# Patient Record
Sex: MALE | Race: OTHER | HISPANIC OR LATINO
[De-identification: names, ages, dates, MRNs, and addresses within clinical notes are randomized per-mention and may not be internally consistent; named-entity substitution may affect disease eponyms.]

---

## 2017-01-15 ENCOUNTER — RX RENEWAL (OUTPATIENT)
Age: 67
End: 2017-01-15

## 2017-02-08 ENCOUNTER — LABORATORY RESULT (OUTPATIENT)
Age: 67
End: 2017-02-08

## 2017-02-08 ENCOUNTER — APPOINTMENT (OUTPATIENT)
Dept: HEART AND VASCULAR | Facility: CLINIC | Age: 67
End: 2017-02-08

## 2017-02-08 VITALS
DIASTOLIC BLOOD PRESSURE: 80 MMHG | SYSTOLIC BLOOD PRESSURE: 140 MMHG | WEIGHT: 203 LBS | HEART RATE: 74 BPM | BODY MASS INDEX: 31.86 KG/M2 | HEIGHT: 67 IN | OXYGEN SATURATION: 98 % | RESPIRATION RATE: 12 BRPM | TEMPERATURE: 98 F

## 2017-02-12 LAB
25(OH)D3 SERPL-MCNC: 26.7 NG/ML
ALBUMIN SERPL ELPH-MCNC: 4.6 G/DL
ALP BLD-CCNC: 78 U/L
ALT SERPL-CCNC: 18 U/L
ANION GAP SERPL CALC-SCNC: 12 MMOL/L
APPEARANCE: CLEAR
AST SERPL-CCNC: 17 U/L
BASOPHILS # BLD AUTO: 0.01 K/UL
BASOPHILS NFR BLD AUTO: 0.3 %
BILIRUB SERPL-MCNC: 1 MG/DL
BILIRUBIN URINE: NEGATIVE
BLOOD URINE: NEGATIVE
BUN SERPL-MCNC: 12 MG/DL
CALCIUM SERPL-MCNC: 9.1 MG/DL
CHLORIDE SERPL-SCNC: 103 MMOL/L
CHOLEST SERPL-MCNC: 214 MG/DL
CHOLEST/HDLC SERPL: 3.7 RATIO
CO2 SERPL-SCNC: 26 MMOL/L
COLOR: ABNORMAL
CREAT SERPL-MCNC: 1 MG/DL
CRP SERPL HS-MCNC: 3.4 MG/L
EOSINOPHIL # BLD AUTO: 0.04 K/UL
EOSINOPHIL NFR BLD AUTO: 1.2 %
ERYTHROCYTE [SEDIMENTATION RATE] IN BLOOD BY WESTERGREN METHOD: 10 MM/HR
FOLATE SERPL-MCNC: 13.6 NG/ML
GLUCOSE QUALITATIVE U: NORMAL MG/DL
GLUCOSE SERPL-MCNC: 99 MG/DL
HCT VFR BLD CALC: 46 %
HDLC SERPL-MCNC: 58 MG/DL
HGB BLD-MCNC: 15 G/DL
IMM GRANULOCYTES NFR BLD AUTO: 0 %
KETONES URINE: NEGATIVE
LDLC SERPL CALC-MCNC: 121 MG/DL
LEUKOCYTE ESTERASE URINE: NEGATIVE
LYMPHOCYTES # BLD AUTO: 1.11 K/UL
LYMPHOCYTES NFR BLD AUTO: 34.6 %
MAGNESIUM SERPL-MCNC: 1.9 MG/DL
MAN DIFF?: NORMAL
MCHC RBC-ENTMCNC: 31.5 PG
MCHC RBC-ENTMCNC: 32.6 GM/DL
MCV RBC AUTO: 96.6 FL
MONOCYTES # BLD AUTO: 0.31 K/UL
MONOCYTES NFR BLD AUTO: 9.7 %
NEUTROPHILS # BLD AUTO: 1.74 K/UL
NEUTROPHILS NFR BLD AUTO: 54.2 %
NITRITE URINE: NEGATIVE
PH URINE: 5.5
PLATELET # BLD AUTO: 218 K/UL
POTASSIUM SERPL-SCNC: 4.1 MMOL/L
PROT SERPL-MCNC: 7.4 G/DL
PROTEIN URINE: NEGATIVE MG/DL
PSA FREE FLD-MCNC: 13.3 %
PSA FREE SERPL-MCNC: 1.13 NG/ML
PSA SERPL-MCNC: 8.51 NG/ML
RBC # BLD: 4.76 M/UL
RBC # FLD: 12.9 %
SODIUM SERPL-SCNC: 141 MMOL/L
SPECIFIC GRAVITY URINE: 1.02
TRIGL SERPL-MCNC: 177 MG/DL
TSH SERPL-ACNC: 1.44 UIU/ML
URATE SERPL-MCNC: 6 MG/DL
UROBILINOGEN URINE: NORMAL MG/DL
VIT B12 SERPL-MCNC: 388 PG/ML
WBC # FLD AUTO: 3.21 K/UL

## 2017-02-13 LAB — VIT C SERPL-MCNC: 0.5 MG/DL

## 2017-08-07 ENCOUNTER — APPOINTMENT (OUTPATIENT)
Dept: HEART AND VASCULAR | Facility: CLINIC | Age: 67
End: 2017-08-07
Payer: COMMERCIAL

## 2017-08-07 VITALS
DIASTOLIC BLOOD PRESSURE: 80 MMHG | SYSTOLIC BLOOD PRESSURE: 114 MMHG | HEART RATE: 91 BPM | BODY MASS INDEX: 32.8 KG/M2 | TEMPERATURE: 97.8 F | OXYGEN SATURATION: 97 % | WEIGHT: 209 LBS | RESPIRATION RATE: 12 BRPM | HEIGHT: 67 IN

## 2017-08-07 DIAGNOSIS — R00.2 PALPITATIONS: ICD-10-CM

## 2017-08-07 DIAGNOSIS — N40.0 BENIGN PROSTATIC HYPERPLASIA WITHOUT LOWER URINARY TRACT SYMPMS: ICD-10-CM

## 2017-08-07 PROCEDURE — 99214 OFFICE O/P EST MOD 30 MIN: CPT | Mod: 25

## 2017-08-07 PROCEDURE — 36415 COLL VENOUS BLD VENIPUNCTURE: CPT

## 2017-08-07 PROCEDURE — 93000 ELECTROCARDIOGRAM COMPLETE: CPT

## 2017-08-12 LAB
25(OH)D3 SERPL-MCNC: 45.8 NG/ML
ALBUMIN SERPL ELPH-MCNC: 4.6 G/DL
ALP BLD-CCNC: 101 U/L
ALT SERPL-CCNC: 25 U/L
ANION GAP SERPL CALC-SCNC: 15 MMOL/L
AST SERPL-CCNC: 23 U/L
BASOPHILS # BLD AUTO: 0.01 K/UL
BASOPHILS NFR BLD AUTO: 0.2 %
BILIRUB SERPL-MCNC: 0.5 MG/DL
BUN SERPL-MCNC: 18 MG/DL
CALCIUM SERPL-MCNC: 9.9 MG/DL
CHLORIDE SERPL-SCNC: 104 MMOL/L
CHOLEST SERPL-MCNC: 201 MG/DL
CHOLEST/HDLC SERPL: 4.2 RATIO
CO2 SERPL-SCNC: 24 MMOL/L
CREAT SERPL-MCNC: 1.02 MG/DL
CRP SERPL HS-MCNC: 15.3 MG/L
EOSINOPHIL # BLD AUTO: 0.07 K/UL
EOSINOPHIL NFR BLD AUTO: 1.5 %
FOLATE SERPL-MCNC: 8.2 NG/ML
GLUCOSE SERPL-MCNC: 104 MG/DL
HBA1C MFR BLD HPLC: 5 %
HCT VFR BLD CALC: 44.1 %
HDLC SERPL-MCNC: 48 MG/DL
HGB BLD-MCNC: 14.5 G/DL
IMM GRANULOCYTES NFR BLD AUTO: 0 %
LDLC SERPL CALC-MCNC: 114 MG/DL
LYMPHOCYTES # BLD AUTO: 1.17 K/UL
LYMPHOCYTES NFR BLD AUTO: 25.2 %
MAGNESIUM SERPL-MCNC: 1.7 MG/DL
MAN DIFF?: NORMAL
MCHC RBC-ENTMCNC: 31.5 PG
MCHC RBC-ENTMCNC: 32.9 GM/DL
MCV RBC AUTO: 95.7 FL
MONOCYTES # BLD AUTO: 0.48 K/UL
MONOCYTES NFR BLD AUTO: 10.3 %
NEUTROPHILS # BLD AUTO: 2.92 K/UL
NEUTROPHILS NFR BLD AUTO: 62.8 %
PLATELET # BLD AUTO: 219 K/UL
POTASSIUM SERPL-SCNC: 4.3 MMOL/L
PROT SERPL-MCNC: 7.7 G/DL
PSA FREE FLD-MCNC: 13.5
PSA FREE SERPL-MCNC: 1.32 NG/ML
PSA SERPL-MCNC: 9.79 NG/ML
RBC # BLD: 4.61 M/UL
RBC # FLD: 13.6 %
SODIUM SERPL-SCNC: 143 MMOL/L
TRIGL SERPL-MCNC: 195 MG/DL
TSH SERPL-ACNC: 1.64 UIU/ML
VIT B12 SERPL-MCNC: 327 PG/ML
WBC # FLD AUTO: 4.65 K/UL

## 2017-08-14 ENCOUNTER — APPOINTMENT (OUTPATIENT)
Dept: UROLOGY | Facility: CLINIC | Age: 67
End: 2017-08-14
Payer: COMMERCIAL

## 2017-08-14 VITALS
HEART RATE: 80 BPM | BODY MASS INDEX: 32.8 KG/M2 | WEIGHT: 209 LBS | TEMPERATURE: 97.8 F | SYSTOLIC BLOOD PRESSURE: 130 MMHG | DIASTOLIC BLOOD PRESSURE: 82 MMHG | HEIGHT: 67 IN

## 2017-08-14 PROCEDURE — 99204 OFFICE O/P NEW MOD 45 MIN: CPT

## 2017-08-14 RX ORDER — POLYETHYLENE GLYCOL 3350 17 G/17G
17 POWDER, FOR SOLUTION ORAL
Qty: 527 | Refills: 0 | Status: COMPLETED | COMMUNITY
Start: 2017-08-07

## 2017-08-16 ENCOUNTER — APPOINTMENT (OUTPATIENT)
Dept: HEART AND VASCULAR | Facility: CLINIC | Age: 67
End: 2017-08-16
Payer: COMMERCIAL

## 2017-08-16 VITALS
RESPIRATION RATE: 14 BRPM | WEIGHT: 204 LBS | DIASTOLIC BLOOD PRESSURE: 80 MMHG | BODY MASS INDEX: 32.02 KG/M2 | HEART RATE: 84 BPM | TEMPERATURE: 98.1 F | HEIGHT: 67 IN | OXYGEN SATURATION: 96 % | SYSTOLIC BLOOD PRESSURE: 132 MMHG

## 2017-08-16 PROCEDURE — 93224 XTRNL ECG REC UP TO 48 HRS: CPT

## 2017-08-30 ENCOUNTER — APPOINTMENT (OUTPATIENT)
Dept: UROLOGY | Facility: CLINIC | Age: 67
End: 2017-08-30

## 2019-08-13 ENCOUNTER — APPOINTMENT (OUTPATIENT)
Dept: HEART AND VASCULAR | Facility: CLINIC | Age: 69
End: 2019-08-13
Payer: MEDICARE

## 2019-08-13 VITALS
RESPIRATION RATE: 14 BRPM | BODY MASS INDEX: 32.33 KG/M2 | DIASTOLIC BLOOD PRESSURE: 98 MMHG | SYSTOLIC BLOOD PRESSURE: 142 MMHG | TEMPERATURE: 98 F | WEIGHT: 206 LBS | HEART RATE: 79 BPM | HEIGHT: 67 IN | OXYGEN SATURATION: 95 %

## 2019-08-13 DIAGNOSIS — K21.9 GASTRO-ESOPHAGEAL REFLUX DISEASE W/OUT ESOPHAGITIS: ICD-10-CM

## 2019-08-13 DIAGNOSIS — K59.09 OTHER CONSTIPATION: ICD-10-CM

## 2019-08-13 PROCEDURE — 99214 OFFICE O/P EST MOD 30 MIN: CPT

## 2019-08-13 PROCEDURE — 93000 ELECTROCARDIOGRAM COMPLETE: CPT

## 2019-08-13 PROCEDURE — 36415 COLL VENOUS BLD VENIPUNCTURE: CPT

## 2019-08-13 RX ORDER — POLYETHYLENE GLYCOL 3350 17 G/17G
17 POWDER, FOR SOLUTION ORAL DAILY
Qty: 30 | Refills: 3 | Status: DISCONTINUED | COMMUNITY
Start: 2017-08-07 | End: 2019-08-13

## 2019-08-14 LAB
25(OH)D3 SERPL-MCNC: 41.5 NG/ML
ALBUMIN SERPL ELPH-MCNC: 5 G/DL
ALP BLD-CCNC: 93 U/L
ALT SERPL-CCNC: 21 U/L
ANION GAP SERPL CALC-SCNC: 12 MMOL/L
APPEARANCE: CLEAR
AST SERPL-CCNC: 22 U/L
BASOPHILS # BLD AUTO: 0.01 K/UL
BASOPHILS NFR BLD AUTO: 0.3 %
BILIRUB SERPL-MCNC: 0.7 MG/DL
BILIRUBIN URINE: NEGATIVE
BLOOD URINE: NEGATIVE
BUN SERPL-MCNC: 14 MG/DL
CALCIUM SERPL-MCNC: 9.8 MG/DL
CHLORIDE SERPL-SCNC: 106 MMOL/L
CHOLEST SERPL-MCNC: 208 MG/DL
CHOLEST/HDLC SERPL: 3.7 RATIO
CO2 SERPL-SCNC: 28 MMOL/L
COLOR: YELLOW
CREAT SERPL-MCNC: 1.06 MG/DL
EOSINOPHIL # BLD AUTO: 0.06 K/UL
EOSINOPHIL NFR BLD AUTO: 1.9 %
ESTIMATED AVERAGE GLUCOSE: 100 MG/DL
GLUCOSE QUALITATIVE U: NEGATIVE
GLUCOSE SERPL-MCNC: 118 MG/DL
HBA1C MFR BLD HPLC: 5.1 %
HCT VFR BLD CALC: 46.5 %
HDLC SERPL-MCNC: 57 MG/DL
HGB BLD-MCNC: 14.8 G/DL
IMM GRANULOCYTES NFR BLD AUTO: 0.3 %
KETONES URINE: NEGATIVE
LDLC SERPL CALC-MCNC: 130 MG/DL
LEUKOCYTE ESTERASE URINE: NEGATIVE
LYMPHOCYTES # BLD AUTO: 1.05 K/UL
LYMPHOCYTES NFR BLD AUTO: 34.1 %
MAN DIFF?: NORMAL
MCHC RBC-ENTMCNC: 31.3 PG
MCHC RBC-ENTMCNC: 31.8 GM/DL
MCV RBC AUTO: 98.3 FL
MONOCYTES # BLD AUTO: 0.29 K/UL
MONOCYTES NFR BLD AUTO: 9.4 %
NEUTROPHILS # BLD AUTO: 1.66 K/UL
NEUTROPHILS NFR BLD AUTO: 54 %
NITRITE URINE: NEGATIVE
PH URINE: 5.5
PLATELET # BLD AUTO: 195 K/UL
POTASSIUM SERPL-SCNC: 4.7 MMOL/L
PROT SERPL-MCNC: 7.7 G/DL
PROTEIN URINE: NORMAL
PSA FREE FLD-MCNC: 10 %
PSA FREE SERPL-MCNC: 1.71 NG/ML
PSA SERPL-MCNC: 16.6 NG/ML
RBC # BLD: 4.73 M/UL
RBC # FLD: 12.8 %
SODIUM SERPL-SCNC: 145 MMOL/L
SPECIFIC GRAVITY URINE: 1.03
TRIGL SERPL-MCNC: 106 MG/DL
TSH SERPL-ACNC: 2.02 UIU/ML
UROBILINOGEN URINE: NORMAL
WBC # FLD AUTO: 3.08 K/UL

## 2019-08-15 LAB
CREAT SPEC-SCNC: 265 MG/DL
MICROALBUMIN 24H UR DL<=1MG/L-MCNC: 4.9 MG/DL
MICROALBUMIN/CREAT 24H UR-RTO: 19 MG/G

## 2019-08-19 NOTE — HISTORY OF PRESENT ILLNESS
[FreeTextEntry1] : EKG shows NSR 72 bpm without ectopy, ischemia or LVH\par \par He denies hematuria, rectal bleeding, dysphagia , chest pain , syncope \par \par

## 2019-08-19 NOTE — REVIEW OF SYSTEMS
[Shortness Of Breath] : no shortness of breath [Dyspnea on exertion] : dyspnea during exertion [Chest  Pressure] : no chest pressure [Chest Pain] : no chest pain [Lower Ext Edema] : no extremity edema [Leg Claudication] : no intermittent leg claudication [Heartburn] : heartburn [Palpitations] : palpitations [Joint Swelling] : no joint swelling [Joint Pain] : joint pain [Joint Stiffness] : no joint stiffness [Muscle Cramps] : no muscle cramps [Limb Weakness (Paresis)] : no limb weakness [Negative] : Heme/Lymph

## 2019-08-19 NOTE — REASON FOR VISIT
[Hyperlipidemia] : hyperlipidemia [Follow-Up - Clinic] : a clinic follow-up of [FreeTextEntry1] : 69   year old obese  male spends several months during the year in [place] and presents  for follow up .  \par \par He needs blood work to follow his PSA and  his complaints are mild dyspnea on exertion, mostly with hills, stairs  with associated fatigue and he suffers from GERD which bothers  him primarily at night.   Currently on Benazapril 20mg po qd and verapamil SR 120mg po qd. \par \par He has hx of HTN and hyperlipidemia and reports a past hx of prostate cancer on biopsy without treatment   (  on surveillance with urologist)\par \par Reports an interval arthroscopy of right lower extremity  December 30 th 2014\par \par Also reports constipation and  palpitations with increasing frequency.  [Hypertension] : hypertension

## 2019-08-19 NOTE — PHYSICAL EXAM
[General Appearance - Well Developed] : well developed [Well Groomed] : well groomed [General Appearance - In No Acute Distress] : no acute distress [Normal Conjunctiva] : the conjunctiva exhibited no abnormalities [Eyelids - No Xanthelasma] : the eyelids demonstrated no xanthelasmas [No Oral Cyanosis] : no oral cyanosis [Normal Jugular Venous A Waves Present] : normal jugular venous A waves present [Normal Jugular Venous V Waves Present] : normal jugular venous V waves present [No Jugular Venous Zhang A Waves] : no jugular venous zhang A waves [FreeTextEntry1] : horizontal scar back of neck, site of previous lipoma resected [Respiration, Rhythm And Depth] : normal respiratory rhythm and effort [Exaggerated Use Of Accessory Muscles For Inspiration] : no accessory muscle use [Heart Rate And Rhythm] : heart rate and rhythm were normal [Auscultation Breath Sounds / Voice Sounds] : lungs were clear to auscultation bilaterally [Heart Sounds] : normal S1 and S2 [Arterial Pulses Normal] : the arterial pulses were normal [Systolic grade ___/6] : A grade [unfilled]/6 systolic murmur was heard. [Veins - Varicosity Changes] : no varicosital changes were noted in the lower extremities [Edema] : no peripheral edema present [Abdomen Soft] : soft [Abdomen Tenderness] : non-tender [Abdomen Mass (___ Cm)] : no abdominal mass palpated [Abnormal Walk] : normal gait [Nail Clubbing] : no clubbing of the fingernails [Gait - Sufficient For Exercise Testing] : the gait was sufficient for exercise testing [Cyanosis, Localized] : no localized cyanosis [Petechial Hemorrhages (___cm)] : no petechial hemorrhages [Skin Color & Pigmentation] : normal skin color and pigmentation [] : no rash [Skin Turgor] : normal skin turgor [No Venous Stasis] : no venous stasis [Skin Lesions] : no skin lesions [No Xanthoma] : no  xanthoma was observed [Oriented To Time, Place, And Person] : oriented to person, place, and time [No Skin Ulcers] : no skin ulcer [Impaired Insight] : insight and judgment were intact [No Anxiety] : not feeling anxious [Affect] : the affect was normal [Mood] : the mood was normal

## 2019-08-19 NOTE — DISCUSSION/SUMMARY
[Essential Hypertension] : essential hypertension [Stable] : stable [Responding to Treatment] : responding to treatment [None] : none [Weight Loss] : weight loss [Sodium Restriction] : sodium restriction [___ Month(s)] : [unfilled] month(s) [FreeTextEntry1] : venipuncture performed  with PSA\par \par GI referral for colonoscopy\par \par medications reconciled and renewed\par \par set up stress echocardiogram  [With Me] : with me

## 2021-07-25 ENCOUNTER — INPATIENT (INPATIENT)
Facility: HOSPITAL | Age: 71
LOS: 1 days | Discharge: HOME HEALTH | DRG: 638 | End: 2021-07-27
Attending: STUDENT IN AN ORGANIZED HEALTH CARE EDUCATION/TRAINING PROGRAM | Admitting: STUDENT IN AN ORGANIZED HEALTH CARE EDUCATION/TRAINING PROGRAM
Payer: COMMERCIAL

## 2021-07-25 VITALS
RESPIRATION RATE: 18 BRPM | WEIGHT: 199.96 LBS | DIASTOLIC BLOOD PRESSURE: 97 MMHG | TEMPERATURE: 98 F | SYSTOLIC BLOOD PRESSURE: 194 MMHG | HEIGHT: 67 IN | HEART RATE: 93 BPM | OXYGEN SATURATION: 96 %

## 2021-07-25 DIAGNOSIS — I72.9 ANEURYSM OF UNSPECIFIED SITE: ICD-10-CM

## 2021-07-25 DIAGNOSIS — I16.1 HYPERTENSIVE EMERGENCY: ICD-10-CM

## 2021-07-25 DIAGNOSIS — N40.0 BENIGN PROSTATIC HYPERPLASIA WITHOUT LOWER URINARY TRACT SYMPTOMS: ICD-10-CM

## 2021-07-25 DIAGNOSIS — A41.9 SEPSIS, UNSPECIFIED ORGANISM: ICD-10-CM

## 2021-07-25 DIAGNOSIS — E11.10 TYPE 2 DIABETES MELLITUS WITH KETOACIDOSIS WITHOUT COMA: ICD-10-CM

## 2021-07-25 DIAGNOSIS — D72.819 DECREASED WHITE BLOOD CELL COUNT, UNSPECIFIED: ICD-10-CM

## 2021-07-25 DIAGNOSIS — K63.89 OTHER SPECIFIED DISEASES OF INTESTINE: ICD-10-CM

## 2021-07-25 DIAGNOSIS — R63.8 OTHER SYMPTOMS AND SIGNS CONCERNING FOOD AND FLUID INTAKE: ICD-10-CM

## 2021-07-25 LAB
ALBUMIN SERPL ELPH-MCNC: 4.6 G/DL — SIGNIFICANT CHANGE UP (ref 3.3–5)
ALP SERPL-CCNC: 143 U/L — HIGH (ref 40–120)
ALT FLD-CCNC: 39 U/L — SIGNIFICANT CHANGE UP (ref 10–45)
ANION GAP SERPL CALC-SCNC: 11 MMOL/L — SIGNIFICANT CHANGE UP (ref 5–17)
ANION GAP SERPL CALC-SCNC: 18 MMOL/L — HIGH (ref 5–17)
ANION GAP SERPL CALC-SCNC: 8 MMOL/L — SIGNIFICANT CHANGE UP (ref 5–17)
APPEARANCE UR: CLEAR — SIGNIFICANT CHANGE UP
APTT BLD: 27.9 SEC — SIGNIFICANT CHANGE UP (ref 27.5–35.5)
AST SERPL-CCNC: 31 U/L — SIGNIFICANT CHANGE UP (ref 10–40)
B-OH-BUTYR SERPL-SCNC: 3 MMOL/L — HIGH
BASE EXCESS BLDV CALC-SCNC: 5.1 MMOL/L — HIGH (ref -2–3)
BASOPHILS # BLD AUTO: 0.02 K/UL — SIGNIFICANT CHANGE UP (ref 0–0.2)
BASOPHILS NFR BLD AUTO: 0.7 % — SIGNIFICANT CHANGE UP (ref 0–2)
BILIRUB SERPL-MCNC: 1 MG/DL — SIGNIFICANT CHANGE UP (ref 0.2–1.2)
BILIRUB UR-MCNC: NEGATIVE — SIGNIFICANT CHANGE UP
BUN SERPL-MCNC: 11 MG/DL — SIGNIFICANT CHANGE UP (ref 7–23)
BUN SERPL-MCNC: 14 MG/DL — SIGNIFICANT CHANGE UP (ref 7–23)
BUN SERPL-MCNC: 15 MG/DL — SIGNIFICANT CHANGE UP (ref 7–23)
CA-I SERPL-SCNC: 1.12 MMOL/L — LOW (ref 1.15–1.33)
CALCIUM SERPL-MCNC: 10.3 MG/DL — SIGNIFICANT CHANGE UP (ref 8.4–10.5)
CALCIUM SERPL-MCNC: 8.7 MG/DL — SIGNIFICANT CHANGE UP (ref 8.4–10.5)
CALCIUM SERPL-MCNC: 9 MG/DL — SIGNIFICANT CHANGE UP (ref 8.4–10.5)
CHLORIDE SERPL-SCNC: 89 MMOL/L — LOW (ref 96–108)
CHLORIDE SERPL-SCNC: 97 MMOL/L — SIGNIFICANT CHANGE UP (ref 96–108)
CHLORIDE SERPL-SCNC: 97 MMOL/L — SIGNIFICANT CHANGE UP (ref 96–108)
CHOLEST SERPL-MCNC: 190 MG/DL — SIGNIFICANT CHANGE UP
CO2 BLDV-SCNC: 33.9 MMOL/L — HIGH (ref 22–26)
CO2 SERPL-SCNC: 29 MMOL/L — SIGNIFICANT CHANGE UP (ref 22–31)
CO2 SERPL-SCNC: 30 MMOL/L — SIGNIFICANT CHANGE UP (ref 22–31)
CO2 SERPL-SCNC: 32 MMOL/L — HIGH (ref 22–31)
COLOR SPEC: YELLOW — SIGNIFICANT CHANGE UP
CREAT SERPL-MCNC: 0.85 MG/DL — SIGNIFICANT CHANGE UP (ref 0.5–1.3)
CREAT SERPL-MCNC: 0.9 MG/DL — SIGNIFICANT CHANGE UP (ref 0.5–1.3)
CREAT SERPL-MCNC: 1.17 MG/DL — SIGNIFICANT CHANGE UP (ref 0.5–1.3)
DIFF PNL FLD: NEGATIVE — SIGNIFICANT CHANGE UP
EOSINOPHIL # BLD AUTO: 0.07 K/UL — SIGNIFICANT CHANGE UP (ref 0–0.5)
EOSINOPHIL NFR BLD AUTO: 2.5 % — SIGNIFICANT CHANGE UP (ref 0–6)
GAS PNL BLDV: 137 MMOL/L — SIGNIFICANT CHANGE UP (ref 136–145)
GAS PNL BLDV: SIGNIFICANT CHANGE UP
GLUCOSE BLDC GLUCOMTR-MCNC: 222 MG/DL — HIGH (ref 70–99)
GLUCOSE BLDC GLUCOMTR-MCNC: 235 MG/DL — HIGH (ref 70–99)
GLUCOSE BLDC GLUCOMTR-MCNC: 269 MG/DL — HIGH (ref 70–99)
GLUCOSE BLDC GLUCOMTR-MCNC: 336 MG/DL — HIGH (ref 70–99)
GLUCOSE SERPL-MCNC: 347 MG/DL — HIGH (ref 70–99)
GLUCOSE SERPL-MCNC: 403 MG/DL — HIGH (ref 70–99)
GLUCOSE SERPL-MCNC: 521 MG/DL — CRITICAL HIGH (ref 70–99)
GLUCOSE UR QL: >=1000
HCO3 BLDV-SCNC: 32 MMOL/L — HIGH (ref 22–29)
HCT VFR BLD CALC: 39.9 % — SIGNIFICANT CHANGE UP (ref 39–50)
HDLC SERPL-MCNC: 43 MG/DL — SIGNIFICANT CHANGE UP
HGB BLD-MCNC: 13.3 G/DL — SIGNIFICANT CHANGE UP (ref 13–17)
IMM GRANULOCYTES NFR BLD AUTO: 0.7 % — SIGNIFICANT CHANGE UP (ref 0–1.5)
INR BLD: 1.01 — SIGNIFICANT CHANGE UP (ref 0.88–1.16)
KETONES UR-MCNC: 40 MG/DL
LACTATE SERPL-SCNC: 2.1 MMOL/L — HIGH (ref 0.5–2)
LEUKOCYTE ESTERASE UR-ACNC: NEGATIVE — SIGNIFICANT CHANGE UP
LIDOCAIN IGE QN: 89 U/L — HIGH (ref 7–60)
LIPID PNL WITH DIRECT LDL SERPL: 110 MG/DL — HIGH
LYMPHOCYTES # BLD AUTO: 0.98 K/UL — LOW (ref 1–3.3)
LYMPHOCYTES # BLD AUTO: 34.4 % — SIGNIFICANT CHANGE UP (ref 13–44)
MAGNESIUM SERPL-MCNC: 2 MG/DL — SIGNIFICANT CHANGE UP (ref 1.6–2.6)
MCHC RBC-ENTMCNC: 30.3 PG — SIGNIFICANT CHANGE UP (ref 27–34)
MCHC RBC-ENTMCNC: 33.3 GM/DL — SIGNIFICANT CHANGE UP (ref 32–36)
MCV RBC AUTO: 90.9 FL — SIGNIFICANT CHANGE UP (ref 80–100)
MONOCYTES # BLD AUTO: 0.41 K/UL — SIGNIFICANT CHANGE UP (ref 0–0.9)
MONOCYTES NFR BLD AUTO: 14.4 % — HIGH (ref 2–14)
NEUTROPHILS # BLD AUTO: 1.35 K/UL — LOW (ref 1.8–7.4)
NEUTROPHILS NFR BLD AUTO: 47.3 % — SIGNIFICANT CHANGE UP (ref 43–77)
NITRITE UR-MCNC: NEGATIVE — SIGNIFICANT CHANGE UP
NON HDL CHOLESTEROL: 147 MG/DL — HIGH
NRBC # BLD: 0 /100 WBCS — SIGNIFICANT CHANGE UP (ref 0–0)
PCO2 BLDV: 57 MMHG — HIGH (ref 42–55)
PH BLDV: 7.36 — SIGNIFICANT CHANGE UP (ref 7.32–7.43)
PH UR: 6 — SIGNIFICANT CHANGE UP (ref 5–8)
PLATELET # BLD AUTO: 167 K/UL — SIGNIFICANT CHANGE UP (ref 150–400)
PO2 BLDV: 28 MMHG — SIGNIFICANT CHANGE UP
POTASSIUM BLDV-SCNC: 4 MMOL/L — SIGNIFICANT CHANGE UP (ref 3.5–5.1)
POTASSIUM SERPL-MCNC: 3.9 MMOL/L — SIGNIFICANT CHANGE UP (ref 3.5–5.3)
POTASSIUM SERPL-MCNC: 4 MMOL/L — SIGNIFICANT CHANGE UP (ref 3.5–5.3)
POTASSIUM SERPL-MCNC: 4.2 MMOL/L — SIGNIFICANT CHANGE UP (ref 3.5–5.3)
POTASSIUM SERPL-SCNC: 3.9 MMOL/L — SIGNIFICANT CHANGE UP (ref 3.5–5.3)
POTASSIUM SERPL-SCNC: 4 MMOL/L — SIGNIFICANT CHANGE UP (ref 3.5–5.3)
POTASSIUM SERPL-SCNC: 4.2 MMOL/L — SIGNIFICANT CHANGE UP (ref 3.5–5.3)
PROT SERPL-MCNC: 8 G/DL — SIGNIFICANT CHANGE UP (ref 6–8.3)
PROT UR-MCNC: NEGATIVE MG/DL — SIGNIFICANT CHANGE UP
PROTHROM AB SERPL-ACNC: 12.1 SEC — SIGNIFICANT CHANGE UP (ref 10.6–13.6)
RBC # BLD: 4.39 M/UL — SIGNIFICANT CHANGE UP (ref 4.2–5.8)
RBC # FLD: 12.4 % — SIGNIFICANT CHANGE UP (ref 10.3–14.5)
SAO2 % BLDV: 43.9 % — SIGNIFICANT CHANGE UP
SARS-COV-2 RNA SPEC QL NAA+PROBE: NEGATIVE — SIGNIFICANT CHANGE UP
SODIUM SERPL-SCNC: 136 MMOL/L — SIGNIFICANT CHANGE UP (ref 135–145)
SODIUM SERPL-SCNC: 137 MMOL/L — SIGNIFICANT CHANGE UP (ref 135–145)
SODIUM SERPL-SCNC: 138 MMOL/L — SIGNIFICANT CHANGE UP (ref 135–145)
SP GR SPEC: 1.01 — SIGNIFICANT CHANGE UP (ref 1–1.03)
TRIGL SERPL-MCNC: 183 MG/DL — HIGH
TSH SERPL-MCNC: 0.92 UIU/ML — SIGNIFICANT CHANGE UP (ref 0.27–4.2)
UROBILINOGEN FLD QL: 0.2 E.U./DL — SIGNIFICANT CHANGE UP
WBC # BLD: 2.85 K/UL — LOW (ref 3.8–10.5)
WBC # FLD AUTO: 2.85 K/UL — LOW (ref 3.8–10.5)

## 2021-07-25 PROCEDURE — 74174 CTA ABD&PLVS W/CONTRAST: CPT | Mod: 26,MA

## 2021-07-25 PROCEDURE — 99285 EMERGENCY DEPT VISIT HI MDM: CPT | Mod: 25

## 2021-07-25 PROCEDURE — 99223 1ST HOSP IP/OBS HIGH 75: CPT

## 2021-07-25 PROCEDURE — 93010 ELECTROCARDIOGRAM REPORT: CPT

## 2021-07-25 PROCEDURE — 71275 CT ANGIOGRAPHY CHEST: CPT | Mod: 26,MA

## 2021-07-25 RX ORDER — INSULIN HUMAN 100 [IU]/ML
6 INJECTION, SOLUTION SUBCUTANEOUS ONCE
Refills: 0 | Status: COMPLETED | OUTPATIENT
Start: 2021-07-25 | End: 2021-07-25

## 2021-07-25 RX ORDER — SODIUM CHLORIDE 9 MG/ML
1000 INJECTION INTRAMUSCULAR; INTRAVENOUS; SUBCUTANEOUS ONCE
Refills: 0 | Status: COMPLETED | OUTPATIENT
Start: 2021-07-25 | End: 2021-07-25

## 2021-07-25 RX ORDER — ONDANSETRON 8 MG/1
4 TABLET, FILM COATED ORAL EVERY 8 HOURS
Refills: 0 | Status: DISCONTINUED | OUTPATIENT
Start: 2021-07-25 | End: 2021-07-27

## 2021-07-25 RX ORDER — SODIUM CHLORIDE 9 MG/ML
1000 INJECTION, SOLUTION INTRAVENOUS
Refills: 0 | Status: DISCONTINUED | OUTPATIENT
Start: 2021-07-25 | End: 2021-07-26

## 2021-07-25 RX ORDER — DEXTROSE 50 % IN WATER 50 %
25 SYRINGE (ML) INTRAVENOUS ONCE
Refills: 0 | Status: DISCONTINUED | OUTPATIENT
Start: 2021-07-25 | End: 2021-07-26

## 2021-07-25 RX ORDER — ENOXAPARIN SODIUM 100 MG/ML
40 INJECTION SUBCUTANEOUS EVERY 24 HOURS
Refills: 0 | Status: DISCONTINUED | OUTPATIENT
Start: 2021-07-25 | End: 2021-07-27

## 2021-07-25 RX ORDER — DEXTROSE 50 % IN WATER 50 %
12.5 SYRINGE (ML) INTRAVENOUS ONCE
Refills: 0 | Status: DISCONTINUED | OUTPATIENT
Start: 2021-07-25 | End: 2021-07-26

## 2021-07-25 RX ORDER — GLUCAGON INJECTION, SOLUTION 0.5 MG/.1ML
1 INJECTION, SOLUTION SUBCUTANEOUS ONCE
Refills: 0 | Status: DISCONTINUED | OUTPATIENT
Start: 2021-07-25 | End: 2021-07-27

## 2021-07-25 RX ORDER — ACETAMINOPHEN 500 MG
650 TABLET ORAL EVERY 6 HOURS
Refills: 0 | Status: DISCONTINUED | OUTPATIENT
Start: 2021-07-25 | End: 2021-07-27

## 2021-07-25 RX ORDER — ONDANSETRON 8 MG/1
4 TABLET, FILM COATED ORAL ONCE
Refills: 0 | Status: COMPLETED | OUTPATIENT
Start: 2021-07-25 | End: 2021-07-25

## 2021-07-25 RX ORDER — POTASSIUM CHLORIDE 20 MEQ
20 PACKET (EA) ORAL ONCE
Refills: 0 | Status: COMPLETED | OUTPATIENT
Start: 2021-07-25 | End: 2021-07-25

## 2021-07-25 RX ORDER — INSULIN LISPRO 100/ML
VIAL (ML) SUBCUTANEOUS
Refills: 0 | Status: DISCONTINUED | OUTPATIENT
Start: 2021-07-25 | End: 2021-07-26

## 2021-07-25 RX ORDER — DEXTROSE 50 % IN WATER 50 %
15 SYRINGE (ML) INTRAVENOUS ONCE
Refills: 0 | Status: DISCONTINUED | OUTPATIENT
Start: 2021-07-25 | End: 2021-07-26

## 2021-07-25 RX ORDER — LANOLIN ALCOHOL/MO/W.PET/CERES
3 CREAM (GRAM) TOPICAL AT BEDTIME
Refills: 0 | Status: DISCONTINUED | OUTPATIENT
Start: 2021-07-25 | End: 2021-07-27

## 2021-07-25 RX ADMIN — Medication 8: at 16:52

## 2021-07-25 RX ADMIN — Medication 3 MILLIGRAM(S): at 21:58

## 2021-07-25 RX ADMIN — ENOXAPARIN SODIUM 40 MILLIGRAM(S): 100 INJECTION SUBCUTANEOUS at 21:58

## 2021-07-25 RX ADMIN — INSULIN HUMAN 6 UNIT(S): 100 INJECTION, SOLUTION SUBCUTANEOUS at 10:38

## 2021-07-25 RX ADMIN — SODIUM CHLORIDE 1000 MILLILITER(S): 9 INJECTION INTRAMUSCULAR; INTRAVENOUS; SUBCUTANEOUS at 11:37

## 2021-07-25 RX ADMIN — Medication 6: at 22:19

## 2021-07-25 RX ADMIN — Medication 20 MILLIEQUIVALENT(S): at 21:58

## 2021-07-25 RX ADMIN — ONDANSETRON 4 MILLIGRAM(S): 8 TABLET, FILM COATED ORAL at 09:32

## 2021-07-25 RX ADMIN — SODIUM CHLORIDE 1000 MILLILITER(S): 9 INJECTION INTRAMUSCULAR; INTRAVENOUS; SUBCUTANEOUS at 10:30

## 2021-07-25 RX ADMIN — SODIUM CHLORIDE 1000 MILLILITER(S): 9 INJECTION INTRAMUSCULAR; INTRAVENOUS; SUBCUTANEOUS at 09:31

## 2021-07-25 NOTE — ED PROVIDER NOTE - OBJECTIVE STATEMENT
72yo M history of HTN and longstanding history of elevated PSA s/p biopsy in 2010 and unclear pathology (states surveillance), complains of weakness, epigastric pain, decrease appetite and PO intake, I lost 20 pounds in four weeks", dizziness and blurry vision x 4 weeks. reports has not taken antihypertensive x 3 weeks 70yo M history of HTN and longstanding history of elevated PSA s/p biopsy in 2010 and unclear pathology (states surveillance), complains of weakness, epigastric pain, decrease appetite and PO intake, I lost 20 pounds in four weeks", dizziness and blurry vision x 4 weeks. reports has not taken antihypertensive x 3 weeks as ran out. +nausea but no vomiting, normal BMs. feels abd is distended. +back pain, denies CP/SOB. never had this before. no surgical hx. has not had screening colonoscopy, denies FH of malignancy.

## 2021-07-25 NOTE — H&P ADULT - PROBLEM SELECTOR PLAN 7
Fluids: none   Electrolytes: Mg>2, K>4  Nutrition:  No IVF currently needed, replete lytes PRN  Prophylaxis: lovenox  Activity: AAT, OOBTC  GI: none  C: FC  Dispo: Admit to [facility] pt p/w wbc 2.85 likely immune compromised 2/2 to DM vs underlying malignancy. no sings of infection at this time  -cont DM management  -c/w colonoscopy outpatient pt p/w wbc 2.85 likely immune compromised 2/2 to DM vs underlying malignancy. no sings of infection at this time  -cont DM management  -plan as above

## 2021-07-25 NOTE — H&P ADULT - ATTENDING COMMENTS
Patient seen and examined at bedside. Pleasant 71yM w/Hx of HTN (non-compliant w/meds), elevated PSA and reports biopsy but does know the path, presenting w/weakness, epigastric pain, decreased appetite and 20lb weight loss over the past 4 weeks a/w polyuria and polydipsia. Found on admission to have glucose of 521 w/anion gap, no acidosis on VBG, with elevated beta-hydroxybutyrate suggestive of diabetic ketosis. Patient also w/hypertension on presentation to SBP >190, resolving without acute intervention, and imaging concerning for rectosigmoid mass vs lymph node. Anion gap closed x1 with 6 units of Humalog. Endocrine consulted for recs and new diagnosis of diabetes. Will discuss role of inpatient vs outpatient colonoscopy when medically optimized. CEA and PSA pending.

## 2021-07-25 NOTE — ED ADULT TRIAGE NOTE - CHIEF COMPLAINT QUOTE
Patient complains of weakness, epigastric pain, decrease appetite and PO intake, I lost 20 pounds in four weeks", dizziness and blurry vision x 4 weeks. Patient denies chest pain, SOB. HX HTN, reports has not taken antihypertensive x 3 weeks. EKG in progress.

## 2021-07-25 NOTE — H&P ADULT - PROBLEM SELECTOR PLAN 8
Fluids: none   Electrolytes: Mg>2, K>4  Nutrition:  No IVF currently needed, replete lytes PRN  Prophylaxis: lovenox  Activity: AAT, OOBTC  GI: none  C: FC  Dispo: Admit to [facility]

## 2021-07-25 NOTE — H&P ADULT - ASSESSMENT
70yo M history of HTN (non complaint w medication) and longstanding history of elevated PSA s/p biopsy in 2010 and unclear pathology (states surveillance) p/w 4 weeks of polyuria polydipsia, frequency urgency weight loss vision loss admitted for management of DKA and hypertensive emergency

## 2021-07-25 NOTE — H&P ADULT - PROBLEM SELECTOR PLAN 1
HR >90, wbc <4, (2/4 SIRS) + lactate 2.1. likely 2/2 to DKA. less likely to be of infectious etiology. UA negative for infection. CT w.o signs of infection.   -s/p 3L NS and 6U humulin regular insulin in ED w resolution of dka and improvement in vitals.  -if spikes fever --> ua, cxr, blood cultures  - will cont to monitor off abx.  -c/w management as below HR >90, wbc <4, (2/4 SIRS) + lactate 2.1. likely 2/2 to DKA. less likely to be of infectious etiology. CT w.o signs of infection. pt w/ complaints of dysuria 1 month ago s/p 9 days of amoxillin he had at home. however dysuria still persists. likely 2/2 to hyperglycemia. UA negative for infection.  -s/p 3L NS and 6U humulin regular insulin in ED w resolution of dka and improvement in vitals.  -if spikes fever --> ua, cxr, blood cultures  - will cont to monitor off abx.  -c/w management as below HR >90, wbc <4, (2/4 SIRS) + lactate 2.1. likely 2/2 to DKA. less likely to be of infectious etiology. CT w.o signs of infection. pt w/ complaints of dysuria 1 month ago s/p 9 days of amoxicillin he had at home. however dysuria still persist. likely 2/2 to hyperglycemia. UA negative for infection.  -s/p 3L NS and 6U humulin regular insulin in ED w resolution of dka and improvement in vitals.  -if spikes fever --> ua, cxr, blood cultures  - will cont to monitor off abx.  -c/w management as below

## 2021-07-25 NOTE — H&P ADULT - PROBLEM SELECTOR PLAN 4
CT angio abd and pelvis: 2.2 cm mass at the rectosigmoid junction concerning for malignancy.  last colonoscopy?  melena?  weight loss?  abdominal pain?  family hx? CT angio abd and pelvis: 2.2 cm mass at the rectosigmoid junction concerning for malignancy. no prior hx of colon cancer. never had a colonoscopy. no hx of cancer in the family. denies tobacco use. denies melena. denies abdominal pain. lost 20 pounds in four weeks.  -f/u GI o/p for colonoscopy (no signs of bleeding at this time, H/H stable. no signs of metastatic disease on CT abd, pelvis, and chest) CT angio abd and pelvis: 2.2 cm mass at the rectosigmoid junction concerning for malignancy. no prior hx of colon cancer. never had a colonoscopy. no hx of cancer in the family. denies tobacco use. denies melena. denies abdominal pain. lost 20 pounds in four weeks.  -consider GI consult in AM-->colonoscopy (no signs of bleeding at this time, H/H stable. no signs of metastatic disease on CT abd, pelvis, and chest)

## 2021-07-25 NOTE — H&P ADULT - PROBLEM SELECTOR PLAN 2
pt w.o known hx of DKA. newly diagnosed diabetic p/w DKA blood glucose >500, UA + ketones, beta hb >2. AN on BMP. a1c this admission 11.2  -resolved w 2L NS and 6U humulin regular insulin  -f/u 4pm BMP  -c/w q6 FS  -endocrine consult  -f/u lipid panel, tsh pt w.o known hx of DKA. newly diagnosed diabetic p/w DKA blood glucose >500, UA + ketones, beta hb >2. AN on BMP. a1c this admission 11.2  -resolved w 2L NS and 6U humulin regular insulin  -f/u 6pm BMP  -c/w q6 FS, SÁNCHEZ  -f/u lipid panel, tsh  -c/w c peptide, islet cell ab/ag, glutamic acid decarboxylase ab,   -endocrine consult pt w.o known hx of DKA. newly diagnosed diabetic p/w DKA blood glucose >500, UA + ketones, beta hb >2. AN on BMP. a1c this admission 11.2  -resolved w 2L NS and 6U humulin regular insulin  -f/u 6pm BMP  -c/w q6 FS, SÁNCHEZ w/ meals and at bedtime  -f/u lipid panel, tsh  -c/w c peptide, islet cell ab/ag, glutamic acid decarboxylase ab,   -endocrine consult

## 2021-07-25 NOTE — ED ADULT NURSE NOTE - OBJECTIVE STATEMENT
Pt AOX4. Pt c/o generalized weakness, epigastric pain, decreased appetite, nausea, and lower back pain for 1 month. Pt states "My abdomen looks more swollen that usual and it feels sharp and its cramping". Pt denies fevers, chills, vomiting, SOB, chest pain. Pt reports being treated for a UTI but c/o dysuria and hematuria after completing antibiotics. Pt speaking in full complete sentences. Respirations even and unlabored.

## 2021-07-25 NOTE — H&P ADULT - HISTORY OF PRESENT ILLNESS
HPI: 72yo M history of HTN and longstanding history of elevated PSA s/p biopsy in 2010 and unclear pathology (states surveillance), complains of weakness, epigastric pain, decrease appetite and PO intake, I lost 20 pounds in four weeks", dizziness and blurry vision x 4 weeks. reports has not taken antihypertensive x 3 weeks as ran out. +nausea but no vomiting, normal BMs. feels abd is distended. +back pain, denies CP/SOB. never had this before. no surgical hx. has not had screening colonoscopy, denies FH of malignancy    In the ED:  Initial vital signs: T: 98.5F, HR: 93, BP: 194/97, R: 18, SpO2: 96% on RA  Labs: significant for wbc 2.85, H/H plt wnl, coags wnl, lytes wnl, BUN/Cr 15/0.90-->11/0.85, blood glucose 521, lipase 81, lactate 2.1, alk phos 143, alt/ast wnl, beta hydroxy butyrate 3.0, A1c 11.6. ABG ph 7.36 pco2 57, hco3 32, UA >1000 glucose, Ketones +  Imaging: CT angio abd and pelvis: 2.2 cm mass at the rectosigmoid junction concerning for malignancy, CT Angio chest: Borderline ectasia of the aortic root 3.8 cm and ascending aorta 3.8 cm. Mild aneurysmal dilatation of the bilateral common iliac arteries each measuring 1.6 cm  EKG: NSR wnl, no st changes, pr qrs int wnl  Medications: 3L NS, 6U humulin regular insulin, 4mg iv zofran  Consults:      HPI: 72yo M history of HTN (non complaint w medication) and longstanding history of elevated PSA s/p biopsy in 2010 and unclear pathology (states surveillance), complains of weakness, epigastric pain, decrease appetite and PO intake, "I lost 20 pounds in four weeks", dizziness, claudication, dysuria, polyuria, polydipsia x 4 weeks. reports hx of blurry vision at baseline wheres prescription glasses however over the 4 weeks blurry vision has been worse than usual. Patient states has not taken antihypertensive x 4+ weeks as ran out. he does not own a BP cuff and has not taken his BP at home. He has not seen his PCP 2 in 2 years. 2 years ago he states his PCP did tell him his A1c was rising but he cannot recall the number at that time. ROS +nausea but no vomiting, normal BMs (no melena or hematochezia). feels abd is distended. Patient denies CP/SOB, fever or chill.  no surgical hx. has not had screening colonoscopy, denies FH of malignancy    In the ED:  Initial vital signs: T: 98.5F, HR: 93, BP: 194/97, R: 18, SpO2: 96% on RA  Labs: significant for wbc 2.85, H/H plt wnl, coags wnl, lytes wnl, BUN/Cr 15/0.90-->11/0.85, blood glucose 521, lipase 81, lactate 2.1, alk phos 143, alt/ast wnl, beta hydroxy butyrate 3.0, A1c 11.6. ABG ph 7.36 pco2 57, hco3 32, UA >1000 glucose, Ketones +  Imaging: CT angio abd and pelvis: 2.2 cm mass at the rectosigmoid junction concerning for malignancy, CT Angio chest: Borderline ectasia of the aortic root 3.8 cm and ascending aorta 3.8 cm. Mild aneurysmal dilatation of the bilateral common iliac arteries each measuring 1.6 cm  EKG: NSR wnl, no st changes, pr qrs int wnl  Medications: 3L NS, 6U humulin regular insulin, 4mg iv zofran  Consults: endocrine

## 2021-07-25 NOTE — H&P ADULT - PROBLEM SELECTOR PLAN 5
CT Angio chest: Borderline ectasia of the aortic root 3.8 cm and ascending aorta 3.8 cm. Mild aneurysmal dilatation of the bilateral common iliac arteries each measuring 1.6 cm  smoking? lipid panel?  -vacular?  -claudication?  -palpable thrill? h/x of BPH s/p biopsy in 2009 w.o signs of malignancy at that time. states he follows with a urologist affiliated w/ [facility] Dr Ab Abernathy, last seen 2 years ago. was told to follow up yearly due to elevated BPH for monitoring of the prostate. says he was sent home w/ a medication however he has not taken it in 2 years (cannot recall the name of the medication). Pt complaints of bladder fullness/incomplete emptying, frequency and urgency, worse within the past 4 weeks.  -CT scan this admission concerning for colorectal malignancy vs prostate cancer/mass  -f/u urology o/p  -consider starting flomax and/or finasteride once blood sugar undercontol h/x of BPH s/p biopsy in 2009 w.o signs of malignancy at that time. states he follows with a urologist affiliated w/ [facility] Dr Juan Jose Aquino (record in [facility]) last seen 2 years ago. was told to follow up yearly due to elevated BPH for monitoring of the prostate. says he was sent home w/ a medication however he has not taken it in 2 years (cannot recall the name of the medication). Pt complaints of bladder fullness/incomplete emptying, frequency and urgency, worse within the past 4 weeks.  -CT scan this admission concerning for colorectal malignancy vs prostate cancer/mass  -f/u urology o/p - Dr Juan Jose Aquino  -consider starting flomax and/or finasteride once blood sugar undercontol

## 2021-07-25 NOTE — H&P ADULT - PROBLEM SELECTOR PLAN 6
pt p/w wbc 2.85 likely immune compromised 2/2 to DM vs malignancy. CT Angio chest: Borderline ectasia of the aortic root 3.8 cm and ascending aorta 3.8 cm. 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 Pt endorsing lower ext claudication for the past 4 weeks. prior to that w.o any claudication or BONE. risk factors including long standing uncontrolled DM and HTN. denies hx of smoking of alcohol abuse. No palpable thrills appreciated on exam  -f/u lipid panel.   -vascular consult CT Angio chest: Borderline ectasia of the aortic root 3.8 cm and ascending aorta 3.8 cm. 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 Pt endorsing lower ext claudication for the past 4 weeks. prior to that w.o any claudication or BONE. risk factors including long standing uncontrolled DM and HTN. denies hx of smoking of alcohol abuse. No palpable thrills appreciated on exam  -based on size of dilation, no indication for intervention at this time  -f/u lipid panel.   -f/u CHELSY, RPR  -consider vascular consult

## 2021-07-25 NOTE — H&P ADULT - NSHPPHYSICALEXAM_GEN_ALL_CORE
PHYSICAL EXAM:    General: WDWN  HEENT: NC/AT; PERRL, anicteric sclera; MMM  Neck: supple  Cardiovascular: +S1/S2, RRR  Respiratory: CTA B/L; no W/R/R  Gastrointestinal: soft, NT/ND; +BSx4  Extremities: WWP; no edema, clubbing or cyanosis  Vascular: 2+ radial, DP/PT pulses B/L  Neurological: AAOx3; no focal deficits  Psychiatric: pleasant mood and affect  Dermatologic: no appreciable wounds or damage to the skin PHYSICAL EXAM:  General: obese, comfortable appearing  HEENT: NC/AT; PERRL, anicteric sclera; MMM  Neck: supple  Cardiovascular: +S1/S2, RRR  Respiratory: CTA B/L; no W/R/R  Gastrointestinal: obese, soft, NT/D; +BSx4, no palpable thrill appreciated  Extremities: WWP; no edema, clubbing or cyanosis, sensation intact  Vascular: 2+ radial, DP/PT pulses B/L  Neurological: AAOx3; no focal deficits  Psychiatric: pleasant mood and affect  Dermatologic: no appreciable wounds or damage to the skin

## 2021-07-25 NOTE — H&P ADULT - NSHPLABSRESULTS_GEN_ALL_CORE
.  LABS:                         13.3   2.85  )-----------( 167      ( 2021 09:33 )             39.9         138  |  97  |  11  ----------------------------<  403<H>  4.2   |  30  |  0.85    Ca    8.7      2021 12:29  Mg     2.0         TPro  8.0  /  Alb  4.6  /  TBili  1.0  /  DBili  x   /  AST  31  /  ALT  39  /  AlkPhos  143<H>      PT/INR - ( 2021 09:36 )   PT: 12.1 sec;   INR: 1.01          PTT - ( 2021 09:36 )  PTT:27.9 sec  Urinalysis Basic - ( 2021 09:32 )    Color: Yellow / Appearance: Clear / S.010 / pH: x  Gluc: x / Ketone: 40 mg/dL  / Bili: Negative / Urobili: 0.2 E.U./dL   Blood: x / Protein: NEGATIVE mg/dL / Nitrite: NEGATIVE   Leuk Esterase: NEGATIVE / RBC: x / WBC x   Sq Epi: x / Non Sq Epi: x / Bacteria: x        Lactate, Blood: 2.1 mmol/L ( @ 09:33)      RADIOLOGY, EKG & ADDITIONAL TESTS: Reviewed.

## 2021-07-25 NOTE — H&P ADULT - PROBLEM SELECTOR PLAN 3
pt w pmh of htn. on... - non compliant w medication. hypertensive on admission 194/97 w/ n/v. however n/v likely in setting of DKA. BP improved in ED to 155/60s w.o BP meds  -will hold off on antihypertensive for now. BP >50% in <24 hours  -c/ BP monitoring  - will restart home medication in 24 hours pt w pmh of htn. on verapamil 120mg qd, amlodipine/benazopril 5-10mg qd, non compliant w medication. hypertensive on admission 194/97 w/ n/v. however n/v likely in setting of DKA. BP improved in ED to 155/60s w.o BP meds  -will hold off on antihypertensive for now. BP >50% in <24 hours  -c/w BP monitoring  -will restart home medication in 24 hours

## 2021-07-25 NOTE — H&P ADULT - NSHPSOCIALHISTORY_GEN_ALL_CORE
Work:  Tobacco use:   EtOH use:  Illicit drug use:    Living situation: Work:x  Tobacco use: x  EtOH use: 5-6 beers 1x/week (last drink 3 months ago)  Illicit drug use:x  Living situation: with son  independent at baseline w.o assisted walking devices

## 2021-07-25 NOTE — ED PROVIDER NOTE - CADM POA PRESS ULCER
Interval History: NAEO. She is resting comfortably without any desats or any issues with her trach or vent. Dad is at bedside, no concerns this morning.    Scheduled Meds:   levetiracetam oral soln  20 mg/kg Per G Tube BID    sulfamethoxazole-trimethoprim  6 mg/kg of trimethoprim Oral Q12H     Continuous Infusions:  PRN Meds:acetaminophen, hydrocortisone, levalbuterol, LORazepam, mineral oil-hydrophil petrolat, polyethylene glycol, simethicone, sodium chloride 3%, vits A and D-white pet-lanolin, zinc oxide-cod liver oil      Objective:     Vital Signs (Most Recent):  Temp: 97.6 °F (36.4 °C) (08/03/19 0349)  Pulse: (!) 133 (08/03/19 0722)  Resp: 26 (08/03/19 0722)  BP: (!) 81/42 (08/02/19 2331)  SpO2: 100 % (08/03/19 0722) Vital Signs (24h Range):  Temp:  [97 °F (36.1 °C)-98.1 °F (36.7 °C)] 97.6 °F (36.4 °C)  Pulse:  [] 133  Resp:  [24-44] 26  SpO2:  [95 %-100 %] 100 %  BP: ()/(42-58) 81/42     No data found.  Body mass index is 14.1 kg/m².    Intake/Output - Last 3 Shifts       08/01 0700 - 08/02 0659 08/02 0700 - 08/03 0659 08/03 0700 - 08/04 0659    NG/ 965     Total Intake(mL/kg) 820 (107.5) 965 (126.5)     Urine (mL/kg/hr) 368 (2) 294 (1.6)     Other  456     Stool       Total Output 368 750     Net +452 +215                  Lines/Drains/Airways     Drain                 Gastrostomy/Enterostomy 11/16/18 1100 Gastrostomy tube w/o balloon LUQ feeding 259 days          Airway                 Surgical Airway 08/01/19 1030 Bivona Cuffless Uncuffed 1 day                Physical Exam   Constitutional: She is active. No distress.   Sleeping, in bed comfortable   HENT:   Head: Atraumatic.   Nose: Nose normal. No nasal discharge.   Mouth/Throat: Mucous membranes are moist. Oropharynx is clear.   Low-set ears; narrow, long head; trach in place   Eyes: Pupils are equal, round, and reactive to light. Conjunctivae and EOM are normal.   Neck: Normal range of motion. Neck supple.   Trach in place w/ collar    Cardiovascular: Normal rate, regular rhythm, S1 normal and S2 normal.   Pulmonary/Chest: Effort normal and breath sounds normal. No respiratory distress.   Increased white tracheal secretions, stable on hme 5L 28%FiO2   Abdominal: Soft. Bowel sounds are normal. She exhibits distension (baseline). There is no tenderness.   Healed scar; G-tube in place, d/c/i   Musculoskeletal: Normal range of motion. She exhibits no tenderness or deformity.   Neurological: She is alert. She exhibits normal muscle tone.   Skin: Skin is warm and dry. No rash noted.   Nursing note and vitals reviewed.      Significant Labs:  No results for input(s): POCTGLUCOSE in the last 48 hours.    Recent Lab Results     None          Significant Imaging: I have reviewed and interpreted all pertinent imaging results/findings within the past 24 hours.   Imaging Results          X-Ray Chest PA And Lateral (Final result)  Result time 06/11/19 17:57:58    Final result by Ibrahima Gonsalez MD (06/11/19 17:57:58)                 Impression:      Stable position of tracheostomy tube tip in the mid intrathoracic trachea.    Airspace opacity in the right upper lobe along bilateral perihilar airspace opacities.  The findings are concerning for possible evolving multifocal pneumonia.      Electronically signed by: Ibrahima Gonsalez MD  Date:    06/11/2019  Time:    17:57             Narrative:    EXAMINATION:  XR CHEST PA AND LATERAL    CLINICAL HISTORY:  Acute tracheitis without obstruction    TECHNIQUE:  PA and lateral views of the chest were performed.    COMPARISON:  03/26/2019.    FINDINGS:  The tracheostomy tube terminates within the mid the intrathoracic trachea.  There is a percutaneous gastrostomy tube in place.    There is stable enlargement of the cardiothymic silhouette.  The hemidiaphragms are within normal limits.  There is no evidence of free air beneath the hemidiaphragms.  No pleural effusion is identified.  There is no evidence of a pneumothorax.   There is no evidence of pneumomediastinum.  There is an airspace opacity in the right upper lobe.  There also bilateral perihilar airspace opacities.  The osseous structures are unremarkable.    There is distention of the bowel loops throughout the abdomen.  There is large colonic stool burden within the left-sided colon.                                 No

## 2021-07-26 ENCOUNTER — TRANSCRIPTION ENCOUNTER (OUTPATIENT)
Age: 71
End: 2021-07-26

## 2021-07-26 LAB
ALBUMIN SERPL ELPH-MCNC: 4 G/DL — SIGNIFICANT CHANGE UP (ref 3.3–5)
ALP SERPL-CCNC: 114 U/L — SIGNIFICANT CHANGE UP (ref 40–120)
ALT FLD-CCNC: 32 U/L — SIGNIFICANT CHANGE UP (ref 10–45)
ANION GAP SERPL CALC-SCNC: 13 MMOL/L — SIGNIFICANT CHANGE UP (ref 5–17)
AST SERPL-CCNC: 27 U/L — SIGNIFICANT CHANGE UP (ref 10–40)
BASOPHILS # BLD AUTO: 0.02 K/UL — SIGNIFICANT CHANGE UP (ref 0–0.2)
BASOPHILS NFR BLD AUTO: 0.7 % — SIGNIFICANT CHANGE UP (ref 0–2)
BILIRUB SERPL-MCNC: 0.8 MG/DL — SIGNIFICANT CHANGE UP (ref 0.2–1.2)
BUN SERPL-MCNC: 14 MG/DL — SIGNIFICANT CHANGE UP (ref 7–23)
C PEPTIDE SERPL-MCNC: 2.3 NG/ML — SIGNIFICANT CHANGE UP (ref 1.1–4.4)
CALCIUM SERPL-MCNC: 9.1 MG/DL — SIGNIFICANT CHANGE UP (ref 8.4–10.5)
CEA SERPL-MCNC: 4.3 NG/ML — HIGH (ref 0–3.8)
CHLORIDE SERPL-SCNC: 97 MMOL/L — SIGNIFICANT CHANGE UP (ref 96–108)
CO2 SERPL-SCNC: 28 MMOL/L — SIGNIFICANT CHANGE UP (ref 22–31)
COVID-19 SPIKE DOMAIN AB INTERP: POSITIVE
COVID-19 SPIKE DOMAIN ANTIBODY RESULT: 47.6 U/ML — HIGH
CREAT SERPL-MCNC: 0.92 MG/DL — SIGNIFICANT CHANGE UP (ref 0.5–1.3)
CULTURE RESULTS: NO GROWTH — SIGNIFICANT CHANGE UP
EOSINOPHIL # BLD AUTO: 0.12 K/UL — SIGNIFICANT CHANGE UP (ref 0–0.5)
EOSINOPHIL NFR BLD AUTO: 3.9 % — SIGNIFICANT CHANGE UP (ref 0–6)
GLUCOSE BLDC GLUCOMTR-MCNC: 175 MG/DL — HIGH (ref 70–99)
GLUCOSE BLDC GLUCOMTR-MCNC: 235 MG/DL — HIGH (ref 70–99)
GLUCOSE BLDC GLUCOMTR-MCNC: 248 MG/DL — HIGH (ref 70–99)
GLUCOSE BLDC GLUCOMTR-MCNC: 284 MG/DL — HIGH (ref 70–99)
GLUCOSE BLDC GLUCOMTR-MCNC: 323 MG/DL — HIGH (ref 70–99)
GLUCOSE SERPL-MCNC: 266 MG/DL — HIGH (ref 70–99)
HCT VFR BLD CALC: 38.8 % — LOW (ref 39–50)
HCV AB S/CO SERPL IA: 0.04 S/CO — SIGNIFICANT CHANGE UP
HCV AB SERPL-IMP: SIGNIFICANT CHANGE UP
HGB BLD-MCNC: 12.9 G/DL — LOW (ref 13–17)
IMM GRANULOCYTES NFR BLD AUTO: 0.7 % — SIGNIFICANT CHANGE UP (ref 0–1.5)
LYMPHOCYTES # BLD AUTO: 1.3 K/UL — SIGNIFICANT CHANGE UP (ref 1–3.3)
LYMPHOCYTES # BLD AUTO: 42.6 % — SIGNIFICANT CHANGE UP (ref 13–44)
MAGNESIUM SERPL-MCNC: 1.8 MG/DL — SIGNIFICANT CHANGE UP (ref 1.6–2.6)
MCHC RBC-ENTMCNC: 30.6 PG — SIGNIFICANT CHANGE UP (ref 27–34)
MCHC RBC-ENTMCNC: 33.2 GM/DL — SIGNIFICANT CHANGE UP (ref 32–36)
MCV RBC AUTO: 92.2 FL — SIGNIFICANT CHANGE UP (ref 80–100)
MONOCYTES # BLD AUTO: 0.31 K/UL — SIGNIFICANT CHANGE UP (ref 0–0.9)
MONOCYTES NFR BLD AUTO: 10.2 % — SIGNIFICANT CHANGE UP (ref 2–14)
NEUTROPHILS # BLD AUTO: 1.28 K/UL — LOW (ref 1.8–7.4)
NEUTROPHILS NFR BLD AUTO: 41.9 % — LOW (ref 43–77)
NRBC # BLD: 0 /100 WBCS — SIGNIFICANT CHANGE UP (ref 0–0)
PLATELET # BLD AUTO: 166 K/UL — SIGNIFICANT CHANGE UP (ref 150–400)
POTASSIUM SERPL-MCNC: 3.9 MMOL/L — SIGNIFICANT CHANGE UP (ref 3.5–5.3)
POTASSIUM SERPL-SCNC: 3.9 MMOL/L — SIGNIFICANT CHANGE UP (ref 3.5–5.3)
PROT SERPL-MCNC: 6.7 G/DL — SIGNIFICANT CHANGE UP (ref 6–8.3)
PSA FLD-MCNC: 50.5 NG/ML — HIGH (ref 0–4)
RBC # BLD: 4.21 M/UL — SIGNIFICANT CHANGE UP (ref 4.2–5.8)
RBC # FLD: 12.7 % — SIGNIFICANT CHANGE UP (ref 10.3–14.5)
SARS-COV-2 IGG+IGM SERPL QL IA: 47.6 U/ML — HIGH
SARS-COV-2 IGG+IGM SERPL QL IA: POSITIVE
SODIUM SERPL-SCNC: 138 MMOL/L — SIGNIFICANT CHANGE UP (ref 135–145)
SPECIMEN SOURCE: SIGNIFICANT CHANGE UP
T PALLIDUM AB TITR SER: NEGATIVE — SIGNIFICANT CHANGE UP
WBC # BLD: 3.05 K/UL — LOW (ref 3.8–10.5)
WBC # FLD AUTO: 3.05 K/UL — LOW (ref 3.8–10.5)

## 2021-07-26 PROCEDURE — 99223 1ST HOSP IP/OBS HIGH 75: CPT

## 2021-07-26 PROCEDURE — 99222 1ST HOSP IP/OBS MODERATE 55: CPT

## 2021-07-26 PROCEDURE — 99233 SBSQ HOSP IP/OBS HIGH 50: CPT | Mod: GC

## 2021-07-26 PROCEDURE — 99222 1ST HOSP IP/OBS MODERATE 55: CPT | Mod: GC

## 2021-07-26 RX ORDER — INSULIN LISPRO 100/ML
VIAL (ML) SUBCUTANEOUS AT BEDTIME
Refills: 0 | Status: DISCONTINUED | OUTPATIENT
Start: 2021-07-26 | End: 2021-07-27

## 2021-07-26 RX ORDER — SODIUM CHLORIDE 9 MG/ML
1000 INJECTION, SOLUTION INTRAVENOUS
Refills: 0 | Status: DISCONTINUED | OUTPATIENT
Start: 2021-07-26 | End: 2021-07-27

## 2021-07-26 RX ORDER — AMLODIPINE BESYLATE AND BENAZEPRIL HYDROCHLORIDE 10; 20 MG/1; MG/1
1 CAPSULE ORAL
Qty: 0 | Refills: 0 | DISCHARGE

## 2021-07-26 RX ORDER — INSULIN LISPRO 100/ML
5 VIAL (ML) SUBCUTANEOUS
Qty: 100 | Refills: 0
Start: 2021-07-26 | End: 2021-08-24

## 2021-07-26 RX ORDER — DEXTROSE 50 % IN WATER 50 %
12.5 SYRINGE (ML) INTRAVENOUS ONCE
Refills: 0 | Status: DISCONTINUED | OUTPATIENT
Start: 2021-07-26 | End: 2021-07-27

## 2021-07-26 RX ORDER — VERAPAMIL HCL 240 MG
1 CAPSULE, EXTENDED RELEASE PELLETS 24 HR ORAL
Qty: 0 | Refills: 0 | DISCHARGE

## 2021-07-26 RX ORDER — DEXTROSE 50 % IN WATER 50 %
25 SYRINGE (ML) INTRAVENOUS ONCE
Refills: 0 | Status: DISCONTINUED | OUTPATIENT
Start: 2021-07-26 | End: 2021-07-27

## 2021-07-26 RX ORDER — DEXTROSE 50 % IN WATER 50 %
15 SYRINGE (ML) INTRAVENOUS ONCE
Refills: 0 | Status: DISCONTINUED | OUTPATIENT
Start: 2021-07-26 | End: 2021-07-27

## 2021-07-26 RX ORDER — POTASSIUM CHLORIDE 20 MEQ
20 PACKET (EA) ORAL ONCE
Refills: 0 | Status: COMPLETED | OUTPATIENT
Start: 2021-07-26 | End: 2021-07-26

## 2021-07-26 RX ORDER — INSULIN GLARGINE 100 [IU]/ML
20 INJECTION, SOLUTION SUBCUTANEOUS AT BEDTIME
Refills: 0 | Status: DISCONTINUED | OUTPATIENT
Start: 2021-07-26 | End: 2021-07-27

## 2021-07-26 RX ORDER — INSULIN ASPART 100 [IU]/ML
5 INJECTION, SOLUTION SUBCUTANEOUS
Qty: 100 | Refills: 0
Start: 2021-07-26 | End: 2021-08-24

## 2021-07-26 RX ORDER — INSULIN LISPRO 100/ML
VIAL (ML) SUBCUTANEOUS
Refills: 0 | Status: DISCONTINUED | OUTPATIENT
Start: 2021-07-26 | End: 2021-07-27

## 2021-07-26 RX ORDER — MAGNESIUM SULFATE 500 MG/ML
1 VIAL (ML) INJECTION ONCE
Refills: 0 | Status: COMPLETED | OUTPATIENT
Start: 2021-07-26 | End: 2021-07-26

## 2021-07-26 RX ORDER — ISOPROPYL ALCOHOL, BENZOCAINE .7; .06 ML/ML; ML/ML
1 SWAB TOPICAL
Qty: 100 | Refills: 1
Start: 2021-07-26 | End: 2021-09-13

## 2021-07-26 RX ORDER — INSULIN LISPRO 100/ML
10 VIAL (ML) SUBCUTANEOUS
Refills: 0 | Status: DISCONTINUED | OUTPATIENT
Start: 2021-07-26 | End: 2021-07-27

## 2021-07-26 RX ORDER — INSULIN GLARGINE 100 [IU]/ML
10 INJECTION, SOLUTION SUBCUTANEOUS
Qty: 100 | Refills: 0
Start: 2021-07-26 | End: 2021-08-24

## 2021-07-26 RX ADMIN — Medication 4: at 08:30

## 2021-07-26 RX ADMIN — Medication 8: at 12:12

## 2021-07-26 RX ADMIN — INSULIN GLARGINE 20 UNIT(S): 100 INJECTION, SOLUTION SUBCUTANEOUS at 22:43

## 2021-07-26 RX ADMIN — ENOXAPARIN SODIUM 40 MILLIGRAM(S): 100 INJECTION SUBCUTANEOUS at 22:43

## 2021-07-26 RX ADMIN — Medication 2: at 22:44

## 2021-07-26 RX ADMIN — Medication 10 UNIT(S): at 17:48

## 2021-07-26 RX ADMIN — Medication 100 GRAM(S): at 09:26

## 2021-07-26 RX ADMIN — Medication 4: at 17:47

## 2021-07-26 RX ADMIN — Medication 20 MILLIEQUIVALENT(S): at 09:25

## 2021-07-26 NOTE — PROGRESS NOTE ADULT - PROBLEM SELECTOR PLAN 4
CT angio abd and pelvis: 2.2 cm mass at the rectosigmoid junction concerning for malignancy. no prior hx of colon cancer. never had a colonoscopy. no hx of cancer in the family. denies tobacco use. denies melena. denies abdominal pain. lost 20 pounds in four weeks.  -consider GI consult in AM-->colonoscopy (no signs of bleeding at this time, H/H stable. no signs of metastatic disease on CT abd, pelvis, and chest) pt admission wbc 2.85 likely immune compromised 2/2 to DM vs underlying malignancy. no signs of infection at this time  - Continue to monitor WBC  - Continue DM management  - Plan as above

## 2021-07-26 NOTE — CONSULT NOTE ADULT - ASSESSMENT
71 year old male, poor historian with poor medical follow-up and medical non-compliance, with history of HTN, elevated PSA s/p biopsy in 2010 with unclear follow-up and diagnosis who initially presented to [facility] ED on 7/25/21 with generalized fatigue and weakness, epigastric pain, decreased appetite and oral intake with reported 20lb weight loss in 4 weeks without effort, dizziness, blurred vision, dysuria, polyuria and polydipsia x 4 weeks.  In ED, he was found to be hypertensive with /97 with noted leukpenia WBC 2.85, in DKA with hyperglycemia (blood glucose 521), Lipase 81, LA 2.1, , Beta hydroxy butyrate 3.0 and A1C 11.6.  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 Dr. Mcfarlane (CT Surgery) consulted for surgical evaluation of small aortic root and ascending aortic aneurysm.    Plan:  Problem 1: Aortic root aneurysm, 3.8cm  -Case discussed with Dr. Mccullough  -No surgical intervention indicated, patient without acute dissection and asymptomatic from small aortic aneurysm.   -Recommend medical optimization and strict BP control  -Patient should continue to follow-up with his Cardiologist, Dr. Wilson  -Patient can follow-up with Dr. Johnston in 1 year for repeat CT imaging. Please ensure patient has Dr. Johnston's office information in his discharge paperwork   Dr. Dashawn Johnston   [facility]   Department of Cardiovascular and Thoracic Surgery  [facility] Fourth Floor  [address] [address] [phone]   -CT Surgery team will sign off, please reconsult as needed.     Problem 2: HTN  -SBP in ED 190s, better controlled while on floor without medical therapy.   -Medical management per primary team  -Patient will benefit from tight blood pressure control     Problem 3: Uncontrolled Diabetes Type 2  -A1C 11  -FS 300s on current regimen, continue to appreciate Endocrine recommendations   -Endocrine team following, DKA resolved  -Continue to monitor, management per primary and endocrine teams.     Problem 4: R/o Malignancy  -Rectosigmoid mass seen incidentally on CT  -Elevated PSA, CEA  -Medical management per primary team.     I have reviewed clinical labs tests and reports, radiology tests and reports, as well as old patient medical records, and discussed with the referring physician.

## 2021-07-26 NOTE — PROGRESS NOTE ADULT - PROBLEM SELECTOR PLAN 6
CT Angio chest: Borderline ectasia of the aortic root 3.8 cm and ascending aorta 3.8 cm. 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 Pt endorsing lower ext claudication for the past 4 weeks. prior to that w.o any claudication or BONE. risk factors including long standing uncontrolled DM and HTN. denies hx of smoking of alcohol abuse. No palpable thrills appreciated on exam  -based on size of dilation, no indication for intervention at this time  -f/u lipid panel.   -f/u CHELSY, RPR  -consider vascular consult pt w pmh of htn. on verapamil 120mg qd, amlodipine/benazopril 5-10mg qd, non compliant w medication. hypertensive on admission 194/97 w/ n/v. however n/v likely in setting of DKA. BP improved in ED to 155/60s w/o BP meds  - Hold off on antihypertensive for now. BP >50% in <24 hours  - C/w BP monitoring  - Will restart home medication in 24 hours

## 2021-07-26 NOTE — CONSULT NOTE ADULT - ASSESSMENT
Mr. Salinas is a 72 yo M w/ PMH HTN admitted for diabetes mellitus w/ hyperglycemia, with his ED workup also notable for CTA abd/pelvis showing 2.2 cm mass at rectosigmoid junction concerning for malignancy; it is unclear to what extent his unintentional weight loss is related to newly-diagnosed uncontrolled T2DM vs underlying malignancy.     # T2DM with hyperglycemia  A1c 11.6%, diagnosed this admission. Not on home meds  - start Lantus _____ qhs  - lispro _____ TID AC  - MDSSI  - please send basal insulin pens, pen needles, and glucometer to pharmacy to facilitate bedside instruction on home blood glucose monitoring and home self-administration of insulin  - diabetes educator consulted, appreciate assistance    We appreciate the opportunity to participate in this patient's care. Endocrinology will continue to follow.  Mr. Salinas is a 70 yo M w/ PMH HTN admitted for diabetes mellitus w/ hyperglycemia, with his ED workup also notable for CTA abd/pelvis showing 2.2 cm mass at rectosigmoid junction concerning for malignancy; it is unclear to what extent his unintentional weight loss is related to newly-diagnosed uncontrolled T2DM vs underlying malignancy.     # T2DM with hyperglycemia  A1c 11.6%, diagnosed this admission. Not on home meds  - start Lantus 20 qhs  - lispro 10 TID AC  - MDSSI at meals and bedtime  - please send basal insulin pens, pen needles, and glucometer to pharmacy to facilitate bedside instruction on home blood glucose monitoring and home self-administration of insulin  - diabetes educator consulted, appreciate assistance    We appreciate the opportunity to participate in this patient's care. Endocrinology will continue to follow.  Mr. Salinas is a 70 yo M w/ PMH HTN admitted for diabetes mellitus w/ hyperglycemia, with his ED workup also notable for CTA abd/pelvis showing 2.2 cm mass at rectosigmoid junction concerning for malignancy; it is unclear to what extent his unintentional weight loss is related to newly-diagnosed uncontrolled T2DM vs underlying malignancy.     # T2DM with hyperglycemia  A1c 11.6%, diagnosed this admission. Not on home meds  - start Lantus 20 qhs  - lispro 10 TID AC  - MDSSI at meals and bedtime  - check C-peptide with AM labs  - please send basal insulin pens, pen needles, and glucometer to pharmacy to facilitate bedside instruction on home blood glucose monitoring and home self-administration of insulin  - diabetes educator consulted, appreciate assistance    We appreciate the opportunity to participate in this patient's care. Endocrinology will continue to follow.  Mr. Salinas is a 72 yo M w/ PMH HTN admitted for diabetes mellitus w/ hyperglycemia, with his ED workup also notable for CTA abd/pelvis showing 2.2 cm mass at rectosigmoid junction concerning for malignancy; it is unclear to what extent his unintentional weight loss is related to newly-diagnosed uncontrolled T2DM vs underlying malignancy.     # T2DM with hyperglycemia  A1c 11.6%, diagnosed this admission. Not on home meds  - start Lantus 20 qhs  - lispro 10 TID AC  - MDSSI at meals and bedtime  - f/u C-peptide, PARTHA auto-Ab sent on admission  - please send basal insulin pens, pen needles, and glucometer to pharmacy to facilitate bedside instruction on home blood glucose monitoring and home self-administration of insulin  - diabetes educator consulted, appreciate assistance    We appreciate the opportunity to participate in this patient's care. Endocrinology will continue to follow.

## 2021-07-26 NOTE — CONSULT NOTE ADULT - SUBJECTIVE AND OBJECTIVE BOX
VASCULAR ATTENDING: Dr. Sanchez     HPI PER PRIMARY: 70yo M history of HTN (non complaint w medication) and longstanding history of elevated PSA s/p biopsy in 2010 and unclear pathology (states surveillance), complains of weakness, epigastric pain, decrease appetite and PO intake, "I lost 20 pounds in four weeks", dizziness, claudication, dysuria, polyuria, polydipsia x 4 weeks. reports hx of blurry vision at baseline wheres prescription glasses however over the 4 weeks blurry vision has been worse than usual. Patient states has not taken antihypertensive x 4+ weeks as ran out. he does not own a BP cuff and has not taken his BP at home. He has not seen his PCP 2 in 2 years. 2 years ago he states his PCP did tell him his A1c was rising but he cannot recall the number at that time. ROS +nausea but no vomiting, normal BMs (no melena or hematochezia). feels abd is distended. Patient denies CP/SOB, fever or chill.  no surgical hx. has not had screening colonoscopy, denies FH of malignancy    In the ED:  Initial vital signs: T: 98.5F, HR: 93, BP: 194/97, R: 18, SpO2: 96% on RA  Labs: significant for wbc 2.85, H/H plt wnl, coags wnl, lytes wnl, BUN/Cr 15/0.90-->11/0.85, blood glucose 521, lipase 81, lactate 2.1, alk phos 143, alt/ast wnl, beta hydroxy butyrate 3.0, A1c 11.6. ABG ph 7.36 pco2 57, hco3 32, UA >1000 glucose, Ketones +  Imaging: CT angio abd and pelvis: 2.2 cm mass at the rectosigmoid junction concerning for malignancy, CT Angio chest: Borderline ectasia of the aortic root 3.8 cm and ascending aorta 3.8 cm. Mild aneurysmal dilatation of the bilateral common iliac arteries each measuring 1.6 cm  EKG: NSR wnl, no st changes, pr qrs int wnl  Medications: 3L NS, 6U humulin regular insulin, 4mg iv zofran  Consults: endocrine     VASCULAR ADDENDUM: Patient seen and interviewed by the vascular team at the bedside. Patient notified of the findings of iliac artery dilatation seen on the CT scan and reports that he was never aware of this and that he has never had any known vascular history. Reports that he does not have, and has never had, any symptoms of claudication, calf pain/buttocks pain/hip pain on exertion, lower extremity pain/cramping, lower extremity sensory or motor changes. Reports that he is able to fully ambulate independently and run if necessary with no limitations. He denies any chest pain/abdominal pain/groin pain/ flank pain.    OBJECTIVE:  Vital Signs Last 24 Hrs  T(C): 36.8 (2021 08:57), Max: 36.8 (2021 08:57)  T(F): 98.3 (2021 08:57), Max: 98.3 (2021 08:57)  HR: 76 (2021 08:57) (61 - 78)  BP: 137/87 (2021 08:57) (120/69 - 139/81)  BP(mean): --  RR: 18 (2021 08:57) (17 - 18)  SpO2: 94% (2021 08:57) (94% - 96%)  I&O's Detail      Physical Exam:  General: No acute distress, resting comfortably in bed  C/V: normal sinus rhythm  Pulm: Nonlabored breathing, no respiratory distress  Abd: soft, non-tender, non-distended.  Exem: warm and well perfused, no edema. Bilateral lower extremities are without any lesions.  Vascular: Palpable femoral, popliteal, DP, PT pulses bilaterally.    LABS:                        12.9   3.05  )-----------( 166      ( 2021 07:44 )             38.8         138  |  97  |  14  ----------------------------<  266<H>  3.9   |  28  |  0.92    Ca    9.1      2021 07:44  Mg     1.8         TPro  6.7  /  Alb  4.0  /  TBili  0.8  /  DBili  x   /  AST  27  /  ALT  32  /  AlkPhos  114      PT/INR - ( 2021 09:36 )   PT: 12.1 sec;   INR: 1.01          PTT - ( 2021 09:36 )  PTT:27.9 sec  Urinalysis Basic - ( 2021 09:32 )    Color: Yellow / Appearance: Clear / S.010 / pH: x  Gluc: x / Ketone: 40 mg/dL  / Bili: Negative / Urobili: 0.2 E.U./dL   Blood: x / Protein: NEGATIVE mg/dL / Nitrite: NEGATIVE   Leuk Esterase: NEGATIVE / RBC: x / WBC x   Sq Epi: x / Non Sq Epi: x / Bacteria: x      Assessment  Mr. Salinas is a 71 year opld man with Hx of non-controlled HTN who initially presented to the ED on  with cc of weakness, dizziness, polyuria, polydypsia, found to be in DKA and in hypertensive emergency. Vascular surgery consulted for evaluation after CT scan noted bilateral AMISHA dilatation to 1.6cm. Patient is asymptomatic, has never had any symptoms of claudication, rest pain, lower extremity pain and has palpable distal pulses.    Plan  --incomplete, pending discussion with vascular attending--

## 2021-07-26 NOTE — CONSULT NOTE ADULT - SUBJECTIVE AND OBJECTIVE BOX
CONSULT NOTE: ENDOCRINOLOGY    HPI: Mr. Salinas is a 72 yo M w/ PMH HTN who presents w/ a 1 month history of unintentional weight loss, admitted for hyperglycemia. He believes a doctor once told him his sugar was elevated "a couple of years ago" but does not recall being prescribed any medication for blood sugar. He denies prior hospitalization for hyperglycemia. As per H&P he reported 20 lbs of unintentional weight loss, though he does not have a scale at home, and denies this today. He does affirm several weeks of blurred vision, polyphagia, polydipsia, polyuria.     PAST MEDICAL & SURGICAL HISTORY:  Essential hypertension  No significant past surgical history    Home Medications:  amlodipine-benazepril 5 mg-10 mg oral capsule: 1 cap(s) orally once a day (2021 16:32)  verapamil 120 mg/24 hours oral capsule, extended release: 1 cap(s) orally once a day (2021 16:32)    SOCIAL HISTORY:  Denies tobacco use. Social alcohol use 5-6 beers 1x/week (last drink 3 months ago). Denies illicit drug use. Lives w/ son in the community, functionally independent at baseline. Ambulates without assistive devices.    FAMILY HISTORY:  Family history of heart disease.     VITAL SIGNS:  T(C): 36.8 (21 @ 08:57), Max: 36.8 (21 @ 08:57)  T(F): 98.3 (21 @ 08:57), Max: 98.3 (21 @ 08:57)  HR: 76 (21 @ 08:57) (61 - 78)  BP: 137/87 (21 @ 08:57) (120/69 - 155/91)  BP(mean): --  RR: 18 (21 @ 08:57) (17 - 18)  SpO2: 94% (21 @ 08:57) (94% - 96%)  Wt(kg): --    PHYSICAL EXAM:    Constitutional: Adult  male seated upright in bed, very pleasant, overweight, in no acute distress  Head: NC/AT  Eyes: PERRL, EOMI, anicteric sclera  ENT: MMM  Neck: no JVD  Respiratory: respirations appear comfortable, on room air, no accessory muscle use  Cardiac: RRR  Gastrointestinal: protuberant abd, soft, NT/ND; no rebound or guarding  Extremities: WWP, no cyanosis; no peripheral edema  Vascular: 2+ radial pulses bilaterally  Dermatologic: skin warm, dry and intact; no rashes or wounds noted  Neurologic: alert, conversational; face symmetric; moving extremities x4 to command  Psychiatric: affect and characteristics of appearance, verbalizations, behaviors are appropriate    LABS:                         12.9   3.05  )-----------( 166      ( 2021 07:44 )             38.8         138  |  97  |  14  ----------------------------<  266<H>  3.9   |  28  |  0.92    Ca    9.1      2021 07:44  Mg     1.8         TPro  6.7  /  Alb  4.0  /  TBili  0.8  /  DBili  x   /  AST  27  /  ALT  32  /  AlkPhos  114      PT/INR - ( 2021 09:36 )   PT: 12.1 sec;   INR: 1.01          PTT - ( 2021 09:36 )  PTT:27.9 sec  Urinalysis Basic - ( 2021 09:32 )    Color: Yellow / Appearance: Clear / S.010 / pH: x  Gluc: x / Ketone: 40 mg/dL  / Bili: Negative / Urobili: 0.2 E.U./dL   Blood: x / Protein: NEGATIVE mg/dL / Nitrite: NEGATIVE   Leuk Esterase: NEGATIVE / RBC: x / WBC x   Sq Epi: x / Non Sq Epi: x / Bacteria: x            CAPILLARY BLOOD GLUCOSE      POCT Blood Glucose.: 323 mg/dL (2021 12:09)  POCT Blood Glucose.: 235 mg/dL (2021 08:16)  POCT Blood Glucose.: 175 mg/dL (2021 02:09)  POCT Blood Glucose.: 269 mg/dL (2021 22:15)  POCT Blood Glucose.: 235 mg/dL (2021 19:38)  POCT Blood Glucose.: 336 mg/dL (2021 16:48)  POCT Blood Glucose.: 222 mg/dL (2021 14:21)      RADIOLOGY, EKG & ADDITIONAL TESTS: Reviewed.  CTA abd/pelvis notable for "IMPRESSION: No aortic dissection. No critical arterial stenosis. Borderline ectasia of the aortic root 3.8 cm and ascending aorta 3.8 cm. 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 2.2 cm mass inseparable from the rectosigmoid junction representing exophytic primary colorectal neoplasm versus lymphadenopathy related to prostate, given the history of elevated PSA. A couple of other mildly enlarged pelvic lymph nodes. Further evaluation may include colonoscopy and endoscopic or CT-guided biopsy." CONSULT NOTE: ENDOCRINOLOGY    HPI: Mr. Salinas is a 72 yo M w/ PMH HTN who presents w/ a 1 month history of unintentional weight loss, admitted for hyperglycemia. He believes a doctor once told him his sugar was elevated "a couple of years ago" but does not recall being prescribed any medication for blood sugar. He denies prior hospitalization for hyperglycemia. As per H&P he reported 20 lbs of unintentional weight loss. He does affirm several weeks of blurred vision, polyphagia, polydipsia, polyuria.     PAST MEDICAL & SURGICAL HISTORY:  Essential hypertension  No significant past surgical history    Home Medications:  amlodipine-benazepril 5 mg-10 mg oral capsule: 1 cap(s) orally once a day (2021 16:32)  verapamil 120 mg/24 hours oral capsule, extended release: 1 cap(s) orally once a day (2021 16:32)    SOCIAL HISTORY:  Denies tobacco use. Social alcohol use 5-6 beers 1x/week (last drink 3 months ago). Denies illicit drug use. Lives w/ son in the community, functionally independent at baseline. Ambulates without assistive devices.    FAMILY HISTORY:  Family history of heart disease.     VITAL SIGNS:  T(C): 36.8 (21 @ 08:57), Max: 36.8 (21 @ 08:57)  T(F): 98.3 (21 @ 08:57), Max: 98.3 (21 @ 08:57)  HR: 76 (21 @ 08:57) (61 - 78)  BP: 137/87 (21 @ 08:57) (120/69 - 155/91)  BP(mean): --  RR: 18 (21 @ 08:57) (17 - 18)  SpO2: 94% (21 @ 08:57) (94% - 96%)  Wt(kg): --    PHYSICAL EXAM:    Constitutional: Adult  male seated upright in bed, very pleasant, overweight, in no acute distress  Head: NC/AT  Eyes: PERRL, EOMI, anicteric sclera  ENT: MMM  Neck: no JVD  Respiratory: respirations appear comfortable, on room air, no accessory muscle use  Cardiac: RRR  Gastrointestinal: protuberant abd, soft, NT/ND; no rebound or guarding  Extremities: WWP, no cyanosis; no peripheral edema  Vascular: 2+ radial pulses bilaterally  Dermatologic: skin warm, dry and intact; no rashes or wounds noted  Neurologic: alert, conversational; face symmetric; moving extremities x4 to command  Psychiatric: affect and characteristics of appearance, verbalizations, behaviors are appropriate    LABS:                         12.9   3.05  )-----------( 166      ( 2021 07:44 )             38.8         138  |  97  |  14  ----------------------------<  266<H>  3.9   |  28  |  0.92    Ca    9.1      2021 07:44  Mg     1.8         TPro  6.7  /  Alb  4.0  /  TBili  0.8  /  DBili  x   /  AST  27  /  ALT  32  /  AlkPhos  114      PT/INR - ( 2021 09:36 )   PT: 12.1 sec;   INR: 1.01          PTT - ( 2021 09:36 )  PTT:27.9 sec  Urinalysis Basic - ( 2021 09:32 )    Color: Yellow / Appearance: Clear / S.010 / pH: x  Gluc: x / Ketone: 40 mg/dL  / Bili: Negative / Urobili: 0.2 E.U./dL   Blood: x / Protein: NEGATIVE mg/dL / Nitrite: NEGATIVE   Leuk Esterase: NEGATIVE / RBC: x / WBC x   Sq Epi: x / Non Sq Epi: x / Bacteria: x            CAPILLARY BLOOD GLUCOSE      POCT Blood Glucose.: 323 mg/dL (2021 12:09)  POCT Blood Glucose.: 235 mg/dL (2021 08:16)  POCT Blood Glucose.: 175 mg/dL (2021 02:09)  POCT Blood Glucose.: 269 mg/dL (2021 22:15)  POCT Blood Glucose.: 235 mg/dL (2021 19:38)  POCT Blood Glucose.: 336 mg/dL (2021 16:48)  POCT Blood Glucose.: 222 mg/dL (2021 14:21)      RADIOLOGY, EKG & ADDITIONAL TESTS: Reviewed.  CTA abd/pelvis notable for "IMPRESSION: No aortic dissection. No critical arterial stenosis. Borderline ectasia of the aortic root 3.8 cm and ascending aorta 3.8 cm. 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 2.2 cm mass inseparable from the rectosigmoid junction representing exophytic primary colorectal neoplasm versus lymphadenopathy related to prostate, given the history of elevated PSA. A couple of other mildly enlarged pelvic lymph nodes. Further evaluation may include colonoscopy and endoscopic or CT-guided biopsy."

## 2021-07-26 NOTE — DISCHARGE NOTE PROVIDER - NSDCMRMEDTOKEN_GEN_ALL_CORE_FT
Admelog SoloStar 100 units/mL injectable solution: 5 unit(s) subcutaneous 3 times a day (with meals)   alcohol swabs : Apply topically to affected area 4 times a day   Basaglar KwikPen 100 units/mL subcutaneous solution: 10 unit(s) subcutaneous once a day   glucometer (per patient&#x27;s insurance): Test blood sugars four times a day. Dispense #1 glucometer.  glucose tablets: Follow instructions on bottle when sugar is low.  Insulin Pen Needles, 4mm: 1 application subcutaneously 4 times a day. ** Use with insulin pen **   lancets: 1 application subcutaneously 4 times a day   test strips (per patient&#x27;s insurance): 1 application subcutaneously 4 times a day. ** Compatible with patient&#x27;s glucometer **   alcohol swabs : Apply topically to affected area 4 times a day   Basaglar KwikPen 100 units/mL subcutaneous solution: 10 unit(s) subcutaneous once a day   glucometer (per patient&#x27;s insurance): Test blood sugars four times a day. Dispense #1 glucometer.  glucose tablets: Follow instructions on bottle when sugar is low.  Insulin Pen Needles, 4mm: 1 application subcutaneously 4 times a day. ** Use with insulin pen **   lancets: 1 application subcutaneously 4 times a day   NovoLOG FlexPen 100 units/mL injectable solution: 5 unit(s) subcutaneous 3 times a day (with meals)   test strips (per patient&#x27;s insurance): 1 application subcutaneously 4 times a day. ** Compatible with patient&#x27;s glucometer **   alcohol swabs : Apply topically to affected area 4 times a day   amLODIPine 5 mg oral tablet: 1 tab(s) orally once a day  Basaglar KwikPen 100 units/mL subcutaneous solution: 10 unit(s) subcutaneous once a day   glucometer (per patient&#x27;s insurance): Test blood sugars four times a day. Dispense #1 glucometer.  glucose tablets: Follow instructions on bottle when sugar is low.  Insulin Pen Needles, 4mm: 1 application subcutaneously 4 times a day. ** Use with insulin pen **   lancets: 1 application subcutaneously 4 times a day   NovoLOG FlexPen 100 units/mL injectable solution: 5 unit(s) subcutaneous 3 times a day (with meals)   test strips (per patient&#x27;s insurance): 1 application subcutaneously 4 times a day. ** Compatible with patient&#x27;s glucometer **   alcohol swabs : Apply topically to affected area 4 times a day   Basaglar KwikPen 100 units/mL subcutaneous solution: 34 unit(s) subcutaneous once a day   glucometer (per patient&#x27;s insurance): Test blood sugars four times a day. Dispense #1 glucometer.  glucose tablets: Follow instructions on bottle when sugar is low.  Insulin Pen Needles, 4mm: 1 application subcutaneously 4 times a day. ** Use with insulin pen **   lancets: 1 application subcutaneously 4 times a day   Norvasc 5 mg oral tablet: 1 tab(s) orally once a day  NovoLOG FlexPen 100 units/mL injectable solution: 18 unit(s) subcutaneous 3 times a day (with meals)   test strips (per patient&#x27;s insurance): 1 application subcutaneously 4 times a day. ** Compatible with patient&#x27;s glucometer **

## 2021-07-26 NOTE — DISCHARGE NOTE PROVIDER - HOSPITAL COURSE
#Discharge: do not delete    Patient is __ yo M/F with past medical history of _____  Presented with _____, found to have _____  Problem List/Main Diagnoses (system-based):   Inpatient treatment course:   New medications:   Labs to be followed outpatient:   Exam to be followed outpatient:    #Discharge: do not delete    Patient is 72 yo M with past medical history of HTN (non complaint w medication) and longstanding history of elevated PSA s/p biopsy in 2010 and unclear pathology (states surveillance)  Presented with weakness, epigastric pain, decrease appetite and PO intake, weight loss (20 pounds in four weeks), dizziness, claudication, dysuria, polyuria, polydipsia and blurry vision x 4 weeks found to have DKA.    Problem List/Main Diagnoses (system-based):   #DKA  Hyperglycemic in 500s on admission with anion gap and +BHB. Endocrine started on 20U basal and 10U bolus regimen. Received diabetes education. Scheduled for follow up with  endocrinology after discharge.    #HTN  194/97 in ED with nausea and vomiting was concerning for hypertensive emergency but BP improved rapidly with DKA management and without any antihypertensives. Has hx of HTN and was prescribed home amlodipine-benzapril and verapamil but has not taken these for over a month because he ran out of meds. BP controlled on admission ***with 5 mg amlodipine***    #Rectosigmoid Mass  2.2 cm Rectosigmoid mass noted on CT A&P in ED concerning for CRC v. prostatic cancer. GI recommended ***outpatient colonoscopy.***    #Elevated PSA  Patient reports history of elevated PSA, S/P prostate biopsy in 2010 w/ unclear pathology and under active surveillance. PSA during admission 50. Informed to follow up with his urologist.    #Vascular Ectasia  CT abdomen showed borderline ectasia of the aortic root and ascending aorta and mild aneurysmal dilatation of the bilateral common iliac arteries. Vascular and cardiothoracic surgery did not recommend acute intervention and encouraged surveillance as an outpatient.    New medications:     Labs to be followed outpatient:     Exam to be followed outpatient:    #Discharge: do not delete    Patient is 72 yo M with past medical history of HTN (non complaint w medication) and longstanding history of elevated PSA s/p biopsy in 2010 and unclear pathology (states surveillance)  Presented with weakness, epigastric pain, decrease appetite and PO intake, weight loss (20 pounds in four weeks), dizziness, claudication, dysuria, polyuria, polydipsia and blurry vision x 4 weeks found to have DKA.    Problem List/Main Diagnoses (system-based):   #DKA  Hyperglycemic in 500s on admission with anion gap and +BHB. Endocrine started on 20U basal and 10U bolus regimen. Received diabetes education. Scheduled for follow up with  endocrinology after discharge.    #HTN  194/97 in ED with nausea and vomiting was concerning for hypertensive emergency but BP improved rapidly with DKA management and without any antihypertensives. Has hx of HTN and was prescribed home amlodipine-benzapril and verapamil but has not taken these for over a month because he ran out of meds. BP controlled on admission with 5 mg amlodipine    #Rectosigmoid Mass  2.2 cm Rectosigmoid mass noted on CT A&P in ED concerning for CRC v. prostatic cancer. To be followed up as an outpatient.    #Elevated PSA  Patient reports history of elevated PSA, S/P prostate biopsy in 2010 w/ unclear pathology and under active surveillance. PSA during admission 50. Informed to follow up with his urologist.    #Vascular Ectasia  CT abdomen showed borderline ectasia of the aortic root and ascending aorta and mild aneurysmal dilatation of the bilateral common iliac arteries. Vascular and cardiothoracic surgery did not recommend acute intervention and encouraged surveillance as an outpatient.    New medications:  None     Labs to be followed outpatient:   Islet cell AB  IA-2 AB  Glutamic Acid Decarboxylase AB  CHELSY    Exam to be followed outpatient:     Physical Exam:  Gen: NAD, laying in bed, well appearing, alert, interactive  HEENT: anicteric sclera, no JVD  Cardio: +S1/S2, RRR, no murmurs  Resp: CTA b/l, no w/r/r  GI: obese, soft, +BS x4, NT/ND  Ext: no peripheral edema, NROM x4  Vasc: 3+ peripheral pulses  Skin: warm, dry, and intact. no rashes, wounds or scars  Neuro: AAOx3   #Discharge: do not delete    Patient is 72 yo M with past medical history of HTN (non complaint w medication) and longstanding history of elevated PSA s/p biopsy in 2010 and unclear pathology (states surveillance)  Presented with weakness, epigastric pain, decrease appetite and PO intake, weight loss (20 pounds in four weeks), dizziness, claudication, dysuria, polyuria, polydipsia and blurry vision x 4 weeks found to have DKA.    Problem List/Main Diagnoses (system-based):   #DKA  Hyperglycemic in 521 on admission with anion gap and +BHB. Endocrine recommended discharge with Lantus 34 qhs and Lispro 18 TID AC. Received diabetes education. Glucose controlled on discharge with follow up scheduled for follow up with Dr. Redmond on 08/03.    #HTN  194/97 in ED with nausea and vomiting was concerning for hypertensive emergency but BP improved rapidly with DKA management and without any antihypertensives. Has hx of HTN and was prescribed home amlodipine-benzapril and verapamil but has not taken these for over a month because he ran out of meds. BP managed with amlodipine 5 mg daily on admission. Discharged on 5 mg amlodipine with follow up scheduled with PCP.    #Rectosigmoid Mass  2.2 cm Rectosigmoid mass noted on CT A&P in ED concerning for CRC v. prostatic cancer. Patient informed to follow up with gastroenterology as an outpatient and provided the contact number to schedule.    #Elevated PSA  Patient reports history of elevated PSA, S/P prostate biopsy in 2010 w/ unclear pathology and under active surveillance. PSA during admission 50. Scheduled for follow up with urology after discharge.    #Vascular Ectasia  CT abdomen showed borderline ectasia of the aortic root and ascending aorta and mild aneurysmal dilatation of the bilateral common iliac arteries. Confirmed dilated ascending aorta on TTE. Vascular and cardiothoracic surgery did not recommend acute intervention and scheduled for appointment with vascular for surveillance as an outpatient.    New medications:  34U Lantus SQ nightly  18U Lispro SQ TID AC  Amlodipine 5 mg PO daily    Labs to be followed outpatient:   Islet cell AB  IA-2 AB  Glutamic Acid Decarboxylase AB  CHELSY    Exam to be followed outpatient:       Physical Exam:  Gen: NAD, laying in bed, well appearing, alert, interactive  HEENT: anicteric sclera, no JVD  Cardio: +S1/S2, RRR, no murmurs  Resp: CTA b/l, no w/r/r  GI: obese, soft, +BS x4, NT/ND  Ext: no peripheral edema, NROM x4  Vasc: 3+ peripheral pulses  Skin: warm, dry, and intact. no rashes, wounds or scars  Neuro: AAOx3   #Discharge: do not delete    Patient is 70 yo M with past medical history of HTN (non complaint w medication) and longstanding history of elevated PSA s/p biopsy in 2010 and unclear pathology (states surveillance)  Presented with weakness, epigastric pain, decrease appetite and PO intake, weight loss (20 pounds in four weeks), dizziness, claudication, dysuria, polyuria, polydipsia and blurry vision x 4 weeks found to have DKA.    Problem List/Main Diagnoses (system-based):   #DKA  Hyperglycemic in 521 on admission with anion gap and +BHB. Endocrine recommended discharge with Lantus 34 qhs and Lispro 18 TID AC. Received diabetes education. Glucose controlled on discharge with follow up scheduled for follow up with Dr. Redmond on 08/03.    #HTN  194/97 in ED with nausea and vomiting was concerning for hypertensive emergency but BP improved rapidly with DKA management and without any antihypertensives. Has hx of HTN and was prescribed home amlodipine-benzapril and verapamil but has not taken these for over a month because he ran out of meds. BP managed with amlodipine 5 mg daily on admission. Discharged on 5 mg amlodipine with follow up scheduled with PCP.    #Rectosigmoid Mass  2.2 cm Rectosigmoid mass noted on CT A&P in ED concerning for CRC v. prostatic cancer. Patient informed to follow up with gastroenterology as an outpatient and provided the contact number to schedule.    #Elevated PSA  Patient reports history of elevated PSA, S/P prostate biopsy in 2010 w/ unclear pathology and under active surveillance. PSA during admission 50. Scheduled for follow up with urology after discharge.    #Vascular Ectasia  CT abdomen showed borderline ectasia of the aortic root and ascending aorta and mild aneurysmal dilatation of the bilateral common iliac arteries. Confirmed dilated ascending aorta on TTE. Vascular and cardiothoracic surgery did not recommend acute intervention and scheduled for appointment with vascular for surveillance as an outpatient.    New medications:  34U Lantus SQ nightly  18U Lispro SQ TID AC  Amlodipine 5 mg PO daily    Labs to be followed outpatient:   Islet cell AB  IA-2 AB  Glutamic Acid Decarboxylase AB  CHELSY    Exam to be followed outpatient:   Endocrinology: DM  Urology: prostate  Vascular: aortic ectasia  GI: rectosigmoid mass requiring colonoscopy  PCP    Physical Exam:  Gen: NAD, laying in bed, well appearing, alert, interactive  HEENT: anicteric sclera, no JVD  Cardio: +S1/S2, RRR, no murmurs  Resp: CTA b/l, no w/r/r  GI: obese, soft, +BS x4, NT/ND  Ext: no peripheral edema, NROM x4  Vasc: 3+ peripheral pulses  Skin: warm, dry, and intact. no rashes, wounds or scars  Neuro: AAOx3

## 2021-07-26 NOTE — DISCHARGE NOTE PROVIDER - NSDCHC_MEDRECSTATUS_GEN_ALL_CORE
Admission Reconciliation is Completed  Discharge Reconciliation is Completed Current smoker Admission Reconciliation is Completed  Discharge Reconciliation is Not Complete

## 2021-07-26 NOTE — DISCHARGE NOTE PROVIDER - CARE PROVIDERS DIRECT ADDRESSES
,[email or web address] in the United [email or web address] ,[email or web address] ,[email or web address],[email or web address],DirectAddress_Unknown,[email or web address]

## 2021-07-26 NOTE — DISCHARGE NOTE PROVIDER - CARE PROVIDER_API CALL
Marvel Hernandez)  LX [facility] Surgery  Vascular  [address], [address] [place] [address]  Phone: [phone]  Fax: [phone]  Follow Up Time:     Dashawn Johnston)  Surgery; Thoracic and Cardiac Surgery  [address], [address] [place] [address]  Phone: [phone]  Fax: [phone]  Follow Up Time:    Marvel Hernandez)  LX [facility] Surgery  Vascular  [address], [address]th [place] [address]  Phone: [phone]  Fax: [phone]  Follow Up Time:     Dashawn Johnston)  Surgery; Thoracic and Cardiac Surgery  [address], [address]  [place] [address]  Phone: [phone]  Fax: [phone]  Follow Up Time:     Juan Jose Aquino)  Urology  [address], [place] [address]  Phone: [phone]  Fax: [phone]  Follow Up Time:    Marvel Hernandez)  LX [facility] Surgery  Vascular  [address], [address]th [place] [address]  Phone: [phone]  Fax: [phone]  Follow Up Time:     Dashawn Johnston)  Surgery; Thoracic and Cardiac Surgery  [address], [address] [place] [address]  Phone: [phone]  Fax: [phone]  Follow Up Time:     Juan Jose Aquino)  Urology  [address], [place] [address]  Phone: [phone]  Fax: [phone]  Follow Up Time:     Anmol Wilson  [address] [facility], [facility] [address], [place] [address]  Phone: [phone]  Fax: (   )    -  Scheduled Appointment: 08/06/2021    Andi Arauz)  Gastroenterology; Internal Medicine  [address], [address] [place] [address]  Phone: [phone]  Fax: [phone]  Follow Up Time:    Marvel Hernandez)  LX [facility] Surgery  Vascular  [address], [address] [place] [address]  Phone: [phone]  Fax: [phone]  Scheduled Appointment: 08/05/2021 02:20 PM    Juan Jose Aquino)  Urology  [address], [place] [address]  Phone: [phone]  Fax: [phone]  Scheduled Appointment: 08/04/2021 03:00 PM    Andi Arauz)  Gastroenterology; Internal Medicine  [address], [address] [place] [address]  Phone: [phone]  Fax: [phone]  Follow Up Time:     Anmol Wilson  [address] [address]  Phone: [phone]  Fax: (   )    -  Scheduled Appointment: 08/06/2021 10:30 AM    Sonia Redmond)  EndocrinologyMetabDiabetes; Internal Medicine  [address] [address]  Phone: [phone]  Fax: [phone]  Scheduled Appointment: 08/03/2021 02:20 PM

## 2021-07-26 NOTE — PROGRESS NOTE ADULT - SUBJECTIVE AND OBJECTIVE BOX
INTERVAL HPI/OVERNIGHT EVENTS:  ***  Patient was seen and examined at bedside.   No o/n events, patient resting comfortably. No complaints at this time. Patient denies: fever, chills, dizziness, weakness, HA, Changes in vision, CP, palpitations, SOB, cough, N/V/D/C, dysuria, changes in bowel movements, LE edema. ROS otherwise negative.    VITALS:  T(F): 97.7 (21 @ 05:45)  HR: 61 (21 @ 05:45)  BP: 129/85 (21 @ 05:45)  RR: 18 (21 @ 05:45)  SpO2: 96% (21 @ 05:45)  Wt(kg): --    PHYSICAL EXAM:    MEDICATIONS  (STANDING):  dextrose 40% Gel 15 Gram(s) Oral once  dextrose 5%. 1000 milliLiter(s) (50 mL/Hr) IV Continuous <Continuous>  dextrose 5%. 1000 milliLiter(s) (100 mL/Hr) IV Continuous <Continuous>  dextrose 50% Injectable 25 Gram(s) IV Push once  dextrose 50% Injectable 12.5 Gram(s) IV Push once  dextrose 50% Injectable 25 Gram(s) IV Push once  enoxaparin Injectable 40 milliGRAM(s) SubCutaneous every 24 hours  glucagon  Injectable 1 milliGRAM(s) IntraMuscular once  insulin lispro (ADMELOG) corrective regimen sliding scale   SubCutaneous Before meals and at bedtime    MEDICATIONS  (PRN):  acetaminophen   Tablet .. 650 milliGRAM(s) Oral every 6 hours PRN Temp greater or equal to 38.5C (101.3F), Mild Pain (1 - 3)  aluminum hydroxide/magnesium hydroxide/simethicone Suspension 30 milliLiter(s) Oral every 4 hours PRN Dyspepsia  melatonin 3 milliGRAM(s) Oral at bedtime PRN Insomnia  ondansetron Injectable 4 milliGRAM(s) IV Push every 8 hours PRN Nausea and/or Vomiting    Allergies    No Known Allergies    Intolerances      LABS:                        13.3   2.85  )-----------( 167      ( 2021 09:33 )             39.9         137  |  97  |  14  ----------------------------<  347<H>  3.9   |  32<H>  |  1.17    Ca    9.0      2021 18:33  Mg     2.0         TPro  8.0  /  Alb  4.6  /  TBili  1.0  /  DBili  x   /  AST  31  /  ALT  39  /  AlkPhos  143<H>  07    PT/INR - ( 2021 09:36 )   PT: 12.1 sec;   INR: 1.01          PTT - ( 2021 09:36 )  PTT:27.9 sec  Urinalysis Basic - ( 2021 09:32 )    Color: Yellow / Appearance: Clear / S.010 / pH: x  Gluc: x / Ketone: 40 mg/dL  / Bili: Negative / Urobili: 0.2 E.U./dL   Blood: x / Protein: NEGATIVE mg/dL / Nitrite: NEGATIVE   Leuk Esterase: NEGATIVE / RBC: x / WBC x   Sq Epi: x / Non Sq Epi: x / Bacteria: x      CAPILLARY BLOOD GLUCOSE      POCT Blood Glucose.: 175 mg/dL (2021 02:09)  POCT Blood Glucose.: 269 mg/dL (2021 22:15)  POCT Blood Glucose.: 235 mg/dL (2021 19:38)  POCT Blood Glucose.: 336 mg/dL (2021 16:48)  POCT Blood Glucose.: 222 mg/dL (2021 14:21)  POCT Blood Glucose.: 342 mg/dL (2021 12:42)  POCT Blood Glucose.: 461 mg/dL (2021 11:23)  POCT Blood Glucose.: 419 mg/dL (2021 10:10)                 INTERVAL HPI/OVERNIGHT EVENTS:  Patient was seen and examined at bedside. Reports major improvements in vision and urinary frequency. No overnight events. Patient resting comfortably. No complaints at this time. Patient denies dizziness, weakness, HA, CP, palpitations, SOB, cough, N/V/D/C, dysuria, changes in bowel movements, LE edema. ROS otherwise negative.    VITALS:  T(F): 97.7 (21 @ 05:45)  HR: 61 (21 @ 05:45)  BP: 129/85 (21 @ 05:45)  RR: 18 (21 @ 05:45)  SpO2: 96% (21 @ 05:45)  Wt(kg): --    PHYSICAL EXAM:  General: obese, comfortable appearing  HEENT: NC/AT; PERRL, anicteric sclera; MMM  Neck: supple  Cardiovascular: +S1/S2, RRR  Respiratory: CTA B/L; no W/R/R  Gastrointestinal: obese, soft, NT/D; +BSx4, no palpable thrill appreciated  Extremities: WWP; no edema, clubbing or cyanosis, sensation intact  Vascular: 2+ radial, DP/PT pulses B/L  Neurological: AAOx3; no focal deficits  Psychiatric: pleasant mood and affect  Dermatologic: no appreciable wounds or damage    MEDICATIONS  (STANDING):  dextrose 40% Gel 15 Gram(s) Oral once  dextrose 5%. 1000 milliLiter(s) (50 mL/Hr) IV Continuous <Continuous>  dextrose 5%. 1000 milliLiter(s) (100 mL/Hr) IV Continuous <Continuous>  dextrose 50% Injectable 25 Gram(s) IV Push once  dextrose 50% Injectable 12.5 Gram(s) IV Push once  dextrose 50% Injectable 25 Gram(s) IV Push once  enoxaparin Injectable 40 milliGRAM(s) SubCutaneous every 24 hours  glucagon  Injectable 1 milliGRAM(s) IntraMuscular once  insulin lispro (ADMELOG) corrective regimen sliding scale   SubCutaneous Before meals and at bedtime    MEDICATIONS  (PRN):  acetaminophen   Tablet .. 650 milliGRAM(s) Oral every 6 hours PRN Temp greater or equal to 38.5C (101.3F), Mild Pain (1 - 3)  aluminum hydroxide/magnesium hydroxide/simethicone Suspension 30 milliLiter(s) Oral every 4 hours PRN Dyspepsia  melatonin 3 milliGRAM(s) Oral at bedtime PRN Insomnia  ondansetron Injectable 4 milliGRAM(s) IV Push every 8 hours PRN Nausea and/or Vomiting    Allergies    No Known Allergies    Intolerances      LABS:                        13.3   2.85  )-----------( 167      ( 2021 09:33 )             39.9     07    137  |  97  |  14  ----------------------------<  347<H>  3.9   |  32<H>  |  1.17    Ca    9.0      2021 18:33  Mg     2.0         TPro  8.0  /  Alb  4.6  /  TBili  1.0  /  DBili  x   /  AST  31  /  ALT  39  /  AlkPhos  143<H>  07    PT/INR - ( 2021 09:36 )   PT: 12.1 sec;   INR: 1.01          PTT - ( 2021 09:36 )  PTT:27.9 sec  Urinalysis Basic - ( 2021 09:32 )    Color: Yellow / Appearance: Clear / S.010 / pH: x  Gluc: x / Ketone: 40 mg/dL  / Bili: Negative / Urobili: 0.2 E.U./dL   Blood: x / Protein: NEGATIVE mg/dL / Nitrite: NEGATIVE   Leuk Esterase: NEGATIVE / RBC: x / WBC x   Sq Epi: x / Non Sq Epi: x / Bacteria: x      CAPILLARY BLOOD GLUCOSE      POCT Blood Glucose.: 175 mg/dL (2021 02:09)  POCT Blood Glucose.: 269 mg/dL (2021 22:15)  POCT Blood Glucose.: 235 mg/dL (2021 19:38)  POCT Blood Glucose.: 336 mg/dL (2021 16:48)  POCT Blood Glucose.: 222 mg/dL (2021 14:21)  POCT Blood Glucose.: 342 mg/dL (2021 12:42)  POCT Blood Glucose.: 461 mg/dL (2021 11:23)  POCT Blood Glucose.: 419 mg/dL (2021 10:10)                 INTERVAL HPI/OVERNIGHT EVENTS:  Patient was seen and examined at bedside. Reports major improvements in vision and urinary frequency. No overnight events. Patient resting comfortably. No complaints at this time. Patient denies dizziness, weakness, HA, CP, palpitations, SOB, cough, N/V/D/C, dysuria, changes in bowel movements, LE edema. ROS otherwise negative.    VITALS:  T(F): 97.7 (21 @ 05:45)  HR: 61 (21 @ 05:45)  BP: 129/85 (21 @ 05:45)  RR: 18 (21 @ 05:45)  SpO2: 96% (21 @ 05:45)  Wt(kg): --    PHYSICAL EXAM:  General: obese, comfortable appearing  HEENT: NC/AT; PERRL, anicteric sclera; MMM  Neck: supple  Cardiovascular: +S1/S2, RRR  Respiratory: CTA B/L; no W/R/R  Gastrointestinal: obese, soft, NT/D; +BSx4, no palpable thrill appreciated  Extremities: WWP; no edema, clubbing or cyanosis, sensation intact  Vascular: 2+ radial, DP/PT pulses B/L  Neurological: AAOx3; no focal deficits  Psychiatric: pleasant mood and affect  Dermatologic: no appreciable wounds or damage    MEDICATIONS  (STANDING):  dextrose 40% Gel 15 Gram(s) Oral once  dextrose 5%. 1000 milliLiter(s) (50 mL/Hr) IV Continuous <Continuous>  dextrose 5%. 1000 milliLiter(s) (100 mL/Hr) IV Continuous <Continuous>  dextrose 50% Injectable 25 Gram(s) IV Push once  dextrose 50% Injectable 12.5 Gram(s) IV Push once  dextrose 50% Injectable 25 Gram(s) IV Push once  enoxaparin Injectable 40 milliGRAM(s) SubCutaneous every 24 hours  glucagon  Injectable 1 milliGRAM(s) IntraMuscular once  insulin lispro (ADMELOG) corrective regimen sliding scale   SubCutaneous Before meals and at bedtime    MEDICATIONS  (PRN):  acetaminophen   Tablet .. 650 milliGRAM(s) Oral every 6 hours PRN Temp greater or equal to 38.5C (101.3F), Mild Pain (1 - 3)  aluminum hydroxide/magnesium hydroxide/simethicone Suspension 30 milliLiter(s) Oral every 4 hours PRN Dyspepsia  melatonin 3 milliGRAM(s) Oral at bedtime PRN Insomnia  ondansetron Injectable 4 milliGRAM(s) IV Push every 8 hours PRN Nausea and/or Vomiting    Allergies    No Known Allergies    Intolerances      LABS:                         12.9   3.05  )-----------( 166      ( 2021 07:44 )             38.8         138  |  97  |  14  ----------------------------<  266<H>  3.9   |  28  |  0.92    Ca    9.1      2021 07:44  Mg     1.8         TPro  6.7  /  Alb  4.0  /  TBili  0.8  /  DBili  x   /  AST  27  /  ALT  32  /  AlkPhos  114      PT/INR - ( 2021 09:36 )   PT: 12.1 sec;   INR: 1.01          PTT - ( 2021 09:36 )  PTT:27.9 sec    CAPILLARY BLOOD GLUCOSE      POCT Blood Glucose.: 248 mg/dL (2021 17:07)  POCT Blood Glucose.: 323 mg/dL (2021 12:09)  POCT Blood Glucose.: 235 mg/dL (2021 08:16)  POCT Blood Glucose.: 175 mg/dL (2021 02:09)  POCT Blood Glucose.: 269 mg/dL (2021 22:15)  POCT Blood Glucose.: 235 mg/dL (2021 19:38)    Urinalysis Basic - ( 2021 09:32 )    Color: Yellow / Appearance: Clear / S.010 / pH: x  Gluc: x / Ketone: 40 mg/dL  / Bili: Negative / Urobili: 0.2 E.U./dL   Blood: x / Protein: NEGATIVE mg/dL / Nitrite: NEGATIVE   Leuk Esterase: NEGATIVE / RBC: x / WBC x   Sq Epi: x / Non Sq Epi: x / Bacteria: x      I&O's Summary

## 2021-07-26 NOTE — PROGRESS NOTE ADULT - PROBLEM SELECTOR PLAN 2
pt w.o known hx of DKA. newly diagnosed diabetic p/w DKA blood glucose >500, UA + ketones, beta hb >2. AN on BMP. a1c this admission 11.2. FSG overnight 175-235.  -resolved w 2L NS and 6U humulin regular insulin  -F/u AM BMP  -c/w q6 FS, SÁNCHEZ w/ meals and at bedtime  -f/u lipid panel, tsh  -c/w c peptide, islet cell ab/ag, glutamic acid decarboxylase ab,   -endocrine consult CT angio abd and pelvis: 2.2 cm mass at the rectosigmoid junction concerning for malignancy. no prior hx of colon cancer. never had a colonoscopy. no hx of cancer in the family. denies tobacco use. denies melena. denies abdominal pain. lost 20 pounds in four weeks.  - Consult GI to determine if inpatient or outpatient C-scope is necessary

## 2021-07-26 NOTE — PROGRESS NOTE ADULT - PROBLEM SELECTOR PLAN 1
HR >90, wbc <4, (2/4 SIRS) + lactate 2.1. likely 2/2 to DKA. less likely to be of infectious etiology. CT w.o signs of infection. pt w/ complaints of dysuria 1 month ago s/p 9 days of amoxicillin he had at home. however dysuria still persist. likely 2/2 to hyperglycemia. UA negative for infection. S/p 3L NS and 6U humulin regular insulin in ED w resolution of dka and improvement in vitals. S/p 14U overnight on MISS.  -if spikes fever --> ua, cxr, blood cultures  - will cont to monitor off abx.  -c/w management as below A1c this admission 11.2. Admitted for mgmt of DKA. S/p 6U in ED and on MISS on admission. FSG overnight 175-235. TSH wnl and mild hypertriglyceridemia and hyperlipidemia.  - F/u AM BMP  - FSG and SÁNCHEZ w/ meals and at bedtime  - F/u c peptide, islet cell ab/ag, glutamic acid decarboxylase ab,   - F/u endocrine recs

## 2021-07-26 NOTE — DISCHARGE NOTE PROVIDER - NSDCCPCAREPLAN_GEN_ALL_CORE_FT
PRINCIPAL DISCHARGE DIAGNOSIS  Diagnosis: DKA (diabetic ketoacidoses)  Assessment and Plan of Treatment: You were admitted to our hospital for diabetic ketoacidosis. Diabetic ketoacidosis is a serious problem that happens to people with diabetes when chemicals called "ketones" build up in their blood. The symptoms can include feeling very thirsty and drinking a lot, urinating a lot, including at night, nausea or vomiting, belly pain, feeling tired or having trouble thinking clearly, having breath that smells sweet or fruity, and weight loss. During the hospitalization, we controlled your glucose and are discharging you on a home insulin regimen 34U Lantus at bedtime and 18U Lispro with meals. It is important that you regularly check your glucose levels, take the insulin as prescribed and to follow up with the endocrinologist Dr. Redmond at 08/03 at 2:20 PM.      SECONDARY DISCHARGE DIAGNOSES  Diagnosis: Vascular ectasias  Assessment and Plan of Treatment: On imaging during this admission, we noticed some of your large blood vessels were dilated. This did not require any intervention at the hospital but requires close follow up with the vascular doctors. If these vessels dilate further and remain unchecked, you are at risk for vessel rupture which can result in severe pain and internal bleeding. Please follow up with Dr. Hernandez on 08/05 at 10:30 AM to determine how best to monitor the vessels.    Diagnosis: High prostate specific antigen (PSA)  Assessment and Plan of Treatment: You have known prostate disease. This was confirmed during this hospitalization. It is important to follow up with your urologist or Dr. Aquino on 08/04 at 3:00 PM to continue monitoring your prostate disease.    Diagnosis: Colonic mass  Assessment and Plan of Treatment: A mass near  your colon was seen on imaging during your hospitalization. It is important to follow up with the GI doctors to perform a colonoscopy. Please call [phone] to schedule an appointment with a GI doctor.

## 2021-07-26 NOTE — DISCHARGE NOTE PROVIDER - PROVIDER TOKENS
PROVIDER:[TOKEN:[[id number]]],PROVIDER:[TOKEN:[[id number]]] PROVIDER:[TOKEN:[[id number]]],PROVIDER:[TOKEN:[[id number]]],PROVIDER:[TOKEN:[[id number]]] PROVIDER:[TOKEN:[[id number]]],PROVIDER:[TOKEN:[[id number]]],PROVIDER:[TOKEN:[[id number]]],FREE:[LAST:[Steve],FIRST:[Anmol],PHONE:[phone]],FAX:[(   )    -],ADDRESS:[[address] [place] [address]],SCHEDULEDAPPT:[08/06/2021]],PROVIDER:[TOKEN:[[id number]]] PROVIDER:[TOKEN:[[id number]],SCHEDULEDAPPT:[08/05/2021],SCHEDULEDAPPTTIME:[02:20 PM]],PROVIDER:[TOKEN:[[id number]],SCHEDULEDAPPT:[08/04/2021],SCHEDULEDAPPTTIME:[03:00 PM]],PROVIDER:[TOKEN:[[id number]]],FREE:[LAST:[Steve],FIRST:[Anmol],PHONE:[phone]],FAX:[(   )    -],ADDRESS:[[address] [place] [address]],SCHEDULEDAPPT:[08/06/2021],SCHEDULEDAPPTTIME:[10:30 AM]],PROVIDER:[TOKEN:[[id number]],SCHEDULEDAPPT:[08/03/2021],SCHEDULEDAPPTTIME:[02:20 PM]]

## 2021-07-26 NOTE — CONSULT NOTE ADULT - SUBJECTIVE AND OBJECTIVE BOX
Surgeon: Dr. Mccullough    Requesting Physician: Dr. Lloyd     HISTORY OF PRESENT ILLNESS:  This is a 71 year old male, poor historian with poor medical follow-up and medical non-compliance, with history of HTN, elevated PSA s/p biopsy in  with unclear follow-up and diagnosis who initially presented to [facility] ED on 21 with generalized fatigue and weakness, epigastric pain, decreased appetite and oral intake with reported 20lb weight loss in 4 weeks without effort, dizziness, blurred vision, dysuria, polyuria and polydipsia x 4 weeks.  In ED, he was found to be hypertensive with /97 with noted leukpenia WBC 2.85, in DKA with hyperglycemia (blood glucose 521), Lipase 81, LA 2.1, , Beta hydroxy butyrate 3.0 and A1C 11.6.  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 Dr. Mccullough (CT Surgery) consulted for surgical evaluation of small aortic root and ascending aortic aneurysm.     PAST MEDICAL & SURGICAL HISTORY:  Essential hypertension    No significant past surgical history    MEDICATIONS  (STANDING):  dextrose 40% Gel 15 Gram(s) Oral once  dextrose 5%. 1000 milliLiter(s) (50 mL/Hr) IV Continuous <Continuous>  dextrose 5%. 1000 milliLiter(s) (100 mL/Hr) IV Continuous <Continuous>  dextrose 50% Injectable 25 Gram(s) IV Push once  dextrose 50% Injectable 12.5 Gram(s) IV Push once  dextrose 50% Injectable 25 Gram(s) IV Push once  enoxaparin Injectable 40 milliGRAM(s) SubCutaneous every 24 hours  glucagon  Injectable 1 milliGRAM(s) IntraMuscular once  insulin lispro (ADMELOG) corrective regimen sliding scale   SubCutaneous Before meals and at bedtime    MEDICATIONS  (PRN):  acetaminophen   Tablet .. 650 milliGRAM(s) Oral every 6 hours PRN Temp greater or equal to 38.5C (101.3F), Mild Pain (1 - 3)  aluminum hydroxide/magnesium hydroxide/simethicone Suspension 30 milliLiter(s) Oral every 4 hours PRN Dyspepsia  melatonin 3 milliGRAM(s) Oral at bedtime PRN Insomnia  ondansetron Injectable 4 milliGRAM(s) IV Push every 8 hours PRN Nausea and/or Vomiting    Allergies    No Known Allergies    Intolerances    SOCIAL HISTORY:  Smoker: NO   ETOH use:  Reports infrequent ETOH use (beer, ~ 4-5 beers each weekend)   Illicit Drug use:  NO  Occupation: Retired   Assisted device use (Cane / Walker): Ambulates and performs ADLs unassisted   Live with: Family     FAMILY HISTORY: Denies significant family history of premature cardiac death or aortic pathologies     Review of Systems:  CONSTITUTIONAL: Denies fevers / chills, sweats, fatigue, weight loss, weight gain                                       NEURO:  Denies parathesias, seizures, syncope, confusion                                                                                  EYES:  Denies blurry vision, discharge, pain, loss of vision                                                                                    ENMT:  Denies difficulty hearing, vertigo, dysphagia, epistaxis, recent dental work                                       CV:  Denies chest pain, palpitations, BONE, orthopnea                                                                                           RESPIRATORY:  Denies Wheezing, SOB, cough / sputum, hemoptysis                                                               GI:  Denies nausea, vomiting, diarrhea, constipation, melena                                                                          : Denies hematuria, dysuria, urgency, incontinence                                                                                          MUSKULOSKELETAL:  Denies arthritis, joint swelling, muscle weakness                                                             SKIN/BREAST:  Denies rash, itching, hair loss, masses                                                                                              PSYCH:  Denies depression, anxiety, suicidal ideation                                                                                                HEME/LYMPH:  Denies bruises easily, enlarged lymph nodes, tender lymph nodes                                          ENDOCRINE:  Denies cold intolerance, heat intolerance, polydipsia                                                                      Vital Signs Last 24 Hrs  T(C): 36.8 (2021 08:57), Max: 36.8 (2021 08:57)  T(F): 98.3 (2021 08:57), Max: 98.3 (2021 08:57)  HR: 76 (2021 08:57) (61 - 78)  BP: 137/87 (2021 08:57) (120/69 - 139/81)  BP(mean): --  RR: 18 (2021 08:57) (17 - 18)  SpO2: 94% (2021 08:57) (94% - 96%)    Physical Exam:  CONSTITUTIONAL:  OOB to chair, no acute distress.   NEURO: AAOx3, no AMS or focal deficits.                       EYES: EOMI b/l, PEERLA b/l. Exhibits appropriate visual acuity.   ENMT: Hearing in tact.  MMM.  No palpable neck masses or hoarseness.   CV: RRR, S1/S2, no m/r/g.   RESPIRATORY:  No acute distress.  CTA b/l, no w/r/r.   GI: ND, NBS, non-TTP.  : No best.    MUSKULOSKELETAL: No obvious malformations.  Non-TTP.  Exhibits normal ROM in all extremities.   SKIN / BREAST:  No obvious lesions/abrasions noted.  Normal skin color and turgor.                                                           LABS:                        12.9   3.05  )-----------( 166      ( 2021 07:44 )             38.8         138  |  97  |  14  ----------------------------<  266<H>  3.9   |  28  |  0.92    Ca    9.1      2021 07:44  Mg     1.8         TPro  6.7  /  Alb  4.0  /  TBili  0.8  /  DBili  x   /  AST  27  /  ALT  32  /  AlkPhos  114  -    PT/INR - ( 2021 09:36 )   PT: 12.1 sec;   INR: 1.01          PTT - ( 2021 09:36 )  PTT:27.9 sec  Urinalysis Basic - ( 2021 09:32 )    Color: Yellow / Appearance: Clear / S.010 / pH: x  Gluc: x / Ketone: 40 mg/dL  / Bili: Negative / Urobili: 0.2 E.U./dL   Blood: x / Protein: NEGATIVE mg/dL / Nitrite: NEGATIVE   Leuk Esterase: NEGATIVE / RBC: x / WBC x   Sq Epi: x / Non Sq Epi: x / Bacteria: x              RADIOLOGY & ADDITIONAL STUDIES:  CAROTID U/S:    CXR:    CT Scan:    EKG:    TTE / JESSICA:    Cardiac Cath: Surgeon: Dr. Mccullough    Requesting Physician: Dr. Lloyd     HISTORY OF PRESENT ILLNESS:  This is a 71 year old male, poor historian with poor medical follow-up and medical non-compliance, with history of HTN, elevated PSA s/p biopsy in  with unclear follow-up and diagnosis who initially presented to [facility] ED on 21 with generalized fatigue and weakness, epigastric pain, decreased appetite and oral intake with reported 20lb weight loss in 4 weeks without effort, dizziness, blurred vision, dysuria, polyuria and polydipsia x 4 weeks.  In ED, he was found to be hypertensive with /97 with noted leukpenia WBC 2.85, in DKA with hyperglycemia (blood glucose 521), Lipase 81, LA 2.1, , Beta hydroxy butyrate 3.0 and A1C 11.6.  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 Dr. Mccullough (CT Surgery) consulted for surgical evaluation of small aortic root and ascending aortic aneurysm.     PAST MEDICAL & SURGICAL HISTORY:  Essential hypertension    No significant past surgical history    MEDICATIONS  (STANDING):  dextrose 40% Gel 15 Gram(s) Oral once  dextrose 5%. 1000 milliLiter(s) (50 mL/Hr) IV Continuous <Continuous>  dextrose 5%. 1000 milliLiter(s) (100 mL/Hr) IV Continuous <Continuous>  dextrose 50% Injectable 25 Gram(s) IV Push once  dextrose 50% Injectable 12.5 Gram(s) IV Push once  dextrose 50% Injectable 25 Gram(s) IV Push once  enoxaparin Injectable 40 milliGRAM(s) SubCutaneous every 24 hours  glucagon  Injectable 1 milliGRAM(s) IntraMuscular once  insulin lispro (ADMELOG) corrective regimen sliding scale   SubCutaneous Before meals and at bedtime    MEDICATIONS  (PRN):  acetaminophen   Tablet .. 650 milliGRAM(s) Oral every 6 hours PRN Temp greater or equal to 38.5C (101.3F), Mild Pain (1 - 3)  aluminum hydroxide/magnesium hydroxide/simethicone Suspension 30 milliLiter(s) Oral every 4 hours PRN Dyspepsia  melatonin 3 milliGRAM(s) Oral at bedtime PRN Insomnia  ondansetron Injectable 4 milliGRAM(s) IV Push every 8 hours PRN Nausea and/or Vomiting    Allergies    No Known Allergies    Intolerances    SOCIAL HISTORY:  Smoker: NO   ETOH use:  Reports infrequent ETOH use (beer, ~ 4-5 beers each weekend)   Illicit Drug use:  NO  Occupation: Retired   Assisted device use (Cane / Walker): Ambulates and performs ADLs unassisted   Live with: Family     FAMILY HISTORY: Denies significant family history of premature cardiac death or aortic pathologies     Review of Systems:  CONSTITUTIONAL: Denies fevers / chills, sweats, fatigue, weight loss, weight gain                                       NEURO:  Denies parathesias, seizures, syncope, confusion                                                                                  EYES:  Denies blurry vision, discharge, pain, loss of vision                                                                                    ENMT:  Denies difficulty hearing, vertigo, dysphagia, epistaxis, recent dental work                                       CV:  Denies chest pain, palpitations, BONE, orthopnea                                                                                           RESPIRATORY:  Denies Wheezing, SOB, cough / sputum, hemoptysis                                                               GI:  Denies nausea, vomiting, diarrhea, constipation, melena                                                                          : Denies hematuria, dysuria, urgency, incontinence                                                                                          MUSKULOSKELETAL:  Denies arthritis, joint swelling, muscle weakness                                                             SKIN/BREAST:  Denies rash, itching, hair loss, masses                                                                                              PSYCH:  Denies depression, anxiety, suicidal ideation                                                                                                HEME/LYMPH:  Denies bruises easily, enlarged lymph nodes, tender lymph nodes                                          ENDOCRINE:  Denies cold intolerance, heat intolerance, polydipsia                                                                      Vital Signs Last 24 Hrs  T(C): 36.8 (2021 08:57), Max: 36.8 (2021 08:57)  T(F): 98.3 (2021 08:57), Max: 98.3 (2021 08:57)  HR: 76 (2021 08:57) (61 - 78)  BP: 137/87 (2021 08:57) (120/69 - 139/81)  BP(mean): --  RR: 18 (2021 08:57) (17 - 18)  SpO2: 94% (2021 08:57) (94% - 96%)    Physical Exam:  CONSTITUTIONAL:  Sitting up in bed, no acute distress.   NEURO: AAOx3, no AMS or focal deficits.                       EYES: EOMI b/l, PEERLA b/l. Exhibits appropriate visual acuity.   ENMT: Hearing in tact.  MMM.  No palpable neck masses or hoarseness.   CV: RRR, S1/S2, no m/r/g.   RESPIRATORY:  No acute distress.  CTA b/l, no w/r/r.   GI: ND, NBS, non-TTP.  : No best.    MUSKULOSKELETAL: No obvious malformations.  Non-TTP.  Exhibits normal ROM in all extremities.   SKIN / BREAST:  No obvious lesions/abrasions noted.  Normal skin color and turgor.                                                           LABS:                        12.9   3.05  )-----------( 166      ( 2021 07:44 )             38.8         138  |  97  |  14  ----------------------------<  266<H>  3.9   |  28  |  0.92    Ca    9.1      2021 07:44  Mg     1.8         TPro  6.7  /  Alb  4.0  /  TBili  0.8  /  DBili  x   /  AST  27  /  ALT  32  /  AlkPhos  114      PT/INR - ( 2021 09:36 )   PT: 12.1 sec;   INR: 1.01          PTT - ( 2021 09:36 )  PTT:27.9 sec  Urinalysis Basic - ( 2021 09:32 )    Color: Yellow / Appearance: Clear / S.010 / pH: x  Gluc: x / Ketone: 40 mg/dL  / Bili: Negative / Urobili: 0.2 E.U./dL   Blood: x / Protein: NEGATIVE mg/dL / Nitrite: NEGATIVE   Leuk Esterase: NEGATIVE / RBC: x / WBC x   Sq Epi: x / Non Sq Epi: x / Bacteria: x    RADIOLOGY & ADDITIONAL STUDIES:  CT Scan:  < from: CT Angio Chest Aorta w/wo IV Cont (21 @ 11:00) >  IMPRESSION:    No aortic dissection. No critical arterial stenosis.    Borderline ectasia of the aortic root 3.8 cm and ascending aorta 3.8 cm. 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    2.2 cm mass inseparable from the rectosigmoid junction representing exophytic primary colorectal neoplasm versus lymphadenopathy related to prostate, given the history of elevated PSA. A couple of other mildly enlarged pelvic lymph nodes. Further evaluation may include colonoscopy and endoscopic or CT-guided biopsy.    < end of copied text >   Surgeon: Dr. Mccullough    Requesting Physician: Dr. Lloyd     HISTORY OF PRESENT ILLNESS:  This is a 71 year old male, poor historian with poor medical follow-up and medical non-compliance, with history of HTN, elevated PSA s/p biopsy in  with unclear follow-up and diagnosis who initially presented to [facility] ED on 21 with generalized fatigue and weakness, epigastric pain, decreased appetite and oral intake with reported 20lb weight loss in 4 weeks without effort, dizziness, blurred vision, dysuria, polyuria and polydipsia x 4 weeks.  In ED, he was found to be hypertensive with /97 with noted leukpenia WBC 2.85, in DKA with hyperglycemia (blood glucose 521), Lipase 81, LA 2.1, , Beta hydroxy butyrate 3.0 and A1C 11.6.  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 Dr. Mcfarlane (CT Surgery) consulted for surgical evaluation of small aortic root and ascending aortic aneurysm.     PAST MEDICAL & SURGICAL HISTORY:  Essential hypertension    No significant past surgical history    MEDICATIONS  (STANDING):  dextrose 40% Gel 15 Gram(s) Oral once  dextrose 5%. 1000 milliLiter(s) (50 mL/Hr) IV Continuous <Continuous>  dextrose 5%. 1000 milliLiter(s) (100 mL/Hr) IV Continuous <Continuous>  dextrose 50% Injectable 25 Gram(s) IV Push once  dextrose 50% Injectable 12.5 Gram(s) IV Push once  dextrose 50% Injectable 25 Gram(s) IV Push once  enoxaparin Injectable 40 milliGRAM(s) SubCutaneous every 24 hours  glucagon  Injectable 1 milliGRAM(s) IntraMuscular once  insulin lispro (ADMELOG) corrective regimen sliding scale   SubCutaneous Before meals and at bedtime    MEDICATIONS  (PRN):  acetaminophen   Tablet .. 650 milliGRAM(s) Oral every 6 hours PRN Temp greater or equal to 38.5C (101.3F), Mild Pain (1 - 3)  aluminum hydroxide/magnesium hydroxide/simethicone Suspension 30 milliLiter(s) Oral every 4 hours PRN Dyspepsia  melatonin 3 milliGRAM(s) Oral at bedtime PRN Insomnia  ondansetron Injectable 4 milliGRAM(s) IV Push every 8 hours PRN Nausea and/or Vomiting    Allergies    No Known Allergies    Intolerances    SOCIAL HISTORY:  Smoker: NO   ETOH use:  Reports infrequent ETOH use (beer, ~ 4-5 beers each weekend)   Illicit Drug use:  NO  Occupation: Retired   Assisted device use (Cane / Walker): Ambulates and performs ADLs unassisted   Live with: Family     FAMILY HISTORY: Denies significant family history of premature cardiac death or aortic pathologies     Review of Systems:  CONSTITUTIONAL: Denies fevers / chills, sweats, fatigue, weight loss, weight gain                                       NEURO:  Denies parathesias, seizures, syncope, confusion                                                                                  EYES:  Denies blurry vision, discharge, pain, loss of vision                                                                                    ENMT:  Denies difficulty hearing, vertigo, dysphagia, epistaxis, recent dental work                                       CV:  Denies chest pain, palpitations, BONE, orthopnea                                                                                           RESPIRATORY:  Denies Wheezing, SOB, cough / sputum, hemoptysis                                                               GI:  Denies nausea, vomiting, diarrhea, constipation, melena                                                                          : Denies hematuria, dysuria, urgency, incontinence                                                                                          MUSKULOSKELETAL:  Denies arthritis, joint swelling, muscle weakness                                                             SKIN/BREAST:  Denies rash, itching, hair loss, masses                                                                                              PSYCH:  Denies depression, anxiety, suicidal ideation                                                                                                HEME/LYMPH:  Denies bruises easily, enlarged lymph nodes, tender lymph nodes                                          ENDOCRINE:  Denies cold intolerance, heat intolerance, polydipsia                                                                      Vital Signs Last 24 Hrs  T(C): 36.8 (2021 08:57), Max: 36.8 (2021 08:57)  T(F): 98.3 (2021 08:57), Max: 98.3 (2021 08:57)  HR: 76 (2021 08:57) (61 - 78)  BP: 137/87 (2021 08:57) (120/69 - 139/81)  BP(mean): --  RR: 18 (2021 08:57) (17 - 18)  SpO2: 94% (2021 08:57) (94% - 96%)    Physical Exam:  CONSTITUTIONAL:  Sitting up in bed, no acute distress.   NEURO: AAOx3, no AMS or focal deficits.                       EYES: EOMI b/l, PEERLA b/l. Exhibits appropriate visual acuity.   ENMT: Hearing in tact.  MMM.  No palpable neck masses or hoarseness.   CV: RRR, S1/S2, no m/r/g.   RESPIRATORY:  No acute distress.  CTA b/l, no w/r/r.   GI: ND, NBS, non-TTP.  : No best.    MUSKULOSKELETAL: No obvious malformations.  Non-TTP.  Exhibits normal ROM in all extremities.   SKIN / BREAST:  No obvious lesions/abrasions noted.  Normal skin color and turgor.                                                           LABS:                        12.9   3.05  )-----------( 166      ( 2021 07:44 )             38.8         138  |  97  |  14  ----------------------------<  266<H>  3.9   |  28  |  0.92    Ca    9.1      2021 07:44  Mg     1.8         TPro  6.7  /  Alb  4.0  /  TBili  0.8  /  DBili  x   /  AST  27  /  ALT  32  /  AlkPhos  114      PT/INR - ( 2021 09:36 )   PT: 12.1 sec;   INR: 1.01          PTT - ( 2021 09:36 )  PTT:27.9 sec  Urinalysis Basic - ( 2021 09:32 )    Color: Yellow / Appearance: Clear / S.010 / pH: x  Gluc: x / Ketone: 40 mg/dL  / Bili: Negative / Urobili: 0.2 E.U./dL   Blood: x / Protein: NEGATIVE mg/dL / Nitrite: NEGATIVE   Leuk Esterase: NEGATIVE / RBC: x / WBC x   Sq Epi: x / Non Sq Epi: x / Bacteria: x    RADIOLOGY & ADDITIONAL STUDIES:  CT Scan:  < from: CT Angio Chest Aorta w/wo IV Cont (21 @ 11:00) >  IMPRESSION:    No aortic dissection. No critical arterial stenosis.    Borderline ectasia of the aortic root 3.8 cm and ascending aorta 3.8 cm. 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    2.2 cm mass inseparable from the rectosigmoid junction representing exophytic primary colorectal neoplasm versus lymphadenopathy related to prostate, given the history of elevated PSA. A couple of other mildly enlarged pelvic lymph nodes. Further evaluation may include colonoscopy and endoscopic or CT-guided biopsy.    < end of copied text >

## 2021-07-26 NOTE — DISCHARGE NOTE PROVIDER - NSDCFUSCHEDAPPT_GEN_ALL_CORE_FT
[facility] ; 08/03/2021 ; NPP Endocrin [address]  [facility] ; 08/04/2021 ; NPP Urology [address] [place] [address] ; 08/05/2021 ; NPP Surg Vasc [address] [place] [address] ; 08/06/2021 ; NPP Heartvasc [address] [facility]

## 2021-07-26 NOTE — CONSULT NOTE ADULT - SUBJECTIVE AND OBJECTIVE BOX
VASCULAR ATTENDING: Dr. Hernandez     HPI PER PRIMARY: 72yo M history of HTN (non complaint w medication) and longstanding history of elevated PSA s/p biopsy in 2010 and unclear pathology (states surveillance), complains of weakness, epigastric pain, decrease appetite and PO intake, "I lost 20 pounds in four weeks", dizziness, claudication, dysuria, polyuria, polydipsia x 4 weeks. reports hx of blurry vision at baseline wheres prescription glasses however over the 4 weeks blurry vision has been worse than usual. Patient states has not taken antihypertensive x 4+ weeks as ran out. he does not own a BP cuff and has not taken his BP at home. He has not seen his PCP 2 in 2 years. 2 years ago he states his PCP did tell him his A1c was rising but he cannot recall the number at that time. ROS +nausea but no vomiting, normal BMs (no melena or hematochezia). feels abd is distended. Patient denies CP/SOB, fever or chill.  no surgical hx. has not had screening colonoscopy, denies FH of malignancy    In the ED:  Initial vital signs: T: 98.5F, HR: 93, BP: 194/97, R: 18, SpO2: 96% on RA  Labs: significant for wbc 2.85, H/H plt wnl, coags wnl, lytes wnl, BUN/Cr 15/0.90-->11/0.85, blood glucose 521, lipase 81, lactate 2.1, alk phos 143, alt/ast wnl, beta hydroxy butyrate 3.0, A1c 11.6. ABG ph 7.36 pco2 57, hco3 32, UA >1000 glucose, Ketones +  Imaging: CT angio abd and pelvis: 2.2 cm mass at the rectosigmoid junction concerning for malignancy, CT Angio chest: Borderline ectasia of the aortic root 3.8 cm and ascending aorta 3.8 cm. Mild aneurysmal dilatation of the bilateral common iliac arteries each measuring 1.6 cm  EKG: NSR wnl, no st changes, pr qrs int wnl  Medications: 3L NS, 6U humulin regular insulin, 4mg iv zofran  Consults: endocrine     VASCULAR ADDENDUM: Patient seen and interviewed by the vascular team at the bedside. Patient notified of the findings of iliac artery dilatation seen on the CT scan and reports that he was never aware of this and that he has never had any known vascular history. Reports that he does not have, and has never had, any symptoms of claudication, calf pain/buttocks pain/hip pain on exertion, lower extremity pain/cramping, lower extremity sensory or motor changes. Reports that he is able to fully ambulate independently and run if necessary with no limitations. He denies any chest pain/abdominal pain/groin pain/ flank pain.    OBJECTIVE:  Vital Signs Last 24 Hrs  T(C): 36.8 (2021 08:57), Max: 36.8 (2021 08:57)  T(F): 98.3 (2021 08:57), Max: 98.3 (2021 08:57)  HR: 76 (2021 08:57) (61 - 78)  BP: 137/87 (2021 08:57) (120/69 - 139/81)  BP(mean): --  RR: 18 (2021 08:57) (17 - 18)  SpO2: 94% (2021 08:57) (94% - 96%)  I&O's Detail      Physical Exam:  General: No acute distress, resting comfortably in bed  C/V: normal sinus rhythm  Pulm: Nonlabored breathing, no respiratory distress  Abd: soft, non-tender, non-distended.  Exem: warm and well perfused, no edema. Bilateral lower extremities are without any lesions.  Vascular: Palpable femoral, popliteal, DP, PT pulses bilaterally.    LABS:                        12.9   3.05  )-----------( 166      ( 2021 07:44 )             38.8         138  |  97  |  14  ----------------------------<  266<H>  3.9   |  28  |  0.92    Ca    9.1      2021 07:44  Mg     1.8         TPro  6.7  /  Alb  4.0  /  TBili  0.8  /  DBili  x   /  AST  27  /  ALT  32  /  AlkPhos  114      PT/INR - ( 2021 09:36 )   PT: 12.1 sec;   INR: 1.01          PTT - ( 2021 09:36 )  PTT:27.9 sec  Urinalysis Basic - ( 2021 09:32 )    Color: Yellow / Appearance: Clear / S.010 / pH: x  Gluc: x / Ketone: 40 mg/dL  / Bili: Negative / Urobili: 0.2 E.U./dL   Blood: x / Protein: NEGATIVE mg/dL / Nitrite: NEGATIVE   Leuk Esterase: NEGATIVE / RBC: x / WBC x   Sq Epi: x / Non Sq Epi: x / Bacteria: x      Assessment  Mr. Salinas is a 71 year opld man with Hx of non-controlled HTN who initially presented to the ED on  with cc of weakness, dizziness, polyuria, polydypsia, found to be in DKA and in hypertensive emergency. Vascular surgery consulted for evaluation after CT scan noted bilateral AMISHA dilatation to 1.6cm. Patient is asymptomatic, has never had any symptoms of claudication, rest pain, lower extremity pain and has palpable distal pulses.    Plan  1. No acute vascular intervention is indicated at this time.  2. Patient should follow up with Dr. Hernandez in the office on discharge for routine evaluation and monitoring. Dr. Hernandez's office is located on [address] [place] [address], [address] [place] [address]. The number for the office is [phone]. Patient also given Dr. Hernandez's card.    Plan discussed with chief resident and vascular attending.  Yoni Zelaya MD PGY2  Vascular Surgery Resident

## 2021-07-26 NOTE — PROGRESS NOTE ADULT - PROBLEM SELECTOR PLAN 3
pt w pmh of htn. on verapamil 120mg qd, amlodipine/benazopril 5-10mg qd, non compliant w medication. hypertensive on admission 194/97 w/ n/v. however n/v likely in setting of DKA. BP improved in ED to 155/60s w.o BP meds  -will hold off on antihypertensive for now. BP >50% in <24 hours  -c/w BP monitoring  -will restart home medication in 24 hours CT Angio chest: Borderline ectasia of the aortic root 3.8 cm and ascending aorta 3.8 cm. 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 Pt endorsing lower ext claudication for the past 4 weeks. prior to that w.o any claudication or BONE. risk factors including long standing uncontrolled DM and HTN. denies hx of smoking of alcohol abuse. No palpable thrills appreciated on exam. Based on size of dilation, no indication for intervention at this time. RPR negative.  - F/u vascular recs  - F/u CT surgery recs  - F/u CHELSY

## 2021-07-26 NOTE — PROGRESS NOTE ADULT - SUBJECTIVE AND OBJECTIVE BOX
CONSULT NOTE: ENDOCRINOLOGY    HPI: Mr. Salinas is a 72 yo M w/ PMH HTN who presents w/ a 1 month history of unintentional weight loss, admitted for hyperglycemia. He believes a doctor once told him his sugar was elevated "a couple of years ago" but does not recall being prescribed any medication for blood sugar. He denies prior hospitalization for hyperglycemia. As per H&P he reported 20 lbs of unintentional weight loss, though he does not have a scale at home, and denies this today. He does affirm several weeks of blurred vision, polyphagia, polydipsia, polyuria.     PAST MEDICAL & SURGICAL HISTORY:  Essential hypertension  No significant past surgical history    Home Medications:  amlodipine-benazepril 5 mg-10 mg oral capsule: 1 cap(s) orally once a day (2021 16:32)  verapamil 120 mg/24 hours oral capsule, extended release: 1 cap(s) orally once a day (2021 16:32)    SOCIAL HISTORY:  Denies tobacco use. Social alcohol use 5-6 beers 1x/week (last drink 3 months ago). Denies illicit drug use. Lives w/ son in the community, functionally independent at baseline. Ambulates without assistive devices.    FAMILY HISTORY:  Family history of heart disease.     VITAL SIGNS:  T(C): 36.8 (21 @ 08:57), Max: 36.8 (21 @ 08:57)  T(F): 98.3 (21 @ 08:57), Max: 98.3 (21 @ 08:57)  HR: 76 (21 @ 08:57) (61 - 78)  BP: 137/87 (21 @ 08:57) (120/69 - 155/91)  BP(mean): --  RR: 18 (21 @ 08:57) (17 - 18)  SpO2: 94% (21 @ 08:57) (94% - 96%)  Wt(kg): --    PHYSICAL EXAM:    Constitutional: Adult  male seated upright in bed, very pleasant, overweight, in no acute distress  Head: NC/AT  Eyes: PERRL, EOMI, anicteric sclera  ENT: MMM  Neck: no JVD  Respiratory: respirations appear comfortable, on room air, no accessory muscle use  Cardiac: RRR  Gastrointestinal: protuberant abd, soft, NT/ND; no rebound or guarding  Extremities: WWP, no cyanosis; no peripheral edema  Vascular: 2+ radial pulses bilaterally  Dermatologic: skin warm, dry and intact; no rashes or wounds noted  Neurologic: alert, conversational; face symmetric; moving extremities x4 to command  Psychiatric: affect and characteristics of appearance, verbalizations, behaviors are appropriate    LABS:                         12.9   3.05  )-----------( 166      ( 2021 07:44 )             38.8         138  |  97  |  14  ----------------------------<  266<H>  3.9   |  28  |  0.92    Ca    9.1      2021 07:44  Mg     1.8         TPro  6.7  /  Alb  4.0  /  TBili  0.8  /  DBili  x   /  AST  27  /  ALT  32  /  AlkPhos  114      PT/INR - ( 2021 09:36 )   PT: 12.1 sec;   INR: 1.01          PTT - ( 2021 09:36 )  PTT:27.9 sec  Urinalysis Basic - ( 2021 09:32 )    Color: Yellow / Appearance: Clear / S.010 / pH: x  Gluc: x / Ketone: 40 mg/dL  / Bili: Negative / Urobili: 0.2 E.U./dL   Blood: x / Protein: NEGATIVE mg/dL / Nitrite: NEGATIVE   Leuk Esterase: NEGATIVE / RBC: x / WBC x   Sq Epi: x / Non Sq Epi: x / Bacteria: x            CAPILLARY BLOOD GLUCOSE      POCT Blood Glucose.: 323 mg/dL (2021 12:09)  POCT Blood Glucose.: 235 mg/dL (2021 08:16)  POCT Blood Glucose.: 175 mg/dL (2021 02:09)  POCT Blood Glucose.: 269 mg/dL (2021 22:15)  POCT Blood Glucose.: 235 mg/dL (2021 19:38)  POCT Blood Glucose.: 336 mg/dL (2021 16:48)  POCT Blood Glucose.: 222 mg/dL (2021 14:21)      RADIOLOGY, EKG & ADDITIONAL TESTS: Reviewed.  CTA abd/pelvis notable for "IMPRESSION: No aortic dissection. No critical arterial stenosis. Borderline ectasia of the aortic root 3.8 cm and ascending aorta 3.8 cm. 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 2.2 cm mass inseparable from the rectosigmoid junction representing exophytic primary colorectal neoplasm versus lymphadenopathy related to prostate, given the history of elevated PSA. A couple of other mildly enlarged pelvic lymph nodes. Further evaluation may include colonoscopy and endoscopic or CT-guided biopsy."

## 2021-07-26 NOTE — CONSULT NOTE ADULT - CONSULT REASON
Iliac Artery Dilatation Noted on CT
Iliac Artery Aneurysms incidentally found on CT
Aortic aneurysm
T2DM with hyperglycemia

## 2021-07-26 NOTE — PROGRESS NOTE ADULT - PROBLEM SELECTOR PLAN 8
Fluids: none   Electrolytes: Mg>2, K>4  Nutrition:  No IVF currently needed, replete lytes PRN  Prophylaxis: lovenox  Activity: AAT, OOBTC  GI: none  C: FC  Dispo: Admit to [facility], [place]

## 2021-07-26 NOTE — DISCHARGE NOTE PROVIDER - NSDCFUADDINST_GEN_ALL_CORE_FT
Follow up with Dr. Hernandez in the office on discharge for routine evaluation and monitoring. Dr. Hernandez's office is located on [address] [facility], [address] [place] [address].    Follow-up with Dr Johnston in his aortic clinic in 12 months with a repeat CT scan to assess for interval growth/stability.  Please follow up with the following providers:  Primary Medical Doctor  Endocrinology  Urology  Vascular  Gastroenterology    To contact gastroenterology to schedule an appointment to evaluate for a colonoscopy, please call [phone]

## 2021-07-26 NOTE — DISCHARGE NOTE PROVIDER - NPI NUMBER (FOR SYSADMIN USE ONLY) :
[[id number] [[id number]] [[id number]],[[id number]],[[id number]] [[id number]],[[id number]],[[id number]]

## 2021-07-26 NOTE — PROGRESS NOTE ADULT - PROBLEM SELECTOR PLAN 7
pt p/w wbc 2.85 likely immune compromised 2/2 to DM vs underlying malignancy. no sings of infection at this time  -cont DM management  -plan as above h/x of BPH s/p biopsy in 2009 w.o signs of malignancy at that time. states he follows with a urologist affiliated w/ [facility] Dr Juan Jose Aquino (record in [facility]) last seen 2 years ago. was told to follow up yearly due to elevated BPH for monitoring of the prostate. says he was sent home w/ a medication however he has not taken it in 2 years (cannot recall the name of the medication). Pt complaints of bladder fullness/incomplete emptying, frequency and urgency, worse within the past 4 weeks. CT scan this admission concerning for colorectal malignancy vs prostate cancer/mass  - F/u urology o/p - Dr Juan Jose Aquino  - Consider starting flomax and/or finasteride once blood sugar undercontol

## 2021-07-26 NOTE — CONSULT NOTE ADULT - ATTENDING COMMENTS
Thank you. Incidental finding of small aortic aneurysm, < 4cm in maximal transverse diameter.  Suggest:  1. BP management long-term  2. Echo to assess aortic valve function  3. Follow-up with Dr Johnston in his aortic clinic in 12 months with a repeat CT scan to assess for interval growth/stability.
Pt seen on rounds this afternoon.  71-yo man who was apparently told of borderline DM 3 years ago, but did not follow up since then and now presents with marked hyperglycemia with ketosis.  Was treated with multiple correction doses but not started on basal or premeal standing nisulin, and glucoses are still elevated to the 250-320 range.    Has had an unintentional 20 lb weight loss in the past year.  Findings on abdominal CT (sigmoid mass) and the markedly elevated PSA noted.  Diet at home has been frankly non-compliant in terms of intake of concentrated sweets (regular soda, etc) and excessive portions of carbs.  Also drinks approximately 2 quarts of milk a day.  ---Although he almost certainly has type 2 DM, need to start on basal/bolus insulin--and I explained this to him.  Will use something close to weight-based, with 20 units Lantus/10 units lispro premeal.    --Dietary restrictions were discussed.  Given the marked non-compliance as outpatient, the prognosis for rapid improvement and transition to oral agents with dietary modification might be quite good

## 2021-07-26 NOTE — PROGRESS NOTE ADULT - PROBLEM SELECTOR PLAN 5
h/x of BPH s/p biopsy in 2009 w.o signs of malignancy at that time. states he follows with a urologist affiliated w/ [facility] Dr Juan Jose Aquino (record in [facility]) last seen 2 years ago. was told to follow up yearly due to elevated BPH for monitoring of the prostate. says he was sent home w/ a medication however he has not taken it in 2 years (cannot recall the name of the medication). Pt complaints of bladder fullness/incomplete emptying, frequency and urgency, worse within the past 4 weeks.  -CT scan this admission concerning for colorectal malignancy vs prostate cancer/mass  -f/u urology o/p - Dr Juan Jose Aquino  -consider starting flomax and/or finasteride once blood sugar undercontol HR >90, wbc <4, (2/4 SIRS) + lactate 2.1. likely 2/2 to DKA. less likely to be of infectious etiology. CT w.o signs of infection. pt w/ complaints of dysuria 1 month ago s/p 9 days of amoxicillin he had at home. however dysuria still persist. likely 2/2 to hyperglycemia. UA negative for infection. S/p 3L NS and 6U humulin regular insulin in ED w resolution of dka and improvement in vitals.  - If spikes fever --> ua, cxr, blood cultures  - Will cont to monitor off abx.  - C/w management as below

## 2021-07-27 ENCOUNTER — TRANSCRIPTION ENCOUNTER (OUTPATIENT)
Age: 71
End: 2021-07-27

## 2021-07-27 VITALS
HEART RATE: 83 BPM | OXYGEN SATURATION: 96 % | TEMPERATURE: 98 F | SYSTOLIC BLOOD PRESSURE: 122 MMHG | DIASTOLIC BLOOD PRESSURE: 73 MMHG | RESPIRATION RATE: 18 BRPM

## 2021-07-27 DIAGNOSIS — E11.10 TYPE 2 DIABETES MELLITUS WITH KETOACIDOSIS WITHOUT COMA: ICD-10-CM

## 2021-07-27 DIAGNOSIS — R97.20 ELEVATED PROSTATE SPECIFIC ANTIGEN [PSA]: ICD-10-CM

## 2021-07-27 DIAGNOSIS — I77.810 THORACIC AORTIC ECTASIA: ICD-10-CM

## 2021-07-27 DIAGNOSIS — E11.65 TYPE 2 DIABETES MELLITUS WITH HYPERGLYCEMIA: ICD-10-CM

## 2021-07-27 DIAGNOSIS — I10 ESSENTIAL (PRIMARY) HYPERTENSION: ICD-10-CM

## 2021-07-27 DIAGNOSIS — D72.819 DECREASED WHITE BLOOD CELL COUNT, UNSPECIFIED: ICD-10-CM

## 2021-07-27 LAB
ANA TITR SER: NEGATIVE — SIGNIFICANT CHANGE UP
ANION GAP SERPL CALC-SCNC: 7 MMOL/L — SIGNIFICANT CHANGE UP (ref 5–17)
BASOPHILS # BLD AUTO: 0.02 K/UL — SIGNIFICANT CHANGE UP (ref 0–0.2)
BASOPHILS NFR BLD AUTO: 0.8 % — SIGNIFICANT CHANGE UP (ref 0–2)
BUN SERPL-MCNC: 12 MG/DL — SIGNIFICANT CHANGE UP (ref 7–23)
C PEPTIDE SERPL-MCNC: 2.8 NG/ML — SIGNIFICANT CHANGE UP (ref 1.1–4.4)
CALCIUM SERPL-MCNC: 9.2 MG/DL — SIGNIFICANT CHANGE UP (ref 8.4–10.5)
CHLORIDE SERPL-SCNC: 100 MMOL/L — SIGNIFICANT CHANGE UP (ref 96–108)
CO2 SERPL-SCNC: 32 MMOL/L — HIGH (ref 22–31)
CREAT SERPL-MCNC: 0.86 MG/DL — SIGNIFICANT CHANGE UP (ref 0.5–1.3)
EOSINOPHIL # BLD AUTO: 0.1 K/UL — SIGNIFICANT CHANGE UP (ref 0–0.5)
EOSINOPHIL NFR BLD AUTO: 3.9 % — SIGNIFICANT CHANGE UP (ref 0–6)
GLUCOSE BLDC GLUCOMTR-MCNC: 154 MG/DL — HIGH (ref 70–99)
GLUCOSE BLDC GLUCOMTR-MCNC: 195 MG/DL — HIGH (ref 70–99)
GLUCOSE BLDC GLUCOMTR-MCNC: 221 MG/DL — HIGH (ref 70–99)
GLUCOSE BLDC GLUCOMTR-MCNC: 242 MG/DL — HIGH (ref 70–99)
GLUCOSE SERPL-MCNC: 257 MG/DL — HIGH (ref 70–99)
HCT VFR BLD CALC: 39.9 % — SIGNIFICANT CHANGE UP (ref 39–50)
HGB BLD-MCNC: 13.6 G/DL — SIGNIFICANT CHANGE UP (ref 13–17)
IMM GRANULOCYTES NFR BLD AUTO: 0.8 % — SIGNIFICANT CHANGE UP (ref 0–1.5)
LYMPHOCYTES # BLD AUTO: 1.04 K/UL — SIGNIFICANT CHANGE UP (ref 1–3.3)
LYMPHOCYTES # BLD AUTO: 40.6 % — SIGNIFICANT CHANGE UP (ref 13–44)
MAGNESIUM SERPL-MCNC: 2.1 MG/DL — SIGNIFICANT CHANGE UP (ref 1.6–2.6)
MCHC RBC-ENTMCNC: 31.6 PG — SIGNIFICANT CHANGE UP (ref 27–34)
MCHC RBC-ENTMCNC: 34.1 GM/DL — SIGNIFICANT CHANGE UP (ref 32–36)
MCV RBC AUTO: 92.8 FL — SIGNIFICANT CHANGE UP (ref 80–100)
MONOCYTES # BLD AUTO: 0.29 K/UL — SIGNIFICANT CHANGE UP (ref 0–0.9)
MONOCYTES NFR BLD AUTO: 11.3 % — SIGNIFICANT CHANGE UP (ref 2–14)
NEUTROPHILS # BLD AUTO: 1.09 K/UL — LOW (ref 1.8–7.4)
NEUTROPHILS NFR BLD AUTO: 42.6 % — LOW (ref 43–77)
NRBC # BLD: 0 /100 WBCS — SIGNIFICANT CHANGE UP (ref 0–0)
PLATELET # BLD AUTO: 149 K/UL — LOW (ref 150–400)
POTASSIUM SERPL-MCNC: 5.1 MMOL/L — SIGNIFICANT CHANGE UP (ref 3.5–5.3)
POTASSIUM SERPL-SCNC: 5.1 MMOL/L — SIGNIFICANT CHANGE UP (ref 3.5–5.3)
RBC # BLD: 4.3 M/UL — SIGNIFICANT CHANGE UP (ref 4.2–5.8)
RBC # FLD: 12.8 % — SIGNIFICANT CHANGE UP (ref 10.3–14.5)
SODIUM SERPL-SCNC: 139 MMOL/L — SIGNIFICANT CHANGE UP (ref 135–145)
WBC # BLD: 2.56 K/UL — LOW (ref 3.8–10.5)
WBC # FLD AUTO: 2.56 K/UL — LOW (ref 3.8–10.5)

## 2021-07-27 PROCEDURE — 84443 ASSAY THYROID STIM HORMONE: CPT

## 2021-07-27 PROCEDURE — 84132 ASSAY OF SERUM POTASSIUM: CPT

## 2021-07-27 PROCEDURE — 86780 TREPONEMA PALLIDUM: CPT

## 2021-07-27 PROCEDURE — 83036 HEMOGLOBIN GLYCOSYLATED A1C: CPT

## 2021-07-27 PROCEDURE — 86803 HEPATITIS C AB TEST: CPT

## 2021-07-27 PROCEDURE — 82962 GLUCOSE BLOOD TEST: CPT

## 2021-07-27 PROCEDURE — 99239 HOSP IP/OBS DSCHRG MGMT >30: CPT | Mod: GC

## 2021-07-27 PROCEDURE — 82803 BLOOD GASES ANY COMBINATION: CPT

## 2021-07-27 PROCEDURE — 87635 SARS-COV-2 COVID-19 AMP PRB: CPT

## 2021-07-27 PROCEDURE — G0103: CPT

## 2021-07-27 PROCEDURE — 81003 URINALYSIS AUTO W/O SCOPE: CPT

## 2021-07-27 PROCEDURE — 96374 THER/PROPH/DIAG INJ IV PUSH: CPT

## 2021-07-27 PROCEDURE — 93306 TTE W/DOPPLER COMPLETE: CPT

## 2021-07-27 PROCEDURE — 93005 ELECTROCARDIOGRAM TRACING: CPT

## 2021-07-27 PROCEDURE — 84681 ASSAY OF C-PEPTIDE: CPT

## 2021-07-27 PROCEDURE — 80048 BASIC METABOLIC PNL TOTAL CA: CPT

## 2021-07-27 PROCEDURE — 71275 CT ANGIOGRAPHY CHEST: CPT | Mod: MA

## 2021-07-27 PROCEDURE — 83735 ASSAY OF MAGNESIUM: CPT

## 2021-07-27 PROCEDURE — 80061 LIPID PANEL: CPT

## 2021-07-27 PROCEDURE — 86038 ANTINUCLEAR ANTIBODIES: CPT

## 2021-07-27 PROCEDURE — 99232 SBSQ HOSP IP/OBS MODERATE 35: CPT | Mod: GC

## 2021-07-27 PROCEDURE — 85610 PROTHROMBIN TIME: CPT

## 2021-07-27 PROCEDURE — 83605 ASSAY OF LACTIC ACID: CPT

## 2021-07-27 PROCEDURE — 84295 ASSAY OF SERUM SODIUM: CPT

## 2021-07-27 PROCEDURE — 85730 THROMBOPLASTIN TIME PARTIAL: CPT

## 2021-07-27 PROCEDURE — 83690 ASSAY OF LIPASE: CPT

## 2021-07-27 PROCEDURE — 80053 COMPREHEN METABOLIC PANEL: CPT

## 2021-07-27 PROCEDURE — 82330 ASSAY OF CALCIUM: CPT

## 2021-07-27 PROCEDURE — 82010 KETONE BODYS QUAN: CPT

## 2021-07-27 PROCEDURE — 99285 EMERGENCY DEPT VISIT HI MDM: CPT

## 2021-07-27 PROCEDURE — 93306 TTE W/DOPPLER COMPLETE: CPT | Mod: 26

## 2021-07-27 PROCEDURE — 86769 SARS-COV-2 COVID-19 ANTIBODY: CPT

## 2021-07-27 PROCEDURE — 86341 ISLET CELL ANTIBODY: CPT

## 2021-07-27 PROCEDURE — 82378 CARCINOEMBRYONIC ANTIGEN: CPT

## 2021-07-27 PROCEDURE — 36415 COLL VENOUS BLD VENIPUNCTURE: CPT

## 2021-07-27 PROCEDURE — 87086 URINE CULTURE/COLONY COUNT: CPT

## 2021-07-27 PROCEDURE — 85025 COMPLETE CBC W/AUTO DIFF WBC: CPT

## 2021-07-27 PROCEDURE — 74174 CTA ABD&PLVS W/CONTRAST: CPT | Mod: MA

## 2021-07-27 RX ORDER — AMLODIPINE BESYLATE 2.5 MG/1
5 TABLET ORAL DAILY
Refills: 0 | Status: DISCONTINUED | OUTPATIENT
Start: 2021-07-27 | End: 2021-07-27

## 2021-07-27 RX ORDER — INSULIN GLARGINE 100 [IU]/ML
34 INJECTION, SOLUTION SUBCUTANEOUS
Qty: 2 | Refills: 0
Start: 2021-07-27 | End: 2021-08-25

## 2021-07-27 RX ORDER — AMLODIPINE BESYLATE 2.5 MG/1
1 TABLET ORAL
Qty: 30 | Refills: 0
Start: 2021-07-27 | End: 2021-08-25

## 2021-07-27 RX ORDER — INSULIN GLARGINE 100 [IU]/ML
34 INJECTION, SOLUTION SUBCUTANEOUS AT BEDTIME
Refills: 0 | Status: DISCONTINUED | OUTPATIENT
Start: 2021-07-27 | End: 2021-07-27

## 2021-07-27 RX ORDER — INSULIN ASPART 100 [IU]/ML
18 INJECTION, SOLUTION SUBCUTANEOUS
Qty: 2 | Refills: 0
Start: 2021-07-27 | End: 2021-08-25

## 2021-07-27 RX ORDER — AMLODIPINE BESYLATE 2.5 MG/1
1 TABLET ORAL
Qty: 0 | Refills: 0 | DISCHARGE
Start: 2021-07-27

## 2021-07-27 RX ORDER — INSULIN LISPRO 100/ML
18 VIAL (ML) SUBCUTANEOUS
Refills: 0 | Status: DISCONTINUED | OUTPATIENT
Start: 2021-07-27 | End: 2021-07-27

## 2021-07-27 RX ADMIN — Medication 2: at 13:18

## 2021-07-27 RX ADMIN — AMLODIPINE BESYLATE 5 MILLIGRAM(S): 2.5 TABLET ORAL at 12:02

## 2021-07-27 RX ADMIN — Medication 10 UNIT(S): at 13:18

## 2021-07-27 RX ADMIN — Medication 4: at 08:48

## 2021-07-27 RX ADMIN — Medication 10 UNIT(S): at 09:52

## 2021-07-27 NOTE — PROGRESS NOTE ADULT - ATTENDING COMMENTS
Pt seen on rounds this afternoon prior to discharge.  Glucoses have been stable but still elevated into the 200-300 range.  Will increase the Lantus to 34 units, the pre-meal to 18 units lispro.  Spent a long while discussing the pt's diet in detail, emphasizing the need to match his insulin and carb for each meal.   He will probably use green bananas as his starch at breakfast.  Starch at lunch is usually rice and beans, which he will need to limit to 1-1 1/2 cups.  The third meal is a problem because he often skips it or eats something very light.   (My suspicion is that he simply grazes of fruit and milk after his main meal at lunch).  He will likely start having two pieces of toast for this meal with one glass of milk.  Was cautioned about his intake of milk (which was previously up to 2 quarts a day) and about fruit  He will [facility] up [address]
Pt. seen and examined by me earlier today; I have read Dr. Alarcon's note, I agree w/ his findings and plan of care as documented; f/u Endocrine, CTS recs

## 2021-07-27 NOTE — PROGRESS NOTE ADULT - SUBJECTIVE AND OBJECTIVE BOX
Patient is a 71y old  Male who presents with a chief complaint of DKA (26 Jul 2021 15:05)      INTERVAL HPI/OVERNIGHT EVENTS:    Pt. seen and examined earlier today  Pt. feels well, hoping to go home, has no complaints  He is motivated to learn how to use insulin and self-monitor his blood glucose    Review of Systems: 12 point review of systems otherwise negative    MEDICATIONS  (STANDING):  amLODIPine   Tablet 5 milliGRAM(s) Oral daily  dextrose 40% Gel 15 Gram(s) Oral once  dextrose 5%. 1000 milliLiter(s) (50 mL/Hr) IV Continuous <Continuous>  dextrose 5%. 1000 milliLiter(s) (100 mL/Hr) IV Continuous <Continuous>  dextrose 50% Injectable 25 Gram(s) IV Push once  dextrose 50% Injectable 12.5 Gram(s) IV Push once  dextrose 50% Injectable 25 Gram(s) IV Push once  enoxaparin Injectable 40 milliGRAM(s) SubCutaneous every 24 hours  glucagon  Injectable 1 milliGRAM(s) IntraMuscular once  insulin glargine Injectable (LANTUS) 34 Unit(s) SubCutaneous at bedtime  insulin lispro (ADMELOG) corrective regimen sliding scale   SubCutaneous three times a day before meals  insulin lispro (ADMELOG) corrective regimen sliding scale   SubCutaneous at bedtime  insulin lispro Injectable (ADMELOG) 18 Unit(s) SubCutaneous three times a day before meals    MEDICATIONS  (PRN):  acetaminophen   Tablet .. 650 milliGRAM(s) Oral every 6 hours PRN Temp greater or equal to 38.5C (101.3F), Mild Pain (1 - 3)  aluminum hydroxide/magnesium hydroxide/simethicone Suspension 30 milliLiter(s) Oral every 4 hours PRN Dyspepsia  melatonin 3 milliGRAM(s) Oral at bedtime PRN Insomnia  ondansetron Injectable 4 milliGRAM(s) IV Push every 8 hours PRN Nausea and/or Vomiting      Allergies    No Known Allergies    Intolerances          Vital Signs Last 24 Hrs  T(C): 36.4 (27 Jul 2021 10:22), Max: 36.7 (26 Jul 2021 16:11)  T(F): 97.6 (27 Jul 2021 10:22), Max: 98.1 (26 Jul 2021 16:11)  HR: 80 (27 Jul 2021 10:22) (67 - 82)  BP: 150/90 (27 Jul 2021 10:22) (127/84 - 150/90)  BP(mean): --  RR: 16 (27 Jul 2021 10:22) (16 - 18)  SpO2: 96% (27 Jul 2021 10:22) (94% - 96%)  CAPILLARY BLOOD GLUCOSE      POCT Blood Glucose.: 195 mg/dL (27 Jul 2021 13:17)  POCT Blood Glucose.: 242 mg/dL (27 Jul 2021 08:31)  POCT Blood Glucose.: 221 mg/dL (27 Jul 2021 03:18)  POCT Blood Glucose.: 284 mg/dL (26 Jul 2021 22:17)  POCT Blood Glucose.: 248 mg/dL (26 Jul 2021 17:07)        Physical Exam:  (earlier today)  Daily     Daily   General:  well-appearing in NAD, sitting at edge of bed  HEENT:  MMM  CV:  no JVD  Extremities:  no edema B/L LE  Skin:  WWP  Neuro:  AAOx3    LABS:                        13.6   2.56  )-----------( 149      ( 27 Jul 2021 08:22 )             39.9     07-27    139  |  100  |  12  ----------------------------<  257<H>  5.1   |  32<H>  |  0.86    Ca    9.2      27 Jul 2021 08:22  Mg     2.1     07-27    TPro  6.7  /  Alb  4.0  /  TBili  0.8  /  DBili  x   /  AST  27  /  ALT  32  /  AlkPhos  114  07-26

## 2021-07-27 NOTE — PROGRESS NOTE ADULT - SUBJECTIVE AND OBJECTIVE BOX
INTERVAL HPI/OVERNIGHT EVENTS:    Patient is a 71y old  Male who presents with a chief complaint of DKA (2021 15:05)    FSG & Insulin received:    Yesterday:  pre-dinner fs  nutritional lispro 10  units + 4  units lispro SS  bedtime fs  lantus  20 units +  2  units lispro SS    Today:  pre-breakfast fs  nutritional lispro 10  units+ 4   units lispro SS  pre-lunch fsg:   nutritional lispro   units+   units lispro SS      Pt reports the following symptoms:    CONSTITUTIONAL:  Negative fever or chills, feels well, good appetite  EYES:  Negative  blurry vision or double vision  CARDIOVASCULAR:  Negative for chest pain or palpitations  RESPIRATORY:  Negative for cough, wheezing, or SOB   GASTROINTESTINAL:  Negative for nausea, vomiting, diarrhea, constipation, or abdominal pain  GENITOURINARY:  Negative frequency, urgency or dysuria  NEUROLOGIC:  No headache, confusion, dizziness, lightheadedness    MEDICATIONS  (STANDING):  amLODIPine   Tablet 5 milliGRAM(s) Oral daily  dextrose 40% Gel 15 Gram(s) Oral once  dextrose 5%. 1000 milliLiter(s) (50 mL/Hr) IV Continuous <Continuous>  dextrose 5%. 1000 milliLiter(s) (100 mL/Hr) IV Continuous <Continuous>  dextrose 50% Injectable 25 Gram(s) IV Push once  dextrose 50% Injectable 12.5 Gram(s) IV Push once  dextrose 50% Injectable 25 Gram(s) IV Push once  enoxaparin Injectable 40 milliGRAM(s) SubCutaneous every 24 hours  glucagon  Injectable 1 milliGRAM(s) IntraMuscular once  insulin glargine Injectable (LANTUS) 34 Unit(s) SubCutaneous at bedtime  insulin lispro (ADMELOG) corrective regimen sliding scale   SubCutaneous three times a day before meals  insulin lispro (ADMELOG) corrective regimen sliding scale   SubCutaneous at bedtime  insulin lispro Injectable (ADMELOG) 18 Unit(s) SubCutaneous three times a day before meals    MEDICATIONS  (PRN):  acetaminophen   Tablet .. 650 milliGRAM(s) Oral every 6 hours PRN Temp greater or equal to 38.5C (101.3F), Mild Pain (1 - 3)  aluminum hydroxide/magnesium hydroxide/simethicone Suspension 30 milliLiter(s) Oral every 4 hours PRN Dyspepsia  melatonin 3 milliGRAM(s) Oral at bedtime PRN Insomnia  ondansetron Injectable 4 milliGRAM(s) IV Push every 8 hours PRN Nausea and/or Vomiting      PHYSICAL EXAM  Vital Signs Last 24 Hrs  T(C): 36.8 (2021 16:31), Max: 36.8 (2021 16:31)  T(F): 98.2 (2021 16:31), Max: 98.2 (2021 16:31)  HR: 83 (2021 16:31) (67 - 83)  BP: 122/73 (2021 16:31) (122/73 - 150/90)  BP(mean): --  RR: 18 (2021 16:31) (16 - 18)  SpO2: 96% (2021 16:31) (94% - 96%)    Constitutional: wn/wd in NAD.   HEENT: NCAT, MMM, OP clear, EOMI, , no proptosis or lid retraction  Neck: no thyromegaly or palpable thyroid nodules   Respiratory: lungs CTAB.  Cardiovascular: regular rhythm, normal S1 and S2, no audible murmurs, no peripheral edema  GI: soft, NT/ND, no masses/HSM appreciated.  Neurology: no tremors, DTR 2+  Skin: no visible rashes/lesions  Psychiatric: AAO x 3, normal affect/mood.    LABS:                        13.6   2.56  )-----------( 149      ( 2021 08:22 )             39.9     07-    139  |  100  |  12  ----------------------------<  257<H>  5.1   |  32<H>  |  0.86    Ca    9.2      2021 08:22  Mg     2.1     -    TPro  6.7  /  Alb  4.0  /  TBili  0.8  /  DBili  x   /  AST  27  /  ALT  32  /  AlkPhos  114  -        Thyroid Stimulating Hormone, Serum: 0.918 uIU/mL ( @ 12:29)      HbA1C:         Insulin Sliding Scale requirements X 24 Hours:      RADIOLOGY & ADDITIONAL TESTS:      Mr. Salinas is a 70 yo M w/ PMH HTN admitted for diabetes mellitus w/ hyperglycemia, with his ED workup also notable for CTA abd/pelvis showing 2.2 cm mass at rectosigmoid junction concerning for malignancy; it is unclear to what extent his unintentional weight loss is related to newly-diagnosed uncontrolled T2DM vs underlying malignancy.     # T2DM with hyperglycemia  A1c 11.6%, diagnosed this admission. Not on home meds  - increase Lantus 34 qhs  - lispro 18 TID AC  - MDSSI at meals and bedtime  - C-peptide: 2.8  - PARTHA auto-Ab sent on admission  - patient's son picked up basal insulin pens, pen needles, and glucometer from pharmacy. Diabetic educator to facilitate bedside instruction on home blood glucose monitoring and home self-administration of insulin  -Insulin regimen will likely not be sufficient but patient will need to follow up with Endocrinology within one week.     We appreciate the opportunity to participate in this patient's care. INTERVAL HPI/OVERNIGHT EVENTS:    Patient is a 71y old  Male who presents with a chief complaint of DKA (2021 15:05)  Patient states he may go home today. He denies any complaints, states understanding on the need to change his diet. He will be staying with his son who will help to take care of him. Blood sugars remained high despite starting basal bolus regimen of insulin close to weight based.   When asked what he ate:  Dinner- turkey, whole banana, salad and whole wheat bread  Breakfast- scrambled eggs, whole wheat bread and whole banana   FSG & Insulin received:    Yesterday:  pre-dinner fs  nutritional lispro 10  units + 4  units lispro SS  bedtime fs  lantus  20 units +  2  units lispro SS    Today:  pre-breakfast fs  nutritional lispro 10  units+ 4   units lispro SS  pre-lunch fsg:   nutritional lispro   units+   units lispro SS      Pt reports the following symptoms:    CONSTITUTIONAL:  Negative fever or chills, feels well, good appetite  EYES:  Negative  blurry vision or double vision  CARDIOVASCULAR:  Negative for chest pain or palpitations  RESPIRATORY:  Negative for cough, wheezing, or SOB   GASTROINTESTINAL:  Negative for nausea, vomiting, diarrhea, constipation, or abdominal pain  GENITOURINARY:  Negative frequency, urgency or dysuria  NEUROLOGIC:  No headache, confusion, dizziness, lightheadedness    MEDICATIONS  (STANDING):  amLODIPine   Tablet 5 milliGRAM(s) Oral daily  dextrose 40% Gel 15 Gram(s) Oral once  dextrose 5%. 1000 milliLiter(s) (50 mL/Hr) IV Continuous <Continuous>  dextrose 5%. 1000 milliLiter(s) (100 mL/Hr) IV Continuous <Continuous>  dextrose 50% Injectable 25 Gram(s) IV Push once  dextrose 50% Injectable 12.5 Gram(s) IV Push once  dextrose 50% Injectable 25 Gram(s) IV Push once  enoxaparin Injectable 40 milliGRAM(s) SubCutaneous every 24 hours  glucagon  Injectable 1 milliGRAM(s) IntraMuscular once  insulin glargine Injectable (LANTUS) 34 Unit(s) SubCutaneous at bedtime  insulin lispro (ADMELOG) corrective regimen sliding scale   SubCutaneous three times a day before meals  insulin lispro (ADMELOG) corrective regimen sliding scale   SubCutaneous at bedtime  insulin lispro Injectable (ADMELOG) 18 Unit(s) SubCutaneous three times a day before meals    MEDICATIONS  (PRN):  acetaminophen   Tablet .. 650 milliGRAM(s) Oral every 6 hours PRN Temp greater or equal to 38.5C (101.3F), Mild Pain (1 - 3)  aluminum hydroxide/magnesium hydroxide/simethicone Suspension 30 milliLiter(s) Oral every 4 hours PRN Dyspepsia  melatonin 3 milliGRAM(s) Oral at bedtime PRN Insomnia  ondansetron Injectable 4 milliGRAM(s) IV Push every 8 hours PRN Nausea and/or Vomiting      PHYSICAL EXAM  Vital Signs Last 24 Hrs  T(C): 36.8 (2021 16:31), Max: 36.8 (2021 16:31)  T(F): 98.2 (2021 16:31), Max: 98.2 (2021 16:31)  HR: 83 (2021 16:31) (67 - 83)  BP: 122/73 (2021 16:31) (122/73 - 150/90)  BP(mean): --  RR: 18 (2021 16:31) (16 - 18)  SpO2: 96% (2021 16:31) (94% - 96%)    Constitutional: wn/wd in NAD.   HEENT: NCAT, MMM, OP clear, EOMI, , no proptosis or lid retraction  Neck: no thyromegaly or palpable thyroid nodules   Respiratory: lungs CTAB.  Cardiovascular: regular rhythm, normal S1 and S2, no audible murmurs, no peripheral edema  GI: soft, NT/ND, no masses/HSM appreciated.  Neurology: no tremors, DTR 2+  Skin: no visible rashes/lesions  Psychiatric: AAO x 3, normal affect/mood.    LABS:                        13.6   2.56  )-----------( 149      ( 2021 08:22 )             39.9         139  |  100  |  12  ----------------------------<  257<H>  5.1   |  32<H>  |  0.86    Ca    9.2      2021 08:22  Mg     2.1         TPro  6.7  /  Alb  4.0  /  TBili  0.8  /  DBili  x   /  AST  27  /  ALT  32  /  AlkPhos  114  -        Thyroid Stimulating Hormone, Serum: 0.918 uIU/mL ( @ 12:29)      HbA1C:         Insulin Sliding Scale requirements X 24 Hours:      RADIOLOGY & ADDITIONAL TESTS:      Mr. Salinas is a 72 yo M w/ PMH HTN admitted for diabetes mellitus w/ hyperglycemia, with his ED workup also notable for CTA abd/pelvis showing 2.2 cm mass at rectosigmoid junction concerning for malignancy; it is unclear to what extent his unintentional weight loss is related to newly-diagnosed uncontrolled T2DM vs underlying malignancy.     # T2DM with hyperglycemia  A1c 11.6%, diagnosed this admission. Not on home meds  - increase Lantus 34 qhs  - lispro 18 TID AC  - MDSSI at meals and bedtime  - C-peptide: 2.8  - PARTHA auto-Ab sent on admission  - patient's son picked up basal insulin pens, pen needles, and glucometer from pharmacy. Diabetic educator to facilitate bedside instruction on home blood glucose monitoring and home self-administration of insulin  -Insulin regimen will likely not be sufficient but patient will need to follow up with Endocrinology within one week.     We appreciate the opportunity to participate in this patient's care.

## 2021-07-27 NOTE — PROGRESS NOTE ADULT - PROBLEM SELECTOR PLAN 4
CT A/P shows "2.2 cm mass inseparable from the rectosigmoid junction representing exophytic primary colorectal neoplasm versus lymphadenopathy related to prostate, given the history of elevated PSA;" case d/w GI fellow, no need for further inpatient work-up; Pt. verbalized understanding of finding and verbalized plan to f/u w/ GI or his PCP as outpatient for further work-up, which may include colonoscopy

## 2021-07-27 NOTE — PROGRESS NOTE ADULT - PROBLEM SELECTOR PLAN 3
CT shows "borderline ectasia of the aortic root 3.8 cm and ascending aorta 3.8 cm;" appreciate CTS and Vascular recs; checking TTE today; no need for further inpatient work-up thereafter, but will need serial imaging and monitoring as outpatient

## 2021-07-27 NOTE — PROGRESS NOTE ADULT - ASSESSMENT
Mr. Salinas is a 72 yo M w/ PMH HTN admitted for diabetes mellitus w/ hyperglycemia, with his ED workup also notable for CTA abd/pelvis showing 2.2 cm mass at rectosigmoid junction concerning for malignancy; it is unclear to what extent his unintentional weight loss is related to newly-diagnosed uncontrolled T2DM vs underlying malignancy.     # T2DM with hyperglycemia  A1c 11.6%, diagnosed this admission. Not on home meds  - start Lantus _____ qhs  - lispro _____ TID AC  - MDSSI  - please send basal insulin pens, pen needles, and glucometer to pharmacy to facilitate bedside instruction on home blood glucose monitoring and home self-administration of insulin  - diabetes educator consulted, appreciate assistance    We appreciate the opportunity to participate in this patient's care. Endocrinology will continue to follow. 
72yo M history of HTN (non complaint w medication) and longstanding history of elevated PSA s/p biopsy in 2010 and unclear pathology (states surveillance) p/w 4 weeks of polyuria polydipsia, frequency urgency weight loss vision loss admitted for management of DKA and hypertensive emergency 
72 y/o M w/

## 2021-07-27 NOTE — PROGRESS NOTE ADULT - TIME BILLING
Insulin adjustment for discharge
Dispo: d/c home pending TTE, diabetes education  Endocrinology appt 8/3  Vascular appt 8/5  PCP appt 8/6 (Dr. Wilson)

## 2021-07-27 NOTE — DISCHARGE NOTE NURSING/CASE MANAGEMENT/SOCIAL WORK - PATIENT PORTAL LINK FT
You can access the FollowMyHealth Patient Portal offered by [facility] by registering at the following website: [email or web address]. By joining [employer]’s FollowMyHealth portal, you will also be able to view your health information using other applications (apps) compatible with our system.

## 2021-07-27 NOTE — PROGRESS NOTE ADULT - PROBLEM SELECTOR PLAN 1
new dx, although Pt. reports being told he had pre-diabetes several years ago; HbA1c 11.6%; cont. basal-bolus insulin, per Endocrine recs; monitor blood glucose; cont. diabetic diet; Dietician and Diabetes Educator to see Pt.

## 2021-07-28 LAB — ISLET CELL512 AB SER-ACNC: SIGNIFICANT CHANGE UP

## 2021-07-30 LAB
GAD65 AB SER-MCNC: 0 NMOL/L — SIGNIFICANT CHANGE UP
ISLET CELL512 AB SER-SCNC: 0 NMOL/L — SIGNIFICANT CHANGE UP

## 2021-08-03 ENCOUNTER — NON-APPOINTMENT (OUTPATIENT)
Age: 71
End: 2021-08-03

## 2021-08-03 ENCOUNTER — APPOINTMENT (OUTPATIENT)
Dept: ENDOCRINOLOGY | Facility: CLINIC | Age: 71
End: 2021-08-03
Payer: MEDICARE

## 2021-08-03 VITALS
TEMPERATURE: 96 F | DIASTOLIC BLOOD PRESSURE: 92 MMHG | HEIGHT: 67 IN | BODY MASS INDEX: 31.01 KG/M2 | SYSTOLIC BLOOD PRESSURE: 138 MMHG | WEIGHT: 198 LBS | HEART RATE: 95 BPM

## 2021-08-03 DIAGNOSIS — K63.89 OTHER SPECIFIED DISEASES OF INTESTINE: ICD-10-CM

## 2021-08-03 DIAGNOSIS — R97.20 ELEVATED PROSTATE SPECIFIC ANTIGEN [PSA]: ICD-10-CM

## 2021-08-03 DIAGNOSIS — I72.3 ANEURYSM OF ILIAC ARTERY: ICD-10-CM

## 2021-08-03 DIAGNOSIS — E11.65 TYPE 2 DIABETES MELLITUS WITH HYPERGLYCEMIA: ICD-10-CM

## 2021-08-03 DIAGNOSIS — Z91.11 PATIENT'S NONCOMPLIANCE WITH DIETARY REGIMEN: ICD-10-CM

## 2021-08-03 DIAGNOSIS — N40.0 BENIGN PROSTATIC HYPERPLASIA WITHOUT LOWER URINARY TRACT SYMPTOMS: ICD-10-CM

## 2021-08-03 DIAGNOSIS — D72.819 DECREASED WHITE BLOOD CELL COUNT, UNSPECIFIED: ICD-10-CM

## 2021-08-03 DIAGNOSIS — R63.4 ABNORMAL WEIGHT LOSS: ICD-10-CM

## 2021-08-03 DIAGNOSIS — R65.10 SYSTEMIC INFLAMMATORY RESPONSE SYNDROME (SIRS) OF NON-INFECTIOUS ORIGIN WITHOUT ACUTE ORGAN DYSFUNCTION: ICD-10-CM

## 2021-08-03 DIAGNOSIS — I16.1 HYPERTENSIVE EMERGENCY: ICD-10-CM

## 2021-08-03 DIAGNOSIS — I10 ESSENTIAL (PRIMARY) HYPERTENSION: ICD-10-CM

## 2021-08-03 DIAGNOSIS — I71.2 THORACIC AORTIC ANEURYSM, WITHOUT RUPTURE: ICD-10-CM

## 2021-08-03 DIAGNOSIS — E11.10 TYPE 2 DIABETES MELLITUS WITH KETOACIDOSIS WITHOUT COMA: ICD-10-CM

## 2021-08-03 LAB — HBA1C MFR BLD HPLC: 10.6

## 2021-08-03 PROCEDURE — 99215 OFFICE O/P EST HI 40 MIN: CPT | Mod: 25

## 2021-08-03 PROCEDURE — 83036 HEMOGLOBIN GLYCOSYLATED A1C: CPT | Mod: QW

## 2021-08-03 PROCEDURE — 99205 OFFICE O/P NEW HI 60 MIN: CPT | Mod: 25

## 2021-08-03 RX ORDER — PEN NEEDLE, DIABETIC 32GX 5/32"
32G X 4 MM NEEDLE, DISPOSABLE MISCELLANEOUS
Qty: 3 | Refills: 3 | Status: ACTIVE | COMMUNITY
Start: 2021-08-03 | End: 1900-01-01

## 2021-08-03 RX ORDER — LANCETS 28 GAUGE
EACH MISCELLANEOUS
Qty: 3 | Refills: 3 | Status: ACTIVE | COMMUNITY
Start: 2021-08-03 | End: 1900-01-01

## 2021-08-03 RX ORDER — BLOOD SUGAR DIAGNOSTIC
STRIP MISCELLANEOUS
Qty: 3 | Refills: 3 | Status: ACTIVE | COMMUNITY
Start: 2021-08-03 | End: 1900-01-01

## 2021-08-03 NOTE — HISTORY OF PRESENT ILLNESS
[FreeTextEntry1] : Patient is a 72 yo man with uncontrolled T2DM (A1c of 11.6% July 2021), HTN, HLD establishing endocrine care today.\par \par Patient diagnosed with uncontrolled type 2 diabetes in July 2021 when he presented with hypertension and abdominal pain. He was worked up for an aneurysm.  [place] by Dr. Wilson and was told of prediabetes in the past.  However, he left for [facility] and was there for three years. Patient had no confidence in doctors in [place]. [place] and was seen at [facility]. Symptoms were increased urination, weakness/fatigue, inability to walk.  CT scan done in the hospital for abdominal aneurysm which was ruled out. He had other prostate findings and has consultation with urology and other specialists all week.  Patient was discharged on insulin.  He states adherence to insulin and SMBG\par He was discharged on basaglar 34 daily, novolog 17 TID with meals but admits he misunderstood and has been taking 5 units of novolog and only 5 units of basaglar at night \par Prior to diagnosis of diabetes he was eating rice and beans. He has chicken breast and vegetables.  While in [place] he was having candy, chocolate, cookies. Since diagnosis his meal is \par Breakfast: two green bananas, fruits, celery, no juice, no sugar; milk\par Lunch: brown rice, vegetables, cauliflower\par Dinner: whole wheat bread with cheese and turkey\par Limits portion\par No soda, no fruit juices but drinks green juice\par Checks sugars three times a day\par AM: 240-276 (this morning sugar was 276, had potato for dinner)\par Before lunch: 240s\par Bedtime: 241\par \par SUNRISE HIE\par A1c 11.6%\par \par TSH 2.02

## 2021-08-03 NOTE — ASSESSMENT
[Diabetes Foot Care] : diabetes foot care [Long Term Vascular Complications] : long term vascular complications of diabetes [Carbohydrate Consistent Diet] : carbohydrate consistent diet [Importance of Diet and Exercise] : importance of diet and exercise to improve glycemic control, achieve weight loss and improve cardiovascular health [Hypoglycemia Management] : hypoglycemia management [Action and use of Insulin] : action and use of short and long-acting insulin [Self Monitoring of Blood Glucose] : self monitoring of blood glucose [Insulin Self-Administration] : insulin self-administration [Injection Technique, Storage, Sharps Disposal] : injection technique, storage, and sharps disposal [Retinopathy Screening] : Patient was referred to ophthalmology for retinopathy screening [Weight Loss] : weight loss [FreeTextEntry1] : Patient is a 70 yo man with newly diagnosed, uncontrolled type 2 diabetes, HTN and HLD establishing endocrine care today\par \par 1. Uncontrolled T2DM A1c of 11.6%\par -patient presented to the ED with symptoms after being away in [place] [facility] [place] for three years. He had symptoms of hyperglycemia and was found to have an A1c of 11.6%. He was discharged on basal/bolus insulin but he has been taking the wrong doses.\par -he adheres to SMBG and sugars remain in the 240s\par -discussed the risks of micro/macrovascular events including, but not limited to, heart attack/stroke/eye complications/kidney disease at length\par -importance of glycemic control discussed; goal A1c of 7-7.5%\par -nutritional counseling recommended but at this time he is overwhelmed with doctors visitsb because he is being worked up for prostate/colon mass. Nutritional counseling provided today including healthy plate diet\par -importance of self-monitoring of blood glucose also discussed.  Goal fasting glucose 100-130 with 2 hour post-prandial goals < 180\par -dilated eye exam required; ophthalmology referral provided\par -monofilament testing done\par -increase Basaglar to 28 units daily and Novolog to 12 units TID with meals. All insulin, pen, and testing supplies sent to his pharmacy\par \par 2. HTN\par -BP goal < 140/90\par \par 3. HLD\par -LDL goal < 70\par -statin prescribed today for primary prevention\par \par Follow up in 2-3 months. Call with hypo/hyperglycemic excursions\par

## 2021-08-03 NOTE — REVIEW OF SYSTEMS
[Fatigue] : fatigue [Dysphagia] : no dysphagia [Chest Pain] : no chest pain [Palpitations] : no palpitations [Shortness Of Breath] : no shortness of breath [Nausea] : no nausea [Constipation] : no constipation [Vomiting] : no vomiting [Diarrhea] : no diarrhea [Headaches] : no headaches [Depression] : no depression [Polydipsia] : polydipsia [All other systems negative] : All other systems negative

## 2021-08-03 NOTE — PHYSICAL EXAM
[Alert] : alert [Well Nourished] : well nourished [No Acute Distress] : no acute distress [EOMI] : extra ocular movement intact [Normal Hearing] : hearing was normal [No Respiratory Distress] : no respiratory distress [No Accessory Muscle Use] : no accessory muscle use [Clear to Auscultation] : lungs were clear to auscultation bilaterally [Normal S1, S2] : normal S1 and S2 [Normal Rate] : heart rate was normal [Normal Bowel Sounds] : normal bowel sounds [Not Tender] : non-tender [Soft] : abdomen soft [Normal Gait] : normal gait [No Joint Swelling] : no joint swelling seen [Normal Strength/Tone] : muscle strength and tone were normal [Foot Ulcers] : no foot ulcers [Right Foot Was Examined] : right foot ~C was examined [Left Foot Was Examined] : left foot ~C was examined [Normal] : normal [Swelling] : not swollen [Erythema] : not erythematous [2+] : 2+ in the dorsalis pedis [#1 Diminished] : number 1 was normal [#2 Diminished] : number 2 was normal [#3 Diminished] : number 3 was normal [#4 Diminished] : number 4 was normal [#5 Diminished] : number 5 was normal [#6 Diminished] : number 6 was normal [#7 Diminished] : number 7 was normal [#8 Diminished] : number 8 was normal [#9 Diminished] : number 9 was normal [#10 Diminished] : number 10 was normal [No Motor Deficits] : the motor exam was normal [No Tremors] : no tremors [Normal Affect] : the affect was normal [Normal Insight/Judgement] : insight and judgment were intact [Normal Mood] : the mood was normal

## 2021-08-04 ENCOUNTER — APPOINTMENT (OUTPATIENT)
Dept: UROLOGY | Facility: CLINIC | Age: 71
End: 2021-08-04
Payer: MEDICARE

## 2021-08-04 VITALS — TEMPERATURE: 96.5 F | DIASTOLIC BLOOD PRESSURE: 85 MMHG | HEART RATE: 96 BPM | SYSTOLIC BLOOD PRESSURE: 132 MMHG

## 2021-08-04 DIAGNOSIS — D49.0 NEOPLASM OF UNSPECIFIED BEHAVIOR OF DIGESTIVE SYSTEM: ICD-10-CM

## 2021-08-04 LAB
ALBUMIN SERPL ELPH-MCNC: 4.8 G/DL
ALP BLD-CCNC: 121 U/L
ALT SERPL-CCNC: 32 U/L
ANION GAP SERPL CALC-SCNC: 15 MMOL/L
AST SERPL-CCNC: 26 U/L
BILIRUB SERPL-MCNC: 0.6 MG/DL
BUN SERPL-MCNC: 18 MG/DL
CALCIUM SERPL-MCNC: 10.3 MG/DL
CHLORIDE SERPL-SCNC: 96 MMOL/L
CHOLEST SERPL-MCNC: 264 MG/DL
CO2 SERPL-SCNC: 28 MMOL/L
CREAT SERPL-MCNC: 1.11 MG/DL
CREAT SPEC-SCNC: 474 MG/DL
ESTIMATED AVERAGE GLUCOSE: 258 MG/DL
GLUCOSE SERPL-MCNC: 227 MG/DL
HBA1C MFR BLD HPLC: 10.6 %
HDLC SERPL-MCNC: 54 MG/DL
LDLC SERPL CALC-MCNC: 153 MG/DL
MICROALBUMIN 24H UR DL<=1MG/L-MCNC: 5.7 MG/DL
MICROALBUMIN/CREAT 24H UR-RTO: 12 MG/G
NONHDLC SERPL-MCNC: 210 MG/DL
POTASSIUM SERPL-SCNC: 4.1 MMOL/L
PROT SERPL-MCNC: 7.4 G/DL
SODIUM SERPL-SCNC: 139 MMOL/L
TRIGL SERPL-MCNC: 288 MG/DL

## 2021-08-04 PROCEDURE — 99204 OFFICE O/P NEW MOD 45 MIN: CPT

## 2021-08-04 NOTE — PHYSICAL EXAM
[General Appearance - Well Developed] : well developed [General Appearance - Well Nourished] : well nourished [Normal Appearance] : normal appearance [Well Groomed] : well groomed [General Appearance - In No Acute Distress] : no acute distress [Abdomen Soft] : soft [Abdomen Tenderness] : non-tender [Costovertebral Angle Tenderness] : no ~M costovertebral angle tenderness [FreeTextEntry1] : prostate is enlarged, firm, nodularity right apex

## 2021-08-04 NOTE — HISTORY OF PRESENT ILLNESS
[FreeTextEntry1] : This is a very pleasant 67  male with a history of HTN and longstanding history of elevated PSA s/p biopsy in 2010 and unclear pathology. Past records indicate progressive rise of PSA from 5.47 in 2014 to 9.79 today. Currently feels well without complaints, denies hematuria, dysuria, or bothersome LUTS. No recent weight loss, has longstanding back pain after a traumatic accident that has not changed in nature, and no family history of prostate cancer. He is unsure if he's been dx with prostate cancer, he has been following with Dr. Mckeon, radiation oncology, yearly for a "check-up" but denies any recent biopsies, imaging or ever having radiation therapy. Has previously seen Dr. Wilcox of  but not since 2011. Of note, he resides in the [place], and only visits [place] for weeks at a time, and [place] on [place] 23rd.\par \par 8/04/21 Patient last seen in 2017, recently hospitalized with DKA, workup included PSA = 50, CT A/P which shows a rectosigmoid mass and pelvic adenopathy with lymph node measuring up to 9mm but suspicious. He is currently voiding well, denies pain or weight loss. Seeing colorectal surgery later this week.  [Urinary Retention] : no urinary retention [Urinary Urgency] : no urinary urgency [Nocturia] : nocturia [Erectile Dysfunction] : no Erectile Dysfunction [Dysuria] : no dysuria [Hematuria - Gross] : no gross hematuria [None] : None

## 2021-08-04 NOTE — ASSESSMENT
[FreeTextEntry1] : 72yo male with very elevated PSA, unclear history of biopsy but he denies prior diagnosis or treatment for PCa\par PSA now 50\par Abnormal BINH and CT suspicious for node positive disease\par Also question of sigmoid neoplasm or advanced PCa\par Check MRI\par F/u colorectal surgery\par F/u to review MRI findings and next steps

## 2021-08-05 ENCOUNTER — APPOINTMENT (OUTPATIENT)
Dept: VASCULAR SURGERY | Facility: CLINIC | Age: 71
End: 2021-08-05
Payer: MEDICARE

## 2021-08-05 PROCEDURE — 99213 OFFICE O/P EST LOW 20 MIN: CPT

## 2021-08-06 ENCOUNTER — APPOINTMENT (OUTPATIENT)
Dept: HEART AND VASCULAR | Facility: CLINIC | Age: 71
End: 2021-08-06

## 2021-08-06 ENCOUNTER — LABORATORY RESULT (OUTPATIENT)
Age: 71
End: 2021-08-06

## 2021-08-06 ENCOUNTER — APPOINTMENT (OUTPATIENT)
Dept: HEART AND VASCULAR | Facility: CLINIC | Age: 71
End: 2021-08-06
Payer: MEDICARE

## 2021-08-06 VITALS
OXYGEN SATURATION: 96 % | WEIGHT: 198 LBS | RESPIRATION RATE: 14 BRPM | DIASTOLIC BLOOD PRESSURE: 70 MMHG | BODY MASS INDEX: 31.08 KG/M2 | HEIGHT: 67 IN | SYSTOLIC BLOOD PRESSURE: 106 MMHG | HEART RATE: 96 BPM | TEMPERATURE: 96.9 F

## 2021-08-06 DIAGNOSIS — R09.89 OTHER SPECIFIED SYMPTOMS AND SIGNS INVOLVING THE CIRCULATORY AND RESPIRATORY SYSTEMS: ICD-10-CM

## 2021-08-06 DIAGNOSIS — R01.1 CARDIAC MURMUR, UNSPECIFIED: ICD-10-CM

## 2021-08-06 DIAGNOSIS — I11.9 HYPERTENSIVE HEART DISEASE W/OUT HEART FAILURE: ICD-10-CM

## 2021-08-06 PROCEDURE — 93000 ELECTROCARDIOGRAM COMPLETE: CPT

## 2021-08-06 PROCEDURE — 93925 LOWER EXTREMITY STUDY: CPT

## 2021-08-06 PROCEDURE — 36415 COLL VENOUS BLD VENIPUNCTURE: CPT

## 2021-08-06 PROCEDURE — 93880 EXTRACRANIAL BILAT STUDY: CPT

## 2021-08-06 PROCEDURE — 99214 OFFICE O/P EST MOD 30 MIN: CPT

## 2021-08-06 NOTE — HISTORY OF PRESENT ILLNESS
[FreeTextEntry1] : 72 y/o M w/h/o HTN, elevated PSA s/p biopsy in 2010 with unclear follow up and diagnosis. He presented to [facility] ED on 7/25 with complaints of generalized weakness, epigastric pain, decrease appetite and PO intake for ~ 4 weeks with unintentional weight loss of ~ 20 lbs with associated dizziness, blurred vision, polyuria and polydipsia. During his stay pt was treated for DKA. He had CT c/a/p with incidental findings of 2.2 cm rectosigmoid mass w/concern for malignancy and a 3.8 cm aortic root and 3.8 cm ascending aortic dilation w/o evidence of dissection and mild aneurysmal dilation of bilateral common iliacs measuring 1.6 cm. \par \par Today, pt presents for follow up on incidental CT findings. Patient has been feeling well overall. He has remained asymptomatic without reports of unusual abdominal pain, back pain, n/v/d. Per patient, he has an appointment to see GI (Dr. Arauz) to discuss rectosigmoid mass.

## 2021-08-06 NOTE — END OF VISIT
[FreeTextEntry3] : I, Dr. Marvel Hernandez  have read and attest that all the information, medical decision making and discharge instructions within are true and accurate, I  personally performed the evaluation and management (E/M) services for this new patient.  That E/M includes conducting the initial examination, assessing all conditions, and establishing the plan of care.  Today, my ACP, Rebekah Vickers PA-C, was here to observe my evaluation and management services for this patient to be followed going forward.\par

## 2021-08-06 NOTE — ASSESSMENT
[FreeTextEntry1] : Controlled BP\par \par obesity\par \par recent diabetic ketoacidosis \par \par \par claudication\par \par \par elevated PSA   , awaiting prostate biopsy \par

## 2021-08-06 NOTE — PHYSICAL EXAM
[Well Developed] : well developed [Well Nourished] : well nourished [No Acute Distress] : no acute distress [Obese] : obese [Normal Conjunctiva] : normal conjunctiva [No Xanthelasma] : no xanthelasma [Normal Venous Pressure] : normal venous pressure [No Carotid Bruit] : no carotid bruit [No Murmur] : no murmur [No Rub] : no rub [No Gallop] : no gallop [Murmur] : murmur [Clear Lung Fields] : clear lung fields [Good Air Entry] : good air entry [No Respiratory Distress] : no respiratory distress  [Soft] : abdomen soft [Non Tender] : non-tender [No Masses/organomegaly] : no masses/organomegaly [Normal Bowel Sounds] : normal bowel sounds [Normal Gait] : normal gait [Gait - Sufficient for Exercise Testing] : gait - sufficient for exercise testing [No Edema] : no edema [No Cyanosis] : no cyanosis [No Clubbing] : no clubbing [No Varicosities] : no varicosities [No Rash] : no rash [No Skin Lesions] : no skin lesions [Moves all extremities] : moves all extremities [No Focal Deficits] : no focal deficits [Normal Speech] : normal speech [Alert and Oriented] : alert and oriented [Normal memory] : normal memory [de-identified] : anicteric  [de-identified] : 2/6 SM at base

## 2021-08-06 NOTE — PHYSICAL EXAM
[Respiratory Effort] : normal respiratory effort [Normal Rate and Rhythm] : normal rate and rhythm [Alert] : alert [Oriented to Person] : oriented to person [Oriented to Place] : oriented to place [Oriented to Time] : oriented to time [Calm] : calm [Abdomen Tenderness] : ~T ~M No abdominal tenderness [de-identified] : Friendly and cooperative, NAD [de-identified] : NC/AT  [de-identified] : Supple  [de-identified] : Grossly intact.  [de-identified] : FROM

## 2021-08-06 NOTE — REVIEW OF SYSTEMS
[Weight Loss (___ Lbs)] : [unfilled] ~Ulb weight loss [Leg Claudication] : intermittent leg claudication [Erectile Dysfunction] : erectile dysfunction [Nocturia] : nocturia [Negative] : Heme/Lymph

## 2021-08-06 NOTE — HISTORY OF PRESENT ILLNESS
[FreeTextEntry1] : Reports that he received Sinovax (covid vaccine ) x 2 in [place]\par \par Denies personal hx of covid infection\par \par Was feeling since since July 1st  with constant urination and weakness \par \par At [facility] , his was in diabetic ketoacidosis and treated and released on   insulin\par \par Denies bleeding \par \par Most recent A1c 10.6%   Creatinine 1.11mg/dL \par \par C/o heaviness in legs especially when walking \par \par EKG shows NSR  72 bpm  without ectopy, ischemia or LVH\par \par He now follows   a diet, walks 50 minutes daily and does finger sticks  tid \par \par

## 2021-08-06 NOTE — ASSESSMENT
[Arterial/Venous Disease] : arterial/venous disease [Medication Management] : medication management [FreeTextEntry1] : 72 y/o M w/h/o HTN, DM with DKA episode, incidental finding of 3.8 cm aortic root and 3.8 cm ascending aortic abdominal  w/o evidence of dissection and mild aneurysmal dilation of bilateral common iliacs measuring 1.6 cm. No vascular intervention is needed at this time as aneurysms are small, advised him to discuss with his sister and makes sure she gets screening for AAA. FU here in 6 months or sooner with any issues.

## 2021-08-06 NOTE — REASON FOR VISIT
[Symptom and Test Evaluation] : symptom and test evaluation [Hypertension] : hypertension [FreeTextEntry1] : 71  year old obese  male spends several months during the year in [facility] [place] and presents  for hospital follow up after recent admission for DKA. \par \par He has hx of HTN and hyperlipidemia and reports a past hx of prostate cancer on biopsy without treatment   (  on surveillance with urologist)\par \par Reports an interval arthroscopy of right lower extremity  December 30 th 2014\par \par

## 2021-08-06 NOTE — DISCUSSION/SUMMARY
[Essential Hypertension] : essential hypertension [Stable] : stable [Responding to Treatment] : responding to treatment [None] : There are no changes in medication management [Weight Loss] : weight loss [With Me] : with me [___ Month(s)] : in [unfilled] month(s) [FreeTextEntry1] : venipuncture with A1c , Covid Ab , B12/folate, lipids, etc \par \par medications reconciled and renewed\par \par arterial duplex scan legs  shows mild-moderate  plaques with   moderate stenosis of the  right  and left anterior tibial arteries \par \par mild, nonobstructive plaques of the carotids bilaterally\par \par Follow up in one month with flu vaccine

## 2021-08-07 LAB
25(OH)D3 SERPL-MCNC: 44.4 NG/ML
ALBUMIN SERPL ELPH-MCNC: 4.5 G/DL
ALP BLD-CCNC: 107 U/L
ALT SERPL-CCNC: 25 U/L
ANION GAP SERPL CALC-SCNC: 13 MMOL/L
APPEARANCE: CLEAR
AST SERPL-CCNC: 30 U/L
BASOPHILS # BLD AUTO: 0.02 K/UL
BASOPHILS NFR BLD AUTO: 0.7 %
BILIRUB SERPL-MCNC: 1.7 MG/DL
BILIRUBIN URINE: NEGATIVE
BLOOD URINE: NEGATIVE
BUN SERPL-MCNC: 19 MG/DL
CALCIUM SERPL-MCNC: 10 MG/DL
CHLORIDE SERPL-SCNC: 103 MMOL/L
CHOLEST SERPL-MCNC: 228 MG/DL
CO2 SERPL-SCNC: 26 MMOL/L
COLOR: YELLOW
COVID-19 NUCLEOCAPSID  GAM ANTIBODY INTERPRETATION: NEGATIVE
COVID-19 SPIKE DOMAIN ANTIBODY INTERPRETATION: POSITIVE
CREAT SERPL-MCNC: 0.98 MG/DL
EOSINOPHIL # BLD AUTO: 0.08 K/UL
EOSINOPHIL NFR BLD AUTO: 2.9 %
FOLATE SERPL-MCNC: 11.4 NG/ML
GLUCOSE QUALITATIVE U: NEGATIVE
GLUCOSE SERPL-MCNC: 117 MG/DL
HCT VFR BLD CALC: 41.4 %
HDLC SERPL-MCNC: 54 MG/DL
HGB BLD-MCNC: 13.2 G/DL
IMM GRANULOCYTES NFR BLD AUTO: 0.4 %
KETONES URINE: NEGATIVE
LDLC SERPL CALC-MCNC: 138 MG/DL
LEUKOCYTE ESTERASE URINE: NEGATIVE
LYMPHOCYTES # BLD AUTO: 0.94 K/UL
LYMPHOCYTES NFR BLD AUTO: 34.3 %
MAN DIFF?: NORMAL
MCHC RBC-ENTMCNC: 31.9 GM/DL
MCHC RBC-ENTMCNC: 32 PG
MCV RBC AUTO: 100.2 FL
MONOCYTES # BLD AUTO: 0.34 K/UL
MONOCYTES NFR BLD AUTO: 12.4 %
NEUTROPHILS # BLD AUTO: 1.35 K/UL
NEUTROPHILS NFR BLD AUTO: 49.3 %
NITRITE URINE: NEGATIVE
NONHDLC SERPL-MCNC: 174 MG/DL
PH URINE: 6.5
PLATELET # BLD AUTO: 200 K/UL
POTASSIUM SERPL-SCNC: 4.3 MMOL/L
PROT SERPL-MCNC: 6.9 G/DL
PROTEIN URINE: NORMAL
RBC # BLD: 4.13 M/UL
RBC # FLD: 13.5 %
SARS-COV-2 AB SERPL IA-ACNC: 38.1 U/ML
SARS-COV-2 AB SERPL QL IA: 0.37 INDEX
SODIUM SERPL-SCNC: 142 MMOL/L
SPECIFIC GRAVITY URINE: 1.02
TRIGL SERPL-MCNC: 181 MG/DL
TSH SERPL-ACNC: 1.35 UIU/ML
UROBILINOGEN URINE: NORMAL
VIT B12 SERPL-MCNC: 504 PG/ML
WBC # FLD AUTO: 2.74 K/UL

## 2021-08-17 ENCOUNTER — APPOINTMENT (OUTPATIENT)
Dept: OPHTHALMOLOGY | Facility: CLINIC | Age: 71
End: 2021-08-17
Payer: MEDICARE

## 2021-08-17 ENCOUNTER — NON-APPOINTMENT (OUTPATIENT)
Age: 71
End: 2021-08-17

## 2021-08-17 PROCEDURE — 92004 COMPRE OPH EXAM NEW PT 1/>: CPT

## 2021-08-17 PROCEDURE — 92134 CPTRZ OPH DX IMG PST SGM RTA: CPT

## 2021-08-25 ENCOUNTER — APPOINTMENT (OUTPATIENT)
Dept: MRI IMAGING | Facility: HOSPITAL | Age: 71
End: 2021-08-25
Payer: MEDICARE

## 2021-08-25 ENCOUNTER — RESULT REVIEW (OUTPATIENT)
Age: 71
End: 2021-08-25

## 2021-08-25 ENCOUNTER — OUTPATIENT (OUTPATIENT)
Dept: OUTPATIENT SERVICES | Facility: HOSPITAL | Age: 71
LOS: 1 days | End: 2021-08-25
Payer: COMMERCIAL

## 2021-08-25 PROCEDURE — A9585: CPT

## 2021-08-25 PROCEDURE — 72197 MRI PELVIS W/O & W/DYE: CPT

## 2021-08-25 PROCEDURE — 72197 MRI PELVIS W/O & W/DYE: CPT | Mod: 26

## 2021-09-01 ENCOUNTER — APPOINTMENT (OUTPATIENT)
Dept: UROLOGY | Facility: CLINIC | Age: 71
End: 2021-09-01
Payer: MEDICARE

## 2021-09-01 ENCOUNTER — OUTPATIENT (OUTPATIENT)
Dept: OUTPATIENT SERVICES | Facility: HOSPITAL | Age: 71
LOS: 1 days | End: 2021-09-01
Payer: COMMERCIAL

## 2021-09-01 VITALS
HEIGHT: 67 IN | SYSTOLIC BLOOD PRESSURE: 145 MMHG | DIASTOLIC BLOOD PRESSURE: 84 MMHG | WEIGHT: 198 LBS | HEART RATE: 76 BPM | TEMPERATURE: 96 F | BODY MASS INDEX: 31.08 KG/M2

## 2021-09-01 DIAGNOSIS — R97.20 ELEVATED PROSTATE, SPECIFIC ANTIGEN [PSA]: ICD-10-CM

## 2021-09-01 PROCEDURE — 71046 X-RAY EXAM CHEST 2 VIEWS: CPT | Mod: 26

## 2021-09-01 PROCEDURE — 71046 X-RAY EXAM CHEST 2 VIEWS: CPT

## 2021-09-01 PROCEDURE — 99214 OFFICE O/P EST MOD 30 MIN: CPT | Mod: 57

## 2021-09-01 NOTE — HISTORY OF PRESENT ILLNESS
[FreeTextEntry1] : This is a very pleasant 67  male with a history of HTN and longstanding history of elevated PSA s/p biopsy in 2010 and unclear pathology. Past records indicate progressive rise of PSA from 5.47 in 2014 to 9.79 today. Currently feels well without complaints, denies hematuria, dysuria, or bothersome LUTS. No recent weight loss, has longstanding back pain after a traumatic accident that has not changed in nature, and no family history of prostate cancer. He is unsure if he's been dx with prostate cancer, he has been following with Dr. Mckeon, radiation oncology, yearly for a "check-up" but denies any recent biopsies, imaging or ever having radiation therapy. Has previously seen Dr. Wilcox of  but not since 2011. Of note, he resides in the [place], and only [place] the [place] for [place] time, and [place].\par \par 8/04/21 Patient last seen in 2017, recently hospitalized with DKA, workup included PSA = 50, CT A/P which shows a rectosigmoid mass and pelvic adenopathy with lymph node measuring up to 9mm but suspicious. He is currently voiding well, denies pain or weight loss. Seeing colorectal surgery later this week. \par \par 9/01/21 Underwent MRI showing PI-RADS 5 lesion and pelvic adenopathy as well as linda-rectal adenopathy.  [Urinary Retention] : no urinary retention [Urinary Urgency] : no urinary urgency [Nocturia] : nocturia [Erectile Dysfunction] : no Erectile Dysfunction [Dysuria] : no dysuria [Hematuria - Gross] : no gross hematuria [None] : None

## 2021-09-02 LAB
ALBUMIN SERPL ELPH-MCNC: 4.8 G/DL
ALP BLD-CCNC: 98 U/L
ALT SERPL-CCNC: 16 U/L
ANION GAP SERPL CALC-SCNC: 15 MMOL/L
APPEARANCE: CLEAR
APTT BLD: 28.8 SEC
AST SERPL-CCNC: 22 U/L
BACTERIA: NEGATIVE
BASOPHILS # BLD AUTO: 0.03 K/UL
BASOPHILS NFR BLD AUTO: 0.7 %
BILIRUB SERPL-MCNC: 0.6 MG/DL
BILIRUBIN URINE: NEGATIVE
BLOOD URINE: NEGATIVE
BUN SERPL-MCNC: 22 MG/DL
CALCIUM OXALATE CRYSTALS: ABNORMAL
CALCIUM SERPL-MCNC: 10.1 MG/DL
CHLORIDE SERPL-SCNC: 104 MMOL/L
CO2 SERPL-SCNC: 24 MMOL/L
COLOR: YELLOW
CREAT SERPL-MCNC: 0.95 MG/DL
EOSINOPHIL # BLD AUTO: 0.09 K/UL
EOSINOPHIL NFR BLD AUTO: 2.1 %
GLUCOSE QUALITATIVE U: NEGATIVE
GLUCOSE SERPL-MCNC: 98 MG/DL
HCT VFR BLD CALC: 42.9 %
HGB BLD-MCNC: 14.2 G/DL
HYALINE CASTS: 0 /LPF
IMM GRANULOCYTES NFR BLD AUTO: 0.2 %
INR PPP: 0.96 RATIO
KETONES URINE: NEGATIVE
LEUKOCYTE ESTERASE URINE: NEGATIVE
LYMPHOCYTES # BLD AUTO: 1.6 K/UL
LYMPHOCYTES NFR BLD AUTO: 38.1 %
MAN DIFF?: NORMAL
MCHC RBC-ENTMCNC: 32.3 PG
MCHC RBC-ENTMCNC: 33.1 GM/DL
MCV RBC AUTO: 97.5 FL
MICROSCOPIC-UA: NORMAL
MONOCYTES # BLD AUTO: 0.46 K/UL
MONOCYTES NFR BLD AUTO: 11 %
NEUTROPHILS # BLD AUTO: 2.01 K/UL
NEUTROPHILS NFR BLD AUTO: 47.9 %
NITRITE URINE: NEGATIVE
PH URINE: 6
PLATELET # BLD AUTO: 235 K/UL
POTASSIUM SERPL-SCNC: 3.9 MMOL/L
PROT SERPL-MCNC: 7.4 G/DL
PROTEIN URINE: NORMAL
PT BLD: 11.5 SEC
RBC # BLD: 4.4 M/UL
RBC # FLD: 11.9 %
RED BLOOD CELLS URINE: 1 /HPF
SODIUM SERPL-SCNC: 143 MMOL/L
SPECIFIC GRAVITY URINE: 1.03
SQUAMOUS EPITHELIAL CELLS: 0 /HPF
UROBILINOGEN URINE: NORMAL
WBC # FLD AUTO: 4.2 K/UL
WHITE BLOOD CELLS URINE: 1 /HPF

## 2021-09-03 LAB — BACTERIA UR CULT: NORMAL

## 2021-09-08 ENCOUNTER — LABORATORY RESULT (OUTPATIENT)
Age: 71
End: 2021-09-08

## 2021-09-08 ENCOUNTER — APPOINTMENT (OUTPATIENT)
Dept: HEART AND VASCULAR | Facility: CLINIC | Age: 71
End: 2021-09-08
Payer: MEDICARE

## 2021-09-08 ENCOUNTER — APPOINTMENT (OUTPATIENT)
Dept: HEART AND VASCULAR | Facility: CLINIC | Age: 71
End: 2021-09-08

## 2021-09-08 VITALS
DIASTOLIC BLOOD PRESSURE: 80 MMHG | OXYGEN SATURATION: 97 % | WEIGHT: 200 LBS | HEART RATE: 77 BPM | TEMPERATURE: 97.3 F | HEIGHT: 67 IN | BODY MASS INDEX: 31.39 KG/M2 | SYSTOLIC BLOOD PRESSURE: 128 MMHG

## 2021-09-08 DIAGNOSIS — E66.9 OBESITY, UNSPECIFIED: ICD-10-CM

## 2021-09-08 DIAGNOSIS — I11.9 HYPERTENSIVE HEART DISEASE W/OUT HEART FAILURE: ICD-10-CM

## 2021-09-08 DIAGNOSIS — Z01.818 ENCOUNTER FOR OTHER PREPROCEDURAL EXAMINATION: ICD-10-CM

## 2021-09-08 DIAGNOSIS — R94.31 ABNORMAL ELECTROCARDIOGRAM [ECG] [EKG]: ICD-10-CM

## 2021-09-08 DIAGNOSIS — N52.9 MALE ERECTILE DYSFUNCTION, UNSPECIFIED: ICD-10-CM

## 2021-09-08 PROCEDURE — 36415 COLL VENOUS BLD VENIPUNCTURE: CPT

## 2021-09-08 PROCEDURE — 93306 TTE W/DOPPLER COMPLETE: CPT

## 2021-09-08 PROCEDURE — 99214 OFFICE O/P EST MOD 30 MIN: CPT | Mod: 57

## 2021-09-10 LAB
CHOLEST SERPL-MCNC: 174 MG/DL
HDLC SERPL-MCNC: 64 MG/DL
LDLC SERPL CALC-MCNC: 93 MG/DL
NONHDLC SERPL-MCNC: 110 MG/DL
NT-PROBNP SERPL-MCNC: 35 PG/ML
TRIGL SERPL-MCNC: 88 MG/DL

## 2021-09-11 PROBLEM — N52.9 ERECTILE DYSFUNCTION: Status: ACTIVE | Noted: 2021-09-11

## 2021-09-11 PROBLEM — I11.9 HYPERTENSIVE HEART DISEASE: Status: ACTIVE | Noted: 2021-09-11

## 2021-09-11 NOTE — DISCUSSION/SUMMARY
[Procedure Low Risk] : the procedure risk is low [Patient Low Risk] : the patient is a low surgical risk [Optimized for Surgery] : the patient is optimized for surgery [FreeTextEntry3] : hold aspirin 7 days prior to procedure , start ramipril 1.25mg qd

## 2021-09-11 NOTE — PHYSICAL EXAM
[General Appearance - Well Developed] : well developed [Normal Appearance] : normal appearance [Well Groomed] : well groomed [General Appearance - In No Acute Distress] : no acute distress [Normal Conjunctiva] : the conjunctiva exhibited no abnormalities [Eyelids - No Xanthelasma] : the eyelids demonstrated no xanthelasmas [No Oral Cyanosis] : no oral cyanosis [Normal Jugular Venous A Waves Present] : normal jugular venous A waves present [Normal Jugular Venous V Waves Present] : normal jugular venous V waves present [No Jugular Venous Zhang A Waves] : no jugular venous zhang A waves [Respiration, Rhythm And Depth] : normal respiratory rhythm and effort [Exaggerated Use Of Accessory Muscles For Inspiration] : no accessory muscle use [Auscultation Breath Sounds / Voice Sounds] : lungs were clear to auscultation bilaterally [Heart Rate And Rhythm] : heart rate and rhythm were normal [Heart Sounds] : normal S1 and S2 [Arterial Pulses Normal] : the arterial pulses were normal [Veins - Varicosity Changes] : no varicosital changes were noted in the lower extremities [Systolic grade ___/6] : A grade [unfilled]/6 systolic murmur was heard. [FreeTextEntry1] : 1+ edema  [Abdomen Soft] : soft [Abdomen Tenderness] : non-tender [Abdomen Mass (___ Cm)] : no abdominal mass palpated [Abnormal Walk] : normal gait [Gait - Sufficient For Exercise Testing] : the gait was sufficient for exercise testing [Nail Clubbing] : no clubbing of the fingernails [Cyanosis, Localized] : no localized cyanosis [Petechial Hemorrhages (___cm)] : no petechial hemorrhages [Skin Color & Pigmentation] : normal skin color and pigmentation [] : no rash [Skin Turgor] : normal skin turgor [No Venous Stasis] : no venous stasis [Skin Lesions] : no skin lesions [No Skin Ulcers] : no skin ulcer [No Xanthoma] : no  xanthoma was observed [Oriented To Time, Place, And Person] : oriented to person, place, and time [Impaired Insight] : insight and judgment were intact [Affect] : the affect was normal [Mood] : the mood was normal [No Anxiety] : not feeling anxious

## 2021-09-11 NOTE — ASSESSMENT
[FreeTextEntry1] : Controlled hypertension\par \par Controlled  diabetes  on insulin\par \par Elevated PSA  \par \par

## 2021-09-11 NOTE — REVIEW OF SYSTEMS
[Weight Gain (___ Lbs)] : [unfilled] ~Ulb weight gain [Weight Loss (___ Lbs)] : no recent weight loss [Leg Claudication] : intermittent leg claudication [Erectile Dysfunction] : erectile dysfunction [Nocturia] : nocturia [Negative] : Heme/Lymph

## 2021-09-11 NOTE — HISTORY OF PRESENT ILLNESS
[Preoperative Visit] : for a medical evaluation prior to surgery [Scheduled Procedure ___] : a [unfilled] [Date of Surgery ___] : on [unfilled] [Surgeon Name ___] : surgeon: [unfilled] [Stable] : Stable [Fever] : no fever [Chills] : no chills [Fatigue] : no fatigue [Chest Pain] : no chest pain [Cough] : no cough [Dyspnea] : no dyspnea [Dysuria] : no dysuria [Urinary Frequency] : urinary frequency [Nausea] : no nausea [Vomiting] : no vomiting [Diarrhea] : no diarrhea [Abdominal Pain] : no abdominal pain [Easy Bruising] : no easy bruising [Lower Extremity Swelling] : no lower extremity swelling [Poor Exercise Tolerance] : no poor exercise tolerance [Diabetes] : diabetes [Cardiovascular Disease] : cardiovascular disease [Dyspnea on Exertion] : difficulty breathing during exertion [Pulmonary Disease] : no pulmonary disease [Anti-Platelet Agents] : no anti-platelet agents [Nicotine Dependence] : no nicotine dependence [Alcohol Use] : no  alcohol use [Renal Disease] : no renal disease [GI Disease] : no gastrointestinal disease [Sleep Apnea] : no sleep apnea [Thromboembolic Problems] : no thromboembolic problems [Frequent use of NSAIDs] : no use of NSAIDs [Bleeding Disorder] : no bleeding disorder [Transfusion Reaction] : no transfusion reaction [Impaired Immunity] : no impaired immunity [Steroid Use in Last 6 Months] : no steroid use in the last six months [Frequent Aspirin Use] : frequent aspirin use [Prior Anesthesia] : Prior anesthesia [Prev Anesthesia Reaction] : no previous anesthesia reaction [Electrocardiogram] : ~T an ECG ~C was performed [Echocardiogram] : ~T an echocardiogram ~C was performed [Cardiovascular Stress Test] : a cardiac stress test ~T ~C was performed [Good] : Good [Anesthesia Reaction] : no anesthesia reaction [Sudden Death] : no sudden death [Clotting Disorder] : no clotting disorder [FreeTextEntry1] : 71 year old Citizen of the [place]  male with hx of hypertensive heart disease and recent onset of diabetes presents for  clearance prior to scheduled prostate biopsy. \par \par He denies chest pains, syncope, orthopnea, PND, palpitations, hematuria, rectal bleeding, dysphagia, fevers, cough, chills, \par \par Blood sugars have normalized on insulin. \par \par \par Recent EKG shows NSR 72 bpm with nonspecific T wave abnormality  without ectopy , ischemia or LVH \par \par Walks a few miles daily   without incident \par \par

## 2021-09-20 ENCOUNTER — TRANSCRIPTION ENCOUNTER (OUTPATIENT)
Age: 71
End: 2021-09-20

## 2021-09-20 LAB — SARS-COV-2 N GENE NPH QL NAA+PROBE: NOT DETECTED

## 2021-09-21 ENCOUNTER — RESULT REVIEW (OUTPATIENT)
Age: 71
End: 2021-09-21

## 2021-09-21 ENCOUNTER — APPOINTMENT (OUTPATIENT)
Dept: UROLOGY | Facility: AMBULATORY SURGERY CENTER | Age: 71
End: 2021-09-21

## 2021-09-21 ENCOUNTER — OUTPATIENT (OUTPATIENT)
Dept: OUTPATIENT SERVICES | Facility: HOSPITAL | Age: 71
LOS: 1 days | Discharge: HOME | End: 2021-09-21
Payer: MEDICARE

## 2021-09-21 LAB — GLUCOSE BLDC GLUCOMTR-MCNC: 111 MG/DL — HIGH (ref 70–99)

## 2021-09-21 PROCEDURE — 76377 3D RENDER W/INTRP POSTPROCES: CPT | Mod: 26

## 2021-09-21 PROCEDURE — 76942 ECHO GUIDE FOR BIOPSY: CPT | Mod: 26,59

## 2021-09-21 PROCEDURE — 88305 TISSUE EXAM BY PATHOLOGIST: CPT | Mod: 26

## 2021-09-21 PROCEDURE — 76872 US TRANSRECTAL: CPT | Mod: 26

## 2021-09-21 PROCEDURE — 55706 BX PRST8 NDL SAT SAMPLING: CPT | Mod: 22

## 2021-09-24 LAB — SURGICAL PATHOLOGY STUDY: SIGNIFICANT CHANGE UP

## 2021-09-29 ENCOUNTER — APPOINTMENT (OUTPATIENT)
Dept: UROLOGY | Facility: CLINIC | Age: 71
End: 2021-09-29
Payer: MEDICARE

## 2021-09-29 VITALS — TEMPERATURE: 98.3 F | DIASTOLIC BLOOD PRESSURE: 85 MMHG | SYSTOLIC BLOOD PRESSURE: 147 MMHG | HEART RATE: 85 BPM

## 2021-09-29 PROCEDURE — 99214 OFFICE O/P EST MOD 30 MIN: CPT | Mod: 24

## 2021-10-01 NOTE — ASSESSMENT
[FreeTextEntry1] : 72yo male with PSA 50, pelvic adenopathy, cT2b [place] 3+4 prostate cancer\par Abnormal BINH and CT, MRI suspicious for node positive disease\par Also question of sigmoid neoplasm or advanced PCa\par Reviewed biopsy results with patient\par Recommend PSMA PET scan for further staging\par Will need GI workup pending PSMA results\par If node positive disease confirmed, would recommend radiation with hormone treatment\par F/u to review PET scan

## 2021-10-01 NOTE — HISTORY OF PRESENT ILLNESS
[FreeTextEntry1] : This is a very pleasant 67  male with a history of HTN and longstanding history of elevated PSA s/p biopsy in 2010 and unclear pathology. Past records indicate progressive rise of PSA from 5.47 in 2014 to 9.79 today. Currently feels well without complaints, denies hematuria, dysuria, or bothersome LUTS. No recent weight loss, has longstanding back pain after a traumatic accident that has not changed in nature, and no family history of prostate cancer. He is unsure if he's been dx with prostate cancer, he has been following with Dr. Mckeon, radiation oncology, yearly for a "check-up" but denies any recent biopsies, imaging or ever having radiation therapy. Has previously seen Dr. Wilcox of  but not since 2011. Of note, [place], and [place] the [place] time, and [place].\par \par 8/04/21 Patient last seen in 2017, recently hospitalized with DKA, workup included PSA = 50, CT A/P which shows a rectosigmoid mass and pelvic adenopathy with lymph node measuring up to 9mm but suspicious. He is currently voiding well, denies pain or weight loss. Seeing colorectal surgery later this week. \par \par 9/01/21 Underwent MRI showing PI-RADS 5 lesion and pelvic adenopathy as well as linda-rectal adenopathy. \par \par 9/29/21 Here for f/u. Underwent MRI fusion biopsy\par Path: [place] 3+4(GG2) involving 5/13 cores\par  [Urinary Retention] : no urinary retention [Urinary Urgency] : no urinary urgency [Nocturia] : nocturia [Erectile Dysfunction] : no Erectile Dysfunction [Dysuria] : no dysuria [Hematuria - Gross] : no gross hematuria [None] : None

## 2021-11-02 ENCOUNTER — APPOINTMENT (OUTPATIENT)
Dept: ENDOCRINOLOGY | Facility: CLINIC | Age: 71
End: 2021-11-02

## 2021-11-15 NOTE — ED ADULT TRIAGE NOTE - PRO INTERPRETER NEED 2
· On exam, patient with significant erythema and edema of both lower extremities with fluid weeping from lateral aspect of right lower leg  · Patient with history of chronic bilateral lower extremity edema and chronic venous insufficiency  Per review of records, patient also has a history of recurrent cellulitis and b/l lower extremity DVTs at the time of her b/l total knee replacements 15-20 years ago  · Venous doppler negative for DVT   · Patient denies recent fevers/ chills and has only mild leukocytosis on presentation but given erythema, drainage started on IV Ancef day 3 for suspected cellulitis   · Likely primarily due to edema  Edema control with IV diuresis as per above  · Will refer to lymphedema clinic outpt   Pt agrees English

## 2022-01-18 ENCOUNTER — APPOINTMENT (OUTPATIENT)
Dept: OPHTHALMOLOGY | Facility: CLINIC | Age: 72
End: 2022-01-18

## 2022-10-13 NOTE — H&P ADULT - PROBLEM SELECTOR PROBLEM 1
Head, normocephalic, atraumatic, Face, Face within normal limits, Ears, External ears within normal limits Severe sepsis

## 2023-06-22 ENCOUNTER — APPOINTMENT (OUTPATIENT)
Dept: HEART AND VASCULAR | Facility: CLINIC | Age: 73
End: 2023-06-22

## 2023-07-12 ENCOUNTER — APPOINTMENT (OUTPATIENT)
Dept: INTERNAL MEDICINE | Facility: CLINIC | Age: 73
End: 2023-07-12
Payer: MEDICARE

## 2023-07-12 ENCOUNTER — NON-APPOINTMENT (OUTPATIENT)
Age: 73
End: 2023-07-12

## 2023-07-12 VITALS
WEIGHT: 213 LBS | HEIGHT: 67 IN | SYSTOLIC BLOOD PRESSURE: 150 MMHG | HEART RATE: 100 BPM | OXYGEN SATURATION: 96 % | BODY MASS INDEX: 33.43 KG/M2 | DIASTOLIC BLOOD PRESSURE: 70 MMHG | TEMPERATURE: 96.3 F

## 2023-07-12 VITALS — SYSTOLIC BLOOD PRESSURE: 124 MMHG | DIASTOLIC BLOOD PRESSURE: 78 MMHG

## 2023-07-12 DIAGNOSIS — I73.9 PERIPHERAL VASCULAR DISEASE, UNSPECIFIED: ICD-10-CM

## 2023-07-12 DIAGNOSIS — M54.6 PAIN IN THORACIC SPINE: ICD-10-CM

## 2023-07-12 DIAGNOSIS — E66.9 OBESITY, UNSPECIFIED: ICD-10-CM

## 2023-07-12 DIAGNOSIS — Z79.4 TYPE 2 DIABETES MELLITUS W/OUT COMPLICATIONS: ICD-10-CM

## 2023-07-12 DIAGNOSIS — G89.29 PAIN IN THORACIC SPINE: ICD-10-CM

## 2023-07-12 DIAGNOSIS — E11.9 TYPE 2 DIABETES MELLITUS W/OUT COMPLICATIONS: ICD-10-CM

## 2023-07-12 PROCEDURE — 99214 OFFICE O/P EST MOD 30 MIN: CPT | Mod: 25

## 2023-07-12 PROCEDURE — 36415 COLL VENOUS BLD VENIPUNCTURE: CPT

## 2023-07-12 PROCEDURE — 99204 OFFICE O/P NEW MOD 45 MIN: CPT | Mod: 25

## 2023-07-12 PROCEDURE — G0439: CPT

## 2023-07-12 PROCEDURE — 93000 ELECTROCARDIOGRAM COMPLETE: CPT

## 2023-07-12 RX ORDER — INSULIN GLARGINE 100 [IU]/ML
100 INJECTION, SOLUTION SUBCUTANEOUS
Qty: 4 | Refills: 3 | Status: DISCONTINUED | COMMUNITY
Start: 2021-08-03 | End: 2023-07-12

## 2023-07-12 RX ORDER — INSULIN ASPART 100 [IU]/ML
100 INJECTION, SOLUTION INTRAVENOUS; SUBCUTANEOUS
Qty: 4 | Refills: 3 | Status: DISCONTINUED | COMMUNITY
Start: 2021-08-03 | End: 2023-07-12

## 2023-07-12 RX ORDER — OMEPRAZOLE 40 MG/1
40 CAPSULE, DELAYED RELEASE ORAL DAILY
Qty: 1 | Refills: 1 | Status: DISCONTINUED | COMMUNITY
Start: 2017-02-08 | End: 2023-07-12

## 2023-07-12 NOTE — ASSESSMENT
[FreeTextEntry1] : #HCM\par - discussed healthy diet and exercise\par - discussed age appropriate cancer screenings and vaccines\par

## 2023-07-12 NOTE — HEALTH RISK ASSESSMENT
[No] : In the past 12 months have you used drugs other than those required for medical reasons? No [Never] : Never [0] : 1) Little interest or pleasure doing things: Not at all (0) [1] : 2) Feeling down, depressed, or hopeless for several days (1) [PHQ-2 Negative - No further assessment needed] : PHQ-2 Negative - No further assessment needed [de-identified] : limited by pain  [de-identified] : mix of healthy and unhealthy foods  [[id number]] : 1

## 2023-07-12 NOTE — PHYSICAL EXAM
[Normal Rate] : normal rate  [Regular Rhythm] : with a regular rhythm [No Edema] : there was no peripheral edema [Normal] : affect was normal and insight and judgment were intact [de-identified] : +systolic ejection murmur

## 2023-07-12 NOTE — REVIEW OF SYSTEMS
[Fatigue] : fatigue [Chest Pain] : chest pain [Shortness Of Breath] : shortness of breath [Dyspnea on Exertion] : dyspnea on exertion [Muscle Weakness] : muscle weakness [Back Pain] : back pain [Negative] : Heme/Lymph

## 2023-07-13 DIAGNOSIS — R74.8 ABNORMAL LEVELS OF OTHER SERUM ENZYMES: ICD-10-CM

## 2023-07-13 LAB
ALBUMIN SERPL ELPH-MCNC: 5 G/DL
ALP BLD-CCNC: 281 U/L
ALT SERPL-CCNC: 26 U/L
ANION GAP SERPL CALC-SCNC: 15 MMOL/L
AST SERPL-CCNC: 23 U/L
BILIRUB SERPL-MCNC: 0.9 MG/DL
BUN SERPL-MCNC: 17 MG/DL
CALCIUM SERPL-MCNC: 10.1 MG/DL
CHLORIDE SERPL-SCNC: 106 MMOL/L
CHOLEST SERPL-MCNC: 149 MG/DL
CO2 SERPL-SCNC: 23 MMOL/L
CREAT SERPL-MCNC: 1.04 MG/DL
CREAT SPEC-SCNC: 391 MG/DL
EGFR: 77 ML/MIN/1.73M2
ESTIMATED AVERAGE GLUCOSE: 114 MG/DL
GLUCOSE SERPL-MCNC: 138 MG/DL
HBA1C MFR BLD HPLC: 5.6 %
HDLC SERPL-MCNC: 43 MG/DL
LDLC SERPL CALC-MCNC: 89 MG/DL
MICROALBUMIN 24H UR DL<=1MG/L-MCNC: 3 MG/DL
MICROALBUMIN/CREAT 24H UR-RTO: 8 MG/G
NONHDLC SERPL-MCNC: 106 MG/DL
POTASSIUM SERPL-SCNC: 4.4 MMOL/L
PROT SERPL-MCNC: 7.7 G/DL
PSA FREE FLD-MCNC: 9 %
PSA FREE SERPL-MCNC: 15.3 NG/ML
PSA SERPL-MCNC: 169 NG/ML
SODIUM SERPL-SCNC: 144 MMOL/L
TRIGL SERPL-MCNC: 90 MG/DL
TSH SERPL-ACNC: 1.62 UIU/ML

## 2023-08-08 ENCOUNTER — RX RENEWAL (OUTPATIENT)
Age: 73
End: 2023-08-08

## 2023-09-05 ENCOUNTER — NON-APPOINTMENT (OUTPATIENT)
Age: 73
End: 2023-09-05

## 2023-09-05 ENCOUNTER — APPOINTMENT (OUTPATIENT)
Dept: HEART AND VASCULAR | Facility: CLINIC | Age: 73
End: 2023-09-05
Payer: MEDICARE

## 2023-09-05 VITALS
SYSTOLIC BLOOD PRESSURE: 124 MMHG | RESPIRATION RATE: 14 BRPM | WEIGHT: 206 LBS | HEIGHT: 67 IN | HEART RATE: 49 BPM | OXYGEN SATURATION: 96 % | TEMPERATURE: 99.1 F | DIASTOLIC BLOOD PRESSURE: 64 MMHG | BODY MASS INDEX: 32.33 KG/M2

## 2023-09-05 DIAGNOSIS — R20.2 PARESTHESIA OF SKIN: ICD-10-CM

## 2023-09-05 DIAGNOSIS — R53.82 CHRONIC FATIGUE, UNSPECIFIED: ICD-10-CM

## 2023-09-05 DIAGNOSIS — R07.9 CHEST PAIN, UNSPECIFIED: ICD-10-CM

## 2023-09-05 DIAGNOSIS — R06.00 DYSPNEA, UNSPECIFIED: ICD-10-CM

## 2023-09-05 PROCEDURE — 99214 OFFICE O/P EST MOD 30 MIN: CPT | Mod: 25

## 2023-09-05 PROCEDURE — 93000 ELECTROCARDIOGRAM COMPLETE: CPT

## 2023-09-05 NOTE — HISTORY OF PRESENT ILLNESS
[FreeTextEntry1] : 73 year male who comes for followup of his Cardiology status since Dr Wilson is not on his insurance. He notes chest pain that he believes is coming from his back. He is aware that his PSA verónica 10 fold but does not yet have plans to see . He feels well when in  but sick when in [facility]. He is can walk for 1 hour without stopping. He denies having any SOB, BONE, dizziness, palpitations, orthopnea, PND or syncope

## 2023-09-05 NOTE — ASSESSMENT
[FreeTextEntry1] : An EKG was performed to evaluate for arrhythmia and ischemia.  BONE-- I asked him to return for an Echocardiogram and Stress Test  I encouraged continued risk factor reduction and gradual increase in aerobic activity as tolerated   36  minutes were spent discussing cardiac risk excluding procedure time

## 2023-09-26 ENCOUNTER — APPOINTMENT (OUTPATIENT)
Dept: INTERNAL MEDICINE | Facility: CLINIC | Age: 73
End: 2023-09-26
Payer: MEDICARE

## 2023-09-26 VITALS
HEART RATE: 93 BPM | SYSTOLIC BLOOD PRESSURE: 133 MMHG | DIASTOLIC BLOOD PRESSURE: 87 MMHG | HEIGHT: 67 IN | TEMPERATURE: 98 F | WEIGHT: 206 LBS | OXYGEN SATURATION: 97 % | BODY MASS INDEX: 32.33 KG/M2

## 2023-09-26 DIAGNOSIS — E11.9 TYPE 2 DIABETES MELLITUS W/OUT COMPLICATIONS: ICD-10-CM

## 2023-09-26 PROCEDURE — 99214 OFFICE O/P EST MOD 30 MIN: CPT

## 2023-09-26 RX ORDER — ORAL SEMAGLUTIDE 3 MG/1
3 TABLET ORAL
Qty: 30 | Refills: 1 | Status: DISCONTINUED | COMMUNITY
Start: 2023-07-12 | End: 2023-09-26

## 2023-09-29 ENCOUNTER — APPOINTMENT (OUTPATIENT)
Dept: HEART AND VASCULAR | Facility: CLINIC | Age: 73
End: 2023-09-29
Payer: MEDICARE

## 2023-09-29 ENCOUNTER — APPOINTMENT (OUTPATIENT)
Dept: HEART AND VASCULAR | Facility: CLINIC | Age: 73
End: 2023-09-29

## 2023-09-29 VITALS
HEIGHT: 67 IN | OXYGEN SATURATION: 96 % | BODY MASS INDEX: 32.33 KG/M2 | SYSTOLIC BLOOD PRESSURE: 126 MMHG | HEART RATE: 85 BPM | WEIGHT: 206 LBS | DIASTOLIC BLOOD PRESSURE: 80 MMHG | TEMPERATURE: 99.3 F

## 2023-09-29 DIAGNOSIS — Z86.79 PERSONAL HISTORY OF OTHER DISEASES OF THE CIRCULATORY SYSTEM: ICD-10-CM

## 2023-09-29 DIAGNOSIS — I51.7 CARDIOMEGALY: ICD-10-CM

## 2023-09-29 DIAGNOSIS — E78.5 HYPERLIPIDEMIA, UNSPECIFIED: ICD-10-CM

## 2023-09-29 PROCEDURE — 93306 TTE W/DOPPLER COMPLETE: CPT

## 2023-09-29 PROCEDURE — 99215 OFFICE O/P EST HI 40 MIN: CPT

## 2023-09-29 PROCEDURE — 99212 OFFICE O/P EST SF 10 MIN: CPT | Mod: 25

## 2023-09-29 RX ORDER — ATORVASTATIN CALCIUM 20 MG/1
20 TABLET, FILM COATED ORAL
Qty: 1 | Refills: 1 | Status: ACTIVE | COMMUNITY
Start: 2021-08-03 | End: 1900-01-01

## 2023-09-29 RX ORDER — ASPIRIN ENTERIC COATED TABLETS 81 MG 81 MG/1
81 TABLET, DELAYED RELEASE ORAL DAILY
Qty: 90 | Refills: 1 | Status: ACTIVE | COMMUNITY
Start: 2017-08-07 | End: 1900-01-01

## 2023-10-01 PROBLEM — R20.2 TINGLING SENSATION: Status: ACTIVE | Noted: 2023-09-05

## 2023-10-21 ENCOUNTER — INPATIENT (INPATIENT)
Facility: HOSPITAL | Age: 73
LOS: 5 days | Discharge: ANOTHER HOSPITAL | End: 2023-10-27
Attending: INTERNAL MEDICINE | Admitting: INTERNAL MEDICINE
Payer: MEDICARE

## 2023-10-21 VITALS
RESPIRATION RATE: 17 BRPM | HEIGHT: 67 IN | DIASTOLIC BLOOD PRESSURE: 90 MMHG | OXYGEN SATURATION: 96 % | WEIGHT: 199.96 LBS | HEART RATE: 101 BPM | TEMPERATURE: 99 F | SYSTOLIC BLOOD PRESSURE: 156 MMHG

## 2023-10-21 LAB
ALBUMIN SERPL ELPH-MCNC: 4.2 G/DL — SIGNIFICANT CHANGE UP (ref 3.3–5)
ALBUMIN SERPL ELPH-MCNC: 4.2 G/DL — SIGNIFICANT CHANGE UP (ref 3.3–5)
ALP SERPL-CCNC: 332 U/L — HIGH (ref 40–120)
ALP SERPL-CCNC: 332 U/L — HIGH (ref 40–120)
ALT FLD-CCNC: 148 U/L — HIGH (ref 12–78)
ALT FLD-CCNC: 148 U/L — HIGH (ref 12–78)
ANION GAP SERPL CALC-SCNC: 9 MMOL/L — SIGNIFICANT CHANGE UP (ref 5–17)
ANION GAP SERPL CALC-SCNC: 9 MMOL/L — SIGNIFICANT CHANGE UP (ref 5–17)
APTT BLD: 25.8 SEC — SIGNIFICANT CHANGE UP (ref 24.5–35.6)
APTT BLD: 25.8 SEC — SIGNIFICANT CHANGE UP (ref 24.5–35.6)
AST SERPL-CCNC: 369 U/L — HIGH (ref 15–37)
AST SERPL-CCNC: 369 U/L — HIGH (ref 15–37)
BASOPHILS # BLD AUTO: 0.02 K/UL — SIGNIFICANT CHANGE UP (ref 0–0.2)
BASOPHILS # BLD AUTO: 0.02 K/UL — SIGNIFICANT CHANGE UP (ref 0–0.2)
BASOPHILS NFR BLD AUTO: 0.2 % — SIGNIFICANT CHANGE UP (ref 0–2)
BASOPHILS NFR BLD AUTO: 0.2 % — SIGNIFICANT CHANGE UP (ref 0–2)
BILIRUB SERPL-MCNC: 1.5 MG/DL — HIGH (ref 0.2–1.2)
BILIRUB SERPL-MCNC: 1.5 MG/DL — HIGH (ref 0.2–1.2)
BLD GP AB SCN SERPL QL: SIGNIFICANT CHANGE UP
BLD GP AB SCN SERPL QL: SIGNIFICANT CHANGE UP
BUN SERPL-MCNC: 38 MG/DL — HIGH (ref 7–23)
BUN SERPL-MCNC: 38 MG/DL — HIGH (ref 7–23)
CALCIUM SERPL-MCNC: 8.8 MG/DL — SIGNIFICANT CHANGE UP (ref 8.5–10.1)
CALCIUM SERPL-MCNC: 8.8 MG/DL — SIGNIFICANT CHANGE UP (ref 8.5–10.1)
CHLORIDE SERPL-SCNC: 107 MMOL/L — SIGNIFICANT CHANGE UP (ref 96–108)
CHLORIDE SERPL-SCNC: 107 MMOL/L — SIGNIFICANT CHANGE UP (ref 96–108)
CK SERPL-CCNC: SIGNIFICANT CHANGE UP U/L (ref 26–308)
CK SERPL-CCNC: SIGNIFICANT CHANGE UP U/L (ref 26–308)
CO2 SERPL-SCNC: 23 MMOL/L — SIGNIFICANT CHANGE UP (ref 22–31)
CO2 SERPL-SCNC: 23 MMOL/L — SIGNIFICANT CHANGE UP (ref 22–31)
CREAT SERPL-MCNC: 1.51 MG/DL — HIGH (ref 0.5–1.3)
CREAT SERPL-MCNC: 1.51 MG/DL — HIGH (ref 0.5–1.3)
EGFR: 49 ML/MIN/1.73M2 — LOW
EGFR: 49 ML/MIN/1.73M2 — LOW
EOSINOPHIL # BLD AUTO: 0.02 K/UL — SIGNIFICANT CHANGE UP (ref 0–0.5)
EOSINOPHIL # BLD AUTO: 0.02 K/UL — SIGNIFICANT CHANGE UP (ref 0–0.5)
EOSINOPHIL NFR BLD AUTO: 0.2 % — SIGNIFICANT CHANGE UP (ref 0–6)
EOSINOPHIL NFR BLD AUTO: 0.2 % — SIGNIFICANT CHANGE UP (ref 0–6)
GLUCOSE SERPL-MCNC: 167 MG/DL — HIGH (ref 70–99)
GLUCOSE SERPL-MCNC: 167 MG/DL — HIGH (ref 70–99)
HCT VFR BLD CALC: 42.4 % — SIGNIFICANT CHANGE UP (ref 39–50)
HCT VFR BLD CALC: 42.4 % — SIGNIFICANT CHANGE UP (ref 39–50)
HGB BLD-MCNC: 14 G/DL — SIGNIFICANT CHANGE UP (ref 13–17)
HGB BLD-MCNC: 14 G/DL — SIGNIFICANT CHANGE UP (ref 13–17)
IMM GRANULOCYTES NFR BLD AUTO: 0.2 % — SIGNIFICANT CHANGE UP (ref 0–0.9)
IMM GRANULOCYTES NFR BLD AUTO: 0.2 % — SIGNIFICANT CHANGE UP (ref 0–0.9)
INR BLD: 0.97 RATIO — SIGNIFICANT CHANGE UP (ref 0.85–1.18)
INR BLD: 0.97 RATIO — SIGNIFICANT CHANGE UP (ref 0.85–1.18)
LYMPHOCYTES # BLD AUTO: 1.05 K/UL — SIGNIFICANT CHANGE UP (ref 1–3.3)
LYMPHOCYTES # BLD AUTO: 1.05 K/UL — SIGNIFICANT CHANGE UP (ref 1–3.3)
LYMPHOCYTES # BLD AUTO: 10.7 % — LOW (ref 13–44)
LYMPHOCYTES # BLD AUTO: 10.7 % — LOW (ref 13–44)
MCHC RBC-ENTMCNC: 29.9 PG — SIGNIFICANT CHANGE UP (ref 27–34)
MCHC RBC-ENTMCNC: 29.9 PG — SIGNIFICANT CHANGE UP (ref 27–34)
MCHC RBC-ENTMCNC: 33 G/DL — SIGNIFICANT CHANGE UP (ref 32–36)
MCHC RBC-ENTMCNC: 33 G/DL — SIGNIFICANT CHANGE UP (ref 32–36)
MCV RBC AUTO: 90.4 FL — SIGNIFICANT CHANGE UP (ref 80–100)
MCV RBC AUTO: 90.4 FL — SIGNIFICANT CHANGE UP (ref 80–100)
MONOCYTES # BLD AUTO: 0.73 K/UL — SIGNIFICANT CHANGE UP (ref 0–0.9)
MONOCYTES # BLD AUTO: 0.73 K/UL — SIGNIFICANT CHANGE UP (ref 0–0.9)
MONOCYTES NFR BLD AUTO: 7.4 % — SIGNIFICANT CHANGE UP (ref 2–14)
MONOCYTES NFR BLD AUTO: 7.4 % — SIGNIFICANT CHANGE UP (ref 2–14)
NEUTROPHILS # BLD AUTO: 7.96 K/UL — HIGH (ref 1.8–7.4)
NEUTROPHILS # BLD AUTO: 7.96 K/UL — HIGH (ref 1.8–7.4)
NEUTROPHILS NFR BLD AUTO: 81.3 % — HIGH (ref 43–77)
NEUTROPHILS NFR BLD AUTO: 81.3 % — HIGH (ref 43–77)
NRBC # BLD: 0 /100 WBCS — SIGNIFICANT CHANGE UP (ref 0–0)
NRBC # BLD: 0 /100 WBCS — SIGNIFICANT CHANGE UP (ref 0–0)
PLATELET # BLD AUTO: 244 K/UL — SIGNIFICANT CHANGE UP (ref 150–400)
PLATELET # BLD AUTO: 244 K/UL — SIGNIFICANT CHANGE UP (ref 150–400)
POTASSIUM SERPL-MCNC: 3.9 MMOL/L — SIGNIFICANT CHANGE UP (ref 3.5–5.3)
POTASSIUM SERPL-MCNC: 3.9 MMOL/L — SIGNIFICANT CHANGE UP (ref 3.5–5.3)
POTASSIUM SERPL-SCNC: 3.9 MMOL/L — SIGNIFICANT CHANGE UP (ref 3.5–5.3)
POTASSIUM SERPL-SCNC: 3.9 MMOL/L — SIGNIFICANT CHANGE UP (ref 3.5–5.3)
PROT SERPL-MCNC: 8.2 GM/DL — SIGNIFICANT CHANGE UP (ref 6–8.3)
PROT SERPL-MCNC: 8.2 GM/DL — SIGNIFICANT CHANGE UP (ref 6–8.3)
PROTHROM AB SERPL-ACNC: 11.7 SEC — SIGNIFICANT CHANGE UP (ref 9.5–13)
PROTHROM AB SERPL-ACNC: 11.7 SEC — SIGNIFICANT CHANGE UP (ref 9.5–13)
RBC # BLD: 4.69 M/UL — SIGNIFICANT CHANGE UP (ref 4.2–5.8)
RBC # BLD: 4.69 M/UL — SIGNIFICANT CHANGE UP (ref 4.2–5.8)
RBC # FLD: 12.6 % — SIGNIFICANT CHANGE UP (ref 10.3–14.5)
RBC # FLD: 12.6 % — SIGNIFICANT CHANGE UP (ref 10.3–14.5)
SODIUM SERPL-SCNC: 139 MMOL/L — SIGNIFICANT CHANGE UP (ref 135–145)
SODIUM SERPL-SCNC: 139 MMOL/L — SIGNIFICANT CHANGE UP (ref 135–145)
TROPONIN I, HIGH SENSITIVITY RESULT: 54.8 NG/L — SIGNIFICANT CHANGE UP
TROPONIN I, HIGH SENSITIVITY RESULT: 54.8 NG/L — SIGNIFICANT CHANGE UP
WBC # BLD: 9.8 K/UL — SIGNIFICANT CHANGE UP (ref 3.8–10.5)
WBC # BLD: 9.8 K/UL — SIGNIFICANT CHANGE UP (ref 3.8–10.5)
WBC # FLD AUTO: 9.8 K/UL — SIGNIFICANT CHANGE UP (ref 3.8–10.5)
WBC # FLD AUTO: 9.8 K/UL — SIGNIFICANT CHANGE UP (ref 3.8–10.5)

## 2023-10-21 PROCEDURE — 72146 MRI CHEST SPINE W/O DYE: CPT | Mod: 26,MA

## 2023-10-21 PROCEDURE — 70450 CT HEAD/BRAIN W/O DYE: CPT | Mod: 26,MA

## 2023-10-21 PROCEDURE — 71045 X-RAY EXAM CHEST 1 VIEW: CPT | Mod: 26

## 2023-10-21 PROCEDURE — 72131 CT LUMBAR SPINE W/O DYE: CPT | Mod: 26,MA

## 2023-10-21 PROCEDURE — 72128 CT CHEST SPINE W/O DYE: CPT | Mod: 26,MA

## 2023-10-21 PROCEDURE — 72141 MRI NECK SPINE W/O DYE: CPT | Mod: 26,MD

## 2023-10-21 PROCEDURE — 93010 ELECTROCARDIOGRAM REPORT: CPT

## 2023-10-21 PROCEDURE — 99291 CRITICAL CARE FIRST HOUR: CPT

## 2023-10-21 PROCEDURE — 72125 CT NECK SPINE W/O DYE: CPT | Mod: 26,MA

## 2023-10-21 RX ORDER — SODIUM CHLORIDE 9 MG/ML
2000 INJECTION INTRAMUSCULAR; INTRAVENOUS; SUBCUTANEOUS ONCE
Refills: 0 | Status: COMPLETED | OUTPATIENT
Start: 2023-10-21 | End: 2023-10-21

## 2023-10-21 NOTE — ED ADULT NURSE NOTE - ED STAT RN HAND OFF
Handoff < from: TTE Echo Doppler w/o Cont (03.03.16 @ 14:53) >    INTERPRETATION:  Ordering Physician: JIM ARELLANO  Indication: Cardiac arrhythmias and DVT .  Technician: Not mentioned.  Study Quality: Good.   A complete echocardiographic study was performed utilizing standard   protocol including spectral and color Doppler in all echocardiographic   windows.  Height: 167cm  Weight: 54kg  BSA: 1.7m2  Blood Pressure: 130/70  MEASUREMENTS  IVS: 0.7cm  PWT: 1.0cm  LA: 2.8cm  AO: 3.0cm  LVIDd: 4.0 cm.  LVIDs: 2.9cm  LVEF: 55-60%.  PASP: 26mm Hg  FINDINGS  Left Ventricle: Normal in size and  preserved LV systolic function with   estimated LVEF 55-60%.  Right Ventricle: Normal in size and normal RV systolic function.  Left Atrium:Normal in size.  Right Atrium: Normal in size.  Mitral Valve: Mild mitral annular calcification is present. Mitral valve   is mildly calcified and no evidence of any mitral stenosis. Mild mitral   regurgitation is present.  Aortic Valve: Aortic root is mild sclerotic and of normal dimensions.   Aortic valve is  mildly calcified and no evidence of any aortic stenosis.  Tricuspid Valve: Mild tricuspid regurgitation is present and calculated    PA systolic pressure is 26 mmHg.  Pulmonic Valve: Tracepulmonary regurgitation is present.  Diastolic Function: Grade 1 LV diastolic dysfunction is present.  Pericardium/Pleura: No evidence of any pericardial effusion.  No intracardiac thrombus or vegetations and tumor.  CONCLUSIONS:  Normal LV size with preserved LV systolic function.  Mild mitral regurgitation is present.  Mild tricuspid regurgitation is present . No evidence of any pulmonary   hypertension.

## 2023-10-21 NOTE — ED ADULT NURSE NOTE - OBJECTIVE STATEMENT
AAOx3 pt presents to ED c/o of neck pain, lower back pain and inability to walk and diminished sensation in both lower extremities. Pt reports falling 10 days ago in bath tub while at home in [place] and was unable to get out for 2 days. He crawled around house until being able to call son that got him to [facility] for care. Pt has bruising on lower back. Unable to lift legs to gravity. No head trauma, LOC. PMH: DM, HTN

## 2023-10-21 NOTE — ED ADULT TRIAGE NOTE - CHIEF COMPLAINT QUOTE
fall last week and hit his lower back , was in the floor 48 because he couldn't stand until his son help him , pt doest have feeling in his leg, c/o neck pain

## 2023-10-21 NOTE — ED ADULT NURSE NOTE - ALCOHOL PRE SCREEN (AUDIT - C)
"Chief Complaint   Patient presents with     Pre-Op Exam     vsd asd repair      Vitals:    07/26/19 0911   BP: (!) 82/42   BP Location: Right leg   Patient Position: Supine   Cuff Size: Child   Pulse: 147   Resp: (!) 46   SpO2: 96%   Weight: 14 lb 12.3 oz (6.7 kg)   Height: 2' 2.97\" (68.5 cm)     Noelle Ayala LPN  July 26, 2019  " Statement Selected

## 2023-10-21 NOTE — ED ADULT NURSE NOTE - ED STAT RN HANDOFF DETAILS
Hand off report given to Jany QUINN. Respirations spontaneous and unlabored. 14fr best cathter in place. IV intact. Pt in NAD at this time

## 2023-10-21 NOTE — ED PROVIDER NOTE - CLINICAL SUMMARY MEDICAL DECISION MAKING FREE TEXT BOX
Fall 10 days ago on floor for 48 hours now with inability to ambulate sensory and motor changes as well as incontinence of urine and stool high suspicion for cord compression.  Patient also with neck pain with movement.  Will give medication for neck pain, consult Ortho, get MRI, check labs r/o rhabdo, get EKG and Admit.

## 2023-10-21 NOTE — ED ADULT NURSE NOTE - NSFALLUNIVINTERV_ED_ALL_ED
Bed/Stretcher in lowest position, wheels locked, appropriate side rails in place/Call bell, personal items and telephone in reach/Instruct patient to call for assistance before getting out of bed/chair/stretcher/Non-slip footwear applied when patient is off stretcher/[place] to call system/Physically safe environment - no spills, clutter or unnecessary equipment/Purposeful proactive rounding/Room/bathroom lighting operational, light cord in reach

## 2023-10-21 NOTE — ED PROVIDER NOTE - OBJECTIVE STATEMENT
This patient is a 73 year old man hx of DM and HTN who presents to the ER c/o neck pain and inability to ambulate, stand or feel his legs.  He states that he fell 10 days ago while in the tub in the [place] and was on the ground trying to get out of the tub and bathroom for 2 days.  He remained in the home crawling on the ground until today he was able to reach his phone and called his son via video who immediately came to the patient's home and took him to the airport to come back to [place].  He reports fecal and urinary incontinence.  His neck pain is constant 7/10 in severity.  Patient states that he can't feel his low back, legs or genitals.  He denies fever, chest pain, SOB. This patient is a 73 year old man hx of DM and HTN who presents to the ER c/o neck pain and inability to ambulate, stand or feel his legs.  He states that he fell 10 days ago while in the tub in the [place] and was on the ground trying to get out of the tub and bathroom for 2 days.  He remained in the home crawling on the ground and reporting increasing weakness and numbness in his leg.  Today he spoke to his son on the phone who immediately came to the patient's home and took him to the airport to come back to [place].  He reports fecal and urinary incontinence today.  His neck pain is constant 7/10 in severity.  Patient states that he can't feel his low back, legs or genitals.  He denies fever, chest pain, SOB.

## 2023-10-21 NOTE — ED PROVIDER NOTE - PROGRESS NOTE DETAILS
Ortho resident in ER with another patient informed of consult and concern for cord compression.   trying to reach MRI tech  still trying to get in contact with MRI tech.  Nursing supervisor was called to help assist with getting help.  No answer. MRI tech returned call and made aware Ortho states no surgical intervention at this time recommends patient be admitted to medicine Patient now reporting that he fell 7 days ago in the tub and was able to get up and walk after that fall.  He has been having progressive numbness in his legs since that fall and needed to hold on to furniture and the wall in order to get around.  He reports that he has fallen 11 times over the week due to his repeated attempts to walk but his legs have been too weak and numb but it got much worse 2 days ago.    Hospitalist at bedside also trying to get clarification and will attempt to speak to patient with .  Oncoming ER attending Dr. Najera made aware and will follow-up if any plans change regarding patient's disposition.

## 2023-10-21 NOTE — ED PROVIDER NOTE - CARE PLAN
1 Principal Discharge DX:	Injury to back   Principal Discharge DX:	Injury of thoracic spinal cord  Secondary Diagnosis:	Rhabdomyolysis   Principal Discharge DX:	Spinal cord compression  Secondary Diagnosis:	Rhabdomyolysis  Secondary Diagnosis:	Fall  Secondary Diagnosis:	Cervical spine fracture  Secondary Diagnosis:	Neck pain

## 2023-10-22 LAB
ANION GAP SERPL CALC-SCNC: 5 MMOL/L — SIGNIFICANT CHANGE UP (ref 5–17)
ANION GAP SERPL CALC-SCNC: 5 MMOL/L — SIGNIFICANT CHANGE UP (ref 5–17)
APPEARANCE UR: CLEAR — SIGNIFICANT CHANGE UP
APPEARANCE UR: CLEAR — SIGNIFICANT CHANGE UP
APTT BLD: 23.3 SEC — LOW (ref 24.5–35.6)
APTT BLD: 23.3 SEC — LOW (ref 24.5–35.6)
APTT BLD: 69.1 SEC — HIGH (ref 24.5–35.6)
APTT BLD: 69.1 SEC — HIGH (ref 24.5–35.6)
BACTERIA # UR AUTO: ABNORMAL
BACTERIA # UR AUTO: ABNORMAL
BILIRUB UR-MCNC: NEGATIVE — SIGNIFICANT CHANGE UP
BILIRUB UR-MCNC: NEGATIVE — SIGNIFICANT CHANGE UP
BUN SERPL-MCNC: 33 MG/DL — HIGH (ref 7–23)
BUN SERPL-MCNC: 33 MG/DL — HIGH (ref 7–23)
CALCIUM SERPL-MCNC: 8.3 MG/DL — LOW (ref 8.5–10.1)
CALCIUM SERPL-MCNC: 8.3 MG/DL — LOW (ref 8.5–10.1)
CHLORIDE SERPL-SCNC: 114 MMOL/L — HIGH (ref 96–108)
CHLORIDE SERPL-SCNC: 114 MMOL/L — HIGH (ref 96–108)
CO2 SERPL-SCNC: 25 MMOL/L — SIGNIFICANT CHANGE UP (ref 22–31)
CO2 SERPL-SCNC: 25 MMOL/L — SIGNIFICANT CHANGE UP (ref 22–31)
COLOR SPEC: YELLOW — SIGNIFICANT CHANGE UP
COLOR SPEC: YELLOW — SIGNIFICANT CHANGE UP
COMMENT - URINE: SIGNIFICANT CHANGE UP
COMMENT - URINE: SIGNIFICANT CHANGE UP
CREAT SERPL-MCNC: 1.21 MG/DL — SIGNIFICANT CHANGE UP (ref 0.5–1.3)
CREAT SERPL-MCNC: 1.21 MG/DL — SIGNIFICANT CHANGE UP (ref 0.5–1.3)
CRP SERPL-MCNC: 84 MG/L — HIGH
CRP SERPL-MCNC: 84 MG/L — HIGH
DIFF PNL FLD: ABNORMAL
DIFF PNL FLD: ABNORMAL
EGFR: 64 ML/MIN/1.73M2 — SIGNIFICANT CHANGE UP
EGFR: 64 ML/MIN/1.73M2 — SIGNIFICANT CHANGE UP
EPI CELLS # UR: SIGNIFICANT CHANGE UP
EPI CELLS # UR: SIGNIFICANT CHANGE UP
ERYTHROCYTE [SEDIMENTATION RATE] IN BLOOD: 16 MM/HR — SIGNIFICANT CHANGE UP (ref 0–20)
ERYTHROCYTE [SEDIMENTATION RATE] IN BLOOD: 16 MM/HR — SIGNIFICANT CHANGE UP (ref 0–20)
GLUCOSE BLDC GLUCOMTR-MCNC: 133 MG/DL — HIGH (ref 70–99)
GLUCOSE BLDC GLUCOMTR-MCNC: 133 MG/DL — HIGH (ref 70–99)
GLUCOSE BLDC GLUCOMTR-MCNC: 156 MG/DL — HIGH (ref 70–99)
GLUCOSE BLDC GLUCOMTR-MCNC: 156 MG/DL — HIGH (ref 70–99)
GLUCOSE BLDC GLUCOMTR-MCNC: 165 MG/DL — HIGH (ref 70–99)
GLUCOSE BLDC GLUCOMTR-MCNC: 165 MG/DL — HIGH (ref 70–99)
GLUCOSE BLDC GLUCOMTR-MCNC: 181 MG/DL — HIGH (ref 70–99)
GLUCOSE BLDC GLUCOMTR-MCNC: 181 MG/DL — HIGH (ref 70–99)
GLUCOSE SERPL-MCNC: 158 MG/DL — HIGH (ref 70–99)
GLUCOSE SERPL-MCNC: 158 MG/DL — HIGH (ref 70–99)
GLUCOSE UR QL: NEGATIVE MG/DL — SIGNIFICANT CHANGE UP
GLUCOSE UR QL: NEGATIVE MG/DL — SIGNIFICANT CHANGE UP
HCT VFR BLD CALC: 36.8 % — LOW (ref 39–50)
HCT VFR BLD CALC: 36.8 % — LOW (ref 39–50)
HCT VFR BLD CALC: 37.3 % — LOW (ref 39–50)
HCT VFR BLD CALC: 37.3 % — LOW (ref 39–50)
HGB BLD-MCNC: 12.5 G/DL — LOW (ref 13–17)
HGB BLD-MCNC: 12.5 G/DL — LOW (ref 13–17)
HGB BLD-MCNC: 12.7 G/DL — LOW (ref 13–17)
HGB BLD-MCNC: 12.7 G/DL — LOW (ref 13–17)
INR BLD: 1.07 RATIO — SIGNIFICANT CHANGE UP (ref 0.85–1.18)
INR BLD: 1.07 RATIO — SIGNIFICANT CHANGE UP (ref 0.85–1.18)
KETONES UR-MCNC: ABNORMAL
KETONES UR-MCNC: ABNORMAL
LDH SERPL L TO P-CCNC: 762 U/L — HIGH (ref 50–242)
LDH SERPL L TO P-CCNC: 762 U/L — HIGH (ref 50–242)
LEUKOCYTE ESTERASE UR-ACNC: NEGATIVE — SIGNIFICANT CHANGE UP
LEUKOCYTE ESTERASE UR-ACNC: NEGATIVE — SIGNIFICANT CHANGE UP
MCHC RBC-ENTMCNC: 30.6 PG — SIGNIFICANT CHANGE UP (ref 27–34)
MCHC RBC-ENTMCNC: 30.6 PG — SIGNIFICANT CHANGE UP (ref 27–34)
MCHC RBC-ENTMCNC: 31.5 PG — SIGNIFICANT CHANGE UP (ref 27–34)
MCHC RBC-ENTMCNC: 31.5 PG — SIGNIFICANT CHANGE UP (ref 27–34)
MCHC RBC-ENTMCNC: 33.5 G/DL — SIGNIFICANT CHANGE UP (ref 32–36)
MCHC RBC-ENTMCNC: 33.5 G/DL — SIGNIFICANT CHANGE UP (ref 32–36)
MCHC RBC-ENTMCNC: 34.5 G/DL — SIGNIFICANT CHANGE UP (ref 32–36)
MCHC RBC-ENTMCNC: 34.5 G/DL — SIGNIFICANT CHANGE UP (ref 32–36)
MCV RBC AUTO: 91.3 FL — SIGNIFICANT CHANGE UP (ref 80–100)
MCV RBC AUTO: 91.3 FL — SIGNIFICANT CHANGE UP (ref 80–100)
MCV RBC AUTO: 91.4 FL — SIGNIFICANT CHANGE UP (ref 80–100)
MCV RBC AUTO: 91.4 FL — SIGNIFICANT CHANGE UP (ref 80–100)
NITRITE UR-MCNC: NEGATIVE — SIGNIFICANT CHANGE UP
NITRITE UR-MCNC: NEGATIVE — SIGNIFICANT CHANGE UP
NRBC # BLD: 0 /100 WBCS — SIGNIFICANT CHANGE UP (ref 0–0)
PH UR: 5 — SIGNIFICANT CHANGE UP (ref 5–8)
PH UR: 5 — SIGNIFICANT CHANGE UP (ref 5–8)
PLATELET # BLD AUTO: 168 K/UL — SIGNIFICANT CHANGE UP (ref 150–400)
PLATELET # BLD AUTO: 168 K/UL — SIGNIFICANT CHANGE UP (ref 150–400)
PLATELET # BLD AUTO: 173 K/UL — SIGNIFICANT CHANGE UP (ref 150–400)
PLATELET # BLD AUTO: 173 K/UL — SIGNIFICANT CHANGE UP (ref 150–400)
POTASSIUM SERPL-MCNC: 3.7 MMOL/L — SIGNIFICANT CHANGE UP (ref 3.5–5.3)
POTASSIUM SERPL-MCNC: 3.7 MMOL/L — SIGNIFICANT CHANGE UP (ref 3.5–5.3)
POTASSIUM SERPL-SCNC: 3.7 MMOL/L — SIGNIFICANT CHANGE UP (ref 3.5–5.3)
POTASSIUM SERPL-SCNC: 3.7 MMOL/L — SIGNIFICANT CHANGE UP (ref 3.5–5.3)
PROT SERPL-MCNC: 6.7 G/DL — SIGNIFICANT CHANGE UP (ref 6–8.3)
PROT UR-MCNC: 30 MG/DL
PROT UR-MCNC: 30 MG/DL
PROTHROM AB SERPL-ACNC: 12.7 SEC — SIGNIFICANT CHANGE UP (ref 9.5–13)
PROTHROM AB SERPL-ACNC: 12.7 SEC — SIGNIFICANT CHANGE UP (ref 9.5–13)
PSA FLD-MCNC: 276 NG/ML — HIGH (ref 0–4)
PSA FLD-MCNC: 276 NG/ML — HIGH (ref 0–4)
RBC # BLD: 4.03 M/UL — LOW (ref 4.2–5.8)
RBC # BLD: 4.03 M/UL — LOW (ref 4.2–5.8)
RBC # BLD: 4.08 M/UL — LOW (ref 4.2–5.8)
RBC # BLD: 4.08 M/UL — LOW (ref 4.2–5.8)
RBC # FLD: 12.2 % — SIGNIFICANT CHANGE UP (ref 10.3–14.5)
RBC # FLD: 12.2 % — SIGNIFICANT CHANGE UP (ref 10.3–14.5)
RBC # FLD: 12.5 % — SIGNIFICANT CHANGE UP (ref 10.3–14.5)
RBC # FLD: 12.5 % — SIGNIFICANT CHANGE UP (ref 10.3–14.5)
RBC CASTS # UR COMP ASSIST: SIGNIFICANT CHANGE UP /HPF (ref 0–4)
RBC CASTS # UR COMP ASSIST: SIGNIFICANT CHANGE UP /HPF (ref 0–4)
SODIUM SERPL-SCNC: 144 MMOL/L — SIGNIFICANT CHANGE UP (ref 135–145)
SODIUM SERPL-SCNC: 144 MMOL/L — SIGNIFICANT CHANGE UP (ref 135–145)
SP GR SPEC: 1.02 — SIGNIFICANT CHANGE UP (ref 1.01–1.02)
SP GR SPEC: 1.02 — SIGNIFICANT CHANGE UP (ref 1.01–1.02)
UROBILINOGEN FLD QL: NEGATIVE MG/DL — SIGNIFICANT CHANGE UP
UROBILINOGEN FLD QL: NEGATIVE MG/DL — SIGNIFICANT CHANGE UP
WBC # BLD: 5.8 K/UL — SIGNIFICANT CHANGE UP (ref 3.8–10.5)
WBC # BLD: 5.8 K/UL — SIGNIFICANT CHANGE UP (ref 3.8–10.5)
WBC # BLD: 6.64 K/UL — SIGNIFICANT CHANGE UP (ref 3.8–10.5)
WBC # BLD: 6.64 K/UL — SIGNIFICANT CHANGE UP (ref 3.8–10.5)
WBC # FLD AUTO: 5.8 K/UL — SIGNIFICANT CHANGE UP (ref 3.8–10.5)
WBC # FLD AUTO: 5.8 K/UL — SIGNIFICANT CHANGE UP (ref 3.8–10.5)
WBC # FLD AUTO: 6.64 K/UL — SIGNIFICANT CHANGE UP (ref 3.8–10.5)
WBC # FLD AUTO: 6.64 K/UL — SIGNIFICANT CHANGE UP (ref 3.8–10.5)
WBC UR QL: SIGNIFICANT CHANGE UP
WBC UR QL: SIGNIFICANT CHANGE UP

## 2023-10-22 PROCEDURE — 99223 1ST HOSP IP/OBS HIGH 75: CPT

## 2023-10-22 PROCEDURE — 93970 EXTREMITY STUDY: CPT | Mod: 26

## 2023-10-22 PROCEDURE — 72142 MRI NECK SPINE W/DYE: CPT | Mod: 26

## 2023-10-22 PROCEDURE — 72158 MRI LUMBAR SPINE W/O & W/DYE: CPT | Mod: 26

## 2023-10-22 PROCEDURE — 72147 MRI CHEST SPINE W/DYE: CPT | Mod: 26

## 2023-10-22 PROCEDURE — 71250 CT THORAX DX C-: CPT | Mod: 26

## 2023-10-22 PROCEDURE — 71045 X-RAY EXAM CHEST 1 VIEW: CPT | Mod: 26

## 2023-10-22 PROCEDURE — 74176 CT ABD & PELVIS W/O CONTRAST: CPT | Mod: 26

## 2023-10-22 RX ORDER — GLUCAGON INJECTION, SOLUTION 0.5 MG/.1ML
1 INJECTION, SOLUTION SUBCUTANEOUS ONCE
Refills: 0 | Status: DISCONTINUED | OUTPATIENT
Start: 2023-10-22 | End: 2023-10-27

## 2023-10-22 RX ORDER — PANTOPRAZOLE SODIUM 20 MG/1
40 TABLET, DELAYED RELEASE ORAL
Refills: 0 | Status: DISCONTINUED | OUTPATIENT
Start: 2023-10-22 | End: 2023-10-27

## 2023-10-22 RX ORDER — INSULIN GLARGINE 100 [IU]/ML
15 INJECTION, SOLUTION SUBCUTANEOUS EVERY MORNING
Refills: 0 | Status: DISCONTINUED | OUTPATIENT
Start: 2023-10-22 | End: 2023-10-27

## 2023-10-22 RX ORDER — MORPHINE SULFATE 50 MG/1
4 CAPSULE, EXTENDED RELEASE ORAL EVERY 4 HOURS
Refills: 0 | Status: DISCONTINUED | OUTPATIENT
Start: 2023-10-22 | End: 2023-10-23

## 2023-10-22 RX ORDER — HEPARIN SODIUM 5000 [USP'U]/ML
INJECTION INTRAVENOUS; SUBCUTANEOUS
Qty: 25000 | Refills: 0 | Status: DISCONTINUED | OUTPATIENT
Start: 2023-10-22 | End: 2023-10-27

## 2023-10-22 RX ORDER — PANTOPRAZOLE SODIUM 20 MG/1
1 TABLET, DELAYED RELEASE ORAL
Refills: 0 | DISCHARGE

## 2023-10-22 RX ORDER — SODIUM CHLORIDE 9 MG/ML
1000 INJECTION, SOLUTION INTRAVENOUS
Refills: 0 | Status: DISCONTINUED | OUTPATIENT
Start: 2023-10-22 | End: 2023-10-27

## 2023-10-22 RX ORDER — ACETAMINOPHEN 500 MG
650 TABLET ORAL EVERY 6 HOURS
Refills: 0 | Status: DISCONTINUED | OUTPATIENT
Start: 2023-10-22 | End: 2023-10-27

## 2023-10-22 RX ORDER — LISINOPRIL 2.5 MG/1
10 TABLET ORAL DAILY
Refills: 0 | Status: DISCONTINUED | OUTPATIENT
Start: 2023-10-22 | End: 2023-10-22

## 2023-10-22 RX ORDER — INSULIN GLARGINE 100 [IU]/ML
30 INJECTION, SOLUTION SUBCUTANEOUS EVERY MORNING
Refills: 0 | Status: DISCONTINUED | OUTPATIENT
Start: 2023-10-22 | End: 2023-10-22

## 2023-10-22 RX ORDER — SODIUM CHLORIDE 9 MG/ML
1000 INJECTION, SOLUTION INTRAVENOUS
Refills: 0 | Status: DISCONTINUED | OUTPATIENT
Start: 2023-10-22 | End: 2023-10-22

## 2023-10-22 RX ORDER — ATORVASTATIN CALCIUM 80 MG/1
20 TABLET, FILM COATED ORAL AT BEDTIME
Refills: 0 | Status: DISCONTINUED | OUTPATIENT
Start: 2023-10-22 | End: 2023-10-27

## 2023-10-22 RX ORDER — HEPARIN SODIUM 5000 [USP'U]/ML
7000 INJECTION INTRAVENOUS; SUBCUTANEOUS EVERY 6 HOURS
Refills: 0 | Status: DISCONTINUED | OUTPATIENT
Start: 2023-10-22 | End: 2023-10-27

## 2023-10-22 RX ORDER — DEXTROSE 50 % IN WATER 50 %
25 SYRINGE (ML) INTRAVENOUS ONCE
Refills: 0 | Status: DISCONTINUED | OUTPATIENT
Start: 2023-10-22 | End: 2023-10-27

## 2023-10-22 RX ORDER — DEXTROSE 50 % IN WATER 50 %
12.5 SYRINGE (ML) INTRAVENOUS ONCE
Refills: 0 | Status: DISCONTINUED | OUTPATIENT
Start: 2023-10-22 | End: 2023-10-27

## 2023-10-22 RX ORDER — AMLODIPINE BESYLATE 2.5 MG/1
10 TABLET ORAL DAILY
Refills: 0 | Status: DISCONTINUED | OUTPATIENT
Start: 2023-10-22 | End: 2023-10-27

## 2023-10-22 RX ORDER — HEPARIN SODIUM 5000 [USP'U]/ML
3500 INJECTION INTRAVENOUS; SUBCUTANEOUS EVERY 6 HOURS
Refills: 0 | Status: DISCONTINUED | OUTPATIENT
Start: 2023-10-22 | End: 2023-10-27

## 2023-10-22 RX ORDER — ONDANSETRON 8 MG/1
4 TABLET, FILM COATED ORAL EVERY 8 HOURS
Refills: 0 | Status: DISCONTINUED | OUTPATIENT
Start: 2023-10-22 | End: 2023-10-27

## 2023-10-22 RX ORDER — SODIUM CHLORIDE 9 MG/ML
1000 INJECTION, SOLUTION INTRAVENOUS
Refills: 0 | Status: DISCONTINUED | OUTPATIENT
Start: 2023-10-22 | End: 2023-10-26

## 2023-10-22 RX ORDER — HEPARIN SODIUM 5000 [USP'U]/ML
7000 INJECTION INTRAVENOUS; SUBCUTANEOUS ONCE
Refills: 0 | Status: DISCONTINUED | OUTPATIENT
Start: 2023-10-22 | End: 2023-10-27

## 2023-10-22 RX ORDER — INFLUENZA VIRUS VACCINE 15; 15; 15; 15 UG/.5ML; UG/.5ML; UG/.5ML; UG/.5ML
0.7 SUSPENSION INTRAMUSCULAR ONCE
Refills: 0 | Status: DISCONTINUED | OUTPATIENT
Start: 2023-10-22 | End: 2023-10-27

## 2023-10-22 RX ORDER — INSULIN LISPRO 100/ML
VIAL (ML) SUBCUTANEOUS
Refills: 0 | Status: DISCONTINUED | OUTPATIENT
Start: 2023-10-22 | End: 2023-10-27

## 2023-10-22 RX ORDER — ASPIRIN/CALCIUM CARB/MAGNESIUM 324 MG
81 TABLET ORAL DAILY
Refills: 0 | Status: DISCONTINUED | OUTPATIENT
Start: 2023-10-22 | End: 2023-10-27

## 2023-10-22 RX ORDER — DEXTROSE 50 % IN WATER 50 %
15 SYRINGE (ML) INTRAVENOUS ONCE
Refills: 0 | Status: DISCONTINUED | OUTPATIENT
Start: 2023-10-22 | End: 2023-10-27

## 2023-10-22 RX ORDER — LANOLIN ALCOHOL/MO/W.PET/CERES
3 CREAM (GRAM) TOPICAL AT BEDTIME
Refills: 0 | Status: DISCONTINUED | OUTPATIENT
Start: 2023-10-22 | End: 2023-10-27

## 2023-10-22 RX ADMIN — HEPARIN SODIUM 1600 UNIT(S)/HR: 5000 INJECTION INTRAVENOUS; SUBCUTANEOUS at 18:14

## 2023-10-22 RX ADMIN — AMLODIPINE BESYLATE 10 MILLIGRAM(S): 2.5 TABLET ORAL at 05:46

## 2023-10-22 RX ADMIN — PANTOPRAZOLE SODIUM 40 MILLIGRAM(S): 20 TABLET, DELAYED RELEASE ORAL at 08:03

## 2023-10-22 RX ADMIN — Medication 81 MILLIGRAM(S): at 16:05

## 2023-10-22 RX ADMIN — Medication 40 MILLIGRAM(S): at 05:46

## 2023-10-22 RX ADMIN — SODIUM CHLORIDE 100 MILLILITER(S): 9 INJECTION, SOLUTION INTRAVENOUS at 05:46

## 2023-10-22 RX ADMIN — Medication 40 MILLIGRAM(S): at 16:05

## 2023-10-22 RX ADMIN — HEPARIN SODIUM 1600 UNIT(S)/HR: 5000 INJECTION INTRAVENOUS; SUBCUTANEOUS at 19:25

## 2023-10-22 RX ADMIN — Medication 2: at 08:01

## 2023-10-22 RX ADMIN — ATORVASTATIN CALCIUM 20 MILLIGRAM(S): 80 TABLET, FILM COATED ORAL at 22:23

## 2023-10-22 RX ADMIN — Medication 2: at 16:07

## 2023-10-22 RX ADMIN — Medication 40 MILLIGRAM(S): at 22:23

## 2023-10-22 RX ADMIN — HEPARIN SODIUM 1600 UNIT(S)/HR: 5000 INJECTION INTRAVENOUS; SUBCUTANEOUS at 09:44

## 2023-10-22 RX ADMIN — MORPHINE SULFATE 4 MILLIGRAM(S): 50 CAPSULE, EXTENDED RELEASE ORAL at 11:05

## 2023-10-22 RX ADMIN — Medication 2 MILLIGRAM(S): at 10:55

## 2023-10-22 RX ADMIN — MORPHINE SULFATE 4 MILLIGRAM(S): 50 CAPSULE, EXTENDED RELEASE ORAL at 12:05

## 2023-10-22 RX ADMIN — SODIUM CHLORIDE 2000 MILLILITER(S): 9 INJECTION INTRAMUSCULAR; INTRAVENOUS; SUBCUTANEOUS at 00:23

## 2023-10-22 RX ADMIN — INSULIN GLARGINE 15 UNIT(S): 100 INJECTION, SOLUTION SUBCUTANEOUS at 16:02

## 2023-10-22 NOTE — H&P ADULT - HISTORY OF PRESENT ILLNESS
73M w/ hx of HTN p/w b/l LE weakness and recurrent falls. Pt is from  [place] after reporting being down for 2 days after fall 2/2 LE weakness. Pt reports that one week prior to presentation, he suffered fall in the bathtub and struck his back. Pt was able to raise himself up and resume normal activity. Pt then developed paresthesias later that evening and developed progressively worsening b/l LE weakness the follow day. Throughout the week, pt reports recurrent falls 2/2 LE weakness. This continued until two days prior to presentation where pt was unable to raise himself up. [place] in the [place]. Pt denies LOC, headstrike, fever/chills/sweats,     In ED, pt afebrile and HDS. Labs notable for BUN/SCr 38/1.51, Bili 1.5, Alk Phos 332, AST//148, CK 16,000. CT demonstrated multiple pathologic cervical, thoracic, and lumbar fractures, osseous metastatic disease, epidural tumor causing moderate spinal canal stenosis 73M w/ hx of IDDM, HTN p/w b/l LE weakness and recurrent falls. Pt is from  [place] after reporting being down for 2 days after fall 2/2 LE weakness. Pt reports that one week prior to presentation, he suffered fall in the bathtub and struck his back. Pt was able to raise himself up and resume normal activity. Pt then developed paresthesias later that evening and developed progressively worsening b/l LE weakness the follow day. Throughout the week, pt reports recurrent falls 2/2 LE weakness. This continued until two days prior to presentation where pt was unable to raise himself up. Pt [place] the [place] who [place]. Pt denies LOC, headstrike, fever/chills/sweats,     In ED, pt afebrile and HDS. Labs notable for BUN/SCr 38/1.51, Bili 1.5, Alk Phos 332, AST//148, CK 16,000. CT demonstrated multiple pathologic cervical, thoracic, and lumbar fractures, osseous metastatic disease, epidural tumor causing moderate spinal canal stenosis 73M w/ hx of IDDM, HTN p/w b/l LE weakness and recurrent falls. Pt is from  and [place] after reporting being down for 2 days after fall 2/2 LE weakness. Pt reports that one week prior to presentation, he suffered fall in the bathtub and struck his back. Pt was able to raise himself up and resume normal activity. Pt then developed paresthesias later that evening and developed progressively worsening b/l LE weakness the follow day. Throughout the week, pt reports recurrent falls 2/2 LE weakness. This continued until two days prior to presentation where pt was unable to raise himself up. Pt [place] the [place] who [place]. Pt denies LOC, headstrike, fever/chills/sweats,     In ED, pt afebrile and HDS. Labs notable for BUN/SCr 38/1.51, Bili 1.5, Alk Phos 332, AST//148, CK 16,000. CT demonstrated multiple pathologic cervical, thoracic, and lumbar fractures, osseous metastatic disease, epidural tumor causing moderate spinal canal stenosis. LE US demonstrated b/l DVTs. Ortho was consulted.

## 2023-10-22 NOTE — PROGRESS NOTE ADULT - SUBJECTIVE AND OBJECTIVE BOX
Pt seen and examined at bedside. Pt doing well this morning. Duplex found bilateral DVTs, placed on heparin drip.       Vital Signs (24 Hrs):  T(C): 36.6 (10-22-23 @ 04:13), Max: 37.1 (10-21-23 @ 19:11)  HR: 86 (10-22-23 @ 04:13) (86 - 101)  BP: 135/77 (10-22-23 @ 04:13) (133/90 - 156/90)  RR: 18 (10-22-23 @ 04:13) (17 - 22)  SpO2: 94% (10-22-23 @ 04:13) (94% - 96%)  Wt(kg): --    LABS:                          12.7   6.64  )-----------( 173      ( 22 Oct 2023 07:49 )             36.8     10-22    144  |  114<H>  |  33<H>  ----------------------------<  158<H>  3.7   |  25  |  1.21    Ca    8.3<L>      22 Oct 2023 07:49    TPro  8.2  /  Alb  4.2  /  TBili  1.5<H>  /  DBili  x   /  AST  369<H>  /  ALT  148<H>  /  AlkPhos  332<H>  10-21    LIVER FUNCTIONS - ( 21 Oct 2023 20:05 )  Alb: 4.2 g/dL / Pro: 8.2 gm/dL / ALK PHOS: 332 U/L / ALT: 148 U/L / AST: 369 U/L / GGT: x           PT/INR - ( 22 Oct 2023 07:49 )   PT: 12.7 sec;   INR: 1.07 ratio         PTT - ( 22 Oct 2023 07:49 )  PTT:23.3 sec    PHYSICAL EXAM:  General; Awake and alert, Oriented x 3  Spine: TTP by spinous process diffusely at thoracic and lumbar spine, no other TTP or bony step offs.    Rectal Exam:  No passive/Active rectal tone  + Saddle anesthesia     Motor exam:     Motor:                   C5                C6              C7               C8           T1   R            5/5                5/5            5/5             5/5          5/5  L             5/5               5/5             5/5             5/5          5/5                L2             L3             L4               L5            S1  R         1/5           1/5           0/5             0/5           0/5  L          1/5          1/5            0/5             0/5           0/5    Sensory:  Patient is 1/2 sensation from around T6/7 dermatome down distally. Endorses feeling some pressure, but denies feeling full sensation to touch compared to higher dermatomes (2/2 sensation C5-T5). Patient does not feel temperature or pain below T6/7 dermatomes.    Positive clonus of BL feet  Negative Babinski b/l  Negative Llamas b/l    Imaging:  CT C/T/Lsp:  1. Osseous metastatic disease is visualized at C7, T1, T2 and T3 and in   the left second rib.  2. Acute, mildly displaced pathologic fractures of the C7 and T1 spinous   processes.  Acute, nondisplaced pathologic fracture in the T1 lamina   bilaterally.  3. Age-indeterminate, minimal pathologic compression fractures in   superior endplates of T2 and T3.  4.  Dorsal epidural tumor extension at T1 causes at least moderate spinal   canal stenosis.  5.  Mild cervical spine degenerative changes.  No clinically significant   cervical spinal canal stenosis.  Moderate to severe neural foraminal   narrowing at C3-C4 on the left.  6. nondisplaced versus minimally displaced fractures in the L2 transverse processes bilaterally.                                            A/P :   Patient is a 73yMale w/ likely partial spinal cord compression    -Attempted to obtain emergent MRI of C/T/Lsp with and without contrast however patient was only able to tolerate MR of the cervical and thoracic spine without contrast. Could no tolerate the remainder of the study and stated that "I am not feeling well and if I can continue the MRI I will feel like I will die." Explained to the patient the importance of obtaining the MRI given that he likely he has a spinal cord injury and that MRI will help us diagnose and guide management better than other imaging modalities available. Also offered patient relaxants and any other medications that could help tolerate the procedure. Patient understood above and was adamant on not proceeding with MRI. Patient refused to take medications and would be agreeable to continue the scans another day.    -Final plan pending complete MRI imaging  -NPO except medications until final plan is formed  -Heparin drip for bilateral DVTs  -Recommend consulting heme/onc and palliative care consult  -NWB  -Mestatic workup  -FU bone scan  -No heavy lifting/twisting  -Multimodal analgesia - recommend low dose opioids, acetaminophen, muscle relaxant as tolerated  -Medical recommendations appreciated  -Imaging and clinical presentation discussed w/ Dr. Cooper, who is aware and agrees w/ above plan.

## 2023-10-22 NOTE — PATIENT PROFILE ADULT - FALL HARM RISK - HARM RISK INTERVENTIONS
Assistance with ambulation/Assistance OOB with selected safe patient handling equipment/Communicate Risk of Fall with Harm to all staff/Discuss with provider need for PT consult/Monitor gait and stability/Reinforce activity limits and safety measures with patient and family/Tailored Fall Risk Interventions/Visual Cue: Yellow wristband and red socks/Bed in lowest position, wheels locked, appropriate side rails in place/Call bell, personal items and telephone in reach/Instruct patient to call for assistance before getting out of bed or chair/Non-slip footwear when patient is out of bed/[place] to call system/Physically safe environment - no spills, clutter or unnecessary equipment/Purposeful Proactive Rounding/Room/bathroom lighting operational, light cord in reach

## 2023-10-22 NOTE — H&P ADULT - NSHPPHYSICALEXAM_GEN_ALL_CORE
T(C): 36.6 (10-22-23 @ 04:13), Max: 37.1 (10-21-23 @ 19:11)  HR: 86 (10-22-23 @ 04:13) (86 - 101)  BP: 135/77 (10-22-23 @ 04:13) (133/90 - 156/90)  RR: 18 (10-22-23 @ 04:13) (17 - 22)  SpO2: 94% (10-22-23 @ 04:13) (94% - 96%)    CONSTITUTIONAL: Well groomed, no apparent distress  EYES: PERRLA and symmetric, EOMI, No conjunctival or scleral injection, non-icteric  ENMT: Oral mucosa with moist membranes. Normal dentition; no pharyngeal injection or exudates             NECK: Supple, symmetric and without tracheal deviation   RESP: No respiratory distress, no use of accessory muscles; CTA b/l, no WRR  CV: RRR, +S1S2, no MRG; no JVD; no peripheral edema  GI: Soft, NT, ND, no rebound, no guarding; no palpable masses; no hepatosplenomegaly; no hernia palpated  LYMPH: No cervical LAD or tenderness; no axillary LAD or tenderness; no inguinal LAD or tenderness  MSK: b/l LE swelling  SKIN: No rashes or ulcers noted; no subcutaneous nodules or induration palpable  NEURO: CN II-XII intact; normal reflexes in upper and lower extremities, sensation intact in upper and lower extremities b/l to light touch   PSYCH: Appropriate insight/judgment; A+O x 3, mood and affect appropriate, recent/remote memory intact

## 2023-10-22 NOTE — PATIENT PROFILE ADULT - FUNCTIONAL ASSESSMENT - BASIC MOBILITY 6.
1-calculated by average/Not able to assess (calculate score using [facility] averaging method)

## 2023-10-22 NOTE — H&P ADULT - ASSESSMENT
73M w/ hx of IDDM, HTN p/w b/l LE weakness and recurrent falls.  73M w/ hx of IDDM, HTN p/w b/l LE weakness and recurrent falls c/f spinal cord compression in setting of lytic metastatic disease. Also found to have evidence of rhabdomyolysis w/ SRUTHI and elevated transaminases    #Spinal cord compression  #metastatic cancer- unconfirmed primary  - Solumedrol 40mg IV TID  - pain control prn  - MRI cervical/thoracic/lumbar spine  - follow up ortho final recs    #B/l DVTs  - start heparin gtt    #Rhabdomyolysis  #SRUTHI- likely intrarenal +/- hypovolemia  #Elevated transaminases- no evidence of liver mets  - IV LR  - hold lisinopril  - trend CK    #IDDM  - 15u lantus (half home dose given NPO)  - ISS    #FEN/PPX  - NPO  - hep gtt for VTE ppx     73M w/ hx of IDDM, HTN p/w b/l LE weakness and recurrent falls c/f spinal cord compression in setting of lytic metastatic disease. Also found to have evidence of rhabdomyolysis w/ SRUTHI and elevated transaminases    #Spinal cord compression  #metastatic cancer- unconfirmed primary  - Solumedrol 40mg IV TID  - pain control prn  - MRI cervical/thoracic/lumbar spine  - follow up ortho final recs  - consult oncology for workup recs    #B/l DVTs  - start heparin gtt    #Rhabdomyolysis  #SRUTHI- likely intrarenal +/- hypovolemia  #Elevated transaminases- no evidence of liver mets  - IV LR  - hold lisinopril  - trend CK    #IDDM  - 15u lantus (half home dose given NPO)  - ISS    #FEN/PPX  - NPO  - hep gtt for VTE ppx     73M w/ hx of IDDM, HTN p/w b/l LE weakness and recurrent falls c/f spinal cord compression in setting of lytic metastatic disease. Also found to have evidence of rhabdomyolysis w/ SRUTHI and elevated transaminases, and b/l LE DVTs on US.    #Spinal cord compression  #metastatic cancer- unconfirmed primary  - Solumedrol 40mg IV TID  - pain control prn  - MRI cervical/thoracic/lumbar spine  - follow up ortho final recs  - consult oncology for workup recs    #B/l LE DVTs  - start heparin gtt    #Rhabdomyolysis  #SRUTHI- likely intrarenal +/- hypovolemia  #Elevated transaminases- no evidence of liver mets  - IV LR  - hold lisinopril  - trend CK    #IDDM  - 15u lantus (half home dose given NPO)  - ISS    #FEN/PPX  - NPO  - hep gtt for VTE ppx

## 2023-10-22 NOTE — PATIENT PROFILE ADULT - FUNCTIONAL ASSESSMENT - BASIC MOBILITY 4.
FAMILY HISTORY:  No pertinent family history in first degree relatives    
No
2 = A lot of assistance

## 2023-10-22 NOTE — CONSULT NOTE ADULT - SUBJECTIVE AND OBJECTIVE BOX
Patient is a 73yMale ambulator at baseline who presents to [place] ED w/ a c/o of back pain and inability to ambulate due to BLLE weakness. Patient states was in the [place], where he occasionally lives at for periods at times, and fell in house while in the bathroom about 10-12 days ago. He was on the ground for about 2 days because he was unable to get up until he was able to contact his son. Afterwhich, his family [place] to the airport where he arrived this ED. Denies HS/LOC. Since then, he has been experiencing BLLE weakness and bladder incontinence. He denies overt bowel incontinence. Patient states he has pain from his mid to lower back, but otherwise denies pain elsewhere in the body. Denies radiculopathy or paraesthesias down the extremities. Patient states that he can't feel his low back, legs or genitals as well as he usually can. Denies any history of known cancer and states that he did not have any bladder/bowel incontinence or weakness prior to this injury. No fevers, chills, N/V, chest pain, SOB. States that he takes Aspirin 81mg daily but otherwise no blood thinners or anticoagulants. Denies any previous orthopaedic history. No other orthopaedic concerns at this time.       PAST MEDICAL & SURGICAL HISTORY:  Essential hypertension      No significant past surgical history          Vital Signs Last 24 Hrs  T(C): 37.1 (22 Oct 2023 00:29), Max: 37.1 (21 Oct 2023 19:11)  T(F): 98.7 (22 Oct 2023 00:29), Max: 98.8 (21 Oct 2023 19:11)  HR: 96 (22 Oct 2023 00:29) (96 - 101)  BP: 133/90 (22 Oct 2023 00:29) (133/90 - 156/90)  BP(mean): --  RR: 20 (22 Oct 2023 00:29) (17 - 20)  SpO2: 96% (22 Oct 2023 00:29) (96% - 96%)    Parameters below as of 22 Oct 2023 00:29  Patient On (Oxygen Delivery Method): room air      I&O's Detail      LABS:                        14.0   9.80  )-----------( 244      ( 21 Oct 2023 20:05 )             42.4     10-21    139  |  107  |  38<H>  ----------------------------<  167<H>  3.9   |  23  |  1.51<H>    Ca    8.8      21 Oct 2023 20:05    TPro  8.2  /  Alb  4.2  /  TBili  1.5<H>  /  DBili  x   /  AST  369<H>  /  ALT  148<H>  /  AlkPhos  332<H>  10-21    PT/INR - ( 21 Oct 2023 22:50 )   PT: 11.7 sec;   INR: 0.97 ratio         PTT - ( 21 Oct 2023 22:50 )  PTT:25.8 sec  Urinalysis Basic - ( 21 Oct 2023 20:05 )    Color: x / Appearance: x / SG: x / pH: x  Gluc: 167 mg/dL / Ketone: x  / Bili: x / Urobili: x   Blood: x / Protein: x / Nitrite: x   Leuk Esterase: x / RBC: x / WBC x   Sq Epi: x / Non Sq Epi: x / Bacteria: x        PHYSICAL EXAM:  General; Awake and alert, Oriented x 3  Spine: TTP by spinous process diffusely at thoracic and lumbar spine, no other TTP or bony step offs.    Rectal Exam:  No passive/Active rectal tone  + Saddle anesthesia     Motor exam:     Motor:                   C5                C6              C7               C8           T1   R            5/5                5/5            5/5             5/5          5/5  L             5/5               5/5             5/5             5/5          5/5                L2             L3             L4               L5            S1  R         1/5           1/5           0/5             0/5           0/5  L          1/5          1/5            0/5             0/5           0/5    Sensory:  Patient is 1/2 sensation from around T6/7 dermatome down distally. Endorses feeling some pressure, but denies feeling full sensation to touch compared to higher dermatomes (2/2 sensation C5-T5). Patient does not feel temperature or pain below T6/7 dermatomes.    Positive clonus of R foot  Negative Babinski b/l  Negative Llamas b/l    Secondary  Superficial abrasion by R knee. Ecchymoses by lower back. No erythema/ecchymosis. No other injuries or complaints. Negative logroll/heelstrike BL.     Imaging:  CT C/T/Lsp:  1. Osseous metastatic disease is visualized at C7, T1, T2 and T3 and in   the left second rib.  2. Acute, mildly displaced pathologic fractures of the C7 and T1 spinous   processes.  Acute, nondisplaced pathologic fracture in the T1 lamina   bilaterally.  3. Age-indeterminate, minimal pathologic compression fractures in   superior endplates of T2 and T3.  4.  Dorsal epidural tumor extension at T1 causes at least moderate spinal   canal stenosis.  5.  Mild cervical spine degenerative changes.  No clinically significant   cervical spinal canal stenosis.  Moderate to severe neural foraminal   narrowing at C3-C4 on the left.  6. nondisplaced versus minimally displaced fractures in the L2 transverse processes bilaterally.                                            A/P :   Patient is a 73yMale w/ likely partial spinal cord compression    -Attempted to obtain emergent MRI of C/T/Lsp with and without contrast however patient was only able to tolerate MR of the cervical and thoracic spine without contrast. Could no tolerate the remainder of the study and stated that "I am not feeling well and if I can continue the MRI I will feel like I will die." Explained to the patient the importance of obtaining the MRI given that he likely he has a spinal cord injury and that MRI will help us diagnose and guide management better than other imaging modalities available. Also offered patient relaxants and any other medications that could help tolerate the procedure. Patient understood above and was adamant on not proceeding with MRI. Patient refused to take medications and would be agreeable to continue the scans another day.    -Final plan pending complete MRI imaging  -NPO except medications until final plan is formed  -Hold chemical DVT ppx at this time  -NWB  -Mestatic workup: CT chest/abd/pelv, LDH/ESR/CRP/PSA/SPEP/UPEP/UA  -FU bone scan  -No heavy lifting/twisting  -Multimodal analgesia - recommend low dose opioids, acetaminophen, muscle relaxant as tolerated  -Medical recommendations appreciated    -Imaging and clinical presentation discussed w/ Dr. Cooper, who is aware and agrees w/ above plan.    .

## 2023-10-22 NOTE — H&P ADULT - NSHPLABSRESULTS_GEN_ALL_CORE
14.0   9.80  )-----------( 244      ( 21 Oct 2023 20:05 )             42.4     10-21    139  |  107  |  38<H>  ----------------------------<  167<H>  3.9   |  23  |  1.51<H>    Ca    8.8      21 Oct 2023 20:05    TPro  8.2  /  Alb  4.2  /  TBili  1.5<H>  /  DBili  x   /  AST  369<H>  /  ALT  148<H>  /  AlkPhos  332<H>  10    PT/INR - ( 21 Oct 2023 22:50 )   PT: 11.7 sec;   INR: 0.97 ratio         PTT - ( 21 Oct 2023 22:50 )  PTT:25.8 sec  Urinalysis Basic - ( 22 Oct 2023 03:20 )    Color: Yellow / Appearance: Clear / S.020 / pH: x  Gluc: x / Ketone: Trace  / Bili: Negative / Urobili: Negative mg/dL   Blood: x / Protein: 30 mg/dL / Nitrite: Negative   Leuk Esterase: Negative / RBC: 0-2 /HPF / WBC 0-2   Sq Epi: x / Non Sq Epi: x / Bacteria: Few

## 2023-10-22 NOTE — CHART NOTE - NSCHARTNOTEFT_GEN_A_CORE
seen and examined.   cont current pain control and iv solumedrol  ativan x 2 mg iv to help with MRI spine per ortho  mass rectosigmoid area. no colonoscopy done in the past. follow CT chest.   neuro checks  GI consult in am.

## 2023-10-22 NOTE — CHART NOTE - NSCHARTNOTEFT_GEN_A_CORE
Imaging reviewed with Dr. cooper, ortho spine attending.   Discussed with Dr. Cooper who is recommending non-operative management at this time.  Pt needs goals of care discussion  [facility] palliative care consult v hospice care consult   Poor prognosis given extensive metastatic disease   follow heme/onc recs   Risks outweigh benefits at this time  Dr. cooper agrees with above plan   No planned orthopedic intervention at this time         Attached below are MR imaging results from today       ACC: [id number] EXAM:  MR SPINE LUMBAR WAW IC   ORDERED BY: BINDU GARDNER    MR SPINE THORACIC WAW IC    PROCEDURE DATE:  10/22/2023          INTERPRETATION:  Clinical indication: Suspected cord compression    MRI of the thoracic spine was performed using axial T1 and T2-weighted   sequence. The patient was injected with 9 cc gadovist IV and sagittal   T1-weighted performed with fat suppression. Axial T1-weighted sequences   were performed. Following injection of approximately 9 cc of gadovist IV   with 1 cc of contrast discarded.    This exam is compared with prior MRI of the thoracic spine performed on   October 21, 2021.    Abnormal lesions are seen involving the T1, T2, T3, T4 and T12 vertebral   bodies as well as suspected involvement of the posterior elements of T1   T2 T3 and T12 levels as well. These findings are likely compatible with   underlying metastasis. There is epidural extension of tumor is seen on   the left side at the T2 level which causes effacement of the ventral   thecal sac. Epidural extension of tumor seen T4 level which causes   effacement ventral thecal sac and spinal cord and left side. Involvement   of the left T4-5 neural foramen is seen as well.    Epidural extension is seen at the T5 level without significant, as the   spinal canal or neural foramen    The spinal cord demonstrate normal signal.    Evaluation of the paraspinal soft tissues demonstrate no abnormal areas   of enhancement seen.    IMPRESSION: Multiple osseous lesions compatible with metastasis. Some of   these lesions do demonstrate associated areas of abnormal enhancement    There is epidural extension of tumor described above.    MRI of the lumbar spine was performed sagittal T1-T2 and STIR sequences.   Axial T1 and T2-weighted sequences were performed. Patient was injected   with 9 cc gadovist IV with 1 cc of contrast discarded. Sagittal T1   sequence was performed with fat suppression. Axial T1-weighted sequence   was performed.    Loss of the normal lumbar lordosis.    Abnormal T1 prolongation seen involving the L3, L5 and right S1 vertebral   bodies. Abnormal lesions are seen involving both iliac bones. These   findings correspond sclerotic lesion seen on prior CT scan of the lumbar   spine performed on October 21, 2023    The lesion likely compatible with underlying metastasis. No epidural or   paraspinal extension of tumor is seen    Loss normal lumbar lordosis    Disc desiccation is seen involving the C3-4 L4-5 and L5-S1 levels   secondary to chronic    L1-2: Normal    L2-3: Normal    L3-4: Disc bulge and bilateral hypertrophic facet joint changes seen.   Mild to moderate narrowing of the right neural foramen    L4-5: Disc bulge and bilateral hypertrophic facet joint changes. Mild to   moderate narrowing of the spinal canal. Mild to moderate narrowing of   both neural foramen    L5-S1: Bilateral hypertrophic facet joint changes seen without   significant compromise of the spinal canal or either neural foramen    Evaluation of the paraspinal soft tissues appear normal.    IMPRESSION: Abnormal lesions as described above.    --- End of Report ---            ROSIE MCKEON MD; Attending Radiologist  This document has been electronically signed. Oct 22 2023  1:41PM        ACC: [id number] EXAM:  MR SPINE CERVICAL IC   ORDERED BY: IMTIAZ POPE     PROCEDURE DATE:  10/22/2023          INTERPRETATION:  Clinical indication: spinal cord compression    The patient was injected with approximately 9 cc gadovist IV 1 cc   contrast discarded. Sagittal T1-weighted sequence was attempted.    The submitted images is limited by motion and is not of diagnostic   quality. The rest of the study was terminated    IMPRESSION: Limited nondiagnostic MRI of cervical spine.    --- End of Report ---            ROSIE MCKEON MD; Attending Radiologist  This document has been electronically signed. Oct 22 2023  1:18PM Imaging reviewed with Dr. cooper, ortho spine attending.   Discussed with Dr. Cooper who is recommending non-operative management at this time.  Pt needs goals of care discussion  [facility] palliative care consult v hospice care consult   Poor prognosis given extensive metastatic disease   follow heme/onc recs   Risks outweigh benefits at this time  Dr. cooper agrees with above plan   No planned orthopedic intervention at this time         Attached below are MR imaging results from today       ACC: [id number] EXAM:  MR SPINE LUMBAR WAW IC   ORDERED BY: BINDU GARDNER    MR SPINE THORACIC WAW IC    PROCEDURE DATE:  10/22/2023          INTERPRETATION:  Clinical indication: Suspected cord compression    MRI of the thoracic spine was performed using axial T1 and T2-weighted   sequence. The patient was injected with 9 cc gadovist IV and sagittal   T1-weighted performed with fat suppression. Axial T1-weighted sequences   were performed. Following injection of approximately 9 cc of gadovist IV   with 1 cc of contrast discarded.    This exam is compared with prior MRI of the thoracic spine performed on   October 21, 2021.    Abnormal lesions are seen involving the T1, T2, T3, T4 and T12 vertebral   bodies as well as suspected involvement of the posterior elements of T1   T2 T3 and T12 levels as well. These findings are likely compatible with   underlying metastasis. There is epidural extension of tumor is seen on   the left side at the T2 level which causes effacement of the ventral   thecal sac. Epidural extension of tumor seen T4 level which causes   effacement ventral thecal sac and spinal cord and left side. Involvement   of the left T4-5 neural foramen is seen as well.    Epidural extension is seen at the T5 level without significant, as the   spinal canal or neural foramen    The spinal cord demonstrate normal signal.    Evaluation of the paraspinal soft tissues demonstrate no abnormal areas   of enhancement seen.    IMPRESSION: Multiple osseous lesions compatible with metastasis. Some of   these lesions do demonstrate associated areas of abnormal enhancement    There is epidural extension of tumor described above.    MRI of the lumbar spine was performed sagittal T1-T2 and STIR sequences.   Axial T1 and T2-weighted sequences were performed. Patient was injected   with 9 cc gadovist IV with 1 cc of contrast discarded. Sagittal T1   sequence was performed with fat suppression. Axial T1-weighted sequence   was performed.    Loss of the normal lumbar lordosis.    Abnormal T1 prolongation seen involving the L3, L5 and right S1 vertebral   bodies. Abnormal lesions are seen involving both iliac bones. These   findings correspond sclerotic lesion seen on prior CT scan of the lumbar   spine performed on October 21, 2023    The lesion likely compatible with underlying metastasis. No epidural or   paraspinal extension of tumor is seen    Loss normal lumbar lordosis    Disc desiccation is seen involving the C3-4 L4-5 and L5-S1 levels   secondary to chronic    L1-2: Normal    L2-3: Normal    L3-4: Disc bulge and bilateral hypertrophic facet joint changes seen.   Mild to moderate narrowing of the right neural foramen    L4-5: Disc bulge and bilateral hypertrophic facet joint changes. Mild to   moderate narrowing of the spinal canal. Mild to moderate narrowing of   both neural foramen    L5-S1: Bilateral hypertrophic facet joint changes seen without   significant compromise of the spinal canal or either neural foramen    Evaluation of the paraspinal soft tissues appear normal.    IMPRESSION: Abnormal lesions as described above.    --- End of Report ---            ROSIE MCKEON MD; Attending Radiologist  This document has been electronically signed. Oct 22 2023  1:41PM        ACC: [id number] EXAM:  MR SPINE CERVICAL IC   ORDERED BY: IMTIAZ POPE     PROCEDURE DATE:  10/22/2023          INTERPRETATION:  Clinical indication: spinal cord compression    The patient was injected with approximately 9 cc gadovist IV 1 cc   contrast discarded. Sagittal T1-weighted sequence was attempted.    The submitted images is limited by motion and is not of diagnostic   quality. The rest of the study was terminated    IMPRESSION: Limited nondiagnostic MRI of cervical spine.    --- End of Report ---            ROSIE MCKEON MD; Attending Radiologist  This document has been electronically signed. Oct 22 2023  1:18PM      ------------  Reviewed case with patient. Reports multiple falls over the course of 10-12 days prior to presentation, and LE weakness throughout that time.   MRI with moderate central stenosis C7-T1 with tumor burden in dorsal epidural space.   Given delayed presentation, burden of metastatic disease, and concurrent requirement for anticoagulation, surgery is not indicated.   Patient's strength has improved since presentation with 1/5 in quads and GS. Continued to monitor.   Rec aggressive PT.     Tomas Cooper MD, MS

## 2023-10-23 DIAGNOSIS — M54.9 DORSALGIA, UNSPECIFIED: ICD-10-CM

## 2023-10-23 DIAGNOSIS — I82.409 ACUTE EMBOLISM AND THROMBOSIS OF UNSPECIFIED DEEP VEINS OF UNSPECIFIED LOWER EXTREMITY: ICD-10-CM

## 2023-10-23 DIAGNOSIS — C61 MALIGNANT NEOPLASM OF PROSTATE: ICD-10-CM

## 2023-10-23 DIAGNOSIS — K59.00 CONSTIPATION, UNSPECIFIED: ICD-10-CM

## 2023-10-23 DIAGNOSIS — Z51.5 ENCOUNTER FOR PALLIATIVE CARE: ICD-10-CM

## 2023-10-23 LAB
A1C WITH ESTIMATED AVERAGE GLUCOSE RESULT: 5.2 % — SIGNIFICANT CHANGE UP (ref 4–5.6)
A1C WITH ESTIMATED AVERAGE GLUCOSE RESULT: 5.2 % — SIGNIFICANT CHANGE UP (ref 4–5.6)
ALBUMIN SERPL ELPH-MCNC: 3 G/DL — LOW (ref 3.3–5)
ALBUMIN SERPL ELPH-MCNC: 3 G/DL — LOW (ref 3.3–5)
ALP SERPL-CCNC: 243 U/L — HIGH (ref 40–120)
ALP SERPL-CCNC: 243 U/L — HIGH (ref 40–120)
ALT FLD-CCNC: 103 U/L — HIGH (ref 12–78)
ALT FLD-CCNC: 103 U/L — HIGH (ref 12–78)
ANION GAP SERPL CALC-SCNC: 5 MMOL/L — SIGNIFICANT CHANGE UP (ref 5–17)
ANION GAP SERPL CALC-SCNC: 5 MMOL/L — SIGNIFICANT CHANGE UP (ref 5–17)
APTT BLD: 52.9 SEC — HIGH (ref 24.5–35.6)
APTT BLD: 52.9 SEC — HIGH (ref 24.5–35.6)
APTT BLD: 75.9 SEC — HIGH (ref 24.5–35.6)
APTT BLD: 75.9 SEC — HIGH (ref 24.5–35.6)
APTT BLD: 87.5 SEC — HIGH (ref 24.5–35.6)
APTT BLD: 87.5 SEC — HIGH (ref 24.5–35.6)
APTT BLD: 99.8 SEC — HIGH (ref 24.5–35.6)
APTT BLD: 99.8 SEC — HIGH (ref 24.5–35.6)
AST SERPL-CCNC: 128 U/L — HIGH (ref 15–37)
AST SERPL-CCNC: 128 U/L — HIGH (ref 15–37)
BILIRUB SERPL-MCNC: 0.9 MG/DL — SIGNIFICANT CHANGE UP (ref 0.2–1.2)
BILIRUB SERPL-MCNC: 0.9 MG/DL — SIGNIFICANT CHANGE UP (ref 0.2–1.2)
BUN SERPL-MCNC: 30 MG/DL — HIGH (ref 7–23)
BUN SERPL-MCNC: 30 MG/DL — HIGH (ref 7–23)
CALCIUM SERPL-MCNC: 8.1 MG/DL — LOW (ref 8.5–10.1)
CALCIUM SERPL-MCNC: 8.1 MG/DL — LOW (ref 8.5–10.1)
CHLORIDE SERPL-SCNC: 110 MMOL/L — HIGH (ref 96–108)
CHLORIDE SERPL-SCNC: 110 MMOL/L — HIGH (ref 96–108)
CK SERPL-CCNC: 3089 U/L — HIGH (ref 26–308)
CK SERPL-CCNC: 3089 U/L — HIGH (ref 26–308)
CO2 SERPL-SCNC: 25 MMOL/L — SIGNIFICANT CHANGE UP (ref 22–31)
CO2 SERPL-SCNC: 25 MMOL/L — SIGNIFICANT CHANGE UP (ref 22–31)
CREAT SERPL-MCNC: 1.01 MG/DL — SIGNIFICANT CHANGE UP (ref 0.5–1.3)
CREAT SERPL-MCNC: 1.01 MG/DL — SIGNIFICANT CHANGE UP (ref 0.5–1.3)
CREATININE, URINE RESULT: 69 MG/DL — SIGNIFICANT CHANGE UP
CREATININE, URINE RESULT: 69 MG/DL — SIGNIFICANT CHANGE UP
EGFR: 80 ML/MIN/1.73M2 — SIGNIFICANT CHANGE UP
EGFR: 80 ML/MIN/1.73M2 — SIGNIFICANT CHANGE UP
ESTIMATED AVERAGE GLUCOSE: 103 MG/DL — SIGNIFICANT CHANGE UP (ref 68–114)
ESTIMATED AVERAGE GLUCOSE: 103 MG/DL — SIGNIFICANT CHANGE UP (ref 68–114)
GLUCOSE BLDC GLUCOMTR-MCNC: 183 MG/DL — HIGH (ref 70–99)
GLUCOSE BLDC GLUCOMTR-MCNC: 183 MG/DL — HIGH (ref 70–99)
GLUCOSE BLDC GLUCOMTR-MCNC: 192 MG/DL — HIGH (ref 70–99)
GLUCOSE BLDC GLUCOMTR-MCNC: 250 MG/DL — HIGH (ref 70–99)
GLUCOSE BLDC GLUCOMTR-MCNC: 250 MG/DL — HIGH (ref 70–99)
GLUCOSE SERPL-MCNC: 194 MG/DL — HIGH (ref 70–99)
GLUCOSE SERPL-MCNC: 194 MG/DL — HIGH (ref 70–99)
HCT VFR BLD CALC: 35.1 % — LOW (ref 39–50)
HCT VFR BLD CALC: 35.1 % — LOW (ref 39–50)
HGB BLD-MCNC: 11.9 G/DL — LOW (ref 13–17)
HGB BLD-MCNC: 11.9 G/DL — LOW (ref 13–17)
MAGNESIUM SERPL-MCNC: 2.4 MG/DL — SIGNIFICANT CHANGE UP (ref 1.6–2.6)
MAGNESIUM SERPL-MCNC: 2.4 MG/DL — SIGNIFICANT CHANGE UP (ref 1.6–2.6)
MCHC RBC-ENTMCNC: 30.7 PG — SIGNIFICANT CHANGE UP (ref 27–34)
MCHC RBC-ENTMCNC: 30.7 PG — SIGNIFICANT CHANGE UP (ref 27–34)
MCHC RBC-ENTMCNC: 33.9 G/DL — SIGNIFICANT CHANGE UP (ref 32–36)
MCHC RBC-ENTMCNC: 33.9 G/DL — SIGNIFICANT CHANGE UP (ref 32–36)
MCV RBC AUTO: 90.7 FL — SIGNIFICANT CHANGE UP (ref 80–100)
MCV RBC AUTO: 90.7 FL — SIGNIFICANT CHANGE UP (ref 80–100)
NRBC # BLD: 0 /100 WBCS — SIGNIFICANT CHANGE UP (ref 0–0)
NRBC # BLD: 0 /100 WBCS — SIGNIFICANT CHANGE UP (ref 0–0)
PHOSPHATE SERPL-MCNC: 2.3 MG/DL — LOW (ref 2.5–4.5)
PHOSPHATE SERPL-MCNC: 2.3 MG/DL — LOW (ref 2.5–4.5)
PLATELET # BLD AUTO: 169 K/UL — SIGNIFICANT CHANGE UP (ref 150–400)
PLATELET # BLD AUTO: 169 K/UL — SIGNIFICANT CHANGE UP (ref 150–400)
POTASSIUM SERPL-MCNC: 3.7 MMOL/L — SIGNIFICANT CHANGE UP (ref 3.5–5.3)
POTASSIUM SERPL-MCNC: 3.7 MMOL/L — SIGNIFICANT CHANGE UP (ref 3.5–5.3)
POTASSIUM SERPL-SCNC: 3.7 MMOL/L — SIGNIFICANT CHANGE UP (ref 3.5–5.3)
POTASSIUM SERPL-SCNC: 3.7 MMOL/L — SIGNIFICANT CHANGE UP (ref 3.5–5.3)
PROT ?TM UR-MCNC: 12 MG/DL — SIGNIFICANT CHANGE UP (ref 0–12)
PROT ?TM UR-MCNC: 12 MG/DL — SIGNIFICANT CHANGE UP (ref 0–12)
PROT SERPL-MCNC: 6.4 GM/DL — SIGNIFICANT CHANGE UP (ref 6–8.3)
PROT SERPL-MCNC: 6.4 GM/DL — SIGNIFICANT CHANGE UP (ref 6–8.3)
RBC # BLD: 3.87 M/UL — LOW (ref 4.2–5.8)
RBC # BLD: 3.87 M/UL — LOW (ref 4.2–5.8)
RBC # FLD: 12.3 % — SIGNIFICANT CHANGE UP (ref 10.3–14.5)
RBC # FLD: 12.3 % — SIGNIFICANT CHANGE UP (ref 10.3–14.5)
SODIUM SERPL-SCNC: 140 MMOL/L — SIGNIFICANT CHANGE UP (ref 135–145)
SODIUM SERPL-SCNC: 140 MMOL/L — SIGNIFICANT CHANGE UP (ref 135–145)
WBC # BLD: 8.53 K/UL — SIGNIFICANT CHANGE UP (ref 3.8–10.5)
WBC # BLD: 8.53 K/UL — SIGNIFICANT CHANGE UP (ref 3.8–10.5)
WBC # FLD AUTO: 8.53 K/UL — SIGNIFICANT CHANGE UP (ref 3.8–10.5)
WBC # FLD AUTO: 8.53 K/UL — SIGNIFICANT CHANGE UP (ref 3.8–10.5)

## 2023-10-23 PROCEDURE — 99497 ADVNCD CARE PLAN 30 MIN: CPT | Mod: 25

## 2023-10-23 PROCEDURE — 99233 SBSQ HOSP IP/OBS HIGH 50: CPT

## 2023-10-23 PROCEDURE — 99223 1ST HOSP IP/OBS HIGH 75: CPT

## 2023-10-23 PROCEDURE — 78306 BONE IMAGING WHOLE BODY: CPT | Mod: 26

## 2023-10-23 RX ORDER — SODIUM,POTASSIUM PHOSPHATES 278-250MG
1 POWDER IN PACKET (EA) ORAL ONCE
Refills: 0 | Status: COMPLETED | OUTPATIENT
Start: 2023-10-23 | End: 2023-10-23

## 2023-10-23 RX ORDER — OXYCODONE HYDROCHLORIDE 5 MG/1
5 TABLET ORAL EVERY 4 HOURS
Refills: 0 | Status: DISCONTINUED | OUTPATIENT
Start: 2023-10-23 | End: 2023-10-27

## 2023-10-23 RX ORDER — OXYCODONE HYDROCHLORIDE 5 MG/1
2.5 TABLET ORAL EVERY 4 HOURS
Refills: 0 | Status: DISCONTINUED | OUTPATIENT
Start: 2023-10-23 | End: 2023-10-27

## 2023-10-23 RX ORDER — SENNA PLUS 8.6 MG/1
2 TABLET ORAL AT BEDTIME
Refills: 0 | Status: DISCONTINUED | OUTPATIENT
Start: 2023-10-23 | End: 2023-10-27

## 2023-10-23 RX ORDER — POLYETHYLENE GLYCOL 3350 17 G/17G
17 POWDER, FOR SOLUTION ORAL DAILY
Refills: 0 | Status: DISCONTINUED | OUTPATIENT
Start: 2023-10-23 | End: 2023-10-27

## 2023-10-23 RX ADMIN — Medication 40 MILLIGRAM(S): at 21:22

## 2023-10-23 RX ADMIN — AMLODIPINE BESYLATE 10 MILLIGRAM(S): 2.5 TABLET ORAL at 05:32

## 2023-10-23 RX ADMIN — ATORVASTATIN CALCIUM 20 MILLIGRAM(S): 80 TABLET, FILM COATED ORAL at 21:23

## 2023-10-23 RX ADMIN — Medication 40 MILLIGRAM(S): at 05:33

## 2023-10-23 RX ADMIN — SODIUM CHLORIDE 100 MILLILITER(S): 9 INJECTION, SOLUTION INTRAVENOUS at 02:08

## 2023-10-23 RX ADMIN — Medication 2: at 08:45

## 2023-10-23 RX ADMIN — HEPARIN SODIUM 1400 UNIT(S)/HR: 5000 INJECTION INTRAVENOUS; SUBCUTANEOUS at 00:48

## 2023-10-23 RX ADMIN — HEPARIN SODIUM 1600 UNIT(S)/HR: 5000 INJECTION INTRAVENOUS; SUBCUTANEOUS at 19:29

## 2023-10-23 RX ADMIN — Medication 1 PACKET(S): at 23:54

## 2023-10-23 RX ADMIN — Medication 40 MILLIGRAM(S): at 15:24

## 2023-10-23 RX ADMIN — INSULIN GLARGINE 15 UNIT(S): 100 INJECTION, SOLUTION SUBCUTANEOUS at 08:44

## 2023-10-23 RX ADMIN — Medication 2: at 16:01

## 2023-10-23 RX ADMIN — SENNA PLUS 2 TABLET(S): 8.6 TABLET ORAL at 21:23

## 2023-10-23 RX ADMIN — PANTOPRAZOLE SODIUM 40 MILLIGRAM(S): 20 TABLET, DELAYED RELEASE ORAL at 12:23

## 2023-10-23 RX ADMIN — HEPARIN SODIUM 1400 UNIT(S)/HR: 5000 INJECTION INTRAVENOUS; SUBCUTANEOUS at 07:37

## 2023-10-23 RX ADMIN — HEPARIN SODIUM 1400 UNIT(S)/HR: 5000 INJECTION INTRAVENOUS; SUBCUTANEOUS at 03:13

## 2023-10-23 RX ADMIN — Medication 4: at 12:21

## 2023-10-23 RX ADMIN — HEPARIN SODIUM 1600 UNIT(S)/HR: 5000 INJECTION INTRAVENOUS; SUBCUTANEOUS at 22:24

## 2023-10-23 RX ADMIN — HEPARIN SODIUM 1400 UNIT(S)/HR: 5000 INJECTION INTRAVENOUS; SUBCUTANEOUS at 07:39

## 2023-10-23 RX ADMIN — Medication 81 MILLIGRAM(S): at 12:22

## 2023-10-23 RX ADMIN — HEPARIN SODIUM 1600 UNIT(S)/HR: 5000 INJECTION INTRAVENOUS; SUBCUTANEOUS at 22:57

## 2023-10-23 RX ADMIN — HEPARIN SODIUM 1600 UNIT(S)/HR: 5000 INJECTION INTRAVENOUS; SUBCUTANEOUS at 15:19

## 2023-10-23 NOTE — PROGRESS NOTE ADULT - CONVERSATION DETAILS
Discussed about poor prognosis with metastatic disease. discussed about code status. discussed about treatment options vs palliative care. consulted oncology, palliative care.   Patient and Family would like to discuss further to finalized GOC

## 2023-10-23 NOTE — CONSULT NOTE ADULT - CONVERSATION DETAILS
Met with patient, wife and 4 children at bedside, current condition, goals of care discussed in [place]. patient reports he was walking and independent up until approx 1 wk ago, began to feel weakness, numbness, s/p fall [place]. Reports he had previously seen urology at [facility], was being monitored, had declined radiation for his prostate CA. He had been recommended for PET CT by his internist during last visit 9/23, however was going [place] so it was held off. He stated that his goal is to try to get stronger and be able to walk again. He is open to treatment options including surgery if necessary.     Discussed HCP, he wishes to designate his son Juan Jose Salinas as primary  and daughter Jamee Salinas  as alternate, although they all discuss as a family. Wife in agreement. HCP form filled and placed in chart.

## 2023-10-23 NOTE — PROGRESS NOTE ADULT - SUBJECTIVE AND OBJECTIVE BOX
CHIEF COMPLAINT: FU of probable metastatic pancreatic cancer   painless jaundice.   poor oral intake  no fever  no n/v/d  no chest pain or sob       PHYSICAL EXAM:    GENERAL: thinly built, no acute distress   CHEST/LUNG:  No wheezing, no crackles   HEART: Regular rate and rhythm; No murmurs  ABDOMEN: Soft, Nontender, Nondistended; Bowel sounds present  EXTREMITIES:  No clubbing, cyanosis, or edema  Psychiatry: AAO x 3, mood is appropriate       OBJECTIVE DATA:   Vital Signs Last 24 Hrs  T(C): 36.5 (23 Oct 2023 10:58), Max: 36.5 (23 Oct 2023 10:58)  T(F): 97.7 (23 Oct 2023 10:58), Max: 97.7 (23 Oct 2023 10:58)  HR: 95 (23 Oct 2023 10:58) (72 - 95)  BP: 130/71 (23 Oct 2023 10:58) (112/72 - 130/71)  BP(mean): --  RR: 17 (23 Oct 2023 10:58) (17 - 17)  SpO2: 94% (23 Oct 2023 10:58) (94% - 94%)    Parameters below as of 23 Oct 2023 10:58  Patient On (Oxygen Delivery Method): room air               Daily     Daily Weight in k (22 Oct 2023 23:52)  LABS:                        11.9   8.53  )-----------( 169      ( 23 Oct 2023 06:20 )             35.1             10-23    140  |  110<H>  |  30<H>  ----------------------------<  194<H>  3.7   |  25  |  1.01    Ca    8.1<L>      23 Oct 2023 06:20  Phos  2.3     10-23  Mg     2.4     10-23    TPro  6.4  /  Alb  3.0<L>  /  TBili  0.9  /  DBili  x   /  AST  128<H>  /  ALT  103<H>  /  AlkPhos  243<H>  10-23              PT/INR - ( 22 Oct 2023 07:49 )   PT: 12.7 sec;   INR: 1.07 ratio         PTT - ( 23 Oct 2023 06:20 )  PTT:87.5 sec  Urinalysis Basic - ( 23 Oct 2023 06:20 )    Color: x / Appearance: x / SG: x / pH: x  Gluc: 194 mg/dL / Ketone: x  / Bili: x / Urobili: x   Blood: x / Protein: x / Nitrite: x   Leuk Esterase: x / RBC: x / WBC x   Sq Epi: x / Non Sq Epi: x / Bacteria: x        CARDIAC MARKERS ( 23 Oct 2023 06:20 )  x     / x     / 3089 U/L / x     / x      CARDIAC MARKERS ( 21 Oct 2023 20:05 )  x     / x     / 04522 U/L / x     / x          CAPILLARY BLOOD GLUCOSE      POCT Blood Glucose.: 250 mg/dL (23 Oct 2023 11:31)         Interval Radiology studies: reviewed by me    < from: MR Thoracic Spine w/ IV Cont (10.22.23 @ 12:21) >  Multiple osseous lesions compatible with metastasis. Some of   these lesions do demonstrate associated areas of abnormal enhancement    There is epidural extension of tumor described above.    MRI of the lumbar spine was performed sagittal T1-T2 and STIR sequences.   Axial T1 and T2-weighted sequences were performed. Patientwas injected   with 9 cc gadovist IV with 1 cc of contrast discarded. Sagittal T1   sequence was performed with fat suppression. Axial T1-weighted sequence   was performed.    Loss of the normal lumbar lordosis.    Abnormal T1 prolongation seen involving the L3, L5 and right S1 vertebral   bodies. Abnormal lesions are seen involving both iliac bones. These   findings correspond sclerotic lesion seen on prior CT scan of the lumbar   spine performed on 2023    The lesion likely compatible with underlying metastasis. No epidural or   paraspinal extension of tumor is seen    Loss normal lumbar lordosis    Disc desiccation is seen involving the C3-4 L4-5 and L5-S1 levels   secondary to chronic    L1-2: Normal    L2-3: Normal    L3-4: Disc bulge and bilateral hypertrophic facet joint changes seen.   Mild to moderate narrowing of the right neural foramen    L4-5: Disc bulge and bilateral hypertrophic facet joint changes. Mild to   moderate narrowing of the spinal canal. Mild to moderate narrowing of   both neural foramen    L5-S1: Bilateral hypertrophic facet joint changes seen without   significant compromise of the spinal canal or either neural foramen    Evaluation of the paraspinal soft tissues appear normal.    < end of copied text >      MEDICATIONS  (STANDING):  amLODIPine   Tablet 10 milliGRAM(s) Oral daily  aspirin  chewable 81 milliGRAM(s) Oral daily  atorvastatin 20 milliGRAM(s) Oral at bedtime  dextrose 5%. 1000 milliLiter(s) (50 mL/Hr) IV Continuous <Continuous>  dextrose 5%. 1000 milliLiter(s) (100 mL/Hr) IV Continuous <Continuous>  dextrose 50% Injectable 25 Gram(s) IV Push once  dextrose 50% Injectable 25 Gram(s) IV Push once  dextrose 50% Injectable 12.5 Gram(s) IV Push once  glucagon  Injectable 1 milliGRAM(s) IntraMuscular once  heparin   Injectable 7000 Unit(s) IV Push once  heparin  Infusion.  Unit(s)/Hr (16 mL/Hr) IV Continuous <Continuous>  influenza  Vaccine (HIGH DOSE) 0.7 milliLiter(s) IntraMuscular once  insulin glargine Injectable (LANTUS) 15 Unit(s) SubCutaneous every morning  insulin lispro (ADMELOG) corrective regimen sliding scale   SubCutaneous three times a day before meals  lactated ringers. 1000 milliLiter(s) (100 mL/Hr) IV Continuous <Continuous>  methylPREDNISolone sodium succinate Injectable 40 milliGRAM(s) IV Push every 8 hours  pantoprazole    Tablet 40 milliGRAM(s) Oral before breakfast  potassium phosphate / sodium phosphate Powder (PHOS-NaK) 1 Packet(s) Oral once    MEDICATIONS  (PRN):  acetaminophen     Tablet .. 650 milliGRAM(s) Oral every 6 hours PRN Temp greater or equal to 38C (100.4F), Mild Pain (1 - 3)  aluminum hydroxide/magnesium hydroxide/simethicone Suspension 30 milliLiter(s) Oral every 4 hours PRN Dyspepsia  dextrose Oral Gel 15 Gram(s) Oral once PRN Blood Glucose LESS THAN 70 milliGRAM(s)/deciliter  heparin   Injectable 3500 Unit(s) IV Push every 6 hours PRN For aPTT between 40 - 57  heparin   Injectable 7000 Unit(s) IV Push every 6 hours PRN For aPTT less than 40  melatonin 3 milliGRAM(s) Oral at bedtime PRN Insomnia  morphine  - Injectable 4 milliGRAM(s) IV Push every 4 hours PRN Severe Pain (7 - 10)  ondansetron Injectable 4 milliGRAM(s) IV Push every 8 hours PRN Nausea and/or Vomiting       CHIEF COMPLAINT: FU of probable prostate cancer with metastatic disease  back pain + and paraesthesia legs   poor oral intake  no fever  no n/v/d  no chest pain or sob       PHYSICAL EXAM:    GENERAL: thinly built, no acute distress   CHEST/LUNG:  No wheezing, no crackles   HEART: Regular rate and rhythm; No murmurs  ABDOMEN: Soft, Nontender, Nondistended; Bowel sounds present  EXTREMITIES:  No clubbing, cyanosis, or edema  Psychiatry: AAO x 3, mood is appropriate       OBJECTIVE DATA:   Vital Signs Last 24 Hrs  T(C): 36.5 (23 Oct 2023 10:58), Max: 36.5 (23 Oct 2023 10:58)  T(F): 97.7 (23 Oct 2023 10:58), Max: 97.7 (23 Oct 2023 10:58)  HR: 95 (23 Oct 2023 10:58) (72 - 95)  BP: 130/71 (23 Oct 2023 10:58) (112/72 - 130/71)  BP(mean): --  RR: 17 (23 Oct 2023 10:58) (17 - 17)  SpO2: 94% (23 Oct 2023 10:58) (94% - 94%)    Parameters below as of 23 Oct 2023 10:58  Patient On (Oxygen Delivery Method): room air               Daily     Daily Weight in k (22 Oct 2023 23:52)  LABS:                        11.9   8.53  )-----------( 169      ( 23 Oct 2023 06:20 )             35.1             10-23    140  |  110<H>  |  30<H>  ----------------------------<  194<H>  3.7   |  25  |  1.01    Ca    8.1<L>      23 Oct 2023 06:20  Phos  2.3     10-23  Mg     2.4     10-23    TPro  6.4  /  Alb  3.0<L>  /  TBili  0.9  /  DBili  x   /  AST  128<H>  /  ALT  103<H>  /  AlkPhos  243<H>  10-23              PT/INR - ( 22 Oct 2023 07:49 )   PT: 12.7 sec;   INR: 1.07 ratio         PTT - ( 23 Oct 2023 06:20 )  PTT:87.5 sec  Urinalysis Basic - ( 23 Oct 2023 06:20 )    Color: x / Appearance: x / SG: x / pH: x  Gluc: 194 mg/dL / Ketone: x  / Bili: x / Urobili: x   Blood: x / Protein: x / Nitrite: x   Leuk Esterase: x / RBC: x / WBC x   Sq Epi: x / Non Sq Epi: x / Bacteria: x        CARDIAC MARKERS ( 23 Oct 2023 06:20 )  x     / x     / 3089 U/L / x     / x      CARDIAC MARKERS ( 21 Oct 2023 20:05 )  x     / x     / 07823 U/L / x     / x          CAPILLARY BLOOD GLUCOSE      POCT Blood Glucose.: 250 mg/dL (23 Oct 2023 11:31)         Interval Radiology studies: reviewed by me    < from: MR Thoracic Spine w/ IV Cont (10.22.23 @ 12:21) >  Multiple osseous lesions compatible with metastasis. Some of   these lesions do demonstrate associated areas of abnormal enhancement    There is epidural extension of tumor described above.    MRI of the lumbar spine was performed sagittal T1-T2 and STIR sequences.   Axial T1 and T2-weighted sequences were performed. Patientwas injected   with 9 cc gadovist IV with 1 cc of contrast discarded. Sagittal T1   sequence was performed with fat suppression. Axial T1-weighted sequence   was performed.    Loss of the normal lumbar lordosis.    Abnormal T1 prolongation seen involving the L3, L5 and right S1 vertebral   bodies. Abnormal lesions are seen involving both iliac bones. These   findings correspond sclerotic lesion seen on prior CT scan of the lumbar   spine performed on 2023    The lesion likely compatible with underlying metastasis. No epidural or   paraspinal extension of tumor is seen    Loss normal lumbar lordosis    Disc desiccation is seen involving the C3-4 L4-5 and L5-S1 levels   secondary to chronic    L1-2: Normal    L2-3: Normal    L3-4: Disc bulge and bilateral hypertrophic facet joint changes seen.   Mild to moderate narrowing of the right neural foramen    L4-5: Disc bulge and bilateral hypertrophic facet joint changes. Mild to   moderate narrowing of the spinal canal. Mild to moderate narrowing of   both neural foramen    L5-S1: Bilateral hypertrophic facet joint changes seen without   significant compromise of the spinal canal or either neural foramen    Evaluation of the paraspinal soft tissues appear normal.    < end of copied text >      MEDICATIONS  (STANDING):  amLODIPine   Tablet 10 milliGRAM(s) Oral daily  aspirin  chewable 81 milliGRAM(s) Oral daily  atorvastatin 20 milliGRAM(s) Oral at bedtime  dextrose 5%. 1000 milliLiter(s) (50 mL/Hr) IV Continuous <Continuous>  dextrose 5%. 1000 milliLiter(s) (100 mL/Hr) IV Continuous <Continuous>  dextrose 50% Injectable 25 Gram(s) IV Push once  dextrose 50% Injectable 25 Gram(s) IV Push once  dextrose 50% Injectable 12.5 Gram(s) IV Push once  glucagon  Injectable 1 milliGRAM(s) IntraMuscular once  heparin   Injectable 7000 Unit(s) IV Push once  heparin  Infusion.  Unit(s)/Hr (16 mL/Hr) IV Continuous <Continuous>  influenza  Vaccine (HIGH DOSE) 0.7 milliLiter(s) IntraMuscular once  insulin glargine Injectable (LANTUS) 15 Unit(s) SubCutaneous every morning  insulin lispro (ADMELOG) corrective regimen sliding scale   SubCutaneous three times a day before meals  lactated ringers. 1000 milliLiter(s) (100 mL/Hr) IV Continuous <Continuous>  methylPREDNISolone sodium succinate Injectable 40 milliGRAM(s) IV Push every 8 hours  pantoprazole    Tablet 40 milliGRAM(s) Oral before breakfast  potassium phosphate / sodium phosphate Powder (PHOS-NaK) 1 Packet(s) Oral once    MEDICATIONS  (PRN):  acetaminophen     Tablet .. 650 milliGRAM(s) Oral every 6 hours PRN Temp greater or equal to 38C (100.4F), Mild Pain (1 - 3)  aluminum hydroxide/magnesium hydroxide/simethicone Suspension 30 milliLiter(s) Oral every 4 hours PRN Dyspepsia  dextrose Oral Gel 15 Gram(s) Oral once PRN Blood Glucose LESS THAN 70 milliGRAM(s)/deciliter  heparin   Injectable 3500 Unit(s) IV Push every 6 hours PRN For aPTT between 40 - 57  heparin   Injectable 7000 Unit(s) IV Push every 6 hours PRN For aPTT less than 40  melatonin 3 milliGRAM(s) Oral at bedtime PRN Insomnia  morphine  - Injectable 4 milliGRAM(s) IV Push every 4 hours PRN Severe Pain (7 - 10)  ondansetron Injectable 4 milliGRAM(s) IV Push every 8 hours PRN Nausea and/or Vomiting

## 2023-10-23 NOTE — CONSULT NOTE ADULT - PROBLEM SELECTOR RECOMMENDATION 9
previously foll by Dr Aquino, last seen 2021, bx showed adenoCA [place] 3-4, declined radiation treatment, recomm for PET CT by his internist on last visit for ongoing back pain 9/23 however held off since pt was going to . . Had possible rectosigmoid lesion vs LAD from prostate CA on previous imaging, current noncon CT shows rectal LAD. onc eval.

## 2023-10-23 NOTE — CONSULT NOTE ADULT - PROBLEM SELECTOR RECOMMENDATION 4
[facility] discussion as above. , has 4 children, named son Juan Jose and daughter Jamee as HCP. Patient interested in treatment options, pt ambulatory up until 1 wk ago. Hx prostate CA, never treated. Onc eval. Ortho f/u. Palliative will follow. Reports last BM several days ago. Start senna qhs, miralax prn.

## 2023-10-23 NOTE — CONSULT NOTE ADULT - PROBLEM SELECTOR RECOMMENDATION 2
with weakness, saddle anesthesia, urinary retention s/p best, with spinal mets likely from prostate CA. MRI findings as above. Concern for spinal cord compression, on steroids.  Ortho following, f/u recs. Ambulatory up until 1 wk ago.     Pain control-   Acetaminophen prn  on steroids  oxycodone IR 2.5 mg po q4h prn mod pain  oxycodone IR 5mg po q4h prn severe pain  bowel regimen on opoids with progressive weakness, saddle anesthesia, urinary retention s/p best, with spinal mets likely from prostate CA. MRI findings as above. Concern for spinal cord compression, on steroids.  Ortho following, f/u recs. Ambulatory up until 1 wk ago.     Pain control-   Acetaminophen prn  on steroids  oxycodone IR 2.5 mg po q4h prn mod pain  oxycodone IR 5mg po q4h prn severe pain  bowel regimen on opoids

## 2023-10-23 NOTE — CONSULT NOTE ADULT - SUBJECTIVE AND OBJECTIVE BOX
Consult to: Discuss complex medical decision making related to goals of care    [facility] Palliative Care Consult Service:   MD Paty Palacios NP    May contact any member of Palliative Care team via Microsoft Teams for consults or questions       HPI:  73M w/ hx of IDDM, HTN p/w b/l LE weakness and recurrent falls. [place] is from  [place] after reporting being down for 2 days after fall 2/2 LE weakness. Pt reports that one week prior to presentation, he suffered fall in the bathtub and struck his back. Pt was able to raise himself up and resume normal activity. Pt then developed paresthesias later that evening and developed progressively worsening b/l LE weakness the follow day. Throughout the week, pt reports recurrent falls 2/2 LE weakness. This continued until two days prior to presentation where pt was unable to raise himself up. Pt [place] in the [place] who [place]. Pt denies LOC, headstrike, fever/chills/sweats,     In ED, pt afebrile and HDS. Labs notable for BUN/SCr 38/1.51, Bili 1.5, Alk Phos 332, AST//148, CK 16,000. CT demonstrated multiple pathologic cervical, thoracic, and lumbar fractures, osseous metastatic disease, epidural tumor causing moderate spinal canal stenosis. LE US demonstrated b/l DVTs. Ortho was consulted. (22 Oct 2023 04:08)    Interval history: patient reports back pain w movement, alleviated w pain meds. reports no BM for several days. Family at bedside.       PAST MEDICAL & SURGICAL HISTORY:  Essential hypertension      No significant past surgical history          FAMILY HISTORY:   unable to obtain from patient due to poor mentation, family unable to give information, see H&P for history      SOCIAL HISTORY: , has 4 children  Admitted from:  home   Hoahaoism/spirituality:   [ none ] Substance abuse, [ none ] Tobacco hx, [ none ] Alcohol hx  [ none ] Home Opioid use      Baseline ADLs (prior to admission): alert, ambulatory    Review of systems:     Pain:  [x ] yes [ ] no  QOL impact -   Location -    lower back              Aggravating factors - movement  Quality -  Radiation -  Timing-  Severity (0-10 scale): 5  Minimal acceptable level (0-10 scale):     PAIN AD Score:     [email or web address] (press ctrl +  left click to view)  CPOT:    https://www.[email or web address].org/getattachment/[email or web address]/Critical-Care-Pain-Observation-Tool-(CPOT)      Dyspnea:      denies                        Anxiety:         denies                      Depression:   denies  Fatigue:      denies                         Nausea:      denies                         Loss of appetite:    denies            Constipation:     +          Diarrhea:     denies    All other systems reviewed and negative       Allergies    No Known Allergies    Intolerances           MEDICATIONS  (STANDING):  amLODIPine   Tablet 10 milliGRAM(s) Oral daily  aspirin  chewable 81 milliGRAM(s) Oral daily  atorvastatin 20 milliGRAM(s) Oral at bedtime  dextrose 5%. 1000 milliLiter(s) (50 mL/Hr) IV Continuous <Continuous>  dextrose 5%. 1000 milliLiter(s) (100 mL/Hr) IV Continuous <Continuous>  dextrose 50% Injectable 12.5 Gram(s) IV Push once  dextrose 50% Injectable 25 Gram(s) IV Push once  dextrose 50% Injectable 25 Gram(s) IV Push once  glucagon  Injectable 1 milliGRAM(s) IntraMuscular once  heparin   Injectable 7000 Unit(s) IV Push once  heparin  Infusion.  Unit(s)/Hr (16 mL/Hr) IV Continuous <Continuous>  influenza  Vaccine (HIGH DOSE) 0.7 milliLiter(s) IntraMuscular once  insulin glargine Injectable (LANTUS) 15 Unit(s) SubCutaneous every morning  insulin lispro (ADMELOG) corrective regimen sliding scale   SubCutaneous three times a day before meals  lactated ringers. 1000 milliLiter(s) (100 mL/Hr) IV Continuous <Continuous>  methylPREDNISolone sodium succinate Injectable 40 milliGRAM(s) IV Push every 8 hours  pantoprazole    Tablet 40 milliGRAM(s) Oral before breakfast  potassium phosphate / sodium phosphate Powder (PHOS-NaK) 1 Packet(s) Oral once  senna 2 Tablet(s) Oral at bedtime    MEDICATIONS  (PRN):  acetaminophen     Tablet .. 650 milliGRAM(s) Oral every 6 hours PRN Temp greater or equal to 38C (100.4F), Mild Pain (1 - 3)  aluminum hydroxide/magnesium hydroxide/simethicone Suspension 30 milliLiter(s) Oral every 4 hours PRN Dyspepsia  dextrose Oral Gel 15 Gram(s) Oral once PRN Blood Glucose LESS THAN 70 milliGRAM(s)/deciliter  heparin   Injectable 3500 Unit(s) IV Push every 6 hours PRN For aPTT between 40 - 57  heparin   Injectable 7000 Unit(s) IV Push every 6 hours PRN For aPTT less than 40  melatonin 3 milliGRAM(s) Oral at bedtime PRN Insomnia  ondansetron Injectable 4 milliGRAM(s) IV Push every 8 hours PRN Nausea and/or Vomiting  oxyCODONE    IR 2.5 milliGRAM(s) Oral every 4 hours PRN Moderate Pain (4 - 6)  oxyCODONE    IR 5 milliGRAM(s) Oral every 4 hours PRN Severe Pain (7 - 10)  polyethylene glycol 3350 17 Gram(s) Oral daily PRN Constipation      PHYSICAL EXAM:  Vital Signs Last 24 Hrs  T(C): 36.5 (23 Oct 2023 10:58), Max: 36.5 (23 Oct 2023 10:58)  T(F): 97.7 (23 Oct 2023 10:58), Max: 97.7 (23 Oct 2023 10:58)  HR: 95 (23 Oct 2023 10:58) (72 - 95)  BP: 130/71 (23 Oct 2023 10:58) (112/72 - 130/71)  BP(mean): --  RR: 17 (23 Oct 2023 10:58) (17 - 17)  SpO2: 94% (23 Oct 2023 10:58) (94% - 94%)    Parameters below as of 23 Oct 2023 10:58  Patient On (Oxygen Delivery Method): room air         Palliative Performance Scale/Karnofsky Score: 50  ECOG Performance: 3    GENERAL: alert, NAD  HEENT: Atraumatic, oropharynx clear, neck supple  CHEST/LUNG: unlabored  HEART: Regular rate and rhythm    ABDOMEN: Soft, Nontender, Nondistended   MUSCULOSKELETAL:  No  edema   NERVOUS SYSTEM:  Alert & Oriented X3,  follows commands  SKIN: No rashes or lesions noted  Oral intake: fair     LABS:                        11.9   8.53  )-----------( 169      ( 23 Oct 2023 06:20 )             35.1     10-23    140  |  110<H>  |  30<H>  ----------------------------<  194<H>  3.7   |  25  |  1.01    Ca    8.1<L>      23 Oct 2023 06:20  Phos  2.3     10-23  Mg     2.4     10-23    TPro  6.4  /  Alb  3.0<L>  /  TBili  0.9  /  DBili  x   /  AST  128<H>  /  ALT  103<H>  /  AlkPhos  243<H>  10-23    Urinalysis Basic - ( 23 Oct 2023 06:20 )    Color: x / Appearance: x / SG: x / pH: x  Gluc: 194 mg/dL / Ketone: x  / Bili: x / Urobili: x   Blood: x / Protein: x / Nitrite: x   Leuk Esterase: x / RBC: x / WBC x   Sq Epi: x / Non Sq Epi: x / Bacteria: x        RADIOLOGY & ADDITIONAL STUDIES:  < from: CT Chest No Cont (10.22.23 @ 02:08) >  ACC: [id number] EXAM:  CT CHEST   ORDERED BY: IMTIAZ POPE     ACC: [id number] EXAM:  CT ABDOMEN AND PELVIS   ORDERED BY: IMTIAZ POPE     PROCEDURE DATE:  10/22/2023          INTERPRETATION:  CLINICAL INFORMATION: Bilateral lower extremity   weakness. Recurrent falls. Suspected pathologic spinal fractures and   osseous metastases. Epidural tumor. History of prostate cancer.    COMPARISON: Lower extremity Doppler 10/22/2023; CT chest abdomen pelvis   7/25/2021    CONTRAST/COMPLICATIONS:  IV Contrast:None  Oral Contrast: None  Complications: None    PROCEDURE:  CT of the Chest, Abdomen and Pelvis was performed.  Sagittal and coronal reformats were performed.    FINDINGS:  CHEST:  LUNGS AND LARGE AIRWAYS: A few scattered micronodules. Evaluation of   lungs is slightly limited by motion artifact. Patent central airways. No   consolidation.  PLEURA: No pleural effusion.  VESSELS: Within normal limits.  HEART: Heart size is normal. No pericardial effusion.  MEDIASTINUM AND LUTHER: Borderline enlarged precarinal node and mildly   enlarged subcarinal node.  CHEST WALL AND LOWER NECK: Within normal limits.    ABDOMEN AND PELVIS:  LIVER: Within normal limits.  BILE DUCTS: Normal caliber.  GALLBLADDER: Within normal limits.  SPLEEN: Within normal limits.  PANCREAS: Within normal limits.  ADRENALS: Within normal limits.  KIDNEYS/URETERS: Tiny nonobstructing stone in the right kidney.   Unremarkable left kidney.    BLADDER: Distended. Mild perivesical fat stranding.  REPRODUCTIVE ORGANS: Prostate is enlarged.    BOWEL: Colonic diverticulosis. No bowel obstruction. Appendix is normal.  PERITONEUM: No ascites.  VESSELS: Atherosclerotic changes.  RETROPERITONEUM/LYMPH NODES: 3.0 x 2.0 cm subserosal implant along the   left rectal wall. Numerous enlarged retroperitoneal and bilateral iliac   chain nodes.  ABDOMINAL WALL: Small fat-containing left inguinal hernia.  BONES: Scattered sclerotic lesions, predominantly in the spine and bony   pelvis.    IMPRESSION:    1. Scattered sclerotic osseous lesions, predominantly in the spine and   bony pelvis. Retroperitoneal and pelvic lymphadenopathy with   indeterminate mediastinal nodes. Findings most likely represent   metastatic prostate cancer, although other malignancies can have a   similar appearance. Please see the reports of recent spinal imaging   studies for further details regarding the spine.  2. Markedly distended bladder. Correlate for bladder outlet obstruction.        --- End of Report ---            DAVIS BAGLEY MD; Attending Radiologist  This document has been electronically signed. Oct 22 2023  9:29AM    < end of copied text >    < from: MR Thoracic Spine w/ IV Cont (10.22.23 @ 12:21) >  CC: [id number] EXAM:  MR SPINE THORACIC IC   ORDERED BY: IMTIAZ POPE     PROCEDURE DATE:  10/22/2023          INTERPRETATION:  Clinical indication: Suspected cord compression    MRI of the thoracic spine was performed using axial T1 and T2-weighted   sequence. The patient was injected with 9 cc gadovist IV and sagittal   T1-weighted performed with fat suppression. Axial T1-weighted sequences   were performed. Following injection of approximately 9 cc of gadovist IV   with 1 cc of contrast discarded.    This exam is compared with prior MRI of the thoracic spine performed on   October 21, 2021.    Abnormal lesions are seen involving the T1, T2, T3, T4 and T12 vertebral   bodies as well as suspected involvement of the posterior elements of T1   T2 T3 and T12 levels as well. These findings are likely compatible with   underlying metastasis. There is epidural extension of tumor is seen on   the left side at the T2 level which causes effacement of the ventral   thecal sac. Epidural extension of tumor seen T4 level which causes   effacement ventral thecal sac and spinal cord and left side. Involvement   of the left T4-5 neural foramen is seen as well.    Epidural extension is seen at the T5 level without significant, as the   spinal canal or neural foramen    The spinal cord demonstrate normal signal.    Evaluation of the paraspinal soft tissues demonstrate no abnormal areas   of enhancement seen.    IMPRESSION: Multiple osseous lesions compatible with metastasis. Some of   these lesions do demonstrate associated areas of abnormal enhancement    There is epidural extension of tumor described above.    MRI of the lumbar spine was performed sagittal T1-T2 and STIR sequences.   Axial T1 and T2-weighted sequences were performed. Patientwas injected   with 9 cc gadovist IV with 1 cc of contrast discarded. Sagittal T1   sequence was performed with fat suppression. Axial T1-weighted sequence   was performed.    Loss of the normal lumbar lordosis.    Abnormal T1 prolongation seen involving the L3, L5 and right S1 vertebral   bodies. Abnormal lesions are seen involving both iliac bones. These   findings correspond sclerotic lesion seen on prior CT scan of the lumbar   spine performed on October 21, 2023    The lesion likely compatible with underlying metastasis. No epidural or   paraspinal extension of tumor is seen    Loss normal lumbar lordosis    Disc desiccation is seen involving the C3-4 L4-5 and L5-S1 levels   secondary to chronic    L1-2: Normal    L2-3: Normal    L3-4: Disc bulge and bilateral hypertrophic facet joint changes seen.   Mild to moderate narrowing of the right neural foramen    L4-5: Disc bulge and bilateral hypertrophic facet joint changes. Mild to   moderate narrowing of the spinal canal. Mild to moderate narrowing of   both neural foramen    L5-S1: Bilateral hypertrophic facet joint changes seen without   significant compromise of the spinal canal or either neural foramen    Evaluation of the paraspinal soft tissues appear normal.    IMPRESSION: Abnormal lesions as described above.    < end of copied text >

## 2023-10-23 NOTE — CONSULT NOTE ADULT - PROBLEM SELECTOR RECOMMENDATION 3
Reports last BM several days ago. Start senna qhs, miralax prn. bilateral peroneal/post tibial DVTs, on heparin

## 2023-10-23 NOTE — CONSULT NOTE ADULT - PROBLEM SELECTOR RECOMMENDATION 5
[facility] discussion as above. , has 4 children, named son Juan Jose and daughter Jamee as HCP. Patient interested in treatment options, pt ambulatory up until 1 wk ago. Hx prostate CA, never treated. Onc eval. Ortho f/u. Palliative will follow.

## 2023-10-24 LAB
% ALBUMIN: 59.1 % — SIGNIFICANT CHANGE UP
% ALBUMIN: 59.1 % — SIGNIFICANT CHANGE UP
% ALPHA 1: 5.3 % — SIGNIFICANT CHANGE UP
% ALPHA 1: 5.3 % — SIGNIFICANT CHANGE UP
% ALPHA 2: 8.2 % — SIGNIFICANT CHANGE UP
% ALPHA 2: 8.2 % — SIGNIFICANT CHANGE UP
% BETA: 11 % — SIGNIFICANT CHANGE UP
% BETA: 11 % — SIGNIFICANT CHANGE UP
% GAMMA: 16.4 % — SIGNIFICANT CHANGE UP
% GAMMA: 16.4 % — SIGNIFICANT CHANGE UP
ALBUMIN SERPL ELPH-MCNC: 4 G/DL — SIGNIFICANT CHANGE UP (ref 3.6–5.5)
ALBUMIN SERPL ELPH-MCNC: 4 G/DL — SIGNIFICANT CHANGE UP (ref 3.6–5.5)
ALBUMIN/GLOB SERPL ELPH: 1.5 RATIO — SIGNIFICANT CHANGE UP
ALBUMIN/GLOB SERPL ELPH: 1.5 RATIO — SIGNIFICANT CHANGE UP
ALPHA1 GLOB SERPL ELPH-MCNC: 0.4 G/DL — SIGNIFICANT CHANGE UP (ref 0.1–0.4)
ALPHA1 GLOB SERPL ELPH-MCNC: 0.4 G/DL — SIGNIFICANT CHANGE UP (ref 0.1–0.4)
ALPHA2 GLOB SERPL ELPH-MCNC: 0.5 G/DL — SIGNIFICANT CHANGE UP (ref 0.5–1)
ALPHA2 GLOB SERPL ELPH-MCNC: 0.5 G/DL — SIGNIFICANT CHANGE UP (ref 0.5–1)
APTT BLD: 101.6 SEC — HIGH (ref 24.5–35.6)
APTT BLD: 101.6 SEC — HIGH (ref 24.5–35.6)
APTT BLD: 59.4 SEC — HIGH (ref 24.5–35.6)
APTT BLD: 59.4 SEC — HIGH (ref 24.5–35.6)
APTT BLD: 77.3 SEC — HIGH (ref 24.5–35.6)
APTT BLD: 77.3 SEC — HIGH (ref 24.5–35.6)
B-GLOBULIN SERPL ELPH-MCNC: 0.7 G/DL — SIGNIFICANT CHANGE UP (ref 0.5–1)
B-GLOBULIN SERPL ELPH-MCNC: 0.7 G/DL — SIGNIFICANT CHANGE UP (ref 0.5–1)
CK SERPL-CCNC: 1328 U/L — HIGH (ref 26–308)
CK SERPL-CCNC: 1328 U/L — HIGH (ref 26–308)
GAMMA GLOBULIN: 1.1 G/DL — SIGNIFICANT CHANGE UP (ref 0.6–1.6)
GAMMA GLOBULIN: 1.1 G/DL — SIGNIFICANT CHANGE UP (ref 0.6–1.6)
GLUCOSE BLDC GLUCOMTR-MCNC: 143 MG/DL — HIGH (ref 70–99)
GLUCOSE BLDC GLUCOMTR-MCNC: 143 MG/DL — HIGH (ref 70–99)
GLUCOSE BLDC GLUCOMTR-MCNC: 191 MG/DL — HIGH (ref 70–99)
GLUCOSE BLDC GLUCOMTR-MCNC: 191 MG/DL — HIGH (ref 70–99)
GLUCOSE BLDC GLUCOMTR-MCNC: 211 MG/DL — HIGH (ref 70–99)
GLUCOSE BLDC GLUCOMTR-MCNC: 211 MG/DL — HIGH (ref 70–99)
GLUCOSE BLDC GLUCOMTR-MCNC: 261 MG/DL — HIGH (ref 70–99)
GLUCOSE BLDC GLUCOMTR-MCNC: 261 MG/DL — HIGH (ref 70–99)
HCT VFR BLD CALC: 36.7 % — LOW (ref 39–50)
HCT VFR BLD CALC: 36.7 % — LOW (ref 39–50)
HGB BLD-MCNC: 12.5 G/DL — LOW (ref 13–17)
HGB BLD-MCNC: 12.5 G/DL — LOW (ref 13–17)
INTERPRETATION SERPL IFE-IMP: SIGNIFICANT CHANGE UP
INTERPRETATION SERPL IFE-IMP: SIGNIFICANT CHANGE UP
MCHC RBC-ENTMCNC: 30.2 PG — SIGNIFICANT CHANGE UP (ref 27–34)
MCHC RBC-ENTMCNC: 30.2 PG — SIGNIFICANT CHANGE UP (ref 27–34)
MCHC RBC-ENTMCNC: 34.1 G/DL — SIGNIFICANT CHANGE UP (ref 32–36)
MCHC RBC-ENTMCNC: 34.1 G/DL — SIGNIFICANT CHANGE UP (ref 32–36)
MCV RBC AUTO: 88.6 FL — SIGNIFICANT CHANGE UP (ref 80–100)
MCV RBC AUTO: 88.6 FL — SIGNIFICANT CHANGE UP (ref 80–100)
NRBC # BLD: 0 /100 WBCS — SIGNIFICANT CHANGE UP (ref 0–0)
NRBC # BLD: 0 /100 WBCS — SIGNIFICANT CHANGE UP (ref 0–0)
PLATELET # BLD AUTO: 183 K/UL — SIGNIFICANT CHANGE UP (ref 150–400)
PLATELET # BLD AUTO: 183 K/UL — SIGNIFICANT CHANGE UP (ref 150–400)
PROT PATTERN SERPL ELPH-IMP: SIGNIFICANT CHANGE UP
PROT PATTERN SERPL ELPH-IMP: SIGNIFICANT CHANGE UP
RBC # BLD: 4.14 M/UL — LOW (ref 4.2–5.8)
RBC # BLD: 4.14 M/UL — LOW (ref 4.2–5.8)
RBC # FLD: 12.4 % — SIGNIFICANT CHANGE UP (ref 10.3–14.5)
RBC # FLD: 12.4 % — SIGNIFICANT CHANGE UP (ref 10.3–14.5)
WBC # BLD: 9.02 K/UL — SIGNIFICANT CHANGE UP (ref 3.8–10.5)
WBC # BLD: 9.02 K/UL — SIGNIFICANT CHANGE UP (ref 3.8–10.5)
WBC # FLD AUTO: 9.02 K/UL — SIGNIFICANT CHANGE UP (ref 3.8–10.5)
WBC # FLD AUTO: 9.02 K/UL — SIGNIFICANT CHANGE UP (ref 3.8–10.5)

## 2023-10-24 PROCEDURE — 99233 SBSQ HOSP IP/OBS HIGH 50: CPT

## 2023-10-24 PROCEDURE — 99223 1ST HOSP IP/OBS HIGH 75: CPT

## 2023-10-24 RX ORDER — PANTOPRAZOLE SODIUM 20 MG/1
1 TABLET, DELAYED RELEASE ORAL
Refills: 0 | DISCHARGE

## 2023-10-24 RX ORDER — AMLODIPINE BESYLATE 2.5 MG/1
1 TABLET ORAL
Refills: 0 | DISCHARGE

## 2023-10-24 RX ORDER — ATORVASTATIN CALCIUM 80 MG/1
1 TABLET, FILM COATED ORAL
Refills: 0 | DISCHARGE

## 2023-10-24 RX ORDER — ASPIRIN/CALCIUM CARB/MAGNESIUM 324 MG
1 TABLET ORAL
Refills: 0 | DISCHARGE

## 2023-10-24 RX ORDER — RAMIPRIL 5 MG
1 CAPSULE ORAL
Refills: 0 | DISCHARGE

## 2023-10-24 RX ORDER — SENNA PLUS 8.6 MG/1
1 TABLET ORAL ONCE
Refills: 0 | Status: DISCONTINUED | OUTPATIENT
Start: 2023-10-24 | End: 2023-10-24

## 2023-10-24 RX ADMIN — Medication 40 MILLIGRAM(S): at 05:25

## 2023-10-24 RX ADMIN — HEPARIN SODIUM 1400 UNIT(S)/HR: 5000 INJECTION INTRAVENOUS; SUBCUTANEOUS at 19:44

## 2023-10-24 RX ADMIN — Medication 6: at 11:58

## 2023-10-24 RX ADMIN — INSULIN GLARGINE 15 UNIT(S): 100 INJECTION, SOLUTION SUBCUTANEOUS at 08:13

## 2023-10-24 RX ADMIN — AMLODIPINE BESYLATE 10 MILLIGRAM(S): 2.5 TABLET ORAL at 05:25

## 2023-10-24 RX ADMIN — SODIUM CHLORIDE 100 MILLILITER(S): 9 INJECTION, SOLUTION INTRAVENOUS at 14:35

## 2023-10-24 RX ADMIN — HEPARIN SODIUM 1400 UNIT(S)/HR: 5000 INJECTION INTRAVENOUS; SUBCUTANEOUS at 06:47

## 2023-10-24 RX ADMIN — HEPARIN SODIUM 1400 UNIT(S)/HR: 5000 INJECTION INTRAVENOUS; SUBCUTANEOUS at 13:14

## 2023-10-24 RX ADMIN — Medication 2: at 08:12

## 2023-10-24 RX ADMIN — HEPARIN SODIUM 1400 UNIT(S)/HR: 5000 INJECTION INTRAVENOUS; SUBCUTANEOUS at 19:40

## 2023-10-24 RX ADMIN — HEPARIN SODIUM 1400 UNIT(S)/HR: 5000 INJECTION INTRAVENOUS; SUBCUTANEOUS at 15:48

## 2023-10-24 RX ADMIN — Medication 40 MILLIGRAM(S): at 14:49

## 2023-10-24 RX ADMIN — SODIUM CHLORIDE 100 MILLILITER(S): 9 INJECTION, SOLUTION INTRAVENOUS at 03:02

## 2023-10-24 RX ADMIN — HEPARIN SODIUM 1400 UNIT(S)/HR: 5000 INJECTION INTRAVENOUS; SUBCUTANEOUS at 07:30

## 2023-10-24 RX ADMIN — PANTOPRAZOLE SODIUM 40 MILLIGRAM(S): 20 TABLET, DELAYED RELEASE ORAL at 08:14

## 2023-10-24 RX ADMIN — Medication 40 MILLIGRAM(S): at 22:05

## 2023-10-24 RX ADMIN — SENNA PLUS 2 TABLET(S): 8.6 TABLET ORAL at 22:05

## 2023-10-24 RX ADMIN — POLYETHYLENE GLYCOL 3350 17 GRAM(S): 17 POWDER, FOR SOLUTION ORAL at 15:45

## 2023-10-24 RX ADMIN — Medication 81 MILLIGRAM(S): at 11:57

## 2023-10-24 RX ADMIN — ATORVASTATIN CALCIUM 20 MILLIGRAM(S): 80 TABLET, FILM COATED ORAL at 22:05

## 2023-10-24 NOTE — CONSULT NOTE ADULT - TIME BILLING
patient exam, chart review, coordination of care w team, pt, family
patient exam, chart review, coordination of care w pt/family, inpatient team.

## 2023-10-24 NOTE — ACUTE INTERFACILITY TRANSFER NOTE - PLAN OF CARE
Radiology oncology consult  Pt currently having spinal cord compression in setting of lytic metastatic disease.

## 2023-10-24 NOTE — CONSULT NOTE ADULT - ASSESSMENT
73M w/ hx of IDDM, HTN and history of prostate cancer s/p biopsy in 2021 ([place] 4+3) who declined radiation therapy at that time, and since followed by urologist at [facility], p/w progressive bilateral LE weakness, R hand paresthesias and recurrent falls. MRI spine findings as detailed above, concerning for spinal cord compression.     #Prostate cancer  - progressive LE weakness, , saddle anesthesia  - MRI spine and CT scan findings reviewed, radiographic findings and , concerning for metastatic prostate cancer and spinal cord compression  - Would recommend urgent transfer to [facility] for radiation oncology and neurosurgery evaluations for cord compression  - Continue with steroids   - Would consider starting pt on degarelix (GnRH receptor antagonist) while inpatient once LFTs improved  - Prostate biopsy in 2021 showed adenocarcinoma, [place] 3+4; declined radiation therapy; please obtain oncology records if possible     # DVTs  - Likely in setting of progressive metastatic disease   - Continue heparin gtt anticoagulation     #Back pain   - appreciate palliative care recommendations for pain and symptoms control     #Rhabdomyolysis  - continue management per primary team     Oncologic workup and findings reviewed in detail with the patient and his daughter Jamee at bedside.  Patient and daughter in agreement with transfer to [facility] for further management.     Will continue to follow; please call with any further questions ([phone]).    Mark Mathews MD  Oncology attending  73M w/ hx of IDDM, HTN and history of prostate cancer s/p biopsy in 2021 ([place] 4+3) who declined radiation therapy at that time, and since followed by urologist at [facility], p/w progressive bilateral LE weakness, R hand paresthesias and recurrent falls. MRI spine findings as detailed above, concerning for spinal cord compression.     #Prostate cancer  - clinical and radiographic findings c/w metastatic prostate cancer  - progressive LE weakness, , saddle anesthesia and MRI findings as above, concerning for spinal cord compression   - Would recommend urgent transfer to [facility] for radiation oncology and neurosurgery evaluations for cord compression  - Continue with steroids   - Would consider starting pt on degarelix (GnRH receptor antagonist) while inpatient once LFTs improved  - Prostate biopsy in 2021 showed adenocarcinoma, [place] 3+4; declined radiation therapy; please obtain oncology records if possible     # DVTs  - Likely in setting of progressive metastatic disease   - Continue heparin gtt anticoagulation     #Back pain   - appreciate palliative care recommendations   - continue current pain regimen and GOC discussions     #Rhabdomyolysis  - continue management per primary team    Oncologic workup and findings reviewed in detail with the patient and his daughter Jamee at bedside.  Patient and daughter in agreement with transfer to [facility] for further management.   Will continue to follow; please call with any further questions ([phone]).  Oncology recommendations discussed with Dr. Mahan.     Mark Mathews MD  Oncology attending  73M w/ hx of IDDM, HTN and history of prostate cancer s/p biopsy in 2021 ([place] 4+3) who declined radiation therapy at that time, and since followed by urologist at [facility], p/w progressive bilateral LE weakness, R hand paresthesias and recurrent falls. MRI spine findings as detailed above, concerning for spinal cord compression.     #Prostate cancer  - clinical and radiographic findings c/w metastatic prostate cancer  - progressive LE weakness, , saddle anesthesia and MRI findings as above, concerning for spinal cord compression   - Would recommend urgent transfer to [facility] for radiation oncology and neurosurgery evaluations for cord compression  - Continue with steroids   - Would consider starting pt on degarelix (GnRH receptor antagonist) while inpatient once LFTs improved  - Prostate biopsy in 2021 showed adenocarcinoma, [place] 3+4; declined radiation therapy; please obtain oncology records if possible     # DVT - bilateral peroneal/post tibial DVTs  - Likely in setting of progressive metastatic disease   - Continue heparin gtt anticoagulation     #Back pain   - appreciate palliative care recommendations   - continue current pain regimen and GOC discussions     #Rhabdomyolysis  - continue management per primary team    Oncologic workup and findings reviewed in detail with the patient and his daughter Jamee at bedside.  Patient and daughter in agreement with transfer to [facility] for further management.   Will continue to follow; please call with any further questions ([phone]).  Oncology recommendations discussed with Dr. Mahan.     Mark Mathews MD  Oncology attending

## 2023-10-24 NOTE — PROGRESS NOTE ADULT - SUBJECTIVE AND OBJECTIVE BOX
CHIEF COMPLAINT: FU of probable prostate cancer with metastatic disease  feeling improvement in his legs, able to bend his legs today. no new focal deficit   no fever  no n/v/d  no chest pain or sob       PHYSICAL EXAM:    GENERAL: thinly built, no acute distress   CHEST/LUNG:  No wheezing, no crackles   HEART: Regular rate and rhythm; No murmurs  ABDOMEN: Soft, Nontender, Nondistended; Bowel sounds present  EXTREMITIES:  No clubbing, cyanosis, or edema. able to bend his legs   Psychiatry: AAO x 3, mood is appropriate       OBJECTIVE DATA:     Vital Signs Last 24 Hrs  T(C): 36.5 (24 Oct 2023 10:49), Max: 37 (23 Oct 2023 23:48)  T(F): 97.7 (24 Oct 2023 10:49), Max: 98.6 (23 Oct 2023 23:48)  HR: 95 (24 Oct 2023 10:49) (74 - 95)  BP: 143/78 (24 Oct 2023 10:49) (103/53 - 143/78)  BP(mean): --  RR: 18 (24 Oct 2023 10:49) (18 - 18)  SpO2: 95% (24 Oct 2023 10:49) (94% - 95%)    Parameters below as of 24 Oct 2023 10:49  Patient On (Oxygen Delivery Method): room air               Daily     Daily   LABS:                        12.5   9.02  )-----------( 183      ( 24 Oct 2023 04:40 )             36.7             10-23    140  |  110<H>  |  30<H>  ----------------------------<  194<H>  3.7   |  25  |  1.01    Ca    8.1<L>      23 Oct 2023 06:20  Phos  2.3     10-23  Mg     2.4     10-23    TPro  6.4  /  Alb  3.0<L>  /  TBili  0.9  /  DBili  x   /  AST  128<H>  /  ALT  103<H>  /  AlkPhos  243<H>  10-23              PTT - ( 24 Oct 2023 12:35 )  PTT:77.3 sec  Urinalysis Basic - ( 23 Oct 2023 06:20 )    Color: x / Appearance: x / SG: x / pH: x  Gluc: 194 mg/dL / Ketone: x  / Bili: x / Urobili: x   Blood: x / Protein: x / Nitrite: x   Leuk Esterase: x / RBC: x / WBC x   Sq Epi: x / Non Sq Epi: x / Bacteria: x       CARDIAC MARKERS ( 24 Oct 2023 04:40 )  x     / x     / 1328 U/L / x     / x      CARDIAC MARKERS ( 23 Oct 2023 06:20 )  x     / x     / 3089 U/L / x     / x          CAPILLARY BLOOD GLUCOSE      POCT Blood Glucose.: 261 mg/dL (24 Oct 2023 11:14)          Interval Radiology studies: reviewed by me  < from: NM Bone Imaging Total (10.23.23 @ 10:53) >    IMPRESSION: Findings consistent with osseous metastatic disease. Findings   are markedly concordant with prior MRI findings, although the lumbar   spine is largely unremarkable. Scapula, multiple LEFT ribs, and BILATERAL   portions of the pelvic region contain additional sites of disease.    < end of copied text >    MEDICATIONS  (STANDING):  amLODIPine   Tablet 10 milliGRAM(s) Oral daily  aspirin  chewable 81 milliGRAM(s) Oral daily  atorvastatin 20 milliGRAM(s) Oral at bedtime  dextrose 5%. 1000 milliLiter(s) (100 mL/Hr) IV Continuous <Continuous>  dextrose 5%. 1000 milliLiter(s) (50 mL/Hr) IV Continuous <Continuous>  dextrose 50% Injectable 12.5 Gram(s) IV Push once  dextrose 50% Injectable 25 Gram(s) IV Push once  dextrose 50% Injectable 25 Gram(s) IV Push once  glucagon  Injectable 1 milliGRAM(s) IntraMuscular once  heparin   Injectable 7000 Unit(s) IV Push once  heparin  Infusion.  Unit(s)/Hr (16 mL/Hr) IV Continuous <Continuous>  influenza  Vaccine (HIGH DOSE) 0.7 milliLiter(s) IntraMuscular once  insulin glargine Injectable (LANTUS) 15 Unit(s) SubCutaneous every morning  insulin lispro (ADMELOG) corrective regimen sliding scale   SubCutaneous three times a day before meals  lactated ringers. 1000 milliLiter(s) (100 mL/Hr) IV Continuous <Continuous>  methylPREDNISolone sodium succinate Injectable 40 milliGRAM(s) IV Push every 8 hours  pantoprazole    Tablet 40 milliGRAM(s) Oral before breakfast  senna 2 Tablet(s) Oral at bedtime    MEDICATIONS  (PRN):  acetaminophen     Tablet .. 650 milliGRAM(s) Oral every 6 hours PRN Temp greater or equal to 38C (100.4F), Mild Pain (1 - 3)  aluminum hydroxide/magnesium hydroxide/simethicone Suspension 30 milliLiter(s) Oral every 4 hours PRN Dyspepsia  dextrose Oral Gel 15 Gram(s) Oral once PRN Blood Glucose LESS THAN 70 milliGRAM(s)/deciliter  heparin   Injectable 7000 Unit(s) IV Push every 6 hours PRN For aPTT less than 40  heparin   Injectable 3500 Unit(s) IV Push every 6 hours PRN For aPTT between 40 - 57  melatonin 3 milliGRAM(s) Oral at bedtime PRN Insomnia  ondansetron Injectable 4 milliGRAM(s) IV Push every 8 hours PRN Nausea and/or Vomiting  oxyCODONE    IR 5 milliGRAM(s) Oral every 4 hours PRN Severe Pain (7 - 10)  oxyCODONE    IR 2.5 milliGRAM(s) Oral every 4 hours PRN Moderate Pain (4 - 6)  polyethylene glycol 3350 17 Gram(s) Oral daily PRN Constipation

## 2023-10-24 NOTE — CONSULT NOTE ADULT - SUBJECTIVE AND OBJECTIVE BOX
Oncology Consult Note    HPI:  73M w/ hx of IDDM, HTN p/w b/l LE weakness and recurrent falls. [place] is [place] to [place] after reporting being down for 2 days after fall 2/2 LE weakness. Pt reports that one week prior to presentation, he suffered fall in the bathtub and struck his back. Pt was able to raise himself up and resume normal activity. Pt then developed paresthesias later that evening and developed progressively worsening b/l LE weakness the follow day. Throughout the week, pt reports recurrent falls 2/2 LE weakness. This continued until two days prior to presentation where pt was unable to raise himself up. Pt [place] in the [place] who [place]. Pt denies LOC, headstrike, fever/chills/sweats,     In ED, pt afebrile and HDS. Labs notable for BUN/SCr 38/1.51, Bili 1.5, Alk Phos 332, AST//148, CK 16,000. CT demonstrated multiple pathologic cervical, thoracic, and lumbar fractures, osseous metastatic disease, epidural tumor causing moderate spinal canal stenosis. LE US demonstrated b/l DVTs. Ortho was consulted. (22 Oct 2023 04:08)      Allergies    No Known Allergies    Intolerances        MEDICATIONS  (STANDING):  amLODIPine   Tablet 10 milliGRAM(s) Oral daily  aspirin  chewable 81 milliGRAM(s) Oral daily  atorvastatin 20 milliGRAM(s) Oral at bedtime  dextrose 5%. 1000 milliLiter(s) (100 mL/Hr) IV Continuous <Continuous>  dextrose 5%. 1000 milliLiter(s) (50 mL/Hr) IV Continuous <Continuous>  dextrose 50% Injectable 12.5 Gram(s) IV Push once  dextrose 50% Injectable 25 Gram(s) IV Push once  dextrose 50% Injectable 25 Gram(s) IV Push once  glucagon  Injectable 1 milliGRAM(s) IntraMuscular once  heparin   Injectable 7000 Unit(s) IV Push once  heparin  Infusion.  Unit(s)/Hr (16 mL/Hr) IV Continuous <Continuous>  influenza  Vaccine (HIGH DOSE) 0.7 milliLiter(s) IntraMuscular once  insulin glargine Injectable (LANTUS) 15 Unit(s) SubCutaneous every morning  insulin lispro (ADMELOG) corrective regimen sliding scale   SubCutaneous three times a day before meals  lactated ringers. 1000 milliLiter(s) (100 mL/Hr) IV Continuous <Continuous>  methylPREDNISolone sodium succinate Injectable 40 milliGRAM(s) IV Push every 8 hours  pantoprazole    Tablet 40 milliGRAM(s) Oral before breakfast  senna 2 Tablet(s) Oral at bedtime    MEDICATIONS  (PRN):  acetaminophen     Tablet .. 650 milliGRAM(s) Oral every 6 hours PRN Temp greater or equal to 38C (100.4F), Mild Pain (1 - 3)  aluminum hydroxide/magnesium hydroxide/simethicone Suspension 30 milliLiter(s) Oral every 4 hours PRN Dyspepsia  dextrose Oral Gel 15 Gram(s) Oral once PRN Blood Glucose LESS THAN 70 milliGRAM(s)/deciliter  heparin   Injectable 7000 Unit(s) IV Push every 6 hours PRN For aPTT less than 40  heparin   Injectable 3500 Unit(s) IV Push every 6 hours PRN For aPTT between 40 - 57  melatonin 3 milliGRAM(s) Oral at bedtime PRN Insomnia  ondansetron Injectable 4 milliGRAM(s) IV Push every 8 hours PRN Nausea and/or Vomiting  oxyCODONE    IR 5 milliGRAM(s) Oral every 4 hours PRN Severe Pain (7 - 10)  oxyCODONE    IR 2.5 milliGRAM(s) Oral every 4 hours PRN Moderate Pain (4 - 6)  polyethylene glycol 3350 17 Gram(s) Oral daily PRN Constipation      PAST MEDICAL & SURGICAL HISTORY:  Essential hypertension      No significant past surgical history          FAMILY HISTORY:      SOCIAL HISTORY: No EtOH, no tobacco    REVIEW OF SYSTEMS:    CONSTITUTIONAL: No weakness, fevers or chills  EYES/ENT: No visual changes;  No vertigo or throat pain   NECK: No pain or stiffness  RESPIRATORY: No cough, wheezing, hemoptysis; No shortness of breath  CARDIOVASCULAR: No chest pain or palpitations  GASTROINTESTINAL: No abdominal or epigastric pain. No nausea, vomiting, or hematemesis; No diarrhea or constipation. No melena or hematochezia.  GENITOURINARY: No dysuria, frequency or hematuria  NEUROLOGICAL: No numbness or weakness  SKIN: No itching, burning, rashes, or lesions   All other review of systems is negative unless indicated above.        T(F): 98.1 (10-24-23 @ 05:15), Max: 98.6 (10-23-23 @ 23:48)  HR: 86 (10-24-23 @ 05:15)  BP: 138/78 (10-24-23 @ 05:15)  RR: 18 (10-24-23 @ 05:15)  SpO2: 95% (10-24-23 @ 05:15)  Wt(kg): --    GENERAL: NAD, well-developed  HEAD:  Atraumatic, Normocephalic  EYES: EOMI, PERRLA, conjunctiva and sclera clear  NECK: Supple, No JVD  CHEST/LUNG: Clear to auscultation bilaterally; No wheeze  HEART: Regular rate and rhythm; No murmurs, rubs, or gallops  ABDOMEN: Soft, Nontender, Nondistended; Bowel sounds present  EXTREMITIES:  2+ Peripheral Pulses, No clubbing, cyanosis, or edema  NEUROLOGY: non-focal  SKIN: No rashes or lesions                          12.5   9.02  )-----------( 183      ( 24 Oct 2023 04:40 )             36.7       10-23    140  |  110<H>  |  30<H>  ----------------------------<  194<H>  3.7   |  25  |  1.01    Ca    8.1<L>      23 Oct 2023 06:20  Phos  2.3     10-23  Mg     2.4     10-23      TPro  6.4  /  Alb  3.0<L>  /  TBili  0.9  /  DBili  x   /  AST  128<H>  /  ALT  103<H>  /  AlkPhos  243<H>  10-23      PSA Total: 276.00  Creatine Kinase, Serum: 3089 U/L (10.23.23 @ 06:20)     < from: MR Cervical Spine No Cont (10.21.23 @ 23:53) >    FINDINGS: CT scans dated 10/21/2023 are available for review.    Cervical and thoracic vertebral alignment is intact.  Cervical and   thoracic vertebral body heights are maintained but minimally displaced   fracture of the C7 spinous process and nondisplaced fracture of the T1   spinous process are noted. Edema is seen within the C3-4 through C7-T1   interspinous ligaments consistent with ligamentous injury. Marrow signal   intensity within cervical vertebral bodies and posterior elements is   significantfor metastatic disease within the C7, T1 through T5 and T12   as well is L1-L3.  Epidural extension of neoplasm is noted at C7   ventrally and dorsally resulting in moderate cord compressionand   underlying edema and extension into the BILATERAL C7-T1 and T1-2 and LEFT   T2-3 neural foramina. Minimal epidural disease also noted in the LEFT   aspect of the spinal canal at T4 resulting in moderate canal stenosis and   minimal cord impingement. Minimal epidural disease also noted at the   RIGHT aspect of the T12 vertebral body without significant canal   narrowing. Metastatic disease noted scattered ribs.    Cervical and thoracic intervertebral discs demonstrate mild disc   degeneration at C5-C6 through T1-T2 as well as T8-9 through T10-11 with   loss of disc height and signal within the nucleus pulposus. There is   narrowing of the LEFT C2-3, RIGHT C3-4, RIGHT C4-5 neural foramina due to   uncovertebral spurring.    The cervical medullary junction remains intact.      IMPRESSION:  Minimallydisplaced fracture of the C7 spinous process and   nondisplaced fracture of the T1 spinous process are noted. Edema is seen   within the C3-4 through C7-T1 interspinous ligaments consistent with   ligamentous injury.    Metastatic disease within the C7, T1 through T5 and T12 as well is L1-L3.    Epidural extension of neoplasm is noted at C7 ventrally and dorsally   resulting in moderate cord compressionand underlying edema and extension   into the BILATERAL C7-T1 and T1-2 and LEFT T2-3 neural foramina. Minimal   epidural disease also noted in the LEFT aspect of the spinal canal at T4   resulting in moderate canal stenosis and minimal cord impingement.   Minimal epidural disease also noted at the RIGHT aspect of the T12   vertebral body without significant canal narrowing.      < from: MR Lumbar Spine w/wo IV Cont (10.22.23 @ 12:21) >  PROCEDURE DATE:  10/22/2023      INTERPRETATION:  Clinical indication: Suspected cord compression    MRI of the thoracic spine was performed using axial T1 and T2-weighted   sequence. The patient was injected with 9 cc gadovist IV and sagittal   T1-weighted performed with fat suppression. Axial T1-weighted sequences   were performed. Following injection of approximately 9 cc of gadovist IV   with 1 cc of contrast discarded.    This exam is compared with prior MRI of the thoracic spine performed on   October 21, 2021.    Abnormal lesions are seen involving the T1, T2, T3, T4 and T12 vertebral   bodies as well as suspected involvement of the posterior elements of T1   T2 T3 and T12 levels as well. These findings are likely compatible with   underlying metastasis. There is epidural extension of tumor is seen on   the left side at the T2 level which causes effacement of the ventral   thecal sac. Epidural extension of tumor seen T4 level which causes   effacement ventral thecal sac and spinal cord and left side. Involvement   of the left T4-5 neural foramen is seen as well.    Epidural extension is seen at the T5 level without significant, as the   spinal canal or neural foramen    The spinal cord demonstrate normal signal.    Evaluation of the paraspinal soft tissues demonstrate no abnormal areas   of enhancement seen.    IMPRESSION: Multiple osseous lesions compatible with metastasis. Some of   these lesions do demonstrate associated areas of abnormal enhancement    There is epidural extension of tumor described above.    MRI of the lumbar spine was performed sagittal T1-T2 and STIR sequences.   Axial T1 and T2-weighted sequences were performed. Patient was injected   with 9 cc gadovist IV with 1 cc of contrast discarded. Sagittal T1   sequence was performed with fat suppression. Axial T1-weighted sequence   was performed.    Loss of the normal lumbar lordosis.    Abnormal T1 prolongation seen involving the L3, L5 and right S1 vertebral   bodies. Abnormal lesions are seen involving both iliac bones. These   findings correspond sclerotic lesion seen on prior CT scan of the lumbar   spine performed on October 21, 2023    The lesion likely compatible with underlying metastasis. No epidural or   paraspinal extension of tumor is seen    Loss normal lumbar lordosis    Disc desiccation is seen involving the C3-4 L4-5 and L5-S1 levels   secondary to chronic    L1-2: Normal    L2-3: Normal    L3-4: Disc bulge and bilateral hypertrophic facet joint changes seen.   Mild to moderate narrowing of the right neural foramen    L4-5: Disc bulge and bilateral hypertrophic facet joint changes. Mild to   moderate narrowing of the spinal canal. Mild to moderate narrowing of   both neural foramen    L5-S1: Bilateral hypertrophic facet joint changes seen without   significant compromise of the spinal canal or either neural foramen    Evaluation of the paraspinal soft tissues appear normal.    IMPRESSION: Abnormal lesions as described above.          < from: MR Thoracic Spine w/ IV Cont (10.22.23 @ 12:21) >  ACC: [id number] EXAM:  MR SPINE THORACIC IC   ORDERED BY: IMTIAZ POPE     PROCEDURE DATE:  10/22/2023          INTERPRETATION:  Clinical indication: Suspected cord compression    MRI of the thoracic spine was performed using axial T1 and T2-weighted   sequence. The patient was injected with 9 cc gadovist IV and sagittal   T1-weighted performed with fat suppression. Axial T1-weighted sequences   were performed. Following injection of approximately 9 cc of gadovist IV   with 1 cc of contrast discarded.    This exam is compared with prior MRI of the thoracic spine performed on   October 21, 2021.    Abnormal lesions are seen involving the T1, T2, T3, T4 and T12 vertebral   bodies as well as suspected involvement of the posterior elements of T1   T2 T3 and T12 levels as well. These findings are likely compatible with   underlying metastasis. There is epidural extension of tumor is seen on   the left side at the T2 level which causes effacement of the ventral   thecal sac. Epidural extension of tumor seen T4 level which causes   effacement ventral thecal sac and spinal cord and left side. Involvement   of the left T4-5 neural foramen is seen as well.    Epidural extension is seen at the T5 level without significant, as the   spinal canal or neural foramen    The spinal cord demonstrate normal signal.    Evaluation of the paraspinal soft tissues demonstrate no abnormal areas   of enhancement seen.    IMPRESSION: Multiple osseous lesions compatible with metastasis. Some of   these lesions do demonstrate associated areas of abnormal enhancement    There is epidural extension of tumor described above.    MRI of the lumbar spine was performed sagittal T1-T2 and STIR sequences.   Axial T1 and T2-weighted sequences were performed. Patientwas injected   with 9 cc gadovist IV with 1 cc of contrast discarded. Sagittal T1   sequence was performed with fat suppression. Axial T1-weighted sequence   was performed.    Loss of the normal lumbar lordosis.    Abnormal T1 prolongation seen involving the L3, L5 and right S1 vertebral   bodies. Abnormal lesions are seen involving both iliac bones. These   findings correspond sclerotic lesion seen on prior CT scan of the lumbar   spine performed on October 21, 2023    The lesion likely compatible with underlying metastasis. No epidural or   paraspinal extension of tumor is seen    Loss normal lumbar lordosis    Disc desiccation is seen involving the C3-4 L4-5 and L5-S1 levels   secondary to chronic    L1-2: Normal    L2-3: Normal    L3-4: Disc bulge and bilateral hypertrophic facet joint changes seen.   Mild to moderate narrowing of the right neural foramen    L4-5: Disc bulge and bilateral hypertrophic facet joint changes. Mild to   moderate narrowing of the spinal canal. Mild to moderate narrowing of   both neural foramen    L5-S1: Bilateral hypertrophic facet joint changes seen without   significant compromise of the spinal canal or either neural foramen    Evaluation of the paraspinal soft tissues appear normal.    IMPRESSION: Abnormal lesions as described above.        < from: CT Chest No Cont (10.22.23 @ 02:08) >  ACC: [id number] EXAM:  CT CHEST   ORDERED BY: IMTIAZ POPE     ACC: [id number] EXAM:  CT ABDOMEN AND PELVIS   ORDERED BY: IMTIAZ POPE     PROCEDURE DATE:  10/22/2023          INTERPRETATION:  CLINICAL INFORMATION: Bilateral lower extremity   weakness. Recurrent falls. Suspected pathologic spinal fractures and   osseous metastases. Epidural tumor. History of prostate cancer.    COMPARISON: Lower extremity Doppler 10/22/2023; CT chest abdomen pelvis   7/25/2021    CONTRAST/COMPLICATIONS:  IV Contrast:None  Oral Contrast: None  Complications: None    PROCEDURE:  CT of the Chest, Abdomen and Pelvis was performed.  Sagittal and coronal reformats were performed.    FINDINGS:  CHEST:  LUNGS AND LARGE AIRWAYS: A few scattered micronodules. Evaluation of   lungs is slightly limited by motion artifact. Patent central airways. No   consolidation.  PLEURA: No pleural effusion.  VESSELS: Within normal limits.  HEART: Heart size is normal. No pericardial effusion.  MEDIASTINUM AND LUTHER: Borderline enlarged precarinal node and mildly   enlarged subcarinal node.  CHEST WALL AND LOWER NECK: Within normal limits.    ABDOMEN AND PELVIS:  LIVER: Within normal limits.  BILE DUCTS: Normal caliber.  GALLBLADDER: Within normal limits.  SPLEEN: Within normal limits.  PANCREAS: Within normal limits.  ADRENALS: Within normal limits.  KIDNEYS/URETERS: Tiny nonobstructing stone in the right kidney.   Unremarkable left kidney.    BLADDER: Distended. Mild perivesical fat stranding.  REPRODUCTIVE ORGANS: Prostate is enlarged.    BOWEL: Colonic diverticulosis. No bowel obstruction. Appendix is normal.  PERITONEUM: No ascites.  VESSELS: Atherosclerotic changes.  RETROPERITONEUM/LYMPH NODES: 3.0 x 2.0 cm subserosal implant along the   left rectal wall. Numerous enlarged retroperitoneal and bilateral iliac   chain nodes.  ABDOMINAL WALL: Small fat-containing left inguinal hernia.  BONES: Scattered sclerotic lesions, predominantly in the spine and bony   pelvis.    IMPRESSION:    1. Scattered sclerotic osseous lesions, predominantly in the spine and   bony pelvis. Retroperitoneal and pelvic lymphadenopathy with   indeterminate mediastinal nodes. Findings most likely represent   metastatic prostate cancer, although other malignancies can have a   similar appearance. Please see the reports of recent spinal imaging   studies for further details regarding the spine.  2. Markedly distended bladder. Correlate for bladder outlet obstruction.     Oncology Consult Note    HPI:  73M w/ hx of IDDM, HTN and history of prostate cancer s/p biopsy in 2021 ([place] 4+3) who declined radiation therapy at that time, and since followed by urologist at [facility], p/w bilateral LE weakness, R hand paresthesias and recurrent falls. Pt was traveling in  and [place] and [place] to [place] after reporting being down for 2 days after fall 2/2 LE weakness. Pt reports that one week prior to presentation, he suffered a fall in the bathtub and struck his back. Pt was able to raise himself up and resume normal activity. Pt then developed paresthesias later that evening of his legs and arms b/l, and developed progressively worsening b/l LE weakness the follow day. Throughout the week, pt reports recurrent falls 2/2 LE weakness. This continued until two days prior to presentation where pt fell again, was unable to raise himself up. Pt reports that he was on the floor for 48 hours, before he was able to call [place] in the [place]. His [place] subsequently [place] back to  [place]. Pt denies LOC, head/neck injury, fever/chills/sweats, n/v.    In ED, pt afebrile and hemodynamically stable. Labs notable for BUN/SCr 38/1.51, Bili 1.5, Alk Phos 332, AST//148, CK 16,000. CT demonstrated multiple pathologic cervical, thoracic, and lumbar fractures, osseous metastatic disease, epidural tumor causing moderate spinal canal stenosis. LE US demonstrated b/l DVTs. Ortho was consulted and patient started on solumedrol.           Allergies    No Known Allergies    Intolerances        MEDICATIONS  (STANDING):  amLODIPine   Tablet 10 milliGRAM(s) Oral daily  aspirin  chewable 81 milliGRAM(s) Oral daily  atorvastatin 20 milliGRAM(s) Oral at bedtime  dextrose 5%. 1000 milliLiter(s) (100 mL/Hr) IV Continuous <Continuous>  dextrose 5%. 1000 milliLiter(s) (50 mL/Hr) IV Continuous <Continuous>  dextrose 50% Injectable 12.5 Gram(s) IV Push once  dextrose 50% Injectable 25 Gram(s) IV Push once  dextrose 50% Injectable 25 Gram(s) IV Push once  glucagon  Injectable 1 milliGRAM(s) IntraMuscular once  heparin   Injectable 7000 Unit(s) IV Push once  heparin  Infusion.  Unit(s)/Hr (16 mL/Hr) IV Continuous <Continuous>  influenza  Vaccine (HIGH DOSE) 0.7 milliLiter(s) IntraMuscular once  insulin glargine Injectable (LANTUS) 15 Unit(s) SubCutaneous every morning  insulin lispro (ADMELOG) corrective regimen sliding scale   SubCutaneous three times a day before meals  lactated ringers. 1000 milliLiter(s) (100 mL/Hr) IV Continuous <Continuous>  methylPREDNISolone sodium succinate Injectable 40 milliGRAM(s) IV Push every 8 hours  pantoprazole    Tablet 40 milliGRAM(s) Oral before breakfast  senna 2 Tablet(s) Oral at bedtime    MEDICATIONS  (PRN):  acetaminophen     Tablet .. 650 milliGRAM(s) Oral every 6 hours PRN Temp greater or equal to 38C (100.4F), Mild Pain (1 - 3)  aluminum hydroxide/magnesium hydroxide/simethicone Suspension 30 milliLiter(s) Oral every 4 hours PRN Dyspepsia  dextrose Oral Gel 15 Gram(s) Oral once PRN Blood Glucose LESS THAN 70 milliGRAM(s)/deciliter  heparin   Injectable 7000 Unit(s) IV Push every 6 hours PRN For aPTT less than 40  heparin   Injectable 3500 Unit(s) IV Push every 6 hours PRN For aPTT between 40 - 57  melatonin 3 milliGRAM(s) Oral at bedtime PRN Insomnia  ondansetron Injectable 4 milliGRAM(s) IV Push every 8 hours PRN Nausea and/or Vomiting  oxyCODONE    IR 5 milliGRAM(s) Oral every 4 hours PRN Severe Pain (7 - 10)  oxyCODONE    IR 2.5 milliGRAM(s) Oral every 4 hours PRN Moderate Pain (4 - 6)  polyethylene glycol 3350 17 Gram(s) Oral daily PRN Constipation      PAST MEDICAL & SURGICAL HISTORY:  Essential hypertension      No significant past surgical history          FAMILY HISTORY:      SOCIAL HISTORY: No EtOH, no tobacco    REVIEW OF SYSTEMS:    CONSTITUTIONAL: No weakness, fevers or chills  EYES/ENT: No visual changes;  No vertigo or throat pain   NECK: No pain or stiffness  RESPIRATORY: No cough, wheezing, hemoptysis; No shortness of breath  CARDIOVASCULAR: No chest pain or palpitations  GASTROINTESTINAL: No abdominal or epigastric pain. No nausea, vomiting, or hematemesis; No diarrhea or constipation. No melena or hematochezia.  GENITOURINARY: No dysuria, frequency or hematuria  NEUROLOGICAL: No numbness or weakness  SKIN: No itching, burning, rashes, or lesions   All other review of systems is negative unless indicated above.        T(F): 98.1 (10-24-23 @ 05:15), Max: 98.6 (10-23-23 @ 23:48)  HR: 86 (10-24-23 @ 05:15)  BP: 138/78 (10-24-23 @ 05:15)  RR: 18 (10-24-23 @ 05:15)  SpO2: 95% (10-24-23 @ 05:15)  Wt(kg): --    GENERAL: NAD, well-developed  HEAD:  Atraumatic, Normocephalic  EYES: EOMI, PERRLA, conjunctiva and sclera clear  NECK: Supple, No JVD  CHEST/LUNG: Clear to auscultation bilaterally; No wheeze  HEART: Regular rate and rhythm; No murmurs, rubs, or gallops  ABDOMEN: Soft, Nontender, Nondistended; Bowel sounds present  EXTREMITIES:  2+ Peripheral Pulses, No clubbing, cyanosis, or edema  NEUROLOGY: non-focal  SKIN: No rashes or lesions                          12.5   9.02  )-----------( 183      ( 24 Oct 2023 04:40 )             36.7       10-23    140  |  110<H>  |  30<H>  ----------------------------<  194<H>  3.7   |  25  |  1.01    Ca    8.1<L>      23 Oct 2023 06:20  Phos  2.3     10-23  Mg     2.4     10-23      TPro  6.4  /  Alb  3.0<L>  /  TBili  0.9  /  DBili  x   /  AST  128<H>  /  ALT  103<H>  /  AlkPhos  243<H>  10-23      PSA Total: 276.00  Creatine Kinase, Serum: 3089 U/L (10.23.23 @ 06:20)     < from: MR Cervical Spine No Cont (10.21.23 @ 23:53) >    FINDINGS: CT scans dated 10/21/2023 are available for review.    Cervical and thoracic vertebral alignment is intact.  Cervical and   thoracic vertebral body heights are maintained but minimally displaced   fracture of the C7 spinous process and nondisplaced fracture of the T1   spinous process are noted. Edema is seen within the C3-4 through C7-T1   interspinous ligaments consistent with ligamentous injury. Marrow signal   intensity within cervical vertebral bodies and posterior elements is   significantfor metastatic disease within the C7, T1 through T5 and T12   as well is L1-L3.  Epidural extension of neoplasm is noted at C7   ventrally and dorsally resulting in moderate cord compressionand   underlying edema and extension into the BILATERAL C7-T1 and T1-2 and LEFT   T2-3 neural foramina. Minimal epidural disease also noted in the LEFT   aspect of the spinal canal at T4 resulting in moderate canal stenosis and   minimal cord impingement. Minimal epidural disease also noted at the   RIGHT aspect of the T12 vertebral body without significant canal   narrowing. Metastatic disease noted scattered ribs.    Cervical and thoracic intervertebral discs demonstrate mild disc   degeneration at C5-C6 through T1-T2 as well as T8-9 through T10-11 with   loss of disc height and signal within the nucleus pulposus. There is   narrowing of the LEFT C2-3, RIGHT C3-4, RIGHT C4-5 neural foramina due to   uncovertebral spurring.    The cervical medullary junction remains intact.      IMPRESSION:  Minimallydisplaced fracture of the C7 spinous process and   nondisplaced fracture of the T1 spinous process are noted. Edema is seen   within the C3-4 through C7-T1 interspinous ligaments consistent with   ligamentous injury.    Metastatic disease within the C7, T1 through T5 and T12 as well is L1-L3.    Epidural extension of neoplasm is noted at C7 ventrally and dorsally   resulting in moderate cord compressionand underlying edema and extension   into the BILATERAL C7-T1 and T1-2 and LEFT T2-3 neural foramina. Minimal   epidural disease also noted in the LEFT aspect of the spinal canal at T4   resulting in moderate canal stenosis and minimal cord impingement.   Minimal epidural disease also noted at the RIGHT aspect of the T12   vertebral body without significant canal narrowing.      < from: MR Lumbar Spine w/wo IV Cont (10.22.23 @ 12:21) >  PROCEDURE DATE:  10/22/2023      INTERPRETATION:  Clinical indication: Suspected cord compression    MRI of the thoracic spine was performed using axial T1 and T2-weighted   sequence. The patient was injected with 9 cc gadovist IV and sagittal   T1-weighted performed with fat suppression. Axial T1-weighted sequences   were performed. Following injection of approximately 9 cc of gadovist IV   with 1 cc of contrast discarded.    This exam is compared with prior MRI of the thoracic spine performed on   October 21, 2021.    Abnormal lesions are seen involving the T1, T2, T3, T4 and T12 vertebral   bodies as well as suspected involvement of the posterior elements of T1   T2 T3 and T12 levels as well. These findings are likely compatible with   underlying metastasis. There is epidural extension of tumor is seen on   the left side at the T2 level which causes effacement of the ventral   thecal sac. Epidural extension of tumor seen T4 level which causes   effacement ventral thecal sac and spinal cord and left side. Involvement   of the left T4-5 neural foramen is seen as well.    Epidural extension is seen at the T5 level without significant, as the   spinal canal or neural foramen    The spinal cord demonstrate normal signal.    Evaluation of the paraspinal soft tissues demonstrate no abnormal areas   of enhancement seen.    IMPRESSION: Multiple osseous lesions compatible with metastasis. Some of   these lesions do demonstrate associated areas of abnormal enhancement    There is epidural extension of tumor described above.    MRI of the lumbar spine was performed sagittal T1-T2 and STIR sequences.   Axial T1 and T2-weighted sequences were performed. Patient was injected   with 9 cc gadovist IV with 1 cc of contrast discarded. Sagittal T1   sequence was performed with fat suppression. Axial T1-weighted sequence   was performed.    Loss of the normal lumbar lordosis.    Abnormal T1 prolongation seen involving the L3, L5 and right S1 vertebral   bodies. Abnormal lesions are seen involving both iliac bones. These   findings correspond sclerotic lesion seen on prior CT scan of the lumbar   spine performed on October 21, 2023    The lesion likely compatible with underlying metastasis. No epidural or   paraspinal extension of tumor is seen    Loss normal lumbar lordosis    Disc desiccation is seen involving the C3-4 L4-5 and L5-S1 levels   secondary to chronic    L1-2: Normal    L2-3: Normal    L3-4: Disc bulge and bilateral hypertrophic facet joint changes seen.   Mild to moderate narrowing of the right neural foramen    L4-5: Disc bulge and bilateral hypertrophic facet joint changes. Mild to   moderate narrowing of the spinal canal. Mild to moderate narrowing of   both neural foramen    L5-S1: Bilateral hypertrophic facet joint changes seen without   significant compromise of the spinal canal or either neural foramen    Evaluation of the paraspinal soft tissues appear normal.    IMPRESSION: Abnormal lesions as described above.          < from: MR Thoracic Spine w/ IV Cont (10.22.23 @ 12:21) >  ACC: [id number] EXAM:  MR SPINE THORACIC IC   ORDERED BY: IMTIAZ POPE     PROCEDURE DATE:  10/22/2023          INTERPRETATION:  Clinical indication: Suspected cord compression    MRI of the thoracic spine was performed using axial T1 and T2-weighted   sequence. The patient was injected with 9 cc gadovist IV and sagittal   T1-weighted performed with fat suppression. Axial T1-weighted sequences   were performed. Following injection of approximately 9 cc of gadovist IV   with 1 cc of contrast discarded.    This exam is compared with prior MRI of the thoracic spine performed on   October 21, 2021.    Abnormal lesions are seen involving the T1, T2, T3, T4 and T12 vertebral   bodies as well as suspected involvement of the posterior elements of T1   T2 T3 and T12 levels as well. These findings are likely compatible with   underlying metastasis. There is epidural extension of tumor is seen on   the left side at the T2 level which causes effacement of the ventral   thecal sac. Epidural extension of tumor seen T4 level which causes   effacement ventral thecal sac and spinal cord and left side. Involvement   of the left T4-5 neural foramen is seen as well.    Epidural extension is seen at the T5 level without significant, as the   spinal canal or neural foramen    The spinal cord demonstrate normal signal.    Evaluation of the paraspinal soft tissues demonstrate no abnormal areas   of enhancement seen.    IMPRESSION: Multiple osseous lesions compatible with metastasis. Some of   these lesions do demonstrate associated areas of abnormal enhancement    There is epidural extension of tumor described above.    MRI of the lumbar spine was performed sagittal T1-T2 and STIR sequences.   Axial T1 and T2-weighted sequences were performed. Patientwas injected   with 9 cc gadovist IV with 1 cc of contrast discarded. Sagittal T1   sequence was performed with fat suppression. Axial T1-weighted sequence   was performed.    Loss of the normal lumbar lordosis.    Abnormal T1 prolongation seen involving the L3, L5 and right S1 vertebral   bodies. Abnormal lesions are seen involving both iliac bones. These   findings correspond sclerotic lesion seen on prior CT scan of the lumbar   spine performed on October 21, 2023    The lesion likely compatible with underlying metastasis. No epidural or   paraspinal extension of tumor is seen    Loss normal lumbar lordosis    Disc desiccation is seen involving the C3-4 L4-5 and L5-S1 levels   secondary to chronic    L1-2: Normal    L2-3: Normal    L3-4: Disc bulge and bilateral hypertrophic facet joint changes seen.   Mild to moderate narrowing of the right neural foramen    L4-5: Disc bulge and bilateral hypertrophic facet joint changes. Mild to   moderate narrowing of the spinal canal. Mild to moderate narrowing of   both neural foramen    L5-S1: Bilateral hypertrophic facet joint changes seen without   significant compromise of the spinal canal or either neural foramen    Evaluation of the paraspinal soft tissues appear normal.    IMPRESSION: Abnormal lesions as described above.        < from: CT Chest No Cont (10.22.23 @ 02:08) >  ACC: [id number] EXAM:  CT CHEST   ORDERED BY: IMTIAZ POPE     ACC: [id number] EXAM:  CT ABDOMEN AND PELVIS   ORDERED BY: IMTIAZ POPE     PROCEDURE DATE:  10/22/2023          INTERPRETATION:  CLINICAL INFORMATION: Bilateral lower extremity   weakness. Recurrent falls. Suspected pathologic spinal fractures and   osseous metastases. Epidural tumor. History of prostate cancer.    COMPARISON: Lower extremity Doppler 10/22/2023; CT chest abdomen pelvis   7/25/2021    CONTRAST/COMPLICATIONS:  IV Contrast:None  Oral Contrast: None  Complications: None    PROCEDURE:  CT of the Chest, Abdomen and Pelvis was performed.  Sagittal and coronal reformats were performed.    FINDINGS:  CHEST:  LUNGS AND LARGE AIRWAYS: A few scattered micronodules. Evaluation of   lungs is slightly limited by motion artifact. Patent central airways. No   consolidation.  PLEURA: No pleural effusion.  VESSELS: Within normal limits.  HEART: Heart size is normal. No pericardial effusion.  MEDIASTINUM AND LUTHER: Borderline enlarged precarinal node and mildly   enlarged subcarinal node.  CHEST WALL AND LOWER NECK: Within normal limits.    ABDOMEN AND PELVIS:  LIVER: Within normal limits.  BILE DUCTS: Normal caliber.  GALLBLADDER: Within normal limits.  SPLEEN: Within normal limits.  PANCREAS: Within normal limits.  ADRENALS: Within normal limits.  KIDNEYS/URETERS: Tiny nonobstructing stone in the right kidney.   Unremarkable left kidney.    BLADDER: Distended. Mild perivesical fat stranding.  REPRODUCTIVE ORGANS: Prostate is enlarged.    BOWEL: Colonic diverticulosis. No bowel obstruction. Appendix is normal.  PERITONEUM: No ascites.  VESSELS: Atherosclerotic changes.  RETROPERITONEUM/LYMPH NODES: 3.0 x 2.0 cm subserosal implant along the   left rectal wall. Numerous enlarged retroperitoneal and bilateral iliac   chain nodes.  ABDOMINAL WALL: Small fat-containing left inguinal hernia.  BONES: Scattered sclerotic lesions, predominantly in the spine and bony   pelvis.    IMPRESSION:    1. Scattered sclerotic osseous lesions, predominantly in the spine and   bony pelvis. Retroperitoneal and pelvic lymphadenopathy with   indeterminate mediastinal nodes. Findings most likely represent   metastatic prostate cancer, although other malignancies can have a   similar appearance. Please see the reports of recent spinal imaging   studies for further details regarding the spine.  2. Markedly distended bladder. Correlate for bladder outlet obstruction.     Oncology Consult Note    HPI:  73M w/ hx of IDDM, HTN and history of prostate cancer s/p biopsy in 2021 ([place] 4+3) who declined radiation therapy at that time, and since followed by urologist at [facility], p/w bilateral LE weakness, R hand paresthesias and recurrent falls. Pt was traveling in  [place] to [place] after reporting being down for 2 days after fall 2/2 LE weakness. Pt reports that one week prior to presentation, he suffered a fall in the bathtub and struck his back. Pt was able to raise himself up and resume normal activity. Pt then developed paresthesias later that evening of his legs and arms b/l, and developed progressively worsening b/l LE weakness the follow day. Throughout the week, pt reports recurrent falls 2/2 LE weakness. This continued until two days prior to presentation where pt fell again, was unable to raise himself up. Pt reports that he was on the floor for 48 hours, before he was able to call his son in the [place]. His son subsequently [place] back to  [place] the [place]. Pt denies LOC, head/neck injury, fever/chills/sweats, n/v. Of note, patient was recommended to undergo PET CT for ongoing back pain in 9/23 however held off since pt was going to travel to .     In ED, pt afebrile and hemodynamically stable. Labs notable for BUN/SCr 38/1.51, Bili 1.5, Alk Phos 332, AST//148, CK 16,000. CT demonstrated multiple pathologic cervical, thoracic, and lumbar fractures, osseous metastatic disease, epidural tumor causing moderate spinal canal stenosis. LE US demonstrated b/l DVTs. Ortho was consulted and patient started on solumedrol.     Currently the patient feels better overall, but still has some pain in the posterior cervical spine and upper thoracic back areas. Patient seen by palliative care and current pain regimen has significantly improved his pain. Sensation and strength in his LE have improved this morning, now able to lift his legs more.       Oncologic history:   Per patient, he was diagnosed with prostate cancer in 2009, was followed by a urologist on [facility]  Most recently followed by Dr. Juan Jose Aquino, urologist at [facility] since 2021  Prostate biopsy in 2021 showed adenocarcinoma, [place] 3+4  Patient declined radiation therapy       No Known Allergies    Intolerances        MEDICATIONS  (STANDING):  amLODIPine   Tablet 10 milliGRAM(s) Oral daily  aspirin  chewable 81 milliGRAM(s) Oral daily  atorvastatin 20 milliGRAM(s) Oral at bedtime  dextrose 5%. 1000 milliLiter(s) (100 mL/Hr) IV Continuous <Continuous>  dextrose 5%. 1000 milliLiter(s) (50 mL/Hr) IV Continuous <Continuous>  dextrose 50% Injectable 12.5 Gram(s) IV Push once  dextrose 50% Injectable 25 Gram(s) IV Push once  dextrose 50% Injectable 25 Gram(s) IV Push once  glucagon  Injectable 1 milliGRAM(s) IntraMuscular once  heparin   Injectable 7000 Unit(s) IV Push once  heparin  Infusion.  Unit(s)/Hr (16 mL/Hr) IV Continuous <Continuous>  influenza  Vaccine (HIGH DOSE) 0.7 milliLiter(s) IntraMuscular once  insulin glargine Injectable (LANTUS) 15 Unit(s) SubCutaneous every morning  insulin lispro (ADMELOG) corrective regimen sliding scale   SubCutaneous three times a day before meals  lactated ringers. 1000 milliLiter(s) (100 mL/Hr) IV Continuous <Continuous>  methylPREDNISolone sodium succinate Injectable 40 milliGRAM(s) IV Push every 8 hours  pantoprazole    Tablet 40 milliGRAM(s) Oral before breakfast  senna 2 Tablet(s) Oral at bedtime    MEDICATIONS  (PRN):  acetaminophen     Tablet .. 650 milliGRAM(s) Oral every 6 hours PRN Temp greater or equal to 38C (100.4F), Mild Pain (1 - 3)  aluminum hydroxide/magnesium hydroxide/simethicone Suspension 30 milliLiter(s) Oral every 4 hours PRN Dyspepsia  dextrose Oral Gel 15 Gram(s) Oral once PRN Blood Glucose LESS THAN 70 milliGRAM(s)/deciliter  heparin   Injectable 7000 Unit(s) IV Push every 6 hours PRN For aPTT less than 40  heparin   Injectable 3500 Unit(s) IV Push every 6 hours PRN For aPTT between 40 - 57  melatonin 3 milliGRAM(s) Oral at bedtime PRN Insomnia  ondansetron Injectable 4 milliGRAM(s) IV Push every 8 hours PRN Nausea and/or Vomiting  oxyCODONE    IR 5 milliGRAM(s) Oral every 4 hours PRN Severe Pain (7 - 10)  oxyCODONE    IR 2.5 milliGRAM(s) Oral every 4 hours PRN Moderate Pain (4 - 6)  polyethylene glycol 3350 17 Gram(s) Oral daily PRN Constipation      PAST MEDICAL & SURGICAL HISTORY:  Essential hypertension      No significant past surgical history          FAMILY HISTORY:      SOCIAL HISTORY: No EtOH, no tobacco    REVIEW OF SYSTEMS:    CONSTITUTIONAL: No weakness, fevers or chills  EYES/ENT: No visual changes;  No vertigo or throat pain   NECK: No pain or stiffness  RESPIRATORY: No cough, wheezing, hemoptysis; No shortness of breath  CARDIOVASCULAR: No chest pain or palpitations  GASTROINTESTINAL: No abdominal or epigastric pain. No nausea, vomiting, or hematemesis; No diarrhea or constipation. No melena or hematochezia.  GENITOURINARY: No dysuria, frequency or hematuria  NEUROLOGICAL: No numbness or weakness  SKIN: No itching, burning, rashes, or lesions   All other review of systems is negative unless indicated above.        T(F): 98.1 (10-24-23 @ 05:15), Max: 98.6 (10-23-23 @ 23:48)  HR: 86 (10-24-23 @ 05:15)  BP: 138/78 (10-24-23 @ 05:15)  RR: 18 (10-24-23 @ 05:15)  SpO2: 95% (10-24-23 @ 05:15)  Wt(kg): --    GENERAL: NAD, well-developed  HEAD:  Atraumatic, Normocephalic  EYES: EOMI, PERRLA, conjunctiva and sclera clear  NECK: Supple, No JVD  CHEST/LUNG: normal respiratory effort   HEART: Regular rate and rhythm  ABDOMEN: Soft, Nontender, Nondistended  EXTREMITIES:  no edema  NEUROLOGY: LE with strength 3/5 left side and 2/5 right side; R 4th and 5th fingers with paresthesias                           12.5   9.02  )-----------( 183      ( 24 Oct 2023 04:40 )             36.7       10-23    140  |  110<H>  |  30<H>  ----------------------------<  194<H>  3.7   |  25  |  1.01    Ca    8.1<L>      23 Oct 2023 06:20  Phos  2.3     10-23  Mg     2.4     10-23      TPro  6.4  /  Alb  3.0<L>  /  TBili  0.9  /  DBili  x   /  AST  128<H>  /  ALT  103<H>  /  AlkPhos  243<H>  10-23      PSA Total: 276.00  Creatine Kinase, Serum: 3089 U/L (10.23.23 @ 06:20)     < from: MR Cervical Spine No Cont (10.21.23 @ 23:53) >    FINDINGS: CT scans dated 10/21/2023 are available for review.    Cervical and thoracic vertebral alignment is intact.  Cervical and   thoracic vertebral body heights are maintained but minimally displaced   fracture of the C7 spinous process and nondisplaced fracture of the T1   spinous process are noted. Edema is seen within the C3-4 through C7-T1   interspinous ligaments consistent with ligamentous injury. Marrow signal   intensity within cervical vertebral bodies and posterior elements is   significantfor metastatic disease within the C7, T1 through T5 and T12   as well is L1-L3.  Epidural extension of neoplasm is noted at C7   ventrally and dorsally resulting in moderate cord compressionand   underlying edema and extension into the BILATERAL C7-T1 and T1-2 and LEFT   T2-3 neural foramina. Minimal epidural disease also noted in the LEFT   aspect of the spinal canal at T4 resulting in moderate canal stenosis and   minimal cord impingement. Minimal epidural disease also noted at the   RIGHT aspect of the T12 vertebral body without significant canal   narrowing. Metastatic disease noted scattered ribs.    Cervical and thoracic intervertebral discs demonstrate mild disc   degeneration at C5-C6 through T1-T2 as well as T8-9 through T10-11 with   loss of disc height and signal within the nucleus pulposus. There is   narrowing of the LEFT C2-3, RIGHT C3-4, RIGHT C4-5 neural foramina due to   uncovertebral spurring.    The cervical medullary junction remains intact.      IMPRESSION:  Minimallydisplaced fracture of the C7 spinous process and   nondisplaced fracture of the T1 spinous process are noted. Edema is seen   within the C3-4 through C7-T1 interspinous ligaments consistent with   ligamentous injury.    Metastatic disease within the C7, T1 through T5 and T12 as well is L1-L3.    Epidural extension of neoplasm is noted at C7 ventrally and dorsally   resulting in moderate cord compressionand underlying edema and extension   into the BILATERAL C7-T1 and T1-2 and LEFT T2-3 neural foramina. Minimal   epidural disease also noted in the LEFT aspect of the spinal canal at T4   resulting in moderate canal stenosis and minimal cord impingement.   Minimal epidural disease also noted at the RIGHT aspect of the T12   vertebral body without significant canal narrowing.      < from: MR Lumbar Spine w/wo IV Cont (10.22.23 @ 12:21) >  PROCEDURE DATE:  10/22/2023      INTERPRETATION:  Clinical indication: Suspected cord compression    MRI of the thoracic spine was performed using axial T1 and T2-weighted   sequence. The patient was injected with 9 cc gadovist IV and sagittal   T1-weighted performed with fat suppression. Axial T1-weighted sequences   were performed. Following injection of approximately 9 cc of gadovist IV   with 1 cc of contrast discarded.    This exam is compared with prior MRI of the thoracic spine performed on   October 21, 2021.    Abnormal lesions are seen involving the T1, T2, T3, T4 and T12 vertebral   bodies as well as suspected involvement of the posterior elements of T1   T2 T3 and T12 levels as well. These findings are likely compatible with   underlying metastasis. There is epidural extension of tumor is seen on   the left side at the T2 level which causes effacement of the ventral   thecal sac. Epidural extension of tumor seen T4 level which causes   effacement ventral thecal sac and spinal cord and left side. Involvement   of the left T4-5 neural foramen is seen as well.    Epidural extension is seen at the T5 level without significant, as the   spinal canal or neural foramen    The spinal cord demonstrate normal signal.    Evaluation of the paraspinal soft tissues demonstrate no abnormal areas   of enhancement seen.    IMPRESSION: Multiple osseous lesions compatible with metastasis. Some of   these lesions do demonstrate associated areas of abnormal enhancement    There is epidural extension of tumor described above.    MRI of the lumbar spine was performed sagittal T1-T2 and STIR sequences.   Axial T1 and T2-weighted sequences were performed. Patient was injected   with 9 cc gadovist IV with 1 cc of contrast discarded. Sagittal T1   sequence was performed with fat suppression. Axial T1-weighted sequence   was performed.    Loss of the normal lumbar lordosis.    Abnormal T1 prolongation seen involving the L3, L5 and right S1 vertebral   bodies. Abnormal lesions are seen involving both iliac bones. These   findings correspond sclerotic lesion seen on prior CT scan of the lumbar   spine performed on October 21, 2023    The lesion likely compatible with underlying metastasis. No epidural or   paraspinal extension of tumor is seen    Loss normal lumbar lordosis    Disc desiccation is seen involving the C3-4 L4-5 and L5-S1 levels   secondary to chronic    L1-2: Normal    L2-3: Normal    L3-4: Disc bulge and bilateral hypertrophic facet joint changes seen.   Mild to moderate narrowing of the right neural foramen    L4-5: Disc bulge and bilateral hypertrophic facet joint changes. Mild to   moderate narrowing of the spinal canal. Mild to moderate narrowing of   both neural foramen    L5-S1: Bilateral hypertrophic facet joint changes seen without   significant compromise of the spinal canal or either neural foramen    Evaluation of the paraspinal soft tissues appear normal.    IMPRESSION: Abnormal lesions as described above.          < from: MR Thoracic Spine w/ IV Cont (10.22.23 @ 12:21) >  ACC: [id number] EXAM:  MR SPINE THORACIC IC   ORDERED BY: IMTIAZ POPE     PROCEDURE DATE:  10/22/2023          INTERPRETATION:  Clinical indication: Suspected cord compression    MRI of the thoracic spine was performed using axial T1 and T2-weighted   sequence. The patient was injected with 9 cc gadovist IV and sagittal   T1-weighted performed with fat suppression. Axial T1-weighted sequences   were performed. Following injection of approximately 9 cc of gadovist IV   with 1 cc of contrast discarded.    This exam is compared with prior MRI of the thoracic spine performed on   October 21, 2021.    Abnormal lesions are seen involving the T1, T2, T3, T4 and T12 vertebral   bodies as well as suspected involvement of the posterior elements of T1   T2 T3 and T12 levels as well. These findings are likely compatible with   underlying metastasis. There is epidural extension of tumor is seen on   the left side at the T2 level which causes effacement of the ventral   thecal sac. Epidural extension of tumor seen T4 level which causes   effacement ventral thecal sac and spinal cord and left side. Involvement   of the left T4-5 neural foramen is seen as well.    Epidural extension is seen at the T5 level without significant, as the   spinal canal or neural foramen    The spinal cord demonstrate normal signal.    Evaluation of the paraspinal soft tissues demonstrate no abnormal areas   of enhancement seen.    IMPRESSION: Multiple osseous lesions compatible with metastasis. Some of   these lesions do demonstrate associated areas of abnormal enhancement    There is epidural extension of tumor described above.    MRI of the lumbar spine was performed sagittal T1-T2 and STIR sequences.   Axial T1 and T2-weighted sequences were performed. Patientwas injected   with 9 cc gadovist IV with 1 cc of contrast discarded. Sagittal T1   sequence was performed with fat suppression. Axial T1-weighted sequence   was performed.    Loss of the normal lumbar lordosis.    Abnormal T1 prolongation seen involving the L3, L5 and right S1 vertebral   bodies. Abnormal lesions are seen involving both iliac bones. These   findings correspond sclerotic lesion seen on prior CT scan of the lumbar   spine performed on October 21, 2023    The lesion likely compatible with underlying metastasis. No epidural or   paraspinal extension of tumor is seen    Loss normal lumbar lordosis    Disc desiccation is seen involving the C3-4 L4-5 and L5-S1 levels   secondary to chronic    L1-2: Normal    L2-3: Normal    L3-4: Disc bulge and bilateral hypertrophic facet joint changes seen.   Mild to moderate narrowing of the right neural foramen    L4-5: Disc bulge and bilateral hypertrophic facet joint changes. Mild to   moderate narrowing of the spinal canal. Mild to moderate narrowing of   both neural foramen    L5-S1: Bilateral hypertrophic facet joint changes seen without   significant compromise of the spinal canal or either neural foramen    Evaluation of the paraspinal soft tissues appear normal.    IMPRESSION: Abnormal lesions as described above.        < from: CT Chest No Cont (10.22.23 @ 02:08) >  ACC: [id number] EXAM:  CT CHEST   ORDERED BY: IMTIAZ POPE     ACC: [id number] EXAM:  CT ABDOMEN AND PELVIS   ORDERED BY: IMTIAZ POPE     PROCEDURE DATE:  10/22/2023          INTERPRETATION:  CLINICAL INFORMATION: Bilateral lower extremity   weakness. Recurrent falls. Suspected pathologic spinal fractures and   osseous metastases. Epidural tumor. History of prostate cancer.    COMPARISON: Lower extremity Doppler 10/22/2023; CT chest abdomen pelvis   7/25/2021    CONTRAST/COMPLICATIONS:  IV Contrast:None  Oral Contrast: None  Complications: None    PROCEDURE:  CT of the Chest, Abdomen and Pelvis was performed.  Sagittal and coronal reformats were performed.    FINDINGS:  CHEST:  LUNGS AND LARGE AIRWAYS: A few scattered micronodules. Evaluation of   lungs is slightly limited by motion artifact. Patent central airways. No   consolidation.  PLEURA: No pleural effusion.  VESSELS: Within normal limits.  HEART: Heart size is normal. No pericardial effusion.  MEDIASTINUM AND LUTHER: Borderline enlarged precarinal node and mildly   enlarged subcarinal node.  CHEST WALL AND LOWER NECK: Within normal limits.    ABDOMEN AND PELVIS:  LIVER: Within normal limits.  BILE DUCTS: Normal caliber.  GALLBLADDER: Within normal limits.  SPLEEN: Within normal limits.  PANCREAS: Within normal limits.  ADRENALS: Within normal limits.  KIDNEYS/URETERS: Tiny nonobstructing stone in the right kidney.   Unremarkable left kidney.    BLADDER: Distended. Mild perivesical fat stranding.  REPRODUCTIVE ORGANS: Prostate is enlarged.    BOWEL: Colonic diverticulosis. No bowel obstruction. Appendix is normal.  PERITONEUM: No ascites.  VESSELS: Atherosclerotic changes.  RETROPERITONEUM/LYMPH NODES: 3.0 x 2.0 cm subserosal implant along the   left rectal wall. Numerous enlarged retroperitoneal and bilateral iliac   chain nodes.  ABDOMINAL WALL: Small fat-containing left inguinal hernia.  BONES: Scattered sclerotic lesions, predominantly in the spine and bony   pelvis.    IMPRESSION:    1. Scattered sclerotic osseous lesions, predominantly in the spine and   bony pelvis. Retroperitoneal and pelvic lymphadenopathy with   indeterminate mediastinal nodes. Findings most likely represent   metastatic prostate cancer, although other malignancies can have a   similar appearance. Please see the reports of recent spinal imaging   studies for further details regarding the spine.  2. Markedly distended bladder. Correlate for bladder outlet obstruction.

## 2023-10-24 NOTE — ACUTE INTERFACILITY TRANSFER NOTE - HOSPITAL COURSE
73M w/ hx of IDDM, HTN p/w b/l LE weakness and recurrent falls c/f spinal cord compression in setting of lytic metastatic disease. Also found to have evidence of rhabdomyolysis w/ SRUTHI and elevated transaminases, and b/l LE DVTs on US. Spinal cord compression with multilevel spine metastatic disease unconfirmed primary source but possibly prostate cancer. Consulted and discussed with ortho, oncology, and palliative, poor prognosis. Consulted with heme-onc, Dr. Mathews which recommended transfer [facility] for radiation oncology and neurosurgery eval.

## 2023-10-25 LAB
% GAMMA, URINE: 9.8 % — SIGNIFICANT CHANGE UP
% GAMMA, URINE: 9.8 % — SIGNIFICANT CHANGE UP
ALBUMIN 24H MFR UR ELPH: 23 % — SIGNIFICANT CHANGE UP
ALBUMIN 24H MFR UR ELPH: 23 % — SIGNIFICANT CHANGE UP
ALPHA1 GLOB 24H MFR UR ELPH: 33.9 % — SIGNIFICANT CHANGE UP
ALPHA1 GLOB 24H MFR UR ELPH: 33.9 % — SIGNIFICANT CHANGE UP
ALPHA2 GLOB 24H MFR UR ELPH: 17.6 % — SIGNIFICANT CHANGE UP
ALPHA2 GLOB 24H MFR UR ELPH: 17.6 % — SIGNIFICANT CHANGE UP
APTT BLD: 149.7 SEC — CRITICAL HIGH (ref 24.5–35.6)
APTT BLD: 149.7 SEC — CRITICAL HIGH (ref 24.5–35.6)
APTT BLD: 65.7 SEC — HIGH (ref 24.5–35.6)
APTT BLD: 65.7 SEC — HIGH (ref 24.5–35.6)
B-GLOBULIN 24H MFR UR ELPH: 15.7 % — SIGNIFICANT CHANGE UP
B-GLOBULIN 24H MFR UR ELPH: 15.7 % — SIGNIFICANT CHANGE UP
CK SERPL-CCNC: 511 U/L — HIGH (ref 26–308)
CK SERPL-CCNC: 511 U/L — HIGH (ref 26–308)
COLLECT DURATION TIME UR: 24 HR — SIGNIFICANT CHANGE UP
COLLECT DURATION TIME UR: 24 HR — SIGNIFICANT CHANGE UP
GLUCOSE BLDC GLUCOMTR-MCNC: 183 MG/DL — HIGH (ref 70–99)
GLUCOSE BLDC GLUCOMTR-MCNC: 183 MG/DL — HIGH (ref 70–99)
GLUCOSE BLDC GLUCOMTR-MCNC: 195 MG/DL — HIGH (ref 70–99)
GLUCOSE BLDC GLUCOMTR-MCNC: 195 MG/DL — HIGH (ref 70–99)
GLUCOSE BLDC GLUCOMTR-MCNC: 256 MG/DL — HIGH (ref 70–99)
GLUCOSE BLDC GLUCOMTR-MCNC: 256 MG/DL — HIGH (ref 70–99)
HCT VFR BLD CALC: 36.2 % — LOW (ref 39–50)
HCT VFR BLD CALC: 36.2 % — LOW (ref 39–50)
HGB BLD-MCNC: 12.6 G/DL — LOW (ref 13–17)
HGB BLD-MCNC: 12.6 G/DL — LOW (ref 13–17)
INTERPRETATION 24H UR IFE-IMP: SIGNIFICANT CHANGE UP
INTERPRETATION 24H UR IFE-IMP: SIGNIFICANT CHANGE UP
M PROTEIN 24H UR ELPH-MRATE: 0 % — SIGNIFICANT CHANGE UP
M PROTEIN 24H UR ELPH-MRATE: 0 % — SIGNIFICANT CHANGE UP
M PROTEIN 24H UR ELPH-MRATE: 0 MG/24HR — SIGNIFICANT CHANGE UP (ref 0–0)
M PROTEIN 24H UR ELPH-MRATE: 0 MG/24HR — SIGNIFICANT CHANGE UP (ref 0–0)
M PROTEIN 24H UR ELPH-MRATE: 0 MG/DL — SIGNIFICANT CHANGE UP
M PROTEIN 24H UR ELPH-MRATE: 0 MG/DL — SIGNIFICANT CHANGE UP
MCHC RBC-ENTMCNC: 30.4 PG — SIGNIFICANT CHANGE UP (ref 27–34)
MCHC RBC-ENTMCNC: 30.4 PG — SIGNIFICANT CHANGE UP (ref 27–34)
MCHC RBC-ENTMCNC: 34.8 G/DL — SIGNIFICANT CHANGE UP (ref 32–36)
MCHC RBC-ENTMCNC: 34.8 G/DL — SIGNIFICANT CHANGE UP (ref 32–36)
MCV RBC AUTO: 87.4 FL — SIGNIFICANT CHANGE UP (ref 80–100)
MCV RBC AUTO: 87.4 FL — SIGNIFICANT CHANGE UP (ref 80–100)
NRBC # BLD: 0 /100 WBCS — SIGNIFICANT CHANGE UP (ref 0–0)
NRBC # BLD: 0 /100 WBCS — SIGNIFICANT CHANGE UP (ref 0–0)
PLATELET # BLD AUTO: 207 K/UL — SIGNIFICANT CHANGE UP (ref 150–400)
PLATELET # BLD AUTO: 207 K/UL — SIGNIFICANT CHANGE UP (ref 150–400)
PROT ?TM UR-MCNC: 12 MG/DL — SIGNIFICANT CHANGE UP (ref 0–12)
PROT ?TM UR-MCNC: 12 MG/DL — SIGNIFICANT CHANGE UP (ref 0–12)
PROT PATTERN 24H UR ELPH-IMP: SIGNIFICANT CHANGE UP
PROT PATTERN 24H UR ELPH-IMP: SIGNIFICANT CHANGE UP
PROTEIN QUANT CALC, URINE: 342 MG/24 H — HIGH (ref 50–100)
PROTEIN QUANT CALC, URINE: 342 MG/24 H — HIGH (ref 50–100)
RBC # BLD: 4.14 M/UL — LOW (ref 4.2–5.8)
RBC # BLD: 4.14 M/UL — LOW (ref 4.2–5.8)
RBC # FLD: 11.9 % — SIGNIFICANT CHANGE UP (ref 10.3–14.5)
RBC # FLD: 11.9 % — SIGNIFICANT CHANGE UP (ref 10.3–14.5)
TOTAL VOLUME - 24 HOUR: 2850 ML — SIGNIFICANT CHANGE UP
TOTAL VOLUME - 24 HOUR: 2850 ML — SIGNIFICANT CHANGE UP
URINE CREATININE CALCULATION: 2 G/24 H — SIGNIFICANT CHANGE UP (ref 1–2)
URINE CREATININE CALCULATION: 2 G/24 H — SIGNIFICANT CHANGE UP (ref 1–2)
WBC # BLD: 9.2 K/UL — SIGNIFICANT CHANGE UP (ref 3.8–10.5)
WBC # BLD: 9.2 K/UL — SIGNIFICANT CHANGE UP (ref 3.8–10.5)
WBC # FLD AUTO: 9.2 K/UL — SIGNIFICANT CHANGE UP (ref 3.8–10.5)
WBC # FLD AUTO: 9.2 K/UL — SIGNIFICANT CHANGE UP (ref 3.8–10.5)

## 2023-10-25 PROCEDURE — 99233 SBSQ HOSP IP/OBS HIGH 50: CPT

## 2023-10-25 PROCEDURE — 99232 SBSQ HOSP IP/OBS MODERATE 35: CPT

## 2023-10-25 RX ADMIN — HEPARIN SODIUM 1100 UNIT(S)/HR: 5000 INJECTION INTRAVENOUS; SUBCUTANEOUS at 12:20

## 2023-10-25 RX ADMIN — INSULIN GLARGINE 15 UNIT(S): 100 INJECTION, SOLUTION SUBCUTANEOUS at 09:00

## 2023-10-25 RX ADMIN — ATORVASTATIN CALCIUM 20 MILLIGRAM(S): 80 TABLET, FILM COATED ORAL at 22:02

## 2023-10-25 RX ADMIN — HEPARIN SODIUM 1100 UNIT(S)/HR: 5000 INJECTION INTRAVENOUS; SUBCUTANEOUS at 19:38

## 2023-10-25 RX ADMIN — AMLODIPINE BESYLATE 10 MILLIGRAM(S): 2.5 TABLET ORAL at 05:12

## 2023-10-25 RX ADMIN — Medication 81 MILLIGRAM(S): at 16:00

## 2023-10-25 RX ADMIN — PANTOPRAZOLE SODIUM 40 MILLIGRAM(S): 20 TABLET, DELAYED RELEASE ORAL at 08:53

## 2023-10-25 RX ADMIN — HEPARIN SODIUM 0 UNIT(S)/HR: 5000 INJECTION INTRAVENOUS; SUBCUTANEOUS at 07:51

## 2023-10-25 RX ADMIN — HEPARIN SODIUM 1100 UNIT(S)/HR: 5000 INJECTION INTRAVENOUS; SUBCUTANEOUS at 08:56

## 2023-10-25 RX ADMIN — HEPARIN SODIUM 1400 UNIT(S)/HR: 5000 INJECTION INTRAVENOUS; SUBCUTANEOUS at 07:49

## 2023-10-25 RX ADMIN — Medication 40 MILLIGRAM(S): at 16:00

## 2023-10-25 RX ADMIN — Medication 40 MILLIGRAM(S): at 05:11

## 2023-10-25 RX ADMIN — Medication 2: at 15:59

## 2023-10-25 RX ADMIN — Medication 40 MILLIGRAM(S): at 22:02

## 2023-10-25 RX ADMIN — HEPARIN SODIUM 1100 UNIT(S)/HR: 5000 INJECTION INTRAVENOUS; SUBCUTANEOUS at 18:03

## 2023-10-25 RX ADMIN — SODIUM CHLORIDE 100 MILLILITER(S): 9 INJECTION, SOLUTION INTRAVENOUS at 05:11

## 2023-10-25 RX ADMIN — Medication 2: at 08:54

## 2023-10-25 RX ADMIN — Medication 6: at 12:18

## 2023-10-25 RX ADMIN — SENNA PLUS 2 TABLET(S): 8.6 TABLET ORAL at 22:02

## 2023-10-25 NOTE — PROGRESS NOTE ADULT - PROBLEM SELECTOR PLAN 1
previously foll by Dr Aquino, last seen 2021, bx showed adenoCA [place] 3+4, declined radiation treatment, recomm for PET CT by his internist on last visit for ongoing back pain 9/23 however held off since pt was going to . . Had possible rectosigmoid lesion vs LAD from prostate CA on previous imaging, current noncon CT shows rectal LAD. Has not received previous treatment for prostate CA. Appreciate onc Dr Mathews input, recommended to start degarelix once LFTs improved, transfer to [facility] for rad/onc, NS eval. Transfer pending

## 2023-10-25 NOTE — PROGRESS NOTE ADULT - SUBJECTIVE AND OBJECTIVE BOX
CHIEF COMPLAINT: FU of probable prostate cancer with metastatic disease  feeling improvement in his legs,  no fever  no n/v/d  no chest pain or sob       PHYSICAL EXAM:    GENERAL: thinly built, no acute distress   CHEST/LUNG:  No wheezing, no crackles   HEART: Regular rate and rhythm; No murmurs  ABDOMEN: Soft, Nontender, Nondistended; Bowel sounds present  EXTREMITIES:  No clubbing, cyanosis, or edema. able to bend his legs   Psychiatry: AAO x 3, mood is appropriate       OBJECTIVE DATA:       Vital Signs Last 24 Hrs  T(C): 36.8 (25 Oct 2023 05:25), Max: 36.9 (24 Oct 2023 23:30)  T(F): 98.2 (25 Oct 2023 05:25), Max: 98.4 (24 Oct 2023 23:30)  HR: 57 (25 Oct 2023 05:25) (57 - 83)  BP: 129/79 (25 Oct 2023 05:25) (129/79 - 151/59)  BP(mean): --  RR: 18 (25 Oct 2023 05:25) (18 - 18)  SpO2: 93% (25 Oct 2023 05:25) (93% - 98%)    Parameters below as of 25 Oct 2023 05:25  Patient On (Oxygen Delivery Method): room air               Daily     Daily   LABS:                        12.6   9.20  )-----------( 207      ( 25 Oct 2023 05:50 )             36.2                             PTT - ( 25 Oct 2023 05:50 )  PTT:149.7 sec     CARDIAC MARKERS ( 25 Oct 2023 05:50 )  x     / x     / 511 U/L / x     / x      CARDIAC MARKERS ( 24 Oct 2023 04:40 )  x     / x     / 1328 U/L / x     / x          CAPILLARY BLOOD GLUCOSE      POCT Blood Glucose.: 183 mg/dL (25 Oct 2023 08:33)      MEDICATIONS  (STANDING):  amLODIPine   Tablet 10 milliGRAM(s) Oral daily  aspirin  chewable 81 milliGRAM(s) Oral daily  atorvastatin 20 milliGRAM(s) Oral at bedtime  dextrose 5%. 1000 milliLiter(s) (50 mL/Hr) IV Continuous <Continuous>  dextrose 5%. 1000 milliLiter(s) (100 mL/Hr) IV Continuous <Continuous>  dextrose 50% Injectable 25 Gram(s) IV Push once  dextrose 50% Injectable 12.5 Gram(s) IV Push once  dextrose 50% Injectable 25 Gram(s) IV Push once  glucagon  Injectable 1 milliGRAM(s) IntraMuscular once  heparin   Injectable 7000 Unit(s) IV Push once  heparin  Infusion.  Unit(s)/Hr (16 mL/Hr) IV Continuous <Continuous>  influenza  Vaccine (HIGH DOSE) 0.7 milliLiter(s) IntraMuscular once  insulin glargine Injectable (LANTUS) 15 Unit(s) SubCutaneous every morning  insulin lispro (ADMELOG) corrective regimen sliding scale   SubCutaneous three times a day before meals  lactated ringers. 1000 milliLiter(s) (100 mL/Hr) IV Continuous <Continuous>  methylPREDNISolone sodium succinate Injectable 40 milliGRAM(s) IV Push every 8 hours  pantoprazole    Tablet 40 milliGRAM(s) Oral before breakfast  senna 2 Tablet(s) Oral at bedtime    MEDICATIONS  (PRN):  acetaminophen     Tablet .. 650 milliGRAM(s) Oral every 6 hours PRN Temp greater or equal to 38C (100.4F), Mild Pain (1 - 3)  aluminum hydroxide/magnesium hydroxide/simethicone Suspension 30 milliLiter(s) Oral every 4 hours PRN Dyspepsia  dextrose Oral Gel 15 Gram(s) Oral once PRN Blood Glucose LESS THAN 70 milliGRAM(s)/deciliter  heparin   Injectable 3500 Unit(s) IV Push every 6 hours PRN For aPTT between 40 - 57  heparin   Injectable 7000 Unit(s) IV Push every 6 hours PRN For aPTT less than 40  melatonin 3 milliGRAM(s) Oral at bedtime PRN Insomnia  ondansetron Injectable 4 milliGRAM(s) IV Push every 8 hours PRN Nausea and/or Vomiting  oxyCODONE    IR 5 milliGRAM(s) Oral every 4 hours PRN Severe Pain (7 - 10)  oxyCODONE    IR 2.5 milliGRAM(s) Oral every 4 hours PRN Moderate Pain (4 - 6)  polyethylene glycol 3350 17 Gram(s) Oral daily PRN Constipation

## 2023-10-25 NOTE — PROGRESS NOTE ADULT - PROBLEM SELECTOR PLAN 5
, has 4 children, named son Juan Jose and daughter Jamee as HCP. Hx prostate CA, never treated, patient ambulatory up until 1 wk ago. Prognosis will depend on his response to treatment. Patient amenable to treatment options. Onc flor appreciated, for transfer to [facility] for rad/onc, NS eval, transfer pending.

## 2023-10-25 NOTE — PROGRESS NOTE ADULT - PROBLEM SELECTOR PLAN 2
with progressive weakness, saddle anesthesia, urinary retention s/p best, with spinal mets likely from prostate CA. MRI findings as above. Concern for spinal cord compression, on steroids.  Ortho eval noted.  Ambulatory up until 1 wk ago. Apprec onc input, recommended for transfer for rad/onc, NS eval, transfer pending. Pt reports improving strength in LE, on steroids.     Pain control-  controlled at present  Acetaminophen prn  on steroids  oxycodone IR 2.5 mg po q4h prn mod pain  oxycodone IR 5mg po q4h prn severe pain  bowel regimen on opioids

## 2023-10-25 NOTE — PROGRESS NOTE ADULT - SUBJECTIVE AND OBJECTIVE BOX
follow up on:  complex medical decision making related to goals of care      OVERNIGHT EVENTS: patient reports pain improved, better mobility of legs, had BM yesterday    Review of systems:     Pain:  [ ] yes [x ] no  QOL impact -   Location -                    Aggravating factors -  Quality -  Radiation -  Timing-  Severity (0-10 scale):  Minimal acceptable level (0-10 scale):     Dyspnea:      denies                        Anxiety:         denies                      Depression:   denies  Fatigue:      denies                         Nausea:      denies                         Loss of appetite:    denies            Constipation:     denies             Diarrhea:     denies    All other systems reviewed and negative      MEDICATIONS  (STANDING):  amLODIPine   Tablet 10 milliGRAM(s) Oral daily  aspirin  chewable 81 milliGRAM(s) Oral daily  atorvastatin 20 milliGRAM(s) Oral at bedtime  dextrose 5%. 1000 milliLiter(s) (100 mL/Hr) IV Continuous <Continuous>  dextrose 5%. 1000 milliLiter(s) (50 mL/Hr) IV Continuous <Continuous>  dextrose 50% Injectable 12.5 Gram(s) IV Push once  dextrose 50% Injectable 25 Gram(s) IV Push once  dextrose 50% Injectable 25 Gram(s) IV Push once  glucagon  Injectable 1 milliGRAM(s) IntraMuscular once  heparin   Injectable 7000 Unit(s) IV Push once  heparin  Infusion.  Unit(s)/Hr (16 mL/Hr) IV Continuous <Continuous>  influenza  Vaccine (HIGH DOSE) 0.7 milliLiter(s) IntraMuscular once  insulin glargine Injectable (LANTUS) 15 Unit(s) SubCutaneous every morning  insulin lispro (ADMELOG) corrective regimen sliding scale   SubCutaneous three times a day before meals  lactated ringers. 1000 milliLiter(s) (100 mL/Hr) IV Continuous <Continuous>  methylPREDNISolone sodium succinate Injectable 40 milliGRAM(s) IV Push every 8 hours  pantoprazole    Tablet 40 milliGRAM(s) Oral before breakfast  senna 2 Tablet(s) Oral at bedtime    MEDICATIONS  (PRN):  acetaminophen     Tablet .. 650 milliGRAM(s) Oral every 6 hours PRN Temp greater or equal to 38C (100.4F), Mild Pain (1 - 3)  aluminum hydroxide/magnesium hydroxide/simethicone Suspension 30 milliLiter(s) Oral every 4 hours PRN Dyspepsia  dextrose Oral Gel 15 Gram(s) Oral once PRN Blood Glucose LESS THAN 70 milliGRAM(s)/deciliter  heparin   Injectable 7000 Unit(s) IV Push every 6 hours PRN For aPTT less than 40  heparin   Injectable 3500 Unit(s) IV Push every 6 hours PRN For aPTT between 40 - 57  melatonin 3 milliGRAM(s) Oral at bedtime PRN Insomnia  ondansetron Injectable 4 milliGRAM(s) IV Push every 8 hours PRN Nausea and/or Vomiting  oxyCODONE    IR 5 milliGRAM(s) Oral every 4 hours PRN Severe Pain (7 - 10)  oxyCODONE    IR 2.5 milliGRAM(s) Oral every 4 hours PRN Moderate Pain (4 - 6)  polyethylene glycol 3350 17 Gram(s) Oral daily PRN Constipation      PHYSICAL EXAM:  Vital Signs Last 24 Hrs  T(C): 36.9 (25 Oct 2023 12:05), Max: 36.9 (24 Oct 2023 23:30)  T(F): 98.4 (25 Oct 2023 12:05), Max: 98.4 (24 Oct 2023 23:30)  HR: 65 (25 Oct 2023 12:05) (57 - 83)  BP: 127/76 (25 Oct 2023 12:05) (127/76 - 151/59)  BP(mean): --  RR: 18 (25 Oct 2023 12:05) (18 - 18)  SpO2: 94% (25 Oct 2023 12:05) (93% - 98%)    Parameters below as of 25 Oct 2023 12:05  Patient On (Oxygen Delivery Method): room air      Palliative Performance Scale/Karnofsky Score: 50     GENERAL: alert, NAD  HEENT: Atraumatic, oropharynx clear, neck supple  CHEST/LUNG: unlabored  HEART: Regular rate and rhythm    ABDOMEN: Soft, Nontender, Nondistended   MUSCULOSKELETAL:  No  edema   NERVOUS SYSTEM:  Alert & Oriented X3,  follows commands, improving LE strength  SKIN: No rashes or lesions noted  Oral intake: good     LABS:                          12.6   9.20  )-----------( 207      ( 25 Oct 2023 05:50 )             36.2               RADIOLOGY & ADDITIONAL STUDIES:

## 2023-10-26 LAB
APTT BLD: 63.3 SEC — HIGH (ref 24.5–35.6)
APTT BLD: 63.3 SEC — HIGH (ref 24.5–35.6)
APTT BLD: 68.2 SEC — HIGH (ref 24.5–35.6)
APTT BLD: 68.2 SEC — HIGH (ref 24.5–35.6)
APTT BLD: 89.6 SEC — HIGH (ref 24.5–35.6)
APTT BLD: 89.6 SEC — HIGH (ref 24.5–35.6)
GLUCOSE BLDC GLUCOMTR-MCNC: 180 MG/DL — HIGH (ref 70–99)
GLUCOSE BLDC GLUCOMTR-MCNC: 180 MG/DL — HIGH (ref 70–99)
GLUCOSE BLDC GLUCOMTR-MCNC: 199 MG/DL — HIGH (ref 70–99)
GLUCOSE BLDC GLUCOMTR-MCNC: 199 MG/DL — HIGH (ref 70–99)
GLUCOSE BLDC GLUCOMTR-MCNC: 206 MG/DL — HIGH (ref 70–99)
GLUCOSE BLDC GLUCOMTR-MCNC: 206 MG/DL — HIGH (ref 70–99)
GLUCOSE BLDC GLUCOMTR-MCNC: 258 MG/DL — HIGH (ref 70–99)
GLUCOSE BLDC GLUCOMTR-MCNC: 258 MG/DL — HIGH (ref 70–99)
HCT VFR BLD CALC: 39.2 % — SIGNIFICANT CHANGE UP (ref 39–50)
HCT VFR BLD CALC: 39.2 % — SIGNIFICANT CHANGE UP (ref 39–50)
HGB BLD-MCNC: 13.6 G/DL — SIGNIFICANT CHANGE UP (ref 13–17)
HGB BLD-MCNC: 13.6 G/DL — SIGNIFICANT CHANGE UP (ref 13–17)
MCHC RBC-ENTMCNC: 30.6 PG — SIGNIFICANT CHANGE UP (ref 27–34)
MCHC RBC-ENTMCNC: 30.6 PG — SIGNIFICANT CHANGE UP (ref 27–34)
MCHC RBC-ENTMCNC: 34.7 G/DL — SIGNIFICANT CHANGE UP (ref 32–36)
MCHC RBC-ENTMCNC: 34.7 G/DL — SIGNIFICANT CHANGE UP (ref 32–36)
MCV RBC AUTO: 88.3 FL — SIGNIFICANT CHANGE UP (ref 80–100)
MCV RBC AUTO: 88.3 FL — SIGNIFICANT CHANGE UP (ref 80–100)
NRBC # BLD: 0 /100 WBCS — SIGNIFICANT CHANGE UP (ref 0–0)
NRBC # BLD: 0 /100 WBCS — SIGNIFICANT CHANGE UP (ref 0–0)
PLATELET # BLD AUTO: 224 K/UL — SIGNIFICANT CHANGE UP (ref 150–400)
PLATELET # BLD AUTO: 224 K/UL — SIGNIFICANT CHANGE UP (ref 150–400)
RBC # BLD: 4.44 M/UL — SIGNIFICANT CHANGE UP (ref 4.2–5.8)
RBC # BLD: 4.44 M/UL — SIGNIFICANT CHANGE UP (ref 4.2–5.8)
RBC # FLD: 12 % — SIGNIFICANT CHANGE UP (ref 10.3–14.5)
RBC # FLD: 12 % — SIGNIFICANT CHANGE UP (ref 10.3–14.5)
WBC # BLD: 11.36 K/UL — HIGH (ref 3.8–10.5)
WBC # BLD: 11.36 K/UL — HIGH (ref 3.8–10.5)
WBC # FLD AUTO: 11.36 K/UL — HIGH (ref 3.8–10.5)
WBC # FLD AUTO: 11.36 K/UL — HIGH (ref 3.8–10.5)

## 2023-10-26 PROCEDURE — 99233 SBSQ HOSP IP/OBS HIGH 50: CPT

## 2023-10-26 RX ADMIN — Medication 40 MILLIGRAM(S): at 16:53

## 2023-10-26 RX ADMIN — Medication 81 MILLIGRAM(S): at 11:42

## 2023-10-26 RX ADMIN — AMLODIPINE BESYLATE 10 MILLIGRAM(S): 2.5 TABLET ORAL at 05:39

## 2023-10-26 RX ADMIN — HEPARIN SODIUM 1100 UNIT(S)/HR: 5000 INJECTION INTRAVENOUS; SUBCUTANEOUS at 19:40

## 2023-10-26 RX ADMIN — Medication 2: at 11:40

## 2023-10-26 RX ADMIN — Medication 2: at 07:52

## 2023-10-26 RX ADMIN — Medication 40 MILLIGRAM(S): at 21:15

## 2023-10-26 RX ADMIN — HEPARIN SODIUM 1100 UNIT(S)/HR: 5000 INJECTION INTRAVENOUS; SUBCUTANEOUS at 11:39

## 2023-10-26 RX ADMIN — HEPARIN SODIUM 1100 UNIT(S)/HR: 5000 INJECTION INTRAVENOUS; SUBCUTANEOUS at 07:24

## 2023-10-26 RX ADMIN — Medication 4: at 17:30

## 2023-10-26 RX ADMIN — ATORVASTATIN CALCIUM 20 MILLIGRAM(S): 80 TABLET, FILM COATED ORAL at 21:16

## 2023-10-26 RX ADMIN — PANTOPRAZOLE SODIUM 40 MILLIGRAM(S): 20 TABLET, DELAYED RELEASE ORAL at 05:39

## 2023-10-26 RX ADMIN — HEPARIN SODIUM 1100 UNIT(S)/HR: 5000 INJECTION INTRAVENOUS; SUBCUTANEOUS at 11:37

## 2023-10-26 RX ADMIN — HEPARIN SODIUM 1100 UNIT(S)/HR: 5000 INJECTION INTRAVENOUS; SUBCUTANEOUS at 01:16

## 2023-10-26 RX ADMIN — Medication 40 MILLIGRAM(S): at 05:39

## 2023-10-26 RX ADMIN — SODIUM CHLORIDE 100 MILLILITER(S): 9 INJECTION, SOLUTION INTRAVENOUS at 01:19

## 2023-10-26 RX ADMIN — INSULIN GLARGINE 15 UNIT(S): 100 INJECTION, SOLUTION SUBCUTANEOUS at 08:01

## 2023-10-26 NOTE — PROGRESS NOTE ADULT - ASSESSMENT
73M w/ hx of IDDM, HTN p/w b/l LE weakness and recurrent falls c/f spinal cord compression in setting of lytic metastatic disease. Also found to have evidence of rhabdomyolysis w/ SRUTHI and elevated transaminases, and b/l LE DVTs on US.    #Spinal cord compression with multilevel spine metastatic disease.    unconfirmed primary but likely prostate cancer (patient has refused radiation therapy in the past)  - Solumedrol 40mg IV TID/IVF. Consulted and discussed with ortho service, oncology service and palliative care service. poor prognosis.   - cont multi-modal analgesia.   - Reviewed NM bone scan.   consulted and discussed with Dr Mathews. recommended to transfer patient to [facility] for radiation oncology and NSY evaluation. I discussed with [facility] transfer center>>> still no bed     #B/l LE DVTs  - Cont heparin gtt and follow PTT. watch for any active gross bleeding.     AOCD. Monitor while on iv heparin.     #Acute Rhabdomyolysis. CK  improved    #SRUTHI- likely intrarenal +/- hypovolemia. improved.     #Elevated transaminases- Likely part of acute rhabdomyolysis. improved     #IDDM. cont current management. Follow finger sticks.     #FEN/PPX  - hep gtt for VTE ppx    Full code  Prognosis poor.     Time spent managing the patient 55 mins. called and updated at [phone]
73M w/ hx of IDDM, HTN p/w b/l LE weakness and recurrent falls c/f spinal cord compression in setting of lytic metastatic disease. Also found to have evidence of rhabdomyolysis w/ SRUTHI and elevated transaminases, and b/l LE DVTs on US.    #Spinal cord compression with multilevel spine metastatic disease.    unconfirmed primary but likely prostate cancer (patient has refused radiation therapy in the past)  - Solumedrol 40mg IV TID/IVF. Consulted and discussed with ortho service, oncology service and palliative care service. poor prognosis.   - cont multi-modal analgesia.   - Reviewed NM bone scan.   consulted and discussed with Dr Mathews. recommended to transfer patient to [facility] for radiation oncology and NSY evaluation. Called [facility] hospitalist service. patient accepted for transfer pending bed availability.     #B/l LE DVTs  - Cont heparin gtt and follow PTT. watch for any active gross bleeding.     AOCD. Monitor while on iv heparin.     #Acute Rhabdomyolysis. CK  improving. cont IVF.     #SRUTHI- likely intrarenal +/- hypovolemia. improved.     #Elevated transaminases- Likely part of acute rhabdomyolysis. Trend.     #IDDM. cont current management. Follow finger sticks.     #FEN/PPX  - NPO  - hep gtt for VTE ppx    Full code  Prognosis poor.     Time spent managing the patient 54 mins 
73M w/ hx of IDDM, HTN p/w b/l LE weakness and recurrent falls c/f spinal cord compression in setting of lytic metastatic disease. Also found to have evidence of rhabdomyolysis w/ SRUTHI and elevated transaminases, and b/l LE DVTs on US.    #Spinal cord compression with multilevel spine metastatic disease.    unconfirmed primary but likely prostate cancer (patient has refused radiation therapy in the past)  - Solumedrol 40mg IV TID/NPO/IVF. Consulted and discussed with ortho service, oncology service and palliative care service. poor prognosis.   - cont multi-modal analgesia.   - Reviewed NM bone scan.   consulted and discussed with Dr Mathews in person. recommended to transfer patient to [facility] for radiation oncology and NSY evaluation. patient and daughter agreeable with the plan.     #B/l LE DVTs  - Cont heparin gtt and follow PTT. watch for any active gross bleeding.     AOCD. Monitor while on iv heparin.     #Acute Rhabdomyolysis. CK  better. cont IVF.     #SRUTHI- likely intrarenal +/- hypovolemia. improved.     #Elevated transaminases- Likely part of acute rhabdomyolysis. Trend.     #IDDM. cont current management. Follow finger sticks.     #FEN/PPX  - NPO  - hep gtt for VTE ppx    Full code  Prognosis poor.     Time spent managing the patient 72 mins 
73M w/ hx of IDDM, HTN p/w b/l LE weakness and recurrent falls c/f spinal cord compression in setting of lytic metastatic disease. Also found to have evidence of rhabdomyolysis w/ SRUTHI and elevated transaminases, and b/l LE DVTs on US.    #Spinal cord compression with multilevel spine metastatic disease.    unconfirmed primary  - Solumedrol 40mg IV TID/NPO/IVF. Consulted and discussed with ortho service, oncology service and palliative care service. poor prognosis.   - cont multi-modal analgesia.   - Pending NM bone scan.   -Follow labs.     #B/l LE DVTs  - Cont heparin gtt and follow PTT. watch for any active gross bleeding.     AOCD. Monitor while on iv heparin.     #Acute Rhabdomyolysis. CK > 3000. cont IVF and trend CK.     #SRUTHI- likely intrarenal +/- hypovolemia. Trend creatinine while on IVF> follow I and Os.     #Elevated transaminases- Likely part of acute rhabdomyolysis. Trend.     #IDDM. cont current management. Follow finger sticks.     #FEN/PPX  - NPO  - hep gtt for VTE ppx    Full code  Prognosis poor.

## 2023-10-26 NOTE — PROGRESS NOTE ADULT - SUBJECTIVE AND OBJECTIVE BOX
CHIEF COMPLAINT: FU of probable prostate cancer with metastatic disease  no new overnight events.   no fever  no n/v/d  no chest pain or sob       PHYSICAL EXAM:    GENERAL: thinly built, no acute distress   CHEST/LUNG:  No wheezing, no crackles   HEART: Regular rate and rhythm; No murmurs  ABDOMEN: Soft, Nontender, Nondistended; Bowel sounds present  EXTREMITIES:  No clubbing, cyanosis, or edema. able to bend his legs   Psychiatry: AAO x 3, mood is appropriate       OBJECTIVE DATA:       Vital Signs Last 24 Hrs  T(C): 36.5 (26 Oct 2023 05:15), Max: 37 (25 Oct 2023 17:35)  T(F): 97.7 (26 Oct 2023 05:15), Max: 98.6 (25 Oct 2023 17:35)  HR: 67 (26 Oct 2023 05:15) (65 - 72)  BP: 131/82 (26 Oct 2023 05:15) (127/76 - 135/82)  BP(mean): --  RR: 17 (26 Oct 2023 05:15) (17 - 18)  SpO2: 94% (26 Oct 2023 05:15) (94% - 94%)    Parameters below as of 26 Oct 2023 05:15  Patient On (Oxygen Delivery Method): room air               Daily     Daily   LABS:                        13.6   11.36 )-----------( 224      ( 26 Oct 2023 07:22 )             39.2                             PTT - ( 26 Oct 2023 07:02 )  PTT:89.6 sec     CARDIAC MARKERS ( 25 Oct 2023 05:50 )  x     / x     / 511 U/L / x     / x          CAPILLARY BLOOD GLUCOSE      POCT Blood Glucose.: 199 mg/dL (26 Oct 2023 07:34)      MEDICATIONS  (STANDING):  amLODIPine   Tablet 10 milliGRAM(s) Oral daily  aspirin  chewable 81 milliGRAM(s) Oral daily  atorvastatin 20 milliGRAM(s) Oral at bedtime  dextrose 5%. 1000 milliLiter(s) (50 mL/Hr) IV Continuous <Continuous>  dextrose 5%. 1000 milliLiter(s) (100 mL/Hr) IV Continuous <Continuous>  dextrose 50% Injectable 12.5 Gram(s) IV Push once  dextrose 50% Injectable 25 Gram(s) IV Push once  dextrose 50% Injectable 25 Gram(s) IV Push once  glucagon  Injectable 1 milliGRAM(s) IntraMuscular once  heparin   Injectable 7000 Unit(s) IV Push once  heparin  Infusion.  Unit(s)/Hr (16 mL/Hr) IV Continuous <Continuous>  influenza  Vaccine (HIGH DOSE) 0.7 milliLiter(s) IntraMuscular once  insulin glargine Injectable (LANTUS) 15 Unit(s) SubCutaneous every morning  insulin lispro (ADMELOG) corrective regimen sliding scale   SubCutaneous three times a day before meals  methylPREDNISolone sodium succinate Injectable 40 milliGRAM(s) IV Push every 8 hours  pantoprazole    Tablet 40 milliGRAM(s) Oral before breakfast  senna 2 Tablet(s) Oral at bedtime    MEDICATIONS  (PRN):  acetaminophen     Tablet .. 650 milliGRAM(s) Oral every 6 hours PRN Temp greater or equal to 38C (100.4F), Mild Pain (1 - 3)  aluminum hydroxide/magnesium hydroxide/simethicone Suspension 30 milliLiter(s) Oral every 4 hours PRN Dyspepsia  dextrose Oral Gel 15 Gram(s) Oral once PRN Blood Glucose LESS THAN 70 milliGRAM(s)/deciliter  heparin   Injectable 7000 Unit(s) IV Push every 6 hours PRN For aPTT less than 40  heparin   Injectable 3500 Unit(s) IV Push every 6 hours PRN For aPTT between 40 - 57  melatonin 3 milliGRAM(s) Oral at bedtime PRN Insomnia  ondansetron Injectable 4 milliGRAM(s) IV Push every 8 hours PRN Nausea and/or Vomiting  oxyCODONE    IR 5 milliGRAM(s) Oral every 4 hours PRN Severe Pain (7 - 10)  oxyCODONE    IR 2.5 milliGRAM(s) Oral every 4 hours PRN Moderate Pain (4 - 6)  polyethylene glycol 3350 17 Gram(s) Oral daily PRN Constipation

## 2023-10-27 ENCOUNTER — INPATIENT (INPATIENT)
Facility: HOSPITAL | Age: 73
LOS: 56 days | Discharge: NURSING, REHAB OR LONG-TERM CARE | End: 2023-12-23
Attending: STUDENT IN AN ORGANIZED HEALTH CARE EDUCATION/TRAINING PROGRAM | Admitting: STUDENT IN AN ORGANIZED HEALTH CARE EDUCATION/TRAINING PROGRAM
Payer: MEDICARE

## 2023-10-27 ENCOUNTER — TRANSCRIPTION ENCOUNTER (OUTPATIENT)
Age: 73
End: 2023-10-27

## 2023-10-27 VITALS
TEMPERATURE: 98 F | DIASTOLIC BLOOD PRESSURE: 87 MMHG | WEIGHT: 154.32 LBS | HEART RATE: 84 BPM | SYSTOLIC BLOOD PRESSURE: 132 MMHG | HEIGHT: 67 IN | RESPIRATION RATE: 18 BRPM | OXYGEN SATURATION: 98 %

## 2023-10-27 VITALS
TEMPERATURE: 98 F | OXYGEN SATURATION: 95 % | HEART RATE: 61 BPM | SYSTOLIC BLOOD PRESSURE: 126 MMHG | RESPIRATION RATE: 18 BRPM | DIASTOLIC BLOOD PRESSURE: 81 MMHG

## 2023-10-27 DIAGNOSIS — G95.20 UNSPECIFIED CORD COMPRESSION: ICD-10-CM

## 2023-10-27 DIAGNOSIS — M54.9 DORSALGIA, UNSPECIFIED: ICD-10-CM

## 2023-10-27 DIAGNOSIS — E11.9 TYPE 2 DIABETES MELLITUS WITHOUT COMPLICATIONS: ICD-10-CM

## 2023-10-27 DIAGNOSIS — N17.9 ACUTE KIDNEY FAILURE, UNSPECIFIED: ICD-10-CM

## 2023-10-27 DIAGNOSIS — M62.82 RHABDOMYOLYSIS: ICD-10-CM

## 2023-10-27 DIAGNOSIS — Z29.9 ENCOUNTER FOR PROPHYLACTIC MEASURES, UNSPECIFIED: ICD-10-CM

## 2023-10-27 DIAGNOSIS — C61 MALIGNANT NEOPLASM OF PROSTATE: ICD-10-CM

## 2023-10-27 DIAGNOSIS — I10 ESSENTIAL (PRIMARY) HYPERTENSION: ICD-10-CM

## 2023-10-27 DIAGNOSIS — E78.5 HYPERLIPIDEMIA, UNSPECIFIED: ICD-10-CM

## 2023-10-27 DIAGNOSIS — I82.409 ACUTE EMBOLISM AND THROMBOSIS OF UNSPECIFIED DEEP VEINS OF UNSPECIFIED LOWER EXTREMITY: ICD-10-CM

## 2023-10-27 DIAGNOSIS — Z79.899 OTHER LONG TERM (CURRENT) DRUG THERAPY: ICD-10-CM

## 2023-10-27 DIAGNOSIS — D63.8 ANEMIA IN OTHER CHRONIC DISEASES CLASSIFIED ELSEWHERE: ICD-10-CM

## 2023-10-27 LAB
ALBUMIN SERPL ELPH-MCNC: 3.2 G/DL — LOW (ref 3.3–5)
ALBUMIN SERPL ELPH-MCNC: 3.2 G/DL — LOW (ref 3.3–5)
ALP SERPL-CCNC: 314 U/L — HIGH (ref 40–120)
ALP SERPL-CCNC: 314 U/L — HIGH (ref 40–120)
ALT FLD-CCNC: 299 U/L — HIGH (ref 12–78)
ALT FLD-CCNC: 299 U/L — HIGH (ref 12–78)
ANION GAP SERPL CALC-SCNC: 5 MMOL/L — SIGNIFICANT CHANGE UP (ref 5–17)
ANION GAP SERPL CALC-SCNC: 5 MMOL/L — SIGNIFICANT CHANGE UP (ref 5–17)
APTT BLD: 23.4 SEC — LOW (ref 24.5–35.6)
APTT BLD: 23.4 SEC — LOW (ref 24.5–35.6)
APTT BLD: 65.8 SEC — HIGH (ref 24.5–35.6)
APTT BLD: 65.8 SEC — HIGH (ref 24.5–35.6)
AST SERPL-CCNC: 108 U/L — HIGH (ref 15–37)
AST SERPL-CCNC: 108 U/L — HIGH (ref 15–37)
BILIRUB SERPL-MCNC: 1.3 MG/DL — HIGH (ref 0.2–1.2)
BILIRUB SERPL-MCNC: 1.3 MG/DL — HIGH (ref 0.2–1.2)
BUN SERPL-MCNC: 23 MG/DL — SIGNIFICANT CHANGE UP (ref 7–23)
BUN SERPL-MCNC: 23 MG/DL — SIGNIFICANT CHANGE UP (ref 7–23)
CALCIUM SERPL-MCNC: 8.7 MG/DL — SIGNIFICANT CHANGE UP (ref 8.5–10.1)
CALCIUM SERPL-MCNC: 8.7 MG/DL — SIGNIFICANT CHANGE UP (ref 8.5–10.1)
CHLORIDE SERPL-SCNC: 99 MMOL/L — SIGNIFICANT CHANGE UP (ref 96–108)
CHLORIDE SERPL-SCNC: 99 MMOL/L — SIGNIFICANT CHANGE UP (ref 96–108)
CO2 SERPL-SCNC: 36 MMOL/L — HIGH (ref 22–31)
CO2 SERPL-SCNC: 36 MMOL/L — HIGH (ref 22–31)
CREAT SERPL-MCNC: 1.07 MG/DL — SIGNIFICANT CHANGE UP (ref 0.5–1.3)
CREAT SERPL-MCNC: 1.07 MG/DL — SIGNIFICANT CHANGE UP (ref 0.5–1.3)
EGFR: 74 ML/MIN/1.73M2 — SIGNIFICANT CHANGE UP
EGFR: 74 ML/MIN/1.73M2 — SIGNIFICANT CHANGE UP
GLUCOSE BLDC GLUCOMTR-MCNC: 159 MG/DL — HIGH (ref 70–99)
GLUCOSE BLDC GLUCOMTR-MCNC: 159 MG/DL — HIGH (ref 70–99)
GLUCOSE BLDC GLUCOMTR-MCNC: 173 MG/DL — HIGH (ref 70–99)
GLUCOSE BLDC GLUCOMTR-MCNC: 173 MG/DL — HIGH (ref 70–99)
GLUCOSE BLDC GLUCOMTR-MCNC: 200 MG/DL — HIGH (ref 70–99)
GLUCOSE BLDC GLUCOMTR-MCNC: 200 MG/DL — HIGH (ref 70–99)
GLUCOSE SERPL-MCNC: 192 MG/DL — HIGH (ref 70–99)
GLUCOSE SERPL-MCNC: 192 MG/DL — HIGH (ref 70–99)
HCT VFR BLD CALC: 39.4 % — SIGNIFICANT CHANGE UP (ref 39–50)
HCT VFR BLD CALC: 39.4 % — SIGNIFICANT CHANGE UP (ref 39–50)
HGB BLD-MCNC: 13.4 G/DL — SIGNIFICANT CHANGE UP (ref 13–17)
HGB BLD-MCNC: 13.4 G/DL — SIGNIFICANT CHANGE UP (ref 13–17)
INR BLD: 0.95 RATIO — SIGNIFICANT CHANGE UP (ref 0.85–1.18)
INR BLD: 0.95 RATIO — SIGNIFICANT CHANGE UP (ref 0.85–1.18)
MCHC RBC-ENTMCNC: 29.8 PG — SIGNIFICANT CHANGE UP (ref 27–34)
MCHC RBC-ENTMCNC: 29.8 PG — SIGNIFICANT CHANGE UP (ref 27–34)
MCHC RBC-ENTMCNC: 34 G/DL — SIGNIFICANT CHANGE UP (ref 32–36)
MCHC RBC-ENTMCNC: 34 G/DL — SIGNIFICANT CHANGE UP (ref 32–36)
MCV RBC AUTO: 87.8 FL — SIGNIFICANT CHANGE UP (ref 80–100)
MCV RBC AUTO: 87.8 FL — SIGNIFICANT CHANGE UP (ref 80–100)
NRBC # BLD: 0 /100 WBCS — SIGNIFICANT CHANGE UP (ref 0–0)
NRBC # BLD: 0 /100 WBCS — SIGNIFICANT CHANGE UP (ref 0–0)
PLATELET # BLD AUTO: 244 K/UL — SIGNIFICANT CHANGE UP (ref 150–400)
PLATELET # BLD AUTO: 244 K/UL — SIGNIFICANT CHANGE UP (ref 150–400)
POTASSIUM SERPL-MCNC: 3.5 MMOL/L — SIGNIFICANT CHANGE UP (ref 3.5–5.3)
POTASSIUM SERPL-MCNC: 3.5 MMOL/L — SIGNIFICANT CHANGE UP (ref 3.5–5.3)
POTASSIUM SERPL-SCNC: 3.5 MMOL/L — SIGNIFICANT CHANGE UP (ref 3.5–5.3)
POTASSIUM SERPL-SCNC: 3.5 MMOL/L — SIGNIFICANT CHANGE UP (ref 3.5–5.3)
PROT SERPL-MCNC: 6.7 GM/DL — SIGNIFICANT CHANGE UP (ref 6–8.3)
PROT SERPL-MCNC: 6.7 GM/DL — SIGNIFICANT CHANGE UP (ref 6–8.3)
PROTHROM AB SERPL-ACNC: 10.7 SEC — SIGNIFICANT CHANGE UP (ref 9.5–13)
PROTHROM AB SERPL-ACNC: 10.7 SEC — SIGNIFICANT CHANGE UP (ref 9.5–13)
RBC # BLD: 4.49 M/UL — SIGNIFICANT CHANGE UP (ref 4.2–5.8)
RBC # BLD: 4.49 M/UL — SIGNIFICANT CHANGE UP (ref 4.2–5.8)
RBC # FLD: 12.2 % — SIGNIFICANT CHANGE UP (ref 10.3–14.5)
RBC # FLD: 12.2 % — SIGNIFICANT CHANGE UP (ref 10.3–14.5)
SODIUM SERPL-SCNC: 140 MMOL/L — SIGNIFICANT CHANGE UP (ref 135–145)
SODIUM SERPL-SCNC: 140 MMOL/L — SIGNIFICANT CHANGE UP (ref 135–145)
WBC # BLD: 14.34 K/UL — HIGH (ref 3.8–10.5)
WBC # BLD: 14.34 K/UL — HIGH (ref 3.8–10.5)
WBC # FLD AUTO: 14.34 K/UL — HIGH (ref 3.8–10.5)
WBC # FLD AUTO: 14.34 K/UL — HIGH (ref 3.8–10.5)

## 2023-10-27 PROCEDURE — 99221 1ST HOSP IP/OBS SF/LOW 40: CPT | Mod: GC

## 2023-10-27 RX ORDER — DEXTROSE 50 % IN WATER 50 %
25 SYRINGE (ML) INTRAVENOUS ONCE
Refills: 0 | Status: DISCONTINUED | OUTPATIENT
Start: 2023-10-27 | End: 2023-11-02

## 2023-10-27 RX ORDER — SENNA PLUS 8.6 MG/1
2 TABLET ORAL AT BEDTIME
Refills: 0 | Status: DISCONTINUED | OUTPATIENT
Start: 2023-10-27 | End: 2023-11-10

## 2023-10-27 RX ORDER — INSULIN GLARGINE 100 [IU]/ML
15 INJECTION, SOLUTION SUBCUTANEOUS EVERY MORNING
Refills: 0 | Status: DISCONTINUED | OUTPATIENT
Start: 2023-10-27 | End: 2023-10-29

## 2023-10-27 RX ORDER — PANTOPRAZOLE SODIUM 20 MG/1
40 TABLET, DELAYED RELEASE ORAL
Refills: 0 | Status: DISCONTINUED | OUTPATIENT
Start: 2023-10-27 | End: 2023-11-23

## 2023-10-27 RX ORDER — OXYCODONE HYDROCHLORIDE 5 MG/1
2.5 TABLET ORAL EVERY 4 HOURS
Refills: 0 | Status: DISCONTINUED | OUTPATIENT
Start: 2023-10-27 | End: 2023-10-27

## 2023-10-27 RX ORDER — SODIUM CHLORIDE 9 MG/ML
1000 INJECTION, SOLUTION INTRAVENOUS
Refills: 0 | Status: DISCONTINUED | OUTPATIENT
Start: 2023-10-27 | End: 2023-11-02

## 2023-10-27 RX ORDER — INSULIN GLARGINE 100 [IU]/ML
20 INJECTION, SOLUTION SUBCUTANEOUS
Refills: 0 | DISCHARGE

## 2023-10-27 RX ORDER — OXYCODONE HYDROCHLORIDE 5 MG/1
5 TABLET ORAL EVERY 4 HOURS
Refills: 0 | Status: DISCONTINUED | OUTPATIENT
Start: 2023-10-27 | End: 2023-11-02

## 2023-10-27 RX ORDER — HEPARIN SODIUM 5000 [USP'U]/ML
2500 INJECTION INTRAVENOUS; SUBCUTANEOUS EVERY 6 HOURS
Refills: 0 | Status: DISCONTINUED | OUTPATIENT
Start: 2023-10-27 | End: 2023-10-30

## 2023-10-27 RX ORDER — GLUCAGON INJECTION, SOLUTION 0.5 MG/.1ML
1 INJECTION, SOLUTION SUBCUTANEOUS ONCE
Refills: 0 | Status: DISCONTINUED | OUTPATIENT
Start: 2023-10-27 | End: 2023-11-06

## 2023-10-27 RX ORDER — ATORVASTATIN CALCIUM 80 MG/1
1 TABLET, FILM COATED ORAL
Refills: 0 | DISCHARGE

## 2023-10-27 RX ORDER — OXYCODONE HYDROCHLORIDE 5 MG/1
5 TABLET ORAL EVERY 4 HOURS
Refills: 0 | Status: DISCONTINUED | OUTPATIENT
Start: 2023-10-27 | End: 2023-10-27

## 2023-10-27 RX ORDER — RAMIPRIL 5 MG
1 CAPSULE ORAL
Refills: 0 | DISCHARGE

## 2023-10-27 RX ORDER — DEXTROSE 50 % IN WATER 50 %
12.5 SYRINGE (ML) INTRAVENOUS ONCE
Refills: 0 | Status: DISCONTINUED | OUTPATIENT
Start: 2023-10-27 | End: 2023-11-02

## 2023-10-27 RX ORDER — DEXTROSE 50 % IN WATER 50 %
15 SYRINGE (ML) INTRAVENOUS ONCE
Refills: 0 | Status: DISCONTINUED | OUTPATIENT
Start: 2023-10-27 | End: 2023-11-02

## 2023-10-27 RX ORDER — AMLODIPINE BESYLATE 2.5 MG/1
10 TABLET ORAL DAILY
Refills: 0 | Status: DISCONTINUED | OUTPATIENT
Start: 2023-10-27 | End: 2023-11-20

## 2023-10-27 RX ORDER — OXYCODONE HYDROCHLORIDE 5 MG/1
2.5 TABLET ORAL EVERY 4 HOURS
Refills: 0 | Status: DISCONTINUED | OUTPATIENT
Start: 2023-10-27 | End: 2023-11-02

## 2023-10-27 RX ORDER — INSULIN LISPRO 100/ML
VIAL (ML) SUBCUTANEOUS
Refills: 0 | Status: DISCONTINUED | OUTPATIENT
Start: 2023-10-27 | End: 2023-11-02

## 2023-10-27 RX ORDER — ATORVASTATIN CALCIUM 80 MG/1
20 TABLET, FILM COATED ORAL AT BEDTIME
Refills: 0 | Status: DISCONTINUED | OUTPATIENT
Start: 2023-10-27 | End: 2023-10-31

## 2023-10-27 RX ORDER — POLYETHYLENE GLYCOL 3350 17 G/17G
17 POWDER, FOR SOLUTION ORAL DAILY
Refills: 0 | Status: DISCONTINUED | OUTPATIENT
Start: 2023-10-27 | End: 2023-11-03

## 2023-10-27 RX ORDER — HEPARIN SODIUM 5000 [USP'U]/ML
5500 INJECTION INTRAVENOUS; SUBCUTANEOUS ONCE
Refills: 0 | Status: COMPLETED | OUTPATIENT
Start: 2023-10-27 | End: 2023-10-27

## 2023-10-27 RX ORDER — HEPARIN SODIUM 5000 [USP'U]/ML
5500 INJECTION INTRAVENOUS; SUBCUTANEOUS EVERY 6 HOURS
Refills: 0 | Status: DISCONTINUED | OUTPATIENT
Start: 2023-10-27 | End: 2023-10-30

## 2023-10-27 RX ORDER — INSULIN LISPRO 100/ML
VIAL (ML) SUBCUTANEOUS AT BEDTIME
Refills: 0 | Status: DISCONTINUED | OUTPATIENT
Start: 2023-10-27 | End: 2023-11-02

## 2023-10-27 RX ORDER — HEPARIN SODIUM 5000 [USP'U]/ML
INJECTION INTRAVENOUS; SUBCUTANEOUS
Qty: 25000 | Refills: 0 | Status: DISCONTINUED | OUTPATIENT
Start: 2023-10-27 | End: 2023-10-27

## 2023-10-27 RX ORDER — HEPARIN SODIUM 5000 [USP'U]/ML
1100 INJECTION INTRAVENOUS; SUBCUTANEOUS
Qty: 25000 | Refills: 0 | Status: DISCONTINUED | OUTPATIENT
Start: 2023-10-27 | End: 2023-10-30

## 2023-10-27 RX ADMIN — HEPARIN SODIUM 1100 UNIT(S)/HR: 5000 INJECTION INTRAVENOUS; SUBCUTANEOUS at 23:02

## 2023-10-27 RX ADMIN — AMLODIPINE BESYLATE 10 MILLIGRAM(S): 2.5 TABLET ORAL at 05:12

## 2023-10-27 RX ADMIN — HEPARIN SODIUM 5500 UNIT(S): 5000 INJECTION INTRAVENOUS; SUBCUTANEOUS at 23:02

## 2023-10-27 RX ADMIN — HEPARIN SODIUM 1100 UNIT(S)/HR: 5000 INJECTION INTRAVENOUS; SUBCUTANEOUS at 07:25

## 2023-10-27 RX ADMIN — ATORVASTATIN CALCIUM 20 MILLIGRAM(S): 80 TABLET, FILM COATED ORAL at 21:49

## 2023-10-27 RX ADMIN — Medication 40 MILLIGRAM(S): at 21:49

## 2023-10-27 RX ADMIN — Medication 2: at 08:02

## 2023-10-27 RX ADMIN — INSULIN GLARGINE 15 UNIT(S): 100 INJECTION, SOLUTION SUBCUTANEOUS at 08:01

## 2023-10-27 RX ADMIN — PANTOPRAZOLE SODIUM 40 MILLIGRAM(S): 20 TABLET, DELAYED RELEASE ORAL at 05:12

## 2023-10-27 RX ADMIN — SENNA PLUS 2 TABLET(S): 8.6 TABLET ORAL at 21:49

## 2023-10-27 RX ADMIN — Medication 40 MILLIGRAM(S): at 05:12

## 2023-10-27 NOTE — PATIENT PROFILE ADULT - NSPROHMSYMPCOND_GEN_A_NUR
TRANSFER - OUT REPORT:    Verbal report given to [facility] RN(name[facility]  being [facility] to [facility](unit) for routine progression of care       Report consisted of patients Situation, Background, Assessment and   Recommendations(SBAR). Information from the following report(s) Procedure Summary, MAR, Accordion and Cardiac Rhythm NSR was reviewed with the receiving nurse. Lines:   Venous Access Device 05/19/20 Upper chest (subclavicular area, right (Active)   Central Line Being Utilized Yes 5/22/2020  8:47 AM   Criteria for Appropriate Use Limited/no vessel suitable for conventional peripheral access 5/22/2020  8:47 AM   Site Assessment Clean, dry, & intact 5/22/2020  8:47 AM   Date of Last Dressing Change 05/21/20 5/22/2020  8:47 AM   Dressing Status Clean, dry, & intact 5/22/2020  8:47 AM   Dressing Type Disk with Chlorhexadine gluconate (CHG) 5/22/2020  8:47 AM   Action Taken Open ports on tubing capped 5/22/2020  8:47 AM   Positive Blood Return (Lateral Site) Yes 5/22/2020  8:47 AM   Alcohol Cap Used Yes 5/22/2020  8:47 AM       Venous Access Device left upper chest mediport 05/19/20 (Active)   Central Line Being Utilized Yes 5/22/2020  3:28 AM   Criteria for Appropriate Use Limited/no vessel suitable for conventional peripheral access 5/22/2020  3:28 AM   Site Assessment Clean, dry, & intact 5/22/2020  3:28 AM   Date of Last Dressing Change 05/21/20 5/22/2020  3:28 AM   Dressing Status Clean, dry, & intact 5/22/2020  3:28 AM   Dressing Type Disk with Chlorhexadine gluconate (CHG); Transparent 5/22/2020  3:28 AM   Action Taken Open ports on tubing capped 5/22/2020  3:28 AM        Opportunity for questions and clarification was provided. cardiovascular

## 2023-10-27 NOTE — CONSULT NOTE ADULT - ASSESSMENT
Mr August is a 73M w/ hx of  HTN and history of prostate cancer diagnosed far back in 2009 by biopsy, he is recently being followed by Dr Zelaya - urologist. [place] score 3+4 =7 by biopsy in 2021, PSA level 169. His imaging shows  ([place] 4+3), PSA level 169 in July 2023. He is now with evidence of diffuse metastatic disease and Bilsky Grade 1 cord compression. Agree with steroids for treatment. We discussed role of palliative radiation therapy in this setting, both long and short term side effects were reviewed. Informed consent was obtained. Efforts made for CT simulation today or as soon as Monday. Advise neurosurgery evaluation for their recommendation as well. Informed consent was obtained, patient will be planned for inpatient palliative radiation therapy 20Gy in 5fx  to C7-T5. Mr August is a 73M w/ hx of  HTN and history of prostate cancer diagnosed far back in 2009 by biopsy, he is recently being followed by Dr Zelaya - urologist. [place] score 3+4 =7 by biopsy in 2021, PSA level 169. His imaging shows  ([place] 4+3), PSA level 169 in July 2023. He is now with evidence of diffuse metastatic disease and Bilsky Grade 1 cord compression. Agree with steroids for treatment. We discussed role of palliative radiation therapy in this setting, both long and short term side effects were reviewed. Informed consent was obtained. Efforts made for CT simulation today or as soon as Monday. Advise neurosurgery evaluation for their recommendation as well. Informed consent was obtained, patient will be planned for inpatient palliative radiation therapy 20Gy in 5fx  to C7-T5.

## 2023-10-27 NOTE — H&P ADULT - PROBLEM SELECTOR PLAN 5
Oxycodone 5 q4h PRN for Severe pain  Oxycodone 2/5 q4h PRN for moderate pain    Plan  Senna standing  Miralax PRN C/W Atorvastatin 20 qd Alk Phos: 314  AST: 314  ALT: 299    Likely in the setting of Rhabdo  Will continue to trend the LFTs and transaminitis  RUQ U/S given persistently elevated LFTs

## 2023-10-27 NOTE — H&P ADULT - PROBLEM SELECTOR PLAN 10
Diet: CC DASH Diet  Psych/Sleep: None ELLIS  GI: Protonix 40 qd/Senna/Miralax  DVT ppx: Hep GTT  Code Status:  Dispo: Unclear reason behind Aspirin 81 qd  Need Clarification on Type of Insulin Taken at home for complete med rec    Family history of stroke    C/W ASA 81 qd

## 2023-10-27 NOTE — H&P ADULT - PROBLEM SELECTOR PLAN 4
C/W Atorvastatin 20 qd Amlodipine 10 qd  Rampril 2.5 qd    C/W Amlodipine 10qd given good BP control  Hold Rampiril iso unknown Creatinine baseline Admission CK > 63973  Downtrended to 511    Will Follow up Creatinine Kinase in the AM

## 2023-10-27 NOTE — PATIENT PROFILE ADULT - FALL HARM RISK - HARM RISK INTERVENTIONS
Assistance with ambulation/Assistance OOB with selected safe patient handling equipment/Communicate Risk of Fall with Harm to all staff/Discuss with provider need for PT consult/Monitor gait and stability/Reinforce activity limits and safety measures with patient and family/Tailored Fall Risk Interventions/Visual Cue: Yellow wristband and red socks/Bed in lowest position, wheels locked, appropriate side rails in place/Call bell, personal items and telephone in reach/Instruct patient to call for assistance before getting out of bed or chair/Non-slip footwear when patient is out of bed/[place] to call system/Physically safe environment - no spills, clutter or unnecessary equipment/Purposeful Proactive Rounding/Room/bathroom lighting operational, light cord in reach

## 2023-10-27 NOTE — H&P ADULT - PROBLEM SELECTOR PLAN 8
Admission CK > 01934, was treated with fluids  Resolved Admission CK > 06553  Resolved C/W Atorvastatin 20 qd

## 2023-10-27 NOTE — H&P ADULT - PROBLEM SELECTOR PLAN 11
Diet: CC DASH Diet  Psych/Sleep: None ELLIS  GI: Protonix 40 qd/Senna/Miralax  DVT ppx: Hep GTT  Code Status: Will need full discussion with family  Dispo:

## 2023-10-27 NOTE — H&P ADULT - NSHPLABSRESULTS_GEN_ALL_CORE
13.4   14.34 )-----------( 244      ( 27 Oct 2023 07:34 )             39.4       10-27    140  |  99  |  23  ----------------------------<  192<H>  3.5   |  36<H>  |  1.07    Ca    8.7      27 Oct 2023 07:34    TPro  6.7  /  Alb  3.2<L>  /  TBili  1.3<H>  /  DBili  x   /  AST  108<H>  /  ALT  299<H>  /  AlkPhos  314<H>  10-27              Urinalysis Basic - ( 27 Oct 2023 07:34 )    Color: x / Appearance: x / SG: x / pH: x  Gluc: 192 mg/dL / Ketone: x  / Bili: x / Urobili: x   Blood: x / Protein: x / Nitrite: x   Leuk Esterase: x / RBC: x / WBC x   Sq Epi: x / Non Sq Epi: x / Bacteria: x        PTT - ( 27 Oct 2023 07:34 )  PTT:65.8 sec    Lactate Trend            CAPILLARY BLOOD GLUCOSE      POCT Blood Glucose.: 200 mg/dL (27 Oct 2023 12:30) Labs Reviewed by attending     13.4   14.34 )-----------( 244      ( 27 Oct 2023 07:34 )             39.4       10-27    140  |  99  |  23  ----------------------------<  192<H>  3.5   |  36<H>  |  1.07    Ca    8.7      27 Oct 2023 07:34    TPro  6.7  /  Alb  3.2<L>  /  TBili  1.3<H>  /  DBili  x   /  AST  108<H>  /  ALT  299<H>  /  AlkPhos  314<H>  10-27              PTT - ( 27 Oct 2023 07:34 )  PTT:65.8 sec      POCT Blood Glucose.: 200 mg/dL (27 Oct 2023 12:30)        < from: MR Thoracic Spine No Cont (10.21.23 @ 23:53) >    Cervical and thoracic vertebral alignment is intact.  Cervical and   thoracic vertebral body heights are maintained but minimally displaced   fracture of the C7 spinous process and nondisplaced fracture of the T1   spinous process are noted. Edema is seen within the C3-4 through C7-T1   interspinous ligaments consistent with ligamentous injury. Marrow signal   intensity within cervical vertebral bodies and posterior elements is   significantfor metastatic disease within the C7, T1 through T5 and T12   as well is L1-L3.  Epidural extension of neoplasm is noted at C7   ventrally and dorsally resulting in moderate cord compressionand   underlying edema and extension into the BILATERAL C7-T1 and T1-2 and LEFT   T2-3 neural foramina. Minimal epidural disease also noted in the LEFT   aspect of the spinal canal at T4 resulting in moderate canal stenosis and   minimal cord impingement. Minimal epidural disease also noted at the   RIGHT aspect of the T12 vertebral body without significant canal   narrowing. Metastatic disease noted scattered ribs.    < end of copied text >

## 2023-10-27 NOTE — DISCHARGE NOTE PROVIDER - HOSPITAL COURSE
73M w/ hx of IDDM, HTN p/w b/l LE weakness and recurrent falls c/f spinal cord compression in setting of lytic metastatic disease. Also found to have evidence of rhabdomyolysis w/ SRUTHI and elevated transaminases, and b/l LE DVTs on US. Spinal cord compression with multilevel spine metastatic disease unconfirmed primary source but possibly prostate cancer. Consulted and discussed with ortho, oncology, and palliative, poor prognosis. Consulted with heme-onc, Dr. Mathews which recommended transfer to [facility] for radiation oncology and neurosurgery eval.

## 2023-10-27 NOTE — H&P ADULT - PROBLEM SELECTOR PLAN 2
Found to have LE Weakness along with saddle anesthesia; concern for cord compression  Prostate Biopsy 2021 --> adenocarcinoma Gelason 3+4; declined radiation therapy at the time  Team at [facility] discussed with Oncology    Plan  Steroids  Degarelix (GnRH Receptor antagonist) once LFTs improved Found to have LE Weakness along with saddle anesthesia; concern for cord compression  Prostate Biopsy 2021 --> adenocarcinoma Gelason 3+4; declined radiation therapy at the time  Team at [facility] discussed with Oncology    Plan  IV MethylPrednisolone 40 q8h  Protonix 40 qd  Degarelix (GnRH Receptor antagonist) once LFTs improved Found to have LE Weakness along with saddle anesthesia; concern for cord compression  Prostate Biopsy 2021 --> adenocarcinoma Gelason 3+4; declined radiation therapy at the time  Epidural disease was also noted at T12 vertebral body. NM Scan significant for multiple sites with osseous lesions;   Team at [facility] discussed with Oncology  edema from C3-4 through C7-T1 interspinous ligaments as well as lytic metastatic disease from C7, Tq-T5 and T12, as well as L1-L3    Plan  Oncology Email Sent  Degarelix (GnRH Receptor antagonist) once LFTs improved Found to have LE Weakness along with saddle anesthesia; concern for cord compression  Prostate Biopsy 2021 --> adenocarcinoma Gelason 3+4; declined radiation therapy at the time  Epidural disease was also noted at T12 vertebral body. NM Scan significant for multiple sites with osseous lesions;   Team at [facility] discussed with Oncology  edema from C3-4 through C7-T1 interspinous ligaments as well as lytic metastatic disease from C7, Tq-T5 and T12, as well as L1-L3    Plan  Oncology Email Sent  Plan as above for cord compression  Degarelix (GnRH Receptor antagonist) once LFTs improved  F/U ICU recs Found to have LE Weakness along with saddle anesthesia; concern for cord compression  Prostate Biopsy 2021 --> adenocarcinoma Gelason 3+4; declined radiation therapy at the time  Epidural disease was also noted at T12 vertebral body. NM Scan significant for multiple sites with osseous lesions;   Team at [facility] discussed with Oncology  edema from C3-4 through C7-T1 interspinous ligaments as well as lytic metastatic disease from C7, Tq-T5 and T12, as well as L1-L3    Plan  Oncology Email Sent  Plan as above for cord compression  Degarelix (GnRH Receptor antagonist) once LFTs improved  F/U Rad Onc Recs

## 2023-10-27 NOTE — H&P ADULT - PROBLEM SELECTOR PLAN 3
C/W Amlodipine 10 qd  Aspirin 81? B/L DVTs found in [facility] VS  - DVT in R posterior tibial and peroneal veins Below Knee  - DVT in Left Posterior tibial and peroneal veins below Knee  Suspect likely iso known malignancy    Plan:  Continue with Heparin GTT B/L DVTs found in [facility]  - DVT in R posterior tibial and peroneal veins Below Knee  - DVT in Left Posterior tibial and peroneal veins below Knee  Suspect likely iso known malignancy    Plan:  Continue with Heparin GTT  TTE in the AM to evaluate for RHS

## 2023-10-27 NOTE — H&P ADULT - ATTENDING COMMENTS
73M w/ T2DM, HTN, Metastatic prostate cancer (2009) was initially evaluated at [facility] for lower extremity paralysis in the setting of a recent fall, found to have cervical spine neoplasm consistent with cord compression, course complicated by progression of prostate cancer, rhabdomyolysis, SRUTHI, and DVT. Ortho and rad/onc consulted. C/w IV solumedrol. Plan for RT as inpatient. Heme/onc emailed, f/u with recs. SRUTHI and rhabdomyolysis resolved. LFT still high, f/u with US abdomen. C/w heparin gtt for b/l DVT.

## 2023-10-27 NOTE — H&P ADULT - NSICDXFAMILYHX_GEN_ALL_CORE_FT
FAMILY HISTORY:  Grandparent  Still living? No  Family history of stroke, Age at diagnosis: Age Unknown

## 2023-10-27 NOTE — H&P ADULT - NSHPPHYSICALEXAM_GEN_ALL_CORE
T(C): 36.7 (10-27-23 @ 15:34), Max: 36.7 (10-27-23 @ 15:34)  HR: 84 (10-27-23 @ 15:34) (61 - 84)  BP: 132/87 (10-27-23 @ 15:34) (124/61 - 132/87)  RR: 18 (10-27-23 @ 15:34) (18 - 18)  SpO2: 98% (10-27-23 @ 15:34) (94% - 98%)    CONSTITUTIONAL: Well groomed, no apparent distress  EYES: PERRLA and symmetric, EOMI, No conjunctival or scleral injection, non-icteric  ENMT: Oral mucosa with moist membranes. Normal dentition; no pharyngeal injection or exudates             NECK: Supple, symmetric and without tracheal deviation   RESP: No respiratory distress, no use of accessory muscles; CTA b/l, no WRR  CV: RRR, +S1S2, no MRG; no JVD; no peripheral edema  GI: Soft, NT, ND, no rebound, no guarding; no palpable masses; no hepatosplenomegaly; no hernia palpated  LYMPH: No cervical LAD or tenderness; no axillary LAD or tenderness; no inguinal LAD or tenderness  MSK: Normal gait; No digital clubbing or cyanosis; examination of the (head/neck/spine/ribs/pelvis, RUE, LUE, RLE, LLE) without misalignment,            Normal ROM without pain, no spinal tenderness, normal muscle strength/tone  SKIN: No rashes or ulcers noted; no subcutaneous nodules or induration palpable  NEURO: CN II-XII intact; normal reflexes in upper and lower extremities, sensation intact in upper and lower extremities b/l to light touch   PSYCH: Appropriate insight/judgment; A+O x 3, mood and affect appropriate, recent/remote memory intact T(C): 36.7 (10-27-23 @ 15:34), Max: 36.7 (10-27-23 @ 15:34)  HR: 84 (10-27-23 @ 15:34) (61 - 84)  BP: 132/87 (10-27-23 @ 15:34) (124/61 - 132/87)  RR: 18 (10-27-23 @ 15:34) (18 - 18)  SpO2: 98% (10-27-23 @ 15:34) (94% - 98%)    CONSTITUTIONAL: Well groomed, no apparent distress  EYES: PERRLA and symmetric, EOMI, No conjunctival or scleral injection, non-icteric  ENMT: Oral mucosa with moist membranes. Normal dentition; no pharyngeal injection or exudates  NECK: Supple, symmetric and without tracheal deviation   RESP: No respiratory distress, no use of accessory muscles; CTA b/l, no WRR  CV: RRR, +S1S2, no MRG; no JVD; no peripheral edema  GI: Soft, NT, ND, no rebound, no guarding; no palpable masses; no hepatosplenomegaly; no hernia palpated  LYMPH: No cervical LAD or tenderness; no axillary LAD or tenderness; no inguinal LAD or tenderness  MSK: 5/5 UE Strength 4/5 Lower extremity strength;   SKIN: No rashes or ulcers noted; no subcutaneous nodules or induration palpable  NEURO: CN II-XII intact; normal reflexes in upper and lower extremities, sensation intact in upper and lower extremities b/l to light touch, good rectal tone per ortho exam,   PSYCH: Appropriate insight/judgment; A+O x 3, mood and affect appropriate, recent/remote memory intact

## 2023-10-27 NOTE — CONSULT NOTE ADULT - ATTENDING COMMENTS
Mr. Salinas is a 73 year old with known history of prostate cancer with no previous prostatectomy/RT/ADT with diffuse osseous disease with epidural extension most prominent in C7-T5. MRI Spine without cord edema or high grade cord compression. We have recommended palliative XRT to C7-T5 pending neurosurgical recommendations. Given multi-level disease and onset of symptoms, unlikely role for acute surgical intervention. Patient consented.     -continue steroids  -consult neurosurgery for possible intervention  -tentative plan for simulation/treatment for palliative XRT C7-T5 Mr. Salinas is a 73 year old with known history of prostate cancer with no prior oncologic treatments found to have diffuse osseous disease with epidural extension most prominent in C7-T5. We have recommended palliative XRT to cervicothoracic spine pending neurosurgical recommendations. Given multi-level disease and onset of symptoms, unlikely role for acute surgical intervention but appreciate neurosurgical input. Patient consented.     -continue steroids  -appreciate med onc input regarding ADT (treatment naive according to patient)  -consult neurosurgery for consideration of acute surgical intervention  -tentative plan for simulation/treatment for palliative XRT to spine

## 2023-10-27 NOTE — H&P ADULT - ASSESSMENT
73M w/ T2DM, HTN, Metastatic prostate cancer who presents as a transfer from [facility]; found to have multiple lytic spinal lesions 2/2 known likely 2/2 prostate cancer. 73M w/ T2DM, HTN, Metastatic prostate cancer (2009) was initially evaluated at 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 Initiated IV solumedrol with gradual improvement of symptoms and return of strength since Wednesday now here at [facility] for spine evaluation by surgery.

## 2023-10-27 NOTE — H&P ADULT - PROBLEM SELECTOR PLAN 7
Admission Creatinine 1.51; subsequent improvement to 1.2 Admission Creatinine 1.51; subsequent improvement to 1.2  Baseline around 1.1    Continue to monitor Creatinine  Holding Ramipril for now, can resume at home Amlodipine 10 qd  Rampril 2.5 qd    C/W Amlodipine 10qd given good BP control  Hold Rampiril iso unknown Creatinine baseline

## 2023-10-27 NOTE — H&P ADULT - NSHPREVIEWOFSYSTEMS_GEN_ALL_CORE
REVIEW OF SYSTEMS:  CONSTITUTIONAL: No weakness, fevers or chills  EYES/ENT: No visual changes;  No vertigo or throat pain   NECK: No pain or stiffness  RESPIRATORY: No cough, wheezing, hemoptysis; No shortness of breath  CARDIOVASCULAR: No chest pain or palpitations  GASTROINTESTINAL: No abdominal or epigastric pain. No nausea, vomiting, or hematemesis; No diarrhea or constipation. No melena or hematochezia.  GENITOURINARY: No dysuria, frequency or hematuria  NEUROLOGICAL: No numbness or weakness  SKIN: No itching, rashes REVIEW OF SYSTEMS:  CONSTITUTIONAL: Mild Lower extremity weakness, but subjectively significantly improved  EYES/ENT: No visual changes;  No vertigo or throat pain   RESPIRATORY: No cough, wheezing, hemoptysis; No shortness of breath  CARDIOVASCULAR: No chest pain or palpitations  GASTROINTESTINAL: No abdominal or epigastric pain. No nausea, vomiting, or hematemesis; No diarrhea or constipation. No melena or hematochezia.  GENITOURINARY: No dysuria, frequency or hematuria  NEUROLOGICAL: Mild weakness in his lower extremities, unable to stand/bear weight  SKIN: No itching, rashes

## 2023-10-27 NOTE — DISCHARGE NOTE PROVIDER - NSDCCPCAREPLAN_GEN_ALL_CORE_FT
PRINCIPAL DISCHARGE DIAGNOSIS  Diagnosis: Spinal cord compression  Assessment and Plan of Treatment: found to have evidence of rhabdomyolysis w/ SRUTHI and elevated transaminases, and b/l LE DVTs on US. Spinal cord compression with multilevel spine metastatic disease unconfirmed primary source but possibly prostate cancer. Consulted and discussed with ortho, oncology, and palliative, poor prognosis. Consulted with heme-onc, Dr. Mathews which recommended transfer to [facility] for radiation oncology and neurosurgery eval.         SECONDARY DISCHARGE DIAGNOSES  Diagnosis: Rhabdomyolysis  Assessment and Plan of Treatment:     Diagnosis: Fall  Assessment and Plan of Treatment:     Diagnosis: Cervical spine fracture  Assessment and Plan of Treatment:     Diagnosis: Neck pain  Assessment and Plan of Treatment:

## 2023-10-27 NOTE — H&P ADULT - PROBLEM SELECTOR PLAN 6
Oxycodone 5 q4h PRN for Severe pain  Oxycodone 2/5 q4h PRN for moderate pain    Plan  Senna standing  Miralax PRN Likely due to Cord Compression/Spinal Mets    Plan:  Oxycodone 5 q4h PRN for Severe pain  Oxycodone 2.5 q4h PRN for moderate pain  Senna standing  Miralax PRN

## 2023-10-27 NOTE — H&P ADULT - HISTORY OF PRESENT ILLNESS
73M with HTN met prostate cancer (2009) with LE weakness and R hand paresthesias presents as a transfer from [facility] for NSGY evaluation    Patient was initially evaluated at [facility]; for b/l LE weakness with recurrent falls; found to have lytic metastatic disease. Patient's course was also complicated by b/l LE DVTs on US and was started on Heparin GTT, he was also found to have Rhabdomyolysis that has since improved   73M with HTN met prostate cancer (2009) with LE weakness and R hand paresthesias presents as a transfer from [facility] for NSGY evaluation    Patient reports that he has known about his prostate cancer since 2009, but did not seek out treatment because he did not take it seriously. He reports that he is typically fully functional and has no known weakness, or paresthesias. Around 1 month ago, he reports feeling some minor tingling/numbness in his R hand. The week prior to his hospitalization, he began to feel some minor weakness in both of his legs, but was still able to perform all of his ADLs and iADLs. He reports on Thursday, while he was showering, he fell in the bath due to weakness in his legs, but reports he was still able to get up and perform some household tasks such as cleaning and cooking, but still had general weakness and fell several more times. When he woke up from bed the next morning, he attempted to use the restroom and fell to the floor and reports he wasn't able to feel his below his belly button, was unable to control his bowel movements, couldn't feel his genitals and was paralyzed on bilateral lower extremities. He was on the ground for >48 hours with no PO intake before his son came to [place] for evaluation at [facility].     Patient was initially evaluated at [facility]; for b/l found to be in rhabdo and SRUTHI, On imaging, he was found to have C7 spinous process nondisplaced fracture of T1 spinous process, edema from C3-4 through C7-T1 interspinous ligaments as well as lytic metastatic disease from C7, Tq-T5 and T12, as well as L1-L3 as well as epidural expansion of neoplasm at C7 centrally and dorsally resulting in moderate cord compression and underlying edema/extension into the bilateral C7-T1 and T1-2 and Left T2-3 Neural foramina. Epidural disease was also notedat T12 vertebral body. NM Scan significant for multiple sites with osseous lesions; Scapula, Multiple left ribs, bilateral pelvic region, as well as b/l LE DVTs on US and was started on Heparin GTT,  73M with HTN met prostate cancer (2009) with LE weakness and R hand paresthesias presents as a transfer from [facility] for NSGY evaluation    Patient reports that he has known about his prostate cancer since 2009, but did not seek out treatment because he did not take it seriously. He reports that he is typically fully functional and has no known weakness, or paresthesias. Around 1 month ago, he reports feeling some minor tingling/numbness in his R hand. The week prior to his hospitalization, he began to feel some minor weakness in both of his legs, but was still able to perform all of his ADLs and iADLs. He reports on Thursday, while he was showering, he fell in the bath due to weakness in his legs, but reports he was still able to get up and perform some household tasks such as cleaning and cooking, but still had general weakness and fell several more times. When he woke up from bed the next morning, he attempted to use the restroom and fell to the floor and reports he wasn't able to feel his below his belly button, was unable to control his bowel movements, couldn't feel his genitals and was paralyzed on bilateral lower extremities. He was on the ground for >48 hours with no PO intake before his son came to the DR to take [place] back [place] the [place] for evaluation at [facility].     Patient was initially evaluated at [facility]; for b/l found to be in rhabdo and SRUTHI, On imaging, he was found to have C7 spinous process nondisplaced fracture of T1 spinous process, edema from C3-4 through C7-T1 interspinous ligaments as well as lytic metastatic disease from C7, Tq-T5 and T12, as well as L1-L3 as well as epidural expansion of neoplasm at C7 centrally and dorsally resulting in moderate cord compression and underlying edema/extension into the bilateral C7-T1 and T1-2 and Left T2-3 Neural foramina. Epidural disease was also notedat T12 vertebral body. NM Scan significant for multiple sites with osseous lesions; Scapula, Multiple left ribs, bilateral pelvic region, as well as b/l LE DVTs on US and was started on Heparin GTT. Patient Reports that since Wednesday, he has had significant improvement in his functional status and has regained sensation and movement in his lower extremities and has been able to feel his genatalia and his lower abdomen.

## 2023-10-27 NOTE — CONSULT NOTE ADULT - SUBJECTIVE AND OBJECTIVE BOX
HPI: Mr Koch is a 73M w/ hx of , HTN and history of prostate cancer diagnosed by biopsy in 2021 (Brook 4+3), PSA level 169 in July 2023, who presented with bilateral LE weakness, R hand paresthesias. [place] in [place] the [place]. He reported that about a week ago he had a ground level fall at his home in [facility] and was unable to get up from the ground for about 48 hours. He contacted his son who resides in the [place] and he was able to reach him after 48 hours and brought him into the ER in the [facility].  Patient reports that one week prior to presentation, he suffered a fall in the bathtub and struck his back. Pt was able to raise himself up and resume normal activity. He then developed paresthesias later that evening of his legs and arms b/l, and developed progressively worsening b/l LE weakness the follow day. Throughout the week, pt reports recurrent falls secondary to LE weakness. He denies any loss of consciousness, head/neck injury. He denies urinary or bowel incontinence. He has had low back pain for a couple of months prior and was scheduled to have a PET/CT on 9/23 however held off since pt was going to travel to .    He presented to the ER in [place] was found to have rhabdomyolysis elevated CK 16,00 and elevated creatinine. His CT demonstrated multiple pathologic cervical, thoracic, and lumbar fractures, diffuse osseous metastatic disease, epidural tumor causing moderate spinal canal stenosis. He was evaluated by medical oncology, was managed with steroids and pain medications. He was transferred to [facility] for neurosurgery and Rad/onc evaluation due to concerns for cord compresion  At the time of my evaluation, he reports he feels much better,  strength in his lower extremities has improved and he is now able to move his lower extremities. He score his pain level 0-1, he only has pain when he moves.      Oncologic history:    Per patient, he was diagnosed with prostate cancer in 2009, was followed by a urologist on [facility]. Per chart review there is record of his evaluation and diagnosis of adenocarcinoma of the prostate by Dr Anmol Wilcox and discussion about treatment options surgery vs seed implant , patient however did not pursue wither of this treatment options. Most recently followed by Dr. Juan Jose Aquino, urologist at [facility] since 2021. PSA level 16in 2021, prostate biopsy showed adenocarcinoma of the prostate Brook score 3+4 , 5 out of 13 cores positive. Perineural invasion was identified and seminal vesicle tissue identified at tissue edge. MRI prostate in 2021 showed extracapsular extension and seminal vesicle invasion, with involvement of perirectal and obturator nodes.  PIRADS 5. he has no prior history of radiation therapy     KPS: 80    Allergies  No Known Allergies  Intolerances        ROS: [  ] Fever  [  ] Chills  [  ]Chest Pain [  ] SOB  [  ]Cough [  ] N/V  [  ] Diarrhea [  ]Constipation [  ]Other ROS:  [ X ] ROS otherwise negative    PAST MEDICAL & SURGICAL HISTORY:  Essential hypertension  No significant past surgical history        PHYSICAL EXAM  General: elderly male, awake and alert, not in acute distress  HEENT: NC/AT; EOMI, PERRL, sclera nonicteric; external ears normal; no rhinorrhea or epistaxis; mucous membranes moist; oropharynx clear and without erythema. Point tenderness along cervical spine  CV: NR, RR; no appreciable r/m/g  Lungs: CTAB, no increased work of breathing  Abdomen: Bowel sounds present; soft, NTND  MSK: Mild tenderness on palpation of lower lumbar spine to sacrum.   Neuro: AAOx3; cranial grossly nerves II-XII intact; strength 5/5 in upper extremities; power in lower extremities 3-4 out of 5. Sensation to light touch in tact bilaterally.  Psych: Full affect; mood congruent  Skin: Visible pressure sores on elbows and knees     IMAGING/LABS/PATHOLOGY: I have personally reviewed the relevant labs, pathology, and imaging as noted in the HPI.  In addition, there is diffuse metastatic disease involving C7, T1-T5 , T12 as well as L1-L3, with epidural extension of the neoplasm noted at C7 and minimally displaced fracture at C7 spinous process. His recent MRI cervical spine has poor quality however bone scan showed increase uptake at the level of the cervical spine from C7 to T5, also at T12 . There is also evidence of metastasis  to the left 4th –6th ribs, lateral border of scapular and sixth rib. Increased uptake in hemisacrum and sacroillaic region and uptake adjacent to the femoral head was also noted on the bone scan.      HPI: Mr Koch is a 73M w/ hx of , HTN and history of prostate cancer diagnosed by biopsy in 2021 (Brook 4+3), PSA level 169 in July 2023, who presented with bilateral LE weakness, R hand paresthesias. [place]. He reported that about a week ago he had a ground level fall at his home in [facility] and was unable to get up from the ground for about 48 hours. He contacted his son who resides in the [place] and he was able to reach him after 48 hours and brought him into the ER in the [facility].  Patient reports that one week prior to presentation, he suffered a fall in the bathtub and struck his back. Pt was able to raise himself up and resume normal activity. He then developed paresthesias later that evening of his legs and arms b/l, and developed progressively worsening b/l LE weakness the follow day. Throughout the week, pt reports recurrent falls secondary to LE weakness. He denies any loss of consciousness, head/neck injury. He denies urinary or bowel incontinence. He has had low back pain for a couple of months prior and was scheduled to have a PET/CT on 9/23 however held off since pt was going to travel to .    He presented to the ER in [place] was found to have rhabdomyolysis elevated CK 16,00 and elevated creatinine. His CT demonstrated multiple pathologic cervical, thoracic, and lumbar fractures, diffuse osseous metastatic disease, epidural tumor causing moderate spinal canal stenosis. He was evaluated by medical oncology, was managed with steroids and pain medications. He was transferred to [facility] for neurosurgery and Rad/onc evaluation due to concerns for cord compression  At the time of my evaluation, he reports he feels much better,  strength in his lower extremities has improved and he is now able to move his lower extremities. He score his pain level 0-1, he only has pain when he moves.      Oncologic history:    Per patient, he was diagnosed with prostate cancer in 2009, was followed by a urologist on [facility]. Per chart review there is record of his evaluation and diagnosis of adenocarcinoma of the prostate by Dr Anmol Wilcox and discussion about treatment options surgery vs seed implant , patient however did not pursue wither of this treatment options. Most recently followed by Dr. Juan Jose Aquino, urologist at [facility] since 2021. PSA level 16in 2021, prostate biopsy showed adenocarcinoma of the prostate Brook score 3+4 , 5 out of 13 cores positive. Perineural invasion was identified and seminal vesicle tissue identified at tissue edge. MRI prostate in 2021 showed extracapsular extension and seminal vesicle invasion, with involvement of perirectal and obturator nodes.  PIRADS 5. he has no prior history of radiation therapy     KPS: 80    Allergies  No Known Allergies  Intolerances        ROS: [  ] Fever  [  ] Chills  [  ]Chest Pain [  ] SOB  [  ]Cough [  ] N/V  [  ] Diarrhea [  ]Constipation [  ]Other ROS:  [ X ] ROS otherwise negative    PAST MEDICAL & SURGICAL HISTORY:  Essential hypertension  No significant past surgical history        PHYSICAL EXAM  General: elderly male, awake and alert, not in acute distress  HEENT: NC/AT; EOMI, PERRL, sclera nonicteric; external ears normal; no rhinorrhea or epistaxis; mucous membranes moist; oropharynx clear and without erythema. Point tenderness along cervical spine  CV: NR, RR; no appreciable r/m/g  Lungs: CTAB, no increased work of breathing  Abdomen: Bowel sounds present; soft, NTND  MSK: Mild tenderness on palpation of lower lumbar spine to sacrum.   Neuro: AAOx3; cranial grossly nerves II-XII intact; strength 5/5 in upper extremities; power in lower extremities 3-4 out of 5. Sensation to light touch in tact bilaterally.  Psych: Full affect; mood congruent  Skin: Visible pressure sores on elbows and knees     IMAGING/LABS/PATHOLOGY: I have personally reviewed the relevant labs, pathology, and imaging as noted in the HPI.  In addition, there is diffuse metastatic disease involving C7, T1-T5 , T12 as well as L1-L3, with epidural extension of the neoplasm noted at C7 and minimally displaced fracture at C7 spinous process. His recent MRI cervical spine has poor quality however bone scan showed increase uptake at the level of the cervical spine from C7 to T5, also at T12 . There is also evidence of metastasis  to the left 4th –6th ribs, lateral border of scapular and sixth rib. Increased uptake in hemisacrum and sacroillaic region and uptake adjacent to the femoral head was also noted on the bone scan.

## 2023-10-27 NOTE — H&P ADULT - CONVERSATION DETAILS
Discussed contingencies for cardiopulmonary arrest and patient reports he would want more time to discuss with his family before making a formal decision.    He wanted to designate his son and daughter Juan Jose Brad and Jamee Salinas as equal HCPs.    Juan Jose Salinas: [phone]  Jamee Salinas [phone]

## 2023-10-27 NOTE — H&P ADULT - PROBLEM SELECTOR PLAN 1
B/L DVTs found in [facility] VS  - DVT in R posterior tibial and peroneal veins Below Knee  - DVT in Left Posterior tibial and peroneal veins below Knee  Suspect likely iso known malignancy    Plan:  Continue with Heparin GTT epidural expansion of neoplasm at C7 centrally and dorsally resulting in moderate cord compression and underlying edema/extension into the bilateral C7-T1 and T1-2 and Left T2-3 Neural foramina.  Since Wednesday, patient endorses improved sensation, and improved strength; reports he was able to have a normal bowel movement today, and has almost full return of sensation.  Still has difficulty with standing, and still does not feel comfortable to stand    Plan:  Patient with improved strength, normal reflexes  F/U with Neurosurgical evaluation  IV Methylprednisolone 40 q8h  Protonix 40 qd epidural expansion of neoplasm at C7 centrally and dorsally resulting in moderate cord compression and underlying edema/extension into the bilateral C7-T1 and T1-2 and Left T2-3 Neural foramina.  Since Wednesday, patient endorses improved sensation, and improved strength; reports he was able to have a normal bowel movement today, and has almost full return of sensation.  Still has difficulty with standing, and still does not feel comfortable to stand    Plan:  Patient with improved strength, normal reflexes  F/U with Neurosurgical evaluation  Neuro Checks q4h  C/W IV Methylprednisolone 40 q8h  Protonix 40 qd epidural expansion of neoplasm at C7 centrally and dorsally resulting in moderate cord compression and underlying edema/extension into the bilateral C7-T1 and T1-2 and Left T2-3 Neural foramina.  Since Wednesday, patient endorses improved sensation, and improved strength; reports he was able to have a normal bowel movement today, and has almost full return of sensation.  Still has difficulty with standing, and still does not feel comfortable to stand    Plan:  Patient with improved strength, normal reflexes  F/U with Neurosurgical evaluation  F/U Rad Onc Recs: Tentative Palliative XRT to C7-T5 pending NSGY/ortho evaluation  Neuro Checks q4h  C/W IV Methylprednisolone 40 q8h  Protonix 40 qd Started on Solumedrol at [facility]  Since Wednesday, Improved strenght, sensation; Normal BM  Rectal Exam: Normal Tone;  Does not feel comfortable to stand, 5/5 UE and 4/5 LE strength    Plan:  F/U with Ortho Recs  F/U Rad Onc Rec: Tentative Palliative Simulation/XRT to C7-T5 pending ortho evaluation  C/W IV Methylprednisolone 40 q8h  Protonix 40 qd  Onc Email Sent  Neuro Checks q4h

## 2023-10-28 DIAGNOSIS — R74.01 ELEVATION OF LEVELS OF LIVER TRANSAMINASE LEVELS: ICD-10-CM

## 2023-10-28 LAB
ALBUMIN SERPL ELPH-MCNC: 3.7 G/DL — SIGNIFICANT CHANGE UP (ref 3.3–5)
ALBUMIN SERPL ELPH-MCNC: 3.7 G/DL — SIGNIFICANT CHANGE UP (ref 3.3–5)
ALP SERPL-CCNC: 311 U/L — HIGH (ref 40–120)
ALP SERPL-CCNC: 311 U/L — HIGH (ref 40–120)
ALT FLD-CCNC: 281 U/L — HIGH (ref 4–41)
ALT FLD-CCNC: 281 U/L — HIGH (ref 4–41)
ANION GAP SERPL CALC-SCNC: 11 MMOL/L — SIGNIFICANT CHANGE UP (ref 7–14)
ANION GAP SERPL CALC-SCNC: 11 MMOL/L — SIGNIFICANT CHANGE UP (ref 7–14)
APTT BLD: 81 SEC — HIGH (ref 24.5–35.6)
APTT BLD: 81 SEC — HIGH (ref 24.5–35.6)
APTT BLD: 93.2 SEC — HIGH (ref 24.5–35.6)
APTT BLD: 93.2 SEC — HIGH (ref 24.5–35.6)
APTT BLD: 99.5 SEC — HIGH (ref 24.5–35.6)
APTT BLD: 99.5 SEC — HIGH (ref 24.5–35.6)
AST SERPL-CCNC: 76 U/L — HIGH (ref 4–40)
AST SERPL-CCNC: 76 U/L — HIGH (ref 4–40)
BASOPHILS # BLD AUTO: 0 K/UL — SIGNIFICANT CHANGE UP (ref 0–0.2)
BASOPHILS # BLD AUTO: 0 K/UL — SIGNIFICANT CHANGE UP (ref 0–0.2)
BASOPHILS NFR BLD AUTO: 0 % — SIGNIFICANT CHANGE UP (ref 0–2)
BASOPHILS NFR BLD AUTO: 0 % — SIGNIFICANT CHANGE UP (ref 0–2)
BILIRUB SERPL-MCNC: 1 MG/DL — SIGNIFICANT CHANGE UP (ref 0.2–1.2)
BILIRUB SERPL-MCNC: 1 MG/DL — SIGNIFICANT CHANGE UP (ref 0.2–1.2)
BUN SERPL-MCNC: 24 MG/DL — HIGH (ref 7–23)
BUN SERPL-MCNC: 24 MG/DL — HIGH (ref 7–23)
CALCIUM SERPL-MCNC: 8.4 MG/DL — SIGNIFICANT CHANGE UP (ref 8.4–10.5)
CALCIUM SERPL-MCNC: 8.4 MG/DL — SIGNIFICANT CHANGE UP (ref 8.4–10.5)
CHLORIDE SERPL-SCNC: 100 MMOL/L — SIGNIFICANT CHANGE UP (ref 98–107)
CHLORIDE SERPL-SCNC: 100 MMOL/L — SIGNIFICANT CHANGE UP (ref 98–107)
CK SERPL-CCNC: 114 U/L — SIGNIFICANT CHANGE UP (ref 30–200)
CK SERPL-CCNC: 114 U/L — SIGNIFICANT CHANGE UP (ref 30–200)
CO2 SERPL-SCNC: 29 MMOL/L — SIGNIFICANT CHANGE UP (ref 22–31)
CO2 SERPL-SCNC: 29 MMOL/L — SIGNIFICANT CHANGE UP (ref 22–31)
CREAT SERPL-MCNC: 0.9 MG/DL — SIGNIFICANT CHANGE UP (ref 0.5–1.3)
CREAT SERPL-MCNC: 0.9 MG/DL — SIGNIFICANT CHANGE UP (ref 0.5–1.3)
EGFR: 91 ML/MIN/1.73M2 — SIGNIFICANT CHANGE UP
EGFR: 91 ML/MIN/1.73M2 — SIGNIFICANT CHANGE UP
EOSINOPHIL # BLD AUTO: 0 K/UL — SIGNIFICANT CHANGE UP (ref 0–0.5)
EOSINOPHIL # BLD AUTO: 0 K/UL — SIGNIFICANT CHANGE UP (ref 0–0.5)
EOSINOPHIL NFR BLD AUTO: 0 % — SIGNIFICANT CHANGE UP (ref 0–6)
EOSINOPHIL NFR BLD AUTO: 0 % — SIGNIFICANT CHANGE UP (ref 0–6)
GIANT PLATELETS BLD QL SMEAR: PRESENT — SIGNIFICANT CHANGE UP
GIANT PLATELETS BLD QL SMEAR: PRESENT — SIGNIFICANT CHANGE UP
GLUCOSE BLDC GLUCOMTR-MCNC: 242 MG/DL — HIGH (ref 70–99)
GLUCOSE BLDC GLUCOMTR-MCNC: 242 MG/DL — HIGH (ref 70–99)
GLUCOSE BLDC GLUCOMTR-MCNC: 266 MG/DL — HIGH (ref 70–99)
GLUCOSE BLDC GLUCOMTR-MCNC: 266 MG/DL — HIGH (ref 70–99)
GLUCOSE BLDC GLUCOMTR-MCNC: 277 MG/DL — HIGH (ref 70–99)
GLUCOSE BLDC GLUCOMTR-MCNC: 277 MG/DL — HIGH (ref 70–99)
GLUCOSE BLDC GLUCOMTR-MCNC: 290 MG/DL — HIGH (ref 70–99)
GLUCOSE BLDC GLUCOMTR-MCNC: 290 MG/DL — HIGH (ref 70–99)
GLUCOSE BLDC GLUCOMTR-MCNC: 310 MG/DL — HIGH (ref 70–99)
GLUCOSE BLDC GLUCOMTR-MCNC: 310 MG/DL — HIGH (ref 70–99)
GLUCOSE SERPL-MCNC: 237 MG/DL — HIGH (ref 70–99)
GLUCOSE SERPL-MCNC: 237 MG/DL — HIGH (ref 70–99)
HCT VFR BLD CALC: 39.5 % — SIGNIFICANT CHANGE UP (ref 39–50)
HCT VFR BLD CALC: 39.5 % — SIGNIFICANT CHANGE UP (ref 39–50)
HGB BLD-MCNC: 13.7 G/DL — SIGNIFICANT CHANGE UP (ref 13–17)
HGB BLD-MCNC: 13.7 G/DL — SIGNIFICANT CHANGE UP (ref 13–17)
IANC: 8.35 K/UL — HIGH (ref 1.8–7.4)
IANC: 8.35 K/UL — HIGH (ref 1.8–7.4)
INR BLD: 1.05 RATIO — SIGNIFICANT CHANGE UP (ref 0.85–1.18)
INR BLD: 1.05 RATIO — SIGNIFICANT CHANGE UP (ref 0.85–1.18)
LYMPHOCYTES # BLD AUTO: 1.08 K/UL — SIGNIFICANT CHANGE UP (ref 1–3.3)
LYMPHOCYTES # BLD AUTO: 1.08 K/UL — SIGNIFICANT CHANGE UP (ref 1–3.3)
LYMPHOCYTES # BLD AUTO: 10.5 % — LOW (ref 13–44)
LYMPHOCYTES # BLD AUTO: 10.5 % — LOW (ref 13–44)
MAGNESIUM SERPL-MCNC: 2.3 MG/DL — SIGNIFICANT CHANGE UP (ref 1.6–2.6)
MAGNESIUM SERPL-MCNC: 2.3 MG/DL — SIGNIFICANT CHANGE UP (ref 1.6–2.6)
MANUAL SMEAR VERIFICATION: SIGNIFICANT CHANGE UP
MANUAL SMEAR VERIFICATION: SIGNIFICANT CHANGE UP
MCHC RBC-ENTMCNC: 30.9 PG — SIGNIFICANT CHANGE UP (ref 27–34)
MCHC RBC-ENTMCNC: 30.9 PG — SIGNIFICANT CHANGE UP (ref 27–34)
MCHC RBC-ENTMCNC: 34.7 GM/DL — SIGNIFICANT CHANGE UP (ref 32–36)
MCHC RBC-ENTMCNC: 34.7 GM/DL — SIGNIFICANT CHANGE UP (ref 32–36)
MCV RBC AUTO: 89 FL — SIGNIFICANT CHANGE UP (ref 80–100)
MCV RBC AUTO: 89 FL — SIGNIFICANT CHANGE UP (ref 80–100)
MONOCYTES # BLD AUTO: 0.54 K/UL — SIGNIFICANT CHANGE UP (ref 0–0.9)
MONOCYTES # BLD AUTO: 0.54 K/UL — SIGNIFICANT CHANGE UP (ref 0–0.9)
MONOCYTES NFR BLD AUTO: 5.3 % — SIGNIFICANT CHANGE UP (ref 2–14)
MONOCYTES NFR BLD AUTO: 5.3 % — SIGNIFICANT CHANGE UP (ref 2–14)
NEUTROPHILS # BLD AUTO: 8.65 K/UL — HIGH (ref 1.8–7.4)
NEUTROPHILS # BLD AUTO: 8.65 K/UL — HIGH (ref 1.8–7.4)
NEUTROPHILS NFR BLD AUTO: 81.6 % — HIGH (ref 43–77)
NEUTROPHILS NFR BLD AUTO: 81.6 % — HIGH (ref 43–77)
NEUTS BAND # BLD: 2.6 % — SIGNIFICANT CHANGE UP (ref 0–6)
NEUTS BAND # BLD: 2.6 % — SIGNIFICANT CHANGE UP (ref 0–6)
PHOSPHATE SERPL-MCNC: 4.5 MG/DL — SIGNIFICANT CHANGE UP (ref 2.5–4.5)
PHOSPHATE SERPL-MCNC: 4.5 MG/DL — SIGNIFICANT CHANGE UP (ref 2.5–4.5)
PLAT MORPH BLD: NORMAL — SIGNIFICANT CHANGE UP
PLAT MORPH BLD: NORMAL — SIGNIFICANT CHANGE UP
PLATELET # BLD AUTO: 199 K/UL — SIGNIFICANT CHANGE UP (ref 150–400)
PLATELET # BLD AUTO: 199 K/UL — SIGNIFICANT CHANGE UP (ref 150–400)
PLATELET COUNT - ESTIMATE: NORMAL — SIGNIFICANT CHANGE UP
PLATELET COUNT - ESTIMATE: NORMAL — SIGNIFICANT CHANGE UP
POTASSIUM SERPL-MCNC: 3.7 MMOL/L — SIGNIFICANT CHANGE UP (ref 3.5–5.3)
POTASSIUM SERPL-MCNC: 3.7 MMOL/L — SIGNIFICANT CHANGE UP (ref 3.5–5.3)
POTASSIUM SERPL-SCNC: 3.7 MMOL/L — SIGNIFICANT CHANGE UP (ref 3.5–5.3)
POTASSIUM SERPL-SCNC: 3.7 MMOL/L — SIGNIFICANT CHANGE UP (ref 3.5–5.3)
PROT SERPL-MCNC: 6.2 G/DL — SIGNIFICANT CHANGE UP (ref 6–8.3)
PROT SERPL-MCNC: 6.2 G/DL — SIGNIFICANT CHANGE UP (ref 6–8.3)
PROTHROM AB SERPL-ACNC: 11.7 SEC — SIGNIFICANT CHANGE UP (ref 9.5–13)
PROTHROM AB SERPL-ACNC: 11.7 SEC — SIGNIFICANT CHANGE UP (ref 9.5–13)
RBC # BLD: 4.44 M/UL — SIGNIFICANT CHANGE UP (ref 4.2–5.8)
RBC # BLD: 4.44 M/UL — SIGNIFICANT CHANGE UP (ref 4.2–5.8)
RBC # FLD: 12.3 % — SIGNIFICANT CHANGE UP (ref 10.3–14.5)
RBC # FLD: 12.3 % — SIGNIFICANT CHANGE UP (ref 10.3–14.5)
RBC BLD AUTO: NORMAL — SIGNIFICANT CHANGE UP
RBC BLD AUTO: NORMAL — SIGNIFICANT CHANGE UP
SODIUM SERPL-SCNC: 140 MMOL/L — SIGNIFICANT CHANGE UP (ref 135–145)
SODIUM SERPL-SCNC: 140 MMOL/L — SIGNIFICANT CHANGE UP (ref 135–145)
WBC # BLD: 10.27 K/UL — SIGNIFICANT CHANGE UP (ref 3.8–10.5)
WBC # BLD: 10.27 K/UL — SIGNIFICANT CHANGE UP (ref 3.8–10.5)
WBC # FLD AUTO: 10.27 K/UL — SIGNIFICANT CHANGE UP (ref 3.8–10.5)
WBC # FLD AUTO: 10.27 K/UL — SIGNIFICANT CHANGE UP (ref 3.8–10.5)

## 2023-10-28 PROCEDURE — 99223 1ST HOSP IP/OBS HIGH 75: CPT

## 2023-10-28 PROCEDURE — 99222 1ST HOSP IP/OBS MODERATE 55: CPT | Mod: GC

## 2023-10-28 PROCEDURE — 93010 ELECTROCARDIOGRAM REPORT: CPT

## 2023-10-28 PROCEDURE — 76705 ECHO EXAM OF ABDOMEN: CPT | Mod: 26

## 2023-10-28 RX ORDER — INSULIN LISPRO 100/ML
2 VIAL (ML) SUBCUTANEOUS
Refills: 0 | Status: DISCONTINUED | OUTPATIENT
Start: 2023-10-28 | End: 2023-10-28

## 2023-10-28 RX ORDER — INSULIN LISPRO 100/ML
5 VIAL (ML) SUBCUTANEOUS
Refills: 0 | Status: DISCONTINUED | OUTPATIENT
Start: 2023-10-28 | End: 2023-10-29

## 2023-10-28 RX ORDER — CHLORHEXIDINE GLUCONATE 213 G/1000ML
1 SOLUTION TOPICAL DAILY
Refills: 0 | Status: DISCONTINUED | OUTPATIENT
Start: 2023-10-28 | End: 2023-12-23

## 2023-10-28 RX ADMIN — HEPARIN SODIUM 1000 UNIT(S)/HR: 5000 INJECTION INTRAVENOUS; SUBCUTANEOUS at 22:48

## 2023-10-28 RX ADMIN — HEPARIN SODIUM 1000 UNIT(S)/HR: 5000 INJECTION INTRAVENOUS; SUBCUTANEOUS at 20:10

## 2023-10-28 RX ADMIN — HEPARIN SODIUM 1100 UNIT(S)/HR: 5000 INJECTION INTRAVENOUS; SUBCUTANEOUS at 08:22

## 2023-10-28 RX ADMIN — Medication 5 UNIT(S): at 18:10

## 2023-10-28 RX ADMIN — PANTOPRAZOLE SODIUM 40 MILLIGRAM(S): 20 TABLET, DELAYED RELEASE ORAL at 08:59

## 2023-10-28 RX ADMIN — SENNA PLUS 2 TABLET(S): 8.6 TABLET ORAL at 21:32

## 2023-10-28 RX ADMIN — Medication 2: at 18:10

## 2023-10-28 RX ADMIN — Medication 40 MILLIGRAM(S): at 13:38

## 2023-10-28 RX ADMIN — HEPARIN SODIUM 1000 UNIT(S)/HR: 5000 INJECTION INTRAVENOUS; SUBCUTANEOUS at 13:40

## 2023-10-28 RX ADMIN — Medication 4: at 13:39

## 2023-10-28 RX ADMIN — INSULIN GLARGINE 15 UNIT(S): 100 INJECTION, SOLUTION SUBCUTANEOUS at 08:59

## 2023-10-28 RX ADMIN — Medication 1: at 23:13

## 2023-10-28 RX ADMIN — Medication 40 MILLIGRAM(S): at 21:32

## 2023-10-28 RX ADMIN — Medication 40 MILLIGRAM(S): at 05:27

## 2023-10-28 RX ADMIN — AMLODIPINE BESYLATE 10 MILLIGRAM(S): 2.5 TABLET ORAL at 05:27

## 2023-10-28 RX ADMIN — Medication 3: at 09:00

## 2023-10-28 RX ADMIN — ATORVASTATIN CALCIUM 20 MILLIGRAM(S): 80 TABLET, FILM COATED ORAL at 21:32

## 2023-10-28 NOTE — CONSULT NOTE ADULT - ATTENDING COMMENTS
Pt seen and examined. History, events, chart reviewed. Images reviewed independently. Pt has metastatic PCA with diffuse spine mets. Plan for RT to relieve C7-T5 cord compression. Recommend continuing dex. Also can start with bicalutamide (androgen receptor antagonist) to prevent flare response before introducing Lupron or other agents. There is no hepatic dose adjustment for this medication. Start at 50 mg once daily.

## 2023-10-28 NOTE — PROGRESS NOTE ADULT - PROBLEM SELECTOR PLAN 2
Found to have LE Weakness along with saddle anesthesia; concern for cord compression  Prostate Biopsy 2021 --> adenocarcinoma [place] 3+4; declined radiation therapy at the time  Epidural disease was also noted at T12 vertebral body. NM Scan significant for multiple sites with osseous lesions;   Team at [facility] discussed with Oncology  edema from C3-4 through C7-T1 interspinous ligaments as well as lytic metastatic disease from C7, Tq-T5 and T12, as well as L1-L3    Plan  Oncology Email Sent  Plan as above for cord compression  Degarelix (GnRH Receptor antagonist) once LFTs improved  F/U Rad Onc Recs

## 2023-10-28 NOTE — PROGRESS NOTE ADULT - PROBLEM SELECTOR PLAN 10
Unclear reason behind Aspirin 81 qd  Need Clarification on Type of Insulin Taken at home for complete med rec    Family history of stroke    C/W ASA 81 qd

## 2023-10-28 NOTE — CONSULT NOTE ADULT - ASSESSMENT
73M w/ hx of IDDM, HTN and history of prostate cancer s/p biopsy in 2021 (Brook 4+3) who declined radiation therapy at that time, and since followed by urologist at [facility], p/w progressive bilateral LE weakness, R hand paresthesias and recurrent falls. MRI spine findings as detailed above, concerning for spinal cord compression.     #metastatic prostate adenocarcinoma   #Back pain 2/2 diffuse mets/cord compression   - hx of prostate cancer diagnosed far back in 2009 by biopsy but did not seek treatment/care at that time given no symptoms until 1 month ago when noticed neuropathy and was following up with Dr. Zelaya - urologist.  - Prostate biopsy in 2021 showed adenocarcinoma, Brook 3+4; PSA level 169 declined radiation therapy  - imaging shows  ([place] 4+3), PSA level 169 in July 2023 now with evidence of diffuse metastatic disease, progressive LE weakness, saddle anesthesia and cord compression.  - continue current pain regimen and sterods; GOC discussions   - Appreciate rad/onc eval: tentative palliative inpatient RT 20Gy in 5fx to C7-T5  - Pending neurosx eval although less likely to be a candidate given multi-level disease for surgical intervention   - Would consider starting pt on degarelix (GnRH receptor antagonist) while inpatient once LFTs improved    # DVT - bilateral peroneal/post tibial DVTs  - Likely in setting of progressive metastatic disease   - Continue heparin gtt anticoagulation     #Rhabdomyolysis  - continue management per primary team    Please do not hesitate to page with questions.     Linda Campos M.D.  Hematology and Medical Oncology PGY4  Pager: [phone]  For weekends and evenings (5 pm - 8 am), please page Heme/Onc fellow on call.  73M w/ hx of IDDM, HTN and history of prostate cancer s/p biopsy in 2021 (Brook 4+3) who declined radiation therapy at that time, and since followed by urologist at [facility], p/w progressive bilateral LE weakness, R hand paresthesias and recurrent falls. MRI spine findings as detailed above, concerning for spinal cord compression.     #metastatic prostate adenocarcinoma   #Back pain 2/2 diffuse mets/cord compression   - hx of prostate cancer diagnosed back in 2009 but did not seek treatment/care at that time given no symptoms until 1 month ago when noticed neuropathy and started following up with Dr. Zelaya - urologist.  - Prostate biopsy in 2021 showed adenocarcinoma, [place] 3+4; PSA level 169 declined radiation therapy  - imaging shows  ([place] 4+3), PSA level 169 in July 2023, now with evidence of diffuse metastatic disease, progressive LE weakness, saddle anesthesia and cord compression.  - continue current pain regimen and sterods; GOC discussions   - Appreciate rad/onc eval: tentative palliative inpatient RT 20Gy in 5fx to C7-T5  - Pending neurosx eval although less likely to be a candidate given multi-level disease for surgical intervention   - consider starting pt on degarelix (GnRH receptor antagonist) while inpatient once LFTs improved    # DVT - bilateral peroneal/post tibial DVTs  - Likely in setting of progressive metastatic disease   - c/w heparin gtt, monitor PTT    #Rhabdomyolysis (improving)  - continue management per primary team    Please do not hesitate to page with questions.     Linda Campos M.D.  Hematology and Medical Oncology PGY4  Pager: [phone]  For weekends and evenings (5 pm - 8 am), please page Heme/Onc fellow on call.  73M w/ hx of IDDM, HTN and history of prostate cancer s/p biopsy in 2021 (Brook 4+3) who declined radiation therapy at that time, and since followed by urologist at [facility], p/w progressive bilateral LE weakness, R hand paresthesias and recurrent falls. MRI spine findings as detailed above, concerning for spinal cord compression.     #metastatic prostate adenocarcinoma   #Back pain 2/2 diffuse mets/cord compression   - hx of prostate cancer diagnosed back in 2009 but did not seek treatment/care at that time given no symptoms until 1 month ago when noticed neuropathy and started following up with Dr. Zelaya - urologist.  - Prostate biopsy in 2021 showed adenocarcinoma, [place] 3+4; PSA level 169 declined radiation therapy  - imaging shows  ([place] 4+3), PSA level 169 in July 2023, now with evidence of diffuse metastatic disease, progressive LE weakness, saddle anesthesia and cord compression.  - continue current pain regimen and sterods; GOC discussions   - Appreciate rad/onc eval: tentative palliative inpatient RT 20Gy in 5fx to C7-T5  - Pending neurosx eval although less likely to be a candidate given multi-level disease for surgical intervention   - Recommend started fasodex 50 mg OD while inpatient along with steroids  - Oncology team to continue to follow     # DVT - bilateral peroneal/post tibial DVTs  - Likely in setting of progressive metastatic disease   - c/w heparin gtt, monitor PTT    #Rhabdomyolysis (improving)  - continue management per primary team    Please do not hesitate to page with questions.     Linda Campos M.D.  Hematology and Medical Oncology PGY4  Pager: [phone]  For weekends and evenings (5 pm - 8 am), please page Heme/Onc fellow on call.  73M w/ hx of IDDM, HTN and history of prostate cancer s/p biopsy in 2021 (Brook 4+3) who declined radiation therapy at that time, and since followed by urologist at [facility], p/w progressive bilateral LE weakness, R hand paresthesias and recurrent falls. MRI spine findings as detailed above, concerning for spinal cord compression.     #metastatic prostate adenocarcinoma   #Back pain 2/2 diffuse mets/cord compression   - hx of prostate cancer diagnosed back in 2009 but did not seek treatment/care at that time given no symptoms until 1 month ago when noticed neuropathy and started following up with Dr. Zelaya - urologist.  - Prostate biopsy in 2021 showed adenocarcinoma, [place] 3+4; PSA level 169 declined radiation therapy  - imaging shows  ([place] 4+3), PSA level 169 in July 2023, now with evidence of diffuse metastatic disease, progressive LE weakness, saddle anesthesia and cord compression.  - continue current pain regimen and sterods; GOC discussions   - Appreciate rad/onc eval: tentative palliative inpatient RT 20Gy in 5fx to C7-T5  - Pending neurosx eval although less likely to be a candidate given multi-level disease for surgical intervention   - Recommend started casodex 50 mg OD while inpatient along with steroids  - Oncology team to continue to follow     # DVT - bilateral peroneal/post tibial DVTs  - Likely in setting of progressive metastatic disease   - c/w heparin gtt, monitor PTT    #Rhabdomyolysis (improving)  - continue management per primary team    Please do not hesitate to page with questions.     Linda Capmos M.D.  Hematology and Medical Oncology PGY4  Pager: [phone]  For weekends and evenings (5 pm - 8 am), please page Heme/Onc fellow on call.  73M w/ hx of IDDM, HTN and history of prostate cancer s/p biopsy in 2021 (Brook 4+3) who declined radiation therapy at that time, and since followed by urologist at [facility], p/w progressive bilateral LE weakness, R hand paresthesias and recurrent falls. MRI spine findings as detailed above, concerning for spinal cord compression.     #metastatic prostate adenocarcinoma   #Back pain 2/2 diffuse mets/cord compression   - hx of prostate cancer diagnosed back in 2009 but did not seek treatment/care at that time given no symptoms until 1 month ago when noticed neuropathy and started following up with Dr. Zelaya - urologist.  - Prostate biopsy in 2021 showed adenocarcinoma, [place] 3+4; PSA level 169 declined radiation therapy  - imaging shows  ([place] 4+3), PSA level 169 in July 2023, now with evidence of diffuse metastatic disease, progressive LE weakness, saddle anesthesia and cord compression.  - continue current pain regimen and steroids   - Appreciate rad/onc eval: tentative palliative inpatient RT 20Gy in 5fx to C7-T5  - Pending neurosx eval although less likely to be a candidate given multi-level disease for surgical intervention   - Recommend started casodex 50 mg OD while inpatient along with steroids  - Oncology team to continue to follow     # DVT - bilateral peroneal/post tibial DVTs  - Likely in setting of progressive metastatic disease   - c/w heparin gtt, monitor PTT    #Rhabdomyolysis (improving)  - continue management per primary team    Please do not hesitate to page with questions.     Linda Campos M.D.  Hematology and Medical Oncology PGY4  Pager: [phone]  For weekends and evenings (5 pm - 8 am), please page Heme/Onc fellow on call.

## 2023-10-28 NOTE — CONSULT NOTE ADULT - ASSESSMENT
ASSESSMENT & PLAN  73yMale w/ concern for cord compression in setting of multiple vertebral lesions w symptoms improving after course of steroids now at [facility] for rad onc eval    PLAN  -WBAT  -pain control  -incentive spirometry  -XRT w rad onc  -Steroids  -PT/OT  -Ortho to follow

## 2023-10-28 NOTE — CONSULT NOTE ADULT - SUBJECTIVE AND OBJECTIVE BOX
HPI  73yMale presents as transfer from  for concern for spinal cord compression.  Pt was found down at home in [place] 1 week ago.  States he got up to go to bathroom and collapsed because his legs went weak.  Was down for 48 hours before [place].  [place]lew to [place] and taken to  where he was found to have rhabdo.  MR done at  showed multiple vertebral lesions w epidural extension.  Pt transferred to [facility] for further workup.  Endorsed no sensation or motor function in LEs since initial fall though improving over last few days s/p steroids.  Endorsed numbness and saddle anesthesia that has resolved.  Denies fevers/chills, acute changes in bowel/bladder function, or saddle anesthesia. Denies recent falls/trauma or any other ortho injuries. Community ambulator w/o assistive devices at baseline.    ROS  Negative unless otherwise specified in HPI.    PAST MEDICAL & SURGICAL Hx  PAST MEDICAL & SURGICAL HISTORY:  Essential hypertension      Prostate cancer metastatic to bone      No significant past surgical history          MEDICATIONS  Home Medications:  amLODIPine 10 mg oral tablet: 1 tab(s) orally once a day (27 Oct 2023 21:15)  atorvastatin 20 mg oral tablet: 1 tab(s) orally once a day (at bedtime) (27 Oct 2023 21:15)  Lantus 100 units/mL subcutaneous solution: 15 unit(s) subcutaneous once a day (in the morning) (28 Oct 2023 00:43)  ramipril 2.5 mg oral capsule: 1 cap(s) orally once a day (27 Oct 2023 21:15)      ALLERGIES  No Known Allergies      FAMILY Hx  FAMILY HISTORY:  Family history of stroke (Grandparent)        SOCIAL Hx  Social History:      VITALS  Vital Signs Last 24 Hrs  T(C): 36.4 (28 Oct 2023 05:10), Max: 37.1 (27 Oct 2023 21:26)  T(F): 97.5 (28 Oct 2023 05:10), Max: 98.8 (27 Oct 2023 21:26)  HR: 83 (28 Oct 2023 05:10) (76 - 84)  BP: 122/83 (28 Oct 2023 05:10) (122/83 - 132/87)  BP(mean): --  RR: 17 (28 Oct 2023 05:10) (17 - 18)  SpO2: 96% (28 Oct 2023 05:10) (96% - 98%)    Parameters below as of 28 Oct 2023 05:10  Patient On (Oxygen Delivery Method): room air        PHYSICAL EXAM  Gen: Lying in bed, NAD  Resp: No increased WOB  Spine:  Skin intact  No bony TTP or step-offs along c-, t-, l-spine, or sacrum    RUE:  Delt 5/5 Bi 5/5 Tri 5/5 Wrist ext 5/5  5/5  SILT C5-T1  2+ radial pulse  Negative Llamas  LUE:  Delt 5/5 Bi 5/5 Tri 5/5 Wrist ext 5/5  5/5  SILT C5-T1  2+ radial pulse  Negative Llamas    RLE:  IP 4/5 HS 5/5 Q 5/5 GS 5/5 TA 3/5 EHL 5/5   SILT L2-S1  2+ DP/PT pulses  Negative clonus, negative Babinski  LLE:  IP 4/5 HS 5/5 Q 5/5 GS 5/5 TA 3/5 EHL 5/5  SILT L2-S1  2+ DP/PT pulses  Negative clonus, negative Babinski     LABS                        13.7   10.27 )-----------( 199      ( 28 Oct 2023 06:46 )             39.5     10-28    140  |  100  |  24<H>  ----------------------------<  237<H>  3.7   |  29  |  0.90    Ca    8.4      28 Oct 2023 06:46  Phos  4.5     10-28  Mg     2.30     10-28    TPro  6.2  /  Alb  3.7  /  TBili  1.0  /  DBili  x   /  AST  76<H>  /  ALT  281<H>  /  AlkPhos  311<H>  10-28    PT/INR - ( 28 Oct 2023 06:46 )   PT: 11.7 sec;   INR: 1.05 ratio         PTT - ( 28 Oct 2023 06:46 )  PTT:93.2 sec

## 2023-10-28 NOTE — PROGRESS NOTE ADULT - PROBLEM SELECTOR PLAN 7
Amlodipine 10 qd  Rampril 2.5 qd    C/W Amlodipine 10qd given good BP control  Hold Rampiril iso unknown Creatinine baseline

## 2023-10-28 NOTE — PROGRESS NOTE ADULT - SUBJECTIVE AND OBJECTIVE BOX
DARYN THORNE 73y Male      Patient is a 73y old  Male who presents with a chief complaint of Diffuse Spinal Mets from Prostate Cancer (27 Oct 2023 19:09)        INTERVAL HPI/OVERNIGHT EVENTS: No acute events overnight. Patient was seen and evaluated at the bedside. The patient denies pain. Vitals stable. Patient denies fever/chills, chest pain, shortness of breath, abdominal pain, headaches, nausea/vomiting, and diarrhea/constipation.      PHYSICAL EXAM:  GENERAL: NAD  HEAD:  Normocephalic  EYES:  conjunctiva and sclera clear  ENMT: Moist mucous membranes  NECK: Supple  NERVOUS SYSTEM:  Alert, awake  CHEST/LUNG: Good air exchange bilaterally, no wheeze  HEART: Regular rate and rhythm        Vital Signs Last 24 Hrs  T(C): 36.4 (28 Oct 2023 05:10), Max: 37.1 (27 Oct 2023 21:26)  T(F): 97.5 (28 Oct 2023 05:10), Max: 98.8 (27 Oct 2023 21:26)  HR: 83 (28 Oct 2023 05:10) (76 - 84)  BP: 122/83 (28 Oct 2023 05:10) (122/83 - 132/87)  BP(mean): --  RR: 17 (28 Oct 2023 05:10) (17 - 18)  SpO2: 96% (28 Oct 2023 05:10) (96% - 98%)    Parameters below as of 28 Oct 2023 05:10  Patient On (Oxygen Delivery Method): room air          Consultant(s) Notes Reviewed:  [X] YES  [ ] NO  Care Discussed with Consultants/Other Providers [X] YES  [ ] NO  Imaging Personally Reviewed:  [X] YES  [ ] NO      LABS:                        13.4   14.34 )-----------( 244      ( 27 Oct 2023 07:34 )             39.4     10-27    140  |  99  |  23  ----------------------------<  192<H>  3.5   |  36<H>  |  1.07    Ca    8.7      27 Oct 2023 07:34    TPro  6.7  /  Alb  3.2<L>  /  TBili  1.3<H>  /  DBili  x   /  AST  108<H>  /  ALT  299<H>  /  AlkPhos  314<H>  10-27    PT/INR - ( 27 Oct 2023 21:23 )   PT: 10.7 sec;   INR: 0.95 ratio         PTT - ( 27 Oct 2023 21:23 )  PTT:23.4 sec  Urinalysis Basic - ( 27 Oct 2023 07:34 )    Color: x / Appearance: x / SG: x / pH: x  Gluc: 192 mg/dL / Ketone: x  / Bili: x / Urobili: x   Blood: x / Protein: x / Nitrite: x   Leuk Esterase: x / RBC: x / WBC x   Sq Epi: x / Non Sq Epi: x / Bacteria: x        CAPILLARY BLOOD GLUCOSE      POCT Blood Glucose.: 159 mg/dL (27 Oct 2023 22:26)  POCT Blood Glucose.: 200 mg/dL (27 Oct 2023 12:30)  POCT Blood Glucose.: 173 mg/dL (27 Oct 2023 07:45)           DARYN THORNE 73y Male      Patient is a 73y old  Male who presents with a chief complaint of Diffuse Spinal Mets from Prostate Cancer (27 Oct 2023 19:09)        INTERVAL HPI/OVERNIGHT EVENTS: No acute events overnight. Patient was seen and evaluated at the bedside. The patient denies pain. Vitals stable. Patient denies fever/chills, chest pain, shortness of breath, abdominal pain, headaches, nausea/vomiting, and diarrhea/constipation.      PHYSICAL EXAM:  GENERAL: NAD  HEAD:  Normocephalic  EYES:  conjunctiva and sclera clear  ENMT: Moist mucous membranes  NECK: Supple  NERVOUS SYSTEM:  Alert, awake, 4/5 strength RLE, 5/5 LLE, sensations intact b/l   CHEST/LUNG: Good air exchange bilaterally, no wheeze  HEART: Regular rate and rhythm        Vital Signs Last 24 Hrs  T(C): 36.4 (28 Oct 2023 05:10), Max: 37.1 (27 Oct 2023 21:26)  T(F): 97.5 (28 Oct 2023 05:10), Max: 98.8 (27 Oct 2023 21:26)  HR: 83 (28 Oct 2023 05:10) (76 - 84)  BP: 122/83 (28 Oct 2023 05:10) (122/83 - 132/87)  BP(mean): --  RR: 17 (28 Oct 2023 05:10) (17 - 18)  SpO2: 96% (28 Oct 2023 05:10) (96% - 98%)    Parameters below as of 28 Oct 2023 05:10  Patient On (Oxygen Delivery Method): room air          Consultant(s) Notes Reviewed:  [X] YES  [ ] NO  Care Discussed with Consultants/Other Providers [X] YES  [ ] NO  Imaging Personally Reviewed:  [X] YES  [ ] NO      LABS:                        13.4   14.34 )-----------( 244      ( 27 Oct 2023 07:34 )             39.4     10-27    140  |  99  |  23  ----------------------------<  192<H>  3.5   |  36<H>  |  1.07    Ca    8.7      27 Oct 2023 07:34    TPro  6.7  /  Alb  3.2<L>  /  TBili  1.3<H>  /  DBili  x   /  AST  108<H>  /  ALT  299<H>  /  AlkPhos  314<H>  10-27    PT/INR - ( 27 Oct 2023 21:23 )   PT: 10.7 sec;   INR: 0.95 ratio         PTT - ( 27 Oct 2023 21:23 )  PTT:23.4 sec  Urinalysis Basic - ( 27 Oct 2023 07:34 )    Color: x / Appearance: x / SG: x / pH: x  Gluc: 192 mg/dL / Ketone: x  / Bili: x / Urobili: x   Blood: x / Protein: x / Nitrite: x   Leuk Esterase: x / RBC: x / WBC x   Sq Epi: x / Non Sq Epi: x / Bacteria: x        CAPILLARY BLOOD GLUCOSE      POCT Blood Glucose.: 159 mg/dL (27 Oct 2023 22:26)  POCT Blood Glucose.: 200 mg/dL (27 Oct 2023 12:30)  POCT Blood Glucose.: 173 mg/dL (27 Oct 2023 07:45)

## 2023-10-28 NOTE — CONSULT NOTE ADULT - SUBJECTIVE AND OBJECTIVE BOX
Oncology Consult Note    HPI as per admitting team:   73M with HTN met prostate cancer (2009) with LE weakness and R hand paresthesias presents as a transfer from [facility] for NSGY evaluation    Patient reports that he has known about his prostate cancer since 2009, but did not seek out treatment because he did not take it seriously. He reports that he is typically fully functional and has no known weakness, or paresthesias. Around 1 month ago, he reports feeling some minor tingling/numbness in his R hand. The week prior to his hospitalization, he began to feel some minor weakness in both of his legs, but was still able to perform all of his ADLs and iADLs. He reports on Thursday, while he was showering, he fell in the bath due to weakness in his legs, but reports he was still able to get up and perform some household tasks such as cleaning and cooking, but still had general weakness and fell several more times. When he woke up from bed the next morning, he attempted to use the restroom and fell to the floor and reports he wasn't able to feel his below his belly button, was unable to control his bowel movements, couldn't feel his genitals and was paralyzed on bilateral lower extremities. He was on the ground for >48 hours with no PO intake before his son came to the DR to take [place] for evaluation at [facility].     Patient was initially evaluated at [facility]; for b/l found to be in rhabdo and SRUTHI, On imaging, he was found to have C7 spinous process nondisplaced fracture of T1 spinous process, edema from C3-4 through C7-T1 interspinous ligaments as well as lytic metastatic disease from C7, Tq-T5 and T12, as well as L1-L3 as well as epidural expansion of neoplasm at C7 centrally and dorsally resulting in moderate cord compression and underlying edema/extension into the bilateral C7-T1 and T1-2 and Left T2-3 Neural foramina. Epidural disease was also notedat T12 vertebral body. NM Scan significant for multiple sites with osseous lesions; Scapula, Multiple left ribs, bilateral pelvic region, as well as b/l LE DVTs on US and was started on Heparin GTT. Patient Reports that since Wednesday, he has had significant improvement in his functional status and has regained sensation and movement in his lower extremities and has been able to feel his genatalia and his lower abdomen.  (27 Oct 2023 19:09)        REVIEW OF SYSTEMS:    CONSTITUTIONAL: No weakness, fevers or chills  EYES/ENT: No visual changes;  No vertigo or throat pain   NECK: No pain or stiffness  RESPIRATORY: No cough, wheezing, hemoptysis; No shortness of breath  CARDIOVASCULAR: No chest pain or palpitations  GASTROINTESTINAL: No abdominal or epigastric pain. No nausea, vomiting, or hematemesis; No diarrhea or constipation. No melena or hematochezia.  GENITOURINARY: No dysuria, frequency or hematuria  NEUROLOGICAL: No numbness or weakness  SKIN: No itching, burning, rashes, or lesions   All other review of systems is negative unless indicated above.    PAST MEDICAL & SURGICAL HISTORY:  Essential hypertension      Prostate cancer metastatic to bone      No significant past surgical history          FAMILY HISTORY:  Family history of stroke (Grandparent)        SOCIAL HISTORY:     Allergies    No Known Allergies    Intolerances        MEDICATIONS  (STANDING):  amLODIPine   Tablet 10 milliGRAM(s) Oral daily  atorvastatin 20 milliGRAM(s) Oral at bedtime  dextrose 5%. 1000 milliLiter(s) (50 mL/Hr) IV Continuous <Continuous>  dextrose 5%. 1000 milliLiter(s) (100 mL/Hr) IV Continuous <Continuous>  dextrose 50% Injectable 25 Gram(s) IV Push once  dextrose 50% Injectable 25 Gram(s) IV Push once  dextrose 50% Injectable 12.5 Gram(s) IV Push once  glucagon  Injectable 1 milliGRAM(s) IntraMuscular once  heparin  Infusion. 1100 Unit(s)/Hr (11 mL/Hr) IV Continuous <Continuous>  insulin glargine Injectable (LANTUS) 15 Unit(s) SubCutaneous every morning  insulin lispro (ADMELOG) corrective regimen sliding scale   SubCutaneous three times a day before meals  insulin lispro (ADMELOG) corrective regimen sliding scale   SubCutaneous at bedtime  methylPREDNISolone sodium succinate Injectable 40 milliGRAM(s) IV Push every 8 hours  pantoprazole    Tablet 40 milliGRAM(s) Oral before breakfast  senna 2 Tablet(s) Oral at bedtime    MEDICATIONS  (PRN):  dextrose Oral Gel 15 Gram(s) Oral once PRN Blood Glucose LESS THAN 70 milliGRAM(s)/deciliter  heparin   Injectable 2500 Unit(s) IV Push every 6 hours PRN For aPTT between 40 - 57  heparin   Injectable 5500 Unit(s) IV Push every 6 hours PRN For aPTT less than 40  oxyCODONE    IR 5 milliGRAM(s) Oral every 4 hours PRN Severe Pain (7 - 10)  oxyCODONE    IR 2.5 milliGRAM(s) Oral every 4 hours PRN Moderate Pain (4 - 6)  polyethylene glycol 3350 17 Gram(s) Oral daily PRN Constipation      OBJECTIVE   Height (cm): 170.2 (10-27 @ 15:34)  Weight (kg): 70 (10-27 @ 15:34)  BMI (kg/m2): 24.2 (10-27 @ 15:34)  BSA (m2): 1.81 (10-27 @ 15:34)    T(F): 97.5 (10-28-23 @ 05:10), Max: 98.8 (10-27-23 @ 21:26)  HR: 83 (10-28-23 @ 05:10)  BP: 122/83 (10-28-23 @ 05:10)  RR: 17 (10-28-23 @ 05:10)  SpO2: 96% (10-28-23 @ 05:10)  Wt(kg): --    PHYSICAL EXAM   GENERAL: NAD, well-developed  HEAD:  Atraumatic, Normocephalic  EYES: EOMI, PERRLA, conjunctiva and sclera clear  NECK: Supple, No JVD  CHEST/LUNG: Clear to auscultation bilaterally; No wheeze  HEART: Regular rate and rhythm; No murmurs, rubs, or gallops  ABDOMEN: Soft, Nontender, Nondistended; Bowel sounds present  EXTREMITIES:  2+ Peripheral Pulses, No clubbing, cyanosis, or edema  NEUROLOGY: non-focal  SKIN: No rashes or lesions                          13.7   10.27 )-----------( 199      ( 28 Oct 2023 06:46 )             39.5       10-28    140  |  100  |  24<H>  ----------------------------<  237<H>  3.7   |  29  |  0.90    Ca    8.4      28 Oct 2023 06:46  Phos  4.5     10-28  Mg     2.30     10-28    TPro  6.2  /  Alb  3.7  /  TBili  1.0  /  DBili  x   /  AST  76<H>  /  ALT  281<H>  /  AlkPhos  311<H>  10-28      Magnesium: 2.30 mg/dL (10-28 @ 06:46)  Phosphorus: 4.5 mg/dL (10-28 @ 06:46)      < from: CT Abdomen and Pelvis No Cont (10.22.23 @ 02:08) >  IMPRESSION:    1. Scattered sclerotic osseous lesions, predominantly in the spine and   bony pelvis. Retroperitoneal and pelvic lymphadenopathy with   indeterminate mediastinal nodes. Findings most likely represent   metastatic prostate cancer, although other malignancies can have a   similar appearance. Please see the reports of recent spinal imaging   studies for further details regarding the spine.  2. Markedly distended bladder. Correlate for bladder outlet obstruction.      < end of copied text >  < from: MR Lumbar Spine w/wo IV Cont (10.22.23 @ 12:21) >  IMPRESSION: Multiple osseous lesions compatible with metastasis. Some of   these lesions do demonstrate associated areas of abnormal enhancement    There is epidural extension of tumor described above.    MRI of the lumbar spine was performed sagittal T1-T2 and STIR sequences.   Axial T1 and T2-weighted sequences were performed. Patient was injected   with 9 cc gadovist IV with 1 cc of contrast discarded. Sagittal T1   sequence was performed with fat suppression. Axial T1-weighted sequence   was performed.    Loss of the normal lumbar lordosis.    Abnormal T1 prolongation seen involving the L3, L5 and right S1 vertebral   bodies. Abnormal lesions are seen involving both iliac bones. These   findings correspond sclerotic lesion seen on prior CT scan of the lumbar   spine performed on October 21, 2023    The lesion likely compatible with underlying metastasis. No epidural or   paraspinal extension of tumor is seen    Loss normal lumbar lordosis    Disc desiccation is seen involving the C3-4 L4-5 and L5-S1 levels   secondary to chronic    L1-2: Normal    L2-3: Normal    L3-4: Disc bulge and bilateral hypertrophic facet joint changes seen.   Mild to moderate narrowing of the right neural foramen    L4-5: Disc bulge and bilateral hypertrophic facet joint changes. Mild to   moderate narrowing of the spinal canal. Mild to moderate narrowing of   both neural foramen    L5-S1: Bilateral hypertrophic facet joint changes seen without   significant compromise of the spinal canal or either neural foramen    Evaluation of the paraspinal soft tissues appear normal.    IMPRESSION: Abnormal lesions as described above.    < end of copied text >  < from: NM Bone Imaging Total (10.23.23 @ 10:53) >    IMPRESSION: Findings consistent with osseous metastatic disease. Findings   are markedly concordant with prior MRI findings, although the lumbar   spine is largely unremarkable. Scapula, multiple LEFT ribs, and BILATERAL   portions of the pelvic region contain additional sites of disease.    < end of copied text >         Oncology Consult Note    HPI as per admitting team:   73M with HTN met prostate cancer (2009) with LE weakness and R hand paresthesias presents as a transfer from [facility] for NSGY evaluation    Patient reports that he has known about his prostate cancer since 2009, but did not seek out treatment because he did not take it seriously. He reports that he is typically fully functional and has no known weakness, or paresthesias. Around 1 month ago, he reports feeling some minor tingling/numbness in his R hand. The week prior to his hospitalization, he began to feel some minor weakness in both of his legs, but was still able to perform all of his ADLs and iADLs. He reports on Thursday, while he was showering, he fell in the bath due to weakness in his legs, but reports he was still able to get up and perform some household tasks such as cleaning and cooking, but still had general weakness and fell several more times. When he woke up from bed the next morning, he attempted to use the restroom and fell to the floor and reports he wasn't able to feel his below his belly button, was unable to control his bowel movements, couldn't feel his genitals and was paralyzed on bilateral lower extremities. He was on the ground for >48 hours with no PO intake before his son came to the DR to take [place] back [place] the [place] for evaluation at [facility].     Patient was initially evaluated at [facility]; for b/l found to be in rhabdo and SRUTHI, On imaging, he was found to have C7 spinous process nondisplaced fracture of T1 spinous process, edema from C3-4 through C7-T1 interspinous ligaments as well as lytic metastatic disease from C7, Tq-T5 and T12, as well as L1-L3 as well as epidural expansion of neoplasm at C7 centrally and dorsally resulting in moderate cord compression and underlying edema/extension into the bilateral C7-T1 and T1-2 and Left T2-3 Neural foramina. Epidural disease was also notedat T12 vertebral body. NM Scan significant for multiple sites with osseous lesions; Scapula, Multiple left ribs, bilateral pelvic region, as well as b/l LE DVTs on US and was started on Heparin GTT. Patient Reports that since Wednesday, he has had significant improvement in his functional status and has regained sensation and movement in his lower extremities and has been able to feel his genatalia and his lower abdomen.  (27 Oct 2023 19:09)        REVIEW OF SYSTEMS:    CONSTITUTIONAL: + weakness   EYES/ENT: No visual changes;  No vertigo or throat pain   NECK: No pain or stiffness  RESPIRATORY: No cough, wheezing, hemoptysis; No shortness of breath  CARDIOVASCULAR: No chest pain or palpitations  GASTROINTESTINAL: No abdominal or epigastric pain. No nausea, vomiting, or hematemesis; No diarrhea or constipation. No melena or hematochezia.  GENITOURINARY: + urinary urgency   NEUROLOGICAL: + bilateral LE numbness/tingling/weakness   SKIN: No itching, burning, rashes, or lesions   All other review of systems is negative unless indicated above.    PAST MEDICAL & SURGICAL HISTORY:  Essential hypertension      Prostate cancer metastatic to bone      No significant past surgical history          FAMILY HISTORY:  Family history of stroke (Grandparent)        SOCIAL HISTORY: Social etoh use, non smoker, no illicit drug use; retired - former ; Lives at home with family, goes to  often with son. No family hx of cancer     Allergies    No Known Allergies    Intolerances        MEDICATIONS  (STANDING):  amLODIPine   Tablet 10 milliGRAM(s) Oral daily  atorvastatin 20 milliGRAM(s) Oral at bedtime  dextrose 5%. 1000 milliLiter(s) (50 mL/Hr) IV Continuous <Continuous>  dextrose 5%. 1000 milliLiter(s) (100 mL/Hr) IV Continuous <Continuous>  dextrose 50% Injectable 25 Gram(s) IV Push once  dextrose 50% Injectable 25 Gram(s) IV Push once  dextrose 50% Injectable 12.5 Gram(s) IV Push once  glucagon  Injectable 1 milliGRAM(s) IntraMuscular once  heparin  Infusion. 1100 Unit(s)/Hr (11 mL/Hr) IV Continuous <Continuous>  insulin glargine Injectable (LANTUS) 15 Unit(s) SubCutaneous every morning  insulin lispro (ADMELOG) corrective regimen sliding scale   SubCutaneous three times a day before meals  insulin lispro (ADMELOG) corrective regimen sliding scale   SubCutaneous at bedtime  methylPREDNISolone sodium succinate Injectable 40 milliGRAM(s) IV Push every 8 hours  pantoprazole    Tablet 40 milliGRAM(s) Oral before breakfast  senna 2 Tablet(s) Oral at bedtime    MEDICATIONS  (PRN):  dextrose Oral Gel 15 Gram(s) Oral once PRN Blood Glucose LESS THAN 70 milliGRAM(s)/deciliter  heparin   Injectable 2500 Unit(s) IV Push every 6 hours PRN For aPTT between 40 - 57  heparin   Injectable 5500 Unit(s) IV Push every 6 hours PRN For aPTT less than 40  oxyCODONE    IR 5 milliGRAM(s) Oral every 4 hours PRN Severe Pain (7 - 10)  oxyCODONE    IR 2.5 milliGRAM(s) Oral every 4 hours PRN Moderate Pain (4 - 6)  polyethylene glycol 3350 17 Gram(s) Oral daily PRN Constipation      OBJECTIVE   Height (cm): 170.2 (10-27 @ 15:34)  Weight (kg): 70 (10-27 @ 15:34)  BMI (kg/m2): 24.2 (10-27 @ 15:34)  BSA (m2): 1.81 (10-27 @ 15:34)    T(F): 97.5 (10-28-23 @ 05:10), Max: 98.8 (10-27-23 @ 21:26)  HR: 83 (10-28-23 @ 05:10)  BP: 122/83 (10-28-23 @ 05:10)  RR: 17 (10-28-23 @ 05:10)  SpO2: 96% (10-28-23 @ 05:10)  Wt(kg): --    PHYSICAL EXAM   GENERAL: NAD, well-developed  HEAD:  Atraumatic, Normocephalic  EYES: EOMI, conjunctiva and sclera clear  CHEST/LUNG: Clear to auscultation bilaterally; No wheeze  HEART: Regular rate and rhythm; No murmurs, rubs, or gallops  ABDOMEN: Soft, Nontender, Nondistended; Bowel sounds present  EXTREMITIES:  decreased bilateral LE strength, + numbness/tingling                             13.7   10.27 )-----------( 199      ( 28 Oct 2023 06:46 )             39.5       10-28    140  |  100  |  24<H>  ----------------------------<  237<H>  3.7   |  29  |  0.90    Ca    8.4      28 Oct 2023 06:46  Phos  4.5     10-28  Mg     2.30     10-28    TPro  6.2  /  Alb  3.7  /  TBili  1.0  /  DBili  x   /  AST  76<H>  /  ALT  281<H>  /  AlkPhos  311<H>  10-28      Magnesium: 2.30 mg/dL (10-28 @ 06:46)  Phosphorus: 4.5 mg/dL (10-28 @ 06:46)      < from: CT Abdomen and Pelvis No Cont (10.22.23 @ 02:08) >  IMPRESSION:    1. Scattered sclerotic osseous lesions, predominantly in the spine and   bony pelvis. Retroperitoneal and pelvic lymphadenopathy with   indeterminate mediastinal nodes. Findings most likely represent   metastatic prostate cancer, although other malignancies can have a   similar appearance. Please see the reports of recent spinal imaging   studies for further details regarding the spine.  2. Markedly distended bladder. Correlate for bladder outlet obstruction.      < end of copied text >  < from: MR Lumbar Spine w/wo IV Cont (10.22.23 @ 12:21) >  IMPRESSION: Multiple osseous lesions compatible with metastasis. Some of   these lesions do demonstrate associated areas of abnormal enhancement    There is epidural extension of tumor described above.    MRI of the lumbar spine was performed sagittal T1-T2 and STIR sequences.   Axial T1 and T2-weighted sequences were performed. Patient was injected   with 9 cc gadovist IV with 1 cc of contrast discarded. Sagittal T1   sequence was performed with fat suppression. Axial T1-weighted sequence   was performed.    Loss of the normal lumbar lordosis.    Abnormal T1 prolongation seen involving the L3, L5 and right S1 vertebral   bodies. Abnormal lesions are seen involving both iliac bones. These   findings correspond sclerotic lesion seen on prior CT scan of the lumbar   spine performed on October 21, 2023    The lesion likely compatible with underlying metastasis. No epidural or   paraspinal extension of tumor is seen    Loss normal lumbar lordosis    Disc desiccation is seen involving the C3-4 L4-5 and L5-S1 levels   secondary to chronic    L1-2: Normal    L2-3: Normal    L3-4: Disc bulge and bilateral hypertrophic facet joint changes seen.   Mild to moderate narrowing of the right neural foramen    L4-5: Disc bulge and bilateral hypertrophic facet joint changes. Mild to   moderate narrowing of the spinal canal. Mild to moderate narrowing of   both neural foramen    L5-S1: Bilateral hypertrophic facet joint changes seen without   significant compromise of the spinal canal or either neural foramen    Evaluation of the paraspinal soft tissues appear normal.    IMPRESSION: Abnormal lesions as described above.    < end of copied text >  < from: NM Bone Imaging Total (10.23.23 @ 10:53) >    IMPRESSION: Findings consistent with osseous metastatic disease. Findings   are markedly concordant with prior MRI findings, although the lumbar   spine is largely unremarkable. Scapula, multiple LEFT ribs, and BILATERAL   portions of the pelvic region contain additional sites of disease.    < end of copied text >

## 2023-10-28 NOTE — PROGRESS NOTE ADULT - PROBLEM SELECTOR PLAN 5
Alk Phos: 314  AST: 314  ALT: 299    Likely in the setting of Rhabdo  Will continue to trend the LFTs and transaminitis  RUQ U/S given persistently elevated LFTs Alk Phos: 314 -> 311  AST: 108 -> 76  ALT: 299 -> 281     Likely in the setting of Rhabdo  Will continue to trend the LFTs and transaminitis  RUQ U/S given persistently elevated LFTs

## 2023-10-28 NOTE — PROGRESS NOTE ADULT - PROBLEM SELECTOR PLAN 3
B/L DVTs found in [facility] VS  - DVT in R posterior tibial and peroneal veins Below Knee  - DVT in Left Posterior tibial and peroneal veins below Knee  Suspect likely iso known malignancy    Plan:  Continue with Heparin GTT  TTE in the AM to evaluate for RHS

## 2023-10-28 NOTE — PROGRESS NOTE ADULT - PROBLEM SELECTOR PLAN 4
Admission CK > 07723  Downtrended to 511    Will Follow up Creatinine Kinase in the AM Admission CK > 18192  Downtrended to 511    Will Follow up Creatinine Kinase Admission CK > 62434  Downtrended to 511  Downtredned further to normalizt at 114

## 2023-10-28 NOTE — PROGRESS NOTE ADULT - ASSESSMENT
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 Initiated IV solumedrol with gradual improvement of symptoms and return of strength since Wednesday now here at [facility] for spine evaluation by surgery.

## 2023-10-28 NOTE — PROGRESS NOTE ADULT - ATTENDING COMMENTS
73M w/ T2DM, HTN, Metastatic prostate cancer (2009) was initially evaluated at [facility] for lower extremity paralysis in the setting of a recent fall, found to have cervical spine neoplasm consistent with cord compression, course complicated by progression of prostate cancer, rhabdomyolysis, SRUTHI, and DVT. Reports strength and sensation improving. Well appearing on exam. Able to move lower extremities. Hemeonc, Ortho and rad/onc consulted, f/u recs. C/w steroid. Plan for RT as inpatient. SRUTHI and rhabdomyolysis resolved. LFT still high, f/u with US abdomen. C/w heparin gtt for b/l DVT. Adding premeal insulin especially now on steroid and hyperglycemia.    Rest as above.  Discussed with HS.

## 2023-10-28 NOTE — PROGRESS NOTE ADULT - PROBLEM SELECTOR PLAN 6
Likely due to Cord Compression/Spinal Mets    Plan:  Oxycodone 5 q4h PRN for Severe pain  Oxycodone 2.5 q4h PRN for moderate pain  Senna standing  Miralax PRN

## 2023-10-28 NOTE — PROGRESS NOTE ADULT - PROBLEM SELECTOR PLAN 1
Started on Solumedrol at [facility] VS  Since Wednesday, Improved strenght, sensation; Normal BM  Rectal Exam: Normal Tone;  Does not feel comfortable to stand, 5/5 UE and 4/5 LE strength    Plan:  F/U with Ortho Recs  F/U Rad Onc Rec: Tentative Palliative Simulation/XRT to C7-T5 pending ortho evaluation  C/W IV Methylprednisolone 40 q8h  Protonix 40 qd  Onc Email Sent  Neuro Checks q4h Started on Solumedrol at [facility] VS  Since Wednesday, Improved strength, sensation; Normal BM  Rectal Exam: Normal Tone;  Does not feel comfortable to stand, 5/5 UE and 4/5 LE strength    Plan:  F/U with Ortho Recs  F/U Rad Onc Rec: Tentative Palliative Simulation/XRT to C7-T5 pending ortho evaluation  C/W IV Methylprednisolone 40 q8h  Protonix 40 qd  Onc Email Sent  Neuro Checks q4h

## 2023-10-29 LAB
ANION GAP SERPL CALC-SCNC: 12 MMOL/L — SIGNIFICANT CHANGE UP (ref 7–14)
ANION GAP SERPL CALC-SCNC: 12 MMOL/L — SIGNIFICANT CHANGE UP (ref 7–14)
APTT BLD: 77.9 SEC — HIGH (ref 24.5–35.6)
APTT BLD: 77.9 SEC — HIGH (ref 24.5–35.6)
BUN SERPL-MCNC: 32 MG/DL — HIGH (ref 7–23)
BUN SERPL-MCNC: 32 MG/DL — HIGH (ref 7–23)
CALCIUM SERPL-MCNC: 9 MG/DL — SIGNIFICANT CHANGE UP (ref 8.4–10.5)
CALCIUM SERPL-MCNC: 9 MG/DL — SIGNIFICANT CHANGE UP (ref 8.4–10.5)
CHLORIDE SERPL-SCNC: 96 MMOL/L — LOW (ref 98–107)
CHLORIDE SERPL-SCNC: 96 MMOL/L — LOW (ref 98–107)
CK SERPL-CCNC: 94 U/L — SIGNIFICANT CHANGE UP (ref 30–200)
CK SERPL-CCNC: 94 U/L — SIGNIFICANT CHANGE UP (ref 30–200)
CO2 SERPL-SCNC: 26 MMOL/L — SIGNIFICANT CHANGE UP (ref 22–31)
CO2 SERPL-SCNC: 26 MMOL/L — SIGNIFICANT CHANGE UP (ref 22–31)
CREAT SERPL-MCNC: 0.96 MG/DL — SIGNIFICANT CHANGE UP (ref 0.5–1.3)
CREAT SERPL-MCNC: 0.96 MG/DL — SIGNIFICANT CHANGE UP (ref 0.5–1.3)
EGFR: 84 ML/MIN/1.73M2 — SIGNIFICANT CHANGE UP
EGFR: 84 ML/MIN/1.73M2 — SIGNIFICANT CHANGE UP
GLUCOSE BLDC GLUCOMTR-MCNC: 240 MG/DL — HIGH (ref 70–99)
GLUCOSE BLDC GLUCOMTR-MCNC: 240 MG/DL — HIGH (ref 70–99)
GLUCOSE BLDC GLUCOMTR-MCNC: 289 MG/DL — HIGH (ref 70–99)
GLUCOSE BLDC GLUCOMTR-MCNC: 289 MG/DL — HIGH (ref 70–99)
GLUCOSE BLDC GLUCOMTR-MCNC: 299 MG/DL — HIGH (ref 70–99)
GLUCOSE BLDC GLUCOMTR-MCNC: 299 MG/DL — HIGH (ref 70–99)
GLUCOSE BLDC GLUCOMTR-MCNC: 311 MG/DL — HIGH (ref 70–99)
GLUCOSE BLDC GLUCOMTR-MCNC: 311 MG/DL — HIGH (ref 70–99)
GLUCOSE SERPL-MCNC: 314 MG/DL — HIGH (ref 70–99)
GLUCOSE SERPL-MCNC: 314 MG/DL — HIGH (ref 70–99)
HCT VFR BLD CALC: 36.9 % — LOW (ref 39–50)
HCT VFR BLD CALC: 36.9 % — LOW (ref 39–50)
HGB BLD-MCNC: 12.6 G/DL — LOW (ref 13–17)
HGB BLD-MCNC: 12.6 G/DL — LOW (ref 13–17)
INR BLD: 1.04 RATIO — SIGNIFICANT CHANGE UP (ref 0.85–1.18)
INR BLD: 1.04 RATIO — SIGNIFICANT CHANGE UP (ref 0.85–1.18)
MAGNESIUM SERPL-MCNC: 2.4 MG/DL — SIGNIFICANT CHANGE UP (ref 1.6–2.6)
MAGNESIUM SERPL-MCNC: 2.4 MG/DL — SIGNIFICANT CHANGE UP (ref 1.6–2.6)
MCHC RBC-ENTMCNC: 30.5 PG — SIGNIFICANT CHANGE UP (ref 27–34)
MCHC RBC-ENTMCNC: 30.5 PG — SIGNIFICANT CHANGE UP (ref 27–34)
MCHC RBC-ENTMCNC: 34.1 GM/DL — SIGNIFICANT CHANGE UP (ref 32–36)
MCHC RBC-ENTMCNC: 34.1 GM/DL — SIGNIFICANT CHANGE UP (ref 32–36)
MCV RBC AUTO: 89.3 FL — SIGNIFICANT CHANGE UP (ref 80–100)
MCV RBC AUTO: 89.3 FL — SIGNIFICANT CHANGE UP (ref 80–100)
MRSA PCR RESULT.: SIGNIFICANT CHANGE UP
MRSA PCR RESULT.: SIGNIFICANT CHANGE UP
NRBC # BLD: 0 /100 WBCS — SIGNIFICANT CHANGE UP (ref 0–0)
NRBC # BLD: 0 /100 WBCS — SIGNIFICANT CHANGE UP (ref 0–0)
NRBC # FLD: 0 K/UL — SIGNIFICANT CHANGE UP (ref 0–0)
NRBC # FLD: 0 K/UL — SIGNIFICANT CHANGE UP (ref 0–0)
PHOSPHATE SERPL-MCNC: 3.8 MG/DL — SIGNIFICANT CHANGE UP (ref 2.5–4.5)
PHOSPHATE SERPL-MCNC: 3.8 MG/DL — SIGNIFICANT CHANGE UP (ref 2.5–4.5)
PLATELET # BLD AUTO: 206 K/UL — SIGNIFICANT CHANGE UP (ref 150–400)
PLATELET # BLD AUTO: 206 K/UL — SIGNIFICANT CHANGE UP (ref 150–400)
POTASSIUM SERPL-MCNC: 4.2 MMOL/L — SIGNIFICANT CHANGE UP (ref 3.5–5.3)
POTASSIUM SERPL-MCNC: 4.2 MMOL/L — SIGNIFICANT CHANGE UP (ref 3.5–5.3)
POTASSIUM SERPL-SCNC: 4.2 MMOL/L — SIGNIFICANT CHANGE UP (ref 3.5–5.3)
POTASSIUM SERPL-SCNC: 4.2 MMOL/L — SIGNIFICANT CHANGE UP (ref 3.5–5.3)
PROTHROM AB SERPL-ACNC: 11.7 SEC — SIGNIFICANT CHANGE UP (ref 9.5–13)
PROTHROM AB SERPL-ACNC: 11.7 SEC — SIGNIFICANT CHANGE UP (ref 9.5–13)
RBC # BLD: 4.13 M/UL — LOW (ref 4.2–5.8)
RBC # BLD: 4.13 M/UL — LOW (ref 4.2–5.8)
RBC # FLD: 12.3 % — SIGNIFICANT CHANGE UP (ref 10.3–14.5)
RBC # FLD: 12.3 % — SIGNIFICANT CHANGE UP (ref 10.3–14.5)
S AUREUS DNA NOSE QL NAA+PROBE: SIGNIFICANT CHANGE UP
S AUREUS DNA NOSE QL NAA+PROBE: SIGNIFICANT CHANGE UP
SODIUM SERPL-SCNC: 134 MMOL/L — LOW (ref 135–145)
SODIUM SERPL-SCNC: 134 MMOL/L — LOW (ref 135–145)
WBC # BLD: 13.68 K/UL — HIGH (ref 3.8–10.5)
WBC # BLD: 13.68 K/UL — HIGH (ref 3.8–10.5)
WBC # FLD AUTO: 13.68 K/UL — HIGH (ref 3.8–10.5)
WBC # FLD AUTO: 13.68 K/UL — HIGH (ref 3.8–10.5)

## 2023-10-29 PROCEDURE — 99231 SBSQ HOSP IP/OBS SF/LOW 25: CPT | Mod: GC

## 2023-10-29 RX ORDER — BICALUTAMIDE 50 MG/1
50 TABLET, FILM COATED ORAL DAILY
Refills: 0 | Status: DISCONTINUED | OUTPATIENT
Start: 2023-10-29 | End: 2023-12-23

## 2023-10-29 RX ORDER — INSULIN LISPRO 100/ML
7 VIAL (ML) SUBCUTANEOUS
Refills: 0 | Status: DISCONTINUED | OUTPATIENT
Start: 2023-10-29 | End: 2023-10-30

## 2023-10-29 RX ORDER — INSULIN GLARGINE 100 [IU]/ML
19 INJECTION, SOLUTION SUBCUTANEOUS EVERY MORNING
Refills: 0 | Status: DISCONTINUED | OUTPATIENT
Start: 2023-10-29 | End: 2023-10-30

## 2023-10-29 RX ADMIN — AMLODIPINE BESYLATE 10 MILLIGRAM(S): 2.5 TABLET ORAL at 05:32

## 2023-10-29 RX ADMIN — HEPARIN SODIUM 1000 UNIT(S)/HR: 5000 INJECTION INTRAVENOUS; SUBCUTANEOUS at 08:52

## 2023-10-29 RX ADMIN — Medication 40 MILLIGRAM(S): at 22:15

## 2023-10-29 RX ADMIN — Medication 4: at 18:22

## 2023-10-29 RX ADMIN — Medication 40 MILLIGRAM(S): at 18:20

## 2023-10-29 RX ADMIN — PANTOPRAZOLE SODIUM 40 MILLIGRAM(S): 20 TABLET, DELAYED RELEASE ORAL at 07:00

## 2023-10-29 RX ADMIN — SENNA PLUS 2 TABLET(S): 8.6 TABLET ORAL at 22:16

## 2023-10-29 RX ADMIN — Medication 7 UNIT(S): at 14:16

## 2023-10-29 RX ADMIN — HEPARIN SODIUM 1000 UNIT(S)/HR: 5000 INJECTION INTRAVENOUS; SUBCUTANEOUS at 08:49

## 2023-10-29 RX ADMIN — Medication 5 UNIT(S): at 09:35

## 2023-10-29 RX ADMIN — INSULIN GLARGINE 15 UNIT(S): 100 INJECTION, SOLUTION SUBCUTANEOUS at 09:35

## 2023-10-29 RX ADMIN — ATORVASTATIN CALCIUM 20 MILLIGRAM(S): 80 TABLET, FILM COATED ORAL at 22:16

## 2023-10-29 RX ADMIN — BICALUTAMIDE 50 MILLIGRAM(S): 50 TABLET, FILM COATED ORAL at 11:45

## 2023-10-29 RX ADMIN — Medication 40 MILLIGRAM(S): at 05:32

## 2023-10-29 RX ADMIN — Medication 7 UNIT(S): at 18:22

## 2023-10-29 RX ADMIN — CHLORHEXIDINE GLUCONATE 1 APPLICATION(S): 213 SOLUTION TOPICAL at 11:46

## 2023-10-29 RX ADMIN — HEPARIN SODIUM 1000 UNIT(S)/HR: 5000 INJECTION INTRAVENOUS; SUBCUTANEOUS at 20:45

## 2023-10-29 RX ADMIN — INSULIN GLARGINE 19 UNIT(S): 100 INJECTION, SOLUTION SUBCUTANEOUS at 22:19

## 2023-10-29 RX ADMIN — Medication 3: at 14:16

## 2023-10-29 NOTE — DIETITIAN INITIAL EVALUATION ADULT - PROBLEM SELECTOR PLAN 1
Started on Solumedrol at [facility] VS  Since Wednesday, Improved strenght, sensation; Normal BM  Rectal Exam: Normal Tone;  Does not feel comfortable to stand, 5/5 UE and 4/5 LE strength    Plan:  F/U with Ortho Recs  F/U Rad Onc Rec: Tentative Palliative Simulation/XRT to C7-T5 pending ortho evaluation  C/W IV Methylprednisolone 40 q8h  Protonix 40 qd  Onc Email Sent  Neuro Checks q4h

## 2023-10-29 NOTE — DIETITIAN INITIAL EVALUATION ADULT - ADD RECOMMEND
1. Continue to document PO in RN flow sheets and monitor weekly weights.   2. Continue to monitor skin integrity, bowel regimen, and nutrition pertinent labs.   3. RD to remain available.   3.

## 2023-10-29 NOTE — DIETITIAN INITIAL EVALUATION ADULT - NS FNS DIET ORDER
Diet, Regular:   Consistent Carbohydrate {Evening Snack} (CSTCHOSN)  DASH/TLC {Sodium & Cholesterol Restricted} (DASH) (10-27-23 @ 22:10)

## 2023-10-29 NOTE — PHYSICAL THERAPY INITIAL EVALUATION ADULT - BED MOBILITY LIMITATIONS, REHAB EVAL
bed mobility not assessed, patient found and left sitting in chair at bedside, as per nursing staff and patient, patient transferred from bed to chair with thomas 3000

## 2023-10-29 NOTE — PROGRESS NOTE ADULT - PROBLEM SELECTOR PLAN 1
Started on Solumedrol at [facility] VS  Since Wednesday, Improved strength, sensation; Normal BM  Rectal Exam: Normal Tone;  Does not feel comfortable to stand, 5/5 UE and 4/5 LE strength    Plan:  F/U with Ortho Recs  F/U Rad Onc Rec: Tentative Palliative Simulation/XRT to C7-T5 pending ortho evaluation  C/W IV Methylprednisolone 40 q8h  Protonix 40 qd  Onc Email Sent  Neuro Checks q4h

## 2023-10-29 NOTE — PHYSICAL THERAPY INITIAL EVALUATION ADULT - ADDITIONAL COMMENTS
As per patient he was independent with functional mobility and ADL's prior to onset of weakness/pain. Patient denies the use of any assistive devices. Reports multiple resent falls due to lower extremity weakness. Patient lives in an apartment with his son, on the second floor, (-) elevator.     PAtient left sitting in chair at bedside, NAD, all lines and tubes intact, call bell within reach, instructed to not get up without assistance, KAI Encinas made aware.

## 2023-10-29 NOTE — DIETITIAN INITIAL EVALUATION ADULT - PROBLEM SELECTOR PLAN 5
Alk Phos: 314  AST: 314  ALT: 299    Likely in the setting of Rhabdo  Will continue to trend the LFTs and transaminitis  RUQ U/S given persistently elevated LFTs

## 2023-10-29 NOTE — DIETITIAN INITIAL EVALUATION ADULT - PERTINENT LABORATORY DATA
10-28    140  |  100  |  24<H>  ----------------------------<  237<H>  3.7   |  29  |  0.90    Ca    8.4      28 Oct 2023 06:46  Phos  4.5     10-28  Mg     2.30     10-28    TPro  6.2  /  Alb  3.7  /  TBili  1.0  /  DBili  x   /  AST  76<H>  /  ALT  281<H>  /  AlkPhos  311<H>  10-28  POCT Blood Glucose.: 289 mg/dL (10-29-23 @ 08:43)  A1C with Estimated Average Glucose Result: 5.2 % (10-23-23 @ 06:20)

## 2023-10-29 NOTE — DIETITIAN INITIAL EVALUATION ADULT - PROBLEM SELECTOR PLAN 2
Found to have LE Weakness along with saddle anesthesia; concern for cord compression  Prostate Biopsy 2021 --> adenocarcinoma Gelason 3+4; declined radiation therapy at the time  Epidural disease was also noted at T12 vertebral body. NM Scan significant for multiple sites with osseous lesions;   Team at [facility] discussed with Oncology  edema from C3-4 through C7-T1 interspinous ligaments as well as lytic metastatic disease from C7, Tq-T5 and T12, as well as L1-L3    Plan  Oncology Email Sent  Plan as above for cord compression  Degarelix (GnRH Receptor antagonist) once LFTs improved  F/U Rad Onc Recs

## 2023-10-29 NOTE — PROGRESS NOTE ADULT - ATTENDING COMMENTS
73M w/ T2DM, HTN, Metastatic prostate cancer (2009) was initially evaluated at 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 Reports strength and sensation improving. Well appearing on exam. Able to move lower extremities. Hemeonc, Ortho and rad/onc consulted, f/u recs. C/w steroid. Plan for RT as inpatient. SRUTHI and rhabdomyolysis resolved. LFT still high, f/u with US abdomen--hepatic steatosis. Will obtain spine imagings per ortho. Started casodex per hemeonc. C/w heparin gtt for b/l DVT. Adjust insulin.    Rest as above.  Discussed with HS. 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 Reports strength and sensation improving. Well appearing on exam. Able to move lower extremities. Hemeonc, Ortho and rad/onc consulted, f/u recs. C/w steroid. Plan for RT as inpatient. SRUTHI and rhabdomyolysis resolved. LFT still high, f/u with US abdomen--unremarkable. Will obtain spine imagings per ortho. Started casodex per hemeonc. C/w heparin gtt for b/l DVT. Adjust insulin.    Rest as above.  Discussed with HS.

## 2023-10-29 NOTE — PHYSICAL THERAPY INITIAL EVALUATION ADULT - PRECAUTIONS/LIMITATIONS, REHAB EVAL
Physical Therapy Visit    Visit Type: Daily Treatment Note  Visit: 3  Referring Provider: Kamala Cohen MD  Medical Diagnosis (from order): Diagnosis Information    Diagnosis  782.0 (ICD-9-CM) - R20.2 (ICD-10-CM) - Paresthesias  781.99 (ICD-9-CM) - R26.89 (ICD-10-CM) - Loss of balance         SUBJECTIVE                                                                                                               Had a massage yesterday so a little sore today. Focus was lumbar and neck/shoulders. Usually feels best 2 days later. Saw neuro yesterday. Will begin acupuncture and chiropractor. Denies any parathesias in UEs/LEs this morning. Did get a foam roll for home.       OBJECTIVE                                                                                                                                       Treatment     Therapeutic Exercise  Foam roll supine pec stretch, towel roll for neutral cervical spine x 2 minutes                                Snow angels x 10                                Chin tuck with downward gaze 10x5 sec,  lift off 10x5 sec                                Green TB horizontal abduction 10x5 sec                                Green TB low trap/sh ER 10x 5 sec  See manual    Manual Therapy   Supine cervical traction, suboccipital release, PROM cervical rot/SB, side glides grade II-III    Home Exercise Program  Access Code: [email or web address]  URL: [email or web address]  Date: 08/24/2023  Prepared by: Reva Murphy    Exercises  - Supine Chest Stretch on Foam Roll  - 1 x daily - 7 x weekly - 1 sets - 300 hold  - Snow [place] on Foam Roll  - 1 x daily - 7 x weekly - 2 sets - 10 reps  - Supine Cervical Retraction with Towel  - 1 x daily - 7 x weekly - 2 sets - 10 reps - 5 hold  - Boissevain: thoracic steppage right  - 1 x daily - 7 x weekly - 2 sets - 10 reps  - Boissevain: thoracic steppage left  - 1 x daily - 7 x weekly - 2 sets - 10 reps  - Standing Shoulder Row with  Anchored Resistance  - 1 x daily - 7 x weekly - 2 sets - 20 reps - 5 hold  - Shoulder extension with resistance - Neutral  - 1 x daily - 7 x weekly - 2 sets - 20 reps  - MTI - Pelvic Tilt  - 1 x daily - 7 x weekly - 2 sets - 20 reps - 5 hold  - MTI - Hooklying Marching  - 1 x daily - 7 x weekly - 2 sets - 20 reps      ASSESSMENT                                                                                                            Barbra has been tolerating initiation of physical therapy well. No increased symptoms today and favorable response to exercise and manual techniques so far. She has limited cervical and thoracic mobility. Low neural tissue irritability this morning.  Will continue to progress Barbra as she tolerates.    PLAN                                                                                                                           Suggestions for next session as indicated: Progress per plan of care, cervical and thoracic mobility, lumbar and cervical stabilization       Therapy procedure time and total treatment time can be found documented on the Time Entry flowsheet     fall precautions/spinal precautions

## 2023-10-29 NOTE — PHYSICAL THERAPY INITIAL EVALUATION ADULT - GENERAL OBSERVATIONS, REHAB EVAL
Patient found sitting in chair at bedside, NAD, A&Ox4, +IV, spO2 99% on room air, light touch sensation intact in bilateral lower extremities, patient agreeable to participate in skilled PT evaluation

## 2023-10-29 NOTE — DIETITIAN INITIAL EVALUATION ADULT - OTHER INFO
Pt is Israeli/English speaking, A&O x4 at baseline. Seen for oncology consult.  Pt reported no changes in appetite and weight PTA.  Denied any chewing or swallowing difficulties at this time.  No reported GI issues such as nausea/vomiting/diarrhea/constipation, on bowel regimen; last BM reported 10/28/23.  PO intake noted > 75% per RN flow sheets.  Skin intact without edema per RN flow sheets.  Pt does not appear significant weight loss.  Current weight @ 70 kg is likely inaccurate as weight @ 90.7kg (10-21-23), 96.6kg (7-12-23) per HIE record.  Reported UBW~ 207-208# in 1 month.  BMI: 31.2 remain obesity.  Pt acknowledged receiving nutrition education years ago during hospital stay, did not need further education due to good glycemic control and following consistent carbohydrate diet at home.  Elevated POCT noted 242-310mg/dl during hospital course, A1c@ 5.2 (10-23), likely related to current transaminitis as WBC @ 13.68H, ALK Phos 314H, AST 314H, ALT 299H.  BS managed by insulin at this time.   Pt is American/English speaking male, A&O x4 at baseline. Seen for oncology consult.  Daughter at bedside today.  Pt reported no changes in appetite and weight PTA.  Denied any chewing or swallowing difficulties at this time.  No reported GI issues such as nausea/vomiting/diarrhea/constipation, on bowel regimen; last BM reported 10/28/23.  PO intake noted > 75% per RN flow sheets.  Skin intact without edema per RN flow sheets.  Pt does not appear significant weight loss.  Current weight @ 70 kg is likely inaccurate as weight @ 90.7kg (10-21-23), 96.6kg (7-12-23) per HIE record.  Reported UBW~ 207-208# in 1 month.    Pt noted some unintended weight loss (-5.9kg/6.1%) in 3 months, wished to lose weights during hospital course due to BMI: 31.2 remain obesity.  Pt acknowledged receiving nutrition education years ago during hospital stay, did not need further education due to good glycemic control and following consistent carbohydrate diet at home.  Elevated POCT noted 242-310mg/dl during hospital course, A1c@ 5.2 (10-23), likely related to current transaminitis as WBC @ 13.68H, ALK Phos 314H, AST 314H, ALT 299H.  BS managed by insulin at this time.

## 2023-10-29 NOTE — PROGRESS NOTE ADULT - PROBLEM SELECTOR PLAN 2
Found to have LE Weakness along with saddle anesthesia; concern for cord compression  Prostate Biopsy 2021 --> adenocarcinoma Gelason 3+4; declined radiation therapy at the time  Epidural disease was also noted at T12 vertebral body. NM Scan significant for multiple sites with osseous lesions;   Team at [facility] discussed with Oncology  edema from C3-4 through C7-T1 interspinous ligaments as well as lytic metastatic disease from C7, Tq-T5 and T12, as well as L1-L3    Plan  Oncology Email Sent  Plan as above for cord compression  Degarelix (GnRH Receptor antagonist) once LFTs improved  F/U Rad Onc Recs

## 2023-10-29 NOTE — PROGRESS NOTE ADULT - SUBJECTIVE AND OBJECTIVE BOX
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 1000 milliLiter(s) (50 mL/Hr) IV Continuous <Continuous>  dextrose 5%. 1000 milliLiter(s) (100 mL/Hr) IV Continuous <Continuous>  dextrose 50% Injectable 25 Gram(s) IV Push once  dextrose 50% Injectable 12.5 Gram(s) IV Push once  dextrose 50% Injectable 25 Gram(s) IV Push once  glucagon  Injectable 1 milliGRAM(s) IntraMuscular once  heparin  Infusion. 1100 Unit(s)/Hr (11 mL/Hr) IV Continuous <Continuous>  insulin glargine Injectable (LANTUS) 15 Unit(s) SubCutaneous every morning  insulin lispro (ADMELOG) corrective regimen sliding scale   SubCutaneous three times a day before meals  insulin lispro (ADMELOG) corrective regimen sliding scale   SubCutaneous at bedtime  insulin lispro Injectable (ADMELOG) 5 Unit(s) SubCutaneous three times a day before meals  methylPREDNISolone sodium succinate Injectable 40 milliGRAM(s) IV Push every 8 hours  pantoprazole    Tablet 40 milliGRAM(s) Oral before breakfast  senna 2 Tablet(s) Oral at bedtime    MEDICATIONS  (PRN):  dextrose Oral Gel 15 Gram(s) Oral once PRN Blood Glucose LESS THAN 70 milliGRAM(s)/deciliter  heparin   Injectable 2500 Unit(s) IV Push every 6 hours PRN For aPTT between 40 - 57  heparin   Injectable 5500 Unit(s) IV Push every 6 hours PRN For aPTT less than 40  oxyCODONE    IR 5 milliGRAM(s) Oral every 4 hours PRN Severe Pain (7 - 10)  oxyCODONE    IR 2.5 milliGRAM(s) Oral every 4 hours PRN Moderate Pain (4 - 6)  polyethylene glycol 3350 17 Gram(s) Oral daily PRN Constipation      Vital Signs Last 24 Hrs  T(C): 36.8 (28 Oct 2023 22:15), Max: 36.8 (28 Oct 2023 22:15)  T(F): 98.2 (28 Oct 2023 22:15), Max: 98.2 (28 Oct 2023 22:15)  HR: 84 (28 Oct 2023 22:15) (84 - 84)  BP: 122/66 (28 Oct 2023 22:15) (119/68 - 122/66)  BP(mean): --  RR: 18 (28 Oct 2023 22:15) (18 - 18)  SpO2: 95% (28 Oct 2023 22:15) (95% - 96%)    Parameters below as of 28 Oct 2023 22:15  Patient On (Oxygen Delivery Method): room air      CAPILLARY BLOOD GLUCOSE      POCT Blood Glucose.: 266 mg/dL (28 Oct 2023 23:12)  POCT Blood Glucose.: 277 mg/dL (28 Oct 2023 21:55)  POCT Blood Glucose.: 242 mg/dL (28 Oct 2023 17:49)  POCT Blood Glucose.: 310 mg/dL (28 Oct 2023 13:11)  POCT Blood Glucose.: 290 mg/dL (28 Oct 2023 08:49)    I&O's Summary    27 Oct 2023 07:01  -  28 Oct 2023 07:00  --------------------------------------------------------  IN: 0 mL / OUT: 2600 mL / NET: -2600 mL    28 Oct 2023 07:01  -  29 Oct 2023 05:28  --------------------------------------------------------  IN: 0 mL / OUT: 1105 mL / NET: -1105 mL        PHYSICAL EXAM:  GENERAL: NAD, well-developed  HEAD:  Atraumatic, Normocephalic  EYES: EOMI, PERRLA, conjunctiva and sclera clear  NECK: Supple, No JVD  CHEST/LUNG: Clear to auscultation bilaterally; No wheeze  HEART: Regular rate and rhythm; No murmurs, rubs, or gallops  ABDOMEN: Soft, Nontender, Nondistended; Bowel sounds present  EXTREMITIES:  2+ Peripheral Pulses, No clubbing, cyanosis, or edema  PSYCH: AAOx3  NEUROLOGY: non-focal  SKIN: No rashes or lesions    LABS:                        13.7   10.27 )-----------( 199      ( 28 Oct 2023 06:46 )             39.5     10-28    140  |  100  |  24<H>  ----------------------------<  237<H>  3.7   |  29  |  0.90    Ca    8.4      28 Oct 2023 06:46  Phos  4.5     10-28  Mg     2.30     10-28    TPro  6.2  /  Alb  3.7  /  TBili  1.0  /  DBili  x   /  AST  76<H>  /  ALT  281<H>  /  AlkPhos  311<H>  10-28    PT/INR - ( 28 Oct 2023 06:46 )   PT: 11.7 sec;   INR: 1.05 ratio         PTT - ( 28 Oct 2023 19:30 )  PTT:81.0 sec  CARDIAC MARKERS ( 28 Oct 2023 06:46 )  x     / x     / 114 U/L / x     / x          Urinalysis Basic - ( 28 Oct 2023 06:46 )    Color: x / Appearance: x / SG: x / pH: x  Gluc: 237 mg/dL / Ketone: x  / Bili: x / Urobili: x   Blood: x / Protein: x / Nitrite: x   Leuk Esterase: x / RBC: x / WBC x   Sq Epi: x / Non Sq Epi: x / Bacteria: x        RADIOLOGY & ADDITIONAL TESTS:    Imaging Personally Reviewed:    Consultant(s) Notes Reviewed:      Care Discussed with Consultants/Other Providers:   Patient is a 73y old  Male who presents with a chief complaint of Diffuse Spinal Mets from Prostate Cancer (28 Oct 2023 09:53)      SUBJECTIVE / OVERNIGHT EVENTS: No acute events overnight. Pt seen and examined at bedside this morning. Endorses feeling well and improved LE weakness. Denies fever, headache, nausea, vomiting, CP, SOB, changes in BM or dysuria.     Brief Daily Plan  - F/u rad onc on Monday     MEDICATIONS  (STANDING):  amLODIPine   Tablet 10 milliGRAM(s) Oral daily  atorvastatin 20 milliGRAM(s) Oral at bedtime  chlorhexidine 2% Cloths 1 Application(s) Topical daily  dextrose 5%. 1000 milliLiter(s) (50 mL/Hr) IV Continuous <Continuous>  dextrose 5%. 1000 milliLiter(s) (100 mL/Hr) IV Continuous <Continuous>  dextrose 50% Injectable 25 Gram(s) IV Push once  dextrose 50% Injectable 12.5 Gram(s) IV Push once  dextrose 50% Injectable 25 Gram(s) IV Push once  glucagon  Injectable 1 milliGRAM(s) IntraMuscular once  heparin  Infusion. 1100 Unit(s)/Hr (11 mL/Hr) IV Continuous <Continuous>  insulin glargine Injectable (LANTUS) 15 Unit(s) SubCutaneous every morning  insulin lispro (ADMELOG) corrective regimen sliding scale   SubCutaneous three times a day before meals  insulin lispro (ADMELOG) corrective regimen sliding scale   SubCutaneous at bedtime  insulin lispro Injectable (ADMELOG) 5 Unit(s) SubCutaneous three times a day before meals  methylPREDNISolone sodium succinate Injectable 40 milliGRAM(s) IV Push every 8 hours  pantoprazole    Tablet 40 milliGRAM(s) Oral before breakfast  senna 2 Tablet(s) Oral at bedtime    MEDICATIONS  (PRN):  dextrose Oral Gel 15 Gram(s) Oral once PRN Blood Glucose LESS THAN 70 milliGRAM(s)/deciliter  heparin   Injectable 2500 Unit(s) IV Push every 6 hours PRN For aPTT between 40 - 57  heparin   Injectable 5500 Unit(s) IV Push every 6 hours PRN For aPTT less than 40  oxyCODONE    IR 5 milliGRAM(s) Oral every 4 hours PRN Severe Pain (7 - 10)  oxyCODONE    IR 2.5 milliGRAM(s) Oral every 4 hours PRN Moderate Pain (4 - 6)  polyethylene glycol 3350 17 Gram(s) Oral daily PRN Constipation      Vital Signs Last 24 Hrs  T(C): 36.8 (28 Oct 2023 22:15), Max: 36.8 (28 Oct 2023 22:15)  T(F): 98.2 (28 Oct 2023 22:15), Max: 98.2 (28 Oct 2023 22:15)  HR: 84 (28 Oct 2023 22:15) (84 - 84)  BP: 122/66 (28 Oct 2023 22:15) (119/68 - 122/66)  BP(mean): --  RR: 18 (28 Oct 2023 22:15) (18 - 18)  SpO2: 95% (28 Oct 2023 22:15) (95% - 96%)    Parameters below as of 28 Oct 2023 22:15  Patient On (Oxygen Delivery Method): room air      CAPILLARY BLOOD GLUCOSE      POCT Blood Glucose.: 266 mg/dL (28 Oct 2023 23:12)  POCT Blood Glucose.: 277 mg/dL (28 Oct 2023 21:55)  POCT Blood Glucose.: 242 mg/dL (28 Oct 2023 17:49)  POCT Blood Glucose.: 310 mg/dL (28 Oct 2023 13:11)  POCT Blood Glucose.: 290 mg/dL (28 Oct 2023 08:49)    I&O's Summary    27 Oct 2023 07:01  -  28 Oct 2023 07:00  --------------------------------------------------------  IN: 0 mL / OUT: 2600 mL / NET: -2600 mL    28 Oct 2023 07:01  -  29 Oct 2023 05:28  --------------------------------------------------------  IN: 0 mL / OUT: 1105 mL / NET: -1105 mL        PHYSICAL EXAM:  GENERAL: NAD, well-developed  HEAD:  Atraumatic, Normocephalic  EYES: EOMI, PERRLA, conjunctiva and sclera clear  NECK: Supple, No JVD  CHEST/LUNG: Clear to auscultation bilaterally; No wheeze  HEART: Regular rate and rhythm; No murmurs, rubs, or gallops  ABDOMEN: Soft, Nontender, Nondistended; Bowel sounds present  EXTREMITIES:  2+ Peripheral Pulses, No clubbing, cyanosis, or edema  PSYCH: AAOx3  NEUROLOGY: non-focal  SKIN: No rashes or lesions    LABS:                        13.7   10.27 )-----------( 199      ( 28 Oct 2023 06:46 )             39.5     10-28    140  |  100  |  24<H>  ----------------------------<  237<H>  3.7   |  29  |  0.90    Ca    8.4      28 Oct 2023 06:46  Phos  4.5     10-28  Mg     2.30     10-28    TPro  6.2  /  Alb  3.7  /  TBili  1.0  /  DBili  x   /  AST  76<H>  /  ALT  281<H>  /  AlkPhos  311<H>  10-28    PT/INR - ( 28 Oct 2023 06:46 )   PT: 11.7 sec;   INR: 1.05 ratio         PTT - ( 28 Oct 2023 19:30 )  PTT:81.0 sec  CARDIAC MARKERS ( 28 Oct 2023 06:46 )  x     / x     / 114 U/L / x     / x          Urinalysis Basic - ( 28 Oct 2023 06:46 )    Color: x / Appearance: x / SG: x / pH: x  Gluc: 237 mg/dL / Ketone: x  / Bili: x / Urobili: x   Blood: x / Protein: x / Nitrite: x   Leuk Esterase: x / RBC: x / WBC x   Sq Epi: x / Non Sq Epi: x / Bacteria: x        RADIOLOGY & ADDITIONAL TESTS:    Imaging Personally Reviewed:    Consultant(s) Notes Reviewed:      Care Discussed with Consultants/Other Providers:

## 2023-10-29 NOTE — DIETITIAN INITIAL EVALUATION ADULT - REASON FOR ADMISSION
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  Per chart review, Pt is a 73M w/ T2DM, HTN, Metastatic prostate cancer (2009) was initially evaluated at [facility] for lower extremity paralysis in the setting of a recent fall, found to have 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 Initiated IV solumedrol with gradual improvement of symptoms and return of strength since Wednesday now here at [facility] for spine evaluation by surgery.

## 2023-10-29 NOTE — DIETITIAN INITIAL EVALUATION ADULT - PROBLEM SELECTOR PROBLEM 5
INFECTIOUS DISEASE CONSULTATION    Elier Mccarthy Patient Status:  Inpatient    10/17/1925 MRN [id number]   [facility] 5NW-A Attending Amy Molina MD   [facility] Day # 2 PCP Felix Narayan Transaminitis NaCl premix 80mg/100ml, 80 mg, Intravenous, Q24H  •  haloperidol lactate (HALDOL) 5 MG/ML injection 1 mg, 1 mg, Intravenous, Q4H PRN  •  Enoxaparin Sodium (LOVENOX) 40 MG/0.4ML injection 40 mg, 40 mg, Subcutaneous, Q24H  •  ondansetron HCl (ZOFRAN) injecti 3. 3*  3.3*   CL  102  106  104   CO2  24.0  24.0  21.0*   ALKPHO  87   --    --    AST  20   --    --    ALT  15   --    --    BILT  1.0   --    --    TP  6.7   --    --      Recent Labs   Lab  01/21/17   [address]   CLARITY  Clear   SPECGRAVI disease) ([facility] [address]     Essential hypertension     Hyperlipidemia, mixed     Bilateral carotid artery disease (HCC)     Pedal edema     Syncope     Hyponatremia     Hypokalemia     Hyperglycemia     Metabolic alkalosis     Hip fracture, right (HCC)     Hip frac

## 2023-10-29 NOTE — PHYSICAL THERAPY INITIAL EVALUATION ADULT - TRANSFER SAFETY CONCERNS NOTED: SIT/STAND, REHAB EVAL
required physical block at left knee to prevent buckeling, patient unable to stand with walker and assistance >3 seconds, poor standing balance, unable to assess gait at this time due to weakness/decreased balance during turns/losing balance/decreased weight-shifting ability

## 2023-10-29 NOTE — DIETITIAN INITIAL EVALUATION ADULT - ORAL INTAKE PTA/DIET HISTORY
Pt cooked his own meals at home, has been trying to follow consistent carbohydrate diet at home.  Only consumed chicken breast, fish and beans as primary source of protein, limited carbohydrates, sugars. Pt cooked his own meals at home, has been trying to follow consistent carbohydrate diet at home.  Only consumed chicken breast, fish and beans as primary source of protein. Pt  limited carbohydrates and sugars in his diet.

## 2023-10-29 NOTE — PROGRESS NOTE ADULT - PROBLEM SELECTOR PLAN 5
Alk Phos: 314 -> 311  AST: 108 -> 76  ALT: 299 -> 281     Likely in the setting of Rhabdo  Will continue to trend the LFTs and transaminitis  RUQ U/S given persistently elevated LFTs

## 2023-10-29 NOTE — DIETITIAN INITIAL EVALUATION ADULT - PERTINENT MEDS FT
MEDICATIONS  (STANDING):  amLODIPine   Tablet 10 milliGRAM(s) Oral daily  atorvastatin 20 milliGRAM(s) Oral at bedtime  bicalutamide 50 milliGRAM(s) Oral daily  chlorhexidine 2% Cloths 1 Application(s) Topical daily  dextrose 5%. 1000 milliLiter(s) (50 mL/Hr) IV Continuous <Continuous>  dextrose 5%. 1000 milliLiter(s) (100 mL/Hr) IV Continuous <Continuous>  dextrose 50% Injectable 25 Gram(s) IV Push once  dextrose 50% Injectable 25 Gram(s) IV Push once  dextrose 50% Injectable 12.5 Gram(s) IV Push once  glucagon  Injectable 1 milliGRAM(s) IntraMuscular once  heparin  Infusion. 1100 Unit(s)/Hr (11 mL/Hr) IV Continuous <Continuous>  insulin glargine Injectable (LANTUS) 19 Unit(s) SubCutaneous every morning  insulin lispro (ADMELOG) corrective regimen sliding scale   SubCutaneous three times a day before meals  insulin lispro (ADMELOG) corrective regimen sliding scale   SubCutaneous at bedtime  insulin lispro Injectable (ADMELOG) 7 Unit(s) SubCutaneous three times a day before meals  methylPREDNISolone sodium succinate Injectable 40 milliGRAM(s) IV Push every 8 hours  pantoprazole    Tablet 40 milliGRAM(s) Oral before breakfast  senna 2 Tablet(s) Oral at bedtime    MEDICATIONS  (PRN):  dextrose Oral Gel 15 Gram(s) Oral once PRN Blood Glucose LESS THAN 70 milliGRAM(s)/deciliter  heparin   Injectable 2500 Unit(s) IV Push every 6 hours PRN For aPTT between 40 - 57  heparin   Injectable 5500 Unit(s) IV Push every 6 hours PRN For aPTT less than 40  oxyCODONE    IR 5 milliGRAM(s) Oral every 4 hours PRN Severe Pain (7 - 10)  oxyCODONE    IR 2.5 milliGRAM(s) Oral every 4 hours PRN Moderate Pain (4 - 6)  polyethylene glycol 3350 17 Gram(s) Oral daily PRN Constipation

## 2023-10-29 NOTE — PROGRESS NOTE ADULT - ASSESSMENT
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 Initiated IV solumedrol with gradual improvement of symptoms and return of strength since Wednesday now here at [facility] for spine evaluation by surgery.

## 2023-10-30 LAB
ALBUMIN SERPL ELPH-MCNC: 3.9 G/DL — SIGNIFICANT CHANGE UP (ref 3.3–5)
ALBUMIN SERPL ELPH-MCNC: 3.9 G/DL — SIGNIFICANT CHANGE UP (ref 3.3–5)
ALP SERPL-CCNC: 315 U/L — HIGH (ref 40–120)
ALP SERPL-CCNC: 315 U/L — HIGH (ref 40–120)
ALT FLD-CCNC: 215 U/L — HIGH (ref 4–41)
ALT FLD-CCNC: 215 U/L — HIGH (ref 4–41)
ANION GAP SERPL CALC-SCNC: 10 MMOL/L — SIGNIFICANT CHANGE UP (ref 7–14)
ANION GAP SERPL CALC-SCNC: 10 MMOL/L — SIGNIFICANT CHANGE UP (ref 7–14)
APTT BLD: 24.2 SEC — LOW (ref 24.5–35.6)
APTT BLD: 24.2 SEC — LOW (ref 24.5–35.6)
AST SERPL-CCNC: 53 U/L — HIGH (ref 4–40)
AST SERPL-CCNC: 53 U/L — HIGH (ref 4–40)
BILIRUB SERPL-MCNC: 0.7 MG/DL — SIGNIFICANT CHANGE UP (ref 0.2–1.2)
BILIRUB SERPL-MCNC: 0.7 MG/DL — SIGNIFICANT CHANGE UP (ref 0.2–1.2)
BUN SERPL-MCNC: 37 MG/DL — HIGH (ref 7–23)
BUN SERPL-MCNC: 37 MG/DL — HIGH (ref 7–23)
CALCIUM SERPL-MCNC: 9 MG/DL — SIGNIFICANT CHANGE UP (ref 8.4–10.5)
CALCIUM SERPL-MCNC: 9 MG/DL — SIGNIFICANT CHANGE UP (ref 8.4–10.5)
CHLORIDE SERPL-SCNC: 99 MMOL/L — SIGNIFICANT CHANGE UP (ref 98–107)
CHLORIDE SERPL-SCNC: 99 MMOL/L — SIGNIFICANT CHANGE UP (ref 98–107)
CK SERPL-CCNC: 99 U/L — SIGNIFICANT CHANGE UP (ref 30–200)
CK SERPL-CCNC: 99 U/L — SIGNIFICANT CHANGE UP (ref 30–200)
CO2 SERPL-SCNC: 29 MMOL/L — SIGNIFICANT CHANGE UP (ref 22–31)
CO2 SERPL-SCNC: 29 MMOL/L — SIGNIFICANT CHANGE UP (ref 22–31)
CREAT SERPL-MCNC: 0.96 MG/DL — SIGNIFICANT CHANGE UP (ref 0.5–1.3)
CREAT SERPL-MCNC: 0.96 MG/DL — SIGNIFICANT CHANGE UP (ref 0.5–1.3)
EGFR: 84 ML/MIN/1.73M2 — SIGNIFICANT CHANGE UP
EGFR: 84 ML/MIN/1.73M2 — SIGNIFICANT CHANGE UP
GLUCOSE BLDC GLUCOMTR-MCNC: 207 MG/DL — HIGH (ref 70–99)
GLUCOSE BLDC GLUCOMTR-MCNC: 207 MG/DL — HIGH (ref 70–99)
GLUCOSE BLDC GLUCOMTR-MCNC: 270 MG/DL — HIGH (ref 70–99)
GLUCOSE BLDC GLUCOMTR-MCNC: 270 MG/DL — HIGH (ref 70–99)
GLUCOSE BLDC GLUCOMTR-MCNC: 277 MG/DL — HIGH (ref 70–99)
GLUCOSE BLDC GLUCOMTR-MCNC: 277 MG/DL — HIGH (ref 70–99)
GLUCOSE BLDC GLUCOMTR-MCNC: 284 MG/DL — HIGH (ref 70–99)
GLUCOSE BLDC GLUCOMTR-MCNC: 284 MG/DL — HIGH (ref 70–99)
GLUCOSE BLDC GLUCOMTR-MCNC: 315 MG/DL — HIGH (ref 70–99)
GLUCOSE BLDC GLUCOMTR-MCNC: 315 MG/DL — HIGH (ref 70–99)
GLUCOSE SERPL-MCNC: 322 MG/DL — HIGH (ref 70–99)
GLUCOSE SERPL-MCNC: 322 MG/DL — HIGH (ref 70–99)
HCT VFR BLD CALC: 40 % — SIGNIFICANT CHANGE UP (ref 39–50)
HCT VFR BLD CALC: 40 % — SIGNIFICANT CHANGE UP (ref 39–50)
HGB BLD-MCNC: 13.5 G/DL — SIGNIFICANT CHANGE UP (ref 13–17)
HGB BLD-MCNC: 13.5 G/DL — SIGNIFICANT CHANGE UP (ref 13–17)
INR BLD: 0.95 RATIO — SIGNIFICANT CHANGE UP (ref 0.85–1.18)
INR BLD: 0.95 RATIO — SIGNIFICANT CHANGE UP (ref 0.85–1.18)
MAGNESIUM SERPL-MCNC: 2.4 MG/DL — SIGNIFICANT CHANGE UP (ref 1.6–2.6)
MAGNESIUM SERPL-MCNC: 2.4 MG/DL — SIGNIFICANT CHANGE UP (ref 1.6–2.6)
MCHC RBC-ENTMCNC: 30.3 PG — SIGNIFICANT CHANGE UP (ref 27–34)
MCHC RBC-ENTMCNC: 30.3 PG — SIGNIFICANT CHANGE UP (ref 27–34)
MCHC RBC-ENTMCNC: 33.8 GM/DL — SIGNIFICANT CHANGE UP (ref 32–36)
MCHC RBC-ENTMCNC: 33.8 GM/DL — SIGNIFICANT CHANGE UP (ref 32–36)
MCV RBC AUTO: 89.7 FL — SIGNIFICANT CHANGE UP (ref 80–100)
MCV RBC AUTO: 89.7 FL — SIGNIFICANT CHANGE UP (ref 80–100)
NRBC # BLD: 0 /100 WBCS — SIGNIFICANT CHANGE UP (ref 0–0)
NRBC # BLD: 0 /100 WBCS — SIGNIFICANT CHANGE UP (ref 0–0)
NRBC # FLD: 0 K/UL — SIGNIFICANT CHANGE UP (ref 0–0)
NRBC # FLD: 0 K/UL — SIGNIFICANT CHANGE UP (ref 0–0)
PHOSPHATE SERPL-MCNC: 3.6 MG/DL — SIGNIFICANT CHANGE UP (ref 2.5–4.5)
PHOSPHATE SERPL-MCNC: 3.6 MG/DL — SIGNIFICANT CHANGE UP (ref 2.5–4.5)
PLATELET # BLD AUTO: 225 K/UL — SIGNIFICANT CHANGE UP (ref 150–400)
PLATELET # BLD AUTO: 225 K/UL — SIGNIFICANT CHANGE UP (ref 150–400)
POTASSIUM SERPL-MCNC: 4.2 MMOL/L — SIGNIFICANT CHANGE UP (ref 3.5–5.3)
POTASSIUM SERPL-MCNC: 4.2 MMOL/L — SIGNIFICANT CHANGE UP (ref 3.5–5.3)
POTASSIUM SERPL-SCNC: 4.2 MMOL/L — SIGNIFICANT CHANGE UP (ref 3.5–5.3)
POTASSIUM SERPL-SCNC: 4.2 MMOL/L — SIGNIFICANT CHANGE UP (ref 3.5–5.3)
PROT SERPL-MCNC: 6.5 G/DL — SIGNIFICANT CHANGE UP (ref 6–8.3)
PROT SERPL-MCNC: 6.5 G/DL — SIGNIFICANT CHANGE UP (ref 6–8.3)
PROTHROM AB SERPL-ACNC: 10.7 SEC — SIGNIFICANT CHANGE UP (ref 9.5–13)
PROTHROM AB SERPL-ACNC: 10.7 SEC — SIGNIFICANT CHANGE UP (ref 9.5–13)
RBC # BLD: 4.46 M/UL — SIGNIFICANT CHANGE UP (ref 4.2–5.8)
RBC # BLD: 4.46 M/UL — SIGNIFICANT CHANGE UP (ref 4.2–5.8)
RBC # FLD: 12.5 % — SIGNIFICANT CHANGE UP (ref 10.3–14.5)
RBC # FLD: 12.5 % — SIGNIFICANT CHANGE UP (ref 10.3–14.5)
SODIUM SERPL-SCNC: 138 MMOL/L — SIGNIFICANT CHANGE UP (ref 135–145)
SODIUM SERPL-SCNC: 138 MMOL/L — SIGNIFICANT CHANGE UP (ref 135–145)
WBC # BLD: 15.24 K/UL — HIGH (ref 3.8–10.5)
WBC # BLD: 15.24 K/UL — HIGH (ref 3.8–10.5)
WBC # FLD AUTO: 15.24 K/UL — HIGH (ref 3.8–10.5)
WBC # FLD AUTO: 15.24 K/UL — HIGH (ref 3.8–10.5)

## 2023-10-30 PROCEDURE — 72141 MRI NECK SPINE W/O DYE: CPT | Mod: 26

## 2023-10-30 PROCEDURE — 93306 TTE W/DOPPLER COMPLETE: CPT | Mod: 26

## 2023-10-30 PROCEDURE — 99233 SBSQ HOSP IP/OBS HIGH 50: CPT | Mod: GC

## 2023-10-30 PROCEDURE — 77334 RADIATION TREATMENT AID(S): CPT | Mod: 26

## 2023-10-30 PROCEDURE — 77290 THER RAD SIMULAJ FIELD CPLX: CPT | Mod: 26

## 2023-10-30 PROCEDURE — 72128 CT CHEST SPINE W/O DYE: CPT | Mod: 26

## 2023-10-30 PROCEDURE — 72146 MRI CHEST SPINE W/O DYE: CPT | Mod: 26

## 2023-10-30 PROCEDURE — 77261 THER RADIOLOGY TX PLNG SMPL: CPT

## 2023-10-30 RX ORDER — HEPARIN SODIUM 5000 [USP'U]/ML
1300 INJECTION INTRAVENOUS; SUBCUTANEOUS
Qty: 25000 | Refills: 0 | Status: DISCONTINUED | OUTPATIENT
Start: 2023-10-30 | End: 2023-10-31

## 2023-10-30 RX ORDER — DEXAMETHASONE 0.5 MG/5ML
10 ELIXIR ORAL EVERY 12 HOURS
Refills: 0 | Status: DISCONTINUED | OUTPATIENT
Start: 2023-10-30 | End: 2023-10-30

## 2023-10-30 RX ORDER — APIXABAN 2.5 MG/1
1 TABLET, FILM COATED ORAL
Qty: 60 | Refills: 0
Start: 2023-10-30 | End: 2023-11-28

## 2023-10-30 RX ORDER — DEXAMETHASONE 0.5 MG/5ML
4 ELIXIR ORAL EVERY 6 HOURS
Refills: 0 | Status: ACTIVE | OUTPATIENT
Start: 2023-10-30 | End: 2023-11-13

## 2023-10-30 RX ORDER — INSULIN LISPRO 100/ML
10 VIAL (ML) SUBCUTANEOUS ONCE
Refills: 0 | Status: COMPLETED | OUTPATIENT
Start: 2023-10-30 | End: 2023-10-30

## 2023-10-30 RX ORDER — INSULIN LISPRO 100/ML
10 VIAL (ML) SUBCUTANEOUS
Refills: 0 | Status: DISCONTINUED | OUTPATIENT
Start: 2023-10-30 | End: 2023-10-31

## 2023-10-30 RX ORDER — INSULIN GLARGINE 100 [IU]/ML
24 INJECTION, SOLUTION SUBCUTANEOUS EVERY MORNING
Refills: 0 | Status: DISCONTINUED | OUTPATIENT
Start: 2023-10-30 | End: 2023-10-31

## 2023-10-30 RX ADMIN — Medication 40 MILLIGRAM(S): at 13:13

## 2023-10-30 RX ADMIN — Medication 3: at 09:09

## 2023-10-30 RX ADMIN — Medication 3: at 13:11

## 2023-10-30 RX ADMIN — HEPARIN SODIUM 1000 UNIT(S)/HR: 5000 INJECTION INTRAVENOUS; SUBCUTANEOUS at 08:29

## 2023-10-30 RX ADMIN — AMLODIPINE BESYLATE 10 MILLIGRAM(S): 2.5 TABLET ORAL at 05:45

## 2023-10-30 RX ADMIN — Medication 10 UNIT(S): at 18:10

## 2023-10-30 RX ADMIN — CHLORHEXIDINE GLUCONATE 1 APPLICATION(S): 213 SOLUTION TOPICAL at 18:11

## 2023-10-30 RX ADMIN — Medication 40 MILLIGRAM(S): at 05:45

## 2023-10-30 RX ADMIN — INSULIN GLARGINE 24 UNIT(S): 100 INJECTION, SOLUTION SUBCUTANEOUS at 09:16

## 2023-10-30 RX ADMIN — BICALUTAMIDE 50 MILLIGRAM(S): 50 TABLET, FILM COATED ORAL at 13:12

## 2023-10-30 RX ADMIN — Medication 1: at 22:37

## 2023-10-30 RX ADMIN — SENNA PLUS 2 TABLET(S): 8.6 TABLET ORAL at 22:37

## 2023-10-30 RX ADMIN — HEPARIN SODIUM 1300 UNIT(S)/HR: 5000 INJECTION INTRAVENOUS; SUBCUTANEOUS at 18:08

## 2023-10-30 RX ADMIN — Medication 10 UNIT(S): at 13:12

## 2023-10-30 RX ADMIN — PANTOPRAZOLE SODIUM 40 MILLIGRAM(S): 20 TABLET, DELAYED RELEASE ORAL at 05:45

## 2023-10-30 RX ADMIN — ATORVASTATIN CALCIUM 20 MILLIGRAM(S): 80 TABLET, FILM COATED ORAL at 22:37

## 2023-10-30 RX ADMIN — Medication 2: at 18:10

## 2023-10-30 RX ADMIN — HEPARIN SODIUM 1300 UNIT(S)/HR: 5000 INJECTION INTRAVENOUS; SUBCUTANEOUS at 20:21

## 2023-10-30 RX ADMIN — HEPARIN SODIUM 1000 UNIT(S)/HR: 5000 INJECTION INTRAVENOUS; SUBCUTANEOUS at 02:01

## 2023-10-30 RX ADMIN — Medication 4 MILLIGRAM(S): at 18:08

## 2023-10-30 RX ADMIN — Medication 10 UNIT(S): at 09:10

## 2023-10-30 NOTE — PROGRESS NOTE ADULT - PROBLEM SELECTOR PLAN 5
Alk Phos: 314 -> 311  AST: 108 -> 76  ALT: 299 -> 281     Likely in the setting of Rhabdo  Will continue to trend the LFTs and transaminitis  RUQ U/S given persistently elevated LFTs Alk Phos: 314 -> 311 -> 315   AST: 108 -> 76 -> 53   ALT: 299 -> 281 -> 215     Likely in the setting of Rhabdo  Will continue to trend the LFTs and transaminitis  RUQ U/S unremarkable

## 2023-10-30 NOTE — PROGRESS NOTE ADULT - PROBLEM SELECTOR PLAN 3
B/L DVTs found in [facility]  - DVT in R posterior tibial and peroneal veins Below Knee  - DVT in Left Posterior tibial and peroneal veins below Knee  Suspect likely iso known malignancy    Plan:  Continue with Heparin GTT  TTE in the AM to evaluate for RHS B/L DVTs found in [facility]  - DVT in R posterior tibial and peroneal veins Below Knee  - DVT in Left Posterior tibial and peroneal veins below Knee  Suspect likely iso known malignancy    Plan:  Continue with Heparin GTT, plan to convert off heparin gtt once surgical intervention decision is made   TTE  to evaluate for RHS, pending

## 2023-10-30 NOTE — PROGRESS NOTE ADULT - PROBLEM SELECTOR PLAN 9
Lantus 15 qAM    Lispro SS TID patient continues to have elevated glucose levels requiring additional insulin sliding scale     increase lantus to 24u  increase admelog to 10u TID patient continues to have elevated glucose levels requiring additional insulin sliding scale, likely due to steroids     increase lantus to 24u  increase admelog to 10u TID

## 2023-10-30 NOTE — PROGRESS NOTE ADULT - ASSESSMENT
ASSESSMENT & PLAN  73yMale w/ concern for cord compression in setting of multiple vertebral lesions w symptoms improving after course of steroids now at [facility] for rad onc eval    PLAN  -WBAT  -pain control  -incentive spirometry  -f/u MR C/Tsp  -XRT w rad onc  -Steroids  -PT/OT    Ximena Stewart MD  Orthopaedic Surgery Resident    For all questions, please reach out via the following numbers for the on-call resident; do not reach out via Teams.  [facility] [id number]  [facility]        [id number]  [facility]  [id number]/ [phone]

## 2023-10-30 NOTE — PROGRESS NOTE ADULT - SUBJECTIVE AND OBJECTIVE BOX
Mr. Salinas, 73y.o. M with metastatic prostate cancer, lower extremity weakness, cord compression by imaging,  is planned for palliative spine RT from C7-T5 with CT simulation and the first treatment to start today and end by 11/3/23.    Appreciate ortho note.  We would request it clarified there is No surgical plan.             < from: MR Cervical Spine No Cont (10.21.23 @ 23:53) >  IMPRESSION:  Minimallydisplaced fracture of the C7 spinous process and   nondisplaced fracture of the T1 spinous process are noted. Edema is seen   within the C3-4 through C7-T1 interspinous ligaments consistent with   ligamentous injury.    Metastatic disease within the C7, T1 through T5 and T12 as well is L1-L3.    Epidural extension of neoplasm is noted at C7 ventrally and dorsally   resulting in moderate cord compressionand underlying edema and extension   into the BILATERAL C7-T1 and T1-2 and LEFT T2-3 neural foramina. Minimal   epidural disease also noted in the LEFT aspect of the spinal canal at T4   resulting in moderate canal stenosis and minimal cord impingement.   Minimal epidural disease also noted at the RIGHT aspect of the T12   vertebral body without significant canal narrowing.              < from: MR Thoracic Spine w/ IV Cont (10.22.23 @ 12:21) >  INTERPRETATION:  Clinical indication: Suspected cord compression    MRI of the thoracic spine was performed using axial T1 and T2-weighted   sequence. The patient was injected with 9 cc gadovist IV and sagittal   T1-weighted performed with fat suppression. Axial T1-weighted sequences   were performed. Following injection of approximately 9 cc of gadovist IV   with 1 cc of contrast discarded.    This exam is compared with prior MRI of the thoracic spine performed on   October 21, 2021.    Abnormal lesions are seen involving the T1, T2, T3, T4 and T12 vertebral   bodies as well as suspected involvement of the posterior elements of T1   T2 T3 and T12 levels as well. These findings are likely compatible with   underlying metastasis. There is epidural extension of tumor is seen on   the left side at the T2 level which causes effacement of the ventral   thecal sac. Epidural extension of tumor seen T4 level which causes   effacement ventral thecal sac and spinal cord and left side. Involvement   of the left T4-5 neural foramen is seen as well.    Epidural extension is seen at the T5 level without significant, as the   spinal canal or neural foramen    The spinal cord demonstrate normal signal.    Evaluation of the paraspinal soft tissues demonstrate no abnormal areas   of enhancement seen.    IMPRESSION: Multiple osseous lesions compatible with metastasis. Some of   these lesions do demonstrate associated areas of abnormal enhancement    There is epidural extension of tumor described above.    MRI of the lumbar spine was performed sagittal T1-T2 and STIR sequences.   Axial T1 and T2-weighted sequences were performed. Patientwas injected   with 9 cc gadovist IV with 1 cc of contrast discarded. Sagittal T1   sequence was performed with fat suppression. Axial T1-weighted sequence   was performed.    Loss of the normal lumbar lordosis.    Abnormal T1 prolongation seen involving the L3, L5 and right S1 vertebral   bodies. Abnormal lesions are seen involving both iliac bones. These   findings correspond sclerotic lesion seen on prior CT scan of the lumbar   spine performed on October 21, 2023    The lesion likely compatible with underlying metastasis. No epidural or   paraspinal extension of tumor is seen    Loss normal lumbar lordosis    Disc desiccation is seen involving the C3-4 L4-5 and L5-S1 levels   secondary to chronic    L1-2: Normal    L2-3: Normal    L3-4: Disc bulge and bilateral hypertrophic facet joint changes seen.   Mild to moderate narrowing of the right neural foramen    L4-5: Disc bulge and bilateral hypertrophic facet joint changes. Mild to   moderate narrowing of the spinal canal. Mild to moderate narrowing of   both neural foramen    L5-S1: Bilateral hypertrophic facet joint changes seen without   significant compromise of the spinal canal or either neural foramen    Evaluation of the paraspinal soft tissues appear normal.    IMPRESSION: Abnormal lesions as described above.    < end of copied text >

## 2023-10-30 NOTE — PROGRESS NOTE ADULT - PROBLEM SELECTOR PLAN 10
Unclear reason behind Aspirin 81 qd  Need Clarification on Type of Insulin Taken at home for complete med rec    Family history of stroke    C/W ASA 81 qd Diet: CC DASH Diet  Psych/Sleep: None ELLIS  GI: Protonix 40 qd/Senna/Miralax  DVT ppx: Hep GTT  Code Status: Will need full discussion with family  Dispo: pending surgical and radiation oncology interventions

## 2023-10-30 NOTE — PROGRESS NOTE ADULT - SUBJECTIVE AND OBJECTIVE BOX
Orthopedic Surgery Progress Note     S: Patient seen and examined today. No acute events overnight. Pain is well controlled. Denies f/c, chest pain, shortness of breath, dizziness.    MEDICATIONS  (STANDING):  amLODIPine   Tablet 10 milliGRAM(s) Oral daily  atorvastatin 20 milliGRAM(s) Oral at bedtime  bicalutamide 50 milliGRAM(s) Oral daily  chlorhexidine 2% Cloths 1 Application(s) Topical daily  dextrose 5%. 1000 milliLiter(s) (50 mL/Hr) IV Continuous <Continuous>  dextrose 5%. 1000 milliLiter(s) (100 mL/Hr) IV Continuous <Continuous>  dextrose 50% Injectable 25 Gram(s) IV Push once  dextrose 50% Injectable 25 Gram(s) IV Push once  dextrose 50% Injectable 12.5 Gram(s) IV Push once  glucagon  Injectable 1 milliGRAM(s) IntraMuscular once  heparin  Infusion. 1100 Unit(s)/Hr (11 mL/Hr) IV Continuous <Continuous>  insulin glargine Injectable (LANTUS) 19 Unit(s) SubCutaneous every morning  insulin lispro (ADMELOG) corrective regimen sliding scale   SubCutaneous three times a day before meals  insulin lispro (ADMELOG) corrective regimen sliding scale   SubCutaneous at bedtime  insulin lispro Injectable (ADMELOG) 7 Unit(s) SubCutaneous three times a day before meals  methylPREDNISolone sodium succinate Injectable 40 milliGRAM(s) IV Push every 8 hours  pantoprazole    Tablet 40 milliGRAM(s) Oral before breakfast  senna 2 Tablet(s) Oral at bedtime    MEDICATIONS  (PRN):  dextrose Oral Gel 15 Gram(s) Oral once PRN Blood Glucose LESS THAN 70 milliGRAM(s)/deciliter  heparin   Injectable 2500 Unit(s) IV Push every 6 hours PRN For aPTT between 40 - 57  heparin   Injectable 5500 Unit(s) IV Push every 6 hours PRN For aPTT less than 40  oxyCODONE    IR 5 milliGRAM(s) Oral every 4 hours PRN Severe Pain (7 - 10)  oxyCODONE    IR 2.5 milliGRAM(s) Oral every 4 hours PRN Moderate Pain (4 - 6)  polyethylene glycol 3350 17 Gram(s) Oral daily PRN Constipation      Physical Exam:  Gen: NAD  Gen: Lying in bed, NAD  Resp: No increased WOB  Spine:  Skin intact  No bony TTP or step-offs along c-, t-, l-spine, or sacrum    RUE:  Delt 5/5 Bi 5/5 Tri 5/5 Wrist ext 5/5  5/5  SILT C5-T1  2+ radial pulse  Negative Llamas  LUE:  Delt 5/5 Bi 5/5 Tri 5/5 Wrist ext 5/5  5/5  SILT C5-T1  2+ radial pulse  Negative Llamas    RLE:  IP 4/5 HS 5/5 Q 5/5 GS 5/5 TA 3/5 EHL 5/5   SILT L2-S1  2+ DP/PT pulses  Negative clonus, negative Babinski  LLE:  IP 4/5 HS 5/5 Q 5/5 GS 5/5 TA 3/5 EHL 5/5  SILT L2-S1  2+ DP/PT pulses  Negative clonus, negative Babinski       Vital Signs Last 24 Hrs  T(C): 36.9 (30 Oct 2023 05:30), Max: 36.9 (30 Oct 2023 05:30)  T(F): 98.4 (30 Oct 2023 05:30), Max: 98.4 (30 Oct 2023 05:30)  HR: 66 (30 Oct 2023 05:30) (66 - 70)  BP: 120/74 (30 Oct 2023 05:30) (120/60 - 123/77)  BP(mean): --  RR: 18 (30 Oct 2023 05:30) (18 - 18)  SpO2: 96% (30 Oct 2023 05:30) (96% - 97%)    Parameters below as of 30 Oct 2023 05:30  Patient On (Oxygen Delivery Method): room air        10-28-23 @ 07:01  -  10-29-23 @ 07:00  --------------------------------------------------------  IN: 0 mL / OUT: 1105 mL / NET: -1105 mL    10-29-23 @ 07:01  -  10-30-23 @ 06:11  --------------------------------------------------------  IN: 0 mL / OUT: 820 mL / NET: -820 mL        LABS:                        12.6   13.68 )-----------( 206      ( 29 Oct 2023 07:20 )             36.9     10-29    134<L>  |  96<L>  |  32<H>  ----------------------------<  314<H>  4.2   |  26  |  0.96    Ca    9.0      29 Oct 2023 15:47  Phos  3.8     10-29  Mg     2.40     10-29    TPro  6.2  /  Alb  3.7  /  TBili  1.0  /  DBili  x   /  AST  76<H>  /  ALT  281<H>  /  AlkPhos  311<H>  10-28

## 2023-10-30 NOTE — PROGRESS NOTE ADULT - PROBLEM SELECTOR PLAN 2
Found to have LE Weakness along with saddle anesthesia; concern for cord compression  Prostate Biopsy 2021 --> adenocarcinoma Gelason 3+4; declined radiation therapy at the time  Epidural disease was also noted at T12 vertebral body. NM Scan significant for multiple sites with osseous lesions;   Team at [facility] discussed with Oncology  edema from C3-4 through C7-T1 interspinous ligaments as well as lytic metastatic disease from C7, Tq-T5 and T12, as well as L1-L3    Plan  Oncology Email Sent  Plan as above for cord compression  Degarelix (GnRH Receptor antagonist) once LFTs improved  F/U Rad Onc Recs Found to have LE Weakness along with saddle anesthesia; concern for cord compression  saddle anesthesia is now resolved   Prostate Biopsy 2021 --> adenocarcinoma Gelason 3+4; declined radiation therapy at the time  Epidural disease was also noted at T12 vertebral body. NM Scan significant for multiple sites with osseous lesions;   Team at [facility] discussed with Oncology  edema from C3-4 through C7-T1 interspinous ligaments as well as lytic metastatic disease from C7, Tq-T5 and T12, as well as L1-L3    Plan  Oncology Email Sent  Plan as above for cord compression  Degarelix (GnRH Receptor antagonist) once LFTs improve, AST/ALT 53/215 10/30, continue to hold   F/U Rad Onc Recs Found to have LE Weakness along with saddle anesthesia; concern for cord compression  saddle anesthesia is now resolved   Prostate Biopsy 2021 --> adenocarcinoma Gelason 3+4; declined radiation therapy at the time  Epidural disease was also noted at T12 vertebral body. NM Scan significant for multiple sites with osseous lesions;   Team at [facility] discussed with Oncology  edema from C3-4 through C7-T1 interspinous ligaments as well as lytic metastatic disease from C7, Tq-T5 and T12, as well as L1-L3    Plan  Oncology following   Plan as above for cord compression  Degarelix (GnRH Receptor antagonist) once LFTs improve, AST/ALT 53/215 10/30, continue to hold   F/U Rad Onc Recs

## 2023-10-30 NOTE — PROGRESS NOTE ADULT - ATTENDING COMMENTS
73M w/ hx of T2DM, HTN, metastatic Prostate cancer (2009) was initially evaluated at [facility] for lower extremity paralysis in the setting of a recent fall, found to have cervical spine neoplasm c/f cord compression, course complicated by progression of prostate cancer, rhabdomyolysis, SRUTHI, and DVT, transferred for [facility] for XRT and ortho-onc eval.     Patient seen and examined, states his paralysis has resolved and he is now able to move his legs and his strength has returned. He also states his sensation to urinate and have a BM is back. Discussed results of MRI and pending orthopedic plan. If no surgery planned, aware that next step is XRT. Strength 5/5 bilateral LE, sensation intact.     #Cord compression   #Metastatic prostate CA  - MRI C/T spine reviewed, showing "fractures of the C7 and T1 spinous processes with intervening ligamentous injury and redemonstration of epidural extension of neoplastic disease at the T1 level with associated cord compression and mild cord edema at T1 which appears unchanged in comparison with 10/21/2023."  - Ortho following, will f/u recs for any surgical interventions. If no surgery planned, then will proceed with RT. Rad-onc following and recs appreciated  - Currently on high dose solumedrol, will transition to PO decadron 4mg q6h and taper   - Onc following, started casodex    #DVT   - 10/22/23 US w/ DVT in the bilateral posterior tibial and peroneal veins  - C/w heparin gtt for now, transition to DOAC vs. lovenox if no surgical intervention planned     #Type 2 Diabetes with hyperglycemia   - C/w basal-bolus, suspect hyperglycemia due to steroids   - Will continue to monitor FS and adjust regimen as needed    Rest as above.  Discussed with resident Dr. Chang

## 2023-10-30 NOTE — PROGRESS NOTE ADULT - PROBLEM SELECTOR PLAN 4
Admission CK > 03266  Downtrended to 511  Downtredned further to normalizt at 114 patients rhabdomyolysis likely in the setting of fall   Admission CK > 72785  Downtrended to 511  Downtredned further to normalize  at 114 patients rhabdomyolysis likely in the setting of fall   Admission CK > 63540  Downtrended to 511  Downtrended further to normalize  at 114

## 2023-10-30 NOTE — PROGRESS NOTE ADULT - PROBLEM SELECTOR PLAN 1
Started on Solumedrol at [facility] VS  Since Wednesday, Improved strength, sensation; Normal BM  Rectal Exam: Normal Tone;  Does not feel comfortable to stand, 5/5 UE and 4/5 LE strength    Plan:  F/U with Ortho Recs  F/U Rad Onc Rec: Tentative Palliative Simulation/XRT to C7-T5 pending ortho evaluation  C/W IV Methylprednisolone 40 q8h  Protonix 40 qd  Onc Email Sent  Neuro Checks q4h Started on Solumedrol at [facility] VS  Since Wednesday, Improved strength, sensation; Normal BM  Rectal Exam: Normal Tone;  Does not feel comfortable to stand, 5/5 UE and 4/5 LE strength    Plan:  F/U with Ortho Recs, surgical intervetion decision pending CT/MRI of spine  F/U Rad Onc Rec: Tentative Palliative Simulation/XRT to C7-T5 pending ortho evaluation  C/W IV Methylprednisolone 40 q8h, discuss with ortho regarding use of methylprednisolone v dexamethasone as steroid of choice  Protonix 40 qd  Onc Email Sent  Neuro Checks q4h Started on Solumedrol at [facility] VS  Since Wednesday, Improved strength, sensation; Normal BM  Rectal Exam: Normal Tone;  Does not feel comfortable to stand, 5/5 UE and 4/5 LE strength    Plan:  F/U with Ortho Recs, surgical intervetion decision pending CT/MRI of spine  F/U Rad Onc Rec: Tentative Palliative Simulation/XRT to C7-T5 pending ortho evaluation  C/W IV Methylprednisolone 40 q8h, discuss with ortho regarding use of methylprednisolone v dexamethasone as steroid of choice  Protonix 40 qd  Neuro Checks q4h

## 2023-10-30 NOTE — PROGRESS NOTE ADULT - SUBJECTIVE AND OBJECTIVE BOX
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 Patient was seen and evaluated at the bedside. The patient denies pain. Vitals stable. Patient denies fever/chills, chest pain, shortness of breath, abdominal pain, headaches, nausea/vomiting, and diarrhea/constipation.      PHYSICAL EXAM:  GENERAL: NAD  HEAD:  Normocephalic  EYES:  conjunctiva and sclera clear  ENMT: Moist mucous membranes  NECK: Supple  NERVOUS SYSTEM:  Alert, awake  CHEST/LUNG: Good air exchange bilaterally, no wheeze  HEART: Regular rate and rhythm        Vital Signs Last 24 Hrs  T(C): 36.9 (30 Oct 2023 05:30), Max: 36.9 (30 Oct 2023 05:30)  T(F): 98.4 (30 Oct 2023 05:30), Max: 98.4 (30 Oct 2023 05:30)  HR: 66 (30 Oct 2023 05:30) (66 - 70)  BP: 120/74 (30 Oct 2023 05:30) (120/60 - 123/77)  BP(mean): --  RR: 18 (30 Oct 2023 05:30) (18 - 18)  SpO2: 96% (30 Oct 2023 05:30) (96% - 97%)    Parameters below as of 30 Oct 2023 05:30  Patient On (Oxygen Delivery Method): room air          Consultant(s) Notes Reviewed:  [X] YES  [ ] NO  Care Discussed with Consultants/Other Providers [X] YES  [ ] NO  Imaging Personally Reviewed:  [X] YES  [ ] NO      LABS:                        12.6   13.68 )-----------( 206      ( 29 Oct 2023 07:20 )             36.9     10-29    134<L>  |  96<L>  |  32<H>  ----------------------------<  314<H>  4.2   |  26  |  0.96    Ca    9.0      29 Oct 2023 15:47  Phos  3.8     10-29  Mg     2.40     10-29      PT/INR - ( 29 Oct 2023 07:20 )   PT: 11.7 sec;   INR: 1.04 ratio         PTT - ( 29 Oct 2023 07:20 )  PTT:77.9 sec  Urinalysis Basic - ( 29 Oct 2023 15:47 )    Color: x / Appearance: x / SG: x / pH: x  Gluc: 314 mg/dL / Ketone: x  / Bili: x / Urobili: x   Blood: x / Protein: x / Nitrite: x   Leuk Esterase: x / RBC: x / WBC x   Sq Epi: x / Non Sq Epi: x / Bacteria: x        CAPILLARY BLOOD GLUCOSE      POCT Blood Glucose.: 240 mg/dL (29 Oct 2023 21:27)  POCT Blood Glucose.: 311 mg/dL (29 Oct 2023 17:48)  POCT Blood Glucose.: 299 mg/dL (29 Oct 2023 13:22)  POCT Blood Glucose.: 289 mg/dL (29 Oct 2023 08:43)           DARYN THORNE 73y Male      Patient is a 73y old  Male who presents with a chief complaint of 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 Patient was seen and evaluated at the bedside. The patient denies pain. Vitals stable. Patient denies fever/chills, chest pain, shortness of breath, abdominal pain, headaches, nausea/vomiting, and diarrhea/constipation. Patient notes that his sensation is intact and that he had a bowel movement this morning.       PHYSICAL EXAM:  GENERAL: NAD  HEAD:  Normocephalic  EYES:  conjunctiva and sclera clear  ENMT: Moist mucous membranes  NECK: Supple  NERVOUS SYSTEM:  Alert, awake, sensation intact b/l upper and lower extremity, right lower extremity 4/5 strength, left lower extremity 5/5   CHEST/LUNG: Good air exchange bilaterally, no wheeze  HEART: Regular rate and rhythm        Vital Signs Last 24 Hrs  T(C): 36.9 (30 Oct 2023 05:30), Max: 36.9 (30 Oct 2023 05:30)  T(F): 98.4 (30 Oct 2023 05:30), Max: 98.4 (30 Oct 2023 05:30)  HR: 66 (30 Oct 2023 05:30) (66 - 70)  BP: 120/74 (30 Oct 2023 05:30) (120/60 - 123/77)  BP(mean): --  RR: 18 (30 Oct 2023 05:30) (18 - 18)  SpO2: 96% (30 Oct 2023 05:30) (96% - 97%)    Parameters below as of 30 Oct 2023 05:30  Patient On (Oxygen Delivery Method): room air          Consultant(s) Notes Reviewed:  [X] YES  [ ] NO  Care Discussed with Consultants/Other Providers [X] YES  [ ] NO  Imaging Personally Reviewed:  [X] YES  [ ] NO      LABS:                        12.6   13.68 )-----------( 206      ( 29 Oct 2023 07:20 )             36.9     10-29    134<L>  |  96<L>  |  32<H>  ----------------------------<  314<H>  4.2   |  26  |  0.96    Ca    9.0      29 Oct 2023 15:47  Phos  3.8     10-29  Mg     2.40     10-29      PT/INR - ( 29 Oct 2023 07:20 )   PT: 11.7 sec;   INR: 1.04 ratio         PTT - ( 29 Oct 2023 07:20 )  PTT:77.9 sec  Urinalysis Basic - ( 29 Oct 2023 15:47 )    Color: x / Appearance: x / SG: x / pH: x  Gluc: 314 mg/dL / Ketone: x  / Bili: x / Urobili: x   Blood: x / Protein: x / Nitrite: x   Leuk Esterase: x / RBC: x / WBC x   Sq Epi: x / Non Sq Epi: x / Bacteria: x        CAPILLARY BLOOD GLUCOSE      POCT Blood Glucose.: 240 mg/dL (29 Oct 2023 21:27)  POCT Blood Glucose.: 311 mg/dL (29 Oct 2023 17:48)  POCT Blood Glucose.: 299 mg/dL (29 Oct 2023 13:22)  POCT Blood Glucose.: 289 mg/dL (29 Oct 2023 08:43)

## 2023-10-30 NOTE — PROGRESS NOTE ADULT - ASSESSMENT
73M w/ T2DM, HTN, Metastatic prostate cancer (2009) was initially evaluated at [facility] for lower extremity paralysis in the setting of a recent fall, found to have 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 Initiated IV solumedrol with gradual improvement of symptoms and return of strength since Wednesday now here at [facility] for spine evaluation by surgery. 73M w/ T2DM, HTN, Metastatic prostate cancer (2009) was initially evaluated at [facility] for lower extremity paralysis in the setting of a recent fall, found to have 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 Initiated IV solumedrol with gradual improvement of symptoms and return of strength since Wednesday now here at [facility] for spine evaluation by surgery.

## 2023-10-30 NOTE — CHART NOTE - NSCHARTNOTEFT_GEN_A_CORE
Medicine team inquired about ortho spine surgical planning as radiation oncology is planning to start XRT today.   Per Dr. Badillo, definitive surgical planning cannot be made until new imaging is completed (MRI C/Tspine), which is ordered.   Discussed above with radiation oncology PA, Jovany Lozada. Rad onc to defer radiation until surgical plan determined.   Above discussed with Dr. Badillo who is in agreement with the plan. Please notify ortho with any further questions. Medicine team inquired about ortho spine surgical planning as radiation oncology is planning to start XRT today.   Per Dr. Badillo, definitive surgical planning cannot be made until new imaging is completed (MRI C/Tspine), which is ordered.   Discussed above with radiation oncology PA, Jovany Lozada. Rad onc to defer radiation until surgical plan determined.   Above discussed with Dr. Badillo who is in agreement with the plan. Please notify ortho with any further questions.    ADDENDUM 10/30/23 17:00    MRI C/T spine from today, 10/30/2023 reviewed by Dr. Badillo. Dr. Badillo to see patient tomorrow and discuss surgical options.   Per Dr. Badillo, given MRI findings, patient may benefit from surgery first followed by radiation therapy.  Above relayed to medicine team as well as discussed with radiation oncology- Dr. Osorio.

## 2023-10-31 DIAGNOSIS — R33.9 RETENTION OF URINE, UNSPECIFIED: ICD-10-CM

## 2023-10-31 LAB
ALBUMIN SERPL ELPH-MCNC: 4.2 G/DL — SIGNIFICANT CHANGE UP (ref 3.3–5)
ALBUMIN SERPL ELPH-MCNC: 4.2 G/DL — SIGNIFICANT CHANGE UP (ref 3.3–5)
ALP SERPL-CCNC: 339 U/L — HIGH (ref 40–120)
ALP SERPL-CCNC: 339 U/L — HIGH (ref 40–120)
ALT FLD-CCNC: 190 U/L — HIGH (ref 4–41)
ALT FLD-CCNC: 190 U/L — HIGH (ref 4–41)
ANION GAP SERPL CALC-SCNC: 14 MMOL/L — SIGNIFICANT CHANGE UP (ref 7–14)
ANION GAP SERPL CALC-SCNC: 14 MMOL/L — SIGNIFICANT CHANGE UP (ref 7–14)
APTT BLD: 60 SEC — HIGH (ref 24.5–35.6)
APTT BLD: 60 SEC — HIGH (ref 24.5–35.6)
APTT BLD: 87 SEC — HIGH (ref 24.5–35.6)
APTT BLD: 87 SEC — HIGH (ref 24.5–35.6)
AST SERPL-CCNC: 44 U/L — HIGH (ref 4–40)
AST SERPL-CCNC: 44 U/L — HIGH (ref 4–40)
BILIRUB SERPL-MCNC: 0.8 MG/DL — SIGNIFICANT CHANGE UP (ref 0.2–1.2)
BILIRUB SERPL-MCNC: 0.8 MG/DL — SIGNIFICANT CHANGE UP (ref 0.2–1.2)
BUN SERPL-MCNC: 33 MG/DL — HIGH (ref 7–23)
BUN SERPL-MCNC: 33 MG/DL — HIGH (ref 7–23)
CALCIUM SERPL-MCNC: 9.3 MG/DL — SIGNIFICANT CHANGE UP (ref 8.4–10.5)
CALCIUM SERPL-MCNC: 9.3 MG/DL — SIGNIFICANT CHANGE UP (ref 8.4–10.5)
CHLORIDE SERPL-SCNC: 100 MMOL/L — SIGNIFICANT CHANGE UP (ref 98–107)
CHLORIDE SERPL-SCNC: 100 MMOL/L — SIGNIFICANT CHANGE UP (ref 98–107)
CO2 SERPL-SCNC: 25 MMOL/L — SIGNIFICANT CHANGE UP (ref 22–31)
CO2 SERPL-SCNC: 25 MMOL/L — SIGNIFICANT CHANGE UP (ref 22–31)
CREAT SERPL-MCNC: 0.87 MG/DL — SIGNIFICANT CHANGE UP (ref 0.5–1.3)
CREAT SERPL-MCNC: 0.87 MG/DL — SIGNIFICANT CHANGE UP (ref 0.5–1.3)
EGFR: 92 ML/MIN/1.73M2 — SIGNIFICANT CHANGE UP
EGFR: 92 ML/MIN/1.73M2 — SIGNIFICANT CHANGE UP
GLUCOSE BLDC GLUCOMTR-MCNC: 222 MG/DL — HIGH (ref 70–99)
GLUCOSE BLDC GLUCOMTR-MCNC: 222 MG/DL — HIGH (ref 70–99)
GLUCOSE BLDC GLUCOMTR-MCNC: 251 MG/DL — HIGH (ref 70–99)
GLUCOSE BLDC GLUCOMTR-MCNC: 251 MG/DL — HIGH (ref 70–99)
GLUCOSE BLDC GLUCOMTR-MCNC: 303 MG/DL — HIGH (ref 70–99)
GLUCOSE BLDC GLUCOMTR-MCNC: 303 MG/DL — HIGH (ref 70–99)
GLUCOSE BLDC GLUCOMTR-MCNC: 319 MG/DL — HIGH (ref 70–99)
GLUCOSE BLDC GLUCOMTR-MCNC: 319 MG/DL — HIGH (ref 70–99)
GLUCOSE BLDC GLUCOMTR-MCNC: 364 MG/DL — HIGH (ref 70–99)
GLUCOSE BLDC GLUCOMTR-MCNC: 364 MG/DL — HIGH (ref 70–99)
GLUCOSE SERPL-MCNC: 291 MG/DL — HIGH (ref 70–99)
GLUCOSE SERPL-MCNC: 291 MG/DL — HIGH (ref 70–99)
HCT VFR BLD CALC: 42.7 % — SIGNIFICANT CHANGE UP (ref 39–50)
HCT VFR BLD CALC: 42.7 % — SIGNIFICANT CHANGE UP (ref 39–50)
HGB BLD-MCNC: 14.8 G/DL — SIGNIFICANT CHANGE UP (ref 13–17)
HGB BLD-MCNC: 14.8 G/DL — SIGNIFICANT CHANGE UP (ref 13–17)
MAGNESIUM SERPL-MCNC: 2.4 MG/DL — SIGNIFICANT CHANGE UP (ref 1.6–2.6)
MAGNESIUM SERPL-MCNC: 2.4 MG/DL — SIGNIFICANT CHANGE UP (ref 1.6–2.6)
MCHC RBC-ENTMCNC: 31.5 PG — SIGNIFICANT CHANGE UP (ref 27–34)
MCHC RBC-ENTMCNC: 31.5 PG — SIGNIFICANT CHANGE UP (ref 27–34)
MCHC RBC-ENTMCNC: 34.7 GM/DL — SIGNIFICANT CHANGE UP (ref 32–36)
MCHC RBC-ENTMCNC: 34.7 GM/DL — SIGNIFICANT CHANGE UP (ref 32–36)
MCV RBC AUTO: 90.9 FL — SIGNIFICANT CHANGE UP (ref 80–100)
MCV RBC AUTO: 90.9 FL — SIGNIFICANT CHANGE UP (ref 80–100)
NRBC # BLD: 0 /100 WBCS — SIGNIFICANT CHANGE UP (ref 0–0)
NRBC # BLD: 0 /100 WBCS — SIGNIFICANT CHANGE UP (ref 0–0)
NRBC # FLD: 0 K/UL — SIGNIFICANT CHANGE UP (ref 0–0)
NRBC # FLD: 0 K/UL — SIGNIFICANT CHANGE UP (ref 0–0)
PHOSPHATE SERPL-MCNC: 3.4 MG/DL — SIGNIFICANT CHANGE UP (ref 2.5–4.5)
PHOSPHATE SERPL-MCNC: 3.4 MG/DL — SIGNIFICANT CHANGE UP (ref 2.5–4.5)
PLATELET # BLD AUTO: 245 K/UL — SIGNIFICANT CHANGE UP (ref 150–400)
PLATELET # BLD AUTO: 245 K/UL — SIGNIFICANT CHANGE UP (ref 150–400)
POTASSIUM SERPL-MCNC: 4.6 MMOL/L — SIGNIFICANT CHANGE UP (ref 3.5–5.3)
POTASSIUM SERPL-MCNC: 4.6 MMOL/L — SIGNIFICANT CHANGE UP (ref 3.5–5.3)
POTASSIUM SERPL-SCNC: 4.6 MMOL/L — SIGNIFICANT CHANGE UP (ref 3.5–5.3)
POTASSIUM SERPL-SCNC: 4.6 MMOL/L — SIGNIFICANT CHANGE UP (ref 3.5–5.3)
PROT SERPL-MCNC: 7.3 G/DL — SIGNIFICANT CHANGE UP (ref 6–8.3)
PROT SERPL-MCNC: 7.3 G/DL — SIGNIFICANT CHANGE UP (ref 6–8.3)
RBC # BLD: 4.7 M/UL — SIGNIFICANT CHANGE UP (ref 4.2–5.8)
RBC # BLD: 4.7 M/UL — SIGNIFICANT CHANGE UP (ref 4.2–5.8)
RBC # FLD: 12.8 % — SIGNIFICANT CHANGE UP (ref 10.3–14.5)
RBC # FLD: 12.8 % — SIGNIFICANT CHANGE UP (ref 10.3–14.5)
SODIUM SERPL-SCNC: 139 MMOL/L — SIGNIFICANT CHANGE UP (ref 135–145)
SODIUM SERPL-SCNC: 139 MMOL/L — SIGNIFICANT CHANGE UP (ref 135–145)
WBC # BLD: 17.93 K/UL — HIGH (ref 3.8–10.5)
WBC # BLD: 17.93 K/UL — HIGH (ref 3.8–10.5)
WBC # FLD AUTO: 17.93 K/UL — HIGH (ref 3.8–10.5)
WBC # FLD AUTO: 17.93 K/UL — HIGH (ref 3.8–10.5)

## 2023-10-31 PROCEDURE — 99233 SBSQ HOSP IP/OBS HIGH 50: CPT

## 2023-10-31 RX ORDER — TAMSULOSIN HYDROCHLORIDE 0.4 MG/1
0.4 CAPSULE ORAL AT BEDTIME
Refills: 0 | Status: DISCONTINUED | OUTPATIENT
Start: 2023-10-31 | End: 2023-12-23

## 2023-10-31 RX ORDER — INSULIN GLARGINE 100 [IU]/ML
29 INJECTION, SOLUTION SUBCUTANEOUS EVERY MORNING
Refills: 0 | Status: DISCONTINUED | OUTPATIENT
Start: 2023-10-31 | End: 2023-11-01

## 2023-10-31 RX ORDER — INSULIN LISPRO 100/ML
12 VIAL (ML) SUBCUTANEOUS
Refills: 0 | Status: DISCONTINUED | OUTPATIENT
Start: 2023-10-31 | End: 2023-11-01

## 2023-10-31 RX ADMIN — Medication 4: at 12:35

## 2023-10-31 RX ADMIN — CHLORHEXIDINE GLUCONATE 1 APPLICATION(S): 213 SOLUTION TOPICAL at 11:46

## 2023-10-31 RX ADMIN — Medication 12 UNIT(S): at 12:35

## 2023-10-31 RX ADMIN — HEPARIN SODIUM 1300 UNIT(S)/HR: 5000 INJECTION INTRAVENOUS; SUBCUTANEOUS at 11:48

## 2023-10-31 RX ADMIN — Medication 4 MILLIGRAM(S): at 18:06

## 2023-10-31 RX ADMIN — BICALUTAMIDE 50 MILLIGRAM(S): 50 TABLET, FILM COATED ORAL at 11:47

## 2023-10-31 RX ADMIN — HEPARIN SODIUM 1300 UNIT(S)/HR: 5000 INJECTION INTRAVENOUS; SUBCUTANEOUS at 11:52

## 2023-10-31 RX ADMIN — Medication 4: at 18:06

## 2023-10-31 RX ADMIN — Medication 4 MILLIGRAM(S): at 00:12

## 2023-10-31 RX ADMIN — Medication 4 MILLIGRAM(S): at 06:32

## 2023-10-31 RX ADMIN — INSULIN GLARGINE 29 UNIT(S): 100 INJECTION, SOLUTION SUBCUTANEOUS at 11:01

## 2023-10-31 RX ADMIN — Medication 3: at 09:18

## 2023-10-31 RX ADMIN — Medication 12 UNIT(S): at 18:07

## 2023-10-31 RX ADMIN — HEPARIN SODIUM 1300 UNIT(S)/HR: 5000 INJECTION INTRAVENOUS; SUBCUTANEOUS at 01:25

## 2023-10-31 RX ADMIN — HEPARIN SODIUM 1300 UNIT(S)/HR: 5000 INJECTION INTRAVENOUS; SUBCUTANEOUS at 07:54

## 2023-10-31 RX ADMIN — Medication 4 MILLIGRAM(S): at 11:47

## 2023-10-31 RX ADMIN — SENNA PLUS 2 TABLET(S): 8.6 TABLET ORAL at 21:54

## 2023-10-31 RX ADMIN — PANTOPRAZOLE SODIUM 40 MILLIGRAM(S): 20 TABLET, DELAYED RELEASE ORAL at 06:32

## 2023-10-31 RX ADMIN — HEPARIN SODIUM 1300 UNIT(S)/HR: 5000 INJECTION INTRAVENOUS; SUBCUTANEOUS at 09:09

## 2023-10-31 RX ADMIN — TAMSULOSIN HYDROCHLORIDE 0.4 MILLIGRAM(S): 0.4 CAPSULE ORAL at 21:54

## 2023-10-31 RX ADMIN — HEPARIN SODIUM 1300 UNIT(S)/HR: 5000 INJECTION INTRAVENOUS; SUBCUTANEOUS at 20:15

## 2023-10-31 RX ADMIN — AMLODIPINE BESYLATE 10 MILLIGRAM(S): 2.5 TABLET ORAL at 06:32

## 2023-10-31 NOTE — PROGRESS NOTE ADULT - PROBLEM SELECTOR PLAN 6
Likely due to Cord Compression/Spinal Mets    Plan:  Oxycodone 5 q4h PRN for Severe pain  Oxycodone 2.5 q4h PRN for moderate pain  Senna standing  Miralax PRN yes Alk Phos: 314 -> 311 -> 315   AST: 108 -> 76 -> 53   ALT: 299 -> 281 -> 215     Likely in the setting of Rhabdo  Will continue to trend the LFTs and transaminitis  RUQ U/S unremarkable

## 2023-10-31 NOTE — PROGRESS NOTE ADULT - PROBLEM SELECTOR PLAN 8
C/W Atorvastatin 20 qd Amlodipine 10 qd  Rampril 2.5 qd    C/W Amlodipine 10qd given good BP control  Hold Rampiril iso unknown Creatinine baseline

## 2023-10-31 NOTE — PROGRESS NOTE ADULT - PROBLEM SELECTOR PLAN 10
Diet: CC DASH Diet  Psych/Sleep: None ELLIS  GI: Protonix 40 qd/Senna/Miralax  DVT ppx: Hep GTT  Code Status: Will need full discussion with family  Dispo: pending surgical and radiation oncology interventions patient continues to have elevated glucose levels requiring additional insulin sliding scale, likely due to steroids     increase lantus to 24u  increase admelog to 10u TID

## 2023-10-31 NOTE — PROGRESS NOTE ADULT - PROBLEM SELECTOR PLAN 11
Diet: CC DASH Diet  Psych/Sleep: None ELLIS  GI: Protonix 40 qd/Senna/Miralax  DVT ppx: Hep GTT  Code Status: Will need full discussion with family  Dispo: pending surgical and radiation oncology interventions

## 2023-10-31 NOTE — PROGRESS NOTE ADULT - PROBLEM SELECTOR PLAN 5
Alk Phos: 314 -> 311 -> 315   AST: 108 -> 76 -> 53   ALT: 299 -> 281 -> 215     Likely in the setting of Rhabdo  Will continue to trend the LFTs and transaminitis  RUQ U/S unremarkable patients rhabdomyolysis likely in the setting of fall   Admission CK > 01545  Downtrended to 511  Downtrended further to normalize  at 114

## 2023-10-31 NOTE — PROGRESS NOTE ADULT - ASSESSMENT
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 Initiated IV solumedrol with gradual improvement of symptoms and return of strength since Wednesday now here at [facility] for spine evaluation by surgery.

## 2023-10-31 NOTE — PROGRESS NOTE ADULT - PROBLEM SELECTOR PLAN 2
Found to have LE Weakness along with saddle anesthesia; concern for cord compression  saddle anesthesia is now resolved   Prostate Biopsy 2021 --> adenocarcinoma Gelason 3+4; declined radiation therapy at the time  Epidural disease was also noted at T12 vertebral body. NM Scan significant for multiple sites with osseous lesions;   Team at [facility] discussed with Oncology  edema from C3-4 through C7-T1 interspinous ligaments as well as lytic metastatic disease from C7, Tq-T5 and T12, as well as L1-L3    Plan  Oncology following   Plan as above for cord compression  Degarelix (GnRH Receptor antagonist) once LFTs improve, AST/ALT 53/215 10/30, continue to hold   F/U Rad Onc Recs Found to have LE Weakness along with saddle anesthesia; concern for cord compression  saddle anesthesia is now resolved   Prostate Biopsy 2021 --> adenocarcinoma Gelason 3+4; declined radiation therapy at the time  Epidural disease was also noted at T12 vertebral body. NM Scan significant for multiple sites with osseous lesions;   Team at [facility] discussed with Oncology  edema from C3-4 through C7-T1 interspinous ligaments as well as lytic metastatic disease from C7, Tq-T5 and T12, as well as L1-L3    Plan  Oncology following   Plan as above for cord compression  Degarelix (GnRH Receptor antagonist) once LFTs improve, AST/ALT 44/199 10/31, continue to hold   F/U Rad Onc Recs

## 2023-10-31 NOTE — PROGRESS NOTE ADULT - PROBLEM SELECTOR PLAN 9
patient continues to have elevated glucose levels requiring additional insulin sliding scale, likely due to steroids     increase lantus to 24u  increase admelog to 10u TID hold atorvastatin iso elevated LFTs

## 2023-10-31 NOTE — CONSULT NOTE ADULT - SUBJECTIVE AND OBJECTIVE BOX
Interventional Radiology    Evaluate for Procedure:     HPI: 73y Male with metastatic prostate cancer initially evaluated at [facility] for lower extremity paralysis in the setting of a recent fall, found to have cervical spine neoplasm concerning for cord compression as well as multiple metastatic thoracic, scapular, pelvic spine lesions. Symptoms improved with Solumedrol. Transferred to [facility] for spine evaluation and plan is for spinal decompression on 11/2. Patient is currently on heparin GTT for bilateral DVTs. IR is consulted for IVC filer placement prior to surgery.     Allergies: No Known Allergies    Medications (Abx/Cardiac/Anticoagulation/Blood Products)  amLODIPine   Tablet: 10 milliGRAM(s) Oral (10-31 @ 06:32)  heparin  Infusion.: 1300 Unit(s)/Hr IV Continuous (10-31 @ 11:49)  heparin  Infusion.: 1000 Unit(s)/Hr IV Continuous (10-30 @ 02:02)    Data:    T(C): 36.4  HR: 78  BP: 125/61  RR: 17  SpO2: 98%    -WBC 17.93 / HgB 14.8 / Hct 42.7 / Plt 245  -Na 139 / Cl 100 / BUN 33 / Glucose 291  -K 4.6 / CO2 25 / Cr 0.87  - / Alk Phos 339 / T.Bili 0.8  -INR -- / PTT 60.0      Radiology:     Assessment/Plan:     73y Male with metastatic prostate cancer initially evaluated at [facility] for lower extremity paralysis in the setting of a recent fall, found to have cervical spine neoplasm concerning for cord compression as well as multiple metastatic thoracic, scapular, pelvic spine lesions. Transferred to [facility] for spine evaluation and plan is for spinal decompression on 11/2. Patient is currently on heparin gtt for bilateral DVTs. IR is consulted for IVC filer placement prior to surgery.     -- Case approved for 11/1  -- NPO at midnight  -- hold heparin gtt on call by IR  -- please complete IR pre-procedure note  -- please place IR procedure request order under Dr. Clements    --  Godwin Garsia, PGY-3  Available on Microsoft Teams    - Non-emergent consults: Place IR consult order in [place]  - Emergent issues (pager): [facility] [phone]; [facility] [phone]; [phone]  - Scheduling questions: [facility] [phone]; [facility] [phone]  - Clinic/outpatient booking: [facility] [phone]; [facility] [phone] Interventional Radiology    Evaluate for Procedure:     HPI: 73y Male with metastatic prostate cancer initially evaluated at [facility] for lower extremity paralysis in the setting of a recent fall, found to have cervical spine neoplasm concerning for cord compression as well as multiple metastatic thoracic, scapular, pelvic spine lesions. Symptoms improved with Solumedrol. Transferred to [facility] for spine evaluation and plan is for spinal decompression on 11/2. Patient is currently on heparin GTT for bilateral DVTs. IR is consulted for IVC filer placement prior to surgery.     Allergies: No Known Allergies    Medications (Abx/Cardiac/Anticoagulation/Blood Products)  amLODIPine   Tablet: 10 milliGRAM(s) Oral (10-31 @ 06:32)  heparin  Infusion.: 1300 Unit(s)/Hr IV Continuous (10-31 @ 11:49)  heparin  Infusion.: 1000 Unit(s)/Hr IV Continuous (10-30 @ 02:02)    Data:    T(C): 36.4  HR: 78  BP: 125/61  RR: 17  SpO2: 98%    -WBC 17.93 / HgB 14.8 / Hct 42.7 / Plt 245  -Na 139 / Cl 100 / BUN 33 / Glucose 291  -K 4.6 / CO2 25 / Cr 0.87  - / Alk Phos 339 / T.Bili 0.8  -INR -- / PTT 60.0      Radiology:     Assessment/Plan:     73y Male with metastatic prostate cancer initially evaluated at [facility] for lower extremity paralysis in the setting of a recent fall, found to have cervical spine neoplasm concerning for cord compression as well as multiple metastatic thoracic, scapular, pelvic spine lesions. Transferred to [facility] for spine evaluation and plan is for spinal decompression on 11/2. Patient is currently on heparin gtt for bilateral DVTs. IR is consulted for IVC filer placement prior to surgery.     -- Case approved for 11/1  -- NPO at midnight  -- hold heparin gtt on call by IR  -- please complete IR pre-procedure note   -- please place IR procedure request order under Dr. Clements    --  Godwin Garsia, PGY-3  Available on Microsoft Teams    - Non-emergent consults: Place IR consult order in [place]  - Emergent issues (pager): [facility] [phone]; [facility] [phone]; [phone]  - Scheduling questions: [facility] [phone]; [facility] [phone]  - Clinic/outpatient booking: [facility] [phone]; [facility] [phone]

## 2023-10-31 NOTE — PROGRESS NOTE ADULT - ATTENDING COMMENTS
73M w/ hx of T2DM, HTN, metastatic Prostate cancer (2009) was initially evaluated at [facility] for lower extremity paralysis in the setting of a recent fall, found to have cervical spine neoplasm c/f cord compression, course complicated by progression of prostate cancer, rhabdomyolysis, SRUTHI, and DVT, transferred for [facility] for XRT and ortho-onc eval.     Patient seen and examined, states his legs feel more swollen today but that his strength, sensation and everything else remains stable and intact. Discussed that ortho doctors may be offering his surgery followed by radiation, but explained plan is not final. Also discussed treatment of DVTs if surgery is planned. Strength 5/5 bilateral LE, sensation intact.     #Cord compression   #Metastatic prostate CA  - MRI C/T spine reviewed, showing "fractures of the C7 and T1 spinous processes with intervening ligamentous injury and redemonstration of epidural extension of neoplastic disease at the T1 level with associated cord compression and mild cord edema at T1 which appears unchanged in comparison with 10/21/2023."  - Ortho following, will f/u recs for any surgical interventions. If no surgery planned, then will proceed with RT. Rad-onc following and recs appreciated  - S/p high dose solumedrol, will transition to PO decadron 4mg q6h and taper pending plan   - Onc following, started casodex  - Attempted TOV, patient retaining > 600cc. Parsons replaced. Started on flomax. Will re-assess TOV depending on above (surgery or not)    #DVT   - 10/22/23 US w/ DVT in the bilateral posterior tibial and peroneal veins  - C/w heparin gtt for now, transition to DOAC vs. lovenox if no surgical intervention planned. If ortho will proceed with surgery, will need to discuss IVC filter pre-op as he cannot be on AC for at least 1 week post-op.      #Type 2 Diabetes with hyperglycemia   - C/w basal-bolus, suspect hyperglycemia due to steroids   - Will continue to monitor FS and adjust regimen as needed    Rest as above.  Discussed with resident Dr. Chang

## 2023-10-31 NOTE — MEDICAL STUDENT PROGRESS NOTE(EDUCATION) - ASSESSMENT
73y M presenting w/ PMH T2DM, HTN, metastatic prostate cancer since 2009, initially evaluated at [facility] for lower extremity paralysis in setting of a recent [place] in [place], found to have cervical spine neoplasm concerning for cord compression as well as diffuse metastatic thoracic, scapular, pelvic spine lesions. Now presenting here at [facility] for evaluation of spine surgery.    Plan:    1) Cervical spinal cord compression  - Started on Solumedrol at [facility]. Has improved strength and sensation since wednesday.   - Yd dc/ed on Solumedrol and started on Decadron 4mg q6 per medicine rec. C/t 11/13, 14 days total.    2) Prostate cancer metastases   MRI thoracic spine: Unchanged cord compression and mild cord edema at the T1 level, as discussed.  MRI cervical spine: 1. Redemonstration of osseous metastatic disease.  2. Redemonstration of fractures of the C7 and T1 spinous processes with intervening ligamentous injury.  3. Redemonstration of epidural extension of neoplastic disease at the T1 level with associated cord compression and mild cord edema at T1 which appears unchanged in comparison with 10/21/2023.  CT thoracic spine: 1. Multiple metastatic lesions throughout the thoracic spine as discussed above, the most significant of which is at the T1 level where there is moderate to severe narrowing of the spinal canal. Correlate with recent MRI of the thoracic spine for further evaluation.  2. Acute fractures to the C7 and T1 spinous processes.  3. Mediastinal findings as discussed above.    Plan  - Onology following  - F/u rad onc recs. Likely radiation therapy following spine surgery.   - Degarelix once LFTs improve    3) B/l lower extremity DVts  -  73y M presenting w/ PMH T2DM, HTN, metastatic prostate cancer since 2009, initially evaluated at [facility] for lower extremity paralysis in setting of a recent fall in [place], found to have cervical spine neoplasm concerning for cord compression as well as diffuse metastatic thoracic, scapular, pelvic spine lesions. Now presenting here at [facility] for evaluation of spine surgery.    Plan:    1) Cervical spinal cord compression  - Started on Solumedrol at [facility]. Has improved strength and sensation since wednesday.   - Yd dc/ed on Solumedrol and started on Decadron 4mg q6 per medicine rec. C/t 11/13, 14 days total.    2) Prostate cancer metastases   MRI thoracic spine: Unchanged cord compression and mild cord edema at the T1 level, as discussed.  MRI cervical spine: 1. Redemonstration of osseous metastatic disease.  2. Redemonstration of fractures of the C7 and T1 spinous processes with intervening ligamentous injury.  3. Redemonstration of epidural extension of neoplastic disease at the T1 level with associated cord compression and mild cord edema at T1 which appears unchanged in comparison with 10/21/2023.  CT thoracic spine: 1. Multiple metastatic lesions throughout the thoracic spine as discussed above, the most significant of which is at the T1 level where there is moderate to severe narrowing of the spinal canal. Correlate with recent MRI of the thoracic spine for further evaluation.  2. Acute fractures to the C7 and T1 spinous processes.  3. Mediastinal findings as discussed above.    Plan  - Onology following  - F/u rad onc recs. Likely radiation therapy following spine surgery.   - Degarelix once LFTs improve      3) Failed TOV  - Patient reports voiding in small quantities throughout the night, reports feeling suprapubic fullness this morning  - Postvoid residual volume bladder scan this morning was 600cc  - Parsons catheter reordered    4) B/l lower extremity DVts  - Patient reports feeling greater ankle swelling, ankles cold.  - Heparin therapeutic dose (PTT 60), continue to monitor for symptoms    5) Transaminitis   - Downtrending: ->311->315. AST: 108->76->53. ALT: 299->281->215.   - Likely in the setting of rhabdomyolysis, continue to monitor with AM labs    6) Back pain  - Pain is well controlled and the patient desired no pain medications overnight  - c/w oxy 2.5mg q4 PRN for moderate pain    7) HTN  - Well controlled BP  - c/w Amlodipine 10qd    8) HLD  - c/w Atorvastatin 20qd    9) T2DM  - Increased lantus yd to 24units, Admelog 10u TID  - POCT 207-284 (291 AM)  - Increased Lantus 5units (29u total), Admelog 2 units (12u total)

## 2023-10-31 NOTE — PROGRESS NOTE ADULT - SUBJECTIVE AND OBJECTIVE BOX
Orthopedic Surgery Progress Note     S: Patient seen and examined today. No acute events overnight. Pain is well controlled. Denies f/c, chest pain, shortness of breath, dizziness.    MEDICATIONS  (STANDING):  amLODIPine   Tablet 10 milliGRAM(s) Oral daily  atorvastatin 20 milliGRAM(s) Oral at bedtime  bicalutamide 50 milliGRAM(s) Oral daily  chlorhexidine 2% Cloths 1 Application(s) Topical daily  dexAMETHasone     Tablet 4 milliGRAM(s) Oral every 6 hours  dextrose 5%. 1000 milliLiter(s) (50 mL/Hr) IV Continuous <Continuous>  dextrose 5%. 1000 milliLiter(s) (100 mL/Hr) IV Continuous <Continuous>  dextrose 50% Injectable 25 Gram(s) IV Push once  dextrose 50% Injectable 12.5 Gram(s) IV Push once  dextrose 50% Injectable 25 Gram(s) IV Push once  glucagon  Injectable 1 milliGRAM(s) IntraMuscular once  heparin  Infusion. 1300 Unit(s)/Hr (13 mL/Hr) IV Continuous <Continuous>  insulin glargine Injectable (LANTUS) 24 Unit(s) SubCutaneous every morning  insulin lispro (ADMELOG) corrective regimen sliding scale   SubCutaneous three times a day before meals  insulin lispro (ADMELOG) corrective regimen sliding scale   SubCutaneous at bedtime  insulin lispro Injectable (ADMELOG) 10 Unit(s) SubCutaneous three times a day before meals  pantoprazole    Tablet 40 milliGRAM(s) Oral before breakfast  senna 2 Tablet(s) Oral at bedtime    MEDICATIONS  (PRN):  dextrose Oral Gel 15 Gram(s) Oral once PRN Blood Glucose LESS THAN 70 milliGRAM(s)/deciliter  oxyCODONE    IR 5 milliGRAM(s) Oral every 4 hours PRN Severe Pain (7 - 10)  oxyCODONE    IR 2.5 milliGRAM(s) Oral every 4 hours PRN Moderate Pain (4 - 6)  polyethylene glycol 3350 17 Gram(s) Oral daily PRN Constipation      Vital Signs Last 24 Hrs  T(C): 36.4 (30 Oct 2023 23:07), Max: 36.4 (30 Oct 2023 23:07)  T(F): 97.5 (30 Oct 2023 23:07), Max: 97.5 (30 Oct 2023 23:07)  HR: 71 (30 Oct 2023 23:07) (71 - 71)  BP: 112/73 (30 Oct 2023 23:07) (112/73 - 112/73)  BP(mean): --  RR: 18 (30 Oct 2023 23:07) (18 - 18)  SpO2: 95% (30 Oct 2023 23:07) (95% - 95%)    Parameters below as of 30 Oct 2023 23:07  Patient On (Oxygen Delivery Method): room air        10-29-23 @ 07:01  -  10-30-23 @ 07:00  --------------------------------------------------------  IN: 0 mL / OUT: 2170 mL / NET: -2170 mL    10-30-23 @ 07:01  -  10-31-23 @ 06:28  --------------------------------------------------------  IN: 0 mL / OUT: 500 mL / NET: -500 mL        Physical Exam:  Gen: NAD  Resp: No increased WOB  Spine:  Skin intact  No bony TTP or step-offs along c-, t-, l-spine, or sacrum    RUE:  Delt 5/5 Bi 5/5 Tri 5/5 Wrist ext 5/5  5/5  SILT C5-T1  2+ radial pulse  Negative Llamas  LUE:  Delt 5/5 Bi 5/5 Tri 5/5 Wrist ext 5/5  5/5  SILT C5-T1  2+ radial pulse  Negative Llamas    RLE:  IP 5/5 HS 5/5 Q 5/5 GS 5/5 TA 5/5 EHL 5/5   SILT L2-S1  2+ DP/PT pulses  Negative clonus, negative Babinski  LLE:  IP 5/5 HS 5/5 Q 5/5 GS 5/5 TA 5/5 EHL 5/5  SILT L2-S1  2+ DP/PT pulses  Negative clonus, negative Babinski     LABS:                        13.5   15.24 )-----------( 225      ( 30 Oct 2023 05:35 )             40.0     10-30    138  |  99  |  37<H>  ----------------------------<  322<H>  4.2   |  29  |  0.96    Ca    9.0      30 Oct 2023 05:35  Phos  3.6     10-30  Mg     2.40     10-30    TPro  6.5  /  Alb  3.9  /  TBili  0.7  /  DBili  x   /  AST  53<H>  /  ALT  215<H>  /  AlkPhos  315<H>  10-30

## 2023-10-31 NOTE — PROGRESS NOTE ADULT - PROBLEM SELECTOR PLAN 3
B/L DVTs found in [facility] VS  - DVT in R posterior tibial and peroneal veins Below Knee  - DVT in Left Posterior tibial and peroneal veins below Knee  Suspect likely iso known malignancy    Plan:  Continue with Heparin GTT, plan to convert off heparin gtt once surgical intervention decision is made   TTE  to evaluate for RHS, pending patient with a history of prostate cancer and history of urinary retention   -best was d/lonny yesterday 10/30, patient voided 600cc and bladder scan demonstrated 632cc   -restart best   -start patient on flomax

## 2023-10-31 NOTE — PROGRESS NOTE ADULT - SUBJECTIVE AND OBJECTIVE BOX
DARYN THORNE 73y Male      Patient is a 73y old  Male who presents with a chief complaint of Diffuse Spinal Mets from Prostate Cancer (31 Oct 2023 06:28)        INTERVAL HPI/OVERNIGHT EVENTS: No acute events overnight. Patient was seen and evaluated at the bedside. The patient denies pain. Vitals stable. Patient denies fever/chills, chest pain, shortness of breath, abdominal pain, headaches, nausea/vomiting, and diarrhea/constipation.      PHYSICAL EXAM:  GENERAL: NAD  HEAD:  Normocephalic  EYES:  conjunctiva and sclera clear  ENMT: Moist mucous membranes  NECK: Supple  NERVOUS SYSTEM:  Alert, awake  CHEST/LUNG: Good air exchange bilaterally, no wheeze  HEART: Regular rate and rhythm        Vital Signs Last 24 Hrs  T(C): 36.7 (31 Oct 2023 06:30), Max: 36.7 (31 Oct 2023 06:30)  T(F): 98 (31 Oct 2023 06:30), Max: 98 (31 Oct 2023 06:30)  HR: 71 (31 Oct 2023 06:30) (71 - 71)  BP: 123/79 (31 Oct 2023 06:30) (112/73 - 123/79)  BP(mean): --  RR: 18 (31 Oct 2023 06:30) (18 - 18)  SpO2: 95% (31 Oct 2023 06:30) (95% - 95%)    Parameters below as of 31 Oct 2023 06:30  Patient On (Oxygen Delivery Method): room air          Consultant(s) Notes Reviewed:  [X] YES  [ ] NO  Care Discussed with Consultants/Other Providers [X] YES  [ ] NO  Imaging Personally Reviewed:  [X] YES  [ ] NO      LABS:                        13.5   15.24 )-----------( 225      ( 30 Oct 2023 05:35 )             40.0     10-30    138  |  99  |  37<H>  ----------------------------<  322<H>  4.2   |  29  |  0.96    Ca    9.0      30 Oct 2023 05:35  Phos  3.6     10-30  Mg     2.40     10-30    TPro  6.5  /  Alb  3.9  /  TBili  0.7  /  DBili  x   /  AST  53<H>  /  ALT  215<H>  /  AlkPhos  315<H>  10-30    PT/INR - ( 30 Oct 2023 15:15 )   PT: SPE. CLOTTED sec;   INR: SPE, CLOTTED Recommended targets/ranges for therapeutic INR:  2.0-3.0 Deep vein thrombosis, pulmonary embolism, atrial fibrillation  2.0-3.0 Mechanical aortic valve, antiphospholipid syndrome with previous  arterial or venous thromboembolism  2.5-3.5Mechanical mitral valve, double mechanical valve (aortic and  mitral positions, high risk valves)  Note: Chest 2012 Feb;141(2 Suppl):7S-47S  Routine coagulation results should be interpreted with caution when  taking Factor Xa inhibitors or direct thrombin inhibitors; blood sampling  prior to drug intake is recommended.  Recommended targets/ranges for therapeutic INR:  2.0-3.0 Deep vein thrombosis, pulmonary embolism, atrial fibrillation  2.0-3.0 Mechanical aortic valve, antiphospholipid syndromewith previous  arterial or venous thromboembolism  2.5-3.5 Mechanical mitral valve, double mechanical valve (aortic and  mitral positions, high risk valves)  Note: Chest 2012 Feb;141(2 Suppl):7S-47S  Routine coagulation results should be interpreted with caution when  taking Factor Xa inhibitors or direct thrombin inhibitors; blood sampling  prior to drug intake is recommended. ratio         PTT - ( 31 Oct 2023 00:54 )  PTT:87.0 sec  Urinalysis Basic - ( 30 Oct 2023 05:35 )    Color: x / Appearance: x / SG: x / pH: x  Gluc: 322 mg/dL / Ketone: x  / Bili: x / Urobili: x   Blood: x / Protein: x / Nitrite: x   Leuk Esterase: x / RBC: x / WBC x   Sq Epi: x / Non Sq Epi: x / Bacteria: x        CAPILLARY BLOOD GLUCOSE      POCT Blood Glucose.: 284 mg/dL (30 Oct 2023 22:06)  POCT Blood Glucose.: 207 mg/dL (30 Oct 2023 18:06)  POCT Blood Glucose.: 270 mg/dL (30 Oct 2023 13:08)  POCT Blood Glucose.: 315 mg/dL (30 Oct 2023 11:19)  POCT Blood Glucose.: 277 mg/dL (30 Oct 2023 08:28)           DARYN THORNE 73y Male      Patient is a 73y old  Male who presents with a chief complaint of Diffuse Spinal Mets from Prostate Cancer (31 Oct 2023 06:28)        INTERVAL HPI/OVERNIGHT EVENTS: No acute events overnight. Patient was seen and evaluated at the bedside. Patient notes that his left ankle is swollen but denies any new pain, SOB, chest pain or loss of sensation. Patient does endorse numbness and tingling but notes that this is not new and that he has been experiencing it   Vitals stable. Patient denies fever/chills, chest pain, shortness of breath, abdominal pain, headaches, nausea/vomiting, and diarrhea/constipation.      PHYSICAL EXAM:  GENERAL: NAD  HEAD:  Normocephalic  EYES:  conjunctiva and sclera clear  ENMT: Moist mucous membranes  NECK: Supple  NERVOUS SYSTEM:  Alert, awake  CHEST/LUNG: Good air exchange bilaterally, no wheeze  HEART: Regular rate and rhythm        Vital Signs Last 24 Hrs  T(C): 36.7 (31 Oct 2023 06:30), Max: 36.7 (31 Oct 2023 06:30)  T(F): 98 (31 Oct 2023 06:30), Max: 98 (31 Oct 2023 06:30)  HR: 71 (31 Oct 2023 06:30) (71 - 71)  BP: 123/79 (31 Oct 2023 06:30) (112/73 - 123/79)  BP(mean): --  RR: 18 (31 Oct 2023 06:30) (18 - 18)  SpO2: 95% (31 Oct 2023 06:30) (95% - 95%)    Parameters below as of 31 Oct 2023 06:30  Patient On (Oxygen Delivery Method): room air          Consultant(s) Notes Reviewed:  [X] YES  [ ] NO  Care Discussed with Consultants/Other Providers [X] YES  [ ] NO  Imaging Personally Reviewed:  [X] YES  [ ] NO      LABS:                        13.5   15.24 )-----------( 225      ( 30 Oct 2023 05:35 )             40.0     10-30    138  |  99  |  37<H>  ----------------------------<  322<H>  4.2   |  29  |  0.96    Ca    9.0      30 Oct 2023 05:35  Phos  3.6     10-30  Mg     2.40     10-30    TPro  6.5  /  Alb  3.9  /  TBili  0.7  /  DBili  x   /  AST  53<H>  /  ALT  215<H>  /  AlkPhos  315<H>  10-30    PT/INR - ( 30 Oct 2023 15:15 )   PT: SPE. CLOTTED sec;   INR: SPE, CLOTTED Recommended targets/ranges for therapeutic INR:  2.0-3.0 Deep vein thrombosis, pulmonary embolism, atrial fibrillation  2.0-3.0 Mechanical aortic valve, antiphospholipid syndrome with previous  arterial or venous thromboembolism  2.5-3.5Mechanical mitral valve, double mechanical valve (aortic and  mitral positions, high risk valves)  Note: Chest 2012 Feb;141(2 Suppl):7S-47S  Routine coagulation results should be interpreted with caution when  taking Factor Xa inhibitors or direct thrombin inhibitors; blood sampling  prior to drug intake is recommended.  Recommended targets/ranges for therapeutic INR:  2.0-3.0 Deep vein thrombosis, pulmonary embolism, atrial fibrillation  2.0-3.0 Mechanical aortic valve, antiphospholipid syndromewith previous  arterial or venous thromboembolism  2.5-3.5 Mechanical mitral valve, double mechanical valve (aortic and  mitral positions, high risk valves)  Note: Chest 2012 Feb;141(2 Suppl):7S-47S  Routine coagulation results should be interpreted with caution when  taking Factor Xa inhibitors or direct thrombin inhibitors; blood sampling  prior to drug intake is recommended. ratio         PTT - ( 31 Oct 2023 00:54 )  PTT:87.0 sec  Urinalysis Basic - ( 30 Oct 2023 05:35 )    Color: x / Appearance: x / SG: x / pH: x  Gluc: 322 mg/dL / Ketone: x  / Bili: x / Urobili: x   Blood: x / Protein: x / Nitrite: x   Leuk Esterase: x / RBC: x / WBC x   Sq Epi: x / Non Sq Epi: x / Bacteria: x        CAPILLARY BLOOD GLUCOSE      POCT Blood Glucose.: 284 mg/dL (30 Oct 2023 22:06)  POCT Blood Glucose.: 207 mg/dL (30 Oct 2023 18:06)  POCT Blood Glucose.: 270 mg/dL (30 Oct 2023 13:08)  POCT Blood Glucose.: 315 mg/dL (30 Oct 2023 11:19)  POCT Blood Glucose.: 277 mg/dL (30 Oct 2023 08:28)           DARYN THORNE 73y Male      Patient is a 73y old  Male who presents with a chief complaint of Diffuse Spinal Mets from Prostate Cancer (31 Oct 2023 06:28)        INTERVAL HPI/OVERNIGHT EVENTS: No acute events overnight. Patient was seen and evaluated at the bedside. Patient notes that his left ankle is swollen but denies any new pain, SOB, chest pain or loss of sensation. Patient does endorse numbness and tingling but notes that this is not new and that he has been experiencing it prior to his admission and since.  Vitals stable. Patient denies fever/chills, chest pain, shortness of breath, abdominal pain, headaches, nausea/vomiting, and diarrhea/constipation.      PHYSICAL EXAM:  GENERAL: NAD  HEAD:  Normocephalic  EYES:  conjunctiva and sclera clear  ENMT: Moist mucous membranes  NECK: Supple  NERVOUS SYSTEM:  Alert, awake, sensation intact b/l   EXT: 5/5 strength b/l upper and lower extremity, b/l pedal pulses present   CHEST/LUNG: Good air exchange bilaterally, no wheeze  HEART: Regular rate and rhythm        Vital Signs Last 24 Hrs  T(C): 36.7 (31 Oct 2023 06:30), Max: 36.7 (31 Oct 2023 06:30)  T(F): 98 (31 Oct 2023 06:30), Max: 98 (31 Oct 2023 06:30)  HR: 71 (31 Oct 2023 06:30) (71 - 71)  BP: 123/79 (31 Oct 2023 06:30) (112/73 - 123/79)  BP(mean): --  RR: 18 (31 Oct 2023 06:30) (18 - 18)  SpO2: 95% (31 Oct 2023 06:30) (95% - 95%)    Parameters below as of 31 Oct 2023 06:30  Patient On (Oxygen Delivery Method): room air          Consultant(s) Notes Reviewed:  [X] YES  [ ] NO  Care Discussed with Consultants/Other Providers [X] YES  [ ] NO  Imaging Personally Reviewed:  [X] YES  [ ] NO      LABS:                        13.5   15.24 )-----------( 225      ( 30 Oct 2023 05:35 )             40.0     10-30    138  |  99  |  37<H>  ----------------------------<  322<H>  4.2   |  29  |  0.96    Ca    9.0      30 Oct 2023 05:35  Phos  3.6     10-30  Mg     2.40     10-30    TPro  6.5  /  Alb  3.9  /  TBili  0.7  /  DBili  x   /  AST  53<H>  /  ALT  215<H>  /  AlkPhos  315<H>  10-30    PT/INR - ( 30 Oct 2023 15:15 )   PT: SPE. CLOTTED sec;   INR: SPE, CLOTTED Recommended targets/ranges for therapeutic INR:  2.0-3.0 Deep vein thrombosis, pulmonary embolism, atrial fibrillation  2.0-3.0 Mechanical aortic valve, antiphospholipid syndrome with previous  arterial or venous thromboembolism  2.5-3.5Mechanical mitral valve, double mechanical valve (aortic and  mitral positions, high risk valves)  Note: Chest 2012 Feb;141(2 Suppl):7S-47S  Routine coagulation results should be interpreted with caution when  taking Factor Xa inhibitors or direct thrombin inhibitors; blood sampling  prior to drug intake is recommended.  Recommended targets/ranges for therapeutic INR:  2.0-3.0 Deep vein thrombosis, pulmonary embolism, atrial fibrillation  2.0-3.0 Mechanical aortic valve, antiphospholipid syndromewith previous  arterial or venous thromboembolism  2.5-3.5 Mechanical mitral valve, double mechanical valve (aortic and  mitral positions, high risk valves)  Note: Chest 2012 Feb;141(2 Suppl):7S-47S  Routine coagulation results should be interpreted with caution when  taking Factor Xa inhibitors or direct thrombin inhibitors; blood sampling  prior to drug intake is recommended. ratio         PTT - ( 31 Oct 2023 00:54 )  PTT:87.0 sec  Urinalysis Basic - ( 30 Oct 2023 05:35 )    Color: x / Appearance: x / SG: x / pH: x  Gluc: 322 mg/dL / Ketone: x  / Bili: x / Urobili: x   Blood: x / Protein: x / Nitrite: x   Leuk Esterase: x / RBC: x / WBC x   Sq Epi: x / Non Sq Epi: x / Bacteria: x        CAPILLARY BLOOD GLUCOSE      POCT Blood Glucose.: 284 mg/dL (30 Oct 2023 22:06)  POCT Blood Glucose.: 207 mg/dL (30 Oct 2023 18:06)  POCT Blood Glucose.: 270 mg/dL (30 Oct 2023 13:08)  POCT Blood Glucose.: 315 mg/dL (30 Oct 2023 11:19)  POCT Blood Glucose.: 277 mg/dL (30 Oct 2023 08:28)

## 2023-10-31 NOTE — PROGRESS NOTE ADULT - PROBLEM SELECTOR PLAN 1
Started on Solumedrol at [facility] VS  Since Wednesday, Improved strength, sensation; Normal BM  Rectal Exam: Normal Tone;  Does not feel comfortable to stand, 5/5 UE and 4/5 LE strength    Plan:  F/U with Ortho Recs, surgical intervetion decision pending CT/MRI of spine  F/U Rad Onc Rec: Tentative Palliative Simulation/XRT to C7-T5 pending ortho evaluation  C/W IV Methylprednisolone 40 q8h, discuss with ortho regarding use of methylprednisolone v dexamethasone as steroid of choice  Protonix 40 qd  Neuro Checks q4h Started on Solumedrol at [facility] VS  Since Wednesday, Improved strength, sensation; Normal BM  Rectal Exam: Normal Tone;  Does not feel comfortable to stand, 5/5 UE and 4/5 LE strength    Plan:  F/U with Ortho Recs, surgical intervetion decision pending CT/MRI of spine  F/U Rad Onc Rec: Tentative Palliative Simulation/XRT to C7-T5 pending ortho evaluation  patients IV methylprednisolone 40 q8 was d/lonny on 10/30 and patient started on PO dexamethasone 4mg q6, continue for 14 days scheduled to end 11/13   Protonix 40 qd  Neuro Checks q4h

## 2023-10-31 NOTE — PROGRESS NOTE ADULT - ASSESSMENT
73yMale w/ concern for cord compression in setting of multiple vertebral lesions w symptoms improving after course of steroids now at [facility] for rad onc eval    PLAN  -WBAT  -pain control  -incentive spirometry  -f/u MR C/Tsp, plan pending  -XRT w rad onc  -Steroids  -PT/OT    Traci Carmichael MD  Orthopaedic Surgery Resident

## 2023-10-31 NOTE — MEDICAL STUDENT PROGRESS NOTE(EDUCATION) - SUBJECTIVE AND OBJECTIVE BOX
73y M w/ [facility] prostate cancer (2009) presenting with diffuse spinal mets concerning for cord compression.    Patient was seen at bedside resting comfortably. Reports no acute events. Parsons catheter was d/lonny last night and patient started on TOV. Patient reports being able to void a few times very small quantities throughout the night, but still reports feeling full. Patient reports feeling no pain which is well-controlled with current pain meds. Patient denies any other complaints, including fever, chills, nausea, vomiting, diarrhea, shortness of breath.      Vital Signs Last 24 Hrs  T(C): 36.7 (31 Oct 2023 06:30), Max: 36.7 (31 Oct 2023 06:30)  T(F): 98 (31 Oct 2023 06:30), Max: 98 (31 Oct 2023 06:30)  HR: 71 (31 Oct 2023 06:30) (71 - 71)  BP: 123/79 (31 Oct 2023 06:30) (112/73 - 123/79)  RR: 18 (31 Oct 2023 06:30) (18 - 18)  SpO2: 95% (31 Oct 2023 06:30) (95% - 95%)    O2 Parameters below as of 31 Oct 2023 06:30  Patient On (Oxygen Delivery Method): room air    GENERAL: NAD, lying in bed comfortably  HEAD:  Atraumatic, Normocephalic  CHEST/LUNG: CTA b/l; No rales, rhonchi, wheezing, or rubs. Unlabored respirations  HEART: RRR; No murmurs, rubs, or gallops  ABDOMEN: BS+; Soft, NT, ND  EXTREMITIES:  B/l ankle swelling, cold feet b/l. Distal pulses not appreciated.  NERVOUS SYSTEM:  A&Ox3  SKIN: No rashes or lesions      LABS:  10-30 @ 05:35 Creatine 99 U/L [30 - 200]                          14.8   17.93 )-----------( 245      ( 31 Oct 2023 07:10 )             42.7     10-31    139  |  100  |  33<H>  ----------------------------<  291<H>  4.6   |  25  |  0.87    Ca    9.3      31 Oct 2023 07:10  Phos  3.4     10-31  Mg     2.40     10-31    TPro  7.3  /  Alb  4.2  /  TBili  0.8  /  DBili  x   /  AST  44<H>  /  ALT  190<H>  /  AlkPhos  339<H>  10-31    LIVER FUNCTIONS - ( 31 Oct 2023 07:10 )  Alb: 4.2 g/dL / Pro: 7.3 g/dL / ALK PHOS: 339 U/L / ALT: 190 U/L / AST: 44 U/L / GGT: x           PT/INR - ( 30 Oct 2023 15:15 )   PT: SPE. CLOTTED sec;   INR: SPE, CLOTTED Recommended targets/ranges for therapeutic INR:  2.0-3.0 Deep vein thrombosis, pulmonary embolism, atrial fibrillation  2.0-3.0 Mechanical aortic valve, antiphospholipid syndrome with previous  arterial or venous thromboembolism  2.5-3.5Mechanical mitral valve, double mechanical valve (aortic and  mitral positions, high risk valves)  Note: Chest 2012 Feb;141(2 Suppl):7S-47S  Routine coagulation results should be interpreted with caution when  taking Factor Xa inhibitors or direct thrombin inhibitors; blood sampling  prior to drug intake is recommended.  Recommended targets/ranges for therapeutic INR:  2.0-3.0 Deep vein thrombosis, pulmonary embolism, atrial fibrillation  2.0-3.0 Mechanical aortic valve, antiphospholipid syndromewith previous  arterial or venous thromboembolism  2.5-3.5 Mechanical mitral valve, double mechanical valve (aortic and  mitral positions, high risk valves)  Note: Chest 2012 Feb;141(2 Suppl):7S-47S  Routine coagulation results should be interpreted with caution when  taking Factor Xa inhibitors or direct thrombin inhibitors; blood sampling  prior to drug intake is recommended. ratio         PTT - ( 31 Oct 2023 07:10 )  PTT:60.0 sec    CARDIAC MARKERS ( 30 Oct 2023 05:35 )  x     / x     / 99 U/L / x     / x            Urinalysis Basic - ( 31 Oct 2023 07:10 )    Color: x / Appearance: x / SG: x / pH: x  Gluc: 291 mg/dL / Ketone: x  / Bili: x / Urobili: x   Blood: x / Protein: x / Nitrite: x   Leuk Esterase: x / RBC: x / WBC x   Sq Epi: x / Non Sq Epi: x / Bacteria: x        Bladder scan this morning revealed 600cc post-void.        73y M w/ [facility] prostate cancer (2009) presenting with diffuse spinal mets concerning for cord compression.    Patient was seen at bedside resting comfortably. Reports no acute events. Parsons catheter was d/lonny last night and patient started on TOV. Patient reports being able to void a few times very small quantities throughout the night, but still reports feeling full. Patient reports feeling no pain which is well-controlled with current pain meds. Patient reports still not being able to ambulate, and reports his ankle is more swollen than yesterday. Patient reports feeling numbness/swelling in his hips downward bilaterally that is worse today. Patient denies any other complaints, including fever, chills, nausea, vomiting, diarrhea, shortness of breath.      Vital Signs Last 24 Hrs  T(C): 36.7 (31 Oct 2023 06:30), Max: 36.7 (31 Oct 2023 06:30)  T(F): 98 (31 Oct 2023 06:30), Max: 98 (31 Oct 2023 06:30)  HR: 71 (31 Oct 2023 06:30) (71 - 71)  BP: 123/79 (31 Oct 2023 06:30) (112/73 - 123/79)  RR: 18 (31 Oct 2023 06:30) (18 - 18)  SpO2: 95% (31 Oct 2023 06:30) (95% - 95%)    O2 Parameters below as of 31 Oct 2023 06:30  Patient On (Oxygen Delivery Method): room air    GENERAL: NAD, lying in bed comfortably  HEAD:  Atraumatic, Normocephalic  CHEST/LUNG: CTA b/l; No rales, rhonchi, wheezing, or rubs. Unlabored respirations  HEART: RRR; No murmurs, rubs, or gallops  ABDOMEN: BS+; Soft, NT, ND  EXTREMITIES:  B/l ankle swelling, cold feet b/l. Distal pulses not appreciated.  NERVOUS SYSTEM:  A&Ox3, 5/5 strength b/l legs and toes. Numbness/tingling. Sensation intact.   SKIN: No rashes or lesions      LABS:  10-30 @ 05:35 Creatine 99 U/L [30 - 200]                          14.8   17.93 )-----------( 245      ( 31 Oct 2023 07:10 )             42.7     10-31    139  |  100  |  33<H>  ----------------------------<  291<H>  4.6   |  25  |  0.87    Ca    9.3      31 Oct 2023 07:10  Phos  3.4     10-31  Mg     2.40     10-31    TPro  7.3  /  Alb  4.2  /  TBili  0.8  /  DBili  x   /  AST  44<H>  /  ALT  190<H>  /  AlkPhos  339<H>  10-31    LIVER FUNCTIONS - ( 31 Oct 2023 07:10 )  Alb: 4.2 g/dL / Pro: 7.3 g/dL / ALK PHOS: 339 U/L / ALT: 190 U/L / AST: 44 U/L / GGT: x           PT/INR - ( 30 Oct 2023 15:15 )   PT: SPE. CLOTTED sec;   INR: SPE, CLOTTED Recommended targets/ranges for therapeutic INR:  2.0-3.0 Deep vein thrombosis, pulmonary embolism, atrial fibrillation  2.0-3.0 Mechanical aortic valve, antiphospholipid syndrome with previous  arterial or venous thromboembolism  2.5-3.5Mechanical mitral valve, double mechanical valve (aortic and  mitral positions, high risk valves)  Note: Chest 2012 Feb;141(2 Suppl):7S-47S  Routine coagulation results should be interpreted with caution when  taking Factor Xa inhibitors or direct thrombin inhibitors; blood sampling  prior to drug intake is recommended.  Recommended targets/ranges for therapeutic INR:  2.0-3.0 Deep vein thrombosis, pulmonary embolism, atrial fibrillation  2.0-3.0 Mechanical aortic valve, antiphospholipid syndromewith previous  arterial or venous thromboembolism  2.5-3.5 Mechanical mitral valve, double mechanical valve (aortic and  mitral positions, high risk valves)  Note: Chest 2012 Feb;141(2 Suppl):7S-47S  Routine coagulation results should be interpreted with caution when  taking Factor Xa inhibitors or direct thrombin inhibitors; blood sampling  prior to drug intake is recommended. ratio         PTT - ( 31 Oct 2023 07:10 )  PTT:60.0 sec    CARDIAC MARKERS ( 30 Oct 2023 05:35 )  x     / x     / 99 U/L / x     / x            Urinalysis Basic - ( 31 Oct 2023 07:10 )    Color: x / Appearance: x / SG: x / pH: x  Gluc: 291 mg/dL / Ketone: x  / Bili: x / Urobili: x   Blood: x / Protein: x / Nitrite: x   Leuk Esterase: x / RBC: x / WBC x   Sq Epi: x / Non Sq Epi: x / Bacteria: x        Bladder scan this morning revealed 600cc post-void.

## 2023-10-31 NOTE — CHART NOTE - NSCHARTNOTEFT_GEN_A_CORE
PRE-INTERVENTIONAL RADIOLOGY PROCEDURE NOTE  ============================  Shyla Haywood PGY1  NS / [facility] Medicine Resident  Pager: [phone]// [phone]  ============================  Patient Name: DARYN THORNE    Patient Age: 73y    Patient Gender: Male    Procedure: IVC filter placement    Diagnosis/Indication: DVT and planned orthopedic procedure requiring holding anticoagulation    Interventional Radiology Attending Physician: Dr Clements    Ordering Attending Physician: Dr Aponte    Pertinent Medical History: Metastatic prostate cancer    Pertinent labs:                      14.8   17.93 )-----------( 245      ( 31 Oct 2023 07:10 )             42.7       10-31    139  |  100  |  33<H>  ----------------------------<  291<H>  4.6   |  25  |  0.87    Ca    9.3      31 Oct 2023 07:10  Phos  3.4     10-31  Mg     2.40     10-31    TPro  7.3  /  Alb  4.2  /  TBili  0.8  /  DBili  x   /  AST  44<H>  /  ALT  190<H>  /  AlkPhos  339<H>  10-31      PT/INR - ( 30 Oct 2023 15:15 )   PT: SPE. CLOTTED sec;   INR: SPE, CLOTTED Recommended targets/ranges for therapeutic INR:  2.0-3.0 Deep vein thrombosis, pulmonary embolism, atrial fibrillation  2.0-3.0 Mechanical aortic valve, antiphospholipid syndrome with previous  arterial or venous thromboembolism  2.5-3.5Mechanical mitral valve, double mechanical valve (aortic and  mitral positions, high risk valves)  Note: Chest 2012 Feb;141(2 Suppl):7S-47S  Routine coagulation results should be interpreted with caution when  taking Factor Xa inhibitors or direct thrombin inhibitors; blood sampling  prior to drug intake is recommended.  Recommended targets/ranges for therapeutic INR:  2.0-3.0 Deep vein thrombosis, pulmonary embolism, atrial fibrillation  2.0-3.0 Mechanical aortic valve, antiphospholipid syndromewith previous  arterial or venous thromboembolism  2.5-3.5 Mechanical mitral valve, double mechanical valve (aortic and  mitral positions, high risk valves)  Note: Chest 2012 Feb;141(2 Suppl):7S-47S  Routine coagulation results should be interpreted with caution when  taking Factor Xa inhibitors or direct thrombin inhibitors; blood sampling  prior to drug intake is recommended. ratio         PTT - ( 31 Oct 2023 07:10 )  PTT:60.0 sec        Patient and Family Aware ? Yes

## 2023-10-31 NOTE — PROGRESS NOTE ADULT - PROBLEM SELECTOR PLAN 7
Amlodipine 10 qd  Rampril 2.5 qd    C/W Amlodipine 10qd given good BP control  Hold Rampiril iso unknown Creatinine baseline Likely due to Cord Compression/Spinal Mets    Plan:  Oxycodone 5 q4h PRN for Severe pain  Oxycodone 2.5 q4h PRN for moderate pain  Senna standing  Miralax PRN

## 2023-10-31 NOTE — PROGRESS NOTE ADULT - PROBLEM SELECTOR PLAN 4
patients rhabdomyolysis likely in the setting of fall   Admission CK > 06137  Downtrended to 511  Downtrended further to normalize  at 114 B/L DVTs found in [facility] VS  - DVT in R posterior tibial and peroneal veins Below Knee  - DVT in Left Posterior tibial and peroneal veins below Knee  Suspect likely iso known malignancy  patient noted increased swelling today, no other symptoms such as chest pain, SOB, pedal pulses present b/l     Plan:  Continue with Heparin GTT, plan to convert off heparin gtt once surgical intervention decision is made   patient will require IVC filter is plan is for surgical decompression prior to radiation   TTE  with no signs of RHS

## 2023-11-01 ENCOUNTER — TRANSCRIPTION ENCOUNTER (OUTPATIENT)
Age: 73
End: 2023-11-01

## 2023-11-01 DIAGNOSIS — Z01.818 ENCOUNTER FOR OTHER PREPROCEDURAL EXAMINATION: ICD-10-CM

## 2023-11-01 LAB
ALBUMIN SERPL ELPH-MCNC: 3.8 G/DL — SIGNIFICANT CHANGE UP (ref 3.3–5)
ALBUMIN SERPL ELPH-MCNC: 3.8 G/DL — SIGNIFICANT CHANGE UP (ref 3.3–5)
ALP SERPL-CCNC: 283 U/L — HIGH (ref 40–120)
ALP SERPL-CCNC: 283 U/L — HIGH (ref 40–120)
ALT FLD-CCNC: 137 U/L — HIGH (ref 4–41)
ALT FLD-CCNC: 137 U/L — HIGH (ref 4–41)
ANION GAP SERPL CALC-SCNC: 10 MMOL/L — SIGNIFICANT CHANGE UP (ref 7–14)
ANION GAP SERPL CALC-SCNC: 10 MMOL/L — SIGNIFICANT CHANGE UP (ref 7–14)
AST SERPL-CCNC: 31 U/L — SIGNIFICANT CHANGE UP (ref 4–40)
AST SERPL-CCNC: 31 U/L — SIGNIFICANT CHANGE UP (ref 4–40)
BILIRUB SERPL-MCNC: 0.8 MG/DL — SIGNIFICANT CHANGE UP (ref 0.2–1.2)
BILIRUB SERPL-MCNC: 0.8 MG/DL — SIGNIFICANT CHANGE UP (ref 0.2–1.2)
BLD GP AB SCN SERPL QL: NEGATIVE — SIGNIFICANT CHANGE UP
BLD GP AB SCN SERPL QL: NEGATIVE — SIGNIFICANT CHANGE UP
BUN SERPL-MCNC: 26 MG/DL — HIGH (ref 7–23)
BUN SERPL-MCNC: 26 MG/DL — HIGH (ref 7–23)
CALCIUM SERPL-MCNC: 9.1 MG/DL — SIGNIFICANT CHANGE UP (ref 8.4–10.5)
CALCIUM SERPL-MCNC: 9.1 MG/DL — SIGNIFICANT CHANGE UP (ref 8.4–10.5)
CHLORIDE SERPL-SCNC: 101 MMOL/L — SIGNIFICANT CHANGE UP (ref 98–107)
CHLORIDE SERPL-SCNC: 101 MMOL/L — SIGNIFICANT CHANGE UP (ref 98–107)
CO2 SERPL-SCNC: 28 MMOL/L — SIGNIFICANT CHANGE UP (ref 22–31)
CO2 SERPL-SCNC: 28 MMOL/L — SIGNIFICANT CHANGE UP (ref 22–31)
CREAT SERPL-MCNC: 0.85 MG/DL — SIGNIFICANT CHANGE UP (ref 0.5–1.3)
CREAT SERPL-MCNC: 0.85 MG/DL — SIGNIFICANT CHANGE UP (ref 0.5–1.3)
EGFR: 93 ML/MIN/1.73M2 — SIGNIFICANT CHANGE UP
EGFR: 93 ML/MIN/1.73M2 — SIGNIFICANT CHANGE UP
GLUCOSE BLDC GLUCOMTR-MCNC: 119 MG/DL — HIGH (ref 70–99)
GLUCOSE BLDC GLUCOMTR-MCNC: 119 MG/DL — HIGH (ref 70–99)
GLUCOSE BLDC GLUCOMTR-MCNC: 156 MG/DL — HIGH (ref 70–99)
GLUCOSE BLDC GLUCOMTR-MCNC: 156 MG/DL — HIGH (ref 70–99)
GLUCOSE BLDC GLUCOMTR-MCNC: 180 MG/DL — HIGH (ref 70–99)
GLUCOSE BLDC GLUCOMTR-MCNC: 180 MG/DL — HIGH (ref 70–99)
GLUCOSE BLDC GLUCOMTR-MCNC: 212 MG/DL — HIGH (ref 70–99)
GLUCOSE BLDC GLUCOMTR-MCNC: 212 MG/DL — HIGH (ref 70–99)
GLUCOSE SERPL-MCNC: 182 MG/DL — HIGH (ref 70–99)
GLUCOSE SERPL-MCNC: 182 MG/DL — HIGH (ref 70–99)
HCT VFR BLD CALC: 39.8 % — SIGNIFICANT CHANGE UP (ref 39–50)
HCT VFR BLD CALC: 39.8 % — SIGNIFICANT CHANGE UP (ref 39–50)
HGB BLD-MCNC: 13.5 G/DL — SIGNIFICANT CHANGE UP (ref 13–17)
HGB BLD-MCNC: 13.5 G/DL — SIGNIFICANT CHANGE UP (ref 13–17)
INR BLD: 1.02 RATIO — SIGNIFICANT CHANGE UP (ref 0.85–1.18)
INR BLD: 1.02 RATIO — SIGNIFICANT CHANGE UP (ref 0.85–1.18)
MAGNESIUM SERPL-MCNC: 2.3 MG/DL — SIGNIFICANT CHANGE UP (ref 1.6–2.6)
MAGNESIUM SERPL-MCNC: 2.3 MG/DL — SIGNIFICANT CHANGE UP (ref 1.6–2.6)
MCHC RBC-ENTMCNC: 31.1 PG — SIGNIFICANT CHANGE UP (ref 27–34)
MCHC RBC-ENTMCNC: 31.1 PG — SIGNIFICANT CHANGE UP (ref 27–34)
MCHC RBC-ENTMCNC: 33.9 GM/DL — SIGNIFICANT CHANGE UP (ref 32–36)
MCHC RBC-ENTMCNC: 33.9 GM/DL — SIGNIFICANT CHANGE UP (ref 32–36)
MCV RBC AUTO: 91.7 FL — SIGNIFICANT CHANGE UP (ref 80–100)
MCV RBC AUTO: 91.7 FL — SIGNIFICANT CHANGE UP (ref 80–100)
NRBC # BLD: 0 /100 WBCS — SIGNIFICANT CHANGE UP (ref 0–0)
NRBC # BLD: 0 /100 WBCS — SIGNIFICANT CHANGE UP (ref 0–0)
NRBC # FLD: 0 K/UL — SIGNIFICANT CHANGE UP (ref 0–0)
NRBC # FLD: 0 K/UL — SIGNIFICANT CHANGE UP (ref 0–0)
PHOSPHATE SERPL-MCNC: 3.9 MG/DL — SIGNIFICANT CHANGE UP (ref 2.5–4.5)
PHOSPHATE SERPL-MCNC: 3.9 MG/DL — SIGNIFICANT CHANGE UP (ref 2.5–4.5)
PLATELET # BLD AUTO: 192 K/UL — SIGNIFICANT CHANGE UP (ref 150–400)
PLATELET # BLD AUTO: 192 K/UL — SIGNIFICANT CHANGE UP (ref 150–400)
POTASSIUM SERPL-MCNC: 4.2 MMOL/L — SIGNIFICANT CHANGE UP (ref 3.5–5.3)
POTASSIUM SERPL-MCNC: 4.2 MMOL/L — SIGNIFICANT CHANGE UP (ref 3.5–5.3)
POTASSIUM SERPL-SCNC: 4.2 MMOL/L — SIGNIFICANT CHANGE UP (ref 3.5–5.3)
POTASSIUM SERPL-SCNC: 4.2 MMOL/L — SIGNIFICANT CHANGE UP (ref 3.5–5.3)
PROT SERPL-MCNC: 6.4 G/DL — SIGNIFICANT CHANGE UP (ref 6–8.3)
PROT SERPL-MCNC: 6.4 G/DL — SIGNIFICANT CHANGE UP (ref 6–8.3)
PROTHROM AB SERPL-ACNC: 11.4 SEC — SIGNIFICANT CHANGE UP (ref 9.5–13)
PROTHROM AB SERPL-ACNC: 11.4 SEC — SIGNIFICANT CHANGE UP (ref 9.5–13)
RBC # BLD: 4.34 M/UL — SIGNIFICANT CHANGE UP (ref 4.2–5.8)
RBC # BLD: 4.34 M/UL — SIGNIFICANT CHANGE UP (ref 4.2–5.8)
RBC # FLD: 12.3 % — SIGNIFICANT CHANGE UP (ref 10.3–14.5)
RBC # FLD: 12.3 % — SIGNIFICANT CHANGE UP (ref 10.3–14.5)
RH IG SCN BLD-IMP: POSITIVE — SIGNIFICANT CHANGE UP
RH IG SCN BLD-IMP: POSITIVE — SIGNIFICANT CHANGE UP
SODIUM SERPL-SCNC: 139 MMOL/L — SIGNIFICANT CHANGE UP (ref 135–145)
SODIUM SERPL-SCNC: 139 MMOL/L — SIGNIFICANT CHANGE UP (ref 135–145)
WBC # BLD: 13.36 K/UL — HIGH (ref 3.8–10.5)
WBC # BLD: 13.36 K/UL — HIGH (ref 3.8–10.5)
WBC # FLD AUTO: 13.36 K/UL — HIGH (ref 3.8–10.5)
WBC # FLD AUTO: 13.36 K/UL — HIGH (ref 3.8–10.5)

## 2023-11-01 PROCEDURE — 37191 INS ENDOVAS VENA CAVA FILTR: CPT

## 2023-11-01 PROCEDURE — 99233 SBSQ HOSP IP/OBS HIGH 50: CPT | Mod: GC

## 2023-11-01 RX ORDER — INSULIN GLARGINE 100 [IU]/ML
13 INJECTION, SOLUTION SUBCUTANEOUS EVERY MORNING
Refills: 0 | Status: DISCONTINUED | OUTPATIENT
Start: 2023-11-01 | End: 2023-11-01

## 2023-11-01 RX ORDER — INSULIN LISPRO 100/ML
14 VIAL (ML) SUBCUTANEOUS
Refills: 0 | Status: DISCONTINUED | OUTPATIENT
Start: 2023-11-01 | End: 2023-11-02

## 2023-11-01 RX ORDER — INSULIN GLARGINE 100 [IU]/ML
20 INJECTION, SOLUTION SUBCUTANEOUS EVERY MORNING
Refills: 0 | Status: DISCONTINUED | OUTPATIENT
Start: 2023-11-01 | End: 2023-11-01

## 2023-11-01 RX ORDER — INSULIN GLARGINE 100 [IU]/ML
25 INJECTION, SOLUTION SUBCUTANEOUS EVERY MORNING
Refills: 0 | Status: DISCONTINUED | OUTPATIENT
Start: 2023-11-01 | End: 2023-11-01

## 2023-11-01 RX ORDER — INSULIN LISPRO 100/ML
18 VIAL (ML) SUBCUTANEOUS ONCE
Refills: 0 | Status: DISCONTINUED | OUTPATIENT
Start: 2023-11-01 | End: 2023-11-01

## 2023-11-01 RX ORDER — INSULIN LISPRO 100/ML
18 VIAL (ML) SUBCUTANEOUS
Refills: 0 | Status: DISCONTINUED | OUTPATIENT
Start: 2023-11-01 | End: 2023-11-01

## 2023-11-01 RX ORDER — INSULIN GLARGINE 100 [IU]/ML
13 INJECTION, SOLUTION SUBCUTANEOUS EVERY MORNING
Refills: 0 | Status: DISCONTINUED | OUTPATIENT
Start: 2023-11-01 | End: 2023-11-02

## 2023-11-01 RX ADMIN — AMLODIPINE BESYLATE 10 MILLIGRAM(S): 2.5 TABLET ORAL at 06:00

## 2023-11-01 RX ADMIN — Medication 4 MILLIGRAM(S): at 17:15

## 2023-11-01 RX ADMIN — Medication 14 UNIT(S): at 18:21

## 2023-11-01 RX ADMIN — INSULIN GLARGINE 13 UNIT(S): 100 INJECTION, SOLUTION SUBCUTANEOUS at 09:27

## 2023-11-01 RX ADMIN — TAMSULOSIN HYDROCHLORIDE 0.4 MILLIGRAM(S): 0.4 CAPSULE ORAL at 21:52

## 2023-11-01 RX ADMIN — Medication 4 MILLIGRAM(S): at 00:06

## 2023-11-01 RX ADMIN — BICALUTAMIDE 50 MILLIGRAM(S): 50 TABLET, FILM COATED ORAL at 12:04

## 2023-11-01 RX ADMIN — Medication 4 MILLIGRAM(S): at 12:04

## 2023-11-01 RX ADMIN — PANTOPRAZOLE SODIUM 40 MILLIGRAM(S): 20 TABLET, DELAYED RELEASE ORAL at 06:00

## 2023-11-01 RX ADMIN — Medication 1: at 09:27

## 2023-11-01 RX ADMIN — Medication 4 MILLIGRAM(S): at 06:00

## 2023-11-01 RX ADMIN — Medication 1: at 13:06

## 2023-11-01 RX ADMIN — SENNA PLUS 2 TABLET(S): 8.6 TABLET ORAL at 21:52

## 2023-11-01 RX ADMIN — CHLORHEXIDINE GLUCONATE 1 APPLICATION(S): 213 SOLUTION TOPICAL at 12:04

## 2023-11-01 NOTE — PROGRESS NOTE ADULT - PROBLEM SELECTOR PLAN 4
B/L DVTs found in [facility] VS  - DVT in R posterior tibial and peroneal veins Below Knee  - DVT in Left Posterior tibial and peroneal veins below Knee  Suspect likely iso known malignancy  patient noted increased swelling today, no other symptoms such as chest pain, SOB, pedal pulses present b/l     Plan:  Continue with Heparin GTT, plan to convert off heparin gtt once surgical intervention decision is made   patient will require IVC filter is plan is for surgical decompression prior to radiation   TTE  with no signs of RHS patient with a history of prostate cancer and history of urinary retention   -best was d/lonny yesterday 10/30, patient voided 600cc and bladder scan demonstrated 632cc   -c/w best   -c/w flomax

## 2023-11-01 NOTE — PROGRESS NOTE ADULT - PROBLEM SELECTOR PLAN 2
Found to have LE Weakness along with saddle anesthesia; concern for cord compression  saddle anesthesia is now resolved   Prostate Biopsy 2021 --> adenocarcinoma Gelason 3+4; declined radiation therapy at the time  Epidural disease was also noted at T12 vertebral body. NM Scan significant for multiple sites with osseous lesions;   Team at [facility] discussed with Oncology  edema from C3-4 through C7-T1 interspinous ligaments as well as lytic metastatic disease from C7, Tq-T5 and T12, as well as L1-L3    Plan  Oncology following   Plan as above for cord compression  Degarelix (GnRH Receptor antagonist) once LFTs improve, AST/ALT 44/199 10/31, continue to hold   F/U Rad Onc Recs

## 2023-11-01 NOTE — PROGRESS NOTE ADULT - PROBLEM SELECTOR PLAN 7
Likely due to Cord Compression/Spinal Mets    Plan:  Oxycodone 5 q4h PRN for Severe pain  Oxycodone 2.5 q4h PRN for moderate pain  Senna standing  Miralax PRN Alk Phos: 314 -> 311 -> 315 -> 283   AST: 108 -> 76 -> 53 -> 31   ALT: 299 -> 281 -> 215 -> 137     Likely in the setting of Rhabdo  Will continue to trend the LFTs and transaminitis  RUQ U/S unremarkable

## 2023-11-01 NOTE — PROGRESS NOTE ADULT - ASSESSMENT
73M w/ T2DM, HTN, Metastatic prostate cancer (2009) was initially evaluated at [facility] for lower extremity paralysis in the setting of a recent fall, found to have 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 Initiated IV solumedrol with gradual improvement of symptoms and return of strength since Wednesday now here at [facility] for spine evaluation by surgery.

## 2023-11-01 NOTE — PRE PROCEDURE NOTE - PRE PROCEDURE EVALUATION
------------------------------------------------------------  Interventional Radiology Pre-Procedure Note  ------------------------------------------------------------    Indication: 73y Male with pmh of metastatic prostate cancer initially evaluated at [facility] for lower extremity paralysis in setting of recent fall found to have multiple metastatic thoracic, scapular and pelvic spine lesions with plan for major orthopedic spine surgery. Patient was found to have bilateral below the knee DVT's. IR was consulted for IVC filter placement.     Past Medical History:  Essential hypertension    Prostate cancer metastatic to bone        Allergies: No Known Allergies      Medications:  amLODIPine   Tablet: 10 milliGRAM(s) Oral (11-01-23 @ 06:00)  heparin  Infusion.: 1300 Unit(s)/Hr IV Continuous (10-31-23 @ 11:53)      Vital Signs:   T(F): 97.6 (13:35), Max: 98.7 (05:56)  HR: 68 (13:35)  BP: 111/72 (13:35)  RR: 17 (13:35)  SpO2: 99% (13:35)    Labs:           13.5  13.36)-----(192     (11-01-23 @ 06:46)         39.8     139 | 101 | 26  --------------------< 182     (11-01-23 @ 06:46)  4.2 | 28 | 0.85       PT: 11.4 11-01-23 @ 06:46  aPTT: -- 11-01-23 @ 06:46   INR: 1.02 11-01-23 @ 06:46    Imaging: US lower extremity 10/22/23 reviewed    Consent: Risks/benefits/alternatives were explained and informed written consent was obtained.     Procedure Plan:   Plan for IVC filter placement today. Case discussed between Dr. Badillo and Dr. Mueller. Despite patient having below knee DVT only, given co-morbidities and significant risk factors for thrombus propagation, in this case the benefit of IVC filter placement outweighs the risk in the setting of planned major surgery. Additionally, patient will likely be able to restart AC approximately 6 weeks postop, at which time he can be evaluated for candidacy for IVC filter retrieval.

## 2023-11-01 NOTE — PROGRESS NOTE ADULT - SUBJECTIVE AND OBJECTIVE BOX
DARYN THORNE 73y Male      Patient is a 73y old  Male who presents with a chief complaint of Diffuse Spinal Mets from Prostate Cancer (01 Nov 2023 06:34)        INTERVAL HPI/OVERNIGHT EVENTS: No acute events overnight. Patient was seen and evaluated at the bedside. The patient denies pain. Vitals stable. Patient denies fever/chills, chest pain, shortness of breath, abdominal pain, headaches, nausea/vomiting, and diarrhea/constipation.      PHYSICAL EXAM:  GENERAL: NAD  HEAD:  Normocephalic  EYES:  conjunctiva and sclera clear  ENMT: Moist mucous membranes  NECK: Supple  NERVOUS SYSTEM:  Alert, awake  CHEST/LUNG: Good air exchange bilaterally, no wheeze  HEART: Regular rate and rhythm        Vital Signs Last 24 Hrs  T(C): 37.1 (01 Nov 2023 05:56), Max: 37.1 (01 Nov 2023 05:56)  T(F): 98.7 (01 Nov 2023 05:56), Max: 98.7 (01 Nov 2023 05:56)  HR: 63 (01 Nov 2023 05:56) (63 - 78)  BP: 132/76 (01 Nov 2023 05:56) (125/61 - 132/78)  BP(mean): --  RR: 17 (01 Nov 2023 05:56) (17 - 17)  SpO2: 95% (01 Nov 2023 05:56) (95% - 98%)    Parameters below as of 01 Nov 2023 05:56  Patient On (Oxygen Delivery Method): room air          Consultant(s) Notes Reviewed:  [X] YES  [ ] NO  Care Discussed with Consultants/Other Providers [X] YES  [ ] NO  Imaging Personally Reviewed:  [X] YES  [ ] NO      LABS:                        13.5   13.36 )-----------( 192      ( 01 Nov 2023 06:46 )             39.8     11-01    139  |  101  |  26<H>  ----------------------------<  182<H>  4.2   |  28  |  0.85    Ca    9.1      01 Nov 2023 06:46  Phos  3.9     11-01  Mg     2.30     11-01    TPro  6.4  /  Alb  3.8  /  TBili  0.8  /  DBili  x   /  AST  31  /  ALT  137<H>  /  AlkPhos  283<H>  11-01    PT/INR - ( 01 Nov 2023 06:46 )   PT: 11.4 sec;   INR: 1.02 ratio         PTT - ( 31 Oct 2023 07:10 )  PTT:60.0 sec  Urinalysis Basic - ( 01 Nov 2023 06:46 )    Color: x / Appearance: x / SG: x / pH: x  Gluc: 182 mg/dL / Ketone: x  / Bili: x / Urobili: x   Blood: x / Protein: x / Nitrite: x   Leuk Esterase: x / RBC: x / WBC x   Sq Epi: x / Non Sq Epi: x / Bacteria: x        CAPILLARY BLOOD GLUCOSE      POCT Blood Glucose.: 222 mg/dL (31 Oct 2023 22:08)  POCT Blood Glucose.: 303 mg/dL (31 Oct 2023 17:51)  POCT Blood Glucose.: 319 mg/dL (31 Oct 2023 12:28)  POCT Blood Glucose.: 364 mg/dL (31 Oct 2023 10:59)  POCT Blood Glucose.: 251 mg/dL (31 Oct 2023 09:12)           DARYN THORNE 73y Male      Patient is a 73y old  Male who presents with a chief complaint of Diffuse Spinal Mets from Prostate Cancer (01 Nov 2023 06:34)        INTERVAL HPI/OVERNIGHT EVENTS: No acute events overnight. Patient was seen and evaluated at the bedside. The patient denies pain. Vitals stable. Patient denies fever/chills, chest pain, shortness of breath, abdominal pain, headaches, nausea/vomiting, and diarrhea/constipation. Notes that his numbness and tingling is improving.       PHYSICAL EXAM:  GENERAL: NAD  HEAD:  Normocephalic  EYES:  conjunctiva and sclera clear  ENMT: Moist mucous membranes  NECK: Supple  NERVOUS SYSTEM:  Alert, awake, sensation intact b/l, strength 5/5 and equal b/l   CHEST/LUNG: Good air exchange bilaterally, no wheeze  HEART: Regular rate and rhythm  EXT: slight swelling at ankle, DP pulses palpable b/l         Vital Signs Last 24 Hrs  T(C): 37.1 (01 Nov 2023 05:56), Max: 37.1 (01 Nov 2023 05:56)  T(F): 98.7 (01 Nov 2023 05:56), Max: 98.7 (01 Nov 2023 05:56)  HR: 63 (01 Nov 2023 05:56) (63 - 78)  BP: 132/76 (01 Nov 2023 05:56) (125/61 - 132/78)  BP(mean): --  RR: 17 (01 Nov 2023 05:56) (17 - 17)  SpO2: 95% (01 Nov 2023 05:56) (95% - 98%)    Parameters below as of 01 Nov 2023 05:56  Patient On (Oxygen Delivery Method): room air          Consultant(s) Notes Reviewed:  [X] YES  [ ] NO  Care Discussed with Consultants/Other Providers [X] YES  [ ] NO  Imaging Personally Reviewed:  [X] YES  [ ] NO      LABS:                        13.5   13.36 )-----------( 192      ( 01 Nov 2023 06:46 )             39.8     11-01    139  |  101  |  26<H>  ----------------------------<  182<H>  4.2   |  28  |  0.85    Ca    9.1      01 Nov 2023 06:46  Phos  3.9     11-01  Mg     2.30     11-01    TPro  6.4  /  Alb  3.8  /  TBili  0.8  /  DBili  x   /  AST  31  /  ALT  137<H>  /  AlkPhos  283<H>  11-01    PT/INR - ( 01 Nov 2023 06:46 )   PT: 11.4 sec;   INR: 1.02 ratio         PTT - ( 31 Oct 2023 07:10 )  PTT:60.0 sec  Urinalysis Basic - ( 01 Nov 2023 06:46 )    Color: x / Appearance: x / SG: x / pH: x  Gluc: 182 mg/dL / Ketone: x  / Bili: x / Urobili: x   Blood: x / Protein: x / Nitrite: x   Leuk Esterase: x / RBC: x / WBC x   Sq Epi: x / Non Sq Epi: x / Bacteria: x        CAPILLARY BLOOD GLUCOSE      POCT Blood Glucose.: 222 mg/dL (31 Oct 2023 22:08)  POCT Blood Glucose.: 303 mg/dL (31 Oct 2023 17:51)  POCT Blood Glucose.: 319 mg/dL (31 Oct 2023 12:28)  POCT Blood Glucose.: 364 mg/dL (31 Oct 2023 10:59)  POCT Blood Glucose.: 251 mg/dL (31 Oct 2023 09:12)

## 2023-11-01 NOTE — PROGRESS NOTE ADULT - PROBLEM SELECTOR PLAN 11
Diet: CC DASH Diet  Psych/Sleep: None ELLIS  GI: Protonix 40 qd/Senna/Miralax  DVT ppx: Hep GTT  Code Status: Will need full discussion with family  Dispo: pending surgical and radiation oncology interventions patient continues to have elevated glucose levels requiring additional insulin sliding scale, likely due to steroids     increase lantus to 24u, minimize dose in preparation for surgery and patient being NPO   increase admelog to 10u TID, hold as patient is NPO

## 2023-11-01 NOTE — PROGRESS NOTE ADULT - PROBLEM SELECTOR PLAN 6
Alk Phos: 314 -> 311 -> 315   AST: 108 -> 76 -> 53   ALT: 299 -> 281 -> 215     Likely in the setting of Rhabdo  Will continue to trend the LFTs and transaminitis  RUQ U/S unremarkable patients rhabdomyolysis likely in the setting of fall   Admission CK > 47174  Downtrended to 511  Downtrended further to normalize  at 114

## 2023-11-01 NOTE — MEDICAL STUDENT PROGRESS NOTE(EDUCATION) - ASSESSMENT
73y M PMH T2DM, HTN, metastatic prostate cancer initially evaluated at [facility] for lower extremity paralysis in setting of a recent fall, found to have cervical spine neoplasm c/f cord compression. Currently scheduled for decompressive ortho spine surgery by Dr. Badillo on Thursday.      Plan:  1) Cord compression   - Solumedrol [facility] --> PO dexamethasone q6 14 day course scheduled to end at 11/13  - Neuro checks q4    2) OR decompressive surgery on thursday  - NPO after midnight  - IVC filter today, f/u with IR  - c/t hold Heparin (DVT prophylaxis) prior to the surgery  - Document medical clearance    3) Prostate cancer mets  - Onc is following  - Rad onc for likely radiation therapy post surgery  -Degarelix f/u, was on hold for downtrending LFTs. LFTs not fully normalized yet.    4) Urinary retention  - Restarted best catheter last night, urine output was 2000mL/24 hrs  - Flomex    5) B/l DVTs  - IVC filter today, f/u with IR  - DVT prophylaxis (heparin) on hold for now    6) Rhadomyolysis   -CK downtrended to normal levels, CK 99 2 days ago.    7) Transaminitis  - further downtrended  - ALP: 339->283, AST: 44->31, ALT: 190->137    8) Back pain  -Oxy 2.5mg q4 PRN, well tolerating  -Senna standing  -Miralax PRN    9) HTN  -Amlodipine 10qd, well controlled    10) HLD  - Hold atorvastatin until LFTs fully normalize; ALT and ALP still elevated    11) T2DM  - Still elevated POCT [id number]  - Lantus 29u, Lispro 18u  73y M PMH T2DM, HTN, metastatic prostate cancer initially evaluated at [facility] for lower extremity paralysis in setting of a recent fall, found to have cervical spine neoplasm c/f cord compression. Currently scheduled for decompressive ortho spine surgery by Dr. Badillo on Thursday.      Plan:  1) Cord compression secondary to diffuse spinal metastasis from prostate cancer, confirmed with CT scan  - Solumedrol [facility] --> PO dexamethasone q6 14 day course scheduled to end at 11/13. Strength 5/5 has improved starting Wednesday.   - Neuro checks q4  - Appreciate ortho recs  - Scheduled for OR decompressive surgery on Thursday  - NPO currently, after midnight as well  - IVC filter today, f/u with IR to c/w heparin interim following filter placement  - Document medical clearance    2) Prostate cancer mets  - Onc is following  - Rad onc for likely radiation therapy post surgery  -Degarelix f/u, was on hold for downtrending LFTs. LFTs not fully normalized yet.    3) Urinary retention  - Restarted best catheter last night, urine output was 2000mL/24 hrs  - Flomex    4) B/l DVTs  - IVC filter today, f/u with IR  - DVT prophylaxis (heparin) on hold for now    5) Transaminitis  - RUQ ultrasound negative  - further downtrended  - ALP: 339->283, AST: 44->31, ALT: 190->137    6) Back pain  -Oxy 2.5mg q4 PRN, well tolerating  -Senna standing  -Miralax PRN    7) HTN  -Amlodipine 10qd, well controlled    8) HLD  - Hold atorvastatin until LFTs fully normalize; ALT and ALP still elevated    9) T2DM  - Still elevated POCT [id number]  - Lantus 29u, Lispro 18u

## 2023-11-01 NOTE — PROCEDURE NOTE - PROCEDURE FINDINGS AND DETAILS
Right CFV access. Successful infrarenal IVC filter placement. Right groin hemostasis achieved with manual pressure.

## 2023-11-01 NOTE — PROGRESS NOTE ADULT - ATTENDING COMMENTS
73M w/ hx of T2DM, HTN, metastatic Prostate cancer (2009) was initially evaluated at [facility] for lower extremity paralysis in the setting of a recent fall, found to have cervical spine neoplasm c/f cord compression, course complicated by progression of prostate cancer, rhabdomyolysis, SRUTHI, and DVT, transferred for [facility] for XRT and ortho-onc eval. Now pending IVC filter and OR w/ ortho-spine on 11/2.     Patient seen and examined, states he is aware of plan for IVC filter today and OR tomorrow, states he talked with orthopedics team already. He states before spinal compression issue he was running 5-7 miles a day (last ran in mid-Oct) and he never experience any chest pain or SOB. He currently denies chest pain or SOB as well.     #Pre-operative evaluation  - EKG reviewed, NSR w/ PACs. TTE reviewed, hyperdynamic LV but otherwise unremarkable. CXR with clear lungs. Patient has no chest pain or SOB. He has no evidence of acute ACS or acute CHF. His METS prior to cord compression > 4. His RCRI score is 1 for pre-op use of insulin. He is medically optimized to proceed to OR without further testing  - Will decrease Lantus pre-op to 20U, hyperglycemia likely related to steroids.   - Plan for AC/ IVC filter as below     #Cord compression   #Metastatic prostate CA  - MRI C/T spine reviewed, showing "fractures of the C7 and T1 spinous processes with intervening ligamentous injury and re-demonstration of epidural extension of neoplastic disease at the T1 level with associated cord compression and mild cord edema at T1 which appears unchanged in comparison with 10/21/2023."  - Ortho planning for OR tomorrow. Followed by RT. Rad-onc following as well   - S/p high dose solumedrol, will transition to PO decadron 4mg q6h and will need to taper   - Onc following, started casodex  - Attempted TOV, patient retaining > 600cc. Parsons replaced. Started on flomax. Will re-assess TOV depending after surgery     #DVT   - 10/22/23  w/ DVT in the bilateral posterior tibial and peroneal veins  - On heparin gtt, now on hold. Plan for IVC filter today w/ IR as patient cannot be on AC after ortho intervention. Restart AC once cleared from ortho perspective. IVC filter will eventually need to be removed once patient is able to tolerate AC.       #Type 2 Diabetes with hyperglycemia   - C/w basal-bolus, suspect hyperglycemia due to steroids   - Will continue to monitor FS and adjust regimen as needed    Rest as above.  Discussed with resident Dr. Chang

## 2023-11-01 NOTE — PROGRESS NOTE ADULT - PROBLEM SELECTOR PLAN 9
hold atorvastatin iso elevated LFTs Amlodipine 10 qd  Rampril 2.5 qd    C/W Amlodipine 10qd given good BP control  Hold Rampiril iso unknown Creatinine baseline

## 2023-11-01 NOTE — PROCEDURE NOTE - PLAN
- Bedrest flat 1 hour  - Further AC management as per primary team/ortho  - Follow-up in IR clinic in 2-3 months or once contraindication to AC no longer exists to evaluate candidacy for IVC filter retrieval - Bedrest flat 1 hour  - Further AC management as per primary team/ortho  - Follow-up in IR clinic in 6-8 weeks or once contraindication to AC no longer exists to evaluate candidacy for IVC filter retrieval

## 2023-11-01 NOTE — PROGRESS NOTE ADULT - PROBLEM SELECTOR PLAN 1
Started on Solumedrol at [facility]  Since Wednesday, Improved strength, sensation; Normal BM  Rectal Exam: Normal Tone;  Does not feel comfortable to stand, 5/5 UE and 4/5 LE strength    Plan:  F/U with Ortho Recs, surgical intervetion decision pending CT/MRI of spine  F/U Rad Onc Rec: Tentative Palliative Simulation/XRT to C7-T5 pending ortho evaluation  patients IV methylprednisolone 40 q8 was d/lonny on 10/30 and patient started on PO dexamethasone 4mg q6, continue for 14 days scheduled to end 11/13   Protonix 40 qd  Neuro Checks q4h Started on Solumedrol at [facility]  Since Wednesday, Improved strength, sensation; Normal BM  Rectal Exam: Normal Tone;  Does not feel comfortable to stand, 5/5 UE and 4/5 LE strength    Plan:  F/U with Ortho Recs, plan for decompressive surgery tomorrow 11/2 after IVC filter placement today 11/1  F/U Rad Onc Rec: Tentative Palliative Simulation/XRT to C7-T5 pending ortho evaluation  patients IV methylprednisolone 40 q8 was d/lonny on 10/30 and patient started on PO dexamethasone 4mg q6, continue for 14 days scheduled to end 11/13   Protonix 40 qd  Neuro Checks q4h

## 2023-11-01 NOTE — PROGRESS NOTE ADULT - ASSESSMENT
73yMale w/ concern for cord compression in setting of multiple vertebral lesions w symptoms improving after course of steroids now at [facility] for rad onc eval    PLAN  -WBAT  -pain control  -incentive spirometry  -MR C/Tsp showing osseous mets, C7 and T1 spinous process fractures, T1 cord compression and cord edema  -IVC filter placement today for prior DVT  -Plan for OR tomorrow 11/2  -XRT w rad onc  -Steroids  -PT/OT    Traci Carmichael MD  Orthopaedic Surgery Resident

## 2023-11-01 NOTE — PROGRESS NOTE ADULT - PROBLEM SELECTOR PLAN 3
patient with a history of prostate cancer and history of urinary retention   -best was d/lonny yesterday 10/30, patient voided 600cc and bladder scan demonstrated 632cc   -restart best   -start patient on flomax RCRI: 1 point based on pre-operative treatment with insulin   METS > 7, as per patient, although his mobility is not limited due to the loss of function with the spinal cord compression, prior to that he was very physically active, jogged most days, climbs 3 flights of stairs as he lives on the 3rd floor, and able to complete ADL and IADLs as well as garden without SOB, chest pain   patient also notes that he is not experiencing any SOB, chest pain, orthopnea, currently and there is no concern for ACS at this time   patient does not have a history of CHF   ECG demonstrated NSR   Echo demonstrates hyperdynamic LV systolic function but otherwise benign   patient is medically optimized to proceed for his procedure in the OR without the need for further testing

## 2023-11-01 NOTE — CHART NOTE - NSCHARTNOTEFT_GEN_A_CORE
Orthopedic surgery team planning on taking patient to OR tomorrow.   -Please make NPO after midnight.  -Please document medical optimization/clearance.   -Needs IR filter today  -Hold chemical DVT prophylaxis

## 2023-11-01 NOTE — PROGRESS NOTE ADULT - SUBJECTIVE AND OBJECTIVE BOX
Orthopedic Surgery Progress Note     S: Patient seen and examined today. No acute events overnight. Pain is well controlled. Denies f/c, chest pain, shortness of breath, dizziness.    MEDICATIONS  (STANDING):  amLODIPine   Tablet 10 milliGRAM(s) Oral daily  bicalutamide 50 milliGRAM(s) Oral daily  chlorhexidine 2% Cloths 1 Application(s) Topical daily  dexAMETHasone     Tablet 4 milliGRAM(s) Oral every 6 hours  dextrose 5%. 1000 milliLiter(s) (50 mL/Hr) IV Continuous <Continuous>  dextrose 5%. 1000 milliLiter(s) (100 mL/Hr) IV Continuous <Continuous>  dextrose 50% Injectable 25 Gram(s) IV Push once  dextrose 50% Injectable 12.5 Gram(s) IV Push once  dextrose 50% Injectable 25 Gram(s) IV Push once  glucagon  Injectable 1 milliGRAM(s) IntraMuscular once  insulin glargine Injectable (LANTUS) 29 Unit(s) SubCutaneous every morning  insulin lispro (ADMELOG) corrective regimen sliding scale   SubCutaneous three times a day before meals  insulin lispro (ADMELOG) corrective regimen sliding scale   SubCutaneous at bedtime  insulin lispro Injectable (ADMELOG) 12 Unit(s) SubCutaneous three times a day before meals  pantoprazole    Tablet 40 milliGRAM(s) Oral before breakfast  senna 2 Tablet(s) Oral at bedtime  tamsulosin 0.4 milliGRAM(s) Oral at bedtime    MEDICATIONS  (PRN):  dextrose Oral Gel 15 Gram(s) Oral once PRN Blood Glucose LESS THAN 70 milliGRAM(s)/deciliter  oxyCODONE    IR 5 milliGRAM(s) Oral every 4 hours PRN Severe Pain (7 - 10)  oxyCODONE    IR 2.5 milliGRAM(s) Oral every 4 hours PRN Moderate Pain (4 - 6)  polyethylene glycol 3350 17 Gram(s) Oral daily PRN Constipation      Vital Signs Last 24 Hrs  T(C): 37.1 (01 Nov 2023 05:56), Max: 37.1 (01 Nov 2023 05:56)  T(F): 98.7 (01 Nov 2023 05:56), Max: 98.7 (01 Nov 2023 05:56)  HR: 63 (01 Nov 2023 05:56) (63 - 78)  BP: 132/76 (01 Nov 2023 05:56) (125/61 - 132/78)  BP(mean): --  RR: 17 (01 Nov 2023 05:56) (17 - 17)  SpO2: 95% (01 Nov 2023 05:56) (95% - 98%)    Parameters below as of 01 Nov 2023 05:56  Patient On (Oxygen Delivery Method): room air        10-30-23 @ 07:01  -  10-31-23 @ 07:00  --------------------------------------------------------  IN: 0 mL / OUT: 1000 mL / NET: -1000 mL    10-31-23 @ 07:01  -  11-01-23 @ 06:35  --------------------------------------------------------  IN: 0 mL / OUT: 2200 mL / NET: -2200 mL        Physical Exam:  Gen: NAD  Resp: No increased WOB  Spine:  Skin intact  No bony TTP or step-offs along c-, t-, l-spine, or sacrum    RUE:  Delt 5/5 Bi 5/5 Tri 5/5 Wrist ext 5/5  5/5  SILT C5-T1  2+ radial pulse  Negative Llamas  LUE:  Delt 5/5 Bi 5/5 Tri 5/5 Wrist ext 5/5  5/5  SILT C5-T1  2+ radial pulse  Negative Llamas    RLE:  IP 5/5 HS 5/5 Q 5/5 GS 5/5 TA 5/5 EHL 5/5   SILT L2-S1  2+ DP/PT pulses  Negative clonus, negative Babinski  LLE:  IP 5/5 HS 5/5 Q 5/5 GS 5/5 TA 5/5 EHL 5/5  SILT L2-S1  2+ DP/PT pulses  Negative clonus, negative Babinski     LABS:                        14.8   17.93 )-----------( 245      ( 31 Oct 2023 07:10 )             42.7     10-31    139  |  100  |  33<H>  ----------------------------<  291<H>  4.6   |  25  |  0.87    Ca    9.3      31 Oct 2023 07:10  Phos  3.4     10-31  Mg     2.40     10-31    TPro  7.3  /  Alb  4.2  /  TBili  0.8  /  DBili  x   /  AST  44<H>  /  ALT  190<H>  /  AlkPhos  339<H>  10-31

## 2023-11-01 NOTE — PROGRESS NOTE ADULT - PROBLEM SELECTOR PLAN 5
patients rhabdomyolysis likely in the setting of fall   Admission CK > 84107  Downtrended to 511  Downtrended further to normalize  at 114 B/L DVTs found in [facility] VS  - DVT in R posterior tibial and peroneal veins Below Knee  - DVT in Left Posterior tibial and peroneal veins below Knee  Suspect likely iso known malignancy  patient noted increased swelling, no other symptoms such as chest pain, SOB, pedal pulses present b/l     Plan:  Hold Heparin GTT for IVC filter today 11/1 and then OR on 11/2, plan to convert off heparin gtt once surgical intervention complete   patient will require IVC filter is plan is for surgical decompression prior to radiation   TTE  with no signs of RHS

## 2023-11-01 NOTE — MEDICAL STUDENT PROGRESS NOTE(EDUCATION) - SUBJECTIVE AND OBJECTIVE BOX
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 Patient reports feeling still not being able to walk on his own. He reports his pain is well controlled and is not requiring more pain medications. He reports he has more strength, and is more energized than yesterday. He reports having less numbness/tingling than the day prior. He reports no hematuria, less swelling in his ankles, no pain in his ankles, no changes in bowel movements, no fevers.    ICU Vital Signs Last 24 Hrs  T(C): 37.1 (01 Nov 2023 05:56), Max: 37.1 (01 Nov 2023 05:56)  T(F): 98.7 (01 Nov 2023 05:56), Max: 98.7 (01 Nov 2023 05:56)  HR: 63 (01 Nov 2023 05:56) (63 - 78)  BP: 132/76 (01 Nov 2023 05:56) (125/61 - 132/78)  RR: 17 (01 Nov 2023 05:56) (17 - 17)  SpO2: 95% (01 Nov 2023 05:56) (95% - 98%)    O2 Parameters below as of 01 Nov 2023 05:56  Patient On (Oxygen Delivery Method): room air    REVIEW OF SYSTEMS:    CONSTITUTIONAL: No weakness, fevers or chills  EYES/ENT: No visual changes;  No vertigo or throat pain   NECK: No pain or stiffness  RESPIRATORY: No cough, wheezing, hemoptysis; No shortness of breath  CARDIOVASCULAR: No chest pain or palpitations  GASTROINTESTINAL: No abdominal or epigastric pain. No nausea, vomiting, or hematemesis; No diarrhea or constipation. No melena or hematochezia.  GENITOURINARY: No dysuria, frequency or hematuria  NEUROLOGICAL: No numbness or weakness  SKIN: No itching, burning, rashes, or lesions   All other review of systems is negative unless indicated above.    LABS:                          13.5   13.36 )-----------( 192      ( 01 Nov 2023 06:46 )             39.8     11-01    139  |  101  |  26<H>  ----------------------------<  182<H>  4.2   |  28  |  0.85    Ca    9.1      01 Nov 2023 06:46  Phos  3.9     11-01  Mg     2.30     11-01    TPro  6.4  /  Alb  3.8  /  TBili  0.8  /  DBili  x   /  AST  31  /  ALT  137<H>  /  AlkPhos  283<H>  11-01    LIVER FUNCTIONS - ( 01 Nov 2023 06:46 )  Alb: 3.8 g/dL / Pro: 6.4 g/dL / ALK PHOS: 283 U/L / ALT: 137 U/L / AST: 31 U/L / GGT: x           PT/INR - ( 01 Nov 2023 06:46 )   PT: 11.4 sec;   INR: 1.02 ratio         PTT - ( 31 Oct 2023 07:10 )  PTT:60.0 sec          Urinalysis Basic - ( 01 Nov 2023 06:46 )    Color: x / Appearance: x / SG: x / pH: x  Gluc: 182 mg/dL / Ketone: x  / Bili: x / Urobili: x   Blood: x / Protein: x / Nitrite: x   Leuk Esterase: x / RBC: x / WBC x   Sq Epi: x / Non Sq Epi: x / Bacteria: x      Parsons output was 2000mL total.      GENERAL: NAD, lying in bed comfortably  HEAD:  Atraumatic, Normocephalic  CHEST/LUNG: CTA b/l; No rales, rhonchi, wheezing, or rubs. Unlabored respirations  HEART: RRR; No murmurs, rubs, or gallops  ABDOMEN: BS+; Soft, NT, ND  EXTREMITIES:  2+ Peripheral Pulses, brisk capillary refill. No clubbing, cyanosis, or edema. 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 Patient reports feeling still not being able to walk on his own. He reports his pain is well controlled and is not requiring more pain medications. He reports he has more strength, and is more energized than yesterday. He reports having less numbness/tingling than the day prior. He reports no hematuria, less swelling in his ankles, no pain in his ankles, no changes in bowel movements, no fevers.    ICU Vital Signs Last 24 Hrs  T(C): 37.1 (01 Nov 2023 05:56), Max: 37.1 (01 Nov 2023 05:56)  T(F): 98.7 (01 Nov 2023 05:56), Max: 98.7 (01 Nov 2023 05:56)  HR: 63 (01 Nov 2023 05:56) (63 - 78)  BP: 132/76 (01 Nov 2023 05:56) (125/61 - 132/78)  RR: 17 (01 Nov 2023 05:56) (17 - 17)  SpO2: 95% (01 Nov 2023 05:56) (95% - 98%)    O2 Parameters below as of 01 Nov 2023 05:56  Patient On (Oxygen Delivery Method): room air    REVIEW OF SYSTEMS:    CONSTITUTIONAL: No weakness, fevers or chills  RESPIRATORY: No cough, wheezing, hemoptysis; No shortness of breath  CARDIOVASCULAR: No chest pain or palpitations  GASTROINTESTINAL: No abdominal or epigastric pain. No nausea, vomiting, or hematemesis; No diarrhea or constipation. No melena or hematochezia.  GENITOURINARY: No dysuria, frequency or hematuria  NEUROLOGICAL: No numbness or weakness  SKIN: No itching, burning, rashes, or lesions   All other review of systems is negative unless indicated above.    LABS:                          13.5   13.36 )-----------( 192      ( 01 Nov 2023 06:46 )             39.8     11-01    139  |  101  |  26<H>  ----------------------------<  182<H>  4.2   |  28  |  0.85    Ca    9.1      01 Nov 2023 06:46  Phos  3.9     11-01  Mg     2.30     11-01    TPro  6.4  /  Alb  3.8  /  TBili  0.8  /  DBili  x   /  AST  31  /  ALT  137<H>  /  AlkPhos  283<H>  11-01    LIVER FUNCTIONS - ( 01 Nov 2023 06:46 )  Alb: 3.8 g/dL / Pro: 6.4 g/dL / ALK PHOS: 283 U/L / ALT: 137 U/L / AST: 31 U/L / GGT: x           PT/INR - ( 01 Nov 2023 06:46 )   PT: 11.4 sec;   INR: 1.02 ratio         PTT - ( 31 Oct 2023 07:10 )  PTT:60.0 sec          Urinalysis Basic - ( 01 Nov 2023 06:46 )    Color: x / Appearance: x / SG: x / pH: x  Gluc: 182 mg/dL / Ketone: x  / Bili: x / Urobili: x   Blood: x / Protein: x / Nitrite: x   Leuk Esterase: x / RBC: x / WBC x   Sq Epi: x / Non Sq Epi: x / Bacteria: x      Parsons output was 2000mL total.      GENERAL: NAD, lying in bed comfortably  HEAD:  Atraumatic, Normocephalic  CHEST/LUNG: CTA b/l; No rales, rhonchi, wheezing, or rubs. Unlabored respirations  HEART: RRR; No murmurs, rubs, or gallops  ABDOMEN: BS+; Soft, NT, ND  EXTREMITIES:  2+ Peripheral Pulses, brisk capillary refill. No clubbing, cyanosis, or edema. Strength 5/5  NERVOUS SYSTEM:  A&Ox3, no focal deficits   SKIN: No rashes or lesions

## 2023-11-02 ENCOUNTER — APPOINTMENT (OUTPATIENT)
Dept: PLASTIC SURGERY | Facility: HOSPITAL | Age: 73
End: 2023-11-02
Payer: MEDICARE

## 2023-11-02 ENCOUNTER — RESULT REVIEW (OUTPATIENT)
Age: 73
End: 2023-11-02

## 2023-11-02 DIAGNOSIS — I10 ESSENTIAL (PRIMARY) HYPERTENSION: ICD-10-CM

## 2023-11-02 DIAGNOSIS — G95.29 OTHER CORD COMPRESSION: ICD-10-CM

## 2023-11-02 LAB
ANION GAP SERPL CALC-SCNC: 11 MMOL/L — SIGNIFICANT CHANGE UP (ref 7–14)
ANION GAP SERPL CALC-SCNC: 11 MMOL/L — SIGNIFICANT CHANGE UP (ref 7–14)
APTT BLD: 23.6 SEC — LOW (ref 24.5–35.6)
APTT BLD: 23.6 SEC — LOW (ref 24.5–35.6)
BLD GP AB SCN SERPL QL: NEGATIVE — SIGNIFICANT CHANGE UP
BLD GP AB SCN SERPL QL: NEGATIVE — SIGNIFICANT CHANGE UP
BLOOD GAS ARTERIAL - LYTES,HGB,ICA,LACT RESULT: SIGNIFICANT CHANGE UP
BLOOD GAS ARTERIAL - LYTES,HGB,ICA,LACT RESULT: SIGNIFICANT CHANGE UP
BUN SERPL-MCNC: 31 MG/DL — HIGH (ref 7–23)
BUN SERPL-MCNC: 31 MG/DL — HIGH (ref 7–23)
CALCIUM SERPL-MCNC: 8.6 MG/DL — SIGNIFICANT CHANGE UP (ref 8.4–10.5)
CALCIUM SERPL-MCNC: 8.6 MG/DL — SIGNIFICANT CHANGE UP (ref 8.4–10.5)
CHLORIDE SERPL-SCNC: 98 MMOL/L — SIGNIFICANT CHANGE UP (ref 98–107)
CHLORIDE SERPL-SCNC: 98 MMOL/L — SIGNIFICANT CHANGE UP (ref 98–107)
CO2 SERPL-SCNC: 24 MMOL/L — SIGNIFICANT CHANGE UP (ref 22–31)
CO2 SERPL-SCNC: 24 MMOL/L — SIGNIFICANT CHANGE UP (ref 22–31)
CREAT SERPL-MCNC: 0.86 MG/DL — SIGNIFICANT CHANGE UP (ref 0.5–1.3)
CREAT SERPL-MCNC: 0.86 MG/DL — SIGNIFICANT CHANGE UP (ref 0.5–1.3)
EGFR: 93 ML/MIN/1.73M2 — SIGNIFICANT CHANGE UP
EGFR: 93 ML/MIN/1.73M2 — SIGNIFICANT CHANGE UP
GAS PNL BLDA: SIGNIFICANT CHANGE UP
GAS PNL BLDA: SIGNIFICANT CHANGE UP
GLUCOSE BLDC GLUCOMTR-MCNC: 155 MG/DL — HIGH (ref 70–99)
GLUCOSE BLDC GLUCOMTR-MCNC: 155 MG/DL — HIGH (ref 70–99)
GLUCOSE BLDC GLUCOMTR-MCNC: 185 MG/DL — HIGH (ref 70–99)
GLUCOSE BLDC GLUCOMTR-MCNC: 185 MG/DL — HIGH (ref 70–99)
GLUCOSE BLDC GLUCOMTR-MCNC: 246 MG/DL — HIGH (ref 70–99)
GLUCOSE BLDC GLUCOMTR-MCNC: 246 MG/DL — HIGH (ref 70–99)
GLUCOSE BLDC GLUCOMTR-MCNC: 263 MG/DL — HIGH (ref 70–99)
GLUCOSE BLDC GLUCOMTR-MCNC: 263 MG/DL — HIGH (ref 70–99)
GLUCOSE SERPL-MCNC: 237 MG/DL — HIGH (ref 70–99)
GLUCOSE SERPL-MCNC: 237 MG/DL — HIGH (ref 70–99)
HCT VFR BLD CALC: 37.1 % — LOW (ref 39–50)
HCT VFR BLD CALC: 37.1 % — LOW (ref 39–50)
HGB BLD-MCNC: 12.5 G/DL — LOW (ref 13–17)
HGB BLD-MCNC: 12.5 G/DL — LOW (ref 13–17)
INR BLD: 1.05 RATIO — SIGNIFICANT CHANGE UP (ref 0.85–1.18)
INR BLD: 1.05 RATIO — SIGNIFICANT CHANGE UP (ref 0.85–1.18)
MAGNESIUM SERPL-MCNC: 2.3 MG/DL — SIGNIFICANT CHANGE UP (ref 1.6–2.6)
MAGNESIUM SERPL-MCNC: 2.3 MG/DL — SIGNIFICANT CHANGE UP (ref 1.6–2.6)
MCHC RBC-ENTMCNC: 30.2 PG — SIGNIFICANT CHANGE UP (ref 27–34)
MCHC RBC-ENTMCNC: 30.2 PG — SIGNIFICANT CHANGE UP (ref 27–34)
MCHC RBC-ENTMCNC: 33.7 GM/DL — SIGNIFICANT CHANGE UP (ref 32–36)
MCHC RBC-ENTMCNC: 33.7 GM/DL — SIGNIFICANT CHANGE UP (ref 32–36)
MCV RBC AUTO: 89.6 FL — SIGNIFICANT CHANGE UP (ref 80–100)
MCV RBC AUTO: 89.6 FL — SIGNIFICANT CHANGE UP (ref 80–100)
MRSA PCR RESULT.: SIGNIFICANT CHANGE UP
MRSA PCR RESULT.: SIGNIFICANT CHANGE UP
NRBC # BLD: 0 /100 WBCS — SIGNIFICANT CHANGE UP (ref 0–0)
NRBC # BLD: 0 /100 WBCS — SIGNIFICANT CHANGE UP (ref 0–0)
NRBC # FLD: 0 K/UL — SIGNIFICANT CHANGE UP (ref 0–0)
NRBC # FLD: 0 K/UL — SIGNIFICANT CHANGE UP (ref 0–0)
PHOSPHATE SERPL-MCNC: 4.3 MG/DL — SIGNIFICANT CHANGE UP (ref 2.5–4.5)
PHOSPHATE SERPL-MCNC: 4.3 MG/DL — SIGNIFICANT CHANGE UP (ref 2.5–4.5)
PLATELET # BLD AUTO: 168 K/UL — SIGNIFICANT CHANGE UP (ref 150–400)
PLATELET # BLD AUTO: 168 K/UL — SIGNIFICANT CHANGE UP (ref 150–400)
POTASSIUM SERPL-MCNC: 4.2 MMOL/L — SIGNIFICANT CHANGE UP (ref 3.5–5.3)
POTASSIUM SERPL-MCNC: 4.2 MMOL/L — SIGNIFICANT CHANGE UP (ref 3.5–5.3)
POTASSIUM SERPL-SCNC: 4.2 MMOL/L — SIGNIFICANT CHANGE UP (ref 3.5–5.3)
POTASSIUM SERPL-SCNC: 4.2 MMOL/L — SIGNIFICANT CHANGE UP (ref 3.5–5.3)
PROTHROM AB SERPL-ACNC: 11.7 SEC — SIGNIFICANT CHANGE UP (ref 9.5–13)
PROTHROM AB SERPL-ACNC: 11.7 SEC — SIGNIFICANT CHANGE UP (ref 9.5–13)
RBC # BLD: 4.14 M/UL — LOW (ref 4.2–5.8)
RBC # BLD: 4.14 M/UL — LOW (ref 4.2–5.8)
RBC # FLD: 12.4 % — SIGNIFICANT CHANGE UP (ref 10.3–14.5)
RBC # FLD: 12.4 % — SIGNIFICANT CHANGE UP (ref 10.3–14.5)
RH IG SCN BLD-IMP: POSITIVE — SIGNIFICANT CHANGE UP
RH IG SCN BLD-IMP: POSITIVE — SIGNIFICANT CHANGE UP
S AUREUS DNA NOSE QL NAA+PROBE: SIGNIFICANT CHANGE UP
S AUREUS DNA NOSE QL NAA+PROBE: SIGNIFICANT CHANGE UP
SODIUM SERPL-SCNC: 133 MMOL/L — LOW (ref 135–145)
SODIUM SERPL-SCNC: 133 MMOL/L — LOW (ref 135–145)
WBC # BLD: 11.86 K/UL — HIGH (ref 3.8–10.5)
WBC # BLD: 11.86 K/UL — HIGH (ref 3.8–10.5)
WBC # FLD AUTO: 11.86 K/UL — HIGH (ref 3.8–10.5)
WBC # FLD AUTO: 11.86 K/UL — HIGH (ref 3.8–10.5)

## 2023-11-02 PROCEDURE — 15004 WOUND PREP F/N/HF/G: CPT

## 2023-11-02 PROCEDURE — 99232 SBSQ HOSP IP/OBS MODERATE 35: CPT | Mod: GC

## 2023-11-02 PROCEDURE — 88341 IMHCHEM/IMCYTCHM EA ADD ANTB: CPT | Mod: 26

## 2023-11-02 PROCEDURE — 15734 MUSCLE-SKIN GRAFT TRUNK: CPT

## 2023-11-02 PROCEDURE — 22842 INSERT SPINE FIXATION DEVICE: CPT

## 2023-11-02 PROCEDURE — 63276 BX/EXC XDRL SPINE LESN THRC: CPT

## 2023-11-02 PROCEDURE — 97606 NEG PRS WND THER DME>50 SQCM: CPT | Mod: 59

## 2023-11-02 PROCEDURE — 14302 TIS TRNFR ADDL 30 SQ CM: CPT

## 2023-11-02 PROCEDURE — 88307 TISSUE EXAM BY PATHOLOGIST: CPT | Mod: 26

## 2023-11-02 PROCEDURE — 88342 IMHCHEM/IMCYTCHM 1ST ANTB: CPT | Mod: 26

## 2023-11-02 PROCEDURE — 22600 ARTHRD PST TQ 1NTRSPC CRV: CPT

## 2023-11-02 PROCEDURE — 88311 DECALCIFY TISSUE: CPT | Mod: 26

## 2023-11-02 PROCEDURE — 22614 ARTHRD PST TQ 1NTRSPC EA ADD: CPT

## 2023-11-02 PROCEDURE — 15005 WND PREP F/N/HF/G ADDL CM: CPT

## 2023-11-02 PROCEDURE — 14301 TIS TRNFR ANY 30.1-60 SQ CM: CPT

## 2023-11-02 PROCEDURE — 63055 DECOMPRESS SPINAL CORD THRC: CPT | Mod: 59

## 2023-11-02 DEVICE — SCREW POLYAXIAL 3.5X14MM: Type: IMPLANTABLE DEVICE | Status: FUNCTIONAL

## 2023-11-02 DEVICE — SURGIFOAM PAD 8CM X 12.5CM X 2MM (100C): Type: IMPLANTABLE DEVICE | Status: FUNCTIONAL

## 2023-11-02 DEVICE — MAYFIELD SKULL PIN ADULT PLASTIC: Type: IMPLANTABLE DEVICE | Status: FUNCTIONAL

## 2023-11-02 DEVICE — SURGIFLO MATRIX WITH THROMBIN KIT: Type: IMPLANTABLE DEVICE | Status: FUNCTIONAL

## 2023-11-02 DEVICE — DURASEAL SEALANT 5ML: Type: IMPLANTABLE DEVICE | Status: FUNCTIONAL

## 2023-11-02 DEVICE — BONE WAX 2.5GM: Type: IMPLANTABLE DEVICE | Status: FUNCTIONAL

## 2023-11-02 DEVICE — SCREW SET YUKON: Type: IMPLANTABLE DEVICE | Status: FUNCTIONAL

## 2023-11-02 DEVICE — GRAFT VITOSIS BIMODAL 10CC: Type: IMPLANTABLE DEVICE | Status: FUNCTIONAL

## 2023-11-02 DEVICE — IMPLANTABLE DEVICE: Type: IMPLANTABLE DEVICE | Status: FUNCTIONAL

## 2023-11-02 DEVICE — SCREW POLYAXIAL 3.5X12MM: Type: IMPLANTABLE DEVICE | Status: FUNCTIONAL

## 2023-11-02 RX ORDER — NALOXONE HYDROCHLORIDE 4 MG/.1ML
0.1 SPRAY NASAL
Refills: 0 | Status: DISCONTINUED | OUTPATIENT
Start: 2023-11-02 | End: 2023-12-23

## 2023-11-02 RX ORDER — INSULIN LISPRO 100/ML
VIAL (ML) SUBCUTANEOUS EVERY 6 HOURS
Refills: 0 | Status: DISCONTINUED | OUTPATIENT
Start: 2023-11-02 | End: 2023-11-02

## 2023-11-02 RX ORDER — INSULIN LISPRO 100/ML
10 VIAL (ML) SUBCUTANEOUS
Refills: 0 | Status: DISCONTINUED | OUTPATIENT
Start: 2023-11-02 | End: 2023-11-03

## 2023-11-02 RX ORDER — NALBUPHINE HYDROCHLORIDE 10 MG/ML
2.5 INJECTION, SOLUTION INTRAMUSCULAR; INTRAVENOUS; SUBCUTANEOUS EVERY 6 HOURS
Refills: 0 | Status: DISCONTINUED | OUTPATIENT
Start: 2023-11-02 | End: 2023-11-04

## 2023-11-02 RX ORDER — ONDANSETRON 8 MG/1
4 TABLET, FILM COATED ORAL EVERY 6 HOURS
Refills: 0 | Status: DISCONTINUED | OUTPATIENT
Start: 2023-11-02 | End: 2023-12-23

## 2023-11-02 RX ORDER — BENZOCAINE AND MENTHOL 5; 1 G/100ML; G/100ML
1 LIQUID ORAL
Refills: 0 | Status: DISCONTINUED | OUTPATIENT
Start: 2023-11-02 | End: 2023-11-07

## 2023-11-02 RX ORDER — SODIUM CHLORIDE 9 MG/ML
1000 INJECTION, SOLUTION INTRAVENOUS
Refills: 0 | Status: DISCONTINUED | OUTPATIENT
Start: 2023-11-02 | End: 2023-11-02

## 2023-11-02 RX ORDER — HYDROMORPHONE HYDROCHLORIDE 2 MG/ML
0.5 INJECTION INTRAMUSCULAR; INTRAVENOUS; SUBCUTANEOUS
Refills: 0 | Status: DISCONTINUED | OUTPATIENT
Start: 2023-11-02 | End: 2023-11-04

## 2023-11-02 RX ORDER — CYCLOBENZAPRINE HYDROCHLORIDE 10 MG/1
10 TABLET, FILM COATED ORAL EVERY 8 HOURS
Refills: 0 | Status: DISCONTINUED | OUTPATIENT
Start: 2023-11-02 | End: 2023-11-03

## 2023-11-02 RX ORDER — HYDROMORPHONE HYDROCHLORIDE 2 MG/ML
0.5 INJECTION INTRAMUSCULAR; INTRAVENOUS; SUBCUTANEOUS ONCE
Refills: 0 | Status: DISCONTINUED | OUTPATIENT
Start: 2023-11-02 | End: 2023-11-02

## 2023-11-02 RX ORDER — CEFAZOLIN SODIUM 1 G
2000 VIAL (EA) INJECTION EVERY 8 HOURS
Refills: 0 | Status: COMPLETED | OUTPATIENT
Start: 2023-11-02 | End: 2023-11-03

## 2023-11-02 RX ORDER — SODIUM CHLORIDE 9 MG/ML
1000 INJECTION, SOLUTION INTRAVENOUS
Refills: 0 | Status: DISCONTINUED | OUTPATIENT
Start: 2023-11-02 | End: 2023-11-03

## 2023-11-02 RX ORDER — ACETAMINOPHEN 500 MG
1000 TABLET ORAL EVERY 6 HOURS
Refills: 0 | Status: COMPLETED | OUTPATIENT
Start: 2023-11-02 | End: 2023-11-03

## 2023-11-02 RX ORDER — INSULIN GLARGINE 100 [IU]/ML
29 INJECTION, SOLUTION SUBCUTANEOUS EVERY MORNING
Refills: 0 | Status: DISCONTINUED | OUTPATIENT
Start: 2023-11-02 | End: 2023-11-03

## 2023-11-02 RX ORDER — ACETAMINOPHEN 500 MG
975 TABLET ORAL EVERY 6 HOURS
Refills: 0 | Status: DISCONTINUED | OUTPATIENT
Start: 2023-11-02 | End: 2023-11-02

## 2023-11-02 RX ORDER — INSULIN LISPRO 100/ML
VIAL (ML) SUBCUTANEOUS
Refills: 0 | Status: DISCONTINUED | OUTPATIENT
Start: 2023-11-02 | End: 2023-11-03

## 2023-11-02 RX ORDER — HYDROMORPHONE HYDROCHLORIDE 2 MG/ML
30 INJECTION INTRAMUSCULAR; INTRAVENOUS; SUBCUTANEOUS
Refills: 0 | Status: DISCONTINUED | OUTPATIENT
Start: 2023-11-02 | End: 2023-11-04

## 2023-11-02 RX ADMIN — Medication 3: at 07:03

## 2023-11-02 RX ADMIN — Medication 2: at 22:18

## 2023-11-02 RX ADMIN — HYDROMORPHONE HYDROCHLORIDE 0.5 MILLIGRAM(S): 2 INJECTION INTRAMUSCULAR; INTRAVENOUS; SUBCUTANEOUS at 21:16

## 2023-11-02 RX ADMIN — CHLORHEXIDINE GLUCONATE 1 APPLICATION(S): 213 SOLUTION TOPICAL at 12:22

## 2023-11-02 RX ADMIN — Medication 4 MILLIGRAM(S): at 05:56

## 2023-11-02 RX ADMIN — Medication 4 MILLIGRAM(S): at 00:34

## 2023-11-02 RX ADMIN — AMLODIPINE BESYLATE 10 MILLIGRAM(S): 2.5 TABLET ORAL at 05:32

## 2023-11-02 RX ADMIN — Medication 1: at 12:19

## 2023-11-02 RX ADMIN — BICALUTAMIDE 50 MILLIGRAM(S): 50 TABLET, FILM COATED ORAL at 12:20

## 2023-11-02 RX ADMIN — Medication 4 MILLIGRAM(S): at 12:20

## 2023-11-02 RX ADMIN — PANTOPRAZOLE SODIUM 40 MILLIGRAM(S): 20 TABLET, DELAYED RELEASE ORAL at 05:56

## 2023-11-02 RX ADMIN — HYDROMORPHONE HYDROCHLORIDE 0.5 MILLIGRAM(S): 2 INJECTION INTRAMUSCULAR; INTRAVENOUS; SUBCUTANEOUS at 22:33

## 2023-11-02 RX ADMIN — HYDROMORPHONE HYDROCHLORIDE 0.5 MILLIGRAM(S): 2 INJECTION INTRAMUSCULAR; INTRAVENOUS; SUBCUTANEOUS at 21:46

## 2023-11-02 RX ADMIN — HYDROMORPHONE HYDROCHLORIDE 30 MILLILITER(S): 2 INJECTION INTRAMUSCULAR; INTRAVENOUS; SUBCUTANEOUS at 21:35

## 2023-11-02 RX ADMIN — Medication 100 MILLIGRAM(S): at 22:15

## 2023-11-02 RX ADMIN — INSULIN GLARGINE 13 UNIT(S): 100 INJECTION, SOLUTION SUBCUTANEOUS at 07:05

## 2023-11-02 RX ADMIN — SODIUM CHLORIDE 75 MILLILITER(S): 9 INJECTION, SOLUTION INTRAVENOUS at 22:15

## 2023-11-02 NOTE — PROGRESS NOTE ADULT - SUBJECTIVE AND OBJECTIVE BOX
Pt seen/examined. Doing well. Pain controlled. No acute overnight complaints or events.    T(C): 37.1 (11-01-23 @ 21:50), Max: 37.1 (11-01-23 @ 05:56)  HR: 71 (11-01-23 @ 21:50) (63 - 71)  BP: 114/65 (11-01-23 @ 21:50) (111/72 - 132/76)  RR: 17 (11-01-23 @ 21:50) (17 - 17)  SpO2: 96% (11-01-23 @ 21:50) (95% - 99%)  Wt(kg): --  - Gen: NAD    Physical Exam:  Gen: NAD  Resp: No increased WOB  Spine:  Skin intact  No bony TTP or step-offs along c-, t-, l-spine, or sacrum    RUE:  Delt 5/5 Bi 5/5 Tri 5/5 Wrist ext 5/5  5/5  SILT C5-T1  2+ radial pulse  Negative Llamas  LUE:  Delt 5/5 Bi 5/5 Tri 5/5 Wrist ext 5/5  5/5  SILT C5-T1  2+ radial pulse  Negative Llamas    RLE:  IP 5/5 HS 5/5 Q 5/5 GS 5/5 TA 5/5 EHL 5/5   SILT L2-S1  2+ DP/PT pulses  Negative clonus, negative Babinski  LLE:  IP 5/5 HS 5/5 Q 5/5 GS 5/5 TA 5/5 EHL 5/5  SILT L2-S1  2+ DP/PT pulses  Negative clonus, negative Babinski       73yMale w/ concern for cord compression in setting of multiple vertebral lesions w symptoms improving after course of steroids now at [facility] for rad onc eval    PLAN  -WBAT  -pain control  -incentive spirometry  -MR C/Tsp showing osseous mets, C7 and T1 spinous process fractures, T1 cord compression and cord edema  - s/p IVC filter placement 11/1  - Plan for OR 11/2  - Keep NPO/preop labs  -XRT w rad onc  -PT/OT

## 2023-11-02 NOTE — CONSULT NOTE ADULT - CONSULT REASON
q4hr neuro checks, monitor for acute changes in neuro status and lower extremity weakness. q1hr neuro checks, monitor for acute changes in neuro status and lower extremity weakness.

## 2023-11-02 NOTE — PROGRESS NOTE ADULT - SUBJECTIVE AND OBJECTIVE BOX
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 VSS. ROS negative.    MEDICATIONS  (STANDING):  amLODIPine   Tablet 10 milliGRAM(s) Oral daily  bicalutamide 50 milliGRAM(s) Oral daily  chlorhexidine 2% Cloths 1 Application(s) Topical daily  dexAMETHasone     Tablet 4 milliGRAM(s) Oral every 6 hours  dextrose 5%. 1000 milliLiter(s) (50 mL/Hr) IV Continuous <Continuous>  dextrose 5%. 1000 milliLiter(s) (100 mL/Hr) IV Continuous <Continuous>  dextrose 50% Injectable 25 Gram(s) IV Push once  dextrose 50% Injectable 25 Gram(s) IV Push once  dextrose 50% Injectable 12.5 Gram(s) IV Push once  glucagon  Injectable 1 milliGRAM(s) IntraMuscular once  insulin glargine Injectable (LANTUS) 13 Unit(s) SubCutaneous every morning  insulin lispro (ADMELOG) corrective regimen sliding scale   SubCutaneous every 6 hours  insulin lispro Injectable (ADMELOG) 14 Unit(s) SubCutaneous three times a day before meals  pantoprazole    Tablet 40 milliGRAM(s) Oral before breakfast  senna 2 Tablet(s) Oral at bedtime  tamsulosin 0.4 milliGRAM(s) Oral at bedtime    MEDICATIONS  (PRN):  dextrose Oral Gel 15 Gram(s) Oral once PRN Blood Glucose LESS THAN 70 milliGRAM(s)/deciliter  oxyCODONE    IR 5 milliGRAM(s) Oral every 4 hours PRN Severe Pain (7 - 10)  oxyCODONE    IR 2.5 milliGRAM(s) Oral every 4 hours PRN Moderate Pain (4 - 6)  polyethylene glycol 3350 17 Gram(s) Oral daily PRN Constipation      CAPILLARY BLOOD GLUCOSE      POCT Blood Glucose.: 263 mg/dL (02 Nov 2023 06:13)  POCT Blood Glucose.: 212 mg/dL (01 Nov 2023 21:44)  POCT Blood Glucose.: 119 mg/dL (01 Nov 2023 17:39)  POCT Blood Glucose.: 156 mg/dL (01 Nov 2023 12:52)  POCT Blood Glucose.: 180 mg/dL (01 Nov 2023 08:38)    I&O's Summary    01 Nov 2023 07:01 - 02 Nov 2023 07:00  --------------------------------------------------------  IN: 0 mL / OUT: 900 mL / NET: -900 mL        PHYSICAL EXAM:  Vital Signs Last 24 Hrs  T(C): 36.5 (02 Nov 2023 04:47), Max: 37.1 (01 Nov 2023 21:50)  T(F): 97.7 (02 Nov 2023 04:47), Max: 98.8 (01 Nov 2023 21:50)  HR: 71 (02 Nov 2023 04:47) (68 - 71)  BP: 118/72 (02 Nov 2023 04:47) (111/72 - 118/72)  BP(mean): --  RR: 18 (02 Nov 2023 04:47) (17 - 18)  SpO2: 97% (02 Nov 2023 04:47) (96% - 99%)    Parameters below as of 02 Nov 2023 04:47  Patient On (Oxygen Delivery Method): room air        CONSTITUTIONAL: NAD, well-developed  HEENT:   RESPIRATORY: Normal respiratory effort; lungs are clear to auscultation bilaterally  CARDIOVASCULAR: Regular rate and rhythm, normal S1 and S2, no murmur/rub/gallop; No lower extremity edema; Peripheral pulses are 2+ bilaterally  ABDOMEN: Nontender to palpation, normoactive bowel sounds, no rebound/guarding; No hepatosplenomegaly  MUSCULOSKELETAL: no clubbing or cyanosis of digits; no joint swelling or tenderness to palpation  PSYCH: A+O to person, place, and time; affect appropriate    LABS:                        12.5   11.86 )-----------( 168      ( 02 Nov 2023 00:35 )             37.1     11-02    133<L>  |  98  |  31<H>  ----------------------------<  237<H>  4.2   |  24  |  0.86    Ca    8.6      02 Nov 2023 00:35  Phos  4.3     11-02  Mg     2.30     11-02    TPro  6.4  /  Alb  3.8  /  TBili  0.8  /  DBili  x   /  AST  31  /  ALT  137<H>  /  AlkPhos  283<H>  11-01    PT/INR - ( 02 Nov 2023 00:35 )   PT: 11.7 sec;   INR: 1.05 ratio         PTT - ( 02 Nov 2023 00:35 )  PTT:23.6 sec      Urinalysis Basic - ( 02 Nov 2023 00:35 )    Color: x / Appearance: x / SG: x / pH: x  Gluc: 237 mg/dL / Ketone: x  / Bili: x / Urobili: x   Blood: x / Protein: x / Nitrite: x   Leuk Esterase: x / RBC: x / WBC x   Sq Epi: x / Non Sq Epi: x / Bacteria: x          RADIOLOGY:        ******************************  Authored By: Shyla Haywood MD PGY2  Internal Medicine  MS Teams  ******************************   PROGRESS NOTE:     Patient is a 73y old  Male who presents with a chief complaint of Diffuse Spinal Mets from Prostate Cancer (02 Nov 2023 04:51)      INTERVAL HISTORY: NAEO. VSS. ROS negative. Eager for the surgery. COntinued numbness/tingling of LE but attempted to walk yesterday.    MEDICATIONS  (STANDING):  amLODIPine   Tablet 10 milliGRAM(s) Oral daily  bicalutamide 50 milliGRAM(s) Oral daily  chlorhexidine 2% Cloths 1 Application(s) Topical daily  dexAMETHasone     Tablet 4 milliGRAM(s) Oral every 6 hours  dextrose 5%. 1000 milliLiter(s) (50 mL/Hr) IV Continuous <Continuous>  dextrose 5%. 1000 milliLiter(s) (100 mL/Hr) IV Continuous <Continuous>  dextrose 50% Injectable 25 Gram(s) IV Push once  dextrose 50% Injectable 25 Gram(s) IV Push once  dextrose 50% Injectable 12.5 Gram(s) IV Push once  glucagon  Injectable 1 milliGRAM(s) IntraMuscular once  insulin glargine Injectable (LANTUS) 13 Unit(s) SubCutaneous every morning  insulin lispro (ADMELOG) corrective regimen sliding scale   SubCutaneous every 6 hours  insulin lispro Injectable (ADMELOG) 14 Unit(s) SubCutaneous three times a day before meals  pantoprazole    Tablet 40 milliGRAM(s) Oral before breakfast  senna 2 Tablet(s) Oral at bedtime  tamsulosin 0.4 milliGRAM(s) Oral at bedtime    MEDICATIONS  (PRN):  dextrose Oral Gel 15 Gram(s) Oral once PRN Blood Glucose LESS THAN 70 milliGRAM(s)/deciliter  oxyCODONE    IR 5 milliGRAM(s) Oral every 4 hours PRN Severe Pain (7 - 10)  oxyCODONE    IR 2.5 milliGRAM(s) Oral every 4 hours PRN Moderate Pain (4 - 6)  polyethylene glycol 3350 17 Gram(s) Oral daily PRN Constipation      CAPILLARY BLOOD GLUCOSE      POCT Blood Glucose.: 263 mg/dL (02 Nov 2023 06:13)  POCT Blood Glucose.: 212 mg/dL (01 Nov 2023 21:44)  POCT Blood Glucose.: 119 mg/dL (01 Nov 2023 17:39)  POCT Blood Glucose.: 156 mg/dL (01 Nov 2023 12:52)  POCT Blood Glucose.: 180 mg/dL (01 Nov 2023 08:38)    I&O's Summary    01 Nov 2023 07:01  -  02 Nov 2023 07:00  --------------------------------------------------------  IN: 0 mL / OUT: 900 mL / NET: -900 mL        PHYSICAL EXAM:  Vital Signs Last 24 Hrs  T(C): 36.5 (02 Nov 2023 04:47), Max: 37.1 (01 Nov 2023 21:50)  T(F): 97.7 (02 Nov 2023 04:47), Max: 98.8 (01 Nov 2023 21:50)  HR: 71 (02 Nov 2023 04:47) (68 - 71)  BP: 118/72 (02 Nov 2023 04:47) (111/72 - 118/72)  BP(mean): --  RR: 18 (02 Nov 2023 04:47) (17 - 18)  SpO2: 97% (02 Nov 2023 04:47) (96% - 99%)    Parameters below as of 02 Nov 2023 04:47  Patient On (Oxygen Delivery Method): room air        CONSTITUTIONAL: NAD, well-developed  RESPIRATORY: Normal respiratory effort; lungs are clear to auscultation bilaterally  CARDIOVASCULAR: Regular rate and rhythm, normal S1 and S2, no murmur/rub/gallop; No lower extremity edema; Peripheral pulses are 2+ bilaterally  ABDOMEN: Nontender to palpation, normoactive bowel sounds  MUSCULOSKELETAL: 5/5 RLE and LLE although RLE weaker than LLE  PSYCH: A+O to person, place, and time; affect appropriate    LABS:                        12.5   11.86 )-----------( 168      ( 02 Nov 2023 00:35 )             37.1     11-02    133<L>  |  98  |  31<H>  ----------------------------<  237<H>  4.2   |  24  |  0.86    Ca    8.6      02 Nov 2023 00:35  Phos  4.3     11-02  Mg     2.30     11-02    TPro  6.4  /  Alb  3.8  /  TBili  0.8  /  DBili  x   /  AST  31  /  ALT  137<H>  /  AlkPhos  283<H>  11-01    PT/INR - ( 02 Nov 2023 00:35 )   PT: 11.7 sec;   INR: 1.05 ratio         PTT - ( 02 Nov 2023 00:35 )  PTT:23.6 sec      Urinalysis Basic - ( 02 Nov 2023 00:35 )    Color: x / Appearance: x / SG: x / pH: x  Gluc: 237 mg/dL / Ketone: x  / Bili: x / Urobili: x   Blood: x / Protein: x / Nitrite: x   Leuk Esterase: x / RBC: x / WBC x   Sq Epi: x / Non Sq Epi: x / Bacteria: x        ******************************  Authored By: Shyla Haywood MD PGY2  Internal Medicine  MS Teams  ******************************   PROGRESS NOTE:     Patient is a 73y old  Male who presents with a chief complaint of Diffuse Spinal Mets from Prostate Cancer (02 Nov 2023 04:51)      INTERVAL HISTORY: NAEO. VSS. ROS negative. Eager for the surgery. Continued numbness/tingling of LE but attempted to walk yesterday.    MEDICATIONS  (STANDING):  amLODIPine   Tablet 10 milliGRAM(s) Oral daily  bicalutamide 50 milliGRAM(s) Oral daily  chlorhexidine 2% Cloths 1 Application(s) Topical daily  dexAMETHasone     Tablet 4 milliGRAM(s) Oral every 6 hours  dextrose 5%. 1000 milliLiter(s) (50 mL/Hr) IV Continuous <Continuous>  dextrose 5%. 1000 milliLiter(s) (100 mL/Hr) IV Continuous <Continuous>  dextrose 50% Injectable 25 Gram(s) IV Push once  dextrose 50% Injectable 25 Gram(s) IV Push once  dextrose 50% Injectable 12.5 Gram(s) IV Push once  glucagon  Injectable 1 milliGRAM(s) IntraMuscular once  insulin glargine Injectable (LANTUS) 13 Unit(s) SubCutaneous every morning  insulin lispro (ADMELOG) corrective regimen sliding scale   SubCutaneous every 6 hours  insulin lispro Injectable (ADMELOG) 14 Unit(s) SubCutaneous three times a day before meals  pantoprazole    Tablet 40 milliGRAM(s) Oral before breakfast  senna 2 Tablet(s) Oral at bedtime  tamsulosin 0.4 milliGRAM(s) Oral at bedtime    MEDICATIONS  (PRN):  dextrose Oral Gel 15 Gram(s) Oral once PRN Blood Glucose LESS THAN 70 milliGRAM(s)/deciliter  oxyCODONE    IR 5 milliGRAM(s) Oral every 4 hours PRN Severe Pain (7 - 10)  oxyCODONE    IR 2.5 milliGRAM(s) Oral every 4 hours PRN Moderate Pain (4 - 6)  polyethylene glycol 3350 17 Gram(s) Oral daily PRN Constipation      CAPILLARY BLOOD GLUCOSE      POCT Blood Glucose.: 263 mg/dL (02 Nov 2023 06:13)  POCT Blood Glucose.: 212 mg/dL (01 Nov 2023 21:44)  POCT Blood Glucose.: 119 mg/dL (01 Nov 2023 17:39)  POCT Blood Glucose.: 156 mg/dL (01 Nov 2023 12:52)  POCT Blood Glucose.: 180 mg/dL (01 Nov 2023 08:38)    I&O's Summary    01 Nov 2023 07:01  -  02 Nov 2023 07:00  --------------------------------------------------------  IN: 0 mL / OUT: 900 mL / NET: -900 mL        PHYSICAL EXAM:  Vital Signs Last 24 Hrs  T(C): 36.5 (02 Nov 2023 04:47), Max: 37.1 (01 Nov 2023 21:50)  T(F): 97.7 (02 Nov 2023 04:47), Max: 98.8 (01 Nov 2023 21:50)  HR: 71 (02 Nov 2023 04:47) (68 - 71)  BP: 118/72 (02 Nov 2023 04:47) (111/72 - 118/72)  BP(mean): --  RR: 18 (02 Nov 2023 04:47) (17 - 18)  SpO2: 97% (02 Nov 2023 04:47) (96% - 99%)    Parameters below as of 02 Nov 2023 04:47  Patient On (Oxygen Delivery Method): room air        CONSTITUTIONAL: NAD, well-developed  RESPIRATORY: Normal respiratory effort; lungs are clear to auscultation bilaterally  CARDIOVASCULAR: Regular rate and rhythm, normal S1 and S2, no murmur/rub/gallop; No lower extremity edema; Peripheral pulses are 2+ bilaterally  ABDOMEN: Nontender to palpation, normoactive bowel sounds  MUSCULOSKELETAL: 5/5 RLE and LLE although RLE weaker than LLE  PSYCH: A+O to person, place, and time; affect appropriate    LABS:                        12.5   11.86 )-----------( 168      ( 02 Nov 2023 00:35 )             37.1     11-02    133<L>  |  98  |  31<H>  ----------------------------<  237<H>  4.2   |  24  |  0.86    Ca    8.6      02 Nov 2023 00:35  Phos  4.3     11-02  Mg     2.30     11-02    TPro  6.4  /  Alb  3.8  /  TBili  0.8  /  DBili  x   /  AST  31  /  ALT  137<H>  /  AlkPhos  283<H>  11-01    PT/INR - ( 02 Nov 2023 00:35 )   PT: 11.7 sec;   INR: 1.05 ratio         PTT - ( 02 Nov 2023 00:35 )  PTT:23.6 sec      Urinalysis Basic - ( 02 Nov 2023 00:35 )    Color: x / Appearance: x / SG: x / pH: x  Gluc: 237 mg/dL / Ketone: x  / Bili: x / Urobili: x   Blood: x / Protein: x / Nitrite: x   Leuk Esterase: x / RBC: x / WBC x   Sq Epi: x / Non Sq Epi: x / Bacteria: x        ******************************  Authored By: Shyla Haywood MD PGY2  Internal Medicine  MS Teams  ******************************

## 2023-11-02 NOTE — PROGRESS NOTE ADULT - PROBLEM SELECTOR PLAN 11
patient continues to have elevated glucose levels requiring additional insulin sliding scale, likely due to steroids     increase lantus to 24u, minimize dose in preparation for surgery and patient being NPO   increase admelog to 10u TID, hold as patient is NPO Diet: CC DASH Diet - NPO for OR   Psych/Sleep: None ELLIS  GI: Protonix 40 qd/Senna/Miralax  DVT ppx: IVC filter  Code Status: Will need full discussion with family  Dispo: pending surgical and radiation oncology interventions

## 2023-11-02 NOTE — CONSULT NOTE ADULT - SUBJECTIVE AND OBJECTIVE BOX
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 Initiated IV solumedrol with gradual improvement of symptoms and return of strength since Wednesday now here at [facility] for spine evaluation by surgery.    Surgery:  Findings: Cervicothoracic spine tumor noted.  Procedure:  C7-T1 decompression   C5-T3 posterior cervical fusion  Surgical incision of back - complex closure paraspinal muscle flaps AGGIE X2    Surgery Information  OR time: 388min     EBL: 510cc      UOP: >1L     IV Fluids: 2.5L Crystalloid       Blood Products: None             PAST MEDICAL & SURGICAL HISTORY:  Essential hypertension      Prostate cancer metastatic to bone      No significant past surgical history    Home Meds:   Allergies: No Known Allergies    Soc:   Advanced Directives: Presumed Full Code     ROS:    General: Non-Contributory  Skin/Breast: Non-Contributory  Ophthalmologic: Non-Contributory  ENMT: Non-Contributory  Respiratory and Thorax: Non-Contributory  Cardiovascular: Non-Contributory  Gastrointestinal: Non-Contributory  Genitourinary: Non-Contributory  Musculoskeletal: Non-Contributory  Neurological: Non-Contributory  Psychiatric: Non-Contributory  Hematology/Lymphatics: Non-Contributory  Endocrine: Non-Contributory  Allergic/Immunologic: Non-Contributory    CURRENT MEDICATIONS:   --------------------------------------------------------------------------------------  Neurologic Medications  oxyCODONE    IR 5 milliGRAM(s) Oral every 4 hours PRN Severe Pain (7 - 10)  oxyCODONE    IR 2.5 milliGRAM(s) Oral every 4 hours PRN Moderate Pain (4 - 6)    Respiratory Medications    Cardiovascular Medications  amLODIPine   Tablet 10 milliGRAM(s) Oral daily    Gastrointestinal Medications  pantoprazole    Tablet 40 milliGRAM(s) Oral before breakfast  polyethylene glycol 3350 17 Gram(s) Oral daily PRN Constipation  senna 2 Tablet(s) Oral at bedtime    Genitourinary Medications  tamsulosin 0.4 milliGRAM(s) Oral at bedtime    Hematologic/Oncologic Medications  bicalutamide 50 milliGRAM(s) Oral daily    Antimicrobial/Immunologic Medications  ceFAZolin   IVPB 2000 milliGRAM(s) IV Intermittent every 8 hours    Endocrine/Metabolic Medications  dexAMETHasone     Tablet 4 milliGRAM(s) Oral every 6 hours  glucagon  Injectable 1 milliGRAM(s) IntraMuscular once  insulin glargine Injectable (LANTUS) 29 Unit(s) SubCutaneous every morning  insulin lispro (ADMELOG) corrective regimen sliding scale   SubCutaneous Before meals and at bedtime    Topical/Other Medications  chlorhexidine 2% Cloths 1 Application(s) Topical daily    --------------------------------------------------------------------------------------    VITAL SIGNS, INS/OUTS (last 24 hours):  --------------------------------------------------------------------------------------  T(C): 35.7 (11-02-23 @ 20:55), Max: 36.5 (11-02-23 @ 04:47)  HR: 78 (11-02-23 @ 21:00) (67 - 78)  BP: 113/73 (11-02-23 @ 13:19) (110/71 - 118/72)  BP(mean): --  ABP: 150/74 (11-02-23 @ 21:00) (147/73 - 150/74)  ABP(mean): 95 (11-02-23 @ 21:00) (93 - 95)  RR: 25 (11-02-23 @ 21:00) (16 - 25)  SpO2: 96% (11-02-23 @ 21:00) (96% - 98%)  Wt(kg): --  CVP(mm Hg): --  CI: --  CAPILLARY BLOOD GLUCOSE      POCT Blood Glucose.: 246 mg/dL (02 Nov 2023 21:47)  POCT Blood Glucose.: 185 mg/dL (02 Nov 2023 13:06)  POCT Blood Glucose.: 155 mg/dL (02 Nov 2023 12:15)  POCT Blood Glucose.: 263 mg/dL (02 Nov 2023 06:13)   N/A      11-01 @ 07:01  -  11-02 @ 07:00  --------------------------------------------------------  IN:  Total IN: 0 mL    OUT:    Indwelling Catheter - Urethral (mL): 900 mL  Total OUT: 900 mL    Total NET: -900 mL      11-02 @ 07:01  -  11-02 @ 21:57  --------------------------------------------------------  IN:  Total IN: 0 mL    OUT:    Indwelling Catheter - Urethral (mL): 425 mL  Total OUT: 425 mL    Total NET: -425 mL        --------------------------------------------------------------------------------------    EXAM:  General/Neuro: NAD, AOX3, speaking complete sentences  RASS: 0  GCS: 15  Exam: Normal, NAD, alert, oriented x 3,  PERRLA    Respiratory  Exam: Lungs clear to auscultation, Normal expansion/effort.    Cardiovascular  Exam: S1, S2.  Regular rate and rhythm.  Cardiac Rhythm: Normal Sinus Rhythm    GI  Exam: Abdomen soft, Non-tender, Non-distended.   Current Diet:  DASH Diet    Tubes/Lines/Drains  ***  [x] L + R. Peripheral IV X2  [] Central Venous Line     	[] R	[] L	[] IJ	[] Fem	[] SC        Type:	    Date Placed:   [X] Arterial Line		[X] R	[] L	[] Fem	[] Rad	[] Ax	Date Placed: 11/2  [] PICC:         	[] Midline		[] Mediport           [] Urinary Catheter		Date Placed:     Extremities  Exam: Extremities warm, pink, well-perfused.      Derm:  Exam: Good skin turgor, no skin breakdown.      :   Exam: Parsons catheter in place.     LABS  --------------------------------------------------------------------------------------  CBC (11-02 @ 00:35)                          12.5<L>                   11.86<H>  )--------------(  168        --    % Neuts, --    % Lymphs, ANC: --                              37.1<L>    BMP (11-02 @ 00:35)       133<L>  |  98      |  31<H> 			Ca++ --      Ca 8.6          ---------------------------------( 237<H>		Mg 2.30         4.2     |  24      |  0.86  			Ph 4.3         Coags (11-02 @ 00:35)  aPTT 23.6<L> / INR 1.05 / PT 11.7      ABG (11-02 @ 18:41)     7.44 / 36 / 238<H> / 24 / 0.6 / 99.4<H>%     Lactate:    ABG (11-02 @ 15:48)     7.46<H> / 37 / 262<H> / 26 / 2.5 / 99.8<H>%     Lactate:      --------------------------------------------------------------------------------------    ASSESSMENT: 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 Initiated IV solumedrol with gradual improvement of symptoms and return of strength since Wednesday now here at [facility] for spine evaluation by surgery.    Surgery:  Findings: Cervicothoracic spine tumor noted.  Procedure:  C7-T1 decompression   C5-T3 posterior cervical fusion  Surgical incision of back - complex closure paraspinal muscle flaps AGGIE X2      PLAN:   Neurologic:  -C-collar  -Baseline:   AOX3, B/L Upper extremity 5/5 motor strength. B/L Lower extremity 4/5 (RLE slightly weaker than LLE, but still able to lift against gravity and resistance). No focal neuro deficits  -q1hr neuro checks  -Keep head of bed >60degrees due to dural tear in surgery  -Oral Dexamethasone  -Pain: Dilaudid PCA, Oral Tylenol    Respiratory:  O2 saturation 98% RA    Cardiovascular:   Hemodynamically stable on no pressor support    Gastrointestinal/Nutrition:   -Clear liquid diet on 11/2  -Transfer to DASH diet on 11/3   -Protonix 40 QD  -Monitor CK  Admission CK > 84182  -Monitor LFTs (elevated most likely due to rhabdo)    Renal/Genitourinary:   -Prostate Biopsy 2021 --> adenocarcinoma Gelason 3+4; declined radiation therapy at the time  -Parsons placed  -Monitor urine output    Hematologic:   -Monitor H&H  -DVT prophylaxis; SCDS  -Hx of DVTs, IVC filter placed 11/1    Infectious Disease:   -Afebrile  -Monitor WBC/temp  -Continue 2grams Ancef (started 11/2) q8hr for 24hrs    Lines/Tubes:  -R. 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 Initiated IV solumedrol with gradual improvement of symptoms and return of strength since Wednesday now here at [facility] for spine evaluation by surgery.    Surgery:  Findings: Cervicothoracic spine tumor noted.  Procedure:  C7-T1 decompression   C5-T3 posterior cervical fusion  Surgical incision of back - complex closure paraspinal muscle flaps AGGIE X2    Surgery Information  OR time: 388min     EBL: 510cc      UOP: >1L     IV Fluids: 2.5L Crystalloid       Blood Products: None             PAST MEDICAL & SURGICAL HISTORY:  Essential hypertension      Prostate cancer metastatic to bone      No significant past surgical history    Home Meds:   Allergies: No Known Allergies    Soc:   Advanced Directives: Presumed Full Code     ROS:    General: Non-Contributory  Skin/Breast: Non-Contributory  Ophthalmologic: Non-Contributory  ENMT: Non-Contributory  Respiratory and Thorax: Non-Contributory  Cardiovascular: Non-Contributory  Gastrointestinal: Non-Contributory  Genitourinary: Non-Contributory  Musculoskeletal: Non-Contributory  Neurological: Non-Contributory  Psychiatric: Non-Contributory  Hematology/Lymphatics: Non-Contributory  Endocrine: Non-Contributory  Allergic/Immunologic: Non-Contributory    CURRENT MEDICATIONS:   --------------------------------------------------------------------------------------  Neurologic Medications  oxyCODONE    IR 5 milliGRAM(s) Oral every 4 hours PRN Severe Pain (7 - 10)  oxyCODONE    IR 2.5 milliGRAM(s) Oral every 4 hours PRN Moderate Pain (4 - 6)    Respiratory Medications    Cardiovascular Medications  amLODIPine   Tablet 10 milliGRAM(s) Oral daily    Gastrointestinal Medications  pantoprazole    Tablet 40 milliGRAM(s) Oral before breakfast  polyethylene glycol 3350 17 Gram(s) Oral daily PRN Constipation  senna 2 Tablet(s) Oral at bedtime    Genitourinary Medications  tamsulosin 0.4 milliGRAM(s) Oral at bedtime    Hematologic/Oncologic Medications  bicalutamide 50 milliGRAM(s) Oral daily    Antimicrobial/Immunologic Medications  ceFAZolin   IVPB 2000 milliGRAM(s) IV Intermittent every 8 hours    Endocrine/Metabolic Medications  dexAMETHasone     Tablet 4 milliGRAM(s) Oral every 6 hours  glucagon  Injectable 1 milliGRAM(s) IntraMuscular once  insulin glargine Injectable (LANTUS) 29 Unit(s) SubCutaneous every morning  insulin lispro (ADMELOG) corrective regimen sliding scale   SubCutaneous Before meals and at bedtime    Topical/Other Medications  chlorhexidine 2% Cloths 1 Application(s) Topical daily    --------------------------------------------------------------------------------------    VITAL SIGNS, INS/OUTS (last 24 hours):  --------------------------------------------------------------------------------------  T(C): 35.7 (11-02-23 @ 20:55), Max: 36.5 (11-02-23 @ 04:47)  HR: 78 (11-02-23 @ 21:00) (67 - 78)  BP: 113/73 (11-02-23 @ 13:19) (110/71 - 118/72)  BP(mean): --  ABP: 150/74 (11-02-23 @ 21:00) (147/73 - 150/74)  ABP(mean): 95 (11-02-23 @ 21:00) (93 - 95)  RR: 25 (11-02-23 @ 21:00) (16 - 25)  SpO2: 96% (11-02-23 @ 21:00) (96% - 98%)  Wt(kg): --  CVP(mm Hg): --  CI: --  CAPILLARY BLOOD GLUCOSE      POCT Blood Glucose.: 246 mg/dL (02 Nov 2023 21:47)  POCT Blood Glucose.: 185 mg/dL (02 Nov 2023 13:06)  POCT Blood Glucose.: 155 mg/dL (02 Nov 2023 12:15)  POCT Blood Glucose.: 263 mg/dL (02 Nov 2023 06:13)   N/A      11-01 @ 07:01  -  11-02 @ 07:00  --------------------------------------------------------  IN:  Total IN: 0 mL    OUT:    Indwelling Catheter - Urethral (mL): 900 mL  Total OUT: 900 mL    Total NET: -900 mL      11-02 @ 07:01  -  11-02 @ 21:57  --------------------------------------------------------  IN:  Total IN: 0 mL    OUT:    Indwelling Catheter - Urethral (mL): 425 mL  Total OUT: 425 mL    Total NET: -425 mL        --------------------------------------------------------------------------------------    EXAM:  General/Neuro: NAD, AOX3, speaking complete sentences  RASS: 0  GCS: 15  Exam: Normal, NAD, alert, oriented x 3,  PERRLA    Respiratory  Exam: Lungs clear to auscultation, Normal expansion/effort.    Cardiovascular  Exam: S1, S2.  Regular rate and rhythm.  Cardiac Rhythm: Normal Sinus Rhythm    GI  Exam: Abdomen soft, Non-tender, Non-distended.   Current Diet:  DASH Diet    Tubes/Lines/Drains  ***  [x] L + R. Peripheral IV X2  [] Central Venous Line     	[] R	[] L	[] IJ	[] Fem	[] SC        Type:	    Date Placed:   [X] Arterial Line		[X] R	[] L	[] Fem	[] Rad	[] Ax	Date Placed: 11/2  [] PICC:         	[] Midline		[] Mediport           [] Urinary Catheter		Date Placed:     Extremities  Exam: Extremities warm, pink, well-perfused.      Derm:  Exam: Good skin turgor, no skin breakdown.      :   Exam: Parsons catheter in place.     LABS  --------------------------------------------------------------------------------------  CBC (11-02 @ 00:35)                          12.5<L>                   11.86<H>  )--------------(  168        --    % Neuts, --    % Lymphs, ANC: --                              37.1<L>    BMP (11-02 @ 00:35)       133<L>  |  98      |  31<H> 			Ca++ --      Ca 8.6          ---------------------------------( 237<H>		Mg 2.30         4.2     |  24      |  0.86  			Ph 4.3         Coags (11-02 @ 00:35)  aPTT 23.6<L> / INR 1.05 / PT 11.7      ABG (11-02 @ 18:41)     7.44 / 36 / 238<H> / 24 / 0.6 / 99.4<H>%     Lactate:    ABG (11-02 @ 15:48)     7.46<H> / 37 / 262<H> / 26 / 2.5 / 99.8<H>%     Lactate:      --------------------------------------------------------------------------------------    ASSESSMENT: 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 Initiated IV solumedrol with gradual improvement of symptoms and return of strength since Wednesday now here at [facility] for spine evaluation by surgery.    Surgery:  Findings: Cervicothoracic spine tumor noted.  Procedure:  C7-T1 decompression   C5-T3 posterior cervical fusion  Surgical incision of back - complex closure paraspinal muscle flaps AGGIE X2      PLAN:   Neurologic:  -C-collar  -Baseline:   AOX3, B/L Upper extremity 5/5 motor strength. B/L Lower extremity 4/5 (RLE slightly weaker than LLE, but still able to lift against gravity and resistance). No focal neuro deficits  -q1hr neuro checks  -Keep head of bed >60degrees due to dural tear in surgery  -Oral Dexamethasone  -Pain: Dilaudid PCA, Oral Tylenol    Respiratory:  O2 saturation 98% RA    Cardiovascular:   Hemodynamically stable on no pressor support    Gastrointestinal/Nutrition:   -Clear liquid diet on 11/2  -Transfer to DASH diet on 11/3   -Protonix 40 QD  -Monitor CK  Admission CK > 22754  -Monitor LFTs (elevated most likely due to rhabdo)    Renal/Genitourinary:   -Prostate Biopsy 2021 --> adenocarcinoma Gelason 3+4; declined radiation therapy at the time  -Parsons placed  -Monitor urine output    Hematologic:   -Monitor H&H  -DVT prophylaxis; SCDS  -Hx of DVTs, IVC filter placed 11/1    Infectious Disease:   -Afebrile  -Monitor WBC/temp  -Continue 2grams Ancef (started 11/2) q8hr for 24hrs    Lines/Tubes:  -R. 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 for lower extremity paralysis in the setting of a recent fall, found to have 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 Initiated IV solumedrol with gradual improvement of symptoms and return of strength since Wednesday now here at [facility] for spine evaluation by surgery.    Surgery:  Findings: Cervicothoracic spine tumor noted.  Procedure:  C7-T1 decompression   C5-T3 posterior cervical fusion  Surgical incision of back - complex closure paraspinal muscle flaps AGGIE X2    Surgery Information  OR time: 388min     EBL: 510cc      UOP: >1L     IV Fluids: 2.5L Crystalloid       Blood Products: None             PAST MEDICAL & SURGICAL HISTORY:  Essential hypertension      Prostate cancer metastatic to bone      No significant past surgical history    Home Meds:   Allergies: No Known Allergies    Soc:   Advanced Directives: Presumed Full Code     ROS:    General: Non-Contributory  Skin/Breast: Non-Contributory  Ophthalmologic: Non-Contributory  ENMT: Non-Contributory  Respiratory and Thorax: Non-Contributory  Cardiovascular: Non-Contributory  Gastrointestinal: Non-Contributory  Genitourinary: Non-Contributory  Musculoskeletal: Non-Contributory  Neurological: Non-Contributory  Psychiatric: Non-Contributory  Hematology/Lymphatics: Non-Contributory  Endocrine: Non-Contributory  Allergic/Immunologic: Non-Contributory    CURRENT MEDICATIONS:   --------------------------------------------------------------------------------------  Neurologic Medications  oxyCODONE    IR 5 milliGRAM(s) Oral every 4 hours PRN Severe Pain (7 - 10)  oxyCODONE    IR 2.5 milliGRAM(s) Oral every 4 hours PRN Moderate Pain (4 - 6)    Respiratory Medications    Cardiovascular Medications  amLODIPine   Tablet 10 milliGRAM(s) Oral daily    Gastrointestinal Medications  pantoprazole    Tablet 40 milliGRAM(s) Oral before breakfast  polyethylene glycol 3350 17 Gram(s) Oral daily PRN Constipation  senna 2 Tablet(s) Oral at bedtime    Genitourinary Medications  tamsulosin 0.4 milliGRAM(s) Oral at bedtime    Hematologic/Oncologic Medications  bicalutamide 50 milliGRAM(s) Oral daily    Antimicrobial/Immunologic Medications  ceFAZolin   IVPB 2000 milliGRAM(s) IV Intermittent every 8 hours    Endocrine/Metabolic Medications  dexAMETHasone     Tablet 4 milliGRAM(s) Oral every 6 hours  glucagon  Injectable 1 milliGRAM(s) IntraMuscular once  insulin glargine Injectable (LANTUS) 29 Unit(s) SubCutaneous every morning  insulin lispro (ADMELOG) corrective regimen sliding scale   SubCutaneous Before meals and at bedtime    Topical/Other Medications  chlorhexidine 2% Cloths 1 Application(s) Topical daily    --------------------------------------------------------------------------------------    VITAL SIGNS, INS/OUTS (last 24 hours):  --------------------------------------------------------------------------------------  T(C): 35.7 (11-02-23 @ 20:55), Max: 36.5 (11-02-23 @ 04:47)  HR: 78 (11-02-23 @ 21:00) (67 - 78)  BP: 113/73 (11-02-23 @ 13:19) (110/71 - 118/72)  BP(mean): --  ABP: 150/74 (11-02-23 @ 21:00) (147/73 - 150/74)  ABP(mean): 95 (11-02-23 @ 21:00) (93 - 95)  RR: 25 (11-02-23 @ 21:00) (16 - 25)  SpO2: 96% (11-02-23 @ 21:00) (96% - 98%)  Wt(kg): --  CVP(mm Hg): --  CI: --  CAPILLARY BLOOD GLUCOSE      POCT Blood Glucose.: 246 mg/dL (02 Nov 2023 21:47)  POCT Blood Glucose.: 185 mg/dL (02 Nov 2023 13:06)  POCT Blood Glucose.: 155 mg/dL (02 Nov 2023 12:15)  POCT Blood Glucose.: 263 mg/dL (02 Nov 2023 06:13)   N/A      11-01 @ 07:01  -  11-02 @ 07:00  --------------------------------------------------------  IN:  Total IN: 0 mL    OUT:    Indwelling Catheter - Urethral (mL): 900 mL  Total OUT: 900 mL    Total NET: -900 mL      11-02 @ 07:01  -  11-02 @ 21:57  --------------------------------------------------------  IN:  Total IN: 0 mL    OUT:    Indwelling Catheter - Urethral (mL): 425 mL  Total OUT: 425 mL    Total NET: -425 mL        --------------------------------------------------------------------------------------    EXAM:  General/Neuro: NAD, AOX3, speaking complete sentences  RASS: 0  GCS: 15  Exam: Normal, NAD, alert, oriented x 3,  PERRLA    Respiratory  Exam: Lungs clear to auscultation, Normal expansion/effort.    Cardiovascular  Exam: S1, S2.  Regular rate and rhythm.  Cardiac Rhythm: Normal Sinus Rhythm    GI  Exam: Abdomen soft, Non-tender, Non-distended.   Current Diet:  DASH Diet    Tubes/Lines/Drains  ***  [x] L + R. Peripheral IV X2  [] Central Venous Line     	[] R	[] L	[] IJ	[] Fem	[] SC        Type:	    Date Placed:   [X] Arterial Line		[X] R	[] L	[] Fem	[] Rad	[] Ax	Date Placed: 11/2  [] PICC:         	[] Midline		[] Mediport           [] Urinary Catheter		Date Placed:     Extremities  Exam: Extremities warm, pink, well-perfused.      Derm:  Exam: Good skin turgor, no skin breakdown.      :   Exam: Parsons catheter in place.     LABS  --------------------------------------------------------------------------------------  CBC (11-02 @ 00:35)                          12.5<L>                   11.86<H>  )--------------(  168        --    % Neuts, --    % Lymphs, ANC: --                              37.1<L>    BMP (11-02 @ 00:35)       133<L>  |  98      |  31<H> 			Ca++ --      Ca 8.6          ---------------------------------( 237<H>		Mg 2.30         4.2     |  24      |  0.86  			Ph 4.3         Coags (11-02 @ 00:35)  aPTT 23.6<L> / INR 1.05 / PT 11.7      ABG (11-02 @ 18:41)     7.44 / 36 / 238<H> / 24 / 0.6 / 99.4<H>%     Lactate:    ABG (11-02 @ 15:48)     7.46<H> / 37 / 262<H> / 26 / 2.5 / 99.8<H>%     Lactate:      --------------------------------------------------------------------------------------    ASSESSMENT: 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 for lower extremity paralysis in the setting of a recent fall, found to have 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 Initiated IV solumedrol with gradual improvement of symptoms and return of strength since Wednesday now here at [facility] for spine evaluation by surgery.    Surgery:  Findings: Cervicothoracic spine tumor noted.  Procedure:  C7-T1 decompression   C5-T3 posterior cervical fusion  Surgical incision of back - complex closure paraspinal muscle flaps AGGIE X2    Interval Events:   -Declined Senna, Flomax on 11/2 secondary to not tolerating oral intake    PLAN:   Neurologic:  -C-collar  -Baseline:   AOX3, B/L Upper extremity 5/5 motor strength. B/L Lower extremity 4/5 (RLE slightly weaker than LLE, but still able to lift against gravity and resistance). No focal neuro deficits  -q1hr neuro checks  -Keep head of bed >60degrees due to dural tear in surgery  -Oral Dexamethasone 4mg q4hrs, stop after 14days  -Pain: Dilaudid PCA, Oral Tylenol    Respiratory:  O2 saturation 98% RA    Cardiovascular:   -Hemodynamically stable on no pressor support  -Home amlodipine 10mg QD    Gastrointestinal/Nutrition:   -Clear liquid diet on 11/2  -Transfer to DASH diet on 11/3   -Senna QD  -Protonix 40 QD  -Monitor CK  Admission CK > 06730  -Monitor LFTs (elevated most likely due to rhabdo)    Renal/Genitourinary:   -Prostate Biopsy 2021 --> adenocarcinoma Gelason 3+4; declined radiation therapy at the time  -LR: 75cc/hr  -Flomax QD  -Parsons placed  -Monitor urine output    Hematologic:   -Monitor H&H  -DVT prophylaxis; SCDS  -Hx of DVTs, IVC filter placed 11/1    Infectious Disease:   -Afebrile  -Monitor WBC/temp  -Continue 2grams Ancef (started 11/2) q8hr for 24hrs    Lines/Tubes:  -R. 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 Initiated IV solumedrol with gradual improvement of symptoms and return of strength since Wednesday now here at [facility] for spine evaluation by surgery.    Surgery:  Findings: Cervicothoracic spine tumor noted.  Procedure:  C7-T1 decompression   C5-T3 posterior cervical fusion  Surgical incision of back - complex closure paraspinal muscle flaps AGGIE X2    Surgery Information  OR time: 388min     EBL: 510cc      UOP: >1L     IV Fluids: 2.5L Crystalloid       Blood Products: None             PAST MEDICAL & SURGICAL HISTORY:  Essential hypertension      Prostate cancer metastatic to bone      No significant past surgical history    Home Meds:   Allergies: No Known Allergies    Soc:   Advanced Directives: Presumed Full Code     ROS:    General: Non-Contributory  Skin/Breast: Non-Contributory  Ophthalmologic: Non-Contributory  ENMT: Non-Contributory  Respiratory and Thorax: Non-Contributory  Cardiovascular: Non-Contributory  Gastrointestinal: Non-Contributory  Genitourinary: Non-Contributory  Musculoskeletal: Non-Contributory  Neurological: Non-Contributory  Psychiatric: Non-Contributory  Hematology/Lymphatics: Non-Contributory  Endocrine: Non-Contributory  Allergic/Immunologic: Non-Contributory    CURRENT MEDICATIONS:   --------------------------------------------------------------------------------------  Neurologic Medications  oxyCODONE    IR 5 milliGRAM(s) Oral every 4 hours PRN Severe Pain (7 - 10)  oxyCODONE    IR 2.5 milliGRAM(s) Oral every 4 hours PRN Moderate Pain (4 - 6)    Respiratory Medications    Cardiovascular Medications  amLODIPine   Tablet 10 milliGRAM(s) Oral daily    Gastrointestinal Medications  pantoprazole    Tablet 40 milliGRAM(s) Oral before breakfast  polyethylene glycol 3350 17 Gram(s) Oral daily PRN Constipation  senna 2 Tablet(s) Oral at bedtime    Genitourinary Medications  tamsulosin 0.4 milliGRAM(s) Oral at bedtime    Hematologic/Oncologic Medications  bicalutamide 50 milliGRAM(s) Oral daily    Antimicrobial/Immunologic Medications  ceFAZolin   IVPB 2000 milliGRAM(s) IV Intermittent every 8 hours    Endocrine/Metabolic Medications  dexAMETHasone     Tablet 4 milliGRAM(s) Oral every 6 hours  glucagon  Injectable 1 milliGRAM(s) IntraMuscular once  insulin glargine Injectable (LANTUS) 29 Unit(s) SubCutaneous every morning  insulin lispro (ADMELOG) corrective regimen sliding scale   SubCutaneous Before meals and at bedtime    Topical/Other Medications  chlorhexidine 2% Cloths 1 Application(s) Topical daily    --------------------------------------------------------------------------------------    VITAL SIGNS, INS/OUTS (last 24 hours):  --------------------------------------------------------------------------------------  T(C): 35.7 (11-02-23 @ 20:55), Max: 36.5 (11-02-23 @ 04:47)  HR: 78 (11-02-23 @ 21:00) (67 - 78)  BP: 113/73 (11-02-23 @ 13:19) (110/71 - 118/72)  BP(mean): --  ABP: 150/74 (11-02-23 @ 21:00) (147/73 - 150/74)  ABP(mean): 95 (11-02-23 @ 21:00) (93 - 95)  RR: 25 (11-02-23 @ 21:00) (16 - 25)  SpO2: 96% (11-02-23 @ 21:00) (96% - 98%)  Wt(kg): --  CVP(mm Hg): --  CI: --  CAPILLARY BLOOD GLUCOSE      POCT Blood Glucose.: 246 mg/dL (02 Nov 2023 21:47)  POCT Blood Glucose.: 185 mg/dL (02 Nov 2023 13:06)  POCT Blood Glucose.: 155 mg/dL (02 Nov 2023 12:15)  POCT Blood Glucose.: 263 mg/dL (02 Nov 2023 06:13)   N/A      11-01 @ 07:01  -  11-02 @ 07:00  --------------------------------------------------------  IN:  Total IN: 0 mL    OUT:    Indwelling Catheter - Urethral (mL): 900 mL  Total OUT: 900 mL    Total NET: -900 mL      11-02 @ 07:01  -  11-02 @ 21:57  --------------------------------------------------------  IN:  Total IN: 0 mL    OUT:    Indwelling Catheter - Urethral (mL): 425 mL  Total OUT: 425 mL    Total NET: -425 mL        --------------------------------------------------------------------------------------    EXAM:  General/Neuro: NAD, AOX3, speaking complete sentences  RASS: 0  GCS: 15  Exam: Normal, NAD, alert, oriented x 3,  PERRLA    Respiratory  Exam: Lungs clear to auscultation, Normal expansion/effort.    Cardiovascular  Exam: S1, S2.  Regular rate and rhythm.  Cardiac Rhythm: Normal Sinus Rhythm    GI  Exam: Abdomen soft, Non-tender, Non-distended.   Current Diet:  DASH Diet    Tubes/Lines/Drains  ***  [x] L + R. Peripheral IV X2  [] Central Venous Line     	[] R	[] L	[] IJ	[] Fem	[] SC        Type:	    Date Placed:   [X] Arterial Line		[X] R	[] L	[] Fem	[] Rad	[] Ax	Date Placed: 11/2  [] PICC:         	[] Midline		[] Mediport           [] Urinary Catheter		Date Placed:     Extremities  Exam: Extremities warm, pink, well-perfused.      Derm:  Exam: Good skin turgor, no skin breakdown.      :   Exam: Parsons catheter in place.     LABS  --------------------------------------------------------------------------------------  CBC (11-02 @ 00:35)                          12.5<L>                   11.86<H>  )--------------(  168        --    % Neuts, --    % Lymphs, ANC: --                              37.1<L>    BMP (11-02 @ 00:35)       133<L>  |  98      |  31<H> 			Ca++ --      Ca 8.6          ---------------------------------( 237<H>		Mg 2.30         4.2     |  24      |  0.86  			Ph 4.3         Coags (11-02 @ 00:35)  aPTT 23.6<L> / INR 1.05 / PT 11.7      ABG (11-02 @ 18:41)     7.44 / 36 / 238<H> / 24 / 0.6 / 99.4<H>%     Lactate:    ABG (11-02 @ 15:48)     7.46<H> / 37 / 262<H> / 26 / 2.5 / 99.8<H>%     Lactate:      --------------------------------------------------------------------------------------    ASSESSMENT: 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 Initiated IV solumedrol with gradual improvement of symptoms and return of strength since Wednesday now here at [facility] for spine evaluation by surgery.    Surgery:  Findings: Cervicothoracic spine tumor noted.  Procedure:  C7-T1 decompression   C5-T3 posterior cervical fusion  Surgical incision of back - complex closure paraspinal muscle flaps AGGIE X2    Interval Events:   -Declined Senna, Flomax on 11/2 secondary to not tolerating oral intake    PLAN:   Neurologic:  -C-collar  -Baseline:   AOX3, B/L Upper extremity 5/5 motor strength. B/L Lower extremity 4/5 (RLE slightly weaker than LLE, but still able to lift against gravity and resistance). No focal neuro deficits  -q1hr neuro checks  -Keep head of bed >60degrees due to dural tear in surgery  -Oral Dexamethasone 4mg q4hrs, stop after 14days  -Pain: Dilaudid PCA, Oral Tylenol    Respiratory:  O2 saturation 98% RA    Cardiovascular:   -Hemodynamically stable on no pressor support  -Home amlodipine 10mg QD    Gastrointestinal/Nutrition:   -Clear liquid diet on 11/2  -Transfer to DASH diet on 11/3   -Senna QD  -Protonix 40 QD  -Monitor CK  Admission CK > 05467  -Monitor LFTs (elevated most likely due to rhabdo)    Renal/Genitourinary:   -Prostate Biopsy 2021 --> adenocarcinoma Gelason 3+4; declined radiation therapy at the time  -LR: 75cc/hr  -Flomax QD  -Parsons placed  -Monitor urine output    Hematologic:   -Monitor H&H  -DVT prophylaxis; SCDS  -Hx of DVTs, IVC filter placed 11/1    Infectious Disease:   -Afebrile  -Monitor WBC/temp  -Continue 2grams Ancef (started 11/2) q8hr for 24hrs    Lines/Tubes:  -R. 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

## 2023-11-02 NOTE — PROGRESS NOTE ADULT - SUBJECTIVE AND OBJECTIVE BOX
Patient Resting without complaints.     Exam:   Gen: Alert/Oriented, No Acute Distress  Pulm: Non-labored breathing  BACK:          Dressing: [x] clean/dry/intact  [ ] Other:           Drains: : JPx2 ss         Sensation: [x] intact to light touch  [ ] decreased:   Motor:                   C5                C6              C7               C8           T1   R            5/5                5/5            5/5             5/5          5/5  L             5/5               5/5             5/5             5/5          5/5                L2             L3             L4               L5            S1  R         5/5           5/5          5/5             5/5           5/5  L          5/5          5/5           5/5             5/5           5/5    Sensory:            C5         C6         C7      C8       T1        (0=absent, 1=impaired, 2=normal, NT=not testable)  R         2            2           2        2         2  L          2            2           2        2         2               L2          L3         L4      L5       S1         (0=absent, 1=impaired, 2=normal, NT=not testable)  R         2            2            2        2        2  L          2            2           2        2         2           WWP; capillary refill <3 seconds              LABS:                        12.4<L>  18.51<H> )-----------( 143<L>    ( 03 Nov 2023 00:20 )             36.5<L>    11-03    135  |  99  |  29<H>  ----------------------------<  275<H>  4.3   |  25  |  1.01        Assessment and Plan:  Pt is a  with  73yy/o  Male  POD#0  s/p C5-T3 PSF/C7-T1 lami     -    C collar at all times  -    Q1 neuro checks  - MAP >85  - HOB >60, monitor drains for CSF  -    Multimodal Pain control  -    DVT ppx venodynes, no chemical DVTppx  -    Resumed home meds  -    Check AM labs  -    Monitor AGGIE output  -    Weight bearing status: WBAT  -    PT/OT  -    Dispo: Home/ Rehab/[facility]/ TBD

## 2023-11-02 NOTE — PROGRESS NOTE ADULT - PROBLEM SELECTOR PLAN 2
Found to have LE Weakness along with saddle anesthesia; concern for cord compression  saddle anesthesia is now resolved   Prostate Biopsy 2021 --> adenocarcinoma Gelason 3+4; declined radiation therapy at the time  Epidural disease was also noted at T12 vertebral body. NM Scan significant for multiple sites with osseous lesions;   Team at [facility] discussed with Oncology  edema from C3-4 through C7-T1 interspinous ligaments as well as lytic metastatic disease from C7, Tq-T5 and T12, as well as L1-L3    Plan  Oncology following   Plan as above for cord compression  Degarelix (GnRH Receptor antagonist) once LFTs improve, AST/ALT 44/199 10/31, continue to hold   F/U Rad Onc Recs Prostate Biopsy 2021 --> adenocarcinoma Gelason 3+4; declined radiation therapy at the time  Epidural disease was also noted at T12 vertebral body. NM Scan significant for multiple sites with osseous lesions;   Team at [facility] discussed with Oncology  edema from C3-4 through C7-T1 interspinous ligaments as well as lytic metastatic disease from C7, Tq-T5 and T12, as well as L1-L3    Plan  Oncology following   Plan as above for cord compression  Degarelix (GnRH Receptor antagonist) once LFTs improve, AST/ALT 44/199 10/31, continue to hold

## 2023-11-02 NOTE — PROGRESS NOTE ADULT - ATTENDING COMMENTS
73M w/ hx of T2DM, HTN, metastatic Prostate cancer (2009) was initially evaluated at [facility] for lower extremity paralysis in the setting of a recent fall, found to have cervical spine neoplasm c/f cord compression, course complicated by progression of prostate cancer, rhabdomyolysis, SRUTHI, and DVT, transferred for [facility] for XRT and ortho-onc eval. Now pending IVC filter and OR w/ ortho-spine on 11/2.     Patient seen and examined, states he feels fine after IVC filter placement yesterday. He is awaiting OR for today. He denies chest pain or SOB.     #Pre-operative evaluation  - EKG reviewed, NSR w/ PACs. TTE reviewed, hyperdynamic LV but otherwise unremarkable. CXR with clear lungs. Patient has no chest pain or SOB. He has no evidence of acute ACS or acute CHF. His METS prior to cord compression > 4. His RCRI score is 1 for pre-op use of insulin. He is medically optimized to proceed to OR without further testing    #Cord compression   #Metastatic prostate CA  - MRI C/T spine reviewed, showing "fractures of the C7 and T1 spinous processes with intervening ligamentous injury and re-demonstration of epidural extension of neoplastic disease at the T1 level with associated cord compression and mild cord edema at T1 which appears unchanged in comparison with 10/21/2023."  - Ortho planning for OR today. Followed by RT. Rad-onc following as well   - S/p high dose solumedrol, will transition to PO decadron 4mg q6h and will need to taper   - Onc following, started casodex  - Attempted TOV, patient retaining > 600cc. Parsons replaced. Started on flomax. Will re-assess TOV depending after surgery     #DVT   - 10/22/23 US w/ DVT in the bilateral posterior tibial and peroneal veins  - S/p heparin gtt, now on hold.   - S/p IVC filter 11/1 w/ IR as patient cannot be on AC after ortho surgery.  - Will need follow-up in IR clinic in 6-8 weeks or once contraindication to AC no longer exists to evaluate candidacy for IVC filter retrieval. Will reassess w/ ortho once AC can be restarted.     #Type 2 Diabetes with hyperglycemia   - C/w basal-bolus, suspect hyperglycemia due to steroids   - Will continue to monitor FS and adjust regimen as needed    Rest as above.  Discussed with resident Dr. Haywood

## 2023-11-02 NOTE — BRIEF OPERATIVE NOTE - NSICDXBRIEFPROCEDURE_GEN_ALL_CORE_FT
PROCEDURES:  Open posterior fusion of cervicojoint of posterior column of thoracic vertebra 02-Nov-2023 19:40:48  Igor Fu  
PROCEDURES:  Closure, wound, back, secondary 02-Nov-2023 20:27:20  Holly Peterson

## 2023-11-02 NOTE — PROGRESS NOTE ADULT - PROBLEM SELECTOR PLAN 4
patient with a history of prostate cancer and history of urinary retention   -best was d/lonny yesterday 10/30, patient voided 600cc and bladder scan demonstrated 632cc   -c/w best   -c/w flomax patient with a history of prostate cancer and history of urinary retention   -best was d/lonny 10/30 but failed TOV, now with best until after procedures   -c/w best   -c/w flomax

## 2023-11-02 NOTE — CONSULT NOTE ADULT - ASSESSMENT
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 Initiated IV solumedrol with gradual improvement of symptoms and return of strength since Wednesday now here at [facility] for spine evaluation by surgery.    Surgery:  Findings: Cervicothoracic spine tumor noted.  Procedure:  C7-T1 decompression   C5-T3 posterior cervical fusion  Surgical incision of back - complex closure paraspinal muscle flaps AGGIE X2      PLAN:   Neurologic:  -C-collar  -Baseline:   AOX3, B/L Upper extremity 5/5 motor strength. B/L Lower extremity 4/5 (RLE slightly weaker than LLE, but still able to lift against gravity and resistance). No focal neuro deficits  -q1hr neuro checks  -Keep head of bed >60degrees due to dural tear in surgery  -Oral Dexamethasone  -Pain: Dilaudid PCA, Oral Tylenol    Respiratory:  O2 saturation 98% RA    Cardiovascular:   Hemodynamically stable on no pressor support    Gastrointestinal/Nutrition:   -Clear liquid diet on 11/2  -Transfer to DASH diet on 11/3   -Protonix 40 QD  -Monitor CK  Admission CK > 63086  -Monitor LFTs (elevated most likely due to rhabdo)    Renal/Genitourinary:   -Prostate Biopsy 2021 --> adenocarcinoma Gelason 3+4; declined radiation therapy at the time  -Parsons placed  -Monitor urine output    Hematologic:   -Monitor H&H  -DVT prophylaxis; SCDS  -Hx of DVTs, IVC filter placed 11/1    Infectious Disease:   -Afebrile  -Monitor WBC/temp  -Continue 2grams Ancef (started 11/2) q8hr for 24hrs    Lines/Tubes:  -R. 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 Initiated IV solumedrol with gradual improvement of symptoms and return of strength since Wednesday now here at [facility] for spine evaluation by surgery.    Surgery:  Findings: Cervicothoracic spine tumor noted.  Procedure:  C7-T1 decompression   C5-T3 posterior cervical fusion  Surgical incision of back - complex closure paraspinal muscle flaps AGGIE X2      PLAN:   Neurologic:  -C-collar  -Baseline:   AOX3, B/L Upper extremity 5/5 motor strength. B/L Lower extremity 4/5 (RLE slightly weaker than LLE, but still able to lift against gravity and resistance). No focal neuro deficits  -q1hr neuro checks  -Keep head of bed >60degrees due to dural tear in surgery  -Oral Dexamethasone  -Pain: Dilaudid PCA, Oral Tylenol    Respiratory:  O2 saturation 98% RA    Cardiovascular:   Hemodynamically stable on no pressor support    Gastrointestinal/Nutrition:   -Clear liquid diet on 11/2  -Transfer to DASH diet on 11/3   -Protonix 40 QD  -Monitor CK  Admission CK > 20293  -Monitor LFTs (elevated most likely due to rhabdo)    Renal/Genitourinary:   -Prostate Biopsy 2021 --> adenocarcinoma Gelason 3+4; declined radiation therapy at the time  -Parsons placed  -Monitor urine output    Hematologic:   -Monitor H&H  -DVT prophylaxis; SCDS  -Hx of DVTs, IVC filter placed 11/1    Infectious Disease:   -Afebrile  -Monitor WBC/temp  -Continue 2grams Ancef (started 11/2) q8hr for 24hrs    Lines/Tubes:  -R. 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 Initiated IV solumedrol with gradual improvement of symptoms and return of strength since Wednesday now here at [facility] for spine evaluation by surgery.    Surgery:  Findings: Cervicothoracic spine tumor noted.  Procedure:  C7-T1 decompression   C5-T3 posterior cervical fusion  Surgical incision of back - complex closure paraspinal muscle flaps AGGIE X2      Interval Events:   -Declined Senna, Flomax on 11/2 secondary to not tolerating oral intake    PLAN:   Neurologic:  -C-collar  -Baseline:   AOX3, B/L Upper extremity 5/5 motor strength. B/L Lower extremity 4/5 (RLE slightly weaker than LLE, but still able to lift against gravity and resistance). No focal neuro deficits  -q1hr neuro checks  -Keep head of bed >60degrees due to dural tear in surgery  -Oral Dexamethasone 4mg q4hrs, stop after 14days  -Pain: Dilaudid PCA, Oral Tylenol    Respiratory:  O2 saturation 98% RA    Cardiovascular:   -Hemodynamically stable on no pressor support  -Home amlodipine 10mg QD    Gastrointestinal/Nutrition:   -Clear liquid diet on 11/2  -Transfer to DASH diet on 11/3   -Senna QD  -Protonix 40 QD  -Monitor CK  Admission CK > 50539  -Monitor LFTs (elevated most likely due to rhabdo)    Renal/Genitourinary:   -Prostate Biopsy 2021 --> adenocarcinoma Gelason 3+4; declined radiation therapy at the time  -LR: 75cc/hr  -Flomax QD  -Parsons placed  -Monitor urine output    Hematologic:   -Monitor H&H  -DVT prophylaxis; SCDS  -Hx of DVTs, IVC filter placed 11/1    Infectious Disease:   -Afebrile  -Monitor WBC/temp  -Continue 2grams Ancef (started 11/2) q8hr for 24hrs    Lines/Tubes:  -R. radial A line  -PIV X2  -AGGIE X2    Endocrine:   -Monitor glucose  -Lantus+ Admelog ISS   -resume insulin (go up to glargine 34U and lispro 20U) once on diet    Disposition: SICU  Follow-up in IR clinic in 6-8 weeks or once contraindication to AC no longer exists to evaluate candidacy for IVC filter retrieval

## 2023-11-02 NOTE — PROGRESS NOTE ADULT - PROBLEM SELECTOR PLAN 1
Started on Solumedrol at [facility]  Since Wednesday, Improved strength, sensation; Normal BM  Rectal Exam: Normal Tone;  Does not feel comfortable to stand, 5/5 UE and 4/5 LE strength    Plan:  F/U with Ortho Recs, plan for decompressive surgery tomorrow 11/2 after IVC filter placement today 11/1  F/U Rad Onc Rec: Tentative Palliative Simulation/XRT to C7-T5 pending ortho evaluation  patients IV methylprednisolone 40 q8 was d/lonny on 10/30 and patient started on PO dexamethasone 4mg q6, continue for 14 days scheduled to end 11/13   Protonix 40 qd  Neuro Checks q4h - F/U with Ortho Recs, plan for decompressive surgery 11/2 after IVC filter placement 11/1  - F/U Rad Onc Rec: Tentative Palliative Simulation/XRT to C7-T5 pending ortho evaluation  - previously on IV methylprednisolone 40 q8 was d/lonny on 10/30 and patient started on PO dexamethasone 4mg q6 - f.u rad onc for when ok to de-escalate dexamethasone  - Protonix 40 qd  - Neuro Checks q4h

## 2023-11-02 NOTE — ADVANCED PRACTICE NURSE CONSULT - ASSESSMENT
General: A&Ox4, sitting in chair, BLE weakness, continent of stool, best catheter in place. Skin warm, dry with increased moisture in intertriginous folds, adequate skin turgor, scattered areas of hyperpigmentation and hypopigmentation, scattered areas of dry stable scabs to bilateral elbows and lower legs. Dry flaky skin to lower back, no ulcerations noted.     Abrasions to bilateral knees s/p fall:  -R knee: 2 abrasions measured in a cluster [id number], wound beds with 20% dermis, 80% fibrin, small fibrinous drainage, no odor, periwound with circumferential blanchable erythema 0.5cm, no increased warmth, no edema, no induration, no fluctuance no signs or symptoms of overt skin infection.  Dry stable scab to 12 o clock of abrasions.   -Left knee abrasion 1.5cmx0.8cmx0.2cm with 100% pale pink dermis, small fibrinous drainage, no odor, Periwound with no erythema, no increased warmth, no edema, no induration, no fluctuance no signs or symptoms of overt skin infection.   -Goals of care: antimicrobial support, offload pressure, protect from friction and shear, monitor for tissue type changes.     Patient states he is aware of slow wound healing with diabetes, discussed indication for use of medihoney for antimicrobial support and reviewed wound care, patient verbalized understanding.

## 2023-11-02 NOTE — PROGRESS NOTE ADULT - PROBLEM SELECTOR PLAN 6
patients rhabdomyolysis likely in the setting of fall   Admission CK > 04488  Downtrended to 511  Downtrended further to normalize  at 114 Alk Phos: 314 -> 311 -> 315 -> 283   AST: 108 -> 76 -> 53 -> 31   ALT: 299 -> 281 -> 215 -> 137     Likely in the setting of Rhabdo  Will continue to trend the LFTs and transaminitis  RUQ U/S unremarkable

## 2023-11-02 NOTE — PROGRESS NOTE ADULT - PROBLEM SELECTOR PLAN 9
Amlodipine 10 qd  Rampril 2.5 qd    C/W Amlodipine 10qd given good BP control  Hold Rampiril iso unknown Creatinine baseline hold atorvastatin iso elevated LFTs

## 2023-11-02 NOTE — PROGRESS NOTE ADULT - PROBLEM SELECTOR PLAN 5
B/L DVTs found in [facility] VS  - DVT in R posterior tibial and peroneal veins Below Knee  - DVT in Left Posterior tibial and peroneal veins below Knee  Suspect likely iso known malignancy  patient noted increased swelling, no other symptoms such as chest pain, SOB, pedal pulses present b/l     Plan:  Hold Heparin GTT for IVC filter today 11/1 and then OR on 11/2, plan to convert off heparin gtt once surgical intervention complete   patient will require IVC filter is plan is for surgical decompression prior to radiation   TTE  with no signs of RHS B/L DVTs found in [facility] VS Suspect likely iso known malignancy  - DVT in R posterior tibial and peroneal veins Below Knee  - DVT in Left Posterior tibial and peroneal veins below Knee  - TTE  with no signs of RHS  - s/p IVC filter 11/1

## 2023-11-02 NOTE — PROGRESS NOTE ADULT - PROBLEM SELECTOR PLAN 3
RCRI: 1 point based on pre-operative treatment with insulin   METS > 7, as per patient, although his mobility is not limited due to the loss of function with the spinal cord compression, prior to that he was very physically active, jogged most days, climbs 3 flights of stairs as he lives on the 3rd floor, and able to complete ADL and IADLs as well as garden without SOB, chest pain   patient also notes that he is not experiencing any SOB, chest pain, orthopnea, currently and there is no concern for ACS at this time   patient does not have a history of CHF   ECG demonstrated NSR   Echo demonstrates hyperdynamic LV systolic function but otherwise benign   patient is medically optimized to proceed for his procedure in the OR without the need for further testing

## 2023-11-02 NOTE — PROGRESS NOTE ADULT - ASSESSMENT
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 Initiated IV solumedrol with gradual improvement of symptoms and return of strength since Wednesday now here at [facility] for spine evaluation by surgery.  73M w/ T2DM, HTN, Metastatic prostate cancer (2009) was initially evaluated at [facility] for lower extremity paralysis in the setting of a recent fall, found to have 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 Initiated IV solumedrol, now transitioned to dexamethasone, with gradual improvement of symptoms and return of strength. S/p IVC filter. Now pending OR decompression/fusion procedure.

## 2023-11-02 NOTE — MEDICAL STUDENT PROGRESS NOTE(EDUCATION) - ASSESSMENT
73M PMH T2DM, HTN, metastatic prostate cancer (2009) found to have diffuse spinal mets c/f cord compression, placed on IVC filter yesterday and scheduled for OR today for decompressive surgery with ortho spine.      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   - Was initially on Solumedrol in [facility] -> Started on Dexamethasone 10/31. Will likely taper postop.  - IVC filter was placed yesterday -> IR recs AC can be resumed 6 weeks postop with a removal of the filter  - OR scheduled today for decompressive surgery with Dr. Badillo  - NPO since last night, preop labs WNL  - Protonix 40qd  - Neuro checks q4    2) Preop clearance for decompressive surgery with ortho spine scheduled today (11/2)  - RCRI = 1, with preoperative insulin given being counted  - ECG negative  - Echo showed hyperdynamic LV systolic function but benign findings  - METS >7, patient is unable to walk on his own.    3) Prostate cancer with diffuse metastasis to the spine  - Onc following  - F/u rad onc  - Degarelix once LFTs improve, f/u with recs from onc    4) Urinary retention following d/c best catheter 10/31  - Patient was restarted on best catheter; total output was 900mL/24hrs  - Patient is voiding regularly with no reported issues of dysuria or hematuria    5) DVTs b/l  - IVC filter was placed yesterday  - D/c heparin as per ortho recs for the surgery today  - Will continue to leave patient d/c on heparin postop    6) Back pain  - Pain is well controlled, c/w current regiment  - Oxy 2.5mg q4  - Senna standing  - Miralax PRN    7) HTN   - BP is well controlled  - Amlodipine 10qd    8) HLD  - Restart atorvastatin following surgery postop as LFTs have improved    9) T2DM  -Glucose spiked (237) following reduction of lantus from 29u-->13u  -Closely monitor postop

## 2023-11-02 NOTE — PROGRESS NOTE ADULT - PROBLEM SELECTOR PLAN 10
hold atorvastatin iso elevated LFTs patient continues to have elevated glucose levels requiring additional insulin sliding scale, likely due to steroids     when on diet, lantus 29U -- while NPO, 13U  increase admelog to 10u TID, hold as patient is NPO

## 2023-11-02 NOTE — PROGRESS NOTE ADULT - PROBLEM SELECTOR PLAN 8
Likely due to Cord Compression/Spinal Mets    Plan:  Oxycodone 5 q4h PRN for Severe pain  Oxycodone 2.5 q4h PRN for moderate pain  Senna standing  Miralax PRN Amlodipine 10 qd  Rampril 2.5 qd    C/W Amlodipine 10qd given good BP control  Hold Rampiril iso unknown Creatinine baseline

## 2023-11-02 NOTE — PROGRESS NOTE ADULT - PROBLEM SELECTOR PLAN 7
Alk Phos: 314 -> 311 -> 315 -> 283   AST: 108 -> 76 -> 53 -> 31   ALT: 299 -> 281 -> 215 -> 137     Likely in the setting of Rhabdo  Will continue to trend the LFTs and transaminitis  RUQ U/S unremarkable Likely due to Cord Compression/Spinal Mets    Plan:  Oxycodone 5 q4h PRN for Severe pain  Oxycodone 2.5 q4h PRN for moderate pain  Senna standing  Miralax PRN

## 2023-11-02 NOTE — ADVANCED PRACTICE NURSE CONSULT - RECOMMEDATIONS
Topical recommendations:   ---Bilateral knees: Cleanse with NS, pat dry. Apply Liquid barrier film to periwound skin (allow to dry). Apply thin layer of medihoney gel to open wounds, cover with silicone foam with border. Change daily and PRN if soiled.     Plan discussed with patient and primary RN Dennis Larios.  Please contact Wound/Ostomy Care Service Line if we can be of further assistance (ext [phone]). Please reconsult if changes to tissue type noted.  Topical recommendations:   ---Bilateral knees: Cleanse with NS, pat dry. Apply Liquid barrier film to periwound skin (allow to dry). Apply thin layer of medihoney gel to open wounds, cover with silicone foam with border. Change daily and PRN if soiled.   ---Continue low air loss bed therapy, continue heel elevation while in bed, continue to turn & reposition per protocol, continue moisture management with barrier creams & single breathable pad, continue measures to decrease friction/shear/pressure.     Plan discussed with patient and primary RN Dennis Larios.  Please contact Wound/Ostomy Care Service Line if we can be of further assistance (ext [phone]). Please reconsult if changes to tissue type noted.

## 2023-11-02 NOTE — PROGRESS NOTE ADULT - ATTENDING COMMENTS
Patient seen and examined. Cont LE weakness with inability to ambulate.  On exam, decreased BLE sens, 2/3-5 IP, 3/5 Q, 2/5 TA, 3-4/5 GS.  MRI with met disease, T1 fx with ESCC.  D/w patient nonsurgical and surgical options.  Patient wishes to proceed with separation surgery, stabilization, with subsequent XRT.  The nature of the planned surgery, other options available to the patient, the risks and possible complications of this surgery and the other options, including but not limited to death, paralysis, nerve damage, infection, bleeding, failure of the surgery to relieve  symptoms, failure of fusion, dislodgement of interbody graft, hardware failure/breakage/loosening, use of DBM  and its inherent risks, blindness, wound healing complications, proximal/distal junctional failure, pulmonary and/or cardiac complications, DVT, PE, CVA, UTI, spinal fluid leakage, possible need for further surgery in the future, spread or recurrence of cancer, adjacent segment deterioration, etc were discussed with the patient at length and the patient understands and wishes to proceed.

## 2023-11-02 NOTE — MEDICAL STUDENT PROGRESS NOTE(EDUCATION) - SUBJECTIVE AND OBJECTIVE BOX
73y M PMH T2DM, THTN, metastatic prostate cancer found to have cervical spine neoplasm c/f cord compression. Patient is scheduled for the OR today, received IVC filter last night.    Patient is seen and examined at bedside, resting comfortably. He reports no acute overnight events and no pain throughout the night. He reports feeling great, is energized, and is excited for the surgery. He has not been able to stand yet and still feels weakness in his legs. He reports less numbness/tingling in his lower extremeties and right pinky when compared to yesterday. He reports last eating around 5-6pm last night, and no complications from his IVC filter procedure.    Vital Signs Last 24 Hrs  T(C): 36.5 (02 Nov 2023 04:47), Max: 37.1 (01 Nov 2023 21:50)  T(F): 97.7 (02 Nov 2023 04:47), Max: 98.8 (01 Nov 2023 21:50)  HR: 71 (02 Nov 2023 04:47) (68 - 71)  BP: 118/72 (02 Nov 2023 04:47) (111/72 - 118/72)  RR: 18 (02 Nov 2023 04:47) (17 - 18)  SpO2: 97% (02 Nov 2023 04:47) (96% - 99%)    O2 Parameters below as of 02 Nov 2023 04:47  Patient On (Oxygen Delivery Method): room air      GENERAL: NAD, lying in bed comfortably  CHEST/LUNG: CTA b/l; No rales, rhonchi, wheezing, or rubs. Unlabored respirations  HEART: RRR; No murmurs, rubs, or gallops  ABDOMEN: BS+; Soft, NT, ND  EXTREMITIES:  2+ Peripheral Pulses, brisk capillary refill. No clubbing, cyanosis. 5/5 strength b/l legs  NERVOUS SYSTEM:  A&Ox3, no focal deficits       LABS:                          12.5   11.86 )-----------( 168      ( 02 Nov 2023 00:35 )             37.1     11-02    133<L>  |  98  |  31<H>  ----------------------------<  237<H>  4.2   |  24  |  0.86    Ca    8.6      02 Nov 2023 00:35  Phos  4.3     11-02  Mg     2.30     11-02    TPro  6.4  /  Alb  3.8  /  TBili  0.8  /  DBili  x   /  AST  31  /  ALT  137<H>  /  AlkPhos  283<H>  11-01    LIVER FUNCTIONS - ( 01 Nov 2023 06:46 )  Alb: 3.8 g/dL / Pro: 6.4 g/dL / ALK PHOS: 283 U/L / ALT: 137 U/L / AST: 31 U/L / GGT: x           PT/INR - ( 02 Nov 2023 00:35 )   PT: 11.7 sec;   INR: 1.05 ratio         PTT - ( 02 Nov 2023 00:35 )  PTT:23.6 sec          Urinalysis Basic - ( 02 Nov 2023 00:35 )    Color: x / Appearance: x / SG: x / pH: x  Gluc: 237 mg/dL / Ketone: x  / Bili: x / Urobili: x   Blood: x / Protein: x / Nitrite: x   Leuk Esterase: x / RBC: x / WBC x   Sq Epi: x / Non Sq Epi: x / Bacteria: x      Total best catheter output: 900mL/24hrs

## 2023-11-02 NOTE — BRIEF OPERATIVE NOTE - OPERATION/FINDINGS
Surgical incision of back  - complex closure, paraspinal muscle flaps AGGIE X 2
Findings: cervicothoracic spine tumor  Procedure: C7-T1 decompression, C5-T3 posterior cervical fusion

## 2023-11-03 PROBLEM — C61 MALIGNANT NEOPLASM OF PROSTATE: Chronic | Status: ACTIVE | Noted: 2023-10-27

## 2023-11-03 LAB
ALBUMIN SERPL ELPH-MCNC: 3.4 G/DL — SIGNIFICANT CHANGE UP (ref 3.3–5)
ALBUMIN SERPL ELPH-MCNC: 3.4 G/DL — SIGNIFICANT CHANGE UP (ref 3.3–5)
ALP SERPL-CCNC: 240 U/L — HIGH (ref 40–120)
ALP SERPL-CCNC: 240 U/L — HIGH (ref 40–120)
ALT FLD-CCNC: 93 U/L — HIGH (ref 4–41)
ALT FLD-CCNC: 93 U/L — HIGH (ref 4–41)
ANION GAP SERPL CALC-SCNC: 11 MMOL/L — SIGNIFICANT CHANGE UP (ref 7–14)
ANION GAP SERPL CALC-SCNC: 11 MMOL/L — SIGNIFICANT CHANGE UP (ref 7–14)
APTT BLD: 23.1 SEC — LOW (ref 24.5–35.6)
APTT BLD: 23.1 SEC — LOW (ref 24.5–35.6)
AST SERPL-CCNC: 30 U/L — SIGNIFICANT CHANGE UP (ref 4–40)
AST SERPL-CCNC: 30 U/L — SIGNIFICANT CHANGE UP (ref 4–40)
BILIRUB SERPL-MCNC: 0.7 MG/DL — SIGNIFICANT CHANGE UP (ref 0.2–1.2)
BILIRUB SERPL-MCNC: 0.7 MG/DL — SIGNIFICANT CHANGE UP (ref 0.2–1.2)
BUN SERPL-MCNC: 29 MG/DL — HIGH (ref 7–23)
BUN SERPL-MCNC: 29 MG/DL — HIGH (ref 7–23)
CALCIUM SERPL-MCNC: 8.5 MG/DL — SIGNIFICANT CHANGE UP (ref 8.4–10.5)
CALCIUM SERPL-MCNC: 8.5 MG/DL — SIGNIFICANT CHANGE UP (ref 8.4–10.5)
CHLORIDE SERPL-SCNC: 99 MMOL/L — SIGNIFICANT CHANGE UP (ref 98–107)
CHLORIDE SERPL-SCNC: 99 MMOL/L — SIGNIFICANT CHANGE UP (ref 98–107)
CO2 SERPL-SCNC: 25 MMOL/L — SIGNIFICANT CHANGE UP (ref 22–31)
CO2 SERPL-SCNC: 25 MMOL/L — SIGNIFICANT CHANGE UP (ref 22–31)
CREAT SERPL-MCNC: 1.01 MG/DL — SIGNIFICANT CHANGE UP (ref 0.5–1.3)
CREAT SERPL-MCNC: 1.01 MG/DL — SIGNIFICANT CHANGE UP (ref 0.5–1.3)
EGFR: 80 ML/MIN/1.73M2 — SIGNIFICANT CHANGE UP
EGFR: 80 ML/MIN/1.73M2 — SIGNIFICANT CHANGE UP
GLUCOSE BLDC GLUCOMTR-MCNC: 188 MG/DL — HIGH (ref 70–99)
GLUCOSE BLDC GLUCOMTR-MCNC: 188 MG/DL — HIGH (ref 70–99)
GLUCOSE BLDC GLUCOMTR-MCNC: 198 MG/DL — HIGH (ref 70–99)
GLUCOSE BLDC GLUCOMTR-MCNC: 198 MG/DL — HIGH (ref 70–99)
GLUCOSE BLDC GLUCOMTR-MCNC: 206 MG/DL — HIGH (ref 70–99)
GLUCOSE BLDC GLUCOMTR-MCNC: 206 MG/DL — HIGH (ref 70–99)
GLUCOSE BLDC GLUCOMTR-MCNC: 300 MG/DL — HIGH (ref 70–99)
GLUCOSE BLDC GLUCOMTR-MCNC: 300 MG/DL — HIGH (ref 70–99)
GLUCOSE SERPL-MCNC: 275 MG/DL — HIGH (ref 70–99)
GLUCOSE SERPL-MCNC: 275 MG/DL — HIGH (ref 70–99)
HCT VFR BLD CALC: 36.5 % — LOW (ref 39–50)
HCT VFR BLD CALC: 36.5 % — LOW (ref 39–50)
HGB BLD-MCNC: 12.4 G/DL — LOW (ref 13–17)
HGB BLD-MCNC: 12.4 G/DL — LOW (ref 13–17)
INR BLD: 1.07 RATIO — SIGNIFICANT CHANGE UP (ref 0.85–1.18)
INR BLD: 1.07 RATIO — SIGNIFICANT CHANGE UP (ref 0.85–1.18)
MAGNESIUM SERPL-MCNC: 2.2 MG/DL — SIGNIFICANT CHANGE UP (ref 1.6–2.6)
MAGNESIUM SERPL-MCNC: 2.2 MG/DL — SIGNIFICANT CHANGE UP (ref 1.6–2.6)
MCHC RBC-ENTMCNC: 30.8 PG — SIGNIFICANT CHANGE UP (ref 27–34)
MCHC RBC-ENTMCNC: 30.8 PG — SIGNIFICANT CHANGE UP (ref 27–34)
MCHC RBC-ENTMCNC: 34 GM/DL — SIGNIFICANT CHANGE UP (ref 32–36)
MCHC RBC-ENTMCNC: 34 GM/DL — SIGNIFICANT CHANGE UP (ref 32–36)
MCV RBC AUTO: 90.6 FL — SIGNIFICANT CHANGE UP (ref 80–100)
MCV RBC AUTO: 90.6 FL — SIGNIFICANT CHANGE UP (ref 80–100)
NRBC # BLD: 0 /100 WBCS — SIGNIFICANT CHANGE UP (ref 0–0)
NRBC # BLD: 0 /100 WBCS — SIGNIFICANT CHANGE UP (ref 0–0)
NRBC # FLD: 0 K/UL — SIGNIFICANT CHANGE UP (ref 0–0)
NRBC # FLD: 0 K/UL — SIGNIFICANT CHANGE UP (ref 0–0)
PHOSPHATE SERPL-MCNC: 4.9 MG/DL — HIGH (ref 2.5–4.5)
PHOSPHATE SERPL-MCNC: 4.9 MG/DL — HIGH (ref 2.5–4.5)
PLATELET # BLD AUTO: 143 K/UL — LOW (ref 150–400)
PLATELET # BLD AUTO: 143 K/UL — LOW (ref 150–400)
POTASSIUM SERPL-MCNC: 4.3 MMOL/L — SIGNIFICANT CHANGE UP (ref 3.5–5.3)
POTASSIUM SERPL-MCNC: 4.3 MMOL/L — SIGNIFICANT CHANGE UP (ref 3.5–5.3)
POTASSIUM SERPL-SCNC: 4.3 MMOL/L — SIGNIFICANT CHANGE UP (ref 3.5–5.3)
POTASSIUM SERPL-SCNC: 4.3 MMOL/L — SIGNIFICANT CHANGE UP (ref 3.5–5.3)
PROT SERPL-MCNC: 5.4 G/DL — LOW (ref 6–8.3)
PROT SERPL-MCNC: 5.4 G/DL — LOW (ref 6–8.3)
PROTHROM AB SERPL-ACNC: 12.1 SEC — SIGNIFICANT CHANGE UP (ref 9.5–13)
PROTHROM AB SERPL-ACNC: 12.1 SEC — SIGNIFICANT CHANGE UP (ref 9.5–13)
RBC # BLD: 4.03 M/UL — LOW (ref 4.2–5.8)
RBC # BLD: 4.03 M/UL — LOW (ref 4.2–5.8)
RBC # FLD: 12.4 % — SIGNIFICANT CHANGE UP (ref 10.3–14.5)
RBC # FLD: 12.4 % — SIGNIFICANT CHANGE UP (ref 10.3–14.5)
SODIUM SERPL-SCNC: 135 MMOL/L — SIGNIFICANT CHANGE UP (ref 135–145)
SODIUM SERPL-SCNC: 135 MMOL/L — SIGNIFICANT CHANGE UP (ref 135–145)
WBC # BLD: 18.51 K/UL — HIGH (ref 3.8–10.5)
WBC # BLD: 18.51 K/UL — HIGH (ref 3.8–10.5)
WBC # FLD AUTO: 18.51 K/UL — HIGH (ref 3.8–10.5)
WBC # FLD AUTO: 18.51 K/UL — HIGH (ref 3.8–10.5)

## 2023-11-03 PROCEDURE — 99233 SBSQ HOSP IP/OBS HIGH 50: CPT | Mod: GC

## 2023-11-03 RX ORDER — INSULIN LISPRO 100/ML
VIAL (ML) SUBCUTANEOUS AT BEDTIME
Refills: 0 | Status: DISCONTINUED | OUTPATIENT
Start: 2023-11-03 | End: 2023-11-05

## 2023-11-03 RX ORDER — INSULIN LISPRO 100/ML
VIAL (ML) SUBCUTANEOUS AT BEDTIME
Refills: 0 | Status: DISCONTINUED | OUTPATIENT
Start: 2023-11-03 | End: 2023-11-03

## 2023-11-03 RX ORDER — HYDRALAZINE HCL 50 MG
10 TABLET ORAL ONCE
Refills: 0 | Status: COMPLETED | OUTPATIENT
Start: 2023-11-03 | End: 2023-11-03

## 2023-11-03 RX ORDER — CYCLOBENZAPRINE HYDROCHLORIDE 10 MG/1
10 TABLET, FILM COATED ORAL EVERY 8 HOURS
Refills: 0 | Status: COMPLETED | OUTPATIENT
Start: 2023-11-03 | End: 2023-11-06

## 2023-11-03 RX ORDER — HYDRALAZINE HCL 50 MG
5 TABLET ORAL ONCE
Refills: 0 | Status: COMPLETED | OUTPATIENT
Start: 2023-11-03 | End: 2023-11-03

## 2023-11-03 RX ORDER — POLYETHYLENE GLYCOL 3350 17 G/17G
17 POWDER, FOR SOLUTION ORAL DAILY
Refills: 0 | Status: DISCONTINUED | OUTPATIENT
Start: 2023-11-03 | End: 2023-11-05

## 2023-11-03 RX ORDER — INSULIN GLARGINE 100 [IU]/ML
20 INJECTION, SOLUTION SUBCUTANEOUS EVERY MORNING
Refills: 0 | Status: DISCONTINUED | OUTPATIENT
Start: 2023-11-03 | End: 2023-11-03

## 2023-11-03 RX ORDER — LISINOPRIL 2.5 MG/1
20 TABLET ORAL DAILY
Refills: 0 | Status: DISCONTINUED | OUTPATIENT
Start: 2023-11-03 | End: 2023-11-17

## 2023-11-03 RX ORDER — ATORVASTATIN CALCIUM 80 MG/1
20 TABLET, FILM COATED ORAL AT BEDTIME
Refills: 0 | Status: DISCONTINUED | OUTPATIENT
Start: 2023-11-03 | End: 2023-12-23

## 2023-11-03 RX ORDER — INSULIN GLARGINE 100 [IU]/ML
30 INJECTION, SOLUTION SUBCUTANEOUS EVERY MORNING
Refills: 0 | Status: DISCONTINUED | OUTPATIENT
Start: 2023-11-03 | End: 2023-11-05

## 2023-11-03 RX ORDER — HYDRALAZINE HCL 50 MG
10 TABLET ORAL ONCE
Refills: 0 | Status: DISCONTINUED | OUTPATIENT
Start: 2023-11-03 | End: 2023-11-03

## 2023-11-03 RX ORDER — INSULIN LISPRO 100/ML
6 VIAL (ML) SUBCUTANEOUS
Refills: 0 | Status: DISCONTINUED | OUTPATIENT
Start: 2023-11-03 | End: 2023-11-03

## 2023-11-03 RX ORDER — ACETAMINOPHEN 500 MG
975 TABLET ORAL EVERY 6 HOURS
Refills: 0 | Status: DISCONTINUED | OUTPATIENT
Start: 2023-11-03 | End: 2023-12-10

## 2023-11-03 RX ORDER — INSULIN LISPRO 100/ML
VIAL (ML) SUBCUTANEOUS
Refills: 0 | Status: DISCONTINUED | OUTPATIENT
Start: 2023-11-03 | End: 2023-11-04

## 2023-11-03 RX ORDER — LABETALOL HCL 100 MG
10 TABLET ORAL ONCE
Refills: 0 | Status: COMPLETED | OUTPATIENT
Start: 2023-11-03 | End: 2023-11-03

## 2023-11-03 RX ORDER — INSULIN LISPRO 100/ML
10 VIAL (ML) SUBCUTANEOUS
Refills: 0 | Status: DISCONTINUED | OUTPATIENT
Start: 2023-11-03 | End: 2023-11-04

## 2023-11-03 RX ADMIN — Medication 1000 MILLIGRAM(S): at 00:38

## 2023-11-03 RX ADMIN — HYDROMORPHONE HYDROCHLORIDE 30 MILLILITER(S): 2 INJECTION INTRAMUSCULAR; INTRAVENOUS; SUBCUTANEOUS at 07:17

## 2023-11-03 RX ADMIN — Medication 400 MILLIGRAM(S): at 05:48

## 2023-11-03 RX ADMIN — BICALUTAMIDE 50 MILLIGRAM(S): 50 TABLET, FILM COATED ORAL at 14:55

## 2023-11-03 RX ADMIN — Medication 5 MILLIGRAM(S): at 09:02

## 2023-11-03 RX ADMIN — Medication 2: at 11:08

## 2023-11-03 RX ADMIN — POLYETHYLENE GLYCOL 3350 17 GRAM(S): 17 POWDER, FOR SOLUTION ORAL at 14:55

## 2023-11-03 RX ADMIN — Medication 6 UNIT(S): at 16:47

## 2023-11-03 RX ADMIN — HYDROMORPHONE HYDROCHLORIDE 30 MILLILITER(S): 2 INJECTION INTRAMUSCULAR; INTRAVENOUS; SUBCUTANEOUS at 20:19

## 2023-11-03 RX ADMIN — Medication 10 MILLIGRAM(S): at 17:05

## 2023-11-03 RX ADMIN — Medication 400 MILLIGRAM(S): at 11:08

## 2023-11-03 RX ADMIN — Medication 10 MILLIGRAM(S): at 12:22

## 2023-11-03 RX ADMIN — CYCLOBENZAPRINE HYDROCHLORIDE 10 MILLIGRAM(S): 10 TABLET, FILM COATED ORAL at 22:25

## 2023-11-03 RX ADMIN — Medication 1000 MILLIGRAM(S): at 06:18

## 2023-11-03 RX ADMIN — Medication 6 UNIT(S): at 11:08

## 2023-11-03 RX ADMIN — Medication 4 MILLIGRAM(S): at 16:49

## 2023-11-03 RX ADMIN — CHLORHEXIDINE GLUCONATE 1 APPLICATION(S): 213 SOLUTION TOPICAL at 16:46

## 2023-11-03 RX ADMIN — Medication 400 MILLIGRAM(S): at 16:49

## 2023-11-03 RX ADMIN — Medication 100 MILLIGRAM(S): at 14:54

## 2023-11-03 RX ADMIN — CYCLOBENZAPRINE HYDROCHLORIDE 10 MILLIGRAM(S): 10 TABLET, FILM COATED ORAL at 14:55

## 2023-11-03 RX ADMIN — Medication 6: at 16:47

## 2023-11-03 RX ADMIN — Medication 4 MILLIGRAM(S): at 11:07

## 2023-11-03 RX ADMIN — AMLODIPINE BESYLATE 10 MILLIGRAM(S): 2.5 TABLET ORAL at 05:47

## 2023-11-03 RX ADMIN — Medication 1: at 08:49

## 2023-11-03 RX ADMIN — INSULIN GLARGINE 20 UNIT(S): 100 INJECTION, SOLUTION SUBCUTANEOUS at 11:07

## 2023-11-03 RX ADMIN — Medication 1000 MILLIGRAM(S): at 16:57

## 2023-11-03 RX ADMIN — ATORVASTATIN CALCIUM 20 MILLIGRAM(S): 80 TABLET, FILM COATED ORAL at 22:25

## 2023-11-03 RX ADMIN — Medication 400 MILLIGRAM(S): at 00:08

## 2023-11-03 RX ADMIN — Medication 1000 MILLIGRAM(S): at 11:40

## 2023-11-03 RX ADMIN — HYDROMORPHONE HYDROCHLORIDE 0.5 MILLIGRAM(S): 2 INJECTION INTRAMUSCULAR; INTRAVENOUS; SUBCUTANEOUS at 18:22

## 2023-11-03 RX ADMIN — Medication 10 MILLIGRAM(S): at 18:21

## 2023-11-03 RX ADMIN — SENNA PLUS 2 TABLET(S): 8.6 TABLET ORAL at 22:24

## 2023-11-03 RX ADMIN — TAMSULOSIN HYDROCHLORIDE 0.4 MILLIGRAM(S): 0.4 CAPSULE ORAL at 22:24

## 2023-11-03 RX ADMIN — Medication 4 MILLIGRAM(S): at 05:47

## 2023-11-03 RX ADMIN — Medication 100 MILLIGRAM(S): at 05:47

## 2023-11-03 RX ADMIN — PANTOPRAZOLE SODIUM 40 MILLIGRAM(S): 20 TABLET, DELAYED RELEASE ORAL at 05:47

## 2023-11-03 NOTE — PHYSICAL THERAPY INITIAL EVALUATION ADULT - GENERAL OBSERVATIONS, REHAB EVAL
Pt seen in SICU, +aspen collar, +monitor, + IV, +AGGIE drain ; Prior to evaluation spoke with RN Maliha and loli to work with Pt - Pt w/ elevated BP during beginning of session and was given IV BP medications by RN therefore held transfers/gait assessment; mid-end of session 's/80

## 2023-11-03 NOTE — PHYSICAL THERAPY INITIAL EVALUATION ADULT - IMPAIRMENTS FOUND, PT EVAL
pain/gait, locomotion, and balance/muscle strength/ROM/sensory integrity
aerobic capacity/endurance/gait, locomotion, and balance/muscle strength/ROM

## 2023-11-03 NOTE — PHYSICAL THERAPY INITIAL EVALUATION ADULT - PERTINENT HX OF CURRENT PROBLEM, REHAB EVAL
73 year old male w/ T2DM, HTN, Metastatic prostate cancer (2009) was initially evaluated at [facility] for lower extremity paralysis in the setting of a recent fall, found to have 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 Initiated IV solumedrol with gradual improvement of symptoms and return of strength since Wednesday now s/p above procedure
Patient is a 73 year old male w/ T2DM, HTN, Metastatic prostate cancer (2009) was initially evaluated at [facility] for lower extremity paralysis in the setting of a recent fall, found to have 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 Initiated IV solumedrol with gradual improvement of symptoms and return of strength since Wednesday now here at [facility] for spine evaluation by surgery. (+) DVT's on heparin.

## 2023-11-03 NOTE — OCCUPATIONAL THERAPY INITIAL EVALUATION ADULT - GENERAL OBSERVATIONS, REHAB EVAL
Patient received semisupine in bed in NAD; agreeable to participate in OT evaluation. +cervical collar. +IV. +tele. BP: 160/77 mmHg

## 2023-11-03 NOTE — PHYSICAL THERAPY INITIAL EVALUATION ADULT - ADDITIONAL COMMENTS
Pt reports he was independent with ambulation without assistive device and ADL's prior to onset of weakness/pain. Reports multiple recent falls due to lower extremity weakness. Pt reports he was independent with ambulation without assistive device and ADL's prior to onset of weakness/pain. Reports multiple recent falls due to lower extremity weakness.    Pt left in bed as found following treatment with head of bed elevated greater than 60 degrees, pt in no distress, vital signs stable per monitor, lines intact, precautions maintained, RN aware.

## 2023-11-03 NOTE — PROGRESS NOTE ADULT - SUBJECTIVE AND OBJECTIVE BOX
Anesthesia Pain Management Service    SUBJECTIVE: Pt doing well with IV PCA without problems reported.    Therapy:	  [ X] IV PCA	   [ ] Epidural           [ ] s/p Spinal Opoid              [ ] Postpartum infusion	  [ ] Patient controlled regional anesthesia (PCRA)    [ ] prn Analgesics    Allergies    No Known Allergies    Intolerances      MEDICATIONS  (STANDING):  acetaminophen   IVPB .. 1000 milliGRAM(s) IV Intermittent every 6 hours  amLODIPine   Tablet 10 milliGRAM(s) Oral daily  bicalutamide 50 milliGRAM(s) Oral daily  ceFAZolin   IVPB 2000 milliGRAM(s) IV Intermittent every 8 hours  chlorhexidine 2% Cloths 1 Application(s) Topical daily  cyclobenzaprine 10 milliGRAM(s) Oral every 8 hours  dexAMETHasone     Tablet 4 milliGRAM(s) Oral every 6 hours  glucagon  Injectable 1 milliGRAM(s) IntraMuscular once  HYDROmorphone PCA (1 mG/mL) 30 milliLiter(s) PCA Continuous PCA Continuous  insulin glargine Injectable (LANTUS) 20 Unit(s) SubCutaneous every morning  insulin lispro (ADMELOG) corrective regimen sliding scale   SubCutaneous Before meals and at bedtime  insulin lispro Injectable (ADMELOG) 6 Unit(s) SubCutaneous three times a day before meals  pantoprazole    Tablet 40 milliGRAM(s) Oral before breakfast  senna 2 Tablet(s) Oral at bedtime  tamsulosin 0.4 milliGRAM(s) Oral at bedtime    MEDICATIONS  (PRN):  benzocaine/menthol Lozenge 1 Lozenge Oral every 3 hours PRN Sore Throat  HYDROmorphone PCA (1 mG/mL) Rescue Clinician Bolus 0.5 milliGRAM(s) IV Push every 15 minutes PRN for Pain Scale GREATER THAN 6  nalbuphine Injectable 2.5 milliGRAM(s) IV Push every 6 hours PRN Pruritus  naloxone Injectable 0.1 milliGRAM(s) IV Push every 3 minutes PRN For ANY of the following changes in patient status:  A. RR LESS THAN 10 breaths per minute, B. Oxygen saturation LESS THAN 90%, C. Sedation score of 6  ondansetron Injectable 4 milliGRAM(s) IV Push every 6 hours PRN Nausea  polyethylene glycol 3350 17 Gram(s) Oral daily PRN Constipation      OBJECTIVE:   [X] No new signs     [ ] Other:    Side Effects:  [X ] None			[ ] Other:    Assessment of Catheter Site:		[ ] Intact		[ ] Other:    ASSESSMENT/PLAN  [ X] Continue current therapy    [ ] Therapy changed to:    [ ] IV PCA       [ ] Epidural     [ ] prn Analgesics     Comments:

## 2023-11-03 NOTE — PHYSICAL THERAPY INITIAL EVALUATION ADULT - GROSSLY INTACT, SENSORY
pt reports mild numbness in b/l feet/Left UE/Right UE/Left LE/Right LE/Grossly Intact
(-) numbness and tinging in all four extremities prior to and following mobility/Left UE/Right UE/Left LE/Right LE/Grossly Intact

## 2023-11-03 NOTE — PHYSICAL THERAPY INITIAL EVALUATION ADULT - PRECAUTIONS/LIMITATIONS, REHAB EVAL
**head of bed greater than 60 degrees 2/2 dural tear during surgery/spinal precautions/surgical precautions **elevate head of bed 60 degrees to 90 degrees 2/2 dural tear during surgery/spinal precautions/surgical precautions

## 2023-11-03 NOTE — PROGRESS NOTE ADULT - SUBJECTIVE AND OBJECTIVE BOX
ANESTHESIA POSTOP CHECK    73y Male POSTOP DAY 1     No COMPLAINTS    NO APPARENT ANESTHESIA COMPLICATIONS

## 2023-11-03 NOTE — OCCUPATIONAL THERAPY INITIAL EVALUATION ADULT - PERTINENT HX OF CURRENT PROBLEM, REHAB EVAL
73 year old male w/ T2DM, HTN, Metastatic prostate cancer (2009) was initially evaluated at [facility] for lower extremity paralysis in the setting of a recent fall, found to have 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 Transferred to [facility] now s/p C5-T3 posterior cervical fusion, C7-T1 decompression on 11/2/23.

## 2023-11-03 NOTE — PHYSICAL THERAPY INITIAL EVALUATION ADULT - PATIENT PROFILE REVIEW, REHAB EVAL
Spoke with ortho PA Helga prior to evaluation states ok for OOB and WBAT- head of bed must be greater than 60 degrees/yes Spoke with ortho PA Helga prior to evaluation states ok for OOB and WBAT- elevate head of bed 60 degrees to 90 degrees/yes

## 2023-11-03 NOTE — OCCUPATIONAL THERAPY INITIAL EVALUATION ADULT - PRECAUTIONS/LIMITATIONS, REHAB EVAL
****HOB 60-90 degrees due to dural tear/aspiration precautions/fall precautions/spinal precautions/surgical precautions

## 2023-11-03 NOTE — PROGRESS NOTE ADULT - ASSESSMENT
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 Initiated IV solumedrol with gradual improvement of symptoms and return of strength since Wednesday now here at [facility] for spine evaluation by surgery.    PLAN:   Neurologic:  -C-collar  -Baseline:   AOX3, B/L Upper extremity 5/5 motor strength. B/L Lower extremity 4/5 (RLE slightly weaker than LLE, but still able to lift against gravity and resistance). No focal neuro deficits  -q1hr neuro checks  -Keep head of bed >60degrees due to dural tear in surgery  -Oral Dexamethasone 4mg q4hrs, stop after 14days  -Pain: Dilaudid PCA, Oral Tylenol    Respiratory:  - O2 saturation 98% RA    Cardiovascular:   -Hemodynamically stable on no pressor support  -Home amlodipine 10mg QD    Gastrointestinal/Nutrition:   -Clear liquid diet on 11/2  -ADAT to DASH diet on 11/3   -Senna QD  -Protonix 40 QD  -Monitor CK  Admission CK > 08194  -Monitor LFTs (elevated most likely due to rhabdo)    Renal/Genitourinary:   -Prostate Biopsy 2021 --> adenocarcinoma Gelason 3+4; declined radiation therapy at the time  -LR: 75cc/hr  -Flomax QD  -Parsons placed  -Monitor urine output    Hematologic:   -Monitor H&H  -DVT prophylaxis; SCDS  -Hx of DVTs, IVC filter placed 11/1    Infectious Disease:   -Afebrile  -Monitor WBC/temp  -Continue 2grams Ancef (started 11/2) q8hr for 24hrs    Lines/Tubes:  -R. 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 Initiated IV solumedrol with gradual improvement of symptoms and return of strength since Wednesday now here at [facility] for spine evaluation by surgery.    PLAN:   Neurologic:  -C-collar  -Baseline:   AOX3, B/L Upper extremity 5/5 motor strength. B/L Lower extremity 4/5 (RLE slightly weaker than LLE, but still able to lift against gravity and resistance). No focal neuro deficits  -q1hr neuro checks  -Keep head of bed >60degrees due to dural tear in surgery  -Oral Dexamethasone 4mg q4hrs, stop after 14days  -Pain: Dilaudid PCA, Oral Tylenol    Respiratory:  - O2 saturation 98% RA    Cardiovascular:   -Hemodynamically stable on no pressor support  -Home amlodipine 10mg QD    Gastrointestinal/Nutrition:   -Clear liquid diet on 11/2  -ADAT to DASH diet on 11/3   -Senna QD  -Protonix 40 QD  -Monitor CK  Admission CK > 65537  -Monitor LFTs (elevated most likely due to rhabdo)    Renal/Genitourinary:   -Prostate Biopsy 2021 --> adenocarcinoma Gelason 3+4; declined radiation therapy at the time  -LR: 75cc/hr  -Flomax QD  -Parsons placed  -Monitor urine output    Hematologic:   -Monitor H&H  -DVT prophylaxis; SCDS  -Hx of DVTs, IVC filter placed 11/1    Infectious Disease:   -Afebrile  -Monitor WBC/temp  -Continue 2grams Ancef (started 11/2) q8hr for 24hrs    Lines/Tubes:  -R. 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 Initiated IV solumedrol with gradual improvement of symptoms and return of strength since Wednesday now here at [facility] for spine evaluation by surgery.    PLAN:   Neurologic:  -C-collar  -Baseline:   AOX3, B/L Upper extremity 5/5 motor strength. B/L Lower extremity 4/5 (RLE slightly weaker than LLE, but still able to lift against gravity and resistance). No focal neuro deficits  -q1hr neuro checks  -Keep head of bed >60degrees due to dural tear in surgery  -Oral Dexamethasone 4mg q4hrs, stop after 14days  -Pain: Dilaudid PCA, Oral Tylenol    Respiratory:  - O2 saturation 98% RA    Cardiovascular:   -Hemodynamically stable on no pressor support  -Home amlodipine 10mg QD    Gastrointestinal/Nutrition:   -ADAT to DASH diet on 11/3   -Senna QD  -Protonix 40 QD  -Monitor CK  Admission CK > 03869, downtrending < 100  -Monitor LFTs (elevated most likely due to rhabdo)    Renal/Genitourinary:   -Prostate Biopsy 2021 --> adenocarcinoma Gelason 3+4; declined radiation therapy at the time  -IVL  -Flomax QD  -Parsons placed  -Monitor urine output    Hematologic:   -Monitor H&H  -DVT prophylaxis; SCDS  -Hx of DVTs, IVC filter placed 11/1    Infectious Disease:   -Afebrile  -Monitor WBC/temp  -Continue 2grams Ancef (started 11/2) q8hr for 24hrs    Lines/Tubes:  -R. 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

## 2023-11-03 NOTE — PROGRESS NOTE ADULT - SUBJECTIVE AND OBJECTIVE BOX
SICU Daily Progress Note  =====================================================  Interval/Overnight Events:  - no acute overnight events    Allergies: No Known Allergies      MEDICATIONS:   --------------------------------------------------------------------------------------  Neurologic Medications  acetaminophen   IVPB .. 1000 milliGRAM(s) IV Intermittent every 6 hours  cyclobenzaprine 10 milliGRAM(s) Oral every 8 hours PRN Muscle Spasm  HYDROmorphone PCA (1 mG/mL) 30 milliLiter(s) PCA Continuous PCA Continuous  HYDROmorphone PCA (1 mG/mL) Rescue Clinician Bolus 0.5 milliGRAM(s) IV Push every 15 minutes PRN for Pain Scale GREATER THAN 6  nalbuphine Injectable 2.5 milliGRAM(s) IV Push every 6 hours PRN Pruritus  ondansetron Injectable 4 milliGRAM(s) IV Push every 6 hours PRN Nausea    Respiratory Medications    Cardiovascular Medications  amLODIPine   Tablet 10 milliGRAM(s) Oral daily    Gastrointestinal Medications  lactated ringers. 1000 milliLiter(s) IV Continuous <Continuous>  pantoprazole    Tablet 40 milliGRAM(s) Oral before breakfast  polyethylene glycol 3350 17 Gram(s) Oral daily PRN Constipation  senna 2 Tablet(s) Oral at bedtime    Genitourinary Medications  tamsulosin 0.4 milliGRAM(s) Oral at bedtime    Hematologic/Oncologic Medications  bicalutamide 50 milliGRAM(s) Oral daily    Antimicrobial/Immunologic Medications  ceFAZolin   IVPB 2000 milliGRAM(s) IV Intermittent every 8 hours    Endocrine/Metabolic Medications  dexAMETHasone     Tablet 4 milliGRAM(s) Oral every 6 hours  glucagon  Injectable 1 milliGRAM(s) IntraMuscular once  insulin glargine Injectable (LANTUS) 29 Unit(s) SubCutaneous every morning  insulin lispro (ADMELOG) corrective regimen sliding scale   SubCutaneous Before meals and at bedtime  insulin lispro Injectable (ADMELOG) 10 Unit(s) SubCutaneous three times a day before meals    Topical/Other Medications  benzocaine/menthol Lozenge 1 Lozenge Oral every 3 hours PRN Sore Throat  chlorhexidine 2% Cloths 1 Application(s) Topical daily  naloxone Injectable 0.1 milliGRAM(s) IV Push every 3 minutes PRN For ANY of the following changes in patient status:  A. RR LESS THAN 10 breaths per minute, B. Oxygen saturation LESS THAN 90%, C. Sedation score of 6    --------------------------------------------------------------------------------------    VITAL SIGNS, INS/OUTS (last 24 hours):  --------------------------------------------------------------------------------------  I&O's Detail    01 Nov 2023 07:01  -  02 Nov 2023 07:00  --------------------------------------------------------  IN:  Total IN: 0 mL    OUT:    Indwelling Catheter - Urethral (mL): 900 mL  Total OUT: 900 mL    Total NET: -900 mL      02 Nov 2023 07:01  -  03 Nov 2023 00:56  --------------------------------------------------------  IN:    IV PiggyBack: 150 mL    Lactated Ringers: 300 mL  Total IN: 450 mL    OUT:    Bulb (mL): 0 mL    Bulb (mL): 100 mL    Indwelling Catheter - Urethral (mL): 655 mL  Total OUT: 755 mL    Total NET: -305 mL        --------------------------------------------------------------------------------------    EXAM  NEUROLOGY  RASS:   	GCS:    Exam: Normal, NAD, alert, oriented x3, no focal deficits.    HEENT  Exam: Normocephalic, atraumatic, EOMI.     RESPIRATORY  Exam: Lungs clear to auscultation, Normal expansion/effort.  Mechanical Ventilation:     CARDIOVASCULAR  Exam: S1, S2.  Regular rate and rhythm.    GI/NUTRITION  Exam: Abdomen soft, Non-tender, Non-distended.    VASCULAR  Exam: Extremities warm, pink, well-perfused.      MUSCULOSKELETAL  Exam: All extremities moving spontaneously without limitations.      SKIN  Exam: Good skin turgor, no skin breakdown.     METABOLIC/FLUIDS/ELECTROLYTES  lactated ringers. 1000 milliLiter(s) IV Continuous <Continuous>      HEMATOLOGIC  [x] VTE Prophylaxis:   Transfusions:	[] PRBC	[] Platelets		[] FFP	[] Cryoprecipitate    INFECTIOUS DISEASE  Antimicrobials/Immunologic Medications:  ceFAZolin   IVPB 2000 milliGRAM(s) IV Intermittent every 8 hours      Tubes/Lines/Drains   [x] Peripheral IV  [] Central Venous Line     	[] R	[] L	[] IJ	[] Fem	[] SC	Date Placed:   [x] Arterial Line		[] R	[] L	[] Fem	[] Rad	[] Ax	Date Placed:   [] PICC		[] Midline		[] Mediport  [x] Urinary Catheter		Date Placed:   [x] Necessity of urinary, arterial, and venous catheters discussed    LABS  --------------------------------------------------------------------------------------  CBC (11-03 @ 00:20)                          12.4<L>                   18.51<H>  )--------------(  143<L>     --    % Neuts, --    % Lymphs, ANC: --                              36.5<L>  CBC (11-02 @ 00:35)                          12.5<L>                   11.86<H>  )--------------(  168        --    % Neuts, --    % Lymphs, ANC: --                              37.1<L>    BMP (11-02 @ 00:35)       133<L>  |  98      |  31<H> 			Ca++ --      Ca 8.6          ---------------------------------( 237<H>		Mg 2.30         4.2     |  24      |  0.86  			Ph 4.3         Coags (11-02 @ 00:35)  aPTT 23.6<L> / INR 1.05 / PT 11.7      ABG (11-02 @ 18:41)     7.44 / 36 / 238<H> / 24 / 0.6 / 99.4<H>%     Lactate:    ABG (11-02 @ 15:48)     7.46<H> / 37 / 262<H> / 26 / 2.5 / 99.8<H>%     Lactate:        Urinalysis (11-02 @ 00:35):     Color:  / Appearance:  / SG:  / pH:  / Gluc: 237<H> / Ketones:  / Bili:  / Urobili:  / Protein : / Nitrites:  / Leuk.Est:  / RBC:  / WBC:  / Sq Epi:  / Non Sq Epi:  / Bacteria      Urinalysis (11-01 @ 06:46):     Color:  / Appearance:  / SG:  / pH:  / Gluc: 182<H> / Ketones:  / Bili:  / Urobili:  / Protein : / Nitrites:  / Leuk.Est:  / RBC:  / WBC:  / Sq Epi:  / Non Sq Epi:  / Bacteria          --------------------------------------------------------------------------------------

## 2023-11-03 NOTE — PROGRESS NOTE ADULT - SUBJECTIVE AND OBJECTIVE BOX
Anesthesia Pain Management Service    SUBJECTIVE: Patient is doing well with IV PCA and no significant problems reported. C/o 7/10 pain. States he takes gabapentin at home, but is unsure of dose.     Pain Scale Score	At rest: ___ 	With Activity: ___ 	[X ] Refer to charted pain scores    THERAPY:    [ ] IV PCA Morphine		[ ] 5 mg/mL	[ ] 1 mg/mL  [X ] IV PCA Hydromorphone	[ ] 5 mg/mL	[X ] 1 mg/mL  [ ] IV PCA Fentanyl		[ ] 50 micrograms/mL    Demand dose __0.2_ lockout __6_ (minutes) Continuous Rate _0__ Total: __5.3_  mg used (in past 24 hours)      MEDICATIONS  (STANDING):  acetaminophen   IVPB .. 1000 milliGRAM(s) IV Intermittent every 6 hours  amLODIPine   Tablet 10 milliGRAM(s) Oral daily  bicalutamide 50 milliGRAM(s) Oral daily  ceFAZolin   IVPB 2000 milliGRAM(s) IV Intermittent every 8 hours  chlorhexidine 2% Cloths 1 Application(s) Topical daily  cyclobenzaprine 10 milliGRAM(s) Oral every 8 hours  dexAMETHasone     Tablet 4 milliGRAM(s) Oral every 6 hours  glucagon  Injectable 1 milliGRAM(s) IntraMuscular once  HYDROmorphone PCA (1 mG/mL) 30 milliLiter(s) PCA Continuous PCA Continuous  insulin glargine Injectable (LANTUS) 20 Unit(s) SubCutaneous every morning  insulin lispro (ADMELOG) corrective regimen sliding scale   SubCutaneous Before meals and at bedtime  insulin lispro Injectable (ADMELOG) 6 Unit(s) SubCutaneous three times a day before meals  pantoprazole    Tablet 40 milliGRAM(s) Oral before breakfast  senna 2 Tablet(s) Oral at bedtime  tamsulosin 0.4 milliGRAM(s) Oral at bedtime    MEDICATIONS  (PRN):  benzocaine/menthol Lozenge 1 Lozenge Oral every 3 hours PRN Sore Throat  HYDROmorphone PCA (1 mG/mL) Rescue Clinician Bolus 0.5 milliGRAM(s) IV Push every 15 minutes PRN for Pain Scale GREATER THAN 6  nalbuphine Injectable 2.5 milliGRAM(s) IV Push every 6 hours PRN Pruritus  naloxone Injectable 0.1 milliGRAM(s) IV Push every 3 minutes PRN For ANY of the following changes in patient status:  A. RR LESS THAN 10 breaths per minute, B. Oxygen saturation LESS THAN 90%, C. Sedation score of 6  ondansetron Injectable 4 milliGRAM(s) IV Push every 6 hours PRN Nausea  polyethylene glycol 3350 17 Gram(s) Oral daily PRN Constipation      OBJECTIVE:    Sedation Score:	[ X] Alert	[ ] Drowsy 	[ ] Arousable	[ ] Asleep	[ ] Unresponsive    Side Effects:	[X ] None	[ ] Nausea	[ ] Vomiting	[ ] Pruritus  		[ ] Other:    Vital Signs Last 24 Hrs  T(C): 36.1 (03 Nov 2023 08:00), Max: 36.5 (02 Nov 2023 13:19)  T(F): 96.9 (03 Nov 2023 08:00), Max: 97.7 (02 Nov 2023 13:19)  HR: 74 (03 Nov 2023 09:00) (61 - 82)  BP: 138/84 (03 Nov 2023 09:00) (110/71 - 138/84)  BP(mean): 101 (03 Nov 2023 09:00) (101 - 103)  RR: 12 (03 Nov 2023 09:00) (9 - 25)  SpO2: 97% (03 Nov 2023 09:00) (93% - 98%)    Parameters below as of 03 Nov 2023 04:00  Patient On (Oxygen Delivery Method): room air        ASSESSMENT/ PLAN    Therapy to  be:	[ X] Continue   [ ] Discontinued   [ ] Change to prn Analgesics    Documentation and Verification of current medications:   [X] Done	[ ] Not done, not elligible    Comments: Continue with IV PCA at current settings. Changed flexeril to standing. Recommend non-opioid adjuvant analgesics to be used when possible and when allowed by primary surgical team.    Progress Note written now but Patient was seen earlier.

## 2023-11-03 NOTE — PHYSICAL THERAPY INITIAL EVALUATION ADULT - PLANNED THERAPY INTERVENTIONS, PT EVAL
balance training/bed mobility training/gait training/ROM/strengthening/transfer training
balance training/bed mobility training/gait training/neuromuscular re-education/ROM/strengthening/transfer training

## 2023-11-03 NOTE — PROGRESS NOTE ADULT - ATTENDING COMMENTS
I agree with the history, physical, and plan, which I have reviewed and edited where appropriate.  I agree with notes/assessment of health care providers on my service.  I have personally examined the patient.  I was physically present for the key portions of the evaluation and management (E/M) service provided.  I reviewed data and laboratory tests/x-rays and all pertinent electronic images.  The patient is a critical care patient with life threatening hemodynamic and metabolic instability in SICU.  Risk benefit analyses discussed.    The patient is in SICU with diagnosis mentioned in the note.    The plan is specified below.    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 Initiated IV solumedrol with gradual improvement of symptoms and return of strength since Wednesday now here at [facility] for spine evaluation by surgery.    PLAN:   Neurologic:  -C-collar  -Baseline:   AOX3, B/L Upper extremity 5/5 motor strength. B/L Lower extremity 4/5 (RLE slightly weaker than LLE, but still able to lift against gravity and resistance). No focal neuro deficits  -q1hr neuro checks  -Keep head of bed >60degrees due to dural tear in surgery  -Oral Dexamethasone 4mg q4hrs, stop after 14days  -Pain: Dilaudid PCA, Oral Tylenol    Respiratory:  - O2 saturation 98% RA    Cardiovascular:   -Hemodynamically stable on no pressor support  -Home amlodipine 10mg QD    Gastrointestinal/Nutrition:   -ADAT to DASH diet on 11/3   -Senna QD  -Protonix 40 QD  -Monitor CK  Admission CK > 66349, downtrending < 100  -Monitor LFTs (elevated most likely due to rhabdo)    Renal/Genitourinary:   -Prostate Biopsy 2021 --> adenocarcinoma Gelason 3+4; declined radiation therapy at the time  -IVL  -Flomax QD  -Parsons placed  -Monitor urine output    Hematologic:   -Monitor H&H  -DVT prophylaxis; SCDS  -Hx of DVTs, IVC filter placed 11/1    Infectious Disease:   -Afebrile  -Monitor WBC/temp  -Continue 2grams Ancef (started 11/2) q8hr for 24hrs    Lines/Tubes:  -R. radial A line  -PIV X2  -AGGIE X2    Endocrine:   -Monitor glucose  -Lantus+ Admelog ISS   -Lantus 20, 6 premeal; redose in PM if needed I agree with the history, physical, and plan, which I have reviewed and edited where appropriate.  I agree with notes/assessment of health care providers on my service.  I have personally examined the patient.  I was physically present for the key portions of the evaluation and management (E/M) service provided.  I reviewed data and laboratory tests/x-rays and all pertinent electronic images.  The patient is a critical care patient with life threatening hemodynamic and metabolic instability in SICU.  Risk benefit analyses discussed.    The patient is in SICU with diagnosis mentioned in the note.    The plan is specified below.    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 Initiated IV solumedrol with gradual improvement of symptoms and return of strength. Now s/p C7-T1 decompression, C5-T3 fusion on 11/2.     PLAN:   Neurologic: postoperative pain, cervical decompression and fusion, cord compression   -C-collar  -q1hr neuro checks x24h  -Keep head of bed >60degrees due to dural tear in surgery  -Oral Dexamethasone 4mg q4hrs, stop after 14days  -Pain: Dilaudid PCA, Oral Tylenol, flexeril x3d    Respiratory:  - on NC    Cardiovascular: HTN  - Goal MAP > 85  - Home amlodipine 10mg QD  - Hydral prn     Gastrointestinal/Nutrition:   -DASH diet    -Senna QD, miralax   -Protonix 40 QD    Renal/Genitourinary: metastatic prostate ca   -IVL  -Flomax QD  -Parsons placed    Hematologic: h/o dvt s/p IVCF 11/1  - resume sqh at 24h  -DVT prophylaxis; SCDS    Infectious Disease:   -2g Ancef (started 11/2) q8hr for 24hrs    Endocrine: DM  -Lantus 20, 6 premeal; redose in PM if needed

## 2023-11-03 NOTE — PHYSICAL THERAPY INITIAL EVALUATION ADULT - RANGE OF MOTION EXAMINATION, REHAB EVAL
bilateral upper extremity ROM was WFL (within functional limits)/bilateral lower extremity ROM was WFL (within functional limits)
impairments in lower extremity hip and knee flexion due to weakness/bilateral upper extremity ROM was WFL (within functional limits)

## 2023-11-03 NOTE — PROGRESS NOTE ADULT - ASSESSMENT
73y Male s/p C5-T3 posterior cervical fusion, C7-T1 decompression for metastatic prostate cancer with plastics closure on 11/2. Recovering well.    Plan:  - Continue aquacel surgical  - Continue AGGIE drain care  - Remainder of care per ortho / SICU    PRS  [id number]

## 2023-11-03 NOTE — PHYSICAL THERAPY INITIAL EVALUATION ADULT - MANUAL MUSCLE TESTING RESULTS, REHAB EVAL
at least 3- to 3/5 throughout extremities
bilateral upper extrewmity strength grossly 4+/5, right lower extremity strength grossly 3+/5, left lower extremity strength grossly 3-/5 upon MMT and functional assessments

## 2023-11-03 NOTE — OCCUPATIONAL THERAPY INITIAL EVALUATION ADULT - DIAGNOSIS, OT EVAL
s/p C5-T3 posterior cervical fusion, s/p C7-T1 decompression; decreased functional mobility, decreased ADL performance

## 2023-11-03 NOTE — PHYSICAL THERAPY INITIAL EVALUATION ADULT - CRITERIA FOR SKILLED THERAPEUTIC INTERVENTIONS
impairments found/functional limitations in following categories
impairments found/functional limitations in following categories/risk reduction/prevention/rehab potential/therapy frequency/predicted duration of therapy intervention/anticipated discharge recommendation

## 2023-11-03 NOTE — PROGRESS NOTE ADULT - SUBJECTIVE AND OBJECTIVE BOX
Orthopaedic Surgery Progress Note    Subjective:   Patient seen and examined. No acute events overnight.     Objective:  T(C): 35.8 (11-03-23 @ 04:00), Max: 36.5 (11-02-23 @ 09:30)  HR: 61 (11-03-23 @ 06:00) (61 - 82)  BP: 113/73 (11-02-23 @ 13:19) (110/71 - 113/73)  RR: 13 (11-03-23 @ 06:00) (11 - 25)  SpO2: 97% (11-03-23 @ 06:00) (93% - 98%)  Wt(kg): --    11-02 @ 07:01  -  11-03 @ 07:00  --------------------------------------------------------  IN: 1125 mL / OUT: 1250 mL / NET: -125 mL        PE  Gen: NAD  Back:   dressing C/D/I, mild appropriate linda-incisional ttp  HMV in place w/ serosanguinous output  Neuro:  Motor:                   C5                C6              C7               C8           T1   R            5/5                4+/5            4+/5             4+/5          4+/5  L             5/5               4+/5             4+/5             4+/5          4+/5                L2             L3             L4               L5            S1  R         5/5           5/5          5/5             5/5           5/5  L          5/5          5/5           5/5             5/5           5/5    Sensory:            C5         C6         C7      C8       T1        (0=absent, 1=impaired, 2=normal, NT=not testable)  R         2            2           1        1         1  L          2            2           1        1         1               L2          L3         L4      L5       S1         (0=absent, 1=impaired, 2=normal, NT=not testable)  R         1            1            1        1        1  L          1            1           1        1         1  WWP BLE                          12.4   18.51 )-----------( 143      ( 03 Nov 2023 00:20 )             36.5     11-03    135  |  99  |  29<H>  ----------------------------<  275<H>  4.3   |  25  |  1.01    Ca    8.5      03 Nov 2023 00:20  Phos  4.9     11-03  Mg     2.20     11-03    TPro  5.4<L>  /  Alb  3.4  /  TBili  0.7  /  DBili  x   /  AST  30  /  ALT  93<H>  /  AlkPhos  240<H>  11-03    PT/INR - ( 03 Nov 2023 00:20 )   PT: 12.1 sec;   INR: 1.07 ratio         PTT - ( 03 Nov 2023 00:20 )  PTT:23.1 sec  Urinalysis Basic - ( 03 Nov 2023 00:20 )    Color: x / Appearance: x / SG: x / pH: x  Gluc: 275 mg/dL / Ketone: x  / Bili: x / Urobili: x   Blood: x / Protein: x / Nitrite: x   Leuk Esterase: x / RBC: x / WBC x   Sq Epi: x / Non Sq Epi: x / Bacteria: x        73y Male s/p C5-T3 posterior cervical fusion, C7-T1 decompression for metastatic prostate cancer. Doing well postoperatively with improved strength compared to preoperatively  - Head of bed 60deg or higher  - MAP > 85  - Q1 neurochecks   - Pain control  - FU labs  - WBAT  - PT/OT/OOB  - I/S  - Monitor HMV output  - SCDs  - Dispo planning

## 2023-11-03 NOTE — PHYSICAL THERAPY INITIAL EVALUATION ADULT - NSPTDISCHREC_GEN_A_CORE
anticipate [facility] , follow PT notes
anticipated discharge to rehab facility to address current functional limitation to optimize safety to allow pt. to reach their optimal level of function.

## 2023-11-03 NOTE — PROGRESS NOTE ADULT - SUBJECTIVE AND OBJECTIVE BOX
Plastic Surgery Progress Note (pg [facility]: [id number], [facility]: [phone])    SUBJECTIVE  Pt comfortable in bed. Pain well controlled, no complaints.    OBJECTIVE  ___________________________________________________  VITAL SIGNS / I&O's   Vital Signs Last 24 Hrs  T(C): 36.1 (03 Nov 2023 08:00), Max: 36.5 (02 Nov 2023 13:19)  T(F): 96.9 (03 Nov 2023 08:00), Max: 97.7 (02 Nov 2023 13:19)  HR: 74 (03 Nov 2023 09:00) (61 - 82)  BP: 138/84 (03 Nov 2023 09:00) (110/71 - 138/84)  BP(mean): 101 (03 Nov 2023 09:00) (101 - 103)  RR: 12 (03 Nov 2023 09:00) (9 - 25)  SpO2: 97% (03 Nov 2023 09:00) (93% - 98%)    Parameters below as of 03 Nov 2023 04:00  Patient On (Oxygen Delivery Method): room air          02 Nov 2023 07:01  -  03 Nov 2023 07:00  --------------------------------------------------------  IN:    IV PiggyBack: 300 mL    Lactated Ringers: 825 mL  Total IN: 1125 mL    OUT:    Bulb (mL): 170 mL    Bulb (mL): 30 mL    Indwelling Catheter - Urethral (mL): 1050 mL  Total OUT: 1250 mL    Total NET: -125 mL      03 Nov 2023 07:01  -  03 Nov 2023 09:33  --------------------------------------------------------  IN:    Lactated Ringers: 75 mL  Total IN: 75 mL    OUT:    Indwelling Catheter - Urethral (mL): 75 mL  Total OUT: 75 mL    Total NET: 0 mL        ___________________________________________________  PHYSICAL EXAM    General: NAD  Back: Soft, flat, aquacel c/d/i. AGGIE SSx2    ___________________________________________________  LABS                        12.4   18.51 )-----------( 143      ( 03 Nov 2023 00:20 )             36.5     03 Nov 2023 00:20    135    |  99     |  29     ----------------------------<  275    4.3     |  25     |  1.01     Ca    8.5        03 Nov 2023 00:20  Phos  4.9       03 Nov 2023 00:20  Mg     2.20      03 Nov 2023 00:20    TPro  5.4    /  Alb  3.4    /  TBili  0.7    /  DBili  x      /  AST  30     /  ALT  93     /  AlkPhos  240    03 Nov 2023 00:20    PT/INR - ( 03 Nov 2023 00:20 )   PT: 12.1 sec;   INR: 1.07 ratio         PTT - ( 03 Nov 2023 00:20 )  PTT:23.1 sec  CAPILLARY BLOOD GLUCOSE      POCT Blood Glucose.: 188 mg/dL (03 Nov 2023 08:42)  POCT Blood Glucose.: 246 mg/dL (02 Nov 2023 21:47)  POCT Blood Glucose.: 185 mg/dL (02 Nov 2023 13:06)  POCT Blood Glucose.: 155 mg/dL (02 Nov 2023 12:15)        Urinalysis Basic - ( 03 Nov 2023 00:20 )    Color: x / Appearance: x / SG: x / pH: x  Gluc: 275 mg/dL / Ketone: x  / Bili: x / Urobili: x   Blood: x / Protein: x / Nitrite: x   Leuk Esterase: x / RBC: x / WBC x   Sq Epi: x / Non Sq Epi: x / Bacteria: x      ___________________________________________________  MICRO  Recent Cultures:    ___________________________________________________  MEDICATIONS  (STANDING):  acetaminophen   IVPB .. 1000 milliGRAM(s) IV Intermittent every 6 hours  amLODIPine   Tablet 10 milliGRAM(s) Oral daily  bicalutamide 50 milliGRAM(s) Oral daily  ceFAZolin   IVPB 2000 milliGRAM(s) IV Intermittent every 8 hours  chlorhexidine 2% Cloths 1 Application(s) Topical daily  dexAMETHasone     Tablet 4 milliGRAM(s) Oral every 6 hours  glucagon  Injectable 1 milliGRAM(s) IntraMuscular once  HYDROmorphone PCA (1 mG/mL) 30 milliLiter(s) PCA Continuous PCA Continuous  insulin glargine Injectable (LANTUS) 20 Unit(s) SubCutaneous every morning  insulin lispro (ADMELOG) corrective regimen sliding scale   SubCutaneous Before meals and at bedtime  insulin lispro Injectable (ADMELOG) 6 Unit(s) SubCutaneous three times a day before meals  pantoprazole    Tablet 40 milliGRAM(s) Oral before breakfast  senna 2 Tablet(s) Oral at bedtime  tamsulosin 0.4 milliGRAM(s) Oral at bedtime    MEDICATIONS  (PRN):  benzocaine/menthol Lozenge 1 Lozenge Oral every 3 hours PRN Sore Throat  cyclobenzaprine 10 milliGRAM(s) Oral every 8 hours PRN Muscle Spasm  HYDROmorphone PCA (1 mG/mL) Rescue Clinician Bolus 0.5 milliGRAM(s) IV Push every 15 minutes PRN for Pain Scale GREATER THAN 6  nalbuphine Injectable 2.5 milliGRAM(s) IV Push every 6 hours PRN Pruritus  naloxone Injectable 0.1 milliGRAM(s) IV Push every 3 minutes PRN For ANY of the following changes in patient status:  A. RR LESS THAN 10 breaths per minute, B. Oxygen saturation LESS THAN 90%, C. Sedation score of 6  ondansetron Injectable 4 milliGRAM(s) IV Push every 6 hours PRN Nausea  polyethylene glycol 3350 17 Gram(s) Oral daily PRN Constipation

## 2023-11-03 NOTE — OCCUPATIONAL THERAPY INITIAL EVALUATION ADULT - LEVEL OF INDEPENDENCE: SUPINE/SIT, REHAB EVAL
supine to long-sit with head of bed elevated/minimum assist (75% patients effort)/moderate assist (50% patients effort)

## 2023-11-04 LAB
ANION GAP SERPL CALC-SCNC: 11 MMOL/L — SIGNIFICANT CHANGE UP (ref 7–14)
ANION GAP SERPL CALC-SCNC: 11 MMOL/L — SIGNIFICANT CHANGE UP (ref 7–14)
BUN SERPL-MCNC: 26 MG/DL — HIGH (ref 7–23)
BUN SERPL-MCNC: 26 MG/DL — HIGH (ref 7–23)
CALCIUM SERPL-MCNC: 8.6 MG/DL — SIGNIFICANT CHANGE UP (ref 8.4–10.5)
CALCIUM SERPL-MCNC: 8.6 MG/DL — SIGNIFICANT CHANGE UP (ref 8.4–10.5)
CHLORIDE SERPL-SCNC: 95 MMOL/L — LOW (ref 98–107)
CHLORIDE SERPL-SCNC: 95 MMOL/L — LOW (ref 98–107)
CO2 SERPL-SCNC: 27 MMOL/L — SIGNIFICANT CHANGE UP (ref 22–31)
CO2 SERPL-SCNC: 27 MMOL/L — SIGNIFICANT CHANGE UP (ref 22–31)
CREAT SERPL-MCNC: 0.89 MG/DL — SIGNIFICANT CHANGE UP (ref 0.5–1.3)
CREAT SERPL-MCNC: 0.89 MG/DL — SIGNIFICANT CHANGE UP (ref 0.5–1.3)
EGFR: 92 ML/MIN/1.73M2 — SIGNIFICANT CHANGE UP
EGFR: 92 ML/MIN/1.73M2 — SIGNIFICANT CHANGE UP
GLUCOSE BLDC GLUCOMTR-MCNC: 145 MG/DL — HIGH (ref 70–99)
GLUCOSE BLDC GLUCOMTR-MCNC: 145 MG/DL — HIGH (ref 70–99)
GLUCOSE BLDC GLUCOMTR-MCNC: 191 MG/DL — HIGH (ref 70–99)
GLUCOSE BLDC GLUCOMTR-MCNC: 191 MG/DL — HIGH (ref 70–99)
GLUCOSE BLDC GLUCOMTR-MCNC: 264 MG/DL — HIGH (ref 70–99)
GLUCOSE BLDC GLUCOMTR-MCNC: 264 MG/DL — HIGH (ref 70–99)
GLUCOSE BLDC GLUCOMTR-MCNC: 269 MG/DL — HIGH (ref 70–99)
GLUCOSE BLDC GLUCOMTR-MCNC: 269 MG/DL — HIGH (ref 70–99)
GLUCOSE SERPL-MCNC: 210 MG/DL — HIGH (ref 70–99)
GLUCOSE SERPL-MCNC: 210 MG/DL — HIGH (ref 70–99)
HCT VFR BLD CALC: 36.2 % — LOW (ref 39–50)
HCT VFR BLD CALC: 36.2 % — LOW (ref 39–50)
HGB BLD-MCNC: 12.3 G/DL — LOW (ref 13–17)
HGB BLD-MCNC: 12.3 G/DL — LOW (ref 13–17)
MAGNESIUM SERPL-MCNC: 2.1 MG/DL — SIGNIFICANT CHANGE UP (ref 1.6–2.6)
MAGNESIUM SERPL-MCNC: 2.1 MG/DL — SIGNIFICANT CHANGE UP (ref 1.6–2.6)
MCHC RBC-ENTMCNC: 30.6 PG — SIGNIFICANT CHANGE UP (ref 27–34)
MCHC RBC-ENTMCNC: 30.6 PG — SIGNIFICANT CHANGE UP (ref 27–34)
MCHC RBC-ENTMCNC: 34 GM/DL — SIGNIFICANT CHANGE UP (ref 32–36)
MCHC RBC-ENTMCNC: 34 GM/DL — SIGNIFICANT CHANGE UP (ref 32–36)
MCV RBC AUTO: 90 FL — SIGNIFICANT CHANGE UP (ref 80–100)
MCV RBC AUTO: 90 FL — SIGNIFICANT CHANGE UP (ref 80–100)
NRBC # BLD: 0 /100 WBCS — SIGNIFICANT CHANGE UP (ref 0–0)
NRBC # BLD: 0 /100 WBCS — SIGNIFICANT CHANGE UP (ref 0–0)
NRBC # FLD: 0 K/UL — SIGNIFICANT CHANGE UP (ref 0–0)
NRBC # FLD: 0 K/UL — SIGNIFICANT CHANGE UP (ref 0–0)
PHOSPHATE SERPL-MCNC: 3.2 MG/DL — SIGNIFICANT CHANGE UP (ref 2.5–4.5)
PHOSPHATE SERPL-MCNC: 3.2 MG/DL — SIGNIFICANT CHANGE UP (ref 2.5–4.5)
PLATELET # BLD AUTO: 142 K/UL — LOW (ref 150–400)
PLATELET # BLD AUTO: 142 K/UL — LOW (ref 150–400)
POTASSIUM SERPL-MCNC: 4.5 MMOL/L — SIGNIFICANT CHANGE UP (ref 3.5–5.3)
POTASSIUM SERPL-MCNC: 4.5 MMOL/L — SIGNIFICANT CHANGE UP (ref 3.5–5.3)
POTASSIUM SERPL-SCNC: 4.5 MMOL/L — SIGNIFICANT CHANGE UP (ref 3.5–5.3)
POTASSIUM SERPL-SCNC: 4.5 MMOL/L — SIGNIFICANT CHANGE UP (ref 3.5–5.3)
RBC # BLD: 4.02 M/UL — LOW (ref 4.2–5.8)
RBC # BLD: 4.02 M/UL — LOW (ref 4.2–5.8)
RBC # FLD: 12.3 % — SIGNIFICANT CHANGE UP (ref 10.3–14.5)
RBC # FLD: 12.3 % — SIGNIFICANT CHANGE UP (ref 10.3–14.5)
SODIUM SERPL-SCNC: 133 MMOL/L — LOW (ref 135–145)
SODIUM SERPL-SCNC: 133 MMOL/L — LOW (ref 135–145)
WBC # BLD: 17.25 K/UL — HIGH (ref 3.8–10.5)
WBC # BLD: 17.25 K/UL — HIGH (ref 3.8–10.5)
WBC # FLD AUTO: 17.25 K/UL — HIGH (ref 3.8–10.5)
WBC # FLD AUTO: 17.25 K/UL — HIGH (ref 3.8–10.5)

## 2023-11-04 PROCEDURE — 99232 SBSQ HOSP IP/OBS MODERATE 35: CPT | Mod: GC

## 2023-11-04 RX ORDER — INSULIN LISPRO 100/ML
VIAL (ML) SUBCUTANEOUS
Refills: 0 | Status: DISCONTINUED | OUTPATIENT
Start: 2023-11-04 | End: 2023-11-05

## 2023-11-04 RX ORDER — OXYCODONE HYDROCHLORIDE 5 MG/1
10 TABLET ORAL
Refills: 0 | Status: DISCONTINUED | OUTPATIENT
Start: 2023-11-04 | End: 2023-11-09

## 2023-11-04 RX ORDER — HYDROMORPHONE HYDROCHLORIDE 2 MG/ML
0.5 INJECTION INTRAMUSCULAR; INTRAVENOUS; SUBCUTANEOUS EVERY 4 HOURS
Refills: 0 | Status: DISCONTINUED | OUTPATIENT
Start: 2023-11-04 | End: 2023-11-09

## 2023-11-04 RX ORDER — INSULIN LISPRO 100/ML
12 VIAL (ML) SUBCUTANEOUS
Refills: 0 | Status: DISCONTINUED | OUTPATIENT
Start: 2023-11-04 | End: 2023-11-09

## 2023-11-04 RX ORDER — HEPARIN SODIUM 5000 [USP'U]/ML
5000 INJECTION INTRAVENOUS; SUBCUTANEOUS EVERY 8 HOURS
Refills: 0 | Status: COMPLETED | OUTPATIENT
Start: 2023-11-04 | End: 2023-12-20

## 2023-11-04 RX ORDER — HEPARIN SODIUM 5000 [USP'U]/ML
5000 INJECTION INTRAVENOUS; SUBCUTANEOUS EVERY 12 HOURS
Refills: 0 | Status: DISCONTINUED | OUTPATIENT
Start: 2023-11-04 | End: 2023-11-04

## 2023-11-04 RX ORDER — OXYCODONE HYDROCHLORIDE 5 MG/1
5 TABLET ORAL
Refills: 0 | Status: DISCONTINUED | OUTPATIENT
Start: 2023-11-04 | End: 2023-11-09

## 2023-11-04 RX ADMIN — Medication 975 MILLIGRAM(S): at 06:15

## 2023-11-04 RX ADMIN — ATORVASTATIN CALCIUM 20 MILLIGRAM(S): 80 TABLET, FILM COATED ORAL at 21:28

## 2023-11-04 RX ADMIN — Medication 4 MILLIGRAM(S): at 05:45

## 2023-11-04 RX ADMIN — Medication 975 MILLIGRAM(S): at 23:31

## 2023-11-04 RX ADMIN — HEPARIN SODIUM 5000 UNIT(S): 5000 INJECTION INTRAVENOUS; SUBCUTANEOUS at 17:06

## 2023-11-04 RX ADMIN — AMLODIPINE BESYLATE 10 MILLIGRAM(S): 2.5 TABLET ORAL at 05:45

## 2023-11-04 RX ADMIN — Medication 4 MILLIGRAM(S): at 00:24

## 2023-11-04 RX ADMIN — CYCLOBENZAPRINE HYDROCHLORIDE 10 MILLIGRAM(S): 10 TABLET, FILM COATED ORAL at 21:28

## 2023-11-04 RX ADMIN — OXYCODONE HYDROCHLORIDE 10 MILLIGRAM(S): 5 TABLET ORAL at 11:49

## 2023-11-04 RX ADMIN — Medication 4 MILLIGRAM(S): at 11:49

## 2023-11-04 RX ADMIN — SENNA PLUS 2 TABLET(S): 8.6 TABLET ORAL at 21:28

## 2023-11-04 RX ADMIN — Medication 3: at 16:53

## 2023-11-04 RX ADMIN — CYCLOBENZAPRINE HYDROCHLORIDE 10 MILLIGRAM(S): 10 TABLET, FILM COATED ORAL at 05:45

## 2023-11-04 RX ADMIN — BICALUTAMIDE 50 MILLIGRAM(S): 50 TABLET, FILM COATED ORAL at 11:48

## 2023-11-04 RX ADMIN — Medication 10 UNIT(S): at 08:07

## 2023-11-04 RX ADMIN — Medication 4 MILLIGRAM(S): at 17:06

## 2023-11-04 RX ADMIN — POLYETHYLENE GLYCOL 3350 17 GRAM(S): 17 POWDER, FOR SOLUTION ORAL at 11:48

## 2023-11-04 RX ADMIN — OXYCODONE HYDROCHLORIDE 10 MILLIGRAM(S): 5 TABLET ORAL at 17:21

## 2023-11-04 RX ADMIN — Medication 975 MILLIGRAM(S): at 00:24

## 2023-11-04 RX ADMIN — Medication 975 MILLIGRAM(S): at 11:50

## 2023-11-04 RX ADMIN — CHLORHEXIDINE GLUCONATE 1 APPLICATION(S): 213 SOLUTION TOPICAL at 17:07

## 2023-11-04 RX ADMIN — Medication 975 MILLIGRAM(S): at 17:06

## 2023-11-04 RX ADMIN — Medication 975 MILLIGRAM(S): at 12:06

## 2023-11-04 RX ADMIN — TAMSULOSIN HYDROCHLORIDE 0.4 MILLIGRAM(S): 0.4 CAPSULE ORAL at 21:28

## 2023-11-04 RX ADMIN — Medication 2: at 08:06

## 2023-11-04 RX ADMIN — Medication 3: at 11:47

## 2023-11-04 RX ADMIN — OXYCODONE HYDROCHLORIDE 10 MILLIGRAM(S): 5 TABLET ORAL at 12:06

## 2023-11-04 RX ADMIN — Medication 10 UNIT(S): at 16:53

## 2023-11-04 RX ADMIN — OXYCODONE HYDROCHLORIDE 10 MILLIGRAM(S): 5 TABLET ORAL at 20:12

## 2023-11-04 RX ADMIN — Medication 975 MILLIGRAM(S): at 17:21

## 2023-11-04 RX ADMIN — PANTOPRAZOLE SODIUM 40 MILLIGRAM(S): 20 TABLET, DELAYED RELEASE ORAL at 05:45

## 2023-11-04 RX ADMIN — CYCLOBENZAPRINE HYDROCHLORIDE 10 MILLIGRAM(S): 10 TABLET, FILM COATED ORAL at 14:33

## 2023-11-04 RX ADMIN — Medication 4 MILLIGRAM(S): at 23:32

## 2023-11-04 RX ADMIN — Medication 975 MILLIGRAM(S): at 05:45

## 2023-11-04 RX ADMIN — INSULIN GLARGINE 30 UNIT(S): 100 INJECTION, SOLUTION SUBCUTANEOUS at 11:46

## 2023-11-04 RX ADMIN — OXYCODONE HYDROCHLORIDE 10 MILLIGRAM(S): 5 TABLET ORAL at 21:12

## 2023-11-04 RX ADMIN — OXYCODONE HYDROCHLORIDE 10 MILLIGRAM(S): 5 TABLET ORAL at 17:06

## 2023-11-04 RX ADMIN — Medication 10 UNIT(S): at 11:47

## 2023-11-04 NOTE — PROGRESS NOTE ADULT - ASSESSMENT
73y Male s/p C5-T3 posterior cervical fusion, C7-T1 decompression for metastatic prostate cancer with plastics closure on 11/2. Recovering well.    Plan:  - Continue aquacel surgical dressing until POD5  - Continue AGGIE drain care  - Remainder of care per ortho / SICU    PRS  [id number]

## 2023-11-04 NOTE — PROGRESS NOTE ADULT - ASSESSMENT
73y Male s/p C5-T3 posterior cervical fusion, C7-T1 decompression for metastatic prostate cancer. Doing well postoperatively with improved strength compared to preoperatively    - Head of bed 60deg or higher  - MAP > 85  - Pain control  - FU labs  - WBAT  - PT/OT/OOB  - I/S  - Monitor HMV output  - SCDs  - Dispo planning    Ximena Stewart MD  Orthopaedic Surgery Resident    For all questions, please reach out via the following numbers for the on-call resident; do not reach out via Teams.  [facility] [place] [id number]  [place]        [id number]  [facility]  [id number]/ [phone]

## 2023-11-04 NOTE — PROGRESS NOTE ADULT - SUBJECTIVE AND OBJECTIVE BOX
Anesthesia Pain Management Service    SUBJECTIVE: Patient is doing well with IV PCA and no significant problems reported.  Patient states his pain is much better than yesterday, when he was in, "100%, nasty pain."    Pain Scale Score	At rest: _6/10__ 	With Activity: ___ 	[X ] Refer to charted pain scores    THERAPY:    [ ] IV PCA Morphine		[ ] 5 mg/mL	[ ] 1 mg/mL  [X ] IV PCA Hydromorphone	[ ] 5 mg/mL	[X ] 1 mg/mL  [ ] IV PCA Fentanyl		[ ] 50 micrograms/mL    Demand dose __0.2_ lockout __6_ (minutes) Continuous Rate _0__ Total: __6.9_   mg used (in past 24 hrs)      MEDICATIONS  (STANDING):  acetaminophen     Tablet .. 975 milliGRAM(s) Oral every 6 hours  amLODIPine   Tablet 10 milliGRAM(s) Oral daily  atorvastatin 20 milliGRAM(s) Oral at bedtime  bicalutamide 50 milliGRAM(s) Oral daily  chlorhexidine 2% Cloths 1 Application(s) Topical daily  cyclobenzaprine 10 milliGRAM(s) Oral every 8 hours  dexAMETHasone     Tablet 4 milliGRAM(s) Oral every 6 hours  glucagon  Injectable 1 milliGRAM(s) IntraMuscular once  insulin glargine Injectable (LANTUS) 30 Unit(s) SubCutaneous every morning  insulin lispro (ADMELOG) corrective regimen sliding scale   SubCutaneous at bedtime  insulin lispro (ADMELOG) corrective regimen sliding scale   SubCutaneous three times a day before meals  insulin lispro Injectable (ADMELOG) 10 Unit(s) SubCutaneous three times a day before meals  lisinopril 20 milliGRAM(s) Oral daily  pantoprazole    Tablet 40 milliGRAM(s) Oral before breakfast  polyethylene glycol 3350 17 Gram(s) Oral daily  senna 2 Tablet(s) Oral at bedtime  tamsulosin 0.4 milliGRAM(s) Oral at bedtime    MEDICATIONS  (PRN):  benzocaine/menthol Lozenge 1 Lozenge Oral every 3 hours PRN Sore Throat  HYDROmorphone  Injectable 0.5 milliGRAM(s) IV Push every 4 hours PRN Severe breakthrough Pain (7 - 10)  naloxone Injectable 0.1 milliGRAM(s) IV Push every 3 minutes PRN For ANY of the following changes in patient status:  A. RR LESS THAN 10 breaths per minute, B. Oxygen saturation LESS THAN 90%, C. Sedation score of 6  ondansetron Injectable 4 milliGRAM(s) IV Push every 6 hours PRN Nausea  oxyCODONE    IR 5 milliGRAM(s) Oral every 3 hours PRN Moderate Pain (4 - 6)  oxyCODONE    IR 10 milliGRAM(s) Oral every 3 hours PRN Severe Pain (7 - 10)      OBJECTIVE:  Patient is sitting up in bed, with C-Collar tolerating po diet.     Sedation Score:	[ X] Alert	[ ] Drowsy 	[ ] Arousable	[ ] Asleep	[ ] Unresponsive    Side Effects:	[X ] None	[ ] Nausea	[ ] Vomiting	[ ] Pruritus  		[ ] Other:    Vital Signs Last 24 Hrs  T(C): 36.2 (04 Nov 2023 08:00), Max: 36.7 (03 Nov 2023 16:00)  T(F): 97.1 (04 Nov 2023 08:00), Max: 98 (03 Nov 2023 16:00)  HR: 91 (04 Nov 2023 08:00) (73 - 106)  BP: 135/71 (04 Nov 2023 08:00) (131/78 - 170/88)  BP(mean): 84 (04 Nov 2023 08:00) (84 - 117)  RR: 15 (04 Nov 2023 08:00) (12 - 21)  SpO2: 94% (04 Nov 2023 08:00) (93% - 97%)    Parameters below as of 04 Nov 2023 04:00  Patient On (Oxygen Delivery Method): room air        ASSESSMENT/ PLAN    Therapy to  be:	[ ] Continue   [ X] Discontinued   [X ] Change to prn Analgesics    Documentation and Verification of current medications:   [X] Done	[ ] Not done, not elligible    Comments:  Patient tolerating advancing po diet.  IV Dilaudid PCA discontinued. PRN Oral/IV opioids and/or Adjuvant non-opioid medication to be ordered at this point.    Progress Note written now but Patient was seen earlier.

## 2023-11-04 NOTE — PROGRESS NOTE ADULT - ATTENDING COMMENTS
I agree with the history, physical, and plan, which I have reviewed and edited where appropriate.  I agree with notes/assessment of health care providers on my service.  I have personally examined the patient.  I was physically present for the key portions of the evaluation and management (E/M) service provided.  I reviewed data and laboratory tests/x-rays and all pertinent electronic images.  The patient is a critical care patient with life threatening hemodynamic and metabolic instability in SICU.  Risk benefit analyses discussed.    The patient is in SICU with diagnosis mentioned in the note.    The plan is specified below.    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 Initiated IV solumedrol with gradual improvement of symptoms and return of strength. Now s/p C7-T1 decompression, C5-T3 fusion on 11/2.     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

## 2023-11-04 NOTE — PROGRESS NOTE ADULT - SUBJECTIVE AND OBJECTIVE BOX
Orthopedic Surgery Progress Note     S: Patient seen and examined today. No acute events overnight. Pain is well controlled. Denies any headaches/dizziness. Denies f/c, chest pain, shortness of breath, dizziness.    MEDICATIONS  (STANDING):  acetaminophen     Tablet .. 975 milliGRAM(s) Oral every 6 hours  amLODIPine   Tablet 10 milliGRAM(s) Oral daily  atorvastatin 20 milliGRAM(s) Oral at bedtime  bicalutamide 50 milliGRAM(s) Oral daily  chlorhexidine 2% Cloths 1 Application(s) Topical daily  cyclobenzaprine 10 milliGRAM(s) Oral every 8 hours  dexAMETHasone     Tablet 4 milliGRAM(s) Oral every 6 hours  glucagon  Injectable 1 milliGRAM(s) IntraMuscular once  HYDROmorphone PCA (1 mG/mL) 30 milliLiter(s) PCA Continuous PCA Continuous  insulin glargine Injectable (LANTUS) 30 Unit(s) SubCutaneous every morning  insulin lispro (ADMELOG) corrective regimen sliding scale   SubCutaneous at bedtime  insulin lispro (ADMELOG) corrective regimen sliding scale   SubCutaneous three times a day before meals  insulin lispro Injectable (ADMELOG) 10 Unit(s) SubCutaneous three times a day before meals  lisinopril 20 milliGRAM(s) Oral daily  pantoprazole    Tablet 40 milliGRAM(s) Oral before breakfast  polyethylene glycol 3350 17 Gram(s) Oral daily  senna 2 Tablet(s) Oral at bedtime  tamsulosin 0.4 milliGRAM(s) Oral at bedtime    MEDICATIONS  (PRN):  benzocaine/menthol Lozenge 1 Lozenge Oral every 3 hours PRN Sore Throat  HYDROmorphone PCA (1 mG/mL) Rescue Clinician Bolus 0.5 milliGRAM(s) IV Push every 15 minutes PRN for Pain Scale GREATER THAN 6  nalbuphine Injectable 2.5 milliGRAM(s) IV Push every 6 hours PRN Pruritus  naloxone Injectable 0.1 milliGRAM(s) IV Push every 3 minutes PRN For ANY of the following changes in patient status:  A. RR LESS THAN 10 breaths per minute, B. Oxygen saturation LESS THAN 90%, C. Sedation score of 6  ondansetron Injectable 4 milliGRAM(s) IV Push every 6 hours PRN Nausea      Physical Exam:  Gen: NAD  Back:   dressing C/D/I, mild appropriate linda-incisional ttp  HMV in place w/ serosanguinous output; no evidence of CSF in drain  Neuro:  Motor:                   C5                C6              C7               C8           T1   R            5/5                4+/5            4+/5             4+/5          4+/5  L             5/5               4+/5             4+/5             4+/5          4+/5                L2             L3             L4               L5            S1  R         5/5           5/5          5/5             5/5           5/5  L          5/5          5/5           5/5             5/5           5/5    Sensory:            C5         C6         C7      C8       T1        (0=absent, 1=impaired, 2=normal, NT=not testable)  R         2            2           1        1         1  L          2            2           1        1         1               L2          L3         L4      L5       S1         (0=absent, 1=impaired, 2=normal, NT=not testable)  R         1            1            1        1        1  L          1            1           1        1         1  WWP BLE    Vital Signs Last 24 Hrs  T(C): 36.4 (04 Nov 2023 00:00), Max: 36.7 (03 Nov 2023 16:00)  T(F): 97.6 (04 Nov 2023 00:00), Max: 98 (03 Nov 2023 16:00)  HR: 73 (04 Nov 2023 00:00) (61 - 106)  BP: 146/82 (04 Nov 2023 00:00) (131/78 - 170/88)  BP(mean): 99 (04 Nov 2023 00:00) (90 - 117)  RR: 17 (04 Nov 2023 00:00) (9 - 21)  SpO2: 95% (04 Nov 2023 00:00) (93% - 98%)    Parameters below as of 03 Nov 2023 20:00  Patient On (Oxygen Delivery Method): room air        11-02-23 @ 07:01  -  11-03-23 @ 07:00  --------------------------------------------------------  IN: 1125 mL / OUT: 1250 mL / NET: -125 mL    11-03-23 @ 07:01  -  11-04-23 @ 04:43  --------------------------------------------------------  IN: 1025 mL / OUT: 1380 mL / NET: -355 mL        LABS:                        12.3   17.25 )-----------( 142      ( 04 Nov 2023 00:45 )             36.2     11-04    133<L>  |  95<L>  |  26<H>  ----------------------------<  210<H>  4.5   |  27  |  0.89    Ca    8.6      04 Nov 2023 00:45  Phos  3.2     11-04  Mg     2.10     11-04    TPro  5.4<L>  /  Alb  3.4  /  TBili  0.7  /  DBili  x   /  AST  30  /  ALT  93<H>  /  AlkPhos  240<H>  11-03

## 2023-11-04 NOTE — PROGRESS NOTE ADULT - SUBJECTIVE AND OBJECTIVE BOX
Plastic Surgery    SUBJECTIVE: Pt seen and examined on rounds with team. No acute events overnight. Dressings c/d/i. Drains ss with expected output.     VITALS  T(C): 36.2 (11-04-23 @ 04:00), Max: 36.7 (11-03-23 @ 16:00)  HR: 76 (11-04-23 @ 04:00) (73 - 106)  BP: 135/87 (11-04-23 @ 04:00) (131/78 - 170/88)  RR: 12 (11-04-23 @ 04:00) (12 - 21)  SpO2: 94% (11-04-23 @ 04:00) (93% - 97%)  CAPILLARY BLOOD GLUCOSE      POCT Blood Glucose.: 191 mg/dL (04 Nov 2023 08:00)  POCT Blood Glucose.: 198 mg/dL (03 Nov 2023 21:41)  POCT Blood Glucose.: 300 mg/dL (03 Nov 2023 16:42)  POCT Blood Glucose.: 206 mg/dL (03 Nov 2023 11:05)  POCT Blood Glucose.: 188 mg/dL (03 Nov 2023 08:42)      Is/Os    11-03 @ 07:01  -  11-04 @ 07:00  --------------------------------------------------------  IN:    IV PiggyBack: 200 mL    Lactated Ringers: 75 mL    Oral Fluid: 750 mL  Total IN: 1025 mL    OUT:    Bulb (mL): 150 mL    Bulb (mL): 110 mL    Indwelling Catheter - Urethral (mL): 1550 mL  Total OUT: 1810 mL    Total NET: -785 mL          PHYSICAL EXAM:     General: NAD  Back: Soft, flat, aquacel c/d/i. AGGIE SSx2    MEDICATIONS (STANDING): acetaminophen     Tablet .. 975 milliGRAM(s) Oral every 6 hours  amLODIPine   Tablet 10 milliGRAM(s) Oral daily  atorvastatin 20 milliGRAM(s) Oral at bedtime  bicalutamide 50 milliGRAM(s) Oral daily  cyclobenzaprine 10 milliGRAM(s) Oral every 8 hours  dexAMETHasone     Tablet 4 milliGRAM(s) Oral every 6 hours  glucagon  Injectable 1 milliGRAM(s) IntraMuscular once  HYDROmorphone PCA (1 mG/mL) 30 milliLiter(s) PCA Continuous PCA Continuous  insulin glargine Injectable (LANTUS) 30 Unit(s) SubCutaneous every morning  insulin lispro (ADMELOG) corrective regimen sliding scale   SubCutaneous at bedtime  insulin lispro (ADMELOG) corrective regimen sliding scale   SubCutaneous three times a day before meals  insulin lispro Injectable (ADMELOG) 10 Unit(s) SubCutaneous three times a day before meals  lisinopril 20 milliGRAM(s) Oral daily  pantoprazole    Tablet 40 milliGRAM(s) Oral before breakfast  polyethylene glycol 3350 17 Gram(s) Oral daily  senna 2 Tablet(s) Oral at bedtime  tamsulosin 0.4 milliGRAM(s) Oral at bedtime    MEDICATIONS (PRN):HYDROmorphone PCA (1 mG/mL) Rescue Clinician Bolus 0.5 milliGRAM(s) IV Push every 15 minutes PRN for Pain Scale GREATER THAN 6  nalbuphine Injectable 2.5 milliGRAM(s) IV Push every 6 hours PRN Pruritus  naloxone Injectable 0.1 milliGRAM(s) IV Push every 3 minutes PRN For ANY of the following changes in patient status:  A. RR LESS THAN 10 breaths per minute, B. Oxygen saturation LESS THAN 90%, C. Sedation score of 6  ondansetron Injectable 4 milliGRAM(s) IV Push every 6 hours PRN Nausea      LABS  CBC (11-04 @ 00:45)                              12.3<L>                         17.25<H>  )----------------(  142<L>     --    % Neutrophils, --    % Lymphocytes, ANC: --                                  36.2<L>  CBC (11-03 @ 00:20)                              12.4<L>                         18.51<H>  )----------------(  143<L>     --    % Neutrophils, --    % Lymphocytes, ANC: --                                  36.5<L>    BMP (11-04 @ 00:45)             133<L>  |  95<L>   |  26<H> 		Ca++ --      Ca 8.6                ---------------------------------( 210<H>		Mg 2.10               4.5     |  27      |  0.89  			Ph 3.2     BMP (11-03 @ 00:20)             135     |  99      |  29<H> 		Ca++ --      Ca 8.5                ---------------------------------( 275<H>		Mg 2.20               4.3     |  25      |  1.01  			Ph 4.9<H>    LFTs (11-03 @ 00:20)      TPro 5.4<L> / Alb 3.4 / TBili 0.7 / DBili -- / AST 30 / ALT 93<H> / AlkPhos 240<H>    Coags (11-03 @ 00:20)  aPTT 23.1<L> / INR 1.07 / PT 12.1            IMAGING STUDIES

## 2023-11-04 NOTE — PROGRESS NOTE ADULT - SUBJECTIVE AND OBJECTIVE BOX
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 Initiated IV solumedrol with gradual improvement of symptoms and return of strength since Wednesday now here at [facility] for spine evaluation by surgery.    O: ICU Vital Signs Last 24 Hrs  T(F): 97.6 (11-04-23 @ 00:00), Max: 98 (11-03-23 @ 16:00)  HR: 73 (11-04-23 @ 00:00) (61 - 106)  BP: 146/82 (11-04-23 @ 00:00) (131/78 - 170/88)  BP(mean): 99 (11-04-23 @ 00:00) (90 - 117)  ABP: 165/81 (11-04-23 @ 00:00)  RR: 17 (11-04-23 @ 00:00) (9 - 21)  SpO2: 95% (11-04-23 @ 00:00) (93% - 98%)    PHYSICAL EXAM:   Neurological: AAOx3, CNII-XII intact,  strength 5/5 b/l upper extremity, strength 4/5 b/l lower extremity   ENT: mucus membrane moist  Cardiovascular: RRR  Respiratory: CTA  Gastrointestinal: soft, NT, ND  Extremities: warm, no dependent edema  Vascular: no cyanosis/erythema  Skin: no rashes  MSK: no joint swelling.     LABS:    11-03    135  |  99  |  29<H>  ----------------------------<  275<H>  4.3   |  25  |  1.01    Ca    8.5      03 Nov 2023 00:20  Phos  4.9     11-03  Mg     2.20     11-03    TPro  5.4<L>  /  Alb  3.4  /  TBili  0.7  /  DBili  x   /  AST  30  /  ALT  93<H>  /  AlkPhos  240<H>  11-03  LIVER FUNCTIONS - ( 03 Nov 2023 00:20 )  Alb: 3.4 g/dL / Pro: 5.4 g/dL / ALK PHOS: 240 U/L / ALT: 93 U/L / AST: 30 U/L / GGT: x                               12.3   17.25 )-----------( 142      ( 04 Nov 2023 00:45 )             36.2   PT/INR - ( 03 Nov 2023 00:20 )   PT: 12.1 sec;   INR: 1.07 ratio         PTT - ( 03 Nov 2023 00:20 )  PTT:23.1 sec  ABG - ( 02 Nov 2023 18:41 )  pH, Arterial: 7.44  pH, Blood: x     /  pCO2: 36    /  pO2: 238   / HCO3: 24    / Base Excess: 0.6   /  SaO2: 99.4      CAPILLARY BLOOD GLUCOSE      POCT Blood Glucose.: 198 mg/dL (03 Nov 2023 21:41)  POCT Blood Glucose.: 300 mg/dL (03 Nov 2023 16:42)  POCT Blood Glucose.: 206 mg/dL (03 Nov 2023 11:05)  POCT Blood Glucose.: 188 mg/dL (03 Nov 2023 08:42)    MEDICATIONS  (STANDING):  acetaminophen     Tablet .. 975 milliGRAM(s) Oral every 6 hours  amLODIPine   Tablet 10 milliGRAM(s) Oral daily  atorvastatin 20 milliGRAM(s) Oral at bedtime  bicalutamide 50 milliGRAM(s) Oral daily  chlorhexidine 2% Cloths 1 Application(s) Topical daily  cyclobenzaprine 10 milliGRAM(s) Oral every 8 hours  dexAMETHasone     Tablet 4 milliGRAM(s) Oral every 6 hours  glucagon  Injectable 1 milliGRAM(s) IntraMuscular once  HYDROmorphone PCA (1 mG/mL) 30 milliLiter(s) PCA Continuous PCA Continuous  insulin glargine Injectable (LANTUS) 30 Unit(s) SubCutaneous every morning  insulin lispro (ADMELOG) corrective regimen sliding scale   SubCutaneous three times a day before meals  insulin lispro (ADMELOG) corrective regimen sliding scale   SubCutaneous at bedtime  insulin lispro Injectable (ADMELOG) 10 Unit(s) SubCutaneous three times a day before meals  lisinopril 20 milliGRAM(s) Oral daily  pantoprazole    Tablet 40 milliGRAM(s) Oral before breakfast  polyethylene glycol 3350 17 Gram(s) Oral daily  senna 2 Tablet(s) Oral at bedtime  tamsulosin 0.4 milliGRAM(s) Oral at bedtime    MEDICATIONS  (PRN):  benzocaine/menthol Lozenge 1 Lozenge Oral every 3 hours PRN Sore Throat  HYDROmorphone PCA (1 mG/mL) Rescue Clinician Bolus 0.5 milliGRAM(s) IV Push every 15 minutes PRN for Pain Scale GREATER THAN 6  nalbuphine Injectable 2.5 milliGRAM(s) IV Push every 6 hours PRN Pruritus  naloxone Injectable 0.1 milliGRAM(s) IV Push every 3 minutes PRN For ANY of the following changes in patient status:  A. RR LESS THAN 10 breaths per minute, B. Oxygen saturation LESS THAN 90%, C. Sedation score of 6  ondansetron Injectable 4 milliGRAM(s) IV Push every 6 hours PRN Nausea      Parsons:	  [ ] None	[ ] Daily Parsons Order Placed	   Indication:	  [ ] Strict I and O's    [ ] Obstruction     [ ] Incontinence + Stage 3 or 4 Decubitus  Central Line:  [ ] None	   [ ]  Medication / TPN Administration     [ ] No Peripheral IV

## 2023-11-04 NOTE — PROGRESS NOTE ADULT - ASSESSMENT
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 Initiated IV solumedrol with gradual improvement of symptoms and return of strength since Wednesday now here at [facility] for spine evaluation by surgery.    Interval Events:   -Tolerating DASH Diet on 11/3   -AGGIE drain X2, sanguinous fluid  - hydralazine x1   -q4hr neuro checks    PLAN:   Neurologic:  -C-collar  -Baseline:   AOX3, B/L Upper extremity 5/5 motor strength. B/L Lower extremity 4/5 (RLE slightly weaker than LLE, but still able to lift against gravity and resistance). No focal neuro deficits  -q1hr neuro checks > q4 checks after 24 hours post op  -Keep head of bed >60degrees due to dural tear in surgery  -Oral Dexamethasone 4mg q6hrs, stop after 14days  -Pain: Dilaudid PCA, IV Tylenol, flexeril    Respiratory:  - O2 saturation 98% RA  - hx of PE, s/p IVC filter    Cardiovascular:   -Hemodynamically stable on no pressor support  -Home amlodipine 10mg QD,  -Home Lisinopril  -Home statin    Gastrointestinal/Nutrition:   - DASH diet started 11/3   -Senna/Miralax QD  -Protonix 40 QD  -Zofran prn  -Monitor CK         Admission CK > 15785, downtrending < 100  -Monitor LFTs (elevated most likely due to rhabdo)    Renal/Genitourinary:   -Prostate Biopsy 2021 --> adenocarcinoma Gelason 3+4; declined radiation therapy at the time  - Home Bicalutamide 50mg  -IVL  -Flomax QD  -Parsons placed due to retention  -Monitor urine output    Hematologic:   - Monitor H&H  - DVT prophylaxis; IVC filter  - hold SCDS due to bilateral DVT  - Hx of DVTs, IVC filter placed 11/1    Infectious Disease:   - Afebrile  - Monitor WBC/temp  - Continue 2grams Ancef (started 11/2) q8hr for 24hrs    Lines/Tubes:  -R. 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

## 2023-11-05 LAB
ANION GAP SERPL CALC-SCNC: 9 MMOL/L — SIGNIFICANT CHANGE UP (ref 7–14)
ANION GAP SERPL CALC-SCNC: 9 MMOL/L — SIGNIFICANT CHANGE UP (ref 7–14)
BUN SERPL-MCNC: 31 MG/DL — HIGH (ref 7–23)
BUN SERPL-MCNC: 31 MG/DL — HIGH (ref 7–23)
CA-I BLD-SCNC: 1.17 MMOL/L — SIGNIFICANT CHANGE UP (ref 1.15–1.29)
CA-I BLD-SCNC: 1.17 MMOL/L — SIGNIFICANT CHANGE UP (ref 1.15–1.29)
CALCIUM SERPL-MCNC: 9 MG/DL — SIGNIFICANT CHANGE UP (ref 8.4–10.5)
CALCIUM SERPL-MCNC: 9 MG/DL — SIGNIFICANT CHANGE UP (ref 8.4–10.5)
CHLORIDE SERPL-SCNC: 98 MMOL/L — SIGNIFICANT CHANGE UP (ref 98–107)
CHLORIDE SERPL-SCNC: 98 MMOL/L — SIGNIFICANT CHANGE UP (ref 98–107)
CO2 SERPL-SCNC: 28 MMOL/L — SIGNIFICANT CHANGE UP (ref 22–31)
CO2 SERPL-SCNC: 28 MMOL/L — SIGNIFICANT CHANGE UP (ref 22–31)
CREAT SERPL-MCNC: 1.12 MG/DL — SIGNIFICANT CHANGE UP (ref 0.5–1.3)
CREAT SERPL-MCNC: 1.12 MG/DL — SIGNIFICANT CHANGE UP (ref 0.5–1.3)
EGFR: 70 ML/MIN/1.73M2 — SIGNIFICANT CHANGE UP
EGFR: 70 ML/MIN/1.73M2 — SIGNIFICANT CHANGE UP
GLUCOSE BLDC GLUCOMTR-MCNC: 158 MG/DL — HIGH (ref 70–99)
GLUCOSE BLDC GLUCOMTR-MCNC: 158 MG/DL — HIGH (ref 70–99)
GLUCOSE BLDC GLUCOMTR-MCNC: 206 MG/DL — HIGH (ref 70–99)
GLUCOSE BLDC GLUCOMTR-MCNC: 206 MG/DL — HIGH (ref 70–99)
GLUCOSE BLDC GLUCOMTR-MCNC: 226 MG/DL — HIGH (ref 70–99)
GLUCOSE BLDC GLUCOMTR-MCNC: 226 MG/DL — HIGH (ref 70–99)
GLUCOSE BLDC GLUCOMTR-MCNC: 256 MG/DL — HIGH (ref 70–99)
GLUCOSE BLDC GLUCOMTR-MCNC: 256 MG/DL — HIGH (ref 70–99)
GLUCOSE SERPL-MCNC: 197 MG/DL — HIGH (ref 70–99)
GLUCOSE SERPL-MCNC: 197 MG/DL — HIGH (ref 70–99)
HCT VFR BLD CALC: 34.6 % — LOW (ref 39–50)
HCT VFR BLD CALC: 34.6 % — LOW (ref 39–50)
HGB BLD-MCNC: 11.7 G/DL — LOW (ref 13–17)
HGB BLD-MCNC: 11.7 G/DL — LOW (ref 13–17)
MAGNESIUM SERPL-MCNC: 2 MG/DL — SIGNIFICANT CHANGE UP (ref 1.6–2.6)
MAGNESIUM SERPL-MCNC: 2 MG/DL — SIGNIFICANT CHANGE UP (ref 1.6–2.6)
MCHC RBC-ENTMCNC: 30.3 PG — SIGNIFICANT CHANGE UP (ref 27–34)
MCHC RBC-ENTMCNC: 30.3 PG — SIGNIFICANT CHANGE UP (ref 27–34)
MCHC RBC-ENTMCNC: 33.8 GM/DL — SIGNIFICANT CHANGE UP (ref 32–36)
MCHC RBC-ENTMCNC: 33.8 GM/DL — SIGNIFICANT CHANGE UP (ref 32–36)
MCV RBC AUTO: 89.6 FL — SIGNIFICANT CHANGE UP (ref 80–100)
MCV RBC AUTO: 89.6 FL — SIGNIFICANT CHANGE UP (ref 80–100)
NRBC # BLD: 0 /100 WBCS — SIGNIFICANT CHANGE UP (ref 0–0)
NRBC # BLD: 0 /100 WBCS — SIGNIFICANT CHANGE UP (ref 0–0)
NRBC # FLD: 0 K/UL — SIGNIFICANT CHANGE UP (ref 0–0)
NRBC # FLD: 0 K/UL — SIGNIFICANT CHANGE UP (ref 0–0)
PHOSPHATE SERPL-MCNC: 2.7 MG/DL — SIGNIFICANT CHANGE UP (ref 2.5–4.5)
PHOSPHATE SERPL-MCNC: 2.7 MG/DL — SIGNIFICANT CHANGE UP (ref 2.5–4.5)
PLATELET # BLD AUTO: 120 K/UL — LOW (ref 150–400)
PLATELET # BLD AUTO: 120 K/UL — LOW (ref 150–400)
POTASSIUM SERPL-MCNC: 4.8 MMOL/L — SIGNIFICANT CHANGE UP (ref 3.5–5.3)
POTASSIUM SERPL-MCNC: 4.8 MMOL/L — SIGNIFICANT CHANGE UP (ref 3.5–5.3)
POTASSIUM SERPL-SCNC: 4.8 MMOL/L — SIGNIFICANT CHANGE UP (ref 3.5–5.3)
POTASSIUM SERPL-SCNC: 4.8 MMOL/L — SIGNIFICANT CHANGE UP (ref 3.5–5.3)
RBC # BLD: 3.86 M/UL — LOW (ref 4.2–5.8)
RBC # BLD: 3.86 M/UL — LOW (ref 4.2–5.8)
RBC # FLD: 12.2 % — SIGNIFICANT CHANGE UP (ref 10.3–14.5)
RBC # FLD: 12.2 % — SIGNIFICANT CHANGE UP (ref 10.3–14.5)
SODIUM SERPL-SCNC: 135 MMOL/L — SIGNIFICANT CHANGE UP (ref 135–145)
SODIUM SERPL-SCNC: 135 MMOL/L — SIGNIFICANT CHANGE UP (ref 135–145)
WBC # BLD: 15.35 K/UL — HIGH (ref 3.8–10.5)
WBC # BLD: 15.35 K/UL — HIGH (ref 3.8–10.5)
WBC # FLD AUTO: 15.35 K/UL — HIGH (ref 3.8–10.5)
WBC # FLD AUTO: 15.35 K/UL — HIGH (ref 3.8–10.5)

## 2023-11-05 PROCEDURE — 99232 SBSQ HOSP IP/OBS MODERATE 35: CPT | Mod: GC

## 2023-11-05 RX ORDER — SODIUM CHLORIDE 9 MG/ML
1000 INJECTION, SOLUTION INTRAVENOUS
Refills: 0 | Status: DISCONTINUED | OUTPATIENT
Start: 2023-11-05 | End: 2023-11-06

## 2023-11-05 RX ORDER — DEXTROSE 50 % IN WATER 50 %
25 SYRINGE (ML) INTRAVENOUS ONCE
Refills: 0 | Status: DISCONTINUED | OUTPATIENT
Start: 2023-11-05 | End: 2023-11-05

## 2023-11-05 RX ORDER — INSULIN LISPRO 100/ML
VIAL (ML) SUBCUTANEOUS
Refills: 0 | Status: DISCONTINUED | OUTPATIENT
Start: 2023-11-05 | End: 2023-11-15

## 2023-11-05 RX ORDER — POLYETHYLENE GLYCOL 3350 17 G/17G
17 POWDER, FOR SOLUTION ORAL
Refills: 0 | Status: DISCONTINUED | OUTPATIENT
Start: 2023-11-05 | End: 2023-11-10

## 2023-11-05 RX ORDER — DEXTROSE 50 % IN WATER 50 %
12.5 SYRINGE (ML) INTRAVENOUS ONCE
Refills: 0 | Status: DISCONTINUED | OUTPATIENT
Start: 2023-11-05 | End: 2023-11-05

## 2023-11-05 RX ORDER — DEXTROSE 50 % IN WATER 50 %
15 SYRINGE (ML) INTRAVENOUS ONCE
Refills: 0 | Status: DISCONTINUED | OUTPATIENT
Start: 2023-11-05 | End: 2023-11-06

## 2023-11-05 RX ORDER — DEXTROSE 50 % IN WATER 50 %
25 SYRINGE (ML) INTRAVENOUS ONCE
Refills: 0 | Status: DISCONTINUED | OUTPATIENT
Start: 2023-11-05 | End: 2023-11-06

## 2023-11-05 RX ORDER — INSULIN LISPRO 100/ML
VIAL (ML) SUBCUTANEOUS AT BEDTIME
Refills: 0 | Status: DISCONTINUED | OUTPATIENT
Start: 2023-11-05 | End: 2023-11-15

## 2023-11-05 RX ORDER — INSULIN GLARGINE 100 [IU]/ML
35 INJECTION, SOLUTION SUBCUTANEOUS EVERY MORNING
Refills: 0 | Status: DISCONTINUED | OUTPATIENT
Start: 2023-11-05 | End: 2023-11-09

## 2023-11-05 RX ORDER — INSULIN GLARGINE 100 [IU]/ML
5 INJECTION, SOLUTION SUBCUTANEOUS ONCE
Refills: 0 | Status: COMPLETED | OUTPATIENT
Start: 2023-11-05 | End: 2023-11-05

## 2023-11-05 RX ADMIN — INSULIN GLARGINE 5 UNIT(S): 100 INJECTION, SOLUTION SUBCUTANEOUS at 13:11

## 2023-11-05 RX ADMIN — BICALUTAMIDE 50 MILLIGRAM(S): 50 TABLET, FILM COATED ORAL at 11:58

## 2023-11-05 RX ADMIN — ATORVASTATIN CALCIUM 20 MILLIGRAM(S): 80 TABLET, FILM COATED ORAL at 22:58

## 2023-11-05 RX ADMIN — POLYETHYLENE GLYCOL 3350 17 GRAM(S): 17 POWDER, FOR SOLUTION ORAL at 17:19

## 2023-11-05 RX ADMIN — AMLODIPINE BESYLATE 10 MILLIGRAM(S): 2.5 TABLET ORAL at 06:38

## 2023-11-05 RX ADMIN — SENNA PLUS 2 TABLET(S): 8.6 TABLET ORAL at 22:58

## 2023-11-05 RX ADMIN — Medication 2: at 08:10

## 2023-11-05 RX ADMIN — Medication 2: at 17:19

## 2023-11-05 RX ADMIN — LISINOPRIL 20 MILLIGRAM(S): 2.5 TABLET ORAL at 06:38

## 2023-11-05 RX ADMIN — Medication 4 MILLIGRAM(S): at 17:22

## 2023-11-05 RX ADMIN — Medication 12 UNIT(S): at 17:19

## 2023-11-05 RX ADMIN — Medication 975 MILLIGRAM(S): at 23:03

## 2023-11-05 RX ADMIN — CYCLOBENZAPRINE HYDROCHLORIDE 10 MILLIGRAM(S): 10 TABLET, FILM COATED ORAL at 13:12

## 2023-11-05 RX ADMIN — Medication 4 MILLIGRAM(S): at 23:03

## 2023-11-05 RX ADMIN — Medication 975 MILLIGRAM(S): at 11:50

## 2023-11-05 RX ADMIN — INSULIN GLARGINE 30 UNIT(S): 100 INJECTION, SOLUTION SUBCUTANEOUS at 08:09

## 2023-11-05 RX ADMIN — Medication 975 MILLIGRAM(S): at 00:00

## 2023-11-05 RX ADMIN — Medication 975 MILLIGRAM(S): at 23:33

## 2023-11-05 RX ADMIN — Medication 4 MILLIGRAM(S): at 06:37

## 2023-11-05 RX ADMIN — OXYCODONE HYDROCHLORIDE 10 MILLIGRAM(S): 5 TABLET ORAL at 09:50

## 2023-11-05 RX ADMIN — Medication 975 MILLIGRAM(S): at 06:37

## 2023-11-05 RX ADMIN — Medication 10 MILLIGRAM(S): at 11:50

## 2023-11-05 RX ADMIN — Medication 12 UNIT(S): at 11:52

## 2023-11-05 RX ADMIN — OXYCODONE HYDROCHLORIDE 10 MILLIGRAM(S): 5 TABLET ORAL at 10:20

## 2023-11-05 RX ADMIN — Medication 4 MILLIGRAM(S): at 11:51

## 2023-11-05 RX ADMIN — CHLORHEXIDINE GLUCONATE 1 APPLICATION(S): 213 SOLUTION TOPICAL at 11:51

## 2023-11-05 RX ADMIN — PANTOPRAZOLE SODIUM 40 MILLIGRAM(S): 20 TABLET, DELAYED RELEASE ORAL at 06:38

## 2023-11-05 RX ADMIN — Medication 975 MILLIGRAM(S): at 07:00

## 2023-11-05 RX ADMIN — CYCLOBENZAPRINE HYDROCHLORIDE 10 MILLIGRAM(S): 10 TABLET, FILM COATED ORAL at 06:37

## 2023-11-05 RX ADMIN — Medication 12 UNIT(S): at 08:09

## 2023-11-05 RX ADMIN — Medication 3: at 11:52

## 2023-11-05 RX ADMIN — Medication 975 MILLIGRAM(S): at 17:22

## 2023-11-05 RX ADMIN — POLYETHYLENE GLYCOL 3350 17 GRAM(S): 17 POWDER, FOR SOLUTION ORAL at 11:59

## 2023-11-05 RX ADMIN — Medication 975 MILLIGRAM(S): at 12:20

## 2023-11-05 RX ADMIN — HEPARIN SODIUM 5000 UNIT(S): 5000 INJECTION INTRAVENOUS; SUBCUTANEOUS at 22:58

## 2023-11-05 RX ADMIN — HEPARIN SODIUM 5000 UNIT(S): 5000 INJECTION INTRAVENOUS; SUBCUTANEOUS at 13:12

## 2023-11-05 RX ADMIN — CYCLOBENZAPRINE HYDROCHLORIDE 10 MILLIGRAM(S): 10 TABLET, FILM COATED ORAL at 22:59

## 2023-11-05 RX ADMIN — Medication 63.75 MILLIMOLE(S): at 01:50

## 2023-11-05 RX ADMIN — HEPARIN SODIUM 5000 UNIT(S): 5000 INJECTION INTRAVENOUS; SUBCUTANEOUS at 01:19

## 2023-11-05 RX ADMIN — Medication 975 MILLIGRAM(S): at 17:50

## 2023-11-05 RX ADMIN — TAMSULOSIN HYDROCHLORIDE 0.4 MILLIGRAM(S): 0.4 CAPSULE ORAL at 22:58

## 2023-11-05 NOTE — PROGRESS NOTE ADULT - SUBJECTIVE AND OBJECTIVE BOX
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 Initiated IV solumedrol with gradual improvement of symptoms and return of strength since Wednesday now here at [facility] for spine evaluation by surgery.    Interval Events:   - hydralazine x1   - Lantus increased to 30, premeal to 10  - PCA off  - listed  - d/c'ed best, pending TOV    MEDICATIONS  (STANDING):  acetaminophen     Tablet .. 975 milliGRAM(s) Oral every 6 hours  amLODIPine   Tablet 10 milliGRAM(s) Oral daily  atorvastatin 20 milliGRAM(s) Oral at bedtime  bicalutamide 50 milliGRAM(s) Oral daily  chlorhexidine 2% Cloths 1 Application(s) Topical daily  cyclobenzaprine 10 milliGRAM(s) Oral every 8 hours  dexAMETHasone     Tablet 4 milliGRAM(s) Oral every 6 hours  glucagon  Injectable 1 milliGRAM(s) IntraMuscular once  heparin   Injectable 5000 Unit(s) SubCutaneous every 8 hours  insulin glargine Injectable (LANTUS) 30 Unit(s) SubCutaneous every morning  insulin lispro (ADMELOG) corrective regimen sliding scale   SubCutaneous at bedtime  insulin lispro (ADMELOG) corrective regimen sliding scale   SubCutaneous three times a day before meals  insulin lispro Injectable (ADMELOG) 12 Unit(s) SubCutaneous three times a day before meals  lisinopril 20 milliGRAM(s) Oral daily  pantoprazole    Tablet 40 milliGRAM(s) Oral before breakfast  polyethylene glycol 3350 17 Gram(s) Oral daily  senna 2 Tablet(s) Oral at bedtime  tamsulosin 0.4 milliGRAM(s) Oral at bedtime    MEDICATIONS  (PRN):  benzocaine/menthol Lozenge 1 Lozenge Oral every 3 hours PRN Sore Throat  HYDROmorphone  Injectable 0.5 milliGRAM(s) IV Push every 4 hours PRN Severe breakthrough Pain (7 - 10)  naloxone Injectable 0.1 milliGRAM(s) IV Push every 3 minutes PRN For ANY of the following changes in patient status:  A. RR LESS THAN 10 breaths per minute, B. Oxygen saturation LESS THAN 90%, C. Sedation score of 6  ondansetron Injectable 4 milliGRAM(s) IV Push every 6 hours PRN Nausea  oxyCODONE    IR 5 milliGRAM(s) Oral every 3 hours PRN Moderate Pain (4 - 6)  oxyCODONE    IR 10 milliGRAM(s) Oral every 3 hours PRN Severe Pain (7 - 10)    ICU Vital Signs Last 24 Hrs  T(C): 36.1 (05 Nov 2023 00:00), Max: 36.5 (04 Nov 2023 20:00)  T(F): 97 (05 Nov 2023 00:00), Max: 97.7 (04 Nov 2023 20:00)  HR: 88 (05 Nov 2023 00:00) (76 - 107)  BP: 143/80 (05 Nov 2023 00:00) (127/73 - 143/80)  BP(mean): 97 (05 Nov 2023 00:00) (84 - 100)  ABP: --  ABP(mean): --  RR: 18 (05 Nov 2023 00:00) (12 - 20)  SpO2: 96% (05 Nov 2023 00:00) (94% - 96%)    O2 Parameters below as of 05 Nov 2023 00:00  Patient On (Oxygen Delivery Method): room air      I&O's Detail    03 Nov 2023 07:01  -  04 Nov 2023 07:00  --------------------------------------------------------  IN:    IV PiggyBack: 200 mL    Lactated Ringers: 75 mL    Oral Fluid: 750 mL  Total IN: 1025 mL    OUT:    Bulb (mL): 150 mL    Bulb (mL): 110 mL    Indwelling Catheter - Urethral (mL): 1550 mL  Total OUT: 1810 mL    Total NET: -785 mL      04 Nov 2023 08:01  -  05 Nov 2023 03:34  --------------------------------------------------------  IN:    Oral Fluid: 750 mL  Total IN: 750 mL    OUT:    Bulb (mL): 60 mL    Bulb (mL): 5 mL    Indwelling Catheter - Urethral (mL): 575 mL    Intermittent Catheterization - Urethral (mL): 450 mL    Voided (mL): 475 mL  Total OUT: 1565 mL    Total NET: -815 mL      PHYSICAL EXAM:   Neurological: AAOx3, CNII-XII intact,  strength 5/5 b/l upper extremity, strength 4/5 b/l lower extremity   ENT: mucus membrane moist  Cardiovascular: RRR  Respiratory: CTA  Gastrointestinal: soft, NT, ND  Extremities: warm, no dependent edema  Vascular: no cyanosis/erythema  Skin: no rashes  MSK: no joint swelling.     LABS:    CBC (11-05 @ 01:35)                              11.7<L>                         15.35<H>  )----------------(  120<L>     --    % Neutrophils, --    % Lymphocytes, ANC: --                                  34.6<L>  CBC (11-04 @ 00:45)                              12.3<L>                         17.25<H>  )----------------(  142<L>     --    % Neutrophils, --    % Lymphocytes, ANC: --                                  36.2<L>    BMP (11-05 @ 01:35)             135     |  98      |  31<H> 		Ca++ 1.17    Ca 9.0                ---------------------------------( 197<H>		Mg 2.00               4.8     |  28      |  1.12  			Ph 2.7     BMP (11-04 @ 00:45)             133<L>  |  95<L>   |  26<H> 		Ca++ --      Ca 8.6                ---------------------------------( 210<H>		Mg 2.10               4.5     |  27      |  0.89  			Ph 3.2

## 2023-11-05 NOTE — PROGRESS NOTE ADULT - ASSESSMENT
73y Male s/p C5-T3 posterior cervical fusion, C7-T1 decompression for metastatic prostate cancer. Doing well postoperatively with improved strength compared to preoperatively    - Head of bed 60deg or higher  - MAP > 85  - Pain control  - FU labs  - WBAT  - PT/OT/OOB  - I/S  - Monitor HMV output  - SCDs  - Dispo: [facility]

## 2023-11-05 NOTE — PROGRESS NOTE ADULT - ASSESSMENT
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 Initiated IV solumedrol with gradual improvement of symptoms and return of strength since Wednesday now here at [facility] for spine evaluation by surgery.    Interval Events:   -Tolerating DASH Diet on 11/3   -AGGIE drain X2, sanguinous fluid  -hydralazine x1   -q4hr neuro checks    PLAN:   Neurologic:  -C-collar  -Baseline:   AOX3, B/L Upper extremity 5/5 motor strength. B/L Lower extremity 4/5 (RLE slightly weaker than LLE, but still able to lift against gravity and resistance). No focal neuro deficits  -q1hr neuro checks > q4 checks after 24 hours post op  -Keep head of bed >60degrees due to dural tear in surgery  -Oral Dexamethasone 4mg q6hrs, stop after 14days  -Pain: Dilaudid PCA, IV Tylenol, flexeril    Respiratory:  - O2 saturation 98% RA  - hx of PE, s/p IVC filter    Cardiovascular:   -Hemodynamically stable on no pressor support  -Home amlodipine 10mg QD,  -Home Lisinopril  -Home statin    Gastrointestinal/Nutrition:   - DASH diet started 11/3   -Senna/Miralax QD  -Protonix 40 QD  -Zofran prn  -Monitor CK  -Admission CK > 17449, downtrending < 100  -Monitor LFTs (elevated most likely due to rhabdo)    Renal/Genitourinary:   -Prostate Biopsy 2021 --> adenocarcinoma Gelason 3+4; declined radiation therapy at the time  -Home Bicalutamide 50mg  -IVL  -Flomax QD  -Parsons placed due to retention  -Monitor urine output    Hematologic:   - Monitor H&H  - DVT prophylaxis; IVC filter  - hold SCDS due to bilateral DVT  - Hx of DVTs, IVC filter placed 11/1    Infectious Disease:   - Afebrile  - Monitor WBC/temp  - Continue 2grams Ancef (started 11/2) q8hr for 24hrs    Lines/Tubes:  -R. 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 Initiated IV solumedrol with gradual improvement of symptoms and return of strength since Wednesday now here at [facility] for spine evaluation by surgery.    Interval Events:   -Tolerating DASH Diet on 11/3   -AGGIE drain X2, sanguinous fluid  -hydralazine x1   -q4hr neuro checks    PLAN:   Neurologic:  -C-collar  -Baseline:   AOX3, B/L Upper extremity 5/5 motor strength. B/L Lower extremity 4/5 (RLE slightly weaker than LLE, but still able to lift against gravity and resistance). No focal neuro deficits  -q1hr neuro checks > q4 checks after 24 hours post op  -Keep head of bed >60degrees due to dural tear in surgery  -Oral Dexamethasone 4mg q6hrs, stop after 14days  -Pain: Dilaudid PCA, IV Tylenol, flexeril    Respiratory:  - O2 saturation 98% RA  - hx of PE, s/p IVC filter    Cardiovascular:   -Hemodynamically stable on no pressor support  -Home amlodipine 10mg QD,  -Home Lisinopril  -Home statin    Gastrointestinal/Nutrition:   - DASH diet started 11/3   -Bowel regimen: Senna/Miralax QD, docolax suppository 11/5 for no BM  -Protonix 40 QD  -Zofran prn  -Monitor CK  -Admission CK > 05518, downtrending < 100  -Monitor LFTs (elevated most likely due to rhabdo)    Renal/Genitourinary:   -Prostate Biopsy 2021 --> adenocarcinoma Gelason 3+4; declined radiation therapy at the time  -Home Bicalutamide 50mg  -IVL  -Flomax QD  -d/c'ed best, passed TOV    Hematologic:   - Monitor H&H  - DVT prophylaxis; IVC filter  - hold SCDS due to bilateral DVT  - Hx of DVTs, IVC filter placed 11/1    Infectious Disease:   - Afebrile  - Monitor WBC/temp  - Continue 2grams Ancef (started 11/2) q8hr for 24hrs    Lines/Tubes:  -R. 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

## 2023-11-05 NOTE — PROGRESS NOTE ADULT - ATTENDING COMMENTS
I agree with the history, physical, and plan, which I have reviewed and edited where appropriate.  I agree with notes/assessment of health care providers on my service.  I have personally examined the patient.  I was physically present for the key portions of the evaluation and management (E/M) service provided.  I reviewed data and laboratory tests/x-rays and all pertinent electronic images.  The patient is a critical care patient with life threatening hemodynamic and metabolic instability in SICU.  Risk benefit analyses discussed.    The patient is in SICU with diagnosis mentioned in the note.    The plan is specified below.    73M w/ T2DM, HTN, Metastatic prostate cancer (2009) was initially evaluated at [facility] for lower extremity paralysis in the setting of a recent fall, found to have 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 Initiated IV solumedrol with gradual improvement of symptoms and return of strength. Now s/p C7-T1 decompression, C5-T3 fusion on 11/2. Stable for transfer to floor.     PLAN:   Neurologic: postoperative pain, cervical decompression and fusion, cord compression   -C-collar  -Keep head of bed >60degrees due to dural tear in surgery  -Oral Dexamethasone 4mg q4hrs, stop after 14days  -Pain: Oral Tylenol, flexeril x3d, PO oxy, dilaudid for breakthrough     Respiratory:  - on NC    Cardiovascular: HTN  - Home amlodipine 10mg QD  - lisinopril     Gastrointestinal/Nutrition: constipation   -DASH diet    -Senna QD, miralax   - add dulcolax   -Protonix 40 QD    Renal/Genitourinary: metastatic prostate ca   -IVL  -Flomax QD    Hematologic: h/o dvt s/p IVCF 11/1  - DVT prophylaxis: SQH    Infectious Disease:   -observe off abx     Endocrine: DM  -increase Lantus 35, 12 premeal; Low ISS .

## 2023-11-05 NOTE — PROGRESS NOTE ADULT - SUBJECTIVE AND OBJECTIVE BOX
ORTHOPAEDIC PROGRESS NOTE    SUBJECTIVE:  Pt seen and examined at bedside this am.  Doing well.  No acute events overnight.  Pt states pain is well controlled    OBJECTIVE:  Vital Signs Last 24 Hrs  T(C): 36.5 (05 Nov 2023 04:00), Max: 36.5 (04 Nov 2023 20:00)  T(F): 97.7 (05 Nov 2023 04:00), Max: 97.7 (04 Nov 2023 20:00)  HR: 86 (05 Nov 2023 04:00) (86 - 107)  BP: 154/89 (05 Nov 2023 04:00) (127/73 - 154/89)  BP(mean): 106 (05 Nov 2023 04:00) (84 - 106)  RR: 15 (05 Nov 2023 04:00) (15 - 20)  SpO2: 95% (05 Nov 2023 04:00) (94% - 96%)    Parameters below as of 05 Nov 2023 04:00  Patient On (Oxygen Delivery Method): room air        Physical Exam:  General: NAD; resting comfrotably in bed  Resp: non labored  Back:   dressing C/D/I, mild appropriate linda-incisional ttp  HMV in place w/ serosanguinous output; no evidence of CSF in drain  Neuro:  Motor:                   C5                C6              C7               C8           T1   R            5/5                4+/5            4+/5             4+/5          4+/5  L             5/5               4+/5             4+/5             4+/5          4+/5                L2             L3             L4               L5            S1  R         5/5           5/5          5/5             5/5           5/5  L          5/5          5/5           5/5             5/5           5/5    Sensory:            C5         C6         C7      C8       T1        (0=absent, 1=impaired, 2=normal, NT=not testable)  R         2            2           1        1         1  L          2            2           1        1         1               L2          L3         L4      L5       S1         (0=absent, 1=impaired, 2=normal, NT=not testable)  R         1            1            1        1        1  L          1            1           1        1         1    LABS                        11.7   15.35 )-----------( 120      ( 05 Nov 2023 01:35 )             34.6       11-05    135  |  98  |  31<H>  ----------------------------<  197<H>  4.8   |  28  |  1.12    Ca    9.0      05 Nov 2023 01:35  Phos  2.7     11-05  Mg     2.00     11-05            I&O's Summary    03 Nov 2023 07:01  -  04 Nov 2023 07:00  --------------------------------------------------------  IN: 1025 mL / OUT: 1810 mL / NET: -785 mL    04 Nov 2023 08:01  -  05 Nov 2023 05:23  --------------------------------------------------------  IN: 1000 mL / OUT: 1565 mL / NET: -565 mL

## 2023-11-06 LAB
ANION GAP SERPL CALC-SCNC: 11 MMOL/L — SIGNIFICANT CHANGE UP (ref 7–14)
ANION GAP SERPL CALC-SCNC: 11 MMOL/L — SIGNIFICANT CHANGE UP (ref 7–14)
BUN SERPL-MCNC: 36 MG/DL — HIGH (ref 7–23)
BUN SERPL-MCNC: 36 MG/DL — HIGH (ref 7–23)
CALCIUM SERPL-MCNC: 8.7 MG/DL — SIGNIFICANT CHANGE UP (ref 8.4–10.5)
CALCIUM SERPL-MCNC: 8.7 MG/DL — SIGNIFICANT CHANGE UP (ref 8.4–10.5)
CHLORIDE SERPL-SCNC: 99 MMOL/L — SIGNIFICANT CHANGE UP (ref 98–107)
CHLORIDE SERPL-SCNC: 99 MMOL/L — SIGNIFICANT CHANGE UP (ref 98–107)
CO2 SERPL-SCNC: 24 MMOL/L — SIGNIFICANT CHANGE UP (ref 22–31)
CO2 SERPL-SCNC: 24 MMOL/L — SIGNIFICANT CHANGE UP (ref 22–31)
CREAT SERPL-MCNC: 0.82 MG/DL — SIGNIFICANT CHANGE UP (ref 0.5–1.3)
CREAT SERPL-MCNC: 0.82 MG/DL — SIGNIFICANT CHANGE UP (ref 0.5–1.3)
EGFR: 94 ML/MIN/1.73M2 — SIGNIFICANT CHANGE UP
EGFR: 94 ML/MIN/1.73M2 — SIGNIFICANT CHANGE UP
GLUCOSE BLDC GLUCOMTR-MCNC: 161 MG/DL — HIGH (ref 70–99)
GLUCOSE BLDC GLUCOMTR-MCNC: 161 MG/DL — HIGH (ref 70–99)
GLUCOSE BLDC GLUCOMTR-MCNC: 188 MG/DL — HIGH (ref 70–99)
GLUCOSE BLDC GLUCOMTR-MCNC: 188 MG/DL — HIGH (ref 70–99)
GLUCOSE BLDC GLUCOMTR-MCNC: 245 MG/DL — HIGH (ref 70–99)
GLUCOSE BLDC GLUCOMTR-MCNC: 245 MG/DL — HIGH (ref 70–99)
GLUCOSE BLDC GLUCOMTR-MCNC: 265 MG/DL — HIGH (ref 70–99)
GLUCOSE BLDC GLUCOMTR-MCNC: 265 MG/DL — HIGH (ref 70–99)
GLUCOSE SERPL-MCNC: 284 MG/DL — HIGH (ref 70–99)
GLUCOSE SERPL-MCNC: 284 MG/DL — HIGH (ref 70–99)
HCT VFR BLD CALC: 33.1 % — LOW (ref 39–50)
HCT VFR BLD CALC: 33.1 % — LOW (ref 39–50)
HGB BLD-MCNC: 11.2 G/DL — LOW (ref 13–17)
HGB BLD-MCNC: 11.2 G/DL — LOW (ref 13–17)
MAGNESIUM SERPL-MCNC: 2.1 MG/DL — SIGNIFICANT CHANGE UP (ref 1.6–2.6)
MAGNESIUM SERPL-MCNC: 2.1 MG/DL — SIGNIFICANT CHANGE UP (ref 1.6–2.6)
MCHC RBC-ENTMCNC: 30.5 PG — SIGNIFICANT CHANGE UP (ref 27–34)
MCHC RBC-ENTMCNC: 30.5 PG — SIGNIFICANT CHANGE UP (ref 27–34)
MCHC RBC-ENTMCNC: 33.8 GM/DL — SIGNIFICANT CHANGE UP (ref 32–36)
MCHC RBC-ENTMCNC: 33.8 GM/DL — SIGNIFICANT CHANGE UP (ref 32–36)
MCV RBC AUTO: 90.2 FL — SIGNIFICANT CHANGE UP (ref 80–100)
MCV RBC AUTO: 90.2 FL — SIGNIFICANT CHANGE UP (ref 80–100)
NRBC # BLD: 0 /100 WBCS — SIGNIFICANT CHANGE UP (ref 0–0)
NRBC # BLD: 0 /100 WBCS — SIGNIFICANT CHANGE UP (ref 0–0)
NRBC # FLD: 0 K/UL — SIGNIFICANT CHANGE UP (ref 0–0)
NRBC # FLD: 0 K/UL — SIGNIFICANT CHANGE UP (ref 0–0)
PHOSPHATE SERPL-MCNC: 2.6 MG/DL — SIGNIFICANT CHANGE UP (ref 2.5–4.5)
PHOSPHATE SERPL-MCNC: 2.6 MG/DL — SIGNIFICANT CHANGE UP (ref 2.5–4.5)
PLATELET # BLD AUTO: 128 K/UL — LOW (ref 150–400)
PLATELET # BLD AUTO: 128 K/UL — LOW (ref 150–400)
POTASSIUM SERPL-MCNC: 4.8 MMOL/L — SIGNIFICANT CHANGE UP (ref 3.5–5.3)
POTASSIUM SERPL-MCNC: 4.8 MMOL/L — SIGNIFICANT CHANGE UP (ref 3.5–5.3)
POTASSIUM SERPL-SCNC: 4.8 MMOL/L — SIGNIFICANT CHANGE UP (ref 3.5–5.3)
POTASSIUM SERPL-SCNC: 4.8 MMOL/L — SIGNIFICANT CHANGE UP (ref 3.5–5.3)
RBC # BLD: 3.67 M/UL — LOW (ref 4.2–5.8)
RBC # BLD: 3.67 M/UL — LOW (ref 4.2–5.8)
RBC # FLD: 12.3 % — SIGNIFICANT CHANGE UP (ref 10.3–14.5)
RBC # FLD: 12.3 % — SIGNIFICANT CHANGE UP (ref 10.3–14.5)
SODIUM SERPL-SCNC: 134 MMOL/L — LOW (ref 135–145)
SODIUM SERPL-SCNC: 134 MMOL/L — LOW (ref 135–145)
WBC # BLD: 8.93 K/UL — SIGNIFICANT CHANGE UP (ref 3.8–10.5)
WBC # BLD: 8.93 K/UL — SIGNIFICANT CHANGE UP (ref 3.8–10.5)
WBC # FLD AUTO: 8.93 K/UL — SIGNIFICANT CHANGE UP (ref 3.8–10.5)
WBC # FLD AUTO: 8.93 K/UL — SIGNIFICANT CHANGE UP (ref 3.8–10.5)

## 2023-11-06 PROCEDURE — 99232 SBSQ HOSP IP/OBS MODERATE 35: CPT | Mod: GC

## 2023-11-06 RX ADMIN — Medication 975 MILLIGRAM(S): at 05:00

## 2023-11-06 RX ADMIN — Medication 975 MILLIGRAM(S): at 17:52

## 2023-11-06 RX ADMIN — Medication 4 MILLIGRAM(S): at 05:01

## 2023-11-06 RX ADMIN — Medication 975 MILLIGRAM(S): at 05:30

## 2023-11-06 RX ADMIN — Medication 975 MILLIGRAM(S): at 11:23

## 2023-11-06 RX ADMIN — Medication 2: at 16:46

## 2023-11-06 RX ADMIN — PANTOPRAZOLE SODIUM 40 MILLIGRAM(S): 20 TABLET, DELAYED RELEASE ORAL at 07:58

## 2023-11-06 RX ADMIN — HEPARIN SODIUM 5000 UNIT(S): 5000 INJECTION INTRAVENOUS; SUBCUTANEOUS at 13:03

## 2023-11-06 RX ADMIN — Medication 4 MILLIGRAM(S): at 17:51

## 2023-11-06 RX ADMIN — POLYETHYLENE GLYCOL 3350 17 GRAM(S): 17 POWDER, FOR SOLUTION ORAL at 17:52

## 2023-11-06 RX ADMIN — CHLORHEXIDINE GLUCONATE 1 APPLICATION(S): 213 SOLUTION TOPICAL at 11:23

## 2023-11-06 RX ADMIN — INSULIN GLARGINE 35 UNIT(S): 100 INJECTION, SOLUTION SUBCUTANEOUS at 07:58

## 2023-11-06 RX ADMIN — Medication 4 MILLIGRAM(S): at 11:23

## 2023-11-06 RX ADMIN — Medication 4: at 08:00

## 2023-11-06 RX ADMIN — Medication 975 MILLIGRAM(S): at 18:22

## 2023-11-06 RX ADMIN — Medication 975 MILLIGRAM(S): at 11:53

## 2023-11-06 RX ADMIN — Medication 12 UNIT(S): at 16:45

## 2023-11-06 RX ADMIN — Medication 12 UNIT(S): at 08:01

## 2023-11-06 RX ADMIN — Medication 12 UNIT(S): at 11:41

## 2023-11-06 RX ADMIN — Medication 6: at 11:42

## 2023-11-06 RX ADMIN — POLYETHYLENE GLYCOL 3350 17 GRAM(S): 17 POWDER, FOR SOLUTION ORAL at 05:00

## 2023-11-06 RX ADMIN — CYCLOBENZAPRINE HYDROCHLORIDE 10 MILLIGRAM(S): 10 TABLET, FILM COATED ORAL at 05:00

## 2023-11-06 RX ADMIN — LISINOPRIL 20 MILLIGRAM(S): 2.5 TABLET ORAL at 05:01

## 2023-11-06 RX ADMIN — HEPARIN SODIUM 5000 UNIT(S): 5000 INJECTION INTRAVENOUS; SUBCUTANEOUS at 05:01

## 2023-11-06 RX ADMIN — AMLODIPINE BESYLATE 10 MILLIGRAM(S): 2.5 TABLET ORAL at 05:01

## 2023-11-06 RX ADMIN — HEPARIN SODIUM 5000 UNIT(S): 5000 INJECTION INTRAVENOUS; SUBCUTANEOUS at 22:11

## 2023-11-06 RX ADMIN — SENNA PLUS 2 TABLET(S): 8.6 TABLET ORAL at 22:10

## 2023-11-06 RX ADMIN — BICALUTAMIDE 50 MILLIGRAM(S): 50 TABLET, FILM COATED ORAL at 12:10

## 2023-11-06 RX ADMIN — ATORVASTATIN CALCIUM 20 MILLIGRAM(S): 80 TABLET, FILM COATED ORAL at 22:09

## 2023-11-06 RX ADMIN — TAMSULOSIN HYDROCHLORIDE 0.4 MILLIGRAM(S): 0.4 CAPSULE ORAL at 22:10

## 2023-11-06 NOTE — PROGRESS NOTE ADULT - ASSESSMENT
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 Now s/p C5-T3 posterior cervical fusion, C7-T1 decompression for metastatic prostate cancer on 11/2.    PLAN:   Neurologic:  -C-collar  -Baseline:   AOX3, B/L Upper extremity 5/5 motor strength. B/L Lower extremity 4/5 (RLE slightly weaker than LLE, but still able to lift against gravity and resistance). No focal neuro deficits  -q4 neuro checks  -Keep head of bed >60degrees due to dural tear in surgery  -Oral Dexamethasone 4mg q6hrs, stop after 14days  -Pain: Dilaudid PCA, IV Tylenol, flexeril    Respiratory:  - O2 saturation 98% RA  - hx of PE, s/p IVC filter    Cardiovascular:   -Hemodynamically stable on no pressor support  -Home amlodipine 10mg QD,  -Home Lisinopril  -Home statin    Gastrointestinal/Nutrition:   - DASH diet started 11/3   -Bowel regimen: Senna/Miralax QD, docolax suppository 11/5 for no BM  -Protonix 40 QD  -Zofran prn  -Monitor CK  -Admission CK > 34773, downtrending < 100  -Monitor LFTs (elevated most likely due to rhabdo)    Renal/Genitourinary:   -Prostate Biopsy 2021 --> adenocarcinoma Gelason 3+4; declined radiation therapy at the time  -Home Bicalutamide 50mg  -IVL  -Flomax QD  -d/c'ed nasir, passed TO    Hematologic:   - Monitor H&H  - DVT prophylaxis; SQH  - hold SCDS due to bilateral DVT  - Hx of DVTs, IVC filter placed 11/1    Infectious Disease:   - Afebrile  - Monitor WBC/temp    Lines/Tubes:  -R. 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 Now s/p C5-T3 posterior cervical fusion, C7-T1 decompression for metastatic prostate cancer on 11/2.    PLAN:   Neurologic:  -C-collar  -Baseline:   AOX3, B/L Upper extremity 5/5 motor strength. B/L Lower extremity 4/5 (RLE slightly weaker than LLE, but still able to lift against gravity and resistance). No focal neuro deficits  -q4 neuro checks  -Oral Dexamethasone 4mg q6hrs, stop after 14days  -Pain: Dilaudid PCA, IV Tylenol, flexeril    Respiratory:  - O2 saturation 98% RA  - hx of PE, s/p IVC filter    Cardiovascular:   -Hemodynamically stable on no pressor support  -Home amlodipine 10mg QD,  -Home Lisinopril  -Home statin    Gastrointestinal/Nutrition:   - DASH diet started 11/3   -Bowel regimen: Senna/Miralax QD, docolax suppository 11/5 for no BM  -Protonix 40 QD  -Zofran prn  -Monitor CK  -Admission CK > 47900, downtrending < 100  -Monitor LFTs (elevated most likely due to rhabdo)    Renal/Genitourinary:   -Prostate Biopsy 2021 --> adenocarcinoma Gelason 3+4; declined radiation therapy at the time  -Home Bicalutamide 50mg  -IVL  -Flomax QD  -d/c'ed best, passed TO    Hematologic:   - Monitor H&H  - DVT prophylaxis; SQH  - hold SCDS due to bilateral DVT  - Hx of DVTs, IVC filter placed 11/1    Infectious Disease:   - Afebrile  - Monitor WBC/temp    Lines/Tubes:  -R. 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

## 2023-11-06 NOTE — PROGRESS NOTE ADULT - SUBJECTIVE AND OBJECTIVE BOX
Plastic Surgery    SUBJECTIVE: Patient seen and examined, no acute events overnight. Dressings in place, to be removed tomorrow     VITALS  T(C): 36.3 (11-06-23 @ 04:00), Max: 37.1 (11-05-23 @ 08:00)  HR: 79 (11-06-23 @ 04:00) (78 - 98)  BP: 134/76 (11-06-23 @ 04:00) (100/75 - 136/77)  RR: 12 (11-06-23 @ 04:00) (12 - 23)  SpO2: 95% (11-06-23 @ 04:00) (93% - 98%)  CAPILLARY BLOOD GLUCOSE      POCT Blood Glucose.: 158 mg/dL (05 Nov 2023 23:02)  POCT Blood Glucose.: 206 mg/dL (05 Nov 2023 17:17)  POCT Blood Glucose.: 256 mg/dL (05 Nov 2023 11:49)  POCT Blood Glucose.: 226 mg/dL (05 Nov 2023 08:07)      Is/Os    11-05 @ 07:01  -  11-06 @ 07:00  --------------------------------------------------------  IN:    Oral Fluid: 750 mL  Total IN: 750 mL    OUT:    Bulb (mL): 10 mL    Bulb (mL): 60 mL    Voided (mL): 2100 mL  Total OUT: 2170 mL    Total NET: -1420 mL          PHYSICAL EXAM:   General: NAD  Back: Soft, flat, aquacel with some saturation but still in place, AGGIE SSx2    MEDICATIONS (STANDING): acetaminophen     Tablet .. 975 milliGRAM(s) Oral every 6 hours  amLODIPine   Tablet 10 milliGRAM(s) Oral daily  atorvastatin 20 milliGRAM(s) Oral at bedtime  bicalutamide 50 milliGRAM(s) Oral daily  dexAMETHasone     Tablet 4 milliGRAM(s) Oral every 6 hours  heparin   Injectable 5000 Unit(s) SubCutaneous every 8 hours  insulin glargine Injectable (LANTUS) 35 Unit(s) SubCutaneous every morning  insulin lispro (ADMELOG) corrective regimen sliding scale   SubCutaneous three times a day before meals  insulin lispro (ADMELOG) corrective regimen sliding scale   SubCutaneous at bedtime  insulin lispro Injectable (ADMELOG) 12 Unit(s) SubCutaneous three times a day before meals  lisinopril 20 milliGRAM(s) Oral daily  pantoprazole    Tablet 40 milliGRAM(s) Oral before breakfast  polyethylene glycol 3350 17 Gram(s) Oral two times a day  senna 2 Tablet(s) Oral at bedtime  tamsulosin 0.4 milliGRAM(s) Oral at bedtime    MEDICATIONS (PRN):HYDROmorphone  Injectable 0.5 milliGRAM(s) IV Push every 4 hours PRN Severe breakthrough Pain (7 - 10)  naloxone Injectable 0.1 milliGRAM(s) IV Push every 3 minutes PRN For ANY of the following changes in patient status:  A. RR LESS THAN 10 breaths per minute, B. Oxygen saturation LESS THAN 90%, C. Sedation score of 6  ondansetron Injectable 4 milliGRAM(s) IV Push every 6 hours PRN Nausea  oxyCODONE    IR 5 milliGRAM(s) Oral every 3 hours PRN Moderate Pain (4 - 6)  oxyCODONE    IR 10 milliGRAM(s) Oral every 3 hours PRN Severe Pain (7 - 10)      LABS  CBC (11-05 @ 01:35)                              11.7<L>                         15.35<H>  )----------------(  120<L>     --    % Neutrophils, --    % Lymphocytes, ANC: --                                  34.6<L>    BMP (11-05 @ 01:35)             135     |  98      |  31<H> 		Ca++ 1.17    Ca 9.0                ---------------------------------( 197<H>		Mg 2.00               4.8     |  28      |  1.12  			Ph 2.7                   IMAGING STUDIES

## 2023-11-06 NOTE — PROGRESS NOTE ADULT - SUBJECTIVE AND OBJECTIVE BOX
Orthopaedic Surgery Progress Note    Subjective:   Patient seen and examined. No acute events overnight.     Objective:  T(C): 36.3 (11-06-23 @ 04:00), Max: 37.1 (11-05-23 @ 08:00)  HR: 79 (11-06-23 @ 04:00) (78 - 98)  BP: 134/76 (11-06-23 @ 04:00) (100/75 - 142/84)  RR: 12 (11-06-23 @ 04:00) (12 - 23)  SpO2: 95% (11-06-23 @ 04:00) (93% - 98%)  Wt(kg): --    11-04 @ 08:01  -  11-05 @ 07:00  --------------------------------------------------------  IN: 1000 mL / OUT: 1940 mL / NET: -940 mL    11-05 @ 07:01  -  11-06 @ 06:26  --------------------------------------------------------  IN: 750 mL / OUT: 2170 mL / NET: -1420 mL        PE  Gen: NAD  Back:   dressing C/D/I, mild appropriate linda-incisional ttp  HMV in place w/ serosanguinous output  Neuro:  Motor:                   C5                C6              C7               C8           T1   R            5/5                4+/5            4+/5             4+/5          4+/5  L             5/5               4+/5             4+/5             4+/5          4+/5                L2             L3             L4               L5            S1  R         5/5           5/5          5/5             5/5           5/5  L          5/5          5/5           5/5             5/5           5/5    Sensory:            C5         C6         C7      C8       T1        (0=absent, 1=impaired, 2=normal, NT=not testable)  R         2            2           1        1         1  L          2            2           1        1         1               L2          L3         L4      L5       S1         (0=absent, 1=impaired, 2=normal, NT=not testable)  R         1            1            1        1        1  L          1            1           1        1         1  WWP BLE                          11.7   15.35 )-----------( 120      ( 05 Nov 2023 01:35 )             34.6     11-05    135  |  98  |  31<H>  ----------------------------<  197<H>  4.8   |  28  |  1.12    Ca    9.0      05 Nov 2023 01:35  Phos  2.7     11-05  Mg     2.00     11-05        Urinalysis Basic - ( 05 Nov 2023 01:35 )    Color: x / Appearance: x / SG: x / pH: x  Gluc: 197 mg/dL / Ketone: x  / Bili: x / Urobili: x   Blood: x / Protein: x / Nitrite: x   Leuk Esterase: x / RBC: x / WBC x   Sq Epi: x / Non Sq Epi: x / Bacteria: x        73y Male s/p C5-T3 posterior cervical fusion, C7-T1 decompression for metastatic prostate cancer.   - Pain control  - FU labs  - WBAT  - PT/OT/OOB  - I/S  - Monitor drain output  - SCDs  - Dispo: [facility]     Orthopaedic Surgery Progress Note    Subjective:   Patient seen and examined. No acute events overnight.     Objective:  T(C): 36.3 (11-06-23 @ 04:00), Max: 37.1 (11-05-23 @ 08:00)  HR: 79 (11-06-23 @ 04:00) (78 - 98)  BP: 134/76 (11-06-23 @ 04:00) (100/75 - 142/84)  RR: 12 (11-06-23 @ 04:00) (12 - 23)  SpO2: 95% (11-06-23 @ 04:00) (93% - 98%)  Wt(kg): --    11-04 @ 08:01  -  11-05 @ 07:00  --------------------------------------------------------  IN: 1000 mL / OUT: 1940 mL / NET: -940 mL    11-05 @ 07:01  -  11-06 @ 06:26  --------------------------------------------------------  IN: 750 mL / OUT: 2170 mL / NET: -1420 mL        PE  Gen: NAD  Back:   dressing C/D/I, mild appropriate linda-incisional ttp  HMV in place w/ serosanguinous output  Neuro:  Motor:                   C5                C6              C7               C8           T1   R            5/5                4+/5            4+/5             4+/5          4+/5  L             5/5               4+/5             4+/5             4+/5          4+/5                L2             L3             L4               L5            S1  R         5/5           5/5          5/5             5/5           5/5  L          5/5          5/5           5/5             5/5           5/5    Sensory:            C5         C6         C7      C8       T1        (0=absent, 1=impaired, 2=normal, NT=not testable)  R         2            2           1        1         1  L          2            2           1        1         1               L2          L3         L4      L5       S1         (0=absent, 1=impaired, 2=normal, NT=not testable)  R         1            1            1        1        1  L          1            1           1        1         1  WWP BLE                          11.7   15.35 )-----------( 120      ( 05 Nov 2023 01:35 )             34.6     11-05    135  |  98  |  31<H>  ----------------------------<  197<H>  4.8   |  28  |  1.12    Ca    9.0      05 Nov 2023 01:35  Phos  2.7     11-05  Mg     2.00     11-05        Urinalysis Basic - ( 05 Nov 2023 01:35 )    Color: x / Appearance: x / SG: x / pH: x  Gluc: 197 mg/dL / Ketone: x  / Bili: x / Urobili: x   Blood: x / Protein: x / Nitrite: x   Leuk Esterase: x / RBC: x / WBC x   Sq Epi: x / Non Sq Epi: x / Bacteria: x        73y Male s/p C5-T3 posterior cervical fusion, C7-T1 decompression for metastatic prostate cancer.   - D/c head of bed restrictions   - Pain control  - FU labs  - WBAT  - PT/OT/OOB  - I/S  - Monitor drain output  - SCDs  - Dispo: [facility]     Orthopaedic Surgery Progress Note    Subjective:   Patient seen and examined. No acute events overnight.     Objective:  T(C): 36.3 (11-06-23 @ 04:00), Max: 37.1 (11-05-23 @ 08:00)  HR: 79 (11-06-23 @ 04:00) (78 - 98)  BP: 134/76 (11-06-23 @ 04:00) (100/75 - 142/84)  RR: 12 (11-06-23 @ 04:00) (12 - 23)  SpO2: 95% (11-06-23 @ 04:00) (93% - 98%)  Wt(kg): --    11-04 @ 08:01  -  11-05 @ 07:00  --------------------------------------------------------  IN: 1000 mL / OUT: 1940 mL / NET: -940 mL    11-05 @ 07:01  -  11-06 @ 06:26  --------------------------------------------------------  IN: 750 mL / OUT: 2170 mL / NET: -1420 mL        PE  Gen: NAD  Back:   dressing C/D/I, mild appropriate linda-incisional ttp  HMV in place w/ serosanguinous output  Neuro:  Motor:                   C5                C6              C7               C8           T1   R            5/5                4+/5            4+/5             4+/5          4+/5  L             5/5               4+/5             4+/5             4+/5          4+/5                L2             L3             L4               L5            S1  R         5/5           5/5          5/5             5/5           5/5  L          5/5          5/5           5/5             5/5           5/5    Sensory:            C5         C6         C7      C8       T1        (0=absent, 1=impaired, 2=normal, NT=not testable)  R         2            2           1        1         1  L          2            2           1        1         1               L2          L3         L4      L5       S1         (0=absent, 1=impaired, 2=normal, NT=not testable)  R         1            1            1        1        1  L          1            1           1        1         1  WWP BLE                          11.7   15.35 )-----------( 120      ( 05 Nov 2023 01:35 )             34.6     11-05    135  |  98  |  31<H>  ----------------------------<  197<H>  4.8   |  28  |  1.12    Ca    9.0      05 Nov 2023 01:35  Phos  2.7     11-05  Mg     2.00     11-05        Urinalysis Basic - ( 05 Nov 2023 01:35 )    Color: x / Appearance: x / SG: x / pH: x  Gluc: 197 mg/dL / Ketone: x  / Bili: x / Urobili: x   Blood: x / Protein: x / Nitrite: x   Leuk Esterase: x / RBC: x / WBC x   Sq Epi: x / Non Sq Epi: x / Bacteria: x        73y Male s/p C5-T3 posterior cervical fusion, C7-T1 decompression for metastatic prostate cancer.   - Anticoagulation contraindicated for at least 6 weeks postoperatively given risk of hematoma and permanent neurologic deficits   - D/c head of bed restrictions   - Pain control  - FU labs  - WBAT  - PT/OT/OOB  - I/S  - Monitor drain output  - SCDs  - Dispo: [facility]

## 2023-11-06 NOTE — PROGRESS NOTE ADULT - ASSESSMENT
73y Male s/p C5-T3 posterior cervical fusion, C7-T1 decompression for metastatic prostate cancer with plastics closure on 11/2. Recovering well.    Plan:  - Continue aquacel surgical dressing until POD5  - Continue AGGIE drain care  - Remainder of care per ortho / SICU    PRS  [id number]

## 2023-11-06 NOTE — PROGRESS NOTE ADULT - SUBJECTIVE AND OBJECTIVE BOX
SICU Daily Progress Note:    Interval Events:   - inc miralax to BID  - suppository dulcolax  - inc insulin: lantus 35, 12 premeals, moderate ISS      MEDICATIONS  (STANDING):  acetaminophen     Tablet .. 975 milliGRAM(s) Oral every 6 hours  amLODIPine   Tablet 10 milliGRAM(s) Oral daily  atorvastatin 20 milliGRAM(s) Oral at bedtime  bicalutamide 50 milliGRAM(s) Oral daily  chlorhexidine 2% Cloths 1 Application(s) Topical daily  cyclobenzaprine 10 milliGRAM(s) Oral every 8 hours  dexAMETHasone     Tablet 4 milliGRAM(s) Oral every 6 hours  glucagon  Injectable 1 milliGRAM(s) IntraMuscular once  heparin   Injectable 5000 Unit(s) SubCutaneous every 8 hours  insulin glargine Injectable (LANTUS) 30 Unit(s) SubCutaneous every morning  insulin lispro (ADMELOG) corrective regimen sliding scale   SubCutaneous at bedtime  insulin lispro (ADMELOG) corrective regimen sliding scale   SubCutaneous three times a day before meals  insulin lispro Injectable (ADMELOG) 12 Unit(s) SubCutaneous three times a day before meals  lisinopril 20 milliGRAM(s) Oral daily  pantoprazole    Tablet 40 milliGRAM(s) Oral before breakfast  polyethylene glycol 3350 17 Gram(s) Oral daily  senna 2 Tablet(s) Oral at bedtime  tamsulosin 0.4 milliGRAM(s) Oral at bedtime    MEDICATIONS  (PRN):  benzocaine/menthol Lozenge 1 Lozenge Oral every 3 hours PRN Sore Throat  HYDROmorphone  Injectable 0.5 milliGRAM(s) IV Push every 4 hours PRN Severe breakthrough Pain (7 - 10)  naloxone Injectable 0.1 milliGRAM(s) IV Push every 3 minutes PRN For ANY of the following changes in patient status:  A. RR LESS THAN 10 breaths per minute, B. Oxygen saturation LESS THAN 90%, C. Sedation score of 6  ondansetron Injectable 4 milliGRAM(s) IV Push every 6 hours PRN Nausea  oxyCODONE    IR 5 milliGRAM(s) Oral every 3 hours PRN Moderate Pain (4 - 6)  oxyCODONE    IR 10 milliGRAM(s) Oral every 3 hours PRN Severe Pain (7 - 10)    ICU Vital Signs Last 24 Hrs  T(C): 36.1 (06 Nov 2023 00:00), Max: 37.1 (05 Nov 2023 08:00)  T(F): 97 (06 Nov 2023 00:00), Max: 98.8 (05 Nov 2023 08:00)  HR: 86 (06 Nov 2023 00:00) (78 - 98)  BP: 126/72 (06 Nov 2023 00:00) (100/75 - 154/89)  BP(mean): 87 (06 Nov 2023 00:00) (79 - 858)  ABP: --  I&O's Detail    04 Nov 2023 08:01  -  05 Nov 2023 07:00  --------------------------------------------------------  IN:    IV PiggyBack: 250 mL    Oral Fluid: 750 mL  Total IN: 1000 mL    OUT:    Bulb (mL): 10 mL    Bulb (mL): 80 mL    Indwelling Catheter - Urethral (mL): 575 mL    Intermittent Catheterization - Urethral (mL): 450 mL    Voided (mL): 825 mL  Total OUT: 1940 mL    Total NET: -940 mL      05 Nov 2023 07:01  -  06 Nov 2023 00:23  --------------------------------------------------------  IN:    IV PiggyBack: 50 mL    Oral Fluid: 700 mL  Total IN: 750 mL    OUT:    Bulb (mL): 10 mL    Bulb (mL): 10 mL    Voided (mL): 1450 mL  Total OUT: 1470 mL    Total NET: -720 mL      ABP(mean): --  RR: 14 (06 Nov 2023 00:00) (14 - 23)  SpO2: 96% (06 Nov 2023 00:00) (93% - 98%)    O2 Parameters below as of 06 Nov 2023 00:00  Patient On (Oxygen Delivery Method): room air      I&O's Detail    03 Nov 2023 07:01  -  04 Nov 2023 07:00  --------------------------------------------------------        PHYSICAL EXAM:   Neurological: AAOx3, CNII-XII intact,  strength 5/5 b/l upper extremity, strength 4/5 b/l lower extremity   ENT: mucus membrane moist  Cardiovascular: RRR  Respiratory: CTA  Gastrointestinal: soft, NT, ND  Extremities: warm, no dependent edema  Vascular: no cyanosis/erythema  Skin: no rashes  MSK: no joint swelling.     LABS:                        11.7   15.35 )-----------( 120      ( 05 Nov 2023 01:35 )             34.6   11-05    135  |  98  |  31<H>  ----------------------------<  197<H>  4.8   |  28  |  1.12    Ca    9.0      05 Nov 2023 01:35  Phos  2.7     11-05  Mg     2.00     11-05                 Lab Facility: [id number]

## 2023-11-06 NOTE — PROGRESS NOTE ADULT - ATTENDING COMMENTS
Patient s/p spinal surgery, requiring sicu for q4 hr neuro checks. Patient tolerating diet, out of bed to chair, on room air. Patient floor eligible.    I have personally interviewed when possible and examined the patient, reviewed data and laboratory tests/x-rays and all pertinent electronic images.  I was physically present for the key portions of the evaluation and management (E/M) service provided.   The SICU team has a constant risk benefit analyzes discussion with the primary team, all consultants, House Staff and PA's on all decisions.  These diagnoses are unrelated to the surgical procedure noted above.  I meet with family if needed to get further history, discuss the case and make care decisions for this patient who might not be able to participate.  Time involved in performance of separately billable procedures was not counted toward my critical care time. There is no overlap.  I spent 30 minutes ( 0800Hrs-0915Hrs in AM/ 1600Hrs-1715Hrs in PM, or other time indicated) of critical care time for the diagnoses, assessment, plan and interventions.  This time excludes time spent on separate procedures and teaching.

## 2023-11-07 LAB
ANION GAP SERPL CALC-SCNC: 9 MMOL/L — SIGNIFICANT CHANGE UP (ref 7–14)
ANION GAP SERPL CALC-SCNC: 9 MMOL/L — SIGNIFICANT CHANGE UP (ref 7–14)
BUN SERPL-MCNC: 30 MG/DL — HIGH (ref 7–23)
BUN SERPL-MCNC: 30 MG/DL — HIGH (ref 7–23)
CA-I BLD-SCNC: 1.12 MMOL/L — LOW (ref 1.15–1.29)
CA-I BLD-SCNC: 1.12 MMOL/L — LOW (ref 1.15–1.29)
CALCIUM SERPL-MCNC: 9 MG/DL — SIGNIFICANT CHANGE UP (ref 8.4–10.5)
CALCIUM SERPL-MCNC: 9 MG/DL — SIGNIFICANT CHANGE UP (ref 8.4–10.5)
CHLORIDE SERPL-SCNC: 101 MMOL/L — SIGNIFICANT CHANGE UP (ref 98–107)
CHLORIDE SERPL-SCNC: 101 MMOL/L — SIGNIFICANT CHANGE UP (ref 98–107)
CO2 SERPL-SCNC: 25 MMOL/L — SIGNIFICANT CHANGE UP (ref 22–31)
CO2 SERPL-SCNC: 25 MMOL/L — SIGNIFICANT CHANGE UP (ref 22–31)
CREAT SERPL-MCNC: 0.83 MG/DL — SIGNIFICANT CHANGE UP (ref 0.5–1.3)
CREAT SERPL-MCNC: 0.83 MG/DL — SIGNIFICANT CHANGE UP (ref 0.5–1.3)
EGFR: 94 ML/MIN/1.73M2 — SIGNIFICANT CHANGE UP
EGFR: 94 ML/MIN/1.73M2 — SIGNIFICANT CHANGE UP
GLUCOSE BLDC GLUCOMTR-MCNC: 141 MG/DL — HIGH (ref 70–99)
GLUCOSE BLDC GLUCOMTR-MCNC: 141 MG/DL — HIGH (ref 70–99)
GLUCOSE BLDC GLUCOMTR-MCNC: 177 MG/DL — HIGH (ref 70–99)
GLUCOSE BLDC GLUCOMTR-MCNC: 177 MG/DL — HIGH (ref 70–99)
GLUCOSE BLDC GLUCOMTR-MCNC: 184 MG/DL — HIGH (ref 70–99)
GLUCOSE BLDC GLUCOMTR-MCNC: 184 MG/DL — HIGH (ref 70–99)
GLUCOSE BLDC GLUCOMTR-MCNC: 254 MG/DL — HIGH (ref 70–99)
GLUCOSE BLDC GLUCOMTR-MCNC: 254 MG/DL — HIGH (ref 70–99)
GLUCOSE BLDC GLUCOMTR-MCNC: 99 MG/DL — SIGNIFICANT CHANGE UP (ref 70–99)
GLUCOSE BLDC GLUCOMTR-MCNC: 99 MG/DL — SIGNIFICANT CHANGE UP (ref 70–99)
GLUCOSE SERPL-MCNC: 188 MG/DL — HIGH (ref 70–99)
GLUCOSE SERPL-MCNC: 188 MG/DL — HIGH (ref 70–99)
HCT VFR BLD CALC: 32.3 % — LOW (ref 39–50)
HCT VFR BLD CALC: 32.3 % — LOW (ref 39–50)
HGB BLD-MCNC: 11 G/DL — LOW (ref 13–17)
HGB BLD-MCNC: 11 G/DL — LOW (ref 13–17)
MAGNESIUM SERPL-MCNC: 2 MG/DL — SIGNIFICANT CHANGE UP (ref 1.6–2.6)
MAGNESIUM SERPL-MCNC: 2 MG/DL — SIGNIFICANT CHANGE UP (ref 1.6–2.6)
MCHC RBC-ENTMCNC: 30.8 PG — SIGNIFICANT CHANGE UP (ref 27–34)
MCHC RBC-ENTMCNC: 30.8 PG — SIGNIFICANT CHANGE UP (ref 27–34)
MCHC RBC-ENTMCNC: 34.1 GM/DL — SIGNIFICANT CHANGE UP (ref 32–36)
MCHC RBC-ENTMCNC: 34.1 GM/DL — SIGNIFICANT CHANGE UP (ref 32–36)
MCV RBC AUTO: 90.5 FL — SIGNIFICANT CHANGE UP (ref 80–100)
MCV RBC AUTO: 90.5 FL — SIGNIFICANT CHANGE UP (ref 80–100)
NRBC # BLD: 0 /100 WBCS — SIGNIFICANT CHANGE UP (ref 0–0)
NRBC # BLD: 0 /100 WBCS — SIGNIFICANT CHANGE UP (ref 0–0)
NRBC # FLD: 0 K/UL — SIGNIFICANT CHANGE UP (ref 0–0)
NRBC # FLD: 0 K/UL — SIGNIFICANT CHANGE UP (ref 0–0)
PHOSPHATE SERPL-MCNC: 3 MG/DL — SIGNIFICANT CHANGE UP (ref 2.5–4.5)
PHOSPHATE SERPL-MCNC: 3 MG/DL — SIGNIFICANT CHANGE UP (ref 2.5–4.5)
PLATELET # BLD AUTO: 146 K/UL — LOW (ref 150–400)
PLATELET # BLD AUTO: 146 K/UL — LOW (ref 150–400)
POTASSIUM SERPL-MCNC: 4.6 MMOL/L — SIGNIFICANT CHANGE UP (ref 3.5–5.3)
POTASSIUM SERPL-MCNC: 4.6 MMOL/L — SIGNIFICANT CHANGE UP (ref 3.5–5.3)
POTASSIUM SERPL-SCNC: 4.6 MMOL/L — SIGNIFICANT CHANGE UP (ref 3.5–5.3)
POTASSIUM SERPL-SCNC: 4.6 MMOL/L — SIGNIFICANT CHANGE UP (ref 3.5–5.3)
RBC # BLD: 3.57 M/UL — LOW (ref 4.2–5.8)
RBC # BLD: 3.57 M/UL — LOW (ref 4.2–5.8)
RBC # FLD: 12.2 % — SIGNIFICANT CHANGE UP (ref 10.3–14.5)
RBC # FLD: 12.2 % — SIGNIFICANT CHANGE UP (ref 10.3–14.5)
SODIUM SERPL-SCNC: 135 MMOL/L — SIGNIFICANT CHANGE UP (ref 135–145)
SODIUM SERPL-SCNC: 135 MMOL/L — SIGNIFICANT CHANGE UP (ref 135–145)
WBC # BLD: 10.44 K/UL — SIGNIFICANT CHANGE UP (ref 3.8–10.5)
WBC # BLD: 10.44 K/UL — SIGNIFICANT CHANGE UP (ref 3.8–10.5)
WBC # FLD AUTO: 10.44 K/UL — SIGNIFICANT CHANGE UP (ref 3.8–10.5)
WBC # FLD AUTO: 10.44 K/UL — SIGNIFICANT CHANGE UP (ref 3.8–10.5)

## 2023-11-07 PROCEDURE — 99232 SBSQ HOSP IP/OBS MODERATE 35: CPT | Mod: GC

## 2023-11-07 RX ORDER — CALCIUM GLUCONATE 100 MG/ML
2 VIAL (ML) INTRAVENOUS ONCE
Refills: 0 | Status: COMPLETED | OUTPATIENT
Start: 2023-11-07 | End: 2023-11-07

## 2023-11-07 RX ADMIN — Medication 6: at 11:57

## 2023-11-07 RX ADMIN — PANTOPRAZOLE SODIUM 40 MILLIGRAM(S): 20 TABLET, DELAYED RELEASE ORAL at 05:59

## 2023-11-07 RX ADMIN — Medication 975 MILLIGRAM(S): at 00:42

## 2023-11-07 RX ADMIN — Medication 200 GRAM(S): at 05:58

## 2023-11-07 RX ADMIN — Medication 4 MILLIGRAM(S): at 18:23

## 2023-11-07 RX ADMIN — Medication 12 UNIT(S): at 11:57

## 2023-11-07 RX ADMIN — Medication 975 MILLIGRAM(S): at 05:58

## 2023-11-07 RX ADMIN — Medication 975 MILLIGRAM(S): at 23:25

## 2023-11-07 RX ADMIN — Medication 12 UNIT(S): at 18:23

## 2023-11-07 RX ADMIN — BICALUTAMIDE 50 MILLIGRAM(S): 50 TABLET, FILM COATED ORAL at 11:06

## 2023-11-07 RX ADMIN — INSULIN GLARGINE 35 UNIT(S): 100 INJECTION, SOLUTION SUBCUTANEOUS at 07:27

## 2023-11-07 RX ADMIN — AMLODIPINE BESYLATE 10 MILLIGRAM(S): 2.5 TABLET ORAL at 05:58

## 2023-11-07 RX ADMIN — ATORVASTATIN CALCIUM 20 MILLIGRAM(S): 80 TABLET, FILM COATED ORAL at 21:06

## 2023-11-07 RX ADMIN — Medication 4 MILLIGRAM(S): at 12:00

## 2023-11-07 RX ADMIN — OXYCODONE HYDROCHLORIDE 10 MILLIGRAM(S): 5 TABLET ORAL at 11:07

## 2023-11-07 RX ADMIN — Medication 12 UNIT(S): at 07:50

## 2023-11-07 RX ADMIN — OXYCODONE HYDROCHLORIDE 10 MILLIGRAM(S): 5 TABLET ORAL at 11:53

## 2023-11-07 RX ADMIN — Medication 2: at 07:50

## 2023-11-07 RX ADMIN — CHLORHEXIDINE GLUCONATE 1 APPLICATION(S): 213 SOLUTION TOPICAL at 12:01

## 2023-11-07 RX ADMIN — Medication 4 MILLIGRAM(S): at 05:58

## 2023-11-07 RX ADMIN — LISINOPRIL 20 MILLIGRAM(S): 2.5 TABLET ORAL at 05:58

## 2023-11-07 RX ADMIN — HEPARIN SODIUM 5000 UNIT(S): 5000 INJECTION INTRAVENOUS; SUBCUTANEOUS at 21:07

## 2023-11-07 RX ADMIN — Medication 975 MILLIGRAM(S): at 12:00

## 2023-11-07 RX ADMIN — HEPARIN SODIUM 5000 UNIT(S): 5000 INJECTION INTRAVENOUS; SUBCUTANEOUS at 05:59

## 2023-11-07 RX ADMIN — POLYETHYLENE GLYCOL 3350 17 GRAM(S): 17 POWDER, FOR SOLUTION ORAL at 16:06

## 2023-11-07 RX ADMIN — SENNA PLUS 2 TABLET(S): 8.6 TABLET ORAL at 21:06

## 2023-11-07 RX ADMIN — Medication 975 MILLIGRAM(S): at 18:23

## 2023-11-07 RX ADMIN — TAMSULOSIN HYDROCHLORIDE 0.4 MILLIGRAM(S): 0.4 CAPSULE ORAL at 21:06

## 2023-11-07 RX ADMIN — Medication 4 MILLIGRAM(S): at 23:25

## 2023-11-07 RX ADMIN — HEPARIN SODIUM 5000 UNIT(S): 5000 INJECTION INTRAVENOUS; SUBCUTANEOUS at 13:47

## 2023-11-07 RX ADMIN — Medication 4 MILLIGRAM(S): at 00:42

## 2023-11-07 NOTE — PROGRESS NOTE ADULT - SUBJECTIVE AND OBJECTIVE BOX
Plastic Surgery    SUBJECTIVE: Pt seen and examined on rounds with team. NAEON.      VITALS  T(C): 36 (11-07-23 @ 08:00), Max: 36.3 (11-06-23 @ 16:00)  HR: 95 (11-07-23 @ 08:00) (74 - 100)  BP: 143/84 (11-07-23 @ 08:00) (105/60 - 143/84)  RR: 17 (11-07-23 @ 08:00) (14 - 19)  SpO2: 96% (11-07-23 @ 08:00) (93% - 98%)  CAPILLARY BLOOD GLUCOSE      POCT Blood Glucose.: 177 mg/dL (07 Nov 2023 07:26)  POCT Blood Glucose.: 161 mg/dL (06 Nov 2023 22:09)  POCT Blood Glucose.: 188 mg/dL (06 Nov 2023 15:59)  POCT Blood Glucose.: 265 mg/dL (06 Nov 2023 11:37)      Is/Os    11-06 @ 07:01  -  11-07 @ 07:00  --------------------------------------------------------  IN:    IV PiggyBack: 100 mL    Oral Fluid: 1300 mL  Total IN: 1400 mL    OUT:    Bulb (mL): 10 mL    Bulb (mL): 90 mL    Voided (mL): 1995 mL  Total OUT: 2095 mL    Total NET: -695 mL      11-07 @ 07:01  -  11-07 @ 08:10  --------------------------------------------------------  IN:    Oral Fluid: 100 mL  Total IN: 100 mL    OUT:    Voided (mL): 100 mL  Total OUT: 100 mL    Total NET: 0 mL          PHYSICAL EXAM:   General: NAD, Lying in bed comfortably  Neuro: Awake and alert  Back: Incision with nylons in place. No palpable collections. Drains holding suction. s/s      LABS  CBC (11-07 @ 01:00)                              11.0<L>                         10.44   )----------------(  146<L>     --    % Neutrophils, --    % Lymphocytes, ANC: --                                  32.3<L>  CBC (11-06 @ 06:21)                              11.2<L>                         8.93    )----------------(  128<L>     --    % Neutrophils, --    % Lymphocytes, ANC: --                                  33.1<L>    BMP (11-07 @ 01:00)             135     |  101     |  30<H> 		Ca++ 1.12<L>  Ca 9.0                ---------------------------------( 188<H>		Mg 2.00               4.6     |  25      |  0.83  			Ph 3.0     BMP (11-06 @ 06:21)             134<L>  |  99      |  36<H> 		Ca++ --      Ca 8.7                ---------------------------------( 284<H>		Mg 2.10               4.8     |  24      |  0.82  			Ph 2.6

## 2023-11-07 NOTE — PROGRESS NOTE ADULT - SUBJECTIVE AND OBJECTIVE BOX
SICU Daily Progress Note:    Interval Events:   - inc miralax to BID  - suppository dulcolax  - inc insulin: lantus 35, 12 premeals, moderate ISS      MEDICATIONS  (STANDING):  acetaminophen     Tablet .. 975 milliGRAM(s) Oral every 6 hours  amLODIPine   Tablet 10 milliGRAM(s) Oral daily  atorvastatin 20 milliGRAM(s) Oral at bedtime  bicalutamide 50 milliGRAM(s) Oral daily  chlorhexidine 2% Cloths 1 Application(s) Topical daily  cyclobenzaprine 10 milliGRAM(s) Oral every 8 hours  dexAMETHasone     Tablet 4 milliGRAM(s) Oral every 6 hours  glucagon  Injectable 1 milliGRAM(s) IntraMuscular once  heparin   Injectable 5000 Unit(s) SubCutaneous every 8 hours  insulin glargine Injectable (LANTUS) 30 Unit(s) SubCutaneous every morning  insulin lispro (ADMELOG) corrective regimen sliding scale   SubCutaneous at bedtime  insulin lispro (ADMELOG) corrective regimen sliding scale   SubCutaneous three times a day before meals  insulin lispro Injectable (ADMELOG) 12 Unit(s) SubCutaneous three times a day before meals  lisinopril 20 milliGRAM(s) Oral daily  pantoprazole    Tablet 40 milliGRAM(s) Oral before breakfast  polyethylene glycol 3350 17 Gram(s) Oral daily  senna 2 Tablet(s) Oral at bedtime  tamsulosin 0.4 milliGRAM(s) Oral at bedtime    MEDICATIONS  (PRN):  benzocaine/menthol Lozenge 1 Lozenge Oral every 3 hours PRN Sore Throat  HYDROmorphone  Injectable 0.5 milliGRAM(s) IV Push every 4 hours PRN Severe breakthrough Pain (7 - 10)  naloxone Injectable 0.1 milliGRAM(s) IV Push every 3 minutes PRN For ANY of the following changes in patient status:  A. RR LESS THAN 10 breaths per minute, B. Oxygen saturation LESS THAN 90%, C. Sedation score of 6  ondansetron Injectable 4 milliGRAM(s) IV Push every 6 hours PRN Nausea  oxyCODONE    IR 5 milliGRAM(s) Oral every 3 hours PRN Moderate Pain (4 - 6)  oxyCODONE    IR 10 milliGRAM(s) Oral every 3 hours PRN Severe Pain (7 - 10)    ICU Vital Signs Last 24 Hrs  T(C): 36.1 (06 Nov 2023 00:00), Max: 37.1 (05 Nov 2023 08:00)  T(F): 97 (06 Nov 2023 00:00), Max: 98.8 (05 Nov 2023 08:00)  HR: 86 (06 Nov 2023 00:00) (78 - 98)  BP: 126/72 (06 Nov 2023 00:00) (100/75 - 154/89)  BP(mean): 87 (06 Nov 2023 00:00) (79 - 858)  ABP: --  I&O's Detail    04 Nov 2023 08:01  -  05 Nov 2023 07:00  --------------------------------------------------------  IN:    IV PiggyBack: 250 mL    Oral Fluid: 750 mL  Total IN: 1000 mL    OUT:    Bulb (mL): 10 mL    Bulb (mL): 80 mL    Indwelling Catheter - Urethral (mL): 575 mL    Intermittent Catheterization - Urethral (mL): 450 mL    Voided (mL): 825 mL  Total OUT: 1940 mL    Total NET: -940 mL      05 Nov 2023 07:01  -  06 Nov 2023 00:23  --------------------------------------------------------  IN:    IV PiggyBack: 50 mL    Oral Fluid: 700 mL  Total IN: 750 mL    OUT:    Bulb (mL): 10 mL    Bulb (mL): 10 mL    Voided (mL): 1450 mL  Total OUT: 1470 mL    Total NET: -720 mL      ABP(mean): --  RR: 14 (06 Nov 2023 00:00) (14 - 23)  SpO2: 96% (06 Nov 2023 00:00) (93% - 98%)    O2 Parameters below as of 06 Nov 2023 00:00  Patient On (Oxygen Delivery Method): room air      I&O's Detail    03 Nov 2023 07:01  -  04 Nov 2023 07:00  --------------------------------------------------------        PHYSICAL EXAM:   Neurological: AAOx3, CNII-XII intact,  strength 5/5 b/l upper extremity, strength 4/5 b/l lower extremity   ENT: mucus membrane moist  Cardiovascular: RRR  Respiratory: CTA  Gastrointestinal: soft, NT, ND  Extremities: warm, no dependent edema  Vascular: no cyanosis/erythema  Skin: no rashes  MSK: no joint swelling.     LABS:                        11.7   15.35 )-----------( 120      ( 05 Nov 2023 01:35 )             34.6   11-05    135  |  98  |  31<H>  ----------------------------<  197<H>  4.8   |  28  |  1.12    Ca    9.0      05 Nov 2023 01:35  Phos  2.7     11-05  Mg     2.00     11-05                 SICU Daily Progress Note:    Interval Events:   - inc miralax to BID  - suppository dulcolax  - inc insulin: lantus 35, 12 premeals, moderate ISS  - continue SQH, no oral AC per ortho     MEDICATIONS  (STANDING):  acetaminophen     Tablet .. 975 milliGRAM(s) Oral every 6 hours  amLODIPine   Tablet 10 milliGRAM(s) Oral daily  atorvastatin 20 milliGRAM(s) Oral at bedtime  bicalutamide 50 milliGRAM(s) Oral daily  calcium gluconate IVPB 2 Gram(s) IV Intermittent once  chlorhexidine 2% Cloths 1 Application(s) Topical daily  dexAMETHasone     Tablet 4 milliGRAM(s) Oral every 6 hours  heparin   Injectable 5000 Unit(s) SubCutaneous every 8 hours  insulin glargine Injectable (LANTUS) 35 Unit(s) SubCutaneous every morning  insulin lispro (ADMELOG) corrective regimen sliding scale   SubCutaneous three times a day before meals  insulin lispro (ADMELOG) corrective regimen sliding scale   SubCutaneous at bedtime  insulin lispro Injectable (ADMELOG) 12 Unit(s) SubCutaneous three times a day before meals  lisinopril 20 milliGRAM(s) Oral daily  pantoprazole    Tablet 40 milliGRAM(s) Oral before breakfast  polyethylene glycol 3350 17 Gram(s) Oral two times a day  senna 2 Tablet(s) Oral at bedtime  tamsulosin 0.4 milliGRAM(s) Oral at bedtime    MEDICATIONS  (PRN):  benzocaine/menthol Lozenge 1 Lozenge Oral every 3 hours PRN Sore Throat  HYDROmorphone  Injectable 0.5 milliGRAM(s) IV Push every 4 hours PRN Severe breakthrough Pain (7 - 10)  naloxone Injectable 0.1 milliGRAM(s) IV Push every 3 minutes PRN For ANY of the following changes in patient status:  A. RR LESS THAN 10 breaths per minute, B. Oxygen saturation LESS THAN 90%, C. Sedation score of 6  ondansetron Injectable 4 milliGRAM(s) IV Push every 6 hours PRN Nausea  oxyCODONE    IR 5 milliGRAM(s) Oral every 3 hours PRN Moderate Pain (4 - 6)  oxyCODONE    IR 10 milliGRAM(s) Oral every 3 hours PRN Severe Pain (7 - 10)    ICU Vital Signs Last 24 Hrs  T(C): 36 (07 Nov 2023 00:00), Max: 36.3 (06 Nov 2023 16:00)  T(F): 96.8 (07 Nov 2023 00:00), Max: 97.3 (06 Nov 2023 16:00)  HR: 74 (07 Nov 2023 04:00) (74 - 100)  BP: 131/72 (07 Nov 2023 04:00) (105/60 - 136/77)  BP(mean): 88 (07 Nov 2023 04:00) (80 - 93)  ABP: --  ABP(mean): --  RR: 16 (07 Nov 2023 04:00) (14 - 19)  SpO2: 94% (07 Nov 2023 04:00) (93% - 98%)    O2 Parameters below as of 07 Nov 2023 04:00  Patient On (Oxygen Delivery Method): room air      I&O's Detail    05 Nov 2023 07:01  -  06 Nov 2023 07:00  --------------------------------------------------------  IN:    Oral Fluid: 800 mL  Total IN: 800 mL    OUT:    Bulb (mL): 10 mL    Bulb (mL): 60 mL    Voided (mL): 2100 mL  Total OUT: 2170 mL    Total NET: -1370 mL      06 Nov 2023 07:01  -  07 Nov 2023 05:30  --------------------------------------------------------  IN:    Oral Fluid: 1200 mL  Total IN: 1200 mL    OUT:    Bulb (mL): 5 mL    Bulb (mL): 60 mL    Voided (mL): 1695 mL  Total OUT: 1760 mL    Total NET: -560 mL        --------------------------------------------------------        PHYSICAL EXAM:   Neurological: AAOx3, CNII-XII intact,  strength 5/5 b/l upper extremity, strength 4/5 b/l lower extremity   ENT: mucus membrane moist  Cardiovascular: RRR  Respiratory: CTA  Gastrointestinal: soft, NT, ND  Extremities: warm, no dependent edema  Vascular: no cyanosis/erythema  Skin: no rashes  MSK: no joint swelling.     LABS:                        11.7   15.35 )-----------( 120      ( 05 Nov 2023 01:35 )             34.6   11-05    135  |  98  |  31<H>  ----------------------------<  197<H>  4.8   |  28  |  1.12    Ca    9.0      05 Nov 2023 01:35  Phos  2.7     11-05  Mg     2.00     11-05

## 2023-11-07 NOTE — PROGRESS NOTE ADULT - ASSESSMENT
73M w/ T2DM, HTN, Metastatic prostate cancer (2009) was initially evaluated at [facility] for lower extremity paralysis in the setting of a recent fall, found to have 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 Now s/p C5-T3 posterior cervical fusion, C7-T1 decompression for metastatic prostate cancer on 11/2.    PLAN:   Neurologic:  -C-collar  -Baseline:   AOX3, B/L Upper extremity 5/5 motor strength. B/L Lower extremity 4/5 (RLE slightly weaker than LLE, but still able to lift against gravity and resistance). No focal neuro deficits  -q4 neuro checks  -Oral Dexamethasone 4mg q6hrs, stop after 14days  -Pain: Dilaudid PCA, IV Tylenol, flexeril    Respiratory:  - O2 saturation 98% RA  - hx of PE, s/p IVC filter    Cardiovascular:   -Hemodynamically stable on no pressor support  -Home amlodipine 10mg QD,  -Home Lisinopril  -Home statin    Gastrointestinal/Nutrition:   - DASH diet started 11/3   -Bowel regimen: Senna/Miralax QD, docolax suppository 11/5 for no BM  -Protonix 40 QD  -Zofran prn  -Monitor CK  -Admission CK > 37618, downtrending < 100  -Monitor LFTs (elevated most likely due to rhabdo)    Renal/Genitourinary:   -Prostate Biopsy 2021 --> adenocarcinoma Gelason 3+4; declined radiation therapy at the time  -Home Bicalutamide 50mg  -IVL  -Flomax QD  -d/c'ed best, passed TO    Hematologic:   - Monitor H&H  - DVT prophylaxis; SQH  - hold SCDS due to bilateral DVT  - Hx of DVTs, IVC filter placed 11/1    Infectious Disease:   - Afebrile  - Monitor WBC/temp    Lines/Tubes:  -R. 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 Now s/p C5-T3 posterior cervical fusion, C7-T1 decompression for metastatic prostate cancer on 11/2 doing well in SICU, stable and listed for the floor    PLAN:   Neurologic:  -C-collar  -Baseline:   AOX3, B/L Upper extremity 5/5 motor strength. B/L Lower extremity 4/5 (RLE slightly weaker than LLE, but still able to lift against gravity and resistance). No focal neuro deficits  -q4 neuro checks  -Oral Dexamethasone 4mg q6hrs, stop after 14days  -Pain: IV Tylenol, flexeril    Respiratory:  - O2 saturation 98% RA  - hx of PE, s/p IVC filter    Cardiovascular:   -Hemodynamically stable on no pressor support  -Home amlodipine 10mg QD,  -Home Lisinopril  -Home statin    Gastrointestinal/Nutrition:   - DASH diet started 11/3   -Bowel regimen: Senna/Miralax BID, docolax suppository 11/5 for no BM  -Protonix 40 QD  -Zofran prn    Renal/Genitourinary:   -Prostate Biopsy 2021 --> adenocarcinoma Gelason 3+4; declined radiation therapy at the time  -Home Bicalutamide 50mg  -IVL  -Flomax QD    Hematologic:   - Monitor H&H  - DVT prophylaxis; SQH  -  continue to hold AC per ortho, ok with SQH   - Hx of DVTs, IVC filter placed 11/1, hold SCDS due to bilateral DVT    Infectious Disease:   - Afebrile  - Monitor WBC/temp    Lines/Tubes:  -R. 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

## 2023-11-07 NOTE — CHART NOTE - NSCHARTNOTEFT_GEN_A_CORE
Spoke with MAR, who stated patient is okay to go to [facility], however primary team is going to be orthopedics until 0800 on 11/8/23. Requesting to still touch base with medicine ACP to sign out patient.     Spoke with medicine ACP on [facility], who stated that the patient needs to be signed out to ortho, and then in the am on 11/8, when medicine takes over care, orthopedics needs to sign out to medicine ACP. Relayed this information to ortho resident Spoke with MAR, who stated patient is okay to go to [facility], however primary team is going to be orthopedics until 0800 on 11/8/23. Requesting to still touch base with medicine ACP to sign out patient.     Spoke with medicine ACP on [facility], who stated that the patient needs to be signed out to ortho, and then in the am on 11/8, when medicine takes over care, orthopedics needs to sign out to medicine ACP. Relayed this information to ortho resident who confirmed they are primary overnight and someone from the ortho team will sign out to medicine in the morning.

## 2023-11-07 NOTE — CHART NOTE - NSCHARTNOTEFT_GEN_A_CORE
Patient being seen for critical care nutrition follow up. Spoke with pt and obtained subjective information from extensive chart review.     Current Diet : Diet, Regular:   Consistent Carbohydrate {Evening Snack} (CSTCHOSN)  DASH/TLC {Sodium & Cholesterol Restricted} (DASH) (11-03-23 @ 08:27)    PO intake: %     Current Weight Trend: 94.6 kg (11/7), 91.8 kg (11/5)  Height (cm): 170.2   Admit Weight (kg): 90.7  BMI (kg/m2): 30.3    Nutrition Interval Events: Pt continues to eat well. He said he hasn't had a BM since 11/5 - bowel regimen has been ordered. Weight trend reflective of fluid shifts with edema presently 2+ generalized. FS over the past 24 hrs 158 - 256 mg/dl with Lantus and Admelog insulins ordered for coverage. Suggest continuing with nutrition plan of care as ordered at this time. RDN services to remain available as needed.     __________________ Pertinent Medications__________________   MEDICATIONS  (STANDING):  acetaminophen     Tablet .. 975 milliGRAM(s) Oral every 6 hours  amLODIPine   Tablet 10 milliGRAM(s) Oral daily  atorvastatin 20 milliGRAM(s) Oral at bedtime  bicalutamide 50 milliGRAM(s) Oral daily  chlorhexidine 2% Cloths 1 Application(s) Topical daily  dexAMETHasone     Tablet 4 milliGRAM(s) Oral every 6 hours  heparin   Injectable 5000 Unit(s) SubCutaneous every 8 hours  insulin glargine Injectable (LANTUS) 35 Unit(s) SubCutaneous every morning  insulin lispro (ADMELOG) corrective regimen sliding scale   SubCutaneous three times a day before meals  insulin lispro (ADMELOG) corrective regimen sliding scale   SubCutaneous at bedtime  insulin lispro Injectable (ADMELOG) 12 Unit(s) SubCutaneous three times a day before meals  lisinopril 20 milliGRAM(s) Oral daily  pantoprazole    Tablet 40 milliGRAM(s) Oral before breakfast  polyethylene glycol 3350 17 Gram(s) Oral two times a day  senna 2 Tablet(s) Oral at bedtime  tamsulosin 0.4 milliGRAM(s) Oral at bedtime    __________________ Pertinent Labs__________________   11-07 Na135 mmol/L Glu 188 mg/dL<H> K+ 4.6 mmol/L Cr  0.83 mg/dL BUN 30 mg/dL<H> 11-07 Phos 3.0 mg/dL 11-03 Alb 3.4 g/dL      Skin: Intact    Estimated Needs:   [x] no change since previous assessment      Previous Nutrition Diagnosis:     Unintended Weight Loss     Nutrition Diagnosis is [x] ongoing        Goal(s):  1. Patient to meet > 75% estimated energy needs    Recommendations:   1. Continue Nutrition Plan of Care as ordered.    Monitoring and Evaluation:   1. Monitor weights, labs, BMs, skin integrity, PO intake and edema.  2. RD services to remain available.     Education:    [x] Given on previous assessment by LIAN Roach, MS, RDN, CDN, CNSC on [facility] TEAMS or Pager #[phone]

## 2023-11-07 NOTE — CHART NOTE - NSCHARTNOTEFT_GEN_A_CORE
Signed out patient to orthopedics team at [id number]. Patient was assigned bed 845B    Plan as follows:  PLAN:   Neurologic:  -C-collar  -Baseline:   AOX3, B/L Upper extremity 5/5 motor strength. B/L Lower extremity 4/5 (RLE slightly weaker than LLE, but still able to lift against gravity and resistance). No focal neuro deficits  -q4 neuro checks  -Oral Dexamethasone 4mg q6hrs, stop after 14days  -Pain: IV Tylenol, flexeril    Respiratory:  - O2 saturation 98% RA  - hx of PE, s/p IVC filter    Cardiovascular:   -Hemodynamically stable on no pressor support  -Home amlodipine 10mg QD,  -Home Lisinopril  -Home statin    Gastrointestinal/Nutrition:   - DASH diet started 11/3   -Bowel regimen: Senna/Miralax BID, docolax suppository 11/5 for no BM  -Protonix 40 QD  -Zofran prn    Renal/Genitourinary:   -Prostate Biopsy 2021 --> adenocarcinoma Gelason 3+4; declined radiation therapy at the time  -Home Bicalutamide 50mg  -IVL  -Flomax QD    Hematologic:   - Monitor H&H  - DVT prophylaxis; SQH  -  continue to hold AC per ortho, ok with SQH   - Hx of DVTs, IVC filter placed 11/1, hold SCDS due to bilateral DVT    Infectious Disease:   - Afebrile  - Monitor WBC/temp    Lines/Tubes:  -R. 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

## 2023-11-07 NOTE — PROGRESS NOTE ADULT - CRITICAL CARE ATTENDING COMMENT
Patient s/p spinal fusion surgery. Patient doing well, neurologically intact. Awaiting floor bed. No change in overall care plan    I have personally interviewed when possible and examined the patient, reviewed data and laboratory tests/x-rays and all pertinent electronic images.  I was physically present for the key portions of the evaluation and management (E/M) service provided.   The SICU team has a constant risk benefit analyzes discussion with the primary team, all consultants, House Staff and PA's on all decisions.  These diagnoses are unrelated to the surgical procedure noted above.  I meet with family if needed to get further history, discuss the case and make care decisions for this patient who might not be able to participate.  Time involved in performance of separately billable procedures was not counted toward my critical care time. There is no overlap.  I spent 30 minutes ( 0800Hrs-0915Hrs in AM/ 1600Hrs-1715Hrs in PM, or other time indicated) of critical care time for the diagnoses, assessment, plan and interventions.  This time excludes time spent on separate procedures and teaching.

## 2023-11-07 NOTE — CHART NOTE - NSCHARTNOTEFT_GEN_A_CORE
MAR Accept Note  Transfer to:  Medicine    Patient seen and examined.   Labs and data reviewed.   No findings precluding transfer of service.       HPI/MICU COURSE: 73M w/ T2DM, HTN, Metastatic prostate cancer (2009) was initially evaluated at [facility] for lower extremity paralysis in the setting of a recent fall, found to have 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 Course complicated by progression of prostate cancer, rhabdomyolysis, SRUTHI, and DVT, transferred for [facility] for XRT and ortho-onc eval.Now s/p C5-T3 posterior cervical fusion, C7-T1 decompression for metastatic prostate cancer on 11/2 with transfer to SICU for neuro checks.    FOR FOLLOW-UP: [ ] Ortho recs  [ ] Rad onc recs for possible radiation therapy MAR Accept Note  Transfer to:  Medicine    Patient seen and examined.   Labs and data reviewed.   No findings precluding transfer of service.       HPI/MICU COURSE: 73M w/ T2DM, HTN, Metastatic prostate cancer (2009) was initially evaluated at [facility] for lower extremity paralysis in the setting of a recent fall, found to have 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 Course complicated by progression of prostate cancer, rhabdomyolysis, SRUTHI, and DVT, transferred for [facility] for XRT and ortho-onc eval.Now s/p C5-T3 posterior cervical fusion, C7-T1 decompression for metastatic prostate cancer on 11/2 with transfer to SICU for neuro checks. Now stable and ready to be transferred to the floors.    FOR FOLLOW-UP: [ ] Ortho recs  [ ] plastic surgery recs  [ ] Rad onc recs for possible radiation therapy MAR Accept Note  Transfer to:  Medicine    Patient seen and examined.   Labs and data reviewed.   No findings precluding transfer of service.       HPI/MICU COURSE: 73M with HTN met prostate cancer (2009) with LE weakness and R hand paresthesias presents as a transfer from [facility] for NSGY evaluation    Patient reports that he has known about his prostate cancer since 2009, but did not seek out treatment because he did not take it seriously. He reports that he is typically fully functional and has no known weakness, or paresthesias. Around 1 month ago, he reports feeling some minor tingling/numbness in his R hand. The week prior to his hospitalization, he began to feel some minor weakness in both of his legs, but was still able to perform all of his ADLs and iADLs. He reports on Thursday, while he was showering, he fell in the bath due to weakness in his legs, but reports he was still able to get up and perform some household tasks such as cleaning and cooking, but still had general weakness and fell several more times. When he woke up from bed the next morning, he attempted to use the restroom and fell to the floor and reports he wasn't able to feel his below his belly button, was unable to control his bowel movements, couldn't feel his genitals and was paralyzed on bilateral lower extremities. He was on the ground for >48 hours with no PO intake before his son came to the [place] to take [place] the [place] for evaluation at [facility].     Patient was initially evaluated at [facility]; for b/l found to be in rhabdo and SRUTHI, On imaging, he was found to have C7 spinous process nondisplaced fracture of T1 spinous process, edema from C3-4 through C7-T1 interspinous ligaments as well as lytic metastatic disease from C7, Tq-T5 and T12, as well as L1-L3 as well as epidural expansion of neoplasm at C7 centrally and dorsally resulting in moderate cord compression and underlying edema/extension into the bilateral C7-T1 and T1-2 and Left T2-3 Neural foramina. Epidural disease was also notedat T12 vertebral body. NM Scan significant for multiple sites with osseous lesions; Scapula, Multiple left ribs, bilateral pelvic region, as well as b/l LE DVTs on US and was started on Heparin GTT. Patient Reports that since Wednesday, he has had significant improvement in his functional status and has regained sensation and movement in his lower extremities and has been able to feel his genatalia and his lower abdomen.     Course complicated by progression of prostate cancer, rhabdomyolysis, SRUTHI, and DVT, transferred for [facility] for XRT and ortho-onc eval.Now s/p C5-T3 posterior cervical fusion, C7-T1 decompression for metastatic prostate cancer on 11/2 with transfer to SICU for neuro checks. Now stable and ready to be transferred to the floors.    FOR FOLLOW-UP: [ ] Ortho recs  [ ] plastic surgery recs  [ ] Rad onc recs for possible radiation therapy MAR Accept Note  Transfer to: Ortho    Patient seen and examined.   Labs and data reviewed.   No findings precluding transfer of service.       HPI/MICU COURSE: 73M with HTN met prostate cancer (2009) with LE weakness and R hand paresthesias presents as a transfer from [facility] for NSGY evaluation    Patient reports that he has known about his prostate cancer since 2009, but did not seek out treatment because he did not take it seriously. He reports that he is typically fully functional and has no known weakness, or paresthesias. Around 1 month ago, he reports feeling some minor tingling/numbness in his R hand. The week prior to his hospitalization, he began to feel some minor weakness in both of his legs, but was still able to perform all of his ADLs and iADLs. He reports on Thursday, while he was showering, he fell in the bath due to weakness in his legs, but reports he was still able to get up and perform some household tasks such as cleaning and cooking, but still had general weakness and fell several more times. When he woke up from bed the next morning, he attempted to use the restroom and fell to the floor and reports he wasn't able to feel his below his belly button, was unable to control his bowel movements, couldn't feel his genitals and was paralyzed on bilateral lower extremities. He was on the ground for >48 hours with no PO intake before his son came to the DR to take [place] for evaluation at [facility].     Patient was initially evaluated at [facility]; for b/l found to be in rhabdo and SRUTHI, On imaging, he was found to have C7 spinous process nondisplaced fracture of T1 spinous process, edema from C3-4 through C7-T1 interspinous ligaments as well as lytic metastatic disease from C7, Tq-T5 and T12, as well as L1-L3 as well as epidural expansion of neoplasm at C7 centrally and dorsally resulting in moderate cord compression and underlying edema/extension into the bilateral C7-T1 and T1-2 and Left T2-3 Neural foramina. Epidural disease was also notedat T12 vertebral body. NM Scan significant for multiple sites with osseous lesions; Scapula, Multiple left ribs, bilateral pelvic region, as well as b/l LE DVTs on US and was started on Heparin GTT. Patient Reports that since Wednesday, he has had significant improvement in his functional status and has regained sensation and movement in his lower extremities and has been able to feel his genatalia and his lower abdomen.     Course complicated by progression of prostate cancer, rhabdomyolysis, SRUTHI, and DVT, transferred for [facility] for XRT and ortho-onc eval.Now s/p C5-T3 posterior cervical fusion, C7-T1 decompression for metastatic prostate cancer on 11/2 with transfer to SICU for neuro checks. Now stable and ready to be transferred to the floors.    FOR FOLLOW-UP: [ ] Ortho recs  [ ] plastic surgery recs  [ ] Rad onc recs for possible radiation therapy

## 2023-11-07 NOTE — PROGRESS NOTE ADULT - SUBJECTIVE AND OBJECTIVE BOX
Orthopaedic Surgery Progress Note    Subjective:   Patient seen and examined. No acute events overnight.     Objective:  T(C): 36 (11-07-23 @ 00:00), Max: 36.3 (11-06-23 @ 16:00)  HR: 74 (11-07-23 @ 04:00) (74 - 100)  BP: 131/72 (11-07-23 @ 04:00) (105/60 - 136/77)  RR: 16 (11-07-23 @ 04:00) (14 - 19)  SpO2: 94% (11-07-23 @ 04:00) (93% - 98%)  Wt(kg): --    11-05 @ 07:01  -  11-06 @ 07:00  --------------------------------------------------------  IN: 800 mL / OUT: 2170 mL / NET: -1370 mL    11-06 @ 07:01  -  11-07 @ 06:21  --------------------------------------------------------  IN: 1200 mL / OUT: 1760 mL / NET: -560 mL        PE  Gen: NAD  Back:   dressing C/D/I, mild appropriate linda-incisional ttp  AGGIE drains in place w/ serosanguinous output  Neuro:  Motor:                   C5                C6              C7               C8           T1   R            5/5                4+/5            4+/5             4+/5          4+/5  L             5/5               4+/5             4+/5             4+/5          4+/5                L2             L3             L4               L5            S1  R         5/5           5/5          5/5             5/5           5/5  L          5/5          5/5           5/5             5/5           5/5    Sensory:            C5         C6         C7      C8       T1        (0=absent, 1=impaired, 2=normal, NT=not testable)  R         2            2           1        1         1  L          2            2           1        1         1               L2          L3         L4      L5       S1         (0=absent, 1=impaired, 2=normal, NT=not testable)  R         1            1            1        1        1  L          1            1           1        1         1  WWP BLE                        11.0   10.44 )-----------( 146      ( 07 Nov 2023 01:00 )             32.3     11-07    135  |  101  |  30<H>  ----------------------------<  188<H>  4.6   |  25  |  0.83    Ca    9.0      07 Nov 2023 01:00  Phos  3.0     11-07  Mg     2.00     11-07        Urinalysis Basic - ( 07 Nov 2023 01:00 )    Color: x / Appearance: x / SG: x / pH: x  Gluc: 188 mg/dL / Ketone: x  / Bili: x / Urobili: x   Blood: x / Protein: x / Nitrite: x   Leuk Esterase: x / RBC: x / WBC x   Sq Epi: x / Non Sq Epi: x / Bacteria: x        73y Male s/p C5-T3 posterior cervical fusion, C7-T1 decompression for metastatic prostate cancer.   - Anticoagulation contraindicated for at least 6 weeks postoperatively given risk of hematoma and permanent neurologic deficits   - Recommend starting to taper Decadron  - No longer needs head of bed restrictions  - Pain control  - FU labs  - WBAT  - PT/OT/OOB  - I/S  - Monitor drain output  - SCDs  - Dispo: [facility]

## 2023-11-07 NOTE — PROGRESS NOTE ADULT - ASSESSMENT
73y Male s/p C5-T3 posterior cervical fusion, C7-T1 decompression for metastatic prostate cancer with plastics closure on 11/2. Recovering well.    Plan:  - remove L Drain today  - Continue AGGIE drain care  - Remainder of care per ortho / SICU    Reggie Lopes PGY3  Plastic Surgery  [id number]# [facility] pager  [phone] [facility] pager  Available on Teams

## 2023-11-08 LAB
ANION GAP SERPL CALC-SCNC: 11 MMOL/L — SIGNIFICANT CHANGE UP (ref 7–14)
ANION GAP SERPL CALC-SCNC: 11 MMOL/L — SIGNIFICANT CHANGE UP (ref 7–14)
BUN SERPL-MCNC: 30 MG/DL — HIGH (ref 7–23)
BUN SERPL-MCNC: 30 MG/DL — HIGH (ref 7–23)
CALCIUM SERPL-MCNC: 8.9 MG/DL — SIGNIFICANT CHANGE UP (ref 8.4–10.5)
CALCIUM SERPL-MCNC: 8.9 MG/DL — SIGNIFICANT CHANGE UP (ref 8.4–10.5)
CHLORIDE SERPL-SCNC: 99 MMOL/L — SIGNIFICANT CHANGE UP (ref 98–107)
CHLORIDE SERPL-SCNC: 99 MMOL/L — SIGNIFICANT CHANGE UP (ref 98–107)
CO2 SERPL-SCNC: 26 MMOL/L — SIGNIFICANT CHANGE UP (ref 22–31)
CO2 SERPL-SCNC: 26 MMOL/L — SIGNIFICANT CHANGE UP (ref 22–31)
CREAT SERPL-MCNC: 0.79 MG/DL — SIGNIFICANT CHANGE UP (ref 0.5–1.3)
CREAT SERPL-MCNC: 0.79 MG/DL — SIGNIFICANT CHANGE UP (ref 0.5–1.3)
EGFR: 95 ML/MIN/1.73M2 — SIGNIFICANT CHANGE UP
EGFR: 95 ML/MIN/1.73M2 — SIGNIFICANT CHANGE UP
GLUCOSE BLDC GLUCOMTR-MCNC: 161 MG/DL — HIGH (ref 70–99)
GLUCOSE BLDC GLUCOMTR-MCNC: 161 MG/DL — HIGH (ref 70–99)
GLUCOSE BLDC GLUCOMTR-MCNC: 167 MG/DL — HIGH (ref 70–99)
GLUCOSE BLDC GLUCOMTR-MCNC: 167 MG/DL — HIGH (ref 70–99)
GLUCOSE BLDC GLUCOMTR-MCNC: 183 MG/DL — HIGH (ref 70–99)
GLUCOSE BLDC GLUCOMTR-MCNC: 183 MG/DL — HIGH (ref 70–99)
GLUCOSE BLDC GLUCOMTR-MCNC: 190 MG/DL — HIGH (ref 70–99)
GLUCOSE BLDC GLUCOMTR-MCNC: 190 MG/DL — HIGH (ref 70–99)
GLUCOSE BLDC GLUCOMTR-MCNC: 263 MG/DL — HIGH (ref 70–99)
GLUCOSE BLDC GLUCOMTR-MCNC: 263 MG/DL — HIGH (ref 70–99)
GLUCOSE SERPL-MCNC: 162 MG/DL — HIGH (ref 70–99)
GLUCOSE SERPL-MCNC: 162 MG/DL — HIGH (ref 70–99)
HCT VFR BLD CALC: 34.3 % — LOW (ref 39–50)
HCT VFR BLD CALC: 34.3 % — LOW (ref 39–50)
HGB BLD-MCNC: 11.6 G/DL — LOW (ref 13–17)
HGB BLD-MCNC: 11.6 G/DL — LOW (ref 13–17)
MAGNESIUM SERPL-MCNC: 2.1 MG/DL — SIGNIFICANT CHANGE UP (ref 1.6–2.6)
MAGNESIUM SERPL-MCNC: 2.1 MG/DL — SIGNIFICANT CHANGE UP (ref 1.6–2.6)
MCHC RBC-ENTMCNC: 30.4 PG — SIGNIFICANT CHANGE UP (ref 27–34)
MCHC RBC-ENTMCNC: 30.4 PG — SIGNIFICANT CHANGE UP (ref 27–34)
MCHC RBC-ENTMCNC: 33.8 GM/DL — SIGNIFICANT CHANGE UP (ref 32–36)
MCHC RBC-ENTMCNC: 33.8 GM/DL — SIGNIFICANT CHANGE UP (ref 32–36)
MCV RBC AUTO: 90 FL — SIGNIFICANT CHANGE UP (ref 80–100)
MCV RBC AUTO: 90 FL — SIGNIFICANT CHANGE UP (ref 80–100)
NRBC # BLD: 0 /100 WBCS — SIGNIFICANT CHANGE UP (ref 0–0)
NRBC # BLD: 0 /100 WBCS — SIGNIFICANT CHANGE UP (ref 0–0)
NRBC # FLD: 0 K/UL — SIGNIFICANT CHANGE UP (ref 0–0)
NRBC # FLD: 0 K/UL — SIGNIFICANT CHANGE UP (ref 0–0)
PHOSPHATE SERPL-MCNC: 3.9 MG/DL — SIGNIFICANT CHANGE UP (ref 2.5–4.5)
PHOSPHATE SERPL-MCNC: 3.9 MG/DL — SIGNIFICANT CHANGE UP (ref 2.5–4.5)
PLATELET # BLD AUTO: 165 K/UL — SIGNIFICANT CHANGE UP (ref 150–400)
PLATELET # BLD AUTO: 165 K/UL — SIGNIFICANT CHANGE UP (ref 150–400)
POTASSIUM SERPL-MCNC: 4.6 MMOL/L — SIGNIFICANT CHANGE UP (ref 3.5–5.3)
POTASSIUM SERPL-MCNC: 4.6 MMOL/L — SIGNIFICANT CHANGE UP (ref 3.5–5.3)
POTASSIUM SERPL-SCNC: 4.6 MMOL/L — SIGNIFICANT CHANGE UP (ref 3.5–5.3)
POTASSIUM SERPL-SCNC: 4.6 MMOL/L — SIGNIFICANT CHANGE UP (ref 3.5–5.3)
RBC # BLD: 3.81 M/UL — LOW (ref 4.2–5.8)
RBC # BLD: 3.81 M/UL — LOW (ref 4.2–5.8)
RBC # FLD: 12.1 % — SIGNIFICANT CHANGE UP (ref 10.3–14.5)
RBC # FLD: 12.1 % — SIGNIFICANT CHANGE UP (ref 10.3–14.5)
SODIUM SERPL-SCNC: 136 MMOL/L — SIGNIFICANT CHANGE UP (ref 135–145)
SODIUM SERPL-SCNC: 136 MMOL/L — SIGNIFICANT CHANGE UP (ref 135–145)
WBC # BLD: 10.81 K/UL — HIGH (ref 3.8–10.5)
WBC # BLD: 10.81 K/UL — HIGH (ref 3.8–10.5)
WBC # FLD AUTO: 10.81 K/UL — HIGH (ref 3.8–10.5)
WBC # FLD AUTO: 10.81 K/UL — HIGH (ref 3.8–10.5)

## 2023-11-08 PROCEDURE — 99233 SBSQ HOSP IP/OBS HIGH 50: CPT

## 2023-11-08 RX ORDER — CHLORHEXIDINE GLUCONATE 213 G/1000ML
1 SOLUTION TOPICAL DAILY
Refills: 0 | Status: DISCONTINUED | OUTPATIENT
Start: 2023-11-08 | End: 2023-11-09

## 2023-11-08 RX ADMIN — BICALUTAMIDE 50 MILLIGRAM(S): 50 TABLET, FILM COATED ORAL at 12:15

## 2023-11-08 RX ADMIN — AMLODIPINE BESYLATE 10 MILLIGRAM(S): 2.5 TABLET ORAL at 05:13

## 2023-11-08 RX ADMIN — Medication 2: at 09:21

## 2023-11-08 RX ADMIN — Medication 975 MILLIGRAM(S): at 05:13

## 2023-11-08 RX ADMIN — POLYETHYLENE GLYCOL 3350 17 GRAM(S): 17 POWDER, FOR SOLUTION ORAL at 18:16

## 2023-11-08 RX ADMIN — Medication 2: at 18:16

## 2023-11-08 RX ADMIN — Medication 4 MILLIGRAM(S): at 23:52

## 2023-11-08 RX ADMIN — Medication 975 MILLIGRAM(S): at 23:53

## 2023-11-08 RX ADMIN — HEPARIN SODIUM 5000 UNIT(S): 5000 INJECTION INTRAVENOUS; SUBCUTANEOUS at 13:15

## 2023-11-08 RX ADMIN — Medication 4 MILLIGRAM(S): at 12:16

## 2023-11-08 RX ADMIN — POLYETHYLENE GLYCOL 3350 17 GRAM(S): 17 POWDER, FOR SOLUTION ORAL at 05:12

## 2023-11-08 RX ADMIN — Medication 975 MILLIGRAM(S): at 13:15

## 2023-11-08 RX ADMIN — TAMSULOSIN HYDROCHLORIDE 0.4 MILLIGRAM(S): 0.4 CAPSULE ORAL at 22:03

## 2023-11-08 RX ADMIN — HEPARIN SODIUM 5000 UNIT(S): 5000 INJECTION INTRAVENOUS; SUBCUTANEOUS at 05:12

## 2023-11-08 RX ADMIN — OXYCODONE HYDROCHLORIDE 10 MILLIGRAM(S): 5 TABLET ORAL at 12:15

## 2023-11-08 RX ADMIN — Medication 975 MILLIGRAM(S): at 19:11

## 2023-11-08 RX ADMIN — PANTOPRAZOLE SODIUM 40 MILLIGRAM(S): 20 TABLET, DELAYED RELEASE ORAL at 05:12

## 2023-11-08 RX ADMIN — Medication 12 UNIT(S): at 09:21

## 2023-11-08 RX ADMIN — Medication 6: at 13:16

## 2023-11-08 RX ADMIN — Medication 4 MILLIGRAM(S): at 05:13

## 2023-11-08 RX ADMIN — HEPARIN SODIUM 5000 UNIT(S): 5000 INJECTION INTRAVENOUS; SUBCUTANEOUS at 22:04

## 2023-11-08 RX ADMIN — Medication 12 UNIT(S): at 13:16

## 2023-11-08 RX ADMIN — Medication 12 UNIT(S): at 18:15

## 2023-11-08 RX ADMIN — LISINOPRIL 20 MILLIGRAM(S): 2.5 TABLET ORAL at 05:12

## 2023-11-08 RX ADMIN — SENNA PLUS 2 TABLET(S): 8.6 TABLET ORAL at 22:03

## 2023-11-08 RX ADMIN — OXYCODONE HYDROCHLORIDE 10 MILLIGRAM(S): 5 TABLET ORAL at 13:15

## 2023-11-08 RX ADMIN — CHLORHEXIDINE GLUCONATE 1 APPLICATION(S): 213 SOLUTION TOPICAL at 12:16

## 2023-11-08 RX ADMIN — Medication 4 MILLIGRAM(S): at 18:15

## 2023-11-08 RX ADMIN — ATORVASTATIN CALCIUM 20 MILLIGRAM(S): 80 TABLET, FILM COATED ORAL at 22:03

## 2023-11-08 RX ADMIN — INSULIN GLARGINE 35 UNIT(S): 100 INJECTION, SOLUTION SUBCUTANEOUS at 09:22

## 2023-11-08 NOTE — PROGRESS NOTE ADULT - SUBJECTIVE AND OBJECTIVE BOX
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 Patient transferred out of SICU to medicine floor.    SUBJECTIVE: Pt seen and examined at bedside this morning. Patient reports feeling much improved. States that he has been able to ambulate and has been voiding/stooling without issue. Endorses pain in his neck/back. Denies any fevers, chills, nausea or vomiting.    ADDITIONAL REVIEW OF SYSTEMS: as above    MEDICATIONS  (STANDING):  acetaminophen     Tablet .. 975 milliGRAM(s) Oral every 6 hours  amLODIPine   Tablet 10 milliGRAM(s) Oral daily  atorvastatin 20 milliGRAM(s) Oral at bedtime  bicalutamide 50 milliGRAM(s) Oral daily  chlorhexidine 2% Cloths 1 Application(s) Topical daily  chlorhexidine 4% Liquid 1 Application(s) Topical daily  dexAMETHasone     Tablet 4 milliGRAM(s) Oral every 6 hours  heparin   Injectable 5000 Unit(s) SubCutaneous every 8 hours  insulin glargine Injectable (LANTUS) 35 Unit(s) SubCutaneous every morning  insulin lispro (ADMELOG) corrective regimen sliding scale   SubCutaneous at bedtime  insulin lispro (ADMELOG) corrective regimen sliding scale   SubCutaneous three times a day before meals  insulin lispro Injectable (ADMELOG) 12 Unit(s) SubCutaneous three times a day before meals  lisinopril 20 milliGRAM(s) Oral daily  pantoprazole    Tablet 40 milliGRAM(s) Oral before breakfast  polyethylene glycol 3350 17 Gram(s) Oral two times a day  senna 2 Tablet(s) Oral at bedtime  tamsulosin 0.4 milliGRAM(s) Oral at bedtime    MEDICATIONS  (PRN):  HYDROmorphone  Injectable 0.5 milliGRAM(s) IV Push every 4 hours PRN Severe breakthrough Pain (7 - 10)  naloxone Injectable 0.1 milliGRAM(s) IV Push every 3 minutes PRN For ANY of the following changes in patient status:  A. RR LESS THAN 10 breaths per minute, B. Oxygen saturation LESS THAN 90%, C. Sedation score of 6  ondansetron Injectable 4 milliGRAM(s) IV Push every 6 hours PRN Nausea  oxyCODONE    IR 5 milliGRAM(s) Oral every 3 hours PRN Moderate Pain (4 - 6)  oxyCODONE    IR 10 milliGRAM(s) Oral every 3 hours PRN Severe Pain (7 - 10)    CAPILLARY BLOOD GLUCOSE    POCT Blood Glucose.: 263 mg/dL (08 Nov 2023 12:41)  POCT Blood Glucose.: 161 mg/dL (08 Nov 2023 09:20)  POCT Blood Glucose.: 167 mg/dL (08 Nov 2023 08:17)  POCT Blood Glucose.: 99 mg/dL (07 Nov 2023 21:30)  POCT Blood Glucose.: 141 mg/dL (07 Nov 2023 17:34)  POCT Blood Glucose.: 184 mg/dL (07 Nov 2023 16:06)    I&O's Summary    07 Nov 2023 07:01  -  08 Nov 2023 07:00  --------------------------------------------------------  IN: 400 mL / OUT: 1240 mL / NET: -840 mL    PHYSICAL EXAM:  Vital Signs Last 24 Hrs  T(C): 36.7 (08 Nov 2023 09:10), Max: 36.9 (07 Nov 2023 16:51)  T(F): 98.1 (08 Nov 2023 09:10), Max: 98.5 (08 Nov 2023 05:10)  HR: 95 (08 Nov 2023 09:10) (74 - 95)  BP: 138/83 (08 Nov 2023 09:10) (113/59 - 144/72)  BP(mean): --  RR: 18 (08 Nov 2023 09:10) (17 - 19)  SpO2: 94% (08 Nov 2023 09:10) (94% - 97%)    Parameters below as of 08 Nov 2023 09:10  Patient On (Oxygen Delivery Method): room air    CONSTITUTIONAL: NAD, well-developed  HEAD: Normocephalic, atraumatic, C collar in place  EYES: EOMI, PERRL  ENT: no rhinorrhea, no pharyngeal erythema  RESPIRATORY: No increased work of breathing, CTAB, no wheezes or crackles appreciated  CARDIOVASCULAR: RRR, S1 and S2 present, no m/r/g  ABDOMEN: soft, NT, ND, bowel sounds present  EXTREMITIES: No LE edema  MUSCULOSKELETAL: no joint swelling, no tenderness to palpation  NEURO: A&Ox3, moving all extremities    LABS:                        11.6   10.81 )-----------( 165      ( 08 Nov 2023 06:47 )             34.3     11-08    136  |  99  |  30<H>  ----------------------------<  162<H>  4.6   |  26  |  0.79    Ca    8.9      08 Nov 2023 06:47  Phos  3.9     11-08  Mg     2.10     11-08    Urinalysis Basic - ( 08 Nov 2023 06:47 )    Color: x / Appearance: x / SG: x / pH: x  Gluc: 162 mg/dL / Ketone: x  / Bili: x / Urobili: x   Blood: x / Protein: x / Nitrite: x   Leuk Esterase: x / RBC: x / WBC x   Sq Epi: x / Non Sq Epi: x / Bacteria: x    RADIOLOGY & ADDITIONAL TESTS:    Results Reviewed:   Imaging Personally Reviewed:  Electrocardiogram Personally Reviewed:    COORDINATION OF CARE:  Care Discussed with Consultants/Other Providers [Y/N]:  Prior or Outpatient Records Reviewed [Y/N]:

## 2023-11-08 NOTE — PROGRESS NOTE ADULT - PROBLEM SELECTOR PLAN 4
Patient found to have b/l LE DVTs during this admission  - s/p IVC filter placed by IR on 11/1  - in setting of spinal surgery, patient to remain off AC due to risk of hematoma formation for at least 6 weeks since surgery (11/2)  - patient to also f/u with IR within 6-8 weeks to assess for IVC filter retrieval once contraindication to AC no longer exists

## 2023-11-08 NOTE — PROGRESS NOTE ADULT - SUBJECTIVE AND OBJECTIVE BOX
Orthopedic Progress Note     S:  No acute events overnight, pain is well controlled.  Patient denies any chest pain, SOB, N/V, fevers/chills.    T(C): 36.9 (11-08-23 @ 05:10), Max: 36.9 (11-07-23 @ 16:51)  HR: 79 (11-08-23 @ 05:10) (74 - 90)  BP: 144/72 (11-08-23 @ 05:10) (113/59 - 144/72)  RR: 17 (11-08-23 @ 05:10) (15 - 19)  SpO2: 96% (11-08-23 @ 05:10) (94% - 97%)  Wt(kg): --I&O's Summary    07 Nov 2023 07:01  -  08 Nov 2023 07:00  --------------------------------------------------------  IN: 400 mL / OUT: 1240 mL / NET: -840 mL        O:  Physical exam:  Gen: NAD  Back:   dressing C/D/I, mild appropriate linda-incisional ttp  AGGIE drains in place w/ serosanguinous output  Neuro:  Motor:                   C5                C6              C7               C8           T1   R            5/5                4+/5            4+/5             4+/5          4+/5  L             5/5               4+/5             4+/5             4+/5          4+/5                L2             L3             L4               L5            S1  R         5/5           5/5          5/5             5/5           5/5  L          5/5          5/5           5/5             5/5           5/5    Sensory:            C5         C6         C7      C8       T1        (0=absent, 1=impaired, 2=normal, NT=not testable)  R         2            2           1        1         1  L          2            2           1        1         1               L2          L3         L4      L5       S1         (0=absent, 1=impaired, 2=normal, NT=not testable)  R         1            1            1        1        1  L          1            1           1        1         1  WWP BLE             Labs:                        11.6   10.81 )-----------( 165      ( 08 Nov 2023 06:47 )             34.3    11-08    136  |  99  |  30<H>  ----------------------------<  162<H>  4.6   |  26  |  0.79    Ca    8.9      08 Nov 2023 06:47  Phos  3.9     11-08  Mg     2.10     11-08

## 2023-11-08 NOTE — PROGRESS NOTE ADULT - PROBLEM SELECTOR PLAN 3
Patient with persistent back/neck pain in setting of recent spinal disease and surgery  - pain management recs appreciated  - no off PCA pump  - c/w oxycodone 10mg/5mg Sev/Mod pain  - dilaudid 0.5mg IV for sev breakthrough pain  - c/w dexamethasone

## 2023-11-08 NOTE — PROGRESS NOTE ADULT - ASSESSMENT
72y/o M with DM, HTN and metastatic prostate cancer (diagnosed in 2009) that initially presented to OSH with LE weakness and R hand paresthesias now transferred to [facility] for NSG eval. Patient now s/p surgical intervention (C5-T3 posterior cervical fusion and C7-T1 decompression) by orthopedic surgery, now transferred to medicine for further management.

## 2023-11-08 NOTE — PROGRESS NOTE ADULT - PROBLEM SELECTOR PLAN 2
Patient previously diagnosed with prostate ca in 2009  - NM scan showing multiple osseous lesions throughout body  - heme/onc recs appreciated, will c/w bicalutamide 50mg qd  - c/w dexamethasone 4mg q6h  - no plan for inpatient RT per rad/onc

## 2023-11-08 NOTE — PROGRESS NOTE ADULT - PROBLEM SELECTOR PLAN 5
Noted with elevated BPs  - c/w amlodipine 10mg qd  - c/w lisinopril 20mg qd  - BPs currently adequately controlled

## 2023-11-08 NOTE — PROGRESS NOTE ADULT - ASSESSMENT
73y Male s/p C5-T3 posterior cervical fusion, C7-T1 decompression for metastatic prostate cancer with plastics closure on 11/2. Recovering well.    Plan:  - L AGGIE drain removed 11/7  - Continue AGGIE drain care for R drain  - Remainder of care per ortho / SICU  - Nylon sutures over incision to remain in place    Plastic Surgery  [phone]# [facility] pager  [phone] [facility] pager  Available on Teams

## 2023-11-08 NOTE — PROGRESS NOTE ADULT - ASSESSMENT
73y Male s/p C5-T3 posterior cervical fusion, C7-T1 decompression for metastatic prostate cancer.   - Anticoagulation contraindicated for at least 6 weeks postoperatively given risk of hematoma and permanent neurologic deficits   - Recommend starting to taper Decadron  - No longer needs head of bed restrictions  - Pain control  - FU labs  - WBAT  - PT/OT/OOB  - I/S  - Monitor drain output  - SCDs  - Dispo: [facility]

## 2023-11-08 NOTE — PROGRESS NOTE ADULT - PROBLEM SELECTOR PLAN 1
Patient initially presenting with LE weakness and well as RUE parasthesias.  - patient found to have numerous spinal cord lesions  - noted with spinous process fractures of C7 and T1  - also noted with neoplasm with epidural expansion causing cord compression from C7-T3  - now s/p C5-T3 posterior cervical fusion and C7-T1 decompression by orthopedic surgery on 11/2  - weakness/parasthesias now markedly improved  - c/w dexamethasone 4mg q6h  - s/p flap closure of back surgical site by plastic surgery with b/l AGGIE drains  - L AGGIE drain removed on 11/7, cont to monitor R AGGIE drain output  - appreciate further orthopedics/plastic surgery recs  - d/w rad/onc, no plan for inpatient RT, recommend outpatient follow up

## 2023-11-09 LAB
ALBUMIN SERPL ELPH-MCNC: 3.1 G/DL — LOW (ref 3.3–5)
ALBUMIN SERPL ELPH-MCNC: 3.1 G/DL — LOW (ref 3.3–5)
ALP SERPL-CCNC: 379 U/L — HIGH (ref 40–120)
ALP SERPL-CCNC: 379 U/L — HIGH (ref 40–120)
ALT FLD-CCNC: 151 U/L — HIGH (ref 4–41)
ALT FLD-CCNC: 151 U/L — HIGH (ref 4–41)
ANION GAP SERPL CALC-SCNC: 9 MMOL/L — SIGNIFICANT CHANGE UP (ref 7–14)
ANION GAP SERPL CALC-SCNC: 9 MMOL/L — SIGNIFICANT CHANGE UP (ref 7–14)
AST SERPL-CCNC: 39 U/L — SIGNIFICANT CHANGE UP (ref 4–40)
AST SERPL-CCNC: 39 U/L — SIGNIFICANT CHANGE UP (ref 4–40)
BILIRUB SERPL-MCNC: 0.6 MG/DL — SIGNIFICANT CHANGE UP (ref 0.2–1.2)
BILIRUB SERPL-MCNC: 0.6 MG/DL — SIGNIFICANT CHANGE UP (ref 0.2–1.2)
BUN SERPL-MCNC: 37 MG/DL — HIGH (ref 7–23)
BUN SERPL-MCNC: 37 MG/DL — HIGH (ref 7–23)
CALCIUM SERPL-MCNC: 9.1 MG/DL — SIGNIFICANT CHANGE UP (ref 8.4–10.5)
CALCIUM SERPL-MCNC: 9.1 MG/DL — SIGNIFICANT CHANGE UP (ref 8.4–10.5)
CHLORIDE SERPL-SCNC: 99 MMOL/L — SIGNIFICANT CHANGE UP (ref 98–107)
CHLORIDE SERPL-SCNC: 99 MMOL/L — SIGNIFICANT CHANGE UP (ref 98–107)
CO2 SERPL-SCNC: 26 MMOL/L — SIGNIFICANT CHANGE UP (ref 22–31)
CO2 SERPL-SCNC: 26 MMOL/L — SIGNIFICANT CHANGE UP (ref 22–31)
CREAT SERPL-MCNC: 0.85 MG/DL — SIGNIFICANT CHANGE UP (ref 0.5–1.3)
CREAT SERPL-MCNC: 0.85 MG/DL — SIGNIFICANT CHANGE UP (ref 0.5–1.3)
EGFR: 93 ML/MIN/1.73M2 — SIGNIFICANT CHANGE UP
EGFR: 93 ML/MIN/1.73M2 — SIGNIFICANT CHANGE UP
GLUCOSE BLDC GLUCOMTR-MCNC: 109 MG/DL — HIGH (ref 70–99)
GLUCOSE BLDC GLUCOMTR-MCNC: 109 MG/DL — HIGH (ref 70–99)
GLUCOSE BLDC GLUCOMTR-MCNC: 183 MG/DL — HIGH (ref 70–99)
GLUCOSE BLDC GLUCOMTR-MCNC: 183 MG/DL — HIGH (ref 70–99)
GLUCOSE BLDC GLUCOMTR-MCNC: 263 MG/DL — HIGH (ref 70–99)
GLUCOSE BLDC GLUCOMTR-MCNC: 263 MG/DL — HIGH (ref 70–99)
GLUCOSE BLDC GLUCOMTR-MCNC: 270 MG/DL — HIGH (ref 70–99)
GLUCOSE BLDC GLUCOMTR-MCNC: 270 MG/DL — HIGH (ref 70–99)
GLUCOSE SERPL-MCNC: 194 MG/DL — HIGH (ref 70–99)
GLUCOSE SERPL-MCNC: 194 MG/DL — HIGH (ref 70–99)
HCT VFR BLD CALC: 33 % — LOW (ref 39–50)
HCT VFR BLD CALC: 33 % — LOW (ref 39–50)
HGB BLD-MCNC: 11 G/DL — LOW (ref 13–17)
HGB BLD-MCNC: 11 G/DL — LOW (ref 13–17)
MAGNESIUM SERPL-MCNC: 2.1 MG/DL — SIGNIFICANT CHANGE UP (ref 1.6–2.6)
MAGNESIUM SERPL-MCNC: 2.1 MG/DL — SIGNIFICANT CHANGE UP (ref 1.6–2.6)
MCHC RBC-ENTMCNC: 31 PG — SIGNIFICANT CHANGE UP (ref 27–34)
MCHC RBC-ENTMCNC: 31 PG — SIGNIFICANT CHANGE UP (ref 27–34)
MCHC RBC-ENTMCNC: 33.3 GM/DL — SIGNIFICANT CHANGE UP (ref 32–36)
MCHC RBC-ENTMCNC: 33.3 GM/DL — SIGNIFICANT CHANGE UP (ref 32–36)
MCV RBC AUTO: 93 FL — SIGNIFICANT CHANGE UP (ref 80–100)
MCV RBC AUTO: 93 FL — SIGNIFICANT CHANGE UP (ref 80–100)
MRSA PCR RESULT.: SIGNIFICANT CHANGE UP
MRSA PCR RESULT.: SIGNIFICANT CHANGE UP
NRBC # BLD: 0 /100 WBCS — SIGNIFICANT CHANGE UP (ref 0–0)
NRBC # BLD: 0 /100 WBCS — SIGNIFICANT CHANGE UP (ref 0–0)
NRBC # FLD: 0 K/UL — SIGNIFICANT CHANGE UP (ref 0–0)
NRBC # FLD: 0 K/UL — SIGNIFICANT CHANGE UP (ref 0–0)
PHOSPHATE SERPL-MCNC: 3.3 MG/DL — SIGNIFICANT CHANGE UP (ref 2.5–4.5)
PHOSPHATE SERPL-MCNC: 3.3 MG/DL — SIGNIFICANT CHANGE UP (ref 2.5–4.5)
PLATELET # BLD AUTO: 106 K/UL — LOW (ref 150–400)
PLATELET # BLD AUTO: 106 K/UL — LOW (ref 150–400)
POTASSIUM SERPL-MCNC: 4.8 MMOL/L — SIGNIFICANT CHANGE UP (ref 3.5–5.3)
POTASSIUM SERPL-MCNC: 4.8 MMOL/L — SIGNIFICANT CHANGE UP (ref 3.5–5.3)
POTASSIUM SERPL-SCNC: 4.8 MMOL/L — SIGNIFICANT CHANGE UP (ref 3.5–5.3)
POTASSIUM SERPL-SCNC: 4.8 MMOL/L — SIGNIFICANT CHANGE UP (ref 3.5–5.3)
PROT SERPL-MCNC: 5.5 G/DL — LOW (ref 6–8.3)
PROT SERPL-MCNC: 5.5 G/DL — LOW (ref 6–8.3)
RBC # BLD: 3.55 M/UL — LOW (ref 4.2–5.8)
RBC # BLD: 3.55 M/UL — LOW (ref 4.2–5.8)
RBC # FLD: 12.4 % — SIGNIFICANT CHANGE UP (ref 10.3–14.5)
RBC # FLD: 12.4 % — SIGNIFICANT CHANGE UP (ref 10.3–14.5)
S AUREUS DNA NOSE QL NAA+PROBE: DETECTED
S AUREUS DNA NOSE QL NAA+PROBE: DETECTED
SODIUM SERPL-SCNC: 134 MMOL/L — LOW (ref 135–145)
SODIUM SERPL-SCNC: 134 MMOL/L — LOW (ref 135–145)
WBC # BLD: 11.45 K/UL — HIGH (ref 3.8–10.5)
WBC # BLD: 11.45 K/UL — HIGH (ref 3.8–10.5)
WBC # FLD AUTO: 11.45 K/UL — HIGH (ref 3.8–10.5)
WBC # FLD AUTO: 11.45 K/UL — HIGH (ref 3.8–10.5)

## 2023-11-09 PROCEDURE — 99233 SBSQ HOSP IP/OBS HIGH 50: CPT

## 2023-11-09 RX ORDER — INSULIN GLARGINE 100 [IU]/ML
37 INJECTION, SOLUTION SUBCUTANEOUS EVERY MORNING
Refills: 0 | Status: DISCONTINUED | OUTPATIENT
Start: 2023-11-09 | End: 2023-11-13

## 2023-11-09 RX ORDER — DEXAMETHASONE 0.5 MG/5ML
4 ELIXIR ORAL DAILY
Refills: 0 | Status: COMPLETED | OUTPATIENT
Start: 2023-11-12 | End: 2023-11-15

## 2023-11-09 RX ORDER — HYDROMORPHONE HYDROCHLORIDE 2 MG/ML
0.5 INJECTION INTRAMUSCULAR; INTRAVENOUS; SUBCUTANEOUS EVERY 4 HOURS
Refills: 0 | Status: DISCONTINUED | OUTPATIENT
Start: 2023-11-09 | End: 2023-11-09

## 2023-11-09 RX ORDER — DEXAMETHASONE 0.5 MG/5ML
ELIXIR ORAL
Refills: 0 | Status: COMPLETED | OUTPATIENT
Start: 2023-11-09 | End: 2023-11-15

## 2023-11-09 RX ORDER — OXYCODONE HYDROCHLORIDE 5 MG/1
10 TABLET ORAL
Refills: 0 | Status: DISCONTINUED | OUTPATIENT
Start: 2023-11-09 | End: 2023-11-14

## 2023-11-09 RX ORDER — MUPIROCIN 20 MG/G
1 OINTMENT TOPICAL
Refills: 0 | Status: COMPLETED | OUTPATIENT
Start: 2023-11-09 | End: 2023-11-14

## 2023-11-09 RX ORDER — OXYCODONE HYDROCHLORIDE 5 MG/1
5 TABLET ORAL
Refills: 0 | Status: DISCONTINUED | OUTPATIENT
Start: 2023-11-09 | End: 2023-11-09

## 2023-11-09 RX ORDER — INSULIN LISPRO 100/ML
14 VIAL (ML) SUBCUTANEOUS
Refills: 0 | Status: DISCONTINUED | OUTPATIENT
Start: 2023-11-09 | End: 2023-11-12

## 2023-11-09 RX ORDER — DEXAMETHASONE 0.5 MG/5ML
4 ELIXIR ORAL EVERY 12 HOURS
Refills: 0 | Status: COMPLETED | OUTPATIENT
Start: 2023-11-09 | End: 2023-11-12

## 2023-11-09 RX ADMIN — POLYETHYLENE GLYCOL 3350 17 GRAM(S): 17 POWDER, FOR SOLUTION ORAL at 17:57

## 2023-11-09 RX ADMIN — Medication 6: at 17:54

## 2023-11-09 RX ADMIN — AMLODIPINE BESYLATE 10 MILLIGRAM(S): 2.5 TABLET ORAL at 05:36

## 2023-11-09 RX ADMIN — HEPARIN SODIUM 5000 UNIT(S): 5000 INJECTION INTRAVENOUS; SUBCUTANEOUS at 21:11

## 2023-11-09 RX ADMIN — Medication 975 MILLIGRAM(S): at 12:41

## 2023-11-09 RX ADMIN — Medication 4 MILLIGRAM(S): at 17:58

## 2023-11-09 RX ADMIN — POLYETHYLENE GLYCOL 3350 17 GRAM(S): 17 POWDER, FOR SOLUTION ORAL at 05:36

## 2023-11-09 RX ADMIN — Medication 12 UNIT(S): at 13:27

## 2023-11-09 RX ADMIN — LISINOPRIL 20 MILLIGRAM(S): 2.5 TABLET ORAL at 05:36

## 2023-11-09 RX ADMIN — Medication 975 MILLIGRAM(S): at 13:41

## 2023-11-09 RX ADMIN — Medication 2: at 09:22

## 2023-11-09 RX ADMIN — HEPARIN SODIUM 5000 UNIT(S): 5000 INJECTION INTRAVENOUS; SUBCUTANEOUS at 15:16

## 2023-11-09 RX ADMIN — Medication 6: at 13:28

## 2023-11-09 RX ADMIN — Medication 14 UNIT(S): at 17:58

## 2023-11-09 RX ADMIN — OXYCODONE HYDROCHLORIDE 10 MILLIGRAM(S): 5 TABLET ORAL at 10:10

## 2023-11-09 RX ADMIN — INSULIN GLARGINE 35 UNIT(S): 100 INJECTION, SOLUTION SUBCUTANEOUS at 09:22

## 2023-11-09 RX ADMIN — ATORVASTATIN CALCIUM 20 MILLIGRAM(S): 80 TABLET, FILM COATED ORAL at 21:11

## 2023-11-09 RX ADMIN — Medication 975 MILLIGRAM(S): at 23:13

## 2023-11-09 RX ADMIN — Medication 12 UNIT(S): at 09:21

## 2023-11-09 RX ADMIN — Medication 4 MILLIGRAM(S): at 05:36

## 2023-11-09 RX ADMIN — TAMSULOSIN HYDROCHLORIDE 0.4 MILLIGRAM(S): 0.4 CAPSULE ORAL at 21:11

## 2023-11-09 RX ADMIN — CHLORHEXIDINE GLUCONATE 1 APPLICATION(S): 213 SOLUTION TOPICAL at 12:41

## 2023-11-09 RX ADMIN — Medication 975 MILLIGRAM(S): at 05:37

## 2023-11-09 RX ADMIN — Medication 975 MILLIGRAM(S): at 17:57

## 2023-11-09 RX ADMIN — MUPIROCIN 1 APPLICATION(S): 20 OINTMENT TOPICAL at 17:57

## 2023-11-09 RX ADMIN — HEPARIN SODIUM 5000 UNIT(S): 5000 INJECTION INTRAVENOUS; SUBCUTANEOUS at 05:36

## 2023-11-09 RX ADMIN — BICALUTAMIDE 50 MILLIGRAM(S): 50 TABLET, FILM COATED ORAL at 12:43

## 2023-11-09 RX ADMIN — OXYCODONE HYDROCHLORIDE 10 MILLIGRAM(S): 5 TABLET ORAL at 11:10

## 2023-11-09 RX ADMIN — PANTOPRAZOLE SODIUM 40 MILLIGRAM(S): 20 TABLET, DELAYED RELEASE ORAL at 05:36

## 2023-11-09 RX ADMIN — Medication 975 MILLIGRAM(S): at 18:57

## 2023-11-09 RX ADMIN — SENNA PLUS 2 TABLET(S): 8.6 TABLET ORAL at 21:11

## 2023-11-09 NOTE — PROGRESS NOTE ADULT - PROBLEM SELECTOR PLAN 3
Patient with persistent back/neck pain in setting of recent spinal disease and surgery  - pain management recs appreciated  - now off PCA pump  - c/w oxycodone 10mg/5mg Sev/Mod pain  - dilaudid 0.5mg IV for sev breakthrough pain  - c/w dexamethasone

## 2023-11-09 NOTE — PROGRESS NOTE ADULT - ASSESSMENT
73y Male s/p C5-T3 posterior cervical fusion, C7-T1 decompression for metastatic prostate cancer.     - Anticoagulation contraindicated for at least 6 weeks postoperatively given risk of hematoma and permanent neurologic deficits   - Recommend starting to taper Decadron  - No longer needs head of bed restrictions  - Pain control  - FU labs  - WBAT  - PT/OT/OOB  - I/S  - Monitor drain output  - SCDs  - Dispo: [facility]TON Stewart MD  Orthopaedic Surgery Resident    For all questions, please reach out via the following numbers for the on-call resident; do not reach out via Teams.  [facility] [id number]  [facility]        [id number]  [facility]  [id number]/ [phone]

## 2023-11-09 NOTE — PROGRESS NOTE ADULT - ASSESSMENT
73y Male s/p C5-T3 posterior cervical fusion, C7-T1 decompression for metastatic prostate cancer with plastics closure on 11/2. Recovering well.    Plan:  - L AGGIE drain removed 11/7  - Continue AGGIE drain care for R drain  - Remainder of care per ortho / SICU  - Nylon sutures over incision to remain in place    Plastic Surgery  [id number]# [facility] pager  [phone] [facility] pager  Available on Teams

## 2023-11-09 NOTE — CHART NOTE - NSCHARTNOTEFT_GEN_A_CORE
Pt was seen initially with plan for inpatient RT, however, was held as patient went to OR for fusion/laminectomy decompression on 11/2/23, Per ortho and primary team, patient will not have RT until 14 days after post-op. This can be done inpatient vs outpatient. He may need rehab after discharge but facilities will likely not accept him if he needs RT. Also he will remain hospitalized and needs AGGIE drain removed by plastics surgeon before discharge.   Will re-eval for feasibility of RT inpatient vs. outpatient near 14 days post-op. 72 yo with metastatic prostate cancer with 2 weeks of lower extremity weakness with low grade cord compression Bilsky grade 1 on MRI Spine.    Pt was seen initially with plan for inpatient RT, however, was held as patient went to OR for fusion/laminectomy decompression on 11/2/23, Per ortho and primary team, patient will not have RT until 14 days after post-op. This can be done inpatient vs outpatient. He may or may not need rehab after discharge. Also he will remain hospitalized and needs AGGIE drain removed by plastics surgeon before discharge.     Recommend outpatient SBRT for good local control, given he had spine surgery already, please request post-op thoracic MRI before discharge.    Case was discussed with Dr. Scales.

## 2023-11-09 NOTE — PROGRESS NOTE ADULT - PROBLEM SELECTOR PLAN 1
Patient initially presenting with LE weakness and well as RUE parasthesias.  - patient found to have numerous spinal cord lesions  - noted with spinous process fractures of C7 and T1  - also noted with neoplasm with epidural expansion causing cord compression from C7-T3  - now s/p C5-T3 posterior cervical fusion and C7-T1 decompression by orthopedic surgery on 11/2  - weakness/parasthesias now markedly improved  - c/w dexamethasone 4mg q6h  - s/p flap closure of back surgical site by plastic surgery with b/l AGGIE drains  - L AGGIE drain removed on 11/7, cont to monitor R AGGIE drain output  - appreciate further orthopedics/plastic surgery recs  - d/w rad/onc, no plan for inpatient RT, recommend outpatient follow up  - per rad/onc will obtain repeat thoracic spine MRI prior to discharge (ordered 11/9)

## 2023-11-09 NOTE — PROGRESS NOTE ADULT - SUBJECTIVE AND OBJECTIVE BOX
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 Pleasant and conversant. States that he feels much better today. Reports that his pain is well controlled. Has been able to ambulate. Voiding and stooling appropriately. Denies any fevers, chills, nausea or vomiting.    ADDITIONAL REVIEW OF SYSTEMS: as above.    MEDICATIONS  (STANDING):  acetaminophen     Tablet .. 975 milliGRAM(s) Oral every 6 hours  amLODIPine   Tablet 10 milliGRAM(s) Oral daily  atorvastatin 20 milliGRAM(s) Oral at bedtime  bicalutamide 50 milliGRAM(s) Oral daily  chlorhexidine 2% Cloths 1 Application(s) Topical daily  chlorhexidine 4% Liquid 1 Application(s) Topical daily  dexAMETHasone     Tablet   Oral   heparin   Injectable 5000 Unit(s) SubCutaneous every 8 hours  insulin glargine Injectable (LANTUS) 35 Unit(s) SubCutaneous every morning  insulin lispro (ADMELOG) corrective regimen sliding scale   SubCutaneous at bedtime  insulin lispro (ADMELOG) corrective regimen sliding scale   SubCutaneous three times a day before meals  insulin lispro Injectable (ADMELOG) 12 Unit(s) SubCutaneous three times a day before meals  lisinopril 20 milliGRAM(s) Oral daily  pantoprazole    Tablet 40 milliGRAM(s) Oral before breakfast  polyethylene glycol 3350 17 Gram(s) Oral two times a day  senna 2 Tablet(s) Oral at bedtime  tamsulosin 0.4 milliGRAM(s) Oral at bedtime    MEDICATIONS  (PRN):  HYDROmorphone  Injectable 0.5 milliGRAM(s) IV Push every 4 hours PRN Severe breakthrough Pain (7 - 10)  naloxone Injectable 0.1 milliGRAM(s) IV Push every 3 minutes PRN For ANY of the following changes in patient status:  A. RR LESS THAN 10 breaths per minute, B. Oxygen saturation LESS THAN 90%, C. Sedation score of 6  ondansetron Injectable 4 milliGRAM(s) IV Push every 6 hours PRN Nausea  oxyCODONE    IR 5 milliGRAM(s) Oral every 3 hours PRN Moderate Pain (4 - 6)  oxyCODONE    IR 10 milliGRAM(s) Oral every 3 hours PRN Severe Pain (7 - 10)    CAPILLARY BLOOD GLUCOSE    POCT Blood Glucose.: 263 mg/dL (09 Nov 2023 12:48)  POCT Blood Glucose.: 183 mg/dL (09 Nov 2023 08:30)  POCT Blood Glucose.: 190 mg/dL (08 Nov 2023 22:45)  POCT Blood Glucose.: 183 mg/dL (08 Nov 2023 17:49)    I&O's Summary    08 Nov 2023 07:01  -  09 Nov 2023 07:00  --------------------------------------------------------  IN: 1120 mL / OUT: 1440 mL / NET: -320 mL    PHYSICAL EXAM:  Vital Signs Last 24 Hrs  T(C): 36.8 (09 Nov 2023 12:56), Max: 36.9 (09 Nov 2023 05:30)  T(F): 98.2 (09 Nov 2023 12:56), Max: 98.4 (09 Nov 2023 05:30)  HR: 89 (09 Nov 2023 12:56) (78 - 97)  BP: 101/50 (09 Nov 2023 12:56) (101/50 - 118/65)  BP(mean): --  RR: 18 (09 Nov 2023 12:56) (17 - 18)  SpO2: 99% (09 Nov 2023 12:56) (95% - 100%)    Parameters below as of 09 Nov 2023 12:56  Patient On (Oxygen Delivery Method): room air    CONSTITUTIONAL: NAD, well-developed  HEAD: Normocephalic, atraumatic, C collar in place  EYES: EOMI, PERRL  ENT: no rhinorrhea, no pharyngeal erythema  RESPIRATORY: No increased work of breathing, CTAB, no wheezes or crackles appreciated  CARDIOVASCULAR: RRR, S1 and S2 present, no m/r/g  ABDOMEN: soft, NT, ND, bowel sounds present  EXTREMITIES: No LE edema  MUSCULOSKELETAL: no joint swelling, no tenderness to palpation  NEURO: A&Ox3, moving all extremities    LABS:                        11.0   11.45 )-----------( 106      ( 09 Nov 2023 05:25 )             33.0     11-09    134<L>  |  99  |  37<H>  ----------------------------<  194<H>  4.8   |  26  |  0.85    Ca    9.1      09 Nov 2023 05:25  Phos  3.3     11-09  Mg     2.10     11-09    TPro  5.5<L>  /  Alb  3.1<L>  /  TBili  0.6  /  DBili  x   /  AST  39  /  ALT  151<H>  /  AlkPhos  379<H>  11-09    Urinalysis Basic - ( 09 Nov 2023 05:25 )    Color: x / Appearance: x / SG: x / pH: x  Gluc: 194 mg/dL / Ketone: x  / Bili: x / Urobili: x   Blood: x / Protein: x / Nitrite: x   Leuk Esterase: x / RBC: x / WBC x   Sq Epi: x / Non Sq Epi: x / Bacteria: x    RADIOLOGY & ADDITIONAL TESTS:    Results Reviewed:   Imaging Personally Reviewed:  Electrocardiogram Personally Reviewed:    COORDINATION OF CARE:  Care Discussed with Consultants/Other Providers [Y/N]:  Prior or Outpatient Records Reviewed [Y/N]:

## 2023-11-09 NOTE — CHART NOTE - NSCHARTNOTEFT_GEN_A_CORE
73y Male s/p C5-T3 posterior cervical fusion, C7-T1 decompression for metastatic prostate cancer with plastics closure on 11/2,  is recovering post operatively.     OUTpatient SBRT, targeted palliative radiation, post-spinal surgery (not possible at [facility]) will be offered upon discharge  and surgical clearance (from wound healing) at [facility], [address] with Dr. Isaias Scales reachable at :   [phone].      We are hoping to have him scheduled for next week.

## 2023-11-09 NOTE — PROGRESS NOTE ADULT - SUBJECTIVE AND OBJECTIVE BOX
Orthopedic Surgery Progress Note     S: Patient seen and examined today. No acute events overnight. Pain is well controlled. Denies f/c, chest pain, shortness of breath, dizziness.    MEDICATIONS  (STANDING):  acetaminophen     Tablet .. 975 milliGRAM(s) Oral every 6 hours  amLODIPine   Tablet 10 milliGRAM(s) Oral daily  atorvastatin 20 milliGRAM(s) Oral at bedtime  bicalutamide 50 milliGRAM(s) Oral daily  chlorhexidine 2% Cloths 1 Application(s) Topical daily  chlorhexidine 4% Liquid 1 Application(s) Topical daily  dexAMETHasone     Tablet 4 milliGRAM(s) Oral every 6 hours  heparin   Injectable 5000 Unit(s) SubCutaneous every 8 hours  insulin glargine Injectable (LANTUS) 35 Unit(s) SubCutaneous every morning  insulin lispro (ADMELOG) corrective regimen sliding scale   SubCutaneous at bedtime  insulin lispro (ADMELOG) corrective regimen sliding scale   SubCutaneous three times a day before meals  insulin lispro Injectable (ADMELOG) 12 Unit(s) SubCutaneous three times a day before meals  lisinopril 20 milliGRAM(s) Oral daily  pantoprazole    Tablet 40 milliGRAM(s) Oral before breakfast  polyethylene glycol 3350 17 Gram(s) Oral two times a day  senna 2 Tablet(s) Oral at bedtime  tamsulosin 0.4 milliGRAM(s) Oral at bedtime    MEDICATIONS  (PRN):  HYDROmorphone  Injectable 0.5 milliGRAM(s) IV Push every 4 hours PRN Severe breakthrough Pain (7 - 10)  naloxone Injectable 0.1 milliGRAM(s) IV Push every 3 minutes PRN For ANY of the following changes in patient status:  A. RR LESS THAN 10 breaths per minute, B. Oxygen saturation LESS THAN 90%, C. Sedation score of 6  ondansetron Injectable 4 milliGRAM(s) IV Push every 6 hours PRN Nausea  oxyCODONE    IR 10 milliGRAM(s) Oral every 3 hours PRN Severe Pain (7 - 10)  oxyCODONE    IR 5 milliGRAM(s) Oral every 3 hours PRN Moderate Pain (4 - 6)      Physical Exam:  Gen: NAD  Back:   dressing C/D/I, mild appropriate linda-incisional ttp  AGGIE drains in place w/ serosanguinous output  Neuro:  Motor:                   C5                C6              C7               C8           T1   R            5/5                4+/5            4+/5             4+/5          4+/5  L             5/5               4+/5             4+/5             4+/5          4+/5                L2             L3             L4               L5            S1  R         5/5           5/5          5/5             5/5           5/5  L          5/5          5/5           5/5             5/5           5/5    Sensory:            C5         C6         C7      C8       T1        (0=absent, 1=impaired, 2=normal, NT=not testable)  R         2            2           1        1         1  L          2            2           1        1         1               L2          L3         L4      L5       S1         (0=absent, 1=impaired, 2=normal, NT=not testable)  R         1            1            1        1        1  L          1            1           1        1         1    Vital Signs Last 24 Hrs  T(C): 36.9 (09 Nov 2023 05:30), Max: 36.9 (09 Nov 2023 05:30)  T(F): 98.4 (09 Nov 2023 05:30), Max: 98.4 (09 Nov 2023 05:30)  HR: 78 (09 Nov 2023 05:30) (78 - 95)  BP: 118/65 (09 Nov 2023 05:30) (106/45 - 138/83)  BP(mean): --  RR: 17 (09 Nov 2023 05:30) (16 - 18)  SpO2: 97% (09 Nov 2023 05:30) (94% - 97%)    Parameters below as of 09 Nov 2023 05:30  Patient On (Oxygen Delivery Method): room air        11-07-23 @ 07:01  -  11-08-23 @ 07:00  --------------------------------------------------------  IN: 400 mL / OUT: 1240 mL / NET: -840 mL    11-08-23 @ 07:01  -  11-09-23 @ 06:32  --------------------------------------------------------  IN: 970 mL / OUT: 1140 mL / NET: -170 mL            LABS:                        11.6   10.81 )-----------( 165      ( 08 Nov 2023 06:47 )             34.3     11-08    136  |  99  |  30<H>  ----------------------------<  162<H>  4.6   |  26  |  0.79    Ca    8.9      08 Nov 2023 06:47  Phos  3.9     11-08  Mg     2.10     11-08

## 2023-11-09 NOTE — PROGRESS NOTE ADULT - PROBLEM SELECTOR PLAN 2
Patient previously diagnosed with prostate ca in 2009  - NM scan showing multiple osseous lesions throughout body  - heme/onc recs appreciated, will c/w bicalutamide 50mg qd  - c/w dexamethasone 4mg q6h, will plan to begin taper  - no plan for inpatient RT per rad/onc

## 2023-11-09 NOTE — PROGRESS NOTE ADULT - ASSESSMENT
74y/o M with DM, HTN and metastatic prostate cancer (diagnosed in 2009) that initially presented to OSH with LE weakness and R hand paresthesias now transferred to [facility] for NSG eval. Patient now s/p surgical intervention (C5-T3 posterior cervical fusion and C7-T1 decompression) by orthopedic surgery, now transferred to medicine for further management.

## 2023-11-09 NOTE — PROGRESS NOTE ADULT - SUBJECTIVE AND OBJECTIVE BOX
Plastic Surgery    SUBJECTIVE: Pt seen and examined on rounds with team. NAEON.      VITALS  T(C): 36.9 (11-09-23 @ 05:30), Max: 36.9 (11-09-23 @ 05:30)  HR: 78 (11-09-23 @ 05:30) (78 - 95)  BP: 118/65 (11-09-23 @ 05:30) (106/45 - 138/83)  RR: 17 (11-09-23 @ 05:30) (16 - 18)  SpO2: 97% (11-09-23 @ 05:30) (94% - 97%)  CAPILLARY BLOOD GLUCOSE      POCT Blood Glucose.: 183 mg/dL (09 Nov 2023 08:30)  POCT Blood Glucose.: 190 mg/dL (08 Nov 2023 22:45)  POCT Blood Glucose.: 183 mg/dL (08 Nov 2023 17:49)  POCT Blood Glucose.: 263 mg/dL (08 Nov 2023 12:41)  POCT Blood Glucose.: 161 mg/dL (08 Nov 2023 09:20)      Is/Os    11-08 @ 07:01  -  11-09 @ 07:00  --------------------------------------------------------  IN:    Oral Fluid: 1120 mL  Total IN: 1120 mL    OUT:    Bulb (mL): 30 mL    Voided (mL): 1410 mL  Total OUT: 1440 mL    Total NET: -320 mL          PHYSICAL EXAM:   General: NAD, Lying in bed comfortably  Neuro: Awake and alert  Back: Incision intact, nylons in place. No palpable collections. Interval removal of L drain. R drain w/ SS output  ___________________________________________________        LABS  CBC (11-09 @ 05:25)                              11.0<L>                         11.45<H>  )----------------(  106<L>     --    % Neutrophils, --    % Lymphocytes, ANC: --                                  33.0<L>  CBC (11-08 @ 06:47)                              11.6<L>                         10.81<H>  )----------------(  165        --    % Neutrophils, --    % Lymphocytes, ANC: --                                  34.3<L>    BMP (11-09 @ 05:25)             134<L>  |  99      |  37<H> 		Ca++ --      Ca 9.1                ---------------------------------( 194<H>		Mg 2.10               4.8     |  26      |  0.85  			Ph 3.3     BMP (11-08 @ 06:47)             136     |  99      |  30<H> 		Ca++ --      Ca 8.9                ---------------------------------( 162<H>		Mg 2.10               4.6     |  26      |  0.79  			Ph 3.9       LFTs (11-09 @ 05:25)      TPro 5.5<L> / Alb 3.1<L> / TBili 0.6 / DBili -- / AST 39 / <H> / AlkPhos 379<H>

## 2023-11-10 ENCOUNTER — TRANSCRIPTION ENCOUNTER (OUTPATIENT)
Age: 73
End: 2023-11-10

## 2023-11-10 LAB
ALBUMIN SERPL ELPH-MCNC: 2.9 G/DL — LOW (ref 3.3–5)
ALBUMIN SERPL ELPH-MCNC: 2.9 G/DL — LOW (ref 3.3–5)
ALP SERPL-CCNC: 359 U/L — HIGH (ref 40–120)
ALP SERPL-CCNC: 359 U/L — HIGH (ref 40–120)
ALT FLD-CCNC: 157 U/L — HIGH (ref 4–41)
ALT FLD-CCNC: 157 U/L — HIGH (ref 4–41)
ANION GAP SERPL CALC-SCNC: 12 MMOL/L — SIGNIFICANT CHANGE UP (ref 7–14)
ANION GAP SERPL CALC-SCNC: 12 MMOL/L — SIGNIFICANT CHANGE UP (ref 7–14)
AST SERPL-CCNC: 67 U/L — HIGH (ref 4–40)
AST SERPL-CCNC: 67 U/L — HIGH (ref 4–40)
BILIRUB SERPL-MCNC: 0.7 MG/DL — SIGNIFICANT CHANGE UP (ref 0.2–1.2)
BILIRUB SERPL-MCNC: 0.7 MG/DL — SIGNIFICANT CHANGE UP (ref 0.2–1.2)
BUN SERPL-MCNC: 36 MG/DL — HIGH (ref 7–23)
BUN SERPL-MCNC: 36 MG/DL — HIGH (ref 7–23)
CALCIUM SERPL-MCNC: 9 MG/DL — SIGNIFICANT CHANGE UP (ref 8.4–10.5)
CALCIUM SERPL-MCNC: 9 MG/DL — SIGNIFICANT CHANGE UP (ref 8.4–10.5)
CHLORIDE SERPL-SCNC: 99 MMOL/L — SIGNIFICANT CHANGE UP (ref 98–107)
CHLORIDE SERPL-SCNC: 99 MMOL/L — SIGNIFICANT CHANGE UP (ref 98–107)
CO2 SERPL-SCNC: 25 MMOL/L — SIGNIFICANT CHANGE UP (ref 22–31)
CO2 SERPL-SCNC: 25 MMOL/L — SIGNIFICANT CHANGE UP (ref 22–31)
CREAT SERPL-MCNC: 0.83 MG/DL — SIGNIFICANT CHANGE UP (ref 0.5–1.3)
CREAT SERPL-MCNC: 0.83 MG/DL — SIGNIFICANT CHANGE UP (ref 0.5–1.3)
EGFR: 94 ML/MIN/1.73M2 — SIGNIFICANT CHANGE UP
EGFR: 94 ML/MIN/1.73M2 — SIGNIFICANT CHANGE UP
GLUCOSE BLDC GLUCOMTR-MCNC: 104 MG/DL — HIGH (ref 70–99)
GLUCOSE BLDC GLUCOMTR-MCNC: 104 MG/DL — HIGH (ref 70–99)
GLUCOSE BLDC GLUCOMTR-MCNC: 293 MG/DL — HIGH (ref 70–99)
GLUCOSE BLDC GLUCOMTR-MCNC: 293 MG/DL — HIGH (ref 70–99)
GLUCOSE BLDC GLUCOMTR-MCNC: 333 MG/DL — HIGH (ref 70–99)
GLUCOSE BLDC GLUCOMTR-MCNC: 333 MG/DL — HIGH (ref 70–99)
GLUCOSE BLDC GLUCOMTR-MCNC: 93 MG/DL — SIGNIFICANT CHANGE UP (ref 70–99)
GLUCOSE BLDC GLUCOMTR-MCNC: 93 MG/DL — SIGNIFICANT CHANGE UP (ref 70–99)
GLUCOSE SERPL-MCNC: 127 MG/DL — HIGH (ref 70–99)
GLUCOSE SERPL-MCNC: 127 MG/DL — HIGH (ref 70–99)
HCT VFR BLD CALC: 33.5 % — LOW (ref 39–50)
HCT VFR BLD CALC: 33.5 % — LOW (ref 39–50)
HGB BLD-MCNC: 11 G/DL — LOW (ref 13–17)
HGB BLD-MCNC: 11 G/DL — LOW (ref 13–17)
MAGNESIUM SERPL-MCNC: 2.1 MG/DL — SIGNIFICANT CHANGE UP (ref 1.6–2.6)
MAGNESIUM SERPL-MCNC: 2.1 MG/DL — SIGNIFICANT CHANGE UP (ref 1.6–2.6)
MCHC RBC-ENTMCNC: 31 PG — SIGNIFICANT CHANGE UP (ref 27–34)
MCHC RBC-ENTMCNC: 31 PG — SIGNIFICANT CHANGE UP (ref 27–34)
MCHC RBC-ENTMCNC: 32.8 GM/DL — SIGNIFICANT CHANGE UP (ref 32–36)
MCHC RBC-ENTMCNC: 32.8 GM/DL — SIGNIFICANT CHANGE UP (ref 32–36)
MCV RBC AUTO: 94.4 FL — SIGNIFICANT CHANGE UP (ref 80–100)
MCV RBC AUTO: 94.4 FL — SIGNIFICANT CHANGE UP (ref 80–100)
NRBC # BLD: 0 /100 WBCS — SIGNIFICANT CHANGE UP (ref 0–0)
NRBC # BLD: 0 /100 WBCS — SIGNIFICANT CHANGE UP (ref 0–0)
NRBC # FLD: 0 K/UL — SIGNIFICANT CHANGE UP (ref 0–0)
NRBC # FLD: 0 K/UL — SIGNIFICANT CHANGE UP (ref 0–0)
PHOSPHATE SERPL-MCNC: 2.9 MG/DL — SIGNIFICANT CHANGE UP (ref 2.5–4.5)
PHOSPHATE SERPL-MCNC: 2.9 MG/DL — SIGNIFICANT CHANGE UP (ref 2.5–4.5)
PLATELET # BLD AUTO: 162 K/UL — SIGNIFICANT CHANGE UP (ref 150–400)
PLATELET # BLD AUTO: 162 K/UL — SIGNIFICANT CHANGE UP (ref 150–400)
POTASSIUM SERPL-MCNC: 5 MMOL/L — SIGNIFICANT CHANGE UP (ref 3.5–5.3)
POTASSIUM SERPL-MCNC: 5 MMOL/L — SIGNIFICANT CHANGE UP (ref 3.5–5.3)
POTASSIUM SERPL-SCNC: 5 MMOL/L — SIGNIFICANT CHANGE UP (ref 3.5–5.3)
POTASSIUM SERPL-SCNC: 5 MMOL/L — SIGNIFICANT CHANGE UP (ref 3.5–5.3)
PROT SERPL-MCNC: 5.4 G/DL — LOW (ref 6–8.3)
PROT SERPL-MCNC: 5.4 G/DL — LOW (ref 6–8.3)
RBC # BLD: 3.55 M/UL — LOW (ref 4.2–5.8)
RBC # BLD: 3.55 M/UL — LOW (ref 4.2–5.8)
RBC # FLD: 12.2 % — SIGNIFICANT CHANGE UP (ref 10.3–14.5)
RBC # FLD: 12.2 % — SIGNIFICANT CHANGE UP (ref 10.3–14.5)
SODIUM SERPL-SCNC: 136 MMOL/L — SIGNIFICANT CHANGE UP (ref 135–145)
SODIUM SERPL-SCNC: 136 MMOL/L — SIGNIFICANT CHANGE UP (ref 135–145)
WBC # BLD: 12.74 K/UL — HIGH (ref 3.8–10.5)
WBC # BLD: 12.74 K/UL — HIGH (ref 3.8–10.5)
WBC # FLD AUTO: 12.74 K/UL — HIGH (ref 3.8–10.5)
WBC # FLD AUTO: 12.74 K/UL — HIGH (ref 3.8–10.5)

## 2023-11-10 PROCEDURE — 99222 1ST HOSP IP/OBS MODERATE 55: CPT

## 2023-11-10 PROCEDURE — 99232 SBSQ HOSP IP/OBS MODERATE 35: CPT

## 2023-11-10 RX ADMIN — Medication 975 MILLIGRAM(S): at 05:19

## 2023-11-10 RX ADMIN — BICALUTAMIDE 50 MILLIGRAM(S): 50 TABLET, FILM COATED ORAL at 13:26

## 2023-11-10 RX ADMIN — INSULIN GLARGINE 37 UNIT(S): 100 INJECTION, SOLUTION SUBCUTANEOUS at 09:06

## 2023-11-10 RX ADMIN — Medication 14 UNIT(S): at 13:24

## 2023-11-10 RX ADMIN — CHLORHEXIDINE GLUCONATE 1 APPLICATION(S): 213 SOLUTION TOPICAL at 13:25

## 2023-11-10 RX ADMIN — Medication 4 MILLIGRAM(S): at 05:18

## 2023-11-10 RX ADMIN — Medication 6: at 13:24

## 2023-11-10 RX ADMIN — HEPARIN SODIUM 5000 UNIT(S): 5000 INJECTION INTRAVENOUS; SUBCUTANEOUS at 05:19

## 2023-11-10 RX ADMIN — TAMSULOSIN HYDROCHLORIDE 0.4 MILLIGRAM(S): 0.4 CAPSULE ORAL at 21:26

## 2023-11-10 RX ADMIN — Medication 975 MILLIGRAM(S): at 14:26

## 2023-11-10 RX ADMIN — MUPIROCIN 1 APPLICATION(S): 20 OINTMENT TOPICAL at 05:18

## 2023-11-10 RX ADMIN — Medication 14 UNIT(S): at 17:40

## 2023-11-10 RX ADMIN — Medication 975 MILLIGRAM(S): at 17:41

## 2023-11-10 RX ADMIN — HEPARIN SODIUM 5000 UNIT(S): 5000 INJECTION INTRAVENOUS; SUBCUTANEOUS at 21:26

## 2023-11-10 RX ADMIN — MUPIROCIN 1 APPLICATION(S): 20 OINTMENT TOPICAL at 17:41

## 2023-11-10 RX ADMIN — PANTOPRAZOLE SODIUM 40 MILLIGRAM(S): 20 TABLET, DELAYED RELEASE ORAL at 05:19

## 2023-11-10 RX ADMIN — Medication 975 MILLIGRAM(S): at 13:26

## 2023-11-10 RX ADMIN — LISINOPRIL 20 MILLIGRAM(S): 2.5 TABLET ORAL at 05:19

## 2023-11-10 RX ADMIN — Medication 14 UNIT(S): at 09:07

## 2023-11-10 RX ADMIN — Medication 8: at 09:06

## 2023-11-10 RX ADMIN — Medication 4 MILLIGRAM(S): at 17:42

## 2023-11-10 RX ADMIN — OXYCODONE HYDROCHLORIDE 10 MILLIGRAM(S): 5 TABLET ORAL at 09:17

## 2023-11-10 RX ADMIN — AMLODIPINE BESYLATE 10 MILLIGRAM(S): 2.5 TABLET ORAL at 05:19

## 2023-11-10 RX ADMIN — ATORVASTATIN CALCIUM 20 MILLIGRAM(S): 80 TABLET, FILM COATED ORAL at 21:26

## 2023-11-10 RX ADMIN — HEPARIN SODIUM 5000 UNIT(S): 5000 INJECTION INTRAVENOUS; SUBCUTANEOUS at 13:26

## 2023-11-10 RX ADMIN — OXYCODONE HYDROCHLORIDE 10 MILLIGRAM(S): 5 TABLET ORAL at 10:17

## 2023-11-10 RX ADMIN — Medication 975 MILLIGRAM(S): at 18:41

## 2023-11-10 NOTE — PROGRESS NOTE ADULT - SUBJECTIVE AND OBJECTIVE BOX
Plastic Surgery Progress Note (pg [facility]: [id number], [facility]: [phone])    SUBJECTIVE  The patient was seen and examined. No acute events overnight.    OBJECTIVE  ___________________________________________________  VITAL SIGNS / I&O's   Vital Signs Last 24 Hrs  T(C): 36.8 (10 Nov 2023 05:15), Max: 36.9 (09 Nov 2023 22:20)  T(F): 98.2 (10 Nov 2023 05:15), Max: 98.4 (09 Nov 2023 22:20)  HR: 79 (10 Nov 2023 05:15) (79 - 97)  BP: 121/69 (10 Nov 2023 05:15) (101/50 - 121/69)  BP(mean): --  RR: 17 (10 Nov 2023 05:15) (17 - 18)  SpO2: 98% (10 Nov 2023 05:15) (96% - 100%)    Parameters below as of 10 Nov 2023 05:15  Patient On (Oxygen Delivery Method): room air          09 Nov 2023 07:01  -  10 Nov 2023 07:00  --------------------------------------------------------  IN:    Oral Fluid: 150 mL  Total IN: 150 mL    OUT:    Voided (mL): 300 mL  Total OUT: 300 mL    Total NET: -150 mL        ___________________________________________________  PHYSICAL EXAM    General: NAD, Lying in bed comfortably  Neuro: Awake and alert  Back: Incision intact, nylons in place. No palpable collections. Interval removal of L drain. R drain w/ SS output    ___________________________________________________  LABS                        11.0   12.74 )-----------( 162      ( 10 Nov 2023 04:43 )             33.5     10 Nov 2023 04:43    136    |  99     |  36     ----------------------------<  127    5.0     |  25     |  0.83     Ca    9.0        10 Nov 2023 04:43  Phos  2.9       10 Nov 2023 04:43  Mg     2.10      10 Nov 2023 04:43    TPro  5.4    /  Alb  2.9    /  TBili  0.7    /  DBili  x      /  AST  67     /  ALT  157    /  AlkPhos  359    10 Nov 2023 04:43      CAPILLARY BLOOD GLUCOSE      POCT Blood Glucose.: 333 mg/dL (10 Nov 2023 08:40)  POCT Blood Glucose.: 109 mg/dL (09 Nov 2023 22:44)  POCT Blood Glucose.: 270 mg/dL (09 Nov 2023 17:27)  POCT Blood Glucose.: 263 mg/dL (09 Nov 2023 12:48)        Urinalysis Basic - ( 10 Nov 2023 04:43 )    Color: x / Appearance: x / SG: x / pH: x  Gluc: 127 mg/dL / Ketone: x  / Bili: x / Urobili: x   Blood: x / Protein: x / Nitrite: x   Leuk Esterase: x / RBC: x / WBC x   Sq Epi: x / Non Sq Epi: x / Bacteria: x      ___________________________________________________  MICRO  Recent Cultures:    ___________________________________________________  MEDICATIONS  (STANDING):  acetaminophen     Tablet .. 975 milliGRAM(s) Oral every 6 hours  amLODIPine   Tablet 10 milliGRAM(s) Oral daily  atorvastatin 20 milliGRAM(s) Oral at bedtime  bicalutamide 50 milliGRAM(s) Oral daily  chlorhexidine 2% Cloths 1 Application(s) Topical daily  dexAMETHasone     Tablet 4 milliGRAM(s) Oral every 12 hours  dexAMETHasone     Tablet   Oral   heparin   Injectable 5000 Unit(s) SubCutaneous every 8 hours  insulin glargine Injectable (LANTUS) 37 Unit(s) SubCutaneous every morning  insulin lispro (ADMELOG) corrective regimen sliding scale   SubCutaneous at bedtime  insulin lispro (ADMELOG) corrective regimen sliding scale   SubCutaneous three times a day before meals  insulin lispro Injectable (ADMELOG) 14 Unit(s) SubCutaneous three times a day before meals  lisinopril 20 milliGRAM(s) Oral daily  mupirocin 2% Ointment 1 Application(s) Topical two times a day  pantoprazole    Tablet 40 milliGRAM(s) Oral before breakfast  polyethylene glycol 3350 17 Gram(s) Oral two times a day  senna 2 Tablet(s) Oral at bedtime  tamsulosin 0.4 milliGRAM(s) Oral at bedtime    MEDICATIONS  (PRN):  HYDROmorphone  Injectable 0.5 milliGRAM(s) IV Push every 4 hours PRN Severe breakthrough Pain (7 - 10)  naloxone Injectable 0.1 milliGRAM(s) IV Push every 3 minutes PRN For ANY of the following changes in patient status:  A. RR LESS THAN 10 breaths per minute, B. Oxygen saturation LESS THAN 90%, C. Sedation score of 6  ondansetron Injectable 4 milliGRAM(s) IV Push every 6 hours PRN Nausea  oxyCODONE    IR 5 milliGRAM(s) Oral every 3 hours PRN Moderate Pain (4 - 6)  oxyCODONE    IR 10 milliGRAM(s) Oral every 3 hours PRN Severe Pain (7 - 10)   Plastic Surgery Progress Note (pg [facility]: [id number], [facility]: [phone])    SUBJECTIVE  The patient was seen and examined. No acute events overnight. Pain controlled, afebrile.     OBJECTIVE  ___________________________________________________  VITAL SIGNS / I&O's   Vital Signs Last 24 Hrs  T(C): 36.8 (10 Nov 2023 05:15), Max: 36.9 (09 Nov 2023 22:20)  T(F): 98.2 (10 Nov 2023 05:15), Max: 98.4 (09 Nov 2023 22:20)  HR: 79 (10 Nov 2023 05:15) (79 - 97)  BP: 121/69 (10 Nov 2023 05:15) (101/50 - 121/69)  BP(mean): --  RR: 17 (10 Nov 2023 05:15) (17 - 18)  SpO2: 98% (10 Nov 2023 05:15) (96% - 100%)    Parameters below as of 10 Nov 2023 05:15  Patient On (Oxygen Delivery Method): room air          09 Nov 2023 07:01  -  10 Nov 2023 07:00  --------------------------------------------------------  IN:    Oral Fluid: 150 mL  Total IN: 150 mL    OUT:    Voided (mL): 300 mL  Total OUT: 300 mL    Total NET: -150 mL        ___________________________________________________  PHYSICAL EXAM    General: NAD, Lying in bed comfortably  Neuro: Awake and alert  Back: Incision intact, nylons in place. No palpable collections. Gauze/tegaderm dressing w/o strikethrough.     ___________________________________________________  LABS                        11.0   12.74 )-----------( 162      ( 10 Nov 2023 04:43 )             33.5     10 Nov 2023 04:43    136    |  99     |  36     ----------------------------<  127    5.0     |  25     |  0.83     Ca    9.0        10 Nov 2023 04:43  Phos  2.9       10 Nov 2023 04:43  Mg     2.10      10 Nov 2023 04:43    TPro  5.4    /  Alb  2.9    /  TBili  0.7    /  DBili  x      /  AST  67     /  ALT  157    /  AlkPhos  359    10 Nov 2023 04:43      CAPILLARY BLOOD GLUCOSE      POCT Blood Glucose.: 333 mg/dL (10 Nov 2023 08:40)  POCT Blood Glucose.: 109 mg/dL (09 Nov 2023 22:44)  POCT Blood Glucose.: 270 mg/dL (09 Nov 2023 17:27)  POCT Blood Glucose.: 263 mg/dL (09 Nov 2023 12:48)        Urinalysis Basic - ( 10 Nov 2023 04:43 )    Color: x / Appearance: x / SG: x / pH: x  Gluc: 127 mg/dL / Ketone: x  / Bili: x / Urobili: x   Blood: x / Protein: x / Nitrite: x   Leuk Esterase: x / RBC: x / WBC x   Sq Epi: x / Non Sq Epi: x / Bacteria: x      ___________________________________________________  MICRO  Recent Cultures:    ___________________________________________________  MEDICATIONS  (STANDING):  acetaminophen     Tablet .. 975 milliGRAM(s) Oral every 6 hours  amLODIPine   Tablet 10 milliGRAM(s) Oral daily  atorvastatin 20 milliGRAM(s) Oral at bedtime  bicalutamide 50 milliGRAM(s) Oral daily  chlorhexidine 2% Cloths 1 Application(s) Topical daily  dexAMETHasone     Tablet 4 milliGRAM(s) Oral every 12 hours  dexAMETHasone     Tablet   Oral   heparin   Injectable 5000 Unit(s) SubCutaneous every 8 hours  insulin glargine Injectable (LANTUS) 37 Unit(s) SubCutaneous every morning  insulin lispro (ADMELOG) corrective regimen sliding scale   SubCutaneous at bedtime  insulin lispro (ADMELOG) corrective regimen sliding scale   SubCutaneous three times a day before meals  insulin lispro Injectable (ADMELOG) 14 Unit(s) SubCutaneous three times a day before meals  lisinopril 20 milliGRAM(s) Oral daily  mupirocin 2% Ointment 1 Application(s) Topical two times a day  pantoprazole    Tablet 40 milliGRAM(s) Oral before breakfast  polyethylene glycol 3350 17 Gram(s) Oral two times a day  senna 2 Tablet(s) Oral at bedtime  tamsulosin 0.4 milliGRAM(s) Oral at bedtime    MEDICATIONS  (PRN):  HYDROmorphone  Injectable 0.5 milliGRAM(s) IV Push every 4 hours PRN Severe breakthrough Pain (7 - 10)  naloxone Injectable 0.1 milliGRAM(s) IV Push every 3 minutes PRN For ANY of the following changes in patient status:  A. RR LESS THAN 10 breaths per minute, B. Oxygen saturation LESS THAN 90%, C. Sedation score of 6  ondansetron Injectable 4 milliGRAM(s) IV Push every 6 hours PRN Nausea  oxyCODONE    IR 5 milliGRAM(s) Oral every 3 hours PRN Moderate Pain (4 - 6)  oxyCODONE    IR 10 milliGRAM(s) Oral every 3 hours PRN Severe Pain (7 - 10)

## 2023-11-10 NOTE — DISCHARGE NOTE PROVIDER - HOSPITAL COURSE
Patient is a 74 y/o Males with HTN, DM, met prostate cancer (2009) that initially presented with LE weakness and R hand paresthesias transferred from [facility] for NSGY evaluation.    Patient was initially evaluated at [facility]; for b/l found to be in rhabdo and SRUTHI, On imaging, he was found to have C7 spinous process nondisplaced fracture of T1 spinous process, edema from C3-4 through C7-T1 interspinous ligaments as well as lytic metastatic disease from C7, Tq-T5 and T12, as well as L1-L3 as well as epidural expansion of neoplasm at C7 centrally and dorsally resulting in moderate cord compression and underlying edema/extension into the bilateral C7-T1 and T1-2 and Left T2-3 Neural foramina. Epidural disease was also noted at T12 vertebral body. NM Scan significant for multiple sites with osseous lesions; Scapula, Multiple left ribs, bilateral pelvic region, as well as b/l LE DVTs on US and was started on Heparin GTT.     Now s/p IVC filter placement by IR On 11/1 (as patient cannot be on full AC for a full 6 weeks after spinal surgery) and C5-T3 posterior cervical fusion, C7-T1 decompression for metastatic prostate cancer on 11/2 with transfer to SICU for neuro checks. Patient also underwent closure of back surgical wound by plastics surgery and all wound drains removed by 11/10. Subsequently transferred to the medicine floor for further management. Patient reevaluated by radiation oncology and recommended for repeat thoracic spine MRI prior to discharge with outpatient rad/onc follow up. Patient is a 72 y/o Males with HTN, DM, met prostate cancer (2009) that initially presented with LE weakness and R hand paresthesias transferred from [facility] for NSGY evaluation.    Patient was initially evaluated at [facility]; for b/l found to be in rhabdo and SRUTHI, On imaging, he was found to have C7 spinous process nondisplaced fracture of T1 spinous process, edema from C3-4 through C7-T1 interspinous ligaments as well as lytic metastatic disease from C7, Tq-T5 and T12, as well as L1-L3 as well as epidural expansion of neoplasm at C7 centrally and dorsally resulting in moderate cord compression and underlying edema/extension into the bilateral C7-T1 and T1-2 and Left T2-3 Neural foramina. Epidural disease was also noted at T12 vertebral body. NM Scan significant for multiple sites with osseous lesions; Scapula, Multiple left ribs, bilateral pelvic region, as well as b/l LE DVTs on US and was started on Heparin GTT.     Now s/p IVC filter placement by IR On 11/1 (as patient cannot be on full AC for a full 6 weeks after spinal surgery) and C5-T3 posterior cervical fusion, C7-T1 decompression for metastatic prostate cancer on 11/2 with transfer to SICU for neuro checks. Patient also underwent closure of back surgical wound by plastics surgery and all wound drains removed by 11/10. Subsequently transferred to the medicine floor for further management. Patient reevaluated by radiation oncology and recommended for repeat thoracic spine MRI prior to discharge with outpatient rad/onc follow up. Patient is a 74 y/o Males with HTN, DM, met prostate cancer (2009) that initially presented with LE weakness and R hand paresthesias transferred from [facility] for NSGY evaluation.    Patient was initially evaluated at [facility]; for b/l found to be in rhabdo and SRUTHI, On imaging, he was found to have C7 spinous process nondisplaced fracture of T1 spinous process, edema from C3-4 through C7-T1 interspinous ligaments as well as lytic metastatic disease from C7, Tq-T5 and T12, as well as L1-L3 as well as epidural expansion of neoplasm at C7 centrally and dorsally resulting in moderate cord compression and underlying edema/extension into the bilateral C7-T1 and T1-2 and Left T2-3 Neural foramina. Epidural disease was also noted at T12 vertebral body. NM Scan significant for multiple sites with osseous lesions; Scapula, Multiple left ribs, bilateral pelvic region, as well as b/l LE DVTs on US and was started on Heparin GTT.     Now s/p IVC filter placement by IR On 11/1 (as patient cannot be on full AC for a full 6 weeks after spinal surgery) and C5-T3 posterior cervical fusion, C7-T1 decompression for metastatic prostate cancer on 11/2 with transfer to SICU for neuro checks. Patient also underwent closure of back surgical wound by plastics surgery and all wound drains removed by 11/10. Subsequently transferred to the medicine floor for further management. Patient reevaluated by radiation oncology and recommended for repeat thoracic spine MRI prior to discharge with outpatient rad/onc follow up. Patient is a 72 y/o Males with HTN, DM, met prostate cancer (2009) that initially presented with LE weakness and R hand paresthesias transferred from [facility] for NSGY evaluation.    Patient was initially evaluated at [facility]; for b/l found to be in rhabdo and SRUTHI, On imaging, he was found to have C7 spinous process nondisplaced fracture of T1 spinous process, edema from C3-4 through C7-T1 interspinous ligaments as well as lytic metastatic disease from C7, Tq-T5 and T12, as well as L1-L3 as well as epidural expansion of neoplasm at C7 centrally and dorsally resulting in moderate cord compression and underlying edema/extension into the bilateral C7-T1 and T1-2 and Left T2-3 Neural foramina. Epidural disease was also noted at T12 vertebral body. NM Scan significant for multiple sites with osseous lesions; Scapula, Multiple left ribs, bilateral pelvic region, as well as b/l LE DVTs on US and was started on Heparin GTT.     Now s/p IVC filter placement by IR On 11/1 (as patient cannot be on full AC for a full 6 weeks after spinal surgery) and C5-T3 posterior cervical fusion, C7-T1 decompression for metastatic prostate cancer on 11/2 with transfer to SICU for neuro checks. Patient also underwent closure of back surgical wound by plastics surgery and all wound drains removed by 11/10. Subsequently transferred to the medicine floor for further management. Patient reevaluated by radiation oncology and recommended for repeat thoracic spine MRI prior to discharge with outpatient rad/onc follow up.  MR Thoracic Spine w/wo IV Cont (11.14.23 @ 11:55) >  IMPRESSION: Postop changes as described above.  Osseous metastasis again seen.  Possible epidural enhancement is seen involving the T4-5 level. Better   quality contrast enhanced MRI of the thoracic spine is recommended for   further evaluation.  Previously noted T2 prolongation involving the spinal cord at the T1   level is not well-demonstrated due to artifact from postop material.  Rt- Onc still recommends OUT Patient  RADIATION treatment Patient is a 72 y/o Males with HTN, DM, met prostate cancer (2009) that initially presented with LE weakness and R hand paresthesias transferred from [facility] for NSGY evaluation.    Patient was initially evaluated at [facility]; for b/l found to be in rhabdo and SRUTHI, On imaging, he was found to have C7 spinous process nondisplaced fracture of T1 spinous process, edema from C3-4 through C7-T1 interspinous ligaments as well as lytic metastatic disease from C7, Tq-T5 and T12, as well as L1-L3 as well as epidural expansion of neoplasm at C7 centrally and dorsally resulting in moderate cord compression and underlying edema/extension into the bilateral C7-T1 and T1-2 and Left T2-3 Neural foramina. Epidural disease was also noted at T12 vertebral body. NM Scan significant for multiple sites with osseous lesions; Scapula, Multiple left ribs, bilateral pelvic region, as well as b/l LE DVTs on US and was started on Heparin GTT.     Now s/p IVC filter placement by IR On 11/1 (as patient cannot be on full AC for a full 6 weeks after spinal surgery) and C5-T3 posterior cervical fusion, C7-T1 decompression for metastatic prostate cancer on 11/2 with transfer to SICU for neuro checks. Patient also underwent closure of back surgical wound by plastics surgery and all wound drains removed by 11/10. Subsequently transferred to the medicine floor for further management. Patient reevaluated by radiation oncology and recommended for repeat thoracic spine MRI prior to discharge with outpatient rad/onc follow up.  MR Thoracic Spine w/wo IV Cont (11.14.23 @ 11:55) >  IMPRESSION: Postop changes as described above.  Osseous metastasis again seen.  Possible epidural enhancement is seen involving the T4-5 level. Better   quality contrast enhanced MRI of the thoracic spine is recommended for   further evaluation.  Previously noted T2 prolongation involving the spinal cord at the T1   level is not well-demonstrated due to artifact from postop material.  Rt- Onc still recommends OUT Patient  RADIATION treatment Patient is a 72 y/o Males with HTN, DM, met prostate cancer (2009) that initially presented with LE weakness and R hand paresthesias transferred from [facility] for NSGY evaluation.    Patient was initially evaluated at [facility]; for b/l found to be in rhabdo and SRUTHI, On imaging, he was found to have C7 spinous process nondisplaced fracture of T1 spinous process, edema from C3-4 through C7-T1 interspinous ligaments as well as lytic metastatic disease from C7, Tq-T5 and T12, as well as L1-L3 as well as epidural expansion of neoplasm at C7 centrally and dorsally resulting in moderate cord compression and underlying edema/extension into the bilateral C7-T1 and T1-2 and Left T2-3 Neural foramina. Epidural disease was also noted at T12 vertebral body. NM Scan significant for multiple sites with osseous lesions; Scapula, Multiple left ribs, bilateral pelvic region, as well as b/l LE DVTs on US and was started on Heparin GTT.     Now s/p IVC filter placement by IR On 11/1 (as patient cannot be on full AC for a full 6 weeks after spinal surgery) and C5-T3 posterior cervical fusion, C7-T1 decompression for metastatic prostate cancer on 11/2 with transfer to SICU for neuro checks. Patient also underwent closure of back surgical wound by plastics surgery and all wound drains removed by 11/10. Subsequently transferred to the medicine floor for further management. Patient reevaluated by radiation oncology and recommended for repeat thoracic spine MRI prior to discharge with outpatient rad/onc follow up.  MR Thoracic Spine w/wo IV Cont (11.14.23 @ 11:55) >  IMPRESSION: Postop changes as described above.  Osseous metastasis again seen.  Possible epidural enhancement is seen involving the T4-5 level. Better   quality contrast enhanced MRI of the thoracic spine is recommended for   further evaluation.  Previously noted T2 prolongation involving the spinal cord at the T1   level is not well-demonstrated due to artifact from postop material.  Rt- Onc still recommends OUT Patient  RADIATION treatment Patient is a 74 y/o Males with HTN, DM, met prostate cancer (2009) that initially presented with LE weakness and R hand paresthesias transferred from [facility] for NSGY evaluation.    Patient was initially evaluated at [facility]; for b/l found to be in rhabdo and SRUTHI, On imaging, he was found to have C7 spinous process nondisplaced fracture of T1 spinous process, edema from C3-4 through C7-T1 interspinous ligaments as well as lytic metastatic disease from C7, Tq-T5 and T12, as well as L1-L3 as well as epidural expansion of neoplasm at C7 centrally and dorsally resulting in moderate cord compression and underlying edema/extension into the bilateral C7-T1 and T1-2 and Left T2-3 Neural foramina. Epidural disease was also noted at T12 vertebral body. NM Scan significant for multiple sites with osseous lesions; Scapula, Multiple left ribs, bilateral pelvic region, as well as b/l LE DVTs on US and was started on Heparin GTT.     Now s/p IVC filter placement by IR On 11/1 (as patient cannot be on full AC for a full 6 weeks after spinal surgery) and C5-T3 posterior cervical fusion, C7-T1 decompression for metastatic prostate cancer on 11/2 with transfer to SICU for neuro checks. Patient also underwent closure of back surgical wound by plastics surgery and all wound drains removed by 11/10. Subsequently transferred to the medicine floor for further management. Patient reevaluated by radiation oncology and recommended for repeat thoracic spine MRI prior to discharge with outpatient rad/onc follow up.  MR Thoracic Spine w/wo IV Cont (11.14.23 @ 11:55) >  IMPRESSION: Postop changes as described above.  Osseous metastasis again seen.  Possible epidural enhancement is seen involving the T4-5 level. Better   quality contrast enhanced MRI of the thoracic spine is recommended for   further evaluation.  Previously noted T2 prolongation involving the spinal cord at the T1   level is not well-demonstrated due to artifact from postop material.  Rt- Onc still recommends OUT Patient  RADIATION treatment     Hospital course complicated by fever  Tmax 102.6 overnight 11/15 Hr 109  Sepsis sec to UTI? vs Adenovirus   Urine pos Leuk and nitrate  Ceftriaxone started----> 11/16 changed to Ancef Q8 with Positive blood cultures 11/15 with MSSA   Repeat blood cultures and Id requested Patient is a 74 y/o Males with HTN, DM, met prostate cancer (2009) that initially presented with LE weakness and R hand paresthesias transferred from [facility] for NSGY evaluation.    Patient was initially evaluated at [facility]; for b/l found to be in rhabdo and SRUTHI, On imaging, he was found to have C7 spinous process nondisplaced fracture of T1 spinous process, edema from C3-4 through C7-T1 interspinous ligaments as well as lytic metastatic disease from C7, Tq-T5 and T12, as well as L1-L3 as well as epidural expansion of neoplasm at C7 centrally and dorsally resulting in moderate cord compression and underlying edema/extension into the bilateral C7-T1 and T1-2 and Left T2-3 Neural foramina. Epidural disease was also noted at T12 vertebral body. NM Scan significant for multiple sites with osseous lesions; Scapula, Multiple left ribs, bilateral pelvic region, as well as b/l LE DVTs on US and was started on Heparin GTT.     Now s/p IVC filter placement by IR On 11/1 (as patient cannot be on full AC for a full 6 weeks after spinal surgery) and C5-T3 posterior cervical fusion, C7-T1 decompression for metastatic prostate cancer on 11/2 with transfer to SICU for neuro checks. Patient also underwent closure of back surgical wound by plastics surgery and all wound drains removed by 11/10. Subsequently transferred to the medicine floor for further management. Patient reevaluated by radiation oncology and recommended for repeat thoracic spine MRI prior to discharge with outpatient rad/onc follow up.  MR Thoracic Spine w/wo IV Cont (11.14.23 @ 11:55) >  IMPRESSION: Postop changes as described above.  Osseous metastasis again seen.  Possible epidural enhancement is seen involving the T4-5 level. Better   quality contrast enhanced MRI of the thoracic spine is recommended for   further evaluation.  Previously noted T2 prolongation involving the spinal cord at the T1   level is not well-demonstrated due to artifact from postop material.  Rt- Onc still recommends OUT Patient  RADIATION treatment     Hospital course complicated by fever  Tmax 102.6 overnight 11/15 Hr 109  Sepsis sec to UTI? vs Adenovirus   Urine pos Leuk and nitrate  Ceftriaxone started----> 11/16 changed to Ancef Q8 with Positive blood cultures 11/15 with MSSA   Repeat blood cultures and Id requested Patient is a 72 y/o Males with HTN, DM, met prostate cancer (2009) that initially presented with LE weakness and R hand paresthesias transferred from [facility] for NSGY evaluation.    Patient was initially evaluated at [facility]; for b/l found to be in rhabdo and SRUTHI, On imaging, he was found to have C7 spinous process nondisplaced fracture of T1 spinous process, edema from C3-4 through C7-T1 interspinous ligaments as well as lytic metastatic disease from C7, Tq-T5 and T12, as well as L1-L3 as well as epidural expansion of neoplasm at C7 centrally and dorsally resulting in moderate cord compression and underlying edema/extension into the bilateral C7-T1 and T1-2 and Left T2-3 Neural foramina. Epidural disease was also noted at T12 vertebral body. NM Scan significant for multiple sites with osseous lesions; Scapula, Multiple left ribs, bilateral pelvic region, as well as b/l LE DVTs on US and was started on Heparin GTT.     Now s/p IVC filter placement by IR On 11/1 (as patient cannot be on full AC for a full 6 weeks after spinal surgery) and C5-T3 posterior cervical fusion, C7-T1 decompression for metastatic prostate cancer on 11/2 with transfer to SICU for neuro checks. Patient also underwent closure of back surgical wound by plastics surgery and all wound drains removed by 11/10. Subsequently transferred to the medicine floor for further management. Patient reevaluated by radiation oncology and recommended for repeat thoracic spine MRI prior to discharge with outpatient rad/onc follow up.  MR Thoracic Spine w/wo IV Cont (11.14.23 @ 11:55) >  IMPRESSION: Postop changes as described above.  Osseous metastasis again seen.  Possible epidural enhancement is seen involving the T4-5 level. Better   quality contrast enhanced MRI of the thoracic spine is recommended for   further evaluation.  Previously noted T2 prolongation involving the spinal cord at the T1   level is not well-demonstrated due to artifact from postop material.  Rt- Onc still recommends OUT Patient  RADIATION treatment     Hospital course complicated by fever  Tmax 102.6 overnight 11/15 Hr 109  Sepsis sec to UTI? vs Adenovirus   Urine pos Leuk and nitrate  Ceftriaxone started----> 11/16 changed to Ancef Q8 with Positive blood cultures 11/15 with MSSA   Repeat blood cultures and Id requested 73M DM2, HTN, Prostate CA p/w spinal cord compression from metastasis s/p C5-T3 Fusion, C7-T1 decompression 11/2 c/b MSSA bacteremia from surgical site infection, B/L LE DVT s/p IVC filter 11/1 by IR, candida esophagitis w/ dysphagia, aspiration multilobar PNA, acute on chronic anemia, C. diff colitis.    Problem/Plan - 1:  ·  Problem: Pneumonia, aspiration.   ·  Plan: c/b acute on chronic hypoxic respiratory failure  Noted result of CTPA.  - No PE  - Broadened antibiotics to cover for aspiration multilobar PNA.  - Cefepime IV changed to Meropenem (11/29-)  - titrate down on HFNC as tolerated.     Problem/Plan - 2:  ·  Problem: C. difficile colitis.   ·  Plan: noted diarrhea and positive C. diff colitis.  - Started on Vanco PO (11/27-) - 10 day course  - continue probiotics  - likely due to long term abx.     Problem/Plan - 3:  ·  Problem: Candida esophagitis.   ·  Plan: c/b dysphagia and aspiration PNA  Reviewed CT Neck/Chest.  Appreciate ENT eval - reviewed direct laryngoscope imaging on 11/22  - ENT suspects esophageal candida but also possible mass effect from recent C-spine surgery; however no airway compromise noted.  - Diflucan IV (11/22-12/1)  - with clinical improvement  - S+S eval appreciated.     Problem/Plan - 4:  ·  Problem: MSSA bacteremia.   ·  Plan: Concern for surgical site as a primary source  - woundvac in place  - awaiting culture from post-surgical fluid collection.  - BCx from 11/16, 11/17 NGTD  - changed to Nafcillin IV on 11/20. Now on cefepime  - TTE reviewed  - continues to spike fevers.     Problem/Plan - 5:  ·  Problem: Cervical spinal cord compression.   ·  Plan: neoplasm with epidural expansion causing cord compression from C7-T3  - s/p C5-T3 posterior cervical fusion and C7-T1 decompression by orthopedic surgery on 11/2  - s/p flap closure of back surgical site by plastic surgery with b/l AGGIE drains; wound vac placed on 11/17  - d/w rad/onc, no plan for inpatient RT, recommend outpatient follow up  - c/b bacteremia  - Ortho to comment on result of MR C-spine - suspect not infected but rather expected post-op fluid collection  - awaiting T spine MR but currently on HFNC with respiratory distress - perform when medically optimized.     Problem/Plan - 6:  ·  Problem: Prostate cancer metastatic to bone.   ·  Plan: Patient previously diagnosed with prostate ca in 2009  - NM scan showing multiple osseous lesions throughout body  - heme/onc recs appreciated, will c/w bicalutamide 50mg qd  - IR consult for kyphoplasty 12/4 in anticipation for Rad Tx.     Problem/Plan - 7:  ·  Problem: DVT, lower extremity.   ·  Plan: Patient found to have b/l LE DVTs during this admission  - s/p IVC filter placed by IR on 11/1  - in setting of spinal surgery, patient to remain off AC due to risk of hematoma formation for at least 6 weeks since surgery (11/2)  - patient to also f/u with IR within 6-8 weeks to assess for IVC filter retrieval once contraindication to AC no longer exists.     Problem/Plan - 8:  ·  Problem: Hypertension.   ·  Plan: Noted with elevated BPs  - c/w amlodipine 10mg qd  - c/w lisinopril 20mg qd.     Problem/Plan - 9:  ·  Problem: T2DM (type 2 diabetes mellitus).   ·  Plan: - FS QID, AISS  - Goal -180.     Problem/Plan - 10:  ·  Problem: Anemia of chronic disease.   ·  Plan; no obvious hemorrhage noted  - continue to monitor CBC  - s/p 1u PRBC on 11/25   - plan for 1u PRBC on 11/29. 73M DM2, HTN, Prostate CA p/w spinal cord compression from metastasis s/p C5-T3 Fusion, C7-T1 decompression 11/2 c/b MSSA bacteremia from surgical site infection, B/L LE DVT s/p IVC filter 11/1 by IR, candida esophagitis w/ dysphagia, aspiration multilobar PNA, acute on chronic anemia, C. diff colitis.    Pneumonia, aspiration.   c/b acute on chronic hypoxic respiratory failure  Noted result of CTPA.  - No PE  - Broadened antibiotics to cover for aspiration multilobar PNA.  - Cefepime IV changed to Meropenem (11/29-) Empiric Bactrim for pjp  - titrate down on HFNC as tolerated.     C. difficile colitis.   noted diarrhea and positive C. diff colitis.  - Started on Vanco PO (11/27-) - 10 day course  - continue probiotics  - likely due to long term abx.     Candida esophagitis.    c/b dysphagia and aspiration PNA  Reviewed CT Neck/Chest.  Appreciate ENT eval - reviewed direct laryngoscope imaging on 11/22  - ENT suspects esophageal candida but also possible mass effect from recent C-spine surgery; however no airway compromise noted.  - Diflucan IV (11/22-12/1)  - with clinical improvement  - S+S eval appreciated.     MSSA bacteremia.    Concern for surgical site as a primary source  - woundvac in place  - awaiting culture from post-surgical fluid collection.  - BCx from 11/16, 11/17 NGTD  - changed to Nafcillin IV on 11/20. Now on cefepime  - TTE reviewed  - continues to spike fevers.     Cervical spinal cord compression.   ·  Plan: neoplasm with epidural expansion causing cord compression from C7-T3  - s/p C5-T3 posterior cervical fusion and C7-T1 decompression by orthopedic surgery on 11/2  - s/p flap closure of back surgical site by plastic surgery with b/l AGGIE drains; wound vac placed on 11/17  - d/w rad/onc, no plan for inpatient RT, recommend outpatient follow up  - c/b bacteremia  - Ortho to comment on result of MR C-spine - suspect not infected but rather expected post-op fluid collection  - T spine MR showed uninfected seroma  - but currently on HFNC with respiratory distress - perform when medically optimized.    Prostate cancer metastatic to bone.   ·  Plan: Patient previously diagnosed with prostate ca in 2009  - NM scan showing multiple osseous lesions throughout body  - heme/onc recs appreciated, will c/w bicalutamide 50mg qd  - IR consult for kyphoplasty 12/4 in anticipation for Rad Tx.    DVT, lower extremity.   Patient found to have b/l LE DVTs during this admission  - s/p IVC filter placed by IR on 11/1  - in setting of spinal surgery, patient to remain off AC due to risk of hematoma formation for at least 6 weeks since surgery (11/2)  - patient to also f/u with IR within 6-8 weeks to assess for IVC filter retrieval once contraindication to AC no longer exists.      Hypertension.   ·  Plan: Noted with elevated BPs  - c/w amlodipine 10mg qd  - c/w lisinopril 20mg qd.     T2DM (type 2 diabetes mellitus).   ·  Plan: - FS QID, AISS  - Goal -180.     Problem/Plan - 10:  ·  Problem: Anemia of chronic disease.   ·  Plan; no obvious hemorrhage noted  - continue to monitor CBC  - s/p 1u PRBC on 11/25   - plan for 1u PRBC on 11/29. 73M DM2, HTN, Prostate CA p/w spinal cord compression from metastasis s/p C5-T3 Fusion, C7-T1 decompression 11/2 c/b MSSA bacteremia from surgical site infection, B/L LE DVT s/p IVC filter 11/1 by IR, candida esophagitis w/ dysphagia, aspiration multilobar PNA, acute on chronic anemia, C. diff colitis.    Pneumonia, aspiration.   c/b acute on chronic hypoxic respiratory failure  Noted result of CTPA.  - No PE  - Broadened antibiotics to cover for aspiration multilobar PNA.  - Cefepime IV changed to Meropenem (11/29-) Empiric Bactrim for pjp  - titrate down on HFNC as tolerated.     C. difficile colitis.   noted diarrhea and positive C. diff colitis.  - Completed 10 day course of po vanco  - continue probiotics  - likely due to long term abx.     Candida esophagitis.    c/b dysphagia and aspiration PNA  Reviewed CT Neck/Chest.  Appreciate ENT eval - reviewed direct laryngoscope imaging on 11/22  - ENT suspects esophageal candida but also possible mass effect from recent C-spine surgery; however no airway compromise noted.  - Diflucan IV (11/22-12/1), cont bacatrim  - with clinical improvement  - S+S eval appreciated.     MSSA bacteremia.    Initially started on cefepime, abx changed to meropenem  .     Cervical spinal cord compression.   ·  Plan: neoplasm with epidural expansion causing cord compression from C7-T3  - s/p C5-T3 posterior cervical fusion and C7-T1 decompression by orthopedic surgery on 11/2  - s/p flap closure of back surgical site by plastic surgery with b/l AGGIE drains; wound vac placed on 11/17  - d/w rad/onc, no plan for inpatient RT, recommend outpatient follow up  - c/b bacteremia  - Ortho to comment on result of MR C-spine - suspect not infected but rather expected post-op fluid collection  - T spine MR showed uninfected seroma  - but currently on HFNC with respiratory distress   -Pt to f/u with IR for kyphoplasty.    Prostate cancer metastatic to bone.   ·  Plan: Patient previously diagnosed with prostate ca in 2009  - NM scan showing multiple osseous lesions throughout body  - heme/onc recs appreciated, will c/w bicalutamide 50mg qd  - f/u with IR for kyphoplasty in anticipation for Rad Tx.    DVT, lower extremity.   Patient found to have b/l LE DVTs during this admission  - s/p IVC filter placed by IR on 11/1  - in setting of spinal surgery, patient to remain off AC due to risk of hematoma formation for at least 6 weeks since surgery (11/2)  - patient to also f/u with IR within 6-8 weeks to assess for IVC filter retrieval once contraindication to AC no longer exists.      Hypertension.    Noted with elevated BPs  - c/w amlodipine 10mg qd  - c/w lisinopril 20mg qd.      : Anemia of chronic disease.   ·  Plan; no obvious hemorrhage noted  - continue to monitor CBC  - s/p 1u PRBC on 11/25   - plan for 1u PRBC on 11/29. 73M with metastatic prostate cancer initially admitted to [facility] for rhabdo + SRUTHI, transferred for spinal cord compression from metastasis s/p C5-T3 Fusion, C7-T1 decompression 11/2 with course c/b MSSA bacteremia from surgical site infection, bilateral LE DVT s/p IVC filter 11/1 by IR, candida esophagitis w/ dysphagia, aspiration multilobar PNA, acute on chronic anemia, and C. diff colitis.         Nutritional Assessment:  · Nutritional Assessment	This patient has been assessed with a concern for Malnutrition and has been determined to have a diagnosis/diagnoses of Severe protein-calorie malnutrition.    This patient is being managed with:   Diet Consistent Carbohydrate w/Evening Snack-  Supplement Feeding Modality:  Oral  Glucerna Shake Cans or Servings Per Day:  1       Frequency:  Three Times a day  Entered: Nov 28 2023  5:16PM     Problem/Plan - 1:  ·  Problem: Pneumonia, aspiration.   ·  Plan: gram negative PNA in the setting of aspiration, c/b acute on chronic hypoxic respiratory failure  - noted result of CTPA 11/27. CT showed diffuse lung opacities, worsening on repeat CT 12/8 concerning for organizing PNA which led to suspicion for PJP  - PJP PNA, was on Bactrim - changed to Mepron given uptrend in creatinine - continue through 12/21  - s/p high flow, now on NC, no plan for bronch per pulm given improvement   - appreciate ID recs - hold off empiric steroids for now. If O2 requirements again plateau or worse, will reconsider empiric steroids or bronchoscopic evaluation with BAL/TBBX  - rvp with +ADENOVIRUS, TTE with no acute findings.     Problem/Plan - 2:  ·  Problem: MSSA bacteremia.   ·  Plan: concern for surgical site as a primary source  - BCx from 11/16, 11/17 NGTD, TTE no endocarditis   - changed to Nafcillin IV on 11/20, plan to complete 12/28 (daptomycin can be considered as alternative if would facilitate rehab placement).     Problem/Plan - 3:  ·  Problem: C. difficile colitis.   ·  Plan: resolved   - noted diarrhea and positive C. diff colitis  - completed po vanco course on 12/8   - continue probiotics.     Problem/Plan - 4:  ·  Problem: Candida esophagitis.   ·  Plan: c/b dysphagia and aspiration PNA  - appreciate ENT eval - reviewed direct laryngoscope imaging on 11/22 - suspects esophageal candida but also possible mass effect from recent C-spine surgery; however no airway compromise noted  - s/p Diflucan IV (11/22-12/1) Fungitell is positive, ID attending aware  - with clinical improvement  - S+S eval appreciated.     Problem/Plan - 5:  ·  Problem: Cervical spinal cord compression.   ·  Plan: neoplasm with epidural expansion causing cord compression from C7-T3  - s/p C5-T3 posterior cervical fusion and C7-T1 decompression by orthopedic surgery on 11/2  - s/p flap closure of back surgical site by plastic surgery with b/l AGGIE drains; wound vac placed on 11/17  - ortho re: MRI c-spine - suspect not infected but rather expected post-op fluid collection  - radonc: recommending outpatient follow up for RT   - sutures removed by plastics, uninfected seroma   - no plan for Kyphoplasty for now per IR, pt can f/u as outpt.     Problem/Plan - 6:  ·  Problem: Prostate cancer metastatic to bone.   ·  Plan: previously diagnosed with prostate ca in 2009  - NM scan showing multiple osseous lesions throughout body  - IR consulted for kyphoplasty 12/4 in anticipation for Rad Tx, per IR no plan for kyphoplasty for now given acute infection/bacteremia, pt to f/u as outpt  - Radonc: patient would benefit from SBRT which is only offered outpatient - family will follow up post dc  [facility], [facility] - followed by qmonthly   - [facility] will set up outpatient followup, will email radiation medicine outpatient on dc for outpatient f/u.     Problem/Plan - 7:  ·  Problem: DVT, lower extremity.   ·  Plan: bilateral LE DVTs s/p IVC filter by IR  - in setting of spinal surgery, patient was remain off AC due to risk of hematoma formation for at least 6 weeks since surgery (11/2), patient is now 7 weeks post op - discussed with ortho (Dr. Coburn team) 12/19 - ok to be on AC (Eliquis 5mg BID without loading dose)  - IR consulted for IVC filter removal - recommending outpatient follow up for non-emergent IVC filter removal   - discussed with heme/onc - hold off AC until IVC filter removed, start DOAC once filter removed outpatient  - Bilateral UE sonogram 12/21: right/left cephalic occlusive SVT no DVT - warm compress + elevate.   73M with metastatic prostate cancer initially admitted to [facility] for rhabdo + SRUTHI, transferred for spinal cord compression from metastasis s/p C5-T3 Fusion, C7-T1 decompression 11/2 with course c/b MSSA bacteremia from surgical site infection, bilateral LE DVT s/p IVC filter 11/1 by IR, candida esophagitis w/ dysphagia, aspiration multilobar PNA, acute on chronic anemia, and C. diff colitis.         Nutritional Assessment:  · Nutritional Assessment	This patient has been assessed with a concern for Malnutrition and has been determined to have a diagnosis/diagnoses of Severe protein-calorie malnutrition.    This patient is being managed with:   Diet Consistent Carbohydrate w/Evening Snack-  Supplement Feeding Modality:  Oral  Glucerna Shake Cans or Servings Per Day:  1       Frequency:  Three Times a day  Entered: Nov 28 2023  5:16PM     Problem/Plan - 1:  ·  Problem: Pneumonia, aspiration.   ·  Plan: gram negative PNA in the setting of aspiration, c/b acute on chronic hypoxic respiratory failure  - noted result of CTPA 11/27. CT showed diffuse lung opacities, worsening on repeat CT 12/8 concerning for organizing PNA which led to suspicion for PJP  - PJP PNA, was on Bactrim - changed to Mepron given uptrend in creatinine - continue through 12/21  - s/p high flow, now on NC, no plan for bronch per pulm given improvement   - appreciate ID recs - hold off empiric steroids for now. If O2 requirements again plateau or worse, will reconsider empiric steroids or bronchoscopic evaluation with BAL/TBBX  - rvp with +ADENOVIRUS, TTE with no acute findings.     Problem/Plan - 2:  ·  Problem: MSSA bacteremia.   ·  Plan: concern for surgical site as a primary source  - BCx from 11/16, 11/17 NGTD, TTE no endocarditis   - changed to Nafcillin IV on 11/20, plan to complete 12/28 (daptomycin can be considered as alternative if would facilitate rehab placement).     Problem/Plan - 3:  ·  Problem: C. difficile colitis.   ·  Plan: resolved   - noted diarrhea and positive C. diff colitis  - completed po vanco course on 12/8   - continue probiotics.     Problem/Plan - 4:  ·  Problem: Candida esophagitis.   ·  Plan: c/b dysphagia and aspiration PNA  - appreciate ENT eval - reviewed direct laryngoscope imaging on 11/22 - suspects esophageal candida but also possible mass effect from recent C-spine surgery; however no airway compromise noted  - s/p Diflucan IV (11/22-12/1) Fungitell is positive, ID attending aware  - with clinical improvement  - S+S eval appreciated.     Problem/Plan - 5:  ·  Problem: Cervical spinal cord compression.   ·  Plan: neoplasm with epidural expansion causing cord compression from C7-T3  - s/p C5-T3 posterior cervical fusion and C7-T1 decompression by orthopedic surgery on 11/2  - s/p flap closure of back surgical site by plastic surgery with b/l AGGIE drains; wound vac placed on 11/17  - ortho re: MRI c-spine - suspect not infected but rather expected post-op fluid collection  - radonc: recommending outpatient follow up for RT   - sutures removed by plastics, uninfected seroma   - no plan for Kyphoplasty for now per IR, pt can f/u as outpt.     Problem/Plan - 6:  ·  Problem: Prostate cancer metastatic to bone.   ·  Plan: previously diagnosed with prostate ca in 2009  - NM scan showing multiple osseous lesions throughout body  - IR consulted for kyphoplasty 12/4 in anticipation for Rad Tx, per IR no plan for kyphoplasty for now given acute infection/bacteremia, pt to f/u as outpt  - Radonc: patient would benefit from SBRT which is only offered outpatient - family will follow up post dc  [facility], Lupron 12/22 - followed by qmonthly   - [facility] will set up outpatient followup, will email radiation medicine outpatient on dc for outpatient f/u.     Problem/Plan - 7:  ·  Problem: DVT, lower extremity.   ·  Plan: bilateral LE DVTs s/p IVC filter by IR  - in setting of spinal surgery, patient was remain off AC due to risk of hematoma formation for at least 6 weeks since surgery (11/2), patient is now 7 weeks post op - discussed with ortho (Dr. Coburn team) 12/19 - ok to be on AC (Eliquis 5mg BID without loading dose)  - IR consulted for IVC filter removal - recommending outpatient follow up for non-emergent IVC filter removal   - discussed with heme/onc - hold off AC until IVC filter removed, start DOAC once filter removed outpatient  - Bilateral UE sonogram 12/21: right/left cephalic occlusive SVT no DVT - warm compress + elevate.   73M with metastatic prostate cancer initially admitted to [facility] for rhabdo + SRUTHI, transferred for spinal cord compression from metastasis s/p C5-T3 Fusion, C7-T1 decompression 11/2 with course c/b MSSA bacteremia from surgical site infection, bilateral LE DVT s/p IVC filter 11/1 by IR, candida esophagitis w/ dysphagia, aspiration multilobar PNA, acute on chronic anemia, and C. diff colitis.         Nutritional Assessment:  · Nutritional Assessment	This patient has been assessed with a concern for Malnutrition and has been determined to have a diagnosis/diagnoses of Severe protein-calorie malnutrition.    This patient is being managed with:   Diet Consistent Carbohydrate w/Evening Snack-  Supplement Feeding Modality:  Oral  Glucerna Shake Cans or Servings Per Day:  1       Frequency:  Three Times a day  Entered: Nov 28 2023  5:16PM     Problem/Plan - 1:  ·  Problem: Pneumonia, aspiration.   ·  Plan: gram negative PNA in the setting of aspiration, c/b acute on chronic hypoxic respiratory failure  - noted result of CTPA 11/27. CT showed diffuse lung opacities, worsening on repeat CT 12/8 concerning for organizing PNA which led to suspicion for PJP  - PJP PNA, was on Bactrim - changed to Mepron given uptrend in creatinine - continue through 12/21  - s/p high flow, now on NC, no plan for bronch per pulm given improvement   - appreciate ID recs - hold off empiric steroids for now. If O2 requirements again plateau or worse, will reconsider empiric steroids or bronchoscopic evaluation with BAL/TBBX  - rvp with +ADENOVIRUS, TTE with no acute findings.     Problem/Plan - 2:  ·  Problem: MSSA bacteremia.   ·  Plan: concern for surgical site as a primary source  - BCx from 11/16, 11/17 NGTD, TTE no endocarditis   - changed to Nafcillin IV on 11/20, plan to complete 12/28 (daptomycin can be considered as alternative if would facilitate rehab placement).     Problem/Plan - 3:  ·  Problem: C. difficile colitis.   ·  Plan: resolved   - noted diarrhea and positive C. diff colitis  - completed po vanco course on 12/8   - continue probiotics.     Problem/Plan - 4:  ·  Problem: Candida esophagitis.   ·  Plan: c/b dysphagia and aspiration PNA  - appreciate ENT eval - reviewed direct laryngoscope imaging on 11/22 - suspects esophageal candida but also possible mass effect from recent C-spine surgery; however no airway compromise noted  - s/p Diflucan IV (11/22-12/1) Fungitell is positive, ID attending aware  - with clinical improvement  - S+S eval appreciated.     Problem/Plan - 5:  ·  Problem: Cervical spinal cord compression.   ·  Plan: neoplasm with epidural expansion causing cord compression from C7-T3  - s/p C5-T3 posterior cervical fusion and C7-T1 decompression by orthopedic surgery on 11/2  - s/p flap closure of back surgical site by plastic surgery with b/l AGGIE drains; wound vac placed on 11/17  - ortho re: MRI c-spine - suspect not infected but rather expected post-op fluid collection  - radonc: recommending outpatient follow up for RT   - sutures removed by plastics, uninfected seroma   - no plan for Kyphoplasty for now per IR, pt can f/u as outpt.     Problem/Plan - 6:  ·  Problem: Prostate cancer metastatic to bone.   ·  Plan: previously diagnosed with prostate ca in 2009  - NM scan showing multiple osseous lesions throughout body  - IR consulted for kyphoplasty 12/4 in anticipation for Rad Tx, per IR no plan for kyphoplasty for now given acute infection/bacteremia, pt to f/u as outpt  - Radonc: patient would benefit from SBRT which is only offered outpatient - family will follow up post dc  [facility], Lupron 12/22 - followed by qmonthly   - [facility] will set up outpatient followup, will email radiation medicine outpatient on dc for outpatient f/u.     Problem/Plan - 7:  ·  Problem: DVT, lower extremity.   ·  Plan: bilateral LE DVTs s/p IVC filter by IR  - in setting of spinal surgery, patient was remain off AC due to risk of hematoma formation for at least 6 weeks since surgery (11/2), patient is now 7 weeks post op - discussed with ortho (Dr. Coburn team) 12/19 - ok to be on AC (Eliquis 5mg BID without loading dose)  - IR consulted for IVC filter removal - recommending outpatient follow up for non-emergent IVC filter removal   - discussed with heme/onc - hold off AC until IVC filter removed, start DOAC once filter removed outpatient  - Bilateral UE sonogram 12/21: right/left cephalic occlusive SVT no DVT - warm compress + elevate.   73M with metastatic prostate cancer initially admitted to [facility] for rhabdo + SRUTHI, transferred for spinal cord compression from metastasis s/p C5-T3 Fusion, C7-T1 decompression 11/2 with course c/b MSSA bacteremia from surgical site infection, bilateral LE DVT s/p IVC filter 11/1 by IR, candida esophagitis w/ dysphagia, aspiration multilobar PNA, acute on chronic anemia, and C. diff colitis.     Problem/Plan - 1:  ·  Problem: Pneumonia, aspiration.   ·  Plan: gram negative PNA in the setting of aspiration, c/b acute on chronic hypoxic respiratory failure  - noted result of CTPA 11/27. CT showed diffuse lung opacities, worsening on repeat CT 12/8 concerning for organizing PNA which led to suspicion for PJP  - PJP PNA, was on Bactrim - changed to Mepron given uptrend in creatinine - continue through 12/21  - s/p high flow, now on NC, no plan for bronch per pulm given improvement   - appreciate ID recs - hold off empiric steroids for now. If O2 requirements again plateau or worse, will reconsider empiric steroids or bronchoscopic evaluation with BAL/TBBX  - rvp with +ADENOVIRUS, TTE with no acute findings.     Problem/Plan - 2:  ·  Problem: MSSA bacteremia.   ·  Plan: concern for surgical site as a primary source  - BCx from 11/16, 11/17 NGTD, TTE no endocarditis   - changed to Nafcillin IV on 11/20, plan to complete 12/28 (daptomycin can be considered as alternative if would facilitate rehab placement).     Problem/Plan - 3:  ·  Problem: C. difficile colitis.   ·  Plan: resolved   - noted diarrhea and positive C. diff colitis  - completed po vanco course on 12/8   - continue probiotics.     Problem/Plan - 4:  ·  Problem: Candida esophagitis.   ·  Plan: c/b dysphagia and aspiration PNA  - appreciate ENT eval - reviewed direct laryngoscope imaging on 11/22 - suspects esophageal candida but also possible mass effect from recent C-spine surgery; however no airway compromise noted  - s/p Diflucan IV (11/22-12/1) Fungitell is positive, ID attending aware  - with clinical improvement  - S+S eval appreciated.     Problem/Plan - 5:  ·  Problem: Cervical spinal cord compression.   ·  Plan: neoplasm with epidural expansion causing cord compression from C7-T3  - s/p C5-T3 posterior cervical fusion and C7-T1 decompression by orthopedic surgery on 11/2  - s/p flap closure of back surgical site by plastic surgery with b/l AGGIE drains; wound vac placed on 11/17  - ortho re: MRI c-spine - suspect not infected but rather expected post-op fluid collection  - radonc: recommending outpatient follow up for RT   - sutures removed by plastics, uninfected seroma   - no plan for Kyphoplasty for now per IR, pt can f/u as outpt.     Problem/Plan - 6:  ·  Problem: Prostate cancer metastatic to bone.   ·  Plan: previously diagnosed with prostate ca in 2009  - NM scan showing multiple osseous lesions throughout body  - IR consulted for kyphoplasty 12/4 in anticipation for Rad Tx, per IR no plan for kyphoplasty for now given acute infection/bacteremia, pt to f/u as outpt  - Radonc: patient would benefit from SBRT which is only offered outpatient - family will follow up post dc  - Hemeon: c/w bicalutamide 50mg qd, Lupron 12/22 - followed by qmonthly   - hemeonc will set up outpatient followup, will email radiation medicine outpatient on dc for outpatient f/u.     Problem/Plan - 7:  ·  Problem: DVT, lower extremity.   ·  Plan: bilateral LE DVTs s/p IVC filter by IR  - in setting of spinal surgery, patient was remain off AC due to risk of hematoma formation for at least 6 weeks since surgery (11/2), patient is now 7 weeks post op - discussed with ortho (Dr. Coburn team) 12/19 - ok to be on AC (Eliquis 5mg BID without loading dose)  - IR consulted for IVC filter removal - recommending outpatient follow up for non-emergent IVC filter removal   - discussed with heme/onc - hold off AC until IVC filter removed, start DOAC once filter removed outpatient  - Bilateral UE sonogram 12/21: right/left cephalic occlusive SVT no DVT - warm compress + elevate.   73M with metastatic prostate cancer initially admitted to [facility] for rhabdo + SRUTHI, transferred for spinal cord compression from metastasis s/p C5-T3 Fusion, C7-T1 decompression 11/2 with course c/b MSSA bacteremia from surgical site infection, bilateral LE DVT s/p IVC filter 11/1 by IR, candida esophagitis w/ dysphagia, aspiration multilobar PNA, acute on chronic anemia, and C. diff colitis.     Problem/Plan - 1:  ·  Problem: Pneumonia, aspiration.   ·  Plan: gram negative PNA in the setting of aspiration, c/b acute on chronic hypoxic respiratory failure  - noted result of CTPA 11/27. CT showed diffuse lung opacities, worsening on repeat CT 12/8 concerning for organizing PNA which led to suspicion for PJP  - PJP PNA, was on Bactrim - changed to Mepron given uptrend in creatinine - continue through 12/21  - s/p high flow, now on NC, no plan for bronch per pulm given improvement   - appreciate ID recs - hold off empiric steroids for now. If O2 requirements again plateau or worse, will reconsider empiric steroids or bronchoscopic evaluation with BAL/TBBX  - rvp with +ADENOVIRUS, TTE with no acute findings.     Problem/Plan - 2:  ·  Problem: MSSA bacteremia.   ·  Plan: concern for surgical site as a primary source  - BCx from 11/16, 11/17 NGTD, TTE no endocarditis   - changed to Nafcillin IV on 11/20, plan to complete 12/28 (daptomycin can be considered as alternative if would facilitate rehab placement).     Problem/Plan - 3:  ·  Problem: C. difficile colitis.   ·  Plan: resolved   - noted diarrhea and positive C. diff colitis  - completed po vanco course on 12/8   - continue probiotics.     Problem/Plan - 4:  ·  Problem: Candida esophagitis.   ·  Plan: c/b dysphagia and aspiration PNA  - appreciate ENT eval - reviewed direct laryngoscope imaging on 11/22 - suspects esophageal candida but also possible mass effect from recent C-spine surgery; however no airway compromise noted  - s/p Diflucan IV (11/22-12/1) Fungitell is positive, ID attending aware  - with clinical improvement  - S+S eval appreciated.     Problem/Plan - 5:  ·  Problem: Cervical spinal cord compression.   ·  Plan: neoplasm with epidural expansion causing cord compression from C7-T3  - s/p C5-T3 posterior cervical fusion and C7-T1 decompression by orthopedic surgery on 11/2  - s/p flap closure of back surgical site by plastic surgery with b/l AGGIE drains; wound vac placed on 11/17  - ortho re: MRI c-spine - suspect not infected but rather expected post-op fluid collection  - radonc: recommending outpatient follow up for RT   - sutures removed by plastics, uninfected seroma   - no plan for Kyphoplasty for now per IR, pt can f/u as outpt.     Problem/Plan - 6:  ·  Problem: Prostate cancer metastatic to bone.   ·  Plan: previously diagnosed with prostate ca in 2009  - NM scan showing multiple osseous lesions throughout body  - IR consulted for kyphoplasty 12/4 in anticipation for Rad Tx, per IR no plan for kyphoplasty for now given acute infection/bacteremia, pt to f/u as outpt  - Radonc: patient would benefit from SBRT which is only offered outpatient - family will follow up post dc  - Hemeon: c/w bicalutamide 50mg qd, Lupron 12/22 - followed by qmonthly   - hemeonc will set up outpatient followup, will email radiation medicine outpatient on dc for outpatient f/u.     Problem/Plan - 7:  ·  Problem: DVT, lower extremity.   ·  Plan: bilateral LE DVTs s/p IVC filter by IR  - in setting of spinal surgery, patient was remain off AC due to risk of hematoma formation for at least 6 weeks since surgery (11/2), patient is now 7 weeks post op - discussed with ortho (Dr. Coburn team) 12/19 - ok to be on AC (Eliquis 5mg BID without loading dose)  - IR consulted for IVC filter removal - recommending outpatient follow up for non-emergent IVC filter removal   - discussed with heme/onc - hold off AC until IVC filter removed, start DOAC once filter removed outpatient  - Bilateral UE sonogram 12/21: right/left cephalic occlusive SVT no DVT - warm compress + elevate.   73M with metastatic prostate cancer initially admitted to [facility] for rhabdo + SRUTHI, transferred for spinal cord compression from metastasis s/p C5-T3 Fusion, C7-T1 decompression 11/2 with course c/b MSSA bacteremia from surgical site infection, bilateral LE DVT s/p IVC filter 11/1 by IR, candida esophagitis w/ dysphagia, aspiration multilobar PNA, acute on chronic anemia, and C. diff colitis.     Problem/Plan - 1:  ·  Problem: Pneumonia, aspiration.   ·  Plan: gram negative PNA in the setting of aspiration, c/b acute on chronic hypoxic respiratory failure  - noted result of CTPA 11/27. CT showed diffuse lung opacities, worsening on repeat CT 12/8 concerning for organizing PNA which led to suspicion for PJP  - PJP PNA, was on Bactrim - changed to Mepron given uptrend in creatinine - continue through 12/21  - s/p high flow, now on NC, no plan for bronch per pulm given improvement   - appreciate ID recs - hold off empiric steroids for now. If O2 requirements again plateau or worse, will reconsider empiric steroids or bronchoscopic evaluation with BAL/TBBX  - rvp with +ADENOVIRUS, TTE with no acute findings.     Problem/Plan - 2:  ·  Problem: MSSA bacteremia.   ·  Plan: concern for surgical site as a primary source  - BCx from 11/16, 11/17 NGTD, TTE no endocarditis   - changed to Nafcillin IV on 11/20, plan to complete 12/28 (daptomycin can be considered as alternative if would facilitate rehab placement).     Problem/Plan - 3:  ·  Problem: C. difficile colitis.   ·  Plan: resolved   - noted diarrhea and positive C. diff colitis  - completed po vanco course on 12/8   - continue probiotics.     Problem/Plan - 4:  ·  Problem: Candida esophagitis.   ·  Plan: c/b dysphagia and aspiration PNA  - appreciate ENT eval - reviewed direct laryngoscope imaging on 11/22 - suspects esophageal candida but also possible mass effect from recent C-spine surgery; however no airway compromise noted  - s/p Diflucan IV (11/22-12/1) Fungitell is positive, ID attending aware  - with clinical improvement  - S+S eval appreciated.     Problem/Plan - 5:  ·  Problem: Cervical spinal cord compression.   ·  Plan: neoplasm with epidural expansion causing cord compression from C7-T3  - s/p C5-T3 posterior cervical fusion and C7-T1 decompression by orthopedic surgery on 11/2  - s/p flap closure of back surgical site by plastic surgery with b/l AGGIE drains; wound vac placed on 11/17  - ortho re: MRI c-spine - suspect not infected but rather expected post-op fluid collection  - radonc: recommending outpatient follow up for RT   - sutures removed by plastics, uninfected seroma   - no plan for Kyphoplasty for now per IR, pt can f/u as outpt.     Problem/Plan - 6:  ·  Problem: Prostate cancer metastatic to bone.   ·  Plan: previously diagnosed with prostate ca in 2009  - NM scan showing multiple osseous lesions throughout body  - IR consulted for kyphoplasty 12/4 in anticipation for Rad Tx, per IR no plan for kyphoplasty for now given acute infection/bacteremia, pt to f/u as outpt  - Radonc: patient would benefit from SBRT which is only offered outpatient - family will follow up post dc  - Hemeon: c/w bicalutamide 50mg qd, Lupron 12/22 - followed by qmonthly   - hemeonc will set up outpatient followup, will email radiation medicine outpatient on dc for outpatient f/u.     Problem/Plan - 7:  ·  Problem: DVT, lower extremity.   ·  Plan: bilateral LE DVTs s/p IVC filter by IR  - in setting of spinal surgery, patient was remain off AC due to risk of hematoma formation for at least 6 weeks since surgery (11/2), patient is now 7 weeks post op - discussed with ortho (Dr. Coburn team) 12/19 - ok to be on AC (Eliquis 5mg BID without loading dose)  - IR consulted for IVC filter removal - recommending outpatient follow up for non-emergent IVC filter removal   - discussed with heme/onc - hold off AC until IVC filter removed, start DOAC once filter removed outpatient  - Bilateral UE sonogram 12/21: right/left cephalic occlusive SVT no DVT - warm compress + elevate.

## 2023-11-10 NOTE — DISCHARGE NOTE PROVIDER - PROVIDER TOKENS
PROVIDER:[TOKEN:[[id number]],FOLLOWUP:[1-3 days],ESTABLISHEDPATIENT:[T]],PROVIDER:[TOKEN:[[id number]]] PROVIDER:[TOKEN:[[id number]],FOLLOWUP:[1-3 days],ESTABLISHEDPATIENT:[T]],PROVIDER:[TOKEN:[[id number]]] PROVIDER:[TOKEN:[[id number]],FOLLOWUP:[1-3 days],ESTABLISHEDPATIENT:[T]],PROVIDER:[TOKEN:[[id number]:[id number]]] PROVIDER:[TOKEN:[[id number]],FOLLOWUP:[1-3 days],ESTABLISHEDPATIENT:[T]],PROVIDER:[TOKEN:[[id number]]],FREE:[LAST:[Oncology Dr Rodgers],FIRST:[[facility]],PHONE:[(   )    -],FAX:[(   )    -],FOLLOWUP:[1 week],ESTABLISHEDPATIENT:[T]] PROVIDER:[TOKEN:[[id number]],FOLLOWUP:[1-3 days],ESTABLISHEDPATIENT:[T]],PROVIDER:[TOKEN:[[id number]]],FREE:[LAST:[Oncology Dr Rodgers],FIRST:[[facility]],PHONE:[(   )    -],FAX:[(   )    -],FOLLOWUP:[1 week],ESTABLISHEDPATIENT:[T]] PROVIDER:[TOKEN:[[id number]],FOLLOWUP:[1-3 days],ESTABLISHEDPATIENT:[T]],PROVIDER:[TOKEN:[[id number]:[id number]]],FREE:[LAST:[Oncology Dr Rodgers],FIRST:[[facility]],PHONE:[(   )    -],FAX:[(   )    -],FOLLOWUP:[1 week],ESTABLISHEDPATIENT:[T]] PROVIDER:[TOKEN:[[id number]],FOLLOWUP:[1-3 days],ESTABLISHEDPATIENT:[T]],PROVIDER:[TOKEN:[[id number]]],FREE:[LAST:[Oncology Dr Rodgers],FIRST:[[facility]],PHONE:[(   )    -],FAX:[(   )    -],FOLLOWUP:[1 week],ESTABLISHEDPATIENT:[T]],PROVIDER:[TOKEN:[[id number]:MIIS:[id number]]] PROVIDER:[TOKEN:[[id number]:[id number]],FOLLOWUP:[1-3 days],ESTABLISHEDPATIENT:[T]],PROVIDER:[TOKEN:[[id number]]],FREE:[LAST:[Oncology Dr Rodgers],FIRST:[[facility]],PHONE:[(   )    -],FAX:[(   )    -],FOLLOWUP:[1 week],ESTABLISHEDPATIENT:[T]],PROVIDER:[TOKEN:[[id number]:MIIS:[id number]]] PROVIDER:[TOKEN:[[id number]:[id number]],FOLLOWUP:[1-3 days],ESTABLISHEDPATIENT:[T]],PROVIDER:[TOKEN:[[id number]]],FREE:[LAST:[Oncology Dr Rodegrs],FIRST:[[facility]],PHONE:[(   )    -],FAX:[(   )    -],FOLLOWUP:[1 week],ESTABLISHEDPATIENT:[T]],PROVIDER:[TOKEN:[[id number]:MIIS:[id number]]] PROVIDER:[TOKEN:[[id number]:MIIS:[id number]],FOLLOWUP:[1-3 days],ESTABLISHEDPATIENT:[T]],PROVIDER:[TOKEN:[[id number]]],PROVIDER:[TOKEN:[[id number]:MIIS:[id number]]],FREE:[LAST:[Oncology Dr Rodgers],FIRST:[[facility]],PHONE:[(   )    -],FAX:[(   )    -],FOLLOWUP:[1 week],ESTABLISHEDPATIENT:[T]],PROVIDER:[TOKEN:[[id number]:MIIS:[id number]]] PROVIDER:[TOKEN:[[id number]:[id number]],FOLLOWUP:[1-3 days],ESTABLISHEDPATIENT:[T]],PROVIDER:[TOKEN:[[id number]:[id number]]],PROVIDER:[TOKEN:[[id number]:MIIS:[id number]]],FREE:[LAST:[Oncology Dr Rodgers],FIRST:[[facility]],PHONE:[(   )    -],FAX:[(   )    -],FOLLOWUP:[1 week],ESTABLISHEDPATIENT:[T]],PROVIDER:[TOKEN:[[id number]:MIIS:[id number]]] PROVIDER:[TOKEN:[[id number]:MIIS:[id number]],FOLLOWUP:[1-3 days],ESTABLISHEDPATIENT:[T]],PROVIDER:[TOKEN:[[id number]:[id number]]],PROVIDER:[TOKEN:[[id number]:MIIS:[id number]]],FREE:[LAST:[Oncology Dr Rodgers],FIRST:[[facility]],PHONE:[(   )    -],FAX:[(   )    -],FOLLOWUP:[1 week],ESTABLISHEDPATIENT:[T]],PROVIDER:[TOKEN:[[id number]:[id number]]] PROVIDER:[TOKEN:[[id number]:[id number]],FOLLOWUP:[1-3 days],ESTABLISHEDPATIENT:[T]],PROVIDER:[TOKEN:[[id number]:[id number]]],PROVIDER:[TOKEN:[[id number]:MIIS:[id number]]],PROVIDER:[TOKEN:[[id number]:MIIS:[id number]]] PROVIDER:[TOKEN:[[id number]:[id number]],FOLLOWUP:[1-3 days],ESTABLISHEDPATIENT:[T]],PROVIDER:[TOKEN:[[id number]:[id number]]],PROVIDER:[TOKEN:[[id number]:MIIS:[id number]]],PROVIDER:[TOKEN:[[id number]:MIIS:[id number]]] PROVIDER:[TOKEN:[[id number]:MIIS:[id number]],FOLLOWUP:[1-3 days],ESTABLISHEDPATIENT:[T]],PROVIDER:[TOKEN:[[id number]:[id number]]],PROVIDER:[TOKEN:[[id number]:MIIS:[id number]]],PROVIDER:[TOKEN:[[id number]:MIIS:[id number]]]

## 2023-11-10 NOTE — PROGRESS NOTE ADULT - SUBJECTIVE AND OBJECTIVE BOX
[facility] Department of Hospital Medicine  Re Diaz MD  Available on MS Teams  Pager: [phone]    Patient is a 73y old  Male who presents with a chief complaint of Diffuse Spinal Mets from Prostate Cancer (10 Nov 2023 09:43)    OVERNIGHT EVENTS: No acute events overnight.    SUBJECTIVE: Pt seen and examined at bedside this morning. Reports that he had multiple soft BMs overnight. Denies any fevers, chills, nausea or vomiting. Endorses neck pain today but improved with medication.    ADDITIONAL REVIEW OF SYSTEMS: as above    MEDICATIONS  (STANDING):  acetaminophen     Tablet .. 975 milliGRAM(s) Oral every 6 hours  amLODIPine   Tablet 10 milliGRAM(s) Oral daily  atorvastatin 20 milliGRAM(s) Oral at bedtime  bicalutamide 50 milliGRAM(s) Oral daily  chlorhexidine 2% Cloths 1 Application(s) Topical daily  dexAMETHasone     Tablet   Oral   dexAMETHasone     Tablet 4 milliGRAM(s) Oral every 12 hours  heparin   Injectable 5000 Unit(s) SubCutaneous every 8 hours  insulin glargine Injectable (LANTUS) 37 Unit(s) SubCutaneous every morning  insulin lispro (ADMELOG) corrective regimen sliding scale   SubCutaneous three times a day before meals  insulin lispro (ADMELOG) corrective regimen sliding scale   SubCutaneous at bedtime  insulin lispro Injectable (ADMELOG) 14 Unit(s) SubCutaneous three times a day before meals  lisinopril 20 milliGRAM(s) Oral daily  mupirocin 2% Ointment 1 Application(s) Topical two times a day  pantoprazole    Tablet 40 milliGRAM(s) Oral before breakfast  tamsulosin 0.4 milliGRAM(s) Oral at bedtime    MEDICATIONS  (PRN):  HYDROmorphone  Injectable 0.5 milliGRAM(s) IV Push every 4 hours PRN Severe breakthrough Pain (7 - 10)  naloxone Injectable 0.1 milliGRAM(s) IV Push every 3 minutes PRN For ANY of the following changes in patient status:  A. RR LESS THAN 10 breaths per minute, B. Oxygen saturation LESS THAN 90%, C. Sedation score of 6  ondansetron Injectable 4 milliGRAM(s) IV Push every 6 hours PRN Nausea  oxyCODONE    IR 10 milliGRAM(s) Oral every 3 hours PRN Severe Pain (7 - 10)  oxyCODONE    IR 5 milliGRAM(s) Oral every 3 hours PRN Moderate Pain (4 - 6)    CAPILLARY BLOOD GLUCOSE    POCT Blood Glucose.: 293 mg/dL (10 Nov 2023 12:41)  POCT Blood Glucose.: 333 mg/dL (10 Nov 2023 08:40)  POCT Blood Glucose.: 109 mg/dL (09 Nov 2023 22:44)  POCT Blood Glucose.: 270 mg/dL (09 Nov 2023 17:27)    I&O's Summary    09 Nov 2023 07:01  -  10 Nov 2023 07:00  --------------------------------------------------------  IN: 150 mL / OUT: 300 mL / NET: -150 mL    PHYSICAL EXAM:  Vital Signs Last 24 Hrs  T(C): 36.3 (10 Nov 2023 08:00), Max: 36.9 (09 Nov 2023 22:20)  T(F): 97.3 (10 Nov 2023 08:00), Max: 98.4 (09 Nov 2023 22:20)  HR: 100 (10 Nov 2023 08:00) (79 - 100)  BP: 127/70 (10 Nov 2023 08:00) (109/60 - 127/70)  BP(mean): --  RR: 18 (10 Nov 2023 08:00) (17 - 18)  SpO2: 98% (10 Nov 2023 08:00) (96% - 98%)    Parameters below as of 10 Nov 2023 08:00  Patient On (Oxygen Delivery Method): room air    CONSTITUTIONAL: NAD, well-developed  HEAD: Normocephalic, atraumatic, C collar in place  EYES: EOMI, PERRL  ENT: no rhinorrhea, no pharyngeal erythema  RESPIRATORY: No increased work of breathing, CTAB, no wheezes or crackles appreciated  CARDIOVASCULAR: RRR, S1 and S2 present, no m/r/g  ABDOMEN: soft, NT, ND, bowel sounds present  EXTREMITIES: No LE edema  MUSCULOSKELETAL: no joint swelling, no tenderness to palpation  NEURO: A&Ox3, moving all extremities    LABS:                        11.0   12.74 )-----------( 162      ( 10 Nov 2023 04:43 )             33.5     11-10    136  |  99  |  36<H>  ----------------------------<  127<H>  5.0   |  25  |  0.83    Ca    9.0      10 Nov 2023 04:43  Phos  2.9     11-10  Mg     2.10     11-10    TPro  5.4<L>  /  Alb  2.9<L>  /  TBili  0.7  /  DBili  x   /  AST  67<H>  /  ALT  157<H>  /  AlkPhos  359<H>  11-10    Urinalysis Basic - ( 10 Nov 2023 04:43 )    Color: x / Appearance: x / SG: x / pH: x  Gluc: 127 mg/dL / Ketone: x  / Bili: x / Urobili: x   Blood: x / Protein: x / Nitrite: x   Leuk Esterase: x / RBC: x / WBC x   Sq Epi: x / Non Sq Epi: x / Bacteria: x    RADIOLOGY & ADDITIONAL TESTS:    Results Reviewed:   Imaging Personally Reviewed:  Electrocardiogram Personally Reviewed:    COORDINATION OF CARE:  Care Discussed with Consultants/Other Providers [Y/N]:  Prior or Outpatient Records Reviewed [Y/N]:

## 2023-11-10 NOTE — DISCHARGE NOTE PROVIDER - NSDCMRMEDTOKEN_GEN_ALL_CORE_FT
amLODIPine 10 mg oral tablet: 1 tab(s) orally once a day  atorvastatin 20 mg oral tablet: 1 tab(s) orally once a day (at bedtime)  Eliquis 5 mg oral tablet: 1 tab(s) orally 2 times a day  Lantus 100 units/mL subcutaneous solution: 15 unit(s) subcutaneous once a day (in the morning)  ramipril 2.5 mg oral capsule: 1 cap(s) orally once a day   amLODIPine 10 mg oral tablet: 1 tab(s) orally once a day  atorvastatin 20 mg oral tablet: 1 tab(s) orally once a day (at bedtime)  Eliquis 5 mg oral tablet: 1 tab(s) orally 2 times a day  Lantus 100 units/mL subcutaneous solution: 15 unit(s) subcutaneous once a day (in the morning)  nafcillin 2 g injection: 2 gram(s) intramuscularly every 4 hours through 12/28  ramipril 2.5 mg oral capsule: 1 cap(s) orally once a day   amLODIPine 10 mg oral tablet: 1 tab(s) orally once a day  atorvastatin 20 mg oral tablet: 1 tab(s) orally once a day (at bedtime)  Casodex 50 mg oral tablet: 1 tab(s) orally once a day  CBC and CMP: Fax result to Dr Mcghee, infectious diease  Phone: [phone]  Fax: [phone]  cholecalciferol oral tablet: 2000 unit(s) orally once a day  Eliquis 5 mg oral tablet: 1 tab(s) orally 2 times a day  Eliquis 5 mg oral tablet: 1 tab(s) orally 2 times a day  lactobacillus acidophilus oral capsule: 1 cap(s) orally once a day  Lantus 100 units/mL subcutaneous solution: 15 unit(s) subcutaneous once a day (in the morning)  lisinopril 5 mg oral tablet: 1 tab(s) orally once a day  nafcillin 2 g injection: 2 gram(s) intramuscularly every 4 hours through 12/28  pantoprazole 40 mg oral delayed release tablet: 1 tab(s) orally once a day (before a meal)  ramipril 2.5 mg oral capsule: 1 cap(s) orally once a day  tamsulosin 0.4 mg oral capsule: 1 cap(s) orally once a day (at bedtime)   amLODIPine 10 mg oral tablet: 1 tab(s) orally once a day  atorvastatin 20 mg oral tablet: 1 tab(s) orally once a day (at bedtime)  Casodex 50 mg oral tablet: 1 tab(s) orally once a day  CBC and CMP: Fax result to Dr Mcghee, infectious diease  Phone: [phone]  Fax: [phone]  cholecalciferol oral tablet: 2000 unit(s) orally once a day  Eliquis 5 mg oral tablet: 1 tab(s) orally 2 times a day  Eliquis 5 mg oral tablet: 1 tab(s) orally 2 times a day  lactobacillus acidophilus oral capsule: 1 cap(s) orally once a day  Lantus 100 units/mL subcutaneous solution: 15 unit(s) subcutaneous once a day (in the morning)  lisinopril 5 mg oral tablet: 1 tab(s) orally once a day  nafcillin 2 g injection: 2 gram(s) intramuscularly every 4 hours through 12/28  pantoprazole 40 mg oral delayed release tablet: 1 tab(s) orally once a day (before a meal)  ramipril 2.5 mg oral capsule: 1 cap(s) orally once a day  tamsulosin 0.4 mg oral capsule: 1 cap(s) orally once a day (at bedtime)   amLODIPine 10 mg oral tablet: 1 tab(s) orally once a day  atorvastatin 20 mg oral tablet: 1 tab(s) orally once a day (at bedtime)  Casodex 50 mg oral tablet: 1 tab(s) orally once a day  CBC and CMP: Fax result to Dr Mcghee, infectious diease  Phone: [phone]  Fax: [phone]  cholecalciferol oral tablet: 2000 unit(s) orally once a day  Eliquis 5 mg oral tablet: 1 tab(s) orally 2 times a day  Eliquis 5 mg oral tablet: 1 tab(s) orally 2 times a day  lactobacillus acidophilus oral capsule: 1 cap(s) orally once a day  Lantus 100 units/mL subcutaneous solution: 15 unit(s) subcutaneous once a day (in the morning)  lisinopril 5 mg oral tablet: 1 tab(s) orally once a day  nafcillin 2 g injection: 2 gram(s) intramuscularly every 4 hours through 12/28  pantoprazole 40 mg oral delayed release tablet: 1 tab(s) orally once a day (before a meal)  ramipril 2.5 mg oral capsule: 1 cap(s) orally once a day  tamsulosin 0.4 mg oral capsule: 1 cap(s) orally once a day (at bedtime)   atorvastatin 20 mg oral tablet: 1 tab(s) orally once a day (at bedtime)  Casodex 50 mg oral tablet: 1 tab(s) orally once a day  CBC and CMP: Fax result to Dr Mcghee, infectious diease  Phone: [phone]  Fax: [phone]  cholecalciferol oral tablet: 2000 unit(s) orally once a day  lactobacillus acidophilus oral capsule: 1 cap(s) orally once a day  lisinopril 5 mg oral tablet: 1 tab(s) orally once a day  nafcillin 2 g injection: 2 gram(s) intramuscularly every 4 hours through 12/28  pantoprazole 40 mg oral delayed release tablet: 1 tab(s) orally once a day (before a meal)  tamsulosin 0.4 mg oral capsule: 1 cap(s) orally once a day (at bedtime)   atorvastatin 20 mg oral tablet: 1 tab(s) orally once a day (at bedtime)  Casodex 50 mg oral tablet: 1 tab(s) orally once a day  CBC and CMP: Fax result to Dr Mcghee, infectious diease  Phone: [phone]  Fax: [phone]  cholecalciferol oral tablet: 2000 unit(s) orally once a day  lactobacillus acidophilus oral capsule: 1 cap(s) orally once a day  lisinopril 5 mg oral tablet: 1 tab(s) orally once a day  nafcillin 2 g injection: 2 gram(s) intramuscularly every 4 hours through 12/28  pantoprazole 40 mg oral delayed release tablet: 1 tab(s) orally once a day (before a meal)  tamsulosin 0.4 mg oral capsule: 1 cap(s) orally once a day (at bedtime)   atorvastatin 20 mg oral tablet: 1 tab(s) orally once a day (at bedtime)  Casodex 50 mg oral tablet: 1 tab(s) orally once a day  CBC and CMP: Fax result to Dr Mcghee, infectious diease  Phone: [phone]  Fax: [phone]  cholecalciferol oral tablet: 2000 unit(s) orally once a day  lactobacillus acidophilus oral capsule: 1 cap(s) orally once a day  lisinopril 5 mg oral tablet: 1 tab(s) orally once a day  nafcillin 2 g injection: 2 gram(s) intramuscularly every 4 hours through 12/28  pantoprazole 40 mg oral delayed release tablet: 1 tab(s) orally once a day (before a meal)  tamsulosin 0.4 mg oral capsule: 1 cap(s) orally once a day (at bedtime)

## 2023-11-10 NOTE — DISCHARGE NOTE PROVIDER - CARE PROVIDER_API CALL
Isaias Scales  Radiation Oncology  450 [facility], [facility] [place] [address]  Phone: [phone]  Fax: [phone]  Established Patient  Follow Up Time: 1-3 days    Tr Yoo  Plastic Surgery  [address] [place] [address]  Phone: [phone]  Fax: [phone]  Follow Up Time:    Isaias Scales  Radiation Oncology  450 [facility], [facility] [place] [address]  Phone: [phone]  Fax: [phone]  Established Patient  Follow Up Time: 1-3 days    Tr Yoo  Plastic Surgery  [address] [place] [address]  Phone: [phone]  Fax: [phone]  Follow Up Time:    Isaias Scales  Radiation Oncology  450 [facility], [facility] [place] [address]  Phone: [phone]  Fax: [phone]  Established Patient  Follow Up Time: 1-3 days    Tr Yoo  Plastic Surgery  [address] [place] [address]  Phone: [phone]  Fax: [phone]  Follow Up Time:    Isaias Scales  Radiation Oncology  450 [facility], [facility] [place] [address]  Phone: [phone]  Fax: [phone]  Established Patient  Follow Up Time: 1-3 days    Tr Yoo  Plastic Surgery  [address] [place] [address]  Phone: [phone]  Fax: [phone]  Follow Up Time:     Oncology Dr Rodgers, [facility]  Phone: (   )    -  Fax: (   )    -  Established Patient  Follow Up Time: 1 week   Isaias Scales  Radiation Oncology  450 [facility], [facility] [place] [address]  Phone: [phone]  Fax: [phone]  Established Patient  Follow Up Time: 1-3 days    Tr Yoo  Plastic Surgery  [address] [place] [address]  Phone: [phone]  Fax: [phone]  Follow Up Time:     Oncology Dr Rodgers, [facility]  Phone: (   )    -  Fax: (   )    -  Established Patient  Follow Up Time: 1 week   Isaias Scales  Radiation Oncology  450 [facility], [facility] [place] [address]  Phone: [phone]  Fax: [phone]  Established Patient  Follow Up Time: 1-3 days    Tr Yoo  Plastic Surgery  [address] [place] [address]  Phone: [phone]  Fax: [phone]  Follow Up Time:     Oncology Dr Rodgers, [facility]  Phone: (   )    -  Fax: (   )    -  Established Patient  Follow Up Time: 1 week   Isaias Scales  Radiation Oncology  450 [facility], [facility] [place] [address]  Phone: [phone]  Fax: [phone]  Established Patient  Follow Up Time: 1-3 days    Tr Yoo  Plastic Surgery  [address] [place] [address] 309  [place] [address]  Phone: [phone]  Fax: [phone]  Follow Up Time:     Oncology Dr Rodgers, [facility]  Phone: (   )    -  Fax: (   )    -  Established Patient  Follow Up Time: 1 week    Weston Mcghee  Infectious Disease  [address] [place] [address]  Phone: [phone]  Fax: [phone]  Follow Up Time:    Isaias Scales  Radiation Oncology  450 [facility], [facility] [place] [address]  Phone: [phone]  Fax: [phone]  Established Patient  Follow Up Time: 1-3 days    Tr Yoo  Plastic Surgery  [address] [place] [address] 309  [place] [address]  Phone: [phone]  Fax: [phone]  Follow Up Time:     Oncology Dr Rodgers, [facility]  Phone: (   )    -  Fax: (   )    -  Established Patient  Follow Up Time: 1 week    Weston Mcghee  Infectious Disease  [address] [place] [address]  Phone: [phone]  Fax: [phone]  Follow Up Time:    Isaias Scales  Radiation Oncology  450 [facility], [facility] [place]- [place] [address]  Phone: [phone]  Fax: [phone]  Established Patient  Follow Up Time: 1-3 days    Tr Yoo  Plastic Surgery  [address] [place] [address] 309  [place] [address]  Phone: [phone]  Fax: [phone]  Follow Up Time:     Oncology Dr Rodgers, [facility]  Phone: (   )    -  Fax: (   )    -  Established Patient  Follow Up Time: 1 week    Weston Mcghee  Infectious Disease  [address] [place] [address]  Phone: [phone]  Fax: [phone]  Follow Up Time:    Isaias Scales  Radiation Oncology  450 [facility], Entrance [place] [address]  Phone: [phone]  Fax: [phone]  Established Patient  Follow Up Time: 1-3 days    Tr Yoo  Plastic Surgery  [address] [place] [address] 309  [place] [address]  Phone: [phone]  Fax: [phone]  Follow Up Time:     Weston Mcghee  Infectious Disease  [address] [place] [address]  Phone: [phone]  Fax: [phone]  Follow Up Time:     Oncology Dr Rodgers, [facility]  Phone: (   )    -  Fax: (   )    -  Established Patient  Follow Up Time: 1 week    Luther Sesay  Otolaryngology  [address] [place] [address]  Phone: [phone]  Fax: [phone]  Follow Up Time:    Isaias Scales  Radiation Oncology  450 [facility], Entrance [place] [address]  Phone: [phone]  Fax: [phone]  Established Patient  Follow Up Time: 1-3 days    Tr Yoo  Plastic Surgery  [address] [place] [address] 309  [place] [address]  Phone: [phone]  Fax: [phone]  Follow Up Time:     Weston Mcghee  Infectious Disease  [address] [place] [address]  Phone: [phone]  Fax: [phone]  Follow Up Time:     Oncology Dr Rodgers, [facility]  Phone: (   )    -  Fax: (   )    -  Established Patient  Follow Up Time: 1 week    Luther Sesay  Otolaryngology  [address] [place] [address]  Phone: [phone]  Fax: [phone]  Follow Up Time:    Isaias Scales  Radiation Oncology  450 [facility], Entrance [place] [address]  Phone: [phone]  Fax: [phone]  Established Patient  Follow Up Time: 1-3 days    Tr Yoo  Plastic Surgery  [address] [place] [address] 309  [place] [address]  Phone: [phone]  Fax: [phone]  Follow Up Time:     Weston Mcghee  Infectious Disease  [address] [place] [address]  Phone: [phone]  Fax: [phone]  Follow Up Time:     Oncology Dr Rodgers, [facility]  Phone: (   )    -  Fax: (   )    -  Established Patient  Follow Up Time: 1 week    Luther Sesay  Otolaryngology  [address] [place] [address]  Phone: [phone]  Fax: [phone]  Follow Up Time:    Isaias Scales  Radiation Oncology  450 [place] [facility], [facility] [place] [address]  Phone: [phone]  Fax: [phone]  Established Patient  Follow Up Time: 1-3 days    Tr Yoo  Plastic Surgery  [address] [place] [address] 309  [place] [address]  Phone: [phone]  Fax: [phone]  Follow Up Time:     Weston Mcghee  Infectious Disease  [address] [place] [address]  Phone: [phone]  Fax: [phone]  Follow Up Time:     Luther Sesay  Otolaryngology  [address] [place] [address]  Phone: [phone]  Fax: [phone]  Follow Up Time:    Isaias Scales  Radiation Oncology  450 [facility], [facility] A- [place] [address]  Phone: [phone]  Fax: [phone]  Established Patient  Follow Up Time: 1-3 days    Tr Yoo  Plastic Surgery  [address] [place] [address] 309  [place] [address]  Phone: [phone]  Fax: [phone]  Follow Up Time:     Weston Mcghee  Infectious Disease  [address] [place] [address]  Phone: [phone]  Fax: [phone]  Follow Up Time:     Luther Sesay  Otolaryngology  [address] [place] [address]  Phone: [phone]  Fax: [phone]  Follow Up Time:    Isaias Scales  Radiation Oncology  450 [facility], [facility] A- [place] [address]  Phone: [phone]  Fax: [phone]  Established Patient  Follow Up Time: 1-3 days    Tr Yoo  Plastic Surgery  [address] [place] [address] 309  [place] [address]  Phone: [phone]  Fax: [phone]  Follow Up Time:     Weston Mcghee  Infectious Disease  [address] [place] [address]  Phone: [phone]  Fax: [phone]  Follow Up Time:     Luther Sesay  Otolaryngology  [address] [place] [address]  Phone: [phone]  Fax: [phone]  Follow Up Time:

## 2023-11-10 NOTE — DISCHARGE NOTE PROVIDER - DETAILS OF MALNUTRITION DIAGNOSIS/DIAGNOSES
This patient has been assessed with a concern for Malnutrition and was treated during this hospitalization for the following Nutrition diagnosis/diagnoses:     -  11/26/2023: Severe protein-calorie malnutrition

## 2023-11-10 NOTE — PROGRESS NOTE ADULT - SUBJECTIVE AND OBJECTIVE BOX
Orthopedic Surgery Progress Note     S: Patient seen and examined today. No acute events overnight. Pain is well controlled. Denies f/c, chest pain, shortness of breath, dizziness. All AGGIE drains removed yesterday.    MEDICATIONS  (STANDING):  acetaminophen     Tablet .. 975 milliGRAM(s) Oral every 6 hours  amLODIPine   Tablet 10 milliGRAM(s) Oral daily  atorvastatin 20 milliGRAM(s) Oral at bedtime  bicalutamide 50 milliGRAM(s) Oral daily  chlorhexidine 2% Cloths 1 Application(s) Topical daily  dexAMETHasone     Tablet 4 milliGRAM(s) Oral every 12 hours  dexAMETHasone     Tablet   Oral   heparin   Injectable 5000 Unit(s) SubCutaneous every 8 hours  insulin glargine Injectable (LANTUS) 37 Unit(s) SubCutaneous every morning  insulin lispro (ADMELOG) corrective regimen sliding scale   SubCutaneous at bedtime  insulin lispro (ADMELOG) corrective regimen sliding scale   SubCutaneous three times a day before meals  insulin lispro Injectable (ADMELOG) 14 Unit(s) SubCutaneous three times a day before meals  lisinopril 20 milliGRAM(s) Oral daily  mupirocin 2% Ointment 1 Application(s) Topical two times a day  pantoprazole    Tablet 40 milliGRAM(s) Oral before breakfast  polyethylene glycol 3350 17 Gram(s) Oral two times a day  senna 2 Tablet(s) Oral at bedtime  tamsulosin 0.4 milliGRAM(s) Oral at bedtime    MEDICATIONS  (PRN):  HYDROmorphone  Injectable 0.5 milliGRAM(s) IV Push every 4 hours PRN Severe breakthrough Pain (7 - 10)  naloxone Injectable 0.1 milliGRAM(s) IV Push every 3 minutes PRN For ANY of the following changes in patient status:  A. RR LESS THAN 10 breaths per minute, B. Oxygen saturation LESS THAN 90%, C. Sedation score of 6  ondansetron Injectable 4 milliGRAM(s) IV Push every 6 hours PRN Nausea  oxyCODONE    IR 10 milliGRAM(s) Oral every 3 hours PRN Severe Pain (7 - 10)  oxyCODONE    IR 5 milliGRAM(s) Oral every 3 hours PRN Moderate Pain (4 - 6)      Vital Signs Last 24 Hrs  T(C): 36.8 (10 Nov 2023 05:15), Max: 36.9 (09 Nov 2023 22:20)  T(F): 98.2 (10 Nov 2023 05:15), Max: 98.4 (09 Nov 2023 22:20)  HR: 79 (10 Nov 2023 05:15) (79 - 97)  BP: 121/69 (10 Nov 2023 05:15) (101/50 - 121/69)  BP(mean): --  RR: 17 (10 Nov 2023 05:15) (17 - 18)  SpO2: 98% (10 Nov 2023 05:15) (96% - 100%)    Parameters below as of 10 Nov 2023 05:15  Patient On (Oxygen Delivery Method): room air        11-08-23 @ 07:01  -  11-09-23 @ 07:00  --------------------------------------------------------  IN: 1120 mL / OUT: 1440 mL / NET: -320 mL    11-09-23 @ 07:01  -  11-10-23 @ 06:19  --------------------------------------------------------  IN: 150 mL / OUT: 300 mL / NET: -150 mL        Physical Exam:  Gen: NAD  Back:   dressing C/D/I, mild appropriate linda-incisional ttp  AGGIE drains in place w/ serosanguinous output  Neuro:  Motor:                   C5                C6              C7               C8           T1   R            5/5                4+/5            4+/5             4+/5          4+/5  L             5/5               4+/5             4+/5             4+/5          4+/5                L2             L3             L4               L5            S1  R         5/5           5/5          5/5             5/5           5/5  L          5/5          5/5           5/5             5/5           5/5    Sensory:            C5         C6         C7      C8       T1        (0=absent, 1=impaired, 2=normal, NT=not testable)  R         2            2           1        1         1  L          2            2           1        1         1               L2          L3         L4      L5       S1         (0=absent, 1=impaired, 2=normal, NT=not testable)  R         1            1            1        1        1  L          1            1           1        1         1      LABS:                        11.0   11.45 )-----------( 106      ( 09 Nov 2023 05:25 )             33.0     11-09    134<L>  |  99  |  37<H>  ----------------------------<  194<H>  4.8   |  26  |  0.85    Ca    9.1      09 Nov 2023 05:25  Phos  3.3     11-09  Mg     2.10     11-09    TPro  5.5<L>  /  Alb  3.1<L>  /  TBili  0.6  /  DBili  x   /  AST  39  /  ALT  151<H>  /  AlkPhos  379<H>  11-09   Orthopedic Surgery Progress Note     S: Patient seen and examined today. No acute events overnight. Pain is well controlled. Denies f/c, chest pain, shortness of breath, dizziness. All AGGIE drains removed yesterday.    MEDICATIONS  (STANDING):  acetaminophen     Tablet .. 975 milliGRAM(s) Oral every 6 hours  amLODIPine   Tablet 10 milliGRAM(s) Oral daily  atorvastatin 20 milliGRAM(s) Oral at bedtime  bicalutamide 50 milliGRAM(s) Oral daily  chlorhexidine 2% Cloths 1 Application(s) Topical daily  dexAMETHasone     Tablet 4 milliGRAM(s) Oral every 12 hours  dexAMETHasone     Tablet   Oral   heparin   Injectable 5000 Unit(s) SubCutaneous every 8 hours  insulin glargine Injectable (LANTUS) 37 Unit(s) SubCutaneous every morning  insulin lispro (ADMELOG) corrective regimen sliding scale   SubCutaneous at bedtime  insulin lispro (ADMELOG) corrective regimen sliding scale   SubCutaneous three times a day before meals  insulin lispro Injectable (ADMELOG) 14 Unit(s) SubCutaneous three times a day before meals  lisinopril 20 milliGRAM(s) Oral daily  mupirocin 2% Ointment 1 Application(s) Topical two times a day  pantoprazole    Tablet 40 milliGRAM(s) Oral before breakfast  polyethylene glycol 3350 17 Gram(s) Oral two times a day  senna 2 Tablet(s) Oral at bedtime  tamsulosin 0.4 milliGRAM(s) Oral at bedtime    MEDICATIONS  (PRN):  HYDROmorphone  Injectable 0.5 milliGRAM(s) IV Push every 4 hours PRN Severe breakthrough Pain (7 - 10)  naloxone Injectable 0.1 milliGRAM(s) IV Push every 3 minutes PRN For ANY of the following changes in patient status:  A. RR LESS THAN 10 breaths per minute, B. Oxygen saturation LESS THAN 90%, C. Sedation score of 6  ondansetron Injectable 4 milliGRAM(s) IV Push every 6 hours PRN Nausea  oxyCODONE    IR 10 milliGRAM(s) Oral every 3 hours PRN Severe Pain (7 - 10)  oxyCODONE    IR 5 milliGRAM(s) Oral every 3 hours PRN Moderate Pain (4 - 6)      Vital Signs Last 24 Hrs  T(C): 36.8 (10 Nov 2023 05:15), Max: 36.9 (09 Nov 2023 22:20)  T(F): 98.2 (10 Nov 2023 05:15), Max: 98.4 (09 Nov 2023 22:20)  HR: 79 (10 Nov 2023 05:15) (79 - 97)  BP: 121/69 (10 Nov 2023 05:15) (101/50 - 121/69)  BP(mean): --  RR: 17 (10 Nov 2023 05:15) (17 - 18)  SpO2: 98% (10 Nov 2023 05:15) (96% - 100%)    Parameters below as of 10 Nov 2023 05:15  Patient On (Oxygen Delivery Method): room air        11-08-23 @ 07:01  -  11-09-23 @ 07:00  --------------------------------------------------------  IN: 1120 mL / OUT: 1440 mL / NET: -320 mL    11-09-23 @ 07:01  -  11-10-23 @ 06:19  --------------------------------------------------------  IN: 150 mL / OUT: 300 mL / NET: -150 mL        Physical Exam:  Gen: NAD  Back:   incision intact, no erythema or drainage  Dressings over prior drain sites  Neuro:  Motor:                   C5                C6              C7               C8           T1   R            5/5                4+/5            4+/5             4+/5          4+/5  L             5/5               4+/5             4+/5             4+/5          4+/5                L2             L3             L4               L5            S1  R         5/5           5/5          5/5             5/5           5/5  L          5/5          5/5           5/5             5/5           5/5    Sensory:            C5         C6         C7      C8       T1        (0=absent, 1=impaired, 2=normal, NT=not testable)  R         2            2           1        1         1  L          2            2           1        1         1               L2          L3         L4      L5       S1         (0=absent, 1=impaired, 2=normal, NT=not testable)  R         1            1            1        1        1  L          1            1           1        1         1      LABS:                        11.0   11.45 )-----------( 106      ( 09 Nov 2023 05:25 )             33.0     11-09    134<L>  |  99  |  37<H>  ----------------------------<  194<H>  4.8   |  26  |  0.85    Ca    9.1      09 Nov 2023 05:25  Phos  3.3     11-09  Mg     2.10     11-09    TPro  5.5<L>  /  Alb  3.1<L>  /  TBili  0.6  /  DBili  x   /  AST  39  /  ALT  151<H>  /  AlkPhos  379<H>  11-09

## 2023-11-10 NOTE — DISCHARGE NOTE PROVIDER - NSDCFUSCHEDAPPT_GEN_ALL_CORE_FT
[facility]  [facility] [address] [facility]  Scheduled Appointment: 01/09/2024     [facility]  [facility] 450 [facility]  Scheduled Appointment: 01/09/2024     [facility]  [facility] 450 [facility]  Scheduled Appointment: 01/09/2024

## 2023-11-10 NOTE — PROGRESS NOTE ADULT - ASSESSMENT
73y Male s/p C5-T3 posterior cervical fusion, C7-T1 decompression for metastatic prostate cancer.     Plan:  - Anticoagulation contraindicated for at least 6 weeks postoperatively given risk of hematoma and permanent neurologic deficits  - Recommend starting to taper Decadron  - No longer needs head of bed restrictions  - Pain control  - WBAT  - PT/OT/OOB  - I/S  - SCDs  - Dispo: [place]

## 2023-11-10 NOTE — PROGRESS NOTE ADULT - ASSESSMENT
73y Male s/p C5-T3 posterior cervical fusion, C7-T1 decompression for metastatic prostate cancer with plastics closure on 11/2. Recovering well.    Plan:  - L AGGIE drain removed 11/7  - Continue AGGIE drain care for R drain  - Remainder of care per ortho / SICU  - Nylon sutures over incision to remain in place    Plastic Surgery  [id number]# [facility] pager  [phone] [facility] pager  Available on Teams  73y Male s/p C5-T3 posterior cervical fusion, C7-T1 decompression for metastatic prostate cancer with plastics closure on 11/2. Recovering well.    Plan:  - Both drains removed  - Nylon sutures over incision to remain in place  - Gauze/tegaderm dressing over incision; change PRN  - Remainder of care per ortho / SICU    Plastic Surgery  [id number]# [facility] pager  [phone] [facility] pager  Available on Teams  73y Male s/p C5-T3 posterior cervical fusion, C7-T1 decompression for metastatic prostate cancer with plastics closure on 11/2. Recovering well.    Plan:  - Both drains removed  - Nylon sutures over incision to remain in place  - Gauze/tegaderm dressing over incision; change PRN  - Remainder of care per primary team    Plastic Surgery  [id number]# [facility] pager  [phone] [facility] pager  Available on Teams

## 2023-11-10 NOTE — DISCHARGE NOTE PROVIDER - NSDCCPCAREPLAN_GEN_ALL_CORE_FT
PRINCIPAL DISCHARGE DIAGNOSIS  Diagnosis: Cervical spinal cord compression  Assessment and Plan of Treatment: You were initially admitted due to leg weakness. It was believed that the weakness and pain you were experiencing in your legs and arm was due to lesions on your spine. These lesions are likely due to the spread of your prostate cancer to your spine. These lesions were growing and causing pressure on your spinal cord. To treat this compression, you were given steroid medication to help reduce swelling. Our surgical teams also performed major spinal surgery to help decompress these swollen areas and take pressure off your spinal cord. You will require close follow up with the orthopedic spine surgeons to ensure that you are continuing to recover well from surgery.      SECONDARY DISCHARGE DIAGNOSES  Diagnosis: Prostate cancer metastatic to bone  Assessment and Plan of Treatment: Your spinal lesions were likely caused by the spreading of prostate cancer to your spinal bones. You were evaluated by our oncology team (cancer specialists) and they recommended a medication called bicalutamide which helped to treat your prostate cancer. You were also seen by our radiation oncologists who are planning to perform radiation treatments to your spine which can help to further improve your back/neck pain. Be sure to follow up with the oncologists as well as the radiation oncologists for further management of your prostate cancer.     PRINCIPAL DISCHARGE DIAGNOSIS  Diagnosis: Cervical spinal cord compression  Assessment and Plan of Treatment: You were evaluted for spinal cord compression due to metastasis lesion in your spine from your Prostate cancer.  You were operated on by ortho spine service (C5-T3 fusion, C7-T1 decompression.)  This procedure was complicated by concern for surgical site infection with bacteremia which required long duration of antibiotics. You are recommended for [facility].      SECONDARY DISCHARGE DIAGNOSES  Diagnosis: Prostate cancer metastatic to bone  Assessment and Plan of Treatment: You were evaluated by interventional radiology for Kyphoplasty prior to your planned radiation treatment.    Diagnosis: C. difficile colitis  Assessment and Plan of Treatment: You developed C. diff colitis likely due to use of long term antibiotics for your bacteremia. You were placed on Vancomycin oral medication for 10 days with good response.    Diagnosis: Candida esophagitis  Assessment and Plan of Treatment: You developed candida esophagitis likely due to long term antibiotics for bactremia. You were evaluated by ENT surgeon who used to scope to visualize to make sure you did not have airway compromise.  You were treated with Diflucan for 10 days with good response.    Diagnosis: DVT, lower extremity  Assessment and Plan of Treatment: You developed DVT due to Prostate cancer. Because of danger of anticoagulation in a setting of spinal surgery, you had IVC filter placed by interventional radiology.  You will need to follow up with IR in 6-8 weeks for retreival of IVC filter and starting of anticoagulation management once cleared by ortho spine team.    Diagnosis: Pneumonia, aspiration  Assessment and Plan of Treatment: You developed aspiration pneumonia during your hospitalization and was complicated by acute hypoxic respiratory failure.  You were placed on high-flow nasal canula and given Meropenem IV antibiotics for treatment.     PRINCIPAL DISCHARGE DIAGNOSIS  Diagnosis: Cervical spinal cord compression  Assessment and Plan of Treatment: You were evaluted for spinal cord compression due to metastasis lesion in your spine from your Prostate cancer.  You were operated on by ortho spine service (C5-T3 fusion, C7-T1 decompression.)  This procedure was complicated by concern for surgical site infection with bacteremia which required long duration of antibiotics. Please continue antibiotics at rehab, follow up with interventional radiology for kyphoplasty.      SECONDARY DISCHARGE DIAGNOSES  Diagnosis: Prostate cancer metastatic to bone  Assessment and Plan of Treatment: Please follow up with [facility] and radiation oncology.    Diagnosis: Pneumonia, aspiration  Assessment and Plan of Treatment: You developed aspiration pneumonia during your hospitalization and was complicated by acute hypoxic respiratory failure.  You were placed on high-flow nasal canula and given Meropenem IV antibiotics for treatment.    Diagnosis: C. difficile colitis  Assessment and Plan of Treatment: You developed C. diff colitis likely due to use of long term antibiotics for your bacteremia. You were placed on Vancomycin oral medication for 10 days with good response.    Diagnosis: Candida esophagitis  Assessment and Plan of Treatment: You developed candida esophagitis likely due to long term antibiotics for bactremia. You were evaluated by ENT surgeon who used to scope to visualize to make sure you did not have airway compromise.  You were treated with Diflucan for 10 days with good response.    Diagnosis: DVT, lower extremity  Assessment and Plan of Treatment: You developed DVT due to Prostate cancer. Because of danger of anticoagulation in a setting of spinal surgery, you had IVC filter placed by interventional radiology.  You will need to follow up with IR to remove the filter. Discuss with your doctor at [facility] to start blood thinners after you have your filter removed.     PRINCIPAL DISCHARGE DIAGNOSIS  Diagnosis: Cervical spinal cord compression  Assessment and Plan of Treatment: You were evaluted for spinal cord compression due to metastasis lesion in your spine from your Prostate cancer.  You were operated on by ortho spine service (C5-T3 fusion, C7-T1 decompression.)  This procedure was complicated by concern for surgical site infection with bacteremia which required long duration of antibiotics. Please continue antibiotics at rehab, follow up with interventional radiology for kyphoplasty.      SECONDARY DISCHARGE DIAGNOSES  Diagnosis: Prostate cancer metastatic to bone  Assessment and Plan of Treatment: Please follow up with [facility] and radiation oncology.    Diagnosis: Pneumonia, aspiration  Assessment and Plan of Treatment: You developed aspiration pneumonia during your hospitalization and was complicated by acute hypoxic respiratory failure.  You were placed on high-flow nasal canula and given Meropenem IV antibiotics for treatment.    Diagnosis: C. difficile colitis  Assessment and Plan of Treatment: You developed C. diff colitis likely due to use of long term antibiotics for your bacteremia. You were placed on Vancomycin oral medication for 10 days with good response.    Diagnosis: Candida esophagitis  Assessment and Plan of Treatment: You developed candida esophagitis likely due to long term antibiotics for bactremia. You were evaluated by ENT surgeon who used to scope to visualize to make sure you did not have airway compromise.  You were treated with Diflucan for 10 days with good response.    Diagnosis: DVT, lower extremity  Assessment and Plan of Treatment: You developed DVT due to Prostate cancer. Because of danger of anticoagulation in a setting of spinal surgery, you had IVC filter placed by interventional radiology.  You will need to follow up with IR to remove the filter. Discuss with your doctor at [facility] to start blood thinners after you have your filter removed.     PRINCIPAL DISCHARGE DIAGNOSIS  Diagnosis: Cervical spinal cord compression  Assessment and Plan of Treatment: You were evaluted for spinal cord compression due to metastasis lesion in your spine from your Prostate cancer.  You were operated on by ortho spine service (C5-T3 fusion, C7-T1 decompression.)  This procedure was complicated by concern for surgical site infection with bacteremia which required long duration of antibiotics. Please continue antibiotics at rehab, follow up with interventional radiology for kyphoplasty.      SECONDARY DISCHARGE DIAGNOSES  Diagnosis: Prostate cancer metastatic to bone  Assessment and Plan of Treatment: Please follow up with [facility] and radiation oncology.    Diagnosis: Pneumonia, aspiration  Assessment and Plan of Treatment: You developed aspiration pneumonia during your hospitalization and was complicated by acute hypoxic respiratory failure.  You were placed on high-flow nasal canula and given Meropenem IV antibiotics for treatment.    Diagnosis: C. difficile colitis  Assessment and Plan of Treatment: You developed C. diff colitis likely due to use of long term antibiotics for your bacteremia. You were placed on Vancomycin oral medication for 10 days with good response.    Diagnosis: Candida esophagitis  Assessment and Plan of Treatment: You developed candida esophagitis likely due to long term antibiotics for bactremia. You were evaluated by ENT surgeon who used to scope to visualize to make sure you did not have airway compromise.  You were treated with Diflucan for 10 days with good response.    Diagnosis: DVT, lower extremity  Assessment and Plan of Treatment: You developed DVT due to Prostate cancer. Because of danger of anticoagulation in a setting of spinal surgery, you had IVC filter placed by interventional radiology.  You will need to follow up with IR to remove the filter. Discuss with your doctor at [facility] to start blood thinners after you have your filter removed.     PRINCIPAL DISCHARGE DIAGNOSIS  Diagnosis: Cervical spinal cord compression  Assessment and Plan of Treatment: You were evaluted for spinal cord compression due to metastasis lesion in your spine from your Prostate cancer.  You were operated on by ortho spine service (C5-T3 fusion, C7-T1 decompression.)  This procedure was complicated by concern for surgical site infection with bacteremia which required long duration of antibiotics. Please continue antibiotics at rehab, follow up with interventional radiology for kyphoplasty.      SECONDARY DISCHARGE DIAGNOSES  Diagnosis: Prostate cancer metastatic to bone  Assessment and Plan of Treatment: Please follow up with [facility] and radiation oncology to continue treatment of Lupron injection, which will be given outpatient in Oncology office    Diagnosis: Pneumonia, aspiration  Assessment and Plan of Treatment: You developed aspiration pneumonia during your hospitalization and was complicated by acute hypoxic respiratory failure.  You were placed on high-flow nasal canula and given Meropenem IV antibiotics for treatment.    Diagnosis: C. difficile colitis  Assessment and Plan of Treatment: You developed C. diff colitis likely due to use of long term antibiotics for your bacteremia. You were placed on Vancomycin oral medication for 10 days with good response.    Diagnosis: Candida esophagitis  Assessment and Plan of Treatment: You developed candida esophagitis likely due to long term antibiotics for bactremia. You were evaluated by ENT surgeon who used to scope to visualize to make sure you did not have airway compromise.  You were treated with Diflucan for 10 days with good response.    Diagnosis: DVT, lower extremity  Assessment and Plan of Treatment: You developed DVT due to Prostate cancer. Because of danger of anticoagulation in a setting of spinal surgery, you had IVC filter placed by interventional radiology.  You will need to follow up with IR to remove the filter. Discuss with your doctor at [facility] to start blood thinners after you have your filter removed.     PRINCIPAL DISCHARGE DIAGNOSIS  Diagnosis: Cervical spinal cord compression  Assessment and Plan of Treatment: You were evaluted for spinal cord compression due to metastasis lesion in your spine from your Prostate cancer.  You were operated on by ortho spine service (C5-T3 fusion, C7-T1 decompression.)  This procedure was complicated by concern for surgical site infection with bacteremia which required long duration of antibiotics. Please continue antibiotics at rehab, follow up with interventional radiology for kyphoplasty.      SECONDARY DISCHARGE DIAGNOSES  Diagnosis: Prostate cancer metastatic to bone  Assessment and Plan of Treatment: Please follow up with [facility] and radiation oncology to continue treatment of Lupron injection, which will be given outpatient in Oncology office    Diagnosis: Pneumonia, aspiration  Assessment and Plan of Treatment: You developed aspiration pneumonia during your hospitalization and was complicated by acute hypoxic respiratory failure.  You were placed on high-flow nasal canula and given Meropenem IV antibiotics for treatment.    Diagnosis: C. difficile colitis  Assessment and Plan of Treatment: You developed C. diff colitis likely due to use of long term antibiotics for your bacteremia. You were placed on Vancomycin oral medication for 10 days with good response.    Diagnosis: Candida esophagitis  Assessment and Plan of Treatment: You developed candida esophagitis likely due to long term antibiotics for bactremia. You were evaluated by ENT surgeon who used to scope to visualize to make sure you did not have airway compromise.  You were treated with Diflucan for 10 days with good response.    Diagnosis: DVT, lower extremity  Assessment and Plan of Treatment: You developed DVT due to Prostate cancer. Because of danger of anticoagulation in a setting of spinal surgery, you had IVC filter placed by interventional radiology.  You will need to follow up with IR to remove the filter. Discuss with your doctor at [facility] to start blood thinners after you have your filter removed.     PRINCIPAL DISCHARGE DIAGNOSIS  Diagnosis: Cervical spinal cord compression  Assessment and Plan of Treatment: You were evaluted for spinal cord compression due to metastasis lesion in your spine from your Prostate cancer.  You were operated on by ortho spine service (C5-T3 fusion, C7-T1 decompression.)  This procedure was complicated by concern for surgical site infection with bacteremia which required long duration of antibiotics. Please continue antibiotics at rehab, follow up with interventional radiology for kyphoplasty.      SECONDARY DISCHARGE DIAGNOSES  Diagnosis: Prostate cancer metastatic to bone  Assessment and Plan of Treatment: Please follow up with [facility] and radiation oncology to continue treatment of Lupron injection, which will be given outpatient in Oncology office    Diagnosis: Pneumonia, aspiration  Assessment and Plan of Treatment: You developed aspiration pneumonia during your hospitalization and was complicated by acute hypoxic respiratory failure.  You were placed on high-flow nasal canula and given Meropenem IV antibiotics for treatment.    Diagnosis: C. difficile colitis  Assessment and Plan of Treatment: You developed C. diff colitis likely due to use of long term antibiotics for your bacteremia. You were placed on Vancomycin oral medication for 10 days with good response.    Diagnosis: Candida esophagitis  Assessment and Plan of Treatment: You developed candida esophagitis likely due to long term antibiotics for bactremia. You were evaluated by ENT surgeon who used to scope to visualize to make sure you did not have airway compromise.  You were treated with Diflucan for 10 days with good response.    Diagnosis: DVT, lower extremity  Assessment and Plan of Treatment: You developed DVT due to Prostate cancer. Because of danger of anticoagulation in a setting of spinal surgery, you had IVC filter placed by interventional radiology.  You will need to follow up with IR to remove the filter. Discuss with your doctor at [facility] to start blood thinners after you have your filter removed.     PRINCIPAL DISCHARGE DIAGNOSIS  Diagnosis: Cervical spinal cord compression  Assessment and Plan of Treatment: You were evaluted for spinal cord compression due to metastasis lesion in your spine from your Prostate cancer.  You were operated on by ortho spine service (C5-T3 fusion, C7-T1 decompression.)  This procedure was complicated by concern for surgical site infection with bacteremia which required long duration of antibiotics. Please continue antibiotics at rehab, follow up with interventional radiology for kyphoplasty.      SECONDARY DISCHARGE DIAGNOSES  Diagnosis: Prostate cancer metastatic to bone  Assessment and Plan of Treatment: Please follow up with [facility] and radiation oncology to continue treatment of Lupron injection, which will be given outpatient in Oncology office on a monthly schedule, next dose due 1/23/23.    Diagnosis: Pneumonia, aspiration  Assessment and Plan of Treatment: You developed aspiration pneumonia during your hospitalization and was complicated by acute hypoxic respiratory failure.  You were placed on high-flow nasal canula and given Meropenem IV antibiotics for treatment.    Diagnosis: C. difficile colitis  Assessment and Plan of Treatment: You developed C. diff colitis likely due to use of long term antibiotics for your bacteremia. You were placed on Vancomycin oral medication for 10 days with good response.    Diagnosis: Candida esophagitis  Assessment and Plan of Treatment: You developed candida esophagitis likely due to long term antibiotics for bactremia. You were evaluated by ENT surgeon who used to scope to visualize to make sure you did not have airway compromise.  You were treated with Diflucan for 10 days with good response.    Diagnosis: DVT, lower extremity  Assessment and Plan of Treatment: You developed DVT due to Prostate cancer. Because of danger of anticoagulation in a setting of spinal surgery, you had IVC filter placed by interventional radiology.  You will need to follow up with IR to remove the filter. Discuss with your doctor at [facility] to start blood thinners after you have your filter removed.     PRINCIPAL DISCHARGE DIAGNOSIS  Diagnosis: Cervical spinal cord compression  Assessment and Plan of Treatment: You were evaluted for spinal cord compression due to metastasis lesion in your spine from your Prostate cancer.  You were operated on by ortho spine service (C5-T3 fusion, C7-T1 decompression.)  This procedure was complicated by concern for surgical site infection with bacteremia which required long duration of antibiotics. Please continue antibiotics at rehab, follow up with interventional radiology for kyphoplasty.      SECONDARY DISCHARGE DIAGNOSES  Diagnosis: Prostate cancer metastatic to bone  Assessment and Plan of Treatment: Please follow up with [facility] and radiation oncology to continue treatment of Lupron injection, which will be given outpatient in Oncology office on a monthly schedule, next dose due 1/23/23.    Diagnosis: Pneumonia, aspiration  Assessment and Plan of Treatment: You developed aspiration pneumonia during your hospitalization and was complicated by acute hypoxic respiratory failure.  You were placed on high-flow nasal canula and given Meropenem IV antibiotics for treatment.    Diagnosis: C. difficile colitis  Assessment and Plan of Treatment: You developed C. diff colitis likely due to use of long term antibiotics for your bacteremia. You were placed on Vancomycin oral medication for 10 days with good response.    Diagnosis: Candida esophagitis  Assessment and Plan of Treatment: You developed candida esophagitis likely due to long term antibiotics for bactremia. You were evaluated by ENT surgeon who used to scope to visualize to make sure you did not have airway compromise.  You were treated with Diflucan for 10 days with good response.    Diagnosis: DVT, lower extremity  Assessment and Plan of Treatment: You developed DVT due to Prostate cancer. Because of danger of anticoagulation in a setting of spinal surgery, you had IVC filter placed by interventional radiology.  You will need to follow up with IR to remove the filter. Discuss with your doctor at [facility] to start blood thinners after you have your filter removed.     PRINCIPAL DISCHARGE DIAGNOSIS  Diagnosis: Cervical spinal cord compression  Assessment and Plan of Treatment: You were evaluted for spinal cord compression due to metastasis lesion in your spine from your Prostate cancer.  You were operated on by ortho spine service (C5-T3 fusion, C7-T1 decompression.)  This procedure was complicated by concern for surgical site infection with bacteremia which required long duration of antibiotics. Please continue antibiotics at rehab, follow up with interventional radiology for kyphoplasty.      SECONDARY DISCHARGE DIAGNOSES  Diagnosis: Prostate cancer metastatic to bone  Assessment and Plan of Treatment: Please follow up with [facility] and radiation oncology to continue treatment of Lupron injection, which will be given outpatient in Oncology office on a monthly schedule, next dose due 1/23/23.    Diagnosis: Pneumonia, aspiration  Assessment and Plan of Treatment: You developed aspiration pneumonia during your hospitalization and was complicated by acute hypoxic respiratory failure.  You were placed on high-flow nasal canula and given Meropenem IV antibiotics for treatment.    Diagnosis: C. difficile colitis  Assessment and Plan of Treatment: You developed C. diff colitis likely due to use of long term antibiotics for your bacteremia. You were placed on Vancomycin oral medication for 10 days with good response.    Diagnosis: Candida esophagitis  Assessment and Plan of Treatment: You developed candida esophagitis likely due to long term antibiotics for bactremia. You were evaluated by ENT surgeon who used to scope to visualize to make sure you did not have airway compromise.  You were treated with Diflucan for 10 days with good response.    Diagnosis: DVT, lower extremity  Assessment and Plan of Treatment: You developed DVT due to Prostate cancer. Because of danger of anticoagulation in a setting of spinal surgery, you had IVC filter placed by interventional radiology.  You will need to follow up with IR to remove the filter. Discuss with your doctor at [facility] to start blood thinners after you have your filter removed.     PRINCIPAL DISCHARGE DIAGNOSIS  Diagnosis: Cervical spinal cord compression  Assessment and Plan of Treatment: You were evaluted for spinal cord compression due to metastasis lesion in your spine from your Prostate cancer.  You were operated on by ortho spine service (C5-T3 fusion, C7-T1 decompression.)  This procedure was complicated by concern for surgical site infection with bacteremia which required long duration of antibiotics. Please continue antibiotics at rehab through 12/28, follow up with interventional radiology for kyphoplasty.      SECONDARY DISCHARGE DIAGNOSES  Diagnosis: Prostate cancer metastatic to bone  Assessment and Plan of Treatment: Please follow up with [facility] and radiation oncology to continue treatment of Lupron injection, which will be given outpatient in Oncology office on a monthly schedule, next dose due 1/23/23. They will call you to set up an appointment. Please follow up with radiation oncology as well.    Diagnosis: DVT, lower extremity  Assessment and Plan of Treatment: You developed DVT due to Prostate cancer. Because of danger of anticoagulation in a setting of spinal surgery, you had IVC filter placed by interventional radiology.  You will need to follow up with IR to remove the filter, please call to make an appointment. Discuss with your doctor at [facility] to start blood thinners after you have your filter removed.    Diagnosis: Pneumonia, aspiration  Assessment and Plan of Treatment: You developed aspiration pneumonia during your hospitalization and was complicated by acute hypoxic respiratory failure.  You were placed on high-flow nasal canula and given Meropenem IV antibiotics for treatment.    Diagnosis: C. difficile colitis  Assessment and Plan of Treatment: You developed C. diff colitis likely due to use of long term antibiotics for your bacteremia. You were placed on Vancomycin oral medication for 10 days with good response.    Diagnosis: Candida esophagitis  Assessment and Plan of Treatment: You developed candida esophagitis likely due to long term antibiotics for bactremia. You were evaluated by ENT surgeon who used to scope to visualize to make sure you did not have airway compromise.  You were treated with Diflucan for 10 days with good response.     PRINCIPAL DISCHARGE DIAGNOSIS  Diagnosis: Cervical spinal cord compression  Assessment and Plan of Treatment: You were evaluted for spinal cord compression due to metastasis lesion in your spine from your Prostate cancer.  You were operated on by ortho spine service (C5-T3 fusion, C7-T1 decompression.)  This procedure was complicated by concern for surgical site infection with bacteremia which required long duration of antibiotics. Please continue antibiotics at rehab through 12/28, follow up with interventional radiology for kyphoplasty.      SECONDARY DISCHARGE DIAGNOSES  Diagnosis: Prostate cancer metastatic to bone  Assessment and Plan of Treatment: Please follow up with [facility] and radiation oncology to continue treatment of Lupron injection, which will be given outpatient in Oncology office on a monthly schedule, next dose due 1/23/23. They will call you to set up an appointment. Please follow up with radiation oncology as well.    Diagnosis: DVT, lower extremity  Assessment and Plan of Treatment: You developed DVT due to Prostate cancer. Because of danger of anticoagulation in a setting of spinal surgery, you had IVC filter placed by interventional radiology.  You will need to follow up with IR to remove the filter, please call to make an appointment. Discuss with your doctor at [facility] to start blood thinners after you have your filter removed.    Diagnosis: Pneumonia, aspiration  Assessment and Plan of Treatment: You developed aspiration pneumonia during your hospitalization and was complicated by acute hypoxic respiratory failure.  You were placed on high-flow nasal canula and given Meropenem IV antibiotics for treatment.    Diagnosis: C. difficile colitis  Assessment and Plan of Treatment: You developed C. diff colitis likely due to use of long term antibiotics for your bacteremia. You were placed on Vancomycin oral medication for 10 days with good response.    Diagnosis: Candida esophagitis  Assessment and Plan of Treatment: You developed candida esophagitis likely due to long term antibiotics for bactremia. You were evaluated by ENT surgeon who used to scope to visualize to make sure you did not have airway compromise.  You were treated with Diflucan for 10 days with good response.     PRINCIPAL DISCHARGE DIAGNOSIS  Diagnosis: Cervical spinal cord compression  Assessment and Plan of Treatment: You were evaluted for spinal cord compression due to metastasis lesion in your spine from your Prostate cancer.  You were operated on by ortho spine service (C5-T3 fusion, C7-T1 decompression.)  This procedure was complicated by concern for surgical site infection with bacteremia which required long duration of antibiotics. Please continue antibiotics at rehab through 12/28, follow up with interventional radiology for kyphoplasty.      SECONDARY DISCHARGE DIAGNOSES  Diagnosis: Prostate cancer metastatic to bone  Assessment and Plan of Treatment: Please follow up with [facility] and radiation oncology to continue treatment of Lupron injection, which will be given outpatient in Oncology office on a monthly schedule, next dose due 1/23/23. They will call you to set up an appointment. Please follow up with radiation oncology as well.    Diagnosis: DVT, lower extremity  Assessment and Plan of Treatment: You developed DVT due to Prostate cancer. Because of danger of anticoagulation in a setting of spinal surgery, you had IVC filter placed by interventional radiology.  You will need to follow up with IR to remove the filter, please call to make an appointment. Discuss with your doctor at [facility] to start blood thinners after you have your filter removed.    Diagnosis: Pneumonia, aspiration  Assessment and Plan of Treatment: You developed aspiration pneumonia during your hospitalization and was complicated by acute hypoxic respiratory failure.  You were placed on high-flow nasal canula and given Meropenem IV antibiotics for treatment.    Diagnosis: C. difficile colitis  Assessment and Plan of Treatment: You developed C. diff colitis likely due to use of long term antibiotics for your bacteremia. You were placed on Vancomycin oral medication for 10 days with good response.    Diagnosis: Candida esophagitis  Assessment and Plan of Treatment: You developed candida esophagitis likely due to long term antibiotics for bactremia. You were evaluated by ENT surgeon who used to scope to visualize to make sure you did not have airway compromise.  You were treated with Diflucan for 10 days with good response.

## 2023-11-10 NOTE — PROGRESS NOTE ADULT - PROBLEM SELECTOR PLAN 1
Patient initially presenting with LE weakness and well as RUE parasthesias.  - patient found to have numerous spinal cord lesions  - noted with spinous process fractures of C7 and T1  - also noted with neoplasm with epidural expansion causing cord compression from C7-T3  - now s/p C5-T3 posterior cervical fusion and C7-T1 decompression by orthopedic surgery on 11/2  - weakness/parasthesias now markedly improved  - c/w dexamethasone 4mg q6h  - s/p flap closure of back surgical site by plastic surgery with b/l AGGIE drains  - L AGGIE drain removed on 11/7, R AGGIE drain removed 11/10  - appreciate further orthopedics/plastic surgery recs  - d/w rad/onc, no plan for inpatient RT, recommend outpatient follow up  - per rad/onc will obtain repeat thoracic spine MRI prior to discharge (ordered 11/9)

## 2023-11-10 NOTE — DISCHARGE NOTE PROVIDER - NSFOLLOWUPCLINICSTOKEN_GEN_ALL_ED_FT
005298:1 month|| ||00\01||False; 660499:1 month|| ||00\01||False; 692097:1 month|| ||00\01||False; 789932:1 month|| ||00\01||False;344530: || ||00\01||False; 139872:1 month|| ||00\01||False;816749: || ||00\01||False; 400263:1 month|| ||00\01||False;973611: || ||00\01||False;

## 2023-11-10 NOTE — DISCHARGE NOTE PROVIDER - NSDCFUADDAPPT_GEN_ALL_CORE_FT
Follow up with Rad/OncDr. Scales at [facility] next to [facility], at- [address] [address] [phone] appt made January 9th 8:30AM    Followup for Kyphoplasty  [facility] [phone]     IR: Outpt scheduling phone number is [phone] for IVC filter removal. Follow up with Rad/OncDr. Scales at [facility] next to [facility], at- [address] [phone] appt made January 9th 8:30AM    Followup for Kyphoplasty  [facility] [phone]     IR: Outpt scheduling phone number is [phone] for IVC filter removal. Follow up with Rad/OncDr. Scales at [facility] next to [facility], at- [address] [phone] appt made January 9th 8:30AM    Followup for Kyphoplasty  [facility] [phone]     IR: Outpt scheduling phone number is [phone] for IVC filter removal. 1. Radiation Oncology - Dr. Scales at [facility] next to [facility], at- [address] [facility] [phone] appt made January 9th 8:30AM    2. Medical Oncology will call you to make an appointment for followup [phone]    3. Followup for Kyphoplasty  IR [facility] [phone]     4. Follow up IR: Outpt scheduling phone number is [phone] for IVC filter removal. 1. Radiation Oncology - Dr. Scales at [facility] next to [facility], at- [address] [facility] [phone] appt made January 9th 8:30AM    2. Medical Oncology will call you to make an appointment for followup [phone]    3. Followup for Kyphoplasty  IR [facility] [phone]     4. Follow up IR: Outpt scheduling phone number is [phone] for IVC filter removal. 1. Radiation Oncology - Dr. Scales at [facility] next to [facility], at- [address] [facility] [phone] appt made January 9th 8:30AM    2. Medical Oncology will call you to make an appointment for followup [phone]    3. Followup for Kyphoplasty  IR [facility] [phone]     4. Follow up IR: Outpt scheduling phone number is [phone] for IVC filter removal.

## 2023-11-10 NOTE — DISCHARGE NOTE PROVIDER - CARE PROVIDERS DIRECT ADDRESSES
,[email or web address],DirectAddress_Unknown ,[email or web address],DirectAddress_Unknown ,[email or web address],DirectAddress_Unknown ,[email or web address],DirectAddress_Unknown,DirectAddress_Unknown ,[email or web address],DirectAddress_Unknown,DirectAddress_Unknown ,[email or web address],DirectAddress_Unknown,DirectAddress_Unknown ,[email or web address],DirectAddress_Unknown,DirectAddress_Unknown,[email or web address] ,[email or web address],DirectAddress_Unknown,DirectAddress_Unknown,[email or web address] ,[email or web address],DirectAddress_Unknown,DirectAddress_Unknown,[email or web address] ,[email or web address],DirectAddress_Unknown,[email or web address] CenterOffsite Care ResourcesSouth Mississippi State [email or web address],DirectAddress_Unknown,[email or web address] General [email or web address] ,[email or web address],DirectAddress_Unknown,[email or web address] [email or web address],DirectAddress_Unknown,[email or web address] State HospitalStarsVu.[email or web address] ,[email or web address],DirectAddress_Unknown,[email or web address] CenterAltaRock EnergyTippah County Hospital.[email or web address],DirectAddress_Unknown,[email or web address] [email or web address] Vision.[facility] ,[email or web address],DirectAddress_Unknown,[email or web address] [email or web address] the Valley [email or web address] ,[email or web address],DirectAddress_Unknown,[email or web address] ,[email or web address],DirectAddress_Unknown,[email or web address]

## 2023-11-10 NOTE — DISCHARGE NOTE PROVIDER - NSFOLLOWUPCLINICS_GEN_ALL_ED_FT
[facility] at [place]  Internal Medicine  [address] [place] [address]  Phone: [phone]  Fax: [phone]  Follow Up Time: 1 month     [facility] at [place]  Internal Medicine  [address] [place] [address]  Phone: [phone]  Fax: [phone]  Follow Up Time: 1 month     [facility] at [place]  Internal Medicine  [address] [place] [address]  Phone: [phone]  Fax: [phone]  Follow Up Time: 1 month     [facility] at [place]  Internal Medicine  [address] [place] [address]  Phone: [phone]  Fax: [phone]  Follow Up Time: 1 month    [facility]  Hematology/Oncology  [address] [place] [address]  Phone: [phone]  Fax:      [facility]ties at [place]  Internal Medicine  [address] [place] [address]  Phone: [phone]  Fax: [phone]  Follow Up Time: 1 month    [facility]  Hematology/Oncology  [address] [place] [address]  Phone: [phone]  Fax:      [facility] Specialties at [place]  Internal Medicine  [address] [place] [address]  Phone: [phone]  Fax: [phone]  Follow Up Time: 1 month    [facility]  Hematology/Oncology  [address] [place] [address]  Phone: [phone]  Fax:

## 2023-11-10 NOTE — PROGRESS NOTE ADULT - ASSESSMENT
74y/o M with DM, HTN and metastatic prostate cancer (diagnosed in 2009) that initially presented to OSH with LE weakness and R hand paresthesias now transferred to [facility] for NSG eval. Patient now s/p surgical intervention (C5-T3 posterior cervical fusion and C7-T1 decompression) by orthopedic surgery, now transferred to medicine for further management.

## 2023-11-10 NOTE — DISCHARGE NOTE PROVIDER - NPI NUMBER (FOR SYSADMIN USE ONLY) :
[[id number]],[[id number]] [[id number]],[[id number]] [[id number]],[[id number]] [[id number]],[[id number]],[UNKNOWN] [[id number]],[[id number]],[UNKNOWN] [[id number]],[[id number]],[UNKNOWN] [[id number]],[[id number]],[UNKNOWN],[[id number]] [[id number]],[[id number]],[UNKNOWN],[[id number]] [[id number]],[[id number]],[UNKNOWN],[[id number]] [[id number]],[[id number]],[[id number]],[UNKNOWN],[[id number]] [[id number]],[[id number]],[[id number]],[UNKNOWN],[[id number]] [[id number]],[[id number]],[[id number]],[UNKNOWN],[[id number]] [[id number]],[[id number]],[[id number]],[[id number]] [[id number]],[[id number]],[[id number]],[[id number]] [[id number]],[[id number]],[[id number]],[[id number]]

## 2023-11-10 NOTE — CONSULT NOTE ADULT - SUBJECTIVE AND OBJECTIVE BOX
HPI:  73M with HTN met prostate cancer (2009) with LE weakness and R hand paresthesias presents as a transfer from [facility] for NSGY evaluation    Patient reports that he has known about his prostate cancer since 2009, but did not seek out treatment because he did not take it seriously. He reports that he is typically fully functional and has no known weakness, or paresthesias. Around 1 month ago, he reports feeling some minor tingling/numbness in his R hand. The week prior to his hospitalization, he began to feel some minor weakness in both of his legs, but was still able to perform all of his ADLs and iADLs. He reports on Thursday, while he was showering, he fell in the bath due to weakness in his legs, but reports he was still able to get up and perform some household tasks such as cleaning and cooking, but still had general weakness and fell several more times. When he woke up from bed the next morning, he attempted to use the restroom and fell to the floor and reports he wasn't able to feel his below his belly button, was unable to control his bowel movements, couldn't feel his genitals and was paralyzed on bilateral lower extremities. He was on the ground for >48 hours with no PO intake before his son came to the [place] for evaluation at [facility].     Patient was initially evaluated at [facility]; for b/l found to be in rhabdo and SRUTHI, On imaging, he was found to have C7 spinous process nondisplaced fracture of T1 spinous process, edema from C3-4 through C7-T1 interspinous ligaments as well as lytic metastatic disease from C7, Tq-T5 and T12, as well as L1-L3 as well as epidural expansion of neoplasm at C7 centrally and dorsally resulting in moderate cord compression and underlying edema/extension into the bilateral C7-T1 and T1-2 and Left T2-3 Neural foramina. Epidural disease was also notedat T12 vertebral body. NM Scan significant for multiple sites with osseous lesions; Scapula, Multiple left ribs, bilateral pelvic region, as well as b/l LE DVTs on US and was started on Heparin GTT. Patient Reports that since Wednesday, he has had significant improvement in his functional status and has regained sensation and movement in his lower extremities and has been able to feel his genatalia and his lower abdomen.  (27 Oct 2023 19:09). s/p C5-T3 posterior cervical fusion, C7-T1 decompression for metastatic prostate cancer.      REVIEW OF SYSTEMS/Subjective: Patient in a chair in NAD, no SOB, no n/v, pain controlled, (+)Aspen collar. (+)tingling in right digit 5, no focal motor weakness  Constitutional - No fever, No fatigue  HEENT - No visual disturbances,  (+) neck pain  Respiratory - No cough, No shortness of breath  Cardiovascular - No chest pain  Gastrointestinal - No abdominal pain  Genitourinary - No dysuria  Neurological - No headaches, No loss of strength, No numbness, (+)balance impairment  Musculoskeletal - No joint pain, No joint swelling, No muscle pain  Psychiatric - No depression, No anxiety  All other review of systems negative    PAST MEDICAL & SURGICAL HISTORY  Spinal cord compression    Family history of stroke (Grandparent)    Handoff    MEWS Score    Essential hypertension    Prostate cancer metastatic to bone    Thoracic spine tumor    Thoracic spine tumor    DVT, lower extremity    Prostate cancer metastatic to bone    Need for prophylactic measure    SRUTHI (acute kidney injury)    T2DM (type 2 diabetes mellitus)    Rhabdomyolysis    Anemia of chronic disease    Back pain    Hypertension    Hyperlipidemia    Cord compression    Cervical spinal cord compression    Medication management    Transaminitis    Urinary retention    Preoperative clearance    Open posterior fusion of cervicojoint of posterior column of thoracic vertebra    Closure, wound, back, secondary    No significant past surgical history    SysAdmin_VstLnk        SOCIAL HISTORY    Smoking - Denied  EtOH - Denied   Drugs - Denied    FUNCTIONAL HISTORY  Lives with son in an apt with 8 evie. (-)HHA  Independent in ambulation without AD, ADL's, transfers prior to hospitalization      FAMILY HISTORY   Reviewed and non-contributory    ALLERGIES  No Known Allergies    VITALS  T(C): 36.8 (11-10-23 @ 05:15)  T(F): 98.2 (11-10-23 @ 05:15), Max: 98.4 (11-09-23 @ 22:20)  HR: 79 (11-10-23 @ 05:15) (79 - 97)  BP: 121/69 (11-10-23 @ 05:15) (101/50 - 121/69)  RR:  (17 - 18)  SpO2:  (96% - 100%)  Wt(kg): --    PHYSICAL EXAM  Constitutional - NAD, Comfortable  HEENT - NCAT, MMM, (+)[place] Collar  Chest - CTA bilaterally  Cardiovascular - RRR, S1S2  Abdomen - BS+, Soft, NTND  ext: (+)BLE edema  Neurologic Exam -                    Cognitive - Awake, Alert, Oriented to self, place, date, year, situation, pleasant and cooperative     Communication - Fluent, No dysarthria       Cranial Nerves - EOMI, tongue midline, no facial symmetry     Motor -                     LEFT    UE - ShAB 5/5, EF 5/5, EE 5/5, WE 5/5,  5/5, (+)TDO                    RIGHT UE - ShAB 5/5, EF 5/5, EE 5/5, WE 5/5,  5/5, (+)TDO                    LEFT    LE - HF 4+/5, KE 5/5, DF 5/5, PF 5/5                    RIGHT LE - HF 4+/5, KE 5/5, DF 5/5, PF 5/5        Sensory - Intact to light touch diffusely BLE, prop impaired left 1st MTP, intact at ankle (B)     Reflexes - +2 KJ, +2 AJ (B), (+) Babinski's bilaterally      Coordination - Finger-to-nose intact bilaterally   Psychiatric - Affect WNL      CURRENT FUNCTIONAL STATUS: (11/9/23)  Sit-Stand Transfer Training  Sit-to-Stand Transfer Training Rehab Potential: fair, will monitor progress closely;  Lower extremity sensation grossly  intact   Transfer Training Sit-to-Stand Transfer: contact guard;  1 person assist;  full weight-bearing   rolling walker  Transfer Training Stand-to-Sit Transfer: contact guard;  1 person assist;  full weight-bearing   rolling walker    Gait Training  Gait Training Rehab Potential: fair, will monitor progress closely  Gait Training: contact guard;  1 person assist;  full weight-bearing   rolling walker;  50 feet;     RECENT LABS/IMAGING                        11.0   12.74 )-----------( 162      ( 10 Nov 2023 04:43 )             33.5     11-10    136  |  99  |  36<H>  ----------------------------<  127<H>  5.0   |  25  |  0.83    Ca    9.0      10 Nov 2023 04:43  Phos  2.9     11-10  Mg     2.10     11-10    TPro  5.4<L>  /  Alb  2.9<L>  /  TBili  0.7  /  DBili  x   /  AST  67<H>  /  ALT  157<H>  /  AlkPhos  359<H>  11-10      Urinalysis Basic - ( 10 Nov 2023 04:43 )    Color: x / Appearance: x / SG: x / pH: x  Gluc: 127 mg/dL / Ketone: x  / Bili: x / Urobili: x   Blood: x / Protein: x / Nitrite: x   Leuk Esterase: x / RBC: x / WBC x   Sq Epi: x / Non Sq Epi: x / Bacteria: x          MEDICATIONS   MEDICATIONS  (STANDING):  acetaminophen     Tablet .. 975 milliGRAM(s) Oral every 6 hours  amLODIPine   Tablet 10 milliGRAM(s) Oral daily  atorvastatin 20 milliGRAM(s) Oral at bedtime  bicalutamide 50 milliGRAM(s) Oral daily  chlorhexidine 2% Cloths 1 Application(s) Topical daily  dexAMETHasone     Tablet 4 milliGRAM(s) Oral every 12 hours  dexAMETHasone     Tablet   Oral   heparin   Injectable 5000 Unit(s) SubCutaneous every 8 hours  insulin glargine Injectable (LANTUS) 37 Unit(s) SubCutaneous every morning  insulin lispro (ADMELOG) corrective regimen sliding scale   SubCutaneous three times a day before meals  insulin lispro (ADMELOG) corrective regimen sliding scale   SubCutaneous at bedtime  insulin lispro Injectable (ADMELOG) 14 Unit(s) SubCutaneous three times a day before meals  lisinopril 20 milliGRAM(s) Oral daily  mupirocin 2% Ointment 1 Application(s) Topical two times a day  pantoprazole    Tablet 40 milliGRAM(s) Oral before breakfast  polyethylene glycol 3350 17 Gram(s) Oral two times a day  senna 2 Tablet(s) Oral at bedtime  tamsulosin 0.4 milliGRAM(s) Oral at bedtime    MEDICATIONS  (PRN):  HYDROmorphone  Injectable 0.5 milliGRAM(s) IV Push every 4 hours PRN Severe breakthrough Pain (7 - 10)  naloxone Injectable 0.1 milliGRAM(s) IV Push every 3 minutes PRN For ANY of the following changes in patient status:  A. RR LESS THAN 10 breaths per minute, B. Oxygen saturation LESS THAN 90%, C. Sedation score of 6  ondansetron Injectable 4 milliGRAM(s) IV Push every 6 hours PRN Nausea  oxyCODONE    IR 10 milliGRAM(s) Oral every 3 hours PRN Severe Pain (7 - 10)  oxyCODONE    IR 5 milliGRAM(s) Oral every 3 hours PRN Moderate Pain (4 - 6)      ASSESSMENT/PLAN  The patient is a 74y/o male PMHx DM, HTN and metastatic prostate cancer (diagnosed in 2009) that initially presented to OSH with LE weakness and R hand paresthesias 2/2 diffuse metastatic disease and cord compression. s/p C5-T3 posterior cervical fusion and C7-T1 decompression (11/2/23) with functional, gait, ADL impairments.    Disposition - Patient is a candidate for restorative inpatient rehab, acute unit. Patient can tolerate 3 hours/day of rehab services.  -please obtain Doppler BLE    PT - ROM, Bed mobility, Transfers, Ambulation with assistive device  OT - ADLs, ROM  Precautions - Falls, Cardiac  DVT Prophylaxis - Heparin SQ  Weight bearing status - WBAT/Aspen Collar  Skin - Turn Q2hrs  Diet - Regular

## 2023-11-11 LAB
ANION GAP SERPL CALC-SCNC: 10 MMOL/L — SIGNIFICANT CHANGE UP (ref 7–14)
ANION GAP SERPL CALC-SCNC: 10 MMOL/L — SIGNIFICANT CHANGE UP (ref 7–14)
BASOPHILS # BLD AUTO: 0 K/UL — SIGNIFICANT CHANGE UP (ref 0–0.2)
BASOPHILS # BLD AUTO: 0 K/UL — SIGNIFICANT CHANGE UP (ref 0–0.2)
BASOPHILS NFR BLD AUTO: 0 % — SIGNIFICANT CHANGE UP (ref 0–2)
BASOPHILS NFR BLD AUTO: 0 % — SIGNIFICANT CHANGE UP (ref 0–2)
BUN SERPL-MCNC: 36 MG/DL — HIGH (ref 7–23)
BUN SERPL-MCNC: 36 MG/DL — HIGH (ref 7–23)
CALCIUM SERPL-MCNC: 9.3 MG/DL — SIGNIFICANT CHANGE UP (ref 8.4–10.5)
CALCIUM SERPL-MCNC: 9.3 MG/DL — SIGNIFICANT CHANGE UP (ref 8.4–10.5)
CHLORIDE SERPL-SCNC: 100 MMOL/L — SIGNIFICANT CHANGE UP (ref 98–107)
CHLORIDE SERPL-SCNC: 100 MMOL/L — SIGNIFICANT CHANGE UP (ref 98–107)
CO2 SERPL-SCNC: 26 MMOL/L — SIGNIFICANT CHANGE UP (ref 22–31)
CO2 SERPL-SCNC: 26 MMOL/L — SIGNIFICANT CHANGE UP (ref 22–31)
CREAT SERPL-MCNC: 0.83 MG/DL — SIGNIFICANT CHANGE UP (ref 0.5–1.3)
CREAT SERPL-MCNC: 0.83 MG/DL — SIGNIFICANT CHANGE UP (ref 0.5–1.3)
EGFR: 94 ML/MIN/1.73M2 — SIGNIFICANT CHANGE UP
EGFR: 94 ML/MIN/1.73M2 — SIGNIFICANT CHANGE UP
EOSINOPHIL # BLD AUTO: 0 K/UL — SIGNIFICANT CHANGE UP (ref 0–0.5)
EOSINOPHIL # BLD AUTO: 0 K/UL — SIGNIFICANT CHANGE UP (ref 0–0.5)
EOSINOPHIL NFR BLD AUTO: 0 % — SIGNIFICANT CHANGE UP (ref 0–6)
EOSINOPHIL NFR BLD AUTO: 0 % — SIGNIFICANT CHANGE UP (ref 0–6)
GLUCOSE BLDC GLUCOMTR-MCNC: 105 MG/DL — HIGH (ref 70–99)
GLUCOSE BLDC GLUCOMTR-MCNC: 105 MG/DL — HIGH (ref 70–99)
GLUCOSE BLDC GLUCOMTR-MCNC: 116 MG/DL — HIGH (ref 70–99)
GLUCOSE BLDC GLUCOMTR-MCNC: 116 MG/DL — HIGH (ref 70–99)
GLUCOSE BLDC GLUCOMTR-MCNC: 133 MG/DL — HIGH (ref 70–99)
GLUCOSE BLDC GLUCOMTR-MCNC: 133 MG/DL — HIGH (ref 70–99)
GLUCOSE BLDC GLUCOMTR-MCNC: 249 MG/DL — HIGH (ref 70–99)
GLUCOSE BLDC GLUCOMTR-MCNC: 249 MG/DL — HIGH (ref 70–99)
GLUCOSE SERPL-MCNC: 151 MG/DL — HIGH (ref 70–99)
GLUCOSE SERPL-MCNC: 151 MG/DL — HIGH (ref 70–99)
HCT VFR BLD CALC: 33.2 % — LOW (ref 39–50)
HCT VFR BLD CALC: 33.2 % — LOW (ref 39–50)
HGB BLD-MCNC: 11.2 G/DL — LOW (ref 13–17)
HGB BLD-MCNC: 11.2 G/DL — LOW (ref 13–17)
IANC: 11.2 K/UL — HIGH (ref 1.8–7.4)
IANC: 11.2 K/UL — HIGH (ref 1.8–7.4)
LYMPHOCYTES # BLD AUTO: 0 % — LOW (ref 13–44)
LYMPHOCYTES # BLD AUTO: 0 % — LOW (ref 13–44)
LYMPHOCYTES # BLD AUTO: 0 K/UL — LOW (ref 1–3.3)
LYMPHOCYTES # BLD AUTO: 0 K/UL — LOW (ref 1–3.3)
MAGNESIUM SERPL-MCNC: 2.1 MG/DL — SIGNIFICANT CHANGE UP (ref 1.6–2.6)
MAGNESIUM SERPL-MCNC: 2.1 MG/DL — SIGNIFICANT CHANGE UP (ref 1.6–2.6)
MANUAL SMEAR VERIFICATION: SIGNIFICANT CHANGE UP
MANUAL SMEAR VERIFICATION: SIGNIFICANT CHANGE UP
MCHC RBC-ENTMCNC: 31.1 PG — SIGNIFICANT CHANGE UP (ref 27–34)
MCHC RBC-ENTMCNC: 31.1 PG — SIGNIFICANT CHANGE UP (ref 27–34)
MCHC RBC-ENTMCNC: 33.7 GM/DL — SIGNIFICANT CHANGE UP (ref 32–36)
MCHC RBC-ENTMCNC: 33.7 GM/DL — SIGNIFICANT CHANGE UP (ref 32–36)
MCV RBC AUTO: 92.2 FL — SIGNIFICANT CHANGE UP (ref 80–100)
MCV RBC AUTO: 92.2 FL — SIGNIFICANT CHANGE UP (ref 80–100)
MONOCYTES # BLD AUTO: 0.42 K/UL — SIGNIFICANT CHANGE UP (ref 0–0.9)
MONOCYTES # BLD AUTO: 0.42 K/UL — SIGNIFICANT CHANGE UP (ref 0–0.9)
MONOCYTES NFR BLD AUTO: 3.5 % — SIGNIFICANT CHANGE UP (ref 2–14)
MONOCYTES NFR BLD AUTO: 3.5 % — SIGNIFICANT CHANGE UP (ref 2–14)
NEUTROPHILS # BLD AUTO: 11.61 K/UL — HIGH (ref 1.8–7.4)
NEUTROPHILS # BLD AUTO: 11.61 K/UL — HIGH (ref 1.8–7.4)
NEUTROPHILS NFR BLD AUTO: 96.5 % — HIGH (ref 43–77)
NEUTROPHILS NFR BLD AUTO: 96.5 % — HIGH (ref 43–77)
PHOSPHATE SERPL-MCNC: 3.3 MG/DL — SIGNIFICANT CHANGE UP (ref 2.5–4.5)
PHOSPHATE SERPL-MCNC: 3.3 MG/DL — SIGNIFICANT CHANGE UP (ref 2.5–4.5)
PLAT MORPH BLD: NORMAL — SIGNIFICANT CHANGE UP
PLAT MORPH BLD: NORMAL — SIGNIFICANT CHANGE UP
PLATELET # BLD AUTO: 155 K/UL — SIGNIFICANT CHANGE UP (ref 150–400)
PLATELET # BLD AUTO: 155 K/UL — SIGNIFICANT CHANGE UP (ref 150–400)
PLATELET COUNT - ESTIMATE: NORMAL — SIGNIFICANT CHANGE UP
PLATELET COUNT - ESTIMATE: NORMAL — SIGNIFICANT CHANGE UP
POTASSIUM SERPL-MCNC: 4.7 MMOL/L — SIGNIFICANT CHANGE UP (ref 3.5–5.3)
POTASSIUM SERPL-MCNC: 4.7 MMOL/L — SIGNIFICANT CHANGE UP (ref 3.5–5.3)
POTASSIUM SERPL-SCNC: 4.7 MMOL/L — SIGNIFICANT CHANGE UP (ref 3.5–5.3)
POTASSIUM SERPL-SCNC: 4.7 MMOL/L — SIGNIFICANT CHANGE UP (ref 3.5–5.3)
RBC # BLD: 3.6 M/UL — LOW (ref 4.2–5.8)
RBC # BLD: 3.6 M/UL — LOW (ref 4.2–5.8)
RBC # FLD: 12.5 % — SIGNIFICANT CHANGE UP (ref 10.3–14.5)
RBC # FLD: 12.5 % — SIGNIFICANT CHANGE UP (ref 10.3–14.5)
RBC BLD AUTO: NORMAL — SIGNIFICANT CHANGE UP
RBC BLD AUTO: NORMAL — SIGNIFICANT CHANGE UP
SODIUM SERPL-SCNC: 136 MMOL/L — SIGNIFICANT CHANGE UP (ref 135–145)
SODIUM SERPL-SCNC: 136 MMOL/L — SIGNIFICANT CHANGE UP (ref 135–145)
WBC # BLD: 12.03 K/UL — HIGH (ref 3.8–10.5)
WBC # BLD: 12.03 K/UL — HIGH (ref 3.8–10.5)
WBC # FLD AUTO: 12.03 K/UL — HIGH (ref 3.8–10.5)
WBC # FLD AUTO: 12.03 K/UL — HIGH (ref 3.8–10.5)

## 2023-11-11 PROCEDURE — 99233 SBSQ HOSP IP/OBS HIGH 50: CPT

## 2023-11-11 RX ADMIN — Medication 4 MILLIGRAM(S): at 18:14

## 2023-11-11 RX ADMIN — AMLODIPINE BESYLATE 10 MILLIGRAM(S): 2.5 TABLET ORAL at 05:49

## 2023-11-11 RX ADMIN — Medication 4: at 13:01

## 2023-11-11 RX ADMIN — TAMSULOSIN HYDROCHLORIDE 0.4 MILLIGRAM(S): 0.4 CAPSULE ORAL at 21:52

## 2023-11-11 RX ADMIN — Medication 975 MILLIGRAM(S): at 00:00

## 2023-11-11 RX ADMIN — CHLORHEXIDINE GLUCONATE 1 APPLICATION(S): 213 SOLUTION TOPICAL at 13:00

## 2023-11-11 RX ADMIN — PANTOPRAZOLE SODIUM 40 MILLIGRAM(S): 20 TABLET, DELAYED RELEASE ORAL at 07:11

## 2023-11-11 RX ADMIN — OXYCODONE HYDROCHLORIDE 10 MILLIGRAM(S): 5 TABLET ORAL at 09:35

## 2023-11-11 RX ADMIN — Medication 975 MILLIGRAM(S): at 13:01

## 2023-11-11 RX ADMIN — INSULIN GLARGINE 37 UNIT(S): 100 INJECTION, SOLUTION SUBCUTANEOUS at 09:08

## 2023-11-11 RX ADMIN — OXYCODONE HYDROCHLORIDE 10 MILLIGRAM(S): 5 TABLET ORAL at 16:30

## 2023-11-11 RX ADMIN — HEPARIN SODIUM 5000 UNIT(S): 5000 INJECTION INTRAVENOUS; SUBCUTANEOUS at 14:52

## 2023-11-11 RX ADMIN — MUPIROCIN 1 APPLICATION(S): 20 OINTMENT TOPICAL at 18:14

## 2023-11-11 RX ADMIN — Medication 4 MILLIGRAM(S): at 05:50

## 2023-11-11 RX ADMIN — ATORVASTATIN CALCIUM 20 MILLIGRAM(S): 80 TABLET, FILM COATED ORAL at 21:52

## 2023-11-11 RX ADMIN — Medication 975 MILLIGRAM(S): at 05:49

## 2023-11-11 RX ADMIN — MUPIROCIN 1 APPLICATION(S): 20 OINTMENT TOPICAL at 07:10

## 2023-11-11 RX ADMIN — LISINOPRIL 20 MILLIGRAM(S): 2.5 TABLET ORAL at 05:50

## 2023-11-11 RX ADMIN — OXYCODONE HYDROCHLORIDE 10 MILLIGRAM(S): 5 TABLET ORAL at 15:30

## 2023-11-11 RX ADMIN — HEPARIN SODIUM 5000 UNIT(S): 5000 INJECTION INTRAVENOUS; SUBCUTANEOUS at 05:50

## 2023-11-11 RX ADMIN — OXYCODONE HYDROCHLORIDE 10 MILLIGRAM(S): 5 TABLET ORAL at 10:35

## 2023-11-11 RX ADMIN — Medication 975 MILLIGRAM(S): at 18:13

## 2023-11-11 RX ADMIN — BICALUTAMIDE 50 MILLIGRAM(S): 50 TABLET, FILM COATED ORAL at 13:00

## 2023-11-11 RX ADMIN — HEPARIN SODIUM 5000 UNIT(S): 5000 INJECTION INTRAVENOUS; SUBCUTANEOUS at 21:53

## 2023-11-11 RX ADMIN — Medication 14 UNIT(S): at 09:09

## 2023-11-11 RX ADMIN — Medication 14 UNIT(S): at 18:15

## 2023-11-11 RX ADMIN — Medication 14 UNIT(S): at 13:02

## 2023-11-11 RX ADMIN — Medication 975 MILLIGRAM(S): at 06:19

## 2023-11-11 RX ADMIN — Medication 975 MILLIGRAM(S): at 23:57

## 2023-11-11 RX ADMIN — Medication 975 MILLIGRAM(S): at 00:20

## 2023-11-11 NOTE — PROGRESS NOTE ADULT - SUBJECTIVE AND OBJECTIVE BOX
ORTHOPAEDIC PROGRESS NOTE    SUBJECTIVE:  Pt seen and examined at bedside this am.  Doing well.  No acute events overnight.  Pt states pain is well controlled    OBJECTIVE:  Vital Signs Last 24 Hrs  T(C): 36.7 (11 Nov 2023 05:55), Max: 36.7 (11 Nov 2023 02:30)  T(F): 98.1 (11 Nov 2023 05:55), Max: 98.1 (11 Nov 2023 05:55)  HR: 87 (11 Nov 2023 05:55) (78 - 101)  BP: 123/69 (11 Nov 2023 05:55) (114/54 - 132/63)  BP(mean): --  RR: 17 (11 Nov 2023 05:55) (16 - 18)  SpO2: 95% (11 Nov 2023 05:55) (95% - 99%)    Parameters below as of 11 Nov 2023 05:55  Patient On (Oxygen Delivery Method): room air        Physical Exam:  General: NAD; resting comfrotably in bed  Resp: non labored  incision intact, no erythema or drainage  Dressings over prior drain sites  Neuro:  Motor:                   C5                C6              C7               C8           T1   R            5/5                4+/5            4+/5             4+/5          4+/5  L             5/5               4+/5             4+/5             4+/5          4+/5                L2             L3             L4               L5            S1  R         5/5           5/5          5/5             5/5           5/5  L          5/5          5/5           5/5             5/5           5/5    Sensory:            C5         C6         C7      C8       T1        (0=absent, 1=impaired, 2=normal, NT=not testable)  R         2            2           1        1         1  L          2            2           1        1         1               L2          L3         L4      L5       S1         (0=absent, 1=impaired, 2=normal, NT=not testable)  R         1            1            1        1        1  L          1            1           1        1         1  LABS                        11.0   12.74 )-----------( 162      ( 10 Nov 2023 04:43 )             33.5       11-10    136  |  99  |  36<H>  ----------------------------<  127<H>  5.0   |  25  |  0.83    Ca    9.0      10 Nov 2023 04:43  Phos  2.9     11-10  Mg     2.10     11-10    TPro  5.4<L>  /  Alb  2.9<L>  /  TBili  0.7  /  DBili  x   /  AST  67<H>  /  ALT  157<H>  /  AlkPhos  359<H>  11-10          I&O's Summary    10 Nov 2023 07:01  -  11 Nov 2023 07:00  --------------------------------------------------------  IN: 0 mL / OUT: 550 mL / NET: -550 mL

## 2023-11-11 NOTE — PROGRESS NOTE ADULT - SUBJECTIVE AND OBJECTIVE BOX
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 Pt reports that he has neck pain due to the recent surgery. It gets better with pain meds. Denies any new symptoms.       MEDICATIONS  (STANDING):  acetaminophen     Tablet .. 975 milliGRAM(s) Oral every 6 hours  amLODIPine   Tablet 10 milliGRAM(s) Oral daily  atorvastatin 20 milliGRAM(s) Oral at bedtime  bicalutamide 50 milliGRAM(s) Oral daily  chlorhexidine 2% Cloths 1 Application(s) Topical daily  dexAMETHasone     Tablet 4 milliGRAM(s) Oral every 12 hours  dexAMETHasone     Tablet   Oral   heparin   Injectable 5000 Unit(s) SubCutaneous every 8 hours  insulin glargine Injectable (LANTUS) 37 Unit(s) SubCutaneous every morning  insulin lispro (ADMELOG) corrective regimen sliding scale   SubCutaneous three times a day before meals  insulin lispro (ADMELOG) corrective regimen sliding scale   SubCutaneous at bedtime  insulin lispro Injectable (ADMELOG) 14 Unit(s) SubCutaneous three times a day before meals  lisinopril 20 milliGRAM(s) Oral daily  mupirocin 2% Ointment 1 Application(s) Topical two times a day  pantoprazole    Tablet 40 milliGRAM(s) Oral before breakfast  tamsulosin 0.4 milliGRAM(s) Oral at bedtime    MEDICATIONS  (PRN):  HYDROmorphone  Injectable 0.5 milliGRAM(s) IV Push every 4 hours PRN Severe breakthrough Pain (7 - 10)  naloxone Injectable 0.1 milliGRAM(s) IV Push every 3 minutes PRN For ANY of the following changes in patient status:  A. RR LESS THAN 10 breaths per minute, B. Oxygen saturation LESS THAN 90%, C. Sedation score of 6  ondansetron Injectable 4 milliGRAM(s) IV Push every 6 hours PRN Nausea  oxyCODONE    IR 5 milliGRAM(s) Oral every 3 hours PRN Moderate Pain (4 - 6)  oxyCODONE    IR 10 milliGRAM(s) Oral every 3 hours PRN Severe Pain (7 - 10)      Vital Signs Last 24 Hrs  T(C): 36.4 (11 Nov 2023 09:30), Max: 36.7 (11 Nov 2023 02:30)  T(F): 97.5 (11 Nov 2023 09:30), Max: 98.1 (11 Nov 2023 05:55)  HR: 92 (11 Nov 2023 09:30) (78 - 101)  BP: 110/70 (11 Nov 2023 09:30) (110/70 - 132/63)  BP(mean): --  RR: 18 (11 Nov 2023 09:30) (16 - 18)  SpO2: 100% (11 Nov 2023 09:30) (95% - 100%)    Parameters below as of 11 Nov 2023 09:30  Patient On (Oxygen Delivery Method): room air          PHYSICAL EXAM  GENERAL: NAD, well-developed  HEAD:  Atraumatic, Normocephalic  EYES: EOMI, PERRLA, conjunctiva and sclera clear  NECK: Supple, No JVD  CHEST/LUNG: Clear to auscultation bilaterally; No wheeze  HEART: Regular rate and rhythm; No murmurs, rubs, or gallops  ABDOMEN: Soft, Nontender, Nondistended; Bowel sounds present  EXTREMITIES:  2+ Peripheral Pulses, No clubbing, cyanosis, or edema  PSYCH: AAOx3  SKIN: No rashes or lesions    CAPILLARY BLOOD GLUCOSE      POCT Blood Glucose.: 133 mg/dL (11 Nov 2023 08:32)  POCT Blood Glucose.: 104 mg/dL (10 Nov 2023 21:30)  POCT Blood Glucose.: 93 mg/dL (10 Nov 2023 17:32)  POCT Blood Glucose.: 293 mg/dL (10 Nov 2023 12:41)    I&O's Summary    10 Nov 2023 07:01  -  11 Nov 2023 07:00  --------------------------------------------------------  IN: 0 mL / OUT: 550 mL / NET: -550 mL        LABS:                        11.2   12.03 )-----------( 155      ( 11 Nov 2023 07:45 )             33.2     11-11    136  |  100  |  36<H>  ----------------------------<  151<H>  4.7   |  26  |  0.83    Ca    9.3      11 Nov 2023 07:45  Phos  3.3     11-11  Mg     2.10     11-11    TPro  5.4<L>  /  Alb  2.9<L>  /  TBili  0.7  /  DBili  x   /  AST  67<H>  /  ALT  157<H>  /  AlkPhos  359<H>  11-10          Urinalysis Basic - ( 11 Nov 2023 07:45 )    Color: x / Appearance: x / SG: x / pH: x  Gluc: 151 mg/dL / Ketone: x  / Bili: x / Urobili: x   Blood: x / Protein: x / Nitrite: x   Leuk Esterase: x / RBC: x / WBC x   Sq Epi: x / Non Sq Epi: x / Bacteria: x

## 2023-11-11 NOTE — PROGRESS NOTE ADULT - ASSESSMENT
72y/o M with DM, HTN and metastatic prostate cancer (diagnosed in 2009) that initially presented to OSH with LE weakness and R hand paresthesias now transferred to [facility] for NSG eval. Patient now s/p surgical intervention (C5-T3 posterior cervical fusion and C7-T1 decompression) by orthopedic surgery, now transferred to medicine for further management.

## 2023-11-12 LAB
ANION GAP SERPL CALC-SCNC: 9 MMOL/L — SIGNIFICANT CHANGE UP (ref 7–14)
ANION GAP SERPL CALC-SCNC: 9 MMOL/L — SIGNIFICANT CHANGE UP (ref 7–14)
BASOPHILS # BLD AUTO: 0.01 K/UL — SIGNIFICANT CHANGE UP (ref 0–0.2)
BASOPHILS # BLD AUTO: 0.01 K/UL — SIGNIFICANT CHANGE UP (ref 0–0.2)
BASOPHILS NFR BLD AUTO: 0.1 % — SIGNIFICANT CHANGE UP (ref 0–2)
BASOPHILS NFR BLD AUTO: 0.1 % — SIGNIFICANT CHANGE UP (ref 0–2)
BUN SERPL-MCNC: 35 MG/DL — HIGH (ref 7–23)
BUN SERPL-MCNC: 35 MG/DL — HIGH (ref 7–23)
CALCIUM SERPL-MCNC: 8.9 MG/DL — SIGNIFICANT CHANGE UP (ref 8.4–10.5)
CALCIUM SERPL-MCNC: 8.9 MG/DL — SIGNIFICANT CHANGE UP (ref 8.4–10.5)
CHLORIDE SERPL-SCNC: 98 MMOL/L — SIGNIFICANT CHANGE UP (ref 98–107)
CHLORIDE SERPL-SCNC: 98 MMOL/L — SIGNIFICANT CHANGE UP (ref 98–107)
CO2 SERPL-SCNC: 27 MMOL/L — SIGNIFICANT CHANGE UP (ref 22–31)
CO2 SERPL-SCNC: 27 MMOL/L — SIGNIFICANT CHANGE UP (ref 22–31)
CREAT SERPL-MCNC: 0.94 MG/DL — SIGNIFICANT CHANGE UP (ref 0.5–1.3)
CREAT SERPL-MCNC: 0.94 MG/DL — SIGNIFICANT CHANGE UP (ref 0.5–1.3)
EGFR: 87 ML/MIN/1.73M2 — SIGNIFICANT CHANGE UP
EGFR: 87 ML/MIN/1.73M2 — SIGNIFICANT CHANGE UP
EOSINOPHIL # BLD AUTO: 0 K/UL — SIGNIFICANT CHANGE UP (ref 0–0.5)
EOSINOPHIL # BLD AUTO: 0 K/UL — SIGNIFICANT CHANGE UP (ref 0–0.5)
EOSINOPHIL NFR BLD AUTO: 0 % — SIGNIFICANT CHANGE UP (ref 0–6)
EOSINOPHIL NFR BLD AUTO: 0 % — SIGNIFICANT CHANGE UP (ref 0–6)
GLUCOSE BLDC GLUCOMTR-MCNC: 155 MG/DL — HIGH (ref 70–99)
GLUCOSE BLDC GLUCOMTR-MCNC: 155 MG/DL — HIGH (ref 70–99)
GLUCOSE BLDC GLUCOMTR-MCNC: 176 MG/DL — HIGH (ref 70–99)
GLUCOSE BLDC GLUCOMTR-MCNC: 176 MG/DL — HIGH (ref 70–99)
GLUCOSE BLDC GLUCOMTR-MCNC: 195 MG/DL — HIGH (ref 70–99)
GLUCOSE BLDC GLUCOMTR-MCNC: 195 MG/DL — HIGH (ref 70–99)
GLUCOSE BLDC GLUCOMTR-MCNC: 234 MG/DL — HIGH (ref 70–99)
GLUCOSE BLDC GLUCOMTR-MCNC: 234 MG/DL — HIGH (ref 70–99)
GLUCOSE BLDC GLUCOMTR-MCNC: 263 MG/DL — HIGH (ref 70–99)
GLUCOSE BLDC GLUCOMTR-MCNC: 263 MG/DL — HIGH (ref 70–99)
GLUCOSE BLDC GLUCOMTR-MCNC: 289 MG/DL — HIGH (ref 70–99)
GLUCOSE BLDC GLUCOMTR-MCNC: 289 MG/DL — HIGH (ref 70–99)
GLUCOSE BLDC GLUCOMTR-MCNC: 51 MG/DL — CRITICAL LOW (ref 70–99)
GLUCOSE BLDC GLUCOMTR-MCNC: 51 MG/DL — CRITICAL LOW (ref 70–99)
GLUCOSE BLDC GLUCOMTR-MCNC: 58 MG/DL — LOW (ref 70–99)
GLUCOSE BLDC GLUCOMTR-MCNC: 58 MG/DL — LOW (ref 70–99)
GLUCOSE BLDC GLUCOMTR-MCNC: 59 MG/DL — LOW (ref 70–99)
GLUCOSE BLDC GLUCOMTR-MCNC: 59 MG/DL — LOW (ref 70–99)
GLUCOSE BLDC GLUCOMTR-MCNC: 67 MG/DL — LOW (ref 70–99)
GLUCOSE BLDC GLUCOMTR-MCNC: 67 MG/DL — LOW (ref 70–99)
GLUCOSE BLDC GLUCOMTR-MCNC: 68 MG/DL — LOW (ref 70–99)
GLUCOSE BLDC GLUCOMTR-MCNC: 68 MG/DL — LOW (ref 70–99)
GLUCOSE BLDC GLUCOMTR-MCNC: 70 MG/DL — SIGNIFICANT CHANGE UP (ref 70–99)
GLUCOSE BLDC GLUCOMTR-MCNC: 70 MG/DL — SIGNIFICANT CHANGE UP (ref 70–99)
GLUCOSE SERPL-MCNC: 150 MG/DL — HIGH (ref 70–99)
GLUCOSE SERPL-MCNC: 150 MG/DL — HIGH (ref 70–99)
HCT VFR BLD CALC: 31.1 % — LOW (ref 39–50)
HCT VFR BLD CALC: 31.1 % — LOW (ref 39–50)
HGB BLD-MCNC: 10.3 G/DL — LOW (ref 13–17)
HGB BLD-MCNC: 10.3 G/DL — LOW (ref 13–17)
IANC: 7.58 K/UL — HIGH (ref 1.8–7.4)
IANC: 7.58 K/UL — HIGH (ref 1.8–7.4)
IMM GRANULOCYTES NFR BLD AUTO: 1.1 % — HIGH (ref 0–0.9)
IMM GRANULOCYTES NFR BLD AUTO: 1.1 % — HIGH (ref 0–0.9)
LYMPHOCYTES # BLD AUTO: 0.23 K/UL — LOW (ref 1–3.3)
LYMPHOCYTES # BLD AUTO: 0.23 K/UL — LOW (ref 1–3.3)
LYMPHOCYTES # BLD AUTO: 2.8 % — LOW (ref 13–44)
LYMPHOCYTES # BLD AUTO: 2.8 % — LOW (ref 13–44)
MAGNESIUM SERPL-MCNC: 1.9 MG/DL — SIGNIFICANT CHANGE UP (ref 1.6–2.6)
MAGNESIUM SERPL-MCNC: 1.9 MG/DL — SIGNIFICANT CHANGE UP (ref 1.6–2.6)
MCHC RBC-ENTMCNC: 30.8 PG — SIGNIFICANT CHANGE UP (ref 27–34)
MCHC RBC-ENTMCNC: 30.8 PG — SIGNIFICANT CHANGE UP (ref 27–34)
MCHC RBC-ENTMCNC: 33.1 GM/DL — SIGNIFICANT CHANGE UP (ref 32–36)
MCHC RBC-ENTMCNC: 33.1 GM/DL — SIGNIFICANT CHANGE UP (ref 32–36)
MCV RBC AUTO: 93.1 FL — SIGNIFICANT CHANGE UP (ref 80–100)
MCV RBC AUTO: 93.1 FL — SIGNIFICANT CHANGE UP (ref 80–100)
MONOCYTES # BLD AUTO: 0.33 K/UL — SIGNIFICANT CHANGE UP (ref 0–0.9)
MONOCYTES # BLD AUTO: 0.33 K/UL — SIGNIFICANT CHANGE UP (ref 0–0.9)
MONOCYTES NFR BLD AUTO: 4 % — SIGNIFICANT CHANGE UP (ref 2–14)
MONOCYTES NFR BLD AUTO: 4 % — SIGNIFICANT CHANGE UP (ref 2–14)
NEUTROPHILS # BLD AUTO: 7.58 K/UL — HIGH (ref 1.8–7.4)
NEUTROPHILS # BLD AUTO: 7.58 K/UL — HIGH (ref 1.8–7.4)
NEUTROPHILS NFR BLD AUTO: 92 % — HIGH (ref 43–77)
NEUTROPHILS NFR BLD AUTO: 92 % — HIGH (ref 43–77)
NRBC # BLD: 0 /100 WBCS — SIGNIFICANT CHANGE UP (ref 0–0)
NRBC # BLD: 0 /100 WBCS — SIGNIFICANT CHANGE UP (ref 0–0)
NRBC # FLD: 0 K/UL — SIGNIFICANT CHANGE UP (ref 0–0)
NRBC # FLD: 0 K/UL — SIGNIFICANT CHANGE UP (ref 0–0)
PHOSPHATE SERPL-MCNC: 3.5 MG/DL — SIGNIFICANT CHANGE UP (ref 2.5–4.5)
PHOSPHATE SERPL-MCNC: 3.5 MG/DL — SIGNIFICANT CHANGE UP (ref 2.5–4.5)
PLATELET # BLD AUTO: 146 K/UL — LOW (ref 150–400)
PLATELET # BLD AUTO: 146 K/UL — LOW (ref 150–400)
POTASSIUM SERPL-MCNC: 4.6 MMOL/L — SIGNIFICANT CHANGE UP (ref 3.5–5.3)
POTASSIUM SERPL-MCNC: 4.6 MMOL/L — SIGNIFICANT CHANGE UP (ref 3.5–5.3)
POTASSIUM SERPL-SCNC: 4.6 MMOL/L — SIGNIFICANT CHANGE UP (ref 3.5–5.3)
POTASSIUM SERPL-SCNC: 4.6 MMOL/L — SIGNIFICANT CHANGE UP (ref 3.5–5.3)
RBC # BLD: 3.34 M/UL — LOW (ref 4.2–5.8)
RBC # BLD: 3.34 M/UL — LOW (ref 4.2–5.8)
RBC # FLD: 12.7 % — SIGNIFICANT CHANGE UP (ref 10.3–14.5)
RBC # FLD: 12.7 % — SIGNIFICANT CHANGE UP (ref 10.3–14.5)
SODIUM SERPL-SCNC: 134 MMOL/L — LOW (ref 135–145)
SODIUM SERPL-SCNC: 134 MMOL/L — LOW (ref 135–145)
WBC # BLD: 8.24 K/UL — SIGNIFICANT CHANGE UP (ref 3.8–10.5)
WBC # BLD: 8.24 K/UL — SIGNIFICANT CHANGE UP (ref 3.8–10.5)
WBC # FLD AUTO: 8.24 K/UL — SIGNIFICANT CHANGE UP (ref 3.8–10.5)
WBC # FLD AUTO: 8.24 K/UL — SIGNIFICANT CHANGE UP (ref 3.8–10.5)

## 2023-11-12 PROCEDURE — 99233 SBSQ HOSP IP/OBS HIGH 50: CPT

## 2023-11-12 RX ORDER — INSULIN LISPRO 100/ML
18 VIAL (ML) SUBCUTANEOUS
Refills: 0 | Status: DISCONTINUED | OUTPATIENT
Start: 2023-11-12 | End: 2023-11-13

## 2023-11-12 RX ORDER — DEXTROSE 50 % IN WATER 50 %
25 SYRINGE (ML) INTRAVENOUS ONCE
Refills: 0 | Status: COMPLETED | OUTPATIENT
Start: 2023-11-12 | End: 2023-11-12

## 2023-11-12 RX ADMIN — PANTOPRAZOLE SODIUM 40 MILLIGRAM(S): 20 TABLET, DELAYED RELEASE ORAL at 06:14

## 2023-11-12 RX ADMIN — Medication 6: at 13:48

## 2023-11-12 RX ADMIN — Medication 18 UNIT(S): at 18:25

## 2023-11-12 RX ADMIN — Medication 4 MILLIGRAM(S): at 06:13

## 2023-11-12 RX ADMIN — HEPARIN SODIUM 5000 UNIT(S): 5000 INJECTION INTRAVENOUS; SUBCUTANEOUS at 13:54

## 2023-11-12 RX ADMIN — TAMSULOSIN HYDROCHLORIDE 0.4 MILLIGRAM(S): 0.4 CAPSULE ORAL at 21:41

## 2023-11-12 RX ADMIN — CHLORHEXIDINE GLUCONATE 1 APPLICATION(S): 213 SOLUTION TOPICAL at 13:53

## 2023-11-12 RX ADMIN — Medication 25 GRAM(S): at 23:21

## 2023-11-12 RX ADMIN — HEPARIN SODIUM 5000 UNIT(S): 5000 INJECTION INTRAVENOUS; SUBCUTANEOUS at 06:14

## 2023-11-12 RX ADMIN — OXYCODONE HYDROCHLORIDE 10 MILLIGRAM(S): 5 TABLET ORAL at 21:41

## 2023-11-12 RX ADMIN — Medication 4: at 09:35

## 2023-11-12 RX ADMIN — Medication 975 MILLIGRAM(S): at 23:49

## 2023-11-12 RX ADMIN — BICALUTAMIDE 50 MILLIGRAM(S): 50 TABLET, FILM COATED ORAL at 13:54

## 2023-11-12 RX ADMIN — LISINOPRIL 20 MILLIGRAM(S): 2.5 TABLET ORAL at 06:14

## 2023-11-12 RX ADMIN — MUPIROCIN 1 APPLICATION(S): 20 OINTMENT TOPICAL at 06:14

## 2023-11-12 RX ADMIN — Medication 975 MILLIGRAM(S): at 06:14

## 2023-11-12 RX ADMIN — ATORVASTATIN CALCIUM 20 MILLIGRAM(S): 80 TABLET, FILM COATED ORAL at 21:43

## 2023-11-12 RX ADMIN — AMLODIPINE BESYLATE 10 MILLIGRAM(S): 2.5 TABLET ORAL at 06:15

## 2023-11-12 RX ADMIN — Medication 975 MILLIGRAM(S): at 00:31

## 2023-11-12 RX ADMIN — OXYCODONE HYDROCHLORIDE 10 MILLIGRAM(S): 5 TABLET ORAL at 19:27

## 2023-11-12 RX ADMIN — MUPIROCIN 1 APPLICATION(S): 20 OINTMENT TOPICAL at 18:28

## 2023-11-12 RX ADMIN — OXYCODONE HYDROCHLORIDE 10 MILLIGRAM(S): 5 TABLET ORAL at 18:27

## 2023-11-12 RX ADMIN — Medication 975 MILLIGRAM(S): at 19:27

## 2023-11-12 RX ADMIN — Medication 2: at 18:25

## 2023-11-12 RX ADMIN — Medication 975 MILLIGRAM(S): at 14:53

## 2023-11-12 RX ADMIN — INSULIN GLARGINE 37 UNIT(S): 100 INJECTION, SOLUTION SUBCUTANEOUS at 10:15

## 2023-11-12 RX ADMIN — OXYCODONE HYDROCHLORIDE 10 MILLIGRAM(S): 5 TABLET ORAL at 10:34

## 2023-11-12 RX ADMIN — OXYCODONE HYDROCHLORIDE 10 MILLIGRAM(S): 5 TABLET ORAL at 09:34

## 2023-11-12 RX ADMIN — Medication 975 MILLIGRAM(S): at 18:27

## 2023-11-12 RX ADMIN — Medication 14 UNIT(S): at 09:35

## 2023-11-12 RX ADMIN — Medication 975 MILLIGRAM(S): at 13:53

## 2023-11-12 RX ADMIN — HEPARIN SODIUM 5000 UNIT(S): 5000 INJECTION INTRAVENOUS; SUBCUTANEOUS at 21:44

## 2023-11-12 RX ADMIN — OXYCODONE HYDROCHLORIDE 10 MILLIGRAM(S): 5 TABLET ORAL at 22:42

## 2023-11-12 NOTE — PROGRESS NOTE ADULT - SUBJECTIVE AND OBJECTIVE BOX
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 Pt continues to report to have neck pain from the surgery which improves with PRN pain medications. Otherwise, no new complaints.       MEDICATIONS  (STANDING):  acetaminophen     Tablet .. 975 milliGRAM(s) Oral every 6 hours  amLODIPine   Tablet 10 milliGRAM(s) Oral daily  atorvastatin 20 milliGRAM(s) Oral at bedtime  bicalutamide 50 milliGRAM(s) Oral daily  chlorhexidine 2% Cloths 1 Application(s) Topical daily  dexAMETHasone     Tablet   Oral   dexAMETHasone     Tablet 4 milliGRAM(s) Oral every 12 hours  dexAMETHasone     Tablet 4 milliGRAM(s) Oral daily  heparin   Injectable 5000 Unit(s) SubCutaneous every 8 hours  insulin glargine Injectable (LANTUS) 37 Unit(s) SubCutaneous every morning  insulin lispro (ADMELOG) corrective regimen sliding scale   SubCutaneous at bedtime  insulin lispro (ADMELOG) corrective regimen sliding scale   SubCutaneous three times a day before meals  insulin lispro Injectable (ADMELOG) 14 Unit(s) SubCutaneous three times a day before meals  lisinopril 20 milliGRAM(s) Oral daily  mupirocin 2% Ointment 1 Application(s) Topical two times a day  pantoprazole    Tablet 40 milliGRAM(s) Oral before breakfast  tamsulosin 0.4 milliGRAM(s) Oral at bedtime    MEDICATIONS  (PRN):  HYDROmorphone  Injectable 0.5 milliGRAM(s) IV Push every 4 hours PRN Severe breakthrough Pain (7 - 10)  naloxone Injectable 0.1 milliGRAM(s) IV Push every 3 minutes PRN For ANY of the following changes in patient status:  A. RR LESS THAN 10 breaths per minute, B. Oxygen saturation LESS THAN 90%, C. Sedation score of 6  ondansetron Injectable 4 milliGRAM(s) IV Push every 6 hours PRN Nausea  oxyCODONE    IR 5 milliGRAM(s) Oral every 3 hours PRN Moderate Pain (4 - 6)  oxyCODONE    IR 10 milliGRAM(s) Oral every 3 hours PRN Severe Pain (7 - 10)      Vital Signs Last 24 Hrs  T(C): 36.6 (12 Nov 2023 06:10), Max: 36.8 (11 Nov 2023 21:30)  T(F): 97.8 (12 Nov 2023 06:10), Max: 98.2 (11 Nov 2023 21:30)  HR: 87 (12 Nov 2023 06:10) (65 - 92)  BP: 112/67 (12 Nov 2023 06:10) (110/70 - 127/64)  BP(mean): --  RR: 16 (12 Nov 2023 06:10) (16 - 18)  SpO2: 97% (12 Nov 2023 06:10) (96% - 100%)    Parameters below as of 12 Nov 2023 06:10  Patient On (Oxygen Delivery Method): room air          PHYSICAL EXAM  GENERAL: NAD, well-developed  HEAD:  Atraumatic, Normocephalic  EYES: EOMI, PERRLA, conjunctiva and sclera clear  NECK: Supple, No JVD  CHEST/LUNG: Clear to auscultation bilaterally; No wheeze  HEART: Regular rate and rhythm; No murmurs, rubs, or gallops  ABDOMEN: Soft, Nontender, Nondistended; Bowel sounds present  EXTREMITIES:  2+ Peripheral Pulses, No clubbing, cyanosis, or edema  PSYCH: AAOx3  SKIN: No rashes or lesions    CAPILLARY BLOOD GLUCOSE      POCT Blood Glucose.: 105 mg/dL (11 Nov 2023 22:13)  POCT Blood Glucose.: 116 mg/dL (11 Nov 2023 18:02)  POCT Blood Glucose.: 249 mg/dL (11 Nov 2023 12:48)  POCT Blood Glucose.: 133 mg/dL (11 Nov 2023 08:32)    I&O's Summary    10 Nov 2023 07:01  -  11 Nov 2023 07:00  --------------------------------------------------------  IN: 0 mL / OUT: 550 mL / NET: -550 mL    11 Nov 2023 07:01  -  12 Nov 2023 06:16  --------------------------------------------------------  IN: 510 mL / OUT: 780 mL / NET: -270 mL        LABS:                        11.2   12.03 )-----------( 155      ( 11 Nov 2023 07:45 )             33.2     11-11    136  |  100  |  36<H>  ----------------------------<  151<H>  4.7   |  26  |  0.83    Ca    9.3      11 Nov 2023 07:45  Phos  3.3     11-11  Mg     2.10     11-11            Urinalysis Basic - ( 11 Nov 2023 07:45 )    Color: x / Appearance: x / SG: x / pH: x  Gluc: 151 mg/dL / Ketone: x  / Bili: x / Urobili: x   Blood: x / Protein: x / Nitrite: x   Leuk Esterase: x / RBC: x / WBC x   Sq Epi: x / Non Sq Epi: x / Bacteria: x

## 2023-11-12 NOTE — CHART NOTE - NSCHARTNOTEFT_GEN_A_CORE
Patient w/ finger stick of 50s x2. Mentating at baseline, asymptomatic. All other VSS. Patient accepting oral intake. RN activated hypoglycemia protocol. Trialed 4oz of apple juice x2 without improvement in blood glucose. Dextrose 50% 25G IV x1 ordered STAT. Of note patient received Admelog 18 units premeal and 2 units corrective at 18:00 and did NOT eat dinner. Will fu repeat FS. Will continue to monitor closely.    POCT Blood Glucose:  67 mg/dL (11-12-23 @ 23:03)  58 mg/dL (11-12-23 @ 23:01)  70 mg/dL (11-12-23 @ 22:37)  68 mg/dL (11-12-23 @ 22:36)  51 mg/dL (11-12-23 @ 21:57)  59 mg/dL (11-12-23 @ 21:56)  195 mg/dL (11-12-23 @ 18:10)  263 mg/dL (11-12-23 @ 12:51)      eMAR:  dextrose 50% Injectable   25 Gram(s) IV Push (11-12-23 @ 23:21)    insulin glargine Injectable (LANTUS)   37 Unit(s) SubCutaneous (11-12-23 @ 10:15)    insulin lispro (ADMELOG) corrective regimen sliding scale   2 Unit(s) SubCutaneous (11-12-23 @ 18:25)   6 Unit(s) SubCutaneous (11-12-23 @ 13:48)   4 Unit(s) SubCutaneous (11-12-23 @ 09:35)    insulin lispro Injectable (ADMELOG)   14 Unit(s) SubCutaneous (11-12-23 @ 09:35)    insulin lispro Injectable (ADMELOG)   18 Unit(s) SubCutaneous (11-12-23 @ 18:25) Patient w/ finger stick of 50s x2. Mentating at baseline, asymptomatic. All other VSS. Patient accepting oral intake. RN activated hypoglycemia protocol. Trialed 4oz of apple juice x2 without improvement in blood glucose. Dextrose 50% 25G IV x1 ordered STAT. Of note patient received Admelog 18 units premeal and 2 units corrective at 18:00 and did NOT eat dinner. Will fu repeat FS. Will continue to monitor closely.    POCT Blood Glucose:  67 mg/dL (11-12-23 @ 23:03)  58 mg/dL (11-12-23 @ 23:01)  70 mg/dL (11-12-23 @ 22:37)  68 mg/dL (11-12-23 @ 22:36)  51 mg/dL (11-12-23 @ 21:57)  59 mg/dL (11-12-23 @ 21:56)  195 mg/dL (11-12-23 @ 18:10)  263 mg/dL (11-12-23 @ 12:51)      eMAR:  dextrose 50% Injectable   25 Gram(s) IV Push (11-12-23 @ 23:21)    insulin glargine Injectable (LANTUS)   37 Unit(s) SubCutaneous (11-12-23 @ 10:15)    insulin lispro (ADMELOG) corrective regimen sliding scale   2 Unit(s) SubCutaneous (11-12-23 @ 18:25)   6 Unit(s) SubCutaneous (11-12-23 @ 13:48)   4 Unit(s) SubCutaneous (11-12-23 @ 09:35)    insulin lispro Injectable (ADMELOG)   14 Unit(s) SubCutaneous (11-12-23 @ 09:35)    insulin lispro Injectable (ADMELOG)   18 Unit(s) SubCutaneous (11-12-23 @ 18:25)    Addendum 23:50:  Repeat FS s/p D50 improved to 176. Will encourage po intake. repeat FS at 3:00 to monitor closely. Patient w/ finger stick of 50s x2. Mentating at baseline, asymptomatic. All other VSS. Patient accepting oral intake. RN activated hypoglycemia protocol. Trialed 4oz of apple juice x2 without improvement in blood glucose. Dextrose 50% 25G IV x1 ordered STAT. Of note patient received Admelog 18 units premeal and 2 units corrective at 18:00 and did NOT eat dinner. Will fu repeat FS. Will continue to monitor closely.    POCT Blood Glucose:  67 mg/dL (11-12-23 @ 23:03)  58 mg/dL (11-12-23 @ 23:01)  70 mg/dL (11-12-23 @ 22:37)  68 mg/dL (11-12-23 @ 22:36)  51 mg/dL (11-12-23 @ 21:57)  59 mg/dL (11-12-23 @ 21:56)  195 mg/dL (11-12-23 @ 18:10)  263 mg/dL (11-12-23 @ 12:51)      eMAR:  dextrose 50% Injectable   25 Gram(s) IV Push (11-12-23 @ 23:21)    insulin glargine Injectable (LANTUS)   37 Unit(s) SubCutaneous (11-12-23 @ 10:15)    insulin lispro (ADMELOG) corrective regimen sliding scale   2 Unit(s) SubCutaneous (11-12-23 @ 18:25)   6 Unit(s) SubCutaneous (11-12-23 @ 13:48)   4 Unit(s) SubCutaneous (11-12-23 @ 09:35)    insulin lispro Injectable (ADMELOG)   14 Unit(s) SubCutaneous (11-12-23 @ 09:35)    insulin lispro Injectable (ADMELOG)   18 Unit(s) SubCutaneous (11-12-23 @ 18:25)    Addendum 23:50:  Repeat FS s/p D50 improved to 176. Will encourage po intake. repeat FS at 3:00 to monitor closely.    Addendum 11/13/23 3:15:  3:00 repeat FS 71. Patient asymptomatic. Will give additional D50 12.5 x1 and repeat FS. Will continue to encourage po intake. Will monitor FS closely.

## 2023-11-12 NOTE — PROGRESS NOTE ADULT - ASSESSMENT
72y/o M with DM, HTN and metastatic prostate cancer (diagnosed in 2009) that initially presented to OSH with LE weakness and R hand paresthesias now transferred to [facility] for NSG eval. Patient now s/p surgical intervention (C5-T3 posterior cervical fusion and C7-T1 decompression) by orthopedic surgery, now transferred to medicine for further management.

## 2023-11-12 NOTE — PROGRESS NOTE ADULT - ASSESSMENT
73y Male s/p C5-T3 posterior cervical fusion, C7-T1 decompression for metastatic prostate cancer with plastics closure on 11/2. Recovering well. Both drains removed.     Plan:  - Nylon sutures over incision to remain in place  - Incision open to air; can apply gauze dressing if serosanguinous drainage continues  - Remainder of care per primary team    Plastic Surgery  [id number]# [facility] pager  [phone] [facility] pager  Available on Teams  73y Male s/p C5-T3 posterior cervical fusion, C7-T1 decompression for metastatic prostate cancer with plastics closure on 11/2. Recovering well. Both drains removed.     Plan:  - Nylon sutures over incision to remain in place  - Incision open to air; can apply gauze dressing if serosanguinous drainage continues  - Optimize nutrition, ensure adequate protein intake  - Remainder of care per primary team    Plastic Surgery  [phone]# [facility] pager  [phone] [facility] pager  Available on Teams

## 2023-11-12 NOTE — PROGRESS NOTE ADULT - SUBJECTIVE AND OBJECTIVE BOX
Plastic Surgery Progress Note (pg [facility]: [id number], [facility]: [phone])    SUBJECTIVE  The patient was seen and examined. No acute events overnight. Pain controlled, afebrile w/ stable vitals.     OBJECTIVE  ___________________________________________________  VITAL SIGNS / I&O's   Vital Signs Last 24 Hrs  T(C): 36.6 (12 Nov 2023 06:10), Max: 36.8 (11 Nov 2023 21:30)  T(F): 97.8 (12 Nov 2023 06:10), Max: 98.2 (11 Nov 2023 21:30)  HR: 87 (12 Nov 2023 06:10) (65 - 92)  BP: 112/67 (12 Nov 2023 06:10) (110/70 - 127/64)  BP(mean): --  RR: 16 (12 Nov 2023 06:10) (16 - 18)  SpO2: 97% (12 Nov 2023 06:10) (96% - 100%)    Parameters below as of 12 Nov 2023 06:10  Patient On (Oxygen Delivery Method): room air          11 Nov 2023 07:01  -  12 Nov 2023 07:00  --------------------------------------------------------  IN:    Oral Fluid: 510 mL  Total IN: 510 mL    OUT:    Voided (mL): 780 mL  Total OUT: 780 mL    Total NET: -270 mL        ___________________________________________________  PHYSICAL EXAM    General: NAD, Lying in bed comfortably  Neuro: Awake and alert  Back: Incision intact, nylons in place. No palpable collections. Some SS drainage expressible from inferior portion of incision.     ___________________________________________________  LABS                        10.3   8.24  )-----------( 146      ( 12 Nov 2023 06:15 )             31.1     12 Nov 2023 06:15    134    |  98     |  35     ----------------------------<  150    4.6     |  27     |  0.94     Ca    8.9        12 Nov 2023 06:15  Phos  3.5       12 Nov 2023 06:15  Mg     1.90      12 Nov 2023 06:15        CAPILLARY BLOOD GLUCOSE      POCT Blood Glucose.: 234 mg/dL (12 Nov 2023 08:49)  POCT Blood Glucose.: 105 mg/dL (11 Nov 2023 22:13)  POCT Blood Glucose.: 116 mg/dL (11 Nov 2023 18:02)  POCT Blood Glucose.: 249 mg/dL (11 Nov 2023 12:48)        Urinalysis Basic - ( 12 Nov 2023 06:15 )    Color: x / Appearance: x / SG: x / pH: x  Gluc: 150 mg/dL / Ketone: x  / Bili: x / Urobili: x   Blood: x / Protein: x / Nitrite: x   Leuk Esterase: x / RBC: x / WBC x   Sq Epi: x / Non Sq Epi: x / Bacteria: x      ___________________________________________________  MICRO  Recent Cultures:    ___________________________________________________  MEDICATIONS  (STANDING):  acetaminophen     Tablet .. 975 milliGRAM(s) Oral every 6 hours  amLODIPine   Tablet 10 milliGRAM(s) Oral daily  atorvastatin 20 milliGRAM(s) Oral at bedtime  bicalutamide 50 milliGRAM(s) Oral daily  chlorhexidine 2% Cloths 1 Application(s) Topical daily  dexAMETHasone     Tablet 4 milliGRAM(s) Oral daily  dexAMETHasone     Tablet   Oral   heparin   Injectable 5000 Unit(s) SubCutaneous every 8 hours  insulin glargine Injectable (LANTUS) 37 Unit(s) SubCutaneous every morning  insulin lispro (ADMELOG) corrective regimen sliding scale   SubCutaneous at bedtime  insulin lispro (ADMELOG) corrective regimen sliding scale   SubCutaneous three times a day before meals  insulin lispro Injectable (ADMELOG) 14 Unit(s) SubCutaneous three times a day before meals  lisinopril 20 milliGRAM(s) Oral daily  mupirocin 2% Ointment 1 Application(s) Topical two times a day  pantoprazole    Tablet 40 milliGRAM(s) Oral before breakfast  tamsulosin 0.4 milliGRAM(s) Oral at bedtime    MEDICATIONS  (PRN):  HYDROmorphone  Injectable 0.5 milliGRAM(s) IV Push every 4 hours PRN Severe breakthrough Pain (7 - 10)  naloxone Injectable 0.1 milliGRAM(s) IV Push every 3 minutes PRN For ANY of the following changes in patient status:  A. RR LESS THAN 10 breaths per minute, B. Oxygen saturation LESS THAN 90%, C. Sedation score of 6  ondansetron Injectable 4 milliGRAM(s) IV Push every 6 hours PRN Nausea  oxyCODONE    IR 10 milliGRAM(s) Oral every 3 hours PRN Severe Pain (7 - 10)  oxyCODONE    IR 5 milliGRAM(s) Oral every 3 hours PRN Moderate Pain (4 - 6)

## 2023-11-13 LAB
ANION GAP SERPL CALC-SCNC: 11 MMOL/L — SIGNIFICANT CHANGE UP (ref 7–14)
ANION GAP SERPL CALC-SCNC: 11 MMOL/L — SIGNIFICANT CHANGE UP (ref 7–14)
BASOPHILS # BLD AUTO: 0 K/UL — SIGNIFICANT CHANGE UP (ref 0–0.2)
BASOPHILS # BLD AUTO: 0 K/UL — SIGNIFICANT CHANGE UP (ref 0–0.2)
BASOPHILS NFR BLD AUTO: 0 % — SIGNIFICANT CHANGE UP (ref 0–2)
BASOPHILS NFR BLD AUTO: 0 % — SIGNIFICANT CHANGE UP (ref 0–2)
BUN SERPL-MCNC: 31 MG/DL — HIGH (ref 7–23)
BUN SERPL-MCNC: 31 MG/DL — HIGH (ref 7–23)
CALCIUM SERPL-MCNC: 8.9 MG/DL — SIGNIFICANT CHANGE UP (ref 8.4–10.5)
CALCIUM SERPL-MCNC: 8.9 MG/DL — SIGNIFICANT CHANGE UP (ref 8.4–10.5)
CHLORIDE SERPL-SCNC: 100 MMOL/L — SIGNIFICANT CHANGE UP (ref 98–107)
CHLORIDE SERPL-SCNC: 100 MMOL/L — SIGNIFICANT CHANGE UP (ref 98–107)
CO2 SERPL-SCNC: 25 MMOL/L — SIGNIFICANT CHANGE UP (ref 22–31)
CO2 SERPL-SCNC: 25 MMOL/L — SIGNIFICANT CHANGE UP (ref 22–31)
CREAT SERPL-MCNC: 0.79 MG/DL — SIGNIFICANT CHANGE UP (ref 0.5–1.3)
CREAT SERPL-MCNC: 0.79 MG/DL — SIGNIFICANT CHANGE UP (ref 0.5–1.3)
EGFR: 95 ML/MIN/1.73M2 — SIGNIFICANT CHANGE UP
EGFR: 95 ML/MIN/1.73M2 — SIGNIFICANT CHANGE UP
EOSINOPHIL # BLD AUTO: 0.02 K/UL — SIGNIFICANT CHANGE UP (ref 0–0.5)
EOSINOPHIL # BLD AUTO: 0.02 K/UL — SIGNIFICANT CHANGE UP (ref 0–0.5)
EOSINOPHIL NFR BLD AUTO: 0.3 % — SIGNIFICANT CHANGE UP (ref 0–6)
EOSINOPHIL NFR BLD AUTO: 0.3 % — SIGNIFICANT CHANGE UP (ref 0–6)
GLUCOSE BLDC GLUCOMTR-MCNC: 110 MG/DL — HIGH (ref 70–99)
GLUCOSE BLDC GLUCOMTR-MCNC: 110 MG/DL — HIGH (ref 70–99)
GLUCOSE BLDC GLUCOMTR-MCNC: 127 MG/DL — HIGH (ref 70–99)
GLUCOSE BLDC GLUCOMTR-MCNC: 127 MG/DL — HIGH (ref 70–99)
GLUCOSE BLDC GLUCOMTR-MCNC: 177 MG/DL — HIGH (ref 70–99)
GLUCOSE BLDC GLUCOMTR-MCNC: 177 MG/DL — HIGH (ref 70–99)
GLUCOSE BLDC GLUCOMTR-MCNC: 215 MG/DL — HIGH (ref 70–99)
GLUCOSE BLDC GLUCOMTR-MCNC: 215 MG/DL — HIGH (ref 70–99)
GLUCOSE BLDC GLUCOMTR-MCNC: 219 MG/DL — HIGH (ref 70–99)
GLUCOSE BLDC GLUCOMTR-MCNC: 219 MG/DL — HIGH (ref 70–99)
GLUCOSE BLDC GLUCOMTR-MCNC: 236 MG/DL — HIGH (ref 70–99)
GLUCOSE BLDC GLUCOMTR-MCNC: 236 MG/DL — HIGH (ref 70–99)
GLUCOSE BLDC GLUCOMTR-MCNC: 43 MG/DL — CRITICAL LOW (ref 70–99)
GLUCOSE BLDC GLUCOMTR-MCNC: 43 MG/DL — CRITICAL LOW (ref 70–99)
GLUCOSE BLDC GLUCOMTR-MCNC: 44 MG/DL — CRITICAL LOW (ref 70–99)
GLUCOSE BLDC GLUCOMTR-MCNC: 44 MG/DL — CRITICAL LOW (ref 70–99)
GLUCOSE BLDC GLUCOMTR-MCNC: 53 MG/DL — CRITICAL LOW (ref 70–99)
GLUCOSE BLDC GLUCOMTR-MCNC: 53 MG/DL — CRITICAL LOW (ref 70–99)
GLUCOSE BLDC GLUCOMTR-MCNC: 71 MG/DL — SIGNIFICANT CHANGE UP (ref 70–99)
GLUCOSE BLDC GLUCOMTR-MCNC: 71 MG/DL — SIGNIFICANT CHANGE UP (ref 70–99)
GLUCOSE SERPL-MCNC: 109 MG/DL — HIGH (ref 70–99)
GLUCOSE SERPL-MCNC: 109 MG/DL — HIGH (ref 70–99)
HCT VFR BLD CALC: 33.4 % — LOW (ref 39–50)
HCT VFR BLD CALC: 33.4 % — LOW (ref 39–50)
HGB BLD-MCNC: 11 G/DL — LOW (ref 13–17)
HGB BLD-MCNC: 11 G/DL — LOW (ref 13–17)
IANC: 6.37 K/UL — SIGNIFICANT CHANGE UP (ref 1.8–7.4)
IANC: 6.37 K/UL — SIGNIFICANT CHANGE UP (ref 1.8–7.4)
IMM GRANULOCYTES NFR BLD AUTO: 1.3 % — HIGH (ref 0–0.9)
IMM GRANULOCYTES NFR BLD AUTO: 1.3 % — HIGH (ref 0–0.9)
LYMPHOCYTES # BLD AUTO: 0.24 K/UL — LOW (ref 1–3.3)
LYMPHOCYTES # BLD AUTO: 0.24 K/UL — LOW (ref 1–3.3)
LYMPHOCYTES # BLD AUTO: 3.4 % — LOW (ref 13–44)
LYMPHOCYTES # BLD AUTO: 3.4 % — LOW (ref 13–44)
MAGNESIUM SERPL-MCNC: 2.1 MG/DL — SIGNIFICANT CHANGE UP (ref 1.6–2.6)
MAGNESIUM SERPL-MCNC: 2.1 MG/DL — SIGNIFICANT CHANGE UP (ref 1.6–2.6)
MCHC RBC-ENTMCNC: 31.1 PG — SIGNIFICANT CHANGE UP (ref 27–34)
MCHC RBC-ENTMCNC: 31.1 PG — SIGNIFICANT CHANGE UP (ref 27–34)
MCHC RBC-ENTMCNC: 32.9 GM/DL — SIGNIFICANT CHANGE UP (ref 32–36)
MCHC RBC-ENTMCNC: 32.9 GM/DL — SIGNIFICANT CHANGE UP (ref 32–36)
MCV RBC AUTO: 94.4 FL — SIGNIFICANT CHANGE UP (ref 80–100)
MCV RBC AUTO: 94.4 FL — SIGNIFICANT CHANGE UP (ref 80–100)
MONOCYTES # BLD AUTO: 0.29 K/UL — SIGNIFICANT CHANGE UP (ref 0–0.9)
MONOCYTES # BLD AUTO: 0.29 K/UL — SIGNIFICANT CHANGE UP (ref 0–0.9)
MONOCYTES NFR BLD AUTO: 4.1 % — SIGNIFICANT CHANGE UP (ref 2–14)
MONOCYTES NFR BLD AUTO: 4.1 % — SIGNIFICANT CHANGE UP (ref 2–14)
NEUTROPHILS # BLD AUTO: 6.37 K/UL — SIGNIFICANT CHANGE UP (ref 1.8–7.4)
NEUTROPHILS # BLD AUTO: 6.37 K/UL — SIGNIFICANT CHANGE UP (ref 1.8–7.4)
NEUTROPHILS NFR BLD AUTO: 90.9 % — HIGH (ref 43–77)
NEUTROPHILS NFR BLD AUTO: 90.9 % — HIGH (ref 43–77)
NRBC # BLD: 0 /100 WBCS — SIGNIFICANT CHANGE UP (ref 0–0)
NRBC # BLD: 0 /100 WBCS — SIGNIFICANT CHANGE UP (ref 0–0)
NRBC # FLD: 0 K/UL — SIGNIFICANT CHANGE UP (ref 0–0)
NRBC # FLD: 0 K/UL — SIGNIFICANT CHANGE UP (ref 0–0)
PHOSPHATE SERPL-MCNC: 3 MG/DL — SIGNIFICANT CHANGE UP (ref 2.5–4.5)
PHOSPHATE SERPL-MCNC: 3 MG/DL — SIGNIFICANT CHANGE UP (ref 2.5–4.5)
PLATELET # BLD AUTO: 132 K/UL — LOW (ref 150–400)
PLATELET # BLD AUTO: 132 K/UL — LOW (ref 150–400)
POTASSIUM SERPL-MCNC: 4 MMOL/L — SIGNIFICANT CHANGE UP (ref 3.5–5.3)
POTASSIUM SERPL-MCNC: 4 MMOL/L — SIGNIFICANT CHANGE UP (ref 3.5–5.3)
POTASSIUM SERPL-SCNC: 4 MMOL/L — SIGNIFICANT CHANGE UP (ref 3.5–5.3)
POTASSIUM SERPL-SCNC: 4 MMOL/L — SIGNIFICANT CHANGE UP (ref 3.5–5.3)
RBC # BLD: 3.54 M/UL — LOW (ref 4.2–5.8)
RBC # BLD: 3.54 M/UL — LOW (ref 4.2–5.8)
RBC # FLD: 12.8 % — SIGNIFICANT CHANGE UP (ref 10.3–14.5)
RBC # FLD: 12.8 % — SIGNIFICANT CHANGE UP (ref 10.3–14.5)
SODIUM SERPL-SCNC: 136 MMOL/L — SIGNIFICANT CHANGE UP (ref 135–145)
SODIUM SERPL-SCNC: 136 MMOL/L — SIGNIFICANT CHANGE UP (ref 135–145)
WBC # BLD: 7.01 K/UL — SIGNIFICANT CHANGE UP (ref 3.8–10.5)
WBC # BLD: 7.01 K/UL — SIGNIFICANT CHANGE UP (ref 3.8–10.5)
WBC # FLD AUTO: 7.01 K/UL — SIGNIFICANT CHANGE UP (ref 3.8–10.5)
WBC # FLD AUTO: 7.01 K/UL — SIGNIFICANT CHANGE UP (ref 3.8–10.5)

## 2023-11-13 PROCEDURE — 99232 SBSQ HOSP IP/OBS MODERATE 35: CPT

## 2023-11-13 RX ORDER — DEXTROSE 50 % IN WATER 50 %
15 SYRINGE (ML) INTRAVENOUS ONCE
Refills: 0 | Status: DISCONTINUED | OUTPATIENT
Start: 2023-11-13 | End: 2023-12-18

## 2023-11-13 RX ORDER — DEXTROSE 50 % IN WATER 50 %
12.5 SYRINGE (ML) INTRAVENOUS ONCE
Refills: 0 | Status: COMPLETED | OUTPATIENT
Start: 2023-11-13 | End: 2023-11-13

## 2023-11-13 RX ORDER — DEXTROSE 50 % IN WATER 50 %
25 SYRINGE (ML) INTRAVENOUS ONCE
Refills: 0 | Status: DISCONTINUED | OUTPATIENT
Start: 2023-11-13 | End: 2023-12-20

## 2023-11-13 RX ORDER — DEXTROSE 50 % IN WATER 50 %
25 SYRINGE (ML) INTRAVENOUS ONCE
Refills: 0 | Status: DISCONTINUED | OUTPATIENT
Start: 2023-11-13 | End: 2023-12-18

## 2023-11-13 RX ORDER — INSULIN GLARGINE 100 [IU]/ML
25 INJECTION, SOLUTION SUBCUTANEOUS EVERY MORNING
Refills: 0 | Status: DISCONTINUED | OUTPATIENT
Start: 2023-11-13 | End: 2023-11-15

## 2023-11-13 RX ORDER — INSULIN LISPRO 100/ML
9 VIAL (ML) SUBCUTANEOUS
Refills: 0 | Status: DISCONTINUED | OUTPATIENT
Start: 2023-11-13 | End: 2023-11-15

## 2023-11-13 RX ORDER — SODIUM CHLORIDE 9 MG/ML
1000 INJECTION, SOLUTION INTRAVENOUS
Refills: 0 | Status: DISCONTINUED | OUTPATIENT
Start: 2023-11-13 | End: 2023-12-18

## 2023-11-13 RX ORDER — DEXTROSE 50 % IN WATER 50 %
12.5 SYRINGE (ML) INTRAVENOUS ONCE
Refills: 0 | Status: DISCONTINUED | OUTPATIENT
Start: 2023-11-13 | End: 2023-12-18

## 2023-11-13 RX ORDER — GLUCAGON INJECTION, SOLUTION 0.5 MG/.1ML
1 INJECTION, SOLUTION SUBCUTANEOUS ONCE
Refills: 0 | Status: DISCONTINUED | OUTPATIENT
Start: 2023-11-13 | End: 2023-12-18

## 2023-11-13 RX ADMIN — Medication 975 MILLIGRAM(S): at 13:00

## 2023-11-13 RX ADMIN — Medication 4: at 09:30

## 2023-11-13 RX ADMIN — OXYCODONE HYDROCHLORIDE 10 MILLIGRAM(S): 5 TABLET ORAL at 15:58

## 2023-11-13 RX ADMIN — OXYCODONE HYDROCHLORIDE 10 MILLIGRAM(S): 5 TABLET ORAL at 20:03

## 2023-11-13 RX ADMIN — Medication 975 MILLIGRAM(S): at 00:09

## 2023-11-13 RX ADMIN — Medication 18 UNIT(S): at 09:31

## 2023-11-13 RX ADMIN — CHLORHEXIDINE GLUCONATE 1 APPLICATION(S): 213 SOLUTION TOPICAL at 13:01

## 2023-11-13 RX ADMIN — Medication 975 MILLIGRAM(S): at 05:50

## 2023-11-13 RX ADMIN — HEPARIN SODIUM 5000 UNIT(S): 5000 INJECTION INTRAVENOUS; SUBCUTANEOUS at 13:02

## 2023-11-13 RX ADMIN — PANTOPRAZOLE SODIUM 40 MILLIGRAM(S): 20 TABLET, DELAYED RELEASE ORAL at 05:50

## 2023-11-13 RX ADMIN — HEPARIN SODIUM 5000 UNIT(S): 5000 INJECTION INTRAVENOUS; SUBCUTANEOUS at 05:53

## 2023-11-13 RX ADMIN — Medication 18 UNIT(S): at 12:57

## 2023-11-13 RX ADMIN — OXYCODONE HYDROCHLORIDE 10 MILLIGRAM(S): 5 TABLET ORAL at 22:08

## 2023-11-13 RX ADMIN — OXYCODONE HYDROCHLORIDE 10 MILLIGRAM(S): 5 TABLET ORAL at 23:00

## 2023-11-13 RX ADMIN — ATORVASTATIN CALCIUM 20 MILLIGRAM(S): 80 TABLET, FILM COATED ORAL at 22:08

## 2023-11-13 RX ADMIN — AMLODIPINE BESYLATE 10 MILLIGRAM(S): 2.5 TABLET ORAL at 05:51

## 2023-11-13 RX ADMIN — Medication 975 MILLIGRAM(S): at 20:04

## 2023-11-13 RX ADMIN — HEPARIN SODIUM 5000 UNIT(S): 5000 INJECTION INTRAVENOUS; SUBCUTANEOUS at 22:08

## 2023-11-13 RX ADMIN — TAMSULOSIN HYDROCHLORIDE 0.4 MILLIGRAM(S): 0.4 CAPSULE ORAL at 22:09

## 2023-11-13 RX ADMIN — OXYCODONE HYDROCHLORIDE 10 MILLIGRAM(S): 5 TABLET ORAL at 19:03

## 2023-11-13 RX ADMIN — OXYCODONE HYDROCHLORIDE 10 MILLIGRAM(S): 5 TABLET ORAL at 16:58

## 2023-11-13 RX ADMIN — BICALUTAMIDE 50 MILLIGRAM(S): 50 TABLET, FILM COATED ORAL at 12:59

## 2023-11-13 RX ADMIN — INSULIN GLARGINE 37 UNIT(S): 100 INJECTION, SOLUTION SUBCUTANEOUS at 09:32

## 2023-11-13 RX ADMIN — MUPIROCIN 1 APPLICATION(S): 20 OINTMENT TOPICAL at 19:06

## 2023-11-13 RX ADMIN — Medication 4 MILLIGRAM(S): at 05:51

## 2023-11-13 RX ADMIN — OXYCODONE HYDROCHLORIDE 10 MILLIGRAM(S): 5 TABLET ORAL at 10:31

## 2023-11-13 RX ADMIN — Medication 975 MILLIGRAM(S): at 19:04

## 2023-11-13 RX ADMIN — Medication 12.5 GRAM(S): at 18:05

## 2023-11-13 RX ADMIN — Medication 12.5 GRAM(S): at 03:27

## 2023-11-13 RX ADMIN — OXYCODONE HYDROCHLORIDE 10 MILLIGRAM(S): 5 TABLET ORAL at 09:31

## 2023-11-13 RX ADMIN — MUPIROCIN 1 APPLICATION(S): 20 OINTMENT TOPICAL at 05:51

## 2023-11-13 RX ADMIN — LISINOPRIL 20 MILLIGRAM(S): 2.5 TABLET ORAL at 05:50

## 2023-11-13 RX ADMIN — Medication 975 MILLIGRAM(S): at 14:00

## 2023-11-13 NOTE — PROVIDER CONTACT NOTE (HYPOGLYCEMIA EVENT) - NS PROVIDER CONTACT BACKGROUND-HYPO
Age: 73y    Gender: Male    POCT Blood Glucose:  67 mg/dL (11-12-23 @ 23:03)  58 mg/dL (11-12-23 @ 23:01)  70 mg/dL (11-12-23 @ 22:37)  68 mg/dL (11-12-23 @ 22:36)  51 mg/dL (11-12-23 @ 21:57)  59 mg/dL (11-12-23 @ 21:56)  195 mg/dL (11-12-23 @ 18:10)  263 mg/dL (11-12-23 @ 12:51)      eMAR:atorvastatin   20 milliGRAM(s) Oral (11-12-23 @ 21:43)    dexAMETHasone     Tablet   4 milliGRAM(s) Oral (11-12-23 @ 06:13)    insulin glargine Injectable (LANTUS)   37 Unit(s) SubCutaneous (11-12-23 @ 10:15)    insulin lispro (ADMELOG) corrective regimen sliding scale   2 Unit(s) SubCutaneous (11-12-23 @ 18:25)   6 Unit(s) SubCutaneous (11-12-23 @ 13:48)   4 Unit(s) SubCutaneous (11-12-23 @ 09:35)    insulin lispro Injectable (ADMELOG)   14 Unit(s) SubCutaneous (11-12-23 @ 09:35)    insulin lispro Injectable (ADMELOG)   18 Unit(s) SubCutaneous (11-12-23 @ 18:25)    
Age: 73y    Gender: Male    POCT Blood Glucose:  70 mg/dL (11-12-23 @ 22:37)  68 mg/dL (11-12-23 @ 22:36)  51 mg/dL (11-12-23 @ 21:57)  59 mg/dL (11-12-23 @ 21:56)  195 mg/dL (11-12-23 @ 18:10)  263 mg/dL (11-12-23 @ 12:51)  289 mg/dL (11-12-23 @ 10:14)  234 mg/dL (11-12-23 @ 08:49)      eMAR:atorvastatin   20 milliGRAM(s) Oral (11-12-23 @ 21:43)    dexAMETHasone     Tablet   4 milliGRAM(s) Oral (11-12-23 @ 06:13)    insulin glargine Injectable (LANTUS)   37 Unit(s) SubCutaneous (11-12-23 @ 10:15)    insulin lispro (ADMELOG) corrective regimen sliding scale   2 Unit(s) SubCutaneous (11-12-23 @ 18:25)   6 Unit(s) SubCutaneous (11-12-23 @ 13:48)   4 Unit(s) SubCutaneous (11-12-23 @ 09:35)    insulin lispro Injectable (ADMELOG)   14 Unit(s) SubCutaneous (11-12-23 @ 09:35)    insulin lispro Injectable (ADMELOG)   18 Unit(s) SubCutaneous (11-12-23 @ 18:25)    
Age: 73y    Gender: Male    POCT Blood Glucose:  127 mg/dL (11-13-23 @ 18:40)  53 mg/dL (11-13-23 @ 17:55)  43 mg/dL (11-13-23 @ 17:35)  44 mg/dL (11-13-23 @ 17:33)  110 mg/dL (11-13-23 @ 12:37)  236 mg/dL (11-13-23 @ 09:29)  215 mg/dL (11-13-23 @ 08:24)  177 mg/dL (11-13-23 @ 03:50)      eMAR:  atorvastatin   20 milliGRAM(s) Oral (11-12-23 @ 21:43)    dexAMETHasone     Tablet   4 milliGRAM(s) Oral (11-13-23 @ 05:51)    dextrose 50% Injectable   12.5 Gram(s) IV Push (11-13-23 @ 18:05)    dextrose 50% Injectable   12.5 Gram(s) IV Push (11-13-23 @ 03:27)    dextrose 50% Injectable   25 Gram(s) IV Push (11-12-23 @ 23:21)    insulin glargine Injectable (LANTUS)   37 Unit(s) SubCutaneous (11-13-23 @ 09:32)    insulin lispro (ADMELOG) corrective regimen sliding scale   4 Unit(s) SubCutaneous (11-13-23 @ 09:30)    insulin lispro Injectable (ADMELOG)   18 Unit(s) SubCutaneous (11-13-23 @ 12:57)   18 Unit(s) SubCutaneous (11-13-23 @ 09:31)    
Age: 73y    Gender: Male    POCT Blood Glucose:  51 mg/dL (11-12-23 @ 21:57)  59 mg/dL (11-12-23 @ 21:56)  195 mg/dL (11-12-23 @ 18:10)  263 mg/dL (11-12-23 @ 12:51)  289 mg/dL (11-12-23 @ 10:14)  234 mg/dL (11-12-23 @ 08:49)  105 mg/dL (11-11-23 @ 22:13)      eMAR:atorvastatin   20 milliGRAM(s) Oral (11-12-23 @ 21:43)    dexAMETHasone     Tablet   4 milliGRAM(s) Oral (11-12-23 @ 06:13)    insulin glargine Injectable (LANTUS)   37 Unit(s) SubCutaneous (11-12-23 @ 10:15)    insulin lispro (ADMELOG) corrective regimen sliding scale   2 Unit(s) SubCutaneous (11-12-23 @ 18:25)   6 Unit(s) SubCutaneous (11-12-23 @ 13:48)   4 Unit(s) SubCutaneous (11-12-23 @ 09:35)    insulin lispro Injectable (ADMELOG)   14 Unit(s) SubCutaneous (11-12-23 @ 09:35)    insulin lispro Injectable (ADMELOG)   18 Unit(s) SubCutaneous (11-12-23 @ 18:25)

## 2023-11-13 NOTE — PROGRESS NOTE ADULT - PROBLEM SELECTOR PLAN 7
DVT: HSQ q8h  Diet: CC/DASH  Dispo: [facility], per PMR patient may benefit from acute rehab DVT: HSQ q8h  Diet: CC/DASH  Dispo: [facility], per PMR patient may benefit from acute rehab  Patient is refusing REHAB  d/w Plastic surgery  needs : Nylon sutures over incision to remain in place  - Gauze/tegaderm dressing for serosanguinous drainage; currently no collections/signs of infection  Need dressing change 1 to 2 x day. to C spine wound.  F/u with Dr Tr Yoo DVT: HSQ q8h  Diet: CC/DASH  Dispo: [facility], per PMR patient may benefit from acute rehab  Patient is refusing REHAB  d/w Plastic surgery  needs : Nylon sutures over incision to remain in place  - Gauze/tegaderm dressing for serosanguinous drainage; currently no collections/signs of infection  Need dressing change 1 to 2 x day. to T spine wound.  F/u with Dr Tr Yoo DVT: HSQ q8h  Diet: CC/DASH  Dispo: [facility], per PMR patient may benefit from acute rehab  Patient is refusing REHAB  d/w Plastic surgery  needs : Nylon sutures over incision to remain in place  - Gauze/tegaderm dressing for serosanguinous drainage; currently no collections/signs of infection  Need dressing change 1 to 2 x day. to T spine wound.  F/u with Dr Tr Yoo  F/u MRI of T spine as per ortho

## 2023-11-13 NOTE — PROVIDER CONTACT NOTE (HYPOGLYCEMIA EVENT) - NS PROVIDER CONTACT NOTE-TREATMENT-HYPO
4 oz Fruit Juice (Specify quantity, date/time)/Dextrose 50% 12.5 Grams IV Push
4 oz Fruit Juice (Specify quantity, date/time)
4 oz Fruit Juice (Specify quantity, date/time)

## 2023-11-13 NOTE — PROGRESS NOTE ADULT - SUBJECTIVE AND OBJECTIVE BOX
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 975 milliGRAM(s) Oral every 6 hours  amLODIPine   Tablet 10 milliGRAM(s) Oral daily  atorvastatin 20 milliGRAM(s) Oral at bedtime  bicalutamide 50 milliGRAM(s) Oral daily  chlorhexidine 2% Cloths 1 Application(s) Topical daily  dexAMETHasone     Tablet   Oral   dexAMETHasone     Tablet 4 milliGRAM(s) Oral daily  heparin   Injectable 5000 Unit(s) SubCutaneous every 8 hours  insulin glargine Injectable (LANTUS) 37 Unit(s) SubCutaneous every morning  insulin lispro (ADMELOG) corrective regimen sliding scale   SubCutaneous three times a day before meals  insulin lispro (ADMELOG) corrective regimen sliding scale   SubCutaneous at bedtime  insulin lispro Injectable (ADMELOG) 18 Unit(s) SubCutaneous three times a day before meals  lisinopril 20 milliGRAM(s) Oral daily  mupirocin 2% Ointment 1 Application(s) Topical two times a day  pantoprazole    Tablet 40 milliGRAM(s) Oral before breakfast  tamsulosin 0.4 milliGRAM(s) Oral at bedtime    MEDICATIONS  (PRN):  HYDROmorphone  Injectable 0.5 milliGRAM(s) IV Push every 4 hours PRN Severe breakthrough Pain (7 - 10)  naloxone Injectable 0.1 milliGRAM(s) IV Push every 3 minutes PRN For ANY of the following changes in patient status:  A. RR LESS THAN 10 breaths per minute, B. Oxygen saturation LESS THAN 90%, C. Sedation score of 6  ondansetron Injectable 4 milliGRAM(s) IV Push every 6 hours PRN Nausea  oxyCODONE    IR 5 milliGRAM(s) Oral every 3 hours PRN Moderate Pain (4 - 6)  oxyCODONE    IR 10 milliGRAM(s) Oral every 3 hours PRN Severe Pain (7 - 10)        CAPILLARY BLOOD GLUCOSE      POCT Blood Glucose.: 236 mg/dL (13 Nov 2023 09:29)  POCT Blood Glucose.: 215 mg/dL (13 Nov 2023 08:24)  POCT Blood Glucose.: 177 mg/dL (13 Nov 2023 03:50)  POCT Blood Glucose.: 71 mg/dL (13 Nov 2023 03:09)  POCT Blood Glucose.: 155 mg/dL (12 Nov 2023 23:53)  POCT Blood Glucose.: 176 mg/dL (12 Nov 2023 23:45)  POCT Blood Glucose.: 67 mg/dL (12 Nov 2023 23:03)  POCT Blood Glucose.: 58 mg/dL (12 Nov 2023 23:01)  POCT Blood Glucose.: 70 mg/dL (12 Nov 2023 22:37)  POCT Blood Glucose.: 68 mg/dL (12 Nov 2023 22:36)  POCT Blood Glucose.: 51 mg/dL (12 Nov 2023 21:57)  POCT Blood Glucose.: 59 mg/dL (12 Nov 2023 21:56)  POCT Blood Glucose.: 195 mg/dL (12 Nov 2023 18:10)  POCT Blood Glucose.: 263 mg/dL (12 Nov 2023 12:51)  POCT Blood Glucose.: 289 mg/dL (12 Nov 2023 10:14)    I&O's Summary      PHYSICAL EXAM:  GENERAL: NAD,   HEAD:  Atraumatic, Normocephalic  EYES: EOMI, PERRLA, conjunctiva and sclera clear  NECK: Supple, No JVD  CHEST/LUNG: Clear to auscultation bilaterally; No wheeze  HEART: Regular rate and rhythm; No murmurs, rubs, or gallops  ABDOMEN: Soft, Nontender, Nondistended; Bowel sounds present  EXTREMITIES:  2+ Peripheral Pulses, No clubbing, cyanosis, or edema  PSYCH: Alert  NEUROLOGY: non-focal  SKIN: No rashes or lesions    LABS:                        11.0   7.01  )-----------( 132      ( 13 Nov 2023 06:00 )             33.4     11-13    136  |  100  |  31<H>  ----------------------------<  109<H>  4.0   |  25  |  0.79    Ca    8.9      13 Nov 2023 06:00  Phos  3.0     11-13  Mg     2.10     11-13          Care Discussed with Consultants/Other Providers:   Internal Medicine Accept Note:  Patient is a 73y old  Male who presents with a chief complaint of 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 975 milliGRAM(s) Oral every 6 hours  amLODIPine   Tablet 10 milliGRAM(s) Oral daily  atorvastatin 20 milliGRAM(s) Oral at bedtime  bicalutamide 50 milliGRAM(s) Oral daily  chlorhexidine 2% Cloths 1 Application(s) Topical daily  dexAMETHasone     Tablet   Oral   dexAMETHasone     Tablet 4 milliGRAM(s) Oral daily  heparin   Injectable 5000 Unit(s) SubCutaneous every 8 hours  insulin glargine Injectable (LANTUS) 37 Unit(s) SubCutaneous every morning  insulin lispro (ADMELOG) corrective regimen sliding scale   SubCutaneous three times a day before meals  insulin lispro (ADMELOG) corrective regimen sliding scale   SubCutaneous at bedtime  insulin lispro Injectable (ADMELOG) 18 Unit(s) SubCutaneous three times a day before meals  lisinopril 20 milliGRAM(s) Oral daily  mupirocin 2% Ointment 1 Application(s) Topical two times a day  pantoprazole    Tablet 40 milliGRAM(s) Oral before breakfast  tamsulosin 0.4 milliGRAM(s) Oral at bedtime    MEDICATIONS  (PRN):  HYDROmorphone  Injectable 0.5 milliGRAM(s) IV Push every 4 hours PRN Severe breakthrough Pain (7 - 10)  naloxone Injectable 0.1 milliGRAM(s) IV Push every 3 minutes PRN For ANY of the following changes in patient status:  A. RR LESS THAN 10 breaths per minute, B. Oxygen saturation LESS THAN 90%, C. Sedation score of 6  ondansetron Injectable 4 milliGRAM(s) IV Push every 6 hours PRN Nausea  oxyCODONE    IR 5 milliGRAM(s) Oral every 3 hours PRN Moderate Pain (4 - 6)  oxyCODONE    IR 10 milliGRAM(s) Oral every 3 hours PRN Severe Pain (7 - 10)        CAPILLARY BLOOD GLUCOSE      POCT Blood Glucose.: 236 mg/dL (13 Nov 2023 09:29)  POCT Blood Glucose.: 215 mg/dL (13 Nov 2023 08:24)  POCT Blood Glucose.: 177 mg/dL (13 Nov 2023 03:50)  POCT Blood Glucose.: 71 mg/dL (13 Nov 2023 03:09)  POCT Blood Glucose.: 155 mg/dL (12 Nov 2023 23:53)  POCT Blood Glucose.: 176 mg/dL (12 Nov 2023 23:45)  POCT Blood Glucose.: 67 mg/dL (12 Nov 2023 23:03)  POCT Blood Glucose.: 58 mg/dL (12 Nov 2023 23:01)  POCT Blood Glucose.: 70 mg/dL (12 Nov 2023 22:37)  POCT Blood Glucose.: 68 mg/dL (12 Nov 2023 22:36)  POCT Blood Glucose.: 51 mg/dL (12 Nov 2023 21:57)  POCT Blood Glucose.: 59 mg/dL (12 Nov 2023 21:56)  POCT Blood Glucose.: 195 mg/dL (12 Nov 2023 18:10)  POCT Blood Glucose.: 263 mg/dL (12 Nov 2023 12:51)  POCT Blood Glucose.: 289 mg/dL (12 Nov 2023 10:14)    I&O's Summary    Vital Signs Last 24 Hrs  T(C): 36.7 (13 Nov 2023 05:44), Max: 37.1 (12 Nov 2023 18:00)  T(F): 98 (13 Nov 2023 05:44), Max: 98.8 (12 Nov 2023 18:00)  HR: 87 (13 Nov 2023 05:44) (76 - 96)  BP: 118/71 (13 Nov 2023 05:44) (109/62 - 119/61)  BP(mean): --  RR: 16 (13 Nov 2023 05:44) (16 - 18)  SpO2: 96% (13 Nov 2023 05:44) (95% - 98%)     room air      PHYSICAL EXAM:  GENERAL: NAD,   HEAD:  Atraumatic, Normocephalic  EYES: EOMI, PERRLA, conjunctiva and sclera clear  NECK: Supple, No JVD  CHEST/LUNG: Clear to auscultation bilaterally; No wheeze  HEART: Regular rate and rhythm; No murmurs, rubs, or gallops  ABDOMEN: Soft, Nontender, Nondistended; Bowel sounds present  EXTREMITIES:  2+ Peripheral Pulses, No clubbing, cyanosis, or edema  PSYCH: Alert  NEUROLOGY: non-focal  SKIN: No rashes or lesions    LABS:                        11.0   7.01  )-----------( 132      ( 13 Nov 2023 06:00 )             33.4     11-13    136  |  100  |  31<H>  ----------------------------<  109<H>  4.0   |  25  |  0.79    Ca    8.9      13 Nov 2023 06:00  Phos  3.0     11-13  Mg     2.10     11-13          Care Discussed with Consultants/Other Providers:

## 2023-11-13 NOTE — PROVIDER CONTACT NOTE (HYPOGLYCEMIA EVENT) - NS PROVIDER CONTACT RECOMMEND-HYPO
ACP notified
Cammie Frey notified. Will recheck FS in 15 minutes
Cammie Garcia notified
Cammie Frey notified. will recheck FS in 15 minutes

## 2023-11-13 NOTE — PROGRESS NOTE ADULT - ASSESSMENT
74y/o M with DM, HTN and metastatic prostate cancer (diagnosed in 2009) that initially presented to OSH with LE weakness and R hand paresthesias now transferred to [facility] for NSG eval. Patient now s/p surgical intervention (C5-T3 posterior cervical fusion and C7-T1 decompression) by orthopedic surgery, now transferred to medicine for further management.

## 2023-11-13 NOTE — PROGRESS NOTE ADULT - ASSESSMENT
73y Male s/p C5-T3 posterior cervical fusion, C7-T1 decompression for metastatic prostate cancer with plastics closure on 11/2. Recovering well. Both drains removed.     Plan:  - Nylon sutures over incision to remain in place  - Gauze/tegaderm dressing for serosanguinous drainage; currently no collections/signs of infection  - Optimize nutrition, ensure adequate protein intake  - Remainder of care per primary team    Plastic Surgery  [phone]# [facility] pager  [phone] [facility] pager  Available on Teams

## 2023-11-13 NOTE — CHART NOTE - NSCHARTNOTEFT_GEN_A_CORE
Notified by RN. Pts FS 44  Pt seen and examined at bedside. Pt awake an alert x's 3. Asymptomatic. Pt received a cup of apple juice and   nurse was advised to activate hypoglycemia protocol. Pt is on a steroid taper. Provider decreased insulin.   Will monitor FS. Plan to call endocrine in am for further assistance.    ICU Vital Signs Last 24 Hrs  T(C): 36.9 (13 Nov 2023 14:39), Max: 36.9 (13 Nov 2023 14:39)  T(F): 98.5 (13 Nov 2023 14:39), Max: 98.5 (13 Nov 2023 14:39)  HR: 86 (13 Nov 2023 14:39) (76 - 90)  BP: 146/94 (13 Nov 2023 14:39) (114/61 - 146/94)  BP(mean): --  ABP: --  ABP(mean): --  RR: 16 (13 Nov 2023 14:39) (16 - 18)  SpO2: 99% (13 Nov 2023 14:39) (95% - 99%)    CAPILLARY BLOOD GLUCOSE      POCT Blood Glucose.: 53 mg/dL (13 Nov 2023 17:55)    O2 Parameters below as of 13 Nov 2023 14:39  Patient On (Oxygen Delivery Method): room air    MEDICATIONS  (STANDING):    dexAMETHasone     Tablet 4 milliGRAM(s) Oral daily  dexAMETHasone     Tablet   Oral   dextrose 5%. 1000 milliLiter(s) (100 mL/Hr) IV Continuous <Continuous>  dextrose 5%. 1000 milliLiter(s) (50 mL/Hr) IV Continuous <Continuous>  dextrose 50% Injectable 25 Gram(s) IV Push once  dextrose 50% Injectable 12.5 Gram(s) IV Push once  dextrose 50% Injectable 25 Gram(s) IV Push once  glucagon  Injectable 1 milliGRAM(s) IntraMuscular once  heparin   Injectable 5000 Unit(s) SubCutaneous every 8 hours  insulin glargine Injectable (LANTUS) 25 Unit(s) SubCutaneous every morning  insulin lispro (ADMELOG) corrective regimen sliding scale   SubCutaneous at bedtime  insulin lispro (ADMELOG) corrective regimen sliding scale   SubCutaneous three times a day before meals  insulin lispro Injectable (ADMELOG) 9 Unit(s) SubCutaneous three times a day before meals

## 2023-11-13 NOTE — PROGRESS NOTE ADULT - SUBJECTIVE AND OBJECTIVE BOX
Plastic Surgery Progress Note (pg [facility]: [id number], NS: [phone])    SUBJECTIVE  The patient was seen and examined. No acute events overnight. Pain controlled, afebrile w/ stable vitals.     OBJECTIVE  ___________________________________________________  VITAL SIGNS / I&O's   Vital Signs Last 24 Hrs  T(C): 36.7 (13 Nov 2023 05:44), Max: 37.1 (12 Nov 2023 18:00)  T(F): 98 (13 Nov 2023 05:44), Max: 98.8 (12 Nov 2023 18:00)  HR: 87 (13 Nov 2023 05:44) (76 - 96)  BP: 118/71 (13 Nov 2023 05:44) (109/62 - 119/61)  BP(mean): --  RR: 16 (13 Nov 2023 05:44) (16 - 18)  SpO2: 96% (13 Nov 2023 05:44) (95% - 98%)    Parameters below as of 13 Nov 2023 05:44  Patient On (Oxygen Delivery Method): room air          ___________________________________________________  PHYSICAL EXAM    General: NAD, Lying in bed comfortably  Neuro: Awake and alert  Back: Incision intact, nylons in place. No palpable collections. Some SS drainage expressible from superior portion of incision. Covered with gauze/tegaderm incision    ___________________________________________________  LABS                        11.0   7.01  )-----------( 132      ( 13 Nov 2023 06:00 )             33.4     13 Nov 2023 06:00    136    |  100    |  31     ----------------------------<  109    4.0     |  25     |  0.79     Ca    8.9        13 Nov 2023 06:00  Phos  3.0       13 Nov 2023 06:00  Mg     2.10      13 Nov 2023 06:00        CAPILLARY BLOOD GLUCOSE      POCT Blood Glucose.: 177 mg/dL (13 Nov 2023 03:50)  POCT Blood Glucose.: 71 mg/dL (13 Nov 2023 03:09)  POCT Blood Glucose.: 155 mg/dL (12 Nov 2023 23:53)  POCT Blood Glucose.: 176 mg/dL (12 Nov 2023 23:45)  POCT Blood Glucose.: 67 mg/dL (12 Nov 2023 23:03)  POCT Blood Glucose.: 58 mg/dL (12 Nov 2023 23:01)  POCT Blood Glucose.: 70 mg/dL (12 Nov 2023 22:37)  POCT Blood Glucose.: 68 mg/dL (12 Nov 2023 22:36)  POCT Blood Glucose.: 51 mg/dL (12 Nov 2023 21:57)  POCT Blood Glucose.: 59 mg/dL (12 Nov 2023 21:56)  POCT Blood Glucose.: 195 mg/dL (12 Nov 2023 18:10)  POCT Blood Glucose.: 263 mg/dL (12 Nov 2023 12:51)  POCT Blood Glucose.: 289 mg/dL (12 Nov 2023 10:14)  POCT Blood Glucose.: 234 mg/dL (12 Nov 2023 08:49)        Urinalysis Basic - ( 13 Nov 2023 06:00 )    Color: x / Appearance: x / SG: x / pH: x  Gluc: 109 mg/dL / Ketone: x  / Bili: x / Urobili: x   Blood: x / Protein: x / Nitrite: x   Leuk Esterase: x / RBC: x / WBC x   Sq Epi: x / Non Sq Epi: x / Bacteria: x      ___________________________________________________  MICRO  Recent Cultures:    ___________________________________________________  MEDICATIONS  (STANDING):  acetaminophen     Tablet .. 975 milliGRAM(s) Oral every 6 hours  amLODIPine   Tablet 10 milliGRAM(s) Oral daily  atorvastatin 20 milliGRAM(s) Oral at bedtime  bicalutamide 50 milliGRAM(s) Oral daily  chlorhexidine 2% Cloths 1 Application(s) Topical daily  dexAMETHasone     Tablet   Oral   dexAMETHasone     Tablet 4 milliGRAM(s) Oral daily  heparin   Injectable 5000 Unit(s) SubCutaneous every 8 hours  insulin glargine Injectable (LANTUS) 37 Unit(s) SubCutaneous every morning  insulin lispro (ADMELOG) corrective regimen sliding scale   SubCutaneous three times a day before meals  insulin lispro (ADMELOG) corrective regimen sliding scale   SubCutaneous at bedtime  insulin lispro Injectable (ADMELOG) 18 Unit(s) SubCutaneous three times a day before meals  lisinopril 20 milliGRAM(s) Oral daily  mupirocin 2% Ointment 1 Application(s) Topical two times a day  pantoprazole    Tablet 40 milliGRAM(s) Oral before breakfast  tamsulosin 0.4 milliGRAM(s) Oral at bedtime    MEDICATIONS  (PRN):  HYDROmorphone  Injectable 0.5 milliGRAM(s) IV Push every 4 hours PRN Severe breakthrough Pain (7 - 10)  naloxone Injectable 0.1 milliGRAM(s) IV Push every 3 minutes PRN For ANY of the following changes in patient status:  A. RR LESS THAN 10 breaths per minute, B. Oxygen saturation LESS THAN 90%, C. Sedation score of 6  ondansetron Injectable 4 milliGRAM(s) IV Push every 6 hours PRN Nausea  oxyCODONE    IR 5 milliGRAM(s) Oral every 3 hours PRN Moderate Pain (4 - 6)  oxyCODONE    IR 10 milliGRAM(s) Oral every 3 hours PRN Severe Pain (7 - 10)

## 2023-11-14 DIAGNOSIS — E16.2 HYPOGLYCEMIA, UNSPECIFIED: ICD-10-CM

## 2023-11-14 DIAGNOSIS — Z02.9 ENCOUNTER FOR ADMINISTRATIVE EXAMINATIONS, UNSPECIFIED: ICD-10-CM

## 2023-11-14 DIAGNOSIS — R73.9 HYPERGLYCEMIA, UNSPECIFIED: ICD-10-CM

## 2023-11-14 LAB
ANION GAP SERPL CALC-SCNC: 11 MMOL/L — SIGNIFICANT CHANGE UP (ref 7–14)
ANION GAP SERPL CALC-SCNC: 11 MMOL/L — SIGNIFICANT CHANGE UP (ref 7–14)
BASOPHILS # BLD AUTO: 0.01 K/UL — SIGNIFICANT CHANGE UP (ref 0–0.2)
BASOPHILS # BLD AUTO: 0.01 K/UL — SIGNIFICANT CHANGE UP (ref 0–0.2)
BASOPHILS NFR BLD AUTO: 0.2 % — SIGNIFICANT CHANGE UP (ref 0–2)
BASOPHILS NFR BLD AUTO: 0.2 % — SIGNIFICANT CHANGE UP (ref 0–2)
BUN SERPL-MCNC: 22 MG/DL — SIGNIFICANT CHANGE UP (ref 7–23)
BUN SERPL-MCNC: 22 MG/DL — SIGNIFICANT CHANGE UP (ref 7–23)
CALCIUM SERPL-MCNC: 8.9 MG/DL — SIGNIFICANT CHANGE UP (ref 8.4–10.5)
CALCIUM SERPL-MCNC: 8.9 MG/DL — SIGNIFICANT CHANGE UP (ref 8.4–10.5)
CHLORIDE SERPL-SCNC: 99 MMOL/L — SIGNIFICANT CHANGE UP (ref 98–107)
CHLORIDE SERPL-SCNC: 99 MMOL/L — SIGNIFICANT CHANGE UP (ref 98–107)
CO2 SERPL-SCNC: 22 MMOL/L — SIGNIFICANT CHANGE UP (ref 22–31)
CO2 SERPL-SCNC: 22 MMOL/L — SIGNIFICANT CHANGE UP (ref 22–31)
CREAT SERPL-MCNC: 0.79 MG/DL — SIGNIFICANT CHANGE UP (ref 0.5–1.3)
CREAT SERPL-MCNC: 0.79 MG/DL — SIGNIFICANT CHANGE UP (ref 0.5–1.3)
EGFR: 95 ML/MIN/1.73M2 — SIGNIFICANT CHANGE UP
EGFR: 95 ML/MIN/1.73M2 — SIGNIFICANT CHANGE UP
EOSINOPHIL # BLD AUTO: 0.02 K/UL — SIGNIFICANT CHANGE UP (ref 0–0.5)
EOSINOPHIL # BLD AUTO: 0.02 K/UL — SIGNIFICANT CHANGE UP (ref 0–0.5)
EOSINOPHIL NFR BLD AUTO: 0.3 % — SIGNIFICANT CHANGE UP (ref 0–6)
EOSINOPHIL NFR BLD AUTO: 0.3 % — SIGNIFICANT CHANGE UP (ref 0–6)
GLUCOSE BLDC GLUCOMTR-MCNC: 125 MG/DL — HIGH (ref 70–99)
GLUCOSE BLDC GLUCOMTR-MCNC: 125 MG/DL — HIGH (ref 70–99)
GLUCOSE BLDC GLUCOMTR-MCNC: 127 MG/DL — HIGH (ref 70–99)
GLUCOSE BLDC GLUCOMTR-MCNC: 127 MG/DL — HIGH (ref 70–99)
GLUCOSE BLDC GLUCOMTR-MCNC: 199 MG/DL — HIGH (ref 70–99)
GLUCOSE BLDC GLUCOMTR-MCNC: 199 MG/DL — HIGH (ref 70–99)
GLUCOSE BLDC GLUCOMTR-MCNC: 93 MG/DL — SIGNIFICANT CHANGE UP (ref 70–99)
GLUCOSE BLDC GLUCOMTR-MCNC: 93 MG/DL — SIGNIFICANT CHANGE UP (ref 70–99)
GLUCOSE SERPL-MCNC: 116 MG/DL — HIGH (ref 70–99)
GLUCOSE SERPL-MCNC: 116 MG/DL — HIGH (ref 70–99)
HCT VFR BLD CALC: 33.8 % — LOW (ref 39–50)
HCT VFR BLD CALC: 33.8 % — LOW (ref 39–50)
HGB BLD-MCNC: 11.3 G/DL — LOW (ref 13–17)
HGB BLD-MCNC: 11.3 G/DL — LOW (ref 13–17)
IANC: 5.62 K/UL — SIGNIFICANT CHANGE UP (ref 1.8–7.4)
IANC: 5.62 K/UL — SIGNIFICANT CHANGE UP (ref 1.8–7.4)
IMM GRANULOCYTES NFR BLD AUTO: 1.2 % — HIGH (ref 0–0.9)
IMM GRANULOCYTES NFR BLD AUTO: 1.2 % — HIGH (ref 0–0.9)
LYMPHOCYTES # BLD AUTO: 0.38 K/UL — LOW (ref 1–3.3)
LYMPHOCYTES # BLD AUTO: 0.38 K/UL — LOW (ref 1–3.3)
LYMPHOCYTES # BLD AUTO: 5.8 % — LOW (ref 13–44)
LYMPHOCYTES # BLD AUTO: 5.8 % — LOW (ref 13–44)
MAGNESIUM SERPL-MCNC: 2 MG/DL — SIGNIFICANT CHANGE UP (ref 1.6–2.6)
MAGNESIUM SERPL-MCNC: 2 MG/DL — SIGNIFICANT CHANGE UP (ref 1.6–2.6)
MCHC RBC-ENTMCNC: 31.7 PG — SIGNIFICANT CHANGE UP (ref 27–34)
MCHC RBC-ENTMCNC: 31.7 PG — SIGNIFICANT CHANGE UP (ref 27–34)
MCHC RBC-ENTMCNC: 33.4 GM/DL — SIGNIFICANT CHANGE UP (ref 32–36)
MCHC RBC-ENTMCNC: 33.4 GM/DL — SIGNIFICANT CHANGE UP (ref 32–36)
MCV RBC AUTO: 94.7 FL — SIGNIFICANT CHANGE UP (ref 80–100)
MCV RBC AUTO: 94.7 FL — SIGNIFICANT CHANGE UP (ref 80–100)
MONOCYTES # BLD AUTO: 0.39 K/UL — SIGNIFICANT CHANGE UP (ref 0–0.9)
MONOCYTES # BLD AUTO: 0.39 K/UL — SIGNIFICANT CHANGE UP (ref 0–0.9)
MONOCYTES NFR BLD AUTO: 6 % — SIGNIFICANT CHANGE UP (ref 2–14)
MONOCYTES NFR BLD AUTO: 6 % — SIGNIFICANT CHANGE UP (ref 2–14)
NEUTROPHILS # BLD AUTO: 5.62 K/UL — SIGNIFICANT CHANGE UP (ref 1.8–7.4)
NEUTROPHILS # BLD AUTO: 5.62 K/UL — SIGNIFICANT CHANGE UP (ref 1.8–7.4)
NEUTROPHILS NFR BLD AUTO: 86.5 % — HIGH (ref 43–77)
NEUTROPHILS NFR BLD AUTO: 86.5 % — HIGH (ref 43–77)
NRBC # BLD: 0 /100 WBCS — SIGNIFICANT CHANGE UP (ref 0–0)
NRBC # BLD: 0 /100 WBCS — SIGNIFICANT CHANGE UP (ref 0–0)
NRBC # FLD: 0 K/UL — SIGNIFICANT CHANGE UP (ref 0–0)
NRBC # FLD: 0 K/UL — SIGNIFICANT CHANGE UP (ref 0–0)
PHOSPHATE SERPL-MCNC: 3 MG/DL — SIGNIFICANT CHANGE UP (ref 2.5–4.5)
PHOSPHATE SERPL-MCNC: 3 MG/DL — SIGNIFICANT CHANGE UP (ref 2.5–4.5)
PLATELET # BLD AUTO: 137 K/UL — LOW (ref 150–400)
PLATELET # BLD AUTO: 137 K/UL — LOW (ref 150–400)
POTASSIUM SERPL-MCNC: 5.3 MMOL/L — SIGNIFICANT CHANGE UP (ref 3.5–5.3)
POTASSIUM SERPL-MCNC: 5.3 MMOL/L — SIGNIFICANT CHANGE UP (ref 3.5–5.3)
POTASSIUM SERPL-SCNC: 5.3 MMOL/L — SIGNIFICANT CHANGE UP (ref 3.5–5.3)
POTASSIUM SERPL-SCNC: 5.3 MMOL/L — SIGNIFICANT CHANGE UP (ref 3.5–5.3)
RBC # BLD: 3.57 M/UL — LOW (ref 4.2–5.8)
RBC # BLD: 3.57 M/UL — LOW (ref 4.2–5.8)
RBC # FLD: 13.1 % — SIGNIFICANT CHANGE UP (ref 10.3–14.5)
RBC # FLD: 13.1 % — SIGNIFICANT CHANGE UP (ref 10.3–14.5)
SODIUM SERPL-SCNC: 132 MMOL/L — LOW (ref 135–145)
SODIUM SERPL-SCNC: 132 MMOL/L — LOW (ref 135–145)
WBC # BLD: 6.5 K/UL — SIGNIFICANT CHANGE UP (ref 3.8–10.5)
WBC # BLD: 6.5 K/UL — SIGNIFICANT CHANGE UP (ref 3.8–10.5)
WBC # FLD AUTO: 6.5 K/UL — SIGNIFICANT CHANGE UP (ref 3.8–10.5)
WBC # FLD AUTO: 6.5 K/UL — SIGNIFICANT CHANGE UP (ref 3.8–10.5)

## 2023-11-14 PROCEDURE — 99233 SBSQ HOSP IP/OBS HIGH 50: CPT

## 2023-11-14 PROCEDURE — 72157 MRI CHEST SPINE W/O & W/DYE: CPT | Mod: 26

## 2023-11-14 PROCEDURE — 99223 1ST HOSP IP/OBS HIGH 75: CPT | Mod: GC

## 2023-11-14 RX ORDER — ACETAMINOPHEN 500 MG
1000 TABLET ORAL ONCE
Refills: 0 | Status: COMPLETED | OUTPATIENT
Start: 2023-11-14 | End: 2023-11-14

## 2023-11-14 RX ADMIN — OXYCODONE HYDROCHLORIDE 10 MILLIGRAM(S): 5 TABLET ORAL at 07:30

## 2023-11-14 RX ADMIN — OXYCODONE HYDROCHLORIDE 10 MILLIGRAM(S): 5 TABLET ORAL at 21:36

## 2023-11-14 RX ADMIN — AMLODIPINE BESYLATE 10 MILLIGRAM(S): 2.5 TABLET ORAL at 06:33

## 2023-11-14 RX ADMIN — Medication 975 MILLIGRAM(S): at 00:28

## 2023-11-14 RX ADMIN — HEPARIN SODIUM 5000 UNIT(S): 5000 INJECTION INTRAVENOUS; SUBCUTANEOUS at 22:25

## 2023-11-14 RX ADMIN — MUPIROCIN 1 APPLICATION(S): 20 OINTMENT TOPICAL at 06:34

## 2023-11-14 RX ADMIN — CHLORHEXIDINE GLUCONATE 1 APPLICATION(S): 213 SOLUTION TOPICAL at 13:16

## 2023-11-14 RX ADMIN — HEPARIN SODIUM 5000 UNIT(S): 5000 INJECTION INTRAVENOUS; SUBCUTANEOUS at 06:33

## 2023-11-14 RX ADMIN — LISINOPRIL 20 MILLIGRAM(S): 2.5 TABLET ORAL at 06:33

## 2023-11-14 RX ADMIN — INSULIN GLARGINE 25 UNIT(S): 100 INJECTION, SOLUTION SUBCUTANEOUS at 09:17

## 2023-11-14 RX ADMIN — OXYCODONE HYDROCHLORIDE 10 MILLIGRAM(S): 5 TABLET ORAL at 06:34

## 2023-11-14 RX ADMIN — Medication 975 MILLIGRAM(S): at 14:18

## 2023-11-14 RX ADMIN — Medication 975 MILLIGRAM(S): at 07:30

## 2023-11-14 RX ADMIN — Medication 975 MILLIGRAM(S): at 01:28

## 2023-11-14 RX ADMIN — OXYCODONE HYDROCHLORIDE 10 MILLIGRAM(S): 5 TABLET ORAL at 17:36

## 2023-11-14 RX ADMIN — Medication 975 MILLIGRAM(S): at 13:16

## 2023-11-14 RX ADMIN — Medication 975 MILLIGRAM(S): at 06:34

## 2023-11-14 RX ADMIN — BICALUTAMIDE 50 MILLIGRAM(S): 50 TABLET, FILM COATED ORAL at 13:16

## 2023-11-14 RX ADMIN — Medication 2: at 13:14

## 2023-11-14 RX ADMIN — PANTOPRAZOLE SODIUM 40 MILLIGRAM(S): 20 TABLET, DELAYED RELEASE ORAL at 06:33

## 2023-11-14 RX ADMIN — Medication 400 MILLIGRAM(S): at 23:33

## 2023-11-14 RX ADMIN — ATORVASTATIN CALCIUM 20 MILLIGRAM(S): 80 TABLET, FILM COATED ORAL at 22:25

## 2023-11-14 RX ADMIN — HEPARIN SODIUM 5000 UNIT(S): 5000 INJECTION INTRAVENOUS; SUBCUTANEOUS at 13:20

## 2023-11-14 RX ADMIN — Medication 9 UNIT(S): at 13:15

## 2023-11-14 RX ADMIN — Medication 4 MILLIGRAM(S): at 06:33

## 2023-11-14 RX ADMIN — TAMSULOSIN HYDROCHLORIDE 0.4 MILLIGRAM(S): 0.4 CAPSULE ORAL at 22:25

## 2023-11-14 RX ADMIN — Medication 9 UNIT(S): at 09:17

## 2023-11-14 RX ADMIN — OXYCODONE HYDROCHLORIDE 10 MILLIGRAM(S): 5 TABLET ORAL at 21:06

## 2023-11-14 RX ADMIN — Medication 9 UNIT(S): at 17:40

## 2023-11-14 NOTE — PROGRESS NOTE ADULT - ASSESSMENT
74y/o M with DM, HTN and metastatic prostate cancer (diagnosed in 2009) that initially presented to OSH with LE weakness and R hand paresthesias now transferred to [facility] for NSG eval. Patient now s/p surgical intervention (C5-T3 posterior cervical fusion and C7-T1 decompression) by orthopedic surgery, now transferred to medicine for further management.

## 2023-11-14 NOTE — CONSULT NOTE ADULT - ATTENDING COMMENTS
73M DM2 on dexamethasone for cervical cord compression, on basal bolus insulin developed hypoglycemia, agree with reduce insulin regimen as outlined. Clarify duration of steroid which will determine need for insulin on dc as patient will not require insulin once off steroid.    Kiki Pierce MD  Division of Endocrinology  Pager: [phone]    If after 6PM or before 9AM, or on weekends/holidays, please call endocrine answering service for assistance ([phone]).  For nonurgent matters email [email or web address] for assistance.

## 2023-11-14 NOTE — PROGRESS NOTE ADULT - PROBLEM SELECTOR PLAN 4
Patient found to have b/l LE DVTs during this admission  - s/p IVC filter placed by IR on 11/1  - in setting of spinal surgery, patient to remain off AC due to risk of hematoma formation for at least 6 weeks since surgery (11/2)  - patient to also f/u with IR within 6-8 weeks to assess for IVC filter retrieval once contraindication to AC no longer exists Patient with persistent back/neck pain in setting of recent spinal disease and surgery  - pain management recs appreciated  - now off PCA pump  - c/w oxycodone 10mg/5mg Sev/Mod pain  - dilaudid 0.5mg IV for sev breakthrough pain  - c/w dexamethasone lianet

## 2023-11-14 NOTE — CHART NOTE - NSCHARTNOTEFT_GEN_A_CORE
rd< from: MR Thoracic Spine w/wo IV Cont (11.14.23 @ 11:55) >  There is evidence of abnormal collection seen just dorsal to the thecal   sac extending from the bottom of C7-T2. This collection measures   approximately 1.7 x 2.8 x 3.2 cm. This could be compatible with sterile   postoperative collection though early abscess or dural leak cannot be   entirely excluded. Clinical correlation and continued close follow-up is   recommended.    There is evidence of a larger collection seen involving the subcutaneous   soft tissue region. This is seen in the back region and measures   approximately 3.5 x 11.4 cm. This too could be compatible sterile   postoperative collection though underlying abscess (less likely) cannot   be entirely excluded.    rad< from: MR Thoracic Spine w/wo IV Cont (11.14.23 @ 11:55) >    IMPRESSION: Postop changes as described above.  Osseous metastasis again seen.  Possible epidural enhancement is seen involving the T4-5 level. Better   quality contrast enhanced MRI of the thoracic spine is recommended for   further evaluation.    Previously noted T2 prolongation involving the spinal cord at the T1   level is not well-demonstrated due to artifact from postop material.    Ortopedics called for F/u rd< from: MR Thoracic Spine w/wo IV Cont (11.14.23 @ 11:55) >  There is evidence of abnormal collection seen just dorsal to the thecal   sac extending from the bottom of C7-T2. This collection measures   approximately 1.7 x 2.8 x 3.2 cm. This could be compatible with sterile   postoperative collection though early abscess or dural leak cannot be   entirely excluded. Clinical correlation and continued close follow-up is   recommended.    There is evidence of a larger collection seen involving the subcutaneous   soft tissue region. This is seen in the back region and measures   approximately 3.5 x 11.4 cm. This too could be compatible sterile   postoperative collection though underlying abscess (less likely) cannot   be entirely excluded.    rad< from: MR Thoracic Spine w/wo IV Cont (11.14.23 @ 11:55) >    IMPRESSION: Postop changes as described above.  Osseous metastasis again seen.  Possible epidural enhancement is seen involving the T4-5 level. Better   quality contrast enhanced MRI of the thoracic spine is recommended for   further evaluation.    Previously noted T2 prolongation involving the spinal cord at the T1   level is not well-demonstrated due to artifact from postop material.    Orthopedics called for F/u [phone], spoke with Celeste, They did not want f/u MRI, but they will review and drop a chart note.  patient had surgery with Dr Andi Badillo rd< from: MR Thoracic Spine w/wo IV Cont (11.14.23 @ 11:55) >  There is evidence of abnormal collection seen just dorsal to the thecal   sac extending from the bottom of C7-T2. This collection measures   approximately 1.7 x 2.8 x 3.2 cm. This could be compatible with sterile   postoperative collection though early abscess or dural leak cannot be   entirely excluded. Clinical correlation and continued close follow-up is   recommended.    There is evidence of a larger collection seen involving the subcutaneous   soft tissue region. This is seen in the back region and measures   approximately 3.5 x 11.4 cm. This too could be compatible sterile   postoperative collection though underlying abscess (less likely) cannot   be entirely excluded.    rad< from: MR Thoracic Spine w/wo IV Cont (11.14.23 @ 11:55) >    IMPRESSION: Postop changes as described above.  Osseous metastasis again seen.  Possible epidural enhancement is seen involving the T4-5 level. Better   quality contrast enhanced MRI of the thoracic spine is recommended for   further evaluation.    Previously noted T2 prolongation involving the spinal cord at the T1   level is not well-demonstrated due to artifact from postop material.    Orthopedics called for F/u [phone], spoke with Celeste, They did not want f/u MRI, but they will review and drop a chart note.  patient had surgery with Dr Andi Badillo    per rad/onc will obtain repeat thoracic spine MRI prior to discharge--> reached out to [facility] onc to review the MRI now completed.    Internal med  Dalia Reynolds  #[phone]

## 2023-11-14 NOTE — PROGRESS NOTE ADULT - PROBLEM SELECTOR PLAN 7
DVT: HSQ q8h  Diet: CC/DASH  Dispo: [facility], per PMR patient may benefit from acute rehab  Patient is refusing REHAB  d/w Plastic surgery  needs : Nylon sutures over incision to remain in place  - Gauze/tegaderm dressing for serosanguinous drainage; currently no collections/signs of infection  Need dressing change 1 to 2 x day. to T spine wound.  F/u with Dr Tr Yoo  F/u MRI of T spine as per ortho - c/w atorvastatin 20mg qhs

## 2023-11-14 NOTE — PROGRESS NOTE ADULT - SUBJECTIVE AND OBJECTIVE BOX
awaiting MRI Patient is a 73y old  Male who presents with a chief complaint of Diffuse Spinal Mets from Prostate Cancer (14 Nov 2023 09:46)      SUBJECTIVE / OVERNIGHT EVENTS:  resting in chair , still awaiting MRI    MEDICATIONS  (STANDING):  acetaminophen     Tablet .. 975 milliGRAM(s) Oral every 6 hours  amLODIPine   Tablet 10 milliGRAM(s) Oral daily  atorvastatin 20 milliGRAM(s) Oral at bedtime  bicalutamide 50 milliGRAM(s) Oral daily  chlorhexidine 2% Cloths 1 Application(s) Topical daily  dexAMETHasone     Tablet   Oral   dexAMETHasone     Tablet 4 milliGRAM(s) Oral daily  dextrose 5%. 1000 milliLiter(s) (50 mL/Hr) IV Continuous <Continuous>  dextrose 5%. 1000 milliLiter(s) (100 mL/Hr) IV Continuous <Continuous>  dextrose 50% Injectable 25 Gram(s) IV Push once  dextrose 50% Injectable 12.5 Gram(s) IV Push once  dextrose 50% Injectable 25 Gram(s) IV Push once  glucagon  Injectable 1 milliGRAM(s) IntraMuscular once  heparin   Injectable 5000 Unit(s) SubCutaneous every 8 hours  insulin glargine Injectable (LANTUS) 25 Unit(s) SubCutaneous every morning  insulin lispro (ADMELOG) corrective regimen sliding scale   SubCutaneous at bedtime  insulin lispro (ADMELOG) corrective regimen sliding scale   SubCutaneous three times a day before meals  insulin lispro Injectable (ADMELOG) 9 Unit(s) SubCutaneous three times a day before meals  lisinopril 20 milliGRAM(s) Oral daily  pantoprazole    Tablet 40 milliGRAM(s) Oral before breakfast  tamsulosin 0.4 milliGRAM(s) Oral at bedtime    MEDICATIONS  (PRN):  dextrose Oral Gel 15 Gram(s) Oral once PRN Blood Glucose LESS THAN 70 milliGRAM(s)/deciliter  HYDROmorphone  Injectable 0.5 milliGRAM(s) IV Push every 4 hours PRN Severe breakthrough Pain (7 - 10)  naloxone Injectable 0.1 milliGRAM(s) IV Push every 3 minutes PRN For ANY of the following changes in patient status:  A. RR LESS THAN 10 breaths per minute, B. Oxygen saturation LESS THAN 90%, C. Sedation score of 6  ondansetron Injectable 4 milliGRAM(s) IV Push every 6 hours PRN Nausea  oxyCODONE    IR 5 milliGRAM(s) Oral every 3 hours PRN Moderate Pain (4 - 6)  oxyCODONE    IR 10 milliGRAM(s) Oral every 3 hours PRN Severe Pain (7 - 10)        CAPILLARY BLOOD GLUCOSE      POCT Blood Glucose.: 199 mg/dL (14 Nov 2023 12:44)  POCT Blood Glucose.: 127 mg/dL (14 Nov 2023 08:23)  POCT Blood Glucose.: 219 mg/dL (13 Nov 2023 22:19)  POCT Blood Glucose.: 127 mg/dL (13 Nov 2023 18:40)  POCT Blood Glucose.: 53 mg/dL (13 Nov 2023 17:55)  POCT Blood Glucose.: 43 mg/dL (13 Nov 2023 17:35)  POCT Blood Glucose.: 44 mg/dL (13 Nov 2023 17:33)    I&O's Summary    13 Nov 2023 07:01  -  14 Nov 2023 07:00  --------------------------------------------------------  IN: 0 mL / OUT: 1000 mL / NET: -1000 mL      Vital Signs Last 24 Hrs  T(C): 36.7 (14 Nov 2023 06:39), Max: 37 (13 Nov 2023 22:04)  T(F): 98 (14 Nov 2023 06:39), Max: 98.6 (13 Nov 2023 22:04)  HR: 85 (14 Nov 2023 06:39) (79 - 86)  BP: 121/72 (14 Nov 2023 06:39) (114/61 - 146/94)  BP(mean): 88 (14 Nov 2023 06:39) (88 - 88)  RR: 18 (14 Nov 2023 06:39) (16 - 18)  SpO2: 98% (14 Nov 2023 06:39) (96% - 99%)    Parameters below as of 14 Nov 2023 06:39  Patient On (Oxygen Delivery Method): room air      PHYSICAL EXAM:  GENERAL: NAD,   HEAD:  Atraumatic, Normocephalic  EYES: EOMI, PERRLA, conjunctiva and sclera clear  NECK: Supple, No JVD  Dressing to back of neck   CHEST/LUNG: Clear to auscultation bilaterally; No wheeze  HEART: Regular rate and rhythm; No murmurs, rubs, or gallops  ABDOMEN: Soft, Nontender, Nondistended; Bowel sounds present  EXTREMITIES:  2+ Peripheral Pulses, No clubbing, cyanosis, or edema  PSYCH: AAOx3  NEUROLOGY: non-focal  SKIN: No rashes or lesions    LABS:                        11.3   6.50  )-----------( 137      ( 14 Nov 2023 06:00 )             33.8     11-14    132<L>  |  99  |  22  ----------------------------<  116<H>  5.3   |  22  |  0.79    Ca    8.9      14 Nov 2023 06:00  Phos  3.0     11-14  Mg     2.00     11-14        Care Discussed with Consultants/Other Providers:  awaiting MRI

## 2023-11-14 NOTE — PROGRESS NOTE ADULT - PROBLEM SELECTOR PLAN 8
DVT: HSQ q8h  Diet: CC/DASH  Dispo: [facility], per PMR patient may benefit from acute rehab  Patient is refusing REHAB  d/w Plastic surgery  needs : Nylon sutures over incision to remain in place  - Gauze/tegaderm dressing for serosanguinous drainage; currently no collections/signs of infection  Need dressing change 1 to 2 x day. to T spine wound.  F/u with Dr Tr Yoo  F/u MRI of T spine as per ortho DVT: HSQ q8h  Diet: CC/DASH  Dispo: [facility], per PMR patient may benefit from acute rehab  Patient is refusing REHAB  d/w Plastic surgery  needs : Nylon sutures over incision to remain in place  - Gauze/tegaderm dressing for serosanguinous drainage; currently no collections/signs of infection  Need dressing change 1 to 2 x day. to T spine wound.  F/u with Dr Tr Yoo

## 2023-11-14 NOTE — PROGRESS NOTE ADULT - PROBLEM SELECTOR PLAN 3
Patient with persistent back/neck pain in setting of recent spinal disease and surgery  - pain management recs appreciated  - now off PCA pump  - c/w oxycodone 10mg/5mg Sev/Mod pain  - dilaudid 0.5mg IV for sev breakthrough pain  - c/w dexamethasone lianet Patient previously diagnosed with prostate ca in 2009  - NM scan showing multiple osseous lesions throughout body  - heme/onc recs appreciated, will c/w bicalutamide 50mg qd  - c/w dexamethasone 4mg q6h, will plan to begin taper  - no plan for inpatient RT per rad/onc

## 2023-11-14 NOTE — PROGRESS NOTE ADULT - PROBLEM SELECTOR PLAN 5
Noted with elevated BPs  - c/w amlodipine 10mg qd  - c/w lisinopril 20mg qd  - BPs currently adequately controlled Patient found to have b/l LE DVTs during this admission  - s/p IVC filter placed by IR on 11/1  - in setting of spinal surgery, patient to remain off AC due to risk of hematoma formation for at least 6 weeks since surgery (11/2)  - patient to also f/u with IR within 6-8 weeks to assess for IVC filter retrieval once contraindication to AC no longer exists

## 2023-11-14 NOTE — PROGRESS NOTE ADULT - PROBLEM SELECTOR PLAN 2
Patient previously diagnosed with prostate ca in 2009  - NM scan showing multiple osseous lesions throughout body  - heme/onc recs appreciated, will c/w bicalutamide 50mg qd  - c/w dexamethasone 4mg q6h, will plan to begin taper  - no plan for inpatient RT per rad/onc Fs 43.  Improved with food.  Patient is on a steroid lianet  Lantus decreased.  Endocrine consult requested

## 2023-11-14 NOTE — CONSULT NOTE ADULT - SUBJECTIVE AND OBJECTIVE BOX
HPI:  73M with HTN met prostate cancer (2009) with LE weakness and R hand paresthesias presents as a transfer from [facility] for NSGY evaluation    Patient reports that he has known about his prostate cancer since 2009, but did not seek out treatment because he did not take it seriously. He reports that he is typically fully functional and has no known weakness, or paresthesias. Around 1 month ago, he reports feeling some minor tingling/numbness in his R hand. The week prior to his hospitalization, he began to feel some minor weakness in both of his legs, but was still able to perform all of his ADLs and iADLs. He reports on Thursday, while he was showering, he fell in the bath due to weakness in his legs, but reports he was still able to get up and perform some household tasks such as cleaning and cooking, but still had general weakness and fell several more times. When he woke up from bed the next morning, he attempted to use the restroom and fell to the floor and reports he wasn't able to feel his below his belly button, was unable to control his bowel movements, couldn't feel his genitals and was paralyzed on bilateral lower extremities. He was on the ground for >48 hours with no PO intake before his son came to the [place] for evaluation at [facility].     Patient was initially evaluated at [facility]; for b/l found to be in rhabdo and SRUTHI, On imaging, he was found to have C7 spinous process nondisplaced fracture of T1 spinous process, edema from C3-4 through C7-T1 interspinous ligaments as well as lytic metastatic disease from C7, Tq-T5 and T12, as well as L1-L3 as well as epidural expansion of neoplasm at C7 centrally and dorsally resulting in moderate cord compression and underlying edema/extension into the bilateral C7-T1 and T1-2 and Left T2-3 Neural foramina. Epidural disease was also notedat T12 vertebral body. NM Scan significant for multiple sites with osseous lesions; Scapula, Multiple left ribs, bilateral pelvic region, as well as b/l LE DVTs on US and was started on Heparin GTT. Patient Reports that since Wednesday, he has had significant improvement in his functional status and has regained sensation and movement in his lower extremities and has been able to feel his genatalia and his lower abdomen.  (27 Oct 2023 19:09)      Consulted for: steroid induced hyperglycemia  This is a 72 yo M /w a PMH of HTN, metastatic prostate cancer and well controlled diabetes who presents with cervical cord compression now on high dose dexamethasone and steroid taper. Endocrinology consulted due to hypoglycemia yesterday    Patient currently on dexamethasone 4mg daily for today and tomorrow. Pending further recs for further taper doses.    Yesterday patient developed hypoglycemia with glucose before dinner of 44. He was receiving 37 units of lantus and 18 units of premeal admelog.  Today, fasting sugar is 127. Received 25 units lantus. Received 9 units admelog with fsg 127 for breakfast and lunchtime     Patient reports diagnosed with DM2 1 year ago. Was previously on rybelsus but did not tolerate due to GI side effects. Patient reports he was also taking metformin 500mg as needed if fasting sugar >180, but reports he hasnt had to do that in 3 months.  A1c 5.2% on this admission  No history of retinopathy, nephropathy, or neuropathy  Reports he has not taken insulin before  Not currently on steroids    PAST MEDICAL & SURGICAL HISTORY:  Essential hypertension      Prostate cancer metastatic to bone      No significant past surgical history          FAMILY HISTORY:  Family history of stroke (Grandparent)        Social History: denies all toxic habits    Home Medications:  amLODIPine 10 mg oral tablet: 1 tab(s) orally once a day (27 Oct 2023 21:15)  atorvastatin 20 mg oral tablet: 1 tab(s) orally once a day (at bedtime) (27 Oct 2023 21:15)  Lantus 100 units/mL subcutaneous solution: 15 unit(s) subcutaneous once a day (in the morning) (28 Oct 2023 00:43)  ramipril 2.5 mg oral capsule: 1 cap(s) orally once a day (27 Oct 2023 21:15)      MEDICATIONS  (STANDING):  acetaminophen     Tablet .. 975 milliGRAM(s) Oral every 6 hours  amLODIPine   Tablet 10 milliGRAM(s) Oral daily  atorvastatin 20 milliGRAM(s) Oral at bedtime  bicalutamide 50 milliGRAM(s) Oral daily  chlorhexidine 2% Cloths 1 Application(s) Topical daily  dexAMETHasone     Tablet   Oral   dexAMETHasone     Tablet 4 milliGRAM(s) Oral daily  dextrose 5%. 1000 milliLiter(s) (50 mL/Hr) IV Continuous <Continuous>  dextrose 5%. 1000 milliLiter(s) (100 mL/Hr) IV Continuous <Continuous>  dextrose 50% Injectable 25 Gram(s) IV Push once  dextrose 50% Injectable 12.5 Gram(s) IV Push once  dextrose 50% Injectable 25 Gram(s) IV Push once  glucagon  Injectable 1 milliGRAM(s) IntraMuscular once  heparin   Injectable 5000 Unit(s) SubCutaneous every 8 hours  insulin glargine Injectable (LANTUS) 25 Unit(s) SubCutaneous every morning  insulin lispro (ADMELOG) corrective regimen sliding scale   SubCutaneous at bedtime  insulin lispro (ADMELOG) corrective regimen sliding scale   SubCutaneous three times a day before meals  insulin lispro Injectable (ADMELOG) 9 Unit(s) SubCutaneous three times a day before meals  lisinopril 20 milliGRAM(s) Oral daily  pantoprazole    Tablet 40 milliGRAM(s) Oral before breakfast  tamsulosin 0.4 milliGRAM(s) Oral at bedtime    MEDICATIONS  (PRN):  dextrose Oral Gel 15 Gram(s) Oral once PRN Blood Glucose LESS THAN 70 milliGRAM(s)/deciliter  HYDROmorphone  Injectable 0.5 milliGRAM(s) IV Push every 4 hours PRN Severe breakthrough Pain (7 - 10)  naloxone Injectable 0.1 milliGRAM(s) IV Push every 3 minutes PRN For ANY of the following changes in patient status:  A. RR LESS THAN 10 breaths per minute, B. Oxygen saturation LESS THAN 90%, C. Sedation score of 6  ondansetron Injectable 4 milliGRAM(s) IV Push every 6 hours PRN Nausea  oxyCODONE    IR 10 milliGRAM(s) Oral every 3 hours PRN Severe Pain (7 - 10)  oxyCODONE    IR 5 milliGRAM(s) Oral every 3 hours PRN Moderate Pain (4 - 6)      Allergies    No Known Allergies    Intolerances      Review of Systems:  Constitutional: No fever  Eyes: No blurry vision  Neuro: No tremors  HEENT: No pain  Cardiovascular: No chest pain, palpitations  Respiratory: No SOB, no cough  GI: No nausea, vomiting, abdominal pain  : No dysuria  Skin: no rash  Psych: no depression  Endocrine: no polyuria, polydipsia  Hem/lymph: no swelling  Osteoporosis: no fractures    PHYSICAL EXAM:  VITALS: T(C): 36.9 (11-14-23 @ 14:52)  T(F): 98.4 (11-14-23 @ 14:52), Max: 98.6 (11-13-23 @ 22:04)  HR: 99 (11-14-23 @ 14:52) (79 - 99)  BP: 103/56 (11-14-23 @ 14:52) (103/56 - 130/76)  RR:  (16 - 18)  SpO2:  (96% - 99%)  Wt(kg): --  GENERAL: NAD  EYES: No proptosis, no lid lag, anicteric  THYROID: Normal size, no palpable nodules  RESPIRATORY: Clear to auscultation bilaterally  CARDIOVASCULAR: Regular rate and rhythm  GI: Soft, nontender, non distended  EXT: b/l feet without wounds; 2+ pulses  PSYCH: Alert and oriented x 3, reactive mood    POCT Blood Glucose.: 199 mg/dL (11-14-23 @ 12:44)  POCT Blood Glucose.: 127 mg/dL (11-14-23 @ 08:23)  POCT Blood Glucose.: 219 mg/dL (11-13-23 @ 22:19)  POCT Blood Glucose.: 127 mg/dL (11-13-23 @ 18:40)  POCT Blood Glucose.: 53 mg/dL (11-13-23 @ 17:55)  POCT Blood Glucose.: 43 mg/dL (11-13-23 @ 17:35)  POCT Blood Glucose.: 44 mg/dL (11-13-23 @ 17:33)  POCT Blood Glucose.: 110 mg/dL (11-13-23 @ 12:37)  POCT Blood Glucose.: 236 mg/dL (11-13-23 @ 09:29)  POCT Blood Glucose.: 215 mg/dL (11-13-23 @ 08:24)  POCT Blood Glucose.: 177 mg/dL (11-13-23 @ 03:50)  POCT Blood Glucose.: 71 mg/dL (11-13-23 @ 03:09)  POCT Blood Glucose.: 155 mg/dL (11-12-23 @ 23:53)  POCT Blood Glucose.: 176 mg/dL (11-12-23 @ 23:45)  POCT Blood Glucose.: 67 mg/dL (11-12-23 @ 23:03)  POCT Blood Glucose.: 58 mg/dL (11-12-23 @ 23:01)  POCT Blood Glucose.: 70 mg/dL (11-12-23 @ 22:37)  POCT Blood Glucose.: 68 mg/dL (11-12-23 @ 22:36)  POCT Blood Glucose.: 51 mg/dL (11-12-23 @ 21:57)  POCT Blood Glucose.: 59 mg/dL (11-12-23 @ 21:56)  POCT Blood Glucose.: 195 mg/dL (11-12-23 @ 18:10)  POCT Blood Glucose.: 263 mg/dL (11-12-23 @ 12:51)  POCT Blood Glucose.: 289 mg/dL (11-12-23 @ 10:14)  POCT Blood Glucose.: 234 mg/dL (11-12-23 @ 08:49)  POCT Blood Glucose.: 105 mg/dL (11-11-23 @ 22:13)  POCT Blood Glucose.: 116 mg/dL (11-11-23 @ 18:02)                            11.3   6.50  )-----------( 137      ( 14 Nov 2023 06:00 )             33.8       11-14    132<L>  |  99  |  22  ----------------------------<  116<H>  5.3   |  22  |  0.79    eGFR: 95    Ca    8.9      11-14  Mg     2.00     11-14  Phos  3.0     11-14        Thyroid Function Tests:      A1C with Estimated Average Glucose Result: 5.2 % (10-23-23 @ 06:20)          Radiology:                HPI:  73M with HTN met prostate cancer (2009) with LE weakness and R hand paresthesias presents as a transfer from [facility] for NSGY evaluation    Patient reports that he has known about his prostate cancer since 2009, but did not seek out treatment because he did not take it seriously. He reports that he is typically fully functional and has no known weakness, or paresthesias. Around 1 month ago, he reports feeling some minor tingling/numbness in his R hand. The week prior to his hospitalization, he began to feel some minor weakness in both of his legs, but was still able to perform all of his ADLs and iADLs. He reports on Thursday, while he was showering, he fell in the bath due to weakness in his legs, but reports he was still able to get up and perform some household tasks such as cleaning and cooking, but still had general weakness and fell several more times. When he woke up from bed the next morning, he attempted to use the restroom and fell to the floor and reports he wasn't able to feel his below his belly button, was unable to control his bowel movements, couldn't feel his genitals and was paralyzed on bilateral lower extremities. He was on the ground for >48 hours with no PO intake before his son came to the [place] to take [place] for evaluation at [facility].     Patient was initially evaluated at [facility]; for b/l found to be in rhabdo and SRUTHI, On imaging, he was found to have C7 spinous process nondisplaced fracture of T1 spinous process, edema from C3-4 through C7-T1 interspinous ligaments as well as lytic metastatic disease from C7, Tq-T5 and T12, as well as L1-L3 as well as epidural expansion of neoplasm at C7 centrally and dorsally resulting in moderate cord compression and underlying edema/extension into the bilateral C7-T1 and T1-2 and Left T2-3 Neural foramina. Epidural disease was also notedat T12 vertebral body. NM Scan significant for multiple sites with osseous lesions; Scapula, Multiple left ribs, bilateral pelvic region, as well as b/l LE DVTs on US and was started on Heparin GTT. Patient Reports that since Wednesday, he has had significant improvement in his functional status and has regained sensation and movement in his lower extremities and has been able to feel his genatalia and his lower abdomen.  (27 Oct 2023 19:09)      Consulted for: steroid induced hyperglycemia  This is a 74 yo M /w a PMH of HTN, metastatic prostate cancer and well controlled diabetes who presents with cervical cord compression now on high dose dexamethasone and steroid taper. Endocrinology consulted due to hypoglycemia yesterday    Patient currently on dexamethasone 4mg daily for today and tomorrow. Has been on dexamethasone since 10/30/23.    Yesterday patient developed hypoglycemia with glucose before dinner of 44. He was receiving 37 units of lantus and 18 units of premeal admelog.  Today, fasting sugar is 127. Received 25 units lantus. Received 9 units admelog with fsg 127 for breakfast and lunchtime     Patient reports diagnosed with DM2 1 year ago. Was previously on rybelsus but did not tolerate due to GI side effects. Patient reports he was also taking metformin 500mg as needed if fasting sugar >180, but reports he hasnt had to do that in 3 months.  A1c 5.2% on this admission  No history of retinopathy, nephropathy, or neuropathy  Reports he has not taken insulin before  Not currently on steroids    PAST MEDICAL & SURGICAL HISTORY:  Essential hypertension      Prostate cancer metastatic to bone      No significant past surgical history          FAMILY HISTORY:  Family history of stroke (Grandparent)        Social History: denies all toxic habits    Home Medications:  amLODIPine 10 mg oral tablet: 1 tab(s) orally once a day (27 Oct 2023 21:15)  atorvastatin 20 mg oral tablet: 1 tab(s) orally once a day (at bedtime) (27 Oct 2023 21:15)  Lantus 100 units/mL subcutaneous solution: 15 unit(s) subcutaneous once a day (in the morning) (28 Oct 2023 00:43)  ramipril 2.5 mg oral capsule: 1 cap(s) orally once a day (27 Oct 2023 21:15)      MEDICATIONS  (STANDING):  acetaminophen     Tablet .. 975 milliGRAM(s) Oral every 6 hours  amLODIPine   Tablet 10 milliGRAM(s) Oral daily  atorvastatin 20 milliGRAM(s) Oral at bedtime  bicalutamide 50 milliGRAM(s) Oral daily  chlorhexidine 2% Cloths 1 Application(s) Topical daily  dexAMETHasone     Tablet   Oral   dexAMETHasone     Tablet 4 milliGRAM(s) Oral daily  dextrose 5%. 1000 milliLiter(s) (50 mL/Hr) IV Continuous <Continuous>  dextrose 5%. 1000 milliLiter(s) (100 mL/Hr) IV Continuous <Continuous>  dextrose 50% Injectable 25 Gram(s) IV Push once  dextrose 50% Injectable 12.5 Gram(s) IV Push once  dextrose 50% Injectable 25 Gram(s) IV Push once  glucagon  Injectable 1 milliGRAM(s) IntraMuscular once  heparin   Injectable 5000 Unit(s) SubCutaneous every 8 hours  insulin glargine Injectable (LANTUS) 25 Unit(s) SubCutaneous every morning  insulin lispro (ADMELOG) corrective regimen sliding scale   SubCutaneous at bedtime  insulin lispro (ADMELOG) corrective regimen sliding scale   SubCutaneous three times a day before meals  insulin lispro Injectable (ADMELOG) 9 Unit(s) SubCutaneous three times a day before meals  lisinopril 20 milliGRAM(s) Oral daily  pantoprazole    Tablet 40 milliGRAM(s) Oral before breakfast  tamsulosin 0.4 milliGRAM(s) Oral at bedtime    MEDICATIONS  (PRN):  dextrose Oral Gel 15 Gram(s) Oral once PRN Blood Glucose LESS THAN 70 milliGRAM(s)/deciliter  HYDROmorphone  Injectable 0.5 milliGRAM(s) IV Push every 4 hours PRN Severe breakthrough Pain (7 - 10)  naloxone Injectable 0.1 milliGRAM(s) IV Push every 3 minutes PRN For ANY of the following changes in patient status:  A. RR LESS THAN 10 breaths per minute, B. Oxygen saturation LESS THAN 90%, C. Sedation score of 6  ondansetron Injectable 4 milliGRAM(s) IV Push every 6 hours PRN Nausea  oxyCODONE    IR 10 milliGRAM(s) Oral every 3 hours PRN Severe Pain (7 - 10)  oxyCODONE    IR 5 milliGRAM(s) Oral every 3 hours PRN Moderate Pain (4 - 6)      Allergies    No Known Allergies    Intolerances      Review of Systems:  Constitutional: No fever  Eyes: No blurry vision  Neuro: No tremors  HEENT: No pain  Cardiovascular: No chest pain, palpitations  Respiratory: No SOB, no cough  GI: No nausea, vomiting, abdominal pain  : No dysuria  Skin: no rash  Psych: no depression  Endocrine: no polyuria, polydipsia  Hem/lymph: no swelling  Osteoporosis: no fractures    PHYSICAL EXAM:  VITALS: T(C): 36.9 (11-14-23 @ 14:52)  T(F): 98.4 (11-14-23 @ 14:52), Max: 98.6 (11-13-23 @ 22:04)  HR: 99 (11-14-23 @ 14:52) (79 - 99)  BP: 103/56 (11-14-23 @ 14:52) (103/56 - 130/76)  RR:  (16 - 18)  SpO2:  (96% - 99%)  Wt(kg): --  GENERAL: NAD  EYES: No proptosis, no lid lag, anicteric  THYROID: Normal size, no palpable nodules  RESPIRATORY: Clear to auscultation bilaterally  CARDIOVASCULAR: Regular rate and rhythm  GI: Soft, nontender, non distended  EXT: b/l feet without wounds; 2+ pulses  PSYCH: Alert and oriented x 3, reactive mood    POCT Blood Glucose.: 199 mg/dL (11-14-23 @ 12:44)  POCT Blood Glucose.: 127 mg/dL (11-14-23 @ 08:23)  POCT Blood Glucose.: 219 mg/dL (11-13-23 @ 22:19)  POCT Blood Glucose.: 127 mg/dL (11-13-23 @ 18:40)  POCT Blood Glucose.: 53 mg/dL (11-13-23 @ 17:55)  POCT Blood Glucose.: 43 mg/dL (11-13-23 @ 17:35)  POCT Blood Glucose.: 44 mg/dL (11-13-23 @ 17:33)  POCT Blood Glucose.: 110 mg/dL (11-13-23 @ 12:37)  POCT Blood Glucose.: 236 mg/dL (11-13-23 @ 09:29)  POCT Blood Glucose.: 215 mg/dL (11-13-23 @ 08:24)  POCT Blood Glucose.: 177 mg/dL (11-13-23 @ 03:50)  POCT Blood Glucose.: 71 mg/dL (11-13-23 @ 03:09)  POCT Blood Glucose.: 155 mg/dL (11-12-23 @ 23:53)  POCT Blood Glucose.: 176 mg/dL (11-12-23 @ 23:45)  POCT Blood Glucose.: 67 mg/dL (11-12-23 @ 23:03)  POCT Blood Glucose.: 58 mg/dL (11-12-23 @ 23:01)  POCT Blood Glucose.: 70 mg/dL (11-12-23 @ 22:37)  POCT Blood Glucose.: 68 mg/dL (11-12-23 @ 22:36)  POCT Blood Glucose.: 51 mg/dL (11-12-23 @ 21:57)  POCT Blood Glucose.: 59 mg/dL (11-12-23 @ 21:56)  POCT Blood Glucose.: 195 mg/dL (11-12-23 @ 18:10)  POCT Blood Glucose.: 263 mg/dL (11-12-23 @ 12:51)  POCT Blood Glucose.: 289 mg/dL (11-12-23 @ 10:14)  POCT Blood Glucose.: 234 mg/dL (11-12-23 @ 08:49)  POCT Blood Glucose.: 105 mg/dL (11-11-23 @ 22:13)  POCT Blood Glucose.: 116 mg/dL (11-11-23 @ 18:02)                            11.3   6.50  )-----------( 137      ( 14 Nov 2023 06:00 )             33.8       11-14    132<L>  |  99  |  22  ----------------------------<  116<H>  5.3   |  22  |  0.79    eGFR: 95    Ca    8.9      11-14  Mg     2.00     11-14  Phos  3.0     11-14        Thyroid Function Tests:      A1C with Estimated Average Glucose Result: 5.2 % (10-23-23 @ 06:20)          Radiology:

## 2023-11-14 NOTE — GOALS OF CARE CONVERSATION - ADVANCED CARE PLANNING - WHAT MATTERS MOST
Full code  Son Juan Jose johnson to be HCP  patient plans to go home to [place].  Notes he the resources to take care of himself in [facility] and  DOES NOT WANT REHAB.

## 2023-11-14 NOTE — PROGRESS NOTE ADULT - PROBLEM SELECTOR PLAN 6
- c/w atorvastatin 20mg qhs Noted with elevated BPs  - c/w amlodipine 10mg qd  - c/w lisinopril 20mg qd  - BPs currently adequately controlled

## 2023-11-14 NOTE — CONSULT NOTE ADULT - ASSESSMENT
This is a 72 yo M /w a PMH of HTN, metastatic prostate cancer and well controlled diabetes who presents with cervical cord compression now on high dose dexamethasone and steroid taper. Endocrinology consulted due to hypoglycemia yesterday    #steroid induced hyperglycemia  #diabetes -A1c 5.2% off medication  -c/w lantus 25 units each morning  -c/w admelog 9 units before meals  -moderate admelog correction scales at meals  -low admelog correction scales at bedtime  -carb consistent diet  -FSG before meals and before bedtime  -FSG goal 100-180 while inpatient  -hypoglycemia protocol in place if needed    #Discharge  -likely d/c on basal insulin + metformin 1000mg BID while on steroids  -can d/c anti-diabetic agents  on cessation of steroids  -follow up with PCP    #HTN  -c/w amlodipine 10mg daily, lisinopril 20mg daily    #HLD  -c/w atorvastatin 20mg daily    Case discussed with Dr. Sedrick Lozada MD  Endocrine Fellow  Can be reached via teams. For follow up questions, discharge recommendations, or new consults, please call answering service at [phone] (weekdays); [phone] (nights/weekends) This is a 74 yo M /w a PMH of HTN, metastatic prostate cancer and well controlled diabetes who presents with cervical cord compression now on high dose dexamethasone and steroid taper. Endocrinology consulted due to hypoglycemia yesterday    #steroid induced hyperglycemia  -currently on 4mg IV dexamethasone today and tomorrow and afterwards possibly stopping altogether, though may change based on today's MRI results    #diabetes -A1c 5.2% off medication  -c/w lantus 25 units each morning   -c/w admelog 9 units before meals  -low admelog correction scales at meals  -low admelog correction scales at bedtime  -carb consistent diet  -FSG before meals and before bedtime  -FSG goal 100-180 while inpatient  -hypoglycemia protocol in place if needed    #Discharge  -pending steroid course and if patient will be going home on steroids or not  -can d/c anti-diabetic agents  on cessation of steroids  -follow up with PCP    #HTN  -c/w amlodipine 10mg daily, lisinopril 20mg daily    #HLD  -c/w atorvastatin 20mg daily    Case discussed with Dr. Sedrick Lozada MD  Endocrine Fellow  Can be reached via teams. For follow up questions, discharge recommendations, or new consults, please call answering service at [phone] (weekdays); [phone] (nights/weekends)

## 2023-11-14 NOTE — PROGRESS NOTE ADULT - PROBLEM SELECTOR PLAN 1
Patient initially presenting with LE weakness and well as RUE parasthesias.  - patient found to have numerous spinal cord lesions  - noted with spinous process fractures of C7 and T1  - also noted with neoplasm with epidural expansion causing cord compression from C7-T3  - now s/p C5-T3 posterior cervical fusion and C7-T1 decompression by orthopedic surgery on 11/2  - weakness/parasthesias now markedly improved  - c/w dexamethasone 4mg q6h  - s/p flap closure of back surgical site by plastic surgery with b/l AGGIE drains  - L AGGIE drain removed on 11/7, R AGGIE drain removed 11/10  - appreciate further orthopedics/plastic surgery recs  - d/w rad/onc, no plan for inpatient RT, recommend outpatient follow up  - per rad/onc will obtain repeat thoracic spine MRI prior to discharge (ordered 11/9)--> still pending

## 2023-11-14 NOTE — GOALS OF CARE CONVERSATION - ADVANCED CARE PLANNING - DOES PATIENT HAVE ADVANCE DIRECTIVE
2021       RE: Elaine Awad  [id number] [facility] [address]     Dear Colleague,    Thank you for referring your patient, Elaine Awad, to the [facility] [place] at [facility]. Please see a copy of my visit note below.    Linda is a 52 year old who is being evaluated via a billable video visit.      How would you like to obtain your AVS? MyChart  If the video visit is dropped, the invitation should be resent by: Text to cell phone: [phone]  Will anyone else be joining your video visit? No    Video Start Time: 845  Video-Visit Details    Type of service:  Video Visit    Video End Time:855    Originating Location (pt. Location): Home    Distant Location (provider location):  [facility] [place]     Platform used for Video Visit: [employer]         During this virtual visit the patient is located in [place], patient verifies this as the location during the entirety of this visit.     Duran Vanessa, EMT  Surgery Clinic    10 minutes spent on the date of the encounter doing chart review, history and exam, documentation and further activities per the note        Return Medical Weight Management Note     Elaine Awad  MRN:  [id number]  :  1968  [place]:  2021    Dear Fernanda Rodriguez MD,    I had the pleasure of seeing your patient Elaine Awad. She is a 52 year old female who I am continuing to see for treatment of obesity related to:       2019   I have the following health issues associated with obesity: GERD (Reflux), Weight Bearing Joint Pain   I have the following symptoms associated with obesity: Knee Pain, GERD (Reflux)     INTERVAL HISTORY:   Hx of starting 24 week plan. Was supposed to graduate May 2020 but stopped visits with  2020  Starting weight 256 lbs     Last visit with me 21 and we restarted topiramate and held phentermine due to high BP   Topiramate  "75mg caused some issues with difficulty concentrating.  281.9 at home this am. Weight down 7-8 lbs from last visit.  She followed up with PCP regarding high BP and she is is taking losartan now.  Last check 118/65 at home today  She would like to retry low dose of phentermine and monitor blood pressure.   BMP 7/31/21 normal    PLAN:   Continue topiramate 50mg daily  Restart lomaira 8mg and check BP regularly at home  Send [employer] message in 1 month to follow up topiramate 50mg and phentermine 8mg restart  Follow up video 3 months Layla  RD next week already scheduled    CURRENT WEIGHT:   283 lbs 0 oz    Initial Weight (lbs): 239.4 lbs  Last Visits Weight: 130.6 kg (288 lb)  Cumulative weight loss (lbs): -43.6  Weight Loss Percentage: -18.21%    Changes and Difficulties 9/8/2021   I have made the following changes to my diet since my last visit: embraced having big meal at lunch   With regards to my diet, I am still struggling with: I am not  sure that I am getting enough protein, still tend to want to eat more quick, convivence foods   I have made the following changes to my activity/exercise since my last visit: had been doing water exercise, that ended, now just found a new program   With regards to my activity/exercise, I am still struggling with: foot pain       VITALS:  Ht 1.626 m (5' 4\")   Wt 128.4 kg (283 lb)   LMP 08/28/2021   BMI 48.58 kg/m      MEDICATIONS:   Current Outpatient Medications   Medication Sig Dispense Refill     fexofenadine (ALLEGRA) 180 MG tablet Take 180 mg by mouth daily       glucosamine sulfate 1000 MG CAPS Take 1 capsule by mouth daily       losartan (COZAAR) 50 MG tablet Take 1 tablet (50 mg) by mouth daily 90 tablet 3     multivitamin w/minerals (THERA-VIT-M) tablet Take 1 tablet by mouth daily       nystatin (NYSTOP) 285996 UNIT/GM external powder APPLY TO THE AFFECTED AREA UNDER BREASTS THREE TIMES DAILY AS NEEDED. 60 g 4     omeprazole (PRILOSEC) 20 MG DR capsule TAKE 1 " "CAPSULE BY MOUTH EVERY DAY 90 capsule 1     rizatriptan (MAXALT-MLT) 5 MG ODT Take 1-2 tablets (5-10 mg) by mouth at onset of headache for migraine May repeat in 2 hours. Max 6 tablets/24 hours. 9 tablet 11     valACYclovir (VALTREX) 500 MG tablet TAKE 1 TABLET BY MOUTH DAILY. INCREASE TO 4 TABLET BY MOUTH 2 TIMES DAILY AT ONSET OF OUTBREAK 100 tablet 2       Weight Loss Medication History Reviewed With Patient 9/8/2021   Which weight loss medications are you currently taking on a regular basis?  Topamax (topiramate)   If you are not taking a weight loss medication that was prescribed to you, please indicate why: Other   Are you having any side effects from the weight loss medication that we have prescribed you? Yes   If you are having side effects please describe: I had been taking 75 mg, I had difficulty concentrating, I decreased dose on 8/30/21, this seems to be  a little better         Lab on 07/31/2021   Component Date Value Ref Range Status     Hemoglobin 07/31/2021 14.8  11.7 - 15.7 g/dL Final     Hepatitis C Antibody 07/31/2021 Nonreactive  Nonreactive Final     Sodium 07/31/2021 137  133 - 144 mmol/L Final     Potassium 07/31/2021 4.1  3.4 - 5.3 mmol/L Final     Chloride 07/31/2021 109  94 - 109 mmol/L Final     Carbon Dioxide (CO2) 07/31/2021 24  20 - 32 mmol/L Final     Anion Gap 07/31/2021 4  3 - 14 mmol/L Final     Urea Nitrogen 07/31/2021 11  7 - 30 mg/dL Final     Creatinine 07/31/2021 0.82  0.52 - 1.04 mg/dL Final     Calcium 07/31/2021 9.6  8.5 - 10.1 mg/dL Final     Glucose 07/31/2021 96  70 - 99 mg/dL Final     GFR Estimate 07/31/2021 84  >60 mL/min/1.73m2 Final    As of July 11, 2021, eGFR is calculated by the CKD-EPI creatinine equation, without race adjustment. eGFR can be influenced by muscle mass, exercise, and diet. The reported eGFR is an estimation only and is only applicable if the renal function is stable.     PHYSICAL EXAM:  Objective    Ht 1.626 m (5' 4\")   Wt 128.4 kg (283 lb)   " LMP 08/28/2021   BMI 48.58 kg/m      Vitals:  No vitals were obtained today due to virtual visit.    Physical Exam   GENERAL: Healthy, alert and no distress  EYES: Eyes grossly normal to inspection.  No discharge or erythema, or obvious scleral/conjunctival abnormalities.  RESP: No audible wheeze, cough, or visible cyanosis.  No visible retractions or increased work of breathing.    SKIN: Visible skin clear. No significant rash, abnormal pigmentation or lesions.  NEURO: Cranial nerves grossly intact.  Mentation and speech appropriate for age.  PSYCH: Mentation appears normal, affect normal/bright, judgement and insight intact, normal speech and appearance well-groomed.    Sincerely,    Layla Cyr PA-C   No

## 2023-11-14 NOTE — GOALS OF CARE CONVERSATION - ADVANCED CARE PLANNING - TREATMENT GUIDELINE COMMENT
Full code  Son Juan Jose johnson to be HCP  patient plans to go home to [place].  Notes he the resources to [place] of himself in [place] and  DOES NOT WANT REHAB.

## 2023-11-14 NOTE — GOALS OF CARE CONVERSATION - ADVANCED CARE PLANNING - CONVERSATION DETAILS
74y/o M with DM, HTN and metastatic prostate cancer (diagnosed in 2009) that initially presented to OSH with LE weakness and R hand paresthesias now transferred to [facility] for NSG eval. Patient now s/p surgical intervention (C5-T3 posterior cervical fusion and C7-T1 decompression) by orthopedic surgery, now transferred to medicine for further management.       Problem/Plan - 1:  ·  Problem: Cervical spinal cord compression.   ·  Plan: Patient initially presenting with LE weakness and well as RUE parasthesias.  - patient found to have numerous spinal cord lesions  - noted with spinous process fractures of C7 and T1  - also noted with neoplasm with epidural expansion causing cord compression from C7-T3  - now s/p C5-T3 posterior cervical fusion and C7-T1 decompression by orthopedic surgery on 11/2  - weakness/parasthesias now markedly improved  - c/w dexamethasone 4mg q6h  - s/p flap closure of back surgical site by plastic surgery with b/l AGGIE drains  - L AGGIE drain removed on 11/7, R AGGIE drain removed 11/10  - appreciate further orthopedics/plastic surgery recs  - d/w rad/onc, no plan for inpatient RT, recommend outpatient follow up  - per rad/onc will obtain repeat thoracic spine MRI prior to discharge (ordered 11/9), done 11/14/23.  Rt oncology to f/u      Patient is Ox3, Notes President is Yuri Elena  He wants Son Giuseppe Robledo to be [phone] or [phone]  Patient wants to be FULL CODE  Full code  Son Juan Jose johnson to be HCP  patient plans to go home to [place].  Notes he the resources to take care of himself in [facility] and  DOES NOT WANT REHAB.  Patient is aware we do not want him falling at home and he will need help to assist him at home---> patient again notes he has help and resources and WILL NOT FALL.  He plans to go to

## 2023-11-14 NOTE — PROGRESS NOTE ADULT - PROBLEM SELECTOR PLAN 9
d/w Plastic surgery  needs : Nylon sutures over incision to remain in place  - Gauze/tegaderm dressing for serosanguinous drainage; currently no collections/signs of infection  Need dressing change 1 to 2 x day. to T spine wound.  F/u with Dr Tr Yoo  Also ut patient f/u with PCP/ Onc and RT oncology  C done 11/14, Jackson Ingram is [facility] [phone], [phone] , full code d/w Plastic surgery  needs : Nylon sutures over incision to remain in place  - Gauze/tegaderm dressing for serosanguinous drainage; currently no collections/signs of infection  Need dressing change 1 to 2 x day. to T spine wound.  F/u with Dr Tr Yoo  Also ut patient f/u with PCP/ Onc and RT oncology  GOC done 11/14, Patient is Ox3 and wants to GO HOME.  He assigned Son Juan Jose is [phone], [phone] , full code  CM will help with wound care, ?? VNS vs Family ???????  Spoke with daughter Melissa and explained we are planning for home.

## 2023-11-15 DIAGNOSIS — R50.9 FEVER, UNSPECIFIED: ICD-10-CM

## 2023-11-15 LAB
ANION GAP SERPL CALC-SCNC: 10 MMOL/L — SIGNIFICANT CHANGE UP (ref 7–14)
ANION GAP SERPL CALC-SCNC: 10 MMOL/L — SIGNIFICANT CHANGE UP (ref 7–14)
ANION GAP SERPL CALC-SCNC: 14 MMOL/L — SIGNIFICANT CHANGE UP (ref 7–14)
ANION GAP SERPL CALC-SCNC: 14 MMOL/L — SIGNIFICANT CHANGE UP (ref 7–14)
APPEARANCE UR: ABNORMAL
APPEARANCE UR: ABNORMAL
B PERT DNA SPEC QL NAA+PROBE: SIGNIFICANT CHANGE UP
B PERT DNA SPEC QL NAA+PROBE: SIGNIFICANT CHANGE UP
B PERT+PARAPERT DNA PNL SPEC NAA+PROBE: SIGNIFICANT CHANGE UP
B PERT+PARAPERT DNA PNL SPEC NAA+PROBE: SIGNIFICANT CHANGE UP
BACTERIA # UR AUTO: ABNORMAL /HPF
BACTERIA # UR AUTO: ABNORMAL /HPF
BASOPHILS # BLD AUTO: 0.01 K/UL — SIGNIFICANT CHANGE UP (ref 0–0.2)
BASOPHILS NFR BLD AUTO: 0.1 % — SIGNIFICANT CHANGE UP (ref 0–2)
BILIRUB UR-MCNC: NEGATIVE — SIGNIFICANT CHANGE UP
BILIRUB UR-MCNC: NEGATIVE — SIGNIFICANT CHANGE UP
BLOOD GAS VENOUS COMPREHENSIVE RESULT: SIGNIFICANT CHANGE UP
BORDETELLA PARAPERTUSSIS (RAPRVP): SIGNIFICANT CHANGE UP
BORDETELLA PARAPERTUSSIS (RAPRVP): SIGNIFICANT CHANGE UP
BUN SERPL-MCNC: 22 MG/DL — SIGNIFICANT CHANGE UP (ref 7–23)
C PNEUM DNA SPEC QL NAA+PROBE: SIGNIFICANT CHANGE UP
C PNEUM DNA SPEC QL NAA+PROBE: SIGNIFICANT CHANGE UP
CALCIUM SERPL-MCNC: 8.9 MG/DL — SIGNIFICANT CHANGE UP (ref 8.4–10.5)
CALCIUM SERPL-MCNC: 8.9 MG/DL — SIGNIFICANT CHANGE UP (ref 8.4–10.5)
CALCIUM SERPL-MCNC: 9.2 MG/DL — SIGNIFICANT CHANGE UP (ref 8.4–10.5)
CALCIUM SERPL-MCNC: 9.2 MG/DL — SIGNIFICANT CHANGE UP (ref 8.4–10.5)
CAST: 1 /LPF — SIGNIFICANT CHANGE UP (ref 0–4)
CAST: 1 /LPF — SIGNIFICANT CHANGE UP (ref 0–4)
CHLORIDE SERPL-SCNC: 96 MMOL/L — LOW (ref 98–107)
CO2 SERPL-SCNC: 22 MMOL/L — SIGNIFICANT CHANGE UP (ref 22–31)
CO2 SERPL-SCNC: 22 MMOL/L — SIGNIFICANT CHANGE UP (ref 22–31)
CO2 SERPL-SCNC: 24 MMOL/L — SIGNIFICANT CHANGE UP (ref 22–31)
CO2 SERPL-SCNC: 24 MMOL/L — SIGNIFICANT CHANGE UP (ref 22–31)
COLOR SPEC: YELLOW — SIGNIFICANT CHANGE UP
COLOR SPEC: YELLOW — SIGNIFICANT CHANGE UP
CREAT SERPL-MCNC: 0.79 MG/DL — SIGNIFICANT CHANGE UP (ref 0.5–1.3)
CREAT SERPL-MCNC: 0.79 MG/DL — SIGNIFICANT CHANGE UP (ref 0.5–1.3)
CREAT SERPL-MCNC: 0.93 MG/DL — SIGNIFICANT CHANGE UP (ref 0.5–1.3)
CREAT SERPL-MCNC: 0.93 MG/DL — SIGNIFICANT CHANGE UP (ref 0.5–1.3)
DIFF PNL FLD: NEGATIVE — SIGNIFICANT CHANGE UP
DIFF PNL FLD: NEGATIVE — SIGNIFICANT CHANGE UP
EGFR: 88 ML/MIN/1.73M2 — SIGNIFICANT CHANGE UP
EGFR: 88 ML/MIN/1.73M2 — SIGNIFICANT CHANGE UP
EGFR: 95 ML/MIN/1.73M2 — SIGNIFICANT CHANGE UP
EGFR: 95 ML/MIN/1.73M2 — SIGNIFICANT CHANGE UP
EOSINOPHIL # BLD AUTO: 0 K/UL — SIGNIFICANT CHANGE UP (ref 0–0.5)
EOSINOPHIL # BLD AUTO: 0 K/UL — SIGNIFICANT CHANGE UP (ref 0–0.5)
EOSINOPHIL # BLD AUTO: 0.01 K/UL — SIGNIFICANT CHANGE UP (ref 0–0.5)
EOSINOPHIL # BLD AUTO: 0.01 K/UL — SIGNIFICANT CHANGE UP (ref 0–0.5)
EOSINOPHIL NFR BLD AUTO: 0 % — SIGNIFICANT CHANGE UP (ref 0–6)
EOSINOPHIL NFR BLD AUTO: 0 % — SIGNIFICANT CHANGE UP (ref 0–6)
EOSINOPHIL NFR BLD AUTO: 0.1 % — SIGNIFICANT CHANGE UP (ref 0–6)
EOSINOPHIL NFR BLD AUTO: 0.1 % — SIGNIFICANT CHANGE UP (ref 0–6)
FLUAV SUBTYP SPEC NAA+PROBE: SIGNIFICANT CHANGE UP
FLUAV SUBTYP SPEC NAA+PROBE: SIGNIFICANT CHANGE UP
FLUBV RNA SPEC QL NAA+PROBE: SIGNIFICANT CHANGE UP
FLUBV RNA SPEC QL NAA+PROBE: SIGNIFICANT CHANGE UP
GLUCOSE BLDC GLUCOMTR-MCNC: 109 MG/DL — HIGH (ref 70–99)
GLUCOSE BLDC GLUCOMTR-MCNC: 109 MG/DL — HIGH (ref 70–99)
GLUCOSE BLDC GLUCOMTR-MCNC: 158 MG/DL — HIGH (ref 70–99)
GLUCOSE BLDC GLUCOMTR-MCNC: 92 MG/DL — SIGNIFICANT CHANGE UP (ref 70–99)
GLUCOSE BLDC GLUCOMTR-MCNC: 92 MG/DL — SIGNIFICANT CHANGE UP (ref 70–99)
GLUCOSE SERPL-MCNC: 122 MG/DL — HIGH (ref 70–99)
GLUCOSE SERPL-MCNC: 122 MG/DL — HIGH (ref 70–99)
GLUCOSE SERPL-MCNC: 139 MG/DL — HIGH (ref 70–99)
GLUCOSE SERPL-MCNC: 139 MG/DL — HIGH (ref 70–99)
GLUCOSE UR QL: NEGATIVE MG/DL — SIGNIFICANT CHANGE UP
GLUCOSE UR QL: NEGATIVE MG/DL — SIGNIFICANT CHANGE UP
GRAM STN FLD: ABNORMAL
HADV DNA SPEC QL NAA+PROBE: DETECTED
HADV DNA SPEC QL NAA+PROBE: DETECTED
HCOV 229E RNA SPEC QL NAA+PROBE: SIGNIFICANT CHANGE UP
HCOV 229E RNA SPEC QL NAA+PROBE: SIGNIFICANT CHANGE UP
HCOV HKU1 RNA SPEC QL NAA+PROBE: SIGNIFICANT CHANGE UP
HCOV HKU1 RNA SPEC QL NAA+PROBE: SIGNIFICANT CHANGE UP
HCOV NL63 RNA SPEC QL NAA+PROBE: SIGNIFICANT CHANGE UP
HCOV NL63 RNA SPEC QL NAA+PROBE: SIGNIFICANT CHANGE UP
HCOV OC43 RNA SPEC QL NAA+PROBE: SIGNIFICANT CHANGE UP
HCOV OC43 RNA SPEC QL NAA+PROBE: SIGNIFICANT CHANGE UP
HCT VFR BLD CALC: 31.8 % — LOW (ref 39–50)
HCT VFR BLD CALC: 31.8 % — LOW (ref 39–50)
HCT VFR BLD CALC: 37 % — LOW (ref 39–50)
HCT VFR BLD CALC: 37 % — LOW (ref 39–50)
HGB BLD-MCNC: 10.5 G/DL — LOW (ref 13–17)
HGB BLD-MCNC: 10.5 G/DL — LOW (ref 13–17)
HGB BLD-MCNC: 12.2 G/DL — LOW (ref 13–17)
HGB BLD-MCNC: 12.2 G/DL — LOW (ref 13–17)
HMPV RNA SPEC QL NAA+PROBE: SIGNIFICANT CHANGE UP
HMPV RNA SPEC QL NAA+PROBE: SIGNIFICANT CHANGE UP
HPIV1 RNA SPEC QL NAA+PROBE: SIGNIFICANT CHANGE UP
HPIV1 RNA SPEC QL NAA+PROBE: SIGNIFICANT CHANGE UP
HPIV2 RNA SPEC QL NAA+PROBE: SIGNIFICANT CHANGE UP
HPIV2 RNA SPEC QL NAA+PROBE: SIGNIFICANT CHANGE UP
HPIV3 RNA SPEC QL NAA+PROBE: SIGNIFICANT CHANGE UP
HPIV3 RNA SPEC QL NAA+PROBE: SIGNIFICANT CHANGE UP
HPIV4 RNA SPEC QL NAA+PROBE: SIGNIFICANT CHANGE UP
HPIV4 RNA SPEC QL NAA+PROBE: SIGNIFICANT CHANGE UP
IANC: 7.18 K/UL — SIGNIFICANT CHANGE UP (ref 1.8–7.4)
IANC: 7.18 K/UL — SIGNIFICANT CHANGE UP (ref 1.8–7.4)
IANC: 8.62 K/UL — HIGH (ref 1.8–7.4)
IANC: 8.62 K/UL — HIGH (ref 1.8–7.4)
IMM GRANULOCYTES NFR BLD AUTO: 0.8 % — SIGNIFICANT CHANGE UP (ref 0–0.9)
IMM GRANULOCYTES NFR BLD AUTO: 0.8 % — SIGNIFICANT CHANGE UP (ref 0–0.9)
IMM GRANULOCYTES NFR BLD AUTO: 1 % — HIGH (ref 0–0.9)
IMM GRANULOCYTES NFR BLD AUTO: 1 % — HIGH (ref 0–0.9)
KETONES UR-MCNC: NEGATIVE MG/DL — SIGNIFICANT CHANGE UP
KETONES UR-MCNC: NEGATIVE MG/DL — SIGNIFICANT CHANGE UP
LEUKOCYTE ESTERASE UR-ACNC: ABNORMAL
LEUKOCYTE ESTERASE UR-ACNC: ABNORMAL
LYMPHOCYTES # BLD AUTO: 0.21 K/UL — LOW (ref 1–3.3)
LYMPHOCYTES # BLD AUTO: 0.21 K/UL — LOW (ref 1–3.3)
LYMPHOCYTES # BLD AUTO: 0.27 K/UL — LOW (ref 1–3.3)
LYMPHOCYTES # BLD AUTO: 0.27 K/UL — LOW (ref 1–3.3)
LYMPHOCYTES # BLD AUTO: 2.2 % — LOW (ref 13–44)
LYMPHOCYTES # BLD AUTO: 2.2 % — LOW (ref 13–44)
LYMPHOCYTES # BLD AUTO: 3.4 % — LOW (ref 13–44)
LYMPHOCYTES # BLD AUTO: 3.4 % — LOW (ref 13–44)
M PNEUMO DNA SPEC QL NAA+PROBE: SIGNIFICANT CHANGE UP
M PNEUMO DNA SPEC QL NAA+PROBE: SIGNIFICANT CHANGE UP
MAGNESIUM SERPL-MCNC: 1.7 MG/DL — SIGNIFICANT CHANGE UP (ref 1.6–2.6)
MAGNESIUM SERPL-MCNC: 1.7 MG/DL — SIGNIFICANT CHANGE UP (ref 1.6–2.6)
MAGNESIUM SERPL-MCNC: 1.9 MG/DL — SIGNIFICANT CHANGE UP (ref 1.6–2.6)
MAGNESIUM SERPL-MCNC: 1.9 MG/DL — SIGNIFICANT CHANGE UP (ref 1.6–2.6)
MCHC RBC-ENTMCNC: 30.8 PG — SIGNIFICANT CHANGE UP (ref 27–34)
MCHC RBC-ENTMCNC: 30.8 PG — SIGNIFICANT CHANGE UP (ref 27–34)
MCHC RBC-ENTMCNC: 31 PG — SIGNIFICANT CHANGE UP (ref 27–34)
MCHC RBC-ENTMCNC: 31 PG — SIGNIFICANT CHANGE UP (ref 27–34)
MCHC RBC-ENTMCNC: 33 GM/DL — SIGNIFICANT CHANGE UP (ref 32–36)
MCV RBC AUTO: 93.3 FL — SIGNIFICANT CHANGE UP (ref 80–100)
MCV RBC AUTO: 93.3 FL — SIGNIFICANT CHANGE UP (ref 80–100)
MCV RBC AUTO: 93.9 FL — SIGNIFICANT CHANGE UP (ref 80–100)
MCV RBC AUTO: 93.9 FL — SIGNIFICANT CHANGE UP (ref 80–100)
METHOD TYPE: SIGNIFICANT CHANGE UP
METHOD TYPE: SIGNIFICANT CHANGE UP
MONOCYTES # BLD AUTO: 0.45 K/UL — SIGNIFICANT CHANGE UP (ref 0–0.9)
MONOCYTES # BLD AUTO: 0.45 K/UL — SIGNIFICANT CHANGE UP (ref 0–0.9)
MONOCYTES # BLD AUTO: 0.46 K/UL — SIGNIFICANT CHANGE UP (ref 0–0.9)
MONOCYTES # BLD AUTO: 0.46 K/UL — SIGNIFICANT CHANGE UP (ref 0–0.9)
MONOCYTES NFR BLD AUTO: 4.9 % — SIGNIFICANT CHANGE UP (ref 2–14)
MONOCYTES NFR BLD AUTO: 4.9 % — SIGNIFICANT CHANGE UP (ref 2–14)
MONOCYTES NFR BLD AUTO: 5.6 % — SIGNIFICANT CHANGE UP (ref 2–14)
MONOCYTES NFR BLD AUTO: 5.6 % — SIGNIFICANT CHANGE UP (ref 2–14)
MSSA DNA SPEC QL NAA+PROBE: SIGNIFICANT CHANGE UP
MSSA DNA SPEC QL NAA+PROBE: SIGNIFICANT CHANGE UP
NEUTROPHILS # BLD AUTO: 7.18 K/UL — SIGNIFICANT CHANGE UP (ref 1.8–7.4)
NEUTROPHILS # BLD AUTO: 7.18 K/UL — SIGNIFICANT CHANGE UP (ref 1.8–7.4)
NEUTROPHILS # BLD AUTO: 8.62 K/UL — HIGH (ref 1.8–7.4)
NEUTROPHILS # BLD AUTO: 8.62 K/UL — HIGH (ref 1.8–7.4)
NEUTROPHILS NFR BLD AUTO: 90 % — HIGH (ref 43–77)
NEUTROPHILS NFR BLD AUTO: 90 % — HIGH (ref 43–77)
NEUTROPHILS NFR BLD AUTO: 91.8 % — HIGH (ref 43–77)
NEUTROPHILS NFR BLD AUTO: 91.8 % — HIGH (ref 43–77)
NITRITE UR-MCNC: POSITIVE
NITRITE UR-MCNC: POSITIVE
NRBC # BLD: 0 /100 WBCS — SIGNIFICANT CHANGE UP (ref 0–0)
NRBC # FLD: 0 K/UL — SIGNIFICANT CHANGE UP (ref 0–0)
PH UR: 8.5 (ref 5–8)
PH UR: 8.5 (ref 5–8)
PHOSPHATE SERPL-MCNC: 2.8 MG/DL — SIGNIFICANT CHANGE UP (ref 2.5–4.5)
PHOSPHATE SERPL-MCNC: 2.8 MG/DL — SIGNIFICANT CHANGE UP (ref 2.5–4.5)
PHOSPHATE SERPL-MCNC: 3 MG/DL — SIGNIFICANT CHANGE UP (ref 2.5–4.5)
PHOSPHATE SERPL-MCNC: 3 MG/DL — SIGNIFICANT CHANGE UP (ref 2.5–4.5)
PLATELET # BLD AUTO: 153 K/UL — SIGNIFICANT CHANGE UP (ref 150–400)
PLATELET # BLD AUTO: 153 K/UL — SIGNIFICANT CHANGE UP (ref 150–400)
PLATELET # BLD AUTO: 170 K/UL — SIGNIFICANT CHANGE UP (ref 150–400)
PLATELET # BLD AUTO: 170 K/UL — SIGNIFICANT CHANGE UP (ref 150–400)
POTASSIUM SERPL-MCNC: 4.1 MMOL/L — SIGNIFICANT CHANGE UP (ref 3.5–5.3)
POTASSIUM SERPL-MCNC: 4.1 MMOL/L — SIGNIFICANT CHANGE UP (ref 3.5–5.3)
POTASSIUM SERPL-MCNC: 4.7 MMOL/L — SIGNIFICANT CHANGE UP (ref 3.5–5.3)
POTASSIUM SERPL-MCNC: 4.7 MMOL/L — SIGNIFICANT CHANGE UP (ref 3.5–5.3)
POTASSIUM SERPL-SCNC: 4.1 MMOL/L — SIGNIFICANT CHANGE UP (ref 3.5–5.3)
POTASSIUM SERPL-SCNC: 4.1 MMOL/L — SIGNIFICANT CHANGE UP (ref 3.5–5.3)
POTASSIUM SERPL-SCNC: 4.7 MMOL/L — SIGNIFICANT CHANGE UP (ref 3.5–5.3)
POTASSIUM SERPL-SCNC: 4.7 MMOL/L — SIGNIFICANT CHANGE UP (ref 3.5–5.3)
PROT UR-MCNC: 30 MG/DL
PROT UR-MCNC: 30 MG/DL
RAPID RVP RESULT: DETECTED
RAPID RVP RESULT: DETECTED
RBC # BLD: 3.41 M/UL — LOW (ref 4.2–5.8)
RBC # BLD: 3.41 M/UL — LOW (ref 4.2–5.8)
RBC # BLD: 3.94 M/UL — LOW (ref 4.2–5.8)
RBC # BLD: 3.94 M/UL — LOW (ref 4.2–5.8)
RBC # FLD: 13.1 % — SIGNIFICANT CHANGE UP (ref 10.3–14.5)
RBC CASTS # UR COMP ASSIST: 2 /HPF — SIGNIFICANT CHANGE UP (ref 0–4)
RBC CASTS # UR COMP ASSIST: 2 /HPF — SIGNIFICANT CHANGE UP (ref 0–4)
RSV RNA SPEC QL NAA+PROBE: SIGNIFICANT CHANGE UP
RSV RNA SPEC QL NAA+PROBE: SIGNIFICANT CHANGE UP
RV+EV RNA SPEC QL NAA+PROBE: SIGNIFICANT CHANGE UP
RV+EV RNA SPEC QL NAA+PROBE: SIGNIFICANT CHANGE UP
SARS-COV-2 RNA SPEC QL NAA+PROBE: SIGNIFICANT CHANGE UP
SARS-COV-2 RNA SPEC QL NAA+PROBE: SIGNIFICANT CHANGE UP
SODIUM SERPL-SCNC: 130 MMOL/L — LOW (ref 135–145)
SODIUM SERPL-SCNC: 130 MMOL/L — LOW (ref 135–145)
SODIUM SERPL-SCNC: 132 MMOL/L — LOW (ref 135–145)
SODIUM SERPL-SCNC: 132 MMOL/L — LOW (ref 135–145)
SP GR SPEC: 1.03 — SIGNIFICANT CHANGE UP (ref 1–1.03)
SP GR SPEC: 1.03 — SIGNIFICANT CHANGE UP (ref 1–1.03)
SPECIMEN SOURCE: SIGNIFICANT CHANGE UP
SQUAMOUS # UR AUTO: 1 /HPF — SIGNIFICANT CHANGE UP (ref 0–5)
SQUAMOUS # UR AUTO: 1 /HPF — SIGNIFICANT CHANGE UP (ref 0–5)
UROBILINOGEN FLD QL: 1 MG/DL — SIGNIFICANT CHANGE UP (ref 0.2–1)
UROBILINOGEN FLD QL: 1 MG/DL — SIGNIFICANT CHANGE UP (ref 0.2–1)
WBC # BLD: 7.98 K/UL — SIGNIFICANT CHANGE UP (ref 3.8–10.5)
WBC # BLD: 7.98 K/UL — SIGNIFICANT CHANGE UP (ref 3.8–10.5)
WBC # BLD: 9.39 K/UL — SIGNIFICANT CHANGE UP (ref 3.8–10.5)
WBC # BLD: 9.39 K/UL — SIGNIFICANT CHANGE UP (ref 3.8–10.5)
WBC # FLD AUTO: 7.98 K/UL — SIGNIFICANT CHANGE UP (ref 3.8–10.5)
WBC # FLD AUTO: 7.98 K/UL — SIGNIFICANT CHANGE UP (ref 3.8–10.5)
WBC # FLD AUTO: 9.39 K/UL — SIGNIFICANT CHANGE UP (ref 3.8–10.5)
WBC # FLD AUTO: 9.39 K/UL — SIGNIFICANT CHANGE UP (ref 3.8–10.5)
WBC UR QL: 25 /HPF — HIGH (ref 0–5)
WBC UR QL: 25 /HPF — HIGH (ref 0–5)

## 2023-11-15 PROCEDURE — 99232 SBSQ HOSP IP/OBS MODERATE 35: CPT

## 2023-11-15 PROCEDURE — 71045 X-RAY EXAM CHEST 1 VIEW: CPT | Mod: 26

## 2023-11-15 PROCEDURE — 99233 SBSQ HOSP IP/OBS HIGH 50: CPT

## 2023-11-15 RX ORDER — INSULIN LISPRO 100/ML
VIAL (ML) SUBCUTANEOUS AT BEDTIME
Refills: 0 | Status: DISCONTINUED | OUTPATIENT
Start: 2023-11-15 | End: 2023-11-22

## 2023-11-15 RX ORDER — INSULIN LISPRO 100/ML
VIAL (ML) SUBCUTANEOUS
Refills: 0 | Status: DISCONTINUED | OUTPATIENT
Start: 2023-11-15 | End: 2023-11-22

## 2023-11-15 RX ORDER — INSULIN LISPRO 100/ML
6 VIAL (ML) SUBCUTANEOUS
Refills: 0 | Status: DISCONTINUED | OUTPATIENT
Start: 2023-11-16 | End: 2023-11-16

## 2023-11-15 RX ORDER — CEFTRIAXONE 500 MG/1
1000 INJECTION, POWDER, FOR SOLUTION INTRAMUSCULAR; INTRAVENOUS EVERY 24 HOURS
Refills: 0 | Status: DISCONTINUED | OUTPATIENT
Start: 2023-11-15 | End: 2023-11-16

## 2023-11-15 RX ORDER — INSULIN LISPRO 100/ML
8 VIAL (ML) SUBCUTANEOUS
Refills: 0 | Status: DISCONTINUED | OUTPATIENT
Start: 2023-11-15 | End: 2023-11-15

## 2023-11-15 RX ORDER — INSULIN GLARGINE 100 [IU]/ML
20 INJECTION, SOLUTION SUBCUTANEOUS
Refills: 0 | Status: DISCONTINUED | OUTPATIENT
Start: 2023-11-16 | End: 2023-11-16

## 2023-11-15 RX ADMIN — HEPARIN SODIUM 5000 UNIT(S): 5000 INJECTION INTRAVENOUS; SUBCUTANEOUS at 14:42

## 2023-11-15 RX ADMIN — ATORVASTATIN CALCIUM 20 MILLIGRAM(S): 80 TABLET, FILM COATED ORAL at 21:14

## 2023-11-15 RX ADMIN — Medication 975 MILLIGRAM(S): at 18:31

## 2023-11-15 RX ADMIN — OXYCODONE HYDROCHLORIDE 10 MILLIGRAM(S): 5 TABLET ORAL at 11:36

## 2023-11-15 RX ADMIN — Medication 975 MILLIGRAM(S): at 06:34

## 2023-11-15 RX ADMIN — Medication 1: at 18:16

## 2023-11-15 RX ADMIN — Medication 975 MILLIGRAM(S): at 11:42

## 2023-11-15 RX ADMIN — HEPARIN SODIUM 5000 UNIT(S): 5000 INJECTION INTRAVENOUS; SUBCUTANEOUS at 06:35

## 2023-11-15 RX ADMIN — PANTOPRAZOLE SODIUM 40 MILLIGRAM(S): 20 TABLET, DELAYED RELEASE ORAL at 06:34

## 2023-11-15 RX ADMIN — Medication 975 MILLIGRAM(S): at 07:04

## 2023-11-15 RX ADMIN — INSULIN GLARGINE 25 UNIT(S): 100 INJECTION, SOLUTION SUBCUTANEOUS at 09:24

## 2023-11-15 RX ADMIN — CHLORHEXIDINE GLUCONATE 1 APPLICATION(S): 213 SOLUTION TOPICAL at 11:36

## 2023-11-15 RX ADMIN — TAMSULOSIN HYDROCHLORIDE 0.4 MILLIGRAM(S): 0.4 CAPSULE ORAL at 21:14

## 2023-11-15 RX ADMIN — Medication 8 UNIT(S): at 18:16

## 2023-11-15 RX ADMIN — BICALUTAMIDE 50 MILLIGRAM(S): 50 TABLET, FILM COATED ORAL at 14:42

## 2023-11-15 RX ADMIN — LISINOPRIL 20 MILLIGRAM(S): 2.5 TABLET ORAL at 06:35

## 2023-11-15 RX ADMIN — CEFTRIAXONE 100 MILLIGRAM(S): 500 INJECTION, POWDER, FOR SOLUTION INTRAMUSCULAR; INTRAVENOUS at 06:36

## 2023-11-15 RX ADMIN — HEPARIN SODIUM 5000 UNIT(S): 5000 INJECTION INTRAVENOUS; SUBCUTANEOUS at 21:15

## 2023-11-15 RX ADMIN — Medication 975 MILLIGRAM(S): at 18:16

## 2023-11-15 RX ADMIN — Medication 4 MILLIGRAM(S): at 06:35

## 2023-11-15 RX ADMIN — Medication 9 UNIT(S): at 11:54

## 2023-11-15 RX ADMIN — Medication 9 UNIT(S): at 09:23

## 2023-11-15 RX ADMIN — Medication 975 MILLIGRAM(S): at 11:48

## 2023-11-15 RX ADMIN — AMLODIPINE BESYLATE 10 MILLIGRAM(S): 2.5 TABLET ORAL at 06:35

## 2023-11-15 NOTE — PROGRESS NOTE ADULT - SUBJECTIVE AND OBJECTIVE BOX
73y Male s/p C5-T3 posterior cervical fusion, C7-T1 decompression for metastatic prostate cancer with plastics closure on 11/2,  is recovering post operatively.     Research MRI has been done below post operatively for RT planning, targeted SBRT.     SBRT, outpatient will be done at [facility], [address] [place] [address] with Dr. Isaias Scales reachable at :   [phone].      We anticipate discharge today/tomorrow; if he cannot be discharged please let us know.         < from: MR Thoracic Spine w/wo IV Cont (11.14.23 @ 11:55) >  IMPRESSION: Postop changes as described above.    Osseous metastasis again seen.    Possible epidural enhancement is seen involving the T4-5 level. Better   quality contrast enhanced MRI of the thoracic spine is recommended for   further evaluation.    Previously noted T2 prolongation involving the spinal cord at the T1   level is not well-demonstrated due to artifact from postop material.      < end of copied text >     73y Male s/p C5-T3 posterior cervical fusion, C7-T1 decompression for metastatic prostate cancer with plastics closure on 11/2,  is recovering post operatively.     Research MRI has been done below post operatively for RT planning, targeted SBRT.   Seen by plastic surgery today:   Plan:  - Nylon sutures over incision to remain in place  - Gauze/tegaderm dressing for serouss drainage; currently no collections/signs of infection  - F/U fever work up  - Optimize nutrition, ensure adequate protein intake  - Remainder of care per primary team    Plastic Surgery  [phone]# [facility] pager  [phone] [facility] pager  Available on Teams         Plan:   SBRT, outpatient will be done at [facility], [address] with Dr. Isaias Scales reachable at :   [phone].      We anticipate discharge today/tomorrow; if he cannot be discharged please let us know.         < from: MR Thoracic Spine w/wo IV Cont (11.14.23 @ 11:55) >  IMPRESSION: Postop changes as described above.    Osseous metastasis again seen.    Possible epidural enhancement is seen involving the T4-5 level. Better   quality contrast enhanced MRI of the thoracic spine is recommended for   further evaluation.    Previously noted T2 prolongation involving the spinal cord at the T1   level is not well-demonstrated due to artifact from postop material.      < end of copied text >

## 2023-11-15 NOTE — PROGRESS NOTE ADULT - PROBLEM SELECTOR PLAN 10
d/w Plastic surgery  needs : Nylon sutures over incision to remain in place  - Gauze/tegaderm dressing for serosanguinous drainage; currently no collections/signs of infection  Need dressing change 1 to 2 x day. to T spine wound.  F/u with Dr Tr Yoo  Also ut patient f/u with PCP/ Onc and RT oncology  GOC done 11/14, Patient is Ox3 and wants to GO HOME.  He assigned Son Juan Jose is [phone], [phone] , full code  CM will help with wound care, ?? VNS vs Family ???????  Spoke with daughter Melissa and explained we are planning for home.

## 2023-11-15 NOTE — CHART NOTE - NSCHARTNOTEFT_GEN_A_CORE
Called by RN for pt with fever - Temp 100.8F   Vitals: Rectal temp 102.6F  /72 Sp02 96% on RA    Pt seen and assessed at bedside resting comfortable in NAD. Reports c/o intermittent dry cough. He reports having 3 bowel movements today - associates it to laxatives. He admits to dysuria. Denies body aches, chills, nasal congestion, sore throat, chest pain, dyspnea, abdominal pain, nausea, vomiting, urinary frequency.    Exam:   General: Resting flat in bed in NAD  Heart: + Tachycardia +S1S2  Lungs: CLA no wheezing   Abd: Soft nontender +BS  Ext: Pulses present b/l     Pt meets SIRS criteria with fever and tachycardia  Sepsis workup sent  CBC w diff, Blood cx x 2, VBG w lactate, UA/Urine CX, COVID/RVP and CXR ordered   IV Tylenol given for fever  monitor fever curve Detail Level: Zone

## 2023-11-15 NOTE — PROGRESS NOTE ADULT - PROBLEM SELECTOR PLAN 8
DVT: HSQ q8h  Diet: CC/DASH  Dispo: [facility], per PMR patient may benefit from acute rehab  Patient is refusing REHAB  d/w Plastic surgery  needs : Nylon sutures over incision to remain in place  - Gauze/tegaderm dressing for serosanguinous drainage; currently no collections/signs of infection  Need dressing change 1 to 2 x day. to T spine wound.  F/u with Dr Tr Yoo - c/w atorvastatin 20mg qhs

## 2023-11-15 NOTE — PROGRESS NOTE ADULT - ASSESSMENT
73y Male s/p C5-T3 posterior cervical fusion, C7-T1 decompression for metastatic prostate cancer with plastics closure on 11/2. Recovering well. Both drains removed.     Plan:  - Nylon sutures over incision to remain in place  - Gauze/tegaderm dressing for serouss drainage; currently no collections/signs of infection  - F/U fever work up  - Optimize nutrition, ensure adequate protein intake  - Remainder of care per primary team    Plastic Surgery  [phone]# [facility] pager  [phone] [facility] pager  Available on Teams

## 2023-11-15 NOTE — PROGRESS NOTE ADULT - PROBLEM SELECTOR PLAN 9
d/w Plastic surgery  needs : Nylon sutures over incision to remain in place  - Gauze/tegaderm dressing for serosanguinous drainage; currently no collections/signs of infection  Need dressing change 1 to 2 x day. to T spine wound.  F/u with Dr Tr Yoo  Also ut patient f/u with PCP/ Onc and RT oncology  GOC done 11/14, Patient is Ox3 and wants to GO HOME.  He assigned Son Juan Jose is [phone], [phone] , full code  CM will help with wound care, ?? VNS vs Family ???????  Spoke with daughter Melissa and explained we are planning for home. DVT: HSQ q8h  Diet: CC/DASH  Dispo: [facility], per PMR patient may benefit from acute rehab  Patient is refusing REHAB  d/w Plastic surgery  needs : Nylon sutures over incision to remain in place  - Gauze/tegaderm dressing for serosanguinous drainage; currently no collections/signs of infection  Need dressing change 1 to 2 x day. to T spine wound.  F/u with Dr Tr Yoo d/w Plastic surgery  needs : Nylon sutures over incision to remain in place  - Gauze/tegaderm dressing for serosanguinous drainage; currently no collections/signs of infection  Need dressing change 1 to 2 x day. to T spine wound.  F/u with Dr Tr Yoo  Also ut patient f/u with PCP/ Onc and RT oncology  GOC done 11/14, Patient is Ox3 and wants to GO HOME.  He assigned Son Juan Jose is [phone], [phone] , full code  CM will help with wound care, ?? VNS vs Family ???????  Spoke with daughter Melissa and explained we are planning for home.  11/15 d/c planning on hold due to fever eval.

## 2023-11-15 NOTE — PROGRESS NOTE ADULT - SUBJECTIVE AND OBJECTIVE BOX
DARYN ENNISO  [id number]    Subjective:    Patient seen and examined, febrile last night. Reports some back pain but otherwise okay        Objective:  T(C): 38 (11-15-23 @ 06:03), Max: 39.2 (11-14-23 @ 22:41)  HR: 105 (11-15-23 @ 06:03) (85 - 109)  BP: 149/69 (11-15-23 @ 06:03) (103/56 - 149/69)  RR: 18 (11-15-23 @ 06:03) (16 - 18)  SpO2: 100% (11-15-23 @ 06:03) (96% - 100%)  Wt(kg): --   11-14    132<L>  |  96<L>  |  22  ----------------------------<  139<H>  4.7   |  22  |  0.93    Ca    9.2      14 Nov 2023 23:26  Phos  3.0     11-14  Mg     1.90     11-14                          12.2   9.39  )-----------( 170      ( 14 Nov 2023 23:26 )             37.0       11-14 @ 07:01  -  11-15 @ 07:00  --------------------------------------------------------  IN: 350 mL / OUT: 550 mL / NET: -200 mL      PHYSICAL EXAM:    General: NAD, Lying in bed comfortably  Neuro: Awake and alert  Back: Incision intact, nylons in place. Possible fluid wave, Some serous drainage expressible from superior portion of incision, non purulent.             MEDICATIONS  (STANDING):  acetaminophen     Tablet .. 975 milliGRAM(s) Oral every 6 hours  amLODIPine   Tablet 10 milliGRAM(s) Oral daily  atorvastatin 20 milliGRAM(s) Oral at bedtime  bicalutamide 50 milliGRAM(s) Oral daily  cefTRIAXone   IVPB 1000 milliGRAM(s) IV Intermittent every 24 hours  chlorhexidine 2% Cloths 1 Application(s) Topical daily  dextrose 5%. 1000 milliLiter(s) (50 mL/Hr) IV Continuous <Continuous>  dextrose 5%. 1000 milliLiter(s) (100 mL/Hr) IV Continuous <Continuous>  dextrose 50% Injectable 25 Gram(s) IV Push once  dextrose 50% Injectable 25 Gram(s) IV Push once  dextrose 50% Injectable 12.5 Gram(s) IV Push once  glucagon  Injectable 1 milliGRAM(s) IntraMuscular once  heparin   Injectable 5000 Unit(s) SubCutaneous every 8 hours  insulin glargine Injectable (LANTUS) 25 Unit(s) SubCutaneous every morning  insulin lispro (ADMELOG) corrective regimen sliding scale   SubCutaneous three times a day before meals  insulin lispro (ADMELOG) corrective regimen sliding scale   SubCutaneous at bedtime  insulin lispro Injectable (ADMELOG) 9 Unit(s) SubCutaneous three times a day before meals  lisinopril 20 milliGRAM(s) Oral daily  pantoprazole    Tablet 40 milliGRAM(s) Oral before breakfast  tamsulosin 0.4 milliGRAM(s) Oral at bedtime    MEDICATIONS  (PRN):  dextrose Oral Gel 15 Gram(s) Oral once PRN Blood Glucose LESS THAN 70 milliGRAM(s)/deciliter  HYDROmorphone  Injectable 0.5 milliGRAM(s) IV Push every 4 hours PRN Severe breakthrough Pain (7 - 10)  naloxone Injectable 0.1 milliGRAM(s) IV Push every 3 minutes PRN For ANY of the following changes in patient status:  A. RR LESS THAN 10 breaths per minute, B. Oxygen saturation LESS THAN 90%, C. Sedation score of 6  ondansetron Injectable 4 milliGRAM(s) IV Push every 6 hours PRN Nausea  oxyCODONE    IR 10 milliGRAM(s) Oral every 3 hours PRN Severe Pain (7 - 10)  oxyCODONE    IR 5 milliGRAM(s) Oral every 3 hours PRN Moderate Pain (4 - 6)

## 2023-11-15 NOTE — PROGRESS NOTE ADULT - ASSESSMENT
This is a 74 yo M /w a PMH of HTN, metastatic prostate cancer and well controlled diabetes who presents with cervical cord compression now on high dose dexamethasone and steroid taper. Endocrinology consulted due to hypoglycemia yesterday    #steroid induced hyperglycemia  -currently on 4mg IV dexamethasone last dose today. As per primary team no current plans to continru steroids at this time     #diabetes -A1c 5.2% off medication  -Decrease Lantus to 20 units each morning (1st dose in am)  -Decrease Admelog to 8  units before meals (please hold if npo/not eating)today; will decrease further 6 units TID AC starting in am to avoid hypoglycemia  -Continue low admelog correction scales at meals  -Contineu low admelog correction scales at bedtime  -carb consistent diet  -FSG before meals and before bedtime  -FSG goal 100-180 while inpatient: stable today   -hypoglycemia protocol in place if needed    #Discharge  -pending steroid course and if patient will be going home on steroids or not  -can d/c anti-diabetic agents if off steroids   -follow up with PCP    #HTN  -c/w amlodipine 10mg daily, lisinopril 20mg daily    #HLD  -c/w atorvastatin 20mg daily    Veronica Schultz  Nurse Practitioner  Division of Endocrinology & Diabetes  In house pager #[phone]    If before 9AM or after 6PM, or on weekends/holidays, please call endocrine answering service for assistance ([phone]).For nonurgent matters email [email or web address] for assistance.  This is a 74 yo M /w a PMH of HTN, metastatic prostate cancer and well controlled diabetes who presents with cervical cord compression now on high dose dexamethasone and steroid taper. Endocrinology consulted due to hypoglycemia yesterday    #steroid induced hyperglycemia  -currently on 4mg IV dexamethasone last dose today. As per primary team no current plans to continue steroids . steroid taper completed. Please notify Endocrine if steroid plan changes     #diabetes -A1c 5.2% off medication  -Insulin doses will need to be decreased further on 11/17   -Decrease Lantus to 20 units each morning (1st dose in am)  -Decrease Admelog to 8  units before meals (please hold if npo/not eating)today; will decrease further 6 units TID AC starting in am to avoid hypoglycemia  -Continue low admelog correction scales at meals  -Contineu low admelog correction scales at bedtime  -carb consistent diet  -FSG before meals and before bedtime  -FSG goal 100-180 while inpatient: stable today   -hypoglycemia protocol in place if needed    #Discharge  -As per primary team no plan for further steroids at this time. Please notify endocrine if steroid plan changes   -can d/c anti-diabetic agents if off steroids   -follow up with PCP    #HTN  - Goal SBP <130/80  -c/w amlodipine 10mg daily, lisinopril 20mg daily  -Titration as per primary team     #HLD  - Goal LDL <70  -c/w atorvastatin 20mg daily if no ocntraindications  -Please obtain lipid profile as an outpt     Veronica Schultz  Nurse Practitioner  Division of Endocrinology & Diabetes  In house pager #[phone]    If before 9AM or after 6PM, or on weekends/holidays, please call endocrine answering service for assistance ([phone]).For nonurgent matters email [email or web address] for assistance.

## 2023-11-15 NOTE — PROGRESS NOTE ADULT - ASSESSMENT
72y/o M with DM, HTN and metastatic prostate cancer (diagnosed in 2009) that initially presented to OSH with LE weakness and R hand paresthesias now transferred to [facility] for NSG eval. Patient now s/p surgical intervention (C5-T3 posterior cervical fusion and C7-T1 decompression) by orthopedic surgery, now transferred to medicine for further management.    rad< from: MR Thoracic Spine w/wo IV Cont (11.14.23 @ 11:55) >  IMPRESSION: Postop changes as described above.  Osseous metastasis again seen.  Possible epidural enhancement is seen involving the T4-5 level. Better   quality contrast enhanced MRI of the thoracic spine is recommended for   further evaluation.  Previously noted T2 prolongation involving the spinal cord at the T1   level is not well-demonstrated due to artifact from postop material.      Had fever overnight

## 2023-11-15 NOTE — PROGRESS NOTE ADULT - SUBJECTIVE AND OBJECTIVE BOX
CT simulation is scheduled with Dr. Scales at [facility] next to Asael ,  at- [address] [place] [address]  [phone]    Date; 11/20/23  Time: 3pm.    If he cannot attend, please call

## 2023-11-15 NOTE — PROGRESS NOTE ADULT - SUBJECTIVE AND OBJECTIVE BOX
Chief Complaint: Steroid induced hyperglycemia    History: Pt seen at bedside. Pt tolerating oral diet. Pt denies nausea and vomiting/any signs of hypoglycemia. Pt reports an adequate appetite.     MEDICATIONS  (STANDING):  acetaminophen     Tablet .. 975 milliGRAM(s) Oral every 6 hours  amLODIPine   Tablet 10 milliGRAM(s) Oral daily  atorvastatin 20 milliGRAM(s) Oral at bedtime  bicalutamide 50 milliGRAM(s) Oral daily  cefTRIAXone   IVPB 1000 milliGRAM(s) IV Intermittent every 24 hours  chlorhexidine 2% Cloths 1 Application(s) Topical daily  dextrose 5%. 1000 milliLiter(s) (50 mL/Hr) IV Continuous <Continuous>  dextrose 5%. 1000 milliLiter(s) (100 mL/Hr) IV Continuous <Continuous>  dextrose 50% Injectable 12.5 Gram(s) IV Push once  dextrose 50% Injectable 25 Gram(s) IV Push once  dextrose 50% Injectable 25 Gram(s) IV Push once  glucagon  Injectable 1 milliGRAM(s) IntraMuscular once  heparin   Injectable 5000 Unit(s) SubCutaneous every 8 hours  insulin glargine Injectable (LANTUS) 25 Unit(s) SubCutaneous every morning  insulin lispro (ADMELOG) corrective regimen sliding scale   SubCutaneous three times a day before meals  insulin lispro (ADMELOG) corrective regimen sliding scale   SubCutaneous at bedtime  insulin lispro Injectable (ADMELOG) 8 Unit(s) SubCutaneous three times a day before meals  lisinopril 20 milliGRAM(s) Oral daily  pantoprazole    Tablet 40 milliGRAM(s) Oral before breakfast  tamsulosin 0.4 milliGRAM(s) Oral at bedtime    MEDICATIONS  (PRN):  dextrose Oral Gel 15 Gram(s) Oral once PRN Blood Glucose LESS THAN 70 milliGRAM(s)/deciliter  HYDROmorphone  Injectable 0.5 milliGRAM(s) IV Push every 4 hours PRN Severe breakthrough Pain (7 - 10)  naloxone Injectable 0.1 milliGRAM(s) IV Push every 3 minutes PRN For ANY of the following changes in patient status:  A. RR LESS THAN 10 breaths per minute, B. Oxygen saturation LESS THAN 90%, C. Sedation score of 6  ondansetron Injectable 4 milliGRAM(s) IV Push every 6 hours PRN Nausea  oxyCODONE    IR 10 milliGRAM(s) Oral every 3 hours PRN Severe Pain (7 - 10)  oxyCODONE    IR 5 milliGRAM(s) Oral every 3 hours PRN Moderate Pain (4 - 6)      Allergies: No Known Allergies      Review of Systems:  Respiratory: No SOB, no cough  GI: No nausea, vomiting, abdominal pain  Endocrine: no polyuria, polydipsia      PHYSICAL EXAM:  VITALS: T(C): 36.8 (11-15-23 @ 12:00)  T(F): 98.3 (11-15-23 @ 12:00), Max: 102.6 (11-14-23 @ 22:41)  HR: 98 (11-15-23 @ 12:00) (96 - 109)  BP: 138/83 (11-15-23 @ 12:00) (128/- - 149/69)  RR:  (17 - 18)  SpO2:  (96% - 100%)  Wt(kg): --  GENERAL: NAD, well-groomed, well-developed  RESPIRATORY: No labored breathing   GI: Soft, nontender, non distended  PSYCH: Alert and oriented x 3, normal affect, normal mood      CAPILLARY BLOOD GLUCOSE  POCT Blood Glucose.: 158 mg/dL (15 Nov 2023 18:13)  POCT Blood Glucose.: 92 mg/dL (15 Nov 2023 11:45)  POCT Blood Glucose.: 109 mg/dL (15 Nov 2023 08:27)  POCT Blood Glucose.: 125 mg/dL (14 Nov 2023 22:22)    A1C with Estimated Average Glucose (10.23.23 @ 06:20)    A1C with Estimated Average Glucose Result: 5.2   Estimated Average Glucose: 103      11-15    130<L>  |  96<L>  |  22  ----------------------------<  122<H>  4.1   |  24  |  0.79    eGFR: 95    Ca    8.9      11-15  Mg     1.70     11-15  Phos  2.8     11-15            Thyroid Function Tests:

## 2023-11-15 NOTE — PROGRESS NOTE ADULT - SUBJECTIVE AND OBJECTIVE BOX
Fever Patient is a 73y old  Male who presents with a chief complaint of Diffuse Spinal Mets from Prostate Cancer (15 Nov 2023 09:14)      SUBJECTIVE / OVERNIGHT EVENTS:  Fever overnight with UTI    MEDICATIONS  (STANDING):  acetaminophen     Tablet .. 975 milliGRAM(s) Oral every 6 hours  amLODIPine   Tablet 10 milliGRAM(s) Oral daily  atorvastatin 20 milliGRAM(s) Oral at bedtime  bicalutamide 50 milliGRAM(s) Oral daily  cefTRIAXone   IVPB 1000 milliGRAM(s) IV Intermittent every 24 hours  chlorhexidine 2% Cloths 1 Application(s) Topical daily  dextrose 5%. 1000 milliLiter(s) (50 mL/Hr) IV Continuous <Continuous>  dextrose 5%. 1000 milliLiter(s) (100 mL/Hr) IV Continuous <Continuous>  dextrose 50% Injectable 25 Gram(s) IV Push once  dextrose 50% Injectable 12.5 Gram(s) IV Push once  dextrose 50% Injectable 25 Gram(s) IV Push once  glucagon  Injectable 1 milliGRAM(s) IntraMuscular once  heparin   Injectable 5000 Unit(s) SubCutaneous every 8 hours  insulin glargine Injectable (LANTUS) 25 Unit(s) SubCutaneous every morning  insulin lispro (ADMELOG) corrective regimen sliding scale   SubCutaneous at bedtime  insulin lispro (ADMELOG) corrective regimen sliding scale   SubCutaneous three times a day before meals  insulin lispro Injectable (ADMELOG) 9 Unit(s) SubCutaneous three times a day before meals  lisinopril 20 milliGRAM(s) Oral daily  pantoprazole    Tablet 40 milliGRAM(s) Oral before breakfast  tamsulosin 0.4 milliGRAM(s) Oral at bedtime    MEDICATIONS  (PRN):  dextrose Oral Gel 15 Gram(s) Oral once PRN Blood Glucose LESS THAN 70 milliGRAM(s)/deciliter  HYDROmorphone  Injectable 0.5 milliGRAM(s) IV Push every 4 hours PRN Severe breakthrough Pain (7 - 10)  naloxone Injectable 0.1 milliGRAM(s) IV Push every 3 minutes PRN For ANY of the following changes in patient status:  A. RR LESS THAN 10 breaths per minute, B. Oxygen saturation LESS THAN 90%, C. Sedation score of 6  ondansetron Injectable 4 milliGRAM(s) IV Push every 6 hours PRN Nausea  oxyCODONE    IR 10 milliGRAM(s) Oral every 3 hours PRN Severe Pain (7 - 10)  oxyCODONE    IR 5 milliGRAM(s) Oral every 3 hours PRN Moderate Pain (4 - 6)        CAPILLARY BLOOD GLUCOSE  POCT Blood Glucose.: 109 mg/dL (15 Nov 2023 08:27)  POCT Blood Glucose.: 125 mg/dL (14 Nov 2023 22:22)  POCT Blood Glucose.: 93 mg/dL (14 Nov 2023 17:38)  POCT Blood Glucose.: 199 mg/dL (14 Nov 2023 12:44)    I&O's Summary    14 Nov 2023 07:01  -  15 Nov 2023 07:00  --------------------------------------------------------  IN: 350 mL / OUT: 550 mL / NET: -200 mL        PHYSICAL EXAM:  GENERAL: NAD,   HEAD:  Atraumatic, Normocephalic  EYES: EOMI, PERRLA, conjunctiva and sclera clear  NECK: Supple, No JVD  CHEST/LUNG: Clear to auscultation bilaterally; No wheeze  HEART: Regular rate and rhythm; No murmurs, rubs, or gallops  ABDOMEN: Soft, Nontender, Nondistended; Bowel sounds present  EXTREMITIES:  2+ Peripheral Pulses, No clubbing, cyanosis, or edema  PSYCH: AAOx3  NEUROLOGY: non-focal  SKIN: dressing to upper back    LABS:                        10.5   7.98  )-----------( 153      ( 15 Nov 2023 06:50 )             31.8     11-15    130<L>  |  96<L>  |  22  ----------------------------<  122<H>  4.1   |  24  |  0.79    Ca    8.9      15 Nov 2023 06:50  Phos  2.8     11-15  Mg     1.70     11-15            Urinalysis Basic - ( 15 Nov 2023 06:50 )  Pos leuk/ pos nitrates      RADIOLOGY & ADDITIONAL TESTS:  rad< from: MR Thoracic Spine w/wo IV Cont (11.14.23 @ 11:55) >  IMPRESSION: Postop changes as described above.  Osseous metastasis again seen.  Possible epidural enhancement is seen involving the T4-5 level. Better   quality contrast enhanced MRI of the thoracic spine is recommended for   further evaluation.  Previously noted T2 prolongation involving the spinal cord at the T1   level is not well-demonstrated due to artifact from postop material.        Care Discussed with Consultants/Other Providers:  Reviewed Mri with RT Onc--> rec out patient RT asap    Seen by RT- Onc  "Plan:   SBRT, outpatient will be done at [facility], [address] [place] [address] with Dr. Isaias Scales reachable at :   [phone].    We anticipate discharge today/tomorrow; if he cannot be discharged please let us know.    Patient is a 73y old  Male who presents with a chief complaint of Diffuse Spinal Mets from Prostate Cancer (15 Nov 2023 09:14)      SUBJECTIVE / OVERNIGHT EVENTS:  Fever overnight with UTI, also positive adeno-virus.    MEDICATIONS  (STANDING):  acetaminophen     Tablet .. 975 milliGRAM(s) Oral every 6 hours  amLODIPine   Tablet 10 milliGRAM(s) Oral daily  atorvastatin 20 milliGRAM(s) Oral at bedtime  bicalutamide 50 milliGRAM(s) Oral daily  cefTRIAXone   IVPB 1000 milliGRAM(s) IV Intermittent every 24 hours  chlorhexidine 2% Cloths 1 Application(s) Topical daily  dextrose 5%. 1000 milliLiter(s) (50 mL/Hr) IV Continuous <Continuous>  dextrose 5%. 1000 milliLiter(s) (100 mL/Hr) IV Continuous <Continuous>  dextrose 50% Injectable 25 Gram(s) IV Push once  dextrose 50% Injectable 12.5 Gram(s) IV Push once  dextrose 50% Injectable 25 Gram(s) IV Push once  glucagon  Injectable 1 milliGRAM(s) IntraMuscular once  heparin   Injectable 5000 Unit(s) SubCutaneous every 8 hours  insulin glargine Injectable (LANTUS) 25 Unit(s) SubCutaneous every morning  insulin lispro (ADMELOG) corrective regimen sliding scale   SubCutaneous at bedtime  insulin lispro (ADMELOG) corrective regimen sliding scale   SubCutaneous three times a day before meals  insulin lispro Injectable (ADMELOG) 9 Unit(s) SubCutaneous three times a day before meals  lisinopril 20 milliGRAM(s) Oral daily  pantoprazole    Tablet 40 milliGRAM(s) Oral before breakfast  tamsulosin 0.4 milliGRAM(s) Oral at bedtime    MEDICATIONS  (PRN):  dextrose Oral Gel 15 Gram(s) Oral once PRN Blood Glucose LESS THAN 70 milliGRAM(s)/deciliter  HYDROmorphone  Injectable 0.5 milliGRAM(s) IV Push every 4 hours PRN Severe breakthrough Pain (7 - 10)  naloxone Injectable 0.1 milliGRAM(s) IV Push every 3 minutes PRN For ANY of the following changes in patient status:  A. RR LESS THAN 10 breaths per minute, B. Oxygen saturation LESS THAN 90%, C. Sedation score of 6  ondansetron Injectable 4 milliGRAM(s) IV Push every 6 hours PRN Nausea  oxyCODONE    IR 10 milliGRAM(s) Oral every 3 hours PRN Severe Pain (7 - 10)  oxyCODONE    IR 5 milliGRAM(s) Oral every 3 hours PRN Moderate Pain (4 - 6)        CAPILLARY BLOOD GLUCOSE  POCT Blood Glucose.: 109 mg/dL (15 Nov 2023 08:27)  POCT Blood Glucose.: 125 mg/dL (14 Nov 2023 22:22)  POCT Blood Glucose.: 93 mg/dL (14 Nov 2023 17:38)  POCT Blood Glucose.: 199 mg/dL (14 Nov 2023 12:44)    I&O's Summary    14 Nov 2023 07:01  -  15 Nov 2023 07:00  --------------------------------------------------------  IN: 350 mL / OUT: 550 mL / NET: -200 mL        PHYSICAL EXAM:  GENERAL: NAD,   HEAD:  Atraumatic, Normocephalic  EYES: EOMI, PERRLA, conjunctiva and sclera clear  NECK: Supple, No JVD  CHEST/LUNG: Clear to auscultation bilaterally; No wheeze  HEART: Regular rate and rhythm; No murmurs, rubs, or gallops  ABDOMEN: Soft, Nontender, Nondistended; Bowel sounds present  EXTREMITIES:  2+ Peripheral Pulses, No clubbing, cyanosis, or edema  PSYCH: AAOx3  NEUROLOGY: non-focal  SKIN: dressing to upper back    LABS:                        10.5   7.98  )-----------( 153      ( 15 Nov 2023 06:50 )             31.8     11-15    130<L>  |  96<L>  |  22  ----------------------------<  122<H>  4.1   |  24  |  0.79    Ca    8.9      15 Nov 2023 06:50  Phos  2.8     11-15  Mg     1.70     11-15            Urinalysis Basic - ( 15 Nov 2023 06:50 )  Pos leuk/ pos nitrates      RADIOLOGY & ADDITIONAL TESTS:  rad< from: MR Thoracic Spine w/wo IV Cont (11.14.23 @ 11:55) >  IMPRESSION: Postop changes as described above.  Osseous metastasis again seen.  Possible epidural enhancement is seen involving the T4-5 level. Better   quality contrast enhanced MRI of the thoracic spine is recommended for   further evaluation.  Previously noted T2 prolongation involving the spinal cord at the T1   level is not well-demonstrated due to artifact from postop material.        Care Discussed with Consultants/Other Providers:  Reviewed Mri with RT Onc--> rec out patient RT asap    Seen by RT- Onc  "Plan:   SBRT, outpatient will be done at [facility], [address] [facility] with Dr. Isaias Scales reachable at :   [phone].    We anticipate discharge today/tomorrow; if he cannot be discharged please let us know.

## 2023-11-15 NOTE — PROGRESS NOTE ADULT - PROBLEM SELECTOR PLAN 1
Patient initially presenting with LE weakness and well as RUE parasthesias.  - patient found to have numerous spinal cord lesions  - noted with spinous process fractures of C7 and T1  - also noted with neoplasm with epidural expansion causing cord compression from C7-T3  - now s/p C5-T3 posterior cervical fusion and C7-T1 decompression by orthopedic surgery on 11/2  - weakness/parasthesias now markedly improved  - c/w dexamethasone 4mg q6h  - s/p flap closure of back surgical site by plastic surgery with b/l AGGIE drains  - L AGGIE drain removed on 11/7, R AGGIE drain removed 11/10  - appreciate further orthopedics/plastic surgery recs  - d/w rad/onc, no plan for inpatient RT, recommend outpatient follow up  - per rad/onc will obtain repeat thoracic spine MRI prior to discharge (ordered 11/9)--> still pending Tmax 102.6 overnight, Hr 109  Sepsis sec to UTI  Urine pos Leuk and nitrate  Ceftriaxone started  f/u cultures Tmax 102.6 overnight, Hr 109  Sepsis sec to UTI  Urine pos Leuk and nitrate  Ceftriaxone started  f/u cultures  Also positive adenovirus.

## 2023-11-15 NOTE — PROGRESS NOTE ADULT - PROBLEM SELECTOR PLAN 2
Fs 45.  Improved with food.  Patient is on a steroid lianet  Lantus decreased.  Endocrine consult requested Patient initially presenting with LE weakness and well as RUE parasthesias.  - patient found to have numerous spinal cord lesions  - noted with spinous process fractures of C7 and T1  - also noted with neoplasm with epidural expansion causing cord compression from C7-T3  - now s/p C5-T3 posterior cervical fusion and C7-T1 decompression by orthopedic surgery on 11/2  - weakness/parasthesias now markedly improved  - c/w dexamethasone 4mg q6h  - s/p flap closure of back surgical site by plastic surgery with b/l AGGIE drains  - L AGGIE drain removed on 11/7, R AGGIE drain removed 11/10  - appreciate further orthopedics/plastic surgery recs  - d/w rad/onc, no plan for inpatient RT, recommend outpatient follow up  - per rad/onc will obtain repeat thoracic spine MRI prior to discharge (ordered 11/9)--> still pending Patient initially presenting with LE weakness and well as RUE parasthesias.  - patient found to have numerous spinal cord lesions  - noted with spinous process fractures of C7 and T1  - also noted with neoplasm with epidural expansion causing cord compression from C7-T3  - now s/p C5-T3 posterior cervical fusion and C7-T1 decompression by orthopedic surgery on 11/2  - weakness/parasthesias now markedly improved  - c/w dexamethasone 4mg q6h  - s/p flap closure of back surgical site by plastic surgery with b/l AGGIE drains  - L AGGIE drain removed on 11/7, R AGGIE drain removed 11/10  - appreciate further orthopedics/plastic surgery recs  - d/w rad/onc, no plan for inpatient RT, recommend outpatient follow up  - per rad/onc will obtain repeat thoracic spine MRI prior to discharge --> DONE --> [id number] notes out patient F/u for RT

## 2023-11-16 ENCOUNTER — NON-APPOINTMENT (OUTPATIENT)
Age: 73
End: 2023-11-16

## 2023-11-16 LAB
ANION GAP SERPL CALC-SCNC: 11 MMOL/L — SIGNIFICANT CHANGE UP (ref 7–14)
ANION GAP SERPL CALC-SCNC: 11 MMOL/L — SIGNIFICANT CHANGE UP (ref 7–14)
BASE EXCESS BLDV CALC-SCNC: 3.2 MMOL/L — HIGH (ref -2–3)
BASE EXCESS BLDV CALC-SCNC: 3.2 MMOL/L — HIGH (ref -2–3)
BASOPHILS # BLD AUTO: 0.01 K/UL — SIGNIFICANT CHANGE UP (ref 0–0.2)
BASOPHILS # BLD AUTO: 0.01 K/UL — SIGNIFICANT CHANGE UP (ref 0–0.2)
BASOPHILS NFR BLD AUTO: 0.2 % — SIGNIFICANT CHANGE UP (ref 0–2)
BASOPHILS NFR BLD AUTO: 0.2 % — SIGNIFICANT CHANGE UP (ref 0–2)
BUN SERPL-MCNC: 29 MG/DL — HIGH (ref 7–23)
BUN SERPL-MCNC: 29 MG/DL — HIGH (ref 7–23)
CALCIUM SERPL-MCNC: 8.9 MG/DL — SIGNIFICANT CHANGE UP (ref 8.4–10.5)
CALCIUM SERPL-MCNC: 8.9 MG/DL — SIGNIFICANT CHANGE UP (ref 8.4–10.5)
CHLORIDE SERPL-SCNC: 97 MMOL/L — LOW (ref 98–107)
CHLORIDE SERPL-SCNC: 97 MMOL/L — LOW (ref 98–107)
CO2 BLDV-SCNC: 28 MMOL/L — HIGH (ref 22–26)
CO2 BLDV-SCNC: 28 MMOL/L — HIGH (ref 22–26)
CO2 SERPL-SCNC: 25 MMOL/L — SIGNIFICANT CHANGE UP (ref 22–31)
CO2 SERPL-SCNC: 25 MMOL/L — SIGNIFICANT CHANGE UP (ref 22–31)
CREAT SERPL-MCNC: 0.84 MG/DL — SIGNIFICANT CHANGE UP (ref 0.5–1.3)
CREAT SERPL-MCNC: 0.84 MG/DL — SIGNIFICANT CHANGE UP (ref 0.5–1.3)
EGFR: 93 ML/MIN/1.73M2 — SIGNIFICANT CHANGE UP
EGFR: 93 ML/MIN/1.73M2 — SIGNIFICANT CHANGE UP
EOSINOPHIL # BLD AUTO: 0.01 K/UL — SIGNIFICANT CHANGE UP (ref 0–0.5)
EOSINOPHIL # BLD AUTO: 0.01 K/UL — SIGNIFICANT CHANGE UP (ref 0–0.5)
EOSINOPHIL NFR BLD AUTO: 0.2 % — SIGNIFICANT CHANGE UP (ref 0–6)
EOSINOPHIL NFR BLD AUTO: 0.2 % — SIGNIFICANT CHANGE UP (ref 0–6)
GLUCOSE BLDC GLUCOMTR-MCNC: 109 MG/DL — HIGH (ref 70–99)
GLUCOSE BLDC GLUCOMTR-MCNC: 109 MG/DL — HIGH (ref 70–99)
GLUCOSE BLDC GLUCOMTR-MCNC: 133 MG/DL — HIGH (ref 70–99)
GLUCOSE BLDC GLUCOMTR-MCNC: 133 MG/DL — HIGH (ref 70–99)
GLUCOSE BLDC GLUCOMTR-MCNC: 149 MG/DL — HIGH (ref 70–99)
GLUCOSE BLDC GLUCOMTR-MCNC: 149 MG/DL — HIGH (ref 70–99)
GLUCOSE BLDC GLUCOMTR-MCNC: 208 MG/DL — HIGH (ref 70–99)
GLUCOSE BLDC GLUCOMTR-MCNC: 208 MG/DL — HIGH (ref 70–99)
GLUCOSE BLDC GLUCOMTR-MCNC: 211 MG/DL — HIGH (ref 70–99)
GLUCOSE BLDC GLUCOMTR-MCNC: 211 MG/DL — HIGH (ref 70–99)
GLUCOSE BLDC GLUCOMTR-MCNC: 227 MG/DL — HIGH (ref 70–99)
GLUCOSE BLDC GLUCOMTR-MCNC: 227 MG/DL — HIGH (ref 70–99)
GLUCOSE SERPL-MCNC: 142 MG/DL — HIGH (ref 70–99)
GLUCOSE SERPL-MCNC: 142 MG/DL — HIGH (ref 70–99)
HCO3 BLDV-SCNC: 27 MMOL/L — SIGNIFICANT CHANGE UP (ref 22–29)
HCO3 BLDV-SCNC: 27 MMOL/L — SIGNIFICANT CHANGE UP (ref 22–29)
HCT VFR BLD CALC: 28.4 % — LOW (ref 39–50)
HCT VFR BLD CALC: 28.4 % — LOW (ref 39–50)
HGB BLD-MCNC: 9.4 G/DL — LOW (ref 13–17)
HGB BLD-MCNC: 9.4 G/DL — LOW (ref 13–17)
IANC: 4.88 K/UL — SIGNIFICANT CHANGE UP (ref 1.8–7.4)
IANC: 4.88 K/UL — SIGNIFICANT CHANGE UP (ref 1.8–7.4)
IMM GRANULOCYTES NFR BLD AUTO: 1.1 % — HIGH (ref 0–0.9)
IMM GRANULOCYTES NFR BLD AUTO: 1.1 % — HIGH (ref 0–0.9)
LYMPHOCYTES # BLD AUTO: 0.29 K/UL — LOW (ref 1–3.3)
LYMPHOCYTES # BLD AUTO: 0.29 K/UL — LOW (ref 1–3.3)
LYMPHOCYTES # BLD AUTO: 5.2 % — LOW (ref 13–44)
LYMPHOCYTES # BLD AUTO: 5.2 % — LOW (ref 13–44)
MAGNESIUM SERPL-MCNC: 2.1 MG/DL — SIGNIFICANT CHANGE UP (ref 1.6–2.6)
MAGNESIUM SERPL-MCNC: 2.1 MG/DL — SIGNIFICANT CHANGE UP (ref 1.6–2.6)
MCHC RBC-ENTMCNC: 31 PG — SIGNIFICANT CHANGE UP (ref 27–34)
MCHC RBC-ENTMCNC: 31 PG — SIGNIFICANT CHANGE UP (ref 27–34)
MCHC RBC-ENTMCNC: 33.1 GM/DL — SIGNIFICANT CHANGE UP (ref 32–36)
MCHC RBC-ENTMCNC: 33.1 GM/DL — SIGNIFICANT CHANGE UP (ref 32–36)
MCV RBC AUTO: 93.7 FL — SIGNIFICANT CHANGE UP (ref 80–100)
MCV RBC AUTO: 93.7 FL — SIGNIFICANT CHANGE UP (ref 80–100)
MONOCYTES # BLD AUTO: 0.29 K/UL — SIGNIFICANT CHANGE UP (ref 0–0.9)
MONOCYTES # BLD AUTO: 0.29 K/UL — SIGNIFICANT CHANGE UP (ref 0–0.9)
MONOCYTES NFR BLD AUTO: 5.2 % — SIGNIFICANT CHANGE UP (ref 2–14)
MONOCYTES NFR BLD AUTO: 5.2 % — SIGNIFICANT CHANGE UP (ref 2–14)
NEUTROPHILS # BLD AUTO: 4.88 K/UL — SIGNIFICANT CHANGE UP (ref 1.8–7.4)
NEUTROPHILS # BLD AUTO: 4.88 K/UL — SIGNIFICANT CHANGE UP (ref 1.8–7.4)
NEUTROPHILS NFR BLD AUTO: 88.1 % — HIGH (ref 43–77)
NEUTROPHILS NFR BLD AUTO: 88.1 % — HIGH (ref 43–77)
NRBC # BLD: 0 /100 WBCS — SIGNIFICANT CHANGE UP (ref 0–0)
NRBC # BLD: 0 /100 WBCS — SIGNIFICANT CHANGE UP (ref 0–0)
NRBC # FLD: 0 K/UL — SIGNIFICANT CHANGE UP (ref 0–0)
NRBC # FLD: 0 K/UL — SIGNIFICANT CHANGE UP (ref 0–0)
PCO2 BLDV: 37 MMHG — LOW (ref 42–55)
PCO2 BLDV: 37 MMHG — LOW (ref 42–55)
PH BLDV: 7.47 — HIGH (ref 7.32–7.43)
PH BLDV: 7.47 — HIGH (ref 7.32–7.43)
PHOSPHATE SERPL-MCNC: 3.1 MG/DL — SIGNIFICANT CHANGE UP (ref 2.5–4.5)
PHOSPHATE SERPL-MCNC: 3.1 MG/DL — SIGNIFICANT CHANGE UP (ref 2.5–4.5)
PLATELET # BLD AUTO: 151 K/UL — SIGNIFICANT CHANGE UP (ref 150–400)
PLATELET # BLD AUTO: 151 K/UL — SIGNIFICANT CHANGE UP (ref 150–400)
PO2 BLDV: 63 MMHG — HIGH (ref 25–45)
PO2 BLDV: 63 MMHG — HIGH (ref 25–45)
POTASSIUM SERPL-MCNC: 4.3 MMOL/L — SIGNIFICANT CHANGE UP (ref 3.5–5.3)
POTASSIUM SERPL-MCNC: 4.3 MMOL/L — SIGNIFICANT CHANGE UP (ref 3.5–5.3)
POTASSIUM SERPL-SCNC: 4.3 MMOL/L — SIGNIFICANT CHANGE UP (ref 3.5–5.3)
POTASSIUM SERPL-SCNC: 4.3 MMOL/L — SIGNIFICANT CHANGE UP (ref 3.5–5.3)
RBC # BLD: 3.03 M/UL — LOW (ref 4.2–5.8)
RBC # BLD: 3.03 M/UL — LOW (ref 4.2–5.8)
RBC # FLD: 13.2 % — SIGNIFICANT CHANGE UP (ref 10.3–14.5)
RBC # FLD: 13.2 % — SIGNIFICANT CHANGE UP (ref 10.3–14.5)
SAO2 % BLDV: 93.3 % — HIGH (ref 67–88)
SAO2 % BLDV: 93.3 % — HIGH (ref 67–88)
SODIUM SERPL-SCNC: 133 MMOL/L — LOW (ref 135–145)
SODIUM SERPL-SCNC: 133 MMOL/L — LOW (ref 135–145)
SURGICAL PATHOLOGY STUDY: SIGNIFICANT CHANGE UP
SURGICAL PATHOLOGY STUDY: SIGNIFICANT CHANGE UP
WBC # BLD: 5.54 K/UL — SIGNIFICANT CHANGE UP (ref 3.8–10.5)
WBC # BLD: 5.54 K/UL — SIGNIFICANT CHANGE UP (ref 3.8–10.5)
WBC # FLD AUTO: 5.54 K/UL — SIGNIFICANT CHANGE UP (ref 3.8–10.5)
WBC # FLD AUTO: 5.54 K/UL — SIGNIFICANT CHANGE UP (ref 3.8–10.5)

## 2023-11-16 PROCEDURE — 99232 SBSQ HOSP IP/OBS MODERATE 35: CPT

## 2023-11-16 PROCEDURE — 99233 SBSQ HOSP IP/OBS HIGH 50: CPT

## 2023-11-16 PROCEDURE — 99223 1ST HOSP IP/OBS HIGH 75: CPT

## 2023-11-16 RX ORDER — INSULIN LISPRO 100/ML
5 VIAL (ML) SUBCUTANEOUS
Refills: 0 | Status: DISCONTINUED | OUTPATIENT
Start: 2023-11-16 | End: 2023-11-17

## 2023-11-16 RX ORDER — CEFAZOLIN SODIUM 1 G
2000 VIAL (EA) INJECTION EVERY 8 HOURS
Refills: 0 | Status: DISCONTINUED | OUTPATIENT
Start: 2023-11-16 | End: 2023-11-16

## 2023-11-16 RX ORDER — CEFAZOLIN SODIUM 1 G
2000 VIAL (EA) INJECTION EVERY 8 HOURS
Refills: 0 | Status: DISCONTINUED | OUTPATIENT
Start: 2023-11-16 | End: 2023-11-20

## 2023-11-16 RX ORDER — INSULIN GLARGINE 100 [IU]/ML
10 INJECTION, SOLUTION SUBCUTANEOUS
Refills: 0 | Status: DISCONTINUED | OUTPATIENT
Start: 2023-11-17 | End: 2023-11-17

## 2023-11-16 RX ADMIN — CHLORHEXIDINE GLUCONATE 1 APPLICATION(S): 213 SOLUTION TOPICAL at 11:10

## 2023-11-16 RX ADMIN — HEPARIN SODIUM 5000 UNIT(S): 5000 INJECTION INTRAVENOUS; SUBCUTANEOUS at 05:18

## 2023-11-16 RX ADMIN — Medication 100 MILLIGRAM(S): at 23:43

## 2023-11-16 RX ADMIN — Medication 975 MILLIGRAM(S): at 17:15

## 2023-11-16 RX ADMIN — Medication 5 UNIT(S): at 18:13

## 2023-11-16 RX ADMIN — Medication 100 MILLIGRAM(S): at 01:33

## 2023-11-16 RX ADMIN — Medication 100 MILLIGRAM(S): at 17:13

## 2023-11-16 RX ADMIN — Medication 975 MILLIGRAM(S): at 05:18

## 2023-11-16 RX ADMIN — HEPARIN SODIUM 5000 UNIT(S): 5000 INJECTION INTRAVENOUS; SUBCUTANEOUS at 21:39

## 2023-11-16 RX ADMIN — Medication 6 UNIT(S): at 13:49

## 2023-11-16 RX ADMIN — PANTOPRAZOLE SODIUM 40 MILLIGRAM(S): 20 TABLET, DELAYED RELEASE ORAL at 05:20

## 2023-11-16 RX ADMIN — HEPARIN SODIUM 5000 UNIT(S): 5000 INJECTION INTRAVENOUS; SUBCUTANEOUS at 13:46

## 2023-11-16 RX ADMIN — Medication 975 MILLIGRAM(S): at 23:43

## 2023-11-16 RX ADMIN — Medication 100 MILLIGRAM(S): at 11:10

## 2023-11-16 RX ADMIN — Medication 975 MILLIGRAM(S): at 00:37

## 2023-11-16 RX ADMIN — AMLODIPINE BESYLATE 10 MILLIGRAM(S): 2.5 TABLET ORAL at 05:18

## 2023-11-16 RX ADMIN — LISINOPRIL 20 MILLIGRAM(S): 2.5 TABLET ORAL at 05:18

## 2023-11-16 RX ADMIN — ATORVASTATIN CALCIUM 20 MILLIGRAM(S): 80 TABLET, FILM COATED ORAL at 21:39

## 2023-11-16 RX ADMIN — Medication 2: at 13:48

## 2023-11-16 RX ADMIN — Medication 975 MILLIGRAM(S): at 06:18

## 2023-11-16 RX ADMIN — INSULIN GLARGINE 20 UNIT(S): 100 INJECTION, SOLUTION SUBCUTANEOUS at 10:30

## 2023-11-16 RX ADMIN — Medication 975 MILLIGRAM(S): at 01:37

## 2023-11-16 RX ADMIN — TAMSULOSIN HYDROCHLORIDE 0.4 MILLIGRAM(S): 0.4 CAPSULE ORAL at 21:39

## 2023-11-16 RX ADMIN — BICALUTAMIDE 50 MILLIGRAM(S): 50 TABLET, FILM COATED ORAL at 13:50

## 2023-11-16 NOTE — PROGRESS NOTE ADULT - SUBJECTIVE AND OBJECTIVE BOX
Chief Complaint: DM2/steroid    History: Last dose of dexamethasone yesterday AM  tolerating po intake  no hypoglycemia events or symptoms    MEDICATIONS  (STANDING):  acetaminophen     Tablet .. 975 milliGRAM(s) Oral every 6 hours  amLODIPine   Tablet 10 milliGRAM(s) Oral daily  atorvastatin 20 milliGRAM(s) Oral at bedtime  bicalutamide 50 milliGRAM(s) Oral daily  ceFAZolin   IVPB 2000 milliGRAM(s) IV Intermittent every 8 hours  chlorhexidine 2% Cloths 1 Application(s) Topical daily  dextrose 5%. 1000 milliLiter(s) (100 mL/Hr) IV Continuous <Continuous>  dextrose 5%. 1000 milliLiter(s) (50 mL/Hr) IV Continuous <Continuous>  dextrose 50% Injectable 25 Gram(s) IV Push once  dextrose 50% Injectable 12.5 Gram(s) IV Push once  dextrose 50% Injectable 25 Gram(s) IV Push once  glucagon  Injectable 1 milliGRAM(s) IntraMuscular once  heparin   Injectable 5000 Unit(s) SubCutaneous every 8 hours  insulin glargine Injectable (LANTUS) 20 Unit(s) SubCutaneous <User Schedule>  insulin lispro (ADMELOG) corrective regimen sliding scale   SubCutaneous three times a day before meals  insulin lispro (ADMELOG) corrective regimen sliding scale   SubCutaneous at bedtime  insulin lispro Injectable (ADMELOG) 6 Unit(s) SubCutaneous three times a day before meals  lisinopril 20 milliGRAM(s) Oral daily  pantoprazole    Tablet 40 milliGRAM(s) Oral before breakfast  tamsulosin 0.4 milliGRAM(s) Oral at bedtime    MEDICATIONS  (PRN):  dextrose Oral Gel 15 Gram(s) Oral once PRN Blood Glucose LESS THAN 70 milliGRAM(s)/deciliter  HYDROmorphone  Injectable 0.5 milliGRAM(s) IV Push every 4 hours PRN Severe breakthrough Pain (7 - 10)  naloxone Injectable 0.1 milliGRAM(s) IV Push every 3 minutes PRN For ANY of the following changes in patient status:  A. RR LESS THAN 10 breaths per minute, B. Oxygen saturation LESS THAN 90%, C. Sedation score of 6  ondansetron Injectable 4 milliGRAM(s) IV Push every 6 hours PRN Nausea  oxyCODONE    IR 5 milliGRAM(s) Oral every 3 hours PRN Moderate Pain (4 - 6)  oxyCODONE    IR 10 milliGRAM(s) Oral every 3 hours PRN Severe Pain (7 - 10)      Allergies    No Known Allergies    Intolerances      Review of Systems:    ALL OTHER SYSTEMS REVIEWED AND NEGATIVE      PHYSICAL EXAM:  VITALS: T(C): 37.4 (11-16-23 @ 12:37)  T(F): 99.4 (11-16-23 @ 12:37), Max: 99.9 (11-15-23 @ 20:35)  HR: 92 (11-16-23 @ 12:37) (92 - 107)  BP: 115/57 (11-16-23 @ 12:37) (110/60 - 121/60)  RR:  (18 - 18)  SpO2:  (94% - 99%)  Wt(kg): --  GENERAL: NAD, well-groomed, well-developed  EYES: No proptosis, no lid lag, anicteric  HEENT:  Atraumatic, Normocephalic, moist mucous membranes  RESPIRATORY: nonlabored respirations, no wheezing  PSYCH: Alert and oriented x 3, normal affect, normal mood    CAPILLARY BLOOD GLUCOSE      POCT Blood Glucose.: 211 mg/dL (16 Nov 2023 13:43)  POCT Blood Glucose.: 208 mg/dL (16 Nov 2023 12:12)  POCT Blood Glucose.: 227 mg/dL (16 Nov 2023 10:29)  POCT Blood Glucose.: 149 mg/dL (16 Nov 2023 08:39)  POCT Blood Glucose.: 158 mg/dL (15 Nov 2023 22:25)  POCT Blood Glucose.: 158 mg/dL (15 Nov 2023 18:13)      11-16    133<L>  |  97<L>  |  29<H>  ----------------------------<  142<H>  4.3   |  25  |  0.84    eGFR: 93    Ca    8.9      11-16  Mg     2.10     11-16  Phos  3.1     11-16        A1C with Estimated Average Glucose Result: 5.2 % (10-23-23 @ 06:20)      Thyroid Function Tests:

## 2023-11-16 NOTE — PROGRESS NOTE ADULT - ASSESSMENT
72y/o M with DM, HTN and metastatic prostate cancer (diagnosed in 2009) that initially presented to OSH with LE weakness and R hand paresthesias now transferred to [facility] for NSG eval. Patient now s/p surgical intervention (C5-T3 posterior cervical fusion and C7-T1 decompression) by orthopedic surgery, now transferred to medicine for further management.    rad< from: MR Thoracic Spine w/wo IV Cont (11.14.23 @ 11:55) >  IMPRESSION: Postop changes as described above.  Osseous metastasis again seen.  Possible epidural enhancement is seen involving the T4-5 level. Better   quality contrast enhanced MRI of the thoracic spine is recommended for   further evaluation.  Previously noted T2 prolongation involving the spinal cord at the T1   level is not well-demonstrated due to artifact from postop material.      Had fever overnight 11/15, Positive Adenovirus

## 2023-11-16 NOTE — PROGRESS NOTE ADULT - SUBJECTIVE AND OBJECTIVE BOX
Plastic Surgery Progress Note (pg [facility]: [id number], [facility]: [phone])    SUBJECTIVE  The patient was seen and examined. No acute events overnight. Pain controlled, afebrile but w/ 1 episode of tachycardia to 107    OBJECTIVE  ___________________________________________________  VITAL SIGNS / I&O's   Vital Signs Last 24 Hrs  T(C): 37.1 (16 Nov 2023 05:00), Max: 37.7 (15 Nov 2023 20:35)  T(F): 98.7 (16 Nov 2023 05:00), Max: 99.9 (15 Nov 2023 20:35)  HR: 94 (16 Nov 2023 05:00) (94 - 107)  BP: 121/60 (16 Nov 2023 05:00) (110/60 - 138/83)  BP(mean): --  RR: 18 (16 Nov 2023 05:00) (18 - 18)  SpO2: 94% (16 Nov 2023 05:00) (94% - 100%)    Parameters below as of 16 Nov 2023 05:00  Patient On (Oxygen Delivery Method): room air          15 Nov 2023 07:01  -  16 Nov 2023 07:00  --------------------------------------------------------  IN:    Oral Fluid: 320 mL  Total IN: 320 mL    OUT:    Voided (mL): 550 mL  Total OUT: 550 mL    Total NET: -230 mL        ___________________________________________________  PHYSICAL EXAM    General: NAD, Lying in bed   Neuro: Awake and alert  Back: Incision intact, nylons in place. Possible fluid wave, Some serous drainage expressible from superior portion of incision, small opening <1cm stable from prior day. non purulent.     ___________________________________________________  LABS                        9.4    5.54  )-----------( 151      ( 16 Nov 2023 05:55 )             28.4     16 Nov 2023 05:55    133    |  97     |  29     ----------------------------<  142    4.3     |  25     |  0.84     Ca    8.9        16 Nov 2023 05:55  Phos  3.1       16 Nov 2023 05:55  Mg     2.10      16 Nov 2023 05:55        CAPILLARY BLOOD GLUCOSE      POCT Blood Glucose.: 158 mg/dL (15 Nov 2023 22:25)  POCT Blood Glucose.: 158 mg/dL (15 Nov 2023 18:13)  POCT Blood Glucose.: 92 mg/dL (15 Nov 2023 11:45)  POCT Blood Glucose.: 109 mg/dL (15 Nov 2023 08:27)        Urinalysis Basic - ( 16 Nov 2023 05:55 )    Color: x / Appearance: x / SG: x / pH: x  Gluc: 142 mg/dL / Ketone: x  / Bili: x / Urobili: x   Blood: x / Protein: x / Nitrite: x   Leuk Esterase: x / RBC: x / WBC x   Sq Epi: x / Non Sq Epi: x / Bacteria: x      ___________________________________________________  MICRO  Recent Cultures:  Specimen Source: .Blood Blood-Peripheral, 11-15 @ 02:05; Results   Growth in aerobic and anaerobic bottles: Gram Positive Cocci in Clusters<!>; Gram Stain:   Growth in aerobic and anaerobic bottles: Gram Positive Cocci in Clusters<!>; Organism: --  Specimen Source: .Blood Blood-Peripheral, 11-15 @ 01:15; Results   Growth in aerobic bottle: Gram Positive Cocci in Clusters  Direct identification is available within approximately 3-5  hours either by Blood Panel Multiplexed PCR or Direct  MALDI-TOF. Details: [email or web address]  Growth in anaerobic bottle: Gram Positive Cocci in Clusters<!>; Gram Stain:   Growth in aerobic bottle: Gram Positive Cocci in Clusters  Growth in anaerobic bottle: Gram Positive Cocci in Clusters<!>; Organism: Blood Culture PCR<!>    ___________________________________________________  MEDICATIONS  (STANDING):  acetaminophen     Tablet .. 975 milliGRAM(s) Oral every 6 hours  amLODIPine   Tablet 10 milliGRAM(s) Oral daily  atorvastatin 20 milliGRAM(s) Oral at bedtime  bicalutamide 50 milliGRAM(s) Oral daily  ceFAZolin   IVPB 2000 milliGRAM(s) IV Intermittent every 8 hours  chlorhexidine 2% Cloths 1 Application(s) Topical daily  dextrose 5%. 1000 milliLiter(s) (100 mL/Hr) IV Continuous <Continuous>  dextrose 5%. 1000 milliLiter(s) (50 mL/Hr) IV Continuous <Continuous>  dextrose 50% Injectable 25 Gram(s) IV Push once  dextrose 50% Injectable 12.5 Gram(s) IV Push once  dextrose 50% Injectable 25 Gram(s) IV Push once  glucagon  Injectable 1 milliGRAM(s) IntraMuscular once  heparin   Injectable 5000 Unit(s) SubCutaneous every 8 hours  insulin glargine Injectable (LANTUS) 20 Unit(s) SubCutaneous <User Schedule>  insulin lispro (ADMELOG) corrective regimen sliding scale   SubCutaneous three times a day before meals  insulin lispro (ADMELOG) corrective regimen sliding scale   SubCutaneous at bedtime  insulin lispro Injectable (ADMELOG) 6 Unit(s) SubCutaneous three times a day before meals  lisinopril 20 milliGRAM(s) Oral daily  pantoprazole    Tablet 40 milliGRAM(s) Oral before breakfast  tamsulosin 0.4 milliGRAM(s) Oral at bedtime    MEDICATIONS  (PRN):  dextrose Oral Gel 15 Gram(s) Oral once PRN Blood Glucose LESS THAN 70 milliGRAM(s)/deciliter  HYDROmorphone  Injectable 0.5 milliGRAM(s) IV Push every 4 hours PRN Severe breakthrough Pain (7 - 10)  naloxone Injectable 0.1 milliGRAM(s) IV Push every 3 minutes PRN For ANY of the following changes in patient status:  A. RR LESS THAN 10 breaths per minute, B. Oxygen saturation LESS THAN 90%, C. Sedation score of 6  ondansetron Injectable 4 milliGRAM(s) IV Push every 6 hours PRN Nausea  oxyCODONE    IR 5 milliGRAM(s) Oral every 3 hours PRN Moderate Pain (4 - 6)  oxyCODONE    IR 10 milliGRAM(s) Oral every 3 hours PRN Severe Pain (7 - 10)

## 2023-11-16 NOTE — PROGRESS NOTE ADULT - PROBLEM SELECTOR PLAN 4
Patient with persistent back/neck pain in setting of recent spinal disease and surgery  - pain management recs appreciated  - now off PCA pump  - c/w oxycodone 10mg/5mg Sev/Mod pain  - dilaudid 0.5mg IV for sev breakthrough pain  - c/w dexamethasone lianet

## 2023-11-16 NOTE — PROGRESS NOTE ADULT - PROBLEM SELECTOR PLAN 1
Tmax 102.6 overnight 11/15 Hr 109  Sepsis sec to UTI  Urine pos Leuk and nitrate  Ceftriaxone started  f/u cultures  Also positive adenovirus. Tmax 102.6 overnight 11/15 Hr 109  Sepsis sec to UTI  Urine pos Leuk and nitrate  Ceftriaxone started  f/u cultures  Also positive adenovirus.  11/15/23 blood culture--->. GP cocci in clusters, ID consult called Tmax 102.6 overnight 11/15 Hr 109  Sepsis sec to UTI  Urine pos Leuk and nitrate  Ceftriaxone started----> 11/16 changed to Ancef Q8  f/u cultures  Also positive adenovirus.  11/15/23 blood culture--->. GP cocci in clusters, ID consult called Tmax 102.6 overnight 11/15 Hr 109  Sepsis sec to UTI  Urine pos Leuk and nitrate  Ceftriaxone started----> 11/16 changed to Ancef Q8  f/u cultures  Also positive adenovirus.  11/15/23 blood culture--->. GP cocci in clusters, ID consult called  Will repeat blood ciltures in AM

## 2023-11-16 NOTE — CONSULT NOTE ADULT - SUBJECTIVE AND OBJECTIVE BOX
HPI:  73M with metastatic prostate cancer diagnosed in 2009 who  with LE weakness and R hand paresthesias.  He presented to [facility] on 10/21 and was transferred to [facility] on 10/27 for NSGY evaluation    Patient reports that he has known about his prostate cancer since 2009, but did not seek out treatment because he did not take it seriously. He reports that he is typically fully functional and has no known weakness, or paresthesias. Around 1 month ago, he reports feeling some minor tingling/numbness in his R hand. The week prior to his hospitalization, he began to feel some minor weakness in both of his legs, but was still able to perform all of his ADLs and iADLs. He reports on Thursday, while he was showering, he fell in the bath due to weakness in his legs, but reports he was still able to get up and perform some household tasks such as cleaning and cooking, but still had general weakness and fell several more times. When he woke up from bed the next morning, he attempted to use the restroom and fell to the floor and reports he wasn't able to feel his below his belly button, was unable to control his bowel movements, couldn't feel his genitals and was paralyzed on bilateral lower extremities. He was on the ground for >48 hours with no PO intake before [place] for evaluation at [facility].     Patient was initially evaluated at [facility]; for b/l found to be in rhabdo and SRUTHI, On imaging, he was found to have C7 spinous process nondisplaced fracture of T1 spinous process, edema from C3-4 through C7-T1 interspinous ligaments as well as lytic metastatic disease from C7, Tq-T5 and T12, as well as L1-L3 as well as epidural expansion of neoplasm at C7 centrally and dorsally resulting in moderate cord compression and underlying edema/extension into the bilateral C7-T1 and T1-2 and Left T2-3 Neural foramina. Epidural disease was also notedat T12 vertebral body. NM Scan significant for multiple sites with osseous lesions; Scapula, Multiple left ribs, bilateral pelvic region, as well as b/l LE DVTs on US and was started on Heparin GTT.    He was taken to the OR on 11/2 for C7-T1 decompression, C5-T3 posterior cervical fusion.  He was given dexamethasone post operatively.       On 11/14, he had fever.  Blood culture was positive for MSSA.  Serous draiange noted from incision      PAST MEDICAL & SURGICAL HISTORY:  Essential hypertension      Prostate cancer metastatic to bone      No significant past surgical history          Allergies    No Known Allergies    Intolerances        ANTIMICROBIALS:  ceFAZolin   IVPB 2000 every 8 hours      OTHER MEDS:  acetaminophen     Tablet .. 975 milliGRAM(s) Oral every 6 hours  amLODIPine   Tablet 10 milliGRAM(s) Oral daily  atorvastatin 20 milliGRAM(s) Oral at bedtime  bicalutamide 50 milliGRAM(s) Oral daily  chlorhexidine 2% Cloths 1 Application(s) Topical daily  dextrose 5%. 1000 milliLiter(s) IV Continuous <Continuous>  dextrose 5%. 1000 milliLiter(s) IV Continuous <Continuous>  dextrose 50% Injectable 25 Gram(s) IV Push once  dextrose 50% Injectable 12.5 Gram(s) IV Push once  dextrose 50% Injectable 25 Gram(s) IV Push once  dextrose Oral Gel 15 Gram(s) Oral once PRN  glucagon  Injectable 1 milliGRAM(s) IntraMuscular once  heparin   Injectable 5000 Unit(s) SubCutaneous every 8 hours  HYDROmorphone  Injectable 0.5 milliGRAM(s) IV Push every 4 hours PRN  insulin lispro (ADMELOG) corrective regimen sliding scale   SubCutaneous three times a day before meals  insulin lispro (ADMELOG) corrective regimen sliding scale   SubCutaneous at bedtime  insulin lispro Injectable (ADMELOG) 5 Unit(s) SubCutaneous three times a day before meals  lisinopril 20 milliGRAM(s) Oral daily  naloxone Injectable 0.1 milliGRAM(s) IV Push every 3 minutes PRN  ondansetron Injectable 4 milliGRAM(s) IV Push every 6 hours PRN  oxyCODONE    IR 5 milliGRAM(s) Oral every 3 hours PRN  oxyCODONE    IR 10 milliGRAM(s) Oral every 3 hours PRN  pantoprazole    Tablet 40 milliGRAM(s) Oral before breakfast  tamsulosin 0.4 milliGRAM(s) Oral at bedtime      SOCIAL HISTORY:  From [place]  No drug use    FAMILY HISTORY:  Family history of stroke (Grandparent)        REVIEW OF SYSTEMS  [  ] ROS unobtainable because:    [ x ] All other systems negative except as noted below:	    Constitutional:  [ ] fever [ ] chills  [ ] weight loss  [x ] weakness  Skin:  [ ] rash [ ] phlebitis	  Eyes: [ ] icterus [ ] pain  [ ] discharge	  ENMT: [ ] sore throat  [ ] thrush [ ] ulcers [ ] exudates  Respiratory: [ ] dyspnea [ ] hemoptysis [ ] cough [ ] sputum	  Cardiovascular:  [ ] chest pain [ ] palpitations [ ] edema	  Gastrointestinal:  [ ] nausea [ ] vomiting [ ] diarrhea [ ] constipation [ ] pain	  Genitourinary:  [ ] dysuria [ ] frequency [ ] hematuria [ ] discharge [ ] flank pain  [ ] incontinence  Musculoskeletal:  [ ] myalgias [ ] arthralgias [ ] arthritis  [x back pain  Neurological:  [ ] headache [ ] seizures  [ ] confusion/altered mental status  Psychiatric:  [ ] anxiety [ ] depression	  Hematology/Lymphatics:  [ ] lymphadenopathy  Endocrine:  [ ] adrenal [ ] thyroid  Allergic/Immunologic:	 [ ] transplant [ ] seasonal    PHYSICAL EXAM:  General: [x ] non-toxic  HEAD/EYES: [ ] PERRL [x ] white sclera [ ] icterus  ENT:  [ ] normal [x ] supple [ ] thrush [ ] pharyngeal exudate  Cardiovascular:   [ ] murmur [x ] normal [ ] PPM/AICD  Respiratory:  [x ] clear to ausculation bilaterally  GI:  [ x] soft, non-tender, normal bowel sounds  :  [ ] best [ ] no CVA tenderness   Musculoskeletal:  [ ] no synovitis  Neurologic:  [ ] non-focal exam   Skin:  [ ] no rash  Lymph: [x ] no lymphadenopathy  Psychiatric:  [ ] appropriate affect [ ] alert & oriented  Lines:  x[ ] no phlebitis [ ] central line          Drug Dosing Weight  Height (cm): 170.2 (02 Nov 2023 13:09)  Weight (kg): 93.9 (02 Nov 2023 13:09)  BMI (kg/m2): 32.4 (02 Nov 2023 13:09)  BSA (m2): 2.05 (02 Nov 2023 13:09)    Vital Signs Last 24 Hrs  T(F): 99.4 (11-16-23 @ 12:37), Max: 102.6 (11-14-23 @ 22:41)    Vital Signs Last 24 Hrs  HR: 92 (11-16-23 @ 12:37) (92 - 107)  BP: 115/57 (11-16-23 @ 12:37) (110/60 - 121/60)  RR: 18 (11-16-23 @ 12:37)  SpO2: 97% (11-16-23 @ 12:37) (94% - 99%)  Wt(kg): --                          9.4    5.54  )-----------( 151      ( 16 Nov 2023 05:55 )             28.4       11-16    133<L>  |  97<L>  |  29<H>  ----------------------------<  142<H>  4.3   |  25  |  0.84    Ca    8.9      16 Nov 2023 05:55  Phos  3.1     11-16  Mg     2.10     11-16        Urinalysis Basic - ( 16 Nov 2023 05:55 )    Color: x / Appearance: x / SG: x / pH: x  Gluc: 142 mg/dL / Ketone: x  / Bili: x / Urobili: x   Blood: x / Protein: x / Nitrite: x   Leuk Esterase: x / RBC: x / WBC x   Sq Epi: x / Non Sq Epi: x / Bacteria: x        MICROBIOLOGY:    Culture - Urine (11.15.23 @ 03:45)    Specimen Source: Clean Catch Clean Catch (Midstream)   Culture Results:   >100,000 CFU/ml Gram Negative Rods        Culture - Blood (11.15.23 @ 02:05)    Gram Stain:   Growth in aerobic and anaerobic bottles: Gram Positive Cocci in Clusters   Specimen Source: .Blood Blood-Peripheral   Culture Results:   Growth in aerobic and anaerobic bottles: Gram Positive Cocci in Clusters      RADIOLOGY:  < from: MR Thoracic Spine w/wo IV Cont (11.14.23 @ 11:55) >  IMPRESSION: Postop changes as described above.    Osseous metastasis again seen.    Possible epidural enhancement is seen involving the T4-5 level. Better   quality contrast enhanced MRI of the thoracic spine is recommended for   further evaluation.    Previously noted T2 prolongation involving the spinal cord at the T1   level is not well-demonstrated due to artifact from postop material.    < end of copied text >     HPI:  73M with metastatic prostate cancer diagnosed in 2009 who  with LE weakness and R hand paresthesias.  He presented to [facility] on 10/21 and was transferred to [facility] on 10/27 for NSGY evaluation    Patient reports that he has known about his prostate cancer since 2009, but did not seek out treatment because he did not take it seriously. He reports that he is typically fully functional and has no known weakness, or paresthesias. Around 1 month ago, he reports feeling some minor tingling/numbness in his R hand. The week prior to his hospitalization, he began to feel some minor weakness in both of his legs, but was still able to perform all of his ADLs and iADLs. He reports on Thursday, while he was showering, he fell in the bath due to weakness in his legs, but reports he was still able to get up and perform some household tasks such as cleaning and cooking, but still had general weakness and fell several more times. When he woke up from bed the next morning, he attempted to use the restroom and fell to the floor and reports he wasn't able to feel his below his belly button, was unable to control his bowel movements, couldn't feel his genitals and was paralyzed on bilateral lower extremities. He was on the ground for >48 hours with no PO intake before his [place] came [place] the [place] for evaluation at [facility].     Patient was initially evaluated at [facility]; for b/l found to be in rhabdo and SRUTHI, On imaging, he was found to have C7 spinous process nondisplaced fracture of T1 spinous process, edema from C3-4 through C7-T1 interspinous ligaments as well as lytic metastatic disease from C7, Tq-T5 and T12, as well as L1-L3 as well as epidural expansion of neoplasm at C7 centrally and dorsally resulting in moderate cord compression and underlying edema/extension into the bilateral C7-T1 and T1-2 and Left T2-3 Neural foramina. Epidural disease was also notedat T12 vertebral body. NM Scan significant for multiple sites with osseous lesions; Scapula, Multiple left ribs, bilateral pelvic region, as well as b/l LE DVTs on US and was started on Heparin GTT.    He was taken to the OR on 11/2 for C7-T1 decompression, C5-T3 posterior cervical fusion.  He was given dexamethasone post operatively.       On 11/14, he had fever.  Blood culture was positive for MSSA.  Serous draiange noted from incision      PAST MEDICAL & SURGICAL HISTORY:  Essential hypertension      Prostate cancer metastatic to bone      No significant past surgical history          Allergies    No Known Allergies    Intolerances        ANTIMICROBIALS:  ceFAZolin   IVPB 2000 every 8 hours      OTHER MEDS:  acetaminophen     Tablet .. 975 milliGRAM(s) Oral every 6 hours  amLODIPine   Tablet 10 milliGRAM(s) Oral daily  atorvastatin 20 milliGRAM(s) Oral at bedtime  bicalutamide 50 milliGRAM(s) Oral daily  chlorhexidine 2% Cloths 1 Application(s) Topical daily  dextrose 5%. 1000 milliLiter(s) IV Continuous <Continuous>  dextrose 5%. 1000 milliLiter(s) IV Continuous <Continuous>  dextrose 50% Injectable 25 Gram(s) IV Push once  dextrose 50% Injectable 12.5 Gram(s) IV Push once  dextrose 50% Injectable 25 Gram(s) IV Push once  dextrose Oral Gel 15 Gram(s) Oral once PRN  glucagon  Injectable 1 milliGRAM(s) IntraMuscular once  heparin   Injectable 5000 Unit(s) SubCutaneous every 8 hours  HYDROmorphone  Injectable 0.5 milliGRAM(s) IV Push every 4 hours PRN  insulin lispro (ADMELOG) corrective regimen sliding scale   SubCutaneous three times a day before meals  insulin lispro (ADMELOG) corrective regimen sliding scale   SubCutaneous at bedtime  insulin lispro Injectable (ADMELOG) 5 Unit(s) SubCutaneous three times a day before meals  lisinopril 20 milliGRAM(s) Oral daily  naloxone Injectable 0.1 milliGRAM(s) IV Push every 3 minutes PRN  ondansetron Injectable 4 milliGRAM(s) IV Push every 6 hours PRN  oxyCODONE    IR 5 milliGRAM(s) Oral every 3 hours PRN  oxyCODONE    IR 10 milliGRAM(s) Oral every 3 hours PRN  pantoprazole    Tablet 40 milliGRAM(s) Oral before breakfast  tamsulosin 0.4 milliGRAM(s) Oral at bedtime      SOCIAL HISTORY:  From [place]  No drug use      FAMILY HISTORY:  Family history of stroke (Grandparent)        REVIEW OF SYSTEMS  [  ] ROS unobtainable because:    [ x ] All other systems negative except as noted below:	    Constitutional:  [ ] fever [ ] chills  [ ] weight loss  [x ] weakness  Skin:  [ ] rash [ ] phlebitis	  Eyes: [ ] icterus [ ] pain  [ ] discharge	  ENMT: [ ] sore throat  [ ] thrush [ ] ulcers [ ] exudates  Respiratory: [ ] dyspnea [ ] hemoptysis [ ] cough [ ] sputum	  Cardiovascular:  [ ] chest pain [ ] palpitations [ ] edema	  Gastrointestinal:  [ ] nausea [ ] vomiting [ ] diarrhea [ ] constipation [ ] pain	  Genitourinary:  [ ] dysuria [ ] frequency [ ] hematuria [ ] discharge [ ] flank pain  [ ] incontinence  Musculoskeletal:  [ ] myalgias [ ] arthralgias [ ] arthritis  [x back pain  Neurological:  [ ] headache [ ] seizures  [ ] confusion/altered mental status  Psychiatric:  [ ] anxiety [ ] depression	  Hematology/Lymphatics:  [ ] lymphadenopathy  Endocrine:  [ ] adrenal [ ] thyroid  Allergic/Immunologic:	 [ ] transplant [ ] seasonal    PHYSICAL EXAM:  General: [x ] non-toxic  HEAD/EYES: [ ] PERRL [x ] white sclera [ ] icterus  ENT:  [ ] normal [x ] supple [ ] thrush [ ] pharyngeal exudate  Cardiovascular:   [ ] murmur [x ] normal [ ] PPM/AICD  Respiratory:  [x ] clear to ausculation bilaterally  GI:  [ x] soft, non-tender, normal bowel sounds  :  [ ] best [ ] no CVA tenderness   Musculoskeletal:  [ ] no synovitis  Neurologic:  [ ] non-focal exam   Skin:  [x ] drainage from cervical incision, dressing is saturated  Lymph: [x ] no lymphadenopathy  Psychiatric:  [ ] appropriate affect [ ] alert & oriented  Lines:  x[ ] no phlebitis [ ] central line          Drug Dosing Weight  Height (cm): 170.2 (02 Nov 2023 13:09)  Weight (kg): 93.9 (02 Nov 2023 13:09)  BMI (kg/m2): 32.4 (02 Nov 2023 13:09)  BSA (m2): 2.05 (02 Nov 2023 13:09)    Vital Signs Last 24 Hrs  T(F): 99.4 (11-16-23 @ 12:37), Max: 102.6 (11-14-23 @ 22:41)    Vital Signs Last 24 Hrs  HR: 92 (11-16-23 @ 12:37) (92 - 107)  BP: 115/57 (11-16-23 @ 12:37) (110/60 - 121/60)  RR: 18 (11-16-23 @ 12:37)  SpO2: 97% (11-16-23 @ 12:37) (94% - 99%)  Wt(kg): --                          9.4    5.54  )-----------( 151      ( 16 Nov 2023 05:55 )             28.4       11-16    133<L>  |  97<L>  |  29<H>  ----------------------------<  142<H>  4.3   |  25  |  0.84    Ca    8.9      16 Nov 2023 05:55  Phos  3.1     11-16  Mg     2.10     11-16        Urinalysis Basic - ( 16 Nov 2023 05:55 )    Color: x / Appearance: x / SG: x / pH: x  Gluc: 142 mg/dL / Ketone: x  / Bili: x / Urobili: x   Blood: x / Protein: x / Nitrite: x   Leuk Esterase: x / RBC: x / WBC x   Sq Epi: x / Non Sq Epi: x / Bacteria: x        MICROBIOLOGY:    Culture - Urine (11.15.23 @ 03:45)    Specimen Source: Clean Catch Clean Catch (Midstream)   Culture Results:   >100,000 CFU/ml Gram Negative Rods        Culture - Blood (11.15.23 @ 02:05)    Gram Stain:   Growth in aerobic and anaerobic bottles: Gram Positive Cocci in Clusters   Specimen Source: .Blood Blood-Peripheral   Culture Results:   Growth in aerobic and anaerobic bottles: Gram Positive Cocci in Clusters      RADIOLOGY:  < from: MR Thoracic Spine w/wo IV Cont (11.14.23 @ 11:55) >  IMPRESSION: Postop changes as described above.    Osseous metastasis again seen.    Possible epidural enhancement is seen involving the T4-5 level. Better   quality contrast enhanced MRI of the thoracic spine is recommended for   further evaluation.    Previously noted T2 prolongation involving the spinal cord at the T1   level is not well-demonstrated due to artifact from postop material.    < end of copied text >

## 2023-11-16 NOTE — PROGRESS NOTE ADULT - PROBLEM SELECTOR PLAN 9
#d/w Plastic surgery  needs : Nylon sutures over incision to remain in place  - Gauze/tegaderm dressing for serosanguinous drainage; currently no collections/signs of infection  Need dressing change 1 to 2 x day. to T spine wound. F/u with Dr Tr Yoo  # Rt- Oncology---> wants out patient RT to be dons once d/c home  Also ut patient f/u with PCP/ Onc and RT oncology  #GOC done 11/14, Patient is Ox3 and wants to GO HOME.  He assigned Son Juan Jose is [phone], [phone] , full code  CM will help with wound care, ?? VNS vs Family ???????  Spoke with daughter Melissa and explained we are planning for home.  11/15 d/c planning on hold due to fever eval. #d/w Plastic surgery  needs : Nylon sutures over incision to remain in place  - Gauze/tegaderm dressing for serosanguinous drainage; currently no collections/signs of infection  Need dressing change 1 to 2 x day. to T spine wound. F/u with Dr Tr Yoo  # Rt- Oncology---> wants out patient RT to be dons once d/c home  Also ut patient f/u with PCP/ Onc and RT oncology  #GOC done 11/14, Patient is Ox3 and wants to GO HOME.  He assigned Son Juan Jose is [phone], [phone] , full code  CM will help with wound care, ?? VNS vs Family ???????  Spoke with daughter Melissa and explained we are planning for home.  11/15 d/c planning on hold due to fever eval.--> 11/15/23 blood culture--->. GP cocci in clusters, ID consult called #d/w Plastic surgery  needs : Nylon sutures over incision to remain in place  - Gauze/tegaderm dressing for serosanguinous drainage; currently no collections/signs of infection  Need dressing change 1 to 2 x day. to T spine wound. F/u with Dr Tr Yoo  # Rt- Oncology---> wants out patient RT to be dons once d/c home  Also ut patient f/u with PCP/ Onc and RT oncology  #Oncology f/u with Dr Rodgers at [facility]  #GOC done 11/14, Patient is Ox3 and wants to GO HOME.  He assigned Son Juan Jose is [phone], [phone] , full code  CM will help with wound care, ?? VNS vs Family ???????  Spoke with daughter Melissa and explained we are planning for home.  11/15 d/c planning on hold due to fever eval.--> 11/15/23 blood culture--->. GP cocci in clusters, ID consult called  d/w Patient / son Giuseppe/TAMARA

## 2023-11-16 NOTE — PHARMACOTHERAPY INTERVENTION NOTE - COMMENTS
Recommended to de-escalate from ceftriaxone to cefazolin 2g q8h for Staphylococcus aureus (MSSA) bacteremia. eGFR 95.     Lele GellerD   Clinical Pharmacy Specialist, Infectious Diseases  Tele-Antimicrobial Stewardship Program (Tele-ASP)  Tele-ASP Phone: [phone]  
Recommended ID consult for patient with Staphylococcus aureus (MSSA) bacteremia.     Sonia England PharmD   Clinical Pharmacy Specialist, Infectious Diseases  Tele-Antimicrobial Stewardship Program (Tele-ASP)  Tele-ASP Phone: [phone]

## 2023-11-16 NOTE — PROGRESS NOTE ADULT - ASSESSMENT
73y Male s/p C5-T3 posterior cervical fusion, C7-T1 decompression for metastatic prostate cancer with plastics closure on 11/2. Recovering well. Both drains removed. MRI 11/14 showing possible seroma in subcutaneous space    Plan:  - Nylon sutures over incision to remain in place  - Gauze/tegaderm dressing for serous drainage; currently no collections/signs of infection  - F/U fever work up  - Optimize nutrition, ensure adequate protein intake  - Remainder of care per primary team    Plastic Surgery  [phone]# [facility] pager  [phone] [facility] pager  Available on Teams  73y Male s/p C5-T3 posterior cervical fusion, C7-T1 decompression for metastatic prostate cancer with plastics closure on 11/2. Recovering well. Both drains removed. MRI 11/14 showing possible seroma in subcutaneous space    Plan:  - Nylon sutures over incision to remain in place  - Gauze/tegaderm dressing for serous drainage; currently no signs of infection  - F/U fever work up  - Monitor for wound dehiscence  - Optimize nutrition, ensure adequate protein intake  - Remainder of care per primary team    Plastic Surgery  [phone]# [facility] pager  [phone] [facility] pager  Available on Teams

## 2023-11-16 NOTE — PROGRESS NOTE ADULT - PROBLEM SELECTOR PLAN 2
Patient initially presenting with LE weakness and well as RUE parasthesias.  - patient found to have numerous spinal cord lesions  - noted with spinous process fractures of C7 and T1  - also noted with neoplasm with epidural expansion causing cord compression from C7-T3  - now s/p C5-T3 posterior cervical fusion and C7-T1 decompression by orthopedic surgery on 11/2  - weakness/parasthesias now markedly improved  - c/w dexamethasone 4mg q6h  - s/p flap closure of back surgical site by plastic surgery with b/l AGGIE drains  - L AGGIE drain removed on 11/7, R AGGIE drain removed 11/10  - appreciate further orthopedics/plastic surgery recs  - d/w rad/onc, no plan for inpatient RT, recommend outpatient follow up  - per rad/onc will obtain repeat thoracic spine MRI prior to discharge --> done with f/u Ortho and Rt-Onc

## 2023-11-16 NOTE — PROGRESS NOTE ADULT - ASSESSMENT
This is a 72 yo M /w a PMH of HTN, metastatic prostate cancer and well controlled diabetes who presents with cervical cord compression now on high dose dexamethasone and steroid taper. Endocrinology consulted due to hypoglycemia yesterday    #steroid induced hyperglycemia  -Was receiving 4mg IV dexamethasone last dose 11/15/23. As per primary team no current plans to continue steroids . steroid taper completed. Please notify Endocrine if steroid plan changes     #diabetes -A1c 5.2% off medication  -Insulin doses will need to be decreased as no further steroid is ordered and dexamethasone effect will wear off in next 1-2 days.  -Decrease Lantus to 10 units each morning at 9AM for tomorrow  -Decrease Admelog to 5  units before meals TID  -Continue low admelog correction scales at meals  -Contineu low admelog correction scales at bedtime  -carb consistent diet  -FSG before meals and before bedtime  -FSG goal 100-180 while inpatient: stable today   -hypoglycemia protocol in place if needed    #Discharge  -As per primary team no plan for further steroids at this time. Please notify endocrine if steroid plan changes   -can d/c anti-diabetic agents if off steroids   -Prior use of rybelsus and metformin, did not tolerate and glucose appears to have been controlled off meds prior to receiving steroid.  -follow up with PCP    #HTN  - Goal SBP <130/80  -c/w amlodipine 10mg daily, lisinopril 20mg daily  -Titration as per primary team     #HLD  - Goal LDL <70  -c/w atorvastatin 20mg daily if no contraindications  -Please obtain lipid profile as an outpt     Kiki Pierce MD  Division of Endocrinology  Pager: [phone]    If after 6PM or before 9AM, or on weekends/holidays, please call endocrine answering service for assistance ([phone]).  For nonurgent matters email [email or web address] for assistance.

## 2023-11-16 NOTE — PROGRESS NOTE ADULT - SUBJECTIVE AND OBJECTIVE BOX
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 975 milliGRAM(s) Oral every 6 hours  amLODIPine   Tablet 10 milliGRAM(s) Oral daily  atorvastatin 20 milliGRAM(s) Oral at bedtime  bicalutamide 50 milliGRAM(s) Oral daily  ceFAZolin   IVPB 2000 milliGRAM(s) IV Intermittent every 8 hours  chlorhexidine 2% Cloths 1 Application(s) Topical daily  dextrose 5%. 1000 milliLiter(s) (50 mL/Hr) IV Continuous <Continuous>  dextrose 5%. 1000 milliLiter(s) (100 mL/Hr) IV Continuous <Continuous>  dextrose 50% Injectable 25 Gram(s) IV Push once  dextrose 50% Injectable 25 Gram(s) IV Push once  dextrose 50% Injectable 12.5 Gram(s) IV Push once  glucagon  Injectable 1 milliGRAM(s) IntraMuscular once  heparin   Injectable 5000 Unit(s) SubCutaneous every 8 hours  insulin glargine Injectable (LANTUS) 20 Unit(s) SubCutaneous <User Schedule>  insulin lispro (ADMELOG) corrective regimen sliding scale   SubCutaneous three times a day before meals  insulin lispro (ADMELOG) corrective regimen sliding scale   SubCutaneous at bedtime  insulin lispro Injectable (ADMELOG) 6 Unit(s) SubCutaneous three times a day before meals  lisinopril 20 milliGRAM(s) Oral daily  pantoprazole    Tablet 40 milliGRAM(s) Oral before breakfast  tamsulosin 0.4 milliGRAM(s) Oral at bedtime    MEDICATIONS  (PRN):  dextrose Oral Gel 15 Gram(s) Oral once PRN Blood Glucose LESS THAN 70 milliGRAM(s)/deciliter  HYDROmorphone  Injectable 0.5 milliGRAM(s) IV Push every 4 hours PRN Severe breakthrough Pain (7 - 10)  naloxone Injectable 0.1 milliGRAM(s) IV Push every 3 minutes PRN For ANY of the following changes in patient status:  A. RR LESS THAN 10 breaths per minute, B. Oxygen saturation LESS THAN 90%, C. Sedation score of 6  ondansetron Injectable 4 milliGRAM(s) IV Push every 6 hours PRN Nausea  oxyCODONE    IR 5 milliGRAM(s) Oral every 3 hours PRN Moderate Pain (4 - 6)  oxyCODONE    IR 10 milliGRAM(s) Oral every 3 hours PRN Severe Pain (7 - 10)        CAPILLARY BLOOD GLUCOSE      POCT Blood Glucose.: 208 mg/dL (16 Nov 2023 12:12)  POCT Blood Glucose.: 227 mg/dL (16 Nov 2023 10:29)  POCT Blood Glucose.: 149 mg/dL (16 Nov 2023 08:39)  POCT Blood Glucose.: 158 mg/dL (15 Nov 2023 22:25)  POCT Blood Glucose.: 158 mg/dL (15 Nov 2023 18:13)    I&O's Summary    15 Nov 2023 07:01  -  16 Nov 2023 07:00  --------------------------------------------------------  IN: 320 mL / OUT: 550 mL / NET: -230 mL      Vital Signs Last 24 Hrs  T(C): 37.4 (16 Nov 2023 12:37), Max: 37.7 (15 Nov 2023 20:35)  T(F): 99.4 (16 Nov 2023 12:37), Max: 99.9 (15 Nov 2023 20:35)  HR: 92 (16 Nov 2023 12:37) (92 - 107)  BP: 115/57 (16 Nov 2023 12:37) (110/60 - 121/60)  BP(mean): --  RR: 18 (16 Nov 2023 12:37) (18 - 18)  SpO2: 97% (16 Nov 2023 12:37) (94% - 99%)    Parameters below as of 16 Nov 2023 12:37  Patient On (Oxygen Delivery Method): room air      PHYSICAL EXAM:  GENERAL: NAD,   HEAD:  Atraumatic, Normocephalic  EYES: EOMI, PERRLA, conjunctiva and sclera clear  NECK: Supple, No JVD  CHEST/LUNG: Clear to auscultation bilaterally; No wheeze  HEART: Regular rate and rhythm; No murmurs, rubs, or gallops  ABDOMEN: Soft, Nontender, Nondistended; Bowel sounds present  EXTREMITIES:  2+ Peripheral Pulses, No clubbing, cyanosis, or edema  PSYCH: AAOx3  NEUROLOGY: non-focal  SKIN: dressing to upper back    LABS:                        9.4    5.54  )-----------( 151      ( 16 Nov 2023 05:55 )             28.4     11-16    133<L>  |  97<L>  |  29<H>  ----------------------------<  142<H>  4.3   |  25  |  0.84    Ca    8.9      16 Nov 2023 05:55  Phos  3.1     11-16  Mg     2.10     11-16      11/15/23 blood culture--->. GP cocci in clusters, ID consult called    RADIOLOGY & ADDITIONAL TESTS:    Imaging Personally Reviewed:    Consultant(s) Notes Reviewed:      Care Discussed with Consultants/Other Providers:   Patient is a 73y old  Male who presents with a chief complaint of Diffuse Spinal Mets from Prostate Cancer (16 Nov 2023 13:13)      SUBJECTIVE / OVERNIGHT EVENTS:  Patient seen with Rn , Son Giuseppe and wife at bedside.  Patient is sitting in chair  Explained we called Id about the positive blood cultures, will repeat blood cultures in AM and f/u with ID  Patient and son AGAIN NOTES they WANT TO GO HOME  Explain we will need  to help arrange for dressing changes once home    MEDICATIONS  (STANDING):  acetaminophen     Tablet .. 975 milliGRAM(s) Oral every 6 hours  amLODIPine   Tablet 10 milliGRAM(s) Oral daily  atorvastatin 20 milliGRAM(s) Oral at bedtime  bicalutamide 50 milliGRAM(s) Oral daily  ceFAZolin   IVPB 2000 milliGRAM(s) IV Intermittent every 8 hours  chlorhexidine 2% Cloths 1 Application(s) Topical daily  dextrose 5%. 1000 milliLiter(s) (50 mL/Hr) IV Continuous <Continuous>  dextrose 5%. 1000 milliLiter(s) (100 mL/Hr) IV Continuous <Continuous>  dextrose 50% Injectable 25 Gram(s) IV Push once  dextrose 50% Injectable 25 Gram(s) IV Push once  dextrose 50% Injectable 12.5 Gram(s) IV Push once  glucagon  Injectable 1 milliGRAM(s) IntraMuscular once  heparin   Injectable 5000 Unit(s) SubCutaneous every 8 hours  insulin glargine Injectable (LANTUS) 20 Unit(s) SubCutaneous <User Schedule>  insulin lispro (ADMELOG) corrective regimen sliding scale   SubCutaneous three times a day before meals  insulin lispro (ADMELOG) corrective regimen sliding scale   SubCutaneous at bedtime  insulin lispro Injectable (ADMELOG) 6 Unit(s) SubCutaneous three times a day before meals  lisinopril 20 milliGRAM(s) Oral daily  pantoprazole    Tablet 40 milliGRAM(s) Oral before breakfast  tamsulosin 0.4 milliGRAM(s) Oral at bedtime    MEDICATIONS  (PRN):  dextrose Oral Gel 15 Gram(s) Oral once PRN Blood Glucose LESS THAN 70 milliGRAM(s)/deciliter  HYDROmorphone  Injectable 0.5 milliGRAM(s) IV Push every 4 hours PRN Severe breakthrough Pain (7 - 10)  naloxone Injectable 0.1 milliGRAM(s) IV Push every 3 minutes PRN For ANY of the following changes in patient status:  A. RR LESS THAN 10 breaths per minute, B. Oxygen saturation LESS THAN 90%, C. Sedation score of 6  ondansetron Injectable 4 milliGRAM(s) IV Push every 6 hours PRN Nausea  oxyCODONE    IR 5 milliGRAM(s) Oral every 3 hours PRN Moderate Pain (4 - 6)  oxyCODONE    IR 10 milliGRAM(s) Oral every 3 hours PRN Severe Pain (7 - 10)        CAPILLARY BLOOD GLUCOSE      POCT Blood Glucose.: 208 mg/dL (16 Nov 2023 12:12)  POCT Blood Glucose.: 227 mg/dL (16 Nov 2023 10:29)  POCT Blood Glucose.: 149 mg/dL (16 Nov 2023 08:39)  POCT Blood Glucose.: 158 mg/dL (15 Nov 2023 22:25)  POCT Blood Glucose.: 158 mg/dL (15 Nov 2023 18:13)    I&O's Summary    15 Nov 2023 07:01  -  16 Nov 2023 07:00  --------------------------------------------------------  IN: 320 mL / OUT: 550 mL / NET: -230 mL      Vital Signs Last 24 Hrs  T(C): 37.4 (16 Nov 2023 12:37), Max: 37.7 (15 Nov 2023 20:35)  T(F): 99.4 (16 Nov 2023 12:37), Max: 99.9 (15 Nov 2023 20:35)  HR: 92 (16 Nov 2023 12:37) (92 - 107)  BP: 115/57 (16 Nov 2023 12:37) (110/60 - 121/60)  BP(mean): --  RR: 18 (16 Nov 2023 12:37) (18 - 18)  SpO2: 97% (16 Nov 2023 12:37) (94% - 99%)    Parameters below as of 16 Nov 2023 12:37  Patient On (Oxygen Delivery Method): room air      PHYSICAL EXAM:  GENERAL: NAD,   HEAD:  Atraumatic, Normocephalic  EYES: EOMI, PERRLA, conjunctiva and sclera clear  NECK: Supple, No JVD  CHEST/LUNG: Clear to auscultation bilaterally; No wheeze  HEART: Regular rate and rhythm; No murmurs, rubs, or gallops  ABDOMEN: Soft, Nontender, Nondistended; Bowel sounds present  EXTREMITIES:  2+ Peripheral Pulses, No clubbing, cyanosis, or edema  PSYCH: AAOx3  NEUROLOGY: non-focal  SKIN: dressing to upper back    LABS:                        9.4    5.54  )-----------( 151      ( 16 Nov 2023 05:55 )             28.4     11-16    133<L>  |  97<L>  |  29<H>  ----------------------------<  142<H>  4.3   |  25  |  0.84    Ca    8.9      16 Nov 2023 05:55  Phos  3.1     11-16  Mg     2.10     11-16      11/15/23 blood culture--->. GP cocci in clusters, ID consult called    RADIOLOGY & ADDITIONAL TESTS:    Imaging Personally Reviewed:    Consultant(s) Notes Reviewed:      Care Discussed with Consultants/Other Providers:

## 2023-11-16 NOTE — CONSULT NOTE ADULT - ASSESSMENT
73 year old with metastatic prostate cancer s/p spinal decompression.  Now with fever and high grade MSSA bacteremia    1) High grade MSSA bacteremia  Check transthoracic echocardiogram  Repeat blood culture every 48 hours until negative  Continue cefazolin 2 gram iv q 8      2) Abnormal Imaging      3) Bacteruria      4) Adenovirus  Supportive care   73 year old with metastatic prostate cancer s/p spinal decompression.  Now with fever and high grade MSSA bacteremia    1) High grade MSSA bacteremia  Check transthoracic echocardiogram  Repeat blood culture every 48 hours until negative  Continue cefazolin 2 gram iv q 8    2) Abnormal Imaging  Fluid collections noted on imaging  His drainage is serosanguinous but is more than I would expect at this point in his course  Spine surgery and plastic surgery dollow up    3) Bacteruria  Culture with E coli  States he is havinv trouble passing his urine and that flomax helps  Monitor symptoms  If febrile, add cipro    4) Adenovirus  Supportive care

## 2023-11-16 NOTE — PROGRESS NOTE ADULT - PROBLEM SELECTOR PLAN 8
DVT: HSQ q8h  Diet: CC/DASH  Dispo: [facility], per PMR patient may benefit from acute rehab  Patient is refusing REHAB  d/w Plastic surgery  needs : Nylon sutures over incision to remain in place  - Gauze/tegaderm dressing for serosanguinous drainage; currently no collections/signs of infection  Need dressing change 1 to 2 x day. to T spine wound.  F/u with Dr Tr Yoo

## 2023-11-17 LAB
-  AMPICILLIN/SULBACTAM: SIGNIFICANT CHANGE UP
-  AMPICILLIN/SULBACTAM: SIGNIFICANT CHANGE UP
-  CEFAZOLIN: SIGNIFICANT CHANGE UP
-  CEFAZOLIN: SIGNIFICANT CHANGE UP
-  CLINDAMYCIN: SIGNIFICANT CHANGE UP
-  CLINDAMYCIN: SIGNIFICANT CHANGE UP
-  ERYTHROMYCIN: SIGNIFICANT CHANGE UP
-  ERYTHROMYCIN: SIGNIFICANT CHANGE UP
-  GENTAMICIN: SIGNIFICANT CHANGE UP
-  GENTAMICIN: SIGNIFICANT CHANGE UP
-  OXACILLIN: SIGNIFICANT CHANGE UP
-  OXACILLIN: SIGNIFICANT CHANGE UP
-  RIFAMPIN: SIGNIFICANT CHANGE UP
-  RIFAMPIN: SIGNIFICANT CHANGE UP
-  TETRACYCLINE: SIGNIFICANT CHANGE UP
-  TETRACYCLINE: SIGNIFICANT CHANGE UP
-  TRIMETHOPRIM/SULFAMETHOXAZOLE: SIGNIFICANT CHANGE UP
-  TRIMETHOPRIM/SULFAMETHOXAZOLE: SIGNIFICANT CHANGE UP
-  VANCOMYCIN: SIGNIFICANT CHANGE UP
-  VANCOMYCIN: SIGNIFICANT CHANGE UP
ALBUMIN SERPL ELPH-MCNC: 2.7 G/DL — LOW (ref 3.3–5)
ALBUMIN SERPL ELPH-MCNC: 2.7 G/DL — LOW (ref 3.3–5)
ALP SERPL-CCNC: 434 U/L — HIGH (ref 40–120)
ALP SERPL-CCNC: 434 U/L — HIGH (ref 40–120)
ALT FLD-CCNC: 50 U/L — HIGH (ref 4–41)
ALT FLD-CCNC: 50 U/L — HIGH (ref 4–41)
ANION GAP SERPL CALC-SCNC: 11 MMOL/L — SIGNIFICANT CHANGE UP (ref 7–14)
ANION GAP SERPL CALC-SCNC: 11 MMOL/L — SIGNIFICANT CHANGE UP (ref 7–14)
AST SERPL-CCNC: 32 U/L — SIGNIFICANT CHANGE UP (ref 4–40)
AST SERPL-CCNC: 32 U/L — SIGNIFICANT CHANGE UP (ref 4–40)
BASOPHILS # BLD AUTO: 0 K/UL — SIGNIFICANT CHANGE UP (ref 0–0.2)
BASOPHILS # BLD AUTO: 0 K/UL — SIGNIFICANT CHANGE UP (ref 0–0.2)
BASOPHILS NFR BLD AUTO: 0 % — SIGNIFICANT CHANGE UP (ref 0–2)
BASOPHILS NFR BLD AUTO: 0 % — SIGNIFICANT CHANGE UP (ref 0–2)
BILIRUB DIRECT SERPL-MCNC: 0.2 MG/DL — SIGNIFICANT CHANGE UP (ref 0–0.3)
BILIRUB DIRECT SERPL-MCNC: 0.2 MG/DL — SIGNIFICANT CHANGE UP (ref 0–0.3)
BILIRUB INDIRECT FLD-MCNC: 0.6 MG/DL — SIGNIFICANT CHANGE UP (ref 0–1)
BILIRUB INDIRECT FLD-MCNC: 0.6 MG/DL — SIGNIFICANT CHANGE UP (ref 0–1)
BILIRUB SERPL-MCNC: 0.8 MG/DL — SIGNIFICANT CHANGE UP (ref 0.2–1.2)
BILIRUB SERPL-MCNC: 0.8 MG/DL — SIGNIFICANT CHANGE UP (ref 0.2–1.2)
BUN SERPL-MCNC: 29 MG/DL — HIGH (ref 7–23)
BUN SERPL-MCNC: 29 MG/DL — HIGH (ref 7–23)
CALCIUM SERPL-MCNC: 8.7 MG/DL — SIGNIFICANT CHANGE UP (ref 8.4–10.5)
CALCIUM SERPL-MCNC: 8.7 MG/DL — SIGNIFICANT CHANGE UP (ref 8.4–10.5)
CHLORIDE SERPL-SCNC: 98 MMOL/L — SIGNIFICANT CHANGE UP (ref 98–107)
CHLORIDE SERPL-SCNC: 98 MMOL/L — SIGNIFICANT CHANGE UP (ref 98–107)
CO2 SERPL-SCNC: 24 MMOL/L — SIGNIFICANT CHANGE UP (ref 22–31)
CO2 SERPL-SCNC: 24 MMOL/L — SIGNIFICANT CHANGE UP (ref 22–31)
CREAT SERPL-MCNC: 0.82 MG/DL — SIGNIFICANT CHANGE UP (ref 0.5–1.3)
CREAT SERPL-MCNC: 0.82 MG/DL — SIGNIFICANT CHANGE UP (ref 0.5–1.3)
CULTURE RESULTS: ABNORMAL
EGFR: 94 ML/MIN/1.73M2 — SIGNIFICANT CHANGE UP
EGFR: 94 ML/MIN/1.73M2 — SIGNIFICANT CHANGE UP
EOSINOPHIL # BLD AUTO: 0.03 K/UL — SIGNIFICANT CHANGE UP (ref 0–0.5)
EOSINOPHIL # BLD AUTO: 0.03 K/UL — SIGNIFICANT CHANGE UP (ref 0–0.5)
EOSINOPHIL NFR BLD AUTO: 0.9 % — SIGNIFICANT CHANGE UP (ref 0–6)
EOSINOPHIL NFR BLD AUTO: 0.9 % — SIGNIFICANT CHANGE UP (ref 0–6)
GLUCOSE BLDC GLUCOMTR-MCNC: 110 MG/DL — HIGH (ref 70–99)
GLUCOSE BLDC GLUCOMTR-MCNC: 110 MG/DL — HIGH (ref 70–99)
GLUCOSE BLDC GLUCOMTR-MCNC: 120 MG/DL — HIGH (ref 70–99)
GLUCOSE BLDC GLUCOMTR-MCNC: 120 MG/DL — HIGH (ref 70–99)
GLUCOSE BLDC GLUCOMTR-MCNC: 147 MG/DL — HIGH (ref 70–99)
GLUCOSE BLDC GLUCOMTR-MCNC: 147 MG/DL — HIGH (ref 70–99)
GLUCOSE BLDC GLUCOMTR-MCNC: 149 MG/DL — HIGH (ref 70–99)
GLUCOSE BLDC GLUCOMTR-MCNC: 149 MG/DL — HIGH (ref 70–99)
GLUCOSE BLDC GLUCOMTR-MCNC: 278 MG/DL — HIGH (ref 70–99)
GLUCOSE BLDC GLUCOMTR-MCNC: 278 MG/DL — HIGH (ref 70–99)
GLUCOSE SERPL-MCNC: 123 MG/DL — HIGH (ref 70–99)
GLUCOSE SERPL-MCNC: 123 MG/DL — HIGH (ref 70–99)
HCT VFR BLD CALC: 27.5 % — LOW (ref 39–50)
HCT VFR BLD CALC: 27.5 % — LOW (ref 39–50)
HGB BLD-MCNC: 9 G/DL — LOW (ref 13–17)
HGB BLD-MCNC: 9 G/DL — LOW (ref 13–17)
IANC: 2.97 K/UL — SIGNIFICANT CHANGE UP (ref 1.8–7.4)
IANC: 2.97 K/UL — SIGNIFICANT CHANGE UP (ref 1.8–7.4)
IMM GRANULOCYTES NFR BLD AUTO: 0.3 % — SIGNIFICANT CHANGE UP (ref 0–0.9)
IMM GRANULOCYTES NFR BLD AUTO: 0.3 % — SIGNIFICANT CHANGE UP (ref 0–0.9)
LYMPHOCYTES # BLD AUTO: 0.24 K/UL — LOW (ref 1–3.3)
LYMPHOCYTES # BLD AUTO: 0.24 K/UL — LOW (ref 1–3.3)
LYMPHOCYTES # BLD AUTO: 6.9 % — LOW (ref 13–44)
LYMPHOCYTES # BLD AUTO: 6.9 % — LOW (ref 13–44)
MAGNESIUM SERPL-MCNC: 2 MG/DL — SIGNIFICANT CHANGE UP (ref 1.6–2.6)
MAGNESIUM SERPL-MCNC: 2 MG/DL — SIGNIFICANT CHANGE UP (ref 1.6–2.6)
MCHC RBC-ENTMCNC: 30.6 PG — SIGNIFICANT CHANGE UP (ref 27–34)
MCHC RBC-ENTMCNC: 30.6 PG — SIGNIFICANT CHANGE UP (ref 27–34)
MCHC RBC-ENTMCNC: 32.7 GM/DL — SIGNIFICANT CHANGE UP (ref 32–36)
MCHC RBC-ENTMCNC: 32.7 GM/DL — SIGNIFICANT CHANGE UP (ref 32–36)
MCV RBC AUTO: 93.5 FL — SIGNIFICANT CHANGE UP (ref 80–100)
MCV RBC AUTO: 93.5 FL — SIGNIFICANT CHANGE UP (ref 80–100)
METHOD TYPE: SIGNIFICANT CHANGE UP
METHOD TYPE: SIGNIFICANT CHANGE UP
MONOCYTES # BLD AUTO: 0.23 K/UL — SIGNIFICANT CHANGE UP (ref 0–0.9)
MONOCYTES # BLD AUTO: 0.23 K/UL — SIGNIFICANT CHANGE UP (ref 0–0.9)
MONOCYTES NFR BLD AUTO: 6.6 % — SIGNIFICANT CHANGE UP (ref 2–14)
MONOCYTES NFR BLD AUTO: 6.6 % — SIGNIFICANT CHANGE UP (ref 2–14)
NEUTROPHILS # BLD AUTO: 2.97 K/UL — SIGNIFICANT CHANGE UP (ref 1.8–7.4)
NEUTROPHILS # BLD AUTO: 2.97 K/UL — SIGNIFICANT CHANGE UP (ref 1.8–7.4)
NEUTROPHILS NFR BLD AUTO: 85.3 % — HIGH (ref 43–77)
NEUTROPHILS NFR BLD AUTO: 85.3 % — HIGH (ref 43–77)
NRBC # BLD: 0 /100 WBCS — SIGNIFICANT CHANGE UP (ref 0–0)
NRBC # BLD: 0 /100 WBCS — SIGNIFICANT CHANGE UP (ref 0–0)
NRBC # FLD: 0 K/UL — SIGNIFICANT CHANGE UP (ref 0–0)
NRBC # FLD: 0 K/UL — SIGNIFICANT CHANGE UP (ref 0–0)
ORGANISM # SPEC MICROSCOPIC CNT: ABNORMAL
PHOSPHATE SERPL-MCNC: 3.1 MG/DL — SIGNIFICANT CHANGE UP (ref 2.5–4.5)
PHOSPHATE SERPL-MCNC: 3.1 MG/DL — SIGNIFICANT CHANGE UP (ref 2.5–4.5)
PLATELET # BLD AUTO: 153 K/UL — SIGNIFICANT CHANGE UP (ref 150–400)
PLATELET # BLD AUTO: 153 K/UL — SIGNIFICANT CHANGE UP (ref 150–400)
POTASSIUM SERPL-MCNC: 4.1 MMOL/L — SIGNIFICANT CHANGE UP (ref 3.5–5.3)
POTASSIUM SERPL-MCNC: 4.1 MMOL/L — SIGNIFICANT CHANGE UP (ref 3.5–5.3)
POTASSIUM SERPL-SCNC: 4.1 MMOL/L — SIGNIFICANT CHANGE UP (ref 3.5–5.3)
POTASSIUM SERPL-SCNC: 4.1 MMOL/L — SIGNIFICANT CHANGE UP (ref 3.5–5.3)
PROT SERPL-MCNC: 5.4 G/DL — LOW (ref 6–8.3)
PROT SERPL-MCNC: 5.4 G/DL — LOW (ref 6–8.3)
RBC # BLD: 2.94 M/UL — LOW (ref 4.2–5.8)
RBC # BLD: 2.94 M/UL — LOW (ref 4.2–5.8)
RBC # FLD: 13.2 % — SIGNIFICANT CHANGE UP (ref 10.3–14.5)
RBC # FLD: 13.2 % — SIGNIFICANT CHANGE UP (ref 10.3–14.5)
SODIUM SERPL-SCNC: 133 MMOL/L — LOW (ref 135–145)
SODIUM SERPL-SCNC: 133 MMOL/L — LOW (ref 135–145)
SPECIMEN SOURCE: SIGNIFICANT CHANGE UP
WBC # BLD: 3.48 K/UL — LOW (ref 3.8–10.5)
WBC # BLD: 3.48 K/UL — LOW (ref 3.8–10.5)
WBC # FLD AUTO: 3.48 K/UL — LOW (ref 3.8–10.5)
WBC # FLD AUTO: 3.48 K/UL — LOW (ref 3.8–10.5)

## 2023-11-17 PROCEDURE — 99232 SBSQ HOSP IP/OBS MODERATE 35: CPT

## 2023-11-17 PROCEDURE — 99233 SBSQ HOSP IP/OBS HIGH 50: CPT

## 2023-11-17 RX ORDER — LISINOPRIL 2.5 MG/1
5 TABLET ORAL DAILY
Refills: 0 | Status: DISCONTINUED | OUTPATIENT
Start: 2023-11-18 | End: 2023-12-23

## 2023-11-17 RX ORDER — CIPROFLOXACIN LACTATE 400MG/40ML
VIAL (ML) INTRAVENOUS
Refills: 0 | Status: DISCONTINUED | OUTPATIENT
Start: 2023-11-17 | End: 2023-11-20

## 2023-11-17 RX ORDER — CIPROFLOXACIN LACTATE 400MG/40ML
400 VIAL (ML) INTRAVENOUS ONCE
Refills: 0 | Status: COMPLETED | OUTPATIENT
Start: 2023-11-17 | End: 2023-11-17

## 2023-11-17 RX ORDER — INSULIN LISPRO 100/ML
3 VIAL (ML) SUBCUTANEOUS
Refills: 0 | Status: DISCONTINUED | OUTPATIENT
Start: 2023-11-17 | End: 2023-11-18

## 2023-11-17 RX ORDER — INSULIN GLARGINE 100 [IU]/ML
10 INJECTION, SOLUTION SUBCUTANEOUS
Refills: 0 | Status: DISCONTINUED | OUTPATIENT
Start: 2023-11-17 | End: 2023-11-17

## 2023-11-17 RX ORDER — CIPROFLOXACIN LACTATE 400MG/40ML
400 VIAL (ML) INTRAVENOUS EVERY 12 HOURS
Refills: 0 | Status: DISCONTINUED | OUTPATIENT
Start: 2023-11-17 | End: 2023-11-20

## 2023-11-17 RX ADMIN — Medication 200 MILLIGRAM(S): at 12:12

## 2023-11-17 RX ADMIN — HEPARIN SODIUM 5000 UNIT(S): 5000 INJECTION INTRAVENOUS; SUBCUTANEOUS at 22:37

## 2023-11-17 RX ADMIN — Medication 975 MILLIGRAM(S): at 05:46

## 2023-11-17 RX ADMIN — Medication 200 MILLIGRAM(S): at 17:55

## 2023-11-17 RX ADMIN — INSULIN GLARGINE 10 UNIT(S): 100 INJECTION, SOLUTION SUBCUTANEOUS at 09:46

## 2023-11-17 RX ADMIN — PANTOPRAZOLE SODIUM 40 MILLIGRAM(S): 20 TABLET, DELAYED RELEASE ORAL at 05:47

## 2023-11-17 RX ADMIN — Medication 100 MILLIGRAM(S): at 14:05

## 2023-11-17 RX ADMIN — Medication 100 MILLIGRAM(S): at 22:37

## 2023-11-17 RX ADMIN — Medication 5 UNIT(S): at 12:37

## 2023-11-17 RX ADMIN — HEPARIN SODIUM 5000 UNIT(S): 5000 INJECTION INTRAVENOUS; SUBCUTANEOUS at 13:43

## 2023-11-17 RX ADMIN — Medication 5 UNIT(S): at 09:50

## 2023-11-17 RX ADMIN — HEPARIN SODIUM 5000 UNIT(S): 5000 INJECTION INTRAVENOUS; SUBCUTANEOUS at 05:47

## 2023-11-17 RX ADMIN — Medication 975 MILLIGRAM(S): at 00:43

## 2023-11-17 RX ADMIN — Medication 975 MILLIGRAM(S): at 11:06

## 2023-11-17 RX ADMIN — AMLODIPINE BESYLATE 10 MILLIGRAM(S): 2.5 TABLET ORAL at 05:47

## 2023-11-17 RX ADMIN — CHLORHEXIDINE GLUCONATE 1 APPLICATION(S): 213 SOLUTION TOPICAL at 11:08

## 2023-11-17 RX ADMIN — Medication 975 MILLIGRAM(S): at 06:46

## 2023-11-17 RX ADMIN — Medication 3 UNIT(S): at 17:56

## 2023-11-17 RX ADMIN — LISINOPRIL 20 MILLIGRAM(S): 2.5 TABLET ORAL at 05:46

## 2023-11-17 RX ADMIN — BICALUTAMIDE 50 MILLIGRAM(S): 50 TABLET, FILM COATED ORAL at 11:08

## 2023-11-17 RX ADMIN — Medication 975 MILLIGRAM(S): at 17:53

## 2023-11-17 RX ADMIN — TAMSULOSIN HYDROCHLORIDE 0.4 MILLIGRAM(S): 0.4 CAPSULE ORAL at 22:38

## 2023-11-17 RX ADMIN — Medication 975 MILLIGRAM(S): at 23:32

## 2023-11-17 RX ADMIN — Medication 200 MILLIGRAM(S): at 00:07

## 2023-11-17 RX ADMIN — Medication 100 MILLIGRAM(S): at 05:48

## 2023-11-17 RX ADMIN — ATORVASTATIN CALCIUM 20 MILLIGRAM(S): 80 TABLET, FILM COATED ORAL at 22:37

## 2023-11-17 RX ADMIN — Medication 975 MILLIGRAM(S): at 13:51

## 2023-11-17 RX ADMIN — Medication 3: at 13:37

## 2023-11-17 NOTE — PROGRESS NOTE ADULT - NSPROGADDITIONALINFOA_GEN_ALL_CORE
Greater than 50 minutes spent with patient and on patient's care plan. Greater than 50 minutes spent with patient and on patient's care plan.    Updated Olga over the phone on 11/17.

## 2023-11-17 NOTE — PROGRESS NOTE ADULT - ASSESSMENT
73 year old with metastatic prostate cancer s/p spinal decompression.  Now with fever and high grade MSSA bacteremia    1) High grade MSSA bacteremia  Check transthoracic echocardiogram  Repeat blood culture every 48 hours until negative  Continue cefazolin 2 gram iv q 8    2) Abnormal Imaging  Fluid collections noted on imaging  Vac placed  Spine surgery and plastic surgery follow up    3) Bacteruria  Culture with GNR  States he is having trouble passing his urine and that flomax helps  Monitor symptoms  Cipro pending sensitivities    4) Adenovirus  Supportive care

## 2023-11-17 NOTE — PROGRESS NOTE ADULT - ASSESSMENT
74y/o M with DM, HTN and metastatic prostate cancer (diagnosed in 2009) that initially presented to OSH with LE weakness and R hand paresthesias now transferred to [facility] for NSG eval. Patient now s/p surgical intervention (C5-T3 posterior cervical fusion and C7-T1 decompression) by orthopedic surgery, now transferred to medicine for further management. Course complicated by MSSA bacteremia w surgical wound as likely source, last blood culture positive (11/15), f/u cultures 11/17 [ ], f/u TTE [ ]. Adenovirus + as well 11/15. Urine cultures w GNRs, f/u sensitivites from 11/15 [ ]. Continues on cefazolin (11/16- ), and cipro (11/17 - ).

## 2023-11-17 NOTE — PROGRESS NOTE ADULT - SUBJECTIVE AND OBJECTIVE BOX
Plastic Surgery Progress Note (pg [facility]: [id number], [facility]: [phone])    SUBJECTIVE  The patient was seen and examined. No acute events overnight. Pain controlled, afebrile with stable vital signs.     OBJECTIVE  ___________________________________________________  VITAL SIGNS / I&O's   Vital Signs Last 24 Hrs  T(C): 37.4 (17 Nov 2023 05:30), Max: 37.4 (16 Nov 2023 12:37)  T(F): 99.4 (17 Nov 2023 05:30), Max: 99.4 (16 Nov 2023 12:37)  HR: 99 (17 Nov 2023 05:30) (92 - 99)  BP: 129/60 (17 Nov 2023 05:30) (115/57 - 133/63)  BP(mean): --  RR: 18 (17 Nov 2023 05:30) (18 - 18)  SpO2: 98% (17 Nov 2023 05:30) (97% - 98%)    Parameters below as of 17 Nov 2023 05:30  Patient On (Oxygen Delivery Method): room air          ___________________________________________________  PHYSICAL EXAM    General: NAD, Lying in bed   Neuro: Awake and alert  Pulm: non-labored respirations  Back: Incision intact, nylons in place. Possible fluid wave, Serous drainage expressible from superior portion of incision, small opening ~1cm slightly larger from prior day. non purulent; no erythema or induration indicative of infection    ___________________________________________________  LABS                        9.0    3.48  )-----------( 153      ( 17 Nov 2023 06:40 )             27.5     17 Nov 2023 06:40    133    |  98     |  29     ----------------------------<  123    4.1     |  24     |  0.82     Ca    8.7        17 Nov 2023 06:40  Phos  3.1       17 Nov 2023 06:40  Mg     2.00      17 Nov 2023 06:40        CAPILLARY BLOOD GLUCOSE      POCT Blood Glucose.: 133 mg/dL (16 Nov 2023 21:55)  POCT Blood Glucose.: 109 mg/dL (16 Nov 2023 17:32)  POCT Blood Glucose.: 211 mg/dL (16 Nov 2023 13:43)  POCT Blood Glucose.: 208 mg/dL (16 Nov 2023 12:12)  POCT Blood Glucose.: 227 mg/dL (16 Nov 2023 10:29)  POCT Blood Glucose.: 149 mg/dL (16 Nov 2023 08:39)        Urinalysis Basic - ( 17 Nov 2023 06:40 )    Color: x / Appearance: x / SG: x / pH: x  Gluc: 123 mg/dL / Ketone: x  / Bili: x / Urobili: x   Blood: x / Protein: x / Nitrite: x   Leuk Esterase: x / RBC: x / WBC x   Sq Epi: x / Non Sq Epi: x / Bacteria: x      ___________________________________________________  MICRO  Recent Cultures:  Specimen Source: Clean Catch Clean Catch (Midstream), 11-15 @ 03:45; Results   >100,000 CFU/ml Gram Negative Rods<!>; Gram Stain: --; Organism: --  Specimen Source: .Blood Blood-Peripheral, 11-15 @ 02:05; Results   Growth in aerobic and anaerobic bottles: Staphylococcus aureus  See previous culture [id number]<!>; Gram Stain:   Growth in aerobic and anaerobic bottles: Gram Positive Cocci in Clusters<!>; Organism: --  Specimen Source: .Blood Blood-Peripheral, 11-15 @ 01:15; Results   Growth in aerobic and anaerobic bottles: Staphylococcus aureus  Direct identification is available within approximately 3-5  hours either by Blood Panel Multiplexed PCR or Direct  MALDI-TOF. Details: [email or web address]<!>; Gram Stain:   Growth in aerobic bottle: Gram Positive Cocci in Clusters  Growth in anaerobic bottle: Gram Positive Cocci in Clusters<!>; Organism: Blood Culture PCR<!>    ___________________________________________________  MEDICATIONS  (STANDING):  acetaminophen     Tablet .. 975 milliGRAM(s) Oral every 6 hours  amLODIPine   Tablet 10 milliGRAM(s) Oral daily  atorvastatin 20 milliGRAM(s) Oral at bedtime  bicalutamide 50 milliGRAM(s) Oral daily  ceFAZolin   IVPB 2000 milliGRAM(s) IV Intermittent every 8 hours  chlorhexidine 2% Cloths 1 Application(s) Topical daily  dextrose 5%. 1000 milliLiter(s) (50 mL/Hr) IV Continuous <Continuous>  dextrose 5%. 1000 milliLiter(s) (100 mL/Hr) IV Continuous <Continuous>  dextrose 50% Injectable 25 Gram(s) IV Push once  dextrose 50% Injectable 12.5 Gram(s) IV Push once  dextrose 50% Injectable 25 Gram(s) IV Push once  glucagon  Injectable 1 milliGRAM(s) IntraMuscular once  heparin   Injectable 5000 Unit(s) SubCutaneous every 8 hours  insulin glargine Injectable (LANTUS) 10 Unit(s) SubCutaneous <User Schedule>  insulin lispro (ADMELOG) corrective regimen sliding scale   SubCutaneous three times a day before meals  insulin lispro (ADMELOG) corrective regimen sliding scale   SubCutaneous at bedtime  insulin lispro Injectable (ADMELOG) 5 Unit(s) SubCutaneous three times a day before meals  lisinopril 20 milliGRAM(s) Oral daily  pantoprazole    Tablet 40 milliGRAM(s) Oral before breakfast  tamsulosin 0.4 milliGRAM(s) Oral at bedtime    MEDICATIONS  (PRN):  dextrose Oral Gel 15 Gram(s) Oral once PRN Blood Glucose LESS THAN 70 milliGRAM(s)/deciliter  guaiFENesin Oral Liquid (Sugar-Free) 200 milliGRAM(s) Oral every 6 hours PRN Cough  naloxone Injectable 0.1 milliGRAM(s) IV Push every 3 minutes PRN For ANY of the following changes in patient status:  A. RR LESS THAN 10 breaths per minute, B. Oxygen saturation LESS THAN 90%, C. Sedation score of 6  ondansetron Injectable 4 milliGRAM(s) IV Push every 6 hours PRN Nausea

## 2023-11-17 NOTE — PROGRESS NOTE ADULT - ASSESSMENT
73y Male s/p C5-T3 posterior cervical fusion, C7-T1 decompression for metastatic prostate cancer with plastics closure on 11/2. Recovering well. Both drains removed. MRI 11/14 showing possible seroma in subcutaneous space    Plan:  - Will place incisional wound vac to neck  - Gauze/Abd dressing for serous drainage; can change PRN; currently no signs of infection  - F/U fever work up - RVP +adenovirus  - Monitor for wound dehiscence  - Optimize nutrition, ensure adequate protein intake  - Remainder of care per primary team    Plastic Surgery  [phone]# [phone] pager  [phone] [facility] pager  Available on Teams

## 2023-11-17 NOTE — PROGRESS NOTE ADULT - PROBLEM SELECTOR PLAN 9
#d/w Plastic surgery  needs : Nylon sutures over incision to remain in place  - Gauze/tegaderm dressing for serosanguinous drainage; currently no collections/signs of infection  Need dressing change 1 to 2 x day. to T spine wound. F/u with Dr Tr Yoo  # Rt- Oncology---> wants out patient RT to be dons once d/c home  Also ut patient f/u with PCP/ Onc and RT oncology  #Oncology f/u with Dr Rodgers at [facility]  #GOC done 11/14, Patient is Ox3 and wants to GO HOME.  He assigned Son Juan Jose is [phone], [phone] , full code  CM will help with wound care, ?? VNS vs Family ???????  Spoke with daughter Melissa and explained we are planning for home.  11/15 d/c planning on hold due to fever eval.--> 11/15/23 blood culture--->. GP cocci in clusters, ID consult called  d/w Patient / son Giuseppe/TAMARA

## 2023-11-17 NOTE — PROGRESS NOTE ADULT - PROBLEM SELECTOR PLAN 1
Tmax 102.6 overnight 11/15 Hr 109  Sepsis sec to UTI and MSSA bacteremia  Urine pos Leuk and nitrate  Ceftriaxone started----> 11/16 changed to Ancef Q8  f/u cultures  Also positive adenovirus.  11/15/23 blood culture--->. GP cocci in clusters, ID consult called  Will repeat blood ciltures q 48 hrs  TTE [ ]  Bladder scan for retention 11/17 eval

## 2023-11-17 NOTE — CHART NOTE - NSCHARTNOTEFT_GEN_A_CORE
Endocrine     Chart reviewed     #steroid induced hyperglycemia  -Was receiving 4mg IV dexamethasone last dose 11/15/23. As per primary team no current plans to continue steroids, steroid taper completed. Please notify Endocrine if steroid plan changes     #diabetes -A1c 5.2% off medication  -Dexamethasone effect will wear off over next 24 hours  -Currently, patient with prandial hyperglycemia at lunch today   -Will stop Lantus for tomorrow   -Will decrease Admelog to 3 units before meals at this time, however, anticipate need to stop over the next 1-2 days  -Continue low Admelog correction scales at meals  -Continue low Admelog correction scales at bedtime  Plan to DC off DM medication   Endocrine      CAPILLARY BLOOD GLUCOSE      POCT Blood Glucose.: 278 mg/dL (17 Nov 2023 12:14)  POCT Blood Glucose.: 149 mg/dL (17 Nov 2023 09:48)  POCT Blood Glucose.: 120 mg/dL (17 Nov 2023 08:21)  POCT Blood Glucose.: 133 mg/dL (16 Nov 2023 21:55)  POCT Blood Glucose.: 109 mg/dL (16 Nov 2023 17:32)      MEDICATIONS  (STANDING):  acetaminophen     Tablet .. 975 milliGRAM(s) Oral every 6 hours  amLODIPine   Tablet 10 milliGRAM(s) Oral daily  atorvastatin 20 milliGRAM(s) Oral at bedtime  bicalutamide 50 milliGRAM(s) Oral daily  ceFAZolin   IVPB 2000 milliGRAM(s) IV Intermittent every 8 hours  chlorhexidine 2% Cloths 1 Application(s) Topical daily  ciprofloxacin   IVPB      ciprofloxacin   IVPB 400 milliGRAM(s) IV Intermittent every 12 hours  dextrose 5%. 1000 milliLiter(s) (50 mL/Hr) IV Continuous <Continuous>  dextrose 5%. 1000 milliLiter(s) (100 mL/Hr) IV Continuous <Continuous>  dextrose 50% Injectable 25 Gram(s) IV Push once  dextrose 50% Injectable 12.5 Gram(s) IV Push once  dextrose 50% Injectable 25 Gram(s) IV Push once  glucagon  Injectable 1 milliGRAM(s) IntraMuscular once  heparin   Injectable 5000 Unit(s) SubCutaneous every 8 hours  insulin glargine Injectable (LANTUS) 10 Unit(s) SubCutaneous <User Schedule>  insulin lispro (ADMELOG) corrective regimen sliding scale   SubCutaneous three times a day before meals  insulin lispro (ADMELOG) corrective regimen sliding scale   SubCutaneous at bedtime  insulin lispro Injectable (ADMELOG) 5 Unit(s) SubCutaneous three times a day before meals  pantoprazole    Tablet 40 milliGRAM(s) Oral before breakfast  tamsulosin 0.4 milliGRAM(s) Oral at bedtime

## 2023-11-17 NOTE — PROGRESS NOTE ADULT - SUBJECTIVE AND OBJECTIVE BOX
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 975 milliGRAM(s) Oral every 6 hours  amLODIPine   Tablet 10 milliGRAM(s) Oral daily  atorvastatin 20 milliGRAM(s) Oral at bedtime  bicalutamide 50 milliGRAM(s) Oral daily  chlorhexidine 2% Cloths 1 Application(s) Topical daily  dextrose 5%. 1000 milliLiter(s) IV Continuous <Continuous>  dextrose 5%. 1000 milliLiter(s) IV Continuous <Continuous>  dextrose 50% Injectable 25 Gram(s) IV Push once  dextrose 50% Injectable 12.5 Gram(s) IV Push once  dextrose 50% Injectable 25 Gram(s) IV Push once  dextrose Oral Gel 15 Gram(s) Oral once PRN  glucagon  Injectable 1 milliGRAM(s) IntraMuscular once  guaiFENesin Oral Liquid (Sugar-Free) 200 milliGRAM(s) Oral every 6 hours PRN  heparin   Injectable 5000 Unit(s) SubCutaneous every 8 hours  insulin lispro (ADMELOG) corrective regimen sliding scale   SubCutaneous three times a day before meals  insulin lispro (ADMELOG) corrective regimen sliding scale   SubCutaneous at bedtime  insulin lispro Injectable (ADMELOG) 3 Unit(s) SubCutaneous three times a day before meals  naloxone Injectable 0.1 milliGRAM(s) IV Push every 3 minutes PRN  ondansetron Injectable 4 milliGRAM(s) IV Push every 6 hours PRN  pantoprazole    Tablet 40 milliGRAM(s) Oral before breakfast  tamsulosin 0.4 milliGRAM(s) Oral at bedtime      Vital Signs Last 24 Hrs  T(C): 37.3 (17 Nov 2023 13:51), Max: 39.3 (17 Nov 2023 11:03)  T(F): 99.2 (17 Nov 2023 13:51), Max: 102.8 (17 Nov 2023 11:03)  HR: 88 (17 Nov 2023 11:03) (88 - 99)  BP: 123/77 (17 Nov 2023 11:03) (123/77 - 133/63)  BP(mean): --  RR: 17 (17 Nov 2023 11:03) (17 - 18)  SpO2: 100% (17 Nov 2023 11:03) (98% - 100%)    Parameters below as of 17 Nov 2023 11:03  Patient On (Oxygen Delivery Method): room air        PHYSICAL EXAM:  General: [x ] non-toxic  HEAD/EYES: [ ] PERRL [x ] white sclera [ ] icterus  ENT:  [ ] normal [ x] supple [ ] thrush [ ] pharyngeal exudate  Cardiovascular:   [ ] murmur [x ] normal [ ] PPM/AICD  Respiratory:  [ x] clear to ausculation bilaterally  GI:  [x ] soft, non-tender, normal bowel sounds  :  [ ] best [ ] no CVA tenderness   Musculoskeletal:  [ ] no synovitis  Neurologic:  [ ] non-focal exam   Skin:  [ ] xvac in place  Lymph: [x ] no lymphadenopathy  Psychiatric:  [ ] appropriate affect [ ] alert & oriented  Lines:  [x ] no phlebitis [ ] central line                                9.0    3.48  )-----------( 153      ( 17 Nov 2023 06:40 )             27.5       11-17    133<L>  |  98  |  29<H>  ----------------------------<  123<H>  4.1   |  24  |  0.82    Ca    8.7      17 Nov 2023 06:40  Phos  3.1     11-17  Mg     2.00     11-17    TPro  5.4<L>  /  Alb  2.7<L>  /  TBili  0.8  /  DBili  0.2  /  AST  32  /  ALT  50<H>  /  AlkPhos  434<H>  11-17      Urinalysis Basic - ( 17 Nov 2023 06:40 )    Color: x / Appearance: x / SG: x / pH: x  Gluc: 123 mg/dL / Ketone: x  / Bili: x / Urobili: x   Blood: x / Protein: x / Nitrite: x   Leuk Esterase: x / RBC: x / WBC x   Sq Epi: x / Non Sq Epi: x / Bacteria: x        MICROBIOLOGY:Culture Results:   No growth at 24 hours (11-16-23 @ 06:05)  Culture Results:   No growth at 24 hours (11-16-23 @ 06:00)  Culture Results:   >100,000 CFU/ml Gram Negative Rods (11-15-23 @ 03:45)  Culture Results:   Growth in aerobic and anaerobic bottles: Staphylococcus aureus  See previous culture [id number] (11-15-23 @ 02:05)  Culture Results:   Growth in aerobic and anaerobic bottles: Staphylococcus aureus  Direct identification is available within approximately 3-5  hours either by Blood Panel Multiplexed PCR or Direct  MALDI-TOF. Details: [email or web address] (11-15-23 @ 01:15)      RADIOLOGY:

## 2023-11-17 NOTE — PROGRESS NOTE ADULT - SUBJECTIVE AND OBJECTIVE BOX
[facility] Division of Hospital Medicine  Meme Kennedy MD  Pager (PARTHA-F, 8A-5P): [phone] or TEAMS  Other Times:  [phone]      SUBJECTIVE / OVERNIGHT EVENTS: Febrile this am, shivering during exam. Endorses a dry cough. States he is having trouble urinating, last urinated yesterday evening. No chest pain. Does endorse neck pain and dull bilateral flank pain. Last BM 2 days prior.    MEDICATIONS  (STANDING):  acetaminophen     Tablet .. 975 milliGRAM(s) Oral every 6 hours  amLODIPine   Tablet 10 milliGRAM(s) Oral daily  atorvastatin 20 milliGRAM(s) Oral at bedtime  bicalutamide 50 milliGRAM(s) Oral daily  ceFAZolin   IVPB 2000 milliGRAM(s) IV Intermittent every 8 hours  chlorhexidine 2% Cloths 1 Application(s) Topical daily  ciprofloxacin   IVPB      dextrose 5%. 1000 milliLiter(s) (50 mL/Hr) IV Continuous <Continuous>  dextrose 5%. 1000 milliLiter(s) (100 mL/Hr) IV Continuous <Continuous>  dextrose 50% Injectable 25 Gram(s) IV Push once  dextrose 50% Injectable 12.5 Gram(s) IV Push once  dextrose 50% Injectable 25 Gram(s) IV Push once  glucagon  Injectable 1 milliGRAM(s) IntraMuscular once  heparin   Injectable 5000 Unit(s) SubCutaneous every 8 hours  insulin glargine Injectable (LANTUS) 10 Unit(s) SubCutaneous <User Schedule>  insulin lispro (ADMELOG) corrective regimen sliding scale   SubCutaneous three times a day before meals  insulin lispro (ADMELOG) corrective regimen sliding scale   SubCutaneous at bedtime  insulin lispro Injectable (ADMELOG) 5 Unit(s) SubCutaneous three times a day before meals  lisinopril 20 milliGRAM(s) Oral daily  pantoprazole    Tablet 40 milliGRAM(s) Oral before breakfast  tamsulosin 0.4 milliGRAM(s) Oral at bedtime    MEDICATIONS  (PRN):  dextrose Oral Gel 15 Gram(s) Oral once PRN Blood Glucose LESS THAN 70 milliGRAM(s)/deciliter  guaiFENesin Oral Liquid (Sugar-Free) 200 milliGRAM(s) Oral every 6 hours PRN Cough  naloxone Injectable 0.1 milliGRAM(s) IV Push every 3 minutes PRN For ANY of the following changes in patient status:  A. RR LESS THAN 10 breaths per minute, B. Oxygen saturation LESS THAN 90%, C. Sedation score of 6  ondansetron Injectable 4 milliGRAM(s) IV Push every 6 hours PRN Nausea      I&O's Summary      PHYSICAL EXAM:  Vital Signs Last 24 Hrs  T(C): 39.3 (17 Nov 2023 11:03), Max: 39.3 (17 Nov 2023 11:03)  T(F): 102.8 (17 Nov 2023 11:03), Max: 102.8 (17 Nov 2023 11:03)  HR: 88 (17 Nov 2023 11:03) (88 - 99)  BP: 123/77 (17 Nov 2023 11:03) (115/57 - 133/63)  BP(mean): --  RR: 17 (17 Nov 2023 11:03) (17 - 18)  SpO2: 100% (17 Nov 2023 11:03) (97% - 100%)    Parameters below as of 17 Nov 2023 11:03  Patient On (Oxygen Delivery Method): room air        PHYSICAL EXAM:  GENERAL: distressed, rigors  HEAD:  Atraumatic, Normocephalic  EYES: EOMI, PERRLA, conjunctiva and sclera clear  NECK: Supple, No JVD  CHEST/LUNG: Clear to auscultation bilaterally; No wheeze  HEART: Regular rate and rhythm; No murmurs, rubs, or gallops  ABDOMEN: Soft, Nontender, Nondistended; Bowel sounds present  EXTREMITIES:  2+ Peripheral Pulses, No clubbing, cyanosis, or edema  PSYCH: AAOx3  NEUROLOGY: non-focal  SKIN: wound vac placed on 11/17 am    LABS:                        9.0    3.48  )-----------( 153      ( 17 Nov 2023 06:40 )             27.5     11-17    133<L>  |  98  |  29<H>  ----------------------------<  123<H>  4.1   |  24  |  0.82    Ca    8.7      17 Nov 2023 06:40  Phos  3.1     11-17  Mg     2.00     11-17            Urinalysis Basic - ( 17 Nov 2023 06:40 )    Color: x / Appearance: x / SG: x / pH: x  Gluc: 123 mg/dL / Ketone: x  / Bili: x / Urobili: x   Blood: x / Protein: x / Nitrite: x   Leuk Esterase: x / RBC: x / WBC x   Sq Epi: x / Non Sq Epi: x / Bacteria: x        Culture - Blood (collected 16 Nov 2023 06:05)  Source: .Blood Blood-Venous  Preliminary Report (17 Nov 2023 10:02):    No growth at 24 hours    Culture - Blood (collected 16 Nov 2023 06:00)  Source: .Blood Blood-Peripheral  Preliminary Report (17 Nov 2023 10:02):    No growth at 24 hours    Culture - Urine (collected 15 Nov 2023 03:45)  Source: Clean Catch Clean Catch (Midstream)  Preliminary Report (16 Nov 2023 14:52):    >100,000 CFU/ml Gram Negative Rods    Culture - Blood (collected 15 Nov 2023 02:05)  Source: .Blood Blood-Peripheral  Gram Stain (15 Nov 2023 19:40):    Growth in aerobic and anaerobic bottles: Gram Positive Cocci in Clusters  Preliminary Report (16 Nov 2023 17:03):    Growth in aerobic and anaerobic bottles: Staphylococcus aureus    See previous culture [id number]    Culture - Blood (collected 15 Nov 2023 01:15)  Source: .Blood Blood-Peripheral  Gram Stain (15 Nov 2023 20:17):    Growth in aerobic bottle: Gram Positive Cocci in Clusters    Growth in anaerobic bottle: Gram Positive Cocci in Clusters  Preliminary Report (16 Nov 2023 17:04):    Growth in aerobic and anaerobic bottles: Staphylococcus aureus    Direct identification is available within approximately 3-5    hours either by Blood Panel Multiplexed PCR or Direct    MALDI-TOF. Details: [email or web address]  Organism: Blood Culture PCR (15 Nov 2023 21:37)  Organism: Blood Culture PCR (15 Nov 2023 21:37)        RADIOLOGY & ADDITIONAL TESTS:  Results Reviewed:   Imaging Personally Reviewed:  Electrocardiogram Personally Reviewed:    COORDINATION OF CARE:  Care Discussed with Consultants/Other Providers [Y/N]: ID, plastics  Prior or Outpatient Records Reviewed [Y/N]:

## 2023-11-17 NOTE — CHART NOTE - NSCHARTNOTEFT_GEN_A_CORE
Source: Patient [X]    Family [ ]     Other (Chart Review) [X]    Medical Course: Per chart review, patient is a 73y Male with PMH diabetes mellitus, HTN, and metastatic prostate cancer (diagnosed in 2009) that initially presented to OSH with LE weakness and R hand paresthesias now transferred to [facility] for NSG eval. Patient now status post surgical intervention (C5-T3 posterior cervical fusion and C7-T1 decompression) by orthopedic surgery, now transferred to medicine for further management. Course complicated by MSSA bacteremia with surgical wound as likely source, Adenovirus + as well 11/15.    Nutrition Course: Patient seen at bedside this AM. Patient is currently ordered for a PO diet with Glucerna Shake TID supplementation. Patient reports a poor appetite during course of admission, however documented on RN flowsheets as largely consuming % of meals. Please continue to document and monitor patient's PO intake trends. Patient reports dislike of Glucerna Shake supplement, amenable to try [employer] Glucose Support alternatively. Patient encouraged to complete daily menu selections to further help promote PO intake. Patient denies any chewing or swallowing difficulty on current diet order. No report of GI distress (nausea, vomiting, diarrhea, constipation). Noted HbA1c 5.2% (10/23), within normal limits.    Diet : Diet, Regular:   Consistent Carbohydrate {Evening Snack} (CSTCHOSN)  Supplement Feeding Modality:  Oral  Glucerna Shake Cans or Servings Per Day:  3       Frequency:  Daily (11-16-23 @ 13:13)    Anthropometrics  Height: 170.2cm/67inches  Weights per RN flowsheets: 94.6kg (11/7), 94.5kg (11/6), 91.8kg (11/5), 93.6kg (11/4), 93.9kg (11/2)  BMI: 32.59kg/m^2  % Weight Change: +0.7kg (<1%) over 5 days period of time    Pertinent Medications: MEDICATIONS  (STANDING):  acetaminophen     Tablet .. 975 milliGRAM(s) Oral every 6 hours  amLODIPine   Tablet 10 milliGRAM(s) Oral daily  atorvastatin 20 milliGRAM(s) Oral at bedtime  bicalutamide 50 milliGRAM(s) Oral daily  ceFAZolin   IVPB 2000 milliGRAM(s) IV Intermittent every 8 hours  chlorhexidine 2% Cloths 1 Application(s) Topical daily  ciprofloxacin   IVPB      ciprofloxacin   IVPB 400 milliGRAM(s) IV Intermittent every 12 hours  dextrose 5%. 1000 milliLiter(s) (50 mL/Hr) IV Continuous <Continuous>  dextrose 5%. 1000 milliLiter(s) (100 mL/Hr) IV Continuous <Continuous>  dextrose 50% Injectable 25 Gram(s) IV Push once  dextrose 50% Injectable 12.5 Gram(s) IV Push once  dextrose 50% Injectable 25 Gram(s) IV Push once  glucagon  Injectable 1 milliGRAM(s) IntraMuscular once  heparin   Injectable 5000 Unit(s) SubCutaneous every 8 hours  insulin glargine Injectable (LANTUS) 10 Unit(s) SubCutaneous <User Schedule>  insulin lispro (ADMELOG) corrective regimen sliding scale   SubCutaneous three times a day before meals  insulin lispro (ADMELOG) corrective regimen sliding scale   SubCutaneous at bedtime  insulin lispro Injectable (ADMELOG) 5 Unit(s) SubCutaneous three times a day before meals  pantoprazole    Tablet 40 milliGRAM(s) Oral before breakfast  tamsulosin 0.4 milliGRAM(s) Oral at bedtime    MEDICATIONS  (PRN):  dextrose Oral Gel 15 Gram(s) Oral once PRN Blood Glucose LESS THAN 70 milliGRAM(s)/deciliter  guaiFENesin Oral Liquid (Sugar-Free) 200 milliGRAM(s) Oral every 6 hours PRN Cough  naloxone Injectable 0.1 milliGRAM(s) IV Push every 3 minutes PRN For ANY of the following changes in patient status:  A. RR LESS THAN 10 breaths per minute, B. Oxygen saturation LESS THAN 90%, C. Sedation score of 6  ondansetron Injectable 4 milliGRAM(s) IV Push every 6 hours PRN Nausea    Pertinent Labs:  11-17 Na133 mmol/L<L> Glu 123 mg/dL<H> K+ 4.1 mmol/L Cr  0.82 mg/dL BUN 29 mg/dL<H> 11-17 Phos 3.1 mg/dL 11-17 Alb 2.7 g/dL<L>    Skin: MASD (sacral/gluteal), surgical incision, right knee abrasion per RN flowsheet    Fluid: Edema 2+ (left foot, right foot) per RN flowsheet    GI: Last BM 11/14/23 per RN flowsheet documentation. Not noted to be on a bowel regimen.     Estimated Needs:   [X] no change since previous assessment  Weight Used: Ideal body weight 163lb/73.9kg  Estimated energy needs: 1847-2217kcal (based on 25-30kcal/kg)  Estimated protein needs: 88.68-96.07gms (based on 1.2-1.3gms/kg)    Previous Nutrition Diagnosis: Unintended weight loss    Nutrition Diagnosis is [X] ongoing    New Nutrition Diagnosis: Not applicable    Education: [X] Given today    Type of education provided: Writer provided verbal education regarding current diet order and nutrition recommendations for after discharge, including a low sugar, low sodium dietary pattern. Patient verbalized understanding to the discussion.     Nutrition Recommendations  - Continue current diet order, as tolerated: consistent carbohydrate {evening snack}  --> Consider liberalization of diet to regular at medical team's discretion, recent HbA1c 5.2% (within normal limits)  - Recommend change oral nutrition supplement from Glucerna Shake to [employer] Glucose Support (provides 300kcal, 16gms protein per serving) TID as per patient preference  - Monitor weights, labs, BM's, skin integrity, p.o. intake.   - Please monitor % PO intake on flowsheets   - Honor food preferences as able within therapeutic diet order.     Monitoring and Evaluation:   [X] PO intake [ ] Tolerance to diet prescription [X] weights [X] follow up per protocol    MS SARI Kenyon  On [employer] Teams or Pager #[phone]

## 2023-11-17 NOTE — PROGRESS NOTE ADULT - PROBLEM SELECTOR PLAN 2
Patient initially presenting with LE weakness and well as RUE parasthesias.  - patient found to have numerous spinal cord lesions  - noted with spinous process fractures of C7 and T1  - also noted with neoplasm with epidural expansion causing cord compression from C7-T3  - now s/p C5-T3 posterior cervical fusion and C7-T1 decompression by orthopedic surgery on 11/2  - weakness/parasthesias now markedly improved  - c/w dexamethasone 4mg q6h  - s/p flap closure of back surgical site by plastic surgery with b/l AGGIE drains; wound vac placed on 11/17  - L AGGIE drain removed on 11/7, R AGGIE drain removed 11/10  - appreciate further orthopedics/plastic surgery recs  - d/w rad/onc, no plan for inpatient RT, recommend outpatient follow up  - per rad/onc will obtain repeat thoracic spine MRI prior to discharge --> done with f/u Ortho and Rt-Onc

## 2023-11-18 LAB
-  AMOXICILLIN/CLAVULANIC ACID: SIGNIFICANT CHANGE UP
-  AMOXICILLIN/CLAVULANIC ACID: SIGNIFICANT CHANGE UP
-  AMPICILLIN/SULBACTAM: SIGNIFICANT CHANGE UP
-  AMPICILLIN/SULBACTAM: SIGNIFICANT CHANGE UP
-  AMPICILLIN: SIGNIFICANT CHANGE UP
-  AZTREONAM: SIGNIFICANT CHANGE UP
-  AZTREONAM: SIGNIFICANT CHANGE UP
-  CEFAZOLIN: SIGNIFICANT CHANGE UP
-  CEFAZOLIN: SIGNIFICANT CHANGE UP
-  CEFEPIME: SIGNIFICANT CHANGE UP
-  CEFEPIME: SIGNIFICANT CHANGE UP
-  CEFOXITIN: SIGNIFICANT CHANGE UP
-  CEFOXITIN: SIGNIFICANT CHANGE UP
-  CEFTRIAXONE: SIGNIFICANT CHANGE UP
-  CEFTRIAXONE: SIGNIFICANT CHANGE UP
-  CIPROFLOXACIN: SIGNIFICANT CHANGE UP
-  ERTAPENEM: SIGNIFICANT CHANGE UP
-  ERTAPENEM: SIGNIFICANT CHANGE UP
-  GENTAMICIN: SIGNIFICANT CHANGE UP
-  GENTAMICIN: SIGNIFICANT CHANGE UP
-  IMIPENEM: SIGNIFICANT CHANGE UP
-  IMIPENEM: SIGNIFICANT CHANGE UP
-  LEVOFLOXACIN: SIGNIFICANT CHANGE UP
-  MEROPENEM: SIGNIFICANT CHANGE UP
-  MEROPENEM: SIGNIFICANT CHANGE UP
-  NITROFURANTOIN: SIGNIFICANT CHANGE UP
-  PIPERACILLIN/TAZOBACTAM: SIGNIFICANT CHANGE UP
-  PIPERACILLIN/TAZOBACTAM: SIGNIFICANT CHANGE UP
-  TETRACYCLINE: SIGNIFICANT CHANGE UP
-  TETRACYCLINE: SIGNIFICANT CHANGE UP
-  TOBRAMYCIN: SIGNIFICANT CHANGE UP
-  TOBRAMYCIN: SIGNIFICANT CHANGE UP
-  TRIMETHOPRIM/SULFAMETHOXAZOLE: SIGNIFICANT CHANGE UP
-  TRIMETHOPRIM/SULFAMETHOXAZOLE: SIGNIFICANT CHANGE UP
-  VANCOMYCIN: SIGNIFICANT CHANGE UP
-  VANCOMYCIN: SIGNIFICANT CHANGE UP
ANION GAP SERPL CALC-SCNC: 12 MMOL/L — SIGNIFICANT CHANGE UP (ref 7–14)
ANION GAP SERPL CALC-SCNC: 12 MMOL/L — SIGNIFICANT CHANGE UP (ref 7–14)
ANION GAP SERPL CALC-SCNC: 15 MMOL/L — HIGH (ref 7–14)
ANION GAP SERPL CALC-SCNC: 15 MMOL/L — HIGH (ref 7–14)
APPEARANCE UR: CLEAR — SIGNIFICANT CHANGE UP
APPEARANCE UR: CLEAR — SIGNIFICANT CHANGE UP
BACTERIA # UR AUTO: NEGATIVE /HPF — SIGNIFICANT CHANGE UP
BACTERIA # UR AUTO: NEGATIVE /HPF — SIGNIFICANT CHANGE UP
BASOPHILS # BLD AUTO: 0 K/UL — SIGNIFICANT CHANGE UP (ref 0–0.2)
BASOPHILS # BLD AUTO: 0 K/UL — SIGNIFICANT CHANGE UP (ref 0–0.2)
BASOPHILS # BLD AUTO: 0.03 K/UL — SIGNIFICANT CHANGE UP (ref 0–0.2)
BASOPHILS # BLD AUTO: 0.03 K/UL — SIGNIFICANT CHANGE UP (ref 0–0.2)
BASOPHILS NFR BLD AUTO: 0 % — SIGNIFICANT CHANGE UP (ref 0–2)
BASOPHILS NFR BLD AUTO: 0 % — SIGNIFICANT CHANGE UP (ref 0–2)
BASOPHILS NFR BLD AUTO: 0.9 % — SIGNIFICANT CHANGE UP (ref 0–2)
BASOPHILS NFR BLD AUTO: 0.9 % — SIGNIFICANT CHANGE UP (ref 0–2)
BILIRUB UR-MCNC: NEGATIVE — SIGNIFICANT CHANGE UP
BILIRUB UR-MCNC: NEGATIVE — SIGNIFICANT CHANGE UP
BLD GP AB SCN SERPL QL: NEGATIVE — SIGNIFICANT CHANGE UP
BLD GP AB SCN SERPL QL: NEGATIVE — SIGNIFICANT CHANGE UP
BUN SERPL-MCNC: 22 MG/DL — SIGNIFICANT CHANGE UP (ref 7–23)
BUN SERPL-MCNC: 22 MG/DL — SIGNIFICANT CHANGE UP (ref 7–23)
BUN SERPL-MCNC: 23 MG/DL — SIGNIFICANT CHANGE UP (ref 7–23)
BUN SERPL-MCNC: 23 MG/DL — SIGNIFICANT CHANGE UP (ref 7–23)
CALCIUM SERPL-MCNC: 8.3 MG/DL — LOW (ref 8.4–10.5)
CAST: 1 /LPF — SIGNIFICANT CHANGE UP (ref 0–4)
CAST: 1 /LPF — SIGNIFICANT CHANGE UP (ref 0–4)
CHLORIDE SERPL-SCNC: 93 MMOL/L — LOW (ref 98–107)
CHLORIDE SERPL-SCNC: 93 MMOL/L — LOW (ref 98–107)
CHLORIDE SERPL-SCNC: 99 MMOL/L — SIGNIFICANT CHANGE UP (ref 98–107)
CHLORIDE SERPL-SCNC: 99 MMOL/L — SIGNIFICANT CHANGE UP (ref 98–107)
CO2 SERPL-SCNC: 23 MMOL/L — SIGNIFICANT CHANGE UP (ref 22–31)
COLOR SPEC: YELLOW — SIGNIFICANT CHANGE UP
COLOR SPEC: YELLOW — SIGNIFICANT CHANGE UP
CREAT SERPL-MCNC: 0.86 MG/DL — SIGNIFICANT CHANGE UP (ref 0.5–1.3)
CREAT SERPL-MCNC: 0.86 MG/DL — SIGNIFICANT CHANGE UP (ref 0.5–1.3)
CREAT SERPL-MCNC: 0.91 MG/DL — SIGNIFICANT CHANGE UP (ref 0.5–1.3)
CREAT SERPL-MCNC: 0.91 MG/DL — SIGNIFICANT CHANGE UP (ref 0.5–1.3)
CULTURE RESULTS: ABNORMAL
CULTURE RESULTS: ABNORMAL
DIFF PNL FLD: ABNORMAL
DIFF PNL FLD: ABNORMAL
EGFR: 90 ML/MIN/1.73M2 — SIGNIFICANT CHANGE UP
EGFR: 90 ML/MIN/1.73M2 — SIGNIFICANT CHANGE UP
EGFR: 93 ML/MIN/1.73M2 — SIGNIFICANT CHANGE UP
EGFR: 93 ML/MIN/1.73M2 — SIGNIFICANT CHANGE UP
EOSINOPHIL # BLD AUTO: 0.05 K/UL — SIGNIFICANT CHANGE UP (ref 0–0.5)
EOSINOPHIL # BLD AUTO: 0.05 K/UL — SIGNIFICANT CHANGE UP (ref 0–0.5)
EOSINOPHIL # BLD AUTO: 0.11 K/UL — SIGNIFICANT CHANGE UP (ref 0–0.5)
EOSINOPHIL # BLD AUTO: 0.11 K/UL — SIGNIFICANT CHANGE UP (ref 0–0.5)
EOSINOPHIL NFR BLD AUTO: 1.7 % — SIGNIFICANT CHANGE UP (ref 0–6)
EOSINOPHIL NFR BLD AUTO: 1.7 % — SIGNIFICANT CHANGE UP (ref 0–6)
EOSINOPHIL NFR BLD AUTO: 3.5 % — SIGNIFICANT CHANGE UP (ref 0–6)
EOSINOPHIL NFR BLD AUTO: 3.5 % — SIGNIFICANT CHANGE UP (ref 0–6)
GLUCOSE BLDC GLUCOMTR-MCNC: 113 MG/DL — HIGH (ref 70–99)
GLUCOSE BLDC GLUCOMTR-MCNC: 113 MG/DL — HIGH (ref 70–99)
GLUCOSE BLDC GLUCOMTR-MCNC: 188 MG/DL — HIGH (ref 70–99)
GLUCOSE BLDC GLUCOMTR-MCNC: 188 MG/DL — HIGH (ref 70–99)
GLUCOSE BLDC GLUCOMTR-MCNC: 201 MG/DL — HIGH (ref 70–99)
GLUCOSE BLDC GLUCOMTR-MCNC: 201 MG/DL — HIGH (ref 70–99)
GLUCOSE BLDC GLUCOMTR-MCNC: 215 MG/DL — HIGH (ref 70–99)
GLUCOSE BLDC GLUCOMTR-MCNC: 215 MG/DL — HIGH (ref 70–99)
GLUCOSE SERPL-MCNC: 188 MG/DL — HIGH (ref 70–99)
GLUCOSE SERPL-MCNC: 188 MG/DL — HIGH (ref 70–99)
GLUCOSE SERPL-MCNC: 396 MG/DL — HIGH (ref 70–99)
GLUCOSE SERPL-MCNC: 396 MG/DL — HIGH (ref 70–99)
GLUCOSE UR QL: NEGATIVE MG/DL — SIGNIFICANT CHANGE UP
GLUCOSE UR QL: NEGATIVE MG/DL — SIGNIFICANT CHANGE UP
HCT VFR BLD CALC: 23 % — LOW (ref 39–50)
HCT VFR BLD CALC: 23 % — LOW (ref 39–50)
HCT VFR BLD CALC: 23.4 % — LOW (ref 39–50)
HCT VFR BLD CALC: 23.4 % — LOW (ref 39–50)
HCT VFR BLD CALC: 24.7 % — LOW (ref 39–50)
HCT VFR BLD CALC: 24.7 % — LOW (ref 39–50)
HGB BLD-MCNC: 7.6 G/DL — LOW (ref 13–17)
HGB BLD-MCNC: 7.8 G/DL — LOW (ref 13–17)
HGB BLD-MCNC: 7.8 G/DL — LOW (ref 13–17)
IANC: 2.4 K/UL — SIGNIFICANT CHANGE UP (ref 1.8–7.4)
IANC: 2.4 K/UL — SIGNIFICANT CHANGE UP (ref 1.8–7.4)
IANC: 2.67 K/UL — SIGNIFICANT CHANGE UP (ref 1.8–7.4)
IANC: 2.67 K/UL — SIGNIFICANT CHANGE UP (ref 1.8–7.4)
IMM GRANULOCYTES NFR BLD AUTO: 0.3 % — SIGNIFICANT CHANGE UP (ref 0–0.9)
IMM GRANULOCYTES NFR BLD AUTO: 0.3 % — SIGNIFICANT CHANGE UP (ref 0–0.9)
KETONES UR-MCNC: NEGATIVE MG/DL — SIGNIFICANT CHANGE UP
KETONES UR-MCNC: NEGATIVE MG/DL — SIGNIFICANT CHANGE UP
LEUKOCYTE ESTERASE UR-ACNC: ABNORMAL
LEUKOCYTE ESTERASE UR-ACNC: ABNORMAL
LYMPHOCYTES # BLD AUTO: 0.08 K/UL — LOW (ref 1–3.3)
LYMPHOCYTES # BLD AUTO: 0.08 K/UL — LOW (ref 1–3.3)
LYMPHOCYTES # BLD AUTO: 0.3 K/UL — LOW (ref 1–3.3)
LYMPHOCYTES # BLD AUTO: 0.3 K/UL — LOW (ref 1–3.3)
LYMPHOCYTES # BLD AUTO: 10.2 % — LOW (ref 13–44)
LYMPHOCYTES # BLD AUTO: 10.2 % — LOW (ref 13–44)
LYMPHOCYTES # BLD AUTO: 2.6 % — LOW (ref 13–44)
LYMPHOCYTES # BLD AUTO: 2.6 % — LOW (ref 13–44)
MAGNESIUM SERPL-MCNC: 2 MG/DL — SIGNIFICANT CHANGE UP (ref 1.6–2.6)
MAGNESIUM SERPL-MCNC: 2 MG/DL — SIGNIFICANT CHANGE UP (ref 1.6–2.6)
MCHC RBC-ENTMCNC: 30.4 PG — SIGNIFICANT CHANGE UP (ref 27–34)
MCHC RBC-ENTMCNC: 30.4 PG — SIGNIFICANT CHANGE UP (ref 27–34)
MCHC RBC-ENTMCNC: 30.8 GM/DL — LOW (ref 32–36)
MCHC RBC-ENTMCNC: 30.8 GM/DL — LOW (ref 32–36)
MCHC RBC-ENTMCNC: 30.8 PG — SIGNIFICANT CHANGE UP (ref 27–34)
MCHC RBC-ENTMCNC: 30.8 PG — SIGNIFICANT CHANGE UP (ref 27–34)
MCHC RBC-ENTMCNC: 31 PG — SIGNIFICANT CHANGE UP (ref 27–34)
MCHC RBC-ENTMCNC: 31 PG — SIGNIFICANT CHANGE UP (ref 27–34)
MCHC RBC-ENTMCNC: 33 GM/DL — SIGNIFICANT CHANGE UP (ref 32–36)
MCHC RBC-ENTMCNC: 33 GM/DL — SIGNIFICANT CHANGE UP (ref 32–36)
MCHC RBC-ENTMCNC: 33.3 GM/DL — SIGNIFICANT CHANGE UP (ref 32–36)
MCHC RBC-ENTMCNC: 33.3 GM/DL — SIGNIFICANT CHANGE UP (ref 32–36)
MCV RBC AUTO: 100 FL — SIGNIFICANT CHANGE UP (ref 80–100)
MCV RBC AUTO: 100 FL — SIGNIFICANT CHANGE UP (ref 80–100)
MCV RBC AUTO: 92 FL — SIGNIFICANT CHANGE UP (ref 80–100)
MCV RBC AUTO: 92 FL — SIGNIFICANT CHANGE UP (ref 80–100)
MCV RBC AUTO: 92.9 FL — SIGNIFICANT CHANGE UP (ref 80–100)
MCV RBC AUTO: 92.9 FL — SIGNIFICANT CHANGE UP (ref 80–100)
METHOD TYPE: SIGNIFICANT CHANGE UP
MONOCYTES # BLD AUTO: 0.19 K/UL — SIGNIFICANT CHANGE UP (ref 0–0.9)
MONOCYTES # BLD AUTO: 0.19 K/UL — SIGNIFICANT CHANGE UP (ref 0–0.9)
MONOCYTES # BLD AUTO: 0.26 K/UL — SIGNIFICANT CHANGE UP (ref 0–0.9)
MONOCYTES # BLD AUTO: 0.26 K/UL — SIGNIFICANT CHANGE UP (ref 0–0.9)
MONOCYTES NFR BLD AUTO: 6.4 % — SIGNIFICANT CHANGE UP (ref 2–14)
MONOCYTES NFR BLD AUTO: 6.4 % — SIGNIFICANT CHANGE UP (ref 2–14)
MONOCYTES NFR BLD AUTO: 8 % — SIGNIFICANT CHANGE UP (ref 2–14)
MONOCYTES NFR BLD AUTO: 8 % — SIGNIFICANT CHANGE UP (ref 2–14)
NEUTROPHILS # BLD AUTO: 2.4 K/UL — SIGNIFICANT CHANGE UP (ref 1.8–7.4)
NEUTROPHILS # BLD AUTO: 2.4 K/UL — SIGNIFICANT CHANGE UP (ref 1.8–7.4)
NEUTROPHILS # BLD AUTO: 2.52 K/UL — SIGNIFICANT CHANGE UP (ref 1.8–7.4)
NEUTROPHILS # BLD AUTO: 2.52 K/UL — SIGNIFICANT CHANGE UP (ref 1.8–7.4)
NEUTROPHILS NFR BLD AUTO: 70.8 % — SIGNIFICANT CHANGE UP (ref 43–77)
NEUTROPHILS NFR BLD AUTO: 70.8 % — SIGNIFICANT CHANGE UP (ref 43–77)
NEUTROPHILS NFR BLD AUTO: 81.4 % — HIGH (ref 43–77)
NEUTROPHILS NFR BLD AUTO: 81.4 % — HIGH (ref 43–77)
NITRITE UR-MCNC: NEGATIVE — SIGNIFICANT CHANGE UP
NITRITE UR-MCNC: NEGATIVE — SIGNIFICANT CHANGE UP
NRBC # BLD: 0 /100 WBCS — SIGNIFICANT CHANGE UP (ref 0–0)
NRBC # FLD: 0 K/UL — SIGNIFICANT CHANGE UP (ref 0–0)
ORGANISM # SPEC MICROSCOPIC CNT: ABNORMAL
PH UR: 5.5 — SIGNIFICANT CHANGE UP (ref 5–8)
PH UR: 5.5 — SIGNIFICANT CHANGE UP (ref 5–8)
PHOSPHATE SERPL-MCNC: 3 MG/DL — SIGNIFICANT CHANGE UP (ref 2.5–4.5)
PHOSPHATE SERPL-MCNC: 3 MG/DL — SIGNIFICANT CHANGE UP (ref 2.5–4.5)
PLATELET # BLD AUTO: 140 K/UL — LOW (ref 150–400)
PLATELET # BLD AUTO: 140 K/UL — LOW (ref 150–400)
PLATELET # BLD AUTO: 169 K/UL — SIGNIFICANT CHANGE UP (ref 150–400)
PLATELET # BLD AUTO: 169 K/UL — SIGNIFICANT CHANGE UP (ref 150–400)
PLATELET # BLD AUTO: 180 K/UL — SIGNIFICANT CHANGE UP (ref 150–400)
PLATELET # BLD AUTO: 180 K/UL — SIGNIFICANT CHANGE UP (ref 150–400)
POTASSIUM SERPL-MCNC: 4 MMOL/L — SIGNIFICANT CHANGE UP (ref 3.5–5.3)
POTASSIUM SERPL-MCNC: 4 MMOL/L — SIGNIFICANT CHANGE UP (ref 3.5–5.3)
POTASSIUM SERPL-MCNC: 4.2 MMOL/L — SIGNIFICANT CHANGE UP (ref 3.5–5.3)
POTASSIUM SERPL-MCNC: 4.2 MMOL/L — SIGNIFICANT CHANGE UP (ref 3.5–5.3)
POTASSIUM SERPL-SCNC: 4 MMOL/L — SIGNIFICANT CHANGE UP (ref 3.5–5.3)
POTASSIUM SERPL-SCNC: 4 MMOL/L — SIGNIFICANT CHANGE UP (ref 3.5–5.3)
POTASSIUM SERPL-SCNC: 4.2 MMOL/L — SIGNIFICANT CHANGE UP (ref 3.5–5.3)
POTASSIUM SERPL-SCNC: 4.2 MMOL/L — SIGNIFICANT CHANGE UP (ref 3.5–5.3)
PROT UR-MCNC: SIGNIFICANT CHANGE UP MG/DL
PROT UR-MCNC: SIGNIFICANT CHANGE UP MG/DL
RBC # BLD: 2.47 M/UL — LOW (ref 4.2–5.8)
RBC # BLD: 2.47 M/UL — LOW (ref 4.2–5.8)
RBC # BLD: 2.5 M/UL — LOW (ref 4.2–5.8)
RBC # BLD: 2.5 M/UL — LOW (ref 4.2–5.8)
RBC # BLD: 2.52 M/UL — LOW (ref 4.2–5.8)
RBC # BLD: 2.52 M/UL — LOW (ref 4.2–5.8)
RBC # FLD: 13.3 % — SIGNIFICANT CHANGE UP (ref 10.3–14.5)
RBC # FLD: 13.3 % — SIGNIFICANT CHANGE UP (ref 10.3–14.5)
RBC # FLD: 13.4 % — SIGNIFICANT CHANGE UP (ref 10.3–14.5)
RBC CASTS # UR COMP ASSIST: 2 /HPF — SIGNIFICANT CHANGE UP (ref 0–4)
RBC CASTS # UR COMP ASSIST: 2 /HPF — SIGNIFICANT CHANGE UP (ref 0–4)
RH IG SCN BLD-IMP: POSITIVE — SIGNIFICANT CHANGE UP
RH IG SCN BLD-IMP: POSITIVE — SIGNIFICANT CHANGE UP
SODIUM SERPL-SCNC: 131 MMOL/L — LOW (ref 135–145)
SODIUM SERPL-SCNC: 131 MMOL/L — LOW (ref 135–145)
SODIUM SERPL-SCNC: 134 MMOL/L — LOW (ref 135–145)
SODIUM SERPL-SCNC: 134 MMOL/L — LOW (ref 135–145)
SP GR SPEC: 1.02 — SIGNIFICANT CHANGE UP (ref 1–1.03)
SP GR SPEC: 1.02 — SIGNIFICANT CHANGE UP (ref 1–1.03)
SPECIMEN SOURCE: SIGNIFICANT CHANGE UP
SPECIMEN SOURCE: SIGNIFICANT CHANGE UP
SQUAMOUS # UR AUTO: 3 /HPF — SIGNIFICANT CHANGE UP (ref 0–5)
SQUAMOUS # UR AUTO: 3 /HPF — SIGNIFICANT CHANGE UP (ref 0–5)
UROBILINOGEN FLD QL: 1 MG/DL — SIGNIFICANT CHANGE UP (ref 0.2–1)
UROBILINOGEN FLD QL: 1 MG/DL — SIGNIFICANT CHANGE UP (ref 0.2–1)
WBC # BLD: 2.95 K/UL — LOW (ref 3.8–10.5)
WBC # BLD: 2.95 K/UL — LOW (ref 3.8–10.5)
WBC # BLD: 3.2 K/UL — LOW (ref 3.8–10.5)
WBC # BLD: 3.2 K/UL — LOW (ref 3.8–10.5)
WBC # BLD: 3.42 K/UL — LOW (ref 3.8–10.5)
WBC # BLD: 3.42 K/UL — LOW (ref 3.8–10.5)
WBC # FLD AUTO: 2.95 K/UL — LOW (ref 3.8–10.5)
WBC # FLD AUTO: 2.95 K/UL — LOW (ref 3.8–10.5)
WBC # FLD AUTO: 3.2 K/UL — LOW (ref 3.8–10.5)
WBC # FLD AUTO: 3.2 K/UL — LOW (ref 3.8–10.5)
WBC # FLD AUTO: 3.42 K/UL — LOW (ref 3.8–10.5)
WBC # FLD AUTO: 3.42 K/UL — LOW (ref 3.8–10.5)
WBC UR QL: 15 /HPF — HIGH (ref 0–5)
WBC UR QL: 15 /HPF — HIGH (ref 0–5)

## 2023-11-18 PROCEDURE — 93306 TTE W/DOPPLER COMPLETE: CPT | Mod: 26

## 2023-11-18 PROCEDURE — 99232 SBSQ HOSP IP/OBS MODERATE 35: CPT

## 2023-11-18 PROCEDURE — 99233 SBSQ HOSP IP/OBS HIGH 50: CPT

## 2023-11-18 RX ADMIN — Medication 200 MILLIGRAM(S): at 06:04

## 2023-11-18 RX ADMIN — CHLORHEXIDINE GLUCONATE 1 APPLICATION(S): 213 SOLUTION TOPICAL at 18:33

## 2023-11-18 RX ADMIN — Medication 975 MILLIGRAM(S): at 13:49

## 2023-11-18 RX ADMIN — Medication 200 MILLIGRAM(S): at 18:33

## 2023-11-18 RX ADMIN — ATORVASTATIN CALCIUM 20 MILLIGRAM(S): 80 TABLET, FILM COATED ORAL at 21:54

## 2023-11-18 RX ADMIN — Medication 1: at 18:28

## 2023-11-18 RX ADMIN — Medication 975 MILLIGRAM(S): at 19:32

## 2023-11-18 RX ADMIN — Medication 100 MILLIGRAM(S): at 06:04

## 2023-11-18 RX ADMIN — Medication 975 MILLIGRAM(S): at 06:06

## 2023-11-18 RX ADMIN — BICALUTAMIDE 50 MILLIGRAM(S): 50 TABLET, FILM COATED ORAL at 12:35

## 2023-11-18 RX ADMIN — LISINOPRIL 5 MILLIGRAM(S): 2.5 TABLET ORAL at 06:06

## 2023-11-18 RX ADMIN — Medication 100 MILLIGRAM(S): at 13:29

## 2023-11-18 RX ADMIN — Medication 975 MILLIGRAM(S): at 00:31

## 2023-11-18 RX ADMIN — PANTOPRAZOLE SODIUM 40 MILLIGRAM(S): 20 TABLET, DELAYED RELEASE ORAL at 06:06

## 2023-11-18 RX ADMIN — Medication 3 UNIT(S): at 08:57

## 2023-11-18 RX ADMIN — Medication 975 MILLIGRAM(S): at 12:35

## 2023-11-18 RX ADMIN — HEPARIN SODIUM 5000 UNIT(S): 5000 INJECTION INTRAVENOUS; SUBCUTANEOUS at 21:55

## 2023-11-18 RX ADMIN — HEPARIN SODIUM 5000 UNIT(S): 5000 INJECTION INTRAVENOUS; SUBCUTANEOUS at 06:05

## 2023-11-18 RX ADMIN — HEPARIN SODIUM 5000 UNIT(S): 5000 INJECTION INTRAVENOUS; SUBCUTANEOUS at 13:30

## 2023-11-18 RX ADMIN — Medication 975 MILLIGRAM(S): at 06:50

## 2023-11-18 RX ADMIN — Medication 2: at 08:56

## 2023-11-18 RX ADMIN — TAMSULOSIN HYDROCHLORIDE 0.4 MILLIGRAM(S): 0.4 CAPSULE ORAL at 21:55

## 2023-11-18 RX ADMIN — AMLODIPINE BESYLATE 10 MILLIGRAM(S): 2.5 TABLET ORAL at 06:06

## 2023-11-18 RX ADMIN — Medication 975 MILLIGRAM(S): at 18:34

## 2023-11-18 RX ADMIN — Medication 100 MILLIGRAM(S): at 21:55

## 2023-11-18 NOTE — PROGRESS NOTE ADULT - SUBJECTIVE AND OBJECTIVE BOX
Plastic Surgery Progress Note (pg [facility]: [id number], [facility]: [phone])    SUBJECTIVE  The patient was seen and examined. No acute events overnight. Pain controlled, afebrile w/ stable vitals. No leakage around vac overnight.     OBJECTIVE  ___________________________________________________  VITAL SIGNS / I&O's   Vital Signs Last 24 Hrs  T(C): 36.4 (2023 06:10), Max: 39.3 (2023 11:03)  T(F): 97.5 (2023 06:10), Max: 102.8 (2023 11:03)  HR: 80 (2023 06:10) (78 - 99)  BP: 129/77 (2023 06:10) (114/60 - 129/77)  BP(mean): --  RR: 18 (2023 06:10) (17 - 18)  SpO2: 99% (2023 06:10) (99% - 100%)    Parameters below as of 2023 06:10  Patient On (Oxygen Delivery Method): room air          ___________________________________________________  PHYSICAL EXAM    General: NAD, Lying in bed   Neuro: Awake and alert  Pulm: non-labored respirations  Back: Soft, flat, no palpable collection. Incisional vac holding suction; no drainage or leaks. Vac canister without visible output    ___________________________________________________  LABS                        7.6    2.95  )-----------( 140      ( 2023 06:15 )             24.7     2023 06:40    133    |  98     |  29     ----------------------------<  123    4.1     |  24     |  0.82     Ca    8.7        2023 06:40  Phos  3.1       2023 06:40  Mg     2.00      2023 06:40    TPro  5.4    /  Alb  2.7    /  TBili  0.8    /  DBili  0.2    /  AST  32     /  ALT  50     /  AlkPhos  434    2023 06:40      CAPILLARY BLOOD GLUCOSE      POCT Blood Glucose.: 147 mg/dL (2023 22:36)  POCT Blood Glucose.: 110 mg/dL (2023 17:49)  POCT Blood Glucose.: 278 mg/dL (2023 12:14)  POCT Blood Glucose.: 149 mg/dL (2023 09:48)  POCT Blood Glucose.: 120 mg/dL (2023 08:21)        Urinalysis Basic - ( 2023 23:36 )    Color: Yellow / Appearance: Clear / S.022 / pH: x  Gluc: x / Ketone: Negative mg/dL  / Bili: Negative / Urobili: 1.0 mg/dL   Blood: x / Protein: Trace mg/dL / Nitrite: Negative   Leuk Esterase: Moderate / RBC: 2 /HPF / WBC 15 /HPF   Sq Epi: x / Non Sq Epi: 3 /HPF / Bacteria: Negative /HPF      ___________________________________________________  MICRO  Recent Cultures:  Specimen Source: .Blood Blood-Venous,  @ 06:05; Results   No growth at 24 hours; Gram Stain: --; Organism: --  Specimen Source: .Blood Blood-Peripheral,  @ 06:00; Results   No growth at 24 hours; Gram Stain: --; Organism: --  Specimen Source: Clean Catch Clean Catch (Midstream), 11-15 @ 03:45; Results   >100,000 CFU/ml Providencia rettgeri  10,000 - 49,000 CFU/mL Enterococcus faecalis<!>; Gram Stain: --; Organism: --  Specimen Source: .Blood Blood-Peripheral, 11-15 @ 02:05; Results   Growth in aerobic and anaerobic bottles: Staphylococcus aureus  See previous culture [id number]<!>; Gram Stain:   Growth in aerobic and anaerobic bottles: Gram Positive Cocci in Clusters<!>; Organism: --  Specimen Source: .Blood Blood-Peripheral, 11-15 @ 01:15; Results   Growth in aerobic and anaerobic bottles: Staphylococcus aureus  Direct identification is available within approximately 3-5  hours either by Blood Panel Multiplexed PCR or Direct  MALDI-TOF. Details: [email or web address]<!>; Gram Stain:   Growth in aerobic bottle: Gram Positive Cocci in Clusters  Growth in anaerobic bottle: Gram Positive Cocci in Clusters<!>; Organism: Blood Culture PCR  Staphylococcus aureus<!>    ___________________________________________________  MEDICATIONS  (STANDING):  acetaminophen     Tablet .. 975 milliGRAM(s) Oral every 6 hours  amLODIPine   Tablet 10 milliGRAM(s) Oral daily  atorvastatin 20 milliGRAM(s) Oral at bedtime  bicalutamide 50 milliGRAM(s) Oral daily  ceFAZolin   IVPB 2000 milliGRAM(s) IV Intermittent every 8 hours  chlorhexidine 2% Cloths 1 Application(s) Topical daily  ciprofloxacin   IVPB      ciprofloxacin   IVPB 400 milliGRAM(s) IV Intermittent every 12 hours  dextrose 5%. 1000 milliLiter(s) (50 mL/Hr) IV Continuous <Continuous>  dextrose 5%. 1000 milliLiter(s) (100 mL/Hr) IV Continuous <Continuous>  dextrose 50% Injectable 25 Gram(s) IV Push once  dextrose 50% Injectable 12.5 Gram(s) IV Push once  dextrose 50% Injectable 25 Gram(s) IV Push once  glucagon  Injectable 1 milliGRAM(s) IntraMuscular once  heparin   Injectable 5000 Unit(s) SubCutaneous every 8 hours  insulin lispro (ADMELOG) corrective regimen sliding scale   SubCutaneous three times a day before meals  insulin lispro (ADMELOG) corrective regimen sliding scale   SubCutaneous at bedtime  insulin lispro Injectable (ADMELOG) 3 Unit(s) SubCutaneous three times a day before meals  lisinopril 5 milliGRAM(s) Oral daily  pantoprazole    Tablet 40 milliGRAM(s) Oral before breakfast  tamsulosin 0.4 milliGRAM(s) Oral at bedtime    MEDICATIONS  (PRN):  dextrose Oral Gel 15 Gram(s) Oral once PRN Blood Glucose LESS THAN 70 milliGRAM(s)/deciliter  guaiFENesin Oral Liquid (Sugar-Free) 200 milliGRAM(s) Oral every 6 hours PRN Cough  naloxone Injectable 0.1 milliGRAM(s) IV Push every 3 minutes PRN For ANY of the following changes in patient status:  A. RR LESS THAN 10 breaths per minute, B. Oxygen saturation LESS THAN 90%, C. Sedation score of 6  ondansetron Injectable 4 milliGRAM(s) IV Push every 6 hours PRN Nausea

## 2023-11-18 NOTE — PROGRESS NOTE ADULT - SUBJECTIVE AND OBJECTIVE BOX
Patient is a 73y old  Male who presents with a chief complaint of Diffuse Spinal Mets from Prostate Cancer (18 Nov 2023 08:07)      SUBJECTIVE / OVERNIGHT EVENTS: No acute overnight event. No current complaint.    MEDICATIONS  (STANDING):  acetaminophen     Tablet .. 975 milliGRAM(s) Oral every 6 hours  amLODIPine   Tablet 10 milliGRAM(s) Oral daily  atorvastatin 20 milliGRAM(s) Oral at bedtime  bicalutamide 50 milliGRAM(s) Oral daily  ceFAZolin   IVPB 2000 milliGRAM(s) IV Intermittent every 8 hours  chlorhexidine 2% Cloths 1 Application(s) Topical daily  ciprofloxacin   IVPB      ciprofloxacin   IVPB 400 milliGRAM(s) IV Intermittent every 12 hours  dextrose 5%. 1000 milliLiter(s) (50 mL/Hr) IV Continuous <Continuous>  dextrose 5%. 1000 milliLiter(s) (100 mL/Hr) IV Continuous <Continuous>  dextrose 50% Injectable 25 Gram(s) IV Push once  dextrose 50% Injectable 12.5 Gram(s) IV Push once  dextrose 50% Injectable 25 Gram(s) IV Push once  glucagon  Injectable 1 milliGRAM(s) IntraMuscular once  heparin   Injectable 5000 Unit(s) SubCutaneous every 8 hours  insulin lispro (ADMELOG) corrective regimen sliding scale   SubCutaneous three times a day before meals  insulin lispro (ADMELOG) corrective regimen sliding scale   SubCutaneous at bedtime  insulin lispro Injectable (ADMELOG) 3 Unit(s) SubCutaneous three times a day before meals  lisinopril 5 milliGRAM(s) Oral daily  pantoprazole    Tablet 40 milliGRAM(s) Oral before breakfast  tamsulosin 0.4 milliGRAM(s) Oral at bedtime    MEDICATIONS  (PRN):  dextrose Oral Gel 15 Gram(s) Oral once PRN Blood Glucose LESS THAN 70 milliGRAM(s)/deciliter  guaiFENesin Oral Liquid (Sugar-Free) 200 milliGRAM(s) Oral every 6 hours PRN Cough  naloxone Injectable 0.1 milliGRAM(s) IV Push every 3 minutes PRN For ANY of the following changes in patient status:  A. RR LESS THAN 10 breaths per minute, B. Oxygen saturation LESS THAN 90%, C. Sedation score of 6  ondansetron Injectable 4 milliGRAM(s) IV Push every 6 hours PRN Nausea      CAPILLARY BLOOD GLUCOSE      POCT Blood Glucose.: 113 mg/dL (18 Nov 2023 12:33)  POCT Blood Glucose.: 201 mg/dL (18 Nov 2023 08:44)  POCT Blood Glucose.: 147 mg/dL (17 Nov 2023 22:36)  POCT Blood Glucose.: 110 mg/dL (17 Nov 2023 17:49)    I&O's Summary      PHYSICAL EXAM:  Vital Signs Last 24 Hrs  T(C): 36.9 (18 Nov 2023 10:00), Max: 37.4 (17 Nov 2023 20:00)  T(F): 98.4 (18 Nov 2023 10:00), Max: 99.3 (17 Nov 2023 20:00)  HR: 89 (18 Nov 2023 10:00) (78 - 99)  BP: 101/49 (18 Nov 2023 10:00) (101/49 - 129/77)  BP(mean): --  RR: 18 (18 Nov 2023 10:00) (18 - 18)  SpO2: 91% (18 Nov 2023 10:00) (91% - 100%)    Parameters below as of 18 Nov 2023 10:00  Patient On (Oxygen Delivery Method): room air      CONSTITUTIONAL: NAD, resting comfortably  EYES: PERRLA; conjunctiva and sclera clear  ENMT: Moist oral mucosa, no pharyngeal injection or exudates; normal dentition  RESPIRATORY: Normal respiratory effort; lungs are clear to auscultation bilaterally  CARDIOVASCULAR: Regular rate and rhythm, normal S1 and S2, no murmur/rub/gallop; No lower extremity edema; Peripheral pulses are 2+ bilaterally  ABDOMEN: Nontender to palpation, normoactive bowel sounds, no rebound/guarding; No hepatosplenomegaly    LABS:                        7.6    2.95  )-----------( 140      ( 18 Nov 2023 06:15 )             24.7     11-18    131<L>  |  93<L>  |  23  ----------------------------<  396<H>  4.0   |  23  |  0.86    Ca    8.3<L>      18 Nov 2023 06:15  Phos  3.0     11-18  Mg     2.00     11-18    TPro  5.4<L>  /  Alb  2.7<L>  /  TBili  0.8  /  DBili  0.2  /  AST  32  /  ALT  50<H>  /  AlkPhos  434<H>  11-17          Urinalysis Basic - ( 18 Nov 2023 06:15 )    Color: x / Appearance: x / SG: x / pH: x  Gluc: 396 mg/dL / Ketone: x  / Bili: x / Urobili: x   Blood: x / Protein: x / Nitrite: x   Leuk Esterase: x / RBC: x / WBC x   Sq Epi: x / Non Sq Epi: x / Bacteria: x        Culture - Blood (collected 17 Nov 2023 06:38)  Source: .Blood Blood-Venous  Preliminary Report (18 Nov 2023 09:01):    No growth at 24 hours    Culture - Blood (collected 17 Nov 2023 06:38)  Source: .Blood Blood-Peripheral  Preliminary Report (18 Nov 2023 09:01):    No growth at 24 hours    Culture - Blood (collected 16 Nov 2023 06:05)  Source: .Blood Blood-Venous  Preliminary Report (18 Nov 2023 10:01):    No growth at 48 Hours    Culture - Blood (collected 16 Nov 2023 06:00)  Source: .Blood Blood-Peripheral  Preliminary Report (18 Nov 2023 10:01):    No growth at 48 Hours        RADIOLOGY & ADDITIONAL TESTS:  Results Reviewed:   Imaging Personally Reviewed:  Electrocardiogram Personally Reviewed:    COORDINATION OF CARE:  Care Discussed with Consultants/Other Providers [Y/N]:  Prior or Outpatient Records Reviewed [Y/N]:

## 2023-11-18 NOTE — CHART NOTE - NSCHARTNOTEFT_GEN_A_CORE
Endocrine     Chart reviewed - Please refer to last progress note for complete plan of care including discharge planning     #steroid induced hyperglycemia  -Was receiving 4mg IV dexamethasone last dose 11/15/23. As per primary team no current plans to continue steroids, steroid taper completed. Please notify Endocrine if steroid plan changes     #diabetes -A1c 5.2% off medication  -Dexamethasone effect wearing off   -Last Lantus dose yesterday morning   -Stopping pre-meal Admelog now   -Continue low Admelog correction scales at meals  -Continue low Admelog correction scales at bedtime    Plan to DC off DM medication given A1c   Endocrine signing off, please call if there are any additional questions     Endocrine      CAPILLARY BLOOD GLUCOSE      POCT Blood Glucose.: 113 mg/dL (18 Nov 2023 12:33)  POCT Blood Glucose.: 201 mg/dL (18 Nov 2023 08:44)  POCT Blood Glucose.: 147 mg/dL (17 Nov 2023 22:36)  POCT Blood Glucose.: 110 mg/dL (17 Nov 2023 17:49)      MEDICATIONS  (STANDING):  acetaminophen     Tablet .. 975 milliGRAM(s) Oral every 6 hours  amLODIPine   Tablet 10 milliGRAM(s) Oral daily  atorvastatin 20 milliGRAM(s) Oral at bedtime  bicalutamide 50 milliGRAM(s) Oral daily  ceFAZolin   IVPB 2000 milliGRAM(s) IV Intermittent every 8 hours  chlorhexidine 2% Cloths 1 Application(s) Topical daily  ciprofloxacin   IVPB      ciprofloxacin   IVPB 400 milliGRAM(s) IV Intermittent every 12 hours  dextrose 5%. 1000 milliLiter(s) (50 mL/Hr) IV Continuous <Continuous>  dextrose 5%. 1000 milliLiter(s) (100 mL/Hr) IV Continuous <Continuous>  dextrose 50% Injectable 25 Gram(s) IV Push once  dextrose 50% Injectable 12.5 Gram(s) IV Push once  dextrose 50% Injectable 25 Gram(s) IV Push once  glucagon  Injectable 1 milliGRAM(s) IntraMuscular once  heparin   Injectable 5000 Unit(s) SubCutaneous every 8 hours  insulin lispro (ADMELOG) corrective regimen sliding scale   SubCutaneous three times a day before meals  insulin lispro (ADMELOG) corrective regimen sliding scale   SubCutaneous at bedtime  lisinopril 5 milliGRAM(s) Oral daily  pantoprazole    Tablet 40 milliGRAM(s) Oral before breakfast  tamsulosin 0.4 milliGRAM(s) Oral at bedtime

## 2023-11-18 NOTE — PROGRESS NOTE ADULT - PROBLEM SELECTOR PLAN 9
#d/w Plastic surgery  needs : Nylon sutures over incision to remain in place  - Gauze/tegaderm dressing for serosanguinous drainage; currently no collections/signs of infection  Need dressing change 1 to 2 x day. to T spine wound. F/u with Dr Tr Yoo  # Rt- Oncology---> wants out patient RT to be dons once d/c home  Also ut patient f/u with PCP/ Onc and RT oncology  #Oncology f/u with Dr Rodgers at [facility]  #GOC done 11/14, Patient is Ox3 and wants to GO HOME.  He assigned Son Juan Jose is [phone], [phone] , full code  CM will help with wound care, ?? VNS vs Family ???????  Spoke with daughter Melissa and explained we are planning for home.  11/15 d/c planning on hold due to fever eval.--> 11/15/23 blood culture--->. GP cocci in clusters, ID consult called  d/w Patient / son Giuseppe/TAMARA

## 2023-11-18 NOTE — PROGRESS NOTE ADULT - ASSESSMENT
73y Male s/p C5-T3 posterior cervical fusion, C7-T1 decompression for metastatic prostate cancer with plastics closure on 11/2. Recovering well. Both drains removed. MRI 11/14 showing possible seroma in subcutaneous space. Incisional vac w/ black sponge/Adaptic started on 11/17/2023.     Plan:  - incisional wound vac to neck; MWF changes  - F/U fever work up - RVP +adenovirus; UA clear  - Monitor vac output  - Optimize nutrition, ensure adequate protein intake  - Remainder of care per primary team    Plastic Surgery  [phone] [facility] pager  [phone] [facility] pager  Available on Teams

## 2023-11-18 NOTE — PROGRESS NOTE ADULT - ASSESSMENT
73 year old with metastatic prostate cancer s/p spinal decompression.  Now with fever and high grade MSSA bacteremia    1) High grade MSSA bacteremia  Check transthoracic echocardiogram  Repeat blood culture 11/16 and 11/17 are without growth  Continue cefazolin 2 gram iv q 8    2) Abnormal Imaging  Fluid collections noted on imaging  Vac placed  Spine surgery and plastic surgery follow up    3) Bacteruria  Culture with mixed growth  100 K providenica< 10-50 K e fecalis  Likely colonization  Cipro would treat providencia which has higher colony count  Given fever, can finish 3 d course of cipro  Okay to change to po    4) Adenovirus  Supportive care

## 2023-11-18 NOTE — PROGRESS NOTE ADULT - ASSESSMENT
74y/o M with DM, HTN and metastatic prostate cancer (diagnosed in 2009) that initially presented to OSH with LE weakness and R hand paresthesias now transferred to [facility] for NSG eval. Patient now s/p surgical intervention (C5-T3 posterior cervical fusion and C7-T1 decompression) by orthopedic surgery, now transferred to medicine for further management. Course complicated by MSSA bacteremia w surgical wound as likely source.

## 2023-11-18 NOTE — PROGRESS NOTE ADULT - SUBJECTIVE AND OBJECTIVE BOX
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 975 milliGRAM(s) Oral every 6 hours  amLODIPine   Tablet 10 milliGRAM(s) Oral daily  atorvastatin 20 milliGRAM(s) Oral at bedtime  bicalutamide 50 milliGRAM(s) Oral daily  chlorhexidine 2% Cloths 1 Application(s) Topical daily  dextrose 5%. 1000 milliLiter(s) IV Continuous <Continuous>  dextrose 5%. 1000 milliLiter(s) IV Continuous <Continuous>  dextrose 50% Injectable 25 Gram(s) IV Push once  dextrose 50% Injectable 12.5 Gram(s) IV Push once  dextrose 50% Injectable 25 Gram(s) IV Push once  dextrose Oral Gel 15 Gram(s) Oral once PRN  glucagon  Injectable 1 milliGRAM(s) IntraMuscular once  guaiFENesin Oral Liquid (Sugar-Free) 200 milliGRAM(s) Oral every 6 hours PRN  heparin   Injectable 5000 Unit(s) SubCutaneous every 8 hours  insulin lispro (ADMELOG) corrective regimen sliding scale   SubCutaneous three times a day before meals  insulin lispro (ADMELOG) corrective regimen sliding scale   SubCutaneous at bedtime  lisinopril 5 milliGRAM(s) Oral daily  naloxone Injectable 0.1 milliGRAM(s) IV Push every 3 minutes PRN  ondansetron Injectable 4 milliGRAM(s) IV Push every 6 hours PRN  pantoprazole    Tablet 40 milliGRAM(s) Oral before breakfast  tamsulosin 0.4 milliGRAM(s) Oral at bedtime      Vital Signs Last 24 Hrs  T(C): 36.9 (18 Nov 2023 10:00), Max: 37.4 (17 Nov 2023 20:00)  T(F): 98.4 (18 Nov 2023 10:00), Max: 99.3 (17 Nov 2023 20:00)  HR: 89 (18 Nov 2023 10:00) (78 - 99)  BP: 101/49 (18 Nov 2023 10:00) (101/49 - 129/77)  BP(mean): --  RR: 18 (18 Nov 2023 10:00) (18 - 18)  SpO2: 91% (18 Nov 2023 10:00) (91% - 100%)    Parameters below as of 18 Nov 2023 10:00  Patient On (Oxygen Delivery Method): room air        PHYSICAL EXAM:  General: [x ] non-toxic  HEAD/EYES: [ ] PERRL [ x] white sclera [ ] icterus  ENT:  [ ] normal [ x] supple [ ] thrush [ ] pharyngeal exudate  Cardiovascular:   [ ] murmur [ x] normal [ ] PPM/AICD  Respiratory:  x[ ] clear to ausculation bilaterally  GI:  [ ] soft, non-tender, normal bowel sounds  :  [ ] best [ ] no CVA tenderness   Musculoskeletal:  [ ] no synovitis  Neurologic:  [ ] non-focal exam   Skin:  [ x] no rash; vac in place  Lymph: [x ] no lymphadenopathy  Psychiatric:  [x ] appropriate affect [ ] alert & oriented  Lines:  [ x] no phlebitis [ ] central line                                7.6    2.95  )-----------( 140      ( 18 Nov 2023 06:15 )             24.7       11-18    131<L>  |  93<L>  |  23  ----------------------------<  396<H>  4.0   |  23  |  0.86    Ca    8.3<L>      18 Nov 2023 06:15  Phos  3.0     11-18  Mg     2.00     11-18    TPro  5.4<L>  /  Alb  2.7<L>  /  TBili  0.8  /  DBili  0.2  /  AST  32  /  ALT  50<H>  /  AlkPhos  434<H>  11-17      Urinalysis Basic - ( 18 Nov 2023 06:15 )    Color: x / Appearance: x / SG: x / pH: x  Gluc: 396 mg/dL / Ketone: x  / Bili: x / Urobili: x   Blood: x / Protein: x / Nitrite: x   Leuk Esterase: x / RBC: x / WBC x   Sq Epi: x / Non Sq Epi: x / Bacteria: x        MICROBIOLOGY:Culture Results:   No growth at 24 hours (11-17-23 @ 06:38)  Culture Results:   No growth at 24 hours (11-17-23 @ 06:38)  Culture Results:   No growth at 48 Hours (11-16-23 @ 06:05)  Culture Results:   No growth at 48 Hours (11-16-23 @ 06:00)  Culture Results:   >100,000 CFU/ml Providencia rettgeri  10,000 - 49,000 CFU/mL Enterococcus faecalis (11-15-23 @ 03:45)  Culture Results:   Growth in aerobic and anaerobic bottles: Staphylococcus aureus  See previous culture [id number] (11-15-23 @ 02:05)  Culture Results:   Growth in aerobic and anaerobic bottles: Staphylococcus aureus  Direct identification is available within approximately 3-5  hours either by Blood Panel Multiplexed PCR or Direct  MALDI-TOF. Details: [email or web address] (11-15-23 @ 01:15)      RADIOLOGY:

## 2023-11-19 LAB
ANION GAP SERPL CALC-SCNC: 13 MMOL/L — SIGNIFICANT CHANGE UP (ref 7–14)
ANION GAP SERPL CALC-SCNC: 13 MMOL/L — SIGNIFICANT CHANGE UP (ref 7–14)
BASOPHILS # BLD AUTO: 0 K/UL — SIGNIFICANT CHANGE UP (ref 0–0.2)
BASOPHILS # BLD AUTO: 0 K/UL — SIGNIFICANT CHANGE UP (ref 0–0.2)
BASOPHILS NFR BLD AUTO: 0 % — SIGNIFICANT CHANGE UP (ref 0–2)
BASOPHILS NFR BLD AUTO: 0 % — SIGNIFICANT CHANGE UP (ref 0–2)
BUN SERPL-MCNC: 17 MG/DL — SIGNIFICANT CHANGE UP (ref 7–23)
BUN SERPL-MCNC: 17 MG/DL — SIGNIFICANT CHANGE UP (ref 7–23)
CALCIUM SERPL-MCNC: 8.4 MG/DL — SIGNIFICANT CHANGE UP (ref 8.4–10.5)
CALCIUM SERPL-MCNC: 8.4 MG/DL — SIGNIFICANT CHANGE UP (ref 8.4–10.5)
CHLORIDE SERPL-SCNC: 97 MMOL/L — LOW (ref 98–107)
CHLORIDE SERPL-SCNC: 97 MMOL/L — LOW (ref 98–107)
CO2 SERPL-SCNC: 25 MMOL/L — SIGNIFICANT CHANGE UP (ref 22–31)
CO2 SERPL-SCNC: 25 MMOL/L — SIGNIFICANT CHANGE UP (ref 22–31)
CREAT SERPL-MCNC: 0.85 MG/DL — SIGNIFICANT CHANGE UP (ref 0.5–1.3)
CREAT SERPL-MCNC: 0.85 MG/DL — SIGNIFICANT CHANGE UP (ref 0.5–1.3)
EGFR: 93 ML/MIN/1.73M2 — SIGNIFICANT CHANGE UP
EGFR: 93 ML/MIN/1.73M2 — SIGNIFICANT CHANGE UP
EOSINOPHIL # BLD AUTO: 0.07 K/UL — SIGNIFICANT CHANGE UP (ref 0–0.5)
EOSINOPHIL # BLD AUTO: 0.07 K/UL — SIGNIFICANT CHANGE UP (ref 0–0.5)
EOSINOPHIL NFR BLD AUTO: 2.5 % — SIGNIFICANT CHANGE UP (ref 0–6)
EOSINOPHIL NFR BLD AUTO: 2.5 % — SIGNIFICANT CHANGE UP (ref 0–6)
GLUCOSE BLDC GLUCOMTR-MCNC: 134 MG/DL — HIGH (ref 70–99)
GLUCOSE BLDC GLUCOMTR-MCNC: 134 MG/DL — HIGH (ref 70–99)
GLUCOSE BLDC GLUCOMTR-MCNC: 163 MG/DL — HIGH (ref 70–99)
GLUCOSE BLDC GLUCOMTR-MCNC: 163 MG/DL — HIGH (ref 70–99)
GLUCOSE BLDC GLUCOMTR-MCNC: 173 MG/DL — HIGH (ref 70–99)
GLUCOSE BLDC GLUCOMTR-MCNC: 173 MG/DL — HIGH (ref 70–99)
GLUCOSE BLDC GLUCOMTR-MCNC: 199 MG/DL — HIGH (ref 70–99)
GLUCOSE BLDC GLUCOMTR-MCNC: 199 MG/DL — HIGH (ref 70–99)
GLUCOSE SERPL-MCNC: 155 MG/DL — HIGH (ref 70–99)
GLUCOSE SERPL-MCNC: 155 MG/DL — HIGH (ref 70–99)
HCT VFR BLD CALC: 24.2 % — LOW (ref 39–50)
HCT VFR BLD CALC: 24.2 % — LOW (ref 39–50)
HGB BLD-MCNC: 8 G/DL — LOW (ref 13–17)
HGB BLD-MCNC: 8 G/DL — LOW (ref 13–17)
IANC: 2.19 K/UL — SIGNIFICANT CHANGE UP (ref 1.8–7.4)
IANC: 2.19 K/UL — SIGNIFICANT CHANGE UP (ref 1.8–7.4)
IMM GRANULOCYTES NFR BLD AUTO: 1.4 % — HIGH (ref 0–0.9)
IMM GRANULOCYTES NFR BLD AUTO: 1.4 % — HIGH (ref 0–0.9)
LYMPHOCYTES # BLD AUTO: 0.34 K/UL — LOW (ref 1–3.3)
LYMPHOCYTES # BLD AUTO: 0.34 K/UL — LOW (ref 1–3.3)
LYMPHOCYTES # BLD AUTO: 12.2 % — LOW (ref 13–44)
LYMPHOCYTES # BLD AUTO: 12.2 % — LOW (ref 13–44)
MAGNESIUM SERPL-MCNC: 2.2 MG/DL — SIGNIFICANT CHANGE UP (ref 1.6–2.6)
MAGNESIUM SERPL-MCNC: 2.2 MG/DL — SIGNIFICANT CHANGE UP (ref 1.6–2.6)
MCHC RBC-ENTMCNC: 30.7 PG — SIGNIFICANT CHANGE UP (ref 27–34)
MCHC RBC-ENTMCNC: 30.7 PG — SIGNIFICANT CHANGE UP (ref 27–34)
MCHC RBC-ENTMCNC: 33.1 GM/DL — SIGNIFICANT CHANGE UP (ref 32–36)
MCHC RBC-ENTMCNC: 33.1 GM/DL — SIGNIFICANT CHANGE UP (ref 32–36)
MCV RBC AUTO: 92.7 FL — SIGNIFICANT CHANGE UP (ref 80–100)
MCV RBC AUTO: 92.7 FL — SIGNIFICANT CHANGE UP (ref 80–100)
MONOCYTES # BLD AUTO: 0.15 K/UL — SIGNIFICANT CHANGE UP (ref 0–0.9)
MONOCYTES # BLD AUTO: 0.15 K/UL — SIGNIFICANT CHANGE UP (ref 0–0.9)
MONOCYTES NFR BLD AUTO: 5.4 % — SIGNIFICANT CHANGE UP (ref 2–14)
MONOCYTES NFR BLD AUTO: 5.4 % — SIGNIFICANT CHANGE UP (ref 2–14)
NEUTROPHILS # BLD AUTO: 2.19 K/UL — SIGNIFICANT CHANGE UP (ref 1.8–7.4)
NEUTROPHILS # BLD AUTO: 2.19 K/UL — SIGNIFICANT CHANGE UP (ref 1.8–7.4)
NEUTROPHILS NFR BLD AUTO: 78.5 % — HIGH (ref 43–77)
NEUTROPHILS NFR BLD AUTO: 78.5 % — HIGH (ref 43–77)
NRBC # BLD: 0 /100 WBCS — SIGNIFICANT CHANGE UP (ref 0–0)
NRBC # BLD: 0 /100 WBCS — SIGNIFICANT CHANGE UP (ref 0–0)
NRBC # FLD: 0 K/UL — SIGNIFICANT CHANGE UP (ref 0–0)
NRBC # FLD: 0 K/UL — SIGNIFICANT CHANGE UP (ref 0–0)
PHOSPHATE SERPL-MCNC: 2.8 MG/DL — SIGNIFICANT CHANGE UP (ref 2.5–4.5)
PHOSPHATE SERPL-MCNC: 2.8 MG/DL — SIGNIFICANT CHANGE UP (ref 2.5–4.5)
PLATELET # BLD AUTO: 173 K/UL — SIGNIFICANT CHANGE UP (ref 150–400)
PLATELET # BLD AUTO: 173 K/UL — SIGNIFICANT CHANGE UP (ref 150–400)
POTASSIUM SERPL-MCNC: 3.8 MMOL/L — SIGNIFICANT CHANGE UP (ref 3.5–5.3)
POTASSIUM SERPL-MCNC: 3.8 MMOL/L — SIGNIFICANT CHANGE UP (ref 3.5–5.3)
POTASSIUM SERPL-SCNC: 3.8 MMOL/L — SIGNIFICANT CHANGE UP (ref 3.5–5.3)
POTASSIUM SERPL-SCNC: 3.8 MMOL/L — SIGNIFICANT CHANGE UP (ref 3.5–5.3)
RBC # BLD: 2.61 M/UL — LOW (ref 4.2–5.8)
RBC # BLD: 2.61 M/UL — LOW (ref 4.2–5.8)
RBC # FLD: 13.6 % — SIGNIFICANT CHANGE UP (ref 10.3–14.5)
RBC # FLD: 13.6 % — SIGNIFICANT CHANGE UP (ref 10.3–14.5)
SODIUM SERPL-SCNC: 135 MMOL/L — SIGNIFICANT CHANGE UP (ref 135–145)
SODIUM SERPL-SCNC: 135 MMOL/L — SIGNIFICANT CHANGE UP (ref 135–145)
WBC # BLD: 2.79 K/UL — LOW (ref 3.8–10.5)
WBC # BLD: 2.79 K/UL — LOW (ref 3.8–10.5)
WBC # FLD AUTO: 2.79 K/UL — LOW (ref 3.8–10.5)
WBC # FLD AUTO: 2.79 K/UL — LOW (ref 3.8–10.5)

## 2023-11-19 PROCEDURE — 99233 SBSQ HOSP IP/OBS HIGH 50: CPT

## 2023-11-19 PROCEDURE — 99232 SBSQ HOSP IP/OBS MODERATE 35: CPT

## 2023-11-19 RX ORDER — BENZOCAINE AND MENTHOL 5; 1 G/100ML; G/100ML
1 LIQUID ORAL EVERY 6 HOURS
Refills: 0 | Status: DISCONTINUED | OUTPATIENT
Start: 2023-11-19 | End: 2023-12-23

## 2023-11-19 RX ADMIN — Medication 975 MILLIGRAM(S): at 00:45

## 2023-11-19 RX ADMIN — BICALUTAMIDE 50 MILLIGRAM(S): 50 TABLET, FILM COATED ORAL at 13:09

## 2023-11-19 RX ADMIN — Medication 200 MILLIGRAM(S): at 18:28

## 2023-11-19 RX ADMIN — Medication 100 MILLIGRAM(S): at 21:18

## 2023-11-19 RX ADMIN — Medication 200 MILLIGRAM(S): at 05:28

## 2023-11-19 RX ADMIN — CHLORHEXIDINE GLUCONATE 1 APPLICATION(S): 213 SOLUTION TOPICAL at 13:27

## 2023-11-19 RX ADMIN — LISINOPRIL 5 MILLIGRAM(S): 2.5 TABLET ORAL at 06:31

## 2023-11-19 RX ADMIN — BENZOCAINE AND MENTHOL 1 LOZENGE: 5; 1 LIQUID ORAL at 21:18

## 2023-11-19 RX ADMIN — Medication 975 MILLIGRAM(S): at 07:00

## 2023-11-19 RX ADMIN — HEPARIN SODIUM 5000 UNIT(S): 5000 INJECTION INTRAVENOUS; SUBCUTANEOUS at 21:18

## 2023-11-19 RX ADMIN — ATORVASTATIN CALCIUM 20 MILLIGRAM(S): 80 TABLET, FILM COATED ORAL at 21:18

## 2023-11-19 RX ADMIN — Medication 975 MILLIGRAM(S): at 12:47

## 2023-11-19 RX ADMIN — PANTOPRAZOLE SODIUM 40 MILLIGRAM(S): 20 TABLET, DELAYED RELEASE ORAL at 06:32

## 2023-11-19 RX ADMIN — TAMSULOSIN HYDROCHLORIDE 0.4 MILLIGRAM(S): 0.4 CAPSULE ORAL at 21:18

## 2023-11-19 RX ADMIN — Medication 100 MILLIGRAM(S): at 13:10

## 2023-11-19 RX ADMIN — Medication 200 MILLIGRAM(S): at 00:09

## 2023-11-19 RX ADMIN — Medication 975 MILLIGRAM(S): at 18:45

## 2023-11-19 RX ADMIN — Medication 1: at 09:20

## 2023-11-19 RX ADMIN — Medication 975 MILLIGRAM(S): at 00:06

## 2023-11-19 RX ADMIN — HEPARIN SODIUM 5000 UNIT(S): 5000 INJECTION INTRAVENOUS; SUBCUTANEOUS at 06:31

## 2023-11-19 RX ADMIN — Medication 100 MILLIGRAM(S): at 06:29

## 2023-11-19 RX ADMIN — HEPARIN SODIUM 5000 UNIT(S): 5000 INJECTION INTRAVENOUS; SUBCUTANEOUS at 13:11

## 2023-11-19 RX ADMIN — Medication 1: at 13:00

## 2023-11-19 RX ADMIN — Medication 975 MILLIGRAM(S): at 06:31

## 2023-11-19 RX ADMIN — Medication 975 MILLIGRAM(S): at 18:28

## 2023-11-19 RX ADMIN — Medication 975 MILLIGRAM(S): at 11:45

## 2023-11-19 RX ADMIN — AMLODIPINE BESYLATE 10 MILLIGRAM(S): 2.5 TABLET ORAL at 06:31

## 2023-11-19 NOTE — PROGRESS NOTE ADULT - PROBLEM SELECTOR PLAN 1
Tmax 102.6 overnight 11/15 Hr 109  Sepsis sec to UTI and MSSA bacteremia  Urine pos Leuk and nitrate  Ceftriaxone started----> 11/16 changed to Ancef Q8  f/u cultures  Also positive adenovirus.  11/15/23 blood culture--->. GP cocci in clusters, ID consult called  Will repeat blood ciltures q 48 hrs  TTE showed hyperdynamic LVSF; unable to rule out endocarditis but low clinical suspicion as no recurrent fever, murmur, or other criteria  Bladder scan for retention 11/17 eval

## 2023-11-19 NOTE — PROGRESS NOTE ADULT - SUBJECTIVE AND OBJECTIVE BOX
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 975 milliGRAM(s) Oral every 6 hours  amLODIPine   Tablet 10 milliGRAM(s) Oral daily  atorvastatin 20 milliGRAM(s) Oral at bedtime  bicalutamide 50 milliGRAM(s) Oral daily  chlorhexidine 2% Cloths 1 Application(s) Topical daily  dextrose 5%. 1000 milliLiter(s) IV Continuous <Continuous>  dextrose 5%. 1000 milliLiter(s) IV Continuous <Continuous>  dextrose 50% Injectable 25 Gram(s) IV Push once  dextrose 50% Injectable 12.5 Gram(s) IV Push once  dextrose 50% Injectable 25 Gram(s) IV Push once  dextrose Oral Gel 15 Gram(s) Oral once PRN  glucagon  Injectable 1 milliGRAM(s) IntraMuscular once  guaiFENesin Oral Liquid (Sugar-Free) 200 milliGRAM(s) Oral every 6 hours PRN  heparin   Injectable 5000 Unit(s) SubCutaneous every 8 hours  insulin lispro (ADMELOG) corrective regimen sliding scale   SubCutaneous three times a day before meals  insulin lispro (ADMELOG) corrective regimen sliding scale   SubCutaneous at bedtime  lisinopril 5 milliGRAM(s) Oral daily  naloxone Injectable 0.1 milliGRAM(s) IV Push every 3 minutes PRN  ondansetron Injectable 4 milliGRAM(s) IV Push every 6 hours PRN  pantoprazole    Tablet 40 milliGRAM(s) Oral before breakfast  tamsulosin 0.4 milliGRAM(s) Oral at bedtime      Vital Signs Last 24 Hrs  T(C): 36.9 (19 Nov 2023 12:22), Max: 38.1 (18 Nov 2023 18:00)  T(F): 98.4 (19 Nov 2023 12:22), Max: 100.5 (18 Nov 2023 18:00)  HR: 89 (19 Nov 2023 12:22) (86 - 94)  BP: 122/68 (19 Nov 2023 12:22) (118/63 - 127/64)  BP(mean): --  RR: 18 (19 Nov 2023 12:22) (17 - 18)  SpO2: 99% (19 Nov 2023 12:22) (95% - 99%)    Parameters below as of 19 Nov 2023 12:22  Patient On (Oxygen Delivery Method): nasal cannula        PHYSICAL EXAM:  General: [x ] non-toxic  HEAD/EYES: [ ] PERRL [x ] white sclera [ ] icterus  ENT:  [ ] normal [x ] supple [ ] thrush [ ] pharyngeal exudate  Cardiovascular:   [ ] murmur [x ] normal [ ] PPM/AICD  Respiratory:  [ x] clear to ausculation bilaterally  GI:  [ x] soft, non-tender, normal bowel sounds  :  [ ] best [ ] no CVA tenderness   Musculoskeletal:  [ ] no synovitis  Neurologic:  [ ] non-focal exam   Skin:  [x ] no rash  Lymph: [x ] no lymphadenopathy  Psychiatric:  [ ] appropriate affect [ ] alert & oriented  Lines:  [x ] no phlebitis [ ] central line                                8.0    2.79  )-----------( 173      ( 19 Nov 2023 07:51 )             24.2       11-19    135  |  97<L>  |  17  ----------------------------<  155<H>  3.8   |  25  |  0.85    Ca    8.4      19 Nov 2023 07:51  Phos  2.8     11-19  Mg     2.20     11-19        Urinalysis Basic - ( 19 Nov 2023 07:51 )    Color: x / Appearance: x / SG: x / pH: x  Gluc: 155 mg/dL / Ketone: x  / Bili: x / Urobili: x   Blood: x / Protein: x / Nitrite: x   Leuk Esterase: x / RBC: x / WBC x   Sq Epi: x / Non Sq Epi: x / Bacteria: x        MICROBIOLOGY:Culture Results:   No growth at 24 hours (11-18-23 @ 06:30)  Culture Results:   No growth at 24 hours (11-18-23 @ 06:15)  Culture Results:   No growth at 48 Hours (11-17-23 @ 06:38)  Culture Results:   No growth at 48 Hours (11-17-23 @ 06:38)  Culture Results:   No growth at 72 Hours (11-16-23 @ 06:05)  Culture Results:   No growth at 72 Hours (11-16-23 @ 06:00)  Culture Results:   >100,000 CFU/ml Providencia rettgeri  10,000 - 49,000 CFU/mL Enterococcus faecalis (11-15-23 @ 03:45)  Culture Results:   Growth in aerobic and anaerobic bottles: Staphylococcus aureus  See previous culture [id number] (11-15-23 @ 02:05)  Culture Results:   Growth in aerobic and anaerobic bottles: Staphylococcus aureus  Direct identification is available within approximately 3-5  hours either by Blood Panel Multiplexed PCR or Direct  MALDI-TOF. Details: [email or web address] (11-15-23 @ 01:15)      RADIOLOGY:

## 2023-11-19 NOTE — PROGRESS NOTE ADULT - ASSESSMENT
74y/o M with DM, HTN and metastatic prostate cancer (diagnosed in 2009) that initially presented to OSH with LE weakness and R hand paresthesias now transferred to [facility] for NSG eval. Patient now s/p surgical intervention (C5-T3 posterior cervical fusion and C7-T1 decompression) by orthopedic surgery, now transferred to medicine for further management. Course complicated by MSSA bacteremia w surgical wound as likely source.

## 2023-11-19 NOTE — PROGRESS NOTE ADULT - ASSESSMENT
73y Male s/p C5-T3 posterior cervical fusion, C7-T1 decompression for metastatic prostate cancer with plastics closure on 11/2. Recovering well. Both drains removed. MRI 11/14 showing possible seroma in subcutaneous space. Incisional vac w/ black sponge/Adaptic started on 11/17/2023.     Plan:  - incisional wound vac to neck; MWF changes  - F/U fever work up - RVP +adenovirus; UA clear  - Monitor vac output  - Optimize nutrition, ensure adequate protein intake  - Remainder of care per primary team    Plastic Surgery  [phone] [facility] pager  [phone] [facility] pager  Available on Teams

## 2023-11-19 NOTE — PROGRESS NOTE ADULT - PROBLEM SELECTOR PLAN 9
#d/w Plastic surgery  needs : Nylon sutures over incision to remain in place  - Gauze/tegaderm dressing for serosanguinous drainage; currently no collections/signs of infection  Need dressing change 1 to 2 x day. to T spine wound. F/u with Dr Tr Yoo  # Rt- Oncology---> wants out patient RT to be dons once d/c home  Also ut patient f/u with PCP/ Onc and RT oncology  #Oncology f/u with Dr Rodgers at [facility]  #GOC done 11/14, Patient is Ox3 and wants to GO HOME.  He assigned Son Juan Jose is [phone], [phone] , full code  CM will help with wound care, ?? VNS vs Family ???????  Spoke with daughter Melissa and explained we are planning for home.  11/15 d/c planning on hold due to fever eval.--> 11/15/23 blood culture--->. GP cocci in clusters, ID consult called  d/w Patient / son Giuseppe/TAMARA

## 2023-11-19 NOTE — PROGRESS NOTE ADULT - SUBJECTIVE AND OBJECTIVE BOX
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 No complaint this morning.    MEDICATIONS  (STANDING):  acetaminophen     Tablet .. 975 milliGRAM(s) Oral every 6 hours  amLODIPine   Tablet 10 milliGRAM(s) Oral daily  atorvastatin 20 milliGRAM(s) Oral at bedtime  bicalutamide 50 milliGRAM(s) Oral daily  ceFAZolin   IVPB 2000 milliGRAM(s) IV Intermittent every 8 hours  chlorhexidine 2% Cloths 1 Application(s) Topical daily  ciprofloxacin   IVPB      ciprofloxacin   IVPB 400 milliGRAM(s) IV Intermittent every 12 hours  dextrose 5%. 1000 milliLiter(s) (50 mL/Hr) IV Continuous <Continuous>  dextrose 5%. 1000 milliLiter(s) (100 mL/Hr) IV Continuous <Continuous>  dextrose 50% Injectable 25 Gram(s) IV Push once  dextrose 50% Injectable 12.5 Gram(s) IV Push once  dextrose 50% Injectable 25 Gram(s) IV Push once  glucagon  Injectable 1 milliGRAM(s) IntraMuscular once  heparin   Injectable 5000 Unit(s) SubCutaneous every 8 hours  insulin lispro (ADMELOG) corrective regimen sliding scale   SubCutaneous three times a day before meals  insulin lispro (ADMELOG) corrective regimen sliding scale   SubCutaneous at bedtime  lisinopril 5 milliGRAM(s) Oral daily  pantoprazole    Tablet 40 milliGRAM(s) Oral before breakfast  tamsulosin 0.4 milliGRAM(s) Oral at bedtime    MEDICATIONS  (PRN):  dextrose Oral Gel 15 Gram(s) Oral once PRN Blood Glucose LESS THAN 70 milliGRAM(s)/deciliter  guaiFENesin Oral Liquid (Sugar-Free) 200 milliGRAM(s) Oral every 6 hours PRN Cough  naloxone Injectable 0.1 milliGRAM(s) IV Push every 3 minutes PRN For ANY of the following changes in patient status:  A. RR LESS THAN 10 breaths per minute, B. Oxygen saturation LESS THAN 90%, C. Sedation score of 6  ondansetron Injectable 4 milliGRAM(s) IV Push every 6 hours PRN Nausea      CAPILLARY BLOOD GLUCOSE      POCT Blood Glucose.: 173 mg/dL (19 Nov 2023 12:02)  POCT Blood Glucose.: 163 mg/dL (19 Nov 2023 08:46)  POCT Blood Glucose.: 215 mg/dL (18 Nov 2023 22:32)  POCT Blood Glucose.: 188 mg/dL (18 Nov 2023 17:31)  POCT Blood Glucose.: 113 mg/dL (18 Nov 2023 12:33)    I&O's Summary    18 Nov 2023 07:01  -  19 Nov 2023 07:00  --------------------------------------------------------  IN: 1260 mL / OUT: 1950 mL / NET: -690 mL        PHYSICAL EXAM:  Vital Signs Last 24 Hrs  T(C): 36.9 (19 Nov 2023 06:28), Max: 38.5 (18 Nov 2023 12:35)  T(F): 98.5 (19 Nov 2023 06:28), Max: 101.3 (18 Nov 2023 12:35)  HR: 86 (19 Nov 2023 06:28) (86 - 98)  BP: 118/63 (19 Nov 2023 06:28) (113/50 - 127/64)  BP(mean): --  RR: 17 (19 Nov 2023 06:28) (17 - 18)  SpO2: 95% (19 Nov 2023 06:28) (88% - 97%)    Parameters below as of 19 Nov 2023 06:28  Patient On (Oxygen Delivery Method): nasal cannula  O2 Flow (L/min): 2    CONSTITUTIONAL: NAD  EYES: PERRLA; conjunctiva and sclera clear  ENMT: Moist oral mucosa, no pharyngeal injection or exudates; normal dentition  RESPIRATORY: Normal respiratory effort; lungs are clear to auscultation bilaterally  CARDIOVASCULAR: Regular rate and rhythm, normal S1 and S2, no murmur/rub/gallop; No lower extremity edema; Peripheral pulses are 2+ bilaterally  ABDOMEN: Nontender to palpation, normoactive bowel sounds, no rebound/guarding; No hepatosplenomegaly    LABS:                        8.0    2.79  )-----------( 173      ( 19 Nov 2023 07:51 )             24.2     11-19    135  |  97<L>  |  17  ----------------------------<  155<H>  3.8   |  25  |  0.85    Ca    8.4      19 Nov 2023 07:51  Phos  2.8     11-19  Mg     2.20     11-19            Urinalysis Basic - ( 19 Nov 2023 07:51 )    Color: x / Appearance: x / SG: x / pH: x  Gluc: 155 mg/dL / Ketone: x  / Bili: x / Urobili: x   Blood: x / Protein: x / Nitrite: x   Leuk Esterase: x / RBC: x / WBC x   Sq Epi: x / Non Sq Epi: x / Bacteria: x        Culture - Blood (collected 18 Nov 2023 06:30)  Source: .Blood Blood  Preliminary Report (19 Nov 2023 11:00):    No growth at 24 hours    Culture - Blood (collected 18 Nov 2023 06:15)  Source: .Blood Blood  Preliminary Report (19 Nov 2023 11:00):    No growth at 24 hours    Culture - Blood (collected 17 Nov 2023 06:38)  Source: .Blood Blood-Venous  Preliminary Report (19 Nov 2023 09:00):    No growth at 48 Hours    Culture - Blood (collected 17 Nov 2023 06:38)  Source: .Blood Blood-Peripheral  Preliminary Report (19 Nov 2023 09:00):    No growth at 48 Hours        RADIOLOGY & ADDITIONAL TESTS:  Results Reviewed:   Imaging Personally Reviewed:  Electrocardiogram Personally Reviewed:    COORDINATION OF CARE:  Care Discussed with Consultants/Other Providers [Y/N]:  Prior or Outpatient Records Reviewed [Y/N]:

## 2023-11-19 NOTE — PROGRESS NOTE ADULT - SUBJECTIVE AND OBJECTIVE BOX
Plastic Surgery Progress Note (pg [facility]: [id number], [facility]: [phone])    SUBJECTIVE  The patient was seen and examined. No acute events overnight.    OBJECTIVE  ___________________________________________________  VITAL SIGNS / I&O's   Vital Signs Last 24 Hrs  T(C): 36.9 (19 Nov 2023 06:28), Max: 38.5 (18 Nov 2023 12:35)  T(F): 98.5 (19 Nov 2023 06:28), Max: 101.3 (18 Nov 2023 12:35)  HR: 86 (19 Nov 2023 06:28) (86 - 98)  BP: 118/63 (19 Nov 2023 06:28) (101/49 - 127/64)  BP(mean): --  RR: 17 (19 Nov 2023 06:28) (17 - 18)  SpO2: 95% (19 Nov 2023 06:28) (88% - 97%)    Parameters below as of 19 Nov 2023 06:28  Patient On (Oxygen Delivery Method): nasal cannula  O2 Flow (L/min): 2        18 Nov 2023 07:01  -  19 Nov 2023 07:00  --------------------------------------------------------  IN:    IV PiggyBack: 300 mL    Oral Fluid: 960 mL  Total IN: 1260 mL    OUT:    Voided (mL): 1950 mL  Total OUT: 1950 mL    Total NET: -690 mL        ___________________________________________________  PHYSICAL EXAM    General: NAD, Lying in bed   Neuro: Awake and alert  Pulm: non-labored respirations  Back: Soft, flat, no palpable collection. Incisional vac holding suction; no drainage or leaks. Vac canister without visible output    ___________________________________________________  LABS                        8.0    2.79  )-----------( 173      ( 19 Nov 2023 07:51 )             24.2     19 Nov 2023 07:51    135    |  97     |  17     ----------------------------<  155    3.8     |  25     |  0.85     Ca    8.4        19 Nov 2023 07:51  Phos  2.8       19 Nov 2023 07:51  Mg     2.20      19 Nov 2023 07:51        CAPILLARY BLOOD GLUCOSE      POCT Blood Glucose.: 163 mg/dL (19 Nov 2023 08:46)  POCT Blood Glucose.: 215 mg/dL (18 Nov 2023 22:32)  POCT Blood Glucose.: 188 mg/dL (18 Nov 2023 17:31)  POCT Blood Glucose.: 113 mg/dL (18 Nov 2023 12:33)        Urinalysis Basic - ( 19 Nov 2023 07:51 )    Color: x / Appearance: x / SG: x / pH: x  Gluc: 155 mg/dL / Ketone: x  / Bili: x / Urobili: x   Blood: x / Protein: x / Nitrite: x   Leuk Esterase: x / RBC: x / WBC x   Sq Epi: x / Non Sq Epi: x / Bacteria: x      ___________________________________________________  MICRO  Recent Cultures:  Specimen Source: .Blood Blood-Peripheral, 11-17 @ 06:38; Results   No growth at 48 Hours; Gram Stain: --; Organism: --  Specimen Source: .Blood Blood-Venous, 11-16 @ 06:05; Results   No growth at 48 Hours; Gram Stain: --; Organism: --  Specimen Source: .Blood Blood-Peripheral, 11-16 @ 06:00; Results   No growth at 48 Hours; Gram Stain: --; Organism: --  Specimen Source: Clean Catch Clean Catch (Midstream), 11-15 @ 03:45; Results   >100,000 CFU/ml Providencia rettgeri  10,000 - 49,000 CFU/mL Enterococcus faecalis<!>; Gram Stain: --; Organism: Providencia rettgeri  Enterococcus faecalis<!>  Specimen Source: .Blood Blood-Peripheral, 11-15 @ 02:05; Results   Growth in aerobic and anaerobic bottles: Staphylococcus aureus  See previous culture [id number]<!>; Gram Stain:   Growth in aerobic and anaerobic bottles: Gram Positive Cocci in Clusters<!>; Organism: --  Specimen Source: .Blood Blood-Peripheral, 11-15 @ 01:15; Results   Growth in aerobic and anaerobic bottles: Staphylococcus aureus  Direct identification is available within approximately 3-5  hours either by Blood Panel Multiplexed PCR or Direct  MALDI-TOF. Details: [email or web address]<!>; Gram Stain:   Growth in aerobic bottle: Gram Positive Cocci in Clusters  Growth in anaerobic bottle: Gram Positive Cocci in Clusters<!>; Organism: Blood Culture PCR  Staphylococcus aureus<!>    ___________________________________________________  MEDICATIONS  (STANDING):  acetaminophen     Tablet .. 975 milliGRAM(s) Oral every 6 hours  amLODIPine   Tablet 10 milliGRAM(s) Oral daily  atorvastatin 20 milliGRAM(s) Oral at bedtime  bicalutamide 50 milliGRAM(s) Oral daily  ceFAZolin   IVPB 2000 milliGRAM(s) IV Intermittent every 8 hours  chlorhexidine 2% Cloths 1 Application(s) Topical daily  ciprofloxacin   IVPB      ciprofloxacin   IVPB 400 milliGRAM(s) IV Intermittent every 12 hours  dextrose 5%. 1000 milliLiter(s) (50 mL/Hr) IV Continuous <Continuous>  dextrose 5%. 1000 milliLiter(s) (100 mL/Hr) IV Continuous <Continuous>  dextrose 50% Injectable 25 Gram(s) IV Push once  dextrose 50% Injectable 12.5 Gram(s) IV Push once  dextrose 50% Injectable 25 Gram(s) IV Push once  glucagon  Injectable 1 milliGRAM(s) IntraMuscular once  heparin   Injectable 5000 Unit(s) SubCutaneous every 8 hours  insulin lispro (ADMELOG) corrective regimen sliding scale   SubCutaneous three times a day before meals  insulin lispro (ADMELOG) corrective regimen sliding scale   SubCutaneous at bedtime  lisinopril 5 milliGRAM(s) Oral daily  pantoprazole    Tablet 40 milliGRAM(s) Oral before breakfast  tamsulosin 0.4 milliGRAM(s) Oral at bedtime    MEDICATIONS  (PRN):  dextrose Oral Gel 15 Gram(s) Oral once PRN Blood Glucose LESS THAN 70 milliGRAM(s)/deciliter  guaiFENesin Oral Liquid (Sugar-Free) 200 milliGRAM(s) Oral every 6 hours PRN Cough  naloxone Injectable 0.1 milliGRAM(s) IV Push every 3 minutes PRN For ANY of the following changes in patient status:  A. RR LESS THAN 10 breaths per minute, B. Oxygen saturation LESS THAN 90%, C. Sedation score of 6  ondansetron Injectable 4 milliGRAM(s) IV Push every 6 hours PRN Nausea

## 2023-11-20 ENCOUNTER — OUTPATIENT (OUTPATIENT)
Dept: OUTPATIENT SERVICES | Facility: HOSPITAL | Age: 73
LOS: 1 days | Discharge: HOME | End: 2023-11-20

## 2023-11-20 DIAGNOSIS — R78.81 BACTEREMIA: ICD-10-CM

## 2023-11-20 LAB
ANION GAP SERPL CALC-SCNC: 11 MMOL/L — SIGNIFICANT CHANGE UP (ref 7–14)
ANION GAP SERPL CALC-SCNC: 11 MMOL/L — SIGNIFICANT CHANGE UP (ref 7–14)
BASOPHILS # BLD AUTO: 0 K/UL — SIGNIFICANT CHANGE UP (ref 0–0.2)
BASOPHILS # BLD AUTO: 0 K/UL — SIGNIFICANT CHANGE UP (ref 0–0.2)
BASOPHILS NFR BLD AUTO: 0 % — SIGNIFICANT CHANGE UP (ref 0–2)
BASOPHILS NFR BLD AUTO: 0 % — SIGNIFICANT CHANGE UP (ref 0–2)
BUN SERPL-MCNC: 16 MG/DL — SIGNIFICANT CHANGE UP (ref 7–23)
BUN SERPL-MCNC: 16 MG/DL — SIGNIFICANT CHANGE UP (ref 7–23)
CALCIUM SERPL-MCNC: 8.3 MG/DL — LOW (ref 8.4–10.5)
CALCIUM SERPL-MCNC: 8.3 MG/DL — LOW (ref 8.4–10.5)
CHLORIDE SERPL-SCNC: 98 MMOL/L — SIGNIFICANT CHANGE UP (ref 98–107)
CHLORIDE SERPL-SCNC: 98 MMOL/L — SIGNIFICANT CHANGE UP (ref 98–107)
CO2 SERPL-SCNC: 25 MMOL/L — SIGNIFICANT CHANGE UP (ref 22–31)
CO2 SERPL-SCNC: 25 MMOL/L — SIGNIFICANT CHANGE UP (ref 22–31)
CREAT SERPL-MCNC: 0.81 MG/DL — SIGNIFICANT CHANGE UP (ref 0.5–1.3)
CREAT SERPL-MCNC: 0.81 MG/DL — SIGNIFICANT CHANGE UP (ref 0.5–1.3)
EGFR: 94 ML/MIN/1.73M2 — SIGNIFICANT CHANGE UP
EGFR: 94 ML/MIN/1.73M2 — SIGNIFICANT CHANGE UP
EOSINOPHIL # BLD AUTO: 0.04 K/UL — SIGNIFICANT CHANGE UP (ref 0–0.5)
EOSINOPHIL # BLD AUTO: 0.04 K/UL — SIGNIFICANT CHANGE UP (ref 0–0.5)
EOSINOPHIL NFR BLD AUTO: 2.2 % — SIGNIFICANT CHANGE UP (ref 0–6)
EOSINOPHIL NFR BLD AUTO: 2.2 % — SIGNIFICANT CHANGE UP (ref 0–6)
GLUCOSE BLDC GLUCOMTR-MCNC: 175 MG/DL — HIGH (ref 70–99)
GLUCOSE BLDC GLUCOMTR-MCNC: 175 MG/DL — HIGH (ref 70–99)
GLUCOSE SERPL-MCNC: 183 MG/DL — HIGH (ref 70–99)
GLUCOSE SERPL-MCNC: 183 MG/DL — HIGH (ref 70–99)
HCT VFR BLD CALC: 24.4 % — LOW (ref 39–50)
HCT VFR BLD CALC: 24.4 % — LOW (ref 39–50)
HGB BLD-MCNC: 8 G/DL — LOW (ref 13–17)
HGB BLD-MCNC: 8 G/DL — LOW (ref 13–17)
IANC: 1.41 K/UL — LOW (ref 1.8–7.4)
IANC: 1.41 K/UL — LOW (ref 1.8–7.4)
IMM GRANULOCYTES NFR BLD AUTO: 0.5 % — SIGNIFICANT CHANGE UP (ref 0–0.9)
IMM GRANULOCYTES NFR BLD AUTO: 0.5 % — SIGNIFICANT CHANGE UP (ref 0–0.9)
LYMPHOCYTES # BLD AUTO: 0.26 K/UL — LOW (ref 1–3.3)
LYMPHOCYTES # BLD AUTO: 0.26 K/UL — LOW (ref 1–3.3)
LYMPHOCYTES # BLD AUTO: 14.1 % — SIGNIFICANT CHANGE UP (ref 13–44)
LYMPHOCYTES # BLD AUTO: 14.1 % — SIGNIFICANT CHANGE UP (ref 13–44)
MAGNESIUM SERPL-MCNC: 2.2 MG/DL — SIGNIFICANT CHANGE UP (ref 1.6–2.6)
MAGNESIUM SERPL-MCNC: 2.2 MG/DL — SIGNIFICANT CHANGE UP (ref 1.6–2.6)
MCHC RBC-ENTMCNC: 31.1 PG — SIGNIFICANT CHANGE UP (ref 27–34)
MCHC RBC-ENTMCNC: 31.1 PG — SIGNIFICANT CHANGE UP (ref 27–34)
MCHC RBC-ENTMCNC: 32.8 GM/DL — SIGNIFICANT CHANGE UP (ref 32–36)
MCHC RBC-ENTMCNC: 32.8 GM/DL — SIGNIFICANT CHANGE UP (ref 32–36)
MCV RBC AUTO: 94.9 FL — SIGNIFICANT CHANGE UP (ref 80–100)
MCV RBC AUTO: 94.9 FL — SIGNIFICANT CHANGE UP (ref 80–100)
MONOCYTES # BLD AUTO: 0.12 K/UL — SIGNIFICANT CHANGE UP (ref 0–0.9)
MONOCYTES # BLD AUTO: 0.12 K/UL — SIGNIFICANT CHANGE UP (ref 0–0.9)
MONOCYTES NFR BLD AUTO: 6.5 % — SIGNIFICANT CHANGE UP (ref 2–14)
MONOCYTES NFR BLD AUTO: 6.5 % — SIGNIFICANT CHANGE UP (ref 2–14)
NEUTROPHILS # BLD AUTO: 1.41 K/UL — LOW (ref 1.8–7.4)
NEUTROPHILS # BLD AUTO: 1.41 K/UL — LOW (ref 1.8–7.4)
NEUTROPHILS NFR BLD AUTO: 76.7 % — SIGNIFICANT CHANGE UP (ref 43–77)
NEUTROPHILS NFR BLD AUTO: 76.7 % — SIGNIFICANT CHANGE UP (ref 43–77)
NRBC # BLD: 0 /100 WBCS — SIGNIFICANT CHANGE UP (ref 0–0)
NRBC # BLD: 0 /100 WBCS — SIGNIFICANT CHANGE UP (ref 0–0)
NRBC # FLD: 0 K/UL — SIGNIFICANT CHANGE UP (ref 0–0)
NRBC # FLD: 0 K/UL — SIGNIFICANT CHANGE UP (ref 0–0)
PHOSPHATE SERPL-MCNC: 2.8 MG/DL — SIGNIFICANT CHANGE UP (ref 2.5–4.5)
PHOSPHATE SERPL-MCNC: 2.8 MG/DL — SIGNIFICANT CHANGE UP (ref 2.5–4.5)
PLATELET # BLD AUTO: 187 K/UL — SIGNIFICANT CHANGE UP (ref 150–400)
PLATELET # BLD AUTO: 187 K/UL — SIGNIFICANT CHANGE UP (ref 150–400)
POTASSIUM SERPL-MCNC: 3.9 MMOL/L — SIGNIFICANT CHANGE UP (ref 3.5–5.3)
POTASSIUM SERPL-MCNC: 3.9 MMOL/L — SIGNIFICANT CHANGE UP (ref 3.5–5.3)
POTASSIUM SERPL-SCNC: 3.9 MMOL/L — SIGNIFICANT CHANGE UP (ref 3.5–5.3)
POTASSIUM SERPL-SCNC: 3.9 MMOL/L — SIGNIFICANT CHANGE UP (ref 3.5–5.3)
RBC # BLD: 2.57 M/UL — LOW (ref 4.2–5.8)
RBC # BLD: 2.57 M/UL — LOW (ref 4.2–5.8)
RBC # FLD: 13.5 % — SIGNIFICANT CHANGE UP (ref 10.3–14.5)
RBC # FLD: 13.5 % — SIGNIFICANT CHANGE UP (ref 10.3–14.5)
SODIUM SERPL-SCNC: 134 MMOL/L — LOW (ref 135–145)
SODIUM SERPL-SCNC: 134 MMOL/L — LOW (ref 135–145)
WBC # BLD: 1.84 K/UL — LOW (ref 3.8–10.5)
WBC # BLD: 1.84 K/UL — LOW (ref 3.8–10.5)
WBC # FLD AUTO: 1.84 K/UL — LOW (ref 3.8–10.5)
WBC # FLD AUTO: 1.84 K/UL — LOW (ref 3.8–10.5)

## 2023-11-20 PROCEDURE — 99232 SBSQ HOSP IP/OBS MODERATE 35: CPT

## 2023-11-20 PROCEDURE — 99233 SBSQ HOSP IP/OBS HIGH 50: CPT

## 2023-11-20 RX ORDER — NAFCILLIN 10 G/100ML
2 INJECTION, POWDER, FOR SOLUTION INTRAVENOUS EVERY 4 HOURS
Refills: 0 | Status: DISCONTINUED | OUTPATIENT
Start: 2023-11-20 | End: 2023-11-27

## 2023-11-20 RX ORDER — SODIUM CHLORIDE 9 MG/ML
1000 INJECTION INTRAMUSCULAR; INTRAVENOUS; SUBCUTANEOUS ONCE
Refills: 0 | Status: COMPLETED | OUTPATIENT
Start: 2023-11-20 | End: 2023-11-20

## 2023-11-20 RX ADMIN — Medication 975 MILLIGRAM(S): at 18:23

## 2023-11-20 RX ADMIN — Medication 200 MILLIGRAM(S): at 05:14

## 2023-11-20 RX ADMIN — ATORVASTATIN CALCIUM 20 MILLIGRAM(S): 80 TABLET, FILM COATED ORAL at 22:59

## 2023-11-20 RX ADMIN — Medication 100 MILLIGRAM(S): at 14:42

## 2023-11-20 RX ADMIN — AMLODIPINE BESYLATE 10 MILLIGRAM(S): 2.5 TABLET ORAL at 05:13

## 2023-11-20 RX ADMIN — BENZOCAINE AND MENTHOL 1 LOZENGE: 5; 1 LIQUID ORAL at 18:59

## 2023-11-20 RX ADMIN — Medication 1: at 09:39

## 2023-11-20 RX ADMIN — CHLORHEXIDINE GLUCONATE 1 APPLICATION(S): 213 SOLUTION TOPICAL at 13:29

## 2023-11-20 RX ADMIN — TAMSULOSIN HYDROCHLORIDE 0.4 MILLIGRAM(S): 0.4 CAPSULE ORAL at 22:59

## 2023-11-20 RX ADMIN — NAFCILLIN 200 GRAM(S): 10 INJECTION, POWDER, FOR SOLUTION INTRAVENOUS at 19:35

## 2023-11-20 RX ADMIN — Medication 975 MILLIGRAM(S): at 05:13

## 2023-11-20 RX ADMIN — Medication 1: at 13:03

## 2023-11-20 RX ADMIN — SODIUM CHLORIDE 1000 MILLILITER(S): 9 INJECTION INTRAMUSCULAR; INTRAVENOUS; SUBCUTANEOUS at 10:17

## 2023-11-20 RX ADMIN — Medication 100 MILLIGRAM(S): at 05:14

## 2023-11-20 RX ADMIN — BICALUTAMIDE 50 MILLIGRAM(S): 50 TABLET, FILM COATED ORAL at 11:30

## 2023-11-20 RX ADMIN — PANTOPRAZOLE SODIUM 40 MILLIGRAM(S): 20 TABLET, DELAYED RELEASE ORAL at 06:45

## 2023-11-20 RX ADMIN — HEPARIN SODIUM 5000 UNIT(S): 5000 INJECTION INTRAVENOUS; SUBCUTANEOUS at 05:13

## 2023-11-20 RX ADMIN — Medication 200 MILLIGRAM(S): at 22:59

## 2023-11-20 RX ADMIN — BENZOCAINE AND MENTHOL 1 LOZENGE: 5; 1 LIQUID ORAL at 05:14

## 2023-11-20 RX ADMIN — HEPARIN SODIUM 5000 UNIT(S): 5000 INJECTION INTRAVENOUS; SUBCUTANEOUS at 14:40

## 2023-11-20 RX ADMIN — LISINOPRIL 5 MILLIGRAM(S): 2.5 TABLET ORAL at 05:15

## 2023-11-20 RX ADMIN — Medication 975 MILLIGRAM(S): at 11:30

## 2023-11-20 RX ADMIN — NAFCILLIN 200 GRAM(S): 10 INJECTION, POWDER, FOR SOLUTION INTRAVENOUS at 22:59

## 2023-11-20 RX ADMIN — HEPARIN SODIUM 5000 UNIT(S): 5000 INJECTION INTRAVENOUS; SUBCUTANEOUS at 22:59

## 2023-11-20 NOTE — PROGRESS NOTE ADULT - ASSESSMENT
73 year old with metastatic prostate cancer s/p spinal decompression.  Now with fever and high grade MSSA bacteremia    1) High grade MSSA bacteremia  Check transthoracic echocardiogram  Repeat blood culture 11/16 and 11/17 are without growth    WBC decreasing - now at 1.8  ? due to cephalosporin    Can try ot change to nafcillin   If not improving, may need to change to Daptoymcin    2) Abnormal Imaging  Fluid collections noted on imaging  Vac placed  Spine surgery and plastic surgery follow up    3) Bacteruria  Culture with mixed growth  100 K providenica< 10-50 K e fecalis  Likely colonization  Cipro would treat providencia which has higher colony count  Finished Cipro    4) Adenovirus  Supportive care

## 2023-11-20 NOTE — PROGRESS NOTE ADULT - SUBJECTIVE AND OBJECTIVE BOX
[facility] Division of Hospital Medicine  Jesus Riojas MD  Pager (M-F, 3M-5P): [phone]  Other Times:  [phone]    Patient is a 73y old  Male who presents with a chief complaint of Diffuse Spinal Mets from Prostate Cancer (20 Nov 2023 07:29)    SUBJECTIVE / OVERNIGHT EVENTS:  Offers no new complaints.  No F/C, N/V, CP, SOB, Cough, lightheadedness, dizziness, abdominal pain, diarrhea, dysuria.    MEDICATIONS  (STANDING):  acetaminophen     Tablet .. 975 milliGRAM(s) Oral every 6 hours  amLODIPine   Tablet 10 milliGRAM(s) Oral daily  atorvastatin 20 milliGRAM(s) Oral at bedtime  bicalutamide 50 milliGRAM(s) Oral daily  ceFAZolin   IVPB 2000 milliGRAM(s) IV Intermittent every 8 hours  chlorhexidine 2% Cloths 1 Application(s) Topical daily  dextrose 5%. 1000 milliLiter(s) (50 mL/Hr) IV Continuous <Continuous>  dextrose 5%. 1000 milliLiter(s) (100 mL/Hr) IV Continuous <Continuous>  dextrose 50% Injectable 25 Gram(s) IV Push once  dextrose 50% Injectable 12.5 Gram(s) IV Push once  dextrose 50% Injectable 25 Gram(s) IV Push once  glucagon  Injectable 1 milliGRAM(s) IntraMuscular once  heparin   Injectable 5000 Unit(s) SubCutaneous every 8 hours  insulin lispro (ADMELOG) corrective regimen sliding scale   SubCutaneous three times a day before meals  insulin lispro (ADMELOG) corrective regimen sliding scale   SubCutaneous at bedtime  lisinopril 5 milliGRAM(s) Oral daily  pantoprazole    Tablet 40 milliGRAM(s) Oral before breakfast  tamsulosin 0.4 milliGRAM(s) Oral at bedtime    MEDICATIONS  (PRN):  benzocaine/menthol Lozenge 1 Lozenge Oral every 6 hours PRN Sore Throat  dextrose Oral Gel 15 Gram(s) Oral once PRN Blood Glucose LESS THAN 70 milliGRAM(s)/deciliter  guaiFENesin Oral Liquid (Sugar-Free) 200 milliGRAM(s) Oral every 6 hours PRN Cough  naloxone Injectable 0.1 milliGRAM(s) IV Push every 3 minutes PRN For ANY of the following changes in patient status:  A. RR LESS THAN 10 breaths per minute, B. Oxygen saturation LESS THAN 90%, C. Sedation score of 6  ondansetron Injectable 4 milliGRAM(s) IV Push every 6 hours PRN Nausea      Vital Signs Last 24 Hrs  T(C): 36.9 (20 Nov 2023 10:00), Max: 36.9 (20 Nov 2023 10:00)  T(F): 98.4 (20 Nov 2023 10:00), Max: 98.4 (20 Nov 2023 10:00)  HR: 86 (20 Nov 2023 10:00) (84 - 97)  BP: 83/61 (20 Nov 2023 10:00) (83/61 - 119/67)  BP(mean): --  RR: 18 (20 Nov 2023 10:00) (16 - 18)  SpO2: 100% (20 Nov 2023 10:00) (97% - 100%)    Parameters below as of 20 Nov 2023 10:00  Patient On (Oxygen Delivery Method): room air      CAPILLARY BLOOD GLUCOSE      POCT Blood Glucose.: 176 mg/dL (20 Nov 2023 12:14)  POCT Blood Glucose.: 175 mg/dL (20 Nov 2023 08:57)  POCT Blood Glucose.: 199 mg/dL (19 Nov 2023 23:38)  POCT Blood Glucose.: 134 mg/dL (19 Nov 2023 18:16)    I&O's Summary    19 Nov 2023 07:01  -  20 Nov 2023 07:00  --------------------------------------------------------  IN: 0 mL / OUT: 1050 mL / NET: -1050 mL        PHYSICAL EXAM:  CONSTITUTIONAL: NAD  EYES: PERRLA; conjunctiva and sclera clear  ENMT: Moist oral mucosa, no pharyngeal injection or exudates; normal dentition  NECK: Supple, no palpable masses; no thyromegaly  RESPIRATORY: Normal respiratory effort; lungs are clear to auscultation bilaterally  CARDIOVASCULAR: Regular rate and rhythm, normal S1 and S2, no murmur/rub/gallop; No lower extremity edema; Peripheral pulses are 2+ bilaterally  ABDOMEN: Nontender to palpation, normoactive bowel sounds, no rebound/guarding; No hepatosplenomegaly  MUSCULOSKELETAL:  Did not assess gait; no clubbing or cyanosis of digits; no joint swelling or tenderness to palpation  PSYCH: A+O to person, place, and time; affect appropriate  NEUROLOGY: CN 2-12 are intact and symmetric; no gross sensory deficits   SKIN: Woundvac in place on his back surgical site - C/D/I    LABS:                        8.0    1.84  )-----------( 187      ( 20 Nov 2023 06:42 )             24.4     11-20    134<L>  |  98  |  16  ----------------------------<  183<H>  3.9   |  25  |  0.81    Ca    8.3<L>      20 Nov 2023 06:42  Phos  2.8     11-20  Mg     2.20     11-20            Urinalysis Basic - ( 20 Nov 2023 06:42 )    Color: x / Appearance: x / SG: x / pH: x  Gluc: 183 mg/dL / Ketone: x  / Bili: x / Urobili: x   Blood: x / Protein: x / Nitrite: x   Leuk Esterase: x / RBC: x / WBC x   Sq Epi: x / Non Sq Epi: x / Bacteria: x        RADIOLOGY & ADDITIONAL TESTS:    Imaging Personally Reviewed:    Care Discussed with Consultants/Other Providers:

## 2023-11-20 NOTE — PROGRESS NOTE ADULT - PROBLEM SELECTOR PLAN 2
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

## 2023-11-20 NOTE — PROGRESS NOTE ADULT - ASSESSMENT
73M DM2, HTN, Prostate CA p/w spinal cord compression from metastasis s/p C5-T3 Fusion, C7-T1 decompression c/b MSSA bacteremia from surgical site infection, B/L LE DVT s/p IVC filter 11/1 by IR.

## 2023-11-20 NOTE — PROGRESS NOTE ADULT - ASSESSMENT
73y Male s/p C5-T3 posterior cervical fusion, C7-T1 decompression for metastatic prostate cancer with plastics closure on 11/2. Recovering well. Both drains removed. MRI 11/14 showing possible seroma in subcutaneous space. Incisional vac w/ black sponge/Adaptic started on 11/17/2023.     Plan:  - incisional wound vac to neck; MWF changes, vac team consulted  - F/U fever work up - RVP +adenovirus; UA clear  - Monitor vac output  - Optimize nutrition, ensure adequate protein intake  - Remainder of care per primary team    Ricardo Weinsteinsh  Plastic Surgery  #[phone] [facility] pager  [phone] [facility] pager  Available on teams

## 2023-11-20 NOTE — PROGRESS NOTE ADULT - PROBLEM SELECTOR PLAN 3
Patient previously diagnosed with prostate ca in 2009  - NM scan showing multiple osseous lesions throughout body  - heme/onc recs appreciated, will c/w bicalutamide 50mg qd  - no plan for inpatient RT per rad/onc

## 2023-11-20 NOTE — PROGRESS NOTE ADULT - SUBJECTIVE AND OBJECTIVE BOX
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 975 milliGRAM(s) Oral every 6 hours  atorvastatin 20 milliGRAM(s) Oral at bedtime  benzocaine/menthol Lozenge 1 Lozenge Oral every 6 hours PRN  bicalutamide 50 milliGRAM(s) Oral daily  chlorhexidine 2% Cloths 1 Application(s) Topical daily  dextrose 5%. 1000 milliLiter(s) IV Continuous <Continuous>  dextrose 5%. 1000 milliLiter(s) IV Continuous <Continuous>  dextrose 50% Injectable 25 Gram(s) IV Push once  dextrose 50% Injectable 12.5 Gram(s) IV Push once  dextrose 50% Injectable 25 Gram(s) IV Push once  dextrose Oral Gel 15 Gram(s) Oral once PRN  glucagon  Injectable 1 milliGRAM(s) IntraMuscular once  guaiFENesin Oral Liquid (Sugar-Free) 200 milliGRAM(s) Oral every 6 hours PRN  heparin   Injectable 5000 Unit(s) SubCutaneous every 8 hours  insulin lispro (ADMELOG) corrective regimen sliding scale   SubCutaneous at bedtime  insulin lispro (ADMELOG) corrective regimen sliding scale   SubCutaneous three times a day before meals  lisinopril 5 milliGRAM(s) Oral daily  naloxone Injectable 0.1 milliGRAM(s) IV Push every 3 minutes PRN  ondansetron Injectable 4 milliGRAM(s) IV Push every 6 hours PRN  pantoprazole    Tablet 40 milliGRAM(s) Oral before breakfast  tamsulosin 0.4 milliGRAM(s) Oral at bedtime      Vital Signs Last 24 Hrs  T(C): 36.8 (20 Nov 2023 12:00), Max: 36.9 (20 Nov 2023 10:00)  T(F): 98.2 (20 Nov 2023 12:00), Max: 98.4 (20 Nov 2023 10:00)  HR: 93 (20 Nov 2023 12:00) (86 - 97)  BP: 93/52 (20 Nov 2023 12:00) (83/61 - 117/56)  BP(mean): --  RR: 18 (20 Nov 2023 12:00) (17 - 18)  SpO2: 100% (20 Nov 2023 12:00) (97% - 100%)    Parameters below as of 20 Nov 2023 12:00  Patient On (Oxygen Delivery Method): nasal cannula        PHYSICAL EXAM:  General: [x ] non-toxic  HEAD/EYES: [ ] PERRL [x ] white sclera [ ] icterus  ENT:  [ ] normal [x ] supple [ ] thrush [ ] pharyngeal exudate  Cardiovascular:   [ ] murmur [ ]x normal [ ] PPM/AICD  Respiratory:  x[ ] clear to ausculation bilaterally  GI:  [x ] soft, non-tender, normal bowel sounds  :  [ ] best [ ] no CVA tenderness   Musculoskeletal:  [ ] no synovitis  Neurologic:  [ ] non-focal exam   Skin:  [x ] no rash  Lymph: [x ] no lymphadenopathy  Psychiatric:  [ ] appropriate affect [ ] alert & oriented  Lines:  [x ] no phlebitis [ ] central line                                8.0    1.84  )-----------( 187      ( 20 Nov 2023 06:42 )             24.4       11-20    134<L>  |  98  |  16  ----------------------------<  183<H>  3.9   |  25  |  0.81    Ca    8.3<L>      20 Nov 2023 06:42  Phos  2.8     11-20  Mg     2.20     11-20        Urinalysis Basic - ( 20 Nov 2023 06:42 )    Color: x / Appearance: x / SG: x / pH: x  Gluc: 183 mg/dL / Ketone: x  / Bili: x / Urobili: x   Blood: x / Protein: x / Nitrite: x   Leuk Esterase: x / RBC: x / WBC x   Sq Epi: x / Non Sq Epi: x / Bacteria: x        MICROBIOLOGY:Culture Results:   No growth at 48 Hours (11-18-23 @ 06:30)  Culture Results:   No growth at 48 Hours (11-18-23 @ 06:15)  Culture Results:   No growth at 72 Hours (11-17-23 @ 06:38)  Culture Results:   No growth at 72 Hours (11-17-23 @ 06:38)  Culture Results:   No growth at 4 days (11-16-23 @ 06:05)  Culture Results:   No growth at 4 days (11-16-23 @ 06:00)  Culture Results:   >100,000 CFU/ml Providencia rettgeri  10,000 - 49,000 CFU/mL Enterococcus faecalis (11-15-23 @ 03:45)  Culture Results:   Growth in aerobic and anaerobic bottles: Staphylococcus aureus  See previous culture [id number] (11-15-23 @ 02:05)  Culture Results:   Growth in aerobic and anaerobic bottles: Staphylococcus aureus  Direct identification is available within approximately 3-5  hours either by Blood Panel Multiplexed PCR or Direct  MALDI-TOF. Details: [email or web address] (11-15-23 @ 01:15)      RADIOLOGY:    < from: TTE W or WO Ultrasound Enhancing Agent (11.18.23 @ 11:24) >  CONCLUSIONS:      1. Left ventricular wall thickness is mildly increased. Left ventricular systolic function is hyperdynamic with an ejection fraction of 72 % by Diggs's method of disks.   2. Normal right ventricular cavity size, wall thickness, and systolic function. Tricuspid annular plane systolic excursion (TAPSE) is 2.9 cm (normal >=1.7 cm).   3. The left atrium is normal.   4. The right atrium is normal in size.   5. No pericardial effusion seen.   6. Unable to rule out endocarditis.   7. Calcification of the aortic valve leaflets. mild aortic stenosis.   8. Hyperdynamic left ventricular systolic function with intra-cavitary gradient of 48 mmHg.   9. No prior echocardiogram is available for comparison.  10. Technically difficult image quality.  11. There is normal LV mass and concentric remodeling.    < end of copied text >

## 2023-11-20 NOTE — ADVANCED PRACTICE NURSE CONSULT - ASSESSMENT
Patient is a&ox4, BLE weakness, OOb to chair with walker and assist, continent of stool and urine. Skin warm, dry with increased moisture in intertriginous folds, adequate skin turgor, scattered areas of hyperpigmentation and hypopigmentation, scattered areas of dry stable scabs to bilateral elbows and lower legs. NPWT VAC therapy to cervical spine and dressing changes explained to patient, patient verbalized understanding. All questions answered. See A&I flowsheet for full assessment details.      Abrasions to bilateral knees s/p fall:  -R knee: 2 abrasions measured in a cluster, entire area [id number], wound beds with 25% fibrin, 75% pink hypopigmentation, no drainage,  no odor, periwound with circumferential blanchable erythema 0.5cm, no increased warmth, no edema, no induration, no fluctuance no signs or symptoms of overt skin infection.   -Left knee abrasion 1.5cmx0.5cmx0.2cm with 100% fibrin, no drainage, no odor, Periwound with no erythema, no increased warmth, no edema, no induration, no fluctuance no signs or symptoms of overt skin infection.   -Goals of care: antimicrobial support, offload pressure, protect from friction and shear, monitor for tissue type changes.     Right upper back- wound of unknown etiology possible skin tear, 0.9gkf6ses2.2cm, 100% yellow moist firmly adhered slough, no odor, no drainage, Periwound with no erythema, no increased warmth, no edema, no induration, no fluctuance no signs or symptoms of overt skin infection. Goals of care: moist wound healing, protect from friction and shear, monitor for tissue type changes.

## 2023-11-20 NOTE — ADVANCED PRACTICE NURSE CONSULT - REASON FOR CONSULT
Patient seen on skin care rounds for NPWT/VAC placement to cervical spine. Patient is a 72 YO M w PMHx of T2DM, HTN and metastatic prostate cancer to bone, found to have cervical spine neoplasm c/f cord compression as well as multiple metastatic thoracic, scapular, pelvic spine lesions. s/p C5-T3 posterior cervical fusion and C7-T1 decompression laminectomy with Dr Badillo spine sx on 11/2. Plastics performed flap closure and BL AGGIE drains removed on 11/7 and 11/9 respectfully. Course complicated by BL DVTs and s/p IVC FILTER (11/1) as pt cannot be on therapeutic AC x6 weeks post-op per ortho. Further c/b by MSSA bacteremia w/ surgical wound as likely source. + adenovirus 11/15. Chart reviewed: WBC 1.84, H/H 8.0/24.4, INR , Platelets 187, hA1c 5.2, BMI 32.4, kimberly 18. 
Patient seen on skin care rounds after wound care referral received for assessment of skin impairment and recommendations of topical management of knee abrasions s/p fall. Patient is a 73M w/ T2DM, HTN, Metastatic prostate cancer (2009) was initially evaluated at [facility] for lower extremity paralysis in the setting of a recent fall, found to have 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 Initiated IV solumedrol with gradual improvement of symptoms and return of strength since Wednesday now here at [facility] for spine evaluation by surgery. Plan for OR today. Chart reviewed: WBC 11.86, H/H 12.5/37.1, INR 1.05, Platelets 168, hA1c 5.2, BMI 24.2, kimberly 15.

## 2023-11-20 NOTE — PROGRESS NOTE ADULT - PROBLEM SELECTOR PLAN 1
Concern for surgical site as a primary source  - woundvac in place  - BCx from 11/16, 11/17 NGTD  - Continue Ancef IV - duration to be determined  - TTE done - awaiting ID's opinion on whether JESSICA would be needed.

## 2023-11-20 NOTE — PROGRESS NOTE ADULT - SUBJECTIVE AND OBJECTIVE BOX
Plastic Surgery Progress Note (pg [facility]: [id number], [facility]: [phone])    SUBJECTIVE  The patient was seen and examined. No acute events overnight. Patient still with cough/sore throat this AM.     OBJECTIVE  ___________________________________________________  VITAL SIGNS / I&O's   Vital Signs Last 24 Hrs  T(C): 36.8 (20 Nov 2023 05:00), Max: 36.9 (19 Nov 2023 12:22)  T(F): 98.2 (20 Nov 2023 05:00), Max: 98.4 (19 Nov 2023 12:22)  HR: 97 (20 Nov 2023 05:00) (84 - 97)  BP: 117/56 (20 Nov 2023 05:00) (105/54 - 122/68)  BP(mean): --  ABP: --  ABP(mean): --  RR: 18 (20 Nov 2023 05:00) (16 - 18)  SpO2: 97% (20 Nov 2023 05:00) (97% - 99%)    O2 Parameters below as of 20 Nov 2023 05:00  Patient On (Oxygen Delivery Method): room air      I&O's Detail    19 Nov 2023 07:01  -  20 Nov 2023 07:00  --------------------------------------------------------  IN:  Total IN: 0 mL    OUT:    Voided (mL): 1050 mL  Total OUT: 1050 mL    Total NET: -1050 mL    ___________________________________________________  PHYSICAL EXAM    General: NAD, Lying in bed   Neuro: Awake and alert  Pulm: non-labored respirations  Back: Soft, flat, no palpable collection. Incisional vac holding suction; no drainage or leaks. Vac canister without visible output    ___________________________________________________  LABS                        8.0    1.84  )-----------( 187      ( 20 Nov 2023 06:42 )             24.4   11-19    135  |  97<L>  |  17  ----------------------------<  155<H>  3.8   |  25  |  0.85    Ca    8.4      19 Nov 2023 07:51  Phos  2.8     11-19  Mg     2.20     11-19          ___________________________________________________  MICRO  Recent Cultures:  Specimen Source: .Blood Blood-Peripheral, 11-17 @ 06:38; Results   No growth at 48 Hours; Gram Stain: --; Organism: --  Specimen Source: .Blood Blood-Venous, 11-16 @ 06:05; Results   No growth at 48 Hours; Gram Stain: --; Organism: --  Specimen Source: .Blood Blood-Peripheral, 11-16 @ 06:00; Results   No growth at 48 Hours; Gram Stain: --; Organism: --  Specimen Source: Clean Catch Clean Catch (Midstream), 11-15 @ 03:45; Results   >100,000 CFU/ml Providencia rettgeri  10,000 - 49,000 CFU/mL Enterococcus faecalis<!>; Gram Stain: --; Organism: Providencia rettgeri  Enterococcus faecalis<!>  Specimen Source: .Blood Blood-Peripheral, 11-15 @ 02:05; Results   Growth in aerobic and anaerobic bottles: Staphylococcus aureus  See previous culture [id number]<!>; Gram Stain:   Growth in aerobic and anaerobic bottles: Gram Positive Cocci in Clusters<!>; Organism: --  Specimen Source: .Blood Blood-Peripheral, 11-15 @ 01:15; Results   Growth in aerobic and anaerobic bottles: Staphylococcus aureus  Direct identification is available within approximately 3-5  hours either by Blood Panel Multiplexed PCR or Direct  MALDI-TOF. Details: [email or web address]<!>; Gram Stain:   Growth in aerobic bottle: Gram Positive Cocci in Clusters  Growth in anaerobic bottle: Gram Positive Cocci in Clusters<!>; Organism: Blood Culture PCR  Staphylococcus aureus<!>    ___________________________________________________  MEDICATIONS  (STANDING):  acetaminophen     Tablet .. 975 milliGRAM(s) Oral every 6 hours  amLODIPine   Tablet 10 milliGRAM(s) Oral daily  atorvastatin 20 milliGRAM(s) Oral at bedtime  bicalutamide 50 milliGRAM(s) Oral daily  ceFAZolin   IVPB 2000 milliGRAM(s) IV Intermittent every 8 hours  chlorhexidine 2% Cloths 1 Application(s) Topical daily  ciprofloxacin   IVPB      ciprofloxacin   IVPB 400 milliGRAM(s) IV Intermittent every 12 hours  dextrose 5%. 1000 milliLiter(s) (50 mL/Hr) IV Continuous <Continuous>  dextrose 5%. 1000 milliLiter(s) (100 mL/Hr) IV Continuous <Continuous>  dextrose 50% Injectable 25 Gram(s) IV Push once  dextrose 50% Injectable 12.5 Gram(s) IV Push once  dextrose 50% Injectable 25 Gram(s) IV Push once  glucagon  Injectable 1 milliGRAM(s) IntraMuscular once  heparin   Injectable 5000 Unit(s) SubCutaneous every 8 hours  insulin lispro (ADMELOG) corrective regimen sliding scale   SubCutaneous three times a day before meals  insulin lispro (ADMELOG) corrective regimen sliding scale   SubCutaneous at bedtime  lisinopril 5 milliGRAM(s) Oral daily  pantoprazole    Tablet 40 milliGRAM(s) Oral before breakfast  tamsulosin 0.4 milliGRAM(s) Oral at bedtime    MEDICATIONS  (PRN):  dextrose Oral Gel 15 Gram(s) Oral once PRN Blood Glucose LESS THAN 70 milliGRAM(s)/deciliter  guaiFENesin Oral Liquid (Sugar-Free) 200 milliGRAM(s) Oral every 6 hours PRN Cough  naloxone Injectable 0.1 milliGRAM(s) IV Push every 3 minutes PRN For ANY of the following changes in patient status:  A. RR LESS THAN 10 breaths per minute, B. Oxygen saturation LESS THAN 90%, C. Sedation score of 6  ondansetron Injectable 4 milliGRAM(s) IV Push every 6 hours PRN Nausea

## 2023-11-21 DIAGNOSIS — R13.10 DYSPHAGIA, UNSPECIFIED: ICD-10-CM

## 2023-11-21 LAB
ANION GAP SERPL CALC-SCNC: 10 MMOL/L — SIGNIFICANT CHANGE UP (ref 7–14)
ANION GAP SERPL CALC-SCNC: 10 MMOL/L — SIGNIFICANT CHANGE UP (ref 7–14)
BASOPHILS # BLD AUTO: 0.01 K/UL — SIGNIFICANT CHANGE UP (ref 0–0.2)
BASOPHILS # BLD AUTO: 0.01 K/UL — SIGNIFICANT CHANGE UP (ref 0–0.2)
BASOPHILS NFR BLD AUTO: 0.5 % — SIGNIFICANT CHANGE UP (ref 0–2)
BASOPHILS NFR BLD AUTO: 0.5 % — SIGNIFICANT CHANGE UP (ref 0–2)
BUN SERPL-MCNC: 15 MG/DL — SIGNIFICANT CHANGE UP (ref 7–23)
BUN SERPL-MCNC: 15 MG/DL — SIGNIFICANT CHANGE UP (ref 7–23)
CALCIUM SERPL-MCNC: 8.3 MG/DL — LOW (ref 8.4–10.5)
CALCIUM SERPL-MCNC: 8.3 MG/DL — LOW (ref 8.4–10.5)
CHLORIDE SERPL-SCNC: 102 MMOL/L — SIGNIFICANT CHANGE UP (ref 98–107)
CHLORIDE SERPL-SCNC: 102 MMOL/L — SIGNIFICANT CHANGE UP (ref 98–107)
CO2 SERPL-SCNC: 25 MMOL/L — SIGNIFICANT CHANGE UP (ref 22–31)
CO2 SERPL-SCNC: 25 MMOL/L — SIGNIFICANT CHANGE UP (ref 22–31)
CREAT SERPL-MCNC: 0.86 MG/DL — SIGNIFICANT CHANGE UP (ref 0.5–1.3)
CREAT SERPL-MCNC: 0.86 MG/DL — SIGNIFICANT CHANGE UP (ref 0.5–1.3)
CULTURE RESULTS: SIGNIFICANT CHANGE UP
EGFR: 93 ML/MIN/1.73M2 — SIGNIFICANT CHANGE UP
EGFR: 93 ML/MIN/1.73M2 — SIGNIFICANT CHANGE UP
EOSINOPHIL # BLD AUTO: 0.03 K/UL — SIGNIFICANT CHANGE UP (ref 0–0.5)
EOSINOPHIL # BLD AUTO: 0.03 K/UL — SIGNIFICANT CHANGE UP (ref 0–0.5)
EOSINOPHIL NFR BLD AUTO: 1.4 % — SIGNIFICANT CHANGE UP (ref 0–6)
EOSINOPHIL NFR BLD AUTO: 1.4 % — SIGNIFICANT CHANGE UP (ref 0–6)
GLUCOSE SERPL-MCNC: 161 MG/DL — HIGH (ref 70–99)
GLUCOSE SERPL-MCNC: 161 MG/DL — HIGH (ref 70–99)
HCT VFR BLD CALC: 23.3 % — LOW (ref 39–50)
HCT VFR BLD CALC: 23.3 % — LOW (ref 39–50)
HGB BLD-MCNC: 7.5 G/DL — LOW (ref 13–17)
HGB BLD-MCNC: 7.5 G/DL — LOW (ref 13–17)
IANC: 1.6 K/UL — LOW (ref 1.8–7.4)
IANC: 1.6 K/UL — LOW (ref 1.8–7.4)
IMM GRANULOCYTES NFR BLD AUTO: 0.9 % — SIGNIFICANT CHANGE UP (ref 0–0.9)
IMM GRANULOCYTES NFR BLD AUTO: 0.9 % — SIGNIFICANT CHANGE UP (ref 0–0.9)
LYMPHOCYTES # BLD AUTO: 0.42 K/UL — LOW (ref 1–3.3)
LYMPHOCYTES # BLD AUTO: 0.42 K/UL — LOW (ref 1–3.3)
LYMPHOCYTES # BLD AUTO: 18.9 % — SIGNIFICANT CHANGE UP (ref 13–44)
LYMPHOCYTES # BLD AUTO: 18.9 % — SIGNIFICANT CHANGE UP (ref 13–44)
MAGNESIUM SERPL-MCNC: 2.3 MG/DL — SIGNIFICANT CHANGE UP (ref 1.6–2.6)
MAGNESIUM SERPL-MCNC: 2.3 MG/DL — SIGNIFICANT CHANGE UP (ref 1.6–2.6)
MCHC RBC-ENTMCNC: 30.5 PG — SIGNIFICANT CHANGE UP (ref 27–34)
MCHC RBC-ENTMCNC: 30.5 PG — SIGNIFICANT CHANGE UP (ref 27–34)
MCHC RBC-ENTMCNC: 32.2 GM/DL — SIGNIFICANT CHANGE UP (ref 32–36)
MCHC RBC-ENTMCNC: 32.2 GM/DL — SIGNIFICANT CHANGE UP (ref 32–36)
MCV RBC AUTO: 94.7 FL — SIGNIFICANT CHANGE UP (ref 80–100)
MCV RBC AUTO: 94.7 FL — SIGNIFICANT CHANGE UP (ref 80–100)
MONOCYTES # BLD AUTO: 0.14 K/UL — SIGNIFICANT CHANGE UP (ref 0–0.9)
MONOCYTES # BLD AUTO: 0.14 K/UL — SIGNIFICANT CHANGE UP (ref 0–0.9)
MONOCYTES NFR BLD AUTO: 6.3 % — SIGNIFICANT CHANGE UP (ref 2–14)
MONOCYTES NFR BLD AUTO: 6.3 % — SIGNIFICANT CHANGE UP (ref 2–14)
NEUTROPHILS # BLD AUTO: 1.6 K/UL — LOW (ref 1.8–7.4)
NEUTROPHILS # BLD AUTO: 1.6 K/UL — LOW (ref 1.8–7.4)
NEUTROPHILS NFR BLD AUTO: 72 % — SIGNIFICANT CHANGE UP (ref 43–77)
NEUTROPHILS NFR BLD AUTO: 72 % — SIGNIFICANT CHANGE UP (ref 43–77)
NRBC # BLD: 0 /100 WBCS — SIGNIFICANT CHANGE UP (ref 0–0)
NRBC # BLD: 0 /100 WBCS — SIGNIFICANT CHANGE UP (ref 0–0)
NRBC # FLD: 0 K/UL — SIGNIFICANT CHANGE UP (ref 0–0)
NRBC # FLD: 0 K/UL — SIGNIFICANT CHANGE UP (ref 0–0)
PHOSPHATE SERPL-MCNC: 3.3 MG/DL — SIGNIFICANT CHANGE UP (ref 2.5–4.5)
PHOSPHATE SERPL-MCNC: 3.3 MG/DL — SIGNIFICANT CHANGE UP (ref 2.5–4.5)
PLATELET # BLD AUTO: 195 K/UL — SIGNIFICANT CHANGE UP (ref 150–400)
PLATELET # BLD AUTO: 195 K/UL — SIGNIFICANT CHANGE UP (ref 150–400)
POTASSIUM SERPL-MCNC: 3.9 MMOL/L — SIGNIFICANT CHANGE UP (ref 3.5–5.3)
POTASSIUM SERPL-MCNC: 3.9 MMOL/L — SIGNIFICANT CHANGE UP (ref 3.5–5.3)
POTASSIUM SERPL-SCNC: 3.9 MMOL/L — SIGNIFICANT CHANGE UP (ref 3.5–5.3)
POTASSIUM SERPL-SCNC: 3.9 MMOL/L — SIGNIFICANT CHANGE UP (ref 3.5–5.3)
RBC # BLD: 2.46 M/UL — LOW (ref 4.2–5.8)
RBC # BLD: 2.46 M/UL — LOW (ref 4.2–5.8)
RBC # FLD: 13.7 % — SIGNIFICANT CHANGE UP (ref 10.3–14.5)
RBC # FLD: 13.7 % — SIGNIFICANT CHANGE UP (ref 10.3–14.5)
SODIUM SERPL-SCNC: 137 MMOL/L — SIGNIFICANT CHANGE UP (ref 135–145)
SODIUM SERPL-SCNC: 137 MMOL/L — SIGNIFICANT CHANGE UP (ref 135–145)
SPECIMEN SOURCE: SIGNIFICANT CHANGE UP
WBC # BLD: 2.22 K/UL — LOW (ref 3.8–10.5)
WBC # BLD: 2.22 K/UL — LOW (ref 3.8–10.5)
WBC # FLD AUTO: 2.22 K/UL — LOW (ref 3.8–10.5)
WBC # FLD AUTO: 2.22 K/UL — LOW (ref 3.8–10.5)

## 2023-11-21 PROCEDURE — 99232 SBSQ HOSP IP/OBS MODERATE 35: CPT

## 2023-11-21 PROCEDURE — 99233 SBSQ HOSP IP/OBS HIGH 50: CPT

## 2023-11-21 RX ORDER — SODIUM CHLORIDE 9 MG/ML
1000 INJECTION, SOLUTION INTRAVENOUS
Refills: 0 | Status: DISCONTINUED | OUTPATIENT
Start: 2023-11-21 | End: 2023-11-23

## 2023-11-21 RX ADMIN — Medication 975 MILLIGRAM(S): at 17:48

## 2023-11-21 RX ADMIN — HEPARIN SODIUM 5000 UNIT(S): 5000 INJECTION INTRAVENOUS; SUBCUTANEOUS at 06:27

## 2023-11-21 RX ADMIN — NAFCILLIN 200 GRAM(S): 10 INJECTION, POWDER, FOR SOLUTION INTRAVENOUS at 15:00

## 2023-11-21 RX ADMIN — PANTOPRAZOLE SODIUM 40 MILLIGRAM(S): 20 TABLET, DELAYED RELEASE ORAL at 06:28

## 2023-11-21 RX ADMIN — ATORVASTATIN CALCIUM 20 MILLIGRAM(S): 80 TABLET, FILM COATED ORAL at 21:39

## 2023-11-21 RX ADMIN — SODIUM CHLORIDE 75 MILLILITER(S): 9 INJECTION, SOLUTION INTRAVENOUS at 21:39

## 2023-11-21 RX ADMIN — NAFCILLIN 200 GRAM(S): 10 INJECTION, POWDER, FOR SOLUTION INTRAVENOUS at 02:57

## 2023-11-21 RX ADMIN — NAFCILLIN 200 GRAM(S): 10 INJECTION, POWDER, FOR SOLUTION INTRAVENOUS at 17:47

## 2023-11-21 RX ADMIN — BENZOCAINE AND MENTHOL 1 LOZENGE: 5; 1 LIQUID ORAL at 12:52

## 2023-11-21 RX ADMIN — CHLORHEXIDINE GLUCONATE 1 APPLICATION(S): 213 SOLUTION TOPICAL at 13:04

## 2023-11-21 RX ADMIN — HEPARIN SODIUM 5000 UNIT(S): 5000 INJECTION INTRAVENOUS; SUBCUTANEOUS at 21:39

## 2023-11-21 RX ADMIN — Medication 975 MILLIGRAM(S): at 00:39

## 2023-11-21 RX ADMIN — Medication 975 MILLIGRAM(S): at 06:27

## 2023-11-21 RX ADMIN — BICALUTAMIDE 50 MILLIGRAM(S): 50 TABLET, FILM COATED ORAL at 12:53

## 2023-11-21 RX ADMIN — NAFCILLIN 200 GRAM(S): 10 INJECTION, POWDER, FOR SOLUTION INTRAVENOUS at 21:38

## 2023-11-21 RX ADMIN — Medication 975 MILLIGRAM(S): at 13:53

## 2023-11-21 RX ADMIN — Medication 975 MILLIGRAM(S): at 12:53

## 2023-11-21 RX ADMIN — HEPARIN SODIUM 5000 UNIT(S): 5000 INJECTION INTRAVENOUS; SUBCUTANEOUS at 15:01

## 2023-11-21 RX ADMIN — NAFCILLIN 200 GRAM(S): 10 INJECTION, POWDER, FOR SOLUTION INTRAVENOUS at 06:26

## 2023-11-21 RX ADMIN — Medication 975 MILLIGRAM(S): at 18:48

## 2023-11-21 RX ADMIN — TAMSULOSIN HYDROCHLORIDE 0.4 MILLIGRAM(S): 0.4 CAPSULE ORAL at 21:39

## 2023-11-21 RX ADMIN — NAFCILLIN 200 GRAM(S): 10 INJECTION, POWDER, FOR SOLUTION INTRAVENOUS at 10:23

## 2023-11-21 NOTE — PROGRESS NOTE ADULT - ASSESSMENT
73y Male s/p C5-T3 posterior cervical fusion, C7-T1 decompression for metastatic prostate cancer with plastics closure on 11/2. Recovering well. Both drains removed. MRI 11/14 showing possible seroma in subcutaneous space. Incisional vac w/ black sponge/Adaptic started on 11/17/2023.     Plan:  - incisional wound vac to neck; MWF changes, vac team consulted  - F/U fever work up - RVP +adenovirus; UA clear  - Monitor vac output  - Optimize nutrition, ensure adequate protein intake  - Remainder of care per primary team    Reggie Lopes PGY3  Plastic Surgery  [phone]# [facility] pager  [phone] [facility] pager  Available on Teams

## 2023-11-21 NOTE — PROGRESS NOTE ADULT - PROBLEM SELECTOR PLAN 2
Concern for surgical site as a primary source  - woundvac in place  - awaiting culture from post-surgical fluid collection.  - BCx from 11/16, 11/17 NGTD  - changed to Nafcillin IV on 11/20.  - TTE done - awaiting ID's opinion on whether JESSICA would be needed.

## 2023-11-21 NOTE — PROGRESS NOTE ADULT - PROBLEM SELECTOR PLAN 1
History and clinical exam suggest more odynophagia rather than dysphagia.  - will obtain S+S  - clinical suspicion for cadida esophagitis - given antibiotic use, DM  - if patient is evaluated by S+S and dysphagia is ruled out, will start Nystatin S+S

## 2023-11-21 NOTE — PROGRESS NOTE ADULT - ASSESSMENT
73M DM2, HTN, Prostate CA p/w spinal cord compression from metastasis s/p C5-T3 Fusion, C7-T1 decompression c/b MSSA bacteremia from surgical site infection, B/L LE DVT s/p IVC filter 11/1 by IR, odynophagia.

## 2023-11-21 NOTE — PROGRESS NOTE ADULT - SUBJECTIVE AND OBJECTIVE BOX
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 975 milliGRAM(s) Oral every 6 hours  atorvastatin 20 milliGRAM(s) Oral at bedtime  benzocaine/menthol Lozenge 1 Lozenge Oral every 6 hours PRN  bicalutamide 50 milliGRAM(s) Oral daily  chlorhexidine 2% Cloths 1 Application(s) Topical daily  dextrose 5%. 1000 milliLiter(s) IV Continuous <Continuous>  dextrose 5%. 1000 milliLiter(s) IV Continuous <Continuous>  dextrose 50% Injectable 25 Gram(s) IV Push once  dextrose 50% Injectable 12.5 Gram(s) IV Push once  dextrose 50% Injectable 25 Gram(s) IV Push once  dextrose Oral Gel 15 Gram(s) Oral once PRN  glucagon  Injectable 1 milliGRAM(s) IntraMuscular once  guaiFENesin Oral Liquid (Sugar-Free) 200 milliGRAM(s) Oral every 6 hours PRN  heparin   Injectable 5000 Unit(s) SubCutaneous every 8 hours  insulin lispro (ADMELOG) corrective regimen sliding scale   SubCutaneous at bedtime  insulin lispro (ADMELOG) corrective regimen sliding scale   SubCutaneous three times a day before meals  lisinopril 5 milliGRAM(s) Oral daily  naloxone Injectable 0.1 milliGRAM(s) IV Push every 3 minutes PRN  ondansetron Injectable 4 milliGRAM(s) IV Push every 6 hours PRN  pantoprazole    Tablet 40 milliGRAM(s) Oral before breakfast  tamsulosin 0.4 milliGRAM(s) Oral at bedtime      Vital Signs Last 24 Hrs  T(C): 36.7 (21 Nov 2023 12:00), Max: 37.7 (21 Nov 2023 06:35)  T(F): 98 (21 Nov 2023 12:00), Max: 99.8 (21 Nov 2023 06:35)  HR: 92 (21 Nov 2023 12:00) (79 - 93)  BP: 110/55 (21 Nov 2023 12:00) (105/50 - 123/56)  BP(mean): 62 (21 Nov 2023 06:35) (62 - 62)  RR: 20 (21 Nov 2023 12:00) (18 - 20)  SpO2: 98% (21 Nov 2023 12:00) (95% - 100%)    Parameters below as of 21 Nov 2023 12:00  Patient On (Oxygen Delivery Method): nasal cannula  O2 Flow (L/min): 2      PHYSICAL EXAM:  General: [ ] non-toxic  HEAD/EYES: [ ] PERRL [x ] white sclera [ ] icterus  ENT:  [ ] normal [ x] supple [ ] thrush [ ] pharyngeal exudate  Cardiovascular:   [ ] murmur [ x] normal [ ] PPM/AICD  Respiratory:  [x ] clear to ausculation bilaterally  GI:  [x ] soft, non-tender, normal bowel sounds  :  [ ] best [ ] no CVA tenderness   Musculoskeletal:  [ ] no synovitis  Neurologic:  [ ] non-focal exam   Skin:  x[ ] no rash  Lymph: [x ] no lymphadenopathy  Psychiatric:  [ ] appropriate affect [ ] alert & oriented  Lines:  [x ] no phlebitis [ ] central line                                7.5    2.22  )-----------( 195      ( 21 Nov 2023 06:57 )             23.3       11-21    137  |  102  |  15  ----------------------------<  161<H>  3.9   |  25  |  0.86    Ca    8.3<L>      21 Nov 2023 06:57  Phos  3.3     11-21  Mg     2.30     11-21        Urinalysis Basic - ( 21 Nov 2023 06:57 )    Color: x / Appearance: x / SG: x / pH: x  Gluc: 161 mg/dL / Ketone: x  / Bili: x / Urobili: x   Blood: x / Protein: x / Nitrite: x   Leuk Esterase: x / RBC: x / WBC x   Sq Epi: x / Non Sq Epi: x / Bacteria: x        MICROBIOLOGY:Culture Results:   No growth at 72 Hours (11-18-23 @ 06:30)  Culture Results:   No growth at 72 Hours (11-18-23 @ 06:15)  Culture Results:   No growth at 4 days (11-17-23 @ 06:38)  Culture Results:   No growth at 4 days (11-17-23 @ 06:38)  Culture Results:   No growth at 5 days (11-16-23 @ 06:05)  Culture Results:   No growth at 5 days (11-16-23 @ 06:00)  Culture Results:   >100,000 CFU/ml Providencia rettgeri  10,000 - 49,000 CFU/mL Enterococcus faecalis (11-15-23 @ 03:45)  Culture Results:   Growth in aerobic and anaerobic bottles: Staphylococcus aureus  See previous culture [id number] (11-15-23 @ 02:05)  Culture Results:   Growth in aerobic and anaerobic bottles: Staphylococcus aureus  Direct identification is available within approximately 3-5  hours either by Blood Panel Multiplexed PCR or Direct  MALDI-TOF. Details: [email or web address] (11-15-23 @ 01:15)      RADIOLOGY:

## 2023-11-21 NOTE — PROGRESS NOTE ADULT - SUBJECTIVE AND OBJECTIVE BOX
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 Pain does not change regardless of fluid vs solids - all manners of PO intake causes pain in throat area.   No F/C, N/V, CP, SOB, Cough, lightheadedness, dizziness, abdominal pain, diarrhea, dysuria.    MEDICATIONS  (STANDING):  acetaminophen     Tablet .. 975 milliGRAM(s) Oral every 6 hours  atorvastatin 20 milliGRAM(s) Oral at bedtime  bicalutamide 50 milliGRAM(s) Oral daily  chlorhexidine 2% Cloths 1 Application(s) Topical daily  dextrose 5%. 1000 milliLiter(s) (50 mL/Hr) IV Continuous <Continuous>  dextrose 5%. 1000 milliLiter(s) (100 mL/Hr) IV Continuous <Continuous>  dextrose 50% Injectable 25 Gram(s) IV Push once  dextrose 50% Injectable 25 Gram(s) IV Push once  dextrose 50% Injectable 12.5 Gram(s) IV Push once  glucagon  Injectable 1 milliGRAM(s) IntraMuscular once  heparin   Injectable 5000 Unit(s) SubCutaneous every 8 hours  insulin lispro (ADMELOG) corrective regimen sliding scale   SubCutaneous at bedtime  insulin lispro (ADMELOG) corrective regimen sliding scale   SubCutaneous three times a day before meals  lisinopril 5 milliGRAM(s) Oral daily  nafcillin  IVPB 2 Gram(s) IV Intermittent every 4 hours  pantoprazole    Tablet 40 milliGRAM(s) Oral before breakfast  tamsulosin 0.4 milliGRAM(s) Oral at bedtime    MEDICATIONS  (PRN):  benzocaine/menthol Lozenge 1 Lozenge Oral every 6 hours PRN Sore Throat  dextrose Oral Gel 15 Gram(s) Oral once PRN Blood Glucose LESS THAN 70 milliGRAM(s)/deciliter  guaiFENesin Oral Liquid (Sugar-Free) 200 milliGRAM(s) Oral every 6 hours PRN Cough  naloxone Injectable 0.1 milliGRAM(s) IV Push every 3 minutes PRN For ANY of the following changes in patient status:  A. RR LESS THAN 10 breaths per minute, B. Oxygen saturation LESS THAN 90%, C. Sedation score of 6  ondansetron Injectable 4 milliGRAM(s) IV Push every 6 hours PRN Nausea      Vital Signs Last 24 Hrs  T(C): 36.7 (21 Nov 2023 12:00), Max: 37.7 (21 Nov 2023 06:35)  T(F): 98 (21 Nov 2023 12:00), Max: 99.8 (21 Nov 2023 06:35)  HR: 92 (21 Nov 2023 12:00) (79 - 93)  BP: 110/55 (21 Nov 2023 12:00) (105/50 - 123/56)  BP(mean): 62 (21 Nov 2023 06:35) (62 - 62)  RR: 20 (21 Nov 2023 12:00) (18 - 20)  SpO2: 98% (21 Nov 2023 12:00) (95% - 100%)    Parameters below as of 21 Nov 2023 12:00  Patient On (Oxygen Delivery Method): nasal cannula  O2 Flow (L/min): 2    CAPILLARY BLOOD GLUCOSE      POCT Blood Glucose.: 141 mg/dL (21 Nov 2023 13:18)  POCT Blood Glucose.: 147 mg/dL (21 Nov 2023 08:50)  POCT Blood Glucose.: 167 mg/dL (20 Nov 2023 22:32)  POCT Blood Glucose.: 148 mg/dL (20 Nov 2023 18:13)    I&O's Summary    20 Nov 2023 07:01  -  21 Nov 2023 07:00  --------------------------------------------------------  IN: 100 mL / OUT: 300 mL / NET: -200 mL        PHYSICAL EXAM:  CONSTITUTIONAL: NAD  EYES: PERRLA; conjunctiva and sclera clear  ENMT: Moist oral mucosa, no pharyngeal injection or exudates; normal dentition; no obvious plaques noted.  NECK: Supple, no palpable masses; no thyromegaly  RESPIRATORY: Normal respiratory effort; lungs are clear to auscultation bilaterally  CARDIOVASCULAR: Regular rate and rhythm, normal S1 and S2, no murmur/rub/gallop; No lower extremity edema; Peripheral pulses are 2+ bilaterally  ABDOMEN: Nontender to palpation, normoactive bowel sounds, no rebound/guarding; No hepatosplenomegaly  MUSCULOSKELETAL:  Did not assess gait; no clubbing or cyanosis of digits; no joint swelling or tenderness to palpation  PSYCH: A+O to person, place, and time; affect appropriate  NEUROLOGY: CN 2-12 are intact and symmetric; no gross sensory deficits   SKIN: Woundvac in place on his back surgical site - C/D/I    LABS:                        7.5    2.22  )-----------( 195      ( 21 Nov 2023 06:57 )             23.3     11-21    137  |  102  |  15  ----------------------------<  161<H>  3.9   |  25  |  0.86    Ca    8.3<L>      21 Nov 2023 06:57  Phos  3.3     11-21  Mg     2.30     11-21            Urinalysis Basic - ( 21 Nov 2023 06:57 )    Color: x / Appearance: x / SG: x / pH: x  Gluc: 161 mg/dL / Ketone: x  / Bili: x / Urobili: x   Blood: x / Protein: x / Nitrite: x   Leuk Esterase: x / RBC: x / WBC x   Sq Epi: x / Non Sq Epi: x / Bacteria: x        RADIOLOGY & ADDITIONAL TESTS:    Imaging Personally Reviewed:    Care Discussed with Consultants/Other Providers:

## 2023-11-21 NOTE — PROGRESS NOTE ADULT - ASSESSMENT
73 year old with metastatic prostate cancer s/p spinal decompression.  Now with fever and high grade MSSA bacteremia    1) High grade MSSA bacteremia  TTE without obvious vegetation  Repeat blood culture 11/16 and 11/17 are without growth    Leukoepnia  ? due to cephalosporin    Continue nafcillin- monitor WBC     2) Abnormal Imaging  Fluid collections noted on imaging  Vac placed  Spine surgery and plastic surgery follow up    3) Bacteruria  Culture with mixed growth  100 K providenica< 10-50 K e fecalis  Likely colonization  Cipro would treat providencia which has higher colony count  Finished Cipro    4) Adenovirus  Supportive care    5) Trouble swallowing with vomiting  Check CT neck and chest  Speech/ swallow eval

## 2023-11-21 NOTE — PROGRESS NOTE ADULT - SUBJECTIVE AND OBJECTIVE BOX
Plastic Surgery    SUBJECTIVE: Pt seen and examined on rounds with team. NAEON.      VITALS  T(C): 37.7 (11-21-23 @ 06:35), Max: 37.7 (11-21-23 @ 06:35)  HR: 79 (11-21-23 @ 06:35) (79 - 93)  BP: 110/48 (11-21-23 @ 06:35) (83/61 - 123/56)  RR: 18 (11-21-23 @ 06:35) (18 - 18)  SpO2: 96% (11-21-23 @ 06:35) (95% - 100%)  CAPILLARY BLOOD GLUCOSE      POCT Blood Glucose.: 167 mg/dL (20 Nov 2023 22:32)  POCT Blood Glucose.: 148 mg/dL (20 Nov 2023 18:13)  POCT Blood Glucose.: 176 mg/dL (20 Nov 2023 12:14)  POCT Blood Glucose.: 175 mg/dL (20 Nov 2023 08:57)      Is/Os    11-20 @ 07:01  -  11-21 @ 07:00  --------------------------------------------------------  IN:    IV PiggyBack: 100 mL  Total IN: 100 mL    OUT:    Voided (mL): 300 mL  Total OUT: 300 mL    Total NET: -200 mL        PHYSICAL EXAM    General: NAD, Lying in bed   Neuro: Awake and alert  Pulm: non-labored respirations  Back: Soft, flat, no palpable collection. Incisional vac holding suction; no drainage or leaks. Vac canister without visible output      LABS  CBC (11-20 @ 06:42)                              8.0<L>                         1.84<L>  )----------------(  187        76.7  % Neutrophils, 14.1  % Lymphocytes, ANC: 1.41<L>                              24.4<L>    BMP (11-20 @ 06:42)             134<L>  |  98      |  16    		Ca++ --      Ca 8.3<L>             ---------------------------------( 183<H>		Mg 2.20               3.9     |  25      |  0.81  			Ph 2.8

## 2023-11-22 DIAGNOSIS — B37.0 CANDIDAL STOMATITIS: ICD-10-CM

## 2023-11-22 DIAGNOSIS — R13.10 DYSPHAGIA, UNSPECIFIED: ICD-10-CM

## 2023-11-22 LAB
ANION GAP SERPL CALC-SCNC: 12 MMOL/L — SIGNIFICANT CHANGE UP (ref 7–14)
ANION GAP SERPL CALC-SCNC: 12 MMOL/L — SIGNIFICANT CHANGE UP (ref 7–14)
BASOPHILS # BLD AUTO: 0 K/UL — SIGNIFICANT CHANGE UP (ref 0–0.2)
BASOPHILS # BLD AUTO: 0 K/UL — SIGNIFICANT CHANGE UP (ref 0–0.2)
BASOPHILS NFR BLD AUTO: 0 % — SIGNIFICANT CHANGE UP (ref 0–2)
BASOPHILS NFR BLD AUTO: 0 % — SIGNIFICANT CHANGE UP (ref 0–2)
BUN SERPL-MCNC: 16 MG/DL — SIGNIFICANT CHANGE UP (ref 7–23)
BUN SERPL-MCNC: 16 MG/DL — SIGNIFICANT CHANGE UP (ref 7–23)
CALCIUM SERPL-MCNC: 8 MG/DL — LOW (ref 8.4–10.5)
CALCIUM SERPL-MCNC: 8 MG/DL — LOW (ref 8.4–10.5)
CHLORIDE SERPL-SCNC: 103 MMOL/L — SIGNIFICANT CHANGE UP (ref 98–107)
CHLORIDE SERPL-SCNC: 103 MMOL/L — SIGNIFICANT CHANGE UP (ref 98–107)
CO2 SERPL-SCNC: 24 MMOL/L — SIGNIFICANT CHANGE UP (ref 22–31)
CO2 SERPL-SCNC: 24 MMOL/L — SIGNIFICANT CHANGE UP (ref 22–31)
CREAT SERPL-MCNC: 1.14 MG/DL — SIGNIFICANT CHANGE UP (ref 0.5–1.3)
CREAT SERPL-MCNC: 1.14 MG/DL — SIGNIFICANT CHANGE UP (ref 0.5–1.3)
CULTURE RESULTS: SIGNIFICANT CHANGE UP
EGFR: 69 ML/MIN/1.73M2 — SIGNIFICANT CHANGE UP
EGFR: 69 ML/MIN/1.73M2 — SIGNIFICANT CHANGE UP
EOSINOPHIL # BLD AUTO: 0.05 K/UL — SIGNIFICANT CHANGE UP (ref 0–0.5)
EOSINOPHIL # BLD AUTO: 0.05 K/UL — SIGNIFICANT CHANGE UP (ref 0–0.5)
EOSINOPHIL NFR BLD AUTO: 1.8 % — SIGNIFICANT CHANGE UP (ref 0–6)
EOSINOPHIL NFR BLD AUTO: 1.8 % — SIGNIFICANT CHANGE UP (ref 0–6)
GIANT PLATELETS BLD QL SMEAR: PRESENT — SIGNIFICANT CHANGE UP
GIANT PLATELETS BLD QL SMEAR: PRESENT — SIGNIFICANT CHANGE UP
GLUCOSE SERPL-MCNC: 137 MG/DL — HIGH (ref 70–99)
GLUCOSE SERPL-MCNC: 137 MG/DL — HIGH (ref 70–99)
HCT VFR BLD CALC: 22.3 % — LOW (ref 39–50)
HCT VFR BLD CALC: 22.3 % — LOW (ref 39–50)
HGB BLD-MCNC: 7.1 G/DL — LOW (ref 13–17)
HGB BLD-MCNC: 7.1 G/DL — LOW (ref 13–17)
IANC: 2.02 K/UL — SIGNIFICANT CHANGE UP (ref 1.8–7.4)
IANC: 2.02 K/UL — SIGNIFICANT CHANGE UP (ref 1.8–7.4)
LYMPHOCYTES # BLD AUTO: 0.27 K/UL — LOW (ref 1–3.3)
LYMPHOCYTES # BLD AUTO: 0.27 K/UL — LOW (ref 1–3.3)
LYMPHOCYTES # BLD AUTO: 9.6 % — LOW (ref 13–44)
LYMPHOCYTES # BLD AUTO: 9.6 % — LOW (ref 13–44)
MAGNESIUM SERPL-MCNC: 2.4 MG/DL — SIGNIFICANT CHANGE UP (ref 1.6–2.6)
MAGNESIUM SERPL-MCNC: 2.4 MG/DL — SIGNIFICANT CHANGE UP (ref 1.6–2.6)
MANUAL SMEAR VERIFICATION: SIGNIFICANT CHANGE UP
MANUAL SMEAR VERIFICATION: SIGNIFICANT CHANGE UP
MCHC RBC-ENTMCNC: 30.5 PG — SIGNIFICANT CHANGE UP (ref 27–34)
MCHC RBC-ENTMCNC: 30.5 PG — SIGNIFICANT CHANGE UP (ref 27–34)
MCHC RBC-ENTMCNC: 31.8 GM/DL — LOW (ref 32–36)
MCHC RBC-ENTMCNC: 31.8 GM/DL — LOW (ref 32–36)
MCV RBC AUTO: 95.7 FL — SIGNIFICANT CHANGE UP (ref 80–100)
MCV RBC AUTO: 95.7 FL — SIGNIFICANT CHANGE UP (ref 80–100)
MONOCYTES # BLD AUTO: 0.13 K/UL — SIGNIFICANT CHANGE UP (ref 0–0.9)
MONOCYTES # BLD AUTO: 0.13 K/UL — SIGNIFICANT CHANGE UP (ref 0–0.9)
MONOCYTES NFR BLD AUTO: 4.4 % — SIGNIFICANT CHANGE UP (ref 2–14)
MONOCYTES NFR BLD AUTO: 4.4 % — SIGNIFICANT CHANGE UP (ref 2–14)
NEUTROPHILS # BLD AUTO: 2.37 K/UL — SIGNIFICANT CHANGE UP (ref 1.8–7.4)
NEUTROPHILS # BLD AUTO: 2.37 K/UL — SIGNIFICANT CHANGE UP (ref 1.8–7.4)
NEUTROPHILS NFR BLD AUTO: 79.8 % — HIGH (ref 43–77)
NEUTROPHILS NFR BLD AUTO: 79.8 % — HIGH (ref 43–77)
NEUTS BAND # BLD: 3.5 % — SIGNIFICANT CHANGE UP (ref 0–6)
NEUTS BAND # BLD: 3.5 % — SIGNIFICANT CHANGE UP (ref 0–6)
PHOSPHATE SERPL-MCNC: 3.6 MG/DL — SIGNIFICANT CHANGE UP (ref 2.5–4.5)
PHOSPHATE SERPL-MCNC: 3.6 MG/DL — SIGNIFICANT CHANGE UP (ref 2.5–4.5)
PLAT MORPH BLD: NORMAL — SIGNIFICANT CHANGE UP
PLAT MORPH BLD: NORMAL — SIGNIFICANT CHANGE UP
PLATELET # BLD AUTO: 225 K/UL — SIGNIFICANT CHANGE UP (ref 150–400)
PLATELET # BLD AUTO: 225 K/UL — SIGNIFICANT CHANGE UP (ref 150–400)
PLATELET COUNT - ESTIMATE: NORMAL — SIGNIFICANT CHANGE UP
PLATELET COUNT - ESTIMATE: NORMAL — SIGNIFICANT CHANGE UP
POLYCHROMASIA BLD QL SMEAR: SLIGHT — SIGNIFICANT CHANGE UP
POLYCHROMASIA BLD QL SMEAR: SLIGHT — SIGNIFICANT CHANGE UP
POTASSIUM SERPL-MCNC: 3.6 MMOL/L — SIGNIFICANT CHANGE UP (ref 3.5–5.3)
POTASSIUM SERPL-MCNC: 3.6 MMOL/L — SIGNIFICANT CHANGE UP (ref 3.5–5.3)
POTASSIUM SERPL-SCNC: 3.6 MMOL/L — SIGNIFICANT CHANGE UP (ref 3.5–5.3)
POTASSIUM SERPL-SCNC: 3.6 MMOL/L — SIGNIFICANT CHANGE UP (ref 3.5–5.3)
RBC # BLD: 2.33 M/UL — LOW (ref 4.2–5.8)
RBC # BLD: 2.33 M/UL — LOW (ref 4.2–5.8)
RBC # FLD: 14 % — SIGNIFICANT CHANGE UP (ref 10.3–14.5)
RBC # FLD: 14 % — SIGNIFICANT CHANGE UP (ref 10.3–14.5)
RBC BLD AUTO: NORMAL — SIGNIFICANT CHANGE UP
RBC BLD AUTO: NORMAL — SIGNIFICANT CHANGE UP
SODIUM SERPL-SCNC: 139 MMOL/L — SIGNIFICANT CHANGE UP (ref 135–145)
SODIUM SERPL-SCNC: 139 MMOL/L — SIGNIFICANT CHANGE UP (ref 135–145)
SPECIMEN SOURCE: SIGNIFICANT CHANGE UP
VARIANT LYMPHS # BLD: 0.9 % — SIGNIFICANT CHANGE UP (ref 0–6)
VARIANT LYMPHS # BLD: 0.9 % — SIGNIFICANT CHANGE UP (ref 0–6)
WBC # BLD: 2.85 K/UL — LOW (ref 3.8–10.5)
WBC # BLD: 2.85 K/UL — LOW (ref 3.8–10.5)
WBC # FLD AUTO: 2.85 K/UL — LOW (ref 3.8–10.5)
WBC # FLD AUTO: 2.85 K/UL — LOW (ref 3.8–10.5)

## 2023-11-22 PROCEDURE — 71260 CT THORAX DX C+: CPT | Mod: 26

## 2023-11-22 PROCEDURE — 99233 SBSQ HOSP IP/OBS HIGH 50: CPT

## 2023-11-22 PROCEDURE — 99232 SBSQ HOSP IP/OBS MODERATE 35: CPT

## 2023-11-22 PROCEDURE — 70491 CT SOFT TISSUE NECK W/DYE: CPT | Mod: 26

## 2023-11-22 PROCEDURE — 74230 X-RAY XM SWLNG FUNCJ C+: CPT | Mod: 26

## 2023-11-22 RX ORDER — FLUCONAZOLE 150 MG/1
200 TABLET ORAL EVERY 24 HOURS
Refills: 0 | Status: DISCONTINUED | OUTPATIENT
Start: 2023-11-23 | End: 2023-12-01

## 2023-11-22 RX ORDER — INSULIN LISPRO 100/ML
VIAL (ML) SUBCUTANEOUS
Refills: 0 | Status: DISCONTINUED | OUTPATIENT
Start: 2023-11-22 | End: 2023-11-22

## 2023-11-22 RX ORDER — ACETAMINOPHEN 500 MG
1000 TABLET ORAL ONCE
Refills: 0 | Status: COMPLETED | OUTPATIENT
Start: 2023-11-22 | End: 2023-11-22

## 2023-11-22 RX ORDER — INSULIN LISPRO 100/ML
VIAL (ML) SUBCUTANEOUS AT BEDTIME
Refills: 0 | Status: DISCONTINUED | OUTPATIENT
Start: 2023-11-22 | End: 2023-11-22

## 2023-11-22 RX ORDER — FLUCONAZOLE 150 MG/1
400 TABLET ORAL ONCE
Refills: 0 | Status: COMPLETED | OUTPATIENT
Start: 2023-11-22 | End: 2023-11-22

## 2023-11-22 RX ORDER — FLUCONAZOLE 150 MG/1
TABLET ORAL
Refills: 0 | Status: DISCONTINUED | OUTPATIENT
Start: 2023-11-22 | End: 2023-12-01

## 2023-11-22 RX ORDER — NYSTATIN 500MM UNIT
500000 POWDER (EA) MISCELLANEOUS EVERY 6 HOURS
Refills: 0 | Status: DISCONTINUED | OUTPATIENT
Start: 2023-11-22 | End: 2023-11-22

## 2023-11-22 RX ORDER — FLUCONAZOLE 150 MG/1
TABLET ORAL
Refills: 0 | Status: DISCONTINUED | OUTPATIENT
Start: 2023-11-22 | End: 2023-11-22

## 2023-11-22 RX ADMIN — Medication 975 MILLIGRAM(S): at 11:48

## 2023-11-22 RX ADMIN — FLUCONAZOLE 400 MILLIGRAM(S): 150 TABLET ORAL at 17:27

## 2023-11-22 RX ADMIN — Medication 1000 MILLIGRAM(S): at 02:56

## 2023-11-22 RX ADMIN — NAFCILLIN 200 GRAM(S): 10 INJECTION, POWDER, FOR SOLUTION INTRAVENOUS at 18:34

## 2023-11-22 RX ADMIN — SODIUM CHLORIDE 75 MILLILITER(S): 9 INJECTION, SOLUTION INTRAVENOUS at 10:10

## 2023-11-22 RX ADMIN — CHLORHEXIDINE GLUCONATE 1 APPLICATION(S): 213 SOLUTION TOPICAL at 11:56

## 2023-11-22 RX ADMIN — HEPARIN SODIUM 5000 UNIT(S): 5000 INJECTION INTRAVENOUS; SUBCUTANEOUS at 22:51

## 2023-11-22 RX ADMIN — NAFCILLIN 200 GRAM(S): 10 INJECTION, POWDER, FOR SOLUTION INTRAVENOUS at 11:54

## 2023-11-22 RX ADMIN — TAMSULOSIN HYDROCHLORIDE 0.4 MILLIGRAM(S): 0.4 CAPSULE ORAL at 22:52

## 2023-11-22 RX ADMIN — NAFCILLIN 200 GRAM(S): 10 INJECTION, POWDER, FOR SOLUTION INTRAVENOUS at 06:17

## 2023-11-22 RX ADMIN — Medication 975 MILLIGRAM(S): at 17:30

## 2023-11-22 RX ADMIN — NAFCILLIN 200 GRAM(S): 10 INJECTION, POWDER, FOR SOLUTION INTRAVENOUS at 02:27

## 2023-11-22 RX ADMIN — NAFCILLIN 200 GRAM(S): 10 INJECTION, POWDER, FOR SOLUTION INTRAVENOUS at 22:50

## 2023-11-22 RX ADMIN — BENZOCAINE AND MENTHOL 1 LOZENGE: 5; 1 LIQUID ORAL at 11:48

## 2023-11-22 RX ADMIN — NAFCILLIN 200 GRAM(S): 10 INJECTION, POWDER, FOR SOLUTION INTRAVENOUS at 15:06

## 2023-11-22 RX ADMIN — HEPARIN SODIUM 5000 UNIT(S): 5000 INJECTION INTRAVENOUS; SUBCUTANEOUS at 06:17

## 2023-11-22 RX ADMIN — HEPARIN SODIUM 5000 UNIT(S): 5000 INJECTION INTRAVENOUS; SUBCUTANEOUS at 14:29

## 2023-11-22 RX ADMIN — Medication 975 MILLIGRAM(S): at 23:25

## 2023-11-22 RX ADMIN — Medication 400 MILLIGRAM(S): at 02:26

## 2023-11-22 RX ADMIN — Medication 500000 UNIT(S): at 11:47

## 2023-11-22 RX ADMIN — BICALUTAMIDE 50 MILLIGRAM(S): 50 TABLET, FILM COATED ORAL at 12:00

## 2023-11-22 RX ADMIN — ATORVASTATIN CALCIUM 20 MILLIGRAM(S): 80 TABLET, FILM COATED ORAL at 22:51

## 2023-11-22 NOTE — PROGRESS NOTE ADULT - ASSESSMENT
73 year old with metastatic prostate cancer s/p spinal decompression.  Now with fever and high grade MSSA bacteremia    1) High grade MSSA bacteremia  TTE without obvious vegetation  Repeat blood culture 11/16 and 11/17 are without growth    Leukoepnia  ? due to cephalosporin    Continue nafcillin- monitor WBC     2) Abnormal Imaging  Fluid collections noted on imaging  Vac placed  Wound examined during vac change- incision was not opened  Spine surgery and plastic surgery follow up    3) Bacteruria  Culture with mixed growth  100 K providenica< 10-50 K e fecalis  Likely colonization  Cipro would treat providencia which has higher colony count  Finished Cipro    4) Adenovirus  Supportive care    5) Trouble swallowing with vomiting  Aspiration noted on mbs  aspiration precautions

## 2023-11-22 NOTE — CONSULT NOTE ADULT - SUBJECTIVE AND OBJECTIVE BOX
CC: Dysphagia    HPI: 73M DM2, Prostate CA w/ mets to spine s/p C7-T1 decompression on 11/2, DVT s/p IVC filter, c/b bactremia on nafcillin IV. ENT consulted for acute onset of dysphagia. Patient reports unable to swallow anything even liquid for 7 days. The event onset is sudden and he reports he was feeling fine after his cervical decompression, but the dysphagia appears all of sudden. Also reports his voice is much deeper and raspier than his usual. Denies SOB, fever, chills, coughing, throat clearing, and sore throat.       PAST MEDICAL & SURGICAL HISTORY:  Essential hypertension      Prostate cancer metastatic to bone      No significant past surgical history        Allergies    No Known Allergies    Intolerances      MEDICATIONS  (STANDING):  acetaminophen     Tablet .. 975 milliGRAM(s) Oral every 6 hours  atorvastatin 20 milliGRAM(s) Oral at bedtime  bicalutamide 50 milliGRAM(s) Oral daily  chlorhexidine 2% Cloths 1 Application(s) Topical daily  dextrose 5%. 1000 milliLiter(s) (50 mL/Hr) IV Continuous <Continuous>  dextrose 5%. 1000 milliLiter(s) (100 mL/Hr) IV Continuous <Continuous>  dextrose 50% Injectable 25 Gram(s) IV Push once  dextrose 50% Injectable 25 Gram(s) IV Push once  dextrose 50% Injectable 12.5 Gram(s) IV Push once  glucagon  Injectable 1 milliGRAM(s) IntraMuscular once  heparin   Injectable 5000 Unit(s) SubCutaneous every 8 hours  lisinopril 5 milliGRAM(s) Oral daily  nafcillin  IVPB 2 Gram(s) IV Intermittent every 4 hours  nystatin    Suspension [id number] Unit(s) Oral every 6 hours  pantoprazole    Tablet 40 milliGRAM(s) Oral before breakfast  sodium chloride 0.45%. 1000 milliLiter(s) (75 mL/Hr) IV Continuous <Continuous>  tamsulosin 0.4 milliGRAM(s) Oral at bedtime    MEDICATIONS  (PRN):  benzocaine/menthol Lozenge 1 Lozenge Oral every 6 hours PRN Sore Throat  dextrose Oral Gel 15 Gram(s) Oral once PRN Blood Glucose LESS THAN 70 milliGRAM(s)/deciliter  guaiFENesin Oral Liquid (Sugar-Free) 200 milliGRAM(s) Oral every 6 hours PRN Cough  naloxone Injectable 0.1 milliGRAM(s) IV Push every 3 minutes PRN For ANY of the following changes in patient status:  A. RR LESS THAN 10 breaths per minute, B. Oxygen saturation LESS THAN 90%, C. Sedation score of 6  ondansetron Injectable 4 milliGRAM(s) IV Push every 6 hours PRN Nausea      Social History: never smoker    Family history: No pertinent family history in first degree relatives    ROS:   ENT: all negative except as noted in HPI   CV: denies palpitations  Pulm: denies SOB, cough, hemoptysis  GI: denies change in appetite, indigestion, n/v  : denies pertinent urinary symptoms, urgency  Neuro: denies numbness/tingling, loss of sensation  Psych: denies anxiety  MS: denies muscle weakness, instability  Heme: denies easy bruising or bleeding  Endo: denies heat/cold intolerance, excessive sweating  Vascular: denies LE edema    Vital Signs Last 24 Hrs  T(C): 37.2 (22 Nov 2023 08:23), Max: 37.2 (22 Nov 2023 08:23)  T(F): 99 (22 Nov 2023 08:23), Max: 99 (22 Nov 2023 08:23)  HR: 89 (22 Nov 2023 08:23) (80 - 92)  BP: 116/61 (22 Nov 2023 08:23) (110/55 - 119/57)  BP(mean): --  RR: 18 (22 Nov 2023 08:23) (18 - 20)  SpO2: 99% (22 Nov 2023 08:23) (95% - 99%)    Parameters below as of 22 Nov 2023 08:23  Patient On (Oxygen Delivery Method): room air                              7.1    2.85  )-----------( 225      ( 22 Nov 2023 06:45 )             22.3    11-22    139  |  103  |  16  ----------------------------<  137<H>  3.6   |  24  |  1.14    Ca    8.0<L>      22 Nov 2023 06:45  Phos  3.6     11-22  Mg     2.40     11-22         PHYSICAL EXAM:  Gen: NAD  Skin: No rashes, bruises, or lesions  Head: Normocephalic, Atraumatic  Face: no edema, erythema, or fluctuance. Parotid glands soft without mass  Eyes: no scleral injection  Ears: No mastoid tenderness, erythema, or ear bulging  Nose: Nares bilaterally patent, no discharge  Mouth: No stridor, no drooling, no trismus.  Mucosa moist, tongue/uvula midline, oropharynx clear  Voice: hoarse, deeper and raspy voice  Neck: Flat, supple, no lymphadenopathy, trachea midline, no masses  Lymphatic: No lymphadenopathy  Resp: breathing easily, no stridor  CV: no peripheral edema/cyanosis  GI: nondistended   Peripheral vascular: no JVD or edema  Neuro: facial nerve intact, no facial droop      Fiberoptic Indirect laryngoscopy:  (Scope #2 used)  Flexible laryngoscopy was performed and revealed the following:    -- Nasopharynx had no mass or exudate.    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 There is a slight glottic gap with phonation     -- Post cricoid area clear, no edema and no erythema    -- Interarytenoid edema was absent    -- Airway patent

## 2023-11-22 NOTE — PROGRESS NOTE ADULT - PROBLEM SELECTOR PLAN 1
Reviewed CT Neck/Chest.  Appreciate ENT eval - reviewed direct laryngoscope imaging on 11/22  - ENT suspects esophageal candida but also possible mass effect from recent C-spine surgery; however no airway compromise noted.  - Nystatin S+S started - if unable to tolerate, will consider IV formulation until dysphagia improved.  - discuss with Ortho in regards to result of the CT Neck.

## 2023-11-22 NOTE — CONSULT NOTE ADULT - PROBLEM SELECTOR RECOMMENDATION 2
- Please consider start nystatin swish and swallow or oral anti-fungal if he is able to swallow pills for 10 days  - Patient can follow up outpatient with Dr. Sesay if CT neck is negative for mass  - Scope exam reviewed and case discussed with Dr. Sesay

## 2023-11-22 NOTE — PROGRESS NOTE ADULT - ASSESSMENT
73M DM2, HTN, Prostate CA p/w spinal cord compression from metastasis s/p C5-T3 Fusion, C7-T1 decompression c/b MSSA bacteremia from surgical site infection, B/L LE DVT s/p IVC filter 11/1 by IR, dysphagia.

## 2023-11-22 NOTE — SWALLOW VFSS/MBS ASSESSMENT ADULT - ORAL PHASE
Reduced anterior - posterior transport/Residue in oral cavity/Incomplete tongue to palate contact/Uncontrolled bolus / spillover in hypopharynx Reduced anterior - posterior transport

## 2023-11-22 NOTE — PROGRESS NOTE ADULT - SUBJECTIVE AND OBJECTIVE BOX
Plastic Surgery    SUBJECTIVE: NAEON. Will plan for dressing change with wound vac team today.     Objective:  Vital Signs Last 24 Hrs  T(C): 37.2 (22 Nov 2023 08:23), Max: 37.2 (22 Nov 2023 08:23)  T(F): 99 (22 Nov 2023 08:23), Max: 99 (22 Nov 2023 08:23)  HR: 89 (22 Nov 2023 08:23) (80 - 92)  BP: 116/61 (22 Nov 2023 08:23) (110/55 - 119/57)  BP(mean): --  ABP: --  ABP(mean): --  RR: 18 (22 Nov 2023 08:23) (18 - 20)  SpO2: 99% (22 Nov 2023 08:23) (95% - 99%)    O2 Parameters below as of 22 Nov 2023 08:23  Patient On (Oxygen Delivery Method): room air      I&O's Detail    21 Nov 2023 07:01  -  22 Nov 2023 07:00  --------------------------------------------------------  IN:    IV PiggyBack: 200 mL    Oral Fluid: 50 mL    sodium chloride 0.45%: 225 mL  Total IN: 475 mL    OUT:    Voided (mL): 850 mL  Total OUT: 850 mL    Total NET: -375 mL      PHYSICAL EXAM    General: NAD, Lying in bed   Neuro: Awake and alert  Pulm: non-labored respirations  Back: Soft, flat, no palpable collection. Incisional vac holding suction; no drainage or leaks. Vac canister without visible output      LABS                          7.1    2.85  )-----------( 225      ( 22 Nov 2023 06:45 )             22.3   11-22    139  |  103  |  x   ----------------------------<  x   3.6   |  x   |  x     Ca    8.3<L>      21 Nov 2023 06:57  Phos  3.6     11-22  Mg     2.40     11-22

## 2023-11-22 NOTE — SWALLOW VFSS/MBS ASSESSMENT ADULT - ADDITIONAL RECOMMENDATIONS
1. Follow up with ENT given laryngoscopy findings, odynophagia and hoarse vocal quality 2. RD follow up for tube feed management 3. Medical team advised to reconsult this service as patient becomes medically optimized (improved odynophagia see ENT note). 1. Follow up with ENT given laryngoscopy findings, odynophagia and hoarse vocal quality 2. RD follow up for tube feed management 3. Medical team advised to reconsult this service as patient becomes medically optimized (improved odynophagia see ENT note) to reassess for an oral diet program.

## 2023-11-22 NOTE — CONSULT NOTE ADULT - ASSESSMENT
73M DM2, Prostate CA w/ mets to spine s/p C7-T1 decompression on 11/2, DVT s/p IVC filter, c/b bactremia on nafcillin IV. ENT consulted for acute onset of dysphagia. SLP evaluated at bedside recommends NPO, scope exam shows sporadic leukoplakia in the posterior epiglottis, right aryepiglottic folds and around both false vocal folds. The dysphagia is not due to larynx cause or this incidental findings. It’s likely from the cervical decompression surgery causing posterior esophageal compression, but need to confirm from image. The cause of  leukoplakia is unknown and based on the scope exam likely due to inflammatory changes vs early sign of thrush.

## 2023-11-22 NOTE — SWALLOW VFSS/MBS ASSESSMENT ADULT - ROSENBEK'S PENETRATION ASPIRATION SCALE
5-7 (7) contrast passes glottis, visible subglottic residue remains despite patient’s response (aspiration)

## 2023-11-22 NOTE — PROGRESS NOTE ADULT - ASSESSMENT
73y Male s/p C5-T3 posterior cervical fusion, C7-T1 decompression for metastatic prostate cancer with plastics closure on 11/2. Recovering well. Both drains removed. MRI 11/14 showing possible seroma in subcutaneous space. Incisional vac w/ black sponge/Adaptic started on 11/17/2023.     Plan:  - incisional wound vac to neck; MWF changes, vac team consulted  - F/U fever work up - RVP +adenovirus; UA clear  - Monitor vac output  - Optimize nutrition, ensure adequate protein intake  - Remainder of care per primary team    Ricardo Weinsteinsh  Plastic Surgery  #[phone] [facility] pager  [phone] [facility] pager  Available on teams

## 2023-11-22 NOTE — PROGRESS NOTE ADULT - SUBJECTIVE AND OBJECTIVE BOX
[facility] Division of Hospital Medicine  Jesus Riojas MD  Pager (PARTHA-AMBAR, 7Y-5P): [phone]  Other Times:  [phone]    Patient is a 73y old  Male who presents with a chief complaint of Diffuse Spinal Mets from Prostate Cancer (22 Nov 2023 10:44)    SUBJECTIVE / OVERNIGHT EVENTS:  Patient still with dysphagia this AM.  Upset about the feeling of being abandoned - states the nurses did not address his concerns and felt that he was left soiled for too long before nurses came to help.  No F/C, N/V, CP, SOB, Cough, lightheadedness, dizziness, abdominal pain, diarrhea, dysuria.    MEDICATIONS  (STANDING):  acetaminophen     Tablet .. 975 milliGRAM(s) Oral every 6 hours  atorvastatin 20 milliGRAM(s) Oral at bedtime  bicalutamide 50 milliGRAM(s) Oral daily  chlorhexidine 2% Cloths 1 Application(s) Topical daily  dextrose 5%. 1000 milliLiter(s) (50 mL/Hr) IV Continuous <Continuous>  dextrose 5%. 1000 milliLiter(s) (100 mL/Hr) IV Continuous <Continuous>  dextrose 50% Injectable 25 Gram(s) IV Push once  dextrose 50% Injectable 25 Gram(s) IV Push once  dextrose 50% Injectable 12.5 Gram(s) IV Push once  glucagon  Injectable 1 milliGRAM(s) IntraMuscular once  heparin   Injectable 5000 Unit(s) SubCutaneous every 8 hours  insulin lispro (ADMELOG) corrective regimen sliding scale   SubCutaneous three times a day before meals  insulin lispro (ADMELOG) corrective regimen sliding scale   SubCutaneous at bedtime  lisinopril 5 milliGRAM(s) Oral daily  nafcillin  IVPB 2 Gram(s) IV Intermittent every 4 hours  nystatin    Suspension [id number] Unit(s) Oral every 6 hours  pantoprazole    Tablet 40 milliGRAM(s) Oral before breakfast  sodium chloride 0.45%. 1000 milliLiter(s) (75 mL/Hr) IV Continuous <Continuous>  tamsulosin 0.4 milliGRAM(s) Oral at bedtime    MEDICATIONS  (PRN):  benzocaine/menthol Lozenge 1 Lozenge Oral every 6 hours PRN Sore Throat  dextrose Oral Gel 15 Gram(s) Oral once PRN Blood Glucose LESS THAN 70 milliGRAM(s)/deciliter  guaiFENesin Oral Liquid (Sugar-Free) 200 milliGRAM(s) Oral every 6 hours PRN Cough  naloxone Injectable 0.1 milliGRAM(s) IV Push every 3 minutes PRN For ANY of the following changes in patient status:  A. RR LESS THAN 10 breaths per minute, B. Oxygen saturation LESS THAN 90%, C. Sedation score of 6  ondansetron Injectable 4 milliGRAM(s) IV Push every 6 hours PRN Nausea      Vital Signs Last 24 Hrs  T(C): 37.3 (22 Nov 2023 11:52), Max: 37.3 (22 Nov 2023 11:52)  T(F): 99.1 (22 Nov 2023 11:52), Max: 99.1 (22 Nov 2023 11:52)  HR: 99 (22 Nov 2023 11:52) (80 - 99)  BP: 130/67 (22 Nov 2023 11:52) (112/56 - 130/67)  BP(mean): --  RR: 18 (22 Nov 2023 11:52) (18 - 20)  SpO2: 96% (22 Nov 2023 11:52) (95% - 99%)    Parameters below as of 22 Nov 2023 11:52  Patient On (Oxygen Delivery Method): room air      CAPILLARY BLOOD GLUCOSE      POCT Blood Glucose.: 126 mg/dL (22 Nov 2023 11:45)  POCT Blood Glucose.: 127 mg/dL (22 Nov 2023 06:29)  POCT Blood Glucose.: 143 mg/dL (21 Nov 2023 21:42)  POCT Blood Glucose.: 123 mg/dL (21 Nov 2023 17:43)  POCT Blood Glucose.: 141 mg/dL (21 Nov 2023 13:18)    I&O's Summary    21 Nov 2023 07:01  -  22 Nov 2023 07:00  --------------------------------------------------------  IN: 475 mL / OUT: 850 mL / NET: -375 mL        PHYSICAL EXAM:  CONSTITUTIONAL: NAD  EYES: PERRLA; conjunctiva and sclera clear  ENMT: Moist oral mucosa, no pharyngeal injection or exudates; normal dentition; no obvious plaques noted.  NECK: Supple, no palpable masses; no thyromegaly  RESPIRATORY: Normal respiratory effort; lungs are clear to auscultation bilaterally  CARDIOVASCULAR: Regular rate and rhythm, normal S1 and S2, no murmur/rub/gallop; No lower extremity edema; Peripheral pulses are 2+ bilaterally  ABDOMEN: Nontender to palpation, normoactive bowel sounds, no rebound/guarding; No hepatosplenomegaly  MUSCULOSKELETAL:  Did not assess gait; no clubbing or cyanosis of digits; no joint swelling or tenderness to palpation  PSYCH: A+O to person, place, and time; affect appropriate  NEUROLOGY: CN 2-12 are intact and symmetric; no gross sensory deficits   SKIN: Woundvac in place on his back surgical site - C/D/I    LABS:                        7.1    2.85  )-----------( 225      ( 22 Nov 2023 06:45 )             22.3     11-22    139  |  103  |  16  ----------------------------<  137<H>  3.6   |  24  |  1.14    Ca    8.0<L>      22 Nov 2023 06:45  Phos  3.6     11-22  Mg     2.40     11-22            Urinalysis Basic - ( 22 Nov 2023 06:45 )    Color: x / Appearance: x / SG: x / pH: x  Gluc: 137 mg/dL / Ketone: x  / Bili: x / Urobili: x   Blood: x / Protein: x / Nitrite: x   Leuk Esterase: x / RBC: x / WBC x   Sq Epi: x / Non Sq Epi: x / Bacteria: x        RADIOLOGY & ADDITIONAL TESTS:  < from: CT Neck Soft Tissue w/ IV Cont (11.22.23 @ 10:51) >  IMPRESSION: Status post posterior fusion and laminectomy, as discussed,   with a probable seroma formation within the paraspinal soft tissues.   Superimposed infection is difficult to exclude. Admission the canal is   markedly limited secondary to intrinsic CT modality and also streak and   beam hardening artifact generated by the metallic orthopedic hardware.    No cervical mass or cervical lymphadenopathy.    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    < end of copied text >    Imaging Personally Reviewed:    Care Discussed with Consultants/Other Providers:

## 2023-11-22 NOTE — SWALLOW VFSS/MBS ASSESSMENT ADULT - DIAGNOSTIC IMPRESSIONS
1. Mild oral dysphagia for puree, mildly thick liquids and thin liquids characterized by adequate oral containment, slow anterior to posterior transport with premature spillage to the pyriforms for mildly thick liquids and thin liquids due to reduced tongue to palate seal. There was mild oral residue noted post primary swallow for mildly thick liquids and thin liquids which cleared given spontaneous reswallow.  2. Severe pharyngeal dysphagia for puree, mildly thick liquids and thin liquids characterized by delayed initiation of the pharyngeal swallow (bolus head at the level of the vallecula), reduced base of tongue retraction, reduced epiglottic deflection, reduced hyolaryngeal excursion/ reduced laryngeal vestibular closure, and reduced pharyngeal contractility. There was moderate-severe pharyngeal clearance deficits for puree located in the vallecula>pyriforms which a cued reswallow did not benefit to lessen pharyngeal residue. There was mild- moderate pharyngeal clearance deficits post primary swallow for mildly thick liquids and thin liquids located in the vallecula> pyriforms with spontaneous reswallows partially assisting in pharyngeal clearance. There was Aspiration during the swallow for Mildly Thick Liquids and additional Deep Laryngeal Penetration to the level of the vocal folds after the swallow during spontaneous reswallow. There was Deep Laryngeal penetration to the level of the vocal folds during the swallow resulting in subsequent Aspiration after the swallow during spontaneous reswallow. The patient was sensate to the Aspiration evidenced by a cough response; however, patient was unable to clear contrast from the airway/ trachea. Postural strategies not attempted given patient with cervical hardware and pain/ discomfort with change in head position.    Of Note: Patient noted with cervical hardware on  image. 1. Mild oral dysphagia for puree, mildly thick liquids and thin liquids characterized by adequate oral containment, slow anterior to posterior transport with premature spillage to the pyriforms for mildly thick liquids and thin liquids due to reduced tongue to palate seal. There was mild oral residue noted post primary swallow for mildly thick liquids and thin liquids which cleared given spontaneous reswallow.  2. Severe pharyngeal dysphagia for puree, mildly thick liquids and thin liquids characterized by delayed initiation of the pharyngeal swallow (bolus head at the level of the vallecula), reduced base of tongue retraction, reduced epiglottic deflection, reduced hyolaryngeal excursion/ reduced laryngeal vestibular closure, and reduced pharyngeal contractility. There was moderate-severe pharyngeal clearance deficits for puree located in the vallecula>pyriforms and mild- moderate pharyngeal clearance deficits post primary swallow for mildly thick liquids and thin liquids located in the vallecula> pyriforms. Spontaneous reswallow partially assisted in pharyngeal clearance. There was Aspiration during the swallow for Mildly Thick Liquids which was likely exacerbated by pharyngeal residue (puree) entering into the laryngeal vestibule from liquid wash of mildly thick liquids. There was Deep Laryngeal penetration to the level of the vocal folds during the swallow for Thin Liquids resulting in delayed Aspiration. The patient was sensate to the Aspiration evidenced by a cough response; however, patient was unable to clear contrast from the airway/ trachea. Postural strategies not attempted given patient with cervical hardware and pain/ discomfort with change in head position.      Of Note: Patient noted with cervical hardware on  image.

## 2023-11-22 NOTE — PROGRESS NOTE ADULT - SUBJECTIVE AND OBJECTIVE BOX
Follow Up:      Inverval History/ROS:Patient is a 73y old  Male who presents with a chief complaint of Diffuse Spinal Mets from Prostate Cancer (22 Nov 2023 11:59)    No fever  No events    Allergies    No Known Allergies    Intolerances        ANTIMICROBIALS:  fluconAZOLE IVPB    nafcillin  IVPB 2 every 4 hours      OTHER MEDS:  acetaminophen     Tablet .. 975 milliGRAM(s) Oral every 6 hours  atorvastatin 20 milliGRAM(s) Oral at bedtime  benzocaine/menthol Lozenge 1 Lozenge Oral every 6 hours PRN  bicalutamide 50 milliGRAM(s) Oral daily  chlorhexidine 2% Cloths 1 Application(s) Topical daily  dextrose 5%. 1000 milliLiter(s) IV Continuous <Continuous>  dextrose 5%. 1000 milliLiter(s) IV Continuous <Continuous>  dextrose 50% Injectable 25 Gram(s) IV Push once  dextrose 50% Injectable 12.5 Gram(s) IV Push once  dextrose 50% Injectable 25 Gram(s) IV Push once  dextrose Oral Gel 15 Gram(s) Oral once PRN  glucagon  Injectable 1 milliGRAM(s) IntraMuscular once  guaiFENesin Oral Liquid (Sugar-Free) 200 milliGRAM(s) Oral every 6 hours PRN  heparin   Injectable 5000 Unit(s) SubCutaneous every 8 hours  lisinopril 5 milliGRAM(s) Oral daily  naloxone Injectable 0.1 milliGRAM(s) IV Push every 3 minutes PRN  ondansetron Injectable 4 milliGRAM(s) IV Push every 6 hours PRN  pantoprazole    Tablet 40 milliGRAM(s) Oral before breakfast  sodium chloride 0.45%. 1000 milliLiter(s) IV Continuous <Continuous>  tamsulosin 0.4 milliGRAM(s) Oral at bedtime      Vital Signs Last 24 Hrs  T(C): 37.3 (22 Nov 2023 17:00), Max: 37.3 (22 Nov 2023 11:52)  T(F): 99.1 (22 Nov 2023 17:00), Max: 99.1 (22 Nov 2023 11:52)  HR: 93 (22 Nov 2023 17:00) (80 - 99)  BP: 132/60 (22 Nov 2023 17:00) (112/56 - 132/60)  BP(mean): --  RR: 18 (22 Nov 2023 17:00) (18 - 20)  SpO2: 94% (22 Nov 2023 17:00) (94% - 99%)    Parameters below as of 22 Nov 2023 17:00  Patient On (Oxygen Delivery Method): nasal cannula        PHYSICAL EXAM:  General: [x ] non-toxic  HEAD/EYES: [ ] PERRL [x ] white sclera [ ] icterus  ENT:  [ ] normal [x ] supple [ ] thrush [ ] pharyngeal exudate  Cardiovascular:   [ ] murmur [ x] normal [ ] PPM/AICD  Respiratory:  [ x] clear to ausculation bilaterally  GI:  [ ]x soft, non-tender, normal bowel sounds  :  [ ] best [ ] no CVA tenderness   Musculoskeletal:  [ ] no synovitis  Neurologic:  [ ] non-focal exam   Skin:  x[ ] no rash  Lymph: [ ] no lymphadenopathy  Psychiatric:  [ ] appropriate affect [ ] alert & oriented  Lines:  [ ] no phlebitis [ ] central line                                7.1    2.85  )-----------( 225      ( 22 Nov 2023 06:45 )             22.3       11-22    139  |  103  |  16  ----------------------------<  137<H>  3.6   |  24  |  1.14    Ca    8.0<L>      22 Nov 2023 06:45  Phos  3.6     11-22  Mg     2.40     11-22        Urinalysis Basic - ( 22 Nov 2023 06:45 )    Color: x / Appearance: x / SG: x / pH: x  Gluc: 137 mg/dL / Ketone: x  / Bili: x / Urobili: x   Blood: x / Protein: x / Nitrite: x   Leuk Esterase: x / RBC: x / WBC x   Sq Epi: x / Non Sq Epi: x / Bacteria: x        MICROBIOLOGY:Culture Results:   No growth at 4 days (11-18-23 @ 06:30)  Culture Results:   No growth at 4 days (11-18-23 @ 06:15)  Culture Results:   No growth at 5 days (11-17-23 @ 06:38)  Culture Results:   No growth at 5 days (11-17-23 @ 06:38)  Culture Results:   No growth at 5 days (11-16-23 @ 06:05)  Culture Results:   No growth at 5 days (11-16-23 @ 06:00)      RADIOLOGY:

## 2023-11-22 NOTE — PROGRESS NOTE ADULT - PROBLEM SELECTOR PLAN 3
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

## 2023-11-22 NOTE — PROGRESS NOTE ADULT - NSPROGADDITIONALINFOA_GEN_ALL_CORE
Discussed with daughter Olga on the phone on 11/22 for 15 minutes.  Answered all the questions. Forwarded the concern about nursing care to 4S Nursing Manager.

## 2023-11-23 DIAGNOSIS — B37.81 CANDIDAL ESOPHAGITIS: ICD-10-CM

## 2023-11-23 DIAGNOSIS — D63.8 ANEMIA IN OTHER CHRONIC DISEASES CLASSIFIED ELSEWHERE: ICD-10-CM

## 2023-11-23 LAB
24R-OH-CALCIDIOL SERPL-MCNC: 25.3 NG/ML — LOW (ref 30–80)
24R-OH-CALCIDIOL SERPL-MCNC: 25.3 NG/ML — LOW (ref 30–80)
ANION GAP SERPL CALC-SCNC: 14 MMOL/L — SIGNIFICANT CHANGE UP (ref 7–14)
ANION GAP SERPL CALC-SCNC: 14 MMOL/L — SIGNIFICANT CHANGE UP (ref 7–14)
ANISOCYTOSIS BLD QL: SLIGHT — SIGNIFICANT CHANGE UP
ANISOCYTOSIS BLD QL: SLIGHT — SIGNIFICANT CHANGE UP
BASOPHILS # BLD AUTO: 0.06 K/UL — SIGNIFICANT CHANGE UP (ref 0–0.2)
BASOPHILS # BLD AUTO: 0.06 K/UL — SIGNIFICANT CHANGE UP (ref 0–0.2)
BASOPHILS NFR BLD AUTO: 1.7 % — SIGNIFICANT CHANGE UP (ref 0–2)
BASOPHILS NFR BLD AUTO: 1.7 % — SIGNIFICANT CHANGE UP (ref 0–2)
BLD GP AB SCN SERPL QL: NEGATIVE — SIGNIFICANT CHANGE UP
BLD GP AB SCN SERPL QL: NEGATIVE — SIGNIFICANT CHANGE UP
BUN SERPL-MCNC: 11 MG/DL — SIGNIFICANT CHANGE UP (ref 7–23)
BUN SERPL-MCNC: 11 MG/DL — SIGNIFICANT CHANGE UP (ref 7–23)
CALCIUM SERPL-MCNC: 8 MG/DL — LOW (ref 8.4–10.5)
CALCIUM SERPL-MCNC: 8 MG/DL — LOW (ref 8.4–10.5)
CHLORIDE SERPL-SCNC: 100 MMOL/L — SIGNIFICANT CHANGE UP (ref 98–107)
CHLORIDE SERPL-SCNC: 100 MMOL/L — SIGNIFICANT CHANGE UP (ref 98–107)
CHOLEST SERPL-MCNC: 124 MG/DL — SIGNIFICANT CHANGE UP
CHOLEST SERPL-MCNC: 124 MG/DL — SIGNIFICANT CHANGE UP
CO2 SERPL-SCNC: 23 MMOL/L — SIGNIFICANT CHANGE UP (ref 22–31)
CO2 SERPL-SCNC: 23 MMOL/L — SIGNIFICANT CHANGE UP (ref 22–31)
CREAT SERPL-MCNC: 0.84 MG/DL — SIGNIFICANT CHANGE UP (ref 0.5–1.3)
CREAT SERPL-MCNC: 0.84 MG/DL — SIGNIFICANT CHANGE UP (ref 0.5–1.3)
CULTURE RESULTS: SIGNIFICANT CHANGE UP
EGFR: 93 ML/MIN/1.73M2 — SIGNIFICANT CHANGE UP
EGFR: 93 ML/MIN/1.73M2 — SIGNIFICANT CHANGE UP
EOSINOPHIL # BLD AUTO: 0.03 K/UL — SIGNIFICANT CHANGE UP (ref 0–0.5)
EOSINOPHIL # BLD AUTO: 0.03 K/UL — SIGNIFICANT CHANGE UP (ref 0–0.5)
EOSINOPHIL NFR BLD AUTO: 0.8 % — SIGNIFICANT CHANGE UP (ref 0–6)
EOSINOPHIL NFR BLD AUTO: 0.8 % — SIGNIFICANT CHANGE UP (ref 0–6)
FERRITIN SERPL-MCNC: 1294 NG/ML — HIGH (ref 30–400)
FERRITIN SERPL-MCNC: 1294 NG/ML — HIGH (ref 30–400)
FOLATE SERPL-MCNC: 12.5 NG/ML — SIGNIFICANT CHANGE UP (ref 3.1–17.5)
FOLATE SERPL-MCNC: 12.5 NG/ML — SIGNIFICANT CHANGE UP (ref 3.1–17.5)
GIANT PLATELETS BLD QL SMEAR: PRESENT — SIGNIFICANT CHANGE UP
GIANT PLATELETS BLD QL SMEAR: PRESENT — SIGNIFICANT CHANGE UP
GLUCOSE SERPL-MCNC: 102 MG/DL — HIGH (ref 70–99)
GLUCOSE SERPL-MCNC: 102 MG/DL — HIGH (ref 70–99)
HCT VFR BLD CALC: 23.8 % — LOW (ref 39–50)
HCT VFR BLD CALC: 23.8 % — LOW (ref 39–50)
HDLC SERPL-MCNC: 22 MG/DL — LOW
HDLC SERPL-MCNC: 22 MG/DL — LOW
HGB BLD-MCNC: 7.6 G/DL — LOW (ref 13–17)
HGB BLD-MCNC: 7.6 G/DL — LOW (ref 13–17)
IANC: 2.24 K/UL — SIGNIFICANT CHANGE UP (ref 1.8–7.4)
IANC: 2.24 K/UL — SIGNIFICANT CHANGE UP (ref 1.8–7.4)
IRON SATN MFR SERPL: 43 % — SIGNIFICANT CHANGE UP (ref 14–50)
IRON SATN MFR SERPL: 43 % — SIGNIFICANT CHANGE UP (ref 14–50)
IRON SATN MFR SERPL: 52 UG/DL — SIGNIFICANT CHANGE UP (ref 45–165)
IRON SATN MFR SERPL: 52 UG/DL — SIGNIFICANT CHANGE UP (ref 45–165)
LIPID PNL WITH DIRECT LDL SERPL: 67 MG/DL — SIGNIFICANT CHANGE UP
LIPID PNL WITH DIRECT LDL SERPL: 67 MG/DL — SIGNIFICANT CHANGE UP
LYMPHOCYTES # BLD AUTO: 0.35 K/UL — LOW (ref 1–3.3)
LYMPHOCYTES # BLD AUTO: 0.35 K/UL — LOW (ref 1–3.3)
LYMPHOCYTES # BLD AUTO: 10.2 % — LOW (ref 13–44)
LYMPHOCYTES # BLD AUTO: 10.2 % — LOW (ref 13–44)
MACROCYTES BLD QL: SLIGHT — SIGNIFICANT CHANGE UP
MACROCYTES BLD QL: SLIGHT — SIGNIFICANT CHANGE UP
MAGNESIUM SERPL-MCNC: 2.2 MG/DL — SIGNIFICANT CHANGE UP (ref 1.6–2.6)
MAGNESIUM SERPL-MCNC: 2.2 MG/DL — SIGNIFICANT CHANGE UP (ref 1.6–2.6)
MCHC RBC-ENTMCNC: 30.3 PG — SIGNIFICANT CHANGE UP (ref 27–34)
MCHC RBC-ENTMCNC: 30.3 PG — SIGNIFICANT CHANGE UP (ref 27–34)
MCHC RBC-ENTMCNC: 31.9 GM/DL — LOW (ref 32–36)
MCHC RBC-ENTMCNC: 31.9 GM/DL — LOW (ref 32–36)
MCV RBC AUTO: 94.8 FL — SIGNIFICANT CHANGE UP (ref 80–100)
MCV RBC AUTO: 94.8 FL — SIGNIFICANT CHANGE UP (ref 80–100)
MONOCYTES # BLD AUTO: 0.35 K/UL — SIGNIFICANT CHANGE UP (ref 0–0.9)
MONOCYTES # BLD AUTO: 0.35 K/UL — SIGNIFICANT CHANGE UP (ref 0–0.9)
MONOCYTES NFR BLD AUTO: 10.2 % — SIGNIFICANT CHANGE UP (ref 2–14)
MONOCYTES NFR BLD AUTO: 10.2 % — SIGNIFICANT CHANGE UP (ref 2–14)
MYELOCYTES NFR BLD: 0.8 % — HIGH (ref 0–0)
MYELOCYTES NFR BLD: 0.8 % — HIGH (ref 0–0)
NEUTROPHILS # BLD AUTO: 2.51 K/UL — SIGNIFICANT CHANGE UP (ref 1.8–7.4)
NEUTROPHILS # BLD AUTO: 2.51 K/UL — SIGNIFICANT CHANGE UP (ref 1.8–7.4)
NEUTROPHILS NFR BLD AUTO: 71.2 % — SIGNIFICANT CHANGE UP (ref 43–77)
NEUTROPHILS NFR BLD AUTO: 71.2 % — SIGNIFICANT CHANGE UP (ref 43–77)
NEUTS BAND # BLD: 1.7 % — SIGNIFICANT CHANGE UP (ref 0–6)
NEUTS BAND # BLD: 1.7 % — SIGNIFICANT CHANGE UP (ref 0–6)
NON HDL CHOLESTEROL: 102 MG/DL — SIGNIFICANT CHANGE UP
NON HDL CHOLESTEROL: 102 MG/DL — SIGNIFICANT CHANGE UP
PHOSPHATE SERPL-MCNC: 2.7 MG/DL — SIGNIFICANT CHANGE UP (ref 2.5–4.5)
PHOSPHATE SERPL-MCNC: 2.7 MG/DL — SIGNIFICANT CHANGE UP (ref 2.5–4.5)
PLAT MORPH BLD: ABNORMAL
PLAT MORPH BLD: ABNORMAL
PLATELET # BLD AUTO: 273 K/UL — SIGNIFICANT CHANGE UP (ref 150–400)
PLATELET # BLD AUTO: 273 K/UL — SIGNIFICANT CHANGE UP (ref 150–400)
PLATELET COUNT - ESTIMATE: NORMAL — SIGNIFICANT CHANGE UP
PLATELET COUNT - ESTIMATE: NORMAL — SIGNIFICANT CHANGE UP
POLYCHROMASIA BLD QL SMEAR: SLIGHT — SIGNIFICANT CHANGE UP
POLYCHROMASIA BLD QL SMEAR: SLIGHT — SIGNIFICANT CHANGE UP
POTASSIUM SERPL-MCNC: 3 MMOL/L — LOW (ref 3.5–5.3)
POTASSIUM SERPL-MCNC: 3 MMOL/L — LOW (ref 3.5–5.3)
POTASSIUM SERPL-SCNC: 3 MMOL/L — LOW (ref 3.5–5.3)
POTASSIUM SERPL-SCNC: 3 MMOL/L — LOW (ref 3.5–5.3)
RBC # BLD: 2.51 M/UL — LOW (ref 4.2–5.8)
RBC # BLD: 2.51 M/UL — LOW (ref 4.2–5.8)
RBC # FLD: 14.2 % — SIGNIFICANT CHANGE UP (ref 10.3–14.5)
RBC # FLD: 14.2 % — SIGNIFICANT CHANGE UP (ref 10.3–14.5)
RBC BLD AUTO: ABNORMAL
RBC BLD AUTO: ABNORMAL
RH IG SCN BLD-IMP: POSITIVE — SIGNIFICANT CHANGE UP
RH IG SCN BLD-IMP: POSITIVE — SIGNIFICANT CHANGE UP
SMUDGE CELLS # BLD: PRESENT — SIGNIFICANT CHANGE UP
SMUDGE CELLS # BLD: PRESENT — SIGNIFICANT CHANGE UP
SODIUM SERPL-SCNC: 137 MMOL/L — SIGNIFICANT CHANGE UP (ref 135–145)
SODIUM SERPL-SCNC: 137 MMOL/L — SIGNIFICANT CHANGE UP (ref 135–145)
SPECIMEN SOURCE: SIGNIFICANT CHANGE UP
TIBC SERPL-MCNC: 122 UG/DL — LOW (ref 220–430)
TIBC SERPL-MCNC: 122 UG/DL — LOW (ref 220–430)
TRIGL SERPL-MCNC: 174 MG/DL — HIGH
TRIGL SERPL-MCNC: 174 MG/DL — HIGH
TSH SERPL-MCNC: 1.36 UIU/ML — SIGNIFICANT CHANGE UP (ref 0.27–4.2)
TSH SERPL-MCNC: 1.36 UIU/ML — SIGNIFICANT CHANGE UP (ref 0.27–4.2)
UIBC SERPL-MCNC: 70 UG/DL — LOW (ref 110–370)
UIBC SERPL-MCNC: 70 UG/DL — LOW (ref 110–370)
VARIANT LYMPHS # BLD: 3.4 % — SIGNIFICANT CHANGE UP (ref 0–6)
VARIANT LYMPHS # BLD: 3.4 % — SIGNIFICANT CHANGE UP (ref 0–6)
VIT B12 SERPL-MCNC: >2000 PG/ML — HIGH (ref 200–900)
VIT B12 SERPL-MCNC: >2000 PG/ML — HIGH (ref 200–900)
WBC # BLD: 3.44 K/UL — LOW (ref 3.8–10.5)
WBC # BLD: 3.44 K/UL — LOW (ref 3.8–10.5)
WBC # FLD AUTO: 3.44 K/UL — LOW (ref 3.8–10.5)
WBC # FLD AUTO: 3.44 K/UL — LOW (ref 3.8–10.5)

## 2023-11-23 PROCEDURE — 99233 SBSQ HOSP IP/OBS HIGH 50: CPT

## 2023-11-23 RX ORDER — PANTOPRAZOLE SODIUM 20 MG/1
40 TABLET, DELAYED RELEASE ORAL DAILY
Refills: 0 | Status: DISCONTINUED | OUTPATIENT
Start: 2023-11-24 | End: 2023-12-20

## 2023-11-23 RX ORDER — POTASSIUM CHLORIDE 20 MEQ
10 PACKET (EA) ORAL
Refills: 0 | Status: COMPLETED | OUTPATIENT
Start: 2023-11-23 | End: 2023-11-23

## 2023-11-23 RX ORDER — SODIUM CHLORIDE 9 MG/ML
1000 INJECTION INTRAMUSCULAR; INTRAVENOUS; SUBCUTANEOUS
Refills: 0 | Status: DISCONTINUED | OUTPATIENT
Start: 2023-11-23 | End: 2023-11-24

## 2023-11-23 RX ORDER — CHOLECALCIFEROL (VITAMIN D3) 125 MCG
2000 CAPSULE ORAL DAILY
Refills: 0 | Status: DISCONTINUED | OUTPATIENT
Start: 2023-11-23 | End: 2023-12-23

## 2023-11-23 RX ADMIN — HEPARIN SODIUM 5000 UNIT(S): 5000 INJECTION INTRAVENOUS; SUBCUTANEOUS at 23:02

## 2023-11-23 RX ADMIN — PANTOPRAZOLE SODIUM 40 MILLIGRAM(S): 20 TABLET, DELAYED RELEASE ORAL at 08:09

## 2023-11-23 RX ADMIN — Medication 100 MILLIEQUIVALENT(S): at 19:48

## 2023-11-23 RX ADMIN — NAFCILLIN 200 GRAM(S): 10 INJECTION, POWDER, FOR SOLUTION INTRAVENOUS at 22:48

## 2023-11-23 RX ADMIN — Medication 100 MILLIEQUIVALENT(S): at 11:37

## 2023-11-23 RX ADMIN — BICALUTAMIDE 50 MILLIGRAM(S): 50 TABLET, FILM COATED ORAL at 11:38

## 2023-11-23 RX ADMIN — Medication 100 MILLIEQUIVALENT(S): at 08:33

## 2023-11-23 RX ADMIN — NAFCILLIN 200 GRAM(S): 10 INJECTION, POWDER, FOR SOLUTION INTRAVENOUS at 15:26

## 2023-11-23 RX ADMIN — SODIUM CHLORIDE 75 MILLILITER(S): 9 INJECTION, SOLUTION INTRAVENOUS at 08:07

## 2023-11-23 RX ADMIN — Medication 100 MILLIEQUIVALENT(S): at 21:11

## 2023-11-23 RX ADMIN — ATORVASTATIN CALCIUM 20 MILLIGRAM(S): 80 TABLET, FILM COATED ORAL at 23:03

## 2023-11-23 RX ADMIN — Medication 100 MILLIEQUIVALENT(S): at 09:48

## 2023-11-23 RX ADMIN — TAMSULOSIN HYDROCHLORIDE 0.4 MILLIGRAM(S): 0.4 CAPSULE ORAL at 23:03

## 2023-11-23 RX ADMIN — NAFCILLIN 200 GRAM(S): 10 INJECTION, POWDER, FOR SOLUTION INTRAVENOUS at 18:01

## 2023-11-23 RX ADMIN — Medication 975 MILLIGRAM(S): at 00:00

## 2023-11-23 RX ADMIN — LISINOPRIL 5 MILLIGRAM(S): 2.5 TABLET ORAL at 05:50

## 2023-11-23 RX ADMIN — Medication 100 MILLIEQUIVALENT(S): at 18:02

## 2023-11-23 RX ADMIN — FLUCONAZOLE 100 MILLIGRAM(S): 150 TABLET ORAL at 15:53

## 2023-11-23 RX ADMIN — Medication 2000 UNIT(S): at 11:38

## 2023-11-23 RX ADMIN — SODIUM CHLORIDE 75 MILLILITER(S): 9 INJECTION INTRAMUSCULAR; INTRAVENOUS; SUBCUTANEOUS at 08:33

## 2023-11-23 RX ADMIN — Medication 975 MILLIGRAM(S): at 06:20

## 2023-11-23 RX ADMIN — NAFCILLIN 200 GRAM(S): 10 INJECTION, POWDER, FOR SOLUTION INTRAVENOUS at 11:00

## 2023-11-23 RX ADMIN — SODIUM CHLORIDE 75 MILLILITER(S): 9 INJECTION INTRAMUSCULAR; INTRAVENOUS; SUBCUTANEOUS at 23:03

## 2023-11-23 RX ADMIN — NAFCILLIN 200 GRAM(S): 10 INJECTION, POWDER, FOR SOLUTION INTRAVENOUS at 02:39

## 2023-11-23 RX ADMIN — CHLORHEXIDINE GLUCONATE 1 APPLICATION(S): 213 SOLUTION TOPICAL at 11:01

## 2023-11-23 RX ADMIN — Medication 975 MILLIGRAM(S): at 05:50

## 2023-11-23 RX ADMIN — HEPARIN SODIUM 5000 UNIT(S): 5000 INJECTION INTRAVENOUS; SUBCUTANEOUS at 15:26

## 2023-11-23 RX ADMIN — HEPARIN SODIUM 5000 UNIT(S): 5000 INJECTION INTRAVENOUS; SUBCUTANEOUS at 05:50

## 2023-11-23 RX ADMIN — NAFCILLIN 200 GRAM(S): 10 INJECTION, POWDER, FOR SOLUTION INTRAVENOUS at 05:50

## 2023-11-23 NOTE — PROGRESS NOTE ADULT - SUBJECTIVE AND OBJECTIVE BOX
Plastic Surgery    SUBJECTIVE: Pt seen and examined on rounds with team. No acute events overnight. Feels well. Prevena placed yesterday, holding good suction.     VITALS  T(C): 37 (11-23-23 @ 05:00), Max: 37.3 (11-22-23 @ 11:52)  HR: 90 (11-23-23 @ 05:00) (83 - 99)  BP: 125/66 (11-23-23 @ 05:00) (120/56 - 132/60)  RR: 18 (11-23-23 @ 05:00) (18 - 18)  SpO2: 95% (11-23-23 @ 05:00) (93% - 96%)  CAPILLARY BLOOD GLUCOSE      POCT Blood Glucose.: 110 mg/dL (23 Nov 2023 05:43)  POCT Blood Glucose.: 123 mg/dL (22 Nov 2023 23:09)  POCT Blood Glucose.: 111 mg/dL (22 Nov 2023 17:58)  POCT Blood Glucose.: 126 mg/dL (22 Nov 2023 11:45)      Is/Os      PHYSICAL EXAM:   General: NAD, Lying in bed   Neuro: Awake and alert  Pulm: non-labored respirations  Back: Soft, flat, no palpable collection. Incisional vac holding suction; no drainage or leaks. Vac canister without visible output    MEDICATIONS (STANDING): acetaminophen     Tablet .. 975 milliGRAM(s) Oral every 6 hours  atorvastatin 20 milliGRAM(s) Oral at bedtime  bicalutamide 50 milliGRAM(s) Oral daily  dextrose 5%. 1000 milliLiter(s) IV Continuous <Continuous>  dextrose 5%. 1000 milliLiter(s) IV Continuous <Continuous>  dextrose 50% Injectable 25 Gram(s) IV Push once  dextrose 50% Injectable 12.5 Gram(s) IV Push once  dextrose 50% Injectable 25 Gram(s) IV Push once  fluconAZOLE IVPB      fluconAZOLE IVPB 200 milliGRAM(s) IV Intermittent every 24 hours  glucagon  Injectable 1 milliGRAM(s) IntraMuscular once  heparin   Injectable 5000 Unit(s) SubCutaneous every 8 hours  lisinopril 5 milliGRAM(s) Oral daily  nafcillin  IVPB 2 Gram(s) IV Intermittent every 4 hours  pantoprazole  Injectable 40 milliGRAM(s) IV Push daily  potassium chloride  10 mEq/100 mL IVPB 10 milliEquivalent(s) IV Intermittent every 1 hour  sodium chloride 0.9%. 1000 milliLiter(s) IV Continuous <Continuous>  tamsulosin 0.4 milliGRAM(s) Oral at bedtime    MEDICATIONS (PRN):dextrose Oral Gel 15 Gram(s) Oral once PRN Blood Glucose LESS THAN 70 milliGRAM(s)/deciliter  guaiFENesin Oral Liquid (Sugar-Free) 200 milliGRAM(s) Oral every 6 hours PRN Cough  naloxone Injectable 0.1 milliGRAM(s) IV Push every 3 minutes PRN For ANY of the following changes in patient status:  A. RR LESS THAN 10 breaths per minute, B. Oxygen saturation LESS THAN 90%, C. Sedation score of 6  ondansetron Injectable 4 milliGRAM(s) IV Push every 6 hours PRN Nausea      LABS  CBC (11-23 @ 05:52)                              7.6<L>                         3.44<L>  )----------------(  273        --    % Neutrophils, --    % Lymphocytes, ANC: --                                  23.8<L>  CBC (11-22 @ 06:45)                              7.1<L>                         2.85<L>  )----------------(  225        79.8<H>% Neutrophils, 9.6<L>% Lymphocytes, ANC: 2.37                                22.3<L>    BMP (11-23 @ 05:52)             137     |  100     |  11    		Ca++ --      Ca 8.0<L>             ---------------------------------( 102<H>		Mg 2.20               3.0<L>  |  23      |  0.84  			Ph 2.7     BMP (11-22 @ 06:45)             139     |  103     |  16    		Ca++ --      Ca 8.0<L>             ---------------------------------( 137<H>		Mg 2.40               3.6     |  24      |  1.14  			Ph 3.6                   IMAGING STUDIES

## 2023-11-23 NOTE — PROGRESS NOTE ADULT - ASSESSMENT
73y Male s/p C5-T3 posterior cervical fusion, C7-T1 decompression for metastatic prostate cancer with plastics closure on 11/2. Recovering well. Both drains removed. MRI 11/14 showing possible seroma in subcutaneous space. Incisional vac w/ black sponge/Adaptic started on 11/17/2023.     Plan:  - prevena wound vac to neck - to be left in place for 5 days   - F/U fever work up - RVP +adenovirus; UA clear  - Monitor vac output  - Optimize nutrition, ensure adequate protein intake  - Remainder of care per primary team    Ricardo Weinsteinsh  Plastic Surgery  #[phone] [facility] pager  [phone] [facility] pager  Available on teams

## 2023-11-23 NOTE — PROGRESS NOTE ADULT - ASSESSMENT
73M DM2, HTN, Prostate CA p/w spinal cord compression from metastasis s/p C5-T3 Fusion, C7-T1 decompression c/b MSSA bacteremia from surgical site infection, B/L LE DVT s/p IVC filter 11/1 by IR, candida esophagitis w/ dysphagia.

## 2023-11-23 NOTE — PROGRESS NOTE ADULT - PROBLEM SELECTOR PLAN 2
Concern for surgical site as a primary source  - woundvac in place  - awaiting culture from post-surgical fluid collection.  - BCx from 11/16, 11/17 NGTD  - changed to Nafcillin IV on 11/20.  - TTE reviewed

## 2023-11-23 NOTE — PROGRESS NOTE ADULT - PROBLEM SELECTOR PLAN 1
Reviewed CT Neck/Chest.  Appreciate ENT eval - reviewed direct laryngoscope imaging on 11/22  - ENT suspects esophageal candida but also possible mass effect from recent C-spine surgery; however no airway compromise noted.  - discussed with Ortho - Dr. Badillo reviewed imaging - no indication for surgical intervention.  - Diflucan IV started  - monitor clinical response - repeat S+S once pain is improved.  - NPO for now.

## 2023-11-23 NOTE — PROGRESS NOTE ADULT - SUBJECTIVE AND OBJECTIVE BOX
[facility] Division of Hospital Medicine  Jesus Riojas MD  Pager (PARTHA-F, 8A-5P): [phone]  Other Times:  [phone]    Patient is a 73y old  Male who presents with a chief complaint of Diffuse Spinal Mets from Prostate Cancer (23 Nov 2023 08:39)    SUBJECTIVE / OVERNIGHT EVENTS:  Patient states that subjectively his throat feels better than yesterday.  No F/C, N/V, CP, SOB, Cough, lightheadedness, dizziness, abdominal pain, diarrhea, dysuria.    MEDICATIONS  (STANDING):  acetaminophen     Tablet .. 975 milliGRAM(s) Oral every 6 hours  atorvastatin 20 milliGRAM(s) Oral at bedtime  bicalutamide 50 milliGRAM(s) Oral daily  chlorhexidine 2% Cloths 1 Application(s) Topical daily  cholecalciferol 2000 Unit(s) Oral daily  dextrose 5%. 1000 milliLiter(s) (50 mL/Hr) IV Continuous <Continuous>  dextrose 5%. 1000 milliLiter(s) (100 mL/Hr) IV Continuous <Continuous>  dextrose 50% Injectable 25 Gram(s) IV Push once  dextrose 50% Injectable 25 Gram(s) IV Push once  dextrose 50% Injectable 12.5 Gram(s) IV Push once  fluconAZOLE IVPB      fluconAZOLE IVPB 200 milliGRAM(s) IV Intermittent every 24 hours  glucagon  Injectable 1 milliGRAM(s) IntraMuscular once  heparin   Injectable 5000 Unit(s) SubCutaneous every 8 hours  lisinopril 5 milliGRAM(s) Oral daily  nafcillin  IVPB 2 Gram(s) IV Intermittent every 4 hours  pantoprazole  Injectable 40 milliGRAM(s) IV Push daily  sodium chloride 0.9%. 1000 milliLiter(s) (75 mL/Hr) IV Continuous <Continuous>  tamsulosin 0.4 milliGRAM(s) Oral at bedtime    MEDICATIONS  (PRN):  benzocaine/menthol Lozenge 1 Lozenge Oral every 6 hours PRN Sore Throat  dextrose Oral Gel 15 Gram(s) Oral once PRN Blood Glucose LESS THAN 70 milliGRAM(s)/deciliter  guaiFENesin Oral Liquid (Sugar-Free) 200 milliGRAM(s) Oral every 6 hours PRN Cough  naloxone Injectable 0.1 milliGRAM(s) IV Push every 3 minutes PRN For ANY of the following changes in patient status:  A. RR LESS THAN 10 breaths per minute, B. Oxygen saturation LESS THAN 90%, C. Sedation score of 6  ondansetron Injectable 4 milliGRAM(s) IV Push every 6 hours PRN Nausea      Vital Signs Last 24 Hrs  T(C): 36.8 (23 Nov 2023 13:00), Max: 37.3 (22 Nov 2023 17:00)  T(F): 98.3 (23 Nov 2023 13:00), Max: 99.1 (22 Nov 2023 17:00)  HR: 93 (23 Nov 2023 13:00) (83 - 93)  BP: 131/63 (23 Nov 2023 13:00) (120/56 - 132/60)  BP(mean): --  RR: 18 (23 Nov 2023 13:00) (18 - 18)  SpO2: 99% (23 Nov 2023 13:00) (93% - 99%)    Parameters below as of 23 Nov 2023 13:00  Patient On (Oxygen Delivery Method): nasal cannula  O2 Flow (L/min): 2    CAPILLARY BLOOD GLUCOSE      POCT Blood Glucose.: 121 mg/dL (23 Nov 2023 12:22)  POCT Blood Glucose.: 110 mg/dL (23 Nov 2023 05:43)  POCT Blood Glucose.: 123 mg/dL (22 Nov 2023 23:09)  POCT Blood Glucose.: 111 mg/dL (22 Nov 2023 17:58)    I&O's Summary      PHYSICAL EXAM:  CONSTITUTIONAL: NAD  EYES: PERRLA; conjunctiva and sclera clear  ENMT: Moist oral mucosa, no pharyngeal injection or exudates; normal dentition; no obvious plaques noted.  NECK: Supple, no palpable masses; no thyromegaly  RESPIRATORY: Normal respiratory effort; lungs are clear to auscultation bilaterally  CARDIOVASCULAR: Regular rate and rhythm, normal S1 and S2, no murmur/rub/gallop; No lower extremity edema; Peripheral pulses are 2+ bilaterally  ABDOMEN: Nontender to palpation, normoactive bowel sounds, no rebound/guarding; No hepatosplenomegaly  MUSCULOSKELETAL:  Did not assess gait; no clubbing or cyanosis of digits; no joint swelling or tenderness to palpation  PSYCH: A+O to person, place, and time; affect appropriate  NEUROLOGY: CN 2-12 are intact and symmetric; no gross sensory deficits   SKIN: Woundvac in place on his back surgical site - C/D/I    LABS:                        7.6    3.44  )-----------( 273      ( 23 Nov 2023 05:52 )             23.8     11-23    137  |  100  |  11  ----------------------------<  102<H>  3.0<L>   |  23  |  0.84    Ca    8.0<L>      23 Nov 2023 05:52  Phos  2.7     11-23  Mg     2.20     11-23            Urinalysis Basic - ( 23 Nov 2023 05:52 )    Color: x / Appearance: x / SG: x / pH: x  Gluc: 102 mg/dL / Ketone: x  / Bili: x / Urobili: x   Blood: x / Protein: x / Nitrite: x   Leuk Esterase: x / RBC: x / WBC x   Sq Epi: x / Non Sq Epi: x / Bacteria: x        RADIOLOGY & ADDITIONAL TESTS:  < from: CT Neck Soft Tissue w/ IV Cont (11.22.23 @ 10:51) >  IMPRESSION: Status post posterior fusion and laminectomy, as discussed,   with a probable seroma formation within the paraspinal soft tissues.   Superimposed infection is difficult to exclude. Admission the canal is   markedly limited secondary to intrinsic CT modality and also streak and   beam hardening artifact generated by the metallic orthopedic hardware.    No cervical mass or cervical lymphadenopathy.    < end of copied text >    Imaging Personally Reviewed:    Care Discussed with Consultants/Other Providers:

## 2023-11-24 LAB
ANION GAP SERPL CALC-SCNC: 12 MMOL/L — SIGNIFICANT CHANGE UP (ref 7–14)
ANION GAP SERPL CALC-SCNC: 12 MMOL/L — SIGNIFICANT CHANGE UP (ref 7–14)
APPEARANCE UR: CLEAR — SIGNIFICANT CHANGE UP
APPEARANCE UR: CLEAR — SIGNIFICANT CHANGE UP
B PERT DNA SPEC QL NAA+PROBE: SIGNIFICANT CHANGE UP
B PERT DNA SPEC QL NAA+PROBE: SIGNIFICANT CHANGE UP
B PERT+PARAPERT DNA PNL SPEC NAA+PROBE: SIGNIFICANT CHANGE UP
B PERT+PARAPERT DNA PNL SPEC NAA+PROBE: SIGNIFICANT CHANGE UP
BACTERIA # UR AUTO: NEGATIVE /HPF — SIGNIFICANT CHANGE UP
BACTERIA # UR AUTO: NEGATIVE /HPF — SIGNIFICANT CHANGE UP
BASOPHILS # BLD AUTO: 0.02 K/UL — SIGNIFICANT CHANGE UP (ref 0–0.2)
BASOPHILS # BLD AUTO: 0.02 K/UL — SIGNIFICANT CHANGE UP (ref 0–0.2)
BASOPHILS NFR BLD AUTO: 0.5 % — SIGNIFICANT CHANGE UP (ref 0–2)
BASOPHILS NFR BLD AUTO: 0.5 % — SIGNIFICANT CHANGE UP (ref 0–2)
BILIRUB UR-MCNC: NEGATIVE — SIGNIFICANT CHANGE UP
BILIRUB UR-MCNC: NEGATIVE — SIGNIFICANT CHANGE UP
BORDETELLA PARAPERTUSSIS (RAPRVP): SIGNIFICANT CHANGE UP
BORDETELLA PARAPERTUSSIS (RAPRVP): SIGNIFICANT CHANGE UP
BUN SERPL-MCNC: 9 MG/DL — SIGNIFICANT CHANGE UP (ref 7–23)
BUN SERPL-MCNC: 9 MG/DL — SIGNIFICANT CHANGE UP (ref 7–23)
C PNEUM DNA SPEC QL NAA+PROBE: SIGNIFICANT CHANGE UP
C PNEUM DNA SPEC QL NAA+PROBE: SIGNIFICANT CHANGE UP
CALCIUM SERPL-MCNC: 7.8 MG/DL — LOW (ref 8.4–10.5)
CALCIUM SERPL-MCNC: 7.8 MG/DL — LOW (ref 8.4–10.5)
CAST: 1 /LPF — SIGNIFICANT CHANGE UP (ref 0–4)
CAST: 1 /LPF — SIGNIFICANT CHANGE UP (ref 0–4)
CHLORIDE SERPL-SCNC: 102 MMOL/L — SIGNIFICANT CHANGE UP (ref 98–107)
CHLORIDE SERPL-SCNC: 102 MMOL/L — SIGNIFICANT CHANGE UP (ref 98–107)
CO2 SERPL-SCNC: 24 MMOL/L — SIGNIFICANT CHANGE UP (ref 22–31)
CO2 SERPL-SCNC: 24 MMOL/L — SIGNIFICANT CHANGE UP (ref 22–31)
COLOR SPEC: YELLOW — SIGNIFICANT CHANGE UP
COLOR SPEC: YELLOW — SIGNIFICANT CHANGE UP
CREAT SERPL-MCNC: 0.82 MG/DL — SIGNIFICANT CHANGE UP (ref 0.5–1.3)
CREAT SERPL-MCNC: 0.82 MG/DL — SIGNIFICANT CHANGE UP (ref 0.5–1.3)
DIFF PNL FLD: NEGATIVE — SIGNIFICANT CHANGE UP
DIFF PNL FLD: NEGATIVE — SIGNIFICANT CHANGE UP
EGFR: 94 ML/MIN/1.73M2 — SIGNIFICANT CHANGE UP
EGFR: 94 ML/MIN/1.73M2 — SIGNIFICANT CHANGE UP
EOSINOPHIL # BLD AUTO: 0.04 K/UL — SIGNIFICANT CHANGE UP (ref 0–0.5)
EOSINOPHIL # BLD AUTO: 0.04 K/UL — SIGNIFICANT CHANGE UP (ref 0–0.5)
EOSINOPHIL NFR BLD AUTO: 0.9 % — SIGNIFICANT CHANGE UP (ref 0–6)
EOSINOPHIL NFR BLD AUTO: 0.9 % — SIGNIFICANT CHANGE UP (ref 0–6)
FLUAV SUBTYP SPEC NAA+PROBE: SIGNIFICANT CHANGE UP
FLUAV SUBTYP SPEC NAA+PROBE: SIGNIFICANT CHANGE UP
FLUBV RNA SPEC QL NAA+PROBE: SIGNIFICANT CHANGE UP
FLUBV RNA SPEC QL NAA+PROBE: SIGNIFICANT CHANGE UP
GLUCOSE SERPL-MCNC: 107 MG/DL — HIGH (ref 70–99)
GLUCOSE SERPL-MCNC: 107 MG/DL — HIGH (ref 70–99)
GLUCOSE UR QL: NEGATIVE MG/DL — SIGNIFICANT CHANGE UP
GLUCOSE UR QL: NEGATIVE MG/DL — SIGNIFICANT CHANGE UP
HADV DNA SPEC QL NAA+PROBE: DETECTED
HADV DNA SPEC QL NAA+PROBE: DETECTED
HCOV 229E RNA SPEC QL NAA+PROBE: SIGNIFICANT CHANGE UP
HCOV 229E RNA SPEC QL NAA+PROBE: SIGNIFICANT CHANGE UP
HCOV HKU1 RNA SPEC QL NAA+PROBE: SIGNIFICANT CHANGE UP
HCOV HKU1 RNA SPEC QL NAA+PROBE: SIGNIFICANT CHANGE UP
HCOV NL63 RNA SPEC QL NAA+PROBE: SIGNIFICANT CHANGE UP
HCOV NL63 RNA SPEC QL NAA+PROBE: SIGNIFICANT CHANGE UP
HCOV OC43 RNA SPEC QL NAA+PROBE: SIGNIFICANT CHANGE UP
HCOV OC43 RNA SPEC QL NAA+PROBE: SIGNIFICANT CHANGE UP
HCT VFR BLD CALC: 24.5 % — LOW (ref 39–50)
HCT VFR BLD CALC: 24.5 % — LOW (ref 39–50)
HGB BLD-MCNC: 7.6 G/DL — LOW (ref 13–17)
HGB BLD-MCNC: 7.6 G/DL — LOW (ref 13–17)
HMPV RNA SPEC QL NAA+PROBE: SIGNIFICANT CHANGE UP
HMPV RNA SPEC QL NAA+PROBE: SIGNIFICANT CHANGE UP
HPIV1 RNA SPEC QL NAA+PROBE: SIGNIFICANT CHANGE UP
HPIV1 RNA SPEC QL NAA+PROBE: SIGNIFICANT CHANGE UP
HPIV2 RNA SPEC QL NAA+PROBE: SIGNIFICANT CHANGE UP
HPIV2 RNA SPEC QL NAA+PROBE: SIGNIFICANT CHANGE UP
HPIV3 RNA SPEC QL NAA+PROBE: SIGNIFICANT CHANGE UP
HPIV3 RNA SPEC QL NAA+PROBE: SIGNIFICANT CHANGE UP
HPIV4 RNA SPEC QL NAA+PROBE: SIGNIFICANT CHANGE UP
HPIV4 RNA SPEC QL NAA+PROBE: SIGNIFICANT CHANGE UP
IANC: 2.73 K/UL — SIGNIFICANT CHANGE UP (ref 1.8–7.4)
IANC: 2.73 K/UL — SIGNIFICANT CHANGE UP (ref 1.8–7.4)
IMM GRANULOCYTES NFR BLD AUTO: 3 % — HIGH (ref 0–0.9)
IMM GRANULOCYTES NFR BLD AUTO: 3 % — HIGH (ref 0–0.9)
KETONES UR-MCNC: ABNORMAL MG/DL
KETONES UR-MCNC: ABNORMAL MG/DL
LACTATE SERPL-SCNC: 0.9 MMOL/L — SIGNIFICANT CHANGE UP (ref 0.5–2)
LACTATE SERPL-SCNC: 0.9 MMOL/L — SIGNIFICANT CHANGE UP (ref 0.5–2)
LEUKOCYTE ESTERASE UR-ACNC: NEGATIVE — SIGNIFICANT CHANGE UP
LEUKOCYTE ESTERASE UR-ACNC: NEGATIVE — SIGNIFICANT CHANGE UP
LYMPHOCYTES # BLD AUTO: 1.18 K/UL — SIGNIFICANT CHANGE UP (ref 1–3.3)
LYMPHOCYTES # BLD AUTO: 1.18 K/UL — SIGNIFICANT CHANGE UP (ref 1–3.3)
LYMPHOCYTES # BLD AUTO: 27.1 % — SIGNIFICANT CHANGE UP (ref 13–44)
LYMPHOCYTES # BLD AUTO: 27.1 % — SIGNIFICANT CHANGE UP (ref 13–44)
M PNEUMO DNA SPEC QL NAA+PROBE: SIGNIFICANT CHANGE UP
M PNEUMO DNA SPEC QL NAA+PROBE: SIGNIFICANT CHANGE UP
MAGNESIUM SERPL-MCNC: 2 MG/DL — SIGNIFICANT CHANGE UP (ref 1.6–2.6)
MAGNESIUM SERPL-MCNC: 2 MG/DL — SIGNIFICANT CHANGE UP (ref 1.6–2.6)
MCHC RBC-ENTMCNC: 30.5 PG — SIGNIFICANT CHANGE UP (ref 27–34)
MCHC RBC-ENTMCNC: 30.5 PG — SIGNIFICANT CHANGE UP (ref 27–34)
MCHC RBC-ENTMCNC: 31 GM/DL — LOW (ref 32–36)
MCHC RBC-ENTMCNC: 31 GM/DL — LOW (ref 32–36)
MCV RBC AUTO: 98.4 FL — SIGNIFICANT CHANGE UP (ref 80–100)
MCV RBC AUTO: 98.4 FL — SIGNIFICANT CHANGE UP (ref 80–100)
MONOCYTES # BLD AUTO: 0.26 K/UL — SIGNIFICANT CHANGE UP (ref 0–0.9)
MONOCYTES # BLD AUTO: 0.26 K/UL — SIGNIFICANT CHANGE UP (ref 0–0.9)
MONOCYTES NFR BLD AUTO: 6 % — SIGNIFICANT CHANGE UP (ref 2–14)
MONOCYTES NFR BLD AUTO: 6 % — SIGNIFICANT CHANGE UP (ref 2–14)
NEUTROPHILS # BLD AUTO: 2.73 K/UL — SIGNIFICANT CHANGE UP (ref 1.8–7.4)
NEUTROPHILS # BLD AUTO: 2.73 K/UL — SIGNIFICANT CHANGE UP (ref 1.8–7.4)
NEUTROPHILS NFR BLD AUTO: 62.5 % — SIGNIFICANT CHANGE UP (ref 43–77)
NEUTROPHILS NFR BLD AUTO: 62.5 % — SIGNIFICANT CHANGE UP (ref 43–77)
NITRITE UR-MCNC: NEGATIVE — SIGNIFICANT CHANGE UP
NITRITE UR-MCNC: NEGATIVE — SIGNIFICANT CHANGE UP
NRBC # BLD: 2 /100 WBCS — HIGH (ref 0–0)
NRBC # BLD: 2 /100 WBCS — HIGH (ref 0–0)
NRBC # FLD: 0.07 K/UL — HIGH (ref 0–0)
NRBC # FLD: 0.07 K/UL — HIGH (ref 0–0)
PH UR: 6.5 — SIGNIFICANT CHANGE UP (ref 5–8)
PH UR: 6.5 — SIGNIFICANT CHANGE UP (ref 5–8)
PHOSPHATE SERPL-MCNC: 2.2 MG/DL — LOW (ref 2.5–4.5)
PHOSPHATE SERPL-MCNC: 2.2 MG/DL — LOW (ref 2.5–4.5)
PLATELET # BLD AUTO: 285 K/UL — SIGNIFICANT CHANGE UP (ref 150–400)
PLATELET # BLD AUTO: 285 K/UL — SIGNIFICANT CHANGE UP (ref 150–400)
POTASSIUM SERPL-MCNC: 3 MMOL/L — LOW (ref 3.5–5.3)
POTASSIUM SERPL-MCNC: 3 MMOL/L — LOW (ref 3.5–5.3)
POTASSIUM SERPL-SCNC: 3 MMOL/L — LOW (ref 3.5–5.3)
POTASSIUM SERPL-SCNC: 3 MMOL/L — LOW (ref 3.5–5.3)
PROT UR-MCNC: SIGNIFICANT CHANGE UP MG/DL
PROT UR-MCNC: SIGNIFICANT CHANGE UP MG/DL
RAPID RVP RESULT: DETECTED
RAPID RVP RESULT: DETECTED
RBC # BLD: 2.49 M/UL — LOW (ref 4.2–5.8)
RBC # BLD: 2.49 M/UL — LOW (ref 4.2–5.8)
RBC # FLD: 14.6 % — HIGH (ref 10.3–14.5)
RBC # FLD: 14.6 % — HIGH (ref 10.3–14.5)
RBC CASTS # UR COMP ASSIST: 2 /HPF — SIGNIFICANT CHANGE UP (ref 0–4)
RBC CASTS # UR COMP ASSIST: 2 /HPF — SIGNIFICANT CHANGE UP (ref 0–4)
RSV RNA SPEC QL NAA+PROBE: SIGNIFICANT CHANGE UP
RSV RNA SPEC QL NAA+PROBE: SIGNIFICANT CHANGE UP
RV+EV RNA SPEC QL NAA+PROBE: SIGNIFICANT CHANGE UP
RV+EV RNA SPEC QL NAA+PROBE: SIGNIFICANT CHANGE UP
SARS-COV-2 RNA SPEC QL NAA+PROBE: SIGNIFICANT CHANGE UP
SARS-COV-2 RNA SPEC QL NAA+PROBE: SIGNIFICANT CHANGE UP
SODIUM SERPL-SCNC: 138 MMOL/L — SIGNIFICANT CHANGE UP (ref 135–145)
SODIUM SERPL-SCNC: 138 MMOL/L — SIGNIFICANT CHANGE UP (ref 135–145)
SP GR SPEC: 1.01 — SIGNIFICANT CHANGE UP (ref 1–1.03)
SP GR SPEC: 1.01 — SIGNIFICANT CHANGE UP (ref 1–1.03)
SQUAMOUS # UR AUTO: 3 /HPF — SIGNIFICANT CHANGE UP (ref 0–5)
SQUAMOUS # UR AUTO: 3 /HPF — SIGNIFICANT CHANGE UP (ref 0–5)
UROBILINOGEN FLD QL: 1 MG/DL — SIGNIFICANT CHANGE UP (ref 0.2–1)
UROBILINOGEN FLD QL: 1 MG/DL — SIGNIFICANT CHANGE UP (ref 0.2–1)
WBC # BLD: 4.36 K/UL — SIGNIFICANT CHANGE UP (ref 3.8–10.5)
WBC # BLD: 4.36 K/UL — SIGNIFICANT CHANGE UP (ref 3.8–10.5)
WBC # FLD AUTO: 4.36 K/UL — SIGNIFICANT CHANGE UP (ref 3.8–10.5)
WBC # FLD AUTO: 4.36 K/UL — SIGNIFICANT CHANGE UP (ref 3.8–10.5)
WBC UR QL: 1 /HPF — SIGNIFICANT CHANGE UP (ref 0–5)
WBC UR QL: 1 /HPF — SIGNIFICANT CHANGE UP (ref 0–5)

## 2023-11-24 PROCEDURE — 71045 X-RAY EXAM CHEST 1 VIEW: CPT | Mod: 26

## 2023-11-24 PROCEDURE — 99233 SBSQ HOSP IP/OBS HIGH 50: CPT

## 2023-11-24 PROCEDURE — 99232 SBSQ HOSP IP/OBS MODERATE 35: CPT

## 2023-11-24 RX ORDER — ACETAMINOPHEN 500 MG
1000 TABLET ORAL ONCE
Refills: 0 | Status: COMPLETED | OUTPATIENT
Start: 2023-11-24 | End: 2023-11-24

## 2023-11-24 RX ORDER — POTASSIUM CHLORIDE 20 MEQ
10 PACKET (EA) ORAL
Refills: 0 | Status: COMPLETED | OUTPATIENT
Start: 2023-11-24 | End: 2023-11-24

## 2023-11-24 RX ORDER — SODIUM CHLORIDE 9 MG/ML
1000 INJECTION INTRAMUSCULAR; INTRAVENOUS; SUBCUTANEOUS
Refills: 0 | Status: DISCONTINUED | OUTPATIENT
Start: 2023-11-24 | End: 2023-12-08

## 2023-11-24 RX ORDER — POTASSIUM PHOSPHATE, MONOBASIC POTASSIUM PHOSPHATE, DIBASIC 236; 224 MG/ML; MG/ML
30 INJECTION, SOLUTION INTRAVENOUS ONCE
Refills: 0 | Status: COMPLETED | OUTPATIENT
Start: 2023-11-24 | End: 2023-11-24

## 2023-11-24 RX ORDER — MOXIFLOXACIN HYDROCHLORIDE TABLETS, 400 MG 400 MG/1
400 TABLET, FILM COATED ORAL EVERY 24 HOURS
Refills: 0 | Status: DISCONTINUED | OUTPATIENT
Start: 2023-11-24 | End: 2023-11-27

## 2023-11-24 RX ADMIN — NAFCILLIN 200 GRAM(S): 10 INJECTION, POWDER, FOR SOLUTION INTRAVENOUS at 15:10

## 2023-11-24 RX ADMIN — POTASSIUM PHOSPHATE, MONOBASIC POTASSIUM PHOSPHATE, DIBASIC 83.33 MILLIMOLE(S): 236; 224 INJECTION, SOLUTION INTRAVENOUS at 12:51

## 2023-11-24 RX ADMIN — Medication 100 MILLIEQUIVALENT(S): at 08:58

## 2023-11-24 RX ADMIN — NAFCILLIN 200 GRAM(S): 10 INJECTION, POWDER, FOR SOLUTION INTRAVENOUS at 02:46

## 2023-11-24 RX ADMIN — HEPARIN SODIUM 5000 UNIT(S): 5000 INJECTION INTRAVENOUS; SUBCUTANEOUS at 15:11

## 2023-11-24 RX ADMIN — LISINOPRIL 5 MILLIGRAM(S): 2.5 TABLET ORAL at 06:23

## 2023-11-24 RX ADMIN — BICALUTAMIDE 50 MILLIGRAM(S): 50 TABLET, FILM COATED ORAL at 10:27

## 2023-11-24 RX ADMIN — SODIUM CHLORIDE 100 MILLILITER(S): 9 INJECTION INTRAMUSCULAR; INTRAVENOUS; SUBCUTANEOUS at 12:51

## 2023-11-24 RX ADMIN — Medication 100 MILLIEQUIVALENT(S): at 10:27

## 2023-11-24 RX ADMIN — Medication 2000 UNIT(S): at 10:27

## 2023-11-24 RX ADMIN — Medication 100 MILLIEQUIVALENT(S): at 22:44

## 2023-11-24 RX ADMIN — HEPARIN SODIUM 5000 UNIT(S): 5000 INJECTION INTRAVENOUS; SUBCUTANEOUS at 06:23

## 2023-11-24 RX ADMIN — SODIUM CHLORIDE 100 MILLILITER(S): 9 INJECTION INTRAMUSCULAR; INTRAVENOUS; SUBCUTANEOUS at 23:50

## 2023-11-24 RX ADMIN — FLUCONAZOLE 100 MILLIGRAM(S): 150 TABLET ORAL at 16:01

## 2023-11-24 RX ADMIN — Medication 975 MILLIGRAM(S): at 22:45

## 2023-11-24 RX ADMIN — SODIUM CHLORIDE 100 MILLILITER(S): 9 INJECTION INTRAMUSCULAR; INTRAVENOUS; SUBCUTANEOUS at 07:48

## 2023-11-24 RX ADMIN — Medication 100 MILLIEQUIVALENT(S): at 23:50

## 2023-11-24 RX ADMIN — NAFCILLIN 200 GRAM(S): 10 INJECTION, POWDER, FOR SOLUTION INTRAVENOUS at 18:53

## 2023-11-24 RX ADMIN — HEPARIN SODIUM 5000 UNIT(S): 5000 INJECTION INTRAVENOUS; SUBCUTANEOUS at 22:49

## 2023-11-24 RX ADMIN — Medication 100 MILLIEQUIVALENT(S): at 07:49

## 2023-11-24 RX ADMIN — NAFCILLIN 200 GRAM(S): 10 INJECTION, POWDER, FOR SOLUTION INTRAVENOUS at 06:23

## 2023-11-24 RX ADMIN — ATORVASTATIN CALCIUM 20 MILLIGRAM(S): 80 TABLET, FILM COATED ORAL at 22:50

## 2023-11-24 RX ADMIN — CHLORHEXIDINE GLUCONATE 1 APPLICATION(S): 213 SOLUTION TOPICAL at 10:27

## 2023-11-24 RX ADMIN — NAFCILLIN 200 GRAM(S): 10 INJECTION, POWDER, FOR SOLUTION INTRAVENOUS at 10:26

## 2023-11-24 RX ADMIN — TAMSULOSIN HYDROCHLORIDE 0.4 MILLIGRAM(S): 0.4 CAPSULE ORAL at 22:50

## 2023-11-24 RX ADMIN — PANTOPRAZOLE SODIUM 40 MILLIGRAM(S): 20 TABLET, DELAYED RELEASE ORAL at 10:27

## 2023-11-24 RX ADMIN — Medication 400 MILLIGRAM(S): at 13:27

## 2023-11-24 NOTE — PROGRESS NOTE ADULT - ASSESSMENT
73y Male s/p C5-T3 posterior cervical fusion, C7-T1 decompression for metastatic prostate cancer with plastics closure on 11/2. Recovering well. Both drains removed. MRI 11/14 showing possible seroma in subcutaneous space. Incisional vac w/ black sponge/Adaptic started on 11/17/2023.     Plan:  - prevena wound vac to neck - to be left in place for 5 days   - F/U fever work up - RVP +adenovirus; UA clear  - Monitor vac output  - Optimize nutrition, ensure adequate protein intake  - Remainder of care per primary team    Ricardo Weinsteinsh  Plastic Surgery  #[phone] [facility] pager  [phone] [facility] pager  Available on teams

## 2023-11-24 NOTE — PROGRESS NOTE ADULT - ASSESSMENT
73 year old with metastatic prostate cancer s/p spinal decompression.  Now with fever and high grade MSSA bacteremia    1) High grade MSSA bacteremia  TTE without obvious vegetation  Repeat blood culture 11/16 and 11/17 are without growth    Leukoepnia  ? due to cephalosporin. IMproved after change to nafcillin.       2) Abnormal Imaging  Fluid collections noted on imaging  Vac placed  Wound examined during vac change- incision was not opened    3) He is having fever.    ? new focus vs persistent focus on MSSA  CT with opacities in lung- he did just have adenovirus.  His respiratory symptoms have improved  WIth a change in respiratory symptoms, would then consider broadening antibiotics for to cover bacterial pneumonia    I would repeat an MRI of the cervical and thoracic spine to evaluate the previously noted collection  ? if drainage is needed  Spine surgery and plastic surgery follow up    3) Bacteruria  Culture with mixed growth  100 K providenica< 10-50 K e fecalis  Likely colonization  Cipro would treat providencia which has higher colony count  Finished Cipro    4) Adenovirus  Supportive care    5) Trouble swallowing with vomiting  Aspiration noted on mbs  aspiration precautions    Weston Mcghee MD  Please contact via TEAM between 8 am and 6 pm    In the evening or on weekends, can contact call service at [phone]     73 year old with metastatic prostate cancer s/p spinal decompression.  Now with fever and high grade MSSA bacteremia    1) High grade MSSA bacteremia  TTE without obvious vegetation  Repeat blood culture 11/16 and 11/17 are without growth    Leukoepnia  ? due to cephalosporin. IMproved after change to nafcillin.       2) Abnormal Imaging  Fluid collections noted on imaging  Vac placed  Wound examined during vac change- incision was not opened    3) He is having fever.    ? new focus vs persistent focus on MSSA  CT with opacities in lung- he did just have adenovirus.  His respiratory symptoms have improved  Add avelox for ? aspiration  I clinically worsens, change avelox/ nafcillin to Cefepime 2 gram iv q 8    I would repeat an MRI of the cervical and thoracic spine to evaluate the previously noted collection  ? if drainage is needed  Spine surgery and plastic surgery follow up    3) Bacteruria  Culture with mixed growth  100 K providenica< 10-50 K e fecalis  Likely colonization  Cipro would treat providencia which has higher colony count  Finished Cipro    4) Adenovirus  Supportive care    5) Trouble swallowing with vomiting  Aspiration noted on mbs  aspiration precautions    Weston Mcghee MD  Please contact via TEAM between 8 am and 6 pm    In the evening or on weekends, can contact call service at [phone]

## 2023-11-24 NOTE — PROGRESS NOTE ADULT - PROBLEM SELECTOR PLAN 1
Reviewed CT Neck/Chest.  Appreciate ENT eval - reviewed direct laryngoscope imaging on 11/22  - ENT suspects esophageal candida but also possible mass effect from recent C-spine surgery; however no airway compromise noted.  - discussed with Ortho - Dr. Badillo reviewed imaging - no indication for surgical intervention.  - Diflucan IV (11/22-)  - clinical improvement  - monitor clinical response - repeat S+S once pain is improved

## 2023-11-24 NOTE — PROGRESS NOTE ADULT - SUBJECTIVE AND OBJECTIVE BOX
[facility] Division of Hospital Medicine  Jesus Riojas MD  Pager (PARTHA-AMBAR, 4Y-5P): [phone]  Other Times:  [phone]    Patient is a 73y old  Male who presents with a chief complaint of Diffuse Spinal Mets from Prostate Cancer (24 Nov 2023 11:44)    SUBJECTIVE / OVERNIGHT EVENTS:  Patient states that his swallowing and odynophagia is improving on Diflucan.  Episode of Tm 101 overnight. No current F/C, N/V, CP, SOB, Cough, lightheadedness, dizziness, abdominal pain, diarrhea, dysuria.    MEDICATIONS  (STANDING):  acetaminophen     Tablet .. 975 milliGRAM(s) Oral every 6 hours  atorvastatin 20 milliGRAM(s) Oral at bedtime  bicalutamide 50 milliGRAM(s) Oral daily  chlorhexidine 2% Cloths 1 Application(s) Topical daily  cholecalciferol 2000 Unit(s) Oral daily  dextrose 5%. 1000 milliLiter(s) (50 mL/Hr) IV Continuous <Continuous>  dextrose 5%. 1000 milliLiter(s) (100 mL/Hr) IV Continuous <Continuous>  dextrose 50% Injectable 25 Gram(s) IV Push once  dextrose 50% Injectable 12.5 Gram(s) IV Push once  dextrose 50% Injectable 25 Gram(s) IV Push once  fluconAZOLE IVPB      fluconAZOLE IVPB 200 milliGRAM(s) IV Intermittent every 24 hours  glucagon  Injectable 1 milliGRAM(s) IntraMuscular once  heparin   Injectable 5000 Unit(s) SubCutaneous every 8 hours  lisinopril 5 milliGRAM(s) Oral daily  nafcillin  IVPB 2 Gram(s) IV Intermittent every 4 hours  pantoprazole  Injectable 40 milliGRAM(s) IV Push daily  potassium chloride  10 mEq/100 mL IVPB 10 milliEquivalent(s) IV Intermittent every 1 hour  potassium phosphate IVPB 30 milliMole(s) IV Intermittent once  sodium chloride 0.9%. 1000 milliLiter(s) (100 mL/Hr) IV Continuous <Continuous>  tamsulosin 0.4 milliGRAM(s) Oral at bedtime    MEDICATIONS  (PRN):  benzocaine/menthol Lozenge 1 Lozenge Oral every 6 hours PRN Sore Throat  dextrose Oral Gel 15 Gram(s) Oral once PRN Blood Glucose LESS THAN 70 milliGRAM(s)/deciliter  guaiFENesin Oral Liquid (Sugar-Free) 200 milliGRAM(s) Oral every 6 hours PRN Cough  naloxone Injectable 0.1 milliGRAM(s) IV Push every 3 minutes PRN For ANY of the following changes in patient status:  A. RR LESS THAN 10 breaths per minute, B. Oxygen saturation LESS THAN 90%, C. Sedation score of 6  ondansetron Injectable 4 milliGRAM(s) IV Push every 6 hours PRN Nausea      Vital Signs Last 24 Hrs  T(C): 37.2 (24 Nov 2023 10:00), Max: 38.3 (23 Nov 2023 20:16)  T(F): 99 (24 Nov 2023 10:00), Max: 101 (23 Nov 2023 20:16)  HR: 99 (24 Nov 2023 10:00) (88 - 99)  BP: 129/67 (24 Nov 2023 10:00) (120/59 - 137/63)  BP(mean): --  RR: 18 (24 Nov 2023 10:00) (18 - 18)  SpO2: 96% (24 Nov 2023 10:00) (96% - 99%)    Parameters below as of 24 Nov 2023 10:00  Patient On (Oxygen Delivery Method): nasal cannula  O2 Flow (L/min): 2    CAPILLARY BLOOD GLUCOSE      POCT Blood Glucose.: 118 mg/dL (24 Nov 2023 06:29)  POCT Blood Glucose.: 108 mg/dL (23 Nov 2023 23:12)  POCT Blood Glucose.: 106 mg/dL (23 Nov 2023 17:59)    I&O's Summary      PHYSICAL EXAM:  CONSTITUTIONAL: NAD  EYES: PERRLA; conjunctiva and sclera clear  ENMT: Moist oral mucosa, no pharyngeal injection or exudates; normal dentition; no obvious plaques noted.  NECK: Supple, no palpable masses; no thyromegaly  RESPIRATORY: Normal respiratory effort; lungs are clear to auscultation bilaterally  CARDIOVASCULAR: Regular rate and rhythm, normal S1 and S2, no murmur/rub/gallop; No lower extremity edema; Peripheral pulses are 2+ bilaterally  ABDOMEN: Nontender to palpation, normoactive bowel sounds, no rebound/guarding; No hepatosplenomegaly  MUSCULOSKELETAL:  Did not assess gait; no clubbing or cyanosis of digits; no joint swelling or tenderness to palpation  PSYCH: A+O to person, place, and time; affect appropriate  NEUROLOGY: CN 2-12 are intact and symmetric; no gross sensory deficits   SKIN: Woundvac in place on his back surgical site - C/D/I    LABS:                        7.6    4.36  )-----------( 285      ( 24 Nov 2023 06:13 )             24.5     11-24    138  |  102  |  9   ----------------------------<  107<H>  3.0<L>   |  24  |  0.82    Ca    7.8<L>      24 Nov 2023 06:13  Phos  2.2     11-24  Mg     2.00     11-24            Urinalysis Basic - ( 24 Nov 2023 06:13 )    Color: x / Appearance: x / SG: x / pH: x  Gluc: 107 mg/dL / Ketone: x  / Bili: x / Urobili: x   Blood: x / Protein: x / Nitrite: x   Leuk Esterase: x / RBC: x / WBC x   Sq Epi: x / Non Sq Epi: x / Bacteria: x        RADIOLOGY & ADDITIONAL TESTS:    Imaging Personally Reviewed:    Care Discussed with Consultants/Other Providers:

## 2023-11-24 NOTE — PROGRESS NOTE ADULT - PROBLEM SELECTOR PLAN 2
Concern for surgical site as a primary source  - woundvac in place  - awaiting culture from post-surgical fluid collection.  - BCx from 11/16, 11/17 NGTD  - changed to Nafcillin IV on 11/20.  - TTE reviewed  - continues to spike fevers.  - CT Neck reviewed with ortho - no intervention  - MR C/T-spine re-ordered to evaluate the fluid collection

## 2023-11-24 NOTE — PROGRESS NOTE ADULT - PROBLEM SELECTOR PLAN 3
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

## 2023-11-24 NOTE — PROGRESS NOTE ADULT - SUBJECTIVE AND OBJECTIVE BOX
Plastic Surgery    SUBJECTIVE: Pt seen and examined on rounds with team. One time fever overnight, otherwise VSS. Prevena holding suction. Will keep in place for now.     VITALS  T(C): 37.1 (11-24-23 @ 06:00), Max: 38.3 (11-23-23 @ 20:16)  HR: 91 (11-24-23 @ 06:00) (86 - 99)  BP: 126/62 (11-24-23 @ 06:00) (120/59 - 137/63)  RR: 18 (11-24-23 @ 06:00) (18 - 18)  SpO2: 97% (11-24-23 @ 06:00) (97% - 99%)  CAPILLARY BLOOD GLUCOSE      POCT Blood Glucose.: 118 mg/dL (24 Nov 2023 06:29)  POCT Blood Glucose.: 108 mg/dL (23 Nov 2023 23:12)  POCT Blood Glucose.: 106 mg/dL (23 Nov 2023 17:59)  POCT Blood Glucose.: 121 mg/dL (23 Nov 2023 12:22)      Is/Os      PHYSICAL EXAM:   General: NAD, Lying in bed   Neuro: Awake and alert  Pulm: non-labored respirations  Back: Soft, flat, no palpable collection. Incisional vac holding suction; no drainage or leaks. Vac canister without visible output    MEDICATIONS (STANDING): acetaminophen     Tablet .. 975 milliGRAM(s) Oral every 6 hours  atorvastatin 20 milliGRAM(s) Oral at bedtime  bicalutamide 50 milliGRAM(s) Oral daily  cholecalciferol 2000 Unit(s) Oral daily  dextrose 5%. 1000 milliLiter(s) IV Continuous <Continuous>  dextrose 5%. 1000 milliLiter(s) IV Continuous <Continuous>  dextrose 50% Injectable 25 Gram(s) IV Push once  dextrose 50% Injectable 12.5 Gram(s) IV Push once  dextrose 50% Injectable 25 Gram(s) IV Push once  fluconAZOLE IVPB      fluconAZOLE IVPB 200 milliGRAM(s) IV Intermittent every 24 hours  glucagon  Injectable 1 milliGRAM(s) IntraMuscular once  heparin   Injectable 5000 Unit(s) SubCutaneous every 8 hours  lisinopril 5 milliGRAM(s) Oral daily  nafcillin  IVPB 2 Gram(s) IV Intermittent every 4 hours  pantoprazole  Injectable 40 milliGRAM(s) IV Push daily  potassium chloride  10 mEq/100 mL IVPB 10 milliEquivalent(s) IV Intermittent every 1 hour  potassium chloride  10 mEq/100 mL IVPB 10 milliEquivalent(s) IV Intermittent every 1 hour  potassium phosphate IVPB 30 milliMole(s) IV Intermittent once  sodium chloride 0.9%. 1000 milliLiter(s) IV Continuous <Continuous>  tamsulosin 0.4 milliGRAM(s) Oral at bedtime    MEDICATIONS (PRN):dextrose Oral Gel 15 Gram(s) Oral once PRN Blood Glucose LESS THAN 70 milliGRAM(s)/deciliter  guaiFENesin Oral Liquid (Sugar-Free) 200 milliGRAM(s) Oral every 6 hours PRN Cough  naloxone Injectable 0.1 milliGRAM(s) IV Push every 3 minutes PRN For ANY of the following changes in patient status:  A. RR LESS THAN 10 breaths per minute, B. Oxygen saturation LESS THAN 90%, C. Sedation score of 6  ondansetron Injectable 4 milliGRAM(s) IV Push every 6 hours PRN Nausea      LABS  CBC (11-24 @ 06:13)                              7.6<L>                         4.36    )----------------(  285        62.5  % Neutrophils, 27.1  % Lymphocytes, ANC: 2.73                                24.5<L>  CBC (11-23 @ 05:52)                              7.6<L>                         3.44<L>  )----------------(  273        71.2  % Neutrophils, 10.2<L>% Lymphocytes, ANC: 2.51                                23.8<L>    BMP (11-24 @ 06:13)             138     |  102     |  9     		Ca++ --      Ca 7.8<L>             ---------------------------------( 107<H>		Mg 2.00               3.0<L>  |  24      |  0.82  			Ph 2.2<L>  BMP (11-23 @ 05:52)             137     |  100     |  11    		Ca++ --      Ca 8.0<L>             ---------------------------------( 102<H>		Mg 2.20               3.0<L>  |  23      |  0.84  			Ph 2.7                   IMAGING STUDIES

## 2023-11-24 NOTE — PROGRESS NOTE ADULT - SUBJECTIVE AND OBJECTIVE BOX
Follow Up:      Inverval History/ROS:Patient is a 73y old  Male who presents with a chief complaint of Diffuse Spinal Mets from Prostate Cancer (24 Nov 2023 08:57)    Denies sob  Denies cough  + fever    Allergies    No Known Allergies    Intolerances        ANTIMICROBIALS:  fluconAZOLE IVPB    fluconAZOLE IVPB 200 every 24 hours  nafcillin  IVPB 2 every 4 hours      OTHER MEDS:  acetaminophen     Tablet .. 975 milliGRAM(s) Oral every 6 hours  atorvastatin 20 milliGRAM(s) Oral at bedtime  benzocaine/menthol Lozenge 1 Lozenge Oral every 6 hours PRN  bicalutamide 50 milliGRAM(s) Oral daily  chlorhexidine 2% Cloths 1 Application(s) Topical daily  cholecalciferol 2000 Unit(s) Oral daily  dextrose 5%. 1000 milliLiter(s) IV Continuous <Continuous>  dextrose 5%. 1000 milliLiter(s) IV Continuous <Continuous>  dextrose 50% Injectable 25 Gram(s) IV Push once  dextrose 50% Injectable 12.5 Gram(s) IV Push once  dextrose 50% Injectable 25 Gram(s) IV Push once  dextrose Oral Gel 15 Gram(s) Oral once PRN  glucagon  Injectable 1 milliGRAM(s) IntraMuscular once  guaiFENesin Oral Liquid (Sugar-Free) 200 milliGRAM(s) Oral every 6 hours PRN  heparin   Injectable 5000 Unit(s) SubCutaneous every 8 hours  lisinopril 5 milliGRAM(s) Oral daily  naloxone Injectable 0.1 milliGRAM(s) IV Push every 3 minutes PRN  ondansetron Injectable 4 milliGRAM(s) IV Push every 6 hours PRN  pantoprazole  Injectable 40 milliGRAM(s) IV Push daily  potassium chloride  10 mEq/100 mL IVPB 10 milliEquivalent(s) IV Intermittent every 1 hour  potassium phosphate IVPB 30 milliMole(s) IV Intermittent once  sodium chloride 0.9%. 1000 milliLiter(s) IV Continuous <Continuous>  tamsulosin 0.4 milliGRAM(s) Oral at bedtime      Vital Signs Last 24 Hrs  T(C): 37.2 (24 Nov 2023 10:00), Max: 38.3 (23 Nov 2023 20:16)  T(F): 99 (24 Nov 2023 10:00), Max: 101 (23 Nov 2023 20:16)  HR: 99 (24 Nov 2023 10:00) (88 - 99)  BP: 129/67 (24 Nov 2023 10:00) (120/59 - 137/63)  BP(mean): --  RR: 18 (24 Nov 2023 10:00) (18 - 18)  SpO2: 96% (24 Nov 2023 10:00) (96% - 99%)    Parameters below as of 24 Nov 2023 10:00  Patient On (Oxygen Delivery Method): nasal cannula  O2 Flow (L/min): 2      PHYSICAL EXAM:  General: [x ] non-toxic  HEAD/EYES: [ ] PERRL x[ ] white sclera [ ] icterus  ENT:  [ ] normal [x ] supple [ ] thrush [ ] pharyngeal exudate  Cardiovascular:   [ ] murmur [x ] normal [ ] PPM/AICD  Respiratory:  [x ] clear to ausculation bilaterally  GI:  [ x] soft, non-tender, normal bowel sounds  :  [ ] best [x ] no CVA tenderness   Musculoskeletal:  [ ] no synovitis  Neurologic:  [ ] non-focal exam   Skin:  [ x] no rash  Lymph: [x ] no lymphadenopathy  Psychiatric:  [ ] appropriate affect [ ] alert & oriented  Lines:  [x ] no phlebitis [ ] central line                                7.6    4.36  )-----------( 285      ( 24 Nov 2023 06:13 )             24.5       11-24    138  |  102  |  9   ----------------------------<  107<H>  3.0<L>   |  24  |  0.82    Ca    7.8<L>      24 Nov 2023 06:13  Phos  2.2     11-24  Mg     2.00     11-24        Urinalysis Basic - ( 24 Nov 2023 06:13 )    Color: x / Appearance: x / SG: x / pH: x  Gluc: 107 mg/dL / Ketone: x  / Bili: x / Urobili: x   Blood: x / Protein: x / Nitrite: x   Leuk Esterase: x / RBC: x / WBC x   Sq Epi: x / Non Sq Epi: x / Bacteria: x        MICROBIOLOGY:Culture Results:   No growth at 5 days (11-18-23 @ 06:30)  Culture Results:   No growth at 5 days (11-18-23 @ 06:15)      RADIOLOGY:

## 2023-11-25 LAB
ANION GAP SERPL CALC-SCNC: 13 MMOL/L — SIGNIFICANT CHANGE UP (ref 7–14)
ANION GAP SERPL CALC-SCNC: 13 MMOL/L — SIGNIFICANT CHANGE UP (ref 7–14)
ANISOCYTOSIS BLD QL: SLIGHT — SIGNIFICANT CHANGE UP
ANISOCYTOSIS BLD QL: SLIGHT — SIGNIFICANT CHANGE UP
BASOPHILS # BLD AUTO: 0 K/UL — SIGNIFICANT CHANGE UP (ref 0–0.2)
BASOPHILS # BLD AUTO: 0 K/UL — SIGNIFICANT CHANGE UP (ref 0–0.2)
BASOPHILS # BLD AUTO: 0.02 K/UL — SIGNIFICANT CHANGE UP (ref 0–0.2)
BASOPHILS # BLD AUTO: 0.02 K/UL — SIGNIFICANT CHANGE UP (ref 0–0.2)
BASOPHILS NFR BLD AUTO: 0 % — SIGNIFICANT CHANGE UP (ref 0–2)
BASOPHILS NFR BLD AUTO: 0 % — SIGNIFICANT CHANGE UP (ref 0–2)
BASOPHILS NFR BLD AUTO: 0.4 % — SIGNIFICANT CHANGE UP (ref 0–2)
BASOPHILS NFR BLD AUTO: 0.4 % — SIGNIFICANT CHANGE UP (ref 0–2)
BLD GP AB SCN SERPL QL: NEGATIVE — SIGNIFICANT CHANGE UP
BLD GP AB SCN SERPL QL: NEGATIVE — SIGNIFICANT CHANGE UP
BUN SERPL-MCNC: 8 MG/DL — SIGNIFICANT CHANGE UP (ref 7–23)
BUN SERPL-MCNC: 8 MG/DL — SIGNIFICANT CHANGE UP (ref 7–23)
CALCIUM SERPL-MCNC: 7.6 MG/DL — LOW (ref 8.4–10.5)
CALCIUM SERPL-MCNC: 7.6 MG/DL — LOW (ref 8.4–10.5)
CHLORIDE SERPL-SCNC: 102 MMOL/L — SIGNIFICANT CHANGE UP (ref 98–107)
CHLORIDE SERPL-SCNC: 102 MMOL/L — SIGNIFICANT CHANGE UP (ref 98–107)
CO2 SERPL-SCNC: 22 MMOL/L — SIGNIFICANT CHANGE UP (ref 22–31)
CO2 SERPL-SCNC: 22 MMOL/L — SIGNIFICANT CHANGE UP (ref 22–31)
CREAT SERPL-MCNC: 0.77 MG/DL — SIGNIFICANT CHANGE UP (ref 0.5–1.3)
CREAT SERPL-MCNC: 0.77 MG/DL — SIGNIFICANT CHANGE UP (ref 0.5–1.3)
EGFR: 96 ML/MIN/1.73M2 — SIGNIFICANT CHANGE UP
EGFR: 96 ML/MIN/1.73M2 — SIGNIFICANT CHANGE UP
EOSINOPHIL # BLD AUTO: 0 K/UL — SIGNIFICANT CHANGE UP (ref 0–0.5)
EOSINOPHIL # BLD AUTO: 0 K/UL — SIGNIFICANT CHANGE UP (ref 0–0.5)
EOSINOPHIL # BLD AUTO: 0.06 K/UL — SIGNIFICANT CHANGE UP (ref 0–0.5)
EOSINOPHIL # BLD AUTO: 0.06 K/UL — SIGNIFICANT CHANGE UP (ref 0–0.5)
EOSINOPHIL NFR BLD AUTO: 0 % — SIGNIFICANT CHANGE UP (ref 0–6)
EOSINOPHIL NFR BLD AUTO: 0 % — SIGNIFICANT CHANGE UP (ref 0–6)
EOSINOPHIL NFR BLD AUTO: 1.1 % — SIGNIFICANT CHANGE UP (ref 0–6)
EOSINOPHIL NFR BLD AUTO: 1.1 % — SIGNIFICANT CHANGE UP (ref 0–6)
GIANT PLATELETS BLD QL SMEAR: PRESENT — SIGNIFICANT CHANGE UP
GIANT PLATELETS BLD QL SMEAR: PRESENT — SIGNIFICANT CHANGE UP
GLUCOSE SERPL-MCNC: 104 MG/DL — HIGH (ref 70–99)
GLUCOSE SERPL-MCNC: 104 MG/DL — HIGH (ref 70–99)
HCT VFR BLD CALC: 20.2 % — CRITICAL LOW (ref 39–50)
HCT VFR BLD CALC: 20.2 % — CRITICAL LOW (ref 39–50)
HCT VFR BLD CALC: 21.9 % — LOW (ref 39–50)
HCT VFR BLD CALC: 21.9 % — LOW (ref 39–50)
HGB BLD-MCNC: 6.4 G/DL — CRITICAL LOW (ref 13–17)
HGB BLD-MCNC: 6.4 G/DL — CRITICAL LOW (ref 13–17)
HGB BLD-MCNC: 7 G/DL — CRITICAL LOW (ref 13–17)
HGB BLD-MCNC: 7 G/DL — CRITICAL LOW (ref 13–17)
IANC: 3.43 K/UL — SIGNIFICANT CHANGE UP (ref 1.8–7.4)
IANC: 3.43 K/UL — SIGNIFICANT CHANGE UP (ref 1.8–7.4)
IANC: 3.52 K/UL — SIGNIFICANT CHANGE UP (ref 1.8–7.4)
IANC: 3.52 K/UL — SIGNIFICANT CHANGE UP (ref 1.8–7.4)
IMM GRANULOCYTES NFR BLD AUTO: 3.8 % — HIGH (ref 0–0.9)
IMM GRANULOCYTES NFR BLD AUTO: 3.8 % — HIGH (ref 0–0.9)
LYMPHOCYTES # BLD AUTO: 0.54 K/UL — LOW (ref 1–3.3)
LYMPHOCYTES # BLD AUTO: 0.54 K/UL — LOW (ref 1–3.3)
LYMPHOCYTES # BLD AUTO: 1.31 K/UL — SIGNIFICANT CHANGE UP (ref 1–3.3)
LYMPHOCYTES # BLD AUTO: 1.31 K/UL — SIGNIFICANT CHANGE UP (ref 1–3.3)
LYMPHOCYTES # BLD AUTO: 10.5 % — LOW (ref 13–44)
LYMPHOCYTES # BLD AUTO: 10.5 % — LOW (ref 13–44)
LYMPHOCYTES # BLD AUTO: 24.6 % — SIGNIFICANT CHANGE UP (ref 13–44)
LYMPHOCYTES # BLD AUTO: 24.6 % — SIGNIFICANT CHANGE UP (ref 13–44)
MACROCYTES BLD QL: SLIGHT — SIGNIFICANT CHANGE UP
MACROCYTES BLD QL: SLIGHT — SIGNIFICANT CHANGE UP
MAGNESIUM SERPL-MCNC: 1.8 MG/DL — SIGNIFICANT CHANGE UP (ref 1.6–2.6)
MAGNESIUM SERPL-MCNC: 1.8 MG/DL — SIGNIFICANT CHANGE UP (ref 1.6–2.6)
MCHC RBC-ENTMCNC: 30.8 PG — SIGNIFICANT CHANGE UP (ref 27–34)
MCHC RBC-ENTMCNC: 30.8 PG — SIGNIFICANT CHANGE UP (ref 27–34)
MCHC RBC-ENTMCNC: 31.2 PG — SIGNIFICANT CHANGE UP (ref 27–34)
MCHC RBC-ENTMCNC: 31.2 PG — SIGNIFICANT CHANGE UP (ref 27–34)
MCHC RBC-ENTMCNC: 31.7 GM/DL — LOW (ref 32–36)
MCHC RBC-ENTMCNC: 31.7 GM/DL — LOW (ref 32–36)
MCHC RBC-ENTMCNC: 32 GM/DL — SIGNIFICANT CHANGE UP (ref 32–36)
MCHC RBC-ENTMCNC: 32 GM/DL — SIGNIFICANT CHANGE UP (ref 32–36)
MCV RBC AUTO: 96.5 FL — SIGNIFICANT CHANGE UP (ref 80–100)
MCV RBC AUTO: 96.5 FL — SIGNIFICANT CHANGE UP (ref 80–100)
MCV RBC AUTO: 98.5 FL — SIGNIFICANT CHANGE UP (ref 80–100)
MCV RBC AUTO: 98.5 FL — SIGNIFICANT CHANGE UP (ref 80–100)
METAMYELOCYTES # FLD: 1.8 % — HIGH (ref 0–1)
METAMYELOCYTES # FLD: 1.8 % — HIGH (ref 0–1)
MONOCYTES # BLD AUTO: 0.28 K/UL — SIGNIFICANT CHANGE UP (ref 0–0.9)
MONOCYTES # BLD AUTO: 0.28 K/UL — SIGNIFICANT CHANGE UP (ref 0–0.9)
MONOCYTES # BLD AUTO: 0.3 K/UL — SIGNIFICANT CHANGE UP (ref 0–0.9)
MONOCYTES # BLD AUTO: 0.3 K/UL — SIGNIFICANT CHANGE UP (ref 0–0.9)
MONOCYTES NFR BLD AUTO: 5.3 % — SIGNIFICANT CHANGE UP (ref 2–14)
MONOCYTES NFR BLD AUTO: 5.3 % — SIGNIFICANT CHANGE UP (ref 2–14)
MONOCYTES NFR BLD AUTO: 5.6 % — SIGNIFICANT CHANGE UP (ref 2–14)
MONOCYTES NFR BLD AUTO: 5.6 % — SIGNIFICANT CHANGE UP (ref 2–14)
MYELOCYTES NFR BLD: 0.9 % — HIGH (ref 0–0)
MYELOCYTES NFR BLD: 0.9 % — HIGH (ref 0–0)
NEUTROPHILS # BLD AUTO: 3.43 K/UL — SIGNIFICANT CHANGE UP (ref 1.8–7.4)
NEUTROPHILS # BLD AUTO: 3.43 K/UL — SIGNIFICANT CHANGE UP (ref 1.8–7.4)
NEUTROPHILS # BLD AUTO: 4.05 K/UL — SIGNIFICANT CHANGE UP (ref 1.8–7.4)
NEUTROPHILS # BLD AUTO: 4.05 K/UL — SIGNIFICANT CHANGE UP (ref 1.8–7.4)
NEUTROPHILS NFR BLD AUTO: 64.5 % — SIGNIFICANT CHANGE UP (ref 43–77)
NEUTROPHILS NFR BLD AUTO: 64.5 % — SIGNIFICANT CHANGE UP (ref 43–77)
NEUTROPHILS NFR BLD AUTO: 76.3 % — SIGNIFICANT CHANGE UP (ref 43–77)
NEUTROPHILS NFR BLD AUTO: 76.3 % — SIGNIFICANT CHANGE UP (ref 43–77)
NEUTS BAND # BLD: 1.7 % — SIGNIFICANT CHANGE UP (ref 0–6)
NEUTS BAND # BLD: 1.7 % — SIGNIFICANT CHANGE UP (ref 0–6)
NRBC # BLD: 1 /100 WBCS — HIGH (ref 0–0)
NRBC # BLD: 1 /100 WBCS — HIGH (ref 0–0)
NRBC # BLD: 4 /100 — HIGH (ref 0–0)
NRBC # BLD: 4 /100 — HIGH (ref 0–0)
NRBC # FLD: 0.06 K/UL — HIGH (ref 0–0)
NRBC # FLD: 0.06 K/UL — HIGH (ref 0–0)
PHOSPHATE SERPL-MCNC: 2.1 MG/DL — LOW (ref 2.5–4.5)
PHOSPHATE SERPL-MCNC: 2.1 MG/DL — LOW (ref 2.5–4.5)
PLAT MORPH BLD: ABNORMAL
PLAT MORPH BLD: ABNORMAL
PLATELET # BLD AUTO: 348 K/UL — SIGNIFICANT CHANGE UP (ref 150–400)
PLATELET COUNT - ESTIMATE: NORMAL — SIGNIFICANT CHANGE UP
PLATELET COUNT - ESTIMATE: NORMAL — SIGNIFICANT CHANGE UP
POIKILOCYTOSIS BLD QL AUTO: SLIGHT — SIGNIFICANT CHANGE UP
POIKILOCYTOSIS BLD QL AUTO: SLIGHT — SIGNIFICANT CHANGE UP
POLYCHROMASIA BLD QL SMEAR: SLIGHT — SIGNIFICANT CHANGE UP
POLYCHROMASIA BLD QL SMEAR: SLIGHT — SIGNIFICANT CHANGE UP
POTASSIUM SERPL-MCNC: 3.2 MMOL/L — LOW (ref 3.5–5.3)
POTASSIUM SERPL-MCNC: 3.2 MMOL/L — LOW (ref 3.5–5.3)
POTASSIUM SERPL-SCNC: 3.2 MMOL/L — LOW (ref 3.5–5.3)
POTASSIUM SERPL-SCNC: 3.2 MMOL/L — LOW (ref 3.5–5.3)
RBC # BLD: 2.05 M/UL — LOW (ref 4.2–5.8)
RBC # BLD: 2.05 M/UL — LOW (ref 4.2–5.8)
RBC # BLD: 2.27 M/UL — LOW (ref 4.2–5.8)
RBC # BLD: 2.27 M/UL — LOW (ref 4.2–5.8)
RBC # FLD: 15.3 % — HIGH (ref 10.3–14.5)
RBC # FLD: 15.3 % — HIGH (ref 10.3–14.5)
RBC # FLD: 15.5 % — HIGH (ref 10.3–14.5)
RBC # FLD: 15.5 % — HIGH (ref 10.3–14.5)
RBC BLD AUTO: ABNORMAL
RBC BLD AUTO: ABNORMAL
RH IG SCN BLD-IMP: POSITIVE — SIGNIFICANT CHANGE UP
RH IG SCN BLD-IMP: POSITIVE — SIGNIFICANT CHANGE UP
SMUDGE CELLS # BLD: PRESENT — SIGNIFICANT CHANGE UP
SMUDGE CELLS # BLD: PRESENT — SIGNIFICANT CHANGE UP
SODIUM SERPL-SCNC: 137 MMOL/L — SIGNIFICANT CHANGE UP (ref 135–145)
SODIUM SERPL-SCNC: 137 MMOL/L — SIGNIFICANT CHANGE UP (ref 135–145)
VARIANT LYMPHS # BLD: 3.5 % — SIGNIFICANT CHANGE UP (ref 0–6)
VARIANT LYMPHS # BLD: 3.5 % — SIGNIFICANT CHANGE UP (ref 0–6)
WBC # BLD: 5.19 K/UL — SIGNIFICANT CHANGE UP (ref 3.8–10.5)
WBC # BLD: 5.19 K/UL — SIGNIFICANT CHANGE UP (ref 3.8–10.5)
WBC # BLD: 5.32 K/UL — SIGNIFICANT CHANGE UP (ref 3.8–10.5)
WBC # BLD: 5.32 K/UL — SIGNIFICANT CHANGE UP (ref 3.8–10.5)
WBC # FLD AUTO: 5.19 K/UL — SIGNIFICANT CHANGE UP (ref 3.8–10.5)
WBC # FLD AUTO: 5.19 K/UL — SIGNIFICANT CHANGE UP (ref 3.8–10.5)
WBC # FLD AUTO: 5.32 K/UL — SIGNIFICANT CHANGE UP (ref 3.8–10.5)
WBC # FLD AUTO: 5.32 K/UL — SIGNIFICANT CHANGE UP (ref 3.8–10.5)

## 2023-11-25 PROCEDURE — 99233 SBSQ HOSP IP/OBS HIGH 50: CPT

## 2023-11-25 RX ORDER — POTASSIUM CHLORIDE 20 MEQ
10 PACKET (EA) ORAL
Refills: 0 | Status: COMPLETED | OUTPATIENT
Start: 2023-11-25 | End: 2023-11-25

## 2023-11-25 RX ORDER — POTASSIUM PHOSPHATE, MONOBASIC POTASSIUM PHOSPHATE, DIBASIC 236; 224 MG/ML; MG/ML
30 INJECTION, SOLUTION INTRAVENOUS ONCE
Refills: 0 | Status: COMPLETED | OUTPATIENT
Start: 2023-11-25 | End: 2023-11-25

## 2023-11-25 RX ADMIN — Medication 100 MILLIEQUIVALENT(S): at 15:38

## 2023-11-25 RX ADMIN — Medication 100 MILLIEQUIVALENT(S): at 16:53

## 2023-11-25 RX ADMIN — NAFCILLIN 200 GRAM(S): 10 INJECTION, POWDER, FOR SOLUTION INTRAVENOUS at 00:51

## 2023-11-25 RX ADMIN — HEPARIN SODIUM 5000 UNIT(S): 5000 INJECTION INTRAVENOUS; SUBCUTANEOUS at 12:03

## 2023-11-25 RX ADMIN — Medication 100 MILLIEQUIVALENT(S): at 17:59

## 2023-11-25 RX ADMIN — SODIUM CHLORIDE 100 MILLILITER(S): 9 INJECTION INTRAMUSCULAR; INTRAVENOUS; SUBCUTANEOUS at 15:57

## 2023-11-25 RX ADMIN — BICALUTAMIDE 50 MILLIGRAM(S): 50 TABLET, FILM COATED ORAL at 12:02

## 2023-11-25 RX ADMIN — FLUCONAZOLE 100 MILLIGRAM(S): 150 TABLET ORAL at 14:52

## 2023-11-25 RX ADMIN — Medication 100 MILLIEQUIVALENT(S): at 00:50

## 2023-11-25 RX ADMIN — HEPARIN SODIUM 5000 UNIT(S): 5000 INJECTION INTRAVENOUS; SUBCUTANEOUS at 06:22

## 2023-11-25 RX ADMIN — CHLORHEXIDINE GLUCONATE 1 APPLICATION(S): 213 SOLUTION TOPICAL at 12:07

## 2023-11-25 RX ADMIN — SODIUM CHLORIDE 100 MILLILITER(S): 9 INJECTION INTRAMUSCULAR; INTRAVENOUS; SUBCUTANEOUS at 22:21

## 2023-11-25 RX ADMIN — MOXIFLOXACIN HYDROCHLORIDE TABLETS, 400 MG 250 MILLIGRAM(S): 400 TABLET, FILM COATED ORAL at 01:31

## 2023-11-25 RX ADMIN — NAFCILLIN 200 GRAM(S): 10 INJECTION, POWDER, FOR SOLUTION INTRAVENOUS at 17:59

## 2023-11-25 RX ADMIN — Medication 975 MILLIGRAM(S): at 12:02

## 2023-11-25 RX ADMIN — POTASSIUM PHOSPHATE, MONOBASIC POTASSIUM PHOSPHATE, DIBASIC 83.33 MILLIMOLE(S): 236; 224 INJECTION, SOLUTION INTRAVENOUS at 15:57

## 2023-11-25 RX ADMIN — NAFCILLIN 200 GRAM(S): 10 INJECTION, POWDER, FOR SOLUTION INTRAVENOUS at 03:30

## 2023-11-25 RX ADMIN — Medication 975 MILLIGRAM(S): at 23:23

## 2023-11-25 RX ADMIN — Medication 975 MILLIGRAM(S): at 17:59

## 2023-11-25 RX ADMIN — LISINOPRIL 5 MILLIGRAM(S): 2.5 TABLET ORAL at 06:25

## 2023-11-25 RX ADMIN — TAMSULOSIN HYDROCHLORIDE 0.4 MILLIGRAM(S): 0.4 CAPSULE ORAL at 22:08

## 2023-11-25 RX ADMIN — Medication 2000 UNIT(S): at 12:03

## 2023-11-25 RX ADMIN — ATORVASTATIN CALCIUM 20 MILLIGRAM(S): 80 TABLET, FILM COATED ORAL at 22:08

## 2023-11-25 RX ADMIN — NAFCILLIN 200 GRAM(S): 10 INJECTION, POWDER, FOR SOLUTION INTRAVENOUS at 22:07

## 2023-11-25 RX ADMIN — Medication 975 MILLIGRAM(S): at 13:02

## 2023-11-25 RX ADMIN — PANTOPRAZOLE SODIUM 40 MILLIGRAM(S): 20 TABLET, DELAYED RELEASE ORAL at 14:52

## 2023-11-25 RX ADMIN — NAFCILLIN 200 GRAM(S): 10 INJECTION, POWDER, FOR SOLUTION INTRAVENOUS at 14:51

## 2023-11-25 RX ADMIN — Medication 975 MILLIGRAM(S): at 06:22

## 2023-11-25 RX ADMIN — HEPARIN SODIUM 5000 UNIT(S): 5000 INJECTION INTRAVENOUS; SUBCUTANEOUS at 22:08

## 2023-11-25 NOTE — PROGRESS NOTE ADULT - SUBJECTIVE AND OBJECTIVE BOX
Plastic Surgery    SUBJECTIVE: Pt seen and examined on rounds with team. No acute events overnight.      VITALS  T(C): 37.2 (11-25-23 @ 06:20), Max: 39 (11-24-23 @ 12:50)  HR: 100 (11-25-23 @ 06:20) (95 - 102)  BP: 128/58 (11-25-23 @ 06:20) (128/58 - 136/72)  RR: 18 (11-25-23 @ 06:20) (17 - 18)  SpO2: 100% (11-25-23 @ 06:20) (96% - 100%)  CAPILLARY BLOOD GLUCOSE      POCT Blood Glucose.: 94 mg/dL (25 Nov 2023 06:48)  POCT Blood Glucose.: 107 mg/dL (25 Nov 2023 01:12)  POCT Blood Glucose.: 96 mg/dL (24 Nov 2023 18:45)  POCT Blood Glucose.: 131 mg/dL (24 Nov 2023 12:41)      Is/Os      PHYSICAL EXAM:   General: NAD, Lying in bed   Neuro: Awake and alert  Pulm: non-labored respirations  Back: Soft, flat, no palpable collection. No surrounding erythema or signs of infection. Incisional vac holding suction; no drainage or leaks. Vac canister without visible output.     MEDICATIONS (STANDING): acetaminophen     Tablet .. 975 milliGRAM(s) Oral every 6 hours  atorvastatin 20 milliGRAM(s) Oral at bedtime  bicalutamide 50 milliGRAM(s) Oral daily  cholecalciferol 2000 Unit(s) Oral daily  dextrose 5%. 1000 milliLiter(s) IV Continuous <Continuous>  dextrose 5%. 1000 milliLiter(s) IV Continuous <Continuous>  dextrose 50% Injectable 12.5 Gram(s) IV Push once  dextrose 50% Injectable 25 Gram(s) IV Push once  dextrose 50% Injectable 25 Gram(s) IV Push once  fluconAZOLE IVPB      fluconAZOLE IVPB 200 milliGRAM(s) IV Intermittent every 24 hours  glucagon  Injectable 1 milliGRAM(s) IntraMuscular once  heparin   Injectable 5000 Unit(s) SubCutaneous every 8 hours  lisinopril 5 milliGRAM(s) Oral daily  moxifloxacin  IVPB 400 milliGRAM(s) IV Intermittent every 24 hours  nafcillin  IVPB 2 Gram(s) IV Intermittent every 4 hours  pantoprazole  Injectable 40 milliGRAM(s) IV Push daily  sodium chloride 0.9%. 1000 milliLiter(s) IV Continuous <Continuous>  tamsulosin 0.4 milliGRAM(s) Oral at bedtime    MEDICATIONS (PRN):dextrose Oral Gel 15 Gram(s) Oral once PRN Blood Glucose LESS THAN 70 milliGRAM(s)/deciliter  guaiFENesin Oral Liquid (Sugar-Free) 200 milliGRAM(s) Oral every 6 hours PRN Cough  naloxone Injectable 0.1 milliGRAM(s) IV Push every 3 minutes PRN For ANY of the following changes in patient status:  A. RR LESS THAN 10 breaths per minute, B. Oxygen saturation LESS THAN 90%, C. Sedation score of 6  ondansetron Injectable 4 milliGRAM(s) IV Push every 6 hours PRN Nausea      LABS  CBC (11-24 @ 06:13)                              7.6<L>                         4.36    )----------------(  285        62.5  % Neutrophils, 27.1  % Lymphocytes, ANC: 2.73                                24.5<L>    BMP (11-25 @ 07:15)             137     |  102     |  8     		Ca++ --      Ca 7.6<L>             ---------------------------------( 104<H>		Mg 1.80               3.2<L>  |  22      |  0.77  			Ph 2.1<L>  BMP (11-24 @ 06:13)             138     |  102     |  9     		Ca++ --      Ca 7.8<L>             ---------------------------------( 107<H>		Mg 2.00               3.0<L>  |  24      |  0.82  			Ph 2.2<L>          ABG (11-24 @ 15:20)      /  /  /  /  / %     Lactate:  0.9        IMAGING STUDIES

## 2023-11-25 NOTE — PROGRESS NOTE ADULT - PROBLEM SELECTOR PLAN 5
Patient found to have b/l LE DVTs during this admission  - s/p IVC filter placed by IR on 11/1  - in setting of spinal surgery, patient to remain off AC due to risk of hematoma formation for at least 6 weeks since surgery (11/2)  - patient to also f/u with IR within 6-8 weeks to assess for IVC filter retrieval once contraindication to AC no longer exists Quality 226: Preventive Care And Screening: Tobacco Use: Screening And Cessation Intervention: Patient screened for tobacco use and is an ex/non-smoker Detail Level: Detailed

## 2023-11-25 NOTE — PROGRESS NOTE ADULT - ASSESSMENT
73y Male s/p C5-T3 posterior cervical fusion, C7-T1 decompression for metastatic prostate cancer with plastics closure on 11/2. Recovering well. Both drains removed. MRI 11/14 showing possible seroma in subcutaneous space. Incisional vac w/ black sponge/Adaptic started on 11/17/2023.     Plan:  - prevena wound vac to neck - to be left in place for 5 days   - F/U fever work up - RVP +adenovirus; UA now negative  - Monitor vac output  - Optimize nutrition, ensure adequate protein intake  - Remainder of care per primary team    Ricardo Weinsteinsh  Plastic Surgery  #[phone] [facility] pager  [phone] [facility] pager  Available on teams

## 2023-11-25 NOTE — PROCEDURE NOTE - ADDITIONAL PROCEDURE DETAILS
Patient requiring PIV access for medical management. Site prepped using aseptic technique. Under ultrasound guidance a vein in the R-upper arm was identified and cannulated using a 20G Acc[employer]WA [employer] Ace Intravascular Catheter. Flashback seen on needle insertion. Blood return present upon catheter deployment and upon aspiration using saline syringe plunger. Line flushes easily.     In a similar fashion, using aseptic technique, using ultrasound guidance a vein in L-upper arm was identified and cannulated using a 20G Angiocath catheter with positive flashback. Line flushes easily.     Catheters secured in place. Patient tolerated procedure well. No immediate complications. Nursing staff notified. Dressing to be changed per unit protocol.

## 2023-11-25 NOTE — PROGRESS NOTE ADULT - PROBLEM SELECTOR PLAN 1
Reviewed CT Neck/Chest.  Appreciate ENT eval - reviewed direct laryngoscope imaging on 11/22  - ENT suspects esophageal candida but also possible mass effect from recent C-spine surgery; however no airway compromise noted.  - discussed with Ortho - Dr. Badillo reviewed imaging - no indication for surgical intervention.  - Diflucan IV (11/22-)  - clinical improvement  - monitor clinical response - improving  - repeat S+S once pain is improved

## 2023-11-25 NOTE — PROGRESS NOTE ADULT - SUBJECTIVE AND OBJECTIVE BOX
[facility] Division of Hospital Medicine  Jesus Riojas MD  Pager (PARTHA-F, 5L-5P): [phone]  Other Times:  [phone]    Patient is a 73y old  Male who presents with a chief complaint of Diffuse Spinal Mets from Prostate Cancer (25 Nov 2023 08:47)    SUBJECTIVE / OVERNIGHT EVENTS:  Patient lost IV access - nursing staff and ACP staff trying to regain access.  Throat pain is improved. No F/C, N/V, CP, SOB, Cough, lightheadedness, dizziness, abdominal pain, diarrhea, dysuria.    MEDICATIONS  (STANDING):  acetaminophen     Tablet .. 975 milliGRAM(s) Oral every 6 hours  atorvastatin 20 milliGRAM(s) Oral at bedtime  bicalutamide 50 milliGRAM(s) Oral daily  chlorhexidine 2% Cloths 1 Application(s) Topical daily  cholecalciferol 2000 Unit(s) Oral daily  dextrose 5%. 1000 milliLiter(s) (50 mL/Hr) IV Continuous <Continuous>  dextrose 5%. 1000 milliLiter(s) (100 mL/Hr) IV Continuous <Continuous>  dextrose 50% Injectable 25 Gram(s) IV Push once  dextrose 50% Injectable 12.5 Gram(s) IV Push once  dextrose 50% Injectable 25 Gram(s) IV Push once  fluconAZOLE IVPB 200 milliGRAM(s) IV Intermittent every 24 hours  fluconAZOLE IVPB      glucagon  Injectable 1 milliGRAM(s) IntraMuscular once  heparin   Injectable 5000 Unit(s) SubCutaneous every 8 hours  lisinopril 5 milliGRAM(s) Oral daily  moxifloxacin  IVPB 400 milliGRAM(s) IV Intermittent every 24 hours  nafcillin  IVPB 2 Gram(s) IV Intermittent every 4 hours  pantoprazole  Injectable 40 milliGRAM(s) IV Push daily  potassium chloride  10 mEq/100 mL IVPB 10 milliEquivalent(s) IV Intermittent every 1 hour  potassium phosphate IVPB 30 milliMole(s) IV Intermittent once  sodium chloride 0.9%. 1000 milliLiter(s) (100 mL/Hr) IV Continuous <Continuous>  tamsulosin 0.4 milliGRAM(s) Oral at bedtime    MEDICATIONS  (PRN):  benzocaine/menthol Lozenge 1 Lozenge Oral every 6 hours PRN Sore Throat  dextrose Oral Gel 15 Gram(s) Oral once PRN Blood Glucose LESS THAN 70 milliGRAM(s)/deciliter  guaiFENesin Oral Liquid (Sugar-Free) 200 milliGRAM(s) Oral every 6 hours PRN Cough  naloxone Injectable 0.1 milliGRAM(s) IV Push every 3 minutes PRN For ANY of the following changes in patient status:  A. RR LESS THAN 10 breaths per minute, B. Oxygen saturation LESS THAN 90%, C. Sedation score of 6  ondansetron Injectable 4 milliGRAM(s) IV Push every 6 hours PRN Nausea      Vital Signs Last 24 Hrs  T(C): 37.3 (25 Nov 2023 10:30), Max: 37.5 (24 Nov 2023 20:00)  T(F): 99.1 (25 Nov 2023 10:30), Max: 99.5 (24 Nov 2023 20:00)  HR: 100 (25 Nov 2023 10:30) (95 - 100)  BP: 119/67 (25 Nov 2023 10:30) (119/67 - 136/72)  BP(mean): --  RR: 18 (25 Nov 2023 10:30) (17 - 18)  SpO2: 100% (25 Nov 2023 10:30) (98% - 100%)    Parameters below as of 25 Nov 2023 10:30  Patient On (Oxygen Delivery Method): nasal cannula  O2 Flow (L/min): 2    CAPILLARY BLOOD GLUCOSE      POCT Blood Glucose.: 115 mg/dL (25 Nov 2023 12:33)  POCT Blood Glucose.: 94 mg/dL (25 Nov 2023 06:48)  POCT Blood Glucose.: 107 mg/dL (25 Nov 2023 01:12)  POCT Blood Glucose.: 96 mg/dL (24 Nov 2023 18:45)    I&O's Summary      PHYSICAL EXAM:  CONSTITUTIONAL: NAD  EYES: PERRLA; conjunctiva and sclera clear  ENMT: Moist oral mucosa, no pharyngeal injection or exudates; normal dentition; no obvious plaques noted.  NECK: Supple, no palpable masses; no thyromegaly  RESPIRATORY: Normal respiratory effort; lungs are clear to auscultation bilaterally  CARDIOVASCULAR: Regular rate and rhythm, normal S1 and S2, no murmur/rub/gallop; No lower extremity edema; Peripheral pulses are 2+ bilaterally  ABDOMEN: Nontender to palpation, normoactive bowel sounds, no rebound/guarding; No hepatosplenomegaly  MUSCULOSKELETAL:  Did not assess gait; no clubbing or cyanosis of digits; no joint swelling or tenderness to palpation  PSYCH: A+O to person, place, and time; affect appropriate  NEUROLOGY: CN 2-12 are intact and symmetric; no gross sensory deficits   SKIN: Woundvac in place on his back surgical site - C/D/I    LABS:                        7.0    5.32  )-----------( 348      ( 25 Nov 2023 07:15 )             21.9     11-25    137  |  102  |  8   ----------------------------<  104<H>  3.2<L>   |  22  |  0.77    Ca    7.6<L>      25 Nov 2023 07:15  Phos  2.1     11-25  Mg     1.80     11-25            Urinalysis Basic - ( 25 Nov 2023 07:15 )    Color: x / Appearance: x / SG: x / pH: x  Gluc: 104 mg/dL / Ketone: x  / Bili: x / Urobili: x   Blood: x / Protein: x / Nitrite: x   Leuk Esterase: x / RBC: x / WBC x   Sq Epi: x / Non Sq Epi: x / Bacteria: x        RADIOLOGY & ADDITIONAL TESTS:    Imaging Personally Reviewed:    Care Discussed with Consultants/Other Providers:

## 2023-11-25 NOTE — PROGRESS NOTE ADULT - ASSESSMENT
73M DM2, HTN, Prostate CA p/w spinal cord compression from metastasis s/p C5-T3 Fusion, C7-T1 decompression c/b MSSA bacteremia from surgical site infection, B/L LE DVT s/p IVC filter 11/1 by IR, candida esophagitis w/ dysphagia, acute on chronic anemia.

## 2023-11-26 DIAGNOSIS — J69.0 PNEUMONITIS DUE TO INHALATION OF FOOD AND VOMIT: ICD-10-CM

## 2023-11-26 LAB
ANION GAP SERPL CALC-SCNC: 12 MMOL/L — SIGNIFICANT CHANGE UP (ref 7–14)
ANION GAP SERPL CALC-SCNC: 12 MMOL/L — SIGNIFICANT CHANGE UP (ref 7–14)
BASOPHILS # BLD AUTO: 0.01 K/UL — SIGNIFICANT CHANGE UP (ref 0–0.2)
BASOPHILS # BLD AUTO: 0.01 K/UL — SIGNIFICANT CHANGE UP (ref 0–0.2)
BASOPHILS NFR BLD AUTO: 0.2 % — SIGNIFICANT CHANGE UP (ref 0–2)
BASOPHILS NFR BLD AUTO: 0.2 % — SIGNIFICANT CHANGE UP (ref 0–2)
BUN SERPL-MCNC: 8 MG/DL — SIGNIFICANT CHANGE UP (ref 7–23)
BUN SERPL-MCNC: 8 MG/DL — SIGNIFICANT CHANGE UP (ref 7–23)
CALCIUM SERPL-MCNC: 7.3 MG/DL — LOW (ref 8.4–10.5)
CALCIUM SERPL-MCNC: 7.3 MG/DL — LOW (ref 8.4–10.5)
CHLORIDE SERPL-SCNC: 104 MMOL/L — SIGNIFICANT CHANGE UP (ref 98–107)
CHLORIDE SERPL-SCNC: 104 MMOL/L — SIGNIFICANT CHANGE UP (ref 98–107)
CO2 SERPL-SCNC: 23 MMOL/L — SIGNIFICANT CHANGE UP (ref 22–31)
CO2 SERPL-SCNC: 23 MMOL/L — SIGNIFICANT CHANGE UP (ref 22–31)
CREAT SERPL-MCNC: 0.86 MG/DL — SIGNIFICANT CHANGE UP (ref 0.5–1.3)
CREAT SERPL-MCNC: 0.86 MG/DL — SIGNIFICANT CHANGE UP (ref 0.5–1.3)
EGFR: 93 ML/MIN/1.73M2 — SIGNIFICANT CHANGE UP
EGFR: 93 ML/MIN/1.73M2 — SIGNIFICANT CHANGE UP
EOSINOPHIL # BLD AUTO: 0.06 K/UL — SIGNIFICANT CHANGE UP (ref 0–0.5)
EOSINOPHIL # BLD AUTO: 0.06 K/UL — SIGNIFICANT CHANGE UP (ref 0–0.5)
EOSINOPHIL NFR BLD AUTO: 1.2 % — SIGNIFICANT CHANGE UP (ref 0–6)
EOSINOPHIL NFR BLD AUTO: 1.2 % — SIGNIFICANT CHANGE UP (ref 0–6)
GLUCOSE SERPL-MCNC: 134 MG/DL — HIGH (ref 70–99)
GLUCOSE SERPL-MCNC: 134 MG/DL — HIGH (ref 70–99)
HCT VFR BLD CALC: 23.5 % — LOW (ref 39–50)
HCT VFR BLD CALC: 23.5 % — LOW (ref 39–50)
HGB BLD-MCNC: 7.5 G/DL — LOW (ref 13–17)
HGB BLD-MCNC: 7.5 G/DL — LOW (ref 13–17)
IANC: 3.5 K/UL — SIGNIFICANT CHANGE UP (ref 1.8–7.4)
IANC: 3.5 K/UL — SIGNIFICANT CHANGE UP (ref 1.8–7.4)
IMM GRANULOCYTES NFR BLD AUTO: 3.7 % — HIGH (ref 0–0.9)
IMM GRANULOCYTES NFR BLD AUTO: 3.7 % — HIGH (ref 0–0.9)
LYMPHOCYTES # BLD AUTO: 1.08 K/UL — SIGNIFICANT CHANGE UP (ref 1–3.3)
LYMPHOCYTES # BLD AUTO: 1.08 K/UL — SIGNIFICANT CHANGE UP (ref 1–3.3)
LYMPHOCYTES # BLD AUTO: 21.1 % — SIGNIFICANT CHANGE UP (ref 13–44)
LYMPHOCYTES # BLD AUTO: 21.1 % — SIGNIFICANT CHANGE UP (ref 13–44)
MAGNESIUM SERPL-MCNC: 1.7 MG/DL — SIGNIFICANT CHANGE UP (ref 1.6–2.6)
MAGNESIUM SERPL-MCNC: 1.7 MG/DL — SIGNIFICANT CHANGE UP (ref 1.6–2.6)
MCHC RBC-ENTMCNC: 30.4 PG — SIGNIFICANT CHANGE UP (ref 27–34)
MCHC RBC-ENTMCNC: 30.4 PG — SIGNIFICANT CHANGE UP (ref 27–34)
MCHC RBC-ENTMCNC: 31.9 GM/DL — LOW (ref 32–36)
MCHC RBC-ENTMCNC: 31.9 GM/DL — LOW (ref 32–36)
MCV RBC AUTO: 95.1 FL — SIGNIFICANT CHANGE UP (ref 80–100)
MCV RBC AUTO: 95.1 FL — SIGNIFICANT CHANGE UP (ref 80–100)
MONOCYTES # BLD AUTO: 0.28 K/UL — SIGNIFICANT CHANGE UP (ref 0–0.9)
MONOCYTES # BLD AUTO: 0.28 K/UL — SIGNIFICANT CHANGE UP (ref 0–0.9)
MONOCYTES NFR BLD AUTO: 5.5 % — SIGNIFICANT CHANGE UP (ref 2–14)
MONOCYTES NFR BLD AUTO: 5.5 % — SIGNIFICANT CHANGE UP (ref 2–14)
NEUTROPHILS # BLD AUTO: 3.5 K/UL — SIGNIFICANT CHANGE UP (ref 1.8–7.4)
NEUTROPHILS # BLD AUTO: 3.5 K/UL — SIGNIFICANT CHANGE UP (ref 1.8–7.4)
NEUTROPHILS NFR BLD AUTO: 68.3 % — SIGNIFICANT CHANGE UP (ref 43–77)
NEUTROPHILS NFR BLD AUTO: 68.3 % — SIGNIFICANT CHANGE UP (ref 43–77)
NRBC # BLD: 1 /100 WBCS — HIGH (ref 0–0)
NRBC # BLD: 1 /100 WBCS — HIGH (ref 0–0)
NRBC # FLD: 0.05 K/UL — HIGH (ref 0–0)
NRBC # FLD: 0.05 K/UL — HIGH (ref 0–0)
PHOSPHATE SERPL-MCNC: 2.1 MG/DL — LOW (ref 2.5–4.5)
PHOSPHATE SERPL-MCNC: 2.1 MG/DL — LOW (ref 2.5–4.5)
PLATELET # BLD AUTO: 341 K/UL — SIGNIFICANT CHANGE UP (ref 150–400)
PLATELET # BLD AUTO: 341 K/UL — SIGNIFICANT CHANGE UP (ref 150–400)
POTASSIUM SERPL-MCNC: 3 MMOL/L — LOW (ref 3.5–5.3)
POTASSIUM SERPL-MCNC: 3 MMOL/L — LOW (ref 3.5–5.3)
POTASSIUM SERPL-SCNC: 3 MMOL/L — LOW (ref 3.5–5.3)
POTASSIUM SERPL-SCNC: 3 MMOL/L — LOW (ref 3.5–5.3)
RBC # BLD: 2.47 M/UL — LOW (ref 4.2–5.8)
RBC # BLD: 2.47 M/UL — LOW (ref 4.2–5.8)
RBC # FLD: 17.7 % — HIGH (ref 10.3–14.5)
RBC # FLD: 17.7 % — HIGH (ref 10.3–14.5)
SODIUM SERPL-SCNC: 139 MMOL/L — SIGNIFICANT CHANGE UP (ref 135–145)
SODIUM SERPL-SCNC: 139 MMOL/L — SIGNIFICANT CHANGE UP (ref 135–145)
WBC # BLD: 5.12 K/UL — SIGNIFICANT CHANGE UP (ref 3.8–10.5)
WBC # BLD: 5.12 K/UL — SIGNIFICANT CHANGE UP (ref 3.8–10.5)
WBC # FLD AUTO: 5.12 K/UL — SIGNIFICANT CHANGE UP (ref 3.8–10.5)
WBC # FLD AUTO: 5.12 K/UL — SIGNIFICANT CHANGE UP (ref 3.8–10.5)

## 2023-11-26 PROCEDURE — 99233 SBSQ HOSP IP/OBS HIGH 50: CPT

## 2023-11-26 PROCEDURE — 99232 SBSQ HOSP IP/OBS MODERATE 35: CPT

## 2023-11-26 RX ORDER — ACETAMINOPHEN 500 MG
1000 TABLET ORAL ONCE
Refills: 0 | Status: COMPLETED | OUTPATIENT
Start: 2023-11-26 | End: 2023-11-26

## 2023-11-26 RX ORDER — POTASSIUM CHLORIDE 20 MEQ
10 PACKET (EA) ORAL
Refills: 0 | Status: COMPLETED | OUTPATIENT
Start: 2023-11-26 | End: 2023-11-26

## 2023-11-26 RX ORDER — LACTOBACILLUS ACIDOPHILUS 100MM CELL
1 CAPSULE ORAL DAILY
Refills: 0 | Status: DISCONTINUED | OUTPATIENT
Start: 2023-11-26 | End: 2023-12-23

## 2023-11-26 RX ADMIN — Medication 100 MILLIEQUIVALENT(S): at 10:56

## 2023-11-26 RX ADMIN — SODIUM CHLORIDE 100 MILLILITER(S): 9 INJECTION INTRAMUSCULAR; INTRAVENOUS; SUBCUTANEOUS at 05:15

## 2023-11-26 RX ADMIN — FLUCONAZOLE 100 MILLIGRAM(S): 150 TABLET ORAL at 15:02

## 2023-11-26 RX ADMIN — NAFCILLIN 200 GRAM(S): 10 INJECTION, POWDER, FOR SOLUTION INTRAVENOUS at 02:07

## 2023-11-26 RX ADMIN — Medication 975 MILLIGRAM(S): at 13:45

## 2023-11-26 RX ADMIN — NAFCILLIN 200 GRAM(S): 10 INJECTION, POWDER, FOR SOLUTION INTRAVENOUS at 05:14

## 2023-11-26 RX ADMIN — MOXIFLOXACIN HYDROCHLORIDE TABLETS, 400 MG 250 MILLIGRAM(S): 400 TABLET, FILM COATED ORAL at 00:36

## 2023-11-26 RX ADMIN — Medication 100 MILLIEQUIVALENT(S): at 09:01

## 2023-11-26 RX ADMIN — HEPARIN SODIUM 5000 UNIT(S): 5000 INJECTION INTRAVENOUS; SUBCUTANEOUS at 12:56

## 2023-11-26 RX ADMIN — Medication 1 TABLET(S): at 12:57

## 2023-11-26 RX ADMIN — Medication 100 MILLIEQUIVALENT(S): at 09:56

## 2023-11-26 RX ADMIN — CHLORHEXIDINE GLUCONATE 1 APPLICATION(S): 213 SOLUTION TOPICAL at 12:56

## 2023-11-26 RX ADMIN — NAFCILLIN 200 GRAM(S): 10 INJECTION, POWDER, FOR SOLUTION INTRAVENOUS at 13:02

## 2023-11-26 RX ADMIN — NAFCILLIN 200 GRAM(S): 10 INJECTION, POWDER, FOR SOLUTION INTRAVENOUS at 22:12

## 2023-11-26 RX ADMIN — NAFCILLIN 200 GRAM(S): 10 INJECTION, POWDER, FOR SOLUTION INTRAVENOUS at 08:07

## 2023-11-26 RX ADMIN — HEPARIN SODIUM 5000 UNIT(S): 5000 INJECTION INTRAVENOUS; SUBCUTANEOUS at 22:12

## 2023-11-26 RX ADMIN — Medication 975 MILLIGRAM(S): at 17:01

## 2023-11-26 RX ADMIN — Medication 975 MILLIGRAM(S): at 18:00

## 2023-11-26 RX ADMIN — ATORVASTATIN CALCIUM 20 MILLIGRAM(S): 80 TABLET, FILM COATED ORAL at 22:07

## 2023-11-26 RX ADMIN — Medication 2000 UNIT(S): at 12:58

## 2023-11-26 RX ADMIN — HEPARIN SODIUM 5000 UNIT(S): 5000 INJECTION INTRAVENOUS; SUBCUTANEOUS at 05:15

## 2023-11-26 RX ADMIN — BICALUTAMIDE 50 MILLIGRAM(S): 50 TABLET, FILM COATED ORAL at 12:57

## 2023-11-26 RX ADMIN — Medication 400 MILLIGRAM(S): at 09:24

## 2023-11-26 RX ADMIN — TAMSULOSIN HYDROCHLORIDE 0.4 MILLIGRAM(S): 0.4 CAPSULE ORAL at 22:09

## 2023-11-26 RX ADMIN — Medication 975 MILLIGRAM(S): at 05:15

## 2023-11-26 RX ADMIN — Medication 1000 MILLIGRAM(S): at 10:00

## 2023-11-26 RX ADMIN — NAFCILLIN 200 GRAM(S): 10 INJECTION, POWDER, FOR SOLUTION INTRAVENOUS at 16:12

## 2023-11-26 RX ADMIN — PANTOPRAZOLE SODIUM 40 MILLIGRAM(S): 20 TABLET, DELAYED RELEASE ORAL at 12:58

## 2023-11-26 RX ADMIN — Medication 975 MILLIGRAM(S): at 12:56

## 2023-11-26 RX ADMIN — LISINOPRIL 5 MILLIGRAM(S): 2.5 TABLET ORAL at 05:15

## 2023-11-26 NOTE — PROGRESS NOTE ADULT - SUBJECTIVE AND OBJECTIVE BOX
Plastic Surgery    SUBJECTIVE: Pt seen and examined on rounds with team. No acute events overnight.  Prevena holding suction. Pending takedown tomorrow.     VITALS  T(C): 38.1 (11-26-23 @ 09:00), Max: 38.5 (11-26-23 @ 05:00)  HR: 97 (11-26-23 @ 08:00) (94 - 104)  BP: 143/72 (11-26-23 @ 08:00) (120/74 - 143/72)  RR: 18 (11-26-23 @ 08:00) (16 - 18)  SpO2: 95% (11-26-23 @ 08:00) (95% - 100%)  CAPILLARY BLOOD GLUCOSE      POCT Blood Glucose.: 132 mg/dL (26 Nov 2023 07:13)  POCT Blood Glucose.: 142 mg/dL (26 Nov 2023 00:38)  POCT Blood Glucose.: 145 mg/dL (25 Nov 2023 18:30)  POCT Blood Glucose.: 115 mg/dL (25 Nov 2023 12:33)      Is/Os      PHYSICAL EXAM:   General: NAD, Lying in bed   Neuro: Awake and alert  Pulm: non-labored respirations  Back: Soft, flat, no palpable collection. No surrounding erythema or signs of infection. Incisional vac holding suction; no drainage or leaks. Vac canister without visible output.       MEDICATIONS (STANDING): acetaminophen     Tablet .. 975 milliGRAM(s) Oral every 6 hours  atorvastatin 20 milliGRAM(s) Oral at bedtime  bicalutamide 50 milliGRAM(s) Oral daily  cholecalciferol 2000 Unit(s) Oral daily  dextrose 5%. 1000 milliLiter(s) IV Continuous <Continuous>  dextrose 5%. 1000 milliLiter(s) IV Continuous <Continuous>  dextrose 50% Injectable 25 Gram(s) IV Push once  dextrose 50% Injectable 12.5 Gram(s) IV Push once  dextrose 50% Injectable 25 Gram(s) IV Push once  fluconAZOLE IVPB      fluconAZOLE IVPB 200 milliGRAM(s) IV Intermittent every 24 hours  glucagon  Injectable 1 milliGRAM(s) IntraMuscular once  heparin   Injectable 5000 Unit(s) SubCutaneous every 8 hours  lactobacillus acidophilus 1 Tablet(s) Oral daily  lisinopril 5 milliGRAM(s) Oral daily  moxifloxacin  IVPB 400 milliGRAM(s) IV Intermittent every 24 hours  nafcillin  IVPB 2 Gram(s) IV Intermittent every 4 hours  pantoprazole  Injectable 40 milliGRAM(s) IV Push daily  potassium chloride  10 mEq/100 mL IVPB 10 milliEquivalent(s) IV Intermittent every 1 hour  sodium chloride 0.9%. 1000 milliLiter(s) IV Continuous <Continuous>  tamsulosin 0.4 milliGRAM(s) Oral at bedtime    MEDICATIONS (PRN):dextrose Oral Gel 15 Gram(s) Oral once PRN Blood Glucose LESS THAN 70 milliGRAM(s)/deciliter  guaiFENesin Oral Liquid (Sugar-Free) 200 milliGRAM(s) Oral every 6 hours PRN Cough  naloxone Injectable 0.1 milliGRAM(s) IV Push every 3 minutes PRN For ANY of the following changes in patient status:  A. RR LESS THAN 10 breaths per minute, B. Oxygen saturation LESS THAN 90%, C. Sedation score of 6  ondansetron Injectable 4 milliGRAM(s) IV Push every 6 hours PRN Nausea      LABS  CBC (11-26 @ 06:38)                              7.5<L>                         5.12    )----------------(  341        68.3  % Neutrophils, 21.1  % Lymphocytes, ANC: 3.50                                23.5<L>  CBC (11-25 @ 20:11)                              6.4<LL>                         5.19    )----------------(  348        76.3  % Neutrophils, 10.5<L>% Lymphocytes, ANC: 4.05                                20.2<LL>    BMP (11-26 @ 06:38)             139     |  104     |  8     		Ca++ --      Ca 7.3<L>             ---------------------------------( 134<H>		Mg 1.70               3.0<L>  |  23      |  0.86  			Ph 2.1<L>  BMP (11-25 @ 07:15)             137     |  102     |  8     		Ca++ --      Ca 7.6<L>             ---------------------------------( 104<H>		Mg 1.80               3.2<L>  |  22      |  0.77  			Ph 2.1<L>                IMAGING STUDIES

## 2023-11-26 NOTE — PROGRESS NOTE ADULT - SUBJECTIVE AND OBJECTIVE BOX
[facility] Division of Hospital Medicine  Jesus Riojas MD  Pager (PARTHA-AMBAR, 8U-5P): [phone]  Other Times:  [phone]    Patient is a 73y old  Male who presents with a chief complaint of Diffuse Spinal Mets from Prostate Cancer (26 Nov 2023 11:14)    SUBJECTIVE / OVERNIGHT EVENTS:  Patient still with fever yesterday.  No chills, N/V, CP, SOB, Cough, lightheadedness, dizziness, abdominal pain, diarrhea, dysuria.    MEDICATIONS  (STANDING):  acetaminophen     Tablet .. 975 milliGRAM(s) Oral every 6 hours  atorvastatin 20 milliGRAM(s) Oral at bedtime  bicalutamide 50 milliGRAM(s) Oral daily  chlorhexidine 2% Cloths 1 Application(s) Topical daily  cholecalciferol 2000 Unit(s) Oral daily  dextrose 5%. 1000 milliLiter(s) (50 mL/Hr) IV Continuous <Continuous>  dextrose 5%. 1000 milliLiter(s) (100 mL/Hr) IV Continuous <Continuous>  dextrose 50% Injectable 25 Gram(s) IV Push once  dextrose 50% Injectable 12.5 Gram(s) IV Push once  dextrose 50% Injectable 25 Gram(s) IV Push once  fluconAZOLE IVPB 200 milliGRAM(s) IV Intermittent every 24 hours  fluconAZOLE IVPB      glucagon  Injectable 1 milliGRAM(s) IntraMuscular once  heparin   Injectable 5000 Unit(s) SubCutaneous every 8 hours  lactobacillus acidophilus 1 Tablet(s) Oral daily  lisinopril 5 milliGRAM(s) Oral daily  moxifloxacin  IVPB 400 milliGRAM(s) IV Intermittent every 24 hours  nafcillin  IVPB 2 Gram(s) IV Intermittent every 4 hours  pantoprazole  Injectable 40 milliGRAM(s) IV Push daily  sodium chloride 0.9%. 1000 milliLiter(s) (100 mL/Hr) IV Continuous <Continuous>  tamsulosin 0.4 milliGRAM(s) Oral at bedtime    MEDICATIONS  (PRN):  benzocaine/menthol Lozenge 1 Lozenge Oral every 6 hours PRN Sore Throat  dextrose Oral Gel 15 Gram(s) Oral once PRN Blood Glucose LESS THAN 70 milliGRAM(s)/deciliter  guaiFENesin Oral Liquid (Sugar-Free) 200 milliGRAM(s) Oral every 6 hours PRN Cough  naloxone Injectable 0.1 milliGRAM(s) IV Push every 3 minutes PRN For ANY of the following changes in patient status:  A. RR LESS THAN 10 breaths per minute, B. Oxygen saturation LESS THAN 90%, C. Sedation score of 6  ondansetron Injectable 4 milliGRAM(s) IV Push every 6 hours PRN Nausea      Vital Signs Last 24 Hrs  T(C): 37.3 (26 Nov 2023 12:21), Max: 38.5 (26 Nov 2023 05:00)  T(F): 99.2 (26 Nov 2023 12:21), Max: 101.3 (26 Nov 2023 05:00)  HR: 93 (26 Nov 2023 12:21) (93 - 104)  BP: 141/79 (26 Nov 2023 12:21) (120/74 - 143/72)  BP(mean): --  RR: 17 (26 Nov 2023 12:21) (16 - 18)  SpO2: 97% (26 Nov 2023 12:21) (95% - 100%)    Parameters below as of 26 Nov 2023 12:21  Patient On (Oxygen Delivery Method): nasal cannula      CAPILLARY BLOOD GLUCOSE      POCT Blood Glucose.: 117 mg/dL (26 Nov 2023 12:57)  POCT Blood Glucose.: 132 mg/dL (26 Nov 2023 07:13)  POCT Blood Glucose.: 142 mg/dL (26 Nov 2023 00:38)  POCT Blood Glucose.: 145 mg/dL (25 Nov 2023 18:30)    I&O's Summary      PHYSICAL EXAM:  CONSTITUTIONAL: NAD  EYES: PERRLA; conjunctiva and sclera clear  ENMT: Moist oral mucosa, no pharyngeal injection or exudates; normal dentition; no obvious plaques noted.  NECK: Supple, no palpable masses; no thyromegaly  RESPIRATORY: Normal respiratory effort; lungs are clear to auscultation bilaterally  CARDIOVASCULAR: Regular rate and rhythm, normal S1 and S2, no murmur/rub/gallop; No lower extremity edema; Peripheral pulses are 2+ bilaterally  ABDOMEN: Nontender to palpation, normoactive bowel sounds, no rebound/guarding; No hepatosplenomegaly  MUSCULOSKELETAL:  Did not assess gait; no clubbing or cyanosis of digits; no joint swelling or tenderness to palpation  PSYCH: A+O to person, place, and time; affect appropriate  NEUROLOGY: CN 2-12 are intact and symmetric; no gross sensory deficits   SKIN: Woundvac in place on his back surgical site - C/D/I    LABS:                        7.5    5.12  )-----------( 341      ( 26 Nov 2023 06:38 )             23.5     11-26    139  |  104  |  8   ----------------------------<  134<H>  3.0<L>   |  23  |  0.86    Ca    7.3<L>      26 Nov 2023 06:38  Phos  2.1     11-26  Mg     1.70     11-26            Urinalysis Basic - ( 26 Nov 2023 06:38 )    Color: x / Appearance: x / SG: x / pH: x  Gluc: 134 mg/dL / Ketone: x  / Bili: x / Urobili: x   Blood: x / Protein: x / Nitrite: x   Leuk Esterase: x / RBC: x / WBC x   Sq Epi: x / Non Sq Epi: x / Bacteria: x        RADIOLOGY & ADDITIONAL TESTS:    Imaging Personally Reviewed:    Care Discussed with Consultants/Other Providers:

## 2023-11-26 NOTE — DIETITIAN NUTRITION RISK NOTIFICATION - TREATMENT: THE FOLLOWING DIET HAS BEEN RECOMMENDED
Diet, NPO:   Except Medications (11-22-23 @ 14:56) [Active]  Diet, Regular (11-02-23 @ 19:48) [Hold]

## 2023-11-26 NOTE — PROGRESS NOTE ADULT - ASSESSMENT
73M DM2, HTN, Prostate CA p/w spinal cord compression from metastasis s/p C5-T3 Fusion, C7-T1 decompression c/b MSSA bacteremia from surgical site infection, B/L LE DVT s/p IVC filter 11/1 by IR, aspiration PNA, candida esophagitis w/ dysphagia, acute on chronic anemia.

## 2023-11-26 NOTE — PROGRESS NOTE ADULT - PROBLEM SELECTOR PLAN 1
c/b dysphagia and aspiration PNA  Reviewed CT Neck/Chest.  Appreciate ENT eval - reviewed direct laryngoscope imaging on 11/22  - ENT suspects esophageal candida but also possible mass effect from recent C-spine surgery; however no airway compromise noted.  - discussed with Ortho - Dr. Badillo reviewed imaging - no indication for surgical intervention.  - Diflucan IV (11/22-)  - with clinical improvement  - S+S eval pending

## 2023-11-26 NOTE — PROGRESS NOTE ADULT - ASSESSMENT
73y Male s/p C5-T3 posterior cervical fusion, C7-T1 decompression for metastatic prostate cancer with plastics closure on 11/2. Recovering well. Both drains removed. MRI 11/14 showing possible seroma in subcutaneous space. Incisional vac w/ black sponge/Adaptic started on 11/17/2023.     Plan:  - prevena wound vac to neck - to be removed tomorrow 11/27  - F/U fever work up - RVP +adenovirus; UA now negative  - Monitor vac output  - Optimize nutrition, ensure adequate protein intake  - Remainder of care per primary team    Ricardo Weinsteinsh  Plastic Surgery  #[phone] [facility] pager  [phone] [facility] pager  Available on teams

## 2023-11-26 NOTE — PROGRESS NOTE ADULT - SUBJECTIVE AND OBJECTIVE BOX
Follow Up:    Fever    Interval History/ROS:  Tmax 101.3 ON. MRI still pending. Patient was seen and examined at bedside. +fever; neck pain improving. x1 diarrhea today.    Allergies  No Known Allergies        ANTIMICROBIALS:  fluconAZOLE IVPB    fluconAZOLE IVPB 200 every 24 hours  moxifloxacin  IVPB 400 every 24 hours  nafcillin  IVPB 2 every 4 hours      OTHER MEDS:  MEDICATIONS  (STANDING):  acetaminophen     Tablet .. 975 every 6 hours  atorvastatin 20 at bedtime  bicalutamide 50 daily  dextrose 50% Injectable 12.5 once  dextrose 50% Injectable 25 once  dextrose 50% Injectable 25 once  dextrose Oral Gel 15 once PRN  glucagon  Injectable 1 once  guaiFENesin Oral Liquid (Sugar-Free) 200 every 6 hours PRN  heparin   Injectable 5000 every 8 hours  lisinopril 5 daily  ondansetron Injectable 4 every 6 hours PRN  pantoprazole  Injectable 40 daily  tamsulosin 0.4 at bedtime      Vital Signs Last 24 Hrs  T(C): 37.7 (26 Nov 2023 10:00), Max: 38.5 (26 Nov 2023 05:00)  T(F): 99.9 (26 Nov 2023 10:00), Max: 101.3 (26 Nov 2023 05:00)  HR: 97 (26 Nov 2023 08:00) (94 - 104)  BP: 143/72 (26 Nov 2023 08:00) (120/74 - 143/72)  BP(mean): --  RR: 18 (26 Nov 2023 08:00) (16 - 18)  SpO2: 95% (26 Nov 2023 08:00) (95% - 100%)    Parameters below as of 26 Nov 2023 08:00  Patient On (Oxygen Delivery Method): nasal cannula  O2 Flow (L/min): 2      PHYSICAL EXAM:  Constitutional: NAD at rest  ENT:  dorsal neck connected to vac, nontender at site  Cardiovascular:   normal S1, S2, no murmur, RRR  Respiratory:  clear BS bilaterally, no wheezes, no rales  Musculoskeletal:  neck supple  Psychiatric:  awake, alert, appropriate mood                                7.5    5.12  )-----------( 341      ( 26 Nov 2023 06:38 )             23.5       11-26    139  |  104  |  8   ----------------------------<  134<H>  3.0<L>   |  23  |  0.86    Ca    7.3<L>      26 Nov 2023 06:38  Phos  2.1     11-26  Mg     1.70     11-26        Urinalysis Basic - ( 26 Nov 2023 06:38 )    Color: x / Appearance: x / SG: x / pH: x  Gluc: 134 mg/dL / Ketone: x  / Bili: x / Urobili: x   Blood: x / Protein: x / Nitrite: x   Leuk Esterase: x / RBC: x / WBC x   Sq Epi: x / Non Sq Epi: x / Bacteria: x        MICROBIOLOGY:  v  .Blood Blood-Peripheral  11-24-23   No growth at 24 hours  --  --      .Blood Blood-Peripheral  11-24-23   No growth at 24 hours  --  --      .Blood Blood  11-18-23   No growth at 5 days  --  --      .Blood Blood  11-18-23   No growth at 5 days  --  --      .Blood Blood-Peripheral  11-17-23   No growth at 5 days  --  --      .Blood Blood-Venous  11-16-23   No growth at 5 days  --  --      .Blood Blood-Peripheral  11-16-23   No growth at 5 days  --  --      Clean Catch Clean Catch (Midstream)  11-15-23   >100,000 CFU/ml Providencia rettgeri  10,000 - 49,000 CFU/mL Enterococcus faecalis  --  Providencia rettgeri  Enterococcus faecalis      .Blood Blood-Peripheral  11-15-23   Growth in aerobic and anaerobic bottles: Staphylococcus aureus  See previous culture [id number]  --    Growth in aerobic and anaerobic bottles: Gram Positive Cocci in Clusters      .Blood Blood-Peripheral  11-15-23   Growth in aerobic and anaerobic bottles: Staphylococcus aureus  Direct identification is available within approximately 3-5  hours either by Blood Panel Multiplexed PCR or Direct  MALDI-TOF. Details: [email or web address]  --  Blood Culture PCR  Staphylococcus aureus          Rapid RVP Result: Detected (11-24 @ 16:08)        RADIOLOGY:  Imaging below independently reviewed.  < from: Xray Chest 1 View- PORTABLE-Urgent (Xray Chest 1 View- PORTABLE-Urgent .) (11.24.23 @ 14:46) >    ACC: [id number] EXAM:  XR CHEST PORTABLE URGENT 1V   ORDERED BY: EDDIE   AYON     PROCEDURE DATE:  11/24/2023          INTERPRETATION:  CLINICAL HISTORY: 73 year old Male with fever.    TECHNIQUE: AP frontal view of the chest    COMPARISON: Chest x-ray 11/15/2023.    FINDINGS:    Lines/Tubes: None.    Heart/Mediastinum: The cardiomediastinal silhouette is within normal   limits. There is tortuosity of the descending aorta.    Lungs/Pleura: Patchy airspace opacity in the left lung field which may   represent atelectasis versus consolidation. There are no pleural   effusions. No pulmonary vascular congestion. No pneumothorax.    Osseous Structures: Partially visualized lower cervical spinal fusion   hardware.      IMPRESSION:  Small patchy airspace opacity in the left lower lung field which may   represent atelectasis versus consolidation.    --- End of Report ---          WILFRED LARA MD; Resident Radiologist  This document has been electronically signed.  MARILY ASTORGA MD; Attending Radiologist  This document has been electronically signed. Nov 24 2023  3:15PM    < end of copied text >

## 2023-11-26 NOTE — CHART NOTE - NSCHARTNOTEFT_GEN_A_CORE
Reason for Follow-Up Assessment: Consult - Assessment and Education     11/22 SLP recommended NPO with consideration for short-term means of nutrition.    Source: [ ] Patient [ ] Family [ ] RN [ ] Chart     Diet, NPO:   Except Medications (11-22-23 @ 14:56)    GI: Fecal Incontinence  Last BM noted on [ ]    PO intake:  [ X ] Poor < 50%  [ ] Fair 50-75% [ ] Good  % [ ] Inconsistent PO intake    Anthropometrics:   Height (cm): 170.2 (11-02), 170.2 (10-22)  Weight (kg): 93.9 (11-02), 90.4 (10-22)  BMI (kg/m2): 32.4 (11-02), 31.2 (10-22)    Edema:  No edema noted     Pressure Injuries: No pressure injuries noted     _______________ Pertinent Medications_______________  MEDICATIONS  (STANDING):  acetaminophen     Tablet .. 975 milliGRAM(s) Oral every 6 hours  atorvastatin 20 milliGRAM(s) Oral at bedtime  bicalutamide 50 milliGRAM(s) Oral daily  chlorhexidine 2% Cloths 1 Application(s) Topical daily  cholecalciferol 2000 Unit(s) Oral daily  dextrose 5%. 1000 milliLiter(s) (50 mL/Hr) IV Continuous <Continuous>  dextrose 5%. 1000 milliLiter(s) (100 mL/Hr) IV Continuous <Continuous>  dextrose 50% Injectable 25 Gram(s) IV Push once  dextrose 50% Injectable 12.5 Gram(s) IV Push once  dextrose 50% Injectable 25 Gram(s) IV Push once  fluconAZOLE IVPB 200 milliGRAM(s) IV Intermittent every 24 hours  fluconAZOLE IVPB      glucagon  Injectable 1 milliGRAM(s) IntraMuscular once  heparin   Injectable 5000 Unit(s) SubCutaneous every 8 hours  lisinopril 5 milliGRAM(s) Oral daily  moxifloxacin  IVPB 400 milliGRAM(s) IV Intermittent every 24 hours  nafcillin  IVPB 2 Gram(s) IV Intermittent every 4 hours  pantoprazole  Injectable 40 milliGRAM(s) IV Push daily  potassium chloride  10 mEq/100 mL IVPB 10 milliEquivalent(s) IV Intermittent every 1 hour  sodium chloride 0.9%. 1000 milliLiter(s) (100 mL/Hr) IV Continuous <Continuous>  tamsulosin 0.4 milliGRAM(s) Oral at bedtime    MEDICATIONS  (PRN):  benzocaine/menthol Lozenge 1 Lozenge Oral every 6 hours PRN Sore Throat  dextrose Oral Gel 15 Gram(s) Oral once PRN Blood Glucose LESS THAN 70 milliGRAM(s)/deciliter  guaiFENesin Oral Liquid (Sugar-Free) 200 milliGRAM(s) Oral every 6 hours PRN Cough  naloxone Injectable 0.1 milliGRAM(s) IV Push every 3 minutes PRN For ANY of the following changes in patient status:  A. RR LESS THAN 10 breaths per minute, B. Oxygen saturation LESS THAN 90%, C. Sedation score of 6  ondansetron Injectable 4 milliGRAM(s) IV Push every 6 hours PRN Nausea      __________________ Pertinent Labs__________________   11-26 Na139 mmol/L Glu 134 mg/dL<H> K+ 3.0 mmol/L<L> Cr  0.86 mg/dL BUN 8 mg/dL 11-26 Phos 2.1 mg/dL<L> 11-23 Chol 124 mg/dL LDL --    HDL 22 mg/dL<L> Trig 174 mg/dL<H>    POCT Blood Glucose.: 132 mg/dL (11-26-23 @ 07:13)  POCT Blood Glucose.: 142 mg/dL (11-26-23 @ 00:38)  POCT Blood Glucose.: 145 mg/dL (11-25-23 @ 18:30)  POCT Blood Glucose.: 115 mg/dL (11-25-23 @ 12:33)  POCT Blood Glucose.: 94 mg/dL (11-25-23 @ 06:48)  POCT Blood Glucose.: 107 mg/dL (11-25-23 @ 01:12)  POCT Blood Glucose.: 96 mg/dL (11-24-23 @ 18:45)  POCT Blood Glucose.: 131 mg/dL (11-24-23 @ 12:41)  POCT Blood Glucose.: 118 mg/dL (11-24-23 @ 06:29)  POCT Blood Glucose.: 108 mg/dL (11-23-23 @ 23:12)  POCT Blood Glucose.: 106 mg/dL (11-23-23 @ 17:59)  POCT Blood Glucose.: 121 mg/dL (11-23-23 @ 12:22)      Estimated Needs:     [X] Recalculated - 67.2kg  30-35kcal/go=5834-4255sqld  1.2-1.5gm/kg=80-101gm protein     Previous Nutrition Diagnosis: Inadequate Oral Intake     Nutrition Diagnosis is [ ] ongoing  [ ] resolved [ ] not applicable     New Nutrition Diagnosis: n/a     Monitoring and Evaluation:     [ x ] Weight trends   [ x ] Pertinent labs    Recommendations:  1)   2) Obtain and record current weights to best monitor for acute changes in nutritional status.  3) Please consistently document % meal intake in nursing flowsheets. Reason for Follow-Up Assessment: Consult - Assessment and Education     11/25/23 Hospitalist Attending - 73M DM2, HTN, Prostate CA p/w spinal cord compression from metastasis s/p C5-T3 Fusion, C7-T1 decompression c/b MSSA bacteremia from surgical site infection, B/L LE DVT s/p IVC filter 11/1 by IR, candida esophagitis w/ dysphagia, acute on chronic anemia.    11/22 SLP recommended NPO with consideration for short-term means of nutrition. Patient NPO since 11/21 (5 days).  Weight also trending down. Spoke with PA - no plan for NGT placement at this time, current plan is to repeat S&S given patient receiving Diflucan for thrush.  Discussed if plan is to pursue EN, reconsult nutrition for appropriate recommendations.    Source: [ ] Patient [ ] Family [ ] RN [ X ] Chart     Diet, NPO:   Except Medications (11-22-23 @ 14:56)    GI: Fecal Incontinence  Last BM noted on [ 11/24 ]    PO intake:  [ X ] Poor < 50%  [ ] Fair 50-75% [ ] Good  % [ ] Inconsistent PO intake    Weight Hx: 11/22 - 87.2kg      11/7 - 94.6 kg      11/6 - 94.5kg      11/5 - 91.2kg      11/4 - 93.6kg      10/27 - 70kg          Edema: No edema currently noted - previously as much as 3+ edema noted over hospital course    Pressure Injuries: No pressure injuries noted     _______________ Pertinent Medications_______________  MEDICATIONS  (STANDING):  acetaminophen     Tablet .. 975 milliGRAM(s) Oral every 6 hours  atorvastatin 20 milliGRAM(s) Oral at bedtime  bicalutamide 50 milliGRAM(s) Oral daily  chlorhexidine 2% Cloths 1 Application(s) Topical daily  cholecalciferol 2000 Unit(s) Oral daily  dextrose 5%. 1000 milliLiter(s) (50 mL/Hr) IV Continuous <Continuous>  dextrose 5%. 1000 milliLiter(s) (100 mL/Hr) IV Continuous <Continuous>  dextrose 50% Injectable 25 Gram(s) IV Push once  dextrose 50% Injectable 12.5 Gram(s) IV Push once  dextrose 50% Injectable 25 Gram(s) IV Push once  fluconAZOLE IVPB 200 milliGRAM(s) IV Intermittent every 24 hours  fluconAZOLE IVPB      glucagon  Injectable 1 milliGRAM(s) IntraMuscular once  heparin   Injectable 5000 Unit(s) SubCutaneous every 8 hours  lisinopril 5 milliGRAM(s) Oral daily  moxifloxacin  IVPB 400 milliGRAM(s) IV Intermittent every 24 hours  nafcillin  IVPB 2 Gram(s) IV Intermittent every 4 hours  pantoprazole  Injectable 40 milliGRAM(s) IV Push daily  potassium chloride  10 mEq/100 mL IVPB 10 milliEquivalent(s) IV Intermittent every 1 hour  sodium chloride 0.9%. 1000 milliLiter(s) (100 mL/Hr) IV Continuous <Continuous>  tamsulosin 0.4 milliGRAM(s) Oral at bedtime    MEDICATIONS  (PRN):  benzocaine/menthol Lozenge 1 Lozenge Oral every 6 hours PRN Sore Throat  dextrose Oral Gel 15 Gram(s) Oral once PRN Blood Glucose LESS THAN 70 milliGRAM(s)/deciliter  guaiFENesin Oral Liquid (Sugar-Free) 200 milliGRAM(s) Oral every 6 hours PRN Cough  naloxone Injectable 0.1 milliGRAM(s) IV Push every 3 minutes PRN For ANY of the following changes in patient status:  A. RR LESS THAN 10 breaths per minute, B. Oxygen saturation LESS THAN 90%, C. Sedation score of 6  ondansetron Injectable 4 milliGRAM(s) IV Push every 6 hours PRN Nausea      __________________ Pertinent Labs__________________   11-26 Na139 mmol/L Glu 134 mg/dL<H> K+ 3.0 mmol/L<L> Cr  0.86 mg/dL BUN 8 mg/dL 11-26 Phos 2.1 mg/dL<L> 11-23 Chol 124 mg/dL LDL --    HDL 22 mg/dL<L> Trig 174 mg/dL<H>    POCT Blood Glucose.: 132 mg/dL (11-26-23 @ 07:13)  POCT Blood Glucose.: 142 mg/dL (11-26-23 @ 00:38)  POCT Blood Glucose.: 145 mg/dL (11-25-23 @ 18:30)  POCT Blood Glucose.: 115 mg/dL (11-25-23 @ 12:33)  POCT Blood Glucose.: 94 mg/dL (11-25-23 @ 06:48)  POCT Blood Glucose.: 107 mg/dL (11-25-23 @ 01:12)  POCT Blood Glucose.: 96 mg/dL (11-24-23 @ 18:45)  POCT Blood Glucose.: 131 mg/dL (11-24-23 @ 12:41)  POCT Blood Glucose.: 118 mg/dL (11-24-23 @ 06:29)  POCT Blood Glucose.: 108 mg/dL (11-23-23 @ 23:12)  POCT Blood Glucose.: 106 mg/dL (11-23-23 @ 17:59)  POCT Blood Glucose.: 121 mg/dL (11-23-23 @ 12:22)    Estimated Needs:     [X] Recalculated - 67.2kg  30-35kcal/vt=9868-2002kwwc  1.2-1.5gm/kg=80-101gm protein     Previous Nutrition Diagnosis: Inadequate Oral Intake     Nutrition Diagnosis is [ ] ongoing  [ ] resolved [ ] not applicable     New Nutrition Diagnosis:     Monitoring and Evaluation:     [ x ] Weight trends   [ x ] Pertinent labs    Recommendations:  1) Continue NPO pending repeat bedside swallow assessment - defer diet to physician / ACP  2) Obtain and record current weights to best monitor for acute changes in nutritional status.  3) Please consistently document % meal intake in nursing flowsheets.  4) Suggest goals of care discussion to establish nutritional plan of care should patient be unable to tolerate Po diet. Reconsult RDN as needed. Reason for Follow-Up Assessment: Consult - Assessment and Education     11/25/23 Hospitalist Attending - 73M DM2, HTN, Prostate CA p/w spinal cord compression from metastasis s/p C5-T3 Fusion, C7-T1 decompression c/b MSSA bacteremia from surgical site infection, B/L LE DVT s/p IVC filter 11/1 by IR, candida esophagitis w/ dysphagia, acute on chronic anemia.    On 11/22 SLP recommended NPO with consideration for short-term means of nutrition. Patient NPO since 11/21 (5 days). RD spoke with PA - no plan for NGT placement at this time, current plan is to repeat S&S to re-evaluate possibly resuming PO diet given patient has been receiving Diflucan for thrush.  Discussed with PA that if plan is to pursue EN, reconsult nutrition for appropriate recommendations.  Patient reported that he is feeling hungry, stated he is feeling better in terms of his thrush. Reviewed plan with patient as well and he verbalized understanding.    Source: [X ] Patient [ ] Family [ ] RN [ X ] Chart, PA    Diet, NPO:   Except Medications (11-22-23 @ 14:56)    GI: Fecal Incontinence  Last BM noted on [ 11/24 ]    PO intake:  [ X ] Poor < 50% due to NPO  [ ] Fair 50-75% [ ] Good  % [ ] Inconsistent PO intake    Weight Hx: 11/22 - 87.2kg      11/7 - 94.6 kg      11/6 - 94.5kg      11/5 - 91.2kg      11/4 - 93.6kg      10/27 - 70kg ? weight error  Patient reported usual body weight of 207 pounds (94kg) PTA  Patient with weight loss (6.4kg), 6.8% since 11/4, <1 month, likely multifactorial in setting of inadequate energy intake and improved edema         Edema: No edema currently noted - previously as much as 3+ edema noted over hospital course    Pressure Injuries: No pressure injuries noted     _______________ Pertinent Medications_______________  MEDICATIONS  (STANDING):  acetaminophen     Tablet .. 975 milliGRAM(s) Oral every 6 hours  atorvastatin 20 milliGRAM(s) Oral at bedtime  bicalutamide 50 milliGRAM(s) Oral daily  chlorhexidine 2% Cloths 1 Application(s) Topical daily  cholecalciferol 2000 Unit(s) Oral daily  dextrose 5%. 1000 milliLiter(s) (50 mL/Hr) IV Continuous <Continuous>  dextrose 5%. 1000 milliLiter(s) (100 mL/Hr) IV Continuous <Continuous>  dextrose 50% Injectable 25 Gram(s) IV Push once  dextrose 50% Injectable 12.5 Gram(s) IV Push once  dextrose 50% Injectable 25 Gram(s) IV Push once  fluconAZOLE IVPB 200 milliGRAM(s) IV Intermittent every 24 hours  fluconAZOLE IVPB      glucagon  Injectable 1 milliGRAM(s) IntraMuscular once  heparin   Injectable 5000 Unit(s) SubCutaneous every 8 hours  lisinopril 5 milliGRAM(s) Oral daily  moxifloxacin  IVPB 400 milliGRAM(s) IV Intermittent every 24 hours  nafcillin  IVPB 2 Gram(s) IV Intermittent every 4 hours  pantoprazole  Injectable 40 milliGRAM(s) IV Push daily  potassium chloride  10 mEq/100 mL IVPB 10 milliEquivalent(s) IV Intermittent every 1 hour  sodium chloride 0.9%. 1000 milliLiter(s) (100 mL/Hr) IV Continuous <Continuous>  tamsulosin 0.4 milliGRAM(s) Oral at bedtime    MEDICATIONS  (PRN):  benzocaine/menthol Lozenge 1 Lozenge Oral every 6 hours PRN Sore Throat  dextrose Oral Gel 15 Gram(s) Oral once PRN Blood Glucose LESS THAN 70 milliGRAM(s)/deciliter  guaiFENesin Oral Liquid (Sugar-Free) 200 milliGRAM(s) Oral every 6 hours PRN Cough  naloxone Injectable 0.1 milliGRAM(s) IV Push every 3 minutes PRN For ANY of the following changes in patient status:  A. RR LESS THAN 10 breaths per minute, B. Oxygen saturation LESS THAN 90%, C. Sedation score of 6  ondansetron Injectable 4 milliGRAM(s) IV Push every 6 hours PRN Nausea      __________________ Pertinent Labs__________________   11-26 Na139 mmol/L Glu 134 mg/dL<H> K+ 3.0 mmol/L<L> Cr  0.86 mg/dL BUN 8 mg/dL 11-26 Phos 2.1 mg/dL<L> 11-23 Chol 124 mg/dL LDL --    HDL 22 mg/dL<L> Trig 174 mg/dL<H>    POCT Blood Glucose.: 132 mg/dL (11-26-23 @ 07:13)  POCT Blood Glucose.: 142 mg/dL (11-26-23 @ 00:38)  POCT Blood Glucose.: 145 mg/dL (11-25-23 @ 18:30)  POCT Blood Glucose.: 115 mg/dL (11-25-23 @ 12:33)  POCT Blood Glucose.: 94 mg/dL (11-25-23 @ 06:48)  POCT Blood Glucose.: 107 mg/dL (11-25-23 @ 01:12)  POCT Blood Glucose.: 96 mg/dL (11-24-23 @ 18:45)  POCT Blood Glucose.: 131 mg/dL (11-24-23 @ 12:41)  POCT Blood Glucose.: 118 mg/dL (11-24-23 @ 06:29)  POCT Blood Glucose.: 108 mg/dL (11-23-23 @ 23:12)  POCT Blood Glucose.: 106 mg/dL (11-23-23 @ 17:59)  POCT Blood Glucose.: 121 mg/dL (11-23-23 @ 12:22)    Estimated Needs:     [X] Recalculated - 67.2kg - patient with increased needs in setting of metastatic illness (prostate CA with mets to bone)  30-35kcal/ze=4535-6508vhmg  1.2-1.5gm/kg=80-101gm protein     Previous Nutrition Diagnosis: Inadequate Oral Intake     Nutrition Diagnosis is [X ] ongoing  [ ] resolved [ ] not applicable     Patient now meets criteria for Severe Malnutrition in setting of acute illness (dysphagia, candida esophagitis)  as evidenced by wt loss 6.8% in <1 mo., caloric intake <50% nutrition needs >5 days.      Monitoring and Evaluation:   [ x ] Monitor for candidacy to upgrade diet vs. possibly initiate alternate means of nutrition  [ x ] Weight trends   [ x ] Pertinent labs    Recommendations:  1) Continue NPO statys pending repeat bedside swallow assessment/evaluation; if patient determined to be able to tolerate PO diet, suggest Low Sodium / Consistent Carb with Evening Snack, Alicia Jessica Glucose Control 3x day to optimize nutrition (thicken if needed to prescribed liquid consistency)  2) Obtain and record current weights to best monitor for acute changes in nutritional status.  3) If unable to tolerate PO diet, suggest alternate means of nutrition/hydration i.e. initiate EN if in accordance w goals of care / patient wishes; Reconsult RDN as needed.    Kiah Leong, MS, RDN, CDN  Pager [phone]  Also available on MS Teams Reason for Follow-Up Assessment: Consult - Assessment and Education     11/25/23 Hospitalist Attending - 73M DM2, HTN, Prostate CA p/w spinal cord compression from metastasis s/p C5-T3 Fusion, C7-T1 decompression c/b MSSA bacteremia from surgical site infection, B/L LE DVT s/p IVC filter 11/1 by IR, candida esophagitis w/ dysphagia, acute on chronic anemia.    On 11/22 SLP recommended NPO with consideration for short-term means of nutrition. Patient NPO since 11/21 (5 days). RD spoke with PA - no plan for NGT placement at this time, current plan is to repeat S&S to re-evaluate possibly resuming PO diet given patient has been receiving Diflucan for thrush.  Discussed with PA that if plan is to pursue EN, reconsult nutrition for appropriate recommendations.  Patient reported that he is feeling hungry, stated he is feeling better in terms of his thrush. Reviewed plan with patient as well and he verbalized understanding.    Diet Education:  [X ] Given (11/26)  [X  ] Given on previous assessment by RD  [  ] Not applicable due to mental status / cognitive deficits  [  ] Patient deferred / refused   [  ] Not applicable due to current medical status / prognosis     Source: [X ] Patient [ ] Family [ ] RN [ X ] Chart, PA    Diet, NPO:   Except Medications (11-22-23 @ 14:56)    GI: Fecal Incontinence  Last BM noted on [ 11/24 ]    PO intake:  [ X ] Poor < 50% due to NPO  [ ] Fair 50-75% [ ] Good  % [ ] Inconsistent PO intake    Weight Hx: 11/22 - 87.2kg      11/7 - 94.6 kg      11/6 - 94.5kg      11/5 - 91.2kg      11/4 - 93.6kg      10/27 - 70kg ? weight error  Patient reported usual body weight of 207 pounds (94kg) PTA  Patient with weight loss (6.4kg), 6.8% since 11/4, <1 month, likely multifactorial in setting of inadequate energy intake and improved edema         Edema: No edema currently noted - previously as much as 3+ edema noted over hospital course    Pressure Injuries: No pressure injuries noted     _______________ Pertinent Medications_______________  MEDICATIONS  (STANDING):  acetaminophen     Tablet .. 975 milliGRAM(s) Oral every 6 hours  atorvastatin 20 milliGRAM(s) Oral at bedtime  bicalutamide 50 milliGRAM(s) Oral daily  chlorhexidine 2% Cloths 1 Application(s) Topical daily  cholecalciferol 2000 Unit(s) Oral daily  dextrose 5%. 1000 milliLiter(s) (50 mL/Hr) IV Continuous <Continuous>  dextrose 5%. 1000 milliLiter(s) (100 mL/Hr) IV Continuous <Continuous>  dextrose 50% Injectable 25 Gram(s) IV Push once  dextrose 50% Injectable 12.5 Gram(s) IV Push once  dextrose 50% Injectable 25 Gram(s) IV Push once  fluconAZOLE IVPB 200 milliGRAM(s) IV Intermittent every 24 hours  fluconAZOLE IVPB      glucagon  Injectable 1 milliGRAM(s) IntraMuscular once  heparin   Injectable 5000 Unit(s) SubCutaneous every 8 hours  lisinopril 5 milliGRAM(s) Oral daily  moxifloxacin  IVPB 400 milliGRAM(s) IV Intermittent every 24 hours  nafcillin  IVPB 2 Gram(s) IV Intermittent every 4 hours  pantoprazole  Injectable 40 milliGRAM(s) IV Push daily  potassium chloride  10 mEq/100 mL IVPB 10 milliEquivalent(s) IV Intermittent every 1 hour  sodium chloride 0.9%. 1000 milliLiter(s) (100 mL/Hr) IV Continuous <Continuous>  tamsulosin 0.4 milliGRAM(s) Oral at bedtime    MEDICATIONS  (PRN):  benzocaine/menthol Lozenge 1 Lozenge Oral every 6 hours PRN Sore Throat  dextrose Oral Gel 15 Gram(s) Oral once PRN Blood Glucose LESS THAN 70 milliGRAM(s)/deciliter  guaiFENesin Oral Liquid (Sugar-Free) 200 milliGRAM(s) Oral every 6 hours PRN Cough  naloxone Injectable 0.1 milliGRAM(s) IV Push every 3 minutes PRN For ANY of the following changes in patient status:  A. RR LESS THAN 10 breaths per minute, B. Oxygen saturation LESS THAN 90%, C. Sedation score of 6  ondansetron Injectable 4 milliGRAM(s) IV Push every 6 hours PRN Nausea      __________________ Pertinent Labs__________________   11-26 Na139 mmol/L Glu 134 mg/dL<H> K+ 3.0 mmol/L<L> Cr  0.86 mg/dL BUN 8 mg/dL 11-26 Phos 2.1 mg/dL<L> 11-23 Chol 124 mg/dL LDL --    HDL 22 mg/dL<L> Trig 174 mg/dL<H>    POCT Blood Glucose.: 132 mg/dL (11-26-23 @ 07:13)  POCT Blood Glucose.: 142 mg/dL (11-26-23 @ 00:38)  POCT Blood Glucose.: 145 mg/dL (11-25-23 @ 18:30)  POCT Blood Glucose.: 115 mg/dL (11-25-23 @ 12:33)  POCT Blood Glucose.: 94 mg/dL (11-25-23 @ 06:48)  POCT Blood Glucose.: 107 mg/dL (11-25-23 @ 01:12)  POCT Blood Glucose.: 96 mg/dL (11-24-23 @ 18:45)  POCT Blood Glucose.: 131 mg/dL (11-24-23 @ 12:41)  POCT Blood Glucose.: 118 mg/dL (11-24-23 @ 06:29)  POCT Blood Glucose.: 108 mg/dL (11-23-23 @ 23:12)  POCT Blood Glucose.: 106 mg/dL (11-23-23 @ 17:59)  POCT Blood Glucose.: 121 mg/dL (11-23-23 @ 12:22)    Estimated Needs:     [X] Recalculated - 67.2kg - patient with increased needs in setting of metastatic illness (prostate CA with mets to bone)  30-35kcal/rv=9105-9839adyc  1.2-1.5gm/kg=80-101gm protein     Previous Nutrition Diagnosis: Unintended Weight Loss    Nutrition Diagnosis is [X ] ongoing  [ ] resolved [ ] not applicable     Patient now meets criteria for Severe Malnutrition in setting of acute illness (dysphagia, candida esophagitis)  as evidenced by wt loss 6.8% in <1 mo., caloric intake <50% nutrition needs >5 days.    Monitoring and Evaluation:   [ x ] Monitor for candidacy to upgrade diet vs. possibly initiate alternate means of nutrition  [ x ] Weight trends   [ x ] Pertinent labs    Recommendations:  1) Continue NPO status pending repeat bedside swallow assessment/evaluation; if patient determined to be able to tolerate PO diet, suggest Low Sodium / Consistent Carb diet with Evening Snack, suggest Alicia Amorelie Glucose Control or Glucerna 3x day to optimize nutrition (thicken if needed to prescribed liquid consistency)  2) Obtain and record current weights to best monitor for acute changes in nutritional status.  3) If unable to tolerate PO diet, suggest alternate means of nutrition/hydration i.e. initiate EN if in accordance w goals of care / patient wishes; Reconsult RDN as needed.    Kiah Leong, MS, RDN, CDN  Pager [phone]  Also available on MS Teams

## 2023-11-26 NOTE — PROGRESS NOTE ADULT - PROBLEM SELECTOR PLAN 3
Concern for surgical site as a primary source  - woundvac in place  - awaiting culture from post-surgical fluid collection.  - BCx from 11/16, 11/17 NGTD  - changed to Nafcillin IV on 11/20.  - TTE reviewed  - continues to spike fevers.  - CT Neck reviewed with ortho - no intervention  - MR C/T-spine re-ordered to evaluate the fluid collection - continues to have fever

## 2023-11-26 NOTE — PROGRESS NOTE ADULT - ASSESSMENT
73-yo M w/ PMH of metastatic prostate CA s/p spinal decompression, now w/ fever and MSSA bacteremia. BCx clear as of 11/16. Pending MRI of the C/T spines.    1) High grade MSSA bacteremia  TTE without obvious vegetation  Repeat blood culture 11/16 and 11/17 are without growth    Leukoepnia  ? due to cephalosporin. Improved after change to nafcillin.  C/t monitor      2) Abnormal Imaging  Fluid collections noted on imaging  Vac placed  Wound examined during vac change- incision was not opened. Nontender at the site.  MRI C/T spine pending.    3) He is having fever.    ? new focus vs persistent focus on MSSA  CT with opacities in lung- he did just have adenovirus.  His respiratory symptoms have improved  Add avelox for ? aspiration  If clinically worsens, change avelox/ nafcillin to Cefepime 2 gram iv q 8  Given the supplemental oxygen requirement and fever, would keep naf/moxi at this time.  MRI C/T spine pending as above - ? if drainage is needed  Spine surgery and plastic surgery follow up    3) Bacteruria  Culture with mixed growth  100 K providenica< 10-50 K e fecalis  Likely colonization  Cipro would treat providencia which has higher colony count  Finished Cipro    4) Adenovirus  Supportive care    5) Trouble swallowing with vomiting  Aspiration noted on mbs  aspiration precautions    Plan discussed with primary team ACP.  Thank you for this consult. Inpatient ID team will follow.    Andrea Powell MD, PhD  Attending Physician  Division of Infectious Diseases  Department of Medicine    Please contact through MS Teams message.  Office: [phone] (after 5 PM or weekend)   73-yo M w/ PMH of metastatic prostate CA s/p spinal decompression, now w/ fever and MSSA bacteremia. BCx clear as of 11/16. Pending MRI of the C/T spines.    1) High grade MSSA bacteremia  TTE without obvious vegetation  Repeat blood culture 11/16 and 11/17 are without growth    Leukoepnia  ? due to cephalosporin. Improved after change to nafcillin.  C/t monitor      2) Abnormal Imaging  Fluid collections noted on imaging  Vac placed  Wound examined during vac change- incision was not opened. Nontender at the site.  MRI C/T spine pending.    3) He is having fever.    ? new focus vs persistent focus on MSSA  CT with opacities in lung- he did just have adenovirus.  His respiratory symptoms have improved  Add avelox for ? aspiration  If clinically worsens, change avelox/ nafcillin to Cefepime 2 gram iv q 8  Given the supplemental oxygen requirement and fever, would keep naf/moxi at this time.  MRI C/T spine pending as above - ? if drainage is needed  Spine surgery and plastic surgery follow up  Repeat BCx.    3) Bacteruria  Culture with mixed growth  100 K providenica< 10-50 K e fecalis  Likely colonization  Cipro would treat providencia which has higher colony count  Finished Cipro    4) Adenovirus  Supportive care    5) Trouble swallowing with vomiting  Aspiration noted on mbs  aspiration precautions    Plan discussed with primary team ACP.  Thank you for this consult. Inpatient ID team will follow.    Andrea Powell MD, PhD  Attending Physician  Division of Infectious Diseases  Department of Medicine    Please contact through MS Teams message.  Office: [phone] (after 5 PM or weekend)

## 2023-11-27 DIAGNOSIS — R06.00 DYSPNEA, UNSPECIFIED: ICD-10-CM

## 2023-11-27 LAB
ANION GAP SERPL CALC-SCNC: 15 MMOL/L — HIGH (ref 7–14)
ANION GAP SERPL CALC-SCNC: 15 MMOL/L — HIGH (ref 7–14)
BASOPHILS # BLD AUTO: 0.01 K/UL — SIGNIFICANT CHANGE UP (ref 0–0.2)
BASOPHILS # BLD AUTO: 0.01 K/UL — SIGNIFICANT CHANGE UP (ref 0–0.2)
BASOPHILS NFR BLD AUTO: 0.1 % — SIGNIFICANT CHANGE UP (ref 0–2)
BASOPHILS NFR BLD AUTO: 0.1 % — SIGNIFICANT CHANGE UP (ref 0–2)
BUN SERPL-MCNC: 8 MG/DL — SIGNIFICANT CHANGE UP (ref 7–23)
BUN SERPL-MCNC: 8 MG/DL — SIGNIFICANT CHANGE UP (ref 7–23)
C DIFF BY PCR RESULT: DETECTED
C DIFF BY PCR RESULT: DETECTED
CALCIUM SERPL-MCNC: 7 MG/DL — LOW (ref 8.4–10.5)
CALCIUM SERPL-MCNC: 7 MG/DL — LOW (ref 8.4–10.5)
CHLORIDE SERPL-SCNC: 101 MMOL/L — SIGNIFICANT CHANGE UP (ref 98–107)
CHLORIDE SERPL-SCNC: 101 MMOL/L — SIGNIFICANT CHANGE UP (ref 98–107)
CK MB BLD-MCNC: 3 % — HIGH (ref 0–2.5)
CK MB BLD-MCNC: 3 % — HIGH (ref 0–2.5)
CK MB CFR SERPL CALC: 2.7 NG/ML — SIGNIFICANT CHANGE UP
CK MB CFR SERPL CALC: 2.7 NG/ML — SIGNIFICANT CHANGE UP
CK SERPL-CCNC: 90 U/L — SIGNIFICANT CHANGE UP (ref 30–200)
CK SERPL-CCNC: 90 U/L — SIGNIFICANT CHANGE UP (ref 30–200)
CO2 SERPL-SCNC: 22 MMOL/L — SIGNIFICANT CHANGE UP (ref 22–31)
CO2 SERPL-SCNC: 22 MMOL/L — SIGNIFICANT CHANGE UP (ref 22–31)
CREAT SERPL-MCNC: 0.83 MG/DL — SIGNIFICANT CHANGE UP (ref 0.5–1.3)
CREAT SERPL-MCNC: 0.83 MG/DL — SIGNIFICANT CHANGE UP (ref 0.5–1.3)
EGFR: 94 ML/MIN/1.73M2 — SIGNIFICANT CHANGE UP
EGFR: 94 ML/MIN/1.73M2 — SIGNIFICANT CHANGE UP
EOSINOPHIL # BLD AUTO: 0.03 K/UL — SIGNIFICANT CHANGE UP (ref 0–0.5)
EOSINOPHIL # BLD AUTO: 0.03 K/UL — SIGNIFICANT CHANGE UP (ref 0–0.5)
EOSINOPHIL NFR BLD AUTO: 0.4 % — SIGNIFICANT CHANGE UP (ref 0–6)
EOSINOPHIL NFR BLD AUTO: 0.4 % — SIGNIFICANT CHANGE UP (ref 0–6)
GI PCR PANEL: SIGNIFICANT CHANGE UP
GI PCR PANEL: SIGNIFICANT CHANGE UP
GLUCOSE SERPL-MCNC: 101 MG/DL — HIGH (ref 70–99)
GLUCOSE SERPL-MCNC: 101 MG/DL — HIGH (ref 70–99)
HCT VFR BLD CALC: 21.5 % — LOW (ref 39–50)
HCT VFR BLD CALC: 21.5 % — LOW (ref 39–50)
HGB BLD-MCNC: 7.1 G/DL — LOW (ref 13–17)
HGB BLD-MCNC: 7.1 G/DL — LOW (ref 13–17)
IANC: 4.94 K/UL — SIGNIFICANT CHANGE UP (ref 1.8–7.4)
IANC: 4.94 K/UL — SIGNIFICANT CHANGE UP (ref 1.8–7.4)
IMM GRANULOCYTES NFR BLD AUTO: 1 % — HIGH (ref 0–0.9)
IMM GRANULOCYTES NFR BLD AUTO: 1 % — HIGH (ref 0–0.9)
LYMPHOCYTES # BLD AUTO: 1.42 K/UL — SIGNIFICANT CHANGE UP (ref 1–3.3)
LYMPHOCYTES # BLD AUTO: 1.42 K/UL — SIGNIFICANT CHANGE UP (ref 1–3.3)
LYMPHOCYTES # BLD AUTO: 21 % — SIGNIFICANT CHANGE UP (ref 13–44)
LYMPHOCYTES # BLD AUTO: 21 % — SIGNIFICANT CHANGE UP (ref 13–44)
MAGNESIUM SERPL-MCNC: 1.6 MG/DL — SIGNIFICANT CHANGE UP (ref 1.6–2.6)
MAGNESIUM SERPL-MCNC: 1.6 MG/DL — SIGNIFICANT CHANGE UP (ref 1.6–2.6)
MCHC RBC-ENTMCNC: 31.3 PG — SIGNIFICANT CHANGE UP (ref 27–34)
MCHC RBC-ENTMCNC: 31.3 PG — SIGNIFICANT CHANGE UP (ref 27–34)
MCHC RBC-ENTMCNC: 33 GM/DL — SIGNIFICANT CHANGE UP (ref 32–36)
MCHC RBC-ENTMCNC: 33 GM/DL — SIGNIFICANT CHANGE UP (ref 32–36)
MCV RBC AUTO: 94.7 FL — SIGNIFICANT CHANGE UP (ref 80–100)
MCV RBC AUTO: 94.7 FL — SIGNIFICANT CHANGE UP (ref 80–100)
MONOCYTES # BLD AUTO: 0.3 K/UL — SIGNIFICANT CHANGE UP (ref 0–0.9)
MONOCYTES # BLD AUTO: 0.3 K/UL — SIGNIFICANT CHANGE UP (ref 0–0.9)
MONOCYTES NFR BLD AUTO: 4.4 % — SIGNIFICANT CHANGE UP (ref 2–14)
MONOCYTES NFR BLD AUTO: 4.4 % — SIGNIFICANT CHANGE UP (ref 2–14)
NEUTROPHILS # BLD AUTO: 4.94 K/UL — SIGNIFICANT CHANGE UP (ref 1.8–7.4)
NEUTROPHILS # BLD AUTO: 4.94 K/UL — SIGNIFICANT CHANGE UP (ref 1.8–7.4)
NEUTROPHILS NFR BLD AUTO: 73.1 % — SIGNIFICANT CHANGE UP (ref 43–77)
NEUTROPHILS NFR BLD AUTO: 73.1 % — SIGNIFICANT CHANGE UP (ref 43–77)
NRBC # BLD: 0 /100 WBCS — SIGNIFICANT CHANGE UP (ref 0–0)
NRBC # BLD: 0 /100 WBCS — SIGNIFICANT CHANGE UP (ref 0–0)
NRBC # FLD: 0.04 K/UL — HIGH (ref 0–0)
NRBC # FLD: 0.04 K/UL — HIGH (ref 0–0)
PHOSPHATE SERPL-MCNC: 2.9 MG/DL — SIGNIFICANT CHANGE UP (ref 2.5–4.5)
PHOSPHATE SERPL-MCNC: 2.9 MG/DL — SIGNIFICANT CHANGE UP (ref 2.5–4.5)
PLATELET # BLD AUTO: 365 K/UL — SIGNIFICANT CHANGE UP (ref 150–400)
PLATELET # BLD AUTO: 365 K/UL — SIGNIFICANT CHANGE UP (ref 150–400)
POTASSIUM SERPL-MCNC: 2.7 MMOL/L — CRITICAL LOW (ref 3.5–5.3)
POTASSIUM SERPL-MCNC: 2.7 MMOL/L — CRITICAL LOW (ref 3.5–5.3)
POTASSIUM SERPL-SCNC: 2.7 MMOL/L — CRITICAL LOW (ref 3.5–5.3)
POTASSIUM SERPL-SCNC: 2.7 MMOL/L — CRITICAL LOW (ref 3.5–5.3)
RBC # BLD: 2.27 M/UL — LOW (ref 4.2–5.8)
RBC # BLD: 2.27 M/UL — LOW (ref 4.2–5.8)
RBC # FLD: 18.5 % — HIGH (ref 10.3–14.5)
RBC # FLD: 18.5 % — HIGH (ref 10.3–14.5)
SODIUM SERPL-SCNC: 138 MMOL/L — SIGNIFICANT CHANGE UP (ref 135–145)
SODIUM SERPL-SCNC: 138 MMOL/L — SIGNIFICANT CHANGE UP (ref 135–145)
TROPONIN T, HIGH SENSITIVITY RESULT: 39 NG/L — SIGNIFICANT CHANGE UP
TROPONIN T, HIGH SENSITIVITY RESULT: 39 NG/L — SIGNIFICANT CHANGE UP
WBC # BLD: 6.77 K/UL — SIGNIFICANT CHANGE UP (ref 3.8–10.5)
WBC # BLD: 6.77 K/UL — SIGNIFICANT CHANGE UP (ref 3.8–10.5)
WBC # FLD AUTO: 6.77 K/UL — SIGNIFICANT CHANGE UP (ref 3.8–10.5)
WBC # FLD AUTO: 6.77 K/UL — SIGNIFICANT CHANGE UP (ref 3.8–10.5)

## 2023-11-27 PROCEDURE — 93010 ELECTROCARDIOGRAM REPORT: CPT

## 2023-11-27 PROCEDURE — 71275 CT ANGIOGRAPHY CHEST: CPT | Mod: 26

## 2023-11-27 PROCEDURE — 99232 SBSQ HOSP IP/OBS MODERATE 35: CPT

## 2023-11-27 PROCEDURE — 99233 SBSQ HOSP IP/OBS HIGH 50: CPT

## 2023-11-27 PROCEDURE — 71045 X-RAY EXAM CHEST 1 VIEW: CPT | Mod: 26

## 2023-11-27 RX ORDER — POTASSIUM CHLORIDE 20 MEQ
20 PACKET (EA) ORAL
Refills: 0 | Status: COMPLETED | OUTPATIENT
Start: 2023-11-27 | End: 2023-11-27

## 2023-11-27 RX ORDER — LEVALBUTEROL 1.25 MG/.5ML
0.63 SOLUTION, CONCENTRATE RESPIRATORY (INHALATION) EVERY 6 HOURS
Refills: 0 | Status: DISCONTINUED | OUTPATIENT
Start: 2023-11-27 | End: 2023-12-23

## 2023-11-27 RX ORDER — CEFEPIME 1 G/1
2000 INJECTION, POWDER, FOR SOLUTION INTRAMUSCULAR; INTRAVENOUS EVERY 8 HOURS
Refills: 0 | Status: DISCONTINUED | OUTPATIENT
Start: 2023-11-27 | End: 2023-11-29

## 2023-11-27 RX ADMIN — HEPARIN SODIUM 5000 UNIT(S): 5000 INJECTION INTRAVENOUS; SUBCUTANEOUS at 05:37

## 2023-11-27 RX ADMIN — ATORVASTATIN CALCIUM 20 MILLIGRAM(S): 80 TABLET, FILM COATED ORAL at 21:44

## 2023-11-27 RX ADMIN — TAMSULOSIN HYDROCHLORIDE 0.4 MILLIGRAM(S): 0.4 CAPSULE ORAL at 21:44

## 2023-11-27 RX ADMIN — Medication 975 MILLIGRAM(S): at 11:45

## 2023-11-27 RX ADMIN — HEPARIN SODIUM 5000 UNIT(S): 5000 INJECTION INTRAVENOUS; SUBCUTANEOUS at 21:44

## 2023-11-27 RX ADMIN — FLUCONAZOLE 100 MILLIGRAM(S): 150 TABLET ORAL at 14:18

## 2023-11-27 RX ADMIN — MOXIFLOXACIN HYDROCHLORIDE TABLETS, 400 MG 250 MILLIGRAM(S): 400 TABLET, FILM COATED ORAL at 02:11

## 2023-11-27 RX ADMIN — PANTOPRAZOLE SODIUM 40 MILLIGRAM(S): 20 TABLET, DELAYED RELEASE ORAL at 11:35

## 2023-11-27 RX ADMIN — LEVALBUTEROL 0.63 MILLIGRAM(S): 1.25 SOLUTION, CONCENTRATE RESPIRATORY (INHALATION) at 14:55

## 2023-11-27 RX ADMIN — Medication 1 TABLET(S): at 11:34

## 2023-11-27 RX ADMIN — Medication 975 MILLIGRAM(S): at 05:38

## 2023-11-27 RX ADMIN — BICALUTAMIDE 50 MILLIGRAM(S): 50 TABLET, FILM COATED ORAL at 11:34

## 2023-11-27 RX ADMIN — Medication 50 MILLIEQUIVALENT(S): at 09:34

## 2023-11-27 RX ADMIN — LISINOPRIL 5 MILLIGRAM(S): 2.5 TABLET ORAL at 05:37

## 2023-11-27 RX ADMIN — Medication 975 MILLIGRAM(S): at 00:43

## 2023-11-27 RX ADMIN — CEFEPIME 100 MILLIGRAM(S): 1 INJECTION, POWDER, FOR SOLUTION INTRAMUSCULAR; INTRAVENOUS at 21:48

## 2023-11-27 RX ADMIN — Medication 975 MILLIGRAM(S): at 23:08

## 2023-11-27 RX ADMIN — NAFCILLIN 200 GRAM(S): 10 INJECTION, POWDER, FOR SOLUTION INTRAVENOUS at 09:09

## 2023-11-27 RX ADMIN — Medication 50 MILLIEQUIVALENT(S): at 14:05

## 2023-11-27 RX ADMIN — Medication 975 MILLIGRAM(S): at 11:34

## 2023-11-27 RX ADMIN — SODIUM CHLORIDE 100 MILLILITER(S): 9 INJECTION INTRAMUSCULAR; INTRAVENOUS; SUBCUTANEOUS at 14:04

## 2023-11-27 RX ADMIN — Medication 2000 UNIT(S): at 11:34

## 2023-11-27 RX ADMIN — NAFCILLIN 200 GRAM(S): 10 INJECTION, POWDER, FOR SOLUTION INTRAVENOUS at 13:27

## 2023-11-27 RX ADMIN — HEPARIN SODIUM 5000 UNIT(S): 5000 INJECTION INTRAVENOUS; SUBCUTANEOUS at 13:22

## 2023-11-27 RX ADMIN — Medication 50 MILLIEQUIVALENT(S): at 11:36

## 2023-11-27 RX ADMIN — Medication 975 MILLIGRAM(S): at 18:15

## 2023-11-27 RX ADMIN — NAFCILLIN 200 GRAM(S): 10 INJECTION, POWDER, FOR SOLUTION INTRAVENOUS at 03:46

## 2023-11-27 RX ADMIN — CHLORHEXIDINE GLUCONATE 1 APPLICATION(S): 213 SOLUTION TOPICAL at 11:36

## 2023-11-27 RX ADMIN — Medication 975 MILLIGRAM(S): at 17:33

## 2023-11-27 RX ADMIN — NAFCILLIN 200 GRAM(S): 10 INJECTION, POWDER, FOR SOLUTION INTRAVENOUS at 06:50

## 2023-11-27 NOTE — CHART NOTE - NSCHARTNOTEFT_GEN_A_CORE
Notified by RN patient with SOB and tachycardia. Patient assessed at bedside, A&Ox3, c/o SOB. Denies lightheadedness, dizziness, n/v, abdominal pain, or chest pain. Patient with 1 episode of diarrhea this AM.     ICU Vital Signs Last 24 Hrs  T(C): 37.7 (27 Nov 2023 12:40), Max: 38.7 (27 Nov 2023 00:45)  T(F): 99.9 (27 Nov 2023 12:40), Max: 101.7 (27 Nov 2023 00:45)  HR: 106 (27 Nov 2023 12:40) (94 - 110)  BP: 136/64 (27 Nov 2023 12:40) (117/51 - 142/72)  BP(mean): --  ABP: --  ABP(mean): --  RR: 18 (27 Nov 2023 12:40) (17 - 18)  SpO2: 94% (27 Nov 2023 12:40) (93% - 95%)    O2 Parameters below as of 27 Nov 2023 12:40  Patient On (Oxygen Delivery Method): nasal cannula  O2 Flow (L/min): 2     CONSTITUTIONAL: Well groomed   EYES: PERRLA and symmetric, EOMI    RESP: Tachypneic. No use of accessory muscles; CTA b/l, no WRR  CV: Tachycardic, +S1S2, no MRG   GI: Soft, NT, ND, no rebound, no guarding; no palpable masses     NEURO: sensation intact in upper and lower extremities b/l to light touch   PSYCH: Appropriate insight/judgment; A+O x 3, mood and affect appropriate, recent/remote memory intact    Of note, patient with B/L DVTs and s/p IVC filter (11/1) off therapeutic AC given orthopedic recommendations (s/p C7-T1 decompression, C5-T3 posterior cervical fusion 11/2/23, AC contraindicated for at least 6 weeks postoperatively given risk of hematoma and permanent neurologic deficits).      Supplemental oxygen increased to 3L/min NC temporarily for patient's comfort. Xopenex PRN ordered. EKG w/ sinus tachycardia @105, no ischemic abnormalities. Cardiac enzymes, STAT CXR, and CTA r/o PE ordered. Patient understands and agrees to plan at this time. Case discussed with Dr. Riojas. Notified by RN patient with SOB and tachycardia. Patient assessed at bedside, A&Ox3, c/o SOB. Denies lightheadedness, dizziness, n/v, abdominal pain, or chest pain. Patient with 1 episode of diarrhea this AM.     ICU Vital Signs Last 24 Hrs  T(C): 37.7 (27 Nov 2023 12:40), Max: 38.7 (27 Nov 2023 00:45)  T(F): 99.9 (27 Nov 2023 12:40), Max: 101.7 (27 Nov 2023 00:45)  HR: 106 (27 Nov 2023 12:40) (94 - 110)  BP: 136/64 (27 Nov 2023 12:40) (117/51 - 142/72)  BP(mean): --  ABP: --  ABP(mean): --  RR: 18 (27 Nov 2023 12:40) (17 - 18)  SpO2: 94% (27 Nov 2023 12:40) (93% - 95%)    O2 Parameters below as of 27 Nov 2023 12:40  Patient On (Oxygen Delivery Method): nasal cannula  O2 Flow (L/min): 2     CONSTITUTIONAL: Well groomed   EYES: PERRLA and symmetric, EOMI    RESP: Tachypneic. No use of accessory muscles; CTA b/l, no WRR  CV: Tachycardic, +S1S2, no MRG   GI: Soft, NT, ND, no rebound, no guarding; no palpable masses     NEURO: sensation intact in upper and lower extremities b/l to light touch   PSYCH: Appropriate insight/judgment; A+O x 3, mood and affect appropriate, recent/remote memory intact    Of note, patient with B/L DVTs and s/p IVC filter (11/1) off therapeutic AC given orthopedic recommendations (s/p C7-T1 decompression, C5-T3 posterior cervical fusion 11/2/23, AC contraindicated for at least 6 weeks postoperatively given risk of hematoma and permanent neurologic deficits).      Supplemental oxygen increased to 3L/min NC temporarily for patient's comfort. Xopenex PRN ordered. EKG w/ sinus tachycardia @105, no ischemic abnormalities. Cardiac enzymes, STAT CXR, and CTA r/o PE ordered. Patient understands and agrees to plan at this time. Case discussed with Dr. Riojas.    Addendum:   Patient with 3rd watery BM over the last 24 hrs per RN. Will order c-diff/stool studies.

## 2023-11-27 NOTE — PROGRESS NOTE ADULT - SUBJECTIVE AND OBJECTIVE BOX
Plastic Surgery Progress Note (pg [facility]: [id number], [place]: [phone])    SUBJECTIVE  The patient was seen and examined.     OBJECTIVE  ___________________________________________________  VITAL SIGNS / I&O's   Vital Signs Last 24 Hrs  T(C): 37.7 (27 Nov 2023 12:40), Max: 38.7 (27 Nov 2023 00:45)  T(F): 99.9 (27 Nov 2023 12:40), Max: 101.7 (27 Nov 2023 00:45)  HR: 96 (27 Nov 2023 16:00) (94 - 110)  BP: 136/64 (27 Nov 2023 12:40) (117/51 - 140/59)  BP(mean): --  RR: 18 (27 Nov 2023 12:40) (18 - 18)  SpO2: 94% (27 Nov 2023 12:40) (93% - 95%)    Parameters below as of 27 Nov 2023 16:00  Patient On (Oxygen Delivery Method): nasal cannula          26 Nov 2023 07:01  -  27 Nov 2023 07:00  --------------------------------------------------------  IN:    Oral Fluid: 100 mL  Total IN: 100 mL    OUT:    Voided (mL): 700 mL  Total OUT: 700 mL    Total NET: -600 mL        ___________________________________________________  PHYSICAL EXAM    General: NAD, Lying in bed   Neuro: Awake and alert  Pulm: non-labored respirations  Back: Incisions intact with palpable collection. No surrounding erythema or signs of infection. Incisional vac holding suction; no drainage or leaks. Vac canister without visible output - removed    ___________________________________________________  LABS                        7.1    6.77  )-----------( 365      ( 27 Nov 2023 07:31 )             21.5     27 Nov 2023 07:31    138    |  101    |  8      ----------------------------<  101    2.7     |  22     |  0.83     Ca    7.0        27 Nov 2023 07:31  Phos  2.9       27 Nov 2023 07:31  Mg     1.60      27 Nov 2023 07:31        CAPILLARY BLOOD GLUCOSE      POCT Blood Glucose.: 120 mg/dL (27 Nov 2023 12:13)  POCT Blood Glucose.: 90 mg/dL (27 Nov 2023 06:49)  POCT Blood Glucose.: 95 mg/dL (27 Nov 2023 00:36)  POCT Blood Glucose.: 96 mg/dL (26 Nov 2023 18:14)    CARDIAC MARKERS ( 27 Nov 2023 13:41 )  x     / x     / 90 U/L / x     / 2.7 ng/mL      Urinalysis Basic - ( 27 Nov 2023 07:31 )    Color: x / Appearance: x / SG: x / pH: x  Gluc: 101 mg/dL / Ketone: x  / Bili: x / Urobili: x   Blood: x / Protein: x / Nitrite: x   Leuk Esterase: x / RBC: x / WBC x   Sq Epi: x / Non Sq Epi: x / Bacteria: x      ___________________________________________________  MICRO  Recent Cultures:  Specimen Source: .Blood Blood-Peripheral, 11-24 @ 15:20; Results   No growth at 48 Hours; Gram Stain: --; Organism: --  Specimen Source: .Blood Blood-Peripheral, 11-24 @ 15:15; Results   No growth at 48 Hours; Gram Stain: --; Organism: --    ___________________________________________________  MEDICATIONS  (STANDING):  acetaminophen     Tablet .. 975 milliGRAM(s) Oral every 6 hours  atorvastatin 20 milliGRAM(s) Oral at bedtime  bicalutamide 50 milliGRAM(s) Oral daily  chlorhexidine 2% Cloths 1 Application(s) Topical daily  cholecalciferol 2000 Unit(s) Oral daily  dextrose 5%. 1000 milliLiter(s) (50 mL/Hr) IV Continuous <Continuous>  dextrose 5%. 1000 milliLiter(s) (100 mL/Hr) IV Continuous <Continuous>  dextrose 50% Injectable 25 Gram(s) IV Push once  dextrose 50% Injectable 12.5 Gram(s) IV Push once  dextrose 50% Injectable 25 Gram(s) IV Push once  fluconAZOLE IVPB      fluconAZOLE IVPB 200 milliGRAM(s) IV Intermittent every 24 hours  glucagon  Injectable 1 milliGRAM(s) IntraMuscular once  heparin   Injectable 5000 Unit(s) SubCutaneous every 8 hours  lactobacillus acidophilus 1 Tablet(s) Oral daily  lisinopril 5 milliGRAM(s) Oral daily  moxifloxacin  IVPB 400 milliGRAM(s) IV Intermittent every 24 hours  nafcillin  IVPB 2 Gram(s) IV Intermittent every 4 hours  pantoprazole  Injectable 40 milliGRAM(s) IV Push daily  sodium chloride 0.9%. 1000 milliLiter(s) (100 mL/Hr) IV Continuous <Continuous>  tamsulosin 0.4 milliGRAM(s) Oral at bedtime    MEDICATIONS  (PRN):  benzocaine/menthol Lozenge 1 Lozenge Oral every 6 hours PRN Sore Throat  dextrose Oral Gel 15 Gram(s) Oral once PRN Blood Glucose LESS THAN 70 milliGRAM(s)/deciliter  guaiFENesin Oral Liquid (Sugar-Free) 200 milliGRAM(s) Oral every 6 hours PRN Cough  levalbuterol Inhalation 0.63 milliGRAM(s) Inhalation every 6 hours PRN shortness of breath/wheezing  naloxone Injectable 0.1 milliGRAM(s) IV Push every 3 minutes PRN For ANY of the following changes in patient status:  A. RR LESS THAN 10 breaths per minute, B. Oxygen saturation LESS THAN 90%, C. Sedation score of 6  ondansetron Injectable 4 milliGRAM(s) IV Push every 6 hours PRN Nausea

## 2023-11-27 NOTE — PROGRESS NOTE ADULT - ASSESSMENT
73-yo M w/ PMH of metastatic prostate CA s/p spinal decompression, now w/ fever and MSSA bacteremia. BCx clear as of 11/16.     #ongoing fever  #anemia  #MSSA bactremia      1) High grade MSSA bacteremia  TTE without obvious vegetation  Repeat blood culture 11/16 and 11/17 are without growth    Leukoepnia  ? due to cephalosporin. Improved after change to nafcillin.  C/t monitor      2) Abnormal Imaging  Fluid collections noted on imaging  Vac placed  Wound examined during vac change- incision was not opened. Nontender at the site.  MRI C/T spine pending.    3) He is having fever.    ? new focus vs persistent focus on MSSA  CT with opacities in lung- he did just have adenovirus.  C/o increased SOB   Change avelox/ nafcillin to Cefepime 2 gram iv q 8    MRI C/T spine pending as above - ? if drainage is needed  Spine surgery and plastic surgery follow up  Repeat BCx.    4) Bacteruria  Culture with mixed growth  100 K providenica< 10-50 K e fecalis  Likely colonization  Finished Cipro    4) Adenovirus  Initally tested positive on 11/15 witg cough  Cough improved.   Repeat positive likely due to sensitivity of PCR  I would not attribute ongoing fever to adenovirus    Supportive care    Weston Mcghee MD  Please contact via TEAM between 8 am and 6 pm    In the evening or on weekends, can contact call service at [phone] 73-yo M w/ PMH of metastatic prostate CA s/p spinal decompression, now w/ fever and MSSA bacteremia. BCx clear as of 11/16.     #ongoing fever  #anemia  #MSSA bactremia      1) High grade MSSA bacteremia  TTE without obvious vegetation  Repeat blood culture 11/16 and 11/17 are without growth    Leukoepnia  ? due to cephalosporin. Improved after change to nafcillin.  C/t monitor      2) Abnormal Imaging  Fluid collections noted on imaging  Vac placed  Wound examined during vac change- incision was not opened. Nontender at the site.  MRI C/T spine pending.    3) He is having fever.    ? new focus vs persistent focus on MSSA  CT with opacities in lung- he did just have adenovirus.  C/o increased SOB  Change avelox/ nafcillin to Cefepime 2 gram iv q 8  ----will need to monitor counts slowly    MRI C/T spine pending as above - ? if drainage is needed  Spine surgery and plastic surgery follow up  Repeat BCx.    4) Bacteruria  Culture with mixed growth  100 K providenica< 10-50 K e fecalis  Likely colonization  Finished Cipro    5) Adenovirus  Initially tested positive on 11/15 with cough  Cough improved.   Repeat positive likely due to sensitivity of PCR  I would not attribute ongoing fever to adenovirus    6) anemia  ? cause  Check ldh/ haptoglobin    Supportive care    Weston Mcghee MD  Please contact via TEAM between 8 am and 6 pm    In the evening or on weekends, can contact call service at [phone] 73-yo M w/ PMH of metastatic prostate CA s/p spinal decompression, now w/ fever and MSSA bacteremia. BCx clear as of 11/16.     #ongoing fever  #anemia  #MSSA bactremia      1) High grade MSSA bacteremia  TTE without obvious vegetation  Repeat blood culture 11/16 and 11/17 are without growth    Leukoepnia  ? due to cephalosporin. Improved after change to nafcillin.  C/t monitor      2) Abnormal Imaging  Fluid collections noted on imaging  Vac placed  Wound examined during vac change- incision was not opened. Nontender at the site.  MRI C/T spine pending.    3) He is having fever.    ? new focus vs persistent focus on MSSA  CT with opacities in lung- he did just have adenovirus.  C/o increased SOB  Change avelox/ nafcillin to Cefepime 2 gram iv q 8  ----will need to monitor counts slowly    MRI C/T spine pending as above - ? if drainage is needed  Spine surgery and plastic surgery follow up  Repeat BCx.    4) Bacteruria  Culture with mixed growth  100 K providenica< 10-50 K e fecalis  Likely colonization  Finished Cipro    5) Adenovirus  Initially tested positive on 11/15 with cough  Cough improved.   Repeat positive likely due to sensitivity of PCR  I would not attribute ongoing fever to adenovirus    6) anemia  ? cause  primary team evaluating    Supportive care    Weston Mcghee MD  Please contact via TEAM between 8 am and 6 pm    In the evening or on weekends, can contact call service at [phone]

## 2023-11-27 NOTE — SWALLOW BEDSIDE ASSESSMENT ADULT - ADDITIONAL RECOMMENDATIONS
1. This service to follow up as schedule permits. 2 Reconsult if change in medical status
1. This service to follow up as schedule permits. 2. Reconsult if change in medical status.

## 2023-11-27 NOTE — SWALLOW BEDSIDE ASSESSMENT ADULT - PHARYNGEAL PHASE
Within functional limits
Patient exhibited expectoration of bolus trials into basin/Complaints of pharyngeal stasis

## 2023-11-27 NOTE — SWALLOW BEDSIDE ASSESSMENT ADULT - COMMENTS
Hospitalist note 11/27 "73M DM2, HTN, Prostate CA p/w spinal cord compression from metastasis s/p C5-T3 Fusion, C7-T1 decompression c/b MSSA bacteremia from surgical site infection, B/L LE DVT s/p IVC filter 11/1 by IR, aspiration PNA, candida esophagitis w/ dysphagia, acute on chronic anemia.Appreciate ENT eval - reviewed direct laryngoscope imaging on 11/22  - ENT suspects esophageal candida but also possible mass effect from recent C-spine surgery; however no airway compromise noted."    Of Note: Patient is known to this service as patient was seen for an initial swallow assessment on 11/21 and completed a Cineosphagram on 11/22 with recommendations of NPO with consideration for short-term non-oral means at that time (please see note for details).     Patient seen at bedside this afternoon for a reassessment of the swallow function. patient reporting his odynophagia has now resolved. patient states he is able to swallow without difficulty at this time.
Hospitalist note 11/21 "73M DM2, HTN, Prostate CA p/w spinal cord compression from metastasis s/p C5-T3 Fusion, C7-T1 decompression c/b MSSA bacteremia from surgical site infection, B/L LE DVT s/p IVC filter 11/1 by IR, odynophagia. Patient states that he's had difficulty swallowing due to pain - states it's been going for few days but this was the first day he let me know about it. Pain does not change regardless of fluid vs solids - all manners of PO intake causes pain in throat area."    Patient seen at bedside this afternoon for an initial assessment of the swallow function, at which time patient was alert. Patient noted to have a basin with tissues/secretions present. Patient receiving supplemental O2 via nasal cannula. Patient states he has severe odynophagia and difficulty swallowing. Patient states symptoms initiated 6 days ago. Food and liquid is "not going down" with patient pointing to throat.

## 2023-11-27 NOTE — PROGRESS NOTE ADULT - ASSESSMENT
73y Male s/p C5-T3 posterior cervical fusion, C7-T1 decompression for metastatic prostate cancer with plastics closure on 11/2. Recovering well. Both drains removed. MRI 11/14 showing possible seroma in subcutaneous space. Incisional vac w/ black sponge/Adaptic started on 11/17/2023.     Plan:  - prevena removed  - F/U fever work up - RVP +adenovirus; UA now negative  - Monitor vac output  - Optimize nutrition, ensure adequate protein intake  - Remainder of care per primary team  - Incision intact with palpable collection, but would not recommend aspiration at this time    Tani Rodarte  Plastic Surgery  #[phone] [facility] pager  Available on teams

## 2023-11-27 NOTE — SWALLOW BEDSIDE ASSESSMENT ADULT - SWALLOW EVAL: DIAGNOSIS
1. Functional oral stage for puree and thin liquids marked by adequate oral acceptance, collection and transfer. 2. Severe pharyngeal dysphagia for puree and thin liquids marked by a present pharyngeal swallow trigger with reduced hyolaryngeal elevation upon digital palpation without patient stating "It's still in my throat" and patient volitionally expectorating bolus trials into basin.
1. Functional oral stage for puree and thin liquids marked by adequate oral acceptance, collection and transfer. 2. functional pharyngeal phase for puree and thin liquids marked by a present pharyngeal swallow trigger with hyolaryngeal elevation noted upon digital palpation without evidence of impaired airway protection.

## 2023-11-27 NOTE — SWALLOW BEDSIDE ASSESSMENT ADULT - ASR SWALLOW RECOMMEND DIAG
Cinesophagram to objectively assess the swallow function given new onset of dysphagia
Repeat Cinesophagram to objectively assess the swallow function

## 2023-11-27 NOTE — PROGRESS NOTE ADULT - PROBLEM SELECTOR PLAN 2
c/b dysphagia and aspiration PNA  Reviewed CT Neck/Chest.  Appreciate ENT eval - reviewed direct laryngoscope imaging on 11/22  - ENT suspects esophageal candida but also possible mass effect from recent C-spine surgery; however no airway compromise noted.  - discussed with Ortho - Dr. Badillo reviewed imaging - no indication for surgical intervention.  - Diflucan IV (11/22-)  - with clinical improvement  - S+S eval - advance to pureed for now. Cinesophagram pending.

## 2023-11-27 NOTE — PROGRESS NOTE ADULT - PROBLEM SELECTOR PLAN 4
Concern for surgical site as a primary source  - woundvac in place  - awaiting culture from post-surgical fluid collection.  - BCx from 11/16, 11/17 NGTD  - changed to Nafcillin IV on 11/20.  - TTE reviewed  - continues to spike fevers.  - CT Neck reviewed with ortho - no intervention  - MR C/T-spine re-ordered to evaluate the fluid collection - continues to have fever Not applicable

## 2023-11-27 NOTE — PROGRESS NOTE ADULT - SUBJECTIVE AND OBJECTIVE BOX
Follow Up:      Inverval History/ROS:Patient is a 73y old  Male who presents with a chief complaint of Diffuse Spinal Mets from Prostate Cancer (27 Nov 2023 13:38)    C/o SOB  Febrile over the weekend, 38.7 in the last 24 hours      Allergies    No Known Allergies    Intolerances        ANTIMICROBIALS:  fluconAZOLE IVPB    fluconAZOLE IVPB 200 every 24 hours  moxifloxacin  IVPB 400 every 24 hours  nafcillin  IVPB 2 every 4 hours      OTHER MEDS:  acetaminophen     Tablet .. 975 milliGRAM(s) Oral every 6 hours  atorvastatin 20 milliGRAM(s) Oral at bedtime  benzocaine/menthol Lozenge 1 Lozenge Oral every 6 hours PRN  bicalutamide 50 milliGRAM(s) Oral daily  chlorhexidine 2% Cloths 1 Application(s) Topical daily  cholecalciferol 2000 Unit(s) Oral daily  dextrose 5%. 1000 milliLiter(s) IV Continuous <Continuous>  dextrose 5%. 1000 milliLiter(s) IV Continuous <Continuous>  dextrose 50% Injectable 25 Gram(s) IV Push once  dextrose 50% Injectable 25 Gram(s) IV Push once  dextrose 50% Injectable 12.5 Gram(s) IV Push once  dextrose Oral Gel 15 Gram(s) Oral once PRN  glucagon  Injectable 1 milliGRAM(s) IntraMuscular once  guaiFENesin Oral Liquid (Sugar-Free) 200 milliGRAM(s) Oral every 6 hours PRN  heparin   Injectable 5000 Unit(s) SubCutaneous every 8 hours  lactobacillus acidophilus 1 Tablet(s) Oral daily  levalbuterol Inhalation 0.63 milliGRAM(s) Inhalation every 6 hours PRN  lisinopril 5 milliGRAM(s) Oral daily  naloxone Injectable 0.1 milliGRAM(s) IV Push every 3 minutes PRN  ondansetron Injectable 4 milliGRAM(s) IV Push every 6 hours PRN  pantoprazole  Injectable 40 milliGRAM(s) IV Push daily  sodium chloride 0.9%. 1000 milliLiter(s) IV Continuous <Continuous>  tamsulosin 0.4 milliGRAM(s) Oral at bedtime      Vital Signs Last 24 Hrs  T(C): 37.7 (27 Nov 2023 16:00), Max: 38.7 (27 Nov 2023 00:45)  T(F): 99.9 (27 Nov 2023 16:00), Max: 101.7 (27 Nov 2023 00:45)  HR: 110 (27 Nov 2023 16:00) (94 - 110)  BP: 145/75 (27 Nov 2023 16:00) (117/51 - 145/75)  BP(mean): --  RR: 18 (27 Nov 2023 16:00) (18 - 18)  SpO2: 91% (27 Nov 2023 16:00) (91% - 95%)    Parameters below as of 27 Nov 2023 16:00  Patient On (Oxygen Delivery Method): nasal cannula  O2 Flow (L/min): 2      PHYSICAL EXAM:  General: [ ] non-toxic  HEAD/EYES: [ ] PERRL [x ] white sclera [ ] icterus  ENT:  [ ] normal [x ] supple [ ] thrush [ ] pharyngeal exudate  Cardiovascular:   [ ] murmur [x ] normal [ ] PPM/AICD  Respiratory:  [ x] clear to ausculation bilaterally  GI:  [x ] soft, non-tender, normal bowel sounds  :  [ ] best [x ] no CVA tenderness   Musculoskeletal:  [ ] no synovitis  Neurologic:  [ ] non-focal exam   Skin:  [ x] no rash  Lymph: [x ] no lymphadenopathy  Psychiatric:  [ ] appropriate affect [ ] alert & oriented  Lines:  [x ] no phlebitis [ ] central line                                7.1    6.77  )-----------( 365      ( 27 Nov 2023 07:31 )             21.5       11-27    138  |  101  |  8   ----------------------------<  101<H>  2.7<LL>   |  22  |  0.83    Ca    7.0<L>      27 Nov 2023 07:31  Phos  2.9     11-27  Mg     1.60     11-27        Urinalysis Basic - ( 27 Nov 2023 07:31 )    Color: x / Appearance: x / SG: x / pH: x  Gluc: 101 mg/dL / Ketone: x  / Bili: x / Urobili: x   Blood: x / Protein: x / Nitrite: x   Leuk Esterase: x / RBC: x / WBC x   Sq Epi: x / Non Sq Epi: x / Bacteria: x        MICROBIOLOGY:Culture Results:   No growth at 48 Hours (11-24-23 @ 15:20)  Culture Results:   No growth at 48 Hours (11-24-23 @ 15:15)      RADIOLOGY:

## 2023-11-27 NOTE — SWALLOW BEDSIDE ASSESSMENT ADULT - SWALLOW EVAL: RECOMMENDED DIET
Consider NPO with consideration for short-term non-oral means of nutrition/hydration/medication
Puree with thin liquids

## 2023-11-27 NOTE — PROGRESS NOTE ADULT - ASSESSMENT
73M DM2, HTN, Prostate CA p/w spinal cord compression from metastasis s/p C5-T3 Fusion, C7-T1 decompression c/b MSSA bacteremia from surgical site infection, B/L LE DVT s/p IVC filter 11/1 by IR, aspiration PNA, candida esophagitis w/ dysphagia, acute on chronic anemia, acute dyspnea.

## 2023-11-27 NOTE — PROGRESS NOTE ADULT - PROBLEM SELECTOR PLAN 1
Given recent recurrent fever with no obvious new infectious source, known B/L DVT s/p IVC filter, not on A/C due to cord compression s/p fusion, clinical concern for PE  - continue O2 support  - CTPA ordered

## 2023-11-27 NOTE — PROGRESS NOTE ADULT - SUBJECTIVE AND OBJECTIVE BOX
[facility] Division of Hospital Medicine  Jesus Riojas MD  Pager (PARTHA-AMBAR, 5R-[phone]): [phone]  Other Times:  [phone]    Patient is a 73y old  Male who presents with a chief complaint of Diffuse Spinal Mets from Prostate Cancer (26 Nov 2023 13:12)    SUBJECTIVE / OVERNIGHT EVENTS:  Patient still with fever overnight.  C/o mild dyspnea that's new, no CP. Odynophagia resolved. No chils, N/V, CP, SOB, Cough, lightheadedness, dizziness, abdominal pain, diarrhea, dysuria.    MEDICATIONS  (STANDING):  acetaminophen     Tablet .. 975 milliGRAM(s) Oral every 6 hours  atorvastatin 20 milliGRAM(s) Oral at bedtime  bicalutamide 50 milliGRAM(s) Oral daily  chlorhexidine 2% Cloths 1 Application(s) Topical daily  cholecalciferol 2000 Unit(s) Oral daily  dextrose 5%. 1000 milliLiter(s) (50 mL/Hr) IV Continuous <Continuous>  dextrose 5%. 1000 milliLiter(s) (100 mL/Hr) IV Continuous <Continuous>  dextrose 50% Injectable 25 Gram(s) IV Push once  dextrose 50% Injectable 12.5 Gram(s) IV Push once  dextrose 50% Injectable 25 Gram(s) IV Push once  fluconAZOLE IVPB      fluconAZOLE IVPB 200 milliGRAM(s) IV Intermittent every 24 hours  glucagon  Injectable 1 milliGRAM(s) IntraMuscular once  heparin   Injectable 5000 Unit(s) SubCutaneous every 8 hours  lactobacillus acidophilus 1 Tablet(s) Oral daily  lisinopril 5 milliGRAM(s) Oral daily  moxifloxacin  IVPB 400 milliGRAM(s) IV Intermittent every 24 hours  nafcillin  IVPB 2 Gram(s) IV Intermittent every 4 hours  pantoprazole  Injectable 40 milliGRAM(s) IV Push daily  potassium chloride  20 mEq/100 mL IVPB 20 milliEquivalent(s) IV Intermittent every 2 hours  sodium chloride 0.9%. 1000 milliLiter(s) (100 mL/Hr) IV Continuous <Continuous>  tamsulosin 0.4 milliGRAM(s) Oral at bedtime    MEDICATIONS  (PRN):  benzocaine/menthol Lozenge 1 Lozenge Oral every 6 hours PRN Sore Throat  dextrose Oral Gel 15 Gram(s) Oral once PRN Blood Glucose LESS THAN 70 milliGRAM(s)/deciliter  guaiFENesin Oral Liquid (Sugar-Free) 200 milliGRAM(s) Oral every 6 hours PRN Cough  levalbuterol Inhalation 0.63 milliGRAM(s) Inhalation every 6 hours PRN shortness of breath/wheezing  naloxone Injectable 0.1 milliGRAM(s) IV Push every 3 minutes PRN For ANY of the following changes in patient status:  A. RR LESS THAN 10 breaths per minute, B. Oxygen saturation LESS THAN 90%, C. Sedation score of 6  ondansetron Injectable 4 milliGRAM(s) IV Push every 6 hours PRN Nausea      Vital Signs Last 24 Hrs  T(C): 37.7 (27 Nov 2023 12:40), Max: 38.7 (27 Nov 2023 00:45)  T(F): 99.9 (27 Nov 2023 12:40), Max: 101.7 (27 Nov 2023 00:45)  HR: 106 (27 Nov 2023 12:40) (94 - 110)  BP: 136/64 (27 Nov 2023 12:40) (117/51 - 142/72)  BP(mean): --  RR: 18 (27 Nov 2023 12:40) (17 - 18)  SpO2: 94% (27 Nov 2023 12:40) (93% - 95%)    Parameters below as of 27 Nov 2023 12:40  Patient On (Oxygen Delivery Method): nasal cannula  O2 Flow (L/min): 2    CAPILLARY BLOOD GLUCOSE      POCT Blood Glucose.: 120 mg/dL (27 Nov 2023 12:13)  POCT Blood Glucose.: 90 mg/dL (27 Nov 2023 06:49)  POCT Blood Glucose.: 95 mg/dL (27 Nov 2023 00:36)  POCT Blood Glucose.: 96 mg/dL (26 Nov 2023 18:14)    I&O's Summary    26 Nov 2023 07:01  -  27 Nov 2023 07:00  --------------------------------------------------------  IN: 100 mL / OUT: 700 mL / NET: -600 mL        PHYSICAL EXAM:  CONSTITUTIONAL: NAD  EYES: PERRLA; conjunctiva and sclera clear  ENMT: Moist oral mucosa, no pharyngeal injection or exudates; normal dentition; no obvious plaques noted.  NECK: Supple, no palpable masses; no thyromegaly  RESPIRATORY: Normal respiratory effort; lungs are clear to auscultation bilaterally  CARDIOVASCULAR: Regular rate and rhythm, normal S1 and S2, no murmur/rub/gallop; No lower extremity edema; Peripheral pulses are 2+ bilaterally  ABDOMEN: Nontender to palpation, normoactive bowel sounds, no rebound/guarding; No hepatosplenomegaly  MUSCULOSKELETAL:  Did not assess gait; no clubbing or cyanosis of digits; no joint swelling or tenderness to palpation  PSYCH: A+O to person, place, and time; affect appropriate  NEUROLOGY: CN 2-12 are intact and symmetric; no gross sensory deficits   SKIN: Woundvac in place on his back surgical site - C/D/I    LABS:                        7.1    6.77  )-----------( 365      ( 27 Nov 2023 07:31 )             21.5     11-27    138  |  101  |  8   ----------------------------<  101<H>  2.7<LL>   |  22  |  0.83    Ca    7.0<L>      27 Nov 2023 07:31  Phos  2.9     11-27  Mg     1.60     11-27            Urinalysis Basic - ( 27 Nov 2023 07:31 )    Color: x / Appearance: x / SG: x / pH: x  Gluc: 101 mg/dL / Ketone: x  / Bili: x / Urobili: x   Blood: x / Protein: x / Nitrite: x   Leuk Esterase: x / RBC: x / WBC x   Sq Epi: x / Non Sq Epi: x / Bacteria: x        RADIOLOGY & ADDITIONAL TESTS:    Imaging Personally Reviewed:    Care Discussed with Consultants/Other Providers:

## 2023-11-28 DIAGNOSIS — A04.72 ENTEROCOLITIS DUE TO CLOSTRIDIUM DIFFICILE, NOT SPECIFIED AS RECURRENT: ICD-10-CM

## 2023-11-28 LAB
ANION GAP SERPL CALC-SCNC: 9 MMOL/L — SIGNIFICANT CHANGE UP (ref 7–14)
ANION GAP SERPL CALC-SCNC: 9 MMOL/L — SIGNIFICANT CHANGE UP (ref 7–14)
BLOOD GAS ARTERIAL COMPREHENSIVE RESULT: SIGNIFICANT CHANGE UP
BLOOD GAS ARTERIAL COMPREHENSIVE RESULT: SIGNIFICANT CHANGE UP
BUN SERPL-MCNC: 9 MG/DL — SIGNIFICANT CHANGE UP (ref 7–23)
BUN SERPL-MCNC: 9 MG/DL — SIGNIFICANT CHANGE UP (ref 7–23)
CALCIUM SERPL-MCNC: 7.5 MG/DL — LOW (ref 8.4–10.5)
CALCIUM SERPL-MCNC: 7.5 MG/DL — LOW (ref 8.4–10.5)
CHLORIDE SERPL-SCNC: 106 MMOL/L — SIGNIFICANT CHANGE UP (ref 98–107)
CHLORIDE SERPL-SCNC: 106 MMOL/L — SIGNIFICANT CHANGE UP (ref 98–107)
CO2 SERPL-SCNC: 26 MMOL/L — SIGNIFICANT CHANGE UP (ref 22–31)
CO2 SERPL-SCNC: 26 MMOL/L — SIGNIFICANT CHANGE UP (ref 22–31)
CREAT SERPL-MCNC: 0.81 MG/DL — SIGNIFICANT CHANGE UP (ref 0.5–1.3)
CREAT SERPL-MCNC: 0.81 MG/DL — SIGNIFICANT CHANGE UP (ref 0.5–1.3)
CULTURE RESULTS: SIGNIFICANT CHANGE UP
CULTURE RESULTS: SIGNIFICANT CHANGE UP
EGFR: 94 ML/MIN/1.73M2 — SIGNIFICANT CHANGE UP
EGFR: 94 ML/MIN/1.73M2 — SIGNIFICANT CHANGE UP
GLUCOSE SERPL-MCNC: 111 MG/DL — HIGH (ref 70–99)
GLUCOSE SERPL-MCNC: 111 MG/DL — HIGH (ref 70–99)
MAGNESIUM SERPL-MCNC: 1.7 MG/DL — SIGNIFICANT CHANGE UP (ref 1.6–2.6)
MAGNESIUM SERPL-MCNC: 1.7 MG/DL — SIGNIFICANT CHANGE UP (ref 1.6–2.6)
PHOSPHATE SERPL-MCNC: 2.6 MG/DL — SIGNIFICANT CHANGE UP (ref 2.5–4.5)
PHOSPHATE SERPL-MCNC: 2.6 MG/DL — SIGNIFICANT CHANGE UP (ref 2.5–4.5)
POTASSIUM SERPL-MCNC: 3.3 MMOL/L — LOW (ref 3.5–5.3)
POTASSIUM SERPL-MCNC: 3.3 MMOL/L — LOW (ref 3.5–5.3)
POTASSIUM SERPL-SCNC: 3.3 MMOL/L — LOW (ref 3.5–5.3)
POTASSIUM SERPL-SCNC: 3.3 MMOL/L — LOW (ref 3.5–5.3)
SODIUM SERPL-SCNC: 141 MMOL/L — SIGNIFICANT CHANGE UP (ref 135–145)
SODIUM SERPL-SCNC: 141 MMOL/L — SIGNIFICANT CHANGE UP (ref 135–145)
SPECIMEN SOURCE: SIGNIFICANT CHANGE UP
SPECIMEN SOURCE: SIGNIFICANT CHANGE UP

## 2023-11-28 PROCEDURE — 99233 SBSQ HOSP IP/OBS HIGH 50: CPT

## 2023-11-28 PROCEDURE — 74230 X-RAY XM SWLNG FUNCJ C+: CPT | Mod: 26

## 2023-11-28 PROCEDURE — 72142 MRI NECK SPINE W/DYE: CPT | Mod: 26

## 2023-11-28 RX ORDER — VANCOMYCIN HCL 1 G
125 VIAL (EA) INTRAVENOUS EVERY 6 HOURS
Refills: 0 | Status: COMPLETED | OUTPATIENT
Start: 2023-11-28 | End: 2023-12-08

## 2023-11-28 RX ORDER — ACETAMINOPHEN 500 MG
1000 TABLET ORAL ONCE
Refills: 0 | Status: COMPLETED | OUTPATIENT
Start: 2023-11-28 | End: 2023-11-28

## 2023-11-28 RX ORDER — MAGNESIUM SULFATE 500 MG/ML
1 VIAL (ML) INJECTION ONCE
Refills: 0 | Status: COMPLETED | OUTPATIENT
Start: 2023-11-28 | End: 2023-11-28

## 2023-11-28 RX ORDER — POTASSIUM CHLORIDE 20 MEQ
40 PACKET (EA) ORAL EVERY 4 HOURS
Refills: 0 | Status: COMPLETED | OUTPATIENT
Start: 2023-11-28 | End: 2023-11-28

## 2023-11-28 RX ADMIN — Medication 2000 UNIT(S): at 12:09

## 2023-11-28 RX ADMIN — CHLORHEXIDINE GLUCONATE 1 APPLICATION(S): 213 SOLUTION TOPICAL at 12:10

## 2023-11-28 RX ADMIN — ATORVASTATIN CALCIUM 20 MILLIGRAM(S): 80 TABLET, FILM COATED ORAL at 21:57

## 2023-11-28 RX ADMIN — LEVALBUTEROL 0.63 MILLIGRAM(S): 1.25 SOLUTION, CONCENTRATE RESPIRATORY (INHALATION) at 00:52

## 2023-11-28 RX ADMIN — HEPARIN SODIUM 5000 UNIT(S): 5000 INJECTION INTRAVENOUS; SUBCUTANEOUS at 14:51

## 2023-11-28 RX ADMIN — Medication 125 MILLIGRAM(S): at 23:22

## 2023-11-28 RX ADMIN — Medication 975 MILLIGRAM(S): at 19:20

## 2023-11-28 RX ADMIN — Medication 100 GRAM(S): at 21:58

## 2023-11-28 RX ADMIN — FLUCONAZOLE 100 MILLIGRAM(S): 150 TABLET ORAL at 15:13

## 2023-11-28 RX ADMIN — TAMSULOSIN HYDROCHLORIDE 0.4 MILLIGRAM(S): 0.4 CAPSULE ORAL at 21:57

## 2023-11-28 RX ADMIN — Medication 125 MILLIGRAM(S): at 06:02

## 2023-11-28 RX ADMIN — CEFEPIME 100 MILLIGRAM(S): 1 INJECTION, POWDER, FOR SOLUTION INTRAMUSCULAR; INTRAVENOUS at 21:58

## 2023-11-28 RX ADMIN — Medication 975 MILLIGRAM(S): at 23:23

## 2023-11-28 RX ADMIN — Medication 40 MILLIEQUIVALENT(S): at 15:44

## 2023-11-28 RX ADMIN — CEFEPIME 100 MILLIGRAM(S): 1 INJECTION, POWDER, FOR SOLUTION INTRAMUSCULAR; INTRAVENOUS at 06:13

## 2023-11-28 RX ADMIN — HEPARIN SODIUM 5000 UNIT(S): 5000 INJECTION INTRAVENOUS; SUBCUTANEOUS at 06:02

## 2023-11-28 RX ADMIN — PANTOPRAZOLE SODIUM 40 MILLIGRAM(S): 20 TABLET, DELAYED RELEASE ORAL at 12:08

## 2023-11-28 RX ADMIN — Medication 975 MILLIGRAM(S): at 13:09

## 2023-11-28 RX ADMIN — LEVALBUTEROL 0.63 MILLIGRAM(S): 1.25 SOLUTION, CONCENTRATE RESPIRATORY (INHALATION) at 14:49

## 2023-11-28 RX ADMIN — Medication 1000 MILLIGRAM(S): at 16:13

## 2023-11-28 RX ADMIN — CEFEPIME 100 MILLIGRAM(S): 1 INJECTION, POWDER, FOR SOLUTION INTRAMUSCULAR; INTRAVENOUS at 14:48

## 2023-11-28 RX ADMIN — Medication 975 MILLIGRAM(S): at 06:30

## 2023-11-28 RX ADMIN — Medication 1 TABLET(S): at 12:09

## 2023-11-28 RX ADMIN — LISINOPRIL 5 MILLIGRAM(S): 2.5 TABLET ORAL at 06:01

## 2023-11-28 RX ADMIN — BICALUTAMIDE 50 MILLIGRAM(S): 50 TABLET, FILM COATED ORAL at 12:09

## 2023-11-28 RX ADMIN — Medication 40 MILLIEQUIVALENT(S): at 18:39

## 2023-11-28 RX ADMIN — Medication 975 MILLIGRAM(S): at 18:20

## 2023-11-28 RX ADMIN — Medication 975 MILLIGRAM(S): at 06:01

## 2023-11-28 RX ADMIN — Medication 125 MILLIGRAM(S): at 12:09

## 2023-11-28 RX ADMIN — Medication 125 MILLIGRAM(S): at 18:36

## 2023-11-28 RX ADMIN — HEPARIN SODIUM 5000 UNIT(S): 5000 INJECTION INTRAVENOUS; SUBCUTANEOUS at 21:57

## 2023-11-28 RX ADMIN — Medication 975 MILLIGRAM(S): at 12:09

## 2023-11-28 RX ADMIN — Medication 400 MILLIGRAM(S): at 15:43

## 2023-11-28 RX ADMIN — Medication 975 MILLIGRAM(S): at 00:00

## 2023-11-28 NOTE — SWALLOW VFSS/MBS ASSESSMENT ADULT - RECOMMENDED CONSISTENCY
1. Consider NPO with consideration for short-term non-oral means of nutrition/ hydration/ medication  2. Aspiration precautions  3. Oral Hygiene  4. Reflux precautions
1. Regular Solids with Thin Liquids  2. Swallowing Guidelines: Seated Upright during Mealtimes; Slow Pacing; Take Small Bites; Single/Small Sips for Thin Liquids, Allow for Swallow Prior to Next Presentation; Maintain Oral Hygiene  3. Aspiration and Reflux Precautions

## 2023-11-28 NOTE — PROGRESS NOTE ADULT - NSPROGADDITIONALINFOA_GEN_ALL_CORE
Discussed with daughter Kerry on the phone on 11/27 for 10 minutes  Answered all the questions. Discussed with daughter Kerry on the phone on 11/28 for 10 minutes  Answered all the questions.

## 2023-11-28 NOTE — SWALLOW VFSS/MBS ASSESSMENT ADULT - COMMENTS
Progress Note- Hospitalist 11/27: "73M DM2, HTN, Prostate CA p/w spinal cord compression from metastasis s/p C5-T3 Fusion, C7-T1 decompression c/b MSSA bacteremia from surgical site infection, B/L LE DVT s/p IVC filter 11/1 by IR, aspiration PNA, candida esophagitis w/ dysphagia, acute on chronic anemia, acute dyspnea."     Of note, patient is known to this service, seen for clinical swallow evaluations on 11/21 and a Cinesophagram on 11/22 with recommendations to consider NPO with consideration for short term nonoral means of nutrition. Patient seen for a repeat clinical swallow evaluation on 11/27 with recommendations of Puree with Thin Liquids and a repeat Cinesophagram. (see reports for details)     Patient received in Radiology Suite this AM for a repeat Cinesophagram. Patient transferred to a specialized seating unit with lateral view projection. Patient with nasal cannula.
Hospitalist 11/22, “73M DM2, HTN, Prostate CA p/w spinal cord compression from metastasis s/p C5-T3 Fusion, C7-T1 decompression c/b MSSA bacteremia from surgical site infection, B/L LE DVT s/p IVC filter 11/1 by IR, dysphagia...  ·  Plan: Reviewed CT Neck/Chest. Appreciate ENT eval - reviewed direct laryngoscope imaging on 11/22   - ENT suspects esophageal candida but also possible mass effect from recent C-spine surgery; however no airway compromise noted.   - Nystatin S+S started - if unable to tolerate, will consider IV formulation until dysphagia improved.   - discuss with Ortho in regards to result of the CT Neck.     ENT 11/22: Fiberoptic Indirect laryngoscopy:... 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 Patient noted with O2 via nasal canula. Patient reporting 8.5/10 baseline throat pain that started ~7 days ago which worsens when swallowing or talking. When patient opened mouth to speak noted with anterior loss of secretions. Patient noted with hoarse vocal quality. Despite pain, patient agreeable to PO trials.

## 2023-11-28 NOTE — PROGRESS NOTE ADULT - ASSESSMENT
73M DM2, HTN, Prostate CA p/w spinal cord compression from metastasis s/p C5-T3 Fusion, C7-T1 decompression c/b MSSA bacteremia from surgical site infection, B/L LE DVT s/p IVC filter 11/1 by IR, aspiration PNA, candida esophagitis w/ dysphagia, acute on chronic anemia, acute dyspnea. C. diff colitis.

## 2023-11-28 NOTE — PROGRESS NOTE ADULT - PROBLEM SELECTOR PROBLEM 9
cthemoSubjective:    Toni Lo is a 60 y.o. male with  has a past medical history of ADHD (attention deficit hyperactivity disorder), COVID-19, COVID-19, Depression, DM2 (diabetes mellitus, type 2) ([address] Erectile dysfunction, Gastroesophageal reflux disease, Hyperlipidemia, Hypertension, Kidney stone, Low back pain of thoracolumbar region with sciatica, and Neuropathy. Presented to the office today for:  Chief Complaint   Patient presents with    Diabetes     Follow up    Medication Refill     pending       HPI    62-year-old male with past medical history of DM, hypertension, ESRD on HD MWF presents for diabetes follow-up. A1c today is 5.7, last A1c was 6.86 months ago. Patient is not on any DM medications. BP today is 133/68. On Lopressor 25 mg twice daily. He might forget taking the med sometimes. Reports healthy diet. He is pretty active. Patient is on aspirin. He is also on gabapentin 100 mg TID for diabetic neuropathy. Reports erectile dysfunction for 4 years. He smoked cig and quit 3 years ago. He drinks 2-3 beers every day. Denies drug use. Review of Systems   Constitutional:  Negative for activity change, appetite change, chills, fatigue and fever. HENT: Negative. Respiratory:  Negative for cough, chest tightness and shortness of breath. Cardiovascular:  Negative for chest pain, palpitations and leg swelling. Gastrointestinal:  Negative for abdominal pain, constipation, diarrhea, nausea and vomiting. Genitourinary:  Negative for decreased urine volume, difficulty urinating, dysuria, frequency and hematuria. Musculoskeletal:  Negative for arthralgias, back pain and neck pain. Skin:  Negative for color change. Neurological:  Negative for dizziness, weakness, light-headedness, numbness and headaches.        The patient has a   Family History   Problem Relation Age of Onset    Diabetes Mother     High Blood Pressure Father        Objective:    /68 (Site: Left Upper Arm, Position: Sitting, Cuff Size: Large Adult)   Pulse 69   Wt 221 lb (100.2 kg)   BMI 31.71 kg/m²    BP Readings from Last 3 Encounters:   09/27/22 133/68   08/18/22 114/71   07/13/22 116/61       Physical Exam  Vitals and nursing note reviewed. Constitutional:       General: He is not in acute distress. Appearance: Normal appearance. He is not ill-appearing. HENT:      Head: Normocephalic and atraumatic. Mouth/Throat:      Pharynx: Oropharynx is clear. Cardiovascular:      Rate and Rhythm: Normal rate and regular rhythm. Pulses: Normal pulses. Heart sounds: No murmur heard. Pulmonary:      Effort: Pulmonary effort is normal.      Breath sounds: Normal breath sounds. Abdominal:      Palpations: Abdomen is soft. There is no mass. Tenderness: There is no abdominal tenderness. Musculoskeletal:         General: No swelling or tenderness. Normal range of motion. Cervical back: Normal range of motion and neck supple. Neurological:      General: No focal deficit present. Mental Status: He is alert and oriented to person, place, and time. Lab Results   Component Value Date    WBC 6.2 04/14/2022    HGB 10.9 (L) 04/14/2022    HCT 33.1 (L) 04/14/2022     04/14/2022    CHOL 188 04/14/2022    TRIG 177 (H) 04/14/2022    HDL 80 04/14/2022    ALT 33 09/16/2021    AST 69 (H) 09/16/2021     04/14/2022    K 4.3 04/14/2022    CL 98 04/14/2022    CREATININE 5.73 (HH) 04/14/2022    BUN 34 (H) 04/14/2022    CO2 28 04/14/2022    TSH 1.08 04/14/2022    INR 0.9 09/18/2021    LABA1C 6.9 03/24/2022    LABMICR 3,134 (H) 12/14/2020     Lab Results   Component Value Date    CALCIUM 9.4 04/14/2022    PHOS 7.2 (H) 09/18/2021     Lab Results   Component Value Date    LDLCHOLESTEROL 73 04/14/2022       Assessment and Plan:    1.  Type 2 diabetes mellitus with chronic kidney disease on chronic dialysis, with long-term current use of insulin ([address]  Controlled off medications. Will continue conservative management and lifestyle modification. Counseled patient regarding diet, exercise. Will refill patient's gabapentin because of his diabetic neuropathy. Will refer patient to ophthalmology for diabetic retinal exam.    - gabapentin (NEURONTIN) 100 MG capsule; Take 1 capsule by mouth 3 times daily for 45 days. Dispense: 90 capsule; Refill: 0  - aspirin 81 MG chewable tablet; Take 1 tablet by mouth daily  Dispense: 30 tablet; Refill: 0  - POCT glycosylated hemoglobin (Hb A1C)  - [facility] - Mary Ann Lamas MD, Ophthalmology, [place]    2. Essential hypertension  Well-controlled. BP is 133/68. Will continue Lopressor 50 BID    - metoprolol tartrate (LOPRESSOR) 25 MG tablet; Take 2 tablets by mouth 2 times daily  Dispense: 60 tablet; Refill: 0    3. ESRD (end stage renal disease) on dialysis [facility])  On hemodialysis Monday Wednesday Friday. - metoprolol tartrate (LOPRESSOR) 25 MG tablet; Take 2 tablets by mouth 2 times daily  Dispense: 60 tablet; Refill: 0    4. Erectile dysfunction, unspecified erectile dysfunction type  Not on any medications that might cause ED. Likely secondary to diabetes complications. Will start low dose sildenafil patient is on hemodialysis and sildenafil is not dialyzable. - sildenafil (REVATIO) 20 MG tablet; Take 1 tablet by mouth daily as needed (for erectile dysfunction)  Dispense: 10 tablet; Refill: 0          Requested Prescriptions     Signed Prescriptions Disp Refills    gabapentin (NEURONTIN) 100 MG capsule 90 capsule 0     Sig: Take 1 capsule by mouth 3 times daily for 45 days.     acetaminophen (TYLENOL) 325 MG tablet 120 tablet 0     Sig: TAKE 2 TABLET BY MOUTH EVERY 6 HOURS AS NEEDED FOR PAIN    aspirin 81 MG chewable tablet 30 tablet 0     Sig: Take 1 tablet by mouth daily    metoprolol tartrate (LOPRESSOR) 25 MG tablet 60 tablet 0     Sig: Take 2 tablets by mouth 2 times daily    sildenafil (REVATIO) 20 MG tablet 10 tablet 0     Sig: Take 1 tablet by mouth daily as needed (for erectile dysfunction)       Medications Discontinued During This Encounter   Medication Reason    metoprolol tartrate (LOPRESSOR) 25 MG tablet     aspirin 81 MG chewable tablet REORDER    acetaminophen (TYLENOL) 325 MG tablet REORDER    gabapentin (NEURONTIN) 100 MG capsule REORDER       Marques received counseling on the following healthy behaviors: nutrition, exercise and medication adherence    Discussed use,benefit, and side effects of prescribed medications. Barriers to medication compliance addressed. All patient questions answered. Pt voiced understanding. Return in about 3 months (around 12/27/2022) for HTN. Disclaimer: Some orall of this note was transcribed using voice-recognition software. This may cause typographical errors occasionally. Although all effort is made to fix these errors, please do not hesitate to contact our office if there Rene Rodas concern with the understanding of this note. T2DM (type 2 diabetes mellitus)

## 2023-11-28 NOTE — SWALLOW VFSS/MBS ASSESSMENT ADULT - DIAGNOSTIC IMPRESSIONS
1) Functional Oral Stage for Puree, Regular Solids, Moderately Thick Liquids, Mildly Thick Liquids, and Thin Liquids characterized by adequate oral containment, adequate bolus manipulation, adequate mastication for Regular Solids, adequate anterior to posterior transfer, premature spillage to the hypopharynx (vallecular) due to reduced tongue to palate seal greater for Thin Liquids, adequate oral clearance.   2) Mild Pharyngeal Stage for Puree, Regular Solids, Moderately Thick Liquids, Mildly Thick Liquids, and Thin Liquids characterized by delayed initiation of the pharyngeal swallow (Bolus head at the vallecular for Thin Liquids), adequate laryngeal elevation, adequate laryngeal vestibule closure, reduced base of tongue retraction, adequate epiglottic deflection, adequate pharyngeal contractility. There is Trace Pharyngeal Clearance at the vallecular across consistencies post primary swallow. Spontaneous reswallow benefits to improve pharyngeal clearance. There is Trace Laryngeal Penetration during the swallow with retrieval for Thin Liquids with airway protection maintained. There is No Aspiration before, during, or after the swallow for Puree, Regular Solids, Moderately Thick Liquids, Mildly Thick Liquids, and Thin Liquids.    Of note, patient noted with cervical hardware as indicated on previous Cinesophagram.

## 2023-11-28 NOTE — PROGRESS NOTE ADULT - PROBLEM SELECTOR PLAN 1
Noted result of CTPA.  - No PE  - Broadened antibiotics to cover for aspiration multilobar PNA.  - Cefepime IV (11/28-)

## 2023-11-28 NOTE — PROGRESS NOTE ADULT - PROBLEM SELECTOR PLAN 2
noted diarrhea and positive C. diff colitis.  - Started on Vanco PO (11/27-)  - continue probiotics  - likely due to long term abx.

## 2023-11-28 NOTE — PROGRESS NOTE ADULT - ASSESSMENT
73-yo M w/ PMH of metastatic prostate CA s/p spinal decompression, now w/ fever and MSSA bacteremia. BCx clear as of 11/16.     #ongoing fever  #anemia  #MSSA bactremia    1) High grade MSSA bacteremia  TTE without obvious vegetation  Repeat blood culture 11/16 and 11/17 are without growth    Leukoepnia  ? due to cephalosporin. Improved after change to nafcillin.  C/t monitor    2) Abnormal Imaging  Fluid collections noted on imaging  Vac placed  Wound examined during vac change- incision was not opened. Nontender at the site.  MRI C/T spine pending.    3) He is having fever.    ? new focus vs persistent focus on MSSA  CT with opacities in lung- he did just have adenovirus.  C/o increased SOB  Continue  Cefepime 2 gram iv q 8  ----will need to monitor counts slowly    MRI C/T spine pending as above - ? if drainage is needed  Spine surgery and plastic surgery follow up  Repeat BCx.    4) Bacteruria  Culture with mixed growth  100 K providenica< 10-50 K e fecalis  Likely colonization  Finished Cipro    5) Adenovirus  Initially tested positive on 11/15 with cough  Cough improved.   Repeat positive likely due to sensitivity of PCR  I would not attribute ongoing fever to adenovirus    6) anemia  ? cause  primary team evaluating    7) C diff  Po vanco for 10 days    Supportive care    Weston Mcghee MD  Please contact via TEAM between 8 am and 6 pm    In the evening or on weekends, can contact call service at [phone] 73-yo M w/ PMH of metastatic prostate CA s/p spinal decompression, now w/ fever and MSSA bacteremia. BCx clear as of 11/16.     #ongoing fever  #anemia  #MSSA bactremia    1) High grade MSSA bacteremia  TTE without obvious vegetation  Repeat blood culture 11/16 and 11/17 are without growth    Leukopenia  ? due to cephalosporin. Improved after change to nafcillin.  C/t monitor    2) Abnormal Imaging  Fluid collections noted on imaging  Vac placed  Wound examined during vac change- incision was not opened. Nontender at the site.  MRI C/T spine pending.    3) He is having fever.    ? new focus vs persistent focus on MSSA  CT with opacities in lung- he did just have adenovirus.  C/o increased SOB  Continue  Cefepime 2 gram iv q 8  ----will need to monitor counts slowly    MRI cervical spine as above - ? if drainage is needed given ongoing fever  Spine surgery and plastic surgery follow up  Repeat BCx testing    4) Bacteruria  Culture with mixed growth  100 K providenica< 10-50 K e fecalis  Likely colonization  Finished Cipro    5) Adenovirus  Initially tested positive on 11/15 with cough  Cough improved.   Repeat positive likely due to sensitivity of PCR  I would not attribute ongoing fever to adenovirus    6) anemia  ? cause  primary team evaluating    7) C diff  Po vanco for 10 days    Supportive care    Weston Mcghee MD  Please contact via TEAM between 8 am and 6 pm    In the evening or on weekends, can contact call service at [phone]

## 2023-11-28 NOTE — PROGRESS NOTE ADULT - ASSESSMENT
73y Male s/p C5-T3 posterior cervical fusion, C7-T1 decompression for metastatic prostate cancer with plastics closure on 11/2. Recovering well. Both drains removed. MRI 11/14 showing possible seroma in subcutaneous space. Incisional vac w/ black sponge/Adaptic started on 11/17/2023, now left open to air    Plan:  - leave back incision open to air  - F/U fever work up - RVP +adenovirus; UA now negative. C diff positive  - Monitor vac output  - Optimize nutrition, ensure adequate protein intake  - Remainder of care per primary team    Plastic Surgery  #[phone] [facility] pager

## 2023-11-28 NOTE — PROGRESS NOTE ADULT - SUBJECTIVE AND OBJECTIVE BOX
Follow Up:      Inverval History/ROS:Patient is a 73y old  Male who presents with a chief complaint of Diffuse Spinal Mets from Prostate Cancer (28 Nov 2023 12:56)    + fever    Allergies    No Known Allergies    Intolerances        ANTIMICROBIALS:  cefepime   IVPB 2000 every 8 hours  fluconAZOLE IVPB 200 every 24 hours  fluconAZOLE IVPB    vancomycin    Solution 125 every 6 hours      OTHER MEDS:  acetaminophen     Tablet .. 975 milliGRAM(s) Oral every 6 hours  atorvastatin 20 milliGRAM(s) Oral at bedtime  benzocaine/menthol Lozenge 1 Lozenge Oral every 6 hours PRN  bicalutamide 50 milliGRAM(s) Oral daily  chlorhexidine 2% Cloths 1 Application(s) Topical daily  cholecalciferol 2000 Unit(s) Oral daily  dextrose 5%. 1000 milliLiter(s) IV Continuous <Continuous>  dextrose 5%. 1000 milliLiter(s) IV Continuous <Continuous>  dextrose 50% Injectable 25 Gram(s) IV Push once  dextrose 50% Injectable 25 Gram(s) IV Push once  dextrose 50% Injectable 12.5 Gram(s) IV Push once  dextrose Oral Gel 15 Gram(s) Oral once PRN  glucagon  Injectable 1 milliGRAM(s) IntraMuscular once  guaiFENesin Oral Liquid (Sugar-Free) 200 milliGRAM(s) Oral every 6 hours PRN  heparin   Injectable 5000 Unit(s) SubCutaneous every 8 hours  lactobacillus acidophilus 1 Tablet(s) Oral daily  levalbuterol Inhalation 0.63 milliGRAM(s) Inhalation every 6 hours PRN  lisinopril 5 milliGRAM(s) Oral daily  naloxone Injectable 0.1 milliGRAM(s) IV Push every 3 minutes PRN  ondansetron Injectable 4 milliGRAM(s) IV Push every 6 hours PRN  pantoprazole  Injectable 40 milliGRAM(s) IV Push daily  potassium chloride    Tablet ER 40 milliEquivalent(s) Oral every 4 hours  sodium chloride 0.9%. 1000 milliLiter(s) IV Continuous <Continuous>  tamsulosin 0.4 milliGRAM(s) Oral at bedtime      Vital Signs Last 24 Hrs  T(C): 39.3 (28 Nov 2023 14:30), Max: 39.3 (28 Nov 2023 14:30)  T(F): 102.7 (28 Nov 2023 14:30), Max: 102.7 (28 Nov 2023 14:30)  HR: 119 (28 Nov 2023 06:00) (101 - 119)  BP: 147/75 (28 Nov 2023 06:00) (121/58 - 147/75)  BP(mean): --  RR: 18 (28 Nov 2023 06:00) (18 - 18)  SpO2: 96% (28 Nov 2023 06:00) (94% - 96%)    Parameters below as of 28 Nov 2023 06:00  Patient On (Oxygen Delivery Method): nasal cannula  O2 Flow (L/min): 4      PHYSICAL EXAM:  General: [x ] non-toxic  HEAD/EYES: [ ] PERRL [x ] white sclera [ ] icterus  ENT:  [ ] normal [ x] supple [ ] thrush [ ] pharyngeal exudate  Cardiovascular:   [ ] murmur [x ] normal [ ] PPM/AICD  Respiratory:  [ x] clear to ausculation bilaterally  GI:  [ x] soft, non-tender, normal bowel sounds  :  [ ] best [ x] no CVA tenderness   Musculoskeletal:  [ ] no synovitis  Neurologic:  [ ] non-focal exam   Skin:  [x ] no rash  Lymph: [x ] no lymphadenopathy  Psychiatric:  [ ] appropriate affect [ ] alert & oriented  Lines:  [x ] no phlebitis [ ] central line  x                              7.1    6.77  )-----------( 365      ( 27 Nov 2023 07:31 )             21.5       11-27    138  |  101  |  8   ----------------------------<  101<H>  2.7<LL>   |  22  |  0.83    Ca    7.0<L>      27 Nov 2023 07:31  Phos  2.9     11-27  Mg     1.60     11-27        Urinalysis Basic - ( 27 Nov 2023 07:31 )    Color: x / Appearance: x / SG: x / pH: x  Gluc: 101 mg/dL / Ketone: x  / Bili: x / Urobili: x   Blood: x / Protein: x / Nitrite: x   Leuk Esterase: x / RBC: x / WBC x   Sq Epi: x / Non Sq Epi: x / Bacteria: x        MICROBIOLOGY:Culture Results:   No Protozoa seen by trichrome stain  No Helminths or Protozoa seen in formalin concentrate  performed by iodine stain  (routine O+P not evaluated for Microsporidia,  Cryptosporidia or Cyclospora)  One negative sample does not necessarily rule  out the presence of a parasitic infection. (11-27-23 @ 17:07)  Culture Results:   No growth at 72 Hours (11-24-23 @ 15:20)  Culture Results:   No growth at 72 Hours (11-24-23 @ 15:15)      RADIOLOGY:     Follow Up:      Inverval History/ROS:Patient is a 73y old  Male who presents with a chief complaint of Diffuse Spinal Mets from Prostate Cancer (28 Nov 2023 12:56)    + fever    Allergies    No Known Allergies    Intolerances        ANTIMICROBIALS:  cefepime   IVPB 2000 every 8 hours  fluconAZOLE IVPB 200 every 24 hours  fluconAZOLE IVPB    vancomycin    Solution 125 every 6 hours      OTHER MEDS:  acetaminophen     Tablet .. 975 milliGRAM(s) Oral every 6 hours  atorvastatin 20 milliGRAM(s) Oral at bedtime  benzocaine/menthol Lozenge 1 Lozenge Oral every 6 hours PRN  bicalutamide 50 milliGRAM(s) Oral daily  chlorhexidine 2% Cloths 1 Application(s) Topical daily  cholecalciferol 2000 Unit(s) Oral daily  dextrose 5%. 1000 milliLiter(s) IV Continuous <Continuous>  dextrose 5%. 1000 milliLiter(s) IV Continuous <Continuous>  dextrose 50% Injectable 25 Gram(s) IV Push once  dextrose 50% Injectable 25 Gram(s) IV Push once  dextrose 50% Injectable 12.5 Gram(s) IV Push once  dextrose Oral Gel 15 Gram(s) Oral once PRN  glucagon  Injectable 1 milliGRAM(s) IntraMuscular once  guaiFENesin Oral Liquid (Sugar-Free) 200 milliGRAM(s) Oral every 6 hours PRN  heparin   Injectable 5000 Unit(s) SubCutaneous every 8 hours  lactobacillus acidophilus 1 Tablet(s) Oral daily  levalbuterol Inhalation 0.63 milliGRAM(s) Inhalation every 6 hours PRN  lisinopril 5 milliGRAM(s) Oral daily  naloxone Injectable 0.1 milliGRAM(s) IV Push every 3 minutes PRN  ondansetron Injectable 4 milliGRAM(s) IV Push every 6 hours PRN  pantoprazole  Injectable 40 milliGRAM(s) IV Push daily  potassium chloride    Tablet ER 40 milliEquivalent(s) Oral every 4 hours  sodium chloride 0.9%. 1000 milliLiter(s) IV Continuous <Continuous>  tamsulosin 0.4 milliGRAM(s) Oral at bedtime      Vital Signs Last 24 Hrs  T(C): 39.3 (28 Nov 2023 14:30), Max: 39.3 (28 Nov 2023 14:30)  T(F): 102.7 (28 Nov 2023 14:30), Max: 102.7 (28 Nov 2023 14:30)  HR: 119 (28 Nov 2023 06:00) (101 - 119)  BP: 147/75 (28 Nov 2023 06:00) (121/58 - 147/75)  BP(mean): --  RR: 18 (28 Nov 2023 06:00) (18 - 18)  SpO2: 96% (28 Nov 2023 06:00) (94% - 96%)    Parameters below as of 28 Nov 2023 06:00  Patient On (Oxygen Delivery Method): nasal cannula  O2 Flow (L/min): 4      PHYSICAL EXAM:  General: [x ] non-toxic  HEAD/EYES: [ ] PERRL [x ] white sclera [ ] icterus  ENT:  [ ] normal [ x] supple [ ] thrush [ ] pharyngeal exudate  Cardiovascular:   [ ] murmur [x ] normal [ ] PPM/AICD  Respiratory:  [ x] clear to ausculation bilaterally  GI:  [ x] soft, non-tender, normal bowel sounds  :  [ ] best [ x] no CVA tenderness   Musculoskeletal:  [ ] no synovitis  Neurologic:  [ ] non-focal exam   Skin:  [x ] no rash  Lymph: [x ] no lymphadenopathy  Psychiatric:  [ ] appropriate affect [ ] alert & oriented  Lines:  [x ] no phlebitis [ ] central line  x                              7.1    6.77  )-----------( 365      ( 27 Nov 2023 07:31 )             21.5       11-27    138  |  101  |  8   ----------------------------<  101<H>  2.7<LL>   |  22  |  0.83    Ca    7.0<L>      27 Nov 2023 07:31  Phos  2.9     11-27  Mg     1.60     11-27        Urinalysis Basic - ( 27 Nov 2023 07:31 )    Color: x / Appearance: x / SG: x / pH: x  Gluc: 101 mg/dL / Ketone: x  / Bili: x / Urobili: x   Blood: x / Protein: x / Nitrite: x   Leuk Esterase: x / RBC: x / WBC x   Sq Epi: x / Non Sq Epi: x / Bacteria: x        MICROBIOLOGY:Culture Results:   No Protozoa seen by trichrome stain  No Helminths or Protozoa seen in formalin concentrate  performed by iodine stain  (routine O+P not evaluated for Microsporidia,  Cryptosporidia or Cyclospora)  One negative sample does not necessarily rule  out the presence of a parasitic infection. (11-27-23 @ 17:07)  Culture Results:   No growth at 72 Hours (11-24-23 @ 15:20)  Culture Results:   No growth at 72 Hours (11-24-23 @ 15:15)      RADIOLOGY:    < from: MR Cervical Spine w/ IV Cont (11.28.23 @ 10:39) >  ACC: [id number] EXAM:  MR SPINE CERVICAL IC   ORDERED BY: EDDIE AYON     PROCEDURE DATE:  11/28/2023          INTERPRETATION:  Clinical indication: Status post surgery.    MRI of the cervical spine was performed sagittal T1-T2 and STIR   sequences.Axial T1 and T2-weighted sequences were performed. The patient   was injected with approximately 10 cc gadavist IV and sagittal   T1-weighted sequence was performed with fat suppression. Axial   T1-weighted sequences performed    This exam is compared with prior preoperative MRI cervical spine   performed on October 30, 2023.    Loss normal of the cervical lordosis is seen. This could be due to   positioning or muscle spasm.    Postoperative changes compatible bilateral laminectomy is seen involving   the C7, T1, C7-T1 levels. There is evidence of posterior spinal fusion   seen central C5-T3. There is evidence of a large collection in the   posterior paraspinal soft tissue region which extends from C3 to at least   T3 and possibly beyond. This collection measures approximately 3.8 x 9.1   cm. This could be compatible with a sterile postoperative collection,   though the possibility of underlying abscess cannot entirely excluded.   There is evidence of abnormal T1 prolongation associated enhancement seen   involving the C7, T1, T2, T3 and T4 vertebral bodies. Similar findings   are seen involving the posterior elements at these levels. These findings   could be compatible with underlying metastasis, multiple myeloma lymphoma   or othersimilar etiologies.    C2-3: Normal    C3-4: Disc bulge and bilateral hypertrophic facet disease. Moderate to   severe narrowing of both neural foramen    C4-5: Disc bulge and bilateral hypertrophic facet change. Moderate   narrowing of both neural foramen    C5-6: Bilateral hypertrophic facet joint changes are seen. Mild narrowing   of the left neural foramen and mild to moderate narrowing of the right   neural foramen    C6-7: Normal    C7-T1: Bilateral hypertrophic facet joint changes seen. Moderate   narrowing of both neural foramen    Evaluation of the spinal cord appears grossly unremarkable.    Impression: Postop changes are identified as described above.    There is evidence of a large collection seen involving posterior   paraspinal soft tissue region.    Abnormal T1 prolongation associated enhancement is seen involving the   C7-T4 vertebral body as described above.    < end of copied text >

## 2023-11-28 NOTE — CHART NOTE - NSCHARTNOTEFT_GEN_A_CORE
Notified by RN of pt with + C.diff. Continue contact precautions. Will start PO Vanc 125mg Q 6 hours x10 days. F/u ID recs in AM.

## 2023-11-28 NOTE — PROGRESS NOTE ADULT - SUBJECTIVE AND OBJECTIVE BOX
Plastic Surgery Progress Note (pg [facility]: [id number], [facility]: [phone])    SUBJECTIVE  Pt comfortable in bed. Pain well controlled, no complaints.    OBJECTIVE  ___________________________________________________  VITAL SIGNS / I&O's   Vital Signs Last 24 Hrs  T(C): 37.1 (28 Nov 2023 06:00), Max: 37.7 (27 Nov 2023 12:40)  T(F): 98.8 (28 Nov 2023 06:00), Max: 99.9 (27 Nov 2023 12:40)  HR: 119 (28 Nov 2023 06:00) (95 - 119)  BP: 147/75 (28 Nov 2023 06:00) (117/51 - 147/75)  BP(mean): --  RR: 18 (28 Nov 2023 06:00) (18 - 18)  SpO2: 96% (28 Nov 2023 06:00) (91% - 96%)    Parameters below as of 28 Nov 2023 06:00  Patient On (Oxygen Delivery Method): nasal cannula  O2 Flow (L/min): 4        ___________________________________________________  PHYSICAL EXAM    General: NAD, Lying in bed   Neuro: Awake and alert  Pulm: non-labored respirations  Back: Incisions intact with no palpable collection. No erythema    ___________________________________________________  LABS                        7.1    6.77  )-----------( 365      ( 27 Nov 2023 07:31 )             21.5     27 Nov 2023 07:31    138    |  101    |  8      ----------------------------<  101    2.7     |  22     |  0.83     Ca    7.0        27 Nov 2023 07:31  Phos  2.9       27 Nov 2023 07:31  Mg     1.60      27 Nov 2023 07:31        CAPILLARY BLOOD GLUCOSE      POCT Blood Glucose.: 120 mg/dL (27 Nov 2023 22:30)  POCT Blood Glucose.: 128 mg/dL (27 Nov 2023 19:09)  POCT Blood Glucose.: 120 mg/dL (27 Nov 2023 12:13)    CARDIAC MARKERS ( 27 Nov 2023 13:41 )  x     / x     / 90 U/L / x     / 2.7 ng/mL      Urinalysis Basic - ( 27 Nov 2023 07:31 )    Color: x / Appearance: x / SG: x / pH: x  Gluc: 101 mg/dL / Ketone: x  / Bili: x / Urobili: x   Blood: x / Protein: x / Nitrite: x   Leuk Esterase: x / RBC: x / WBC x   Sq Epi: x / Non Sq Epi: x / Bacteria: x      ___________________________________________________  MICRO  Recent Cultures:  Specimen Source: .Stool Feces, 11-27 @ 17:07; Results   Testing in progress; Gram Stain: --; Organism: --  Specimen Source: .Blood Blood-Peripheral, 11-24 @ 15:20; Results   No growth at 72 Hours; Gram Stain: --; Organism: --  Specimen Source: .Blood Blood-Peripheral, 11-24 @ 15:15; Results   No growth at 72 Hours; Gram Stain: --; Organism: --    ___________________________________________________  MEDICATIONS  (STANDING):  acetaminophen     Tablet .. 975 milliGRAM(s) Oral every 6 hours  atorvastatin 20 milliGRAM(s) Oral at bedtime  bicalutamide 50 milliGRAM(s) Oral daily  cefepime   IVPB 2000 milliGRAM(s) IV Intermittent every 8 hours  chlorhexidine 2% Cloths 1 Application(s) Topical daily  cholecalciferol 2000 Unit(s) Oral daily  dextrose 5%. 1000 milliLiter(s) (50 mL/Hr) IV Continuous <Continuous>  dextrose 5%. 1000 milliLiter(s) (100 mL/Hr) IV Continuous <Continuous>  dextrose 50% Injectable 25 Gram(s) IV Push once  dextrose 50% Injectable 12.5 Gram(s) IV Push once  dextrose 50% Injectable 25 Gram(s) IV Push once  fluconAZOLE IVPB      fluconAZOLE IVPB 200 milliGRAM(s) IV Intermittent every 24 hours  glucagon  Injectable 1 milliGRAM(s) IntraMuscular once  heparin   Injectable 5000 Unit(s) SubCutaneous every 8 hours  lactobacillus acidophilus 1 Tablet(s) Oral daily  lisinopril 5 milliGRAM(s) Oral daily  pantoprazole  Injectable 40 milliGRAM(s) IV Push daily  sodium chloride 0.9%. 1000 milliLiter(s) (100 mL/Hr) IV Continuous <Continuous>  tamsulosin 0.4 milliGRAM(s) Oral at bedtime  vancomycin    Solution 125 milliGRAM(s) Oral every 6 hours    MEDICATIONS  (PRN):  benzocaine/menthol Lozenge 1 Lozenge Oral every 6 hours PRN Sore Throat  dextrose Oral Gel 15 Gram(s) Oral once PRN Blood Glucose LESS THAN 70 milliGRAM(s)/deciliter  guaiFENesin Oral Liquid (Sugar-Free) 200 milliGRAM(s) Oral every 6 hours PRN Cough  levalbuterol Inhalation 0.63 milliGRAM(s) Inhalation every 6 hours PRN shortness of breath/wheezing  naloxone Injectable 0.1 milliGRAM(s) IV Push every 3 minutes PRN For ANY of the following changes in patient status:  A. RR LESS THAN 10 breaths per minute, B. Oxygen saturation LESS THAN 90%, C. Sedation score of 6  ondansetron Injectable 4 milliGRAM(s) IV Push every 6 hours PRN Nausea

## 2023-11-28 NOTE — PROGRESS NOTE ADULT - SUBJECTIVE AND OBJECTIVE BOX
[facility] Division of Hospital Medicine  Jesus Riojas MD  Pager (PARTHA-F, 8T-5P): [phone]  Other Times:  [phone]    Patient is a 73y old  Male who presents with a chief complaint of Diffuse Spinal Mets from Prostate Cancer (28 Nov 2023 07:21)    SUBJECTIVE / OVERNIGHT EVENTS:  Patient returned from MR brain.  Still with dyspnea but overall feels better than  yesterday. No F/C, N/V, CP, SOB, Cough, lightheadedness, dizziness, abdominal pain, diarrhea, dysuria.    MEDICATIONS  (STANDING):  acetaminophen     Tablet .. 975 milliGRAM(s) Oral every 6 hours  atorvastatin 20 milliGRAM(s) Oral at bedtime  bicalutamide 50 milliGRAM(s) Oral daily  cefepime   IVPB 2000 milliGRAM(s) IV Intermittent every 8 hours  chlorhexidine 2% Cloths 1 Application(s) Topical daily  cholecalciferol 2000 Unit(s) Oral daily  dextrose 5%. 1000 milliLiter(s) (50 mL/Hr) IV Continuous <Continuous>  dextrose 5%. 1000 milliLiter(s) (100 mL/Hr) IV Continuous <Continuous>  dextrose 50% Injectable 25 Gram(s) IV Push once  dextrose 50% Injectable 25 Gram(s) IV Push once  dextrose 50% Injectable 12.5 Gram(s) IV Push once  fluconAZOLE IVPB 200 milliGRAM(s) IV Intermittent every 24 hours  fluconAZOLE IVPB      glucagon  Injectable 1 milliGRAM(s) IntraMuscular once  heparin   Injectable 5000 Unit(s) SubCutaneous every 8 hours  lactobacillus acidophilus 1 Tablet(s) Oral daily  lisinopril 5 milliGRAM(s) Oral daily  pantoprazole  Injectable 40 milliGRAM(s) IV Push daily  sodium chloride 0.9%. 1000 milliLiter(s) (100 mL/Hr) IV Continuous <Continuous>  tamsulosin 0.4 milliGRAM(s) Oral at bedtime  vancomycin    Solution 125 milliGRAM(s) Oral every 6 hours    MEDICATIONS  (PRN):  benzocaine/menthol Lozenge 1 Lozenge Oral every 6 hours PRN Sore Throat  dextrose Oral Gel 15 Gram(s) Oral once PRN Blood Glucose LESS THAN 70 milliGRAM(s)/deciliter  guaiFENesin Oral Liquid (Sugar-Free) 200 milliGRAM(s) Oral every 6 hours PRN Cough  levalbuterol Inhalation 0.63 milliGRAM(s) Inhalation every 6 hours PRN shortness of breath/wheezing  naloxone Injectable 0.1 milliGRAM(s) IV Push every 3 minutes PRN For ANY of the following changes in patient status:  A. RR LESS THAN 10 breaths per minute, B. Oxygen saturation LESS THAN 90%, C. Sedation score of 6  ondansetron Injectable 4 milliGRAM(s) IV Push every 6 hours PRN Nausea      Vital Signs Last 24 Hrs  T(C): 37.1 (28 Nov 2023 06:00), Max: 37.7 (27 Nov 2023 16:00)  T(F): 98.8 (28 Nov 2023 06:00), Max: 99.9 (27 Nov 2023 16:00)  HR: 119 (28 Nov 2023 06:00) (96 - 119)  BP: 147/75 (28 Nov 2023 06:00) (121/58 - 147/75)  BP(mean): --  RR: 18 (28 Nov 2023 06:00) (18 - 18)  SpO2: 96% (28 Nov 2023 06:00) (91% - 96%)    Parameters below as of 28 Nov 2023 06:00  Patient On (Oxygen Delivery Method): nasal cannula  O2 Flow (L/min): 4    CAPILLARY BLOOD GLUCOSE      POCT Blood Glucose.: 115 mg/dL (28 Nov 2023 12:07)  POCT Blood Glucose.: 120 mg/dL (27 Nov 2023 22:30)  POCT Blood Glucose.: 128 mg/dL (27 Nov 2023 19:09)    I&O's Summary      PHYSICAL EXAM:  CONSTITUTIONAL: NAD  EYES: PERRLA; conjunctiva and sclera clear  ENMT: Moist oral mucosa, no pharyngeal injection or exudates; normal dentition; no obvious plaques noted.  NECK: Supple, no palpable masses; no thyromegaly  RESPIRATORY: Normal respiratory effort; lungs are clear to auscultation bilaterally  CARDIOVASCULAR: Regular rate and rhythm, normal S1 and S2, no murmur/rub/gallop; No lower extremity edema; Peripheral pulses are 2+ bilaterally  ABDOMEN: Nontender to palpation, normoactive bowel sounds, no rebound/guarding; No hepatosplenomegaly  MUSCULOSKELETAL:  Did not assess gait; no clubbing or cyanosis of digits; no joint swelling or tenderness to palpation  PSYCH: A+O to person, place, and time; affect appropriate  NEUROLOGY: CN 2-12 are intact and symmetric; no gross sensory deficits   SKIN: Woundvac in place on his back surgical site - C/D/I    LABS:                        7.1    6.77  )-----------( 365      ( 27 Nov 2023 07:31 )             21.5     11-27    138  |  101  |  8   ----------------------------<  101<H>  2.7<LL>   |  22  |  0.83    Ca    7.0<L>      27 Nov 2023 07:31  Phos  2.9     11-27  Mg     1.60     11-27        CARDIAC MARKERS ( 27 Nov 2023 13:41 )  x     / x     / 90 U/L / x     / 2.7 ng/mL      Urinalysis Basic - ( 27 Nov 2023 07:31 )    Color: x / Appearance: x / SG: x / pH: x  Gluc: 101 mg/dL / Ketone: x  / Bili: x / Urobili: x   Blood: x / Protein: x / Nitrite: x   Leuk Esterase: x / RBC: x / WBC x   Sq Epi: x / Non Sq Epi: x / Bacteria: x        RADIOLOGY & ADDITIONAL TESTS:  < from: MR Cervical Spine w/ IV Cont (11.28.23 @ 10:39) >  INTERPRETATION:  Clinical indication: Status post surgery.    MRI of the cervical spine was performed sagittal T1-T2 and STIR   sequences.Axial T1 and T2-weighted sequences were performed. The patient   was injected with approximately 10 cc gadavist IV and sagittal   T1-weighted sequence was performed with fat suppression. Axial   T1-weighted sequences performed    This exam is compared with prior preoperative MRI cervical spine   performed on October 30, 2023.    Loss normal of the cervical lordosis is seen. This could be due to   positioning or muscle spasm.    Postoperative changes compatible bilateral laminectomy is seen involving   the C7, T1, C7-T1 levels. There is evidence of posterior spinal fusion   seen central C5-T3. There is evidence of a large collection in the   posterior paraspinal soft tissue region which extends from C3 to at least   T3 and possibly beyond. This collection measures approximately 3.8 x 9.1   cm. This could be compatible with a sterile postoperative collection,   though the possibility of underlying abscess cannot entirely excluded.   There is evidence of abnormal T1 prolongation associated enhancement seen   involving the C7, T1, T2, T3 and T4 vertebral bodies. Similar findings   are seen involving the posterior elements at these levels. These findings   could be compatible with underlying metastasis, multiple myeloma lymphoma   or othersimilar etiologies.    C2-3: Normal    C3-4: Disc bulge and bilateral hypertrophic facet disease. Moderate to   severe narrowing of both neural foramen    C4-5: Disc bulge and bilateral hypertrophic facet change. Moderate   narrowing of both neural foramen    C5-6: Bilateral hypertrophic facet joint changes are seen. Mild narrowing   of the left neural foramen and mild to moderate narrowing of the right   neural foramen    C6-7: Normal    C7-T1: Bilateral hypertrophic facet joint changes seen. Moderate   narrowing of both neural foramen    Evaluation of the spinal cord appears grossly unremarkable.    Impression: Postop changes are identified as described above.    There is evidence of a large collection seen involving posterior   paraspinal soft tissue region.    Abnormal T1 prolongation associated enhancement is seen involving the   C7-T4 vertebral body as described above.    < end of copied text >  < from: CT Angio Chest PE Protocol w/ IV Cont (11.27.23 @ 18:19) >  No evidence for pulmonary embolus up to and including lobar branches.   Segmental and subsegmental branches are not well evaluated due to   respiratory motion artifact.  Worsening multifocal confluent groundglass infiltratesand consolidative   opacities in the right lower lobe and lateral left lung base. Correlate   for atypical pneumonia.    < end of copied text >      Imaging Personally Reviewed:    Care Discussed with Consultants/Other Providers:

## 2023-11-29 LAB
ANION GAP SERPL CALC-SCNC: 10 MMOL/L — SIGNIFICANT CHANGE UP (ref 7–14)
ANION GAP SERPL CALC-SCNC: 10 MMOL/L — SIGNIFICANT CHANGE UP (ref 7–14)
BASOPHILS # BLD AUTO: 0.02 K/UL — SIGNIFICANT CHANGE UP (ref 0–0.2)
BASOPHILS # BLD AUTO: 0.02 K/UL — SIGNIFICANT CHANGE UP (ref 0–0.2)
BASOPHILS NFR BLD AUTO: 0.3 % — SIGNIFICANT CHANGE UP (ref 0–2)
BASOPHILS NFR BLD AUTO: 0.3 % — SIGNIFICANT CHANGE UP (ref 0–2)
BLD GP AB SCN SERPL QL: NEGATIVE — SIGNIFICANT CHANGE UP
BLD GP AB SCN SERPL QL: NEGATIVE — SIGNIFICANT CHANGE UP
BUN SERPL-MCNC: 10 MG/DL — SIGNIFICANT CHANGE UP (ref 7–23)
BUN SERPL-MCNC: 10 MG/DL — SIGNIFICANT CHANGE UP (ref 7–23)
CALCIUM SERPL-MCNC: 7.6 MG/DL — LOW (ref 8.4–10.5)
CALCIUM SERPL-MCNC: 7.6 MG/DL — LOW (ref 8.4–10.5)
CHLORIDE SERPL-SCNC: 104 MMOL/L — SIGNIFICANT CHANGE UP (ref 98–107)
CHLORIDE SERPL-SCNC: 104 MMOL/L — SIGNIFICANT CHANGE UP (ref 98–107)
CO2 SERPL-SCNC: 23 MMOL/L — SIGNIFICANT CHANGE UP (ref 22–31)
CO2 SERPL-SCNC: 23 MMOL/L — SIGNIFICANT CHANGE UP (ref 22–31)
CREAT SERPL-MCNC: 0.75 MG/DL — SIGNIFICANT CHANGE UP (ref 0.5–1.3)
CREAT SERPL-MCNC: 0.75 MG/DL — SIGNIFICANT CHANGE UP (ref 0.5–1.3)
CULTURE RESULTS: SIGNIFICANT CHANGE UP
EGFR: 96 ML/MIN/1.73M2 — SIGNIFICANT CHANGE UP
EGFR: 96 ML/MIN/1.73M2 — SIGNIFICANT CHANGE UP
EOSINOPHIL # BLD AUTO: 0.06 K/UL — SIGNIFICANT CHANGE UP (ref 0–0.5)
EOSINOPHIL # BLD AUTO: 0.06 K/UL — SIGNIFICANT CHANGE UP (ref 0–0.5)
EOSINOPHIL NFR BLD AUTO: 0.9 % — SIGNIFICANT CHANGE UP (ref 0–6)
EOSINOPHIL NFR BLD AUTO: 0.9 % — SIGNIFICANT CHANGE UP (ref 0–6)
GLUCOSE SERPL-MCNC: 113 MG/DL — HIGH (ref 70–99)
GLUCOSE SERPL-MCNC: 113 MG/DL — HIGH (ref 70–99)
HCT VFR BLD CALC: 21.3 % — LOW (ref 39–50)
HCT VFR BLD CALC: 21.3 % — LOW (ref 39–50)
HGB BLD-MCNC: 6.5 G/DL — CRITICAL LOW (ref 13–17)
HGB BLD-MCNC: 6.5 G/DL — CRITICAL LOW (ref 13–17)
IANC: 5.41 K/UL — SIGNIFICANT CHANGE UP (ref 1.8–7.4)
IANC: 5.41 K/UL — SIGNIFICANT CHANGE UP (ref 1.8–7.4)
IMM GRANULOCYTES NFR BLD AUTO: 1.4 % — HIGH (ref 0–0.9)
IMM GRANULOCYTES NFR BLD AUTO: 1.4 % — HIGH (ref 0–0.9)
LYMPHOCYTES # BLD AUTO: 1.12 K/UL — SIGNIFICANT CHANGE UP (ref 1–3.3)
LYMPHOCYTES # BLD AUTO: 1.12 K/UL — SIGNIFICANT CHANGE UP (ref 1–3.3)
LYMPHOCYTES # BLD AUTO: 16.1 % — SIGNIFICANT CHANGE UP (ref 13–44)
LYMPHOCYTES # BLD AUTO: 16.1 % — SIGNIFICANT CHANGE UP (ref 13–44)
MAGNESIUM SERPL-MCNC: 1.9 MG/DL — SIGNIFICANT CHANGE UP (ref 1.6–2.6)
MAGNESIUM SERPL-MCNC: 1.9 MG/DL — SIGNIFICANT CHANGE UP (ref 1.6–2.6)
MCHC RBC-ENTMCNC: 30.5 GM/DL — LOW (ref 32–36)
MCHC RBC-ENTMCNC: 30.5 GM/DL — LOW (ref 32–36)
MCHC RBC-ENTMCNC: 30.5 PG — SIGNIFICANT CHANGE UP (ref 27–34)
MCHC RBC-ENTMCNC: 30.5 PG — SIGNIFICANT CHANGE UP (ref 27–34)
MCV RBC AUTO: 100 FL — SIGNIFICANT CHANGE UP (ref 80–100)
MCV RBC AUTO: 100 FL — SIGNIFICANT CHANGE UP (ref 80–100)
MONOCYTES # BLD AUTO: 0.26 K/UL — SIGNIFICANT CHANGE UP (ref 0–0.9)
MONOCYTES # BLD AUTO: 0.26 K/UL — SIGNIFICANT CHANGE UP (ref 0–0.9)
MONOCYTES NFR BLD AUTO: 3.7 % — SIGNIFICANT CHANGE UP (ref 2–14)
MONOCYTES NFR BLD AUTO: 3.7 % — SIGNIFICANT CHANGE UP (ref 2–14)
NEUTROPHILS # BLD AUTO: 5.41 K/UL — SIGNIFICANT CHANGE UP (ref 1.8–7.4)
NEUTROPHILS # BLD AUTO: 5.41 K/UL — SIGNIFICANT CHANGE UP (ref 1.8–7.4)
NEUTROPHILS NFR BLD AUTO: 77.6 % — HIGH (ref 43–77)
NEUTROPHILS NFR BLD AUTO: 77.6 % — HIGH (ref 43–77)
NRBC # BLD: 0 /100 WBCS — SIGNIFICANT CHANGE UP (ref 0–0)
NRBC # BLD: 0 /100 WBCS — SIGNIFICANT CHANGE UP (ref 0–0)
NRBC # FLD: 0.03 K/UL — HIGH (ref 0–0)
NRBC # FLD: 0.03 K/UL — HIGH (ref 0–0)
PHOSPHATE SERPL-MCNC: 2.1 MG/DL — LOW (ref 2.5–4.5)
PHOSPHATE SERPL-MCNC: 2.1 MG/DL — LOW (ref 2.5–4.5)
PLATELET # BLD AUTO: 409 K/UL — HIGH (ref 150–400)
PLATELET # BLD AUTO: 409 K/UL — HIGH (ref 150–400)
POTASSIUM SERPL-MCNC: 3.4 MMOL/L — LOW (ref 3.5–5.3)
POTASSIUM SERPL-MCNC: 3.4 MMOL/L — LOW (ref 3.5–5.3)
POTASSIUM SERPL-SCNC: 3.4 MMOL/L — LOW (ref 3.5–5.3)
POTASSIUM SERPL-SCNC: 3.4 MMOL/L — LOW (ref 3.5–5.3)
RBC # BLD: 2.13 M/UL — LOW (ref 4.2–5.8)
RBC # BLD: 2.13 M/UL — LOW (ref 4.2–5.8)
RBC # FLD: 19.2 % — HIGH (ref 10.3–14.5)
RBC # FLD: 19.2 % — HIGH (ref 10.3–14.5)
RH IG SCN BLD-IMP: POSITIVE — SIGNIFICANT CHANGE UP
RH IG SCN BLD-IMP: POSITIVE — SIGNIFICANT CHANGE UP
SODIUM SERPL-SCNC: 137 MMOL/L — SIGNIFICANT CHANGE UP (ref 135–145)
SODIUM SERPL-SCNC: 137 MMOL/L — SIGNIFICANT CHANGE UP (ref 135–145)
SPECIMEN SOURCE: SIGNIFICANT CHANGE UP
WBC # BLD: 6.97 K/UL — SIGNIFICANT CHANGE UP (ref 3.8–10.5)
WBC # BLD: 6.97 K/UL — SIGNIFICANT CHANGE UP (ref 3.8–10.5)
WBC # FLD AUTO: 6.97 K/UL — SIGNIFICANT CHANGE UP (ref 3.8–10.5)
WBC # FLD AUTO: 6.97 K/UL — SIGNIFICANT CHANGE UP (ref 3.8–10.5)

## 2023-11-29 PROCEDURE — 99233 SBSQ HOSP IP/OBS HIGH 50: CPT

## 2023-11-29 PROCEDURE — 99497 ADVNCD CARE PLAN 30 MIN: CPT | Mod: 25

## 2023-11-29 RX ORDER — MEROPENEM 1 G/30ML
1000 INJECTION INTRAVENOUS EVERY 8 HOURS
Refills: 0 | Status: COMPLETED | OUTPATIENT
Start: 2023-11-29 | End: 2023-12-06

## 2023-11-29 RX ORDER — POTASSIUM CHLORIDE 20 MEQ
40 PACKET (EA) ORAL ONCE
Refills: 0 | Status: COMPLETED | OUTPATIENT
Start: 2023-11-29 | End: 2023-11-30

## 2023-11-29 RX ADMIN — Medication 125 MILLIGRAM(S): at 05:54

## 2023-11-29 RX ADMIN — CEFEPIME 100 MILLIGRAM(S): 1 INJECTION, POWDER, FOR SOLUTION INTRAMUSCULAR; INTRAVENOUS at 13:05

## 2023-11-29 RX ADMIN — HEPARIN SODIUM 5000 UNIT(S): 5000 INJECTION INTRAVENOUS; SUBCUTANEOUS at 05:59

## 2023-11-29 RX ADMIN — CEFEPIME 100 MILLIGRAM(S): 1 INJECTION, POWDER, FOR SOLUTION INTRAMUSCULAR; INTRAVENOUS at 05:54

## 2023-11-29 RX ADMIN — HEPARIN SODIUM 5000 UNIT(S): 5000 INJECTION INTRAVENOUS; SUBCUTANEOUS at 23:17

## 2023-11-29 RX ADMIN — Medication 125 MILLIGRAM(S): at 13:05

## 2023-11-29 RX ADMIN — CHLORHEXIDINE GLUCONATE 1 APPLICATION(S): 213 SOLUTION TOPICAL at 13:06

## 2023-11-29 RX ADMIN — Medication 1 TABLET(S): at 13:05

## 2023-11-29 RX ADMIN — Medication 975 MILLIGRAM(S): at 18:31

## 2023-11-29 RX ADMIN — Medication 975 MILLIGRAM(S): at 00:30

## 2023-11-29 RX ADMIN — ATORVASTATIN CALCIUM 20 MILLIGRAM(S): 80 TABLET, FILM COATED ORAL at 23:16

## 2023-11-29 RX ADMIN — Medication 975 MILLIGRAM(S): at 19:31

## 2023-11-29 RX ADMIN — Medication 125 MILLIGRAM(S): at 18:30

## 2023-11-29 RX ADMIN — Medication 125 MILLIGRAM(S): at 23:56

## 2023-11-29 RX ADMIN — Medication 975 MILLIGRAM(S): at 05:59

## 2023-11-29 RX ADMIN — Medication 975 MILLIGRAM(S): at 14:04

## 2023-11-29 RX ADMIN — MEROPENEM 100 MILLIGRAM(S): 1 INJECTION INTRAVENOUS at 23:17

## 2023-11-29 RX ADMIN — BICALUTAMIDE 50 MILLIGRAM(S): 50 TABLET, FILM COATED ORAL at 13:05

## 2023-11-29 RX ADMIN — Medication 975 MILLIGRAM(S): at 23:56

## 2023-11-29 RX ADMIN — HEPARIN SODIUM 5000 UNIT(S): 5000 INJECTION INTRAVENOUS; SUBCUTANEOUS at 13:05

## 2023-11-29 RX ADMIN — LISINOPRIL 5 MILLIGRAM(S): 2.5 TABLET ORAL at 05:54

## 2023-11-29 RX ADMIN — Medication 2000 UNIT(S): at 13:04

## 2023-11-29 RX ADMIN — Medication 975 MILLIGRAM(S): at 13:04

## 2023-11-29 RX ADMIN — FLUCONAZOLE 100 MILLIGRAM(S): 150 TABLET ORAL at 16:07

## 2023-11-29 RX ADMIN — PANTOPRAZOLE SODIUM 40 MILLIGRAM(S): 20 TABLET, DELAYED RELEASE ORAL at 13:04

## 2023-11-29 RX ADMIN — TAMSULOSIN HYDROCHLORIDE 0.4 MILLIGRAM(S): 0.4 CAPSULE ORAL at 23:17

## 2023-11-29 NOTE — PROGRESS NOTE ADULT - PROBLEM SELECTOR PLAN 2
noted diarrhea and positive C. diff colitis.  - Started on Vanco PO (11/27-) - 10 day course  - continue probiotics  - likely due to long term abx.

## 2023-11-29 NOTE — PROGRESS NOTE ADULT - CONVERSATION DETAILS
Patient was approached about his thoughts on DNR/DNI given current clinical deterioration.  Patient's goal and primary concern is to beat this cancer and get back home.  He wishes to be full code and want every medical intervention available to him.

## 2023-11-29 NOTE — PROGRESS NOTE ADULT - PROBLEM SELECTOR PLAN 3
c/b dysphagia and aspiration PNA  Reviewed CT Neck/Chest.  Appreciate ENT eval - reviewed direct laryngoscope imaging on 11/22  - ENT suspects esophageal candida but also possible mass effect from recent C-spine surgery; however no airway compromise noted.  - Diflucan IV (11/22-)  - with clinical improvement  - S+S eval - Cinesophagram F/U

## 2023-11-29 NOTE — PROGRESS NOTE ADULT - PROBLEM SELECTOR PLAN 10
no obvious hemorrhage noted  - continue to monitor CBC  - s/p 1u PRBC on 11/25   - plan for 1u PRBC on 11/29.

## 2023-11-29 NOTE — PROGRESS NOTE ADULT - PROBLEM SELECTOR PLAN 6
Patient previously diagnosed with prostate ca in 2009  - NM scan showing multiple osseous lesions throughout body  - heme/onc recs appreciated, will c/w bicalutamide 50mg qd  - IR consult for kyphoplasty 12/4 in anticipation for Rad Tx.

## 2023-11-29 NOTE — PROVIDER CONTACT NOTE (CRITICAL VALUE NOTIFICATION) - BACKGROUND
cervical spine cord compression
Patient admitted with spinal cord compression
spinal cord compression
pt admitted for spinal cord compression

## 2023-11-29 NOTE — PROGRESS NOTE ADULT - ASSESSMENT
73y Male s/p C5-T3 posterior cervical fusion, C7-T1 decompression for metastatic prostate cancer with plastics closure on 11/2. Recovering well. Both drains removed. MRI 11/14 showing possible seroma in subcutaneous space. Incisional vac w/ black sponge/Adaptic started on 11/17/2023, now left open to air    Plan:  - leave back incision open to air  - F/U fever work up - RVP +adenovirus; UA now negative. C diff positive  - Seroma not likely infected  - Monitor vac output  - Optimize nutrition, ensure adequate protein intake  - Remainder of care per primary team    Plastic Surgery  #[phone] [facility] pager 73y Male s/p C5-T3 posterior cervical fusion, C7-T1 decompression for metastatic prostate cancer with plastics closure on 11/2. Recovering well. Both drains removed. MRI 11/14 showing possible seroma in subcutaneous space. Incisional vac w/ black sponge/Adaptic started on 11/17/2023, now left open to air    Plan:  - leave back incision open to air  - F/U fever work up - RVP +adenovirus; UA now negative. C diff positive  - Seroma not likely infected  - Monitor vac output and CBC  - Optimize nutrition, ensure adequate protein intake  - Remainder of care per primary team    Plastic Surgery  #[phone] [facility] pager

## 2023-11-29 NOTE — PROVIDER CONTACT NOTE (CRITICAL VALUE NOTIFICATION) - SITUATION
Growth in aerobic and anaerobic bottles: Gram Positive cocci in cluster
hgb 6.5
potassium 2.7
hemoglobin 7.0

## 2023-11-29 NOTE — PROGRESS NOTE ADULT - SUBJECTIVE AND OBJECTIVE BOX
Plastic Surgery Progress Note (pg [facility]: [id number], [facility]: [phone])    SUBJECTIVE  The patient was seen and examined.     OBJECTIVE  ___________________________________________________  VITAL SIGNS / I&O's   Vital Signs Last 24 Hrs  T(C): 37.3 (29 Nov 2023 09:28), Max: 39.3 (28 Nov 2023 14:30)  T(F): 99.1 (29 Nov 2023 09:28), Max: 102.7 (28 Nov 2023 14:30)  HR: 98 (29 Nov 2023 09:28) (80 - 115)  BP: 118/56 (29 Nov 2023 09:28) (118/56 - 154/69)  BP(mean): --  RR: 18 (29 Nov 2023 09:28) (18 - 20)  SpO2: 100% (29 Nov 2023 09:28) (96% - 100%)    Parameters below as of 29 Nov 2023 09:28  Patient On (Oxygen Delivery Method): high flow  O2 Flow (L/min): 50  O2 Concentration (%): 100      ___________________________________________________  PHYSICAL EXAM    General: NAD, Lying in bed   Neuro: Awake and alert  Pulm: non-labored respirations  Back: Incisions intact with no palpable collection. No erythema    ___________________________________________________  LABS                        6.5    6.97  )-----------( 409      ( 29 Nov 2023 07:24 )             21.3     29 Nov 2023 07:24    137    |  104    |  10     ----------------------------<  113    3.4     |  23     |  0.75     Ca    7.6        29 Nov 2023 07:24  Phos  2.1       29 Nov 2023 07:24  Mg     1.90      29 Nov 2023 07:24        CAPILLARY BLOOD GLUCOSE      POCT Blood Glucose.: 118 mg/dL (29 Nov 2023 09:02)  POCT Blood Glucose.: 130 mg/dL (28 Nov 2023 23:33)  POCT Blood Glucose.: 115 mg/dL (28 Nov 2023 17:38)  POCT Blood Glucose.: 115 mg/dL (28 Nov 2023 12:07)    CARDIAC MARKERS ( 27 Nov 2023 13:41 )  x     / x     / 90 U/L / x     / 2.7 ng/mL      Urinalysis Basic - ( 29 Nov 2023 07:24 )    Color: x / Appearance: x / SG: x / pH: x  Gluc: 113 mg/dL / Ketone: x  / Bili: x / Urobili: x   Blood: x / Protein: x / Nitrite: x   Leuk Esterase: x / RBC: x / WBC x   Sq Epi: x / Non Sq Epi: x / Bacteria: x      ___________________________________________________  MICRO  Recent Cultures:  Specimen Source: .Blood Blood, 11-27 @ 17:16; Results   No growth at 24 hours; Gram Stain: --; Organism: --  Specimen Source: .Stool Feces, 11-27 @ 17:07; Results   No Protozoa seen by trichrome stain  No Helminths or Protozoa seen in formalin concentrate  performed by iodine stain  (routine O+P not evaluated for Microsporidia,  Cryptosporidia or Cyclospora)  One negative sample does not necessarily rule  out the presence of a parasitic infection.; Gram Stain: --; Organism: --  Specimen Source: .Stool Feces, 11-27 @ 15:41; Results   No enteric pathogens to date: Final culture pending; Gram Stain: --; Organism: --  Specimen Source: .Blood Blood-Peripheral, 11-24 @ 15:20; Results   No growth at 4 days; Gram Stain: --; Organism: --  Specimen Source: .Blood Blood-Peripheral, 11-24 @ 15:15; Results   No growth at 4 days; Gram Stain: --; Organism: --    ___________________________________________________  MEDICATIONS  (STANDING):  acetaminophen     Tablet .. 975 milliGRAM(s) Oral every 6 hours  atorvastatin 20 milliGRAM(s) Oral at bedtime  bicalutamide 50 milliGRAM(s) Oral daily  cefepime   IVPB 2000 milliGRAM(s) IV Intermittent every 8 hours  chlorhexidine 2% Cloths 1 Application(s) Topical daily  cholecalciferol 2000 Unit(s) Oral daily  dextrose 5%. 1000 milliLiter(s) (50 mL/Hr) IV Continuous <Continuous>  dextrose 5%. 1000 milliLiter(s) (100 mL/Hr) IV Continuous <Continuous>  dextrose 50% Injectable 25 Gram(s) IV Push once  dextrose 50% Injectable 12.5 Gram(s) IV Push once  dextrose 50% Injectable 25 Gram(s) IV Push once  fluconAZOLE IVPB      fluconAZOLE IVPB 200 milliGRAM(s) IV Intermittent every 24 hours  glucagon  Injectable 1 milliGRAM(s) IntraMuscular once  heparin   Injectable 5000 Unit(s) SubCutaneous every 8 hours  lactobacillus acidophilus 1 Tablet(s) Oral daily  lisinopril 5 milliGRAM(s) Oral daily  pantoprazole  Injectable 40 milliGRAM(s) IV Push daily  potassium chloride   Powder 40 milliEquivalent(s) Oral once  sodium chloride 0.9%. 1000 milliLiter(s) (100 mL/Hr) IV Continuous <Continuous>  tamsulosin 0.4 milliGRAM(s) Oral at bedtime  vancomycin    Solution 125 milliGRAM(s) Oral every 6 hours    MEDICATIONS  (PRN):  benzocaine/menthol Lozenge 1 Lozenge Oral every 6 hours PRN Sore Throat  dextrose Oral Gel 15 Gram(s) Oral once PRN Blood Glucose LESS THAN 70 milliGRAM(s)/deciliter  guaiFENesin Oral Liquid (Sugar-Free) 200 milliGRAM(s) Oral every 6 hours PRN Cough  levalbuterol Inhalation 0.63 milliGRAM(s) Inhalation every 6 hours PRN shortness of breath/wheezing  naloxone Injectable 0.1 milliGRAM(s) IV Push every 3 minutes PRN For ANY of the following changes in patient status:  A. RR LESS THAN 10 breaths per minute, B. Oxygen saturation LESS THAN 90%, C. Sedation score of 6  ondansetron Injectable 4 milliGRAM(s) IV Push every 6 hours PRN Nausea   Plastic Surgery Progress Note (pg [facility]: [id number], [facility]: [phone])    SUBJECTIVE  The patient was seen and examined. Hg <7. Patient ordered for PRB    OBJECTIVE  ___________________________________________________  VITAL SIGNS / I&O's   Vital Signs Last 24 Hrs  T(C): 37.3 (29 Nov 2023 09:28), Max: 39.3 (28 Nov 2023 14:30)  T(F): 99.1 (29 Nov 2023 09:28), Max: 102.7 (28 Nov 2023 14:30)  HR: 98 (29 Nov 2023 09:28) (80 - 115)  BP: 118/56 (29 Nov 2023 09:28) (118/56 - 154/69)  BP(mean): --  RR: 18 (29 Nov 2023 09:28) (18 - 20)  SpO2: 100% (29 Nov 2023 09:28) (96% - 100%)    Parameters below as of 29 Nov 2023 09:28  Patient On (Oxygen Delivery Method): high flow  O2 Flow (L/min): 50  O2 Concentration (%): 100      ___________________________________________________  PHYSICAL EXAM    General: NAD, Lying in bed   Neuro: Awake and alert  Pulm: non-labored respirations  Back: Incisions intact with no palpable collection. No erythema    ___________________________________________________  LABS                        6.5    6.97  )-----------( 409      ( 29 Nov 2023 07:24 )             21.3     29 Nov 2023 07:24    137    |  104    |  10     ----------------------------<  113    3.4     |  23     |  0.75     Ca    7.6        29 Nov 2023 07:24  Phos  2.1       29 Nov 2023 07:24  Mg     1.90      29 Nov 2023 07:24        CAPILLARY BLOOD GLUCOSE      POCT Blood Glucose.: 118 mg/dL (29 Nov 2023 09:02)  POCT Blood Glucose.: 130 mg/dL (28 Nov 2023 23:33)  POCT Blood Glucose.: 115 mg/dL (28 Nov 2023 17:38)  POCT Blood Glucose.: 115 mg/dL (28 Nov 2023 12:07)    CARDIAC MARKERS ( 27 Nov 2023 13:41 )  x     / x     / 90 U/L / x     / 2.7 ng/mL      Urinalysis Basic - ( 29 Nov 2023 07:24 )    Color: x / Appearance: x / SG: x / pH: x  Gluc: 113 mg/dL / Ketone: x  / Bili: x / Urobili: x   Blood: x / Protein: x / Nitrite: x   Leuk Esterase: x / RBC: x / WBC x   Sq Epi: x / Non Sq Epi: x / Bacteria: x      ___________________________________________________  MICRO  Recent Cultures:  Specimen Source: .Blood Blood, 11-27 @ 17:16; Results   No growth at 24 hours; Gram Stain: --; Organism: --  Specimen Source: .Stool Feces, 11-27 @ 17:07; Results   No Protozoa seen by trichrome stain  No Helminths or Protozoa seen in formalin concentrate  performed by iodine stain  (routine O+P not evaluated for Microsporidia,  Cryptosporidia or Cyclospora)  One negative sample does not necessarily rule  out the presence of a parasitic infection.; Gram Stain: --; Organism: --  Specimen Source: .Stool Feces, 11-27 @ 15:41; Results   No enteric pathogens to date: Final culture pending; Gram Stain: --; Organism: --  Specimen Source: .Blood Blood-Peripheral, 11-24 @ 15:20; Results   No growth at 4 days; Gram Stain: --; Organism: --  Specimen Source: .Blood Blood-Peripheral, 11-24 @ 15:15; Results   No growth at 4 days; Gram Stain: --; Organism: --    ___________________________________________________  MEDICATIONS  (STANDING):  acetaminophen     Tablet .. 975 milliGRAM(s) Oral every 6 hours  atorvastatin 20 milliGRAM(s) Oral at bedtime  bicalutamide 50 milliGRAM(s) Oral daily  cefepime   IVPB 2000 milliGRAM(s) IV Intermittent every 8 hours  chlorhexidine 2% Cloths 1 Application(s) Topical daily  cholecalciferol 2000 Unit(s) Oral daily  dextrose 5%. 1000 milliLiter(s) (50 mL/Hr) IV Continuous <Continuous>  dextrose 5%. 1000 milliLiter(s) (100 mL/Hr) IV Continuous <Continuous>  dextrose 50% Injectable 25 Gram(s) IV Push once  dextrose 50% Injectable 12.5 Gram(s) IV Push once  dextrose 50% Injectable 25 Gram(s) IV Push once  fluconAZOLE IVPB      fluconAZOLE IVPB 200 milliGRAM(s) IV Intermittent every 24 hours  glucagon  Injectable 1 milliGRAM(s) IntraMuscular once  heparin   Injectable 5000 Unit(s) SubCutaneous every 8 hours  lactobacillus acidophilus 1 Tablet(s) Oral daily  lisinopril 5 milliGRAM(s) Oral daily  pantoprazole  Injectable 40 milliGRAM(s) IV Push daily  potassium chloride   Powder 40 milliEquivalent(s) Oral once  sodium chloride 0.9%. 1000 milliLiter(s) (100 mL/Hr) IV Continuous <Continuous>  tamsulosin 0.4 milliGRAM(s) Oral at bedtime  vancomycin    Solution 125 milliGRAM(s) Oral every 6 hours    MEDICATIONS  (PRN):  benzocaine/menthol Lozenge 1 Lozenge Oral every 6 hours PRN Sore Throat  dextrose Oral Gel 15 Gram(s) Oral once PRN Blood Glucose LESS THAN 70 milliGRAM(s)/deciliter  guaiFENesin Oral Liquid (Sugar-Free) 200 milliGRAM(s) Oral every 6 hours PRN Cough  levalbuterol Inhalation 0.63 milliGRAM(s) Inhalation every 6 hours PRN shortness of breath/wheezing  naloxone Injectable 0.1 milliGRAM(s) IV Push every 3 minutes PRN For ANY of the following changes in patient status:  A. RR LESS THAN 10 breaths per minute, B. Oxygen saturation LESS THAN 90%, C. Sedation score of 6  ondansetron Injectable 4 milliGRAM(s) IV Push every 6 hours PRN Nausea

## 2023-11-29 NOTE — PROGRESS NOTE ADULT - SUBJECTIVE AND OBJECTIVE BOX
Follow Up:      Inverval History/ROS:Patient is a 73y old  Male who presents with a chief complaint of Diffuse Spinal Mets from Prostate Cancer (29 Nov 2023 12:56)    No diarrhea.  Increased SOB- now on high flow.  Less cough.  +fever    Allergies    No Known Allergies    Intolerances        ANTIMICROBIALS:  fluconAZOLE IVPB    fluconAZOLE IVPB 200 every 24 hours  meropenem  IVPB 1000 every 8 hours  vancomycin    Solution 125 every 6 hours      OTHER MEDS:  acetaminophen     Tablet .. 975 milliGRAM(s) Oral every 6 hours  atorvastatin 20 milliGRAM(s) Oral at bedtime  benzocaine/menthol Lozenge 1 Lozenge Oral every 6 hours PRN  bicalutamide 50 milliGRAM(s) Oral daily  chlorhexidine 2% Cloths 1 Application(s) Topical daily  cholecalciferol 2000 Unit(s) Oral daily  dextrose 5%. 1000 milliLiter(s) IV Continuous <Continuous>  dextrose 5%. 1000 milliLiter(s) IV Continuous <Continuous>  dextrose 50% Injectable 25 Gram(s) IV Push once  dextrose 50% Injectable 25 Gram(s) IV Push once  dextrose 50% Injectable 12.5 Gram(s) IV Push once  dextrose Oral Gel 15 Gram(s) Oral once PRN  glucagon  Injectable 1 milliGRAM(s) IntraMuscular once  guaiFENesin Oral Liquid (Sugar-Free) 200 milliGRAM(s) Oral every 6 hours PRN  heparin   Injectable 5000 Unit(s) SubCutaneous every 8 hours  lactobacillus acidophilus 1 Tablet(s) Oral daily  levalbuterol Inhalation 0.63 milliGRAM(s) Inhalation every 6 hours PRN  lisinopril 5 milliGRAM(s) Oral daily  naloxone Injectable 0.1 milliGRAM(s) IV Push every 3 minutes PRN  ondansetron Injectable 4 milliGRAM(s) IV Push every 6 hours PRN  pantoprazole  Injectable 40 milliGRAM(s) IV Push daily  potassium chloride   Powder 40 milliEquivalent(s) Oral once  sodium chloride 0.9%. 1000 milliLiter(s) IV Continuous <Continuous>  tamsulosin 0.4 milliGRAM(s) Oral at bedtime      Vital Signs Last 24 Hrs  T(C): 37.3 (29 Nov 2023 18:20), Max: 38.8 (29 Nov 2023 12:00)  T(F): 99.1 (29 Nov 2023 18:20), Max: 101.9 (29 Nov 2023 12:00)  HR: 99 (29 Nov 2023 18:44) (98 - 123)  BP: 164/87 (29 Nov 2023 18:20) (118/56 - 164/87)  BP(mean): --  RR: 18 (29 Nov 2023 18:44) (18 - 20)  SpO2: 95% (29 Nov 2023 18:44) (93% - 100%)    Parameters below as of 29 Nov 2023 18:44  Patient On (Oxygen Delivery Method): nasal cannula, high flow  O2 Flow (L/min): 50  O2 Concentration (%): 50    PHYSICAL EXAM:  General: [x ] on high flow  HEAD/EYES: [ ] PERRL [ x] white sclera [ ] icterus  ENT:  [ ] normal [x ] supple [ ] thrush [ ] pharyngeal exudate  Cardiovascular:   [ ] murmur [x ] normal [ ] PPM/AICD  Respiratory:  [ x]course BS  GI:  [x ] soft, non-tender, normal bowel sounds  :  [ ] best [ ] no CVA tenderness   Musculoskeletal:  [x ] no synovitis  Neurologic:  [ ] non-focal exam   Skin:  [x ] no rash  Lymph: [x ] no lymphadenopathy  Psychiatric:  [ ] appropriate affect [ ] alert & oriented  Lines:  [x ] no phlebitis [ ] central line                                6.5    6.97  )-----------( 409      ( 29 Nov 2023 07:24 )             21.3       11-29    137  |  104  |  10  ----------------------------<  113<H>  3.4<L>   |  23  |  0.75    Ca    7.6<L>      29 Nov 2023 07:24  Phos  2.1     11-29  Mg     1.90     11-29        Urinalysis Basic - ( 29 Nov 2023 07:24 )    Color: x / Appearance: x / SG: x / pH: x  Gluc: 113 mg/dL / Ketone: x  / Bili: x / Urobili: x   Blood: x / Protein: x / Nitrite: x   Leuk Esterase: x / RBC: x / WBC x   Sq Epi: x / Non Sq Epi: x / Bacteria: x        MICROBIOLOGY:Culture Results:   No growth at 48 Hours (11-27-23 @ 17:16)  Culture Results:   No growth at 48 Hours (11-27-23 @ 17:16)  Culture Results:   No Protozoa seen by trichrome stain  No Helminths or Protozoa seen in formalin concentrate  performed by iodine stain  (routine O+P not evaluated for Microsporidia,  Cryptosporidia or Cyclospora)  One negative sample does not necessarily rule  out the presence of a parasitic infection. (11-27-23 @ 17:07)  Culture Results:   No enteric pathogens isolated.  (Stool culture examined for Salmonella,  Shigella, Campylobacter, Aeromonas, Plesiomonas,  Vibrio, E.coli O157 and Yersinia) (11-27-23 @ 15:41)  Culture Results:   No growth at 5 days (11-24-23 @ 15:20)  Culture Results:   No growth at 5 days (11-24-23 @ 15:15)      RADIOLOGY:

## 2023-11-29 NOTE — CONSULT NOTE ADULT - SUBJECTIVE AND OBJECTIVE BOX
HPI:  73M DM2, HTN, Prostate CA p/w spinal cord compression from metastasis s/p C5-T3 Fusion, C7-T1 decompression c/b MSSA bacteremia from surgical site infection, B/L LE DVT s/p IVC filter 11/1 by IR, aspiration PNA, candida esophagitis w/ dysphagia, acute on chronic anemia, acute dyspnea. C. diff colitis.    Allergies: No Known Allergies    Medications (Abx/Cardiac/Anticoagulation/Blood Products)    cefepime   IVPB: 100 mL/Hr IV Intermittent (11-29 @ 05:54)  fluconAZOLE IVPB: 100 mL/Hr IV Intermittent (11-28 @ 15:13)  heparin   Injectable: 5000 Unit(s) SubCutaneous (11-29 @ 05:59)  lisinopril: 5 milliGRAM(s) Oral (11-29 @ 05:54)  nafcillin  IVPB: 200 mL/Hr IV Intermittent (11-27 @ 13:27)  vancomycin    Solution: 125 milliGRAM(s) Oral (11-29 @ 05:54)    Data:    89.2  T(C): 37.3  HR: 107  BP: 118/56  RR: 20  SpO2: 99%    -WBC 6.97 / HgB 6.5 / Hct 21.3 / Plt 409  -Na 137 / Cl 104 / BUN 10 / Glucose 113  -K 3.4 / CO2 23 / Cr 0.75  -ALT -- / Alk Phos -- / T.Bili --  -INR 1.07 / PTT 23.1      Radiology:   Chest CT reviewed. MR linda spine pending    Assessment/Plan:   73M w/ m prostate cancer. Case discussed during spine tumor board. Plan for RT. Ir consulted for pre-rt kyphoplasty.     -- IR will plan to perform kyphoplasty on monday 12/4  -- NPO amn prior to procedure   -- hold a.m. anticoagulation  -- am labs/coags  -- please place IR procedure request order under Dr. Wan  -- plan discussed w/ team       - Non-emergent consults: Place IR consult order in [place]  - Emergent issues (pager): [facility] [phone]; [facility] [phone]; [phone]  - Scheduling questions: [facility] [phone]; [facility] [phone]  - Clinic/outpatient booking: [facility] [phone]; [facility] [phone]

## 2023-11-29 NOTE — PROGRESS NOTE ADULT - SUBJECTIVE AND OBJECTIVE BOX
[facility] Division of Hospital Medicine  Jesus Riojas MD  Pager (M-F, 1D-5P): [phone]  Other Times:  [phone]    Patient is a 73y old  Male who presents with a chief complaint of Diffuse Spinal Mets from Prostate Cancer (29 Nov 2023 11:42)    SUBJECTIVE / OVERNIGHT EVENTS:  Patient tolerated HFNC overnight. Continues to have diarrhea. No F/C, N/V, CP, SOB, Cough, lightheadedness, dizziness, abdominal pain, dysuria.    MEDICATIONS  (STANDING):  acetaminophen     Tablet .. 975 milliGRAM(s) Oral every 6 hours  atorvastatin 20 milliGRAM(s) Oral at bedtime  bicalutamide 50 milliGRAM(s) Oral daily  cefepime   IVPB 2000 milliGRAM(s) IV Intermittent every 8 hours  chlorhexidine 2% Cloths 1 Application(s) Topical daily  cholecalciferol 2000 Unit(s) Oral daily  dextrose 5%. 1000 milliLiter(s) (50 mL/Hr) IV Continuous <Continuous>  dextrose 5%. 1000 milliLiter(s) (100 mL/Hr) IV Continuous <Continuous>  dextrose 50% Injectable 25 Gram(s) IV Push once  dextrose 50% Injectable 12.5 Gram(s) IV Push once  dextrose 50% Injectable 25 Gram(s) IV Push once  fluconAZOLE IVPB 200 milliGRAM(s) IV Intermittent every 24 hours  fluconAZOLE IVPB      glucagon  Injectable 1 milliGRAM(s) IntraMuscular once  heparin   Injectable 5000 Unit(s) SubCutaneous every 8 hours  lactobacillus acidophilus 1 Tablet(s) Oral daily  lisinopril 5 milliGRAM(s) Oral daily  pantoprazole  Injectable 40 milliGRAM(s) IV Push daily  potassium chloride   Powder 40 milliEquivalent(s) Oral once  sodium chloride 0.9%. 1000 milliLiter(s) (100 mL/Hr) IV Continuous <Continuous>  tamsulosin 0.4 milliGRAM(s) Oral at bedtime  vancomycin    Solution 125 milliGRAM(s) Oral every 6 hours    MEDICATIONS  (PRN):  benzocaine/menthol Lozenge 1 Lozenge Oral every 6 hours PRN Sore Throat  dextrose Oral Gel 15 Gram(s) Oral once PRN Blood Glucose LESS THAN 70 milliGRAM(s)/deciliter  guaiFENesin Oral Liquid (Sugar-Free) 200 milliGRAM(s) Oral every 6 hours PRN Cough  levalbuterol Inhalation 0.63 milliGRAM(s) Inhalation every 6 hours PRN shortness of breath/wheezing  naloxone Injectable 0.1 milliGRAM(s) IV Push every 3 minutes PRN For ANY of the following changes in patient status:  A. RR LESS THAN 10 breaths per minute, B. Oxygen saturation LESS THAN 90%, C. Sedation score of 6  ondansetron Injectable 4 milliGRAM(s) IV Push every 6 hours PRN Nausea      Vital Signs Last 24 Hrs  T(C): 38.8 (29 Nov 2023 12:00), Max: 39.3 (28 Nov 2023 14:30)  T(F): 101.9 (29 Nov 2023 12:00), Max: 102.7 (28 Nov 2023 14:30)  HR: 107 (29 Nov 2023 11:17) (80 - 115)  BP: 118/56 (29 Nov 2023 09:28) (118/56 - 154/69)  BP(mean): --  RR: 20 (29 Nov 2023 11:17) (18 - 20)  SpO2: 99% (29 Nov 2023 11:17) (96% - 100%)    Parameters below as of 29 Nov 2023 11:17  Patient On (Oxygen Delivery Method): nasal cannula, high flow  O2 Flow (L/min): 50  O2 Concentration (%): 50  CAPILLARY BLOOD GLUCOSE      POCT Blood Glucose.: 126 mg/dL (29 Nov 2023 12:45)  POCT Blood Glucose.: 118 mg/dL (29 Nov 2023 09:02)  POCT Blood Glucose.: 130 mg/dL (28 Nov 2023 23:33)  POCT Blood Glucose.: 115 mg/dL (28 Nov 2023 17:38)    I&O's Summary      PHYSICAL EXAM:  CONSTITUTIONAL: NAD  EYES: PERRLA; conjunctiva and sclera clear  ENMT: Moist oral mucosa, no pharyngeal injection or exudates; normal dentition; no obvious plaques noted.  NECK: Supple, no palpable masses; no thyromegaly  RESPIRATORY: Normal respiratory effort; lungs are clear to auscultation bilaterally  CARDIOVASCULAR: Regular rate and rhythm, normal S1 and S2, no murmur/rub/gallop; No lower extremity edema; Peripheral pulses are 2+ bilaterally  ABDOMEN: Nontender to palpation, normoactive bowel sounds, no rebound/guarding; No hepatosplenomegaly  MUSCULOSKELETAL:  Did not assess gait; no clubbing or cyanosis of digits; no joint swelling or tenderness to palpation  PSYCH: A+O to person, place, and time; affect appropriate  NEUROLOGY: CN 2-12 are intact and symmetric; no gross sensory deficits   SKIN: Woundvac in place on his back surgical site - C/D/I    LABS:                        6.5    6.97  )-----------( 409      ( 29 Nov 2023 07:24 )             21.3     11-29    137  |  104  |  10  ----------------------------<  113<H>  3.4<L>   |  23  |  0.75    Ca    7.6<L>      29 Nov 2023 07:24  Phos  2.1     11-29  Mg     1.90     11-29        CARDIAC MARKERS ( 27 Nov 2023 13:41 )  x     / x     / 90 U/L / x     / 2.7 ng/mL      Urinalysis Basic - ( 29 Nov 2023 07:24 )    Color: x / Appearance: x / SG: x / pH: x  Gluc: 113 mg/dL / Ketone: x  / Bili: x / Urobili: x   Blood: x / Protein: x / Nitrite: x   Leuk Esterase: x / RBC: x / WBC x   Sq Epi: x / Non Sq Epi: x / Bacteria: x        RADIOLOGY & ADDITIONAL TESTS:    Imaging Personally Reviewed:    Care Discussed with Consultants/Other Providers:

## 2023-11-29 NOTE — PROVIDER CONTACT NOTE (CRITICAL VALUE NOTIFICATION) - ACTION/TREATMENT ORDERED:
ordered for repeat blood cultures  in the morning. Safety maintained.
provider notified
md made aware. no new interventions at this time. "7.0 is borderline. unless he is symptomatic would hold off transfusion and check repeat in afternoon"
no new orders at this time

## 2023-11-29 NOTE — PROGRESS NOTE ADULT - ASSESSMENT
73-yo M w/ PMH of metastatic prostate CA s/p spinal decompression, now w/ fever and MSSA bacteremia. BCx clear as of 11/16.     #ongoing fever  #anemia  #MSSA bactremia    1) High grade MSSA bacteremia  TTE without obvious vegetation  Repeat blood culture 11/16 and 11/17 are without growth    Leukopenia- ? due to cephalosporin    2) Abnormal Imaging  Fluid collections noted on imaging  Vac placed  Wound examined during vac change- incision was not opened. Nontender at the site.    3) Hypoxic respiratory failure  Concern for aspiration  Still SOB  Broaden to meropenem  H/o DVT, s/p filter  thromboembolic disease still on DDx    3) C diff  Continue po vancomycin    4) Fever  Treated for MSSA bacteremia  Treated for C diff  Treated for aspiration pna  Concern that collections on MRI may be a focus-   If fever persists, ? if drainage is feasible and if he would tolerate the procedure    5) Adenovirus  Initially tested positive on 11/15 with cough  Cough improved.   Repeat positive likely due to sensitivity of PCR  I would not attribute ongoing fever to adenovirus    6) anemia  ? cause  primary team evaluating      Supportive care    Weston Mcghee MD  Please contact via TEAM between 8 am and 6 pm    In the evening or on weekends, can contact call service at [phone]

## 2023-11-29 NOTE — PROGRESS NOTE ADULT - PROBLEM SELECTOR PLAN 4
Concern for surgical site as a primary source  - woundvac in place  - awaiting culture from post-surgical fluid collection.  - BCx from 11/16, 11/17 NGTD  - changed to Nafcillin IV on 11/20. Now on cefepime  - TTE reviewed  - continues to spike fevers.  - Ortho to comment on result of MR C/T-spine - suspect not infected but rather expected post-op fluid collection  - MR C/T-spine re-ordered to evaluate the fluid collection - continues to have fever

## 2023-11-29 NOTE — PROVIDER CONTACT NOTE (CRITICAL VALUE NOTIFICATION) - TEST AND RESULT REPORTED:
potassium 2.7
hemoglobin 7.0
Growth in aerobic and anaerobic bottles: Gram Positive cocci in cluster
hgb 6.5

## 2023-11-29 NOTE — PROVIDER CONTACT NOTE (CRITICAL VALUE NOTIFICATION) - ASSESSMENT
Not in any acute distress
pt remains asymptomatic. no signs of acute distress noted. VSS. pt denies chest pain, dizziness or headache. pt reamains a&o x4.
A&O4 vitals wnl
No active bleeding, no signs or symptoms of distress

## 2023-11-29 NOTE — PROGRESS NOTE ADULT - PROBLEM SELECTOR PLAN 1
c/b acute on chronic hypoxic respiratory failure  Noted result of CTPA.  - No PE  - Broadened antibiotics to cover for aspiration multilobar PNA.  - Cefepime IV (11/28-)  - Titrate down on HFNC to keep sat >90%

## 2023-11-30 LAB
ANION GAP SERPL CALC-SCNC: 10 MMOL/L — SIGNIFICANT CHANGE UP (ref 7–14)
ANION GAP SERPL CALC-SCNC: 10 MMOL/L — SIGNIFICANT CHANGE UP (ref 7–14)
BASOPHILS # BLD AUTO: 0.02 K/UL — SIGNIFICANT CHANGE UP (ref 0–0.2)
BASOPHILS # BLD AUTO: 0.02 K/UL — SIGNIFICANT CHANGE UP (ref 0–0.2)
BASOPHILS NFR BLD AUTO: 0.3 % — SIGNIFICANT CHANGE UP (ref 0–2)
BASOPHILS NFR BLD AUTO: 0.3 % — SIGNIFICANT CHANGE UP (ref 0–2)
BUN SERPL-MCNC: 10 MG/DL — SIGNIFICANT CHANGE UP (ref 7–23)
BUN SERPL-MCNC: 10 MG/DL — SIGNIFICANT CHANGE UP (ref 7–23)
CALCIUM SERPL-MCNC: 8.1 MG/DL — LOW (ref 8.4–10.5)
CALCIUM SERPL-MCNC: 8.1 MG/DL — LOW (ref 8.4–10.5)
CHLORIDE SERPL-SCNC: 105 MMOL/L — SIGNIFICANT CHANGE UP (ref 98–107)
CHLORIDE SERPL-SCNC: 105 MMOL/L — SIGNIFICANT CHANGE UP (ref 98–107)
CO2 SERPL-SCNC: 25 MMOL/L — SIGNIFICANT CHANGE UP (ref 22–31)
CO2 SERPL-SCNC: 25 MMOL/L — SIGNIFICANT CHANGE UP (ref 22–31)
CREAT SERPL-MCNC: 0.67 MG/DL — SIGNIFICANT CHANGE UP (ref 0.5–1.3)
CREAT SERPL-MCNC: 0.67 MG/DL — SIGNIFICANT CHANGE UP (ref 0.5–1.3)
EGFR: 100 ML/MIN/1.73M2 — SIGNIFICANT CHANGE UP
EGFR: 100 ML/MIN/1.73M2 — SIGNIFICANT CHANGE UP
EOSINOPHIL # BLD AUTO: 0.06 K/UL — SIGNIFICANT CHANGE UP (ref 0–0.5)
EOSINOPHIL # BLD AUTO: 0.06 K/UL — SIGNIFICANT CHANGE UP (ref 0–0.5)
EOSINOPHIL NFR BLD AUTO: 0.8 % — SIGNIFICANT CHANGE UP (ref 0–6)
EOSINOPHIL NFR BLD AUTO: 0.8 % — SIGNIFICANT CHANGE UP (ref 0–6)
GLUCOSE SERPL-MCNC: 111 MG/DL — HIGH (ref 70–99)
GLUCOSE SERPL-MCNC: 111 MG/DL — HIGH (ref 70–99)
HCT VFR BLD CALC: 25.1 % — LOW (ref 39–50)
HCT VFR BLD CALC: 25.1 % — LOW (ref 39–50)
HGB BLD-MCNC: 8 G/DL — LOW (ref 13–17)
HGB BLD-MCNC: 8 G/DL — LOW (ref 13–17)
IANC: 5.67 K/UL — SIGNIFICANT CHANGE UP (ref 1.8–7.4)
IANC: 5.67 K/UL — SIGNIFICANT CHANGE UP (ref 1.8–7.4)
IMM GRANULOCYTES NFR BLD AUTO: 2.1 % — HIGH (ref 0–0.9)
IMM GRANULOCYTES NFR BLD AUTO: 2.1 % — HIGH (ref 0–0.9)
LYMPHOCYTES # BLD AUTO: 1.37 K/UL — SIGNIFICANT CHANGE UP (ref 1–3.3)
LYMPHOCYTES # BLD AUTO: 1.37 K/UL — SIGNIFICANT CHANGE UP (ref 1–3.3)
LYMPHOCYTES # BLD AUTO: 17.9 % — SIGNIFICANT CHANGE UP (ref 13–44)
LYMPHOCYTES # BLD AUTO: 17.9 % — SIGNIFICANT CHANGE UP (ref 13–44)
MAGNESIUM SERPL-MCNC: 2 MG/DL — SIGNIFICANT CHANGE UP (ref 1.6–2.6)
MAGNESIUM SERPL-MCNC: 2 MG/DL — SIGNIFICANT CHANGE UP (ref 1.6–2.6)
MCHC RBC-ENTMCNC: 30.4 PG — SIGNIFICANT CHANGE UP (ref 27–34)
MCHC RBC-ENTMCNC: 30.4 PG — SIGNIFICANT CHANGE UP (ref 27–34)
MCHC RBC-ENTMCNC: 31.9 GM/DL — LOW (ref 32–36)
MCHC RBC-ENTMCNC: 31.9 GM/DL — LOW (ref 32–36)
MCV RBC AUTO: 95.4 FL — SIGNIFICANT CHANGE UP (ref 80–100)
MCV RBC AUTO: 95.4 FL — SIGNIFICANT CHANGE UP (ref 80–100)
MONOCYTES # BLD AUTO: 0.38 K/UL — SIGNIFICANT CHANGE UP (ref 0–0.9)
MONOCYTES # BLD AUTO: 0.38 K/UL — SIGNIFICANT CHANGE UP (ref 0–0.9)
MONOCYTES NFR BLD AUTO: 5 % — SIGNIFICANT CHANGE UP (ref 2–14)
MONOCYTES NFR BLD AUTO: 5 % — SIGNIFICANT CHANGE UP (ref 2–14)
NEUTROPHILS # BLD AUTO: 5.67 K/UL — SIGNIFICANT CHANGE UP (ref 1.8–7.4)
NEUTROPHILS # BLD AUTO: 5.67 K/UL — SIGNIFICANT CHANGE UP (ref 1.8–7.4)
NEUTROPHILS NFR BLD AUTO: 73.9 % — SIGNIFICANT CHANGE UP (ref 43–77)
NEUTROPHILS NFR BLD AUTO: 73.9 % — SIGNIFICANT CHANGE UP (ref 43–77)
NRBC # BLD: 0 /100 WBCS — SIGNIFICANT CHANGE UP (ref 0–0)
NRBC # BLD: 0 /100 WBCS — SIGNIFICANT CHANGE UP (ref 0–0)
NRBC # FLD: 0.05 K/UL — HIGH (ref 0–0)
NRBC # FLD: 0.05 K/UL — HIGH (ref 0–0)
PHOSPHATE SERPL-MCNC: 2.2 MG/DL — LOW (ref 2.5–4.5)
PHOSPHATE SERPL-MCNC: 2.2 MG/DL — LOW (ref 2.5–4.5)
PLATELET # BLD AUTO: 419 K/UL — HIGH (ref 150–400)
PLATELET # BLD AUTO: 419 K/UL — HIGH (ref 150–400)
POTASSIUM SERPL-MCNC: 3.5 MMOL/L — SIGNIFICANT CHANGE UP (ref 3.5–5.3)
POTASSIUM SERPL-MCNC: 3.5 MMOL/L — SIGNIFICANT CHANGE UP (ref 3.5–5.3)
POTASSIUM SERPL-SCNC: 3.5 MMOL/L — SIGNIFICANT CHANGE UP (ref 3.5–5.3)
POTASSIUM SERPL-SCNC: 3.5 MMOL/L — SIGNIFICANT CHANGE UP (ref 3.5–5.3)
RBC # BLD: 2.63 M/UL — LOW (ref 4.2–5.8)
RBC # BLD: 2.63 M/UL — LOW (ref 4.2–5.8)
RBC # FLD: 19.9 % — HIGH (ref 10.3–14.5)
RBC # FLD: 19.9 % — HIGH (ref 10.3–14.5)
SODIUM SERPL-SCNC: 140 MMOL/L — SIGNIFICANT CHANGE UP (ref 135–145)
SODIUM SERPL-SCNC: 140 MMOL/L — SIGNIFICANT CHANGE UP (ref 135–145)
WBC # BLD: 7.66 K/UL — SIGNIFICANT CHANGE UP (ref 3.8–10.5)
WBC # BLD: 7.66 K/UL — SIGNIFICANT CHANGE UP (ref 3.8–10.5)
WBC # FLD AUTO: 7.66 K/UL — SIGNIFICANT CHANGE UP (ref 3.8–10.5)
WBC # FLD AUTO: 7.66 K/UL — SIGNIFICANT CHANGE UP (ref 3.8–10.5)

## 2023-11-30 PROCEDURE — 99233 SBSQ HOSP IP/OBS HIGH 50: CPT

## 2023-11-30 RX ADMIN — MEROPENEM 100 MILLIGRAM(S): 1 INJECTION INTRAVENOUS at 15:09

## 2023-11-30 RX ADMIN — TAMSULOSIN HYDROCHLORIDE 0.4 MILLIGRAM(S): 0.4 CAPSULE ORAL at 23:22

## 2023-11-30 RX ADMIN — MEROPENEM 100 MILLIGRAM(S): 1 INJECTION INTRAVENOUS at 23:21

## 2023-11-30 RX ADMIN — Medication 975 MILLIGRAM(S): at 23:22

## 2023-11-30 RX ADMIN — HEPARIN SODIUM 5000 UNIT(S): 5000 INJECTION INTRAVENOUS; SUBCUTANEOUS at 07:39

## 2023-11-30 RX ADMIN — LISINOPRIL 5 MILLIGRAM(S): 2.5 TABLET ORAL at 07:36

## 2023-11-30 RX ADMIN — Medication 975 MILLIGRAM(S): at 13:35

## 2023-11-30 RX ADMIN — Medication 2 TABLET(S): at 23:22

## 2023-11-30 RX ADMIN — Medication 125 MILLIGRAM(S): at 07:38

## 2023-11-30 RX ADMIN — Medication 975 MILLIGRAM(S): at 18:33

## 2023-11-30 RX ADMIN — Medication 1 TABLET(S): at 12:38

## 2023-11-30 RX ADMIN — CHLORHEXIDINE GLUCONATE 1 APPLICATION(S): 213 SOLUTION TOPICAL at 12:35

## 2023-11-30 RX ADMIN — HEPARIN SODIUM 5000 UNIT(S): 5000 INJECTION INTRAVENOUS; SUBCUTANEOUS at 23:22

## 2023-11-30 RX ADMIN — Medication 975 MILLIGRAM(S): at 07:40

## 2023-11-30 RX ADMIN — FLUCONAZOLE 100 MILLIGRAM(S): 150 TABLET ORAL at 15:09

## 2023-11-30 RX ADMIN — PANTOPRAZOLE SODIUM 40 MILLIGRAM(S): 20 TABLET, DELAYED RELEASE ORAL at 12:36

## 2023-11-30 RX ADMIN — HEPARIN SODIUM 5000 UNIT(S): 5000 INJECTION INTRAVENOUS; SUBCUTANEOUS at 15:09

## 2023-11-30 RX ADMIN — ATORVASTATIN CALCIUM 20 MILLIGRAM(S): 80 TABLET, FILM COATED ORAL at 23:22

## 2023-11-30 RX ADMIN — Medication 125 MILLIGRAM(S): at 18:33

## 2023-11-30 RX ADMIN — Medication 125 MILLIGRAM(S): at 23:23

## 2023-11-30 RX ADMIN — Medication 40 MILLIEQUIVALENT(S): at 12:33

## 2023-11-30 RX ADMIN — Medication 125 MILLIGRAM(S): at 12:35

## 2023-11-30 RX ADMIN — MEROPENEM 100 MILLIGRAM(S): 1 INJECTION INTRAVENOUS at 08:36

## 2023-11-30 RX ADMIN — BICALUTAMIDE 50 MILLIGRAM(S): 50 TABLET, FILM COATED ORAL at 12:35

## 2023-11-30 RX ADMIN — Medication 975 MILLIGRAM(S): at 12:35

## 2023-11-30 RX ADMIN — Medication 2000 UNIT(S): at 12:38

## 2023-11-30 NOTE — PROGRESS NOTE ADULT - SUBJECTIVE AND OBJECTIVE BOX
Follow Up:      Inverval History/ROS:Patient is a 73y old  Male who presents with a chief complaint of Diffuse Spinal Mets from Prostate Cancer (30 Nov 2023 13:45)    On high flow oxygen    Allergies    No Known Allergies    Intolerances        ANTIMICROBIALS:  fluconAZOLE IVPB 200 every 24 hours  fluconAZOLE IVPB    meropenem  IVPB 1000 every 8 hours  vancomycin    Solution 125 every 6 hours      OTHER MEDS:  acetaminophen     Tablet .. 975 milliGRAM(s) Oral every 6 hours  atorvastatin 20 milliGRAM(s) Oral at bedtime  benzocaine/menthol Lozenge 1 Lozenge Oral every 6 hours PRN  bicalutamide 50 milliGRAM(s) Oral daily  chlorhexidine 2% Cloths 1 Application(s) Topical daily  cholecalciferol 2000 Unit(s) Oral daily  dextrose 5%. 1000 milliLiter(s) IV Continuous <Continuous>  dextrose 5%. 1000 milliLiter(s) IV Continuous <Continuous>  dextrose 50% Injectable 25 Gram(s) IV Push once  dextrose 50% Injectable 12.5 Gram(s) IV Push once  dextrose 50% Injectable 25 Gram(s) IV Push once  dextrose Oral Gel 15 Gram(s) Oral once PRN  glucagon  Injectable 1 milliGRAM(s) IntraMuscular once  guaiFENesin Oral Liquid (Sugar-Free) 200 milliGRAM(s) Oral every 6 hours PRN  heparin   Injectable 5000 Unit(s) SubCutaneous every 8 hours  lactobacillus acidophilus 1 Tablet(s) Oral daily  levalbuterol Inhalation 0.63 milliGRAM(s) Inhalation every 6 hours PRN  lisinopril 5 milliGRAM(s) Oral daily  naloxone Injectable 0.1 milliGRAM(s) IV Push every 3 minutes PRN  ondansetron Injectable 4 milliGRAM(s) IV Push every 6 hours PRN  pantoprazole  Injectable 40 milliGRAM(s) IV Push daily  sodium chloride 0.9%. 1000 milliLiter(s) IV Continuous <Continuous>  tamsulosin 0.4 milliGRAM(s) Oral at bedtime      Vital Signs Last 24 Hrs  T(C): 36.9 (30 Nov 2023 20:43), Max: 37.7 (30 Nov 2023 18:40)  T(F): 98.4 (30 Nov 2023 20:43), Max: 99.8 (30 Nov 2023 18:40)  HR: 92 (30 Nov 2023 20:43) (92 - 116)  BP: 126/80 (30 Nov 2023 20:43) (126/80 - 129/68)  BP(mean): --  RR: 19 (30 Nov 2023 20:43) (18 - 24)  SpO2: 100% (30 Nov 2023 20:43) (92% - 100%)    Parameters below as of 30 Nov 2023 20:43  Patient On (Oxygen Delivery Method): nasal cannula  O2 Flow (L/min): 50  O2 Concentration (%): 100    PHYSICAL EXAM:  General: [x ] on high flow  HEAD/EYES: [ ] PERRL [x ] white sclera [ ] icterus  ENT:  [ ] normal [x ] supple [ ] thrush [ ] pharyngeal exudate  Cardiovascular:   [ ] murmur [x ] normal [ ] PPM/AICD  Respiratory:  [x ] clear to ausculation bilaterally  GI:  x[ ] soft, non-tender, normal bowel sounds  :  [ ] best [ ] no CVA tenderness   Musculoskeletal:  [ ] no synovitis  Neurologic:  [ ] non-focal exam   Skin:  [x ] no rash  Lymph: [x ] no lymphadenopathy  Psychiatric:  [ ] appropriate affect [ ] alert & oriented  Lines:  [ x] no phlebitis [ ] central line                                8.0    7.66  )-----------( 419      ( 30 Nov 2023 15:06 )             25.1       11-30    140  |  105  |  10  ----------------------------<  111<H>  3.5   |  25  |  0.67    Ca    8.1<L>      30 Nov 2023 15:06  Phos  2.2     11-30  Mg     2.00     11-30        Urinalysis Basic - ( 30 Nov 2023 15:06 )    Color: x / Appearance: x / SG: x / pH: x  Gluc: 111 mg/dL / Ketone: x  / Bili: x / Urobili: x   Blood: x / Protein: x / Nitrite: x   Leuk Esterase: x / RBC: x / WBC x   Sq Epi: x / Non Sq Epi: x / Bacteria: x        MICROBIOLOGY:Culture Results:   No growth at 72 Hours (11-27-23 @ 17:16)  Culture Results:   No growth at 72 Hours (11-27-23 @ 17:16)  Culture Results:   No Protozoa seen by trichrome stain  No Helminths or Protozoa seen in formalin concentrate  performed by iodine stain  (routine O+P not evaluated for Microsporidia,  Cryptosporidia or Cyclospora)  One negative sample does not necessarily rule  out the presence of a parasitic infection. (11-27-23 @ 17:07)  Culture Results:   No enteric pathogens isolated.  (Stool culture examined for Salmonella,  Shigella, Campylobacter, Aeromonas, Plesiomonas,  Vibrio, E.coli O157 and Yersinia) (11-27-23 @ 15:41)  Culture Results:   No growth at 5 days (11-24-23 @ 15:20)  Culture Results:   No growth at 5 days (11-24-23 @ 15:15)      RADIOLOGY:

## 2023-11-30 NOTE — PROGRESS NOTE ADULT - ASSESSMENT
73-yo M w/ PMH of metastatic prostate CA s/p spinal decompression, now w/ fever and MSSA bacteremia. BCx clear as of 11/16.     #ongoing fever  #anemia  #MSSA bactremia    1) High grade MSSA bacteremia  TTE without obvious vegetation  Repeat blood culture 11/16 and 11/17 are without growth    Leukopenia- ? due to cephalosporin    2) Abnormal Imaging  Fluid collections noted on imaging  Vac placed  Wound examined during vac change- incision was not opened. Nontender at the site.    3) Hypoxic respiratory failure  Concern for aspiration  Still SOB  Broaden to meropenem  H/o DVT, s/p filter  thromboembolic disease still on DDx    3) C diff  Continue po vancomycin    4) Fever  Treated for MSSA bacteremia  Treated for C diff  Treated for aspiration pna  I would not attribute fever to adenovirus- diagnosed on 11/15. It would not cause fever at this point.  I would not attribute fever to c diff- pt states diarrhea has resolved    Concern that collections on MRI may be a focus in a patient with staph bacteremia and previous copious drainage from his wound    If fever persists, ? if drainage is feasible and if he would tolerate the procedure    Check HIV status    5) Adenovirus  Initially tested positive on 11/15 with cough  Cough improved.   Repeat positive likely due to sensitivity of PCR  I would not attribute ongoing fever to adenovirus    6) anemia  ? cause  primary team evaluating      Supportive care    Weston Mcghee MD  Please contact via TEAM between 8 am and 6 pm    In the evening or on weekends, can contact call service at [phone]

## 2023-11-30 NOTE — PROGRESS NOTE ADULT - ASSESSMENT
73y Male s/p C5-T3 posterior cervical fusion, C7-T1 decompression for metastatic prostate cancer with plastics closure on 11/2. Recovering well. Both drains removed. MRI 11/14 showing possible seroma in subcutaneous space. Incisional vac w/ black sponge/Adaptic started on 11/17/2023, now left open to air    Plan:  - leave back incision open to air  - F/U fever work up - RVP +adenovirus; UA now negative. C diff positive  - Seroma not likely infected  - Optimize nutrition, ensure adequate protein intake  - Remainder of care per primary team    Plastic Surgery  #[phone] pager

## 2023-11-30 NOTE — PROGRESS NOTE ADULT - SUBJECTIVE AND OBJECTIVE BOX
Plastic Surgery Progress Note (pg [facility]: [id number], [facility]: [phone])    SUBJECTIVE  Pt comfortable in bed. Pain well controlled, no complaints.    OBJECTIVE  ___________________________________________________  VITAL SIGNS / I&O's   Vital Signs Last 24 Hrs  T(C): 36.9 (29 Nov 2023 20:53), Max: 38.8 (29 Nov 2023 12:00)  T(F): 98.4 (29 Nov 2023 20:53), Max: 101.9 (29 Nov 2023 12:00)  HR: 99 (30 Nov 2023 03:20) (98 - 123)  BP: 145/70 (29 Nov 2023 20:53) (118/56 - 164/87)  BP(mean): --  RR: 18 (30 Nov 2023 03:20) (18 - 20)  SpO2: 97% (30 Nov 2023 03:20) (93% - 100%)    Parameters below as of 30 Nov 2023 03:20  Patient On (Oxygen Delivery Method): nasal cannula, high flow  O2 Flow (L/min): 50  O2 Concentration (%): 50      ___________________________________________________  PHYSICAL EXAM    General: NAD, Lying in bed   Neuro: Awake and alert  Pulm: non-labored respirations  Back: Incisions intact with no palpable collection. No erythema    ___________________________________________________  LABS                        6.5    6.97  )-----------( 409      ( 29 Nov 2023 07:24 )             21.3     29 Nov 2023 07:24    137    |  104    |  10     ----------------------------<  113    3.4     |  23     |  0.75     Ca    7.6        29 Nov 2023 07:24  Phos  2.1       29 Nov 2023 07:24  Mg     1.90      29 Nov 2023 07:24        CAPILLARY BLOOD GLUCOSE      POCT Blood Glucose.: 124 mg/dL (29 Nov 2023 21:34)  POCT Blood Glucose.: 107 mg/dL (29 Nov 2023 17:50)  POCT Blood Glucose.: 126 mg/dL (29 Nov 2023 12:45)  POCT Blood Glucose.: 118 mg/dL (29 Nov 2023 09:02)        Urinalysis Basic - ( 29 Nov 2023 07:24 )    Color: x / Appearance: x / SG: x / pH: x  Gluc: 113 mg/dL / Ketone: x  / Bili: x / Urobili: x   Blood: x / Protein: x / Nitrite: x   Leuk Esterase: x / RBC: x / WBC x   Sq Epi: x / Non Sq Epi: x / Bacteria: x      ___________________________________________________  MICRO  Recent Cultures:  Specimen Source: .Blood Blood, 11-27 @ 17:16; Results   No growth at 48 Hours; Gram Stain: --; Organism: --  Specimen Source: .Stool Feces, 11-27 @ 17:07; Results   No Protozoa seen by trichrome stain  No Helminths or Protozoa seen in formalin concentrate  performed by iodine stain  (routine O+P not evaluated for Microsporidia,  Cryptosporidia or Cyclospora)  One negative sample does not necessarily rule  out the presence of a parasitic infection.; Gram Stain: --; Organism: --  Specimen Source: .Stool Feces, 11-27 @ 15:41; Results   No enteric pathogens isolated.  (Stool culture examined for Salmonella,  Shigella, Campylobacter, Aeromonas, Plesiomonas,  Vibrio, E.coli O157 and Yersinia); Gram Stain: --; Organism: --  Specimen Source: .Blood Blood-Peripheral, 11-24 @ 15:20; Results   No growth at 5 days; Gram Stain: --; Organism: --  Specimen Source: .Blood Blood-Peripheral, 11-24 @ 15:15; Results   No growth at 5 days; Gram Stain: --; Organism: --    ___________________________________________________  MEDICATIONS  (STANDING):  acetaminophen     Tablet .. 975 milliGRAM(s) Oral every 6 hours  atorvastatin 20 milliGRAM(s) Oral at bedtime  bicalutamide 50 milliGRAM(s) Oral daily  chlorhexidine 2% Cloths 1 Application(s) Topical daily  cholecalciferol 2000 Unit(s) Oral daily  dextrose 5%. 1000 milliLiter(s) (50 mL/Hr) IV Continuous <Continuous>  dextrose 5%. 1000 milliLiter(s) (100 mL/Hr) IV Continuous <Continuous>  dextrose 50% Injectable 25 Gram(s) IV Push once  dextrose 50% Injectable 12.5 Gram(s) IV Push once  dextrose 50% Injectable 25 Gram(s) IV Push once  fluconAZOLE IVPB      fluconAZOLE IVPB 200 milliGRAM(s) IV Intermittent every 24 hours  glucagon  Injectable 1 milliGRAM(s) IntraMuscular once  heparin   Injectable 5000 Unit(s) SubCutaneous every 8 hours  lactobacillus acidophilus 1 Tablet(s) Oral daily  lisinopril 5 milliGRAM(s) Oral daily  meropenem  IVPB 1000 milliGRAM(s) IV Intermittent every 8 hours  pantoprazole  Injectable 40 milliGRAM(s) IV Push daily  potassium chloride   Powder 40 milliEquivalent(s) Oral once  sodium chloride 0.9%. 1000 milliLiter(s) (100 mL/Hr) IV Continuous <Continuous>  tamsulosin 0.4 milliGRAM(s) Oral at bedtime  vancomycin    Solution 125 milliGRAM(s) Oral every 6 hours    MEDICATIONS  (PRN):  benzocaine/menthol Lozenge 1 Lozenge Oral every 6 hours PRN Sore Throat  dextrose Oral Gel 15 Gram(s) Oral once PRN Blood Glucose LESS THAN 70 milliGRAM(s)/deciliter  guaiFENesin Oral Liquid (Sugar-Free) 200 milliGRAM(s) Oral every 6 hours PRN Cough  levalbuterol Inhalation 0.63 milliGRAM(s) Inhalation every 6 hours PRN shortness of breath/wheezing  naloxone Injectable 0.1 milliGRAM(s) IV Push every 3 minutes PRN For ANY of the following changes in patient status:  A. RR LESS THAN 10 breaths per minute, B. Oxygen saturation LESS THAN 90%, C. Sedation score of 6  ondansetron Injectable 4 milliGRAM(s) IV Push every 6 hours PRN Nausea

## 2023-11-30 NOTE — PROGRESS NOTE ADULT - SUBJECTIVE AND OBJECTIVE BOX
[facility] Division of Hospital Medicine  Jesus Riojas MD  Pager (PARTHA-F, 8A-5P): [phone]  Other Times:  [phone]    Patient is a 73y old  Male who presents with a chief complaint of Diffuse Spinal Mets from Prostate Cancer (30 Nov 2023 06:49)    SUBJECTIVE / OVERNIGHT EVENTS:  Complaining of dypsnea overnight. Feels slightly better this morning. Diarrhea stopped. No F/C, N/V, CP, SOB, Cough, lightheadedness, dizziness, abdominal pain, diarrhea, dysuria.    MEDICATIONS  (STANDING):  acetaminophen     Tablet .. 975 milliGRAM(s) Oral every 6 hours  atorvastatin 20 milliGRAM(s) Oral at bedtime  bicalutamide 50 milliGRAM(s) Oral daily  chlorhexidine 2% Cloths 1 Application(s) Topical daily  cholecalciferol 2000 Unit(s) Oral daily  dextrose 5%. 1000 milliLiter(s) (50 mL/Hr) IV Continuous <Continuous>  dextrose 5%. 1000 milliLiter(s) (100 mL/Hr) IV Continuous <Continuous>  dextrose 50% Injectable 25 Gram(s) IV Push once  dextrose 50% Injectable 12.5 Gram(s) IV Push once  dextrose 50% Injectable 25 Gram(s) IV Push once  fluconAZOLE IVPB 200 milliGRAM(s) IV Intermittent every 24 hours  fluconAZOLE IVPB      glucagon  Injectable 1 milliGRAM(s) IntraMuscular once  heparin   Injectable 5000 Unit(s) SubCutaneous every 8 hours  lactobacillus acidophilus 1 Tablet(s) Oral daily  lisinopril 5 milliGRAM(s) Oral daily  meropenem  IVPB 1000 milliGRAM(s) IV Intermittent every 8 hours  pantoprazole  Injectable 40 milliGRAM(s) IV Push daily  sodium chloride 0.9%. 1000 milliLiter(s) (100 mL/Hr) IV Continuous <Continuous>  tamsulosin 0.4 milliGRAM(s) Oral at bedtime  vancomycin    Solution 125 milliGRAM(s) Oral every 6 hours    MEDICATIONS  (PRN):  benzocaine/menthol Lozenge 1 Lozenge Oral every 6 hours PRN Sore Throat  dextrose Oral Gel 15 Gram(s) Oral once PRN Blood Glucose LESS THAN 70 milliGRAM(s)/deciliter  guaiFENesin Oral Liquid (Sugar-Free) 200 milliGRAM(s) Oral every 6 hours PRN Cough  levalbuterol Inhalation 0.63 milliGRAM(s) Inhalation every 6 hours PRN shortness of breath/wheezing  naloxone Injectable 0.1 milliGRAM(s) IV Push every 3 minutes PRN For ANY of the following changes in patient status:  A. RR LESS THAN 10 breaths per minute, B. Oxygen saturation LESS THAN 90%, C. Sedation score of 6  ondansetron Injectable 4 milliGRAM(s) IV Push every 6 hours PRN Nausea      Vital Signs Last 24 Hrs  T(C): 36.8 (30 Nov 2023 00:45), Max: 38.3 (29 Nov 2023 14:50)  T(F): 98.2 (30 Nov 2023 00:45), Max: 100.9 (29 Nov 2023 14:50)  HR: 104 (30 Nov 2023 10:53) (98 - 123)  BP: 129/68 (30 Nov 2023 00:45) (129/68 - 164/87)  BP(mean): --  RR: 20 (30 Nov 2023 10:53) (18 - 24)  SpO2: 94% (30 Nov 2023 10:53) (92% - 100%)    Parameters below as of 30 Nov 2023 10:53  Patient On (Oxygen Delivery Method): nasal cannula, high flow  O2 Flow (L/min): 50  O2 Concentration (%): 100  CAPILLARY BLOOD GLUCOSE      POCT Blood Glucose.: 125 mg/dL (30 Nov 2023 12:48)  POCT Blood Glucose.: 114 mg/dL (30 Nov 2023 09:00)  POCT Blood Glucose.: 124 mg/dL (29 Nov 2023 21:34)  POCT Blood Glucose.: 107 mg/dL (29 Nov 2023 17:50)    I&O's Summary      PHYSICAL EXAM:  CONSTITUTIONAL: NAD  EYES: PERRLA; conjunctiva and sclera clear  ENMT: Moist oral mucosa, no pharyngeal injection or exudates; normal dentition; no obvious plaques noted.  NECK: Supple, no palpable masses; no thyromegaly  RESPIRATORY: Normal respiratory effort; decreased respiratory effort.  CARDIOVASCULAR: Regular rate and rhythm, normal S1 and S2, no murmur/rub/gallop; No lower extremity edema; Peripheral pulses are 2+ bilaterally  ABDOMEN: Nontender to palpation, normoactive bowel sounds, no rebound/guarding; No hepatosplenomegaly  MUSCULOSKELETAL:  Did not assess gait; no clubbing or cyanosis of digits; no joint swelling or tenderness to palpation  PSYCH: A+O to person, place, and time; affect appropriate  NEUROLOGY: CN 2-12 are intact and symmetric; no gross sensory deficits   SKIN: surgical site - C/D/I    LABS:                        6.5    6.97  )-----------( 409      ( 29 Nov 2023 07:24 )             21.3     11-29    137  |  104  |  10  ----------------------------<  113<H>  3.4<L>   |  23  |  0.75    Ca    7.6<L>      29 Nov 2023 07:24  Phos  2.1     11-29  Mg     1.90     11-29            Urinalysis Basic - ( 29 Nov 2023 07:24 )    Color: x / Appearance: x / SG: x / pH: x  Gluc: 113 mg/dL / Ketone: x  / Bili: x / Urobili: x   Blood: x / Protein: x / Nitrite: x   Leuk Esterase: x / RBC: x / WBC x   Sq Epi: x / Non Sq Epi: x / Bacteria: x        RADIOLOGY & ADDITIONAL TESTS:    Imaging Personally Reviewed:    Care Discussed with Consultants/Other Providers:

## 2023-11-30 NOTE — PROGRESS NOTE ADULT - PROBLEM SELECTOR PLAN 1
c/b acute on chronic hypoxic respiratory failure  Noted result of CTPA.  - No PE  - Broadened antibiotics to cover for aspiration multilobar PNA.  - Cefepime IV changed to Meropenem (11/29-)  - still requiring HFNC 50L 100%FiO2.

## 2023-11-30 NOTE — PROGRESS NOTE ADULT - ASSESSMENT
73M DM2, HTN, Prostate CA p/w spinal cord compression from metastasis s/p C5-T3 Fusion, C7-T1 decompression 11/2 c/b MSSA bacteremia from surgical site infection, B/L LE DVT s/p IVC filter 11/1 by IR, candida esophagitis w/ dysphagia, aspiration multilobar PNA, acute on chronic anemia, C. diff colitis.

## 2023-11-30 NOTE — PROGRESS NOTE ADULT - NSPROGADDITIONALINFOA_GEN_ALL_CORE
Discussed with daughter Kerry on the phone on 11/28 for 10 minutes  Answered all the questions. Discussed with daughter Kerry on the phone on 11/30 for 10 minutes  Answered all the questions.

## 2023-12-01 LAB
ANION GAP SERPL CALC-SCNC: 11 MMOL/L — SIGNIFICANT CHANGE UP (ref 7–14)
ANION GAP SERPL CALC-SCNC: 11 MMOL/L — SIGNIFICANT CHANGE UP (ref 7–14)
BASOPHILS # BLD AUTO: 0.02 K/UL — SIGNIFICANT CHANGE UP (ref 0–0.2)
BASOPHILS # BLD AUTO: 0.02 K/UL — SIGNIFICANT CHANGE UP (ref 0–0.2)
BASOPHILS NFR BLD AUTO: 0.3 % — SIGNIFICANT CHANGE UP (ref 0–2)
BASOPHILS NFR BLD AUTO: 0.3 % — SIGNIFICANT CHANGE UP (ref 0–2)
BUN SERPL-MCNC: 10 MG/DL — SIGNIFICANT CHANGE UP (ref 7–23)
BUN SERPL-MCNC: 10 MG/DL — SIGNIFICANT CHANGE UP (ref 7–23)
CALCIUM SERPL-MCNC: 8.3 MG/DL — LOW (ref 8.4–10.5)
CALCIUM SERPL-MCNC: 8.3 MG/DL — LOW (ref 8.4–10.5)
CHLORIDE SERPL-SCNC: 103 MMOL/L — SIGNIFICANT CHANGE UP (ref 98–107)
CHLORIDE SERPL-SCNC: 103 MMOL/L — SIGNIFICANT CHANGE UP (ref 98–107)
CO2 SERPL-SCNC: 27 MMOL/L — SIGNIFICANT CHANGE UP (ref 22–31)
CO2 SERPL-SCNC: 27 MMOL/L — SIGNIFICANT CHANGE UP (ref 22–31)
CREAT SERPL-MCNC: 0.64 MG/DL — SIGNIFICANT CHANGE UP (ref 0.5–1.3)
CREAT SERPL-MCNC: 0.64 MG/DL — SIGNIFICANT CHANGE UP (ref 0.5–1.3)
EGFR: 101 ML/MIN/1.73M2 — SIGNIFICANT CHANGE UP
EGFR: 101 ML/MIN/1.73M2 — SIGNIFICANT CHANGE UP
EOSINOPHIL # BLD AUTO: 0.11 K/UL — SIGNIFICANT CHANGE UP (ref 0–0.5)
EOSINOPHIL # BLD AUTO: 0.11 K/UL — SIGNIFICANT CHANGE UP (ref 0–0.5)
EOSINOPHIL NFR BLD AUTO: 1.7 % — SIGNIFICANT CHANGE UP (ref 0–6)
EOSINOPHIL NFR BLD AUTO: 1.7 % — SIGNIFICANT CHANGE UP (ref 0–6)
GLUCOSE SERPL-MCNC: 96 MG/DL — SIGNIFICANT CHANGE UP (ref 70–99)
GLUCOSE SERPL-MCNC: 96 MG/DL — SIGNIFICANT CHANGE UP (ref 70–99)
HCT VFR BLD CALC: 25.3 % — LOW (ref 39–50)
HCT VFR BLD CALC: 25.3 % — LOW (ref 39–50)
HGB BLD-MCNC: 7.9 G/DL — LOW (ref 13–17)
HGB BLD-MCNC: 7.9 G/DL — LOW (ref 13–17)
IANC: 4.59 K/UL — SIGNIFICANT CHANGE UP (ref 1.8–7.4)
IANC: 4.59 K/UL — SIGNIFICANT CHANGE UP (ref 1.8–7.4)
IMM GRANULOCYTES NFR BLD AUTO: 2.3 % — HIGH (ref 0–0.9)
IMM GRANULOCYTES NFR BLD AUTO: 2.3 % — HIGH (ref 0–0.9)
LYMPHOCYTES # BLD AUTO: 1.19 K/UL — SIGNIFICANT CHANGE UP (ref 1–3.3)
LYMPHOCYTES # BLD AUTO: 1.19 K/UL — SIGNIFICANT CHANGE UP (ref 1–3.3)
LYMPHOCYTES # BLD AUTO: 18.3 % — SIGNIFICANT CHANGE UP (ref 13–44)
LYMPHOCYTES # BLD AUTO: 18.3 % — SIGNIFICANT CHANGE UP (ref 13–44)
MAGNESIUM SERPL-MCNC: 2 MG/DL — SIGNIFICANT CHANGE UP (ref 1.6–2.6)
MAGNESIUM SERPL-MCNC: 2 MG/DL — SIGNIFICANT CHANGE UP (ref 1.6–2.6)
MCHC RBC-ENTMCNC: 29.6 PG — SIGNIFICANT CHANGE UP (ref 27–34)
MCHC RBC-ENTMCNC: 29.6 PG — SIGNIFICANT CHANGE UP (ref 27–34)
MCHC RBC-ENTMCNC: 31.2 GM/DL — LOW (ref 32–36)
MCHC RBC-ENTMCNC: 31.2 GM/DL — LOW (ref 32–36)
MCV RBC AUTO: 94.8 FL — SIGNIFICANT CHANGE UP (ref 80–100)
MCV RBC AUTO: 94.8 FL — SIGNIFICANT CHANGE UP (ref 80–100)
MONOCYTES # BLD AUTO: 0.43 K/UL — SIGNIFICANT CHANGE UP (ref 0–0.9)
MONOCYTES # BLD AUTO: 0.43 K/UL — SIGNIFICANT CHANGE UP (ref 0–0.9)
MONOCYTES NFR BLD AUTO: 6.6 % — SIGNIFICANT CHANGE UP (ref 2–14)
MONOCYTES NFR BLD AUTO: 6.6 % — SIGNIFICANT CHANGE UP (ref 2–14)
NEUTROPHILS # BLD AUTO: 4.59 K/UL — SIGNIFICANT CHANGE UP (ref 1.8–7.4)
NEUTROPHILS # BLD AUTO: 4.59 K/UL — SIGNIFICANT CHANGE UP (ref 1.8–7.4)
NEUTROPHILS NFR BLD AUTO: 70.8 % — SIGNIFICANT CHANGE UP (ref 43–77)
NEUTROPHILS NFR BLD AUTO: 70.8 % — SIGNIFICANT CHANGE UP (ref 43–77)
NRBC # BLD: 0 /100 WBCS — SIGNIFICANT CHANGE UP (ref 0–0)
NRBC # BLD: 0 /100 WBCS — SIGNIFICANT CHANGE UP (ref 0–0)
NRBC # FLD: 0.04 K/UL — HIGH (ref 0–0)
NRBC # FLD: 0.04 K/UL — HIGH (ref 0–0)
PHOSPHATE SERPL-MCNC: 2.8 MG/DL — SIGNIFICANT CHANGE UP (ref 2.5–4.5)
PHOSPHATE SERPL-MCNC: 2.8 MG/DL — SIGNIFICANT CHANGE UP (ref 2.5–4.5)
PLATELET # BLD AUTO: 407 K/UL — HIGH (ref 150–400)
PLATELET # BLD AUTO: 407 K/UL — HIGH (ref 150–400)
POTASSIUM SERPL-MCNC: 3.2 MMOL/L — LOW (ref 3.5–5.3)
POTASSIUM SERPL-MCNC: 3.2 MMOL/L — LOW (ref 3.5–5.3)
POTASSIUM SERPL-SCNC: 3.2 MMOL/L — LOW (ref 3.5–5.3)
POTASSIUM SERPL-SCNC: 3.2 MMOL/L — LOW (ref 3.5–5.3)
RBC # BLD: 2.67 M/UL — LOW (ref 4.2–5.8)
RBC # BLD: 2.67 M/UL — LOW (ref 4.2–5.8)
RBC # FLD: 19.3 % — HIGH (ref 10.3–14.5)
RBC # FLD: 19.3 % — HIGH (ref 10.3–14.5)
SODIUM SERPL-SCNC: 141 MMOL/L — SIGNIFICANT CHANGE UP (ref 135–145)
SODIUM SERPL-SCNC: 141 MMOL/L — SIGNIFICANT CHANGE UP (ref 135–145)
WBC # BLD: 6.49 K/UL — SIGNIFICANT CHANGE UP (ref 3.8–10.5)
WBC # BLD: 6.49 K/UL — SIGNIFICANT CHANGE UP (ref 3.8–10.5)
WBC # FLD AUTO: 6.49 K/UL — SIGNIFICANT CHANGE UP (ref 3.8–10.5)
WBC # FLD AUTO: 6.49 K/UL — SIGNIFICANT CHANGE UP (ref 3.8–10.5)

## 2023-12-01 PROCEDURE — 99233 SBSQ HOSP IP/OBS HIGH 50: CPT

## 2023-12-01 RX ORDER — POTASSIUM CHLORIDE 20 MEQ
40 PACKET (EA) ORAL EVERY 4 HOURS
Refills: 0 | Status: COMPLETED | OUTPATIENT
Start: 2023-12-01 | End: 2023-12-01

## 2023-12-01 RX ADMIN — Medication 975 MILLIGRAM(S): at 16:15

## 2023-12-01 RX ADMIN — Medication 40 MILLIEQUIVALENT(S): at 22:52

## 2023-12-01 RX ADMIN — Medication 975 MILLIGRAM(S): at 23:50

## 2023-12-01 RX ADMIN — Medication 125 MILLIGRAM(S): at 23:52

## 2023-12-01 RX ADMIN — Medication 975 MILLIGRAM(S): at 06:18

## 2023-12-01 RX ADMIN — Medication 975 MILLIGRAM(S): at 18:43

## 2023-12-01 RX ADMIN — HEPARIN SODIUM 5000 UNIT(S): 5000 INJECTION INTRAVENOUS; SUBCUTANEOUS at 17:07

## 2023-12-01 RX ADMIN — PANTOPRAZOLE SODIUM 40 MILLIGRAM(S): 20 TABLET, DELAYED RELEASE ORAL at 12:51

## 2023-12-01 RX ADMIN — Medication 125 MILLIGRAM(S): at 17:09

## 2023-12-01 RX ADMIN — SODIUM CHLORIDE 100 MILLILITER(S): 9 INJECTION INTRAMUSCULAR; INTRAVENOUS; SUBCUTANEOUS at 17:07

## 2023-12-01 RX ADMIN — CHLORHEXIDINE GLUCONATE 1 APPLICATION(S): 213 SOLUTION TOPICAL at 17:13

## 2023-12-01 RX ADMIN — Medication 2 TABLET(S): at 06:17

## 2023-12-01 RX ADMIN — MEROPENEM 100 MILLIGRAM(S): 1 INJECTION INTRAVENOUS at 06:18

## 2023-12-01 RX ADMIN — HEPARIN SODIUM 5000 UNIT(S): 5000 INJECTION INTRAVENOUS; SUBCUTANEOUS at 06:18

## 2023-12-01 RX ADMIN — Medication 2 TABLET(S): at 17:08

## 2023-12-01 RX ADMIN — Medication 975 MILLIGRAM(S): at 12:42

## 2023-12-01 RX ADMIN — Medication 1 TABLET(S): at 12:51

## 2023-12-01 RX ADMIN — Medication 2000 UNIT(S): at 12:52

## 2023-12-01 RX ADMIN — BICALUTAMIDE 50 MILLIGRAM(S): 50 TABLET, FILM COATED ORAL at 12:43

## 2023-12-01 RX ADMIN — Medication 975 MILLIGRAM(S): at 23:15

## 2023-12-01 RX ADMIN — MEROPENEM 100 MILLIGRAM(S): 1 INJECTION INTRAVENOUS at 17:07

## 2023-12-01 RX ADMIN — Medication 125 MILLIGRAM(S): at 06:17

## 2023-12-01 RX ADMIN — Medication 975 MILLIGRAM(S): at 17:10

## 2023-12-01 RX ADMIN — TAMSULOSIN HYDROCHLORIDE 0.4 MILLIGRAM(S): 0.4 CAPSULE ORAL at 23:16

## 2023-12-01 RX ADMIN — LISINOPRIL 5 MILLIGRAM(S): 2.5 TABLET ORAL at 06:17

## 2023-12-01 RX ADMIN — Medication 40 MILLIEQUIVALENT(S): at 17:08

## 2023-12-01 RX ADMIN — ATORVASTATIN CALCIUM 20 MILLIGRAM(S): 80 TABLET, FILM COATED ORAL at 23:16

## 2023-12-01 RX ADMIN — Medication 125 MILLIGRAM(S): at 12:39

## 2023-12-01 NOTE — PROGRESS NOTE ADULT - PROBLEM SELECTOR PLAN 5
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

## 2023-12-01 NOTE — PROGRESS NOTE ADULT - ASSESSMENT
73-yo M w/ PMH of metastatic prostate CA s/p spinal decompression, now w/ fever and MSSA bacteremia. BCx clear as of 11/16.     #ongoing fever  #anemia  #MSSA bacteremia    1) High grade MSSA bacteremia  TTE without obvious vegetation  Repeat blood culture 11/16 and 11/17 are without growth    Leukopenia earlier this admission- ? due to cephalosporin    2) Abnormal Imaging  Fluid collections noted on imaging  Previously had draiange from incicision    3) Hypoxic respiratory failure  Concern for aspiration    H/o DVT, s/p filter  thromboembolic disease still on DDx    Broadened to meropenem   Added Bactrim for empiric PCP coverage  --CT with ground glass but not typical of pcp  -but he was not improving on braod spectrum abx    Check sputum for PCP  Check fungitell  Check HIV status (pt provided verbal consent)    3) C diff  Continue po vancomycin    4) Fever  Treated for MSSA bacteremia  Treated for C diff  Treated for aspiration pna  I would not attribute fever to adenovirus- diagnosed on 11/15. It would not cause fever at this point.  I would not attribute fever to c diff- pt states diarrhea has resolved    Concern that collections on MRI may be a focus in a patient with staph bacteremia and previous copious drainage from his wound    If fever persists, ? if drainage is feasible and if he would tolerate the procedure    Check HIV status    I will be away as of12/2 through 12/10.  ID staff will be available.  Call [phone] with issues/ concerns.    Supportive care    Weston Mcghee MD  Please contact via TEAM between 8 am and 6 pm    In the evening or on weekends, can contact call service at [phone]

## 2023-12-01 NOTE — CHART NOTE - NSCHARTNOTEFT_GEN_A_CORE
NUTRITION FOLLOW-UP:    73M DM2, HTN, Prostate CA p/w spinal cord compression from metastasis s/p C5-T3 Fusion, C7-T1 decompression 11/2 c/b MSSA bacteremia from surgical site infection, B/L LE DVT s/p IVC filter 11/1 by IR, candida esophagitis w/ dysphagia, aspiration multilobar PNA, acute on chronic anemia, C. diff colitis.    Pt seen for malnutrition, consumed 50% meals with low appetite. No GI distress (nausea/vomiting/diarrhea/constipation.) at present. Continue with supplement as ordered Glucerna Therapeutic Nutrition 8oz. 3x daily (~660 Kcals, ~30 gm Protein). Noted skin wound to Lt & Rt knee, Rt upper back, no edema as per RN flow sheet. Obtain updated wts.     Weight: NO updated wts, 193.7# (11/22/23)    Labs:  12-01 Na 141 mmol/L Glu 96 mg/dL K+ 3.2 mmol/L<L> Cr 0.64 mg/dL BUN 10 mg/dL Phos 2.8 mg/dL  12-01 @ 12:45 POCT 118 mg/dL  12-01 @ 09:11 POCT 105 mg/dL  11-30 @ 23:03 POCT 108 mg/dL  11-30 @ 18:14 POCT 107 mg/dL    Current Diet: Diet, Consistent Carbohydrate w/Evening Snack:   Supplement Feeding Modality:  Oral  Glucerna Shake Cans or Servings Per Day:  1       Frequency:  Three Times a day (11-28-23 @ 17:16)    Skin-wound to Lt & Rt knee, Rt upper back, no edema as per RN flow sheet    MEDICATIONS  (STANDING):  acetaminophen     Tablet .. 975 milliGRAM(s) Oral every 6 hours  atorvastatin 20 milliGRAM(s) Oral at bedtime  bicalutamide 50 milliGRAM(s) Oral daily  chlorhexidine 2% Cloths 1 Application(s) Topical daily  cholecalciferol 2000 Unit(s) Oral daily  dextrose 5%. 1000 milliLiter(s) (50 mL/Hr) IV Continuous <Continuous>  dextrose 5%. 1000 milliLiter(s) (100 mL/Hr) IV Continuous <Continuous>  dextrose 50% Injectable 25 Gram(s) IV Push once  dextrose 50% Injectable 12.5 Gram(s) IV Push once  dextrose 50% Injectable 25 Gram(s) IV Push once  glucagon  Injectable 1 milliGRAM(s) IntraMuscular once  heparin   Injectable 5000 Unit(s) SubCutaneous every 8 hours  lactobacillus acidophilus 1 Tablet(s) Oral daily  lisinopril 5 milliGRAM(s) Oral daily  meropenem  IVPB 1000 milliGRAM(s) IV Intermittent every 8 hours  pantoprazole  Injectable 40 milliGRAM(s) IV Push daily  potassium chloride   Powder 40 milliEquivalent(s) Oral every 4 hours  sodium chloride 0.9%. 1000 milliLiter(s) (100 mL/Hr) IV Continuous <Continuous>  tamsulosin 0.4 milliGRAM(s) Oral at bedtime  trimethoprim  160 mG/sulfamethoxazole 800 mG 2 Tablet(s) Oral three times a day  vancomycin    Solution 125 milliGRAM(s) Oral every 6 hours    Estimated needs:  No changes since previous assessment    Nutrition Diagnosis:  Ongoing- Severe Malnutrition    RD to Remain Available:    Additional Recommendations:   Continue with current diet and supplement as ordered.   obtain updated wts.   Monitor PO intake, weight trends, nutrition related lab values, BMs/GI distress, hydration status, skin integrity.       Kaitlyn Segovia RDN #[id number]

## 2023-12-01 NOTE — PROGRESS NOTE ADULT - SUBJECTIVE AND OBJECTIVE BOX
[facility] Division of Hospital Medicine  Jesus Riojas MD  Pager (M-F, 4G-5P): [phone]  Other Times:  [phone]    Patient is a 73y old  Male who presents with a chief complaint of Diffuse Spinal Mets from Prostate Cancer (01 Dec 2023 07:30)    SUBJECTIVE / OVERNIGHT EVENTS:  Patient states that he feels slightly better. No F/C, N/V, CP, Cough, lightheadedness, dizziness, abdominal pain, diarrhea, dysuria.    MEDICATIONS  (STANDING):  acetaminophen     Tablet .. 975 milliGRAM(s) Oral every 6 hours  atorvastatin 20 milliGRAM(s) Oral at bedtime  bicalutamide 50 milliGRAM(s) Oral daily  chlorhexidine 2% Cloths 1 Application(s) Topical daily  cholecalciferol 2000 Unit(s) Oral daily  dextrose 5%. 1000 milliLiter(s) (50 mL/Hr) IV Continuous <Continuous>  dextrose 5%. 1000 milliLiter(s) (100 mL/Hr) IV Continuous <Continuous>  dextrose 50% Injectable 25 Gram(s) IV Push once  dextrose 50% Injectable 12.5 Gram(s) IV Push once  dextrose 50% Injectable 25 Gram(s) IV Push once  fluconAZOLE IVPB      fluconAZOLE IVPB 200 milliGRAM(s) IV Intermittent every 24 hours  glucagon  Injectable 1 milliGRAM(s) IntraMuscular once  heparin   Injectable 5000 Unit(s) SubCutaneous every 8 hours  lactobacillus acidophilus 1 Tablet(s) Oral daily  lisinopril 5 milliGRAM(s) Oral daily  meropenem  IVPB 1000 milliGRAM(s) IV Intermittent every 8 hours  pantoprazole  Injectable 40 milliGRAM(s) IV Push daily  potassium chloride   Powder 40 milliEquivalent(s) Oral every 4 hours  sodium chloride 0.9%. 1000 milliLiter(s) (100 mL/Hr) IV Continuous <Continuous>  tamsulosin 0.4 milliGRAM(s) Oral at bedtime  trimethoprim  160 mG/sulfamethoxazole 800 mG 2 Tablet(s) Oral three times a day  vancomycin    Solution 125 milliGRAM(s) Oral every 6 hours    MEDICATIONS  (PRN):  benzocaine/menthol Lozenge 1 Lozenge Oral every 6 hours PRN Sore Throat  dextrose Oral Gel 15 Gram(s) Oral once PRN Blood Glucose LESS THAN 70 milliGRAM(s)/deciliter  guaiFENesin Oral Liquid (Sugar-Free) 200 milliGRAM(s) Oral every 6 hours PRN Cough  levalbuterol Inhalation 0.63 milliGRAM(s) Inhalation every 6 hours PRN shortness of breath/wheezing  naloxone Injectable 0.1 milliGRAM(s) IV Push every 3 minutes PRN For ANY of the following changes in patient status:  A. RR LESS THAN 10 breaths per minute, B. Oxygen saturation LESS THAN 90%, C. Sedation score of 6  ondansetron Injectable 4 milliGRAM(s) IV Push every 6 hours PRN Nausea      Vital Signs Last 24 Hrs  T(C): 37.1 (01 Dec 2023 10:05), Max: 37.7 (30 Nov 2023 18:40)  T(F): 98.7 (01 Dec 2023 10:05), Max: 99.8 (30 Nov 2023 18:40)  HR: 98 (01 Dec 2023 10:18) (92 - 100)  BP: 128/84 (01 Dec 2023 10:05) (126/80 - 130/88)  BP(mean): --  RR: 18 (01 Dec 2023 10:18) (18 - 20)  SpO2: 100% (01 Dec 2023 10:18) (95% - 100%)    Parameters below as of 01 Dec 2023 10:18  Patient On (Oxygen Delivery Method): nasal cannula w/ humidification  O2 Flow (L/min): 50  O2 Concentration (%): 100  CAPILLARY BLOOD GLUCOSE      POCT Blood Glucose.: 118 mg/dL (01 Dec 2023 12:45)  POCT Blood Glucose.: 105 mg/dL (01 Dec 2023 09:11)  POCT Blood Glucose.: 108 mg/dL (30 Nov 2023 23:03)  POCT Blood Glucose.: 107 mg/dL (30 Nov 2023 18:14)    I&O's Summary      PHYSICAL EXAM:  CONSTITUTIONAL: NAD  EYES: PERRLA; conjunctiva and sclera clear  ENMT: Moist oral mucosa, no pharyngeal injection or exudates; normal dentition; no obvious plaques noted.  NECK: Supple, no palpable masses; no thyromegaly  RESPIRATORY: Normal respiratory effort; decreased respiratory effort; HFNC in place.  CARDIOVASCULAR: Regular rate and rhythm, normal S1 and S2, no murmur/rub/gallop; No lower extremity edema; Peripheral pulses are 2+ bilaterally  ABDOMEN: Nontender to palpation, normoactive bowel sounds, no rebound/guarding; No hepatosplenomegaly  MUSCULOSKELETAL:  Did not assess gait; no clubbing or cyanosis of digits; no joint swelling or tenderness to palpation  PSYCH: A+O to person, place, and time; affect appropriate  NEUROLOGY: CN 2-12 are intact and symmetric; no gross sensory deficits   SKIN: surgical site - C/D/I    LABS:                        7.9    6.49  )-----------( 407      ( 01 Dec 2023 04:37 )             25.3     12-01    141  |  103  |  10  ----------------------------<  96  3.2<L>   |  27  |  0.64    Ca    8.3<L>      01 Dec 2023 04:37  Phos  2.8     12-01  Mg     2.00     12-01            Urinalysis Basic - ( 01 Dec 2023 04:37 )    Color: x / Appearance: x / SG: x / pH: x  Gluc: 96 mg/dL / Ketone: x  / Bili: x / Urobili: x   Blood: x / Protein: x / Nitrite: x   Leuk Esterase: x / RBC: x / WBC x   Sq Epi: x / Non Sq Epi: x / Bacteria: x        RADIOLOGY & ADDITIONAL TESTS:    Imaging Personally Reviewed:    Care Discussed with Consultants/Other Providers:

## 2023-12-01 NOTE — PROGRESS NOTE ADULT - ASSESSMENT
73y Male s/p C5-T3 posterior cervical fusion, C7-T1 decompression for metastatic prostate cancer with plastics closure on 11/2. Recovering well. Both drains removed. MRI 11/14 showing possible seroma in subcutaneous space. Incisional vac w/ black sponge/Adaptic started on 11/17/2023, now left open to air    Plan:  - leave back incision open to air  - F/U fever work up - RVP +adenovirus; UA now negative. C diff positive  - Seroma not likely infected  - Optimize nutrition, ensure adequate protein intake  - Remainder of care per primary team    Plastic Surgery  #[phone] [facility] pager

## 2023-12-01 NOTE — PROGRESS NOTE ADULT - PROBLEM SELECTOR PLAN 3
c/b dysphagia and aspiration PNA  Reviewed CT Neck/Chest.  Appreciate ENT eval - reviewed direct laryngoscope imaging on 11/22  - ENT suspects esophageal candida but also possible mass effect from recent C-spine surgery; however no airway compromise noted.  - Diflucan IV (11/22-12/1)  - with clinical improvement  - S+S eval appreciated

## 2023-12-01 NOTE — PROGRESS NOTE ADULT - PROBLEM SELECTOR PLAN 4
Concern for surgical site as a primary source  - woundvac in place  - awaiting culture from post-surgical fluid collection.  - BCx from 11/16, 11/17 NGTD  - changed to Nafcillin IV on 11/20. Now on cefepime  - TTE reviewed  - continues to spike fevers.

## 2023-12-01 NOTE — PROGRESS NOTE ADULT - NSPROGADDITIONALINFOA_GEN_ALL_CORE
Discussed with daughter Kerry on the phone on 11/30 for 10 minutes  Answered all the questions. Discussed with daughter Kerry on the phone on 12/1 for 10 minutes  Answered all the questions.

## 2023-12-01 NOTE — PROGRESS NOTE ADULT - SUBJECTIVE AND OBJECTIVE BOX
DARYN THORNE  [id number]    Subjective:    Patient seen and examined, no complaints.        Objective:  T(C): 37.2 (12-01-23 @ 06:15), Max: 37.7 (11-30-23 @ 18:40)  HR: 98 (12-01-23 @ 07:27) (92 - 104)  BP: 130/85 (12-01-23 @ 06:15) (126/80 - 130/88)  RR: 18 (12-01-23 @ 07:27) (18 - 20)  SpO2: 100% (12-01-23 @ 07:27) (94% - 100%)  Wt(kg): --   11-30    140  |  105  |  10  ----------------------------<  111<H>  3.5   |  25  |  0.67    Ca    8.1<L>      30 Nov 2023 15:06  Phos  2.2     11-30  Mg     2.00     11-30                          7.9    6.49  )-----------( 407      ( 01 Dec 2023 04:37 )             25.3       PHYSICAL EXAM:    General: NAD, Lying in bed   Neuro: Awake and alert  Pulm: non-labored respirations  Back: Incisions intact, slight fluid wave. MRI showing superficial collection, no concern for infection at this time.             MEDICATIONS  (STANDING):  acetaminophen     Tablet .. 975 milliGRAM(s) Oral every 6 hours  atorvastatin 20 milliGRAM(s) Oral at bedtime  bicalutamide 50 milliGRAM(s) Oral daily  chlorhexidine 2% Cloths 1 Application(s) Topical daily  cholecalciferol 2000 Unit(s) Oral daily  dextrose 5%. 1000 milliLiter(s) (50 mL/Hr) IV Continuous <Continuous>  dextrose 5%. 1000 milliLiter(s) (100 mL/Hr) IV Continuous <Continuous>  dextrose 50% Injectable 12.5 Gram(s) IV Push once  dextrose 50% Injectable 25 Gram(s) IV Push once  dextrose 50% Injectable 25 Gram(s) IV Push once  fluconAZOLE IVPB      fluconAZOLE IVPB 200 milliGRAM(s) IV Intermittent every 24 hours  glucagon  Injectable 1 milliGRAM(s) IntraMuscular once  heparin   Injectable 5000 Unit(s) SubCutaneous every 8 hours  lactobacillus acidophilus 1 Tablet(s) Oral daily  lisinopril 5 milliGRAM(s) Oral daily  meropenem  IVPB 1000 milliGRAM(s) IV Intermittent every 8 hours  pantoprazole  Injectable 40 milliGRAM(s) IV Push daily  sodium chloride 0.9%. 1000 milliLiter(s) (100 mL/Hr) IV Continuous <Continuous>  tamsulosin 0.4 milliGRAM(s) Oral at bedtime  trimethoprim  160 mG/sulfamethoxazole 800 mG 2 Tablet(s) Oral three times a day  vancomycin    Solution 125 milliGRAM(s) Oral every 6 hours    MEDICATIONS  (PRN):  benzocaine/menthol Lozenge 1 Lozenge Oral every 6 hours PRN Sore Throat  dextrose Oral Gel 15 Gram(s) Oral once PRN Blood Glucose LESS THAN 70 milliGRAM(s)/deciliter  guaiFENesin Oral Liquid (Sugar-Free) 200 milliGRAM(s) Oral every 6 hours PRN Cough  levalbuterol Inhalation 0.63 milliGRAM(s) Inhalation every 6 hours PRN shortness of breath/wheezing  naloxone Injectable 0.1 milliGRAM(s) IV Push every 3 minutes PRN For ANY of the following changes in patient status:  A. RR LESS THAN 10 breaths per minute, B. Oxygen saturation LESS THAN 90%, C. Sedation score of 6  ondansetron Injectable 4 milliGRAM(s) IV Push every 6 hours PRN Nausea

## 2023-12-01 NOTE — PROGRESS NOTE ADULT - PROBLEM SELECTOR PLAN 1
c/b acute on chronic hypoxic respiratory failure  Noted result of CTPA.  - No PE  - Broadened antibiotics to cover for aspiration multilobar PNA.  - Cefepime IV changed to Meropenem (11/29-)  - titrate down on HFNC as tolerated

## 2023-12-01 NOTE — PROGRESS NOTE ADULT - SUBJECTIVE AND OBJECTIVE BOX
Follow Up:      Inverval History/ROS: Patient is a 73y old  Male who presents with a chief complaint of Diffuse Spinal Mets from Prostate Cancer (01 Dec 2023 14:34)    On high flow, decreased to FiO2 @ 60%  No fever    Allergies    No Known Allergies    Intolerances        ANTIMICROBIALS:  meropenem  IVPB 1000 every 8 hours  trimethoprim  160 mG/sulfamethoxazole 800 mG 2 three times a day  vancomycin    Solution 125 every 6 hours      OTHER MEDS:  acetaminophen     Tablet .. 975 milliGRAM(s) Oral every 6 hours  atorvastatin 20 milliGRAM(s) Oral at bedtime  benzocaine/menthol Lozenge 1 Lozenge Oral every 6 hours PRN  bicalutamide 50 milliGRAM(s) Oral daily  chlorhexidine 2% Cloths 1 Application(s) Topical daily  cholecalciferol 2000 Unit(s) Oral daily  dextrose 5%. 1000 milliLiter(s) IV Continuous <Continuous>  dextrose 5%. 1000 milliLiter(s) IV Continuous <Continuous>  dextrose 50% Injectable 25 Gram(s) IV Push once  dextrose 50% Injectable 12.5 Gram(s) IV Push once  dextrose 50% Injectable 25 Gram(s) IV Push once  dextrose Oral Gel 15 Gram(s) Oral once PRN  glucagon  Injectable 1 milliGRAM(s) IntraMuscular once  guaiFENesin Oral Liquid (Sugar-Free) 200 milliGRAM(s) Oral every 6 hours PRN  heparin   Injectable 5000 Unit(s) SubCutaneous every 8 hours  lactobacillus acidophilus 1 Tablet(s) Oral daily  levalbuterol Inhalation 0.63 milliGRAM(s) Inhalation every 6 hours PRN  lisinopril 5 milliGRAM(s) Oral daily  naloxone Injectable 0.1 milliGRAM(s) IV Push every 3 minutes PRN  ondansetron Injectable 4 milliGRAM(s) IV Push every 6 hours PRN  pantoprazole  Injectable 40 milliGRAM(s) IV Push daily  potassium chloride   Powder 40 milliEquivalent(s) Oral every 4 hours  sodium chloride 0.9%. 1000 milliLiter(s) IV Continuous <Continuous>  tamsulosin 0.4 milliGRAM(s) Oral at bedtime      Vital Signs Last 24 Hrs  T(C): 36.8 (01 Dec 2023 12:00), Max: 37.7 (30 Nov 2023 18:40)  T(F): 98.3 (01 Dec 2023 12:00), Max: 99.8 (30 Nov 2023 18:40)  HR: 97 (01 Dec 2023 16:09) (92 - 100)  BP: 134/74 (01 Dec 2023 12:00) (126/80 - 134/74)  BP(mean): --  RR: 18 (01 Dec 2023 16:09) (18 - 20)  SpO2: 95% (01 Dec 2023 16:09) (95% - 100%)    Parameters below as of 01 Dec 2023 16:09  Patient On (Oxygen Delivery Method): nasal cannula, high flow  O2 Flow (L/min): 50  O2 Concentration (%): 60    PHYSICAL EXAM:  General: [x ] non-toxic  HEAD/EYES: [ ] PERRL [x ] white sclera [ ] icterus  ENT:  [ ] normal [x ] supple [ ] thrush [ ] pharyngeal exudate  Cardiovascular:   [ ] murmur x[ ] normal [ ] PPM/AICD  Respiratory:  [ x] clear to ausculation bilaterally  GI:  [x ] soft, non-tender, normal bowel sounds  :  [ ] best [ ] no CVA tenderness   Musculoskeletal:  [ ] no synovitis  Neurologic:  [ ] non-focal exam   Skin:  [x ] no rash  Lymph: [ ] no lymphadenopathy  Psychiatric:  [ ] appropriate affect [ ] alert & oriented  Lines:  [ x] no phlebitis [ ] central line                                7.9    6.49  )-----------( 407      ( 01 Dec 2023 04:37 )             25.3       12-01    141  |  103  |  10  ----------------------------<  96  3.2<L>   |  27  |  0.64    Ca    8.3<L>      01 Dec 2023 04:37  Phos  2.8     12-01  Mg     2.00     12-01        Urinalysis Basic - ( 01 Dec 2023 04:37 )    Color: x / Appearance: x / SG: x / pH: x  Gluc: 96 mg/dL / Ketone: x  / Bili: x / Urobili: x   Blood: x / Protein: x / Nitrite: x   Leuk Esterase: x / RBC: x / WBC x   Sq Epi: x / Non Sq Epi: x / Bacteria: x        MICROBIOLOGY:Culture Results:   No growth at 72 Hours (11-27-23 @ 17:16)  Culture Results:   No growth at 72 Hours (11-27-23 @ 17:16)  Culture Results:   No Protozoa seen by trichrome stain  No Helminths or Protozoa seen in formalin concentrate  performed by iodine stain  (routine O+P not evaluated for Microsporidia,  Cryptosporidia or Cyclospora)  One negative sample does not necessarily rule  out the presence of a parasitic infection. (11-27-23 @ 17:07)  Culture Results:   No enteric pathogens isolated.  (Stool culture examined for Salmonella,  Shigella, Campylobacter, Aeromonas, Plesiomonas,  Vibrio, E.coli O157 and Yersinia) (11-27-23 @ 15:41)      RADIOLOGY:  < from: CT Angio Chest PE Protocol w/ IV Cont (11.27.23 @ 18:19) >  IMPRESSION:    No evidence for pulmonary embolus up to and including lobar branches.   Segmental and subsegmental branches are not well evaluated due to   respiratory motion artifact.  Worsening multifocal confluent groundglass infiltratesand consolidative   opacities in the right lower lobe and lateral left lung base. Correlate   for atypical pneumonia.    < end of copied text >

## 2023-12-02 LAB
CULTURE RESULTS: SIGNIFICANT CHANGE UP
SPECIMEN SOURCE: SIGNIFICANT CHANGE UP

## 2023-12-02 PROCEDURE — 99232 SBSQ HOSP IP/OBS MODERATE 35: CPT

## 2023-12-02 PROCEDURE — 99233 SBSQ HOSP IP/OBS HIGH 50: CPT

## 2023-12-02 RX ADMIN — HEPARIN SODIUM 5000 UNIT(S): 5000 INJECTION INTRAVENOUS; SUBCUTANEOUS at 09:49

## 2023-12-02 RX ADMIN — MEROPENEM 100 MILLIGRAM(S): 1 INJECTION INTRAVENOUS at 01:26

## 2023-12-02 RX ADMIN — Medication 125 MILLIGRAM(S): at 06:19

## 2023-12-02 RX ADMIN — HEPARIN SODIUM 5000 UNIT(S): 5000 INJECTION INTRAVENOUS; SUBCUTANEOUS at 17:31

## 2023-12-02 RX ADMIN — HEPARIN SODIUM 5000 UNIT(S): 5000 INJECTION INTRAVENOUS; SUBCUTANEOUS at 01:28

## 2023-12-02 RX ADMIN — MEROPENEM 100 MILLIGRAM(S): 1 INJECTION INTRAVENOUS at 17:31

## 2023-12-02 RX ADMIN — Medication 2 TABLET(S): at 01:26

## 2023-12-02 RX ADMIN — SODIUM CHLORIDE 100 MILLILITER(S): 9 INJECTION INTRAMUSCULAR; INTRAVENOUS; SUBCUTANEOUS at 06:20

## 2023-12-02 RX ADMIN — TAMSULOSIN HYDROCHLORIDE 0.4 MILLIGRAM(S): 0.4 CAPSULE ORAL at 21:03

## 2023-12-02 RX ADMIN — Medication 1 TABLET(S): at 12:30

## 2023-12-02 RX ADMIN — Medication 2 TABLET(S): at 09:54

## 2023-12-02 RX ADMIN — ATORVASTATIN CALCIUM 20 MILLIGRAM(S): 80 TABLET, FILM COATED ORAL at 21:03

## 2023-12-02 RX ADMIN — LISINOPRIL 5 MILLIGRAM(S): 2.5 TABLET ORAL at 06:19

## 2023-12-02 RX ADMIN — Medication 125 MILLIGRAM(S): at 17:31

## 2023-12-02 RX ADMIN — Medication 975 MILLIGRAM(S): at 06:19

## 2023-12-02 RX ADMIN — Medication 975 MILLIGRAM(S): at 06:55

## 2023-12-02 RX ADMIN — SODIUM CHLORIDE 100 MILLILITER(S): 9 INJECTION INTRAMUSCULAR; INTRAVENOUS; SUBCUTANEOUS at 21:02

## 2023-12-02 RX ADMIN — Medication 125 MILLIGRAM(S): at 12:42

## 2023-12-02 RX ADMIN — MEROPENEM 100 MILLIGRAM(S): 1 INJECTION INTRAVENOUS at 09:49

## 2023-12-02 RX ADMIN — PANTOPRAZOLE SODIUM 40 MILLIGRAM(S): 20 TABLET, DELAYED RELEASE ORAL at 12:30

## 2023-12-02 RX ADMIN — Medication 2000 UNIT(S): at 12:30

## 2023-12-02 RX ADMIN — CHLORHEXIDINE GLUCONATE 1 APPLICATION(S): 213 SOLUTION TOPICAL at 12:30

## 2023-12-02 RX ADMIN — Medication 2 TABLET(S): at 17:32

## 2023-12-02 RX ADMIN — BICALUTAMIDE 50 MILLIGRAM(S): 50 TABLET, FILM COATED ORAL at 12:30

## 2023-12-02 RX ADMIN — SODIUM CHLORIDE 100 MILLILITER(S): 9 INJECTION INTRAMUSCULAR; INTRAVENOUS; SUBCUTANEOUS at 17:37

## 2023-12-02 NOTE — PROGRESS NOTE ADULT - PROBLEM SELECTOR PLAN 9
Pre-Operative Instructions    Patient Name: Daly Roque Procedure: Removal of hardware right long finger     Surgery Date: 8/11/2023   Arrival Time: TBD Surgery Time: [facility]     Ambulatory Surgery Center : [address] [place] [id number]    You will receive a call from the Ambulatory Surgery Department approximately 3 days prior to your surgery date.  At that time, the nurse will review your health history, give you instructions for the ASC and the Arrival time for your procedure.  If you do not receive a call within 2 days of your surgery, please call [phone]. Please ignore any Robo calls or times in the [facility]. and only follow the time given by the ASC.    Special Instructions for your procedure from Dr. Wei    **Please bring this checklist with you to your History and Physical appointment. This will allow for you and your provider to review prior to your surgery. **    HISTORY AND PHYSICAL EXAM:  The [facility] requires History and Physical Exams to be done within 30 days of surgery. If this appointment is not completed within this time frame, an additional visit and co-pay may apply.      Dr. Wei will clear you for this procedure. No additional appointment needed.     - PRE-ADMISSION LAB WORK:  None    - Chest X-Ray: No    - EKG: No    **If you are feeling ill, showing any symptoms, or test positive for Covid-19 leading up to surgery, please notify your physician and surgeon.     - Is Physical Therapy needed? No    - Post operative appointment with Lexi Marques PA-C on 8/22/2023 at 8:45am at the [facility] ([address] [place] [address]) location.    Medications:  Unless otherwise instructed by your physician, STOP over-the-counter, anti-inflammatories, herbal supplements, and blood thinning medications for 7 days prior to surgery.  Please also see the below Ambulatory Surgery Center Guidelines for further instructions. Special instructions related to medications: DR. WEI WILL  - FS QID, AISS  - Goal -180 GIVE YOU DIRECTION FOR ALL OTHER MEDICATIONS.    Examples of aspirin products or NSAID's  Advil  Empirin Aleve   Ibuprofen Vandana Harris Brandyn   Ascriptin Motrin Bufferin   Nyquil Naprosyn Ecedrin   Voltaren Ecotrin      Eating and Drinking Restrictions:  **NOTHING TO EAT AFTER MIDNIGHT THE NIGHT BEFORE YOUR SURGERY, THIS INCLUDES WATER.  (Please note: These are generic instructions and will not apply to every patient and every case. If the patient care coordinator tells you to follow different directions, you are to follow their directions regarding this).    **You may shower the morning of surgery or the night before, please remember not to put on deodorant, lotions, powder, perfume/ [place], jewelry or make-up on the day of your procedure. (Please note: These are generic instructions and will not apply to every patient and every case. If the patient care coordinator tells you to follow different directions, you are to follow their directions regarding this)    * Arrange for an adult to drive you to and from your surgery. An adult must stay with you for the first 24 hours after your surgery.     * Wear loose fitting clothing.                 * Bring inhalers or nebulizer.                    * Do not wear contacts.                      * Do not bring valuables.  _________________________________________________________________________________________________________________________________________  Contact Patient Care Coordinator at [phone] (hours 8:30 a.m. to 5:00 p.m.) if you have not received a call from the Ambulatory Surgery Center 3 days prior to surgery.    **IF YOU HAVE AN INSURANCE CHANGE BETWEEN THE DAY YOUR SURGERY WAS SCHEDULED AND THE DATE OF YOUR SURGERY, PLEASE NOTIFY THE OFFICE ASAP**    On the day of surgery:  - Please bring with you a photo ID and insurance card.   Failure to have these documents will result in cancellation of your surgery.     Failure to complete any of the following  requirements may result in the delay or cancellation of your surgery.     : Mary               Contact Number: [phone]      Ambulatory Surgery Center Guidelines    If there is any change in your health, please contact your surgeon (fever, chest pain, shortness of breath, respiratory infections), or if you have started any new medications since your surgery was scheduled.    Plan for unforeseen changes in surgery time.      Although we try to maintain a set surgery schedule, there are times when a surgery case may take longer than expected. This may result in your being in the surgery center longer than originally planned. It is best to \"free up\" your entire day to allow for any unforeseen time changes.      Arrange for an adult (18 yrs old or greater) to stay with you at the surgery center. It is very important that you have an adult stay with you at the surgery center during your procedure. The surgeon will want to discuss your surgery and post-operative care with an adult friend or adult family member. Additionally, an adult will be needed to drive you home. An adult must stay with you for the first 24 hours after your surgery. IF YOU DO NOT HAVE AN ADULT TO DRIVE YOU HOME, YOUR SURGERY WILL BE CANCELLED.     Make arrangements for young children to stay with a  or family member. Children under the age of 12 must stay in the waiting area and must be accompanied by an adult at all times.      Medications             Unless otherwise instructed by your physician: STOP over-the-counter medications, anti-inflammatories (for example Aspirin, Advil, Aleve, Ibuprofen, Motrin), Vitamins, herbal supplements for 7 days prior to surgery. Tylenol, or Acetaminophen based products may be used. Special instructions related to medications: As directed    PATIENTS ON BLOOD THINNERS    IF YOU HAVE NOT RECEIVED INSTRUCTIONS REGARDING YOUR BLOOD THINNING MEDICATIONS WITHIN 7 DAYS OF YOUR SURGERY, PLEASE CALL THE  SURGEON'S OFFICE. FAILURE TO DO SO MAY RESULT IN CANCELLATION OF YOUR SURGERY.     Medication Instructions for Day of Surgery:   Please take your cardiac medicine, high blood pressure medicine, prednisone, and any seizure medication with a sip of water. Do not take any diuretic (water pills) or oral medication for diabetes.  For insulin dependent diabetics:  Do not take any short acting or sliding scale insulin in the morning.  Unless otherwise directed by your personal physician, follow the following instructions: If your procedure is scheduled before noon, take one half of your long acting insulin in the morning.  If your procedure is scheduled after 12 noon, take one third of your long acting insulin in the morning.        Remember to follow pre-operative dietary restrictions.     An empty stomach is imperative to prevent nausea or vomiting during and after your procedure. It is important that you \"not eat or drink anything\" after midnight the night of your scheduled surgery. Even that cup of coffee seems harmless, it may lead to a significant delay or even a cancellation of your surgery. Have fluids and soft bland food available at home for the initial recovery period.       Things to bring with you:  a list of your current medications (including \"over the counter\" and herbal medications)  important legal documents such as 's license, insurance card, Power of , Guardianship papers  any assistive device you are currently using (crutches, walker, hearing aid, etc.)  Inhalers    Things to leave at home: jewelry, piercing's, purse, wallet, money, and other valuable items. Do not wear makeup or contact lenses.    Limit visitors while you are at the surgery center.    Avoid alcoholic beverages for at least 24 hours prior to your surgery.     Wear loose comfortable clothes. Clothes that are easy to put on and take off are best. Sweat pants, shirts with buttons, and slip-on shoes are wise choices. Avoid  tight-fitting clothing such as blue jeans and turtlenecks.       Ask questions if you are unsure about any information given to you. We always want you to feel safe and confident in the care you are receiving.     Location:  [facility] is located at [facility], [address] [place] [address] in [place], [place]. Enter through the main entrance. When you get inside the first set of glass doors, look to the left. On the glass wall is a sign that reads [facility]. Go through the doors and take the stairs or the elevator down to the lower level. Check in with the .       TO ALL AMBULATORY SURGERY PATIENTS    The [facility] utilizes [facility], for anesthesia services. Patients receiving services at the [facility] may receive three separate bills - one from [facility] for professional services, one from the [facility] for facility services, and one from [facility], for anesthesiology services. Please verify with your insurer that [facility], are in your network as it does differ from [facility].    You can decide today about the care you will receive in the future.  [facility] respects your rights and will support your decisions to the fullest extent permitted by law, including your right to refuse or accept medical or surgical treatment. Please be advised that the [facility] prohibits ambulatory surgery centers from upholding Advanced Directives during surgical procedures. For this reason, any Advanced Directive will be null and void during your stay in the Ambulatory Surgery Center.     Although not honored in the Ambulatory Surgery Center, you are still entitled to be informed about your rights surrounding Advanced Directives. Advanced Directives are legal documents that enable you to specify what forms of treatment you want performed or withheld  should you become unable to make or communicate these decisions on your own.  Although this is not a common decision made by outpatient surgery patients, we would like to let you know that an information booklet, \"A Personal Decision\" is available upon your request.      How do you know if an Advanced Directive is right for you?  It is important to realize your rights as an individual, what the nature of consent for treatment implies, what a durable power of  for health care is and what a living will is.      After reviewing the booklet, \"A Personal Decision,\" should you have any further questions, please call us at [phone].      Thank you.     PATIENT’S RIGHTS    To be treated with respect, consideration and dignity, free from all forms of abuse, harassment and discrimination.     To receive quality care and high professional standards in a safe environment.    To be provided with appropriate privacy.    To expect that all disclosures and records are treated confidentially and, except when required by law, to be given the opportunity to approve or refuse their release.    To be provided, to the degree known, complete information concerning their diagnosis, treatment and prognosis. When appropriate, the information is provided to a person designated by the patient to be a legally authorized person/surrogate.    To review the records pertaining to his/her medical care and to have the information explained or interpreted as necessary except when restricted law.    To expect that we will communicate with you in a matter that you can understand.     To be given the names of all practitioners and health care personnel participating in his/her care.    To make decisions about the plan of care prior to and during the course of treatment.  To refuse a recommended treatment or plan of care to the extent permitted by law and to be informed of the medical consequences of this action.     To expect that your  treatment preferences will be responded to as delineated in your Advanced Directive, within the limits of the ASC bylaws regarding resuscitation    To be informed as to:  Rights and Responsibilities.  Services available in the organization.  Provisions for after-hours and emergency care.   The charges for services and available payment options.   The right to consent or decline participation in proposed research  studies.   The available resources for resolving disputes, grievances, and conflicts.      To expect emergency procedures to be implemented without unnecessary delay.    To expect a safe hospital transfer with appropriate medical records when necessary.     To know that all patients rights extend to the patient’s legal representatives/surrogates.     Complaints regarding Patients Rights or any issue surrounding care received during your stay can be made by contacting any of the following organizations:  Medicare patient: Visit the Office of Medicare Beneficiary Ombudsman at www.medicare.gov on the web.  Or call 1-800-Medicare (1-311.288.6551).     TTY users should call 1-598.199.9668.  Non-Medicare patients can contact the [facility] of Public Health at [phone]; fax [phone], TTY [phone].  Any patient can also contact the [employer] at [phone] (toll free 8:30 am to 5:00 pm, central time, weekdays).      *The Ambulatory Surgery Center is a partnership between [facility] and [facility].     Patient Responsibilities    It is your responsibility as a [facility] patient:    To provide all personal and family health information needed to provide you with appropriate care.    To participate to the best of your ability in making decisions about your medical treatment, and to comply with the agreed upon plan of care.    To ask questions of your physician or other care providers when you do not understand any information or  instructions.    To maintain appointments as scheduled, or to reschedule in a timely fashion.    To inform your physician and other care providers if you anticipate problems in following prescribed treatment.    To recognize the impact of your lifestyle on your personal health.    To inform your physician or other care provider if you desire a transfer of care to another physician.    To be considerate of others receiving and providing care.  To observe relevant surgery center policies and procedures.    To accept financial responsibility for health care services and to work cooperatively with [facility] to resolve financial obligations

## 2023-12-02 NOTE — PROGRESS NOTE ADULT - SUBJECTIVE AND OBJECTIVE BOX
Patient is a 73y old  Male who presents with a chief complaint of Diffuse Spinal Mets from Prostate Cancer (01 Dec 2023 18:06)      INTERVAL HPI/OVERNIGHT EVENTS: LION overnight. No acute complaints this morning.      REVIEW OF SYSTEMS:    CONSTITUTIONAL: No weakness, fevers or chills  EYES/ENT: No visual changes;  No vertigo or throat pain   NECK: No pain or stiffness  RESPIRATORY: No cough, wheezing, hemoptysis; No shortness of breath  CARDIOVASCULAR: No chest pain or palpitations  GASTROINTESTINAL: No abdominal or epigastric pain. No nausea, vomiting, or hematemesis; No diarrhea or constipation. No melena or hematochezia.  GENITOURINARY: No dysuria, frequency or hematuria  NEUROLOGICAL: No numbness or weakness  All other review of systems is negative unless indicated above.    FAMILY HISTORY:  Family history of stroke (Grandparent)      T(C): 36.6 (12-02-23 @ 04:32), Max: 37.1 (12-01-23 @ 10:05)  HR: 90 (12-02-23 @ 06:14) (84 - 100)  BP: 135/82 (12-02-23 @ 06:14) (128/84 - 145/74)  RR: 19 (12-02-23 @ 04:32) (18 - 20)  SpO2: 98% (12-02-23 @ 06:14) (95% - 100%)  Wt(kg): --Vital Signs Last 24 Hrs  T(C): 36.6 (02 Dec 2023 04:32), Max: 37.1 (01 Dec 2023 10:05)  T(F): 97.8 (02 Dec 2023 04:32), Max: 98.7 (01 Dec 2023 10:05)  HR: 90 (02 Dec 2023 06:14) (84 - 100)  BP: 135/82 (02 Dec 2023 06:14) (128/84 - 145/74)  BP(mean): --  RR: 19 (02 Dec 2023 04:32) (18 - 20)  SpO2: 98% (02 Dec 2023 06:14) (95% - 100%)    Parameters below as of 02 Dec 2023 06:14  Patient On (Oxygen Delivery Method): nasal cannula, high flow        PHYSICAL EXAM:  CONSTITUTIONAL: NAD  EYES: PERRLA; conjunctiva and sclera clear  ENMT: Moist oral mucosa, no pharyngeal injection or exudates; normal dentition; no obvious plaques noted.  NECK: Supple, no palpable masses; no thyromegaly  RESPIRATORY: Normal respiratory effort; decreased respiratory effort; HFNC in place.  CARDIOVASCULAR: Regular rate and rhythm, normal S1 and S2, no murmur/rub/gallop; No lower extremity edema; Peripheral pulses are 2+ bilaterally  ABDOMEN: Nontender to palpation, normoactive bowel sounds, no rebound/guarding; No hepatosplenomegaly  MUSCULOSKELETAL:  Did not assess gait; no clubbing or cyanosis of digits; no joint swelling or tenderness to palpation  PSYCH: A+O to person, place, and time; affect appropriate  NEUROLOGY: CN 2-12 are intact and symmetric; no gross sensory deficits   SKIN: surgical site - C/D/I    Consultant(s) Notes Reviewed:  [x ] YES  [ ] NO  Care Discussed with Consultants/Other Providers [ x] YES  [ ] NO    LABS:      Ca    8.3        01 Dec 2023 04:37        CAPILLARY BLOOD GLUCOSE      POCT Blood Glucose.: 111 mg/dL (01 Dec 2023 21:02)  POCT Blood Glucose.: 118 mg/dL (01 Dec 2023 17:51)  POCT Blood Glucose.: 118 mg/dL (01 Dec 2023 12:45)  POCT Blood Glucose.: 105 mg/dL (01 Dec 2023 09:11)    BLOOD CULTURE      RADIOLOGY & ADDITIONAL TESTS:    Imaging Personally Reviewed:  [ ] YES  [ ] NO  acetaminophen     Tablet .. 975 milliGRAM(s) Oral every 6 hours  atorvastatin 20 milliGRAM(s) Oral at bedtime  benzocaine/menthol Lozenge 1 Lozenge Oral every 6 hours PRN  bicalutamide 50 milliGRAM(s) Oral daily  chlorhexidine 2% Cloths 1 Application(s) Topical daily  cholecalciferol 2000 Unit(s) Oral daily  dextrose 5%. 1000 milliLiter(s) IV Continuous <Continuous>  dextrose 5%. 1000 milliLiter(s) IV Continuous <Continuous>  dextrose 50% Injectable 25 Gram(s) IV Push once  dextrose 50% Injectable 25 Gram(s) IV Push once  dextrose 50% Injectable 12.5 Gram(s) IV Push once  dextrose Oral Gel 15 Gram(s) Oral once PRN  glucagon  Injectable 1 milliGRAM(s) IntraMuscular once  guaiFENesin Oral Liquid (Sugar-Free) 200 milliGRAM(s) Oral every 6 hours PRN  heparin   Injectable 5000 Unit(s) SubCutaneous every 8 hours  lactobacillus acidophilus 1 Tablet(s) Oral daily  levalbuterol Inhalation 0.63 milliGRAM(s) Inhalation every 6 hours PRN  lisinopril 5 milliGRAM(s) Oral daily  meropenem  IVPB 1000 milliGRAM(s) IV Intermittent every 8 hours  naloxone Injectable 0.1 milliGRAM(s) IV Push every 3 minutes PRN  ondansetron Injectable 4 milliGRAM(s) IV Push every 6 hours PRN  pantoprazole  Injectable 40 milliGRAM(s) IV Push daily  sodium chloride 0.9%. 1000 milliLiter(s) IV Continuous <Continuous>  tamsulosin 0.4 milliGRAM(s) Oral at bedtime  trimethoprim  160 mG/sulfamethoxazole 800 mG 2 Tablet(s) Oral three times a day  vancomycin    Solution 125 milliGRAM(s) Oral every 6 hours      HEALTH ISSUES - PROBLEM Dx:  DVT, lower extremity    Prostate cancer metastatic to bone    T2DM (type 2 diabetes mellitus)    Hypertension    Cervical spinal cord compression    MSSA bacteremia    Candida esophagitis    Anemia of chronic disease    Pneumonia, aspiration    C. difficile colitis

## 2023-12-02 NOTE — PROGRESS NOTE ADULT - SUBJECTIVE AND OBJECTIVE BOX
Plastic Surgery Progress Note (pg [facility]: [id number], [place]: [phone])    SUBJECTIVE  Pt comfortable in bed. Pain well controlled, no complaints.    OBJECTIVE  ___________________________________________________  VITAL SIGNS / I&O's   Vital Signs Last 24 Hrs  T(C): 36.6 (02 Dec 2023 04:32), Max: 37.1 (01 Dec 2023 10:05)  T(F): 97.8 (02 Dec 2023 04:32), Max: 98.7 (01 Dec 2023 10:05)  HR: 83 (02 Dec 2023 08:21) (83 - 100)  BP: 135/82 (02 Dec 2023 06:14) (128/84 - 145/74)  BP(mean): --  RR: 20 (02 Dec 2023 08:21) (18 - 20)  SpO2: 97% (02 Dec 2023 08:21) (95% - 100%)    Parameters below as of 02 Dec 2023 08:21  Patient On (Oxygen Delivery Method): nasal cannula, high flow  O2 Flow (L/min): 50  O2 Concentration (%): 60      01 Dec 2023 07:01  -  02 Dec 2023 07:00  --------------------------------------------------------  IN:    Oral Fluid: 500 mL  Total IN: 500 mL    OUT:    Voided (mL): 1500 mL  Total OUT: 1500 mL    Total NET: -1000 mL        ___________________________________________________  PHYSICAL EXAM    General: NAD  Neuro: Awake and alert  Pulm: non-labored respirations  Back: Incisions intact, slight fluid wave. No s/s of infection  ___________________________________________________  LABS                        7.9    6.49  )-----------( 407      ( 01 Dec 2023 04:37 )             25.3     01 Dec 2023 04:37    141    |  103    |  10     ----------------------------<  96     3.2     |  27     |  0.64     Ca    8.3        01 Dec 2023 04:37  Phos  2.8       01 Dec 2023 04:37  Mg     2.00      01 Dec 2023 04:37        CAPILLARY BLOOD GLUCOSE      POCT Blood Glucose.: 104 mg/dL (02 Dec 2023 09:16)  POCT Blood Glucose.: 111 mg/dL (01 Dec 2023 21:02)  POCT Blood Glucose.: 118 mg/dL (01 Dec 2023 17:51)  POCT Blood Glucose.: 118 mg/dL (01 Dec 2023 12:45)        Urinalysis Basic - ( 01 Dec 2023 04:37 )    Color: x / Appearance: x / SG: x / pH: x  Gluc: 96 mg/dL / Ketone: x  / Bili: x / Urobili: x   Blood: x / Protein: x / Nitrite: x   Leuk Esterase: x / RBC: x / WBC x   Sq Epi: x / Non Sq Epi: x / Bacteria: x      ___________________________________________________  MICRO  Recent Cultures:  Specimen Source: .Blood Blood, 11-27 @ 17:16; Results   No growth at 4 days; Gram Stain: --; Organism: --  Specimen Source: .Stool Feces, 11-27 @ 17:07; Results   No Protozoa seen by trichrome stain  No Helminths or Protozoa seen in formalin concentrate  performed by iodine stain  (routine O+P not evaluated for Microsporidia,  Cryptosporidia or Cyclospora)  One negative sample does not necessarily rule  out the presence of a parasitic infection.; Gram Stain: --; Organism: --  Specimen Source: .Stool Feces, 11-27 @ 15:41; Results   No enteric pathogens isolated.  (Stool culture examined for Salmonella,  Shigella, Campylobacter, Aeromonas, Plesiomonas,  Vibrio, E.coli O157 and Yersinia); Gram Stain: --; Organism: --    ___________________________________________________  MEDICATIONS  (STANDING):  acetaminophen     Tablet .. 975 milliGRAM(s) Oral every 6 hours  atorvastatin 20 milliGRAM(s) Oral at bedtime  bicalutamide 50 milliGRAM(s) Oral daily  chlorhexidine 2% Cloths 1 Application(s) Topical daily  cholecalciferol 2000 Unit(s) Oral daily  dextrose 5%. 1000 milliLiter(s) (50 mL/Hr) IV Continuous <Continuous>  dextrose 5%. 1000 milliLiter(s) (100 mL/Hr) IV Continuous <Continuous>  dextrose 50% Injectable 25 Gram(s) IV Push once  dextrose 50% Injectable 12.5 Gram(s) IV Push once  dextrose 50% Injectable 25 Gram(s) IV Push once  glucagon  Injectable 1 milliGRAM(s) IntraMuscular once  heparin   Injectable 5000 Unit(s) SubCutaneous every 8 hours  lactobacillus acidophilus 1 Tablet(s) Oral daily  lisinopril 5 milliGRAM(s) Oral daily  meropenem  IVPB 1000 milliGRAM(s) IV Intermittent every 8 hours  pantoprazole  Injectable 40 milliGRAM(s) IV Push daily  sodium chloride 0.9%. 1000 milliLiter(s) (100 mL/Hr) IV Continuous <Continuous>  tamsulosin 0.4 milliGRAM(s) Oral at bedtime  trimethoprim  160 mG/sulfamethoxazole 800 mG 2 Tablet(s) Oral three times a day  vancomycin    Solution 125 milliGRAM(s) Oral every 6 hours    MEDICATIONS  (PRN):  benzocaine/menthol Lozenge 1 Lozenge Oral every 6 hours PRN Sore Throat  dextrose Oral Gel 15 Gram(s) Oral once PRN Blood Glucose LESS THAN 70 milliGRAM(s)/deciliter  guaiFENesin Oral Liquid (Sugar-Free) 200 milliGRAM(s) Oral every 6 hours PRN Cough  levalbuterol Inhalation 0.63 milliGRAM(s) Inhalation every 6 hours PRN shortness of breath/wheezing  naloxone Injectable 0.1 milliGRAM(s) IV Push every 3 minutes PRN For ANY of the following changes in patient status:  A. RR LESS THAN 10 breaths per minute, B. Oxygen saturation LESS THAN 90%, C. Sedation score of 6  ondansetron Injectable 4 milliGRAM(s) IV Push every 6 hours PRN Nausea

## 2023-12-02 NOTE — PROGRESS NOTE ADULT - ASSESSMENT
73y Male s/p C5-T3 posterior cervical fusion, C7-T1 decompression for metastatic prostate cancer with plastics closure on 11/2. Recovering well. Both drains removed. MRI 11/14 showing possible seroma in subcutaneous space. Incisional vac w/ black sponge/Adaptic started on 11/17/2023, now left open to air    Plan:  - leave back incision open to air  - F/U fever work up - RVP +adenovirus; UA now negative. C diff positive  - Seroma not likely infected  - Optimize nutrition, ensure adequate protein intake  - Remainder of care per primary team    Plastic Surgery  #[id number] [facility] pager

## 2023-12-02 NOTE — PROGRESS NOTE ADULT - PROBLEM SELECTOR PLAN 4
Concern for surgical site as a primary source  - woundvac in place  - awaiting culture from post-surgical fluid collection.  - BCx from 11/16, 11/17 NGTD  - changed to Nafcillin IV on 11/20. Now on meropenem (11/29-)  - TTE reviewed  - continues to spike fevers.

## 2023-12-02 NOTE — PROGRESS NOTE ADULT - ASSESSMENT
This is a 72 y/o M w/ PMH of metastatic prostate CA s/p spinal decompression, now w/ fever and MSSA bacteremia. BCx clear as of 11/16.     #ongoing fever  #anemia  #MSSA bacteremia    1) High grade MSSA bacteremia  TTE without obvious vegetation  Repeat blood culture 11/16 and 11/17 are without growth    Leukopenia earlier this admission- ? due to cephalosporin    2) Abnormal Imaging  Fluid collections noted on imaging  Previously had drainage from incision    3) Hypoxic respiratory failure  Concern for aspiration    H/o DVT, s/p filter  thromboembolic disease still on DDx    Broadened to meropenem   Added Bactrim for empiric PCP coverage  --CT with ground glass but not typical of pcp  -but he was not improving on braod spectrum abx    Check sputum for PCP  Check fungitell  Check HIV status (pt provided verbal consent)    3) C diff  Continue po vancomycin    4) Fever  Treated for MSSA bacteremia  Treated for C diff  Treated for aspiration pna  I would not attribute fever to adenovirus- diagnosed on 11/15. It would not cause fever at this point.  I would not attribute fever to c diff- pt states diarrhea has resolved    Concern that collections on MRI may be a focus in a patient with staph bacteremia and previous copious drainage from his wound    If fever persists, ? if drainage is feasible and if he would tolerate the procedure    Check HIV status    Plan:   1. C/w Meropenem 1 g q8 for MSSA bacteremia and empiric coverage   2. C/w Bactrim DS 2 tab q8 for empiric PJP PNA treatment, follow up Fungitell, if negative can d/c   3. C/w PO vancomycin, diarrhea has resolved at this time  4. F/u HIV testing    Thank you for this consult. Inpatient ID team will follow.    Mauricio Vázquez M.D.  Attending Physician  Division of Infectious Diseases  Department of Medicine    Please contact through MS Teams message.  Office: [phone] (after 5 PM or weekend)

## 2023-12-02 NOTE — PROGRESS NOTE ADULT - PROBLEM SELECTOR PLAN 10
no obvious hemorrhage noted  - continue to monitor CBC  - s/p 1u PRBC on 11/25   - s/p 1u PRBC on 11/29

## 2023-12-03 LAB
ANION GAP SERPL CALC-SCNC: 10 MMOL/L — SIGNIFICANT CHANGE UP (ref 7–14)
ANION GAP SERPL CALC-SCNC: 10 MMOL/L — SIGNIFICANT CHANGE UP (ref 7–14)
ANISOCYTOSIS BLD QL: SLIGHT — SIGNIFICANT CHANGE UP
ANISOCYTOSIS BLD QL: SLIGHT — SIGNIFICANT CHANGE UP
BASOPHILS # BLD AUTO: 0 K/UL — SIGNIFICANT CHANGE UP (ref 0–0.2)
BASOPHILS # BLD AUTO: 0 K/UL — SIGNIFICANT CHANGE UP (ref 0–0.2)
BASOPHILS NFR BLD AUTO: 0 % — SIGNIFICANT CHANGE UP (ref 0–2)
BASOPHILS NFR BLD AUTO: 0 % — SIGNIFICANT CHANGE UP (ref 0–2)
BUN SERPL-MCNC: 8 MG/DL — SIGNIFICANT CHANGE UP (ref 7–23)
BUN SERPL-MCNC: 8 MG/DL — SIGNIFICANT CHANGE UP (ref 7–23)
CALCIUM SERPL-MCNC: 8.2 MG/DL — LOW (ref 8.4–10.5)
CALCIUM SERPL-MCNC: 8.2 MG/DL — LOW (ref 8.4–10.5)
CHLORIDE SERPL-SCNC: 103 MMOL/L — SIGNIFICANT CHANGE UP (ref 98–107)
CHLORIDE SERPL-SCNC: 103 MMOL/L — SIGNIFICANT CHANGE UP (ref 98–107)
CO2 SERPL-SCNC: 27 MMOL/L — SIGNIFICANT CHANGE UP (ref 22–31)
CO2 SERPL-SCNC: 27 MMOL/L — SIGNIFICANT CHANGE UP (ref 22–31)
CREAT SERPL-MCNC: 0.79 MG/DL — SIGNIFICANT CHANGE UP (ref 0.5–1.3)
CREAT SERPL-MCNC: 0.79 MG/DL — SIGNIFICANT CHANGE UP (ref 0.5–1.3)
DACRYOCYTES BLD QL SMEAR: SLIGHT — SIGNIFICANT CHANGE UP
DACRYOCYTES BLD QL SMEAR: SLIGHT — SIGNIFICANT CHANGE UP
EGFR: 95 ML/MIN/1.73M2 — SIGNIFICANT CHANGE UP
EGFR: 95 ML/MIN/1.73M2 — SIGNIFICANT CHANGE UP
EOSINOPHIL # BLD AUTO: 0.1 K/UL — SIGNIFICANT CHANGE UP (ref 0–0.5)
EOSINOPHIL # BLD AUTO: 0.1 K/UL — SIGNIFICANT CHANGE UP (ref 0–0.5)
EOSINOPHIL NFR BLD AUTO: 1.8 % — SIGNIFICANT CHANGE UP (ref 0–6)
EOSINOPHIL NFR BLD AUTO: 1.8 % — SIGNIFICANT CHANGE UP (ref 0–6)
GIANT PLATELETS BLD QL SMEAR: PRESENT — SIGNIFICANT CHANGE UP
GIANT PLATELETS BLD QL SMEAR: PRESENT — SIGNIFICANT CHANGE UP
GLUCOSE SERPL-MCNC: 112 MG/DL — HIGH (ref 70–99)
GLUCOSE SERPL-MCNC: 112 MG/DL — HIGH (ref 70–99)
HCT VFR BLD CALC: 24.1 % — LOW (ref 39–50)
HCT VFR BLD CALC: 24.1 % — LOW (ref 39–50)
HGB BLD-MCNC: 7.6 G/DL — LOW (ref 13–17)
HGB BLD-MCNC: 7.6 G/DL — LOW (ref 13–17)
HIV 1+2 AB+HIV1 P24 AG SERPL QL IA: SIGNIFICANT CHANGE UP
HIV 1+2 AB+HIV1 P24 AG SERPL QL IA: SIGNIFICANT CHANGE UP
IANC: 3.09 K/UL — SIGNIFICANT CHANGE UP (ref 1.8–7.4)
IANC: 3.09 K/UL — SIGNIFICANT CHANGE UP (ref 1.8–7.4)
LDH SERPL L TO P-CCNC: 434 U/L — HIGH (ref 135–225)
LDH SERPL L TO P-CCNC: 434 U/L — HIGH (ref 135–225)
LYMPHOCYTES # BLD AUTO: 0.49 K/UL — LOW (ref 1–3.3)
LYMPHOCYTES # BLD AUTO: 0.49 K/UL — LOW (ref 1–3.3)
LYMPHOCYTES # BLD AUTO: 8.8 % — LOW (ref 13–44)
LYMPHOCYTES # BLD AUTO: 8.8 % — LOW (ref 13–44)
MACROCYTES BLD QL: SLIGHT — SIGNIFICANT CHANGE UP
MACROCYTES BLD QL: SLIGHT — SIGNIFICANT CHANGE UP
MAGNESIUM SERPL-MCNC: 1.9 MG/DL — SIGNIFICANT CHANGE UP (ref 1.6–2.6)
MAGNESIUM SERPL-MCNC: 1.9 MG/DL — SIGNIFICANT CHANGE UP (ref 1.6–2.6)
MANUAL SMEAR VERIFICATION: SIGNIFICANT CHANGE UP
MANUAL SMEAR VERIFICATION: SIGNIFICANT CHANGE UP
MCHC RBC-ENTMCNC: 30.5 PG — SIGNIFICANT CHANGE UP (ref 27–34)
MCHC RBC-ENTMCNC: 30.5 PG — SIGNIFICANT CHANGE UP (ref 27–34)
MCHC RBC-ENTMCNC: 31.5 GM/DL — LOW (ref 32–36)
MCHC RBC-ENTMCNC: 31.5 GM/DL — LOW (ref 32–36)
MCV RBC AUTO: 96.8 FL — SIGNIFICANT CHANGE UP (ref 80–100)
MCV RBC AUTO: 96.8 FL — SIGNIFICANT CHANGE UP (ref 80–100)
METAMYELOCYTES # FLD: 2.6 % — HIGH (ref 0–1)
METAMYELOCYTES # FLD: 2.6 % — HIGH (ref 0–1)
MONOCYTES # BLD AUTO: 0.63 K/UL — SIGNIFICANT CHANGE UP (ref 0–0.9)
MONOCYTES # BLD AUTO: 0.63 K/UL — SIGNIFICANT CHANGE UP (ref 0–0.9)
MONOCYTES NFR BLD AUTO: 11.4 % — SIGNIFICANT CHANGE UP (ref 2–14)
MONOCYTES NFR BLD AUTO: 11.4 % — SIGNIFICANT CHANGE UP (ref 2–14)
MYELOCYTES NFR BLD: 2.6 % — HIGH (ref 0–0)
MYELOCYTES NFR BLD: 2.6 % — HIGH (ref 0–0)
NEUTROPHILS # BLD AUTO: 4.05 K/UL — SIGNIFICANT CHANGE UP (ref 1.8–7.4)
NEUTROPHILS # BLD AUTO: 4.05 K/UL — SIGNIFICANT CHANGE UP (ref 1.8–7.4)
NEUTROPHILS NFR BLD AUTO: 72.8 % — SIGNIFICANT CHANGE UP (ref 43–77)
NEUTROPHILS NFR BLD AUTO: 72.8 % — SIGNIFICANT CHANGE UP (ref 43–77)
NRBC # BLD: 1 /100 — HIGH (ref 0–0)
NRBC # BLD: 1 /100 — HIGH (ref 0–0)
OVALOCYTES BLD QL SMEAR: SLIGHT — SIGNIFICANT CHANGE UP
OVALOCYTES BLD QL SMEAR: SLIGHT — SIGNIFICANT CHANGE UP
PHOSPHATE SERPL-MCNC: 2.6 MG/DL — SIGNIFICANT CHANGE UP (ref 2.5–4.5)
PHOSPHATE SERPL-MCNC: 2.6 MG/DL — SIGNIFICANT CHANGE UP (ref 2.5–4.5)
PLAT MORPH BLD: NORMAL — SIGNIFICANT CHANGE UP
PLAT MORPH BLD: NORMAL — SIGNIFICANT CHANGE UP
PLATELET # BLD AUTO: 401 K/UL — HIGH (ref 150–400)
PLATELET # BLD AUTO: 401 K/UL — HIGH (ref 150–400)
PLATELET COUNT - ESTIMATE: NORMAL — SIGNIFICANT CHANGE UP
PLATELET COUNT - ESTIMATE: NORMAL — SIGNIFICANT CHANGE UP
POIKILOCYTOSIS BLD QL AUTO: SLIGHT — SIGNIFICANT CHANGE UP
POIKILOCYTOSIS BLD QL AUTO: SLIGHT — SIGNIFICANT CHANGE UP
POLYCHROMASIA BLD QL SMEAR: SLIGHT — SIGNIFICANT CHANGE UP
POLYCHROMASIA BLD QL SMEAR: SLIGHT — SIGNIFICANT CHANGE UP
POTASSIUM SERPL-MCNC: 3 MMOL/L — LOW (ref 3.5–5.3)
POTASSIUM SERPL-MCNC: 3 MMOL/L — LOW (ref 3.5–5.3)
POTASSIUM SERPL-SCNC: 3 MMOL/L — LOW (ref 3.5–5.3)
POTASSIUM SERPL-SCNC: 3 MMOL/L — LOW (ref 3.5–5.3)
RBC # BLD: 2.49 M/UL — LOW (ref 4.2–5.8)
RBC # BLD: 2.49 M/UL — LOW (ref 4.2–5.8)
RBC # FLD: 18.1 % — HIGH (ref 10.3–14.5)
RBC # FLD: 18.1 % — HIGH (ref 10.3–14.5)
RBC BLD AUTO: ABNORMAL
RBC BLD AUTO: ABNORMAL
SMUDGE CELLS # BLD: PRESENT — SIGNIFICANT CHANGE UP
SMUDGE CELLS # BLD: PRESENT — SIGNIFICANT CHANGE UP
SODIUM SERPL-SCNC: 140 MMOL/L — SIGNIFICANT CHANGE UP (ref 135–145)
SODIUM SERPL-SCNC: 140 MMOL/L — SIGNIFICANT CHANGE UP (ref 135–145)
STOMATOCYTES BLD QL SMEAR: SLIGHT — SIGNIFICANT CHANGE UP
STOMATOCYTES BLD QL SMEAR: SLIGHT — SIGNIFICANT CHANGE UP
WBC # BLD: 5.56 K/UL — SIGNIFICANT CHANGE UP (ref 3.8–10.5)
WBC # BLD: 5.56 K/UL — SIGNIFICANT CHANGE UP (ref 3.8–10.5)
WBC # FLD AUTO: 5.56 K/UL — SIGNIFICANT CHANGE UP (ref 3.8–10.5)
WBC # FLD AUTO: 5.56 K/UL — SIGNIFICANT CHANGE UP (ref 3.8–10.5)

## 2023-12-03 PROCEDURE — 99233 SBSQ HOSP IP/OBS HIGH 50: CPT

## 2023-12-03 RX ORDER — POTASSIUM CHLORIDE 20 MEQ
40 PACKET (EA) ORAL EVERY 4 HOURS
Refills: 0 | Status: COMPLETED | OUTPATIENT
Start: 2023-12-03 | End: 2023-12-03

## 2023-12-03 RX ADMIN — SODIUM CHLORIDE 100 MILLILITER(S): 9 INJECTION INTRAMUSCULAR; INTRAVENOUS; SUBCUTANEOUS at 01:05

## 2023-12-03 RX ADMIN — BICALUTAMIDE 50 MILLIGRAM(S): 50 TABLET, FILM COATED ORAL at 12:10

## 2023-12-03 RX ADMIN — Medication 2 TABLET(S): at 00:30

## 2023-12-03 RX ADMIN — Medication 2 TABLET(S): at 09:37

## 2023-12-03 RX ADMIN — Medication 2000 UNIT(S): at 12:10

## 2023-12-03 RX ADMIN — TAMSULOSIN HYDROCHLORIDE 0.4 MILLIGRAM(S): 0.4 CAPSULE ORAL at 21:50

## 2023-12-03 RX ADMIN — CHLORHEXIDINE GLUCONATE 1 APPLICATION(S): 213 SOLUTION TOPICAL at 12:10

## 2023-12-03 RX ADMIN — MEROPENEM 100 MILLIGRAM(S): 1 INJECTION INTRAVENOUS at 15:02

## 2023-12-03 RX ADMIN — SODIUM CHLORIDE 100 MILLILITER(S): 9 INJECTION INTRAMUSCULAR; INTRAVENOUS; SUBCUTANEOUS at 20:16

## 2023-12-03 RX ADMIN — HEPARIN SODIUM 5000 UNIT(S): 5000 INJECTION INTRAVENOUS; SUBCUTANEOUS at 00:32

## 2023-12-03 RX ADMIN — Medication 975 MILLIGRAM(S): at 05:41

## 2023-12-03 RX ADMIN — HEPARIN SODIUM 5000 UNIT(S): 5000 INJECTION INTRAVENOUS; SUBCUTANEOUS at 17:26

## 2023-12-03 RX ADMIN — Medication 40 MILLIEQUIVALENT(S): at 17:26

## 2023-12-03 RX ADMIN — Medication 125 MILLIGRAM(S): at 05:11

## 2023-12-03 RX ADMIN — ATORVASTATIN CALCIUM 20 MILLIGRAM(S): 80 TABLET, FILM COATED ORAL at 21:50

## 2023-12-03 RX ADMIN — Medication 125 MILLIGRAM(S): at 17:27

## 2023-12-03 RX ADMIN — Medication 975 MILLIGRAM(S): at 00:30

## 2023-12-03 RX ADMIN — Medication 125 MILLIGRAM(S): at 12:09

## 2023-12-03 RX ADMIN — Medication 40 MILLIEQUIVALENT(S): at 15:39

## 2023-12-03 RX ADMIN — Medication 1 TABLET(S): at 12:10

## 2023-12-03 RX ADMIN — HEPARIN SODIUM 5000 UNIT(S): 5000 INJECTION INTRAVENOUS; SUBCUTANEOUS at 09:37

## 2023-12-03 RX ADMIN — Medication 40 MILLIEQUIVALENT(S): at 09:42

## 2023-12-03 RX ADMIN — Medication 975 MILLIGRAM(S): at 05:11

## 2023-12-03 RX ADMIN — Medication 975 MILLIGRAM(S): at 01:30

## 2023-12-03 RX ADMIN — MEROPENEM 100 MILLIGRAM(S): 1 INJECTION INTRAVENOUS at 00:32

## 2023-12-03 RX ADMIN — Medication 2 TABLET(S): at 17:26

## 2023-12-03 RX ADMIN — PANTOPRAZOLE SODIUM 40 MILLIGRAM(S): 20 TABLET, DELAYED RELEASE ORAL at 17:26

## 2023-12-03 RX ADMIN — Medication 125 MILLIGRAM(S): at 00:32

## 2023-12-03 RX ADMIN — LISINOPRIL 5 MILLIGRAM(S): 2.5 TABLET ORAL at 05:12

## 2023-12-03 RX ADMIN — SODIUM CHLORIDE 100 MILLILITER(S): 9 INJECTION INTRAMUSCULAR; INTRAVENOUS; SUBCUTANEOUS at 15:04

## 2023-12-03 NOTE — PROCEDURE NOTE - ADDITIONAL PROCEDURE DETAILS
Patient was properly identified using name and . CatchFree Lot #[id number] utilized. Site prepped using chlorhexidine and tourniquet applied. Ultrasound was used to identify a RUE, easily compressible, no thrombus, non-pulsatile vein. A  20G x 5.7cm CatchFree Ace Intravascular Catheter was introduced into the skin and inserted into the identified vessel. Flash seen in needle housing and placement confirmed on US. Guidewire advanced and catheter advanced over guide wire. Needle withdrawn and placement again confirmed with US visualization. Clave with saline flush attached and placement again confirmed with positive blood return/ flash. IV flushes easily without resistance, and no swelling appreciated at insertion site. Site cleaned with alcohol, Cavilon skin prep applied, and cath secured with central line dressing. Patient tolerated procedure well with no complications and no blood loss. Nursing staff informed and dressing labeled with initials, date and time. Dressing to be changed Qweekly. Patient was properly identified using name and . mobile melting [employer] Lot #[id number] utilized. Site prepped using chlorhexidine and tourniquet applied. Ultrasound was used to identify a RUE, easily compressible, no thrombus, non-pulsatile vein. A  20G x 5.7cm mobile [employer] Ace Intravascular Catheter was introduced into the skin and inserted into the identified vessel. Flash seen in needle housing and placement confirmed on US. Guidewire advanced and catheter advanced over guide wire. Needle withdrawn and placement again confirmed with US visualization. Clave with saline flush attached and placement again confirmed with positive blood return/ flash. IV flushes easily without resistance, and no swelling appreciated at insertion site. Site cleaned with alcohol, Cavilon skin prep applied, and cath secured with central line dressing. Patient tolerated procedure well with no complications and no blood loss. Nursing staff informed and dressing labeled with initials, date and time. Dressing to be changed Qweekly.

## 2023-12-03 NOTE — PROCEDURE NOTE - NSPROCDETAILS_GEN_ALL_CORE
location identified, draped/prepped, sterile technique used/blood seen on insertion/dressing applied/flushes easily

## 2023-12-03 NOTE — PROGRESS NOTE ADULT - PROBLEM SELECTOR PLAN 2
Concern for surgical site as a primary source  - woundvac in place  - awaiting culture from post-surgical fluid collection.  - BCx from 11/16, 11/17 NGTD  - changed to Nafcillin IV on 11/20. Now on meropenem (11/29-)  - Meropenem 1 g q8 for MSSA bacteremia and empiric coverage   - TTE reviewed  - continues to spike fevers.

## 2023-12-03 NOTE — PROGRESS NOTE ADULT - PROBLEM SELECTOR PLAN 1
c/b acute on chronic hypoxic respiratory failure  Noted result of CTPA.  - No PE  - Broadened antibiotics to cover for aspiration multilobar PNA.  - titrate down on HFNC as tolerated  - Bactrim DS 2 tab q8 for empiric PJP PNA treatment, follow up Fungitell, if negative can d/c

## 2023-12-03 NOTE — PROGRESS NOTE ADULT - PROBLEM SELECTOR PLAN 3
noted diarrhea and positive C. diff colitis.  - Started on Vanco PO (11/27-12/6) - 10 day course  - continue probiotics  - likely due to long term abx.

## 2023-12-03 NOTE — PROGRESS NOTE ADULT - SUBJECTIVE AND OBJECTIVE BOX
Patient is a 73y old  Male who presents with a chief complaint of Diffuse Spinal Mets from Prostate Cancer (02 Dec 2023 10:03)      INTERVAL HPI/OVERNIGHT EVENTS: LION overnight. No complaints this morning.       REVIEW OF SYSTEMS:    CONSTITUTIONAL: No weakness, fevers or chills  EYES/ENT: No visual changes;  No vertigo or throat pain   NECK: No pain or stiffness  RESPIRATORY: No cough, wheezing, hemoptysis; No shortness of breath  CARDIOVASCULAR: No chest pain or palpitations  GASTROINTESTINAL: No abdominal or epigastric pain. No nausea, vomiting, or hematemesis; No diarrhea or constipation. No melena or hematochezia.  GENITOURINARY: No dysuria, frequency or hematuria  NEUROLOGICAL: No numbness or weakness  All other review of systems is negative unless indicated above.    FAMILY HISTORY:  Family history of stroke (Grandparent)      T(C): 36.6 (12-03-23 @ 05:00), Max: 37.3 (12-02-23 @ 20:18)  HR: 94 (12-03-23 @ 08:05) (88 - 104)  BP: 126/70 (12-03-23 @ 05:00) (126/70 - 149/81)  RR: 19 (12-03-23 @ 08:05) (18 - 20)  SpO2: 98% (12-03-23 @ 08:05) (95% - 100%)  Wt(kg): --Vital Signs Last 24 Hrs  T(C): 36.6 (03 Dec 2023 05:00), Max: 37.3 (02 Dec 2023 20:18)  T(F): 97.8 (03 Dec 2023 05:00), Max: 99.2 (02 Dec 2023 20:18)  HR: 94 (03 Dec 2023 08:05) (88 - 104)  BP: 126/70 (03 Dec 2023 05:00) (126/70 - 149/81)  BP(mean): 97 (02 Dec 2023 14:50) (97 - 97)  RR: 19 (03 Dec 2023 08:05) (18 - 20)  SpO2: 98% (03 Dec 2023 08:05) (95% - 100%)    Parameters below as of 03 Dec 2023 08:05  Patient On (Oxygen Delivery Method): nasal cannula, high flow  O2 Flow (L/min): 50  O2 Concentration (%): 60    PHYSICAL EXAM:  CONSTITUTIONAL: NAD  EYES: PERRLA; conjunctiva and sclera clear  ENMT: Moist oral mucosa, no pharyngeal injection or exudates; normal dentition; no obvious plaques noted.  NECK: Supple, no palpable masses; no thyromegaly  RESPIRATORY: Normal respiratory effort; decreased respiratory effort; HFNC in place.  CARDIOVASCULAR: Regular rate and rhythm, normal S1 and S2, no murmur/rub/gallop; No lower extremity edema; Peripheral pulses are 2+ bilaterally  ABDOMEN: Nontender to palpation, normoactive bowel sounds, no rebound/guarding; No hepatosplenomegaly  MUSCULOSKELETAL:  Did not assess gait; no clubbing or cyanosis of digits; no joint swelling or tenderness to palpation  PSYCH: A+O to person, place, and time; affect appropriate  NEUROLOGY: CN 2-12 are intact and symmetric; no gross sensory deficits   SKIN: surgical site - C/D/I    Consultant(s) Notes Reviewed:  [x ] YES  [ ] NO  Care Discussed with Consultants/Other Providers [ x] YES  [ ] NO    LABS:                        7.6    5.56  )-----------( 401      ( 03 Dec 2023 06:45 )             24.1     03 Dec 2023 06:45    140    |  103    |  8      ----------------------------<  112    3.0     |  27     |  0.79     Ca    8.2        03 Dec 2023 06:45  Phos  2.6       03 Dec 2023 06:45  Mg     1.90      03 Dec 2023 06:45        CAPILLARY BLOOD GLUCOSE      POCT Blood Glucose.: 113 mg/dL (03 Dec 2023 08:40)  POCT Blood Glucose.: 124 mg/dL (02 Dec 2023 22:03)  POCT Blood Glucose.: 96 mg/dL (02 Dec 2023 21:18)  POCT Blood Glucose.: 112 mg/dL (02 Dec 2023 18:12)  POCT Blood Glucose.: 113 mg/dL (02 Dec 2023 13:20)    BLOOD CULTURE      RADIOLOGY & ADDITIONAL TESTS:    Imaging Personally Reviewed:  [ ] YES  [ ] NO  acetaminophen     Tablet .. 975 milliGRAM(s) Oral every 6 hours  atorvastatin 20 milliGRAM(s) Oral at bedtime  benzocaine/menthol Lozenge 1 Lozenge Oral every 6 hours PRN  bicalutamide 50 milliGRAM(s) Oral daily  chlorhexidine 2% Cloths 1 Application(s) Topical daily  cholecalciferol 2000 Unit(s) Oral daily  dextrose 5%. 1000 milliLiter(s) IV Continuous <Continuous>  dextrose 5%. 1000 milliLiter(s) IV Continuous <Continuous>  dextrose 50% Injectable 25 Gram(s) IV Push once  dextrose 50% Injectable 12.5 Gram(s) IV Push once  dextrose 50% Injectable 25 Gram(s) IV Push once  dextrose Oral Gel 15 Gram(s) Oral once PRN  glucagon  Injectable 1 milliGRAM(s) IntraMuscular once  guaiFENesin Oral Liquid (Sugar-Free) 200 milliGRAM(s) Oral every 6 hours PRN  heparin   Injectable 5000 Unit(s) SubCutaneous every 8 hours  lactobacillus acidophilus 1 Tablet(s) Oral daily  levalbuterol Inhalation 0.63 milliGRAM(s) Inhalation every 6 hours PRN  lisinopril 5 milliGRAM(s) Oral daily  meropenem  IVPB 1000 milliGRAM(s) IV Intermittent every 8 hours  naloxone Injectable 0.1 milliGRAM(s) IV Push every 3 minutes PRN  ondansetron Injectable 4 milliGRAM(s) IV Push every 6 hours PRN  pantoprazole  Injectable 40 milliGRAM(s) IV Push daily  potassium chloride   Powder 40 milliEquivalent(s) Oral every 4 hours  sodium chloride 0.9%. 1000 milliLiter(s) IV Continuous <Continuous>  tamsulosin 0.4 milliGRAM(s) Oral at bedtime  trimethoprim  160 mG/sulfamethoxazole 800 mG 2 Tablet(s) Oral three times a day  vancomycin    Solution 125 milliGRAM(s) Oral every 6 hours      HEALTH ISSUES - PROBLEM Dx:  DVT, lower extremity    Prostate cancer metastatic to bone    T2DM (type 2 diabetes mellitus)    Hypertension    Cervical spinal cord compression    MSSA bacteremia    Candida esophagitis    Anemia of chronic disease    Pneumonia, aspiration    C. difficile colitis

## 2023-12-04 LAB
ALBUMIN SERPL ELPH-MCNC: 2.5 G/DL — LOW (ref 3.3–5)
ALBUMIN SERPL ELPH-MCNC: 2.5 G/DL — LOW (ref 3.3–5)
ALP SERPL-CCNC: 729 U/L — HIGH (ref 40–120)
ALP SERPL-CCNC: 729 U/L — HIGH (ref 40–120)
ALT FLD-CCNC: 32 U/L — SIGNIFICANT CHANGE UP (ref 4–41)
ALT FLD-CCNC: 32 U/L — SIGNIFICANT CHANGE UP (ref 4–41)
ANION GAP SERPL CALC-SCNC: 11 MMOL/L — SIGNIFICANT CHANGE UP (ref 7–14)
ANION GAP SERPL CALC-SCNC: 11 MMOL/L — SIGNIFICANT CHANGE UP (ref 7–14)
APTT BLD: 31 SEC — SIGNIFICANT CHANGE UP (ref 24.5–35.6)
APTT BLD: 31 SEC — SIGNIFICANT CHANGE UP (ref 24.5–35.6)
AST SERPL-CCNC: 77 U/L — HIGH (ref 4–40)
AST SERPL-CCNC: 77 U/L — HIGH (ref 4–40)
BASOPHILS # BLD AUTO: 0.06 K/UL — SIGNIFICANT CHANGE UP (ref 0–0.2)
BASOPHILS # BLD AUTO: 0.06 K/UL — SIGNIFICANT CHANGE UP (ref 0–0.2)
BASOPHILS NFR BLD AUTO: 0.9 % — SIGNIFICANT CHANGE UP (ref 0–2)
BASOPHILS NFR BLD AUTO: 0.9 % — SIGNIFICANT CHANGE UP (ref 0–2)
BILIRUB DIRECT SERPL-MCNC: 0.3 MG/DL — SIGNIFICANT CHANGE UP (ref 0–0.3)
BILIRUB DIRECT SERPL-MCNC: 0.3 MG/DL — SIGNIFICANT CHANGE UP (ref 0–0.3)
BILIRUB INDIRECT FLD-MCNC: 0.2 MG/DL — SIGNIFICANT CHANGE UP (ref 0–1)
BILIRUB INDIRECT FLD-MCNC: 0.2 MG/DL — SIGNIFICANT CHANGE UP (ref 0–1)
BILIRUB SERPL-MCNC: 0.5 MG/DL — SIGNIFICANT CHANGE UP (ref 0.2–1.2)
BILIRUB SERPL-MCNC: 0.5 MG/DL — SIGNIFICANT CHANGE UP (ref 0.2–1.2)
BUN SERPL-MCNC: 8 MG/DL — SIGNIFICANT CHANGE UP (ref 7–23)
BUN SERPL-MCNC: 8 MG/DL — SIGNIFICANT CHANGE UP (ref 7–23)
CALCIUM SERPL-MCNC: 8.4 MG/DL — SIGNIFICANT CHANGE UP (ref 8.4–10.5)
CALCIUM SERPL-MCNC: 8.4 MG/DL — SIGNIFICANT CHANGE UP (ref 8.4–10.5)
CHLORIDE SERPL-SCNC: 104 MMOL/L — SIGNIFICANT CHANGE UP (ref 98–107)
CHLORIDE SERPL-SCNC: 104 MMOL/L — SIGNIFICANT CHANGE UP (ref 98–107)
CO2 SERPL-SCNC: 26 MMOL/L — SIGNIFICANT CHANGE UP (ref 22–31)
CO2 SERPL-SCNC: 26 MMOL/L — SIGNIFICANT CHANGE UP (ref 22–31)
CREAT SERPL-MCNC: 0.81 MG/DL — SIGNIFICANT CHANGE UP (ref 0.5–1.3)
CREAT SERPL-MCNC: 0.81 MG/DL — SIGNIFICANT CHANGE UP (ref 0.5–1.3)
EGFR: 94 ML/MIN/1.73M2 — SIGNIFICANT CHANGE UP
EGFR: 94 ML/MIN/1.73M2 — SIGNIFICANT CHANGE UP
EOSINOPHIL # BLD AUTO: 0.13 K/UL — SIGNIFICANT CHANGE UP (ref 0–0.5)
EOSINOPHIL # BLD AUTO: 0.13 K/UL — SIGNIFICANT CHANGE UP (ref 0–0.5)
EOSINOPHIL NFR BLD AUTO: 1.9 % — SIGNIFICANT CHANGE UP (ref 0–6)
EOSINOPHIL NFR BLD AUTO: 1.9 % — SIGNIFICANT CHANGE UP (ref 0–6)
GLUCOSE SERPL-MCNC: 105 MG/DL — HIGH (ref 70–99)
GLUCOSE SERPL-MCNC: 105 MG/DL — HIGH (ref 70–99)
HCT VFR BLD CALC: 23.9 % — LOW (ref 39–50)
HCT VFR BLD CALC: 23.9 % — LOW (ref 39–50)
HGB BLD-MCNC: 7.3 G/DL — LOW (ref 13–17)
HGB BLD-MCNC: 7.3 G/DL — LOW (ref 13–17)
IANC: 3.86 K/UL — SIGNIFICANT CHANGE UP (ref 1.8–7.4)
IANC: 3.86 K/UL — SIGNIFICANT CHANGE UP (ref 1.8–7.4)
IMM GRANULOCYTES NFR BLD AUTO: 8.8 % — HIGH (ref 0–0.9)
IMM GRANULOCYTES NFR BLD AUTO: 8.8 % — HIGH (ref 0–0.9)
INR BLD: 1.12 RATIO — SIGNIFICANT CHANGE UP (ref 0.85–1.18)
INR BLD: 1.12 RATIO — SIGNIFICANT CHANGE UP (ref 0.85–1.18)
LYMPHOCYTES # BLD AUTO: 1.6 K/UL — SIGNIFICANT CHANGE UP (ref 1–3.3)
LYMPHOCYTES # BLD AUTO: 1.6 K/UL — SIGNIFICANT CHANGE UP (ref 1–3.3)
LYMPHOCYTES # BLD AUTO: 23.7 % — SIGNIFICANT CHANGE UP (ref 13–44)
LYMPHOCYTES # BLD AUTO: 23.7 % — SIGNIFICANT CHANGE UP (ref 13–44)
MAGNESIUM SERPL-MCNC: 1.9 MG/DL — SIGNIFICANT CHANGE UP (ref 1.6–2.6)
MAGNESIUM SERPL-MCNC: 1.9 MG/DL — SIGNIFICANT CHANGE UP (ref 1.6–2.6)
MCHC RBC-ENTMCNC: 29.9 PG — SIGNIFICANT CHANGE UP (ref 27–34)
MCHC RBC-ENTMCNC: 29.9 PG — SIGNIFICANT CHANGE UP (ref 27–34)
MCHC RBC-ENTMCNC: 30.5 GM/DL — LOW (ref 32–36)
MCHC RBC-ENTMCNC: 30.5 GM/DL — LOW (ref 32–36)
MCV RBC AUTO: 98 FL — SIGNIFICANT CHANGE UP (ref 80–100)
MCV RBC AUTO: 98 FL — SIGNIFICANT CHANGE UP (ref 80–100)
MONOCYTES # BLD AUTO: 0.5 K/UL — SIGNIFICANT CHANGE UP (ref 0–0.9)
MONOCYTES # BLD AUTO: 0.5 K/UL — SIGNIFICANT CHANGE UP (ref 0–0.9)
MONOCYTES NFR BLD AUTO: 7.4 % — SIGNIFICANT CHANGE UP (ref 2–14)
MONOCYTES NFR BLD AUTO: 7.4 % — SIGNIFICANT CHANGE UP (ref 2–14)
NEUTROPHILS # BLD AUTO: 3.86 K/UL — SIGNIFICANT CHANGE UP (ref 1.8–7.4)
NEUTROPHILS # BLD AUTO: 3.86 K/UL — SIGNIFICANT CHANGE UP (ref 1.8–7.4)
NEUTROPHILS NFR BLD AUTO: 57.3 % — SIGNIFICANT CHANGE UP (ref 43–77)
NEUTROPHILS NFR BLD AUTO: 57.3 % — SIGNIFICANT CHANGE UP (ref 43–77)
NRBC # BLD: 2 /100 WBCS — HIGH (ref 0–0)
NRBC # BLD: 2 /100 WBCS — HIGH (ref 0–0)
NRBC # FLD: 0.16 K/UL — HIGH (ref 0–0)
NRBC # FLD: 0.16 K/UL — HIGH (ref 0–0)
PHOSPHATE SERPL-MCNC: 2.6 MG/DL — SIGNIFICANT CHANGE UP (ref 2.5–4.5)
PHOSPHATE SERPL-MCNC: 2.6 MG/DL — SIGNIFICANT CHANGE UP (ref 2.5–4.5)
PLATELET # BLD AUTO: 398 K/UL — SIGNIFICANT CHANGE UP (ref 150–400)
PLATELET # BLD AUTO: 398 K/UL — SIGNIFICANT CHANGE UP (ref 150–400)
POTASSIUM SERPL-MCNC: 4.1 MMOL/L — SIGNIFICANT CHANGE UP (ref 3.5–5.3)
POTASSIUM SERPL-MCNC: 4.1 MMOL/L — SIGNIFICANT CHANGE UP (ref 3.5–5.3)
POTASSIUM SERPL-SCNC: 4.1 MMOL/L — SIGNIFICANT CHANGE UP (ref 3.5–5.3)
POTASSIUM SERPL-SCNC: 4.1 MMOL/L — SIGNIFICANT CHANGE UP (ref 3.5–5.3)
PROT SERPL-MCNC: 6.1 G/DL — SIGNIFICANT CHANGE UP (ref 6–8.3)
PROT SERPL-MCNC: 6.1 G/DL — SIGNIFICANT CHANGE UP (ref 6–8.3)
PROTHROM AB SERPL-ACNC: 12.5 SEC — SIGNIFICANT CHANGE UP (ref 9.5–13)
PROTHROM AB SERPL-ACNC: 12.5 SEC — SIGNIFICANT CHANGE UP (ref 9.5–13)
RBC # BLD: 2.44 M/UL — LOW (ref 4.2–5.8)
RBC # BLD: 2.44 M/UL — LOW (ref 4.2–5.8)
RBC # FLD: 18.5 % — HIGH (ref 10.3–14.5)
RBC # FLD: 18.5 % — HIGH (ref 10.3–14.5)
SODIUM SERPL-SCNC: 141 MMOL/L — SIGNIFICANT CHANGE UP (ref 135–145)
SODIUM SERPL-SCNC: 141 MMOL/L — SIGNIFICANT CHANGE UP (ref 135–145)
WBC # BLD: 6.74 K/UL — SIGNIFICANT CHANGE UP (ref 3.8–10.5)
WBC # BLD: 6.74 K/UL — SIGNIFICANT CHANGE UP (ref 3.8–10.5)
WBC # FLD AUTO: 6.74 K/UL — SIGNIFICANT CHANGE UP (ref 3.8–10.5)
WBC # FLD AUTO: 6.74 K/UL — SIGNIFICANT CHANGE UP (ref 3.8–10.5)

## 2023-12-04 PROCEDURE — 99233 SBSQ HOSP IP/OBS HIGH 50: CPT

## 2023-12-04 RX ADMIN — TAMSULOSIN HYDROCHLORIDE 0.4 MILLIGRAM(S): 0.4 CAPSULE ORAL at 21:28

## 2023-12-04 RX ADMIN — MEROPENEM 100 MILLIGRAM(S): 1 INJECTION INTRAVENOUS at 22:07

## 2023-12-04 RX ADMIN — BICALUTAMIDE 50 MILLIGRAM(S): 50 TABLET, FILM COATED ORAL at 12:57

## 2023-12-04 RX ADMIN — CHLORHEXIDINE GLUCONATE 1 APPLICATION(S): 213 SOLUTION TOPICAL at 12:52

## 2023-12-04 RX ADMIN — Medication 125 MILLIGRAM(S): at 12:54

## 2023-12-04 RX ADMIN — Medication 125 MILLIGRAM(S): at 00:13

## 2023-12-04 RX ADMIN — LISINOPRIL 5 MILLIGRAM(S): 2.5 TABLET ORAL at 05:23

## 2023-12-04 RX ADMIN — SODIUM CHLORIDE 100 MILLILITER(S): 9 INJECTION INTRAMUSCULAR; INTRAVENOUS; SUBCUTANEOUS at 22:07

## 2023-12-04 RX ADMIN — ATORVASTATIN CALCIUM 20 MILLIGRAM(S): 80 TABLET, FILM COATED ORAL at 21:28

## 2023-12-04 RX ADMIN — Medication 2 TABLET(S): at 08:44

## 2023-12-04 RX ADMIN — HEPARIN SODIUM 5000 UNIT(S): 5000 INJECTION INTRAVENOUS; SUBCUTANEOUS at 00:12

## 2023-12-04 RX ADMIN — Medication 2 TABLET(S): at 18:23

## 2023-12-04 RX ADMIN — Medication 125 MILLIGRAM(S): at 05:24

## 2023-12-04 RX ADMIN — Medication 975 MILLIGRAM(S): at 00:13

## 2023-12-04 RX ADMIN — Medication 975 MILLIGRAM(S): at 05:23

## 2023-12-04 RX ADMIN — MEROPENEM 100 MILLIGRAM(S): 1 INJECTION INTRAVENOUS at 00:12

## 2023-12-04 RX ADMIN — HEPARIN SODIUM 5000 UNIT(S): 5000 INJECTION INTRAVENOUS; SUBCUTANEOUS at 08:43

## 2023-12-04 RX ADMIN — Medication 2000 UNIT(S): at 12:56

## 2023-12-04 RX ADMIN — Medication 125 MILLIGRAM(S): at 18:21

## 2023-12-04 RX ADMIN — Medication 975 MILLIGRAM(S): at 01:02

## 2023-12-04 RX ADMIN — HEPARIN SODIUM 5000 UNIT(S): 5000 INJECTION INTRAVENOUS; SUBCUTANEOUS at 18:21

## 2023-12-04 RX ADMIN — Medication 2 TABLET(S): at 00:13

## 2023-12-04 RX ADMIN — Medication 1 TABLET(S): at 12:59

## 2023-12-04 NOTE — PROVIDER CONTACT NOTE (OTHER) - ACTION/TREATMENT ORDERED:
Fluid bolus ordered and to be administered.
administer IV tylenol
no new orders at this time
provided notified
ACP aware, no actions ordered at this time
Tylenol PO
ACP would like a rectal temp
OK to be off heparin drip for MRI/echo - ptt drawn when pt returns - heparin gtt restarted at 13
Swallowing eval pending, npo in progress.
MD order BC, UA, UC, VBG, CBC, CMP and chest xray and IV tylenol
PA cleared patient for transfusion with baseline temp of 100.9F oral
Relay information to day team for IV team to further assess, possibly place alternated peripheral access for patient.
Tylenol administered. Will continue to monitor and reassess
md made aware. stated he will come a stick him after hand off report.
provider made aware, awaiting further instruction
Provider made aware. Endorse to night nurse and reach out to clinical impact nurse.

## 2023-12-04 NOTE — PROVIDER CONTACT NOTE (OTHER) - NAME OF MD/NP/PA/DO NOTIFIED:
ACP Sneha Melendrez
Franco Orozco, PA
Mia MAI
ACp Sneha Melendrez
PAU Kilgore notified.
Joseline Xie
Rama Harper. PA
BANDAR Hoover
Mercedes Charlton
Rama Harper
Re Chang
simone pa
Héctor Brown ACP
md job lo
ACP Sneha Melendrez was made awathom
PAU Pretty

## 2023-12-04 NOTE — PROVIDER CONTACT NOTE (OTHER) - BACKGROUND
Pt 74 y/o M. Hx metastatic prostate CA. Admitted for spinal cord compression. Lower extremity DVT.
Pt has adenovirus and Bacteremia
spinal cord compression
Pt OOB to chair this AM. Slight dyspnea on exertion. on 2L NC. Pt dangling feet, pt performing foot pumps in chair
Pt was admitted with spinal cord compression
spinal cord compression
hgb 6.5
Pt was admitted with spinal cord compression
Pt has adenovirus and Bacteremia
Pt was admitted with spinal cord compression
pt admitted for spinal cord compression
cervical spinal cord compression
Patient is on NS 0.9% @100ml/hr
admit dx: spinal cord compression
Patient admitted for spinal cord compression.

## 2023-12-04 NOTE — PROGRESS NOTE ADULT - ASSESSMENT
73y Male s/p C5-T3 posterior cervical fusion, C7-T1 decompression for metastatic prostate cancer with plastics closure on 11/2. Recovering well. Both drains removed. MRI 11/14 showing possible seroma in subcutaneous space. Incisional vac w/ black sponge/Adaptic started on 11/17/2023, now left open to air    Plan:  - leave back incision open to air  - F/U fever work up - RVP +adenovirus; UA now negative. C diff positive  - Seroma not likely infected  - Optimize nutrition, ensure adequate protein intake  - Remainder of care per primary team    Plastic Surgery  #[phone] [facility] pager 73y Male s/p C5-T3 posterior cervical fusion, C7-T1 decompression for metastatic prostate cancer with plastics closure on 11/2. Recovering well. Both drains removed. MRI 11/14 showing possible seroma in subcutaneous space. Incisional vac w/ black sponge/Adaptic started on 11/17/2023, now left open to air    Plan:  - leave back incision open to air  - F/U fever work up - RVP +adenovirus; UA now negative. C diff positive  - Seroma not likely infected  - Optimize nutrition, ensure adequate protein intake  - Remainder of care per primary team    Plastic Surgery  #[phone] [facility] pager

## 2023-12-04 NOTE — PROGRESS NOTE ADULT - PROBLEM SELECTOR PLAN 5
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  -f/u with Ortho/Plastics about timing of suture removal

## 2023-12-04 NOTE — PROVIDER CONTACT NOTE (OTHER) - SITUATION
Pt rectal temp is 102.6F
pre-transfusion temp 100.9F oral
Pt with BP 83/61 this AM taken 2 times.
Rectal temp 101.3 degrees F
Patient c/o sore throat and difficulty swallowing.
Patient's accucath infiltrated, placed by provider via ultrasound at 1502 12/3.
not able to draw labs
102.7 F oral
No ptt drawn today - pt on heparin gtt
Patient reported shortness of breath no chest pain
post-transfusion vitals:  /87    RR 18  temp 99.1F oral
Pt temp is 100.4F an HR is 105 bpm
Pt HR temp is 100.8F and HR is 109 bpm
Patient had an ultrasound guided IV inserted 3x throughout shift, all 3 infiltrated
Pt is pending MRI, unable to go due to high flow

## 2023-12-04 NOTE — PROVIDER CONTACT NOTE (OTHER) - ASSESSMENT
Both upper extremities edematous where IVs were inserted
Patient c/o sore throat, no signs of acute respiratory distress noted.
pt is a super hard stick. I tried, PCA tried.
Patient denies pain, no sign or symptoms of distress
Pt A&Ox3. Denies any dizziness/CP/palpitations. Dyspneic on exertion. BP recheck after foot pumps. SBP remains 80's.
Pt has chills.
Pt rectal temp is 102.6F
patient denies chest pain, shortness of breath, headache, dizziness, blurry vision; no change in mentation.
Pt temp is 100.4F an HR is 105 bpm
No signs or symptoms of distress
Pt HR temp is 100.8F and HR is 109 bpm
fever 101.7, , patient feels warm to touch
No ptt drawn today - pt on heparin gtt - no s/s bleeding
No signs or symptoms of distress
PT A&O4 short of breath on 2L n/c. denies chest pain vitals 136/64  Pulse 106 O2 94 Temp 99.9
Patient's right bicep swollen, no presence of pain, redness, or tenderness.

## 2023-12-04 NOTE — PROGRESS NOTE ADULT - PROBLEM SELECTOR PLAN 6
Patient previously diagnosed with prostate ca in 2009  - NM scan showing multiple osseous lesions throughout body  - heme/onc recs appreciated, will c/w bicalutamide 50mg qd  - IR consulted for kyphoplasty 12/4 in anticipation for Rad Tx.

## 2023-12-04 NOTE — PROVIDER CONTACT NOTE (OTHER) - RECOMMENDATIONS
ACP aware, midnight standing tylenol given as ordered, and ice packs placed on patient's head. will recheck temp in 30-60mins
Provider made aware.
Swallowing eval pending
provider notified
MD order BC, UA,UC, VBG,CBC, CMP and chest xray and IV tylenol
notify PA
notify md
BC, UC,UC, lactate and tylenol
Tylenol administered. Will continue to monitor and reassess
IVF bolus
Tylenol (sespsis workup done 11/14)
notify PA
notify PA
IVs were removed, patient stated no pain. Provider made aware, stated to stop IV fluids until peripheral access is obtained.
provider made aware

## 2023-12-04 NOTE — PROGRESS NOTE ADULT - SUBJECTIVE AND OBJECTIVE BOX
Plastic Surgery Progress Note (pg [facility]: [id number], [facility]: [phone])    SUBJECTIVE  The patient was seen and examined.     OBJECTIVE  ___________________________________________________  VITAL SIGNS / I&O's   Vital Signs Last 24 Hrs  T(C): 37.1 (04 Dec 2023 06:22), Max: 37.2 (04 Dec 2023 02:25)  T(F): 98.7 (04 Dec 2023 06:22), Max: 98.9 (04 Dec 2023 02:25)  HR: 95 (04 Dec 2023 06:22) (92 - 106)  BP: 124/72 (04 Dec 2023 06:22) (122/73 - 131/73)  BP(mean): --  RR: 19 (04 Dec 2023 06:22) (17 - 20)  SpO2: 97% (04 Dec 2023 06:22) (96% - 99%)    Parameters below as of 04 Dec 2023 06:22  Patient On (Oxygen Delivery Method): nasal cannula, high flow  O2 Flow (L/min): 50  O2 Concentration (%): 60      03 Dec 2023 07:01  -  04 Dec 2023 07:00  --------------------------------------------------------  IN:    Oral Fluid: 935 mL    sodium chloride 0.9%: 1100 mL  Total IN: 2035 mL    OUT:    Voided (mL): 1700 mL  Total OUT: 1700 mL    Total NET: 335 mL        ___________________________________________________  PHYSICAL EXAM    -- CONSTITUTIONAL: NAD, lying in bed  -- NEURO: Awake, alert  -- HEENT:  -- NECK:   -- CHEST:  Breast: L                R  -- PULM: Non-labored respirations  -- ABDOMEN:   -- EXTREMITIES:     ___________________________________________________  LABS                        7.3    6.74  )-----------( 398      ( 04 Dec 2023 06:01 )             23.9     04 Dec 2023 06:01    141    |  104    |  8      ----------------------------<  105    4.1     |  26     |  0.81     Ca    8.4        04 Dec 2023 06:01  Phos  2.6       04 Dec 2023 06:01  Mg     1.90      04 Dec 2023 06:01    TPro  6.1    /  Alb  2.5    /  TBili  0.5    /  DBili  0.3    /  AST  77     /  ALT  32     /  AlkPhos  729    04 Dec 2023 06:01    PT/INR - ( 04 Dec 2023 06:01 )   PT: 12.5 sec;   INR: 1.12 ratio         PTT - ( 04 Dec 2023 06:01 )  PTT:31.0 sec  CAPILLARY BLOOD GLUCOSE      POCT Blood Glucose.: 110 mg/dL (03 Dec 2023 22:17)  POCT Blood Glucose.: 115 mg/dL (03 Dec 2023 17:17)  POCT Blood Glucose.: 135 mg/dL (03 Dec 2023 12:31)  POCT Blood Glucose.: 113 mg/dL (03 Dec 2023 08:40)        Urinalysis Basic - ( 04 Dec 2023 06:01 )    Color: x / Appearance: x / SG: x / pH: x  Gluc: 105 mg/dL / Ketone: x  / Bili: x / Urobili: x   Blood: x / Protein: x / Nitrite: x   Leuk Esterase: x / RBC: x / WBC x   Sq Epi: x / Non Sq Epi: x / Bacteria: x      ___________________________________________________  MICRO  Recent Cultures:  Specimen Source: .Blood Blood, 11-27 @ 17:16; Results   No growth at 5 days; Gram Stain: --; Organism: --  Specimen Source: .Stool Feces, 11-27 @ 17:07; Results   No Protozoa seen by trichrome stain  No Helminths or Protozoa seen in formalin concentrate  performed by iodine stain  (routine O+P not evaluated for Microsporidia,  Cryptosporidia or Cyclospora)  One negative sample does not necessarily rule  out the presence of a parasitic infection.; Gram Stain: --; Organism: --  Specimen Source: .Stool Feces, 11-27 @ 15:41; Results   No enteric pathogens isolated.  (Stool culture examined for Salmonella,  Shigella, Campylobacter, Aeromonas, Plesiomonas,  Vibrio, E.coli O157 and Yersinia); Gram Stain: --; Organism: --    ___________________________________________________  MEDICATIONS  (STANDING):  acetaminophen     Tablet .. 975 milliGRAM(s) Oral every 6 hours  atorvastatin 20 milliGRAM(s) Oral at bedtime  bicalutamide 50 milliGRAM(s) Oral daily  chlorhexidine 2% Cloths 1 Application(s) Topical daily  cholecalciferol 2000 Unit(s) Oral daily  dextrose 5%. 1000 milliLiter(s) (50 mL/Hr) IV Continuous <Continuous>  dextrose 5%. 1000 milliLiter(s) (100 mL/Hr) IV Continuous <Continuous>  dextrose 50% Injectable 25 Gram(s) IV Push once  dextrose 50% Injectable 25 Gram(s) IV Push once  dextrose 50% Injectable 12.5 Gram(s) IV Push once  glucagon  Injectable 1 milliGRAM(s) IntraMuscular once  heparin   Injectable 5000 Unit(s) SubCutaneous every 8 hours  lactobacillus acidophilus 1 Tablet(s) Oral daily  lisinopril 5 milliGRAM(s) Oral daily  meropenem  IVPB 1000 milliGRAM(s) IV Intermittent every 8 hours  pantoprazole  Injectable 40 milliGRAM(s) IV Push daily  sodium chloride 0.9%. 1000 milliLiter(s) (100 mL/Hr) IV Continuous <Continuous>  tamsulosin 0.4 milliGRAM(s) Oral at bedtime  trimethoprim  160 mG/sulfamethoxazole 800 mG 2 Tablet(s) Oral three times a day  vancomycin    Solution 125 milliGRAM(s) Oral every 6 hours    MEDICATIONS  (PRN):  benzocaine/menthol Lozenge 1 Lozenge Oral every 6 hours PRN Sore Throat  dextrose Oral Gel 15 Gram(s) Oral once PRN Blood Glucose LESS THAN 70 milliGRAM(s)/deciliter  guaiFENesin Oral Liquid (Sugar-Free) 200 milliGRAM(s) Oral every 6 hours PRN Cough  levalbuterol Inhalation 0.63 milliGRAM(s) Inhalation every 6 hours PRN shortness of breath/wheezing  naloxone Injectable 0.1 milliGRAM(s) IV Push every 3 minutes PRN For ANY of the following changes in patient status:  A. RR LESS THAN 10 breaths per minute, B. Oxygen saturation LESS THAN 90%, C. Sedation score of 6  ondansetron Injectable 4 milliGRAM(s) IV Push every 6 hours PRN Nausea    Plastic Surgery Progress Note (pg [facility]: [id number], [facility]: [phone])    SUBJECTIVE  The patient was seen and examined. NPO for IR today    OBJECTIVE  ___________________________________________________  VITAL SIGNS / I&O's   Vital Signs Last 24 Hrs  T(C): 37.1 (04 Dec 2023 06:22), Max: 37.2 (04 Dec 2023 02:25)  T(F): 98.7 (04 Dec 2023 06:22), Max: 98.9 (04 Dec 2023 02:25)  HR: 95 (04 Dec 2023 06:22) (92 - 106)  BP: 124/72 (04 Dec 2023 06:22) (122/73 - 131/73)  BP(mean): --  RR: 19 (04 Dec 2023 06:22) (17 - 20)  SpO2: 97% (04 Dec 2023 06:22) (96% - 99%)    Parameters below as of 04 Dec 2023 06:22  Patient On (Oxygen Delivery Method): nasal cannula, high flow  O2 Flow (L/min): 50  O2 Concentration (%): 60      03 Dec 2023 07:01  -  04 Dec 2023 07:00  --------------------------------------------------------  IN:    Oral Fluid: 935 mL    sodium chloride 0.9%: 1100 mL  Total IN: 2035 mL    OUT:    Voided (mL): 1700 mL  Total OUT: 1700 mL    Total NET: 335 mL        ___________________________________________________  PHYSICAL EXAM    General: NAD  Neuro: Awake and alert  Pulm: non-labored respirations  Back: Incisions intact, slight fluid wave. No s/s of infection    ___________________________________________________  LABS                        7.3    6.74  )-----------( 398      ( 04 Dec 2023 06:01 )             23.9     04 Dec 2023 06:01    141    |  104    |  8      ----------------------------<  105    4.1     |  26     |  0.81     Ca    8.4        04 Dec 2023 06:01  Phos  2.6       04 Dec 2023 06:01  Mg     1.90      04 Dec 2023 06:01    TPro  6.1    /  Alb  2.5    /  TBili  0.5    /  DBili  0.3    /  AST  77     /  ALT  32     /  AlkPhos  729    04 Dec 2023 06:01    PT/INR - ( 04 Dec 2023 06:01 )   PT: 12.5 sec;   INR: 1.12 ratio         PTT - ( 04 Dec 2023 06:01 )  PTT:31.0 sec  CAPILLARY BLOOD GLUCOSE      POCT Blood Glucose.: 110 mg/dL (03 Dec 2023 22:17)  POCT Blood Glucose.: 115 mg/dL (03 Dec 2023 17:17)  POCT Blood Glucose.: 135 mg/dL (03 Dec 2023 12:31)  POCT Blood Glucose.: 113 mg/dL (03 Dec 2023 08:40)        Urinalysis Basic - ( 04 Dec 2023 06:01 )    Color: x / Appearance: x / SG: x / pH: x  Gluc: 105 mg/dL / Ketone: x  / Bili: x / Urobili: x   Blood: x / Protein: x / Nitrite: x   Leuk Esterase: x / RBC: x / WBC x   Sq Epi: x / Non Sq Epi: x / Bacteria: x      ___________________________________________________  MICRO  Recent Cultures:  Specimen Source: .Blood Blood, 11-27 @ 17:16; Results   No growth at 5 days; Gram Stain: --; Organism: --  Specimen Source: .Stool Feces, 11-27 @ 17:07; Results   No Protozoa seen by trichrome stain  No Helminths or Protozoa seen in formalin concentrate  performed by iodine stain  (routine O+P not evaluated for Microsporidia,  Cryptosporidia or Cyclospora)  One negative sample does not necessarily rule  out the presence of a parasitic infection.; Gram Stain: --; Organism: --  Specimen Source: .Stool Feces, 11-27 @ 15:41; Results   No enteric pathogens isolated.  (Stool culture examined for Salmonella,  Shigella, Campylobacter, Aeromonas, Plesiomonas,  Vibrio, E.coli O157 and Yersinia); Gram Stain: --; Organism: --    ___________________________________________________  MEDICATIONS  (STANDING):  acetaminophen     Tablet .. 975 milliGRAM(s) Oral every 6 hours  atorvastatin 20 milliGRAM(s) Oral at bedtime  bicalutamide 50 milliGRAM(s) Oral daily  chlorhexidine 2% Cloths 1 Application(s) Topical daily  cholecalciferol 2000 Unit(s) Oral daily  dextrose 5%. 1000 milliLiter(s) (50 mL/Hr) IV Continuous <Continuous>  dextrose 5%. 1000 milliLiter(s) (100 mL/Hr) IV Continuous <Continuous>  dextrose 50% Injectable 25 Gram(s) IV Push once  dextrose 50% Injectable 12.5 Gram(s) IV Push once  dextrose 50% Injectable 25 Gram(s) IV Push once  glucagon  Injectable 1 milliGRAM(s) IntraMuscular once  heparin   Injectable 5000 Unit(s) SubCutaneous every 8 hours  lactobacillus acidophilus 1 Tablet(s) Oral daily  lisinopril 5 milliGRAM(s) Oral daily  meropenem  IVPB 1000 milliGRAM(s) IV Intermittent every 8 hours  pantoprazole  Injectable 40 milliGRAM(s) IV Push daily  sodium chloride 0.9%. 1000 milliLiter(s) (100 mL/Hr) IV Continuous <Continuous>  tamsulosin 0.4 milliGRAM(s) Oral at bedtime  trimethoprim  160 mG/sulfamethoxazole 800 mG 2 Tablet(s) Oral three times a day  vancomycin    Solution 125 milliGRAM(s) Oral every 6 hours    MEDICATIONS  (PRN):  benzocaine/menthol Lozenge 1 Lozenge Oral every 6 hours PRN Sore Throat  dextrose Oral Gel 15 Gram(s) Oral once PRN Blood Glucose LESS THAN 70 milliGRAM(s)/deciliter  guaiFENesin Oral Liquid (Sugar-Free) 200 milliGRAM(s) Oral every 6 hours PRN Cough  levalbuterol Inhalation 0.63 milliGRAM(s) Inhalation every 6 hours PRN shortness of breath/wheezing  naloxone Injectable 0.1 milliGRAM(s) IV Push every 3 minutes PRN For ANY of the following changes in patient status:  A. RR LESS THAN 10 breaths per minute, B. Oxygen saturation LESS THAN 90%, C. Sedation score of 6  ondansetron Injectable 4 milliGRAM(s) IV Push every 6 hours PRN Nausea     Plastic Surgery Progress Note (pg [facility]: [id number], [facility]: [phone])    SUBJECTIVE  The patient was seen and examined.     OBJECTIVE  ___________________________________________________  VITAL SIGNS / I&O's   Vital Signs Last 24 Hrs  T(C): 37.1 (04 Dec 2023 06:22), Max: 37.2 (04 Dec 2023 02:25)  T(F): 98.7 (04 Dec 2023 06:22), Max: 98.9 (04 Dec 2023 02:25)  HR: 95 (04 Dec 2023 06:22) (92 - 106)  BP: 124/72 (04 Dec 2023 06:22) (122/73 - 131/73)  BP(mean): --  RR: 19 (04 Dec 2023 06:22) (17 - 20)  SpO2: 97% (04 Dec 2023 06:22) (96% - 99%)    Parameters below as of 04 Dec 2023 06:22  Patient On (Oxygen Delivery Method): nasal cannula, high flow  O2 Flow (L/min): 50  O2 Concentration (%): 60      03 Dec 2023 07:01  -  04 Dec 2023 07:00  --------------------------------------------------------  IN:    Oral Fluid: 935 mL    sodium chloride 0.9%: 1100 mL  Total IN: 2035 mL    OUT:    Voided (mL): 1700 mL  Total OUT: 1700 mL    Total NET: 335 mL        ___________________________________________________  PHYSICAL EXAM    -- CONSTITUTIONAL: NAD, lying in bed  -- NEURO: Awake, alert  -- HEENT:  -- NECK:   -- CHEST:  Breast: L                R  -- PULM: Non-labored respirations  -- ABDOMEN:   -- EXTREMITIES:     ___________________________________________________  LABS                        7.3    6.74  )-----------( 398      ( 04 Dec 2023 06:01 )             23.9     04 Dec 2023 06:01    141    |  104    |  8      ----------------------------<  105    4.1     |  26     |  0.81     Ca    8.4        04 Dec 2023 06:01  Phos  2.6       04 Dec 2023 06:01  Mg     1.90      04 Dec 2023 06:01    TPro  6.1    /  Alb  2.5    /  TBili  0.5    /  DBili  0.3    /  AST  77     /  ALT  32     /  AlkPhos  729    04 Dec 2023 06:01    PT/INR - ( 04 Dec 2023 06:01 )   PT: 12.5 sec;   INR: 1.12 ratio         PTT - ( 04 Dec 2023 06:01 )  PTT:31.0 sec  CAPILLARY BLOOD GLUCOSE      POCT Blood Glucose.: 110 mg/dL (03 Dec 2023 22:17)  POCT Blood Glucose.: 115 mg/dL (03 Dec 2023 17:17)  POCT Blood Glucose.: 135 mg/dL (03 Dec 2023 12:31)  POCT Blood Glucose.: 113 mg/dL (03 Dec 2023 08:40)        Urinalysis Basic - ( 04 Dec 2023 06:01 )    Color: x / Appearance: x / SG: x / pH: x  Gluc: 105 mg/dL / Ketone: x  / Bili: x / Urobili: x   Blood: x / Protein: x / Nitrite: x   Leuk Esterase: x / RBC: x / WBC x   Sq Epi: x / Non Sq Epi: x / Bacteria: x      ___________________________________________________  MICRO  Recent Cultures:  Specimen Source: .Blood Blood, 11-27 @ 17:16; Results   No growth at 5 days; Gram Stain: --; Organism: --  Specimen Source: .Stool Feces, 11-27 @ 17:07; Results   No Protozoa seen by trichrome stain  No Helminths or Protozoa seen in formalin concentrate  performed by iodine stain  (routine O+P not evaluated for Microsporidia,  Cryptosporidia or Cyclospora)  One negative sample does not necessarily rule  out the presence of a parasitic infection.; Gram Stain: --; Organism: --  Specimen Source: .Stool Feces, 11-27 @ 15:41; Results   No enteric pathogens isolated.  (Stool culture examined for Salmonella,  Shigella, Campylobacter, Aeromonas, Plesiomonas,  Vibrio, E.coli O157 and Yersinia); Gram Stain: --; Organism: --    ___________________________________________________  MEDICATIONS  (STANDING):  acetaminophen     Tablet .. 975 milliGRAM(s) Oral every 6 hours  atorvastatin 20 milliGRAM(s) Oral at bedtime  bicalutamide 50 milliGRAM(s) Oral daily  chlorhexidine 2% Cloths 1 Application(s) Topical daily  cholecalciferol 2000 Unit(s) Oral daily  dextrose 5%. 1000 milliLiter(s) (50 mL/Hr) IV Continuous <Continuous>  dextrose 5%. 1000 milliLiter(s) (100 mL/Hr) IV Continuous <Continuous>  dextrose 50% Injectable 25 Gram(s) IV Push once  dextrose 50% Injectable 25 Gram(s) IV Push once  dextrose 50% Injectable 12.5 Gram(s) IV Push once  glucagon  Injectable 1 milliGRAM(s) IntraMuscular once  heparin   Injectable 5000 Unit(s) SubCutaneous every 8 hours  lactobacillus acidophilus 1 Tablet(s) Oral daily  lisinopril 5 milliGRAM(s) Oral daily  meropenem  IVPB 1000 milliGRAM(s) IV Intermittent every 8 hours  pantoprazole  Injectable 40 milliGRAM(s) IV Push daily  sodium chloride 0.9%. 1000 milliLiter(s) (100 mL/Hr) IV Continuous <Continuous>  tamsulosin 0.4 milliGRAM(s) Oral at bedtime  trimethoprim  160 mG/sulfamethoxazole 800 mG 2 Tablet(s) Oral three times a day  vancomycin    Solution 125 milliGRAM(s) Oral every 6 hours    MEDICATIONS  (PRN):  benzocaine/menthol Lozenge 1 Lozenge Oral every 6 hours PRN Sore Throat  dextrose Oral Gel 15 Gram(s) Oral once PRN Blood Glucose LESS THAN 70 milliGRAM(s)/deciliter  guaiFENesin Oral Liquid (Sugar-Free) 200 milliGRAM(s) Oral every 6 hours PRN Cough  levalbuterol Inhalation 0.63 milliGRAM(s) Inhalation every 6 hours PRN shortness of breath/wheezing  naloxone Injectable 0.1 milliGRAM(s) IV Push every 3 minutes PRN For ANY of the following changes in patient status:  A. RR LESS THAN 10 breaths per minute, B. Oxygen saturation LESS THAN 90%, C. Sedation score of 6  ondansetron Injectable 4 milliGRAM(s) IV Push every 6 hours PRN Nausea    Plastic Surgery Progress Note (pg [facility]: [id number], [facility]: [phone])    SUBJECTIVE  The patient was seen and examined. NPO for IR today    OBJECTIVE  ___________________________________________________  VITAL SIGNS / I&O's   Vital Signs Last 24 Hrs  T(C): 37.1 (04 Dec 2023 06:22), Max: 37.2 (04 Dec 2023 02:25)  T(F): 98.7 (04 Dec 2023 06:22), Max: 98.9 (04 Dec 2023 02:25)  HR: 95 (04 Dec 2023 06:22) (92 - 106)  BP: 124/72 (04 Dec 2023 06:22) (122/73 - 131/73)  BP(mean): --  RR: 19 (04 Dec 2023 06:22) (17 - 20)  SpO2: 97% (04 Dec 2023 06:22) (96% - 99%)    Parameters below as of 04 Dec 2023 06:22  Patient On (Oxygen Delivery Method): nasal cannula, high flow  O2 Flow (L/min): 50  O2 Concentration (%): 60      03 Dec 2023 07:01  -  04 Dec 2023 07:00  --------------------------------------------------------  IN:    Oral Fluid: 935 mL    sodium chloride 0.9%: 1100 mL  Total IN: 2035 mL    OUT:    Voided (mL): 1700 mL  Total OUT: 1700 mL    Total NET: 335 mL        ___________________________________________________  PHYSICAL EXAM    General: NAD  Neuro: Awake and alert  Pulm: non-labored respirations  Back: Incisions intact, slight fluid wave. No s/s of infection    ___________________________________________________  LABS                        7.3    6.74  )-----------( 398      ( 04 Dec 2023 06:01 )             23.9     04 Dec 2023 06:01    141    |  104    |  8      ----------------------------<  105    4.1     |  26     |  0.81     Ca    8.4        04 Dec 2023 06:01  Phos  2.6       04 Dec 2023 06:01  Mg     1.90      04 Dec 2023 06:01    TPro  6.1    /  Alb  2.5    /  TBili  0.5    /  DBili  0.3    /  AST  77     /  ALT  32     /  AlkPhos  729    04 Dec 2023 06:01    PT/INR - ( 04 Dec 2023 06:01 )   PT: 12.5 sec;   INR: 1.12 ratio         PTT - ( 04 Dec 2023 06:01 )  PTT:31.0 sec  CAPILLARY BLOOD GLUCOSE      POCT Blood Glucose.: 110 mg/dL (03 Dec 2023 22:17)  POCT Blood Glucose.: 115 mg/dL (03 Dec 2023 17:17)  POCT Blood Glucose.: 135 mg/dL (03 Dec 2023 12:31)  POCT Blood Glucose.: 113 mg/dL (03 Dec 2023 08:40)        Urinalysis Basic - ( 04 Dec 2023 06:01 )    Color: x / Appearance: x / SG: x / pH: x  Gluc: 105 mg/dL / Ketone: x  / Bili: x / Urobili: x   Blood: x / Protein: x / Nitrite: x   Leuk Esterase: x / RBC: x / WBC x   Sq Epi: x / Non Sq Epi: x / Bacteria: x      ___________________________________________________  MICRO  Recent Cultures:  Specimen Source: .Blood Blood, 11-27 @ 17:16; Results   No growth at 5 days; Gram Stain: --; Organism: --  Specimen Source: .Stool Feces, 11-27 @ 17:07; Results   No Protozoa seen by trichrome stain  No Helminths or Protozoa seen in formalin concentrate  performed by iodine stain  (routine O+P not evaluated for Microsporidia,  Cryptosporidia or Cyclospora)  One negative sample does not necessarily rule  out the presence of a parasitic infection.; Gram Stain: --; Organism: --  Specimen Source: .Stool Feces, 11-27 @ 15:41; Results   No enteric pathogens isolated.  (Stool culture examined for Salmonella,  Shigella, Campylobacter, Aeromonas, Plesiomonas,  Vibrio, E.coli O157 and Yersinia); Gram Stain: --; Organism: --    ___________________________________________________  MEDICATIONS  (STANDING):  acetaminophen     Tablet .. 975 milliGRAM(s) Oral every 6 hours  atorvastatin 20 milliGRAM(s) Oral at bedtime  bicalutamide 50 milliGRAM(s) Oral daily  chlorhexidine 2% Cloths 1 Application(s) Topical daily  cholecalciferol 2000 Unit(s) Oral daily  dextrose 5%. 1000 milliLiter(s) (50 mL/Hr) IV Continuous <Continuous>  dextrose 5%. 1000 milliLiter(s) (100 mL/Hr) IV Continuous <Continuous>  dextrose 50% Injectable 25 Gram(s) IV Push once  dextrose 50% Injectable 12.5 Gram(s) IV Push once  dextrose 50% Injectable 25 Gram(s) IV Push once  glucagon  Injectable 1 milliGRAM(s) IntraMuscular once  heparin   Injectable 5000 Unit(s) SubCutaneous every 8 hours  lactobacillus acidophilus 1 Tablet(s) Oral daily  lisinopril 5 milliGRAM(s) Oral daily  meropenem  IVPB 1000 milliGRAM(s) IV Intermittent every 8 hours  pantoprazole  Injectable 40 milliGRAM(s) IV Push daily  sodium chloride 0.9%. 1000 milliLiter(s) (100 mL/Hr) IV Continuous <Continuous>  tamsulosin 0.4 milliGRAM(s) Oral at bedtime  trimethoprim  160 mG/sulfamethoxazole 800 mG 2 Tablet(s) Oral three times a day  vancomycin    Solution 125 milliGRAM(s) Oral every 6 hours    MEDICATIONS  (PRN):  benzocaine/menthol Lozenge 1 Lozenge Oral every 6 hours PRN Sore Throat  dextrose Oral Gel 15 Gram(s) Oral once PRN Blood Glucose LESS THAN 70 milliGRAM(s)/deciliter  guaiFENesin Oral Liquid (Sugar-Free) 200 milliGRAM(s) Oral every 6 hours PRN Cough  levalbuterol Inhalation 0.63 milliGRAM(s) Inhalation every 6 hours PRN shortness of breath/wheezing  naloxone Injectable 0.1 milliGRAM(s) IV Push every 3 minutes PRN For ANY of the following changes in patient status:  A. RR LESS THAN 10 breaths per minute, B. Oxygen saturation LESS THAN 90%, C. Sedation score of 6  ondansetron Injectable 4 milliGRAM(s) IV Push every 6 hours PRN Nausea

## 2023-12-04 NOTE — PROGRESS NOTE ADULT - SUBJECTIVE AND OBJECTIVE BOX
Patient is a 73y old  Male who presents with a chief complaint of Diffuse Spinal Mets from Prostate Cancer (02 Dec 2023 09:33)    f/u MSSA bacteremia    Interval History/ROS:    PAST MEDICAL & SURGICAL HISTORY:  Essential hypertension  Prostate cancer metastatic to bone    Allergies  No Known Allergies    ANTIMICROBIALS:    ceFAZolin   IVPB (11/2-11/3, 11/16-11/20)  cefTRIAXone   IVPB (11/15 x1)  ciprofloxacin   IVPB (11/17-11/20)  nafcillin  IVPB (11/20-11/27)  fluconAZOLE IVPB (11/22-11/30)  moxifloxacin  IVPB (11/25-11/27)  cefepime   IVPB (11/27-11/29)    active:  vancomycin    Solution 125 every 6 hours (11/28-)  meropenem  IVPB 1000 every 8 hours (11/29-)  trimethoprim  160 mG/sulfamethoxazole 800 mG 2 three times a day (11/30-)    MEDICATIONS  (STANDING):  acetaminophen     Tablet .. 975 every 6 hours  atorvastatin 20 at bedtime  bicalutamide 50 daily  heparin   Injectable 5000 every 8 hours  lisinopril 5 daily  pantoprazole  Injectable 40 daily  tamsulosin 0.4 at bedtime    Vital Signs Last 24 Hrs  T(F): 98.2 (12-04-23 @ 10:22), Max: 98.9 (12-04-23 @ 02:25)  HR: 94 (12-04-23 @ 12:30)  BP: 134/80 (12-04-23 @ 10:22)  RR: 19 (12-04-23 @ 12:30)  SpO2: 98% (12-04-23 @ 12:30) (95% - 99%)  Wt(kg): --                              7.3    6.74  )-----------( 398      ( 04 Dec 2023 06:01 )             23.9 12-04    141  |  104  |  8   ----------------------------<  105  4.1   |  26  |  0.81  Ca    8.4      04 Dec 2023 06:01Phos  2.6     12-04Mg     1.90     12-04  TPro  6.1  /  Alb  2.5  /  TBili  0.5  /  DBili  0.3  /  AST  77  /  ALT  32  /  AlkPhos  729  12-04    MICROBIOLOGY:  11/27 BC (-)  11/27 Stool cx (-)  11/24 BC (-)  11/18 BC (-)  11/17 BC (-)  11/16 BC (-)  11/15 UC >100,000 CFU/ml Providencia rettgeri  11/15 BC (+) MSSA    C Diff by PCR Result: Detected (11-27 @ 18:41)    C Diff by PCR Result: Detected (11-27-23 @ 18:41)    RADIOLOGY:  MR Cervical Spine w/ IV Cont (11.28.23 @ 10:39) >  Impression: Postop changes are identified as described above.  There is evidence of a large collection seen involving posterior paraspinal soft tissue region.    Abnormal T1 prolongation associated enhancement is seen involving the   C7-T4 vertebral body as described above.    Xray Cinesophagram Swallow Function w/ Contrast (11.28.23 @ 10:35) >  IMPRESSION:  Laryngeal penetration without aspiration of thin liquids.  No aspiration of puree, solids, mildly thick and moderately thick liquids.  For further information and recommendations, please refer to the speech   pathologist final report which is available for review in the electronic medical record.    CT Angio Chest PE Protocol w/ IV Cont (11.27.23 @ 18:19) >  IMPRESSION:  No evidence for pulmonary embolus up to and including lobar branches. Segmental and subsegmental branches are not well evaluated due to respiratory motion artifact.  Worsening multifocal confluent groundglass infiltratesand consolidative opacities in the right lower lobe and lateral left lung base. Correlate for atypical pneumonia.    CT Neck Soft Tissue w/ IV Cont (11.22.23 @ 10:51) >  IMPRESSION: Status post posterior fusion and laminectomy, as discussed, with a probable seroma formation within the paraspinal soft tissues. Superimposed infection is difficult to exclude. Admission the canal is markedly limited secondary to intrinsic CT modality and also streak and beam hardening artifact generated by the metallic orthopedic hardware.  No cervical mass or cervical lymphadenopathy.    CT Chest w/ IV Cont (11.22.23 @ 10:51) >  IMPRESSION:  Airway associated foci of groundglass opacity and small consolidations in the right middle and bilateral lower lobes, which can be due to aspiration pneumonitis versus infection.  Peribronchial wall thickening, likely inflammatory. Trace right pleural effusion.     MR Thoracic Spine w/wo IV Cont (11.14.23 @ 11:55) >  IMPRESSION: Postop changes as described above.  Osseous metastasis again seen.  Possible epidural enhancement is seen involving the T4-5 level. Better quality contrast enhanced MRI of the thoracic spine is recommended for further evaluation.  Previously noted T2 prolongation involving the spinal cord at the T1 level is not well-demonstrated due to artifact from postop material.      MR Thoracic Spine No Cont (10.30.23 @ 13:26) >  IMPRESSION: Overall no change in comparison to the prior MRI studies.  Unchanged cord compression and mild cord edema at the T1 level, as discussed.          TTE W or WO Ultrasound Enhancing Agent (11.18.23 @ 11:24) >  CONCLUSIONS:   1. Left ventricular wall thickness is mildly increased. Left ventricular systolic function is hyperdynamic with an ejection fraction of 72 % by Diggs's method of disks.   2. Normal right ventricular cavity size, wall thickness, and systolic function. Tricuspid annular plane systolic excursion (TAPSE) is 2.9 cm (normal >=1.7 cm).   3. The left atrium is normal.   4. The right atrium is normal in size.   5. No pericardial effusion seen.   6. Unable to rule out endocarditis.   7. Calcification of the aortic valve leaflets. mild aortic stenosis.   8. Hyperdynamic left ventricular systolic function with intra-cavitary gradient of 48 mmHg.   9. No prior echocardiogram is available for comparison.  10. Technically difficult image quality.  11. There is normal LV mass and concentric remodeling.    < end of copied text >     Patient is a 73y old  Male who presents with a chief complaint of Diffuse Spinal Mets from Prostate Cancer (02 Dec 2023 09:33)    f/u MSSA bacteremia    Interval History/ROS: breathing is stable.  no fever.  back pain.  normal bm.  rest of Ros is negative.     PAST MEDICAL & SURGICAL HISTORY:  Essential hypertension  Prostate cancer metastatic to bone    Allergies  No Known Allergies    ANTIMICROBIALS:    ceFAZolin   IVPB (11/2-11/3, 11/16-11/20)  cefTRIAXone   IVPB (11/15 x1)  ciprofloxacin   IVPB (11/17-11/20)  nafcillin  IVPB (11/20-11/27)  fluconAZOLE IVPB (11/22-11/30)  moxifloxacin  IVPB (11/25-11/27)  cefepime   IVPB (11/27-11/29)    active:  vancomycin    Solution 125 every 6 hours (11/28-)  meropenem  IVPB 1000 every 8 hours (11/29-)  trimethoprim  160 mG/sulfamethoxazole 800 mG 2 three times a day (11/30-)    MEDICATIONS  (STANDING):  acetaminophen     Tablet .. 975 every 6 hours  atorvastatin 20 at bedtime  bicalutamide 50 daily  heparin   Injectable 5000 every 8 hours  lisinopril 5 daily  pantoprazole  Injectable 40 daily  tamsulosin 0.4 at bedtime    Vital Signs Last 24 Hrs  T(F): 98.2 (12-04-23 @ 10:22), Max: 98.9 (12-04-23 @ 02:25)  HR: 94 (12-04-23 @ 12:30)  BP: 134/80 (12-04-23 @ 10:22)  RR: 19 (12-04-23 @ 12:30)  SpO2: 98% (12-04-23 @ 12:30) (95% - 99%)  Wt(kg): --    PHYSICAL EXAM:  Constitutional: non-toxic, on hiflow  HEAD/EYES: anicteric  ENT:  supple  Cardiovascular:   normal S1, S2  Respiratory:  clear BS bilaterally  GI:  soft, non-tender, normal bowel sounds  :  no best  Musculoskeletal:  no synovitis  Neurologic: awake and alert, normal strength, no focal findings  Skin:  no rash  Psychiatric:  awake, alert, appropriate mood                       7.3    6.74  )-----------( 398      ( 04 Dec 2023 06:01 )             23.9 12-04    141  |  104  |  8   ----------------------------<  105  4.1   |  26  |  0.81  Ca    8.4      04 Dec 2023 06:01Phos  2.6     12-04Mg     1.90     12-04  TPro  6.1  /  Alb  2.5  /  TBili  0.5  /  DBili  0.3  /  AST  77  /  ALT  32  /  AlkPhos  729  12-04    MICROBIOLOGY:  11/27 BC (-)  11/27 Stool cx (-)  11/24 BC (-)  11/18 BC (-)  11/17 BC (-)  11/16 BC (-)  11/15 UC >100,000 CFU/ml Providencia rettgeri  11/15 BC (+) MSSA    C Diff by PCR Result: Detected (11-27 @ 18:41)    C Diff by PCR Result: Detected (11-27-23 @ 18:41)    RADIOLOGY:  MR Cervical Spine w/ IV Cont (11.28.23 @ 10:39) >  Impression: Postop changes are identified as described above.  There is evidence of a large collection seen involving posterior paraspinal soft tissue region.  Abnormal T1 prolongation associated enhancement is seen involving the C7-T4 vertebral body as described above.    Xray Cinesophagram Swallow Function w/ Contrast (11.28.23 @ 10:35) >  IMPRESSION:  Laryngeal penetration without aspiration of thin liquids.  No aspiration of puree, solids, mildly thick and moderately thick liquids.  For further information and recommendations, please refer to the speech   pathologist final report which is available for review in the electronic medical record.    CT Angio Chest PE Protocol w/ IV Cont (11.27.23 @ 18:19) >  IMPRESSION:  No evidence for pulmonary embolus up to and including lobar branches. Segmental and subsegmental branches are not well evaluated due to respiratory motion artifact.  Worsening multifocal confluent groundglass infiltratesand consolidative opacities in the right lower lobe and lateral left lung base. Correlate for atypical pneumonia.    CT Neck Soft Tissue w/ IV Cont (11.22.23 @ 10:51) >  IMPRESSION: Status post posterior fusion and laminectomy, as discussed, with a probable seroma formation within the paraspinal soft tissues. Superimposed infection is difficult to exclude. Admission the canal is markedly limited secondary to intrinsic CT modality and also streak and beam hardening artifact generated by the metallic orthopedic hardware.  No cervical mass or cervical lymphadenopathy.    CT Chest w/ IV Cont (11.22.23 @ 10:51) >  IMPRESSION:  Airway associated foci of groundglass opacity and small consolidations in the right middle and bilateral lower lobes, which can be due to aspiration pneumonitis versus infection.  Peribronchial wall thickening, likely inflammatory. Trace right pleural effusion.     MR Thoracic Spine w/wo IV Cont (11.14.23 @ 11:55) >  IMPRESSION: Postop changes as described above.  Osseous metastasis again seen.  Possible epidural enhancement is seen involving the T4-5 level. Better quality contrast enhanced MRI of the thoracic spine is recommended for further evaluation.  Previously noted T2 prolongation involving the spinal cord at the T1 level is not well-demonstrated due to artifact from postop material.      MR Thoracic Spine No Cont (10.30.23 @ 13:26) >  IMPRESSION: Overall no change in comparison to the prior MRI studies.  Unchanged cord compression and mild cord edema at the T1 level, as discussed.          TTE W or WO Ultrasound Enhancing Agent (11.18.23 @ 11:24) >  CONCLUSIONS:   1. Left ventricular wall thickness is mildly increased. Left ventricular systolic function is hyperdynamic with an ejection fraction of 72 % by Diggs's method of disks.   2. Normal right ventricular cavity size, wall thickness, and systolic function. Tricuspid annular plane systolic excursion (TAPSE) is 2.9 cm (normal >=1.7 cm).   3. The left atrium is normal.   4. The right atrium is normal in size.   5. No pericardial effusion seen.   6. Unable to rule out endocarditis.   7. Calcification of the aortic valve leaflets. mild aortic stenosis.   8. Hyperdynamic left ventricular systolic function with intra-cavitary gradient of 48 mmHg.   9. No prior echocardiogram is available for comparison.  10. Technically difficult image quality.  11. There is normal LV mass and concentric remodeling.    < end of copied text >

## 2023-12-04 NOTE — PROGRESS NOTE ADULT - PROBLEM SELECTOR PLAN 10
no obvious hemorrhage noted  - continue to monitor CBC  - s/p 1u PRBC on 11/25   - s/p 1u PRBC on 11/29    Plan discussed with ACP

## 2023-12-04 NOTE — PROGRESS NOTE ADULT - SUBJECTIVE AND OBJECTIVE BOX
Patient is a 73y old  Male who presents with a chief complaint of Diffuse Spinal Mets from Prostate Cancer (04 Dec 2023 13:32)      SUBJECTIVE / OVERNIGHT EVENTS: Pt seen and examined at 11:15am, no overnight events, denies any sob, on high flow oxygen, reports rt arm swelling due to iv infiltration but says it is improving from before, no other new issues reported.    MEDICATIONS  (STANDING):  acetaminophen     Tablet .. 975 milliGRAM(s) Oral every 6 hours  atorvastatin 20 milliGRAM(s) Oral at bedtime  bicalutamide 50 milliGRAM(s) Oral daily  chlorhexidine 2% Cloths 1 Application(s) Topical daily  cholecalciferol 2000 Unit(s) Oral daily  dextrose 5%. 1000 milliLiter(s) (50 mL/Hr) IV Continuous <Continuous>  dextrose 5%. 1000 milliLiter(s) (100 mL/Hr) IV Continuous <Continuous>  dextrose 50% Injectable 25 Gram(s) IV Push once  dextrose 50% Injectable 25 Gram(s) IV Push once  dextrose 50% Injectable 12.5 Gram(s) IV Push once  glucagon  Injectable 1 milliGRAM(s) IntraMuscular once  heparin   Injectable 5000 Unit(s) SubCutaneous every 8 hours  lactobacillus acidophilus 1 Tablet(s) Oral daily  lisinopril 5 milliGRAM(s) Oral daily  meropenem  IVPB 1000 milliGRAM(s) IV Intermittent every 8 hours  pantoprazole  Injectable 40 milliGRAM(s) IV Push daily  sodium chloride 0.9%. 1000 milliLiter(s) (100 mL/Hr) IV Continuous <Continuous>  tamsulosin 0.4 milliGRAM(s) Oral at bedtime  trimethoprim  160 mG/sulfamethoxazole 800 mG 2 Tablet(s) Oral three times a day  vancomycin    Solution 125 milliGRAM(s) Oral every 6 hours    MEDICATIONS  (PRN):  benzocaine/menthol Lozenge 1 Lozenge Oral every 6 hours PRN Sore Throat  dextrose Oral Gel 15 Gram(s) Oral once PRN Blood Glucose LESS THAN 70 milliGRAM(s)/deciliter  guaiFENesin Oral Liquid (Sugar-Free) 200 milliGRAM(s) Oral every 6 hours PRN Cough  levalbuterol Inhalation 0.63 milliGRAM(s) Inhalation every 6 hours PRN shortness of breath/wheezing  naloxone Injectable 0.1 milliGRAM(s) IV Push every 3 minutes PRN For ANY of the following changes in patient status:  A. RR LESS THAN 10 breaths per minute, B. Oxygen saturation LESS THAN 90%, C. Sedation score of 6  ondansetron Injectable 4 milliGRAM(s) IV Push every 6 hours PRN Nausea      Vital Signs Last 24 Hrs  T(C): 36.9 (04 Dec 2023 14:22), Max: 37.2 (04 Dec 2023 02:25)  T(F): 98.4 (04 Dec 2023 14:22), Max: 98.9 (04 Dec 2023 02:25)  HR: 94 (04 Dec 2023 14:22) (92 - 106)  BP: 138/82 (04 Dec 2023 14:22) (122/73 - 138/82)  BP(mean): --  RR: 19 (04 Dec 2023 14:22) (17 - 20)  SpO2: 97% (04 Dec 2023 14:22) (95% - 99%)    Parameters below as of 04 Dec 2023 14:22  Patient On (Oxygen Delivery Method): nasal cannula, high flow  O2 Flow (L/min): 40  O2 Concentration (%): 50  CAPILLARY BLOOD GLUCOSE      POCT Blood Glucose.: 105 mg/dL (04 Dec 2023 12:49)  POCT Blood Glucose.: 112 mg/dL (04 Dec 2023 08:38)  POCT Blood Glucose.: 110 mg/dL (03 Dec 2023 22:17)  POCT Blood Glucose.: 115 mg/dL (03 Dec 2023 17:17)    I&O's Summary    03 Dec 2023 07:01  -  04 Dec 2023 07:00  --------------------------------------------------------  IN: 2035 mL / OUT: 1700 mL / NET: 335 mL        PHYSICAL EXAM:  GENERAL: NAD  CHEST/LUNG: Clear to auscultation bilaterally; No wheeze, oh high flow oxygen  HEART: Regular rate and rhythm  ABDOMEN: Soft, Nontender, Nondistended  EXTREMITIES: no LE edema  PSYCH: Calm  NEUROLOGY: AA answers questions appropriately  SKIN: + posterior neck sutures    LABS:                        7.3    6.74  )-----------( 398      ( 04 Dec 2023 06:01 )             23.9     12-04    141  |  104  |  8   ----------------------------<  105<H>  4.1   |  26  |  0.81    Ca    8.4      04 Dec 2023 06:01  Phos  2.6     12-04  Mg     1.90     12-04    TPro  6.1  /  Alb  2.5<L>  /  TBili  0.5  /  DBili  0.3  /  AST  77<H>  /  ALT  32  /  AlkPhos  729<H>  12-04    PT/INR - ( 04 Dec 2023 06:01 )   PT: 12.5 sec;   INR: 1.12 ratio         PTT - ( 04 Dec 2023 06:01 )  PTT:31.0 sec      Urinalysis Basic - ( 04 Dec 2023 06:01 )    Color: x / Appearance: x / SG: x / pH: x  Gluc: 105 mg/dL / Ketone: x  / Bili: x / Urobili: x   Blood: x / Protein: x / Nitrite: x   Leuk Esterase: x / RBC: x / WBC x   Sq Epi: x / Non Sq Epi: x / Bacteria: x        RADIOLOGY & ADDITIONAL TESTS:    Imaging Personally Reviewed:    Consultant(s) Notes Reviewed:      Care Discussed with Consultants/Other Providers:

## 2023-12-04 NOTE — PROVIDER CONTACT NOTE (OTHER) - REASON
Pt HR temp is 100.8F and HR is 109 bpm
No ptt drawn today - pt on heparin gtt
Pt temp is 100.4F an HR is 105 bpm
Rectal temp 101.3 degrees F
not able to draw labs
102.7 F oral
Patient's IV infiltrated for the 3rd time
patient oral fever and tachycardic
pre-transfusion temp 100.9F oral
BP 83/61
Pt is pending MRI, unable to go due to high flow
post-transfusion vitals
Patient c/o sore throat and difficulty swallowing.
Patient reported shortness of breath no chest pain
Pt rectal temp is 102.6F
Patient's accucath infiltrated

## 2023-12-04 NOTE — PROGRESS NOTE ADULT - ASSESSMENT
73m with known metastatic prostate CA admitted initially to [facility] after falling and weakness. Found to be in rhabdo with SRUTHI.  Imaging noted spinal fracture with edema and lytic metastatic disease from C7, T1-T5, T12, and L1-L3 as well as epidural expansion of neoplasm at C7 centrally and dorsally resulting in moderate cord compression and underlying edema/extension into the bilateral C7-T1 and T1-2 and Left T2-3 neural foramina. Epidural disease was also noted at T12 vertebral body.   11/2, he underwent C7-T1 decompression, C5-T3 posterior cervical fusion.  Hospital course complicated by MSSA bacteremia which has cleared with negative TTE.      Overall, metastatic prostate cancer with cord compression s/p spinal decompression 11/2, with fever due to MSSA bacteremia and cdiff    Fever  - resolved for now  - repeat BC negative    MSSA bacteremia  - would continue antibiotics  - negative echo  - on meropenem due to adverse effects (leukopenia on cephalosporin?)  - at least 4 weeks from first negative BC     Abnormal Imaging  - fluid collections noted on imaging  - previously had drainage from incision    Hypoxic respiratory failure  - concern for aspiration    C diff  - on po vancomycin 73m with known metastatic prostate CA admitted initially to [facility] after falling and weakness. Found to be in rhabdo with SRUTHI.  Imaging noted spinal fracture with edema and lytic metastatic disease from C7, T1-T5, T12, and L1-L3 as well as epidural expansion of neoplasm at C7 centrally and dorsally resulting in moderate cord compression and underlying edema/extension into the bilateral C7-T1 and T1-2 and Left T2-3 neural foramina. Epidural disease was also noted at T12 vertebral body.   11/2, he underwent C7-T1 decompression, C5-T3 posterior cervical fusion.  Hospital course complicated by MSSA bacteremia which has cleared with negative TTE.      Overall, metastatic prostate cancer with cord compression s/p spinal decompression 11/2, with fever due to MSSA bacteremia and cdiff    Fever  - resolved for now  - repeat BC negative    MSSA bacteremia  - would continue antibiotics  - negative echo  - on meropenem due to adverse effects (leukopenia on cephalosporin?)  - at least 4 weeks from first negative BC     Abnormal Imaging  - fluid collections noted on imaging  - previously had drainage from incision    Hypoxic respiratory failure  - concern for aspiration    C diff  - on po vancomycin

## 2023-12-04 NOTE — CHART NOTE - NSCHARTNOTEFT_GEN_A_CORE
PRE-INTERVENTIONAL RADIOLOGY PROCEDURE NOTE    Patient Age: 73  Patient Gender: Male  Procedure (including site / side if known): Pre RT  Kyphoplasty  Diagnosis / Indication: Cervical spinal cord compression  Interventional Radiology Attending Physician:   Ordering Attending Physician: Dr Wan  Pertinent medical history: DM2, HTN, Prostate CA p/w spinal cord compression from metastasis s/p C5-T3 Fusion, C7-T1 decompression 11/2 c/b MSSA bacteremia from surgical site infection, B/L LE DVT s/p IVC filter 11/1 by IR, candida esophagitis w/ dysphagia, aspiration multilobar PNA, acute on chronic anemia, C. diff colitis.  Pertinent labs:                       7.6    5.56  )-----------( 401      ( 03 Dec 2023 06:45 )             24.1   12-03    140  |  103  |  8   ----------------------------<  112<H>  3.0<L>   |  27  |  0.79    Ca    8.2<L>      03 Dec 2023 06:45  Phos  2.6     12-03  Mg     1.90     12-03  Patient and Family aware? Yes      Mercedes Charlton PA-C   [id number] PRE-INTERVENTIONAL RADIOLOGY PROCEDURE NOTE    Patient Age: 73  Patient Gender: Male  Procedure (including site / side if known): Pre RT  Kyphoplasty  Diagnosis / Indication: Cervical spinal cord compression  Interventional Radiology Attending Physician:   Ordering Attending Physician: Dr Wan  Pertinent medical history: DM2, HTN, Prostate CA p/w spinal cord compression from metastasis s/p C5-T3 Fusion, C7-T1 decompression 11/2 c/b MSSA bacteremia from surgical site infection, B/L LE DVT s/p IVC filter 11/1 by IR, candida esophagitis w/ dysphagia, aspiration multilobar PNA, acute on chronic anemia, C. diff colitis.  Pertinent labs:                       7.6    5.56  )-----------( 401      ( 03 Dec 2023 06:45 )             24.1   12-03    140  |  103  |  8   ----------------------------<  112<H>  3.0<L>   |  27  |  0.79    Ca    8.2<L>      03 Dec 2023 06:45  Phos  2.6     12-03  Mg     1.90     12-03  Patient and Family aware? Yes      Mercedes Charlton PA-C   [id number]

## 2023-12-04 NOTE — PROVIDER CONTACT NOTE (OTHER) - DATE AND TIME:
04-Dec-2023 08:57
14-Nov-2023 22:45
03-Dec-2023 15:50
27-Nov-2023 00:49
25-Nov-2023 18:30
30-Oct-2023 10:41
15-Nov-2023 06:08
27-Nov-2023 12:40
28-Nov-2023 14:30
29-Nov-2023 20:42
29-Nov-2023 20:46
18-Nov-2023 12:35
20-Nov-2023 10:05
14-Nov-2023 22:00
21-Nov-2023 16:10
04-Dec-2023 03:30

## 2023-12-04 NOTE — PROGRESS NOTE ADULT - PROBLEM SELECTOR PLAN 2
Concern for surgical site as a primary source  - woundvac in place  - awaiting culture from post-surgical fluid collection.  - BCx from 11/16, 11/17 NGTD  - changed to Nafcillin IV on 11/20. Now on meropenem (11/29-)  - Meropenem 1 g q8 for MSSA bacteremia and empiric coverage   - TTE reviewed  - continues to spike fevers.  -ANDERS chandra

## 2023-12-05 LAB
1,3 BETA GLUCAN SER QL: POSITIVE
1,3 BETA GLUCAN SER QL: POSITIVE
ANION GAP SERPL CALC-SCNC: 10 MMOL/L — SIGNIFICANT CHANGE UP (ref 7–14)
ANION GAP SERPL CALC-SCNC: 10 MMOL/L — SIGNIFICANT CHANGE UP (ref 7–14)
BLD GP AB SCN SERPL QL: NEGATIVE — SIGNIFICANT CHANGE UP
BLD GP AB SCN SERPL QL: NEGATIVE — SIGNIFICANT CHANGE UP
BUN SERPL-MCNC: 7 MG/DL — SIGNIFICANT CHANGE UP (ref 7–23)
BUN SERPL-MCNC: 7 MG/DL — SIGNIFICANT CHANGE UP (ref 7–23)
CALCIUM SERPL-MCNC: 8.3 MG/DL — LOW (ref 8.4–10.5)
CALCIUM SERPL-MCNC: 8.3 MG/DL — LOW (ref 8.4–10.5)
CHLORIDE SERPL-SCNC: 101 MMOL/L — SIGNIFICANT CHANGE UP (ref 98–107)
CHLORIDE SERPL-SCNC: 101 MMOL/L — SIGNIFICANT CHANGE UP (ref 98–107)
CO2 SERPL-SCNC: 28 MMOL/L — SIGNIFICANT CHANGE UP (ref 22–31)
CO2 SERPL-SCNC: 28 MMOL/L — SIGNIFICANT CHANGE UP (ref 22–31)
CREAT SERPL-MCNC: 0.83 MG/DL — SIGNIFICANT CHANGE UP (ref 0.5–1.3)
CREAT SERPL-MCNC: 0.83 MG/DL — SIGNIFICANT CHANGE UP (ref 0.5–1.3)
EGFR: 94 ML/MIN/1.73M2 — SIGNIFICANT CHANGE UP
EGFR: 94 ML/MIN/1.73M2 — SIGNIFICANT CHANGE UP
FUNGITELL: 390 PG/ML
FUNGITELL: 390 PG/ML
GLUCOSE SERPL-MCNC: 97 MG/DL — SIGNIFICANT CHANGE UP (ref 70–99)
GLUCOSE SERPL-MCNC: 97 MG/DL — SIGNIFICANT CHANGE UP (ref 70–99)
HCT VFR BLD CALC: 24.7 % — LOW (ref 39–50)
HCT VFR BLD CALC: 24.7 % — LOW (ref 39–50)
HGB BLD-MCNC: 7.6 G/DL — LOW (ref 13–17)
HGB BLD-MCNC: 7.6 G/DL — LOW (ref 13–17)
MAGNESIUM SERPL-MCNC: 1.9 MG/DL — SIGNIFICANT CHANGE UP (ref 1.6–2.6)
MAGNESIUM SERPL-MCNC: 1.9 MG/DL — SIGNIFICANT CHANGE UP (ref 1.6–2.6)
MCHC RBC-ENTMCNC: 29.8 PG — SIGNIFICANT CHANGE UP (ref 27–34)
MCHC RBC-ENTMCNC: 29.8 PG — SIGNIFICANT CHANGE UP (ref 27–34)
MCHC RBC-ENTMCNC: 30.8 GM/DL — LOW (ref 32–36)
MCHC RBC-ENTMCNC: 30.8 GM/DL — LOW (ref 32–36)
MCV RBC AUTO: 96.9 FL — SIGNIFICANT CHANGE UP (ref 80–100)
MCV RBC AUTO: 96.9 FL — SIGNIFICANT CHANGE UP (ref 80–100)
NRBC # BLD: 1 /100 WBCS — HIGH (ref 0–0)
NRBC # BLD: 1 /100 WBCS — HIGH (ref 0–0)
NRBC # FLD: 0.11 K/UL — HIGH (ref 0–0)
NRBC # FLD: 0.11 K/UL — HIGH (ref 0–0)
PHOSPHATE SERPL-MCNC: 3 MG/DL — SIGNIFICANT CHANGE UP (ref 2.5–4.5)
PHOSPHATE SERPL-MCNC: 3 MG/DL — SIGNIFICANT CHANGE UP (ref 2.5–4.5)
PLATELET # BLD AUTO: 404 K/UL — HIGH (ref 150–400)
PLATELET # BLD AUTO: 404 K/UL — HIGH (ref 150–400)
POTASSIUM SERPL-MCNC: 4 MMOL/L — SIGNIFICANT CHANGE UP (ref 3.5–5.3)
POTASSIUM SERPL-MCNC: 4 MMOL/L — SIGNIFICANT CHANGE UP (ref 3.5–5.3)
POTASSIUM SERPL-SCNC: 4 MMOL/L — SIGNIFICANT CHANGE UP (ref 3.5–5.3)
POTASSIUM SERPL-SCNC: 4 MMOL/L — SIGNIFICANT CHANGE UP (ref 3.5–5.3)
RBC # BLD: 2.55 M/UL — LOW (ref 4.2–5.8)
RBC # BLD: 2.55 M/UL — LOW (ref 4.2–5.8)
RBC # FLD: 18.6 % — HIGH (ref 10.3–14.5)
RBC # FLD: 18.6 % — HIGH (ref 10.3–14.5)
RH IG SCN BLD-IMP: POSITIVE — SIGNIFICANT CHANGE UP
RH IG SCN BLD-IMP: POSITIVE — SIGNIFICANT CHANGE UP
SODIUM SERPL-SCNC: 139 MMOL/L — SIGNIFICANT CHANGE UP (ref 135–145)
SODIUM SERPL-SCNC: 139 MMOL/L — SIGNIFICANT CHANGE UP (ref 135–145)
WBC # BLD: 7.8 K/UL — SIGNIFICANT CHANGE UP (ref 3.8–10.5)
WBC # BLD: 7.8 K/UL — SIGNIFICANT CHANGE UP (ref 3.8–10.5)
WBC # FLD AUTO: 7.8 K/UL — SIGNIFICANT CHANGE UP (ref 3.8–10.5)
WBC # FLD AUTO: 7.8 K/UL — SIGNIFICANT CHANGE UP (ref 3.8–10.5)

## 2023-12-05 PROCEDURE — 72147 MRI CHEST SPINE W/DYE: CPT | Mod: 26

## 2023-12-05 PROCEDURE — 99233 SBSQ HOSP IP/OBS HIGH 50: CPT

## 2023-12-05 PROCEDURE — 99232 SBSQ HOSP IP/OBS MODERATE 35: CPT

## 2023-12-05 RX ADMIN — BICALUTAMIDE 50 MILLIGRAM(S): 50 TABLET, FILM COATED ORAL at 11:49

## 2023-12-05 RX ADMIN — Medication 2000 UNIT(S): at 11:50

## 2023-12-05 RX ADMIN — Medication 125 MILLIGRAM(S): at 17:42

## 2023-12-05 RX ADMIN — MEROPENEM 100 MILLIGRAM(S): 1 INJECTION INTRAVENOUS at 06:09

## 2023-12-05 RX ADMIN — HEPARIN SODIUM 5000 UNIT(S): 5000 INJECTION INTRAVENOUS; SUBCUTANEOUS at 00:53

## 2023-12-05 RX ADMIN — PANTOPRAZOLE SODIUM 40 MILLIGRAM(S): 20 TABLET, DELAYED RELEASE ORAL at 11:49

## 2023-12-05 RX ADMIN — Medication 125 MILLIGRAM(S): at 00:49

## 2023-12-05 RX ADMIN — Medication 1 TABLET(S): at 11:50

## 2023-12-05 RX ADMIN — Medication 125 MILLIGRAM(S): at 11:49

## 2023-12-05 RX ADMIN — CHLORHEXIDINE GLUCONATE 1 APPLICATION(S): 213 SOLUTION TOPICAL at 11:49

## 2023-12-05 RX ADMIN — MEROPENEM 100 MILLIGRAM(S): 1 INJECTION INTRAVENOUS at 21:35

## 2023-12-05 RX ADMIN — ATORVASTATIN CALCIUM 20 MILLIGRAM(S): 80 TABLET, FILM COATED ORAL at 21:34

## 2023-12-05 RX ADMIN — MEROPENEM 100 MILLIGRAM(S): 1 INJECTION INTRAVENOUS at 14:53

## 2023-12-05 RX ADMIN — Medication 2 TABLET(S): at 17:42

## 2023-12-05 RX ADMIN — Medication 2 TABLET(S): at 09:05

## 2023-12-05 RX ADMIN — LISINOPRIL 5 MILLIGRAM(S): 2.5 TABLET ORAL at 06:05

## 2023-12-05 RX ADMIN — Medication 125 MILLIGRAM(S): at 06:03

## 2023-12-05 RX ADMIN — HEPARIN SODIUM 5000 UNIT(S): 5000 INJECTION INTRAVENOUS; SUBCUTANEOUS at 17:42

## 2023-12-05 RX ADMIN — Medication 2 TABLET(S): at 00:49

## 2023-12-05 RX ADMIN — TAMSULOSIN HYDROCHLORIDE 0.4 MILLIGRAM(S): 0.4 CAPSULE ORAL at 21:35

## 2023-12-05 NOTE — PROGRESS NOTE ADULT - PROBLEM SELECTOR PLAN 10
no obvious hemorrhage noted  - continue to monitor CBC  - s/p 1u PRBC on 11/25   - s/p 1u PRBC on 11/29    Plan discussed with ACP    Updated daughter Olga on 12/5 and answered all questions no obvious hemorrhage noted  - continue to monitor CBC  - s/p 1u PRBC on 11/25   - s/p 1u PRBC on 11/29    Plan discussed with ACP    Updated daughter Olga on 12/5 and answered all questions.

## 2023-12-05 NOTE — PROGRESS NOTE ADULT - PROBLEM SELECTOR PLAN 4
c/b dysphagia and aspiration PNA  Reviewed CT Neck/Chest.  Appreciate ENT eval - reviewed direct laryngoscope imaging on 11/22  - ENT suspects esophageal candida but also possible mass effect from recent C-spine surgery; however no airway compromise noted.  - Diflucan IV (11/22-12/1)  - with clinical improvement  - S+S eval appreciated c/b dysphagia and aspiration PNA  Reviewed CT Neck/Chest.  Appreciate ENT eval - reviewed direct laryngoscope imaging on 11/22  - ENT suspects esophageal candida but also possible mass effect from recent C-spine surgery; however no airway compromise noted.  - Diflucan IV (11/22-12/1) Fungitell is positive, ID attending made aware, will f/u  - with clinical improvement  - S+S eval appreciated

## 2023-12-05 NOTE — PROGRESS NOTE ADULT - SUBJECTIVE AND OBJECTIVE BOX
Patient is a 73y old  Male who presents with a chief complaint of Diffuse Spinal Mets from Prostate Cancer (05 Dec 2023 14:34)      SUBJECTIVE / OVERNIGHT EVENTS:Pt seen and examined at 11:55am, no overnight events, denies any sob, on non rebreather mask oxygen, just came back after MRI, waiting for Kyphoplasty later today, no other new issues reported.     MEDICATIONS  (STANDING):  acetaminophen     Tablet .. 975 milliGRAM(s) Oral every 6 hours  atorvastatin 20 milliGRAM(s) Oral at bedtime  bicalutamide 50 milliGRAM(s) Oral daily  chlorhexidine 2% Cloths 1 Application(s) Topical daily  cholecalciferol 2000 Unit(s) Oral daily  dextrose 5%. 1000 milliLiter(s) (50 mL/Hr) IV Continuous <Continuous>  dextrose 5%. 1000 milliLiter(s) (100 mL/Hr) IV Continuous <Continuous>  dextrose 50% Injectable 25 Gram(s) IV Push once  dextrose 50% Injectable 12.5 Gram(s) IV Push once  dextrose 50% Injectable 25 Gram(s) IV Push once  glucagon  Injectable 1 milliGRAM(s) IntraMuscular once  heparin   Injectable 5000 Unit(s) SubCutaneous every 8 hours  lactobacillus acidophilus 1 Tablet(s) Oral daily  lisinopril 5 milliGRAM(s) Oral daily  meropenem  IVPB 1000 milliGRAM(s) IV Intermittent every 8 hours  pantoprazole  Injectable 40 milliGRAM(s) IV Push daily  sodium chloride 0.9%. 1000 milliLiter(s) (100 mL/Hr) IV Continuous <Continuous>  tamsulosin 0.4 milliGRAM(s) Oral at bedtime  trimethoprim  160 mG/sulfamethoxazole 800 mG 2 Tablet(s) Oral three times a day  vancomycin    Solution 125 milliGRAM(s) Oral every 6 hours    MEDICATIONS  (PRN):  benzocaine/menthol Lozenge 1 Lozenge Oral every 6 hours PRN Sore Throat  dextrose Oral Gel 15 Gram(s) Oral once PRN Blood Glucose LESS THAN 70 milliGRAM(s)/deciliter  guaiFENesin Oral Liquid (Sugar-Free) 200 milliGRAM(s) Oral every 6 hours PRN Cough  levalbuterol Inhalation 0.63 milliGRAM(s) Inhalation every 6 hours PRN shortness of breath/wheezing  naloxone Injectable 0.1 milliGRAM(s) IV Push every 3 minutes PRN For ANY of the following changes in patient status:  A. RR LESS THAN 10 breaths per minute, B. Oxygen saturation LESS THAN 90%, C. Sedation score of 6  ondansetron Injectable 4 milliGRAM(s) IV Push every 6 hours PRN Nausea      Vital Signs Last 24 Hrs  T(C): 36.9 (05 Dec 2023 06:00), Max: 37 (04 Dec 2023 18:22)  T(F): 98.4 (05 Dec 2023 06:00), Max: 98.6 (04 Dec 2023 18:22)  HR: 96 (05 Dec 2023 06:00) (95 - 100)  BP: 117/75 (05 Dec 2023 06:00) (117/75 - 130/78)  BP(mean): --  RR: 18 (05 Dec 2023 07:23) (17 - 19)  SpO2: 100% (05 Dec 2023 07:23) (97% - 100%)    Parameters below as of 05 Dec 2023 09:00  Patient On (Oxygen Delivery Method): mask, nonrebreather      CAPILLARY BLOOD GLUCOSE      POCT Blood Glucose.: 116 mg/dL (05 Dec 2023 12:21)  POCT Blood Glucose.: 102 mg/dL (04 Dec 2023 20:58)  POCT Blood Glucose.: 103 mg/dL (04 Dec 2023 17:54)    I&O's Summary    04 Dec 2023 07:01  -  05 Dec 2023 07:00  --------------------------------------------------------  IN: 1100 mL / OUT: 3100 mL / NET: -2000 mL        PHYSICAL EXAM:  GENERAL: NAD  CHEST/LUNG: Clear to auscultation bilaterally; No wheeze, on nonrebreather  oxygen  HEART: Regular rate and rhythm  ABDOMEN: Soft, Nontender, Nondistended  EXTREMITIES: no LE edema  PSYCH: Calm  NEUROLOGY: AA answers questions appropriately  SKIN: + posterior neck sutures    LABS:                        7.6    7.80  )-----------( 404      ( 05 Dec 2023 06:00 )             24.7     12-05    139  |  101  |  7   ----------------------------<  97  4.0   |  28  |  0.83    Ca    8.3<L>      05 Dec 2023 06:00  Phos  3.0     12-05  Mg     1.90     12-05    TPro  6.1  /  Alb  2.5<L>  /  TBili  0.5  /  DBili  0.3  /  AST  77<H>  /  ALT  32  /  AlkPhos  729<H>  12-04    PT/INR - ( 04 Dec 2023 06:01 )   PT: 12.5 sec;   INR: 1.12 ratio         PTT - ( 04 Dec 2023 06:01 )  PTT:31.0 sec      Urinalysis Basic - ( 05 Dec 2023 06:00 )    Color: x / Appearance: x / SG: x / pH: x  Gluc: 97 mg/dL / Ketone: x  / Bili: x / Urobili: x   Blood: x / Protein: x / Nitrite: x   Leuk Esterase: x / RBC: x / WBC x   Sq Epi: x / Non Sq Epi: x / Bacteria: x        RADIOLOGY & ADDITIONAL TESTS:  < from: MR Thoracic Spine w/ IV Cont (12.05.23 @ 11:56) >  MR SPINE THORACI    < end of copied text >  < from: MR Thoracic Spine w/ IV Cont (12.05.23 @ 11:56) >  Postop changes identified. Large collection with peripheral   enhancement involving the posterior paraspinal soft tissue region is   again partially demonstrated.    There is evidence of abnormal T1 prolongation associated enhancement seen   involving multiple vertebral bodies and posterior elements which could be   compatible with underlying metastasis or other similar etiologies.    < end of copied text >    Imaging Personally Reviewed:    Consultant(s) Notes Reviewed:      Care Discussed with Consultants/Other Providers:

## 2023-12-05 NOTE — PROGRESS NOTE ADULT - ASSESSMENT
73y Male s/p C5-T3 posterior cervical fusion, C7-T1 decompression for metastatic prostate cancer with plastics closure on 11/2. Recovering well. Both drains removed. MRI 11/14 showing possible seroma in subcutaneous space. Incisional vac w/ black sponge/Adaptic started on 11/17/2023, now left open to air    Plan:  - Sutures removed  - leave back incision open to air  - F/U fever work up - RVP +adenovirus; UA now negative. C diff positive  - Seroma not likely infected  - Optimize nutrition, ensure adequate protein intake  - Remainder of care per primary team    Plastic Surgery  #[id number] [facility]  73y Male s/p C5-T3 posterior cervical fusion, C7-T1 decompression for metastatic prostate cancer with plastics closure on 11/2. Recovering well. Both drains removed. MRI 11/14 showing possible seroma in subcutaneous space. Incisional vac w/ black sponge/Adaptic started on 11/17/2023, now left open to air    Plan:  - Sutures removed  - leave back incision open to air  - F/U fever work up - RVP +adenovirus; UA now negative. C diff positive  - Seroma not likely infected  - Optimize nutrition, ensure adequate protein intake  - Remainder of care per primary team    Plastic Surgery  #[id number] [facility]

## 2023-12-05 NOTE — PROGRESS NOTE ADULT - SUBJECTIVE AND OBJECTIVE BOX
Plastic Surgery Progress Note (pg [facility]: [id number], [facility]: [phone])    SUBJECTIVE  The patient was seen and examined.     OBJECTIVE  ___________________________________________________  VITAL SIGNS / I&O's   Vital Signs Last 24 Hrs  T(C): 36.9 (05 Dec 2023 06:00), Max: 37 (04 Dec 2023 18:22)  T(F): 98.4 (05 Dec 2023 06:00), Max: 98.6 (04 Dec 2023 18:22)  HR: 96 (05 Dec 2023 06:00) (95 - 100)  BP: 117/75 (05 Dec 2023 06:00) (117/75 - 130/78)  BP(mean): --  RR: 18 (05 Dec 2023 07:23) (17 - 19)  SpO2: 100% (05 Dec 2023 07:23) (97% - 100%)    Parameters below as of 05 Dec 2023 09:00  Patient On (Oxygen Delivery Method): mask, nonrebreather          04 Dec 2023 07:01  -  05 Dec 2023 07:00  --------------------------------------------------------  IN:    IV PiggyBack: 100 mL    sodium chloride 0.9%: 1000 mL  Total IN: 1100 mL    OUT:    Voided (mL): 3100 mL  Total OUT: 3100 mL    Total NET: -2000 mL        ___________________________________________________  PHYSICAL EXAM    General: NAD  Neuro: Awake and alert  Pulm: non-labored respirations  Back: Incisions intact, slight fluid wave. No s/s of infection    ___________________________________________________  LABS                        7.6    7.80  )-----------( 404      ( 05 Dec 2023 06:00 )             24.7     05 Dec 2023 06:00    139    |  101    |  7      ----------------------------<  97     4.0     |  28     |  0.83     Ca    8.3        05 Dec 2023 06:00  Phos  3.0       05 Dec 2023 06:00  Mg     1.90      05 Dec 2023 06:00    TPro  6.1    /  Alb  2.5    /  TBili  0.5    /  DBili  0.3    /  AST  77     /  ALT  32     /  AlkPhos  729    04 Dec 2023 06:01    PT/INR - ( 04 Dec 2023 06:01 )   PT: 12.5 sec;   INR: 1.12 ratio         PTT - ( 04 Dec 2023 06:01 )  PTT:31.0 sec  CAPILLARY BLOOD GLUCOSE      POCT Blood Glucose.: 116 mg/dL (05 Dec 2023 12:21)  POCT Blood Glucose.: 102 mg/dL (04 Dec 2023 20:58)  POCT Blood Glucose.: 103 mg/dL (04 Dec 2023 17:54)        Urinalysis Basic - ( 05 Dec 2023 06:00 )    Color: x / Appearance: x / SG: x / pH: x  Gluc: 97 mg/dL / Ketone: x  / Bili: x / Urobili: x   Blood: x / Protein: x / Nitrite: x   Leuk Esterase: x / RBC: x / WBC x   Sq Epi: x / Non Sq Epi: x / Bacteria: x      ___________________________________________________  MICRO  Recent Cultures:    ___________________________________________________  MEDICATIONS  (STANDING):  acetaminophen     Tablet .. 975 milliGRAM(s) Oral every 6 hours  atorvastatin 20 milliGRAM(s) Oral at bedtime  bicalutamide 50 milliGRAM(s) Oral daily  chlorhexidine 2% Cloths 1 Application(s) Topical daily  cholecalciferol 2000 Unit(s) Oral daily  dextrose 5%. 1000 milliLiter(s) (50 mL/Hr) IV Continuous <Continuous>  dextrose 5%. 1000 milliLiter(s) (100 mL/Hr) IV Continuous <Continuous>  dextrose 50% Injectable 25 Gram(s) IV Push once  dextrose 50% Injectable 12.5 Gram(s) IV Push once  dextrose 50% Injectable 25 Gram(s) IV Push once  glucagon  Injectable 1 milliGRAM(s) IntraMuscular once  heparin   Injectable 5000 Unit(s) SubCutaneous every 8 hours  lactobacillus acidophilus 1 Tablet(s) Oral daily  lisinopril 5 milliGRAM(s) Oral daily  meropenem  IVPB 1000 milliGRAM(s) IV Intermittent every 8 hours  pantoprazole  Injectable 40 milliGRAM(s) IV Push daily  sodium chloride 0.9%. 1000 milliLiter(s) (100 mL/Hr) IV Continuous <Continuous>  tamsulosin 0.4 milliGRAM(s) Oral at bedtime  trimethoprim  160 mG/sulfamethoxazole 800 mG 2 Tablet(s) Oral three times a day  vancomycin    Solution 125 milliGRAM(s) Oral every 6 hours    MEDICATIONS  (PRN):  benzocaine/menthol Lozenge 1 Lozenge Oral every 6 hours PRN Sore Throat  dextrose Oral Gel 15 Gram(s) Oral once PRN Blood Glucose LESS THAN 70 milliGRAM(s)/deciliter  guaiFENesin Oral Liquid (Sugar-Free) 200 milliGRAM(s) Oral every 6 hours PRN Cough  levalbuterol Inhalation 0.63 milliGRAM(s) Inhalation every 6 hours PRN shortness of breath/wheezing  naloxone Injectable 0.1 milliGRAM(s) IV Push every 3 minutes PRN For ANY of the following changes in patient status:  A. RR LESS THAN 10 breaths per minute, B. Oxygen saturation LESS THAN 90%, C. Sedation score of 6  ondansetron Injectable 4 milliGRAM(s) IV Push every 6 hours PRN Nausea Plastic Surgery Progress Note (pg [facility]: [id number], [facility]: [phone])    SUBJECTIVE  The patient was seen and examined.     OBJECTIVE  ___________________________________________________  VITAL SIGNS / I&O's   Vital Signs Last 24 Hrs  T(C): 36.9 (05 Dec 2023 06:00), Max: 37 (04 Dec 2023 18:22)  T(F): 98.4 (05 Dec 2023 06:00), Max: 98.6 (04 Dec 2023 18:22)  HR: 96 (05 Dec 2023 06:00) (95 - 100)  BP: 117/75 (05 Dec 2023 06:00) (117/75 - 130/78)  BP(mean): --  RR: 18 (05 Dec 2023 07:23) (17 - 19)  SpO2: 100% (05 Dec 2023 07:23) (97% - 100%)    Parameters below as of 05 Dec 2023 09:00  Patient On (Oxygen Delivery Method): mask, nonrebreather          04 Dec 2023 07:01  -  05 Dec 2023 07:00  --------------------------------------------------------  IN:    IV PiggyBack: 100 mL    sodium chloride 0.9%: 1000 mL  Total IN: 1100 mL    OUT:    Voided (mL): 3100 mL  Total OUT: 3100 mL    Total NET: -2000 mL        ___________________________________________________  PHYSICAL EXAM    General: NAD  Neuro: Awake and alert  Pulm: non-labored respirations  Back: Incisions intact, slight fluid wave. No s/s of infection    ___________________________________________________  LABS                        7.6    7.80  )-----------( 404      ( 05 Dec 2023 06:00 )             24.7     05 Dec 2023 06:00    139    |  101    |  7      ----------------------------<  97     4.0     |  28     |  0.83     Ca    8.3        05 Dec 2023 06:00  Phos  3.0       05 Dec 2023 06:00  Mg     1.90      05 Dec 2023 06:00    TPro  6.1    /  Alb  2.5    /  TBili  0.5    /  DBili  0.3    /  AST  77     /  ALT  32     /  AlkPhos  729    04 Dec 2023 06:01    PT/INR - ( 04 Dec 2023 06:01 )   PT: 12.5 sec;   INR: 1.12 ratio         PTT - ( 04 Dec 2023 06:01 )  PTT:31.0 sec  CAPILLARY BLOOD GLUCOSE      POCT Blood Glucose.: 116 mg/dL (05 Dec 2023 12:21)  POCT Blood Glucose.: 102 mg/dL (04 Dec 2023 20:58)  POCT Blood Glucose.: 103 mg/dL (04 Dec 2023 17:54)        Urinalysis Basic - ( 05 Dec 2023 06:00 )    Color: x / Appearance: x / SG: x / pH: x  Gluc: 97 mg/dL / Ketone: x  / Bili: x / Urobili: x   Blood: x / Protein: x / Nitrite: x   Leuk Esterase: x / RBC: x / WBC x   Sq Epi: x / Non Sq Epi: x / Bacteria: x      ___________________________________________________  MICRO  Recent Cultures:    ___________________________________________________  MEDICATIONS  (STANDING):  acetaminophen     Tablet .. 975 milliGRAM(s) Oral every 6 hours  atorvastatin 20 milliGRAM(s) Oral at bedtime  bicalutamide 50 milliGRAM(s) Oral daily  chlorhexidine 2% Cloths 1 Application(s) Topical daily  cholecalciferol 2000 Unit(s) Oral daily  dextrose 5%. 1000 milliLiter(s) (50 mL/Hr) IV Continuous <Continuous>  dextrose 5%. 1000 milliLiter(s) (100 mL/Hr) IV Continuous <Continuous>  dextrose 50% Injectable 25 Gram(s) IV Push once  dextrose 50% Injectable 12.5 Gram(s) IV Push once  dextrose 50% Injectable 25 Gram(s) IV Push once  glucagon  Injectable 1 milliGRAM(s) IntraMuscular once  heparin   Injectable 5000 Unit(s) SubCutaneous every 8 hours  lactobacillus acidophilus 1 Tablet(s) Oral daily  lisinopril 5 milliGRAM(s) Oral daily  meropenem  IVPB 1000 milliGRAM(s) IV Intermittent every 8 hours  pantoprazole  Injectable 40 milliGRAM(s) IV Push daily  sodium chloride 0.9%. 1000 milliLiter(s) (100 mL/Hr) IV Continuous <Continuous>  tamsulosin 0.4 milliGRAM(s) Oral at bedtime  trimethoprim  160 mG/sulfamethoxazole 800 mG 2 Tablet(s) Oral three times a day  vancomycin    Solution 125 milliGRAM(s) Oral every 6 hours    MEDICATIONS  (PRN):  benzocaine/menthol Lozenge 1 Lozenge Oral every 6 hours PRN Sore Throat  dextrose Oral Gel 15 Gram(s) Oral once PRN Blood Glucose LESS THAN 70 milliGRAM(s)/deciliter  guaiFENesin Oral Liquid (Sugar-Free) 200 milliGRAM(s) Oral every 6 hours PRN Cough  levalbuterol Inhalation 0.63 milliGRAM(s) Inhalation every 6 hours PRN shortness of breath/wheezing  naloxone Injectable 0.1 milliGRAM(s) IV Push every 3 minutes PRN For ANY of the following changes in patient status:  A. RR LESS THAN 10 breaths per minute, B. Oxygen saturation LESS THAN 90%, C. Sedation score of 6  ondansetron Injectable 4 milliGRAM(s) IV Push every 6 hours PRN Nausea

## 2023-12-05 NOTE — PROGRESS NOTE ADULT - ASSESSMENT
73m with known metastatic prostate CA admitted initially to [facility] after falling and weakness. Found to be in rhabdo with SRUTHI.  Imaging noted spinal fracture with edema and lytic metastatic disease from C7, T1-T5, T12, and L1-L3 as well as epidural expansion of neoplasm at C7 centrally and dorsally resulting in moderate cord compression and underlying edema/extension into the bilateral C7-T1 and T1-2 and Left T2-3 neural foramina. Epidural disease was also noted at T12 vertebral body.   11/2, he underwent C7-T1 decompression, C5-T3 posterior cervical fusion.  Hospital course complicated by MSSA bacteremia which has cleared with negative TTE.      Overall, metastatic prostate cancer with cord compression s/p spinal decompression 11/2, with fever due to MSSA bacteremia and cdiff    Fever  - resolved for now  - repeat BC negative    MSSA bacteremia  - would continue antibiotics  - negative echo  - on meropenem due to adverse effects (leukopenia on cephalosporin?)  - at least 4 weeks from first negative BC     Abnormal Imaging  - fluid collections noted on imaging  - per plastics, they feel it is an uninfected seroma  - previously had drainage from incision    Hypoxic respiratory failure  - concern for aspiration    C diff  - on po vancomycin 73m with known metastatic prostate CA admitted initially to [facility] after falling and weakness. Found to be in rhabdo with SRUTHI.  Imaging noted spinal fracture with edema and lytic metastatic disease from C7, T1-T5, T12, and L1-L3 as well as epidural expansion of neoplasm at C7 centrally and dorsally resulting in moderate cord compression and underlying edema/extension into the bilateral C7-T1 and T1-2 and Left T2-3 neural foramina. Epidural disease was also noted at T12 vertebral body.   11/2, he underwent C7-T1 decompression, C5-T3 posterior cervical fusion.  Hospital course complicated by MSSA bacteremia which has cleared with negative TTE.      Overall, metastatic prostate cancer with cord compression s/p spinal decompression 11/2, with fever due to MSSA bacteremia and cdiff    Fever  - resolved for now  - repeat BC negative    MSSA bacteremia  - would continue antibiotics  - negative echo  - on meropenem due to adverse effects (leukopenia on cephalosporin?)  - at least 4 weeks from first negative BC     Abnormal Imaging  - fluid collections noted on imaging  - per plastics, they feel it is an uninfected seroma  - previously had drainage from incision    Hypoxic respiratory failure  - concern for aspiration    C diff  - on po vancomycin

## 2023-12-05 NOTE — PROGRESS NOTE ADULT - PROBLEM SELECTOR PLAN 1
c/b acute on chronic hypoxic respiratory failure  Noted result of CTPA.  - No PE  - Broadened antibiotics to cover for aspiration multilobar PNA.  - titrate down on HFNC as tolerated  - Bactrim DS 2 tab q8 for empiric PJP PNA treatment, follow up Fungitell, if negative can d/c  -wean high flow as tolerated, now on non rebreather mask, monitor resp status closelsy c/b acute on chronic hypoxic respiratory failure  Noted result of CTPA.  - No PE  - Broadened antibiotics to cover for aspiration multilobar PNA.  - titrate down on HFNC as tolerated  - Bactrim DS 2 tab q8 for empiric PJP PNA treatment  -wean high flow as tolerated, now on non rebreather mask, monitor resp status closely  -ANDERS chandra

## 2023-12-05 NOTE — PROGRESS NOTE ADULT - SUBJECTIVE AND OBJECTIVE BOX
Patient is a 73y old  Male who presents with a chief complaint of Diffuse Spinal Mets from Prostate Cancer (02 Dec 2023 09:33)    f/u MSSA bacteremia    Interval History/ROS:  remains of supplemental O2.  no fever.  back pain.  BM okay.  ROS otherwise negative.    PAST MEDICAL & SURGICAL HISTORY:  Essential hypertension  Prostate cancer metastatic to bone    Allergies  No Known Allergies    ANTIMICROBIALS:    ceFAZolin   IVPB (11/2-11/3, 11/16-11/20)  cefTRIAXone   IVPB (11/15 x1)  ciprofloxacin   IVPB (11/17-11/20)  nafcillin  IVPB (11/20-11/27)  fluconAZOLE IVPB (11/22-11/30)  moxifloxacin  IVPB (11/25-11/27)  cefepime   IVPB (11/27-11/29)    active:  vancomycin    Solution 125 every 6 hours (11/28-)  meropenem  IVPB 1000 every 8 hours (11/29-)  trimethoprim  160 mG/sulfamethoxazole 800 mG 2 three times a day (11/30-)    MEDICATIONS  (STANDING):  acetaminophen     Tablet .. 975 every 6 hours  atorvastatin 20 at bedtime  bicalutamide 50 daily  heparin   Injectable 5000 every 8 hours  lisinopril 5 daily  pantoprazole  Injectable 40 daily  tamsulosin 0.4 at bedtime    Vital Signs Last 24 Hrs  T(F): 98.4 (12-05-23 @ 06:00), Max: 98.6 (12-04-23 @ 18:22)  HR: 96 (12-05-23 @ 06:00)  BP: 117/75 (12-05-23 @ 06:00)  RR: 18 (12-05-23 @ 07:23)  SpO2: 100% (12-05-23 @ 07:23) (97% - 100%)    PHYSICAL EXAM:  Constitutional: non-toxic, on NRB mask  HEAD/EYES: anicteric  ENT:  supple  Cardiovascular:   normal S1, S2  Respiratory:  clear BS bilaterally  GI:  soft, non-tender, normal bowel sounds  :  no best  Musculoskeletal:  no synovitis  Neurologic: awake and alert, normal strength, no focal findings  Skin: posterior incision c/d/i, no fluctuance, no cellulitis  Psychiatric:  awake, alert, appropriate mood                                7.6    7.80  )-----------( 404      ( 05 Dec 2023 06:00 )             24.7 12-05    139  |  101  |  7   ----------------------------<  97  4.0   |  28  |  0.83  Ca    8.3      05 Dec 2023 06:00Phos  3.0     12-05Mg     1.90     12-05  TPro  6.1  /  Alb  2.5  /  TBili  0.5  /  DBili  0.3  /  AST  77  /  ALT  32  /  AlkPhos  729  12-04    MICROBIOLOGY:  11/27 BC (-)  11/27 Stool cx (-)  11/24 BC (-)  11/18 BC (-)  11/17 BC (-)  11/16 BC (-)  11/15 UC >100,000 CFU/ml Providencia rettgeri  11/15 BC (+) MSSA    C Diff by PCR Result: Detected (11-27-23 @ 18:41)    RADIOLOGY:  MR Cervical Spine w/ IV Cont (11.28.23 @ 10:39) >  Impression: Postop changes are identified as described above.  There is evidence of a large collection seen involving posterior paraspinal soft tissue region.  Abnormal T1 prolongation associated enhancement is seen involving the C7-T4 vertebral body as described above.    Xray Cinesophagram Swallow Function w/ Contrast (11.28.23 @ 10:35) >  IMPRESSION:  Laryngeal penetration without aspiration of thin liquids.  No aspiration of puree, solids, mildly thick and moderately thick liquids.  For further information and recommendations, please refer to the speech   pathologist final report which is available for review in the electronic medical record.    CT Angio Chest PE Protocol w/ IV Cont (11.27.23 @ 18:19) >  IMPRESSION:  No evidence for pulmonary embolus up to and including lobar branches. Segmental and subsegmental branches are not well evaluated due to respiratory motion artifact.  Worsening multifocal confluent groundglass infiltratesand consolidative opacities in the right lower lobe and lateral left lung base. Correlate for atypical pneumonia.    CT Neck Soft Tissue w/ IV Cont (11.22.23 @ 10:51) >  IMPRESSION: Status post posterior fusion and laminectomy, as discussed, with a probable seroma formation within the paraspinal soft tissues. Superimposed infection is difficult to exclude. Admission the canal is markedly limited secondary to intrinsic CT modality and also streak and beam hardening artifact generated by the metallic orthopedic hardware.  No cervical mass or cervical lymphadenopathy.    CT Chest w/ IV Cont (11.22.23 @ 10:51) >  IMPRESSION:  Airway associated foci of groundglass opacity and small consolidations in the right middle and bilateral lower lobes, which can be due to aspiration pneumonitis versus infection.  Peribronchial wall thickening, likely inflammatory. Trace right pleural effusion.     MR Thoracic Spine w/wo IV Cont (11.14.23 @ 11:55) >  IMPRESSION: Postop changes as described above.  Osseous metastasis again seen.  Possible epidural enhancement is seen involving the T4-5 level. Better quality contrast enhanced MRI of the thoracic spine is recommended for further evaluation.  Previously noted T2 prolongation involving the spinal cord at the T1 level is not well-demonstrated due to artifact from postop material.      MR Thoracic Spine No Cont (10.30.23 @ 13:26) >  IMPRESSION: Overall no change in comparison to the prior MRI studies.  Unchanged cord compression and mild cord edema at the T1 level, as discussed.          TTE W or WO Ultrasound Enhancing Agent (11.18.23 @ 11:24) >  CONCLUSIONS:   1. Left ventricular wall thickness is mildly increased. Left ventricular systolic function is hyperdynamic with an ejection fraction of 72 % by Diggs's method of disks.   2. Normal right ventricular cavity size, wall thickness, and systolic function. Tricuspid annular plane systolic excursion (TAPSE) is 2.9 cm (normal >=1.7 cm).   3. The left atrium is normal.   4. The right atrium is normal in size.   5. No pericardial effusion seen.   6. Unable to rule out endocarditis.   7. Calcification of the aortic valve leaflets. mild aortic stenosis.   8. Hyperdynamic left ventricular systolic function with intra-cavitary gradient of 48 mmHg.   9. No prior echocardiogram is available for comparison.  10. Technically difficult image quality.  11. There is normal LV mass and concentric remodeling.    < end of copied text >

## 2023-12-05 NOTE — PROGRESS NOTE ADULT - PROBLEM SELECTOR PLAN 5
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 Large collection with peripheral enhancement involving the posterior paraspinal soft tissue region is again partially demonstrated.  -Sutures removed  -no plan for Kyphoplasty for now per IR  -Per plastics it is uninfected seroma

## 2023-12-06 LAB
ANION GAP SERPL CALC-SCNC: 12 MMOL/L — SIGNIFICANT CHANGE UP (ref 7–14)
ANION GAP SERPL CALC-SCNC: 12 MMOL/L — SIGNIFICANT CHANGE UP (ref 7–14)
BUN SERPL-MCNC: 8 MG/DL — SIGNIFICANT CHANGE UP (ref 7–23)
BUN SERPL-MCNC: 8 MG/DL — SIGNIFICANT CHANGE UP (ref 7–23)
CALCIUM SERPL-MCNC: 8.5 MG/DL — SIGNIFICANT CHANGE UP (ref 8.4–10.5)
CALCIUM SERPL-MCNC: 8.5 MG/DL — SIGNIFICANT CHANGE UP (ref 8.4–10.5)
CHLORIDE SERPL-SCNC: 103 MMOL/L — SIGNIFICANT CHANGE UP (ref 98–107)
CHLORIDE SERPL-SCNC: 103 MMOL/L — SIGNIFICANT CHANGE UP (ref 98–107)
CO2 SERPL-SCNC: 25 MMOL/L — SIGNIFICANT CHANGE UP (ref 22–31)
CO2 SERPL-SCNC: 25 MMOL/L — SIGNIFICANT CHANGE UP (ref 22–31)
CREAT SERPL-MCNC: 0.82 MG/DL — SIGNIFICANT CHANGE UP (ref 0.5–1.3)
CREAT SERPL-MCNC: 0.82 MG/DL — SIGNIFICANT CHANGE UP (ref 0.5–1.3)
EGFR: 94 ML/MIN/1.73M2 — SIGNIFICANT CHANGE UP
EGFR: 94 ML/MIN/1.73M2 — SIGNIFICANT CHANGE UP
GLUCOSE SERPL-MCNC: 96 MG/DL — SIGNIFICANT CHANGE UP (ref 70–99)
GLUCOSE SERPL-MCNC: 96 MG/DL — SIGNIFICANT CHANGE UP (ref 70–99)
HCT VFR BLD CALC: 26.8 % — LOW (ref 39–50)
HCT VFR BLD CALC: 26.8 % — LOW (ref 39–50)
HGB BLD-MCNC: 8 G/DL — LOW (ref 13–17)
HGB BLD-MCNC: 8 G/DL — LOW (ref 13–17)
MAGNESIUM SERPL-MCNC: 2 MG/DL — SIGNIFICANT CHANGE UP (ref 1.6–2.6)
MAGNESIUM SERPL-MCNC: 2 MG/DL — SIGNIFICANT CHANGE UP (ref 1.6–2.6)
MCHC RBC-ENTMCNC: 29.9 GM/DL — LOW (ref 32–36)
MCHC RBC-ENTMCNC: 29.9 GM/DL — LOW (ref 32–36)
MCHC RBC-ENTMCNC: 29.9 PG — SIGNIFICANT CHANGE UP (ref 27–34)
MCHC RBC-ENTMCNC: 29.9 PG — SIGNIFICANT CHANGE UP (ref 27–34)
MCV RBC AUTO: 100 FL — SIGNIFICANT CHANGE UP (ref 80–100)
MCV RBC AUTO: 100 FL — SIGNIFICANT CHANGE UP (ref 80–100)
NRBC # BLD: 2 /100 WBCS — HIGH (ref 0–0)
NRBC # BLD: 2 /100 WBCS — HIGH (ref 0–0)
NRBC # FLD: 0.12 K/UL — HIGH (ref 0–0)
NRBC # FLD: 0.12 K/UL — HIGH (ref 0–0)
PHOSPHATE SERPL-MCNC: 2.7 MG/DL — SIGNIFICANT CHANGE UP (ref 2.5–4.5)
PHOSPHATE SERPL-MCNC: 2.7 MG/DL — SIGNIFICANT CHANGE UP (ref 2.5–4.5)
PLATELET # BLD AUTO: 275 K/UL — SIGNIFICANT CHANGE UP (ref 150–400)
PLATELET # BLD AUTO: 275 K/UL — SIGNIFICANT CHANGE UP (ref 150–400)
POTASSIUM SERPL-MCNC: 4.4 MMOL/L — SIGNIFICANT CHANGE UP (ref 3.5–5.3)
POTASSIUM SERPL-MCNC: 4.4 MMOL/L — SIGNIFICANT CHANGE UP (ref 3.5–5.3)
POTASSIUM SERPL-SCNC: 4.4 MMOL/L — SIGNIFICANT CHANGE UP (ref 3.5–5.3)
POTASSIUM SERPL-SCNC: 4.4 MMOL/L — SIGNIFICANT CHANGE UP (ref 3.5–5.3)
RBC # BLD: 2.68 M/UL — LOW (ref 4.2–5.8)
RBC # BLD: 2.68 M/UL — LOW (ref 4.2–5.8)
RBC # FLD: 18.8 % — HIGH (ref 10.3–14.5)
RBC # FLD: 18.8 % — HIGH (ref 10.3–14.5)
SODIUM SERPL-SCNC: 140 MMOL/L — SIGNIFICANT CHANGE UP (ref 135–145)
SODIUM SERPL-SCNC: 140 MMOL/L — SIGNIFICANT CHANGE UP (ref 135–145)
WBC # BLD: 6.75 K/UL — SIGNIFICANT CHANGE UP (ref 3.8–10.5)
WBC # BLD: 6.75 K/UL — SIGNIFICANT CHANGE UP (ref 3.8–10.5)
WBC # FLD AUTO: 6.75 K/UL — SIGNIFICANT CHANGE UP (ref 3.8–10.5)
WBC # FLD AUTO: 6.75 K/UL — SIGNIFICANT CHANGE UP (ref 3.8–10.5)

## 2023-12-06 PROCEDURE — 99232 SBSQ HOSP IP/OBS MODERATE 35: CPT

## 2023-12-06 RX ADMIN — HEPARIN SODIUM 5000 UNIT(S): 5000 INJECTION INTRAVENOUS; SUBCUTANEOUS at 00:13

## 2023-12-06 RX ADMIN — Medication 125 MILLIGRAM(S): at 17:34

## 2023-12-06 RX ADMIN — PANTOPRAZOLE SODIUM 40 MILLIGRAM(S): 20 TABLET, DELAYED RELEASE ORAL at 11:40

## 2023-12-06 RX ADMIN — Medication 2 TABLET(S): at 00:13

## 2023-12-06 RX ADMIN — Medication 2 TABLET(S): at 11:35

## 2023-12-06 RX ADMIN — Medication 125 MILLIGRAM(S): at 11:40

## 2023-12-06 RX ADMIN — Medication 1 TABLET(S): at 11:41

## 2023-12-06 RX ADMIN — SODIUM CHLORIDE 100 MILLILITER(S): 9 INJECTION INTRAMUSCULAR; INTRAVENOUS; SUBCUTANEOUS at 11:37

## 2023-12-06 RX ADMIN — Medication 2 TABLET(S): at 17:34

## 2023-12-06 RX ADMIN — Medication 125 MILLIGRAM(S): at 00:12

## 2023-12-06 RX ADMIN — Medication 2000 UNIT(S): at 11:41

## 2023-12-06 RX ADMIN — TAMSULOSIN HYDROCHLORIDE 0.4 MILLIGRAM(S): 0.4 CAPSULE ORAL at 21:12

## 2023-12-06 RX ADMIN — MEROPENEM 100 MILLIGRAM(S): 1 INJECTION INTRAVENOUS at 15:36

## 2023-12-06 RX ADMIN — HEPARIN SODIUM 5000 UNIT(S): 5000 INJECTION INTRAVENOUS; SUBCUTANEOUS at 17:35

## 2023-12-06 RX ADMIN — Medication 125 MILLIGRAM(S): at 23:52

## 2023-12-06 RX ADMIN — LISINOPRIL 5 MILLIGRAM(S): 2.5 TABLET ORAL at 05:19

## 2023-12-06 RX ADMIN — CHLORHEXIDINE GLUCONATE 1 APPLICATION(S): 213 SOLUTION TOPICAL at 11:39

## 2023-12-06 RX ADMIN — MEROPENEM 100 MILLIGRAM(S): 1 INJECTION INTRAVENOUS at 05:23

## 2023-12-06 RX ADMIN — Medication 125 MILLIGRAM(S): at 06:12

## 2023-12-06 RX ADMIN — ATORVASTATIN CALCIUM 20 MILLIGRAM(S): 80 TABLET, FILM COATED ORAL at 21:12

## 2023-12-06 RX ADMIN — HEPARIN SODIUM 5000 UNIT(S): 5000 INJECTION INTRAVENOUS; SUBCUTANEOUS at 11:36

## 2023-12-06 RX ADMIN — SODIUM CHLORIDE 100 MILLILITER(S): 9 INJECTION INTRAMUSCULAR; INTRAVENOUS; SUBCUTANEOUS at 00:15

## 2023-12-06 RX ADMIN — BICALUTAMIDE 50 MILLIGRAM(S): 50 TABLET, FILM COATED ORAL at 11:40

## 2023-12-06 NOTE — PROGRESS NOTE ADULT - PROBLEM SELECTOR PLAN 2
Concern for surgical site as a primary source  - BCx from 11/16, 11/17 NGTD  - changed to Nafcillin IV on 11/20. Now on meropenem (11/29-)  - Meropenem 1 g q8 for MSSA bacteremia and empiric coverage , for 4 weeks from neg blood culture  - TTE revealed hyperdynamic left ventricle  - afebrile now.  -ANDERS chandra

## 2023-12-06 NOTE — PROGRESS NOTE ADULT - SUBJECTIVE AND OBJECTIVE BOX
Plastic Surgery    SUBJECTIVE: Pt seen and examined on rounds with team. NAEON.      VITALS  T(C): 36.8 (12-06-23 @ 05:00), Max: 36.8 (12-05-23 @ 17:30)  HR: 96 (12-06-23 @ 05:00) (96 - 110)  BP: 118/77 (12-06-23 @ 05:00) (118/77 - 127/84)  RR: 18 (12-06-23 @ 05:00) (18 - 20)  SpO2: 96% (12-06-23 @ 05:00) (93% - 100%)  CAPILLARY BLOOD GLUCOSE      POCT Blood Glucose.: 95 mg/dL (05 Dec 2023 22:55)  POCT Blood Glucose.: 99 mg/dL (05 Dec 2023 21:39)  POCT Blood Glucose.: 100 mg/dL (05 Dec 2023 17:46)  POCT Blood Glucose.: 116 mg/dL (05 Dec 2023 12:21)      Is/Os    12-05 @ 07:01  -  12-06 @ 07:00  --------------------------------------------------------  IN:    Oral Fluid: 120 mL  Total IN: 120 mL    OUT:    Voided (mL): 600 mL  Total OUT: 600 mL    Total NET: -480 mL      PHYSICAL EXAM    General: NAD  Neuro: Awake and alert  Pulm: non-labored respirations  Back: Incisions intact, slight fluid wave. No s/s of infection      LABS  CBC (12-05 @ 06:00)                              7.6<L>                         7.80    )----------------(  404<H>     --    % Neutrophils, --    % Lymphocytes, ANC: --                                  24.7<L>    BMP (12-05 @ 06:00)             139     |  101     |  7     		Ca++ --      Ca 8.3<L>             ---------------------------------( 97    		Mg 1.90               4.0     |  28      |  0.83  			Ph 3.0

## 2023-12-06 NOTE — PROGRESS NOTE ADULT - PROBLEM SELECTOR PLAN 10
no obvious hemorrhage noted  - continue to monitor CBC  - s/p 1u PRBC on 11/25   - s/p 1u PRBC on 11/29    Plan discussed with ACP    Updated daughter Olga on 12/5 and answered all questions.

## 2023-12-06 NOTE — PROGRESS NOTE ADULT - SUBJECTIVE AND OBJECTIVE BOX
Patient is a 73y old  Male who presents with a chief complaint of Diffuse Spinal Mets from Prostate Cancer (06 Dec 2023 10:15)      SUBJECTIVE / OVERNIGHT EVENTS: Pt seen and examined at 10:34am, no overnight events, denies any sob however could not tolerate Nonrebreather mask and now back on high flow oxygen, has some cough, had bm today, no other new issues reported.         MEDICATIONS  (STANDING):  acetaminophen     Tablet .. 975 milliGRAM(s) Oral every 6 hours  atorvastatin 20 milliGRAM(s) Oral at bedtime  bicalutamide 50 milliGRAM(s) Oral daily  chlorhexidine 2% Cloths 1 Application(s) Topical daily  cholecalciferol 2000 Unit(s) Oral daily  dextrose 5%. 1000 milliLiter(s) (50 mL/Hr) IV Continuous <Continuous>  dextrose 5%. 1000 milliLiter(s) (100 mL/Hr) IV Continuous <Continuous>  dextrose 50% Injectable 25 Gram(s) IV Push once  dextrose 50% Injectable 25 Gram(s) IV Push once  dextrose 50% Injectable 12.5 Gram(s) IV Push once  glucagon  Injectable 1 milliGRAM(s) IntraMuscular once  heparin   Injectable 5000 Unit(s) SubCutaneous every 8 hours  lactobacillus acidophilus 1 Tablet(s) Oral daily  lisinopril 5 milliGRAM(s) Oral daily  meropenem  IVPB 1000 milliGRAM(s) IV Intermittent every 8 hours  pantoprazole  Injectable 40 milliGRAM(s) IV Push daily  sodium chloride 0.9%. 1000 milliLiter(s) (100 mL/Hr) IV Continuous <Continuous>  tamsulosin 0.4 milliGRAM(s) Oral at bedtime  trimethoprim  160 mG/sulfamethoxazole 800 mG 2 Tablet(s) Oral three times a day  vancomycin    Solution 125 milliGRAM(s) Oral every 6 hours    MEDICATIONS  (PRN):  benzocaine/menthol Lozenge 1 Lozenge Oral every 6 hours PRN Sore Throat  dextrose Oral Gel 15 Gram(s) Oral once PRN Blood Glucose LESS THAN 70 milliGRAM(s)/deciliter  guaiFENesin Oral Liquid (Sugar-Free) 200 milliGRAM(s) Oral every 6 hours PRN Cough  levalbuterol Inhalation 0.63 milliGRAM(s) Inhalation every 6 hours PRN shortness of breath/wheezing  naloxone Injectable 0.1 milliGRAM(s) IV Push every 3 minutes PRN For ANY of the following changes in patient status:  A. RR LESS THAN 10 breaths per minute, B. Oxygen saturation LESS THAN 90%, C. Sedation score of 6  ondansetron Injectable 4 milliGRAM(s) IV Push every 6 hours PRN Nausea      Vital Signs Last 24 Hrs  T(C): 36.6 (06 Dec 2023 12:09), Max: 36.8 (05 Dec 2023 17:30)  T(F): 97.8 (06 Dec 2023 12:09), Max: 98.3 (05 Dec 2023 17:30)  HR: 105 (06 Dec 2023 12:09) (88 - 110)  BP: 109/56 (06 Dec 2023 12:09) (109/56 - 127/84)  BP(mean): --  RR: 20 (06 Dec 2023 12:09) (16 - 20)  SpO2: 96% (06 Dec 2023 12:09) (93% - 100%)    Parameters below as of 06 Dec 2023 12:09  Patient On (Oxygen Delivery Method): nasal cannula      CAPILLARY BLOOD GLUCOSE      POCT Blood Glucose.: 144 mg/dL (06 Dec 2023 13:03)  POCT Blood Glucose.: 105 mg/dL (06 Dec 2023 08:28)  POCT Blood Glucose.: 95 mg/dL (05 Dec 2023 22:55)  POCT Blood Glucose.: 99 mg/dL (05 Dec 2023 21:39)  POCT Blood Glucose.: 100 mg/dL (05 Dec 2023 17:46)    I&O's Summary    05 Dec 2023 07:01  -  06 Dec 2023 07:00  --------------------------------------------------------  IN: 120 mL / OUT: 600 mL / NET: -480 mL        PHYSICAL EXAM:  GENERAL: NAD  CHEST/LUNG: Clear to auscultation bilaterally; No wheeze, on high flow  oxygen  HEART: Regular rate and rhythm  ABDOMEN: Soft, Nontender, Nondistended  EXTREMITIES: no LE edema  PSYCH: Calm  NEUROLOGY: AA answers questions appropriately  SKIN: + posterior neck sutures removed, dry warm      LABS:                        8.0    6.75  )-----------( 275      ( 06 Dec 2023 05:45 )             26.8     12-06    140  |  103  |  8   ----------------------------<  96  4.4   |  25  |  0.82    Ca    8.5      06 Dec 2023 05:45  Phos  2.7     12-06  Mg     2.00     12-06            Urinalysis Basic - ( 06 Dec 2023 05:45 )    Color: x / Appearance: x / SG: x / pH: x  Gluc: 96 mg/dL / Ketone: x  / Bili: x / Urobili: x   Blood: x / Protein: x / Nitrite: x   Leuk Esterase: x / RBC: x / WBC x   Sq Epi: x / Non Sq Epi: x / Bacteria: x        RADIOLOGY & ADDITIONAL TESTS:    Imaging Personally Reviewed:    Consultant(s) Notes Reviewed:      Care Discussed with Consultants/Other Providers:

## 2023-12-06 NOTE — PROGRESS NOTE ADULT - SUBJECTIVE AND OBJECTIVE BOX
Patient is a 73y Male.  HPI:  73M with HTN met prostate cancer (2009) with LE weakness and R hand paresthesias presents as a transfer from [facility] for NSGY evaluation    Patient reports that he has known about his prostate cancer since 2009, but did not seek out treatment because he did not take it seriously. He reports that he is typically fully functional and has no known weakness, or paresthesias. Around 1 month ago, he reports feeling some minor tingling/numbness in his R hand. The week prior to his hospitalization, he began to feel some minor weakness in both of his legs, but was still able to perform all of his ADLs and iADLs. He reports on Thursday, while he was showering, he fell in the bath due to weakness in his legs, but reports he was still able to get up and perform some household tasks such as cleaning and cooking, but still had general weakness and fell several more times. When he woke up from bed the next morning, he attempted to use the restroom and fell to the floor and reports he wasn't able to feel his below his belly button, was unable to control his bowel movements, couldn't feel his genitals and was paralyzed on bilateral lower extremities. He was on the ground for >48 hours with no PO intake before [place] at [facility].     Patient was initially evaluated at [facility]; for b/l found to be in rhabdo and SRUTHI, On imaging, he was found to have C7 spinous process nondisplaced fracture of T1 spinous process, edema from C3-4 through C7-T1 interspinous ligaments as well as lytic metastatic disease from C7, Tq-T5 and T12, as well as L1-L3 as well as epidural expansion of neoplasm at C7 centrally and dorsally resulting in moderate cord compression and underlying edema/extension into the bilateral C7-T1 and T1-2 and Left T2-3 Neural foramina. Epidural disease was also notedat T12 vertebral body. NM Scan significant for multiple sites with osseous lesions; Scapula, Multiple left ribs, bilateral pelvic region, as well as b/l LE DVTs on US and was started on Heparin GTT. Patient Reports that since Wednesday, he has had significant improvement in his functional status and has regained sensation and movement in his lower extremities and has been able to feel his genatalia and his lower abdomen.  (27 Oct 2023 19:09)  .      Denies HS/LOC. Denies pain/injury elsewhere. Denies numbness/tingling/paresthesias/weakness. Denies bowel/bladder incontinence. Denies fevers/chills. No other complaints at this time.    HEALTH ISSUES - PROBLEM Dx:  DVT, lower extremity    Prostate cancer metastatic to bone    Need for prophylactic measure    SRUTHI (acute kidney injury)    T2DM (type 2 diabetes mellitus)    Rhabdomyolysis    Anemia of chronic disease    Back pain    Hypertension    Hyperlipidemia    Cord compression    Cervical spinal cord compression    Medication management    Transaminitis    Urinary retention    Preoperative clearance    Hypoglycemia    Discharge planning issues    Steroid-induced hyperglycemia    Fever    MSSA bacteremia    Odynophagia    Dysphagia    Thrush    Dysphagia, unspecified type    Candida esophagitis    Anemia of chronic disease    Pneumonia, aspiration    Dyspnea    C. difficile colitis      MEDICATIONS  (STANDING):  acetaminophen     Tablet .. 975 milliGRAM(s) Oral every 6 hours  atorvastatin 20 milliGRAM(s) Oral at bedtime  bicalutamide 50 milliGRAM(s) Oral daily  chlorhexidine 2% Cloths 1 Application(s) Topical daily  cholecalciferol 2000 Unit(s) Oral daily  dextrose 5%. 1000 milliLiter(s) IV Continuous <Continuous>  dextrose 5%. 1000 milliLiter(s) IV Continuous <Continuous>  dextrose 50% Injectable 25 Gram(s) IV Push once  dextrose 50% Injectable 12.5 Gram(s) IV Push once  dextrose 50% Injectable 25 Gram(s) IV Push once  glucagon  Injectable 1 milliGRAM(s) IntraMuscular once  heparin   Injectable 5000 Unit(s) SubCutaneous every 8 hours  lactobacillus acidophilus 1 Tablet(s) Oral daily  lisinopril 5 milliGRAM(s) Oral daily  meropenem  IVPB 1000 milliGRAM(s) IV Intermittent every 8 hours  pantoprazole  Injectable 40 milliGRAM(s) IV Push daily  sodium chloride 0.9%. 1000 milliLiter(s) IV Continuous <Continuous>  tamsulosin 0.4 milliGRAM(s) Oral at bedtime  trimethoprim  160 mG/sulfamethoxazole 800 mG 2 Tablet(s) Oral three times a day  vancomycin    Solution 125 milliGRAM(s) Oral every 6 hours      Allergies    No Known Allergies    Intolerances    PAST MEDICAL & SURGICAL HISTORY:  Essential hypertension    Prostate cancer metastatic to bone    No significant past surgical history                          8.0    6.75  )-----------( 275      ( 06 Dec 2023 05:45 )             26.8       06 Dec 2023 05:45    140    |  103    |  8      ----------------------------<  96     4.4     |  25     |  0.82     Ca    8.5        06 Dec 2023 05:45  Phos  2.7       06 Dec 2023 05:45  Mg     2.00      06 Dec 2023 05:45      Urinalysis Basic - ( 06 Dec 2023 05:45 )    Color: x / Appearance: x / SG: x / pH: x  Gluc: 96 mg/dL / Ketone: x  / Bili: x / Urobili: x   Blood: x / Protein: x / Nitrite: x   Leuk Esterase: x / RBC: x / WBC x   Sq Epi: x / Non Sq Epi: x / Bacteria: x        Vital Signs Last 24 Hrs  T(C): 36.8 (12-06-23 @ 05:00), Max: 36.8 (12-05-23 @ 17:30)  T(F): 98.2 (12-06-23 @ 05:00), Max: 98.3 (12-05-23 @ 17:30)  HR: 88 (12-06-23 @ 07:25) (88 - 110)  BP: 118/77 (12-06-23 @ 05:00) (118/77 - 127/84)  BP(mean): --  RR: 16 (12-06-23 @ 07:25) (16 - 20)  SpO2: 95% (12-06-23 @ 07:25) (93% - 100%)    Gen: NAD    Spine PE:  Incision over upper back with some swelling  No gross deformity  TTP around incision site   No bony step offs  Lower paraspinal muscles slightly tender to palpation  Positive straight leg raise bilaterally   Negative clonus  Negative babinski  Negative steele  + rectal tone    Motor:                   C5                C6              C7               C8           T1   R            4/5                4/5            4/5             4/5          4/5  L             4/5               4/5             4/5             4/5          4/5                L2             L3             L4               L5            S1  R         3/5           3/5          3/5             3/5           3/5  L          4/5          4/5           4/5             4/5           4/5    Sensory:            C5         C6         C7      C8       T1        (0=absent, 1=impaired, 2=normal, NT=not testable)  R         2            2           1     2         2  L          2            2           2        2         2               L2          L3         L4      L5       S1         (0=absent, 1=impaired, 2=normal, NT=not testable)  R         1            1            1        1        1  L          1            1           1        1         1    Imaging: Relevant imaging reviewed    A/P: 73y Male with pmh metastatic prostate ca presenting with cord compression now s/p posterior C5-T3 PSF, C7-T1 decompression. IR consulted for kyphoplasty of t1 pathologic compression fx prior to radiation therapy. Initially planned for Monday 12/4 however put on hold given aspiration pna on HFNC. Course c/b MSSA bacteramia now on antibiotics, 4 week course for negative blood culture.    - Given MSSA bactermia, recommend waiting until completion of antibiotics as well as improvement from PNA prior to kypho. High risk for infection given implantable devices. Discussed with pt, okay with plan to wait until completion of abx prior to kyphoplasty.   - If pt is discharged prior to completion of antibiotic therapy, can f/u as outpt with IR by contacting booking office at the number listed below.     --  Luis Miguel Ann MD, PGY-3  Vascular and Interventional Radiology   Available on [employer] Teams    - Non-emergent consults: Place IR consult order in [place]  - Emergent issues (pager): [facility] [phone]; [facility] [phone]; [phone]  - Scheduling questions: [facility] [phone]; [facility] [phone]  - Clinic/outpatient booking: [facility] [phone]; [facility] [phone]           Patient is a 73y Male.  HPI:  73M with HTN met prostate cancer (2009) with LE weakness and R hand paresthesias presents as a transfer from [facility] for NSGY evaluation    Patient reports that he has known about his prostate cancer since 2009, but did not seek out treatment because he did not take it seriously. He reports that he is typically fully functional and has no known weakness, or paresthesias. Around 1 month ago, he reports feeling some minor tingling/numbness in his R hand. The week prior to his hospitalization, he began to feel some minor weakness in both of his legs, but was still able to perform all of his ADLs and iADLs. He reports on Thursday, while he was showering, he fell in the bath due to weakness in his legs, but reports he was still able to get up and perform some household tasks such as cleaning and cooking, but still had general weakness and fell several more times. When he woke up from bed the next morning, he attempted to use the restroom and fell to the floor and reports he wasn't able to feel his below his belly button, was unable to control his bowel movements, couldn't feel his genitals and was paralyzed on bilateral lower extremities. He was on the ground for >48 hours with no PO intake before [place] for evaluation at [facility].     Patient was initially evaluated at [facility]; for b/l found to be in rhabdo and SRUTHI, On imaging, he was found to have C7 spinous process nondisplaced fracture of T1 spinous process, edema from C3-4 through C7-T1 interspinous ligaments as well as lytic metastatic disease from C7, Tq-T5 and T12, as well as L1-L3 as well as epidural expansion of neoplasm at C7 centrally and dorsally resulting in moderate cord compression and underlying edema/extension into the bilateral C7-T1 and T1-2 and Left T2-3 Neural foramina. Epidural disease was also notedat T12 vertebral body. NM Scan significant for multiple sites with osseous lesions; Scapula, Multiple left ribs, bilateral pelvic region, as well as b/l LE DVTs on US and was started on Heparin GTT. Patient Reports that since Wednesday, he has had significant improvement in his functional status and has regained sensation and movement in his lower extremities and has been able to feel his genatalia and his lower abdomen.  (27 Oct 2023 19:09)  .      Denies HS/LOC. Denies pain/injury elsewhere. Denies numbness/tingling/paresthesias/weakness. Denies bowel/bladder incontinence. Denies fevers/chills. No other complaints at this time.    HEALTH ISSUES - PROBLEM Dx:  DVT, lower extremity    Prostate cancer metastatic to bone    Need for prophylactic measure    SRUTHI (acute kidney injury)    T2DM (type 2 diabetes mellitus)    Rhabdomyolysis    Anemia of chronic disease    Back pain    Hypertension    Hyperlipidemia    Cord compression    Cervical spinal cord compression    Medication management    Transaminitis    Urinary retention    Preoperative clearance    Hypoglycemia    Discharge planning issues    Steroid-induced hyperglycemia    Fever    MSSA bacteremia    Odynophagia    Dysphagia    Thrush    Dysphagia, unspecified type    Candida esophagitis    Anemia of chronic disease    Pneumonia, aspiration    Dyspnea    C. difficile colitis      MEDICATIONS  (STANDING):  acetaminophen     Tablet .. 975 milliGRAM(s) Oral every 6 hours  atorvastatin 20 milliGRAM(s) Oral at bedtime  bicalutamide 50 milliGRAM(s) Oral daily  chlorhexidine 2% Cloths 1 Application(s) Topical daily  cholecalciferol 2000 Unit(s) Oral daily  dextrose 5%. 1000 milliLiter(s) IV Continuous <Continuous>  dextrose 5%. 1000 milliLiter(s) IV Continuous <Continuous>  dextrose 50% Injectable 25 Gram(s) IV Push once  dextrose 50% Injectable 12.5 Gram(s) IV Push once  dextrose 50% Injectable 25 Gram(s) IV Push once  glucagon  Injectable 1 milliGRAM(s) IntraMuscular once  heparin   Injectable 5000 Unit(s) SubCutaneous every 8 hours  lactobacillus acidophilus 1 Tablet(s) Oral daily  lisinopril 5 milliGRAM(s) Oral daily  meropenem  IVPB 1000 milliGRAM(s) IV Intermittent every 8 hours  pantoprazole  Injectable 40 milliGRAM(s) IV Push daily  sodium chloride 0.9%. 1000 milliLiter(s) IV Continuous <Continuous>  tamsulosin 0.4 milliGRAM(s) Oral at bedtime  trimethoprim  160 mG/sulfamethoxazole 800 mG 2 Tablet(s) Oral three times a day  vancomycin    Solution 125 milliGRAM(s) Oral every 6 hours      Allergies    No Known Allergies    Intolerances    PAST MEDICAL & SURGICAL HISTORY:  Essential hypertension    Prostate cancer metastatic to bone    No significant past surgical history                          8.0    6.75  )-----------( 275      ( 06 Dec 2023 05:45 )             26.8       06 Dec 2023 05:45    140    |  103    |  8      ----------------------------<  96     4.4     |  25     |  0.82     Ca    8.5        06 Dec 2023 05:45  Phos  2.7       06 Dec 2023 05:45  Mg     2.00      06 Dec 2023 05:45      Urinalysis Basic - ( 06 Dec 2023 05:45 )    Color: x / Appearance: x / SG: x / pH: x  Gluc: 96 mg/dL / Ketone: x  / Bili: x / Urobili: x   Blood: x / Protein: x / Nitrite: x   Leuk Esterase: x / RBC: x / WBC x   Sq Epi: x / Non Sq Epi: x / Bacteria: x        Vital Signs Last 24 Hrs  T(C): 36.8 (12-06-23 @ 05:00), Max: 36.8 (12-05-23 @ 17:30)  T(F): 98.2 (12-06-23 @ 05:00), Max: 98.3 (12-05-23 @ 17:30)  HR: 88 (12-06-23 @ 07:25) (88 - 110)  BP: 118/77 (12-06-23 @ 05:00) (118/77 - 127/84)  BP(mean): --  RR: 16 (12-06-23 @ 07:25) (16 - 20)  SpO2: 95% (12-06-23 @ 07:25) (93% - 100%)    Gen: NAD    Spine PE:  Incision over upper back with some swelling  No gross deformity  TTP around incision site   No bony step offs  Lower paraspinal muscles slightly tender to palpation  Positive straight leg raise bilaterally   Negative clonus  Negative babinski  Negative steele  + rectal tone    Motor:                   C5                C6              C7               C8           T1   R            4/5                4/5            4/5             4/5          4/5  L             4/5               4/5             4/5             4/5          4/5                L2             L3             L4               L5            S1  R         3/5           3/5          3/5             3/5           3/5  L          4/5          4/5           4/5             4/5           4/5    Sensory:            C5         C6         C7      C8       T1        (0=absent, 1=impaired, 2=normal, NT=not testable)  R         2            2           1     2         2  L          2            2           2        2         2               L2          L3         L4      L5       S1         (0=absent, 1=impaired, 2=normal, NT=not testable)  R         1            1            1        1        1  L          1            1           1        1         1    Imaging: Relevant imaging reviewed    A/P: 73y Male with pmh metastatic prostate ca presenting with cord compression now s/p posterior C5-T3 PSF, C7-T1 decompression. IR consulted for kyphoplasty of t1 pathologic compression fx prior to radiation therapy. Initially planned for Monday 12/4 however put on hold given aspiration pna on HFNC. Course c/b MSSA bacteramia now on antibiotics, 4 week course for negative blood culture.    - Given MSSA bactermia, recommend waiting until completion of antibiotics as well as improvement from PNA prior to kypho. High risk for infection given implantable devices. Discussed with pt, okay with plan to wait until completion of abx prior to kyphoplasty.   - If pt is discharged prior to completion of antibiotic therapy, can f/u as outpt with IR by contacting booking office at the number listed below.     --  Luis Miguel Ann MD, PGY-3  Vascular and Interventional Radiology   Available on [employer] Teams    - Non-emergent consults: Place IR consult order in [place]  - Emergent issues (pager): [facility] [phone]; [facility] [phone]; [phone]  - Scheduling questions: [facility] [phone]; [facility] [phone]  - Clinic/outpatient booking: [facility] [phone]; [facility] [phone]           Patient is a 73y Male.  HPI:  73M with HTN met prostate cancer (2009) with LE weakness and R hand paresthesias presents as a transfer from [facility] for NSGY evaluation    Patient reports that he has known about his prostate cancer since 2009, but did not seek out treatment because he did not take it seriously. He reports that he is typically fully functional and has no known weakness, or paresthesias. Around 1 month ago, he reports feeling some minor tingling/numbness in his R hand. The week prior to his hospitalization, he began to feel some minor weakness in both of his legs, but was still able to perform all of his ADLs and iADLs. He reports on Thursday, while he was showering, he fell in the bath due to weakness in his legs, but reports he was still able to get up and perform some household tasks such as cleaning and cooking, but still had general weakness and fell several more times. When he woke up from bed the next morning, he attempted to use the restroom and fell to the floor and reports he wasn't able to feel his below his belly button, was unable to control his bowel movements, couldn't feel his genitals and was paralyzed on bilateral lower extremities. He was on the ground for >48 hours with no PO intake before [place] evaluation at [facility].     Patient was initially evaluated at [facility]; for b/l found to be in rhabdo and SRUTHI, On imaging, he was found to have C7 spinous process nondisplaced fracture of T1 spinous process, edema from C3-4 through C7-T1 interspinous ligaments as well as lytic metastatic disease from C7, Tq-T5 and T12, as well as L1-L3 as well as epidural expansion of neoplasm at C7 centrally and dorsally resulting in moderate cord compression and underlying edema/extension into the bilateral C7-T1 and T1-2 and Left T2-3 Neural foramina. Epidural disease was also notedat T12 vertebral body. NM Scan significant for multiple sites with osseous lesions; Scapula, Multiple left ribs, bilateral pelvic region, as well as b/l LE DVTs on US and was started on Heparin GTT. Patient Reports that since Wednesday, he has had significant improvement in his functional status and has regained sensation and movement in his lower extremities and has been able to feel his genatalia and his lower abdomen.  (27 Oct 2023 19:09)  .      Denies HS/LOC. Denies pain/injury elsewhere. Denies numbness/tingling/paresthesias/weakness. Denies bowel/bladder incontinence. Denies fevers/chills. No other complaints at this time.    HEALTH ISSUES - PROBLEM Dx:  DVT, lower extremity    Prostate cancer metastatic to bone    Need for prophylactic measure    SRUTHI (acute kidney injury)    T2DM (type 2 diabetes mellitus)    Rhabdomyolysis    Anemia of chronic disease    Back pain    Hypertension    Hyperlipidemia    Cord compression    Cervical spinal cord compression    Medication management    Transaminitis    Urinary retention    Preoperative clearance    Hypoglycemia    Discharge planning issues    Steroid-induced hyperglycemia    Fever    MSSA bacteremia    Odynophagia    Dysphagia    Thrush    Dysphagia, unspecified type    Candida esophagitis    Anemia of chronic disease    Pneumonia, aspiration    Dyspnea    C. difficile colitis      MEDICATIONS  (STANDING):  acetaminophen     Tablet .. 975 milliGRAM(s) Oral every 6 hours  atorvastatin 20 milliGRAM(s) Oral at bedtime  bicalutamide 50 milliGRAM(s) Oral daily  chlorhexidine 2% Cloths 1 Application(s) Topical daily  cholecalciferol 2000 Unit(s) Oral daily  dextrose 5%. 1000 milliLiter(s) IV Continuous <Continuous>  dextrose 5%. 1000 milliLiter(s) IV Continuous <Continuous>  dextrose 50% Injectable 25 Gram(s) IV Push once  dextrose 50% Injectable 12.5 Gram(s) IV Push once  dextrose 50% Injectable 25 Gram(s) IV Push once  glucagon  Injectable 1 milliGRAM(s) IntraMuscular once  heparin   Injectable 5000 Unit(s) SubCutaneous every 8 hours  lactobacillus acidophilus 1 Tablet(s) Oral daily  lisinopril 5 milliGRAM(s) Oral daily  meropenem  IVPB 1000 milliGRAM(s) IV Intermittent every 8 hours  pantoprazole  Injectable 40 milliGRAM(s) IV Push daily  sodium chloride 0.9%. 1000 milliLiter(s) IV Continuous <Continuous>  tamsulosin 0.4 milliGRAM(s) Oral at bedtime  trimethoprim  160 mG/sulfamethoxazole 800 mG 2 Tablet(s) Oral three times a day  vancomycin    Solution 125 milliGRAM(s) Oral every 6 hours      Allergies    No Known Allergies    Intolerances    PAST MEDICAL & SURGICAL HISTORY:  Essential hypertension    Prostate cancer metastatic to bone    No significant past surgical history                          8.0    6.75  )-----------( 275      ( 06 Dec 2023 05:45 )             26.8       06 Dec 2023 05:45    140    |  103    |  8      ----------------------------<  96     4.4     |  25     |  0.82     Ca    8.5        06 Dec 2023 05:45  Phos  2.7       06 Dec 2023 05:45  Mg     2.00      06 Dec 2023 05:45      Urinalysis Basic - ( 06 Dec 2023 05:45 )    Color: x / Appearance: x / SG: x / pH: x  Gluc: 96 mg/dL / Ketone: x  / Bili: x / Urobili: x   Blood: x / Protein: x / Nitrite: x   Leuk Esterase: x / RBC: x / WBC x   Sq Epi: x / Non Sq Epi: x / Bacteria: x        Vital Signs Last 24 Hrs  T(C): 36.8 (12-06-23 @ 05:00), Max: 36.8 (12-05-23 @ 17:30)  T(F): 98.2 (12-06-23 @ 05:00), Max: 98.3 (12-05-23 @ 17:30)  HR: 88 (12-06-23 @ 07:25) (88 - 110)  BP: 118/77 (12-06-23 @ 05:00) (118/77 - 127/84)  BP(mean): --  RR: 16 (12-06-23 @ 07:25) (16 - 20)  SpO2: 95% (12-06-23 @ 07:25) (93% - 100%)    Gen: NAD    Spine PE:  Incision over upper back with some swelling  No gross deformity  TTP around incision site   No bony step offs  Lower paraspinal muscles slightly tender to palpation  Positive straight leg raise bilaterally   Negative clonus  Negative babinski  Negative steele  + rectal tone    Motor:                   C5                C6              C7               C8           T1   R            4/5                4/5            4/5             4/5          4/5  L             4/5               4/5             4/5             4/5          4/5                L2             L3             L4               L5            S1  R         3/5           3/5          3/5             3/5           3/5  L          4/5          4/5           4/5             4/5           4/5    Sensory:            C5         C6         C7      C8       T1        (0=absent, 1=impaired, 2=normal, NT=not testable)  R         2            2           1     2         2  L          2            2           2        2         2               L2          L3         L4      L5       S1         (0=absent, 1=impaired, 2=normal, NT=not testable)  R         1            1            1        1        1  L          1            1           1        1         1    Imaging: Relevant imaging reviewed    A/P: 73y Male with pmh metastatic prostate ca presenting with cord compression now s/p posterior C5-T3 PSF, C7-T1 decompression. IR consulted for kyphoplasty of t1 pathologic compression fx prior to radiation therapy. Initially planned for Monday 12/4 however put on hold given aspiration pna on HFNC. Course c/b MSSA bacteramia now on antibiotics, 4 week course for negative blood culture.    - Given MSSA bactermia, recommend waiting until completion of antibiotics as well as improvement from PNA prior to vertebral augmentation. High risk for infection given implantable devices. Discussed with pt, okay with plan to wait until completion of abx prior to vertebral augmentation  - If pt is discharged prior to completion of antibiotic therapy, can f/u as outpt with IR by contacting booking office at the number listed below.     --  Luis Miguel Ann MD, PGY-3  Vascular and Interventional Radiology   Available on Microsoft Teams    - Non-emergent consults: Place IR consult order in [place]  - Emergent issues (pager): [facility] [phone]; [facility] [phone]; [phone]  - Scheduling questions: [facility] [phone]; [facility] [phone]  - Clinic/outpatient booking: [facility] [phone]; [facility] [phone]           Patient is a 73y Male.  HPI:  73M with HTN met prostate cancer (2009) with LE weakness and R hand paresthesias presents as a transfer from [facility] for NSGY evaluation    Patient reports that he has known about his prostate cancer since 2009, but did not seek out treatment because he did not take it seriously. He reports that he is typically fully functional and has no known weakness, or paresthesias. Around 1 month ago, he reports feeling some minor tingling/numbness in his R hand. The week prior to his hospitalization, he began to feel some minor weakness in both of his legs, but was still able to perform all of his ADLs and iADLs. He reports on Thursday, while he was showering, he fell in the bath due to weakness in his legs, but reports he was still able to get up and perform some household tasks such as cleaning and cooking, but still had general weakness and fell several more times. When he woke up from bed the next morning, he attempted to use the restroom and fell to the floor and reports he wasn't able to feel his below his belly button, was unable to control his bowel movements, couldn't feel his genitals and was paralyzed on bilateral lower extremities. He was on the ground for >48 hours with no PO intake before his [place] came to the [place] the [place] for evaluation at [facility].     Patient was initially evaluated at [facility]; for b/l found to be in rhabdo and SRUTHI, On imaging, he was found to have C7 spinous process nondisplaced fracture of T1 spinous process, edema from C3-4 through C7-T1 interspinous ligaments as well as lytic metastatic disease from C7, Tq-T5 and T12, as well as L1-L3 as well as epidural expansion of neoplasm at C7 centrally and dorsally resulting in moderate cord compression and underlying edema/extension into the bilateral C7-T1 and T1-2 and Left T2-3 Neural foramina. Epidural disease was also notedat T12 vertebral body. NM Scan significant for multiple sites with osseous lesions; Scapula, Multiple left ribs, bilateral pelvic region, as well as b/l LE DVTs on US and was started on Heparin GTT. Patient Reports that since Wednesday, he has had significant improvement in his functional status and has regained sensation and movement in his lower extremities and has been able to feel his genatalia and his lower abdomen.  (27 Oct 2023 19:09)  .      Denies HS/LOC. Denies pain/injury elsewhere. Denies numbness/tingling/paresthesias/weakness. Denies bowel/bladder incontinence. Denies fevers/chills. No other complaints at this time.    HEALTH ISSUES - PROBLEM Dx:  DVT, lower extremity    Prostate cancer metastatic to bone    Need for prophylactic measure    SRUTHI (acute kidney injury)    T2DM (type 2 diabetes mellitus)    Rhabdomyolysis    Anemia of chronic disease    Back pain    Hypertension    Hyperlipidemia    Cord compression    Cervical spinal cord compression    Medication management    Transaminitis    Urinary retention    Preoperative clearance    Hypoglycemia    Discharge planning issues    Steroid-induced hyperglycemia    Fever    MSSA bacteremia    Odynophagia    Dysphagia    Thrush    Dysphagia, unspecified type    Candida esophagitis    Anemia of chronic disease    Pneumonia, aspiration    Dyspnea    C. difficile colitis      MEDICATIONS  (STANDING):  acetaminophen     Tablet .. 975 milliGRAM(s) Oral every 6 hours  atorvastatin 20 milliGRAM(s) Oral at bedtime  bicalutamide 50 milliGRAM(s) Oral daily  chlorhexidine 2% Cloths 1 Application(s) Topical daily  cholecalciferol 2000 Unit(s) Oral daily  dextrose 5%. 1000 milliLiter(s) IV Continuous <Continuous>  dextrose 5%. 1000 milliLiter(s) IV Continuous <Continuous>  dextrose 50% Injectable 25 Gram(s) IV Push once  dextrose 50% Injectable 12.5 Gram(s) IV Push once  dextrose 50% Injectable 25 Gram(s) IV Push once  glucagon  Injectable 1 milliGRAM(s) IntraMuscular once  heparin   Injectable 5000 Unit(s) SubCutaneous every 8 hours  lactobacillus acidophilus 1 Tablet(s) Oral daily  lisinopril 5 milliGRAM(s) Oral daily  meropenem  IVPB 1000 milliGRAM(s) IV Intermittent every 8 hours  pantoprazole  Injectable 40 milliGRAM(s) IV Push daily  sodium chloride 0.9%. 1000 milliLiter(s) IV Continuous <Continuous>  tamsulosin 0.4 milliGRAM(s) Oral at bedtime  trimethoprim  160 mG/sulfamethoxazole 800 mG 2 Tablet(s) Oral three times a day  vancomycin    Solution 125 milliGRAM(s) Oral every 6 hours      Allergies    No Known Allergies    Intolerances    PAST MEDICAL & SURGICAL HISTORY:  Essential hypertension    Prostate cancer metastatic to bone    No significant past surgical history                          8.0    6.75  )-----------( 275      ( 06 Dec 2023 05:45 )             26.8       06 Dec 2023 05:45    140    |  103    |  8      ----------------------------<  96     4.4     |  25     |  0.82     Ca    8.5        06 Dec 2023 05:45  Phos  2.7       06 Dec 2023 05:45  Mg     2.00      06 Dec 2023 05:45      Urinalysis Basic - ( 06 Dec 2023 05:45 )    Color: x / Appearance: x / SG: x / pH: x  Gluc: 96 mg/dL / Ketone: x  / Bili: x / Urobili: x   Blood: x / Protein: x / Nitrite: x   Leuk Esterase: x / RBC: x / WBC x   Sq Epi: x / Non Sq Epi: x / Bacteria: x        Vital Signs Last 24 Hrs  T(C): 36.8 (12-06-23 @ 05:00), Max: 36.8 (12-05-23 @ 17:30)  T(F): 98.2 (12-06-23 @ 05:00), Max: 98.3 (12-05-23 @ 17:30)  HR: 88 (12-06-23 @ 07:25) (88 - 110)  BP: 118/77 (12-06-23 @ 05:00) (118/77 - 127/84)  BP(mean): --  RR: 16 (12-06-23 @ 07:25) (16 - 20)  SpO2: 95% (12-06-23 @ 07:25) (93% - 100%)    Gen: NAD    Spine PE:  Incision over upper back with some swelling  No gross deformity  TTP around incision site   No bony step offs  Lower paraspinal muscles slightly tender to palpation  Positive straight leg raise bilaterally   Negative clonus  Negative babinski  Negative steele  + rectal tone    Motor:                   C5                C6              C7               C8           T1   R            4/5                4/5            4/5             4/5          4/5  L             4/5               4/5             4/5             4/5          4/5                L2             L3             L4               L5            S1  R         3/5           3/5          3/5             3/5           3/5  L          4/5          4/5           4/5             4/5           4/5    Sensory:            C5         C6         C7      C8       T1        (0=absent, 1=impaired, 2=normal, NT=not testable)  R         2            2           1     2         2  L          2            2           2        2         2               L2          L3         L4      L5       S1         (0=absent, 1=impaired, 2=normal, NT=not testable)  R         1            1            1        1        1  L          1            1           1        1         1    Imaging: Relevant imaging reviewed    A/P: 73y Male with pmh metastatic prostate ca presenting with cord compression now s/p posterior C5-T3 PSF, C7-T1 decompression. IR consulted for kyphoplasty of t1 pathologic compression fx prior to radiation therapy. Initially planned for Monday 12/4 however put on hold given aspiration pna on HFNC. Course c/b MSSA bacteramia now on antibiotics, 4 week course for negative blood culture.    - Given MSSA bactermia, recommend waiting until completion of antibiotics as well as improvement from PNA prior to vertebral augmentation. High risk for infection given implantable devices. Discussed with pt, okay with plan to wait until completion of abx prior to vertebral augmentation  - If pt is discharged prior to completion of antibiotic therapy, can f/u as outpt with IR by contacting booking office at the number listed below.     --  Luis Miguel Ann MD, PGY-3  Vascular and Interventional Radiology   Available on [employer] Teams    - Non-emergent consults: Place IR consult order in [place]  - Emergent issues (pager): [facility] [phone]; [facility] [phone]; [phone]  - Scheduling questions: [facility] [phone]; [facility] [phone]  - Clinic/outpatient booking: [facility] [phone]; [facility] [phone]

## 2023-12-06 NOTE — PROGRESS NOTE ADULT - PROBLEM SELECTOR PLAN 5
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 Large collection with peripheral enhancement involving the posterior paraspinal soft tissue region is again partially demonstrated.  -Sutures removed by plastics  -no plan for Kyphoplasty for now per IR, pt can f/u as outpt  -Per plastics it is uninfected seroma

## 2023-12-06 NOTE — PROGRESS NOTE ADULT - SUBJECTIVE AND OBJECTIVE BOX
Patient is a 73y old  Male who presents with a chief complaint of Diffuse Spinal Mets from Prostate Cancer (02 Dec 2023 09:33)    f/u MSSA bacteremia    Interval History/ROS:  remains of supplemental O2.  no fever.  back pain.  BM okay.  ROS otherwise negative.    PAST MEDICAL & SURGICAL HISTORY:  Essential hypertension  Prostate cancer metastatic to bone    Allergies  No Known Allergies    ANTIMICROBIALS:    ceFAZolin   IVPB (11/2-11/3, 11/16-11/20)  cefTRIAXone   IVPB (11/15 x1)  ciprofloxacin   IVPB (11/17-11/20)  nafcillin  IVPB (11/20-11/27)  fluconAZOLE IVPB (11/22-11/30)  moxifloxacin  IVPB (11/25-11/27)  cefepime   IVPB (11/27-11/29)    active:  vancomycin    Solution 125 every 6 hours (11/28-)  meropenem  IVPB 1000 every 8 hours (11/29-)  trimethoprim  160 mG/sulfamethoxazole 800 mG 2 three times a day (11/30-)    MEDICATIONS  (STANDING):  acetaminophen     Tablet .. 975 every 6 hours  atorvastatin 20 at bedtime  bicalutamide 50 daily  heparin   Injectable 5000 every 8 hours  lisinopril 5 daily  pantoprazole  Injectable 40 daily  tamsulosin 0.4 at bedtime    Vital Signs Last 24 Hrs  T(F): 98.2 (12-06-23 @ 05:00), Max: 98.3 (12-05-23 @ 17:30)  HR: 88 (12-06-23 @ 07:25)  BP: 118/77 (12-06-23 @ 05:00)  RR: 16 (12-06-23 @ 07:25)  SpO2: 95% (12-06-23 @ 07:25) (93% - 100%)    PHYSICAL EXAM:  Constitutional: non-toxic, on HFNC 40% (down a little)  HEAD/EYES: anicteric  ENT:  supple  Cardiovascular:   normal S1, S2  Respiratory:  clear BS bilaterally  GI:  soft, non-tender, normal bowel sounds  :  no best  Musculoskeletal:  no synovitis  Neurologic: awake and alert, normal strength, no focal findings  Skin: posterior incision c/d/i, no fluctuance, no cellulitis  Psychiatric:  awake, alert, appropriate mood                                      8.0    6.75  )-----------( 275      ( 06 Dec 2023 05:45 )             26.8 12-06    140  |  103  |  8   ----------------------------<  96  4.4   |  25  |  0.82  Ca    8.5      06 Dec 2023 05:45Phos  2.7     12-06Mg     2.00     12-06    Lactate Dehydrogenase, Serum: 434 (12-03)  Fungitell: 390 pg/mL (12.03.23 @ 06:45)   HIV-1/2 Combo Result: Nonreact    MICROBIOLOGY:  11/27 BC (-)  11/27 Stool cx (-)  11/24 BC (-)  11/18 BC (-)  11/17 BC (-)  11/16 BC (-)  11/15 UC >100,000 CFU/ml Providencia rettgeri  11/15 BC (+) MSSA    C Diff by PCR Result: Detected (11-27-23 @ 18:41)    RADIOLOGY:  MR Cervical Spine w/ IV Cont (11.28.23 @ 10:39) >  Impression: Postop changes are identified as described above.  There is evidence of a large collection seen involving posterior paraspinal soft tissue region.  Abnormal T1 prolongation associated enhancement is seen involving the C7-T4 vertebral body as described above.    Xray Cinesophagram Swallow Function w/ Contrast (11.28.23 @ 10:35) >  IMPRESSION:  Laryngeal penetration without aspiration of thin liquids.  No aspiration of puree, solids, mildly thick and moderately thick liquids.  For further information and recommendations, please refer to the speech   pathologist final report which is available for review in the electronic medical record.    CT Angio Chest PE Protocol w/ IV Cont (11.27.23 @ 18:19) >  IMPRESSION:  No evidence for pulmonary embolus up to and including lobar branches. Segmental and subsegmental branches are not well evaluated due to respiratory motion artifact.  Worsening multifocal confluent groundglass infiltrates and consolidative opacities in the right lower lobe and lateral left lung base. Correlate for atypical pneumonia.    CT Neck Soft Tissue w/ IV Cont (11.22.23 @ 10:51) >  IMPRESSION: Status post posterior fusion and laminectomy, as discussed, with a probable seroma formation within the paraspinal soft tissues. Superimposed infection is difficult to exclude. Admission the canal is markedly limited secondary to intrinsic CT modality and also streak and beam hardening artifact generated by the metallic orthopedic hardware.  No cervical mass or cervical lymphadenopathy.    CT Chest w/ IV Cont (11.22.23 @ 10:51) >  IMPRESSION:  Airway associated foci of groundglass opacity and small consolidations in the right middle and bilateral lower lobes, which can be due to aspiration pneumonitis versus infection.  Peribronchial wall thickening, likely inflammatory. Trace right pleural effusion.     MR Thoracic Spine w/wo IV Cont (11.14.23 @ 11:55) >  IMPRESSION: Postop changes as described above.  Osseous metastasis again seen.  Possible epidural enhancement is seen involving the T4-5 level. Better quality contrast enhanced MRI of the thoracic spine is recommended for further evaluation.  Previously noted T2 prolongation involving the spinal cord at the T1 level is not well-demonstrated due to artifact from postop material.      MR Thoracic Spine No Cont (10.30.23 @ 13:26) >  IMPRESSION: Overall no change in comparison to the prior MRI studies.  Unchanged cord compression and mild cord edema at the T1 level, as discussed.          TTE W or WO Ultrasound Enhancing Agent (11.18.23 @ 11:24) >  CONCLUSIONS:   1. Left ventricular wall thickness is mildly increased. Left ventricular systolic function is hyperdynamic with an ejection fraction of 72 % by Diggs's method of disks.   2. Normal right ventricular cavity size, wall thickness, and systolic function. Tricuspid annular plane systolic excursion (TAPSE) is 2.9 cm (normal >=1.7 cm).   3. The left atrium is normal.   4. The right atrium is normal in size.   5. No pericardial effusion seen.   6. Unable to rule out endocarditis.   7. Calcification of the aortic valve leaflets. mild aortic stenosis.   8. Hyperdynamic left ventricular systolic function with intra-cavitary gradient of 48 mmHg.   9. No prior echocardiogram is available for comparison.  10. Technically difficult image quality.  11. There is normal LV mass and concentric remodeling.

## 2023-12-06 NOTE — PROGRESS NOTE ADULT - PROBLEM SELECTOR PLAN 1
c/b acute on chronic hypoxic respiratory failure  Noted result of CTPA.  - No PE  - Broadened antibiotics to cover for aspiration multilobar PNA.  - titrate down on HFNC as tolerated  - Bactrim DS 2 tab q8 for empiric PJP PNA treatment, cont bactrim, Per ID to check sputum pjp pcr  -wean high flow as tolerated,  monitor resp status closely  -ID follg

## 2023-12-06 NOTE — PROGRESS NOTE ADULT - ASSESSMENT
73m with known metastatic prostate CA admitted initially to [facility] after falling and weakness. Found to be in rhabdo with SRUTHI.  Imaging noted spinal fracture with edema and lytic metastatic disease from C7, T1-T5, T12, and L1-L3 as well as epidural expansion of neoplasm at C7 centrally and dorsally resulting in moderate cord compression and underlying edema/extension into the bilateral C7-T1 and T1-2 and Left T2-3 neural foramina. Epidural disease was also noted at T12 vertebral body.   11/2, he underwent C7-T1 decompression, C5-T3 posterior cervical fusion.  Hospital course complicated by MSSA bacteremia which has cleared with negative TTE.  Course also complicated by hypoxia not responding to antibiotics.  Has been on empiric treatment for PJP (bactrim).  Fungitell elevated and LDH elevated.    Overall, metastatic prostate cancer with cord compression s/p spinal decompression 11/2, with fever due to MSSA bacteremia and cdiff, hypoxia.    Fever  - resolved for now  - repeat BC negative    MSSA bacteremia  - would continue antibiotics  - negative echo  - on meropenem due to adverse effects (leukopenia on cephalosporin?)  - at least 4 weeks from first negative BC     Abnormal Imaging  - fluid collections noted on imaging  - per plastics, they feel it is an uninfected seroma  - previously had drainage from incision    Hypoxic respiratory failure  - concern for aspiration  - please send sputum for PJP PCR  - on bactrim / meropenem  - HIV is negative    C diff  - on po vancomycin 73m with known metastatic prostate CA admitted initially to [facility] after falling and weakness. Found to be in rhabdo with SRUTHI.  Imaging noted spinal fracture with edema and lytic metastatic disease from C7, T1-T5, T12, and L1-L3 as well as epidural expansion of neoplasm at C7 centrally and dorsally resulting in moderate cord compression and underlying edema/extension into the bilateral C7-T1 and T1-2 and Left T2-3 neural foramina. Epidural disease was also noted at T12 vertebral body.   11/2, he underwent C7-T1 decompression, C5-T3 posterior cervical fusion.  Hospital course complicated by MSSA bacteremia which has cleared with negative TTE.  Course also complicated by hypoxia not responding to antibiotics.  Has been on empiric treatment for PJP (bactrim).  Fungitell elevated and LDH elevated.    Overall, metastatic prostate cancer with cord compression s/p spinal decompression 11/2, with fever due to MSSA bacteremia and cdiff, hypoxia.    Fever  - resolved for now  - repeat BC negative    MSSA bacteremia  - would continue antibiotics  - negative echo  - on meropenem due to adverse effects (leukopenia on cephalosporin?)  - at least 4 weeks from first negative BC     Abnormal Imaging  - fluid collections noted on imaging  - per plastics, they feel it is an uninfected seroma  - previously had drainage from incision    Hypoxic respiratory failure  - concern for aspiration  - please send sputum for PJP PCR  - on bactrim / meropenem  - HIV is negative    C diff  - on po vancomycin

## 2023-12-06 NOTE — PROGRESS NOTE ADULT - ASSESSMENT
73y Male s/p C5-T3 posterior cervical fusion, C7-T1 decompression for metastatic prostate cancer with plastics closure on 11/2. Recovering well. Both drains removed. MRI 11/14 showing possible seroma in subcutaneous space. Incisional vac w/ black sponge/Adaptic started on 11/17/2023, now left open to air    Plan:  - Sutures removed  - leave back incision open to air  - F/U fever work up - RVP +adenovirus; UA now negative. C diff positive  - Seroma not likely infected  - Optimize nutrition, ensure adequate protein intake  - Remainder of care per primary team    Plastic Surgery  #[id number] [facility]  73y Male s/p C5-T3 posterior cervical fusion, C7-T1 decompression for metastatic prostate cancer with plastics closure on 11/2. Recovering well. Both drains removed. MRI 11/14 showing possible seroma in subcutaneous space. Incisional vac w/ black sponge/Adaptic started on 11/17/2023, now left open to air    Plan:  - Sutures removed  - leave back incision open to air  - F/U fever work up - RVP +adenovirus; UA now negative. C diff positive  - Seroma not likely infected  - Optimize nutrition, ensure adequate protein intake  - Remainder of care per primary team    Plastic Surgery  #[id number] [facility]

## 2023-12-06 NOTE — PROGRESS NOTE ADULT - PROBLEM SELECTOR PLAN 6
Patient previously diagnosed with prostate ca in 2009  - NM scan showing multiple osseous lesions throughout body  - heme/onc recs appreciated, will c/w bicalutamide 50mg qd  - IR consulted for kyphoplasty 12/4 in anticipation for Rad Tx, per IR no plan for kyphoplasty for now given acute infection/bacteremia, pt to f/u as outpt

## 2023-12-06 NOTE — PROGRESS NOTE ADULT - PROBLEM SELECTOR PLAN 4
c/b dysphagia and aspiration PNA  Reviewed CT Neck/Chest.  Appreciate ENT eval - reviewed direct laryngoscope imaging on 11/22  - ENT suspects esophageal candida but also possible mass effect from recent C-spine surgery; however no airway compromise noted.  - Diflucan IV (11/22-12/1) Fungitell is positive, ID attending aware  - with clinical improvement  - S+S eval appreciated

## 2023-12-07 DIAGNOSIS — J96.01 ACUTE RESPIRATORY FAILURE WITH HYPOXIA: ICD-10-CM

## 2023-12-07 DIAGNOSIS — Z20.822 CONTACT WITH AND (SUSPECTED) EXPOSURE TO COVID-19: ICD-10-CM

## 2023-12-07 LAB
ANION GAP SERPL CALC-SCNC: 14 MMOL/L — SIGNIFICANT CHANGE UP (ref 7–14)
ANION GAP SERPL CALC-SCNC: 14 MMOL/L — SIGNIFICANT CHANGE UP (ref 7–14)
B PERT DNA SPEC QL NAA+PROBE: SIGNIFICANT CHANGE UP
B PERT DNA SPEC QL NAA+PROBE: SIGNIFICANT CHANGE UP
B PERT+PARAPERT DNA PNL SPEC NAA+PROBE: SIGNIFICANT CHANGE UP
B PERT+PARAPERT DNA PNL SPEC NAA+PROBE: SIGNIFICANT CHANGE UP
BLOOD GAS ARTERIAL - LYTES,HGB,ICA,LACT RESULT: SIGNIFICANT CHANGE UP
BLOOD GAS ARTERIAL - LYTES,HGB,ICA,LACT RESULT: SIGNIFICANT CHANGE UP
BORDETELLA PARAPERTUSSIS (RAPRVP): SIGNIFICANT CHANGE UP
BORDETELLA PARAPERTUSSIS (RAPRVP): SIGNIFICANT CHANGE UP
BUN SERPL-MCNC: 7 MG/DL — SIGNIFICANT CHANGE UP (ref 7–23)
BUN SERPL-MCNC: 7 MG/DL — SIGNIFICANT CHANGE UP (ref 7–23)
C PNEUM DNA SPEC QL NAA+PROBE: SIGNIFICANT CHANGE UP
C PNEUM DNA SPEC QL NAA+PROBE: SIGNIFICANT CHANGE UP
CALCIUM SERPL-MCNC: 8.5 MG/DL — SIGNIFICANT CHANGE UP (ref 8.4–10.5)
CALCIUM SERPL-MCNC: 8.5 MG/DL — SIGNIFICANT CHANGE UP (ref 8.4–10.5)
CHLORIDE SERPL-SCNC: 101 MMOL/L — SIGNIFICANT CHANGE UP (ref 98–107)
CHLORIDE SERPL-SCNC: 101 MMOL/L — SIGNIFICANT CHANGE UP (ref 98–107)
CO2 SERPL-SCNC: 21 MMOL/L — LOW (ref 22–31)
CO2 SERPL-SCNC: 21 MMOL/L — LOW (ref 22–31)
CREAT SERPL-MCNC: 0.79 MG/DL — SIGNIFICANT CHANGE UP (ref 0.5–1.3)
CREAT SERPL-MCNC: 0.79 MG/DL — SIGNIFICANT CHANGE UP (ref 0.5–1.3)
EGFR: 95 ML/MIN/1.73M2 — SIGNIFICANT CHANGE UP
EGFR: 95 ML/MIN/1.73M2 — SIGNIFICANT CHANGE UP
FLUAV SUBTYP SPEC NAA+PROBE: SIGNIFICANT CHANGE UP
FLUAV SUBTYP SPEC NAA+PROBE: SIGNIFICANT CHANGE UP
FLUBV RNA SPEC QL NAA+PROBE: SIGNIFICANT CHANGE UP
FLUBV RNA SPEC QL NAA+PROBE: SIGNIFICANT CHANGE UP
GLUCOSE SERPL-MCNC: 100 MG/DL — HIGH (ref 70–99)
GLUCOSE SERPL-MCNC: 100 MG/DL — HIGH (ref 70–99)
HADV DNA SPEC QL NAA+PROBE: DETECTED
HADV DNA SPEC QL NAA+PROBE: DETECTED
HCOV 229E RNA SPEC QL NAA+PROBE: SIGNIFICANT CHANGE UP
HCOV 229E RNA SPEC QL NAA+PROBE: SIGNIFICANT CHANGE UP
HCOV HKU1 RNA SPEC QL NAA+PROBE: SIGNIFICANT CHANGE UP
HCOV HKU1 RNA SPEC QL NAA+PROBE: SIGNIFICANT CHANGE UP
HCOV NL63 RNA SPEC QL NAA+PROBE: SIGNIFICANT CHANGE UP
HCOV NL63 RNA SPEC QL NAA+PROBE: SIGNIFICANT CHANGE UP
HCOV OC43 RNA SPEC QL NAA+PROBE: SIGNIFICANT CHANGE UP
HCOV OC43 RNA SPEC QL NAA+PROBE: SIGNIFICANT CHANGE UP
HMPV RNA SPEC QL NAA+PROBE: SIGNIFICANT CHANGE UP
HMPV RNA SPEC QL NAA+PROBE: SIGNIFICANT CHANGE UP
HPIV1 RNA SPEC QL NAA+PROBE: SIGNIFICANT CHANGE UP
HPIV1 RNA SPEC QL NAA+PROBE: SIGNIFICANT CHANGE UP
HPIV2 RNA SPEC QL NAA+PROBE: SIGNIFICANT CHANGE UP
HPIV2 RNA SPEC QL NAA+PROBE: SIGNIFICANT CHANGE UP
HPIV3 RNA SPEC QL NAA+PROBE: SIGNIFICANT CHANGE UP
HPIV3 RNA SPEC QL NAA+PROBE: SIGNIFICANT CHANGE UP
HPIV4 RNA SPEC QL NAA+PROBE: SIGNIFICANT CHANGE UP
HPIV4 RNA SPEC QL NAA+PROBE: SIGNIFICANT CHANGE UP
M PNEUMO DNA SPEC QL NAA+PROBE: SIGNIFICANT CHANGE UP
M PNEUMO DNA SPEC QL NAA+PROBE: SIGNIFICANT CHANGE UP
MAGNESIUM SERPL-MCNC: 2 MG/DL — SIGNIFICANT CHANGE UP (ref 1.6–2.6)
MAGNESIUM SERPL-MCNC: 2 MG/DL — SIGNIFICANT CHANGE UP (ref 1.6–2.6)
NT-PROBNP SERPL-SCNC: 478 PG/ML — HIGH
NT-PROBNP SERPL-SCNC: 478 PG/ML — HIGH
PHOSPHATE SERPL-MCNC: 2.9 MG/DL — SIGNIFICANT CHANGE UP (ref 2.5–4.5)
PHOSPHATE SERPL-MCNC: 2.9 MG/DL — SIGNIFICANT CHANGE UP (ref 2.5–4.5)
POTASSIUM SERPL-MCNC: 4.8 MMOL/L — SIGNIFICANT CHANGE UP (ref 3.5–5.3)
POTASSIUM SERPL-MCNC: 4.8 MMOL/L — SIGNIFICANT CHANGE UP (ref 3.5–5.3)
POTASSIUM SERPL-SCNC: 4.8 MMOL/L — SIGNIFICANT CHANGE UP (ref 3.5–5.3)
POTASSIUM SERPL-SCNC: 4.8 MMOL/L — SIGNIFICANT CHANGE UP (ref 3.5–5.3)
RAPID RVP RESULT: DETECTED
RAPID RVP RESULT: DETECTED
RSV RNA SPEC QL NAA+PROBE: SIGNIFICANT CHANGE UP
RSV RNA SPEC QL NAA+PROBE: SIGNIFICANT CHANGE UP
RV+EV RNA SPEC QL NAA+PROBE: SIGNIFICANT CHANGE UP
RV+EV RNA SPEC QL NAA+PROBE: SIGNIFICANT CHANGE UP
SARS-COV-2 RNA SPEC QL NAA+PROBE: SIGNIFICANT CHANGE UP
SARS-COV-2 RNA SPEC QL NAA+PROBE: SIGNIFICANT CHANGE UP
SODIUM SERPL-SCNC: 136 MMOL/L — SIGNIFICANT CHANGE UP (ref 135–145)
SODIUM SERPL-SCNC: 136 MMOL/L — SIGNIFICANT CHANGE UP (ref 135–145)

## 2023-12-07 PROCEDURE — 99223 1ST HOSP IP/OBS HIGH 75: CPT | Mod: GC

## 2023-12-07 PROCEDURE — 71045 X-RAY EXAM CHEST 1 VIEW: CPT | Mod: 26

## 2023-12-07 PROCEDURE — 99233 SBSQ HOSP IP/OBS HIGH 50: CPT

## 2023-12-07 PROCEDURE — 99232 SBSQ HOSP IP/OBS MODERATE 35: CPT

## 2023-12-07 RX ORDER — MEROPENEM 1 G/30ML
1000 INJECTION INTRAVENOUS EVERY 8 HOURS
Refills: 0 | Status: DISCONTINUED | OUTPATIENT
Start: 2023-12-07 | End: 2023-12-12

## 2023-12-07 RX ADMIN — BICALUTAMIDE 50 MILLIGRAM(S): 50 TABLET, FILM COATED ORAL at 12:22

## 2023-12-07 RX ADMIN — CHLORHEXIDINE GLUCONATE 1 APPLICATION(S): 213 SOLUTION TOPICAL at 17:38

## 2023-12-07 RX ADMIN — HEPARIN SODIUM 5000 UNIT(S): 5000 INJECTION INTRAVENOUS; SUBCUTANEOUS at 09:44

## 2023-12-07 RX ADMIN — Medication 2 TABLET(S): at 09:44

## 2023-12-07 RX ADMIN — Medication 125 MILLIGRAM(S): at 12:20

## 2023-12-07 RX ADMIN — Medication 2 TABLET(S): at 01:53

## 2023-12-07 RX ADMIN — HEPARIN SODIUM 5000 UNIT(S): 5000 INJECTION INTRAVENOUS; SUBCUTANEOUS at 01:54

## 2023-12-07 RX ADMIN — MEROPENEM 100 MILLIGRAM(S): 1 INJECTION INTRAVENOUS at 09:44

## 2023-12-07 RX ADMIN — Medication 1 TABLET(S): at 12:21

## 2023-12-07 RX ADMIN — SODIUM CHLORIDE 100 MILLILITER(S): 9 INJECTION INTRAMUSCULAR; INTRAVENOUS; SUBCUTANEOUS at 09:44

## 2023-12-07 RX ADMIN — Medication 125 MILLIGRAM(S): at 17:43

## 2023-12-07 RX ADMIN — MEROPENEM 100 MILLIGRAM(S): 1 INJECTION INTRAVENOUS at 17:43

## 2023-12-07 RX ADMIN — Medication 2000 UNIT(S): at 12:22

## 2023-12-07 RX ADMIN — LISINOPRIL 5 MILLIGRAM(S): 2.5 TABLET ORAL at 06:06

## 2023-12-07 RX ADMIN — TAMSULOSIN HYDROCHLORIDE 0.4 MILLIGRAM(S): 0.4 CAPSULE ORAL at 22:29

## 2023-12-07 RX ADMIN — PANTOPRAZOLE SODIUM 40 MILLIGRAM(S): 20 TABLET, DELAYED RELEASE ORAL at 12:21

## 2023-12-07 RX ADMIN — ATORVASTATIN CALCIUM 20 MILLIGRAM(S): 80 TABLET, FILM COATED ORAL at 22:32

## 2023-12-07 RX ADMIN — Medication 125 MILLIGRAM(S): at 06:06

## 2023-12-07 RX ADMIN — SODIUM CHLORIDE 100 MILLILITER(S): 9 INJECTION INTRAMUSCULAR; INTRAVENOUS; SUBCUTANEOUS at 06:09

## 2023-12-07 RX ADMIN — Medication 2 TABLET(S): at 17:45

## 2023-12-07 RX ADMIN — HEPARIN SODIUM 5000 UNIT(S): 5000 INJECTION INTRAVENOUS; SUBCUTANEOUS at 17:45

## 2023-12-07 NOTE — CONSULT NOTE ADULT - ATTENDING COMMENTS
Patient is a 72 yo M w/ HTN, metastatic prostate ca (2009) with LE weakness and R hand paresthesias now s/p C5-T3 posterior cervical fusion and C7-T1 decompression for metastatic prostate cancer with plastics closure on 11/2. Hospital course has been c/b MSSA bacteremia (repeat blood cultures negative since then) and acute hypoxic respiratory failure requiring HFNC. Of note, patient tested positive for adenovirus on 11/15 and 11/24. O2 requirements significantly increased on 11/28, initially requiring HFNC 100%. Patient was started empirically on PCP tx with Bactrim, O2 requirements decreased but have since plateaued.    #Acute Hypoxic Respiratory Failure - Unclear etiology. Improved with Meropenem and empiric Bactrim but then plateaued. CT chest on 11/22 with RLL consolidation. Repeat CT chest on 11/27 with multifocal consolidations, mostly peripheral, of note with RLL consolidaiton on initial CT gone. Fungitell elevated. Possible organizing pneumonia vs atypical infectious vs infectious vs pneumonitis (rare but possible with bicalutamide)  #Multifocal PNA  #Shortness of Breath  - c/w HFNC for now, wean as tolerated  - Recommend repeat CT chest non contrast  - May recommend bronchoscopic evaluation with BAL/TBBX pending repeat CT chest and clinical course  - Discussed with ID, would preferentially not start empiric steroids given possible infectious collection on MRI  - Empiric abx as per ID  - Please obtain sputum PCP PCR if able    Serge Machado MD  Pulmonary & Critical Care Patient is a 74 yo M w/ HTN, metastatic prostate ca (2009) with LE weakness and R hand paresthesias now s/p C5-T3 posterior cervical fusion and C7-T1 decompression for metastatic prostate cancer with plastics closure on 11/2. Hospital course has been c/b MSSA bacteremia (repeat blood cultures negative since then) and acute hypoxic respiratory failure requiring HFNC. Of note, patient tested positive for adenovirus on 11/15 and 11/24. O2 requirements significantly increased on 11/28, initially requiring HFNC 100%. Patient was started empirically on PCP tx with Bactrim, O2 requirements decreased but have since plateaued.    #Acute Hypoxic Respiratory Failure - Unclear etiology. Improved with Meropenem and empiric Bactrim but then plateaued. CT chest on 11/22 with RLL consolidation. Repeat CT chest on 11/27 with multifocal consolidations, mostly peripheral, of note with RLL consolidaiton on initial CT gone. Fungitell elevated. Possible organizing pneumonia vs atypical infectious vs infectious vs pneumonitis (rare but possible with bicalutamide)  #Multifocal PNA  #Shortness of Breath  - c/w HFNC for now, wean as tolerated  - Recommend repeat CT chest non contrast  - May recommend bronchoscopic evaluation with BAL/TBBX pending repeat CT chest and clinical course  - Discussed with ID, would preferentially not start empiric steroids given possible infectious collection on MRI  - Empiric abx as per ID  - Please obtain sputum PCP PCR if able    Serge Machado MD  Pulmonary & Critical Care

## 2023-12-07 NOTE — CONSULT NOTE ADULT - ASSESSMENT
73M with HTN met prostate cancer (2009) with LE weakness and R hand paresthesias now s/p C5-T3 posterior cervical fusion and C7-T1 decompression for metastatic prostate cancer with plastics closure on 11/2. Course c/b acute hypoxic respiratory failure requiring HFNC.     #Acute Hypoxic Respiratory Failure   - per flowsheets, patient has been requiring HFNC since 11/28 now on 45% 40L/min  - currently on treatment for multilobar PNA with meropenem (11/29-) as well as treatment for PCP PNA with bactrim DS 2 tab q8  - please obtain sputum PCP PCR  - can consider obtain pro BNP and repeat TTE to evaluate if any concern for cardiac etiology; previous TTE 11/18 with hyperdynamic L ventricle   - can consider RVP to see if there is an element of superimposed viral disease   - please obtain CXR to evaluate if any gross lung pathology zachary iso known metastatic prostate cancer   - sent ABG - will f/u to see extent of hypoxemia    INCOMPLETE NOTE  73M with HTN met prostate cancer (2009) with LE weakness and R hand paresthesias now s/p C5-T3 posterior cervical fusion and C7-T1 decompression for metastatic prostate cancer with plastics closure on 11/2. Course c/b acute hypoxic respiratory failure requiring HFNC.     #Acute Hypoxic Respiratory Failure   - per flowsheets, patient has been requiring HFNC since 11/28 now on 45% 40L/min  - currently on treatment for multilobar PNA with meropenem (11/29-) as well as treatment for PCP PNA with bactrim DS 2 tab q8  - Aa gradient is elevated concerning for V/Q mismatch (PNA vs atelactatic lung, vs CHF), shunt physiology or alveolar hypoventilation (interstitial lung disease or PCP PNA)  - please obtain sputum PCP PCR  - can consider obtain pro BNP and repeat TTE (with bubble study) to evaluate if any concern for cardiac etiology; previous TTE 11/18 with hyperdynamic L ventricle   - can consider RVP to see if there is an element of superimposed viral disease   - please obtain CXR to evaluate if any gross lung pathology zachary iso known metastatic prostate cancer       INCOMPLETE NOTE  73M with HTN met prostate cancer (2009) with LE weakness and R hand paresthesias now s/p C5-T3 posterior cervical fusion and C7-T1 decompression for metastatic prostate cancer with plastics closure on 11/2. Course c/b acute hypoxic respiratory failure requiring HFNC.     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 11/22 vs 11/27; mostly peripheral   - currently on treatment for multilobar PNA with meropenem (11/29-) as well as treatment for PCP PNA with bactrim DS 2 tab q8  - Aa gradient is elevated concerning for V/Q mismatch (PNA vs atelactatic lung, vs CHF), shunt physiology or alveolar hypoventilation (interstitial lung disease or PCP PNA)  - bedside POCUS with no pleural effusions and with diffuse B lines but irregular pleura   - please obtain sputum PCP PCR if able  - can consider obtain pro BNP and repeat TTE (with bubble study) to evaluate if any concern for cardiac etiology however lower suspicion at this time; previous TTE 11/18 with hyperdynamic L ventricle   - can consider RVP to see if there is an element of superimposed viral disease   - recommend repeat CT chest to evaluate for improvement in PNA

## 2023-12-07 NOTE — CONSULT NOTE ADULT - SUBJECTIVE AND OBJECTIVE BOX
PULMONARY SERVICE INITIAL CONSULT NOTE    HPI:  73M with HTN met prostate cancer (2009) with LE weakness and R hand paresthesias presents as a transfer from [facility] for NSGY evaluation    Patient reports that he has known about his prostate cancer since 2009, but did not seek out treatment because he did not take it seriously. He reports that he is typically fully functional and has no known weakness, or paresthesias. Around 1 month ago, he reports feeling some minor tingling/numbness in his R hand. The week prior to his hospitalization, he began to feel some minor weakness in both of his legs, but was still able to perform all of his ADLs and iADLs. He reports on Thursday, while he was showering, he fell in the bath due to weakness in his legs, but reports he was still able to get up and perform some household tasks such as cleaning and cooking, but still had general weakness and fell several more times. When he woke up from bed the next morning, he attempted to use the restroom and fell to the floor and reports he wasn't able to feel his below his belly button, was unable to control his bowel movements, couldn't feel his genitals and was paralyzed on bilateral lower extremities. He was on the ground for >48 hours with no PO intake before his [place] came [place] for [place] at [facility].     Patient was initially evaluated at [facility]; for b/l found to be in rhabdo and SRUTHI, On imaging, he was found to have C7 spinous process nondisplaced fracture of T1 spinous process, edema from C3-4 through C7-T1 interspinous ligaments as well as lytic metastatic disease from C7, Tq-T5 and T12, as well as L1-L3 as well as epidural expansion of neoplasm at C7 centrally and dorsally resulting in moderate cord compression and underlying edema/extension into the bilateral C7-T1 and T1-2 and Left T2-3 Neural foramina. Epidural disease was also notedat T12 vertebral body. NM Scan significant for multiple sites with osseous lesions; Scapula, Multiple left ribs, bilateral pelvic region, as well as b/l LE DVTs on US and was started on Heparin GTT. Patient Reports that since Wednesday, he has had significant improvement in his functional status and has regained sensation and movement in his lower extremities and has been able to feel his genatalia and his lower abdomen.  (27 Oct 2023 19:09)    Since hospital admission, patient is s/p C5-T3 posterior cervical fusion and C7-T1 decompression for metastatic prostate cancer with plastics closure on 11/2. MRI 11/14 with concern for possible seroma in subcutaneous space s/p incisional vac. Was evaluated by radiation onc for potential for RT however given surgical intervention, radiation therapy held. Per documentation, appears patient was on a decadron taper as well. On 11/28, patient noted sudden onset shortness of breath requiring supplemental oxygen supports. Reports some dry cough at that time but otherwise asymptomatic. Placed on HFNC and unable to titrate off since then.     At time of evaluation, patient reports resting comfortably on HFNC and without any symptoms at this time - denies any cough or congestion. No past history of smoking cigarettes, vaping and ecigarette use. Pulmonology consulted for acute hypoxic respiratory failure.      REVIEW OF SYSTEMS:  All additional ROS negative.    PAST MEDICAL & SURGICAL HISTORY:  Essential hypertension      Prostate cancer metastatic to bone      No significant past surgical history          FAMILY HISTORY:  Family history of stroke (Grandparent)        SOCIAL HISTORY:  Smoking Status: [ ] Current, [ ] Former, [ x ] Never  Pack Years:    MEDICATIONS:  Pulmonary:  guaiFENesin Oral Liquid (Sugar-Free) 200 milliGRAM(s) Oral every 6 hours PRN  levalbuterol Inhalation 0.63 milliGRAM(s) Inhalation every 6 hours PRN    Antimicrobials:  meropenem  IVPB 1000 milliGRAM(s) IV Intermittent every 8 hours  trimethoprim  160 mG/sulfamethoxazole 800 mG 2 Tablet(s) Oral three times a day  vancomycin    Solution 125 milliGRAM(s) Oral every 6 hours    Anticoagulants:  heparin   Injectable 5000 Unit(s) SubCutaneous every 8 hours    Onc:  bicalutamide 50 milliGRAM(s) Oral daily    GI/:  pantoprazole  Injectable 40 milliGRAM(s) IV Push daily  tamsulosin 0.4 milliGRAM(s) Oral at bedtime    Endocrine:  atorvastatin 20 milliGRAM(s) Oral at bedtime  dextrose 50% Injectable 25 Gram(s) IV Push once  dextrose 50% Injectable 25 Gram(s) IV Push once  dextrose 50% Injectable 12.5 Gram(s) IV Push once  dextrose Oral Gel 15 Gram(s) Oral once PRN  glucagon  Injectable 1 milliGRAM(s) IntraMuscular once    Cardiac:  lisinopril 5 milliGRAM(s) Oral daily    Other Medications:  acetaminophen     Tablet .. 975 milliGRAM(s) Oral every 6 hours  benzocaine/menthol Lozenge 1 Lozenge Oral every 6 hours PRN  chlorhexidine 2% Cloths 1 Application(s) Topical daily  cholecalciferol 2000 Unit(s) Oral daily  dextrose 5%. 1000 milliLiter(s) IV Continuous <Continuous>  dextrose 5%. 1000 milliLiter(s) IV Continuous <Continuous>  lactobacillus acidophilus 1 Tablet(s) Oral daily  naloxone Injectable 0.1 milliGRAM(s) IV Push every 3 minutes PRN  ondansetron Injectable 4 milliGRAM(s) IV Push every 6 hours PRN  sodium chloride 0.9%. 1000 milliLiter(s) IV Continuous <Continuous>      Allergies    No Known Allergies    Intolerances        PHYSICAL EXAM  Vital Signs Last 24 Hrs  T(C): 36.8 (07 Dec 2023 06:02), Max: 37 (06 Dec 2023 21:00)  T(F): 98.3 (07 Dec 2023 06:02), Max: 98.6 (06 Dec 2023 21:00)  HR: 92 (07 Dec 2023 06:02) (92 - 105)  BP: 121/72 (07 Dec 2023 06:02) (109/56 - 129/79)  BP(mean): --  RR: 18 (07 Dec 2023 06:02) (18 - 20)  SpO2: 98% (07 Dec 2023 06:02) (96% - 98%)    Parameters below as of 07 Dec 2023 06:02  Patient On (Oxygen Delivery Method): nasal cannula, high flow        12-06 @ 07:01  -  12-07 @ 07:00  --------------------------------------------------------  IN: 1430 mL / OUT: 1500 mL / NET: -70 mL          CONSTITUTIONAL: No acute distress. wearing HFNC   HEENT:  Conjunctiva clear B/L.  Moist oral mucosa.   Cardiovascular: RRR with no murmurs. No JVD noted. No lower extremity edema B/L. Extremities are warm and well perfused.    Respiratory: bilateral expiratory rales vs rochi prominent in bases. clear to auscultation in apices   Gastrointestinal:  Soft, nontender. Non-distended. Non-rigid.    Neurologic:  Alert and awake. Moving all extremities. Following commands.    Skin:  No gross rashes notes. Posterior midline spinal incision appears well healing      LABS:      CBC Full  -  ( 06 Dec 2023 05:45 )  WBC Count : 6.75 K/uL  RBC Count : 2.68 M/uL  Hemoglobin : 8.0 g/dL  Hematocrit : 26.8 %  Platelet Count - Automated : 275 K/uL  Mean Cell Volume : 100.0 fL  Mean Cell Hemoglobin : 29.9 pg  Mean Cell Hemoglobin Concentration : 29.9 gm/dL  Auto Neutrophil # : x  Auto Lymphocyte # : x  Auto Monocyte # : x  Auto Eosinophil # : x  Auto Basophil # : x  Auto Neutrophil % : x  Auto Lymphocyte % : x  Auto Monocyte % : x  Auto Eosinophil % : x  Auto Basophil % : x    12-07    136  |  101  |  7   ----------------------------<  100<H>  4.8   |  21<L>  |  0.79    Ca    8.5      07 Dec 2023 06:45  Phos  2.9     12-07  Mg     2.00     12-07            Urinalysis Basic - ( 07 Dec 2023 06:45 )    Color: x / Appearance: x / SG: x / pH: x  Gluc: 100 mg/dL / Ketone: x  / Bili: x / Urobili: x   Blood: x / Protein: x / Nitrite: x   Leuk Esterase: x / RBC: x / WBC x   Sq Epi: x / Non Sq Epi: x / Bacteria: x                RADIOLOGY & ADDITIONAL STUDIES: PULMONARY SERVICE INITIAL CONSULT NOTE    HPI:  73M with HTN met prostate cancer (2009) with LE weakness and R hand paresthesias presents as a transfer from [facility] for NSGY evaluation    Patient reports that he has known about his prostate cancer since 2009, but did not seek out treatment because he did not take it seriously. He reports that he is typically fully functional and has no known weakness, or paresthesias. Around 1 month ago, he reports feeling some minor tingling/numbness in his R hand. The week prior to his hospitalization, he began to feel some minor weakness in both of his legs, but was still able to perform all of his ADLs and iADLs. He reports on Thursday, while he was showering, he fell in the bath due to weakness in his legs, but reports he was still able to get up and perform some household tasks such as cleaning and cooking, but still had general weakness and fell several more times. When he woke up from bed the next morning, he attempted to use the restroom and fell to the floor and reports he wasn't able to feel his below his belly button, was unable to control his bowel movements, couldn't feel his genitals and was paralyzed on bilateral lower extremities. He was on the ground for >48 hours with no PO intake before his son came to the [place] for evaluation at [facility].     Patient was initially evaluated at [facility]; for b/l found to be in rhabdo and SRUTHI, On imaging, he was found to have C7 spinous process nondisplaced fracture of T1 spinous process, edema from C3-4 through C7-T1 interspinous ligaments as well as lytic metastatic disease from C7, Tq-T5 and T12, as well as L1-L3 as well as epidural expansion of neoplasm at C7 centrally and dorsally resulting in moderate cord compression and underlying edema/extension into the bilateral C7-T1 and T1-2 and Left T2-3 Neural foramina. Epidural disease was also notedat T12 vertebral body. NM Scan significant for multiple sites with osseous lesions; Scapula, Multiple left ribs, bilateral pelvic region, as well as b/l LE DVTs on US and was started on Heparin GTT. Patient Reports that since Wednesday, he has had significant improvement in his functional status and has regained sensation and movement in his lower extremities and has been able to feel his genatalia and his lower abdomen.  (27 Oct 2023 19:09)    Since hospital admission, patient is s/p C5-T3 posterior cervical fusion and C7-T1 decompression for metastatic prostate cancer with plastics closure on 11/2. MRI 11/14 with concern for possible seroma in subcutaneous space s/p incisional vac. Was evaluated by radiation onc for potential for RT however given surgical intervention, radiation therapy held. Per documentation, appears patient was on a decadron taper as well. On 11/28, patient noted sudden onset shortness of breath requiring supplemental oxygen supports. Reports some dry cough at that time but otherwise asymptomatic. Placed on HFNC and unable to titrate off since then.     At time of evaluation, patient reports resting comfortably on HFNC and without any symptoms at this time - denies any cough or congestion. No past history of smoking cigarettes, vaping and ecigarette use. Pulmonology consulted for acute hypoxic respiratory failure.      REVIEW OF SYSTEMS:  All additional ROS negative.    PAST MEDICAL & SURGICAL HISTORY:  Essential hypertension      Prostate cancer metastatic to bone      No significant past surgical history          FAMILY HISTORY:  Family history of stroke (Grandparent)        SOCIAL HISTORY:  Smoking Status: [ ] Current, [ ] Former, [ x ] Never  Pack Years:    MEDICATIONS:  Pulmonary:  guaiFENesin Oral Liquid (Sugar-Free) 200 milliGRAM(s) Oral every 6 hours PRN  levalbuterol Inhalation 0.63 milliGRAM(s) Inhalation every 6 hours PRN    Antimicrobials:  meropenem  IVPB 1000 milliGRAM(s) IV Intermittent every 8 hours  trimethoprim  160 mG/sulfamethoxazole 800 mG 2 Tablet(s) Oral three times a day  vancomycin    Solution 125 milliGRAM(s) Oral every 6 hours    Anticoagulants:  heparin   Injectable 5000 Unit(s) SubCutaneous every 8 hours    Onc:  bicalutamide 50 milliGRAM(s) Oral daily    GI/:  pantoprazole  Injectable 40 milliGRAM(s) IV Push daily  tamsulosin 0.4 milliGRAM(s) Oral at bedtime    Endocrine:  atorvastatin 20 milliGRAM(s) Oral at bedtime  dextrose 50% Injectable 25 Gram(s) IV Push once  dextrose 50% Injectable 25 Gram(s) IV Push once  dextrose 50% Injectable 12.5 Gram(s) IV Push once  dextrose Oral Gel 15 Gram(s) Oral once PRN  glucagon  Injectable 1 milliGRAM(s) IntraMuscular once    Cardiac:  lisinopril 5 milliGRAM(s) Oral daily    Other Medications:  acetaminophen     Tablet .. 975 milliGRAM(s) Oral every 6 hours  benzocaine/menthol Lozenge 1 Lozenge Oral every 6 hours PRN  chlorhexidine 2% Cloths 1 Application(s) Topical daily  cholecalciferol 2000 Unit(s) Oral daily  dextrose 5%. 1000 milliLiter(s) IV Continuous <Continuous>  dextrose 5%. 1000 milliLiter(s) IV Continuous <Continuous>  lactobacillus acidophilus 1 Tablet(s) Oral daily  naloxone Injectable 0.1 milliGRAM(s) IV Push every 3 minutes PRN  ondansetron Injectable 4 milliGRAM(s) IV Push every 6 hours PRN  sodium chloride 0.9%. 1000 milliLiter(s) IV Continuous <Continuous>      Allergies    No Known Allergies    Intolerances        PHYSICAL EXAM  Vital Signs Last 24 Hrs  T(C): 36.8 (07 Dec 2023 06:02), Max: 37 (06 Dec 2023 21:00)  T(F): 98.3 (07 Dec 2023 06:02), Max: 98.6 (06 Dec 2023 21:00)  HR: 92 (07 Dec 2023 06:02) (92 - 105)  BP: 121/72 (07 Dec 2023 06:02) (109/56 - 129/79)  BP(mean): --  RR: 18 (07 Dec 2023 06:02) (18 - 20)  SpO2: 98% (07 Dec 2023 06:02) (96% - 98%)    Parameters below as of 07 Dec 2023 06:02  Patient On (Oxygen Delivery Method): nasal cannula, high flow        12-06 @ 07:01  -  12-07 @ 07:00  --------------------------------------------------------  IN: 1430 mL / OUT: 1500 mL / NET: -70 mL          CONSTITUTIONAL: No acute distress. wearing HFNC   HEENT:  Conjunctiva clear B/L.  Moist oral mucosa.   Cardiovascular: RRR with no murmurs. No JVD noted. No lower extremity edema B/L. Extremities are warm and well perfused.    Respiratory: bilateral expiratory rales vs rochi prominent in bases. clear to auscultation in apices   Gastrointestinal:  Soft, nontender. Non-distended. Non-rigid.    Neurologic:  Alert and awake. Moving all extremities. Following commands.    Skin:  No gross rashes notes. Posterior midline spinal incision appears well healing      LABS:      CBC Full  -  ( 06 Dec 2023 05:45 )  WBC Count : 6.75 K/uL  RBC Count : 2.68 M/uL  Hemoglobin : 8.0 g/dL  Hematocrit : 26.8 %  Platelet Count - Automated : 275 K/uL  Mean Cell Volume : 100.0 fL  Mean Cell Hemoglobin : 29.9 pg  Mean Cell Hemoglobin Concentration : 29.9 gm/dL  Auto Neutrophil # : x  Auto Lymphocyte # : x  Auto Monocyte # : x  Auto Eosinophil # : x  Auto Basophil # : x  Auto Neutrophil % : x  Auto Lymphocyte % : x  Auto Monocyte % : x  Auto Eosinophil % : x  Auto Basophil % : x    12-07    136  |  101  |  7   ----------------------------<  100<H>  4.8   |  21<L>  |  0.79    Ca    8.5      07 Dec 2023 06:45  Phos  2.9     12-07  Mg     2.00     12-07            Urinalysis Basic - ( 07 Dec 2023 06:45 )    Color: x / Appearance: x / SG: x / pH: x  Gluc: 100 mg/dL / Ketone: x  / Bili: x / Urobili: x   Blood: x / Protein: x / Nitrite: x   Leuk Esterase: x / RBC: x / WBC x   Sq Epi: x / Non Sq Epi: x / Bacteria: x                RADIOLOGY & ADDITIONAL STUDIES:

## 2023-12-07 NOTE — PROGRESS NOTE ADULT - SUBJECTIVE AND OBJECTIVE BOX
Patient is a 73y old  Male who presents with a chief complaint of Diffuse Spinal Mets from Prostate Cancer (07 Dec 2023 11:24)      SUBJECTIVE / OVERNIGHT EVENTS: Pt seen and examined at 10:48am, no overnight events, denies any sob, still on high flow oxygen, no other complaints, no other new issues reported.         MEDICATIONS  (STANDING):  acetaminophen     Tablet .. 975 milliGRAM(s) Oral every 6 hours  atorvastatin 20 milliGRAM(s) Oral at bedtime  bicalutamide 50 milliGRAM(s) Oral daily  chlorhexidine 2% Cloths 1 Application(s) Topical daily  cholecalciferol 2000 Unit(s) Oral daily  dextrose 5%. 1000 milliLiter(s) (50 mL/Hr) IV Continuous <Continuous>  dextrose 5%. 1000 milliLiter(s) (100 mL/Hr) IV Continuous <Continuous>  dextrose 50% Injectable 25 Gram(s) IV Push once  dextrose 50% Injectable 12.5 Gram(s) IV Push once  dextrose 50% Injectable 25 Gram(s) IV Push once  glucagon  Injectable 1 milliGRAM(s) IntraMuscular once  heparin   Injectable 5000 Unit(s) SubCutaneous every 8 hours  lactobacillus acidophilus 1 Tablet(s) Oral daily  lisinopril 5 milliGRAM(s) Oral daily  meropenem  IVPB 1000 milliGRAM(s) IV Intermittent every 8 hours  pantoprazole  Injectable 40 milliGRAM(s) IV Push daily  sodium chloride 0.9%. 1000 milliLiter(s) (100 mL/Hr) IV Continuous <Continuous>  tamsulosin 0.4 milliGRAM(s) Oral at bedtime  trimethoprim  160 mG/sulfamethoxazole 800 mG 2 Tablet(s) Oral three times a day  vancomycin    Solution 125 milliGRAM(s) Oral every 6 hours    MEDICATIONS  (PRN):  benzocaine/menthol Lozenge 1 Lozenge Oral every 6 hours PRN Sore Throat  dextrose Oral Gel 15 Gram(s) Oral once PRN Blood Glucose LESS THAN 70 milliGRAM(s)/deciliter  guaiFENesin Oral Liquid (Sugar-Free) 200 milliGRAM(s) Oral every 6 hours PRN Cough  levalbuterol Inhalation 0.63 milliGRAM(s) Inhalation every 6 hours PRN shortness of breath/wheezing  naloxone Injectable 0.1 milliGRAM(s) IV Push every 3 minutes PRN For ANY of the following changes in patient status:  A. RR LESS THAN 10 breaths per minute, B. Oxygen saturation LESS THAN 90%, C. Sedation score of 6  ondansetron Injectable 4 milliGRAM(s) IV Push every 6 hours PRN Nausea      Vital Signs Last 24 Hrs  T(C): 36.8 (07 Dec 2023 06:02), Max: 37 (06 Dec 2023 21:00)  T(F): 98.3 (07 Dec 2023 06:02), Max: 98.6 (06 Dec 2023 21:00)  HR: 104 (07 Dec 2023 11:44) (92 - 106)  BP: 121/72 (07 Dec 2023 06:02) (121/72 - 129/79)  BP(mean): --  RR: 18 (07 Dec 2023 11:44) (18 - 20)  SpO2: 95% (07 Dec 2023 11:44) (95% - 100%)    Parameters below as of 07 Dec 2023 11:44  Patient On (Oxygen Delivery Method): nasal cannula, high flow  O2 Flow (L/min): 40  O2 Concentration (%): 40  CAPILLARY BLOOD GLUCOSE      POCT Blood Glucose.: 117 mg/dL (07 Dec 2023 13:06)  POCT Blood Glucose.: 122 mg/dL (07 Dec 2023 08:54)  POCT Blood Glucose.: 97 mg/dL (06 Dec 2023 20:47)  POCT Blood Glucose.: 147 mg/dL (06 Dec 2023 17:57)    I&O's Summary    06 Dec 2023 07:01  -  07 Dec 2023 07:00  --------------------------------------------------------  IN: 1430 mL / OUT: 1500 mL / NET: -70 mL        PHYSICAL EXAM:  GENERAL: NAD  CHEST/LUNG: Clear to auscultation bilaterally; No wheeze, on high flow  oxygen  HEART: Regular rate and rhythm  ABDOMEN: Soft, Nontender, Nondistended  EXTREMITIES: no LE edema  PSYCH: Calm  NEUROLOGY: AA answers questions appropriately  SKIN: dry and warm    LABS:                        8.0    6.75  )-----------( 275      ( 06 Dec 2023 05:45 )             26.8     12-07    136  |  101  |  7   ----------------------------<  100<H>  4.8   |  21<L>  |  0.79    Ca    8.5      07 Dec 2023 06:45  Phos  2.9     12-07  Mg     2.00     12-07            Urinalysis Basic - ( 07 Dec 2023 06:45 )    Color: x / Appearance: x / SG: x / pH: x  Gluc: 100 mg/dL / Ketone: x  / Bili: x / Urobili: x   Blood: x / Protein: x / Nitrite: x   Leuk Esterase: x / RBC: x / WBC x   Sq Epi: x / Non Sq Epi: x / Bacteria: x        RADIOLOGY & ADDITIONAL TESTS:    Imaging Personally Reviewed:    Consultant(s) Notes Reviewed:      Care Discussed with Consultants/Other Providers:

## 2023-12-07 NOTE — PROGRESS NOTE ADULT - SUBJECTIVE AND OBJECTIVE BOX
Patient is a 73y old  Male who presents with a chief complaint of Diffuse Spinal Mets from Prostate Cancer (02 Dec 2023 09:33)    f/u MSSA bacteremia    Interval History/ROS:  remains of supplemental O2 HFNC 45%.  no fever.  back pain.  BM okay.  ROS otherwise negative.    PAST MEDICAL & SURGICAL HISTORY:  Essential hypertension  Prostate cancer metastatic to bone    Allergies  No Known Allergies    ANTIMICROBIALS:    ceFAZolin   IVPB (11/2-11/3, 11/16-11/20)  cefTRIAXone   IVPB (11/15 x1)  ciprofloxacin   IVPB (11/17-11/20)  nafcillin  IVPB (11/20-11/27)  fluconAZOLE IVPB (11/22-11/30)  moxifloxacin  IVPB (11/25-11/27)  cefepime   IVPB (11/27-11/29)    active:  vancomycin    Solution 125 every 6 hours (11/28-)  meropenem  IVPB 1000 every 8 hours (11/29-)  trimethoprim  160 mG/sulfamethoxazole 800 mG 2 three times a day (11/30-)    MEDICATIONS  (STANDING):  acetaminophen     Tablet .. 975 every 6 hours  atorvastatin 20 at bedtime  bicalutamide 50 daily  heparin   Injectable 5000 every 8 hours  lisinopril 5 daily  pantoprazole  Injectable 40 daily  tamsulosin 0.4 at bedtime    Vital Signs Last 24 Hrs  T(F): 98.3 (12-07-23 @ 06:02), Max: 98.6 (12-06-23 @ 21:00)  HR: 92 (12-07-23 @ 06:02)  BP: 121/72 (12-07-23 @ 06:02)  RR: 18 (12-07-23 @ 06:02)  SpO2: 98% (12-07-23 @ 06:02) (96% - 98%)  Wt(kg): --  SpO2: 95% (12-06-23 @ 07:25) (93% - 100%)    PHYSICAL EXAM:  Constitutional: non-toxic, on HFNC 45%  HEAD/EYES: anicteric  ENT:  supple  Cardiovascular:   normal S1, S2  Respiratory:  clear BS bilaterally  GI:  soft, non-tender, normal bowel sounds  :  no best  Musculoskeletal:  no synovitis  Neurologic: awake and alert, normal strength, no focal findings  Skin: posterior incision c/d/i, no fluctuance, no cellulitis  Psychiatric:  awake, alert, appropriate mood                                               8.0    6.75  )-----------( 275      ( 06 Dec 2023 05:45 )             26.8 12-07    136  |  101  |  7   ----------------------------<  100  4.8   |  21  |  0.79  Ca    8.5      07 Dec 2023 06:45Phos  2.9     12-07Mg     2.00     12-07    Lactate Dehydrogenase, Serum: 434 (12-03)  Fungitell: 390 pg/mL (12.03.23 @ 06:45)   HIV-1/2 Combo Result: Nonreact    MICROBIOLOGY:  11/27 BC (-)  11/27 Stool cx (-)  11/24 BC (-)  11/18 BC (-)  11/17 BC (-)  11/16 BC (-)  11/15 UC >100,000 CFU/ml Providencia rettgeri  11/15 BC (+) MSSA    C Diff by PCR Result: Detected (11-27-23 @ 18:41)    RADIOLOGY:  MR Cervical Spine w/ IV Cont (11.28.23 @ 10:39) >  Impression: Postop changes are identified as described above.  There is evidence of a large collection seen involving posterior paraspinal soft tissue region.  Abnormal T1 prolongation associated enhancement is seen involving the C7-T4 vertebral body as described above.    Xray Cinesophagram Swallow Function w/ Contrast (11.28.23 @ 10:35) >  IMPRESSION:  Laryngeal penetration without aspiration of thin liquids.  No aspiration of puree, solids, mildly thick and moderately thick liquids.  For further information and recommendations, please refer to the speech   pathologist final report which is available for review in the electronic medical record.    CT Angio Chest PE Protocol w/ IV Cont (11.27.23 @ 18:19) >  IMPRESSION:  No evidence for pulmonary embolus up to and including lobar branches. Segmental and subsegmental branches are not well evaluated due to respiratory motion artifact.  Worsening multifocal confluent groundglass infiltrates and consolidative opacities in the right lower lobe and lateral left lung base. Correlate for atypical pneumonia.    CT Neck Soft Tissue w/ IV Cont (11.22.23 @ 10:51) >  IMPRESSION: Status post posterior fusion and laminectomy, as discussed, with a probable seroma formation within the paraspinal soft tissues. Superimposed infection is difficult to exclude. Admission the canal is markedly limited secondary to intrinsic CT modality and also streak and beam hardening artifact generated by the metallic orthopedic hardware.  No cervical mass or cervical lymphadenopathy.    CT Chest w/ IV Cont (11.22.23 @ 10:51) >  IMPRESSION:  Airway associated foci of groundglass opacity and small consolidations in the right middle and bilateral lower lobes, which can be due to aspiration pneumonitis versus infection.  Peribronchial wall thickening, likely inflammatory. Trace right pleural effusion.     MR Thoracic Spine w/wo IV Cont (11.14.23 @ 11:55) >  IMPRESSION: Postop changes as described above.  Osseous metastasis again seen.  Possible epidural enhancement is seen involving the T4-5 level. Better quality contrast enhanced MRI of the thoracic spine is recommended for further evaluation.  Previously noted T2 prolongation involving the spinal cord at the T1 level is not well-demonstrated due to artifact from postop material.      MR Thoracic Spine No Cont (10.30.23 @ 13:26) >  IMPRESSION: Overall no change in comparison to the prior MRI studies.  Unchanged cord compression and mild cord edema at the T1 level, as discussed.          TTE W or WO Ultrasound Enhancing Agent (11.18.23 @ 11:24) >  CONCLUSIONS:   1. Left ventricular wall thickness is mildly increased. Left ventricular systolic function is hyperdynamic with an ejection fraction of 72 % by Diggs's method of disks.   2. Normal right ventricular cavity size, wall thickness, and systolic function. Tricuspid annular plane systolic excursion (TAPSE) is 2.9 cm (normal >=1.7 cm).   3. The left atrium is normal.   4. The right atrium is normal in size.   5. No pericardial effusion seen.   6. Unable to rule out endocarditis.   7. Calcification of the aortic valve leaflets. mild aortic stenosis.   8. Hyperdynamic left ventricular systolic function with intra-cavitary gradient of 48 mmHg.   9. No prior echocardiogram is available for comparison.  10. Technically difficult image quality.  11. There is normal LV mass and concentric remodeling.

## 2023-12-07 NOTE — PROGRESS NOTE ADULT - PROBLEM SELECTOR PLAN 2
Pt on high flow oxygen, unable to be weaned  check abg  pulm consulted, f/u consult recs  cont abx as prescribed

## 2023-12-07 NOTE — PROGRESS NOTE ADULT - ASSESSMENT
73m with known metastatic prostate CA admitted initially to [facility] after falling and weakness. Found to be in rhabdo with SRUTHI.  Imaging noted spinal fracture with edema and lytic metastatic disease from C7, T1-T5, T12, and L1-L3 as well as epidural expansion of neoplasm at C7 centrally and dorsally resulting in moderate cord compression and underlying edema/extension into the bilateral C7-T1 and T1-2 and Left T2-3 neural foramina. Epidural disease was also noted at T12 vertebral body.   11/2, he underwent C7-T1 decompression, C5-T3 posterior cervical fusion.  Hospital course complicated by MSSA bacteremia which has cleared with negative TTE.  Course also complicated by hypoxia not responding to antibiotics.  Has been on empiric treatment for PJP (bactrim).  Fungitell elevated and LDH elevated.    Overall, metastatic prostate cancer with cord compression s/p spinal decompression 11/2, with fever due to MSSA bacteremia and cdiff, hypoxia.    Fever  - resolved for now  - repeat BC negative    MSSA bacteremia  - would continue antibiotics  - negative echo  - on meropenem due to adverse effects (leukopenia on cephalosporin?)  - at least 4 weeks from first negative BC     Abnormal Imaging  - fluid collections noted on imaging  - per plastics, they feel it is an uninfected seroma  - previously had drainage from incision    Hypoxic respiratory failure  - concern for aspiration  - to send sputum for PJP PCR  - on bactrim / meropenem  - HIV is negative  - pulmonary f/u    C diff  - on po vancomycin    I have discussed plan of care as detailed above with hospitalist 73m with known metastatic prostate CA admitted initially to [facility] after falling and weakness. Found to be in rhabdo with SRUTHI.  Imaging noted spinal fracture with edema and lytic metastatic disease from C7, T1-T5, T12, and L1-L3 as well as epidural expansion of neoplasm at C7 centrally and dorsally resulting in moderate cord compression and underlying edema/extension into the bilateral C7-T1 and T1-2 and Left T2-3 neural foramina. Epidural disease was also noted at T12 vertebral body.   11/2, he underwent C7-T1 decompression, C5-T3 posterior cervical fusion.  Hospital course complicated by MSSA bacteremia which has cleared with negative TTE.  Course also complicated by hypoxia not responding to antibiotics.  Has been on empiric treatment for PJP (bactrim).  Fungitell elevated and LDH elevated.    Overall, metastatic prostate cancer with cord compression s/p spinal decompression 11/2, with fever due to MSSA bacteremia and cdiff, hypoxia.    Fever  - resolved for now  - repeat BC negative    MSSA bacteremia  - would continue antibiotics  - negative echo  - on meropenem due to adverse effects (leukopenia on cephalosporin?)  - at least 4 weeks from first negative BC     Abnormal Imaging  - fluid collections noted on imaging  - per plastics, they feel it is an uninfected seroma  - previously had drainage from incision    Hypoxic respiratory failure  - concern for aspiration  - to send sputum for PJP PCR  - on bactrim / meropenem  - HIV is negative  - pulmonary f/u    C diff  - on po vancomycin    I have discussed plan of care as detailed above with hospitalist

## 2023-12-07 NOTE — CHART NOTE - NSCHARTNOTEFT_GEN_A_CORE
NUTRITION FOLLOW-UP:    73M DM2, HTN, Prostate CA p/w spinal cord compression from metastasis s/p C5-T3 Fusion, C7-T1 decompression 11/2 c/b MSSA bacteremia from surgical site infection, B/L LE DVT s/p IVC filter 11/1 by IR, candida esophagitis w/ dysphagia, aspiration multilobar PNA, acute on chronic anemia, C. diff colitis.    Pt f/u for malnutrition, consuming 50% meals. No GI distress (nausea/vomiting/diarrhea/constipation.) at present. Noted stable wts in past 2 weeks. Noted skin wound to Lt & Rt knee, Rt upper back surgical incision, no edema as per RN flow sheet.     Weight: 192.7# (12/7/23) via bed  scale, 193.7# (11/22/23)    Labs:  12-07 Na 136 mmol/L Glu 100 mg/dL<H> K+ 4.8 mmol/L Cr 0.79 mg/dL BUN 7 mg/dL Phos 2.9 mg/dL    12-07 @ 13:06 POCT 117 mg/dL  12-07 @ 08:54 POCT 122 mg/dL  12-06 @ 20:47 POCT 97 mg/dL  12-06 @ 17:57 POCT 147 mg/dL    Current Diet: Diet, Consistent Carbohydrate w/Evening Snack:   Supplement Feeding Modality:  Oral  Glucerna Shake Cans or Servings Per Day:  1       Frequency:  Three Times a day (11-28-23 @ 17:16)    Skin- wound to Lt & Rt knee, Rt upper back surgical incision, no edema as per RN flow sheet    Estimated needs: No changes since previous assessment     Nutrition Diagnosis:  Ongoing- Severe malnutrition    RD to Remain Available:    Additional Recommendations:   Continue with current diet and supplement as ordered.   Monitor PO intake, weight trends, nutrition related lab values, BMs/GI distress, hydration status, skin integrity.       Kaitlyn Segovia RDN #[id number] NUTRITION FOLLOW-UP:    73M DM2, HTN, Prostate CA p/w spinal cord compression from metastasis s/p C5-T3 Fusion, C7-T1 decompression 11/2 c/b MSSA bacteremia from surgical site infection, B/L LE DVT s/p IVC filter 11/1 by IR, candida esophagitis w/ dysphagia, aspiration multilobar PNA, acute on chronic anemia, C. diff colitis.    Pt f/u for malnutrition, consuming 50% meals. No GI distress (nausea/vomiting/diarrhea/constipation.) at present. Noted stable wts in past 2 weeks. Noted skin wound to Lt & Rt knee, Rt upper back surgical incision, no edema as per RN flow sheet.     Weight: 192.7# (12/7/23) via bed  scale, 193.7# (11/22/23)    Labs:  12-07 Na 136 mmol/L Glu 100 mg/dL<H> K+ 4.8 mmol/L Cr 0.79 mg/dL BUN 7 mg/dL Phos 2.9 mg/dL    12-07 @ 13:06 POCT 117 mg/dL  12-07 @ 08:54 POCT 122 mg/dL  12-06 @ 20:47 POCT 97 mg/dL  12-06 @ 17:57 POCT 147 mg/dL    Current Diet: Diet, Consistent Carbohydrate w/Evening Snack:   Supplement Feeding Modality:  Oral  Glucerna Shake Cans or Servings Per Day:  1       Frequency:  Three Times a day (11-28-23 @ 17:16)    Skin- wound to Lt & Rt knee, Rt upper back surgical incision, no edema as per RN flow sheet    Estimated needs: No changes since previous assessment     Nutrition Diagnosis:  Ongoing- Severe malnutrition    RD to Remain Available:    Additional Recommendations:   Continue with current diet and supplement as ordered.   Monitor PO intake, weight trends, nutrition related lab values, BMs/GI distress, hydration status, skin integrity.       Kaitlyn Segovia RDN #[id number]

## 2023-12-07 NOTE — PROGRESS NOTE ADULT - PROBLEM SELECTOR PLAN 1
c/b acute on chronic hypoxic respiratory failure  Noted result of CTPA.  - No PE  - Broadened antibiotics to cover for aspiration multilobar PNA.  - titrate down on HFNC as tolerated  - Bactrim DS 2 tab q8 for empiric PJP PNA treatment, cont bactrim, Per ID to check sputum pjp pcr  -wean high flow as tolerated,  monitor resp status closely  -ANDERS chandra, Discussed with ID attending on 12/7, will get Pulm consult as pt unable to be weaned off high flow oxygen

## 2023-12-08 LAB
ANION GAP SERPL CALC-SCNC: 10 MMOL/L — SIGNIFICANT CHANGE UP (ref 7–14)
ANION GAP SERPL CALC-SCNC: 10 MMOL/L — SIGNIFICANT CHANGE UP (ref 7–14)
ANISOCYTOSIS BLD QL: SIGNIFICANT CHANGE UP
ANISOCYTOSIS BLD QL: SIGNIFICANT CHANGE UP
BASOPHILS # BLD AUTO: 0.07 K/UL — SIGNIFICANT CHANGE UP (ref 0–0.2)
BASOPHILS # BLD AUTO: 0.07 K/UL — SIGNIFICANT CHANGE UP (ref 0–0.2)
BASOPHILS NFR BLD AUTO: 0.9 % — SIGNIFICANT CHANGE UP (ref 0–2)
BASOPHILS NFR BLD AUTO: 0.9 % — SIGNIFICANT CHANGE UP (ref 0–2)
BUN SERPL-MCNC: 8 MG/DL — SIGNIFICANT CHANGE UP (ref 7–23)
BUN SERPL-MCNC: 8 MG/DL — SIGNIFICANT CHANGE UP (ref 7–23)
CALCIUM SERPL-MCNC: 8.7 MG/DL — SIGNIFICANT CHANGE UP (ref 8.4–10.5)
CALCIUM SERPL-MCNC: 8.7 MG/DL — SIGNIFICANT CHANGE UP (ref 8.4–10.5)
CHLORIDE SERPL-SCNC: 102 MMOL/L — SIGNIFICANT CHANGE UP (ref 98–107)
CHLORIDE SERPL-SCNC: 102 MMOL/L — SIGNIFICANT CHANGE UP (ref 98–107)
CO2 SERPL-SCNC: 26 MMOL/L — SIGNIFICANT CHANGE UP (ref 22–31)
CO2 SERPL-SCNC: 26 MMOL/L — SIGNIFICANT CHANGE UP (ref 22–31)
CREAT SERPL-MCNC: 0.84 MG/DL — SIGNIFICANT CHANGE UP (ref 0.5–1.3)
CREAT SERPL-MCNC: 0.84 MG/DL — SIGNIFICANT CHANGE UP (ref 0.5–1.3)
EGFR: 93 ML/MIN/1.73M2 — SIGNIFICANT CHANGE UP
EGFR: 93 ML/MIN/1.73M2 — SIGNIFICANT CHANGE UP
EOSINOPHIL # BLD AUTO: 0 K/UL — SIGNIFICANT CHANGE UP (ref 0–0.5)
EOSINOPHIL # BLD AUTO: 0 K/UL — SIGNIFICANT CHANGE UP (ref 0–0.5)
EOSINOPHIL NFR BLD AUTO: 0 % — SIGNIFICANT CHANGE UP (ref 0–6)
EOSINOPHIL NFR BLD AUTO: 0 % — SIGNIFICANT CHANGE UP (ref 0–6)
GIANT PLATELETS BLD QL SMEAR: PRESENT — SIGNIFICANT CHANGE UP
GIANT PLATELETS BLD QL SMEAR: PRESENT — SIGNIFICANT CHANGE UP
GLUCOSE SERPL-MCNC: 93 MG/DL — SIGNIFICANT CHANGE UP (ref 70–99)
GLUCOSE SERPL-MCNC: 93 MG/DL — SIGNIFICANT CHANGE UP (ref 70–99)
HCT VFR BLD CALC: 24.9 % — LOW (ref 39–50)
HCT VFR BLD CALC: 24.9 % — LOW (ref 39–50)
HGB BLD-MCNC: 7.6 G/DL — LOW (ref 13–17)
HGB BLD-MCNC: 7.6 G/DL — LOW (ref 13–17)
IANC: 4.67 K/UL — SIGNIFICANT CHANGE UP (ref 1.8–7.4)
IANC: 4.67 K/UL — SIGNIFICANT CHANGE UP (ref 1.8–7.4)
LYMPHOCYTES # BLD AUTO: 0.78 K/UL — LOW (ref 1–3.3)
LYMPHOCYTES # BLD AUTO: 0.78 K/UL — LOW (ref 1–3.3)
LYMPHOCYTES # BLD AUTO: 9.9 % — LOW (ref 13–44)
LYMPHOCYTES # BLD AUTO: 9.9 % — LOW (ref 13–44)
MACROCYTES BLD QL: SIGNIFICANT CHANGE UP
MACROCYTES BLD QL: SIGNIFICANT CHANGE UP
MAGNESIUM SERPL-MCNC: 1.9 MG/DL — SIGNIFICANT CHANGE UP (ref 1.6–2.6)
MAGNESIUM SERPL-MCNC: 1.9 MG/DL — SIGNIFICANT CHANGE UP (ref 1.6–2.6)
MANUAL SMEAR VERIFICATION: SIGNIFICANT CHANGE UP
MANUAL SMEAR VERIFICATION: SIGNIFICANT CHANGE UP
MCHC RBC-ENTMCNC: 30.5 GM/DL — LOW (ref 32–36)
MCHC RBC-ENTMCNC: 30.5 GM/DL — LOW (ref 32–36)
MCHC RBC-ENTMCNC: 30.5 PG — SIGNIFICANT CHANGE UP (ref 27–34)
MCHC RBC-ENTMCNC: 30.5 PG — SIGNIFICANT CHANGE UP (ref 27–34)
MCV RBC AUTO: 100 FL — SIGNIFICANT CHANGE UP (ref 80–100)
MCV RBC AUTO: 100 FL — SIGNIFICANT CHANGE UP (ref 80–100)
METAMYELOCYTES # FLD: 2.7 % — HIGH (ref 0–1)
METAMYELOCYTES # FLD: 2.7 % — HIGH (ref 0–1)
MICROCYTES BLD QL: SLIGHT — SIGNIFICANT CHANGE UP
MICROCYTES BLD QL: SLIGHT — SIGNIFICANT CHANGE UP
MONOCYTES # BLD AUTO: 0.43 K/UL — SIGNIFICANT CHANGE UP (ref 0–0.9)
MONOCYTES # BLD AUTO: 0.43 K/UL — SIGNIFICANT CHANGE UP (ref 0–0.9)
MONOCYTES NFR BLD AUTO: 5.4 % — SIGNIFICANT CHANGE UP (ref 2–14)
MONOCYTES NFR BLD AUTO: 5.4 % — SIGNIFICANT CHANGE UP (ref 2–14)
MYELOCYTES NFR BLD: 0.9 % — HIGH (ref 0–0)
MYELOCYTES NFR BLD: 0.9 % — HIGH (ref 0–0)
NEUTROPHILS # BLD AUTO: 6.34 K/UL — SIGNIFICANT CHANGE UP (ref 1.8–7.4)
NEUTROPHILS # BLD AUTO: 6.34 K/UL — SIGNIFICANT CHANGE UP (ref 1.8–7.4)
NEUTROPHILS NFR BLD AUTO: 76.6 % — SIGNIFICANT CHANGE UP (ref 43–77)
NEUTROPHILS NFR BLD AUTO: 76.6 % — SIGNIFICANT CHANGE UP (ref 43–77)
NEUTS BAND # BLD: 3.6 % — SIGNIFICANT CHANGE UP (ref 0–6)
NEUTS BAND # BLD: 3.6 % — SIGNIFICANT CHANGE UP (ref 0–6)
PHOSPHATE SERPL-MCNC: 2.7 MG/DL — SIGNIFICANT CHANGE UP (ref 2.5–4.5)
PHOSPHATE SERPL-MCNC: 2.7 MG/DL — SIGNIFICANT CHANGE UP (ref 2.5–4.5)
PLAT MORPH BLD: NORMAL — SIGNIFICANT CHANGE UP
PLAT MORPH BLD: NORMAL — SIGNIFICANT CHANGE UP
PLATELET # BLD AUTO: 347 K/UL — SIGNIFICANT CHANGE UP (ref 150–400)
PLATELET # BLD AUTO: 347 K/UL — SIGNIFICANT CHANGE UP (ref 150–400)
PLATELET COUNT - ESTIMATE: NORMAL — SIGNIFICANT CHANGE UP
PLATELET COUNT - ESTIMATE: NORMAL — SIGNIFICANT CHANGE UP
POIKILOCYTOSIS BLD QL AUTO: SLIGHT — SIGNIFICANT CHANGE UP
POIKILOCYTOSIS BLD QL AUTO: SLIGHT — SIGNIFICANT CHANGE UP
POLYCHROMASIA BLD QL SMEAR: SIGNIFICANT CHANGE UP
POLYCHROMASIA BLD QL SMEAR: SIGNIFICANT CHANGE UP
POTASSIUM SERPL-MCNC: 4.1 MMOL/L — SIGNIFICANT CHANGE UP (ref 3.5–5.3)
POTASSIUM SERPL-MCNC: 4.1 MMOL/L — SIGNIFICANT CHANGE UP (ref 3.5–5.3)
POTASSIUM SERPL-SCNC: 4.1 MMOL/L — SIGNIFICANT CHANGE UP (ref 3.5–5.3)
POTASSIUM SERPL-SCNC: 4.1 MMOL/L — SIGNIFICANT CHANGE UP (ref 3.5–5.3)
RBC # BLD: 2.49 M/UL — LOW (ref 4.2–5.8)
RBC # BLD: 2.49 M/UL — LOW (ref 4.2–5.8)
RBC # FLD: 18.8 % — HIGH (ref 10.3–14.5)
RBC # FLD: 18.8 % — HIGH (ref 10.3–14.5)
RBC BLD AUTO: ABNORMAL
RBC BLD AUTO: ABNORMAL
SMUDGE CELLS # BLD: PRESENT — SIGNIFICANT CHANGE UP
SMUDGE CELLS # BLD: PRESENT — SIGNIFICANT CHANGE UP
SODIUM SERPL-SCNC: 138 MMOL/L — SIGNIFICANT CHANGE UP (ref 135–145)
SODIUM SERPL-SCNC: 138 MMOL/L — SIGNIFICANT CHANGE UP (ref 135–145)
TARGETS BLD QL SMEAR: SLIGHT — SIGNIFICANT CHANGE UP
TARGETS BLD QL SMEAR: SLIGHT — SIGNIFICANT CHANGE UP
WBC # BLD: 7.91 K/UL — SIGNIFICANT CHANGE UP (ref 3.8–10.5)
WBC # BLD: 7.91 K/UL — SIGNIFICANT CHANGE UP (ref 3.8–10.5)
WBC # FLD AUTO: 7.91 K/UL — SIGNIFICANT CHANGE UP (ref 3.8–10.5)
WBC # FLD AUTO: 7.91 K/UL — SIGNIFICANT CHANGE UP (ref 3.8–10.5)

## 2023-12-08 PROCEDURE — 99233 SBSQ HOSP IP/OBS HIGH 50: CPT

## 2023-12-08 PROCEDURE — 99233 SBSQ HOSP IP/OBS HIGH 50: CPT | Mod: GC

## 2023-12-08 PROCEDURE — 99232 SBSQ HOSP IP/OBS MODERATE 35: CPT

## 2023-12-08 PROCEDURE — 71250 CT THORAX DX C-: CPT | Mod: 26

## 2023-12-08 RX ADMIN — SODIUM CHLORIDE 100 MILLILITER(S): 9 INJECTION INTRAMUSCULAR; INTRAVENOUS; SUBCUTANEOUS at 00:17

## 2023-12-08 RX ADMIN — BICALUTAMIDE 50 MILLIGRAM(S): 50 TABLET, FILM COATED ORAL at 13:42

## 2023-12-08 RX ADMIN — Medication 2 TABLET(S): at 00:17

## 2023-12-08 RX ADMIN — HEPARIN SODIUM 5000 UNIT(S): 5000 INJECTION INTRAVENOUS; SUBCUTANEOUS at 18:05

## 2023-12-08 RX ADMIN — LISINOPRIL 5 MILLIGRAM(S): 2.5 TABLET ORAL at 06:10

## 2023-12-08 RX ADMIN — MEROPENEM 100 MILLIGRAM(S): 1 INJECTION INTRAVENOUS at 10:24

## 2023-12-08 RX ADMIN — Medication 1 TABLET(S): at 13:43

## 2023-12-08 RX ADMIN — ATORVASTATIN CALCIUM 20 MILLIGRAM(S): 80 TABLET, FILM COATED ORAL at 21:34

## 2023-12-08 RX ADMIN — CHLORHEXIDINE GLUCONATE 1 APPLICATION(S): 213 SOLUTION TOPICAL at 13:40

## 2023-12-08 RX ADMIN — Medication 125 MILLIGRAM(S): at 00:16

## 2023-12-08 RX ADMIN — Medication 2 TABLET(S): at 10:23

## 2023-12-08 RX ADMIN — Medication 2 TABLET(S): at 18:06

## 2023-12-08 RX ADMIN — MEROPENEM 100 MILLIGRAM(S): 1 INJECTION INTRAVENOUS at 00:17

## 2023-12-08 RX ADMIN — MEROPENEM 100 MILLIGRAM(S): 1 INJECTION INTRAVENOUS at 18:03

## 2023-12-08 RX ADMIN — PANTOPRAZOLE SODIUM 40 MILLIGRAM(S): 20 TABLET, DELAYED RELEASE ORAL at 13:43

## 2023-12-08 RX ADMIN — Medication 2000 UNIT(S): at 13:44

## 2023-12-08 RX ADMIN — HEPARIN SODIUM 5000 UNIT(S): 5000 INJECTION INTRAVENOUS; SUBCUTANEOUS at 10:24

## 2023-12-08 RX ADMIN — HEPARIN SODIUM 5000 UNIT(S): 5000 INJECTION INTRAVENOUS; SUBCUTANEOUS at 00:17

## 2023-12-08 RX ADMIN — TAMSULOSIN HYDROCHLORIDE 0.4 MILLIGRAM(S): 0.4 CAPSULE ORAL at 21:34

## 2023-12-08 NOTE — PROGRESS NOTE ADULT - PROBLEM SELECTOR PLAN 1
c/b acute on chronic hypoxic respiratory failure  Noted result of CTPA.  - No PE  - Broadened antibiotics to cover for aspiration multilobar PNA.  - titrate down on HFNC as tolerated  - Bactrim DS 2 tab q8 for empiric PJP PNA treatment, cont bactrim, f/u sputum pjp pcr  -wean high flow as tolerated,  monitor resp status closely  -ID prateek, Discussed with ID attending on 12/7,  Pulm consult appreciatd per pulm As O2 requirements are improving and patient feeling subjectively better, will hold off empiric steroids for now. If O2 requirements again plateau or worse, will reconsider empiric steroids or bronchoscopic evaluation with BAL/TBBX.  -rvp with adenovirus, pro Bnp wnl for age, echo pending

## 2023-12-08 NOTE — PROGRESS NOTE ADULT - PROBLEM SELECTOR PLAN 2
Pt on high flow oxygen, down titrating now  pulm consulted, f/u consult recs  cont abx as prescribed

## 2023-12-08 NOTE — PROGRESS NOTE ADULT - SUBJECTIVE AND OBJECTIVE BOX
Patient is a 73y old  Male who presents with a chief complaint of Diffuse Spinal Mets from Prostate Cancer (08 Dec 2023 11:26)      SUBJECTIVE / OVERNIGHT EVENTS: Pt seen and examined at 11:10am, no overnight events, sitting in a chair, denies any sob, still on high flow oxygen but down titrated to 30/30 today, no other complaints, no other new issues reported.         MEDICATIONS  (STANDING):  acetaminophen     Tablet .. 975 milliGRAM(s) Oral every 6 hours  atorvastatin 20 milliGRAM(s) Oral at bedtime  bicalutamide 50 milliGRAM(s) Oral daily  chlorhexidine 2% Cloths 1 Application(s) Topical daily  cholecalciferol 2000 Unit(s) Oral daily  dextrose 5%. 1000 milliLiter(s) (50 mL/Hr) IV Continuous <Continuous>  dextrose 5%. 1000 milliLiter(s) (100 mL/Hr) IV Continuous <Continuous>  dextrose 50% Injectable 25 Gram(s) IV Push once  dextrose 50% Injectable 12.5 Gram(s) IV Push once  dextrose 50% Injectable 25 Gram(s) IV Push once  glucagon  Injectable 1 milliGRAM(s) IntraMuscular once  heparin   Injectable 5000 Unit(s) SubCutaneous every 8 hours  lactobacillus acidophilus 1 Tablet(s) Oral daily  lisinopril 5 milliGRAM(s) Oral daily  meropenem  IVPB 1000 milliGRAM(s) IV Intermittent every 8 hours  pantoprazole  Injectable 40 milliGRAM(s) IV Push daily  tamsulosin 0.4 milliGRAM(s) Oral at bedtime  trimethoprim  160 mG/sulfamethoxazole 800 mG 2 Tablet(s) Oral three times a day    MEDICATIONS  (PRN):  benzocaine/menthol Lozenge 1 Lozenge Oral every 6 hours PRN Sore Throat  dextrose Oral Gel 15 Gram(s) Oral once PRN Blood Glucose LESS THAN 70 milliGRAM(s)/deciliter  guaiFENesin Oral Liquid (Sugar-Free) 200 milliGRAM(s) Oral every 6 hours PRN Cough  levalbuterol Inhalation 0.63 milliGRAM(s) Inhalation every 6 hours PRN shortness of breath/wheezing  naloxone Injectable 0.1 milliGRAM(s) IV Push every 3 minutes PRN For ANY of the following changes in patient status:  A. RR LESS THAN 10 breaths per minute, B. Oxygen saturation LESS THAN 90%, C. Sedation score of 6  ondansetron Injectable 4 milliGRAM(s) IV Push every 6 hours PRN Nausea      Vital Signs Last 24 Hrs  T(C): 36.8 (08 Dec 2023 12:49), Max: 36.8 (07 Dec 2023 20:00)  T(F): 98.2 (08 Dec 2023 12:49), Max: 98.3 (07 Dec 2023 20:00)  HR: 103 (08 Dec 2023 12:49) (88 - 110)  BP: 102/64 (08 Dec 2023 12:49) (102/64 - 129/75)  BP(mean): --  RR: 17 (08 Dec 2023 12:49) (17 - 18)  SpO2: 97% (08 Dec 2023 12:49) (94% - 100%)    Parameters below as of 08 Dec 2023 12:49  Patient On (Oxygen Delivery Method): nasal cannula, high flow      CAPILLARY BLOOD GLUCOSE      POCT Blood Glucose.: 102 mg/dL (08 Dec 2023 08:51)  POCT Blood Glucose.: 120 mg/dL (07 Dec 2023 21:17)  POCT Blood Glucose.: 112 mg/dL (07 Dec 2023 17:45)    I&O's Summary    07 Dec 2023 07:01  -  08 Dec 2023 07:00  --------------------------------------------------------  IN: 1380 mL / OUT: 1300 mL / NET: 80 mL    08 Dec 2023 07:01  -  08 Dec 2023 13:31  --------------------------------------------------------  IN: 0 mL / OUT: 625 mL / NET: -625 mL        PHYSICAL EXAM:  GENERAL: NAD  CHEST/LUNG: Clear to auscultation bilaterally; No wheeze, on high flow oxygen  HEART: Regular rate and rhythm  ABDOMEN: Soft, Nontender, Nondistended  EXTREMITIES: no LE edema  PSYCH: Calm  NEUROLOGY: AA answers questions appropriately  SKIN: dry and warm    LABS:                        7.6    7.91  )-----------( 347      ( 08 Dec 2023 05:45 )             24.9     12-08    138  |  102  |  8   ----------------------------<  93  4.1   |  26  |  0.84    Ca    8.7      08 Dec 2023 05:45  Phos  2.7     12-08  Mg     1.90     12-08            Urinalysis Basic - ( 08 Dec 2023 05:45 )    Color: x / Appearance: x / SG: x / pH: x  Gluc: 93 mg/dL / Ketone: x  / Bili: x / Urobili: x   Blood: x / Protein: x / Nitrite: x   Leuk Esterase: x / RBC: x / WBC x   Sq Epi: x / Non Sq Epi: x / Bacteria: x        RADIOLOGY & ADDITIONAL TESTS:    Imaging Personally Reviewed:    Consultant(s) Notes Reviewed:      Care Discussed with Consultants/Other Providers:

## 2023-12-08 NOTE — PROGRESS NOTE ADULT - SUBJECTIVE AND OBJECTIVE BOX
PULMONARY SERVICE FOLLOW UP CONSULT NOTE    SUBJECTIVE:  Overall improving.     REVIEW OF SYSTEMS:  All additional ROS negative.    MEDICATIONS:  Pulmonary:  guaiFENesin Oral Liquid (Sugar-Free) 200 milliGRAM(s) Oral every 6 hours PRN  levalbuterol Inhalation 0.63 milliGRAM(s) Inhalation every 6 hours PRN    Antimicrobials:  meropenem  IVPB 1000 milliGRAM(s) IV Intermittent every 8 hours  trimethoprim  160 mG/sulfamethoxazole 800 mG 2 Tablet(s) Oral three times a day    Anticoagulants:  heparin   Injectable 5000 Unit(s) SubCutaneous every 8 hours    Onc:  bicalutamide 50 milliGRAM(s) Oral daily    GI/:  pantoprazole  Injectable 40 milliGRAM(s) IV Push daily  tamsulosin 0.4 milliGRAM(s) Oral at bedtime    Endocrine:  atorvastatin 20 milliGRAM(s) Oral at bedtime  dextrose 50% Injectable 25 Gram(s) IV Push once  dextrose 50% Injectable 25 Gram(s) IV Push once  dextrose 50% Injectable 12.5 Gram(s) IV Push once  dextrose Oral Gel 15 Gram(s) Oral once PRN  glucagon  Injectable 1 milliGRAM(s) IntraMuscular once    Cardiac:  lisinopril 5 milliGRAM(s) Oral daily    Other Medications:  acetaminophen     Tablet .. 975 milliGRAM(s) Oral every 6 hours  benzocaine/menthol Lozenge 1 Lozenge Oral every 6 hours PRN  chlorhexidine 2% Cloths 1 Application(s) Topical daily  cholecalciferol 2000 Unit(s) Oral daily  dextrose 5%. 1000 milliLiter(s) IV Continuous <Continuous>  dextrose 5%. 1000 milliLiter(s) IV Continuous <Continuous>  lactobacillus acidophilus 1 Tablet(s) Oral daily  naloxone Injectable 0.1 milliGRAM(s) IV Push every 3 minutes PRN  ondansetron Injectable 4 milliGRAM(s) IV Push every 6 hours PRN      PHYSICAL EXAM  Vital Signs Last 24 Hrs  T(C): 36.6 (08 Dec 2023 06:05), Max: 36.8 (07 Dec 2023 20:00)  T(F): 97.9 (08 Dec 2023 06:05), Max: 98.3 (07 Dec 2023 20:00)  HR: 110 (08 Dec 2023 11:50) (88 - 110)  BP: 129/75 (08 Dec 2023 06:05) (121/69 - 129/75)  BP(mean): --  RR: 18 (08 Dec 2023 11:50) (17 - 18)  SpO2: 100% (08 Dec 2023 11:50) (94% - 100%)    Parameters below as of 08 Dec 2023 11:50  Patient On (Oxygen Delivery Method): nasal cannula, high flow  O2 Flow (L/min): 20  O2 Concentration (%): 30    12-07 @ 07:01  -  12-08 @ 07:00  --------------------------------------------------------  IN: 1380 mL / OUT: 1300 mL / NET: 80 mL    12-08 @ 07:01  -  12-08 @ 12:34  --------------------------------------------------------  IN: 0 mL / OUT: 625 mL / NET: -625 mL            CONSTITUTIONAL: No acute distress.   HEENT:  Conjunctiva clear B/L.  Moist oral mucosa.   Cardiovascular: RRR with no murmurs. No JVD noted. No lower extremity edema B/L. Extremities are warm and well perfused.    Respiratory: basilar rales. No accessory muscle use.   Gastrointestinal:  Soft, nontender. Non-distended. Non-rigid.    Neurologic:  Alert and awake. Moving all extremities. Following commands.    Skin:  No gross rashes notes.    LABS:  ABG - ( 07 Dec 2023 11:13 )  pH, Arterial: 7.45  pH, Blood: x     /  pCO2: 41    /  pO2: 105   / HCO3: 28    / Base Excess: 4.1   /  SaO2: 99.2                CBC Full  -  ( 08 Dec 2023 05:45 )  WBC Count : 7.91 K/uL  RBC Count : 2.49 M/uL  Hemoglobin : 7.6 g/dL  Hematocrit : 24.9 %  Platelet Count - Automated : 347 K/uL  Mean Cell Volume : 100.0 fL  Mean Cell Hemoglobin : 30.5 pg  Mean Cell Hemoglobin Concentration : 30.5 gm/dL  Auto Neutrophil # : 6.34 K/uL  Auto Lymphocyte # : 0.78 K/uL  Auto Monocyte # : 0.43 K/uL  Auto Eosinophil # : 0.00 K/uL  Auto Basophil # : 0.07 K/uL  Auto Neutrophil % : 76.6 %  Auto Lymphocyte % : 9.9 %  Auto Monocyte % : 5.4 %  Auto Eosinophil % : 0.0 %  Auto Basophil % : 0.9 %    12-08    138  |  102  |  8   ----------------------------<  93  4.1   |  26  |  0.84    Ca    8.7      08 Dec 2023 05:45  Phos  2.7     12-08  Mg     1.90     12-08            Urinalysis Basic - ( 08 Dec 2023 05:45 )    Color: x / Appearance: x / SG: x / pH: x  Gluc: 93 mg/dL / Ketone: x  / Bili: x / Urobili: x   Blood: x / Protein: x / Nitrite: x   Leuk Esterase: x / RBC: x / WBC x   Sq Epi: x / Non Sq Epi: x / Bacteria: x                RADIOLOGY & ADDITIONAL STUDIES:

## 2023-12-08 NOTE — PROGRESS NOTE ADULT - ASSESSMENT
73y Male s/p C5-T3 posterior cervical fusion, C7-T1 decompression for metastatic prostate cancer with plastics closure on 11/2. Recovering well. Both drains removed. MRI 11/14 showing possible seroma in subcutaneous space. Incisional vac w/ black sponge/Adaptic started on 11/17/2023, now left open to air      Plan:    - leave back incision open to air  - F/U fever work up - RVP +adenovirus; UA now negative. C diff positive  - Seroma not likely infected  - Optimize nutrition, ensure adequate protein intake  - Remainder of care per primary team      Plastic Surgery  #[id number] [facility]  73y Male s/p C5-T3 posterior cervical fusion, C7-T1 decompression for metastatic prostate cancer with plastics closure on 11/2. Recovering well. Both drains removed. MRI 11/14 showing possible seroma in subcutaneous space. Incisional vac w/ black sponge/Adaptic started on 11/17/2023, now left open to air      Plan:    - leave back incision open to air  - F/U fever work up - RVP +adenovirus; UA now negative. C diff positive  - Seroma not likely infected  - Optimize nutrition, ensure adequate protein intake  - Remainder of care per primary team      Plastic Surgery  #[id number] [facility]

## 2023-12-08 NOTE — PROGRESS NOTE ADULT - SUBJECTIVE AND OBJECTIVE BOX
Patient is a 73y old  Male who presents with a chief complaint of Diffuse Spinal Mets from Prostate Cancer (02 Dec 2023 09:33)    f/u MSSA bacteremia    Interval History/ROS:  decreased FiO2 on HFNC 30%, feeling better.  no fever.  back pain better.  BM okay.  ROS otherwise negative.    PAST MEDICAL & SURGICAL HISTORY:  Essential hypertension  Prostate cancer metastatic to bone    Allergies  No Known Allergies    ANTIMICROBIALS:    ceFAZolin   IVPB (11/2-11/3, 11/16-11/20)  cefTRIAXone   IVPB (11/15 x1)  ciprofloxacin   IVPB (11/17-11/20)  nafcillin  IVPB (11/20-11/27)  fluconAZOLE IVPB (11/22-11/30)  moxifloxacin  IVPB (11/25-11/27)  cefepime   IVPB (11/27-11/29)    active:  vancomycin    Solution 125 every 6 hours (11/28-)  meropenem  IVPB 1000 every 8 hours (11/29-)  trimethoprim  160 mG/sulfamethoxazole 800 mG 2 three times a day (11/30-)    MEDICATIONS  (STANDING):  acetaminophen     Tablet .. 975 every 6 hours  atorvastatin 20 at bedtime  bicalutamide 50 daily  heparin   Injectable 5000 every 8 hours  lisinopril 5 daily  pantoprazole  Injectable 40 daily  tamsulosin 0.4 at bedtime    Vital Signs Last 24 Hrs  T(F): 97.9 (12-08-23 @ 06:05), Max: 98.3 (12-07-23 @ 20:00)  HR: 88 (12-08-23 @ 06:05)  BP: 129/75 (12-08-23 @ 06:05)  RR: 17 (12-08-23 @ 07:30)  SpO2: 97% (12-08-23 @ 07:30) (94% - 99%)    PHYSICAL EXAM:  Constitutional: non-toxic, on HFNC 30%  HEAD/EYES: anicteric  ENT:  supple  Cardiovascular:   normal S1, S2  Respiratory:  clear BS bilaterally  GI:  soft, non-tender, normal bowel sounds  :  no best  Musculoskeletal:  no synovitis  Neurologic: awake and alert, normal strength, no focal findings  Skin: posterior incision c/d/i, no fluctuance, no cellulitis  Psychiatric:  awake, alert, appropriate mood                                                      7.6    7.91  )-----------( 347      ( 08 Dec 2023 05:45 )             24.9 12-08    138  |  102  |  8   ----------------------------<  93  4.1   |  26  |  0.84  Ca    8.7      08 Dec 2023 05:45Phos  2.7     12-08Mg     1.90     12-08    Lactate Dehydrogenase, Serum: 434 (12-03)  Fungitell: 390 pg/mL (12.03.23 @ 06:45)   HIV-1/2 Combo Result: Nonreact    MICROBIOLOGY:  11/27 BC (-)  11/27 Stool cx (-)  11/24 BC (-)  11/18 BC (-)  11/17 BC (-)  11/16 BC (-)  11/15 UC >100,000 CFU/ml Providencia rettgeri  11/15 BC (+) MSSA    C Diff by PCR Result: Detected (11-27-23 @ 18:41)    RADIOLOGY:  below radiology personally reviewed and agree with finding    CT Chest No Cont (12.08.23 @ 09:09) >  IMPRESSION:  Diffuse lung disease with evolving bilateral lung opacities since 11/27/2023, worsening in the upper-mid lungs and improving in the lower lobes.  Decreased trace pleural effusions.  Multifocal osteosclerotic metastases. Decreasing upper paraspinal postoperative fluid collection.    MR Cervical Spine w/ IV Cont (11.28.23 @ 10:39) >  Impression: Postop changes are identified as described above.  There is evidence of a large collection seen involving posterior paraspinal soft tissue region.  Abnormal T1 prolongation associated enhancement is seen involving the C7-T4 vertebral body as described above.    Xray Cinesophagram Swallow Function w/ Contrast (11.28.23 @ 10:35) >  IMPRESSION:  Laryngeal penetration without aspiration of thin liquids.  No aspiration of puree, solids, mildly thick and moderately thick liquids.  For further information and recommendations, please refer to the speech   pathologist final report which is available for review in the electronic medical record.    CT Angio Chest PE Protocol w/ IV Cont (11.27.23 @ 18:19) >  IMPRESSION:  No evidence for pulmonary embolus up to and including lobar branches. Segmental and subsegmental branches are not well evaluated due to respiratory motion artifact.  Worsening multifocal confluent groundglass infiltrates and consolidative opacities in the right lower lobe and lateral left lung base. Correlate for atypical pneumonia.    CT Neck Soft Tissue w/ IV Cont (11.22.23 @ 10:51) >  IMPRESSION: Status post posterior fusion and laminectomy, as discussed, with a probable seroma formation within the paraspinal soft tissues. Superimposed infection is difficult to exclude. Admission the canal is markedly limited secondary to intrinsic CT modality and also streak and beam hardening artifact generated by the metallic orthopedic hardware.  No cervical mass or cervical lymphadenopathy.    CT Chest w/ IV Cont (11.22.23 @ 10:51) >  IMPRESSION:  Airway associated foci of groundglass opacity and small consolidations in the right middle and bilateral lower lobes, which can be due to aspiration pneumonitis versus infection.  Peribronchial wall thickening, likely inflammatory. Trace right pleural effusion.     MR Thoracic Spine w/wo IV Cont (11.14.23 @ 11:55) >  IMPRESSION: Postop changes as described above.  Osseous metastasis again seen.  Possible epidural enhancement is seen involving the T4-5 level. Better quality contrast enhanced MRI of the thoracic spine is recommended for further evaluation.  Previously noted T2 prolongation involving the spinal cord at the T1 level is not well-demonstrated due to artifact from postop material.      MR Thoracic Spine No Cont (10.30.23 @ 13:26) >  IMPRESSION: Overall no change in comparison to the prior MRI studies.  Unchanged cord compression and mild cord edema at the T1 level, as discussed.          TTE W or WO Ultrasound Enhancing Agent (11.18.23 @ 11:24) >  CONCLUSIONS:   1. Left ventricular wall thickness is mildly increased. Left ventricular systolic function is hyperdynamic with an ejection fraction of 72 % by Diggs's method of disks.   2. Normal right ventricular cavity size, wall thickness, and systolic function. Tricuspid annular plane systolic excursion (TAPSE) is 2.9 cm (normal >=1.7 cm).   3. The left atrium is normal.   4. The right atrium is normal in size.   5. No pericardial effusion seen.   6. Unable to rule out endocarditis.   7. Calcification of the aortic valve leaflets. mild aortic stenosis.   8. Hyperdynamic left ventricular systolic function with intra-cavitary gradient of 48 mmHg.   9. No prior echocardiogram is available for comparison.  10. Technically difficult image quality.  11. There is normal LV mass and concentric remodeling.

## 2023-12-08 NOTE — PROGRESS NOTE ADULT - ASSESSMENT
73m with known metastatic prostate CA admitted initially to [facility] after falling and weakness. Found to be in rhabdo with SRUTHI.  Imaging noted spinal fracture with edema and lytic metastatic disease from C7, T1-T5, T12, and L1-L3 as well as epidural expansion of neoplasm at C7 centrally and dorsally resulting in moderate cord compression and underlying edema/extension into the bilateral C7-T1 and T1-2 and Left T2-3 neural foramina. Epidural disease was also noted at T12 vertebral body.   11/2, he underwent C7-T1 decompression, C5-T3 posterior cervical fusion.  Hospital course complicated by MSSA bacteremia which has cleared with negative TTE.  Course also complicated by hypoxia not responding to antibiotics.  Has been on empiric treatment for PJP (bactrim).  Fungitell elevated and LDH elevated.  Serial CT with changing GGO.  Seems unlikely to be PJP    Overall, metastatic prostate cancer with cord compression s/p spinal decompression 11/2, with fever due to MSSA bacteremia and cdiff, hypoxia +RVP adenovirus and GGO on CT    Fever  - resolved for now  - repeat BC negative    MSSA bacteremia  - would continue antibiotics  - negative echo  - on meropenem due to adverse effects (leukopenia on cephalosporin?)  - at least 4 weeks IV antibiotics from first negative BC     Abnormal Imaging  - fluid collections noted on imaging  - per plastics, they feel it is an uninfected seroma  - previously had drainage from incision    Hypoxic respiratory failure  - repeat CT with changing GGO  - per pulmonary suspect organizing pneumonia  - patient appears to be improving without steroids  - sputum for PJP PCR pending  - on bactrim / meropenem  - HIV is negative    C diff  - on po vancomycin    I have discussed plan of care as detailed above with pulmonary att    Please call the ID service [phone] with questions or concerns over the weekend 73m with known metastatic prostate CA admitted initially to [facility] after falling and weakness. Found to be in rhabdo with SRUTHI.  Imaging noted spinal fracture with edema and lytic metastatic disease from C7, T1-T5, T12, and L1-L3 as well as epidural expansion of neoplasm at C7 centrally and dorsally resulting in moderate cord compression and underlying edema/extension into the bilateral C7-T1 and T1-2 and Left T2-3 neural foramina. Epidural disease was also noted at T12 vertebral body.   11/2, he underwent C7-T1 decompression, C5-T3 posterior cervical fusion.  Hospital course complicated by MSSA bacteremia which has cleared with negative TTE.  Course also complicated by hypoxia not responding to antibiotics.  Has been on empiric treatment for PJP (bactrim).  Fungitell elevated and LDH elevated.  Serial CT with changing GGO.  Seems unlikely to be PJP    Overall, metastatic prostate cancer with cord compression s/p spinal decompression 11/2, with fever due to MSSA bacteremia and cdiff, hypoxia +RVP adenovirus and GGO on CT    Fever  - resolved for now  - repeat BC negative    MSSA bacteremia  - would continue antibiotics  - negative echo  - on meropenem due to adverse effects (leukopenia on cephalosporin?)  - at least 4 weeks IV antibiotics from first negative BC     Abnormal Imaging  - fluid collections noted on imaging  - per plastics, they feel it is an uninfected seroma  - previously had drainage from incision    Hypoxic respiratory failure  - repeat CT with changing GGO  - per pulmonary suspect organizing pneumonia  - patient appears to be improving without steroids  - sputum for PJP PCR pending  - on bactrim / meropenem  - HIV is negative    C diff  - on po vancomycin    I have discussed plan of care as detailed above with pulmonary att    Please call the ID service [phone] with questions or concerns over the weekend

## 2023-12-08 NOTE — PROGRESS NOTE ADULT - ATTENDING COMMENTS
Patient is a 72 yo M w/ HTN, metastatic prostate ca (2009) with LE weakness and R hand paresthesias now s/p C5-T3 posterior cervical fusion and C7-T1 decompression for metastatic prostate cancer with plastics closure on 11/2. Hospital course has been c/b MSSA bacteremia (repeat blood cultures negative since then) and acute hypoxic respiratory failure requiring HFNC. Of note, patient tested positive for adenovirus on 11/15 and 11/24. O2 requirements significantly increased on 11/28, initially requiring HFNC 100%. Patient was started empirically on PCP tx with Bactrim, O2 requirements decreased but have since plateaued.    #Acute Hypoxic Respiratory Failure - Unclear etiology. Improved with Meropenem and empiric Bactrim but then plateaued. CT chest on 11/22 with RLL consolidation. Repeat CT chest on 11/27 with multifocal consolidations, mostly peripheral, of note with RLL consolidaiton on initial CT gone. Fungitell elevated. Possible organizing pneumonia vs atypical infectious vs infectious vs pneumonitis (rare but possible with bicalutamide). Repeat CT 12/7 with lower lobe consolidation improved, upper lobe slightly worsened.  #Multifocal PNA  #Shortness of Breath  - Will review images during radiology conference  - c/w HFNC for now, wean as tolerated. Down to 30% this AM. If stable can further wean to nasal cannula 6L  - As O2 requirements are improving and patient feeling subjectively better, will hold off empiric steroids for now. If O2 requirements again plateau or worse, will reconsider empiric steroids or bronchoscopic evaluation with BAL/TBBX. Discussed with ID, would preferentially not start empiric steroids given possible infectious collection on MRI  - Empiric abx as per ID    Serge Machado MD  Pulmonary & Critical Care

## 2023-12-08 NOTE — PROGRESS NOTE ADULT - ASSESSMENT
73M with HTN met prostate cancer (2009) with LE weakness and R hand paresthesias now s/p C5-T3 posterior cervical fusion and C7-T1 decompression for metastatic prostate cancer with plastics closure on 11/2. Course c/b acute hypoxic respiratory failure requiring HFNC.     #Acute Hypoxic Respiratory Failure   #Multifocal PNA  #Abnormal CT  #Fleeting / migrating pulmonary opacities     - per flowsheets, patient has been requiring HFNC since 11/28 now on 45% 40L/min  - pattern of fleeting / migratory pneumonia noted on CT chest 11/22 vs 11/27 vs 12/8   - currently on treatment for multilobar PNA with meropenem (11/29-) as well as treatment for PCP PNA with bactrim DS 2 tab q8. Discussed with ID. Reasonable to treat PCP for full 14 days to complete next week.   - bedside POCUS with no pleural effusions and with diffuse B lines but irregular pleura   - please obtain sputum PCP PCR if able  - fungitell elevated.   - lower suspicion for pulmonary edema.   - Bicalutamide pneumonitis lower on ddx. Currently improving on empiric abx (juan and po bactrim) supporting of infection. CT migratory pattern is suspicious for organizing pneumonia. As patient is improving with current management, we favor holding off on systemic steroids for now. If patient continues to improve, will finish course of antimicrobials and obtain outpatient CT imaging. If patient does not continue to improve, then will pursue flexible bronchoscopy and/or systemic steroids.     Dr. Tae Nunes, DO  Pulmonary and Critical Care Medicine Fellow   Available via Microsoft Teams - **Preferred**  Pulmonary [facility] #[id number] ([facility]) / [id number] ([facility])  Pager:  [phone]     73M with HTN met prostate cancer (2009) with LE weakness and R hand paresthesias now s/p C5-T3 posterior cervical fusion and C7-T1 decompression for metastatic prostate cancer with plastics closure on 11/2. Course c/b acute hypoxic respiratory failure requiring HFNC.     #Acute Hypoxic Respiratory Failure   #Multifocal PNA  #Abnormal CT  #Fleeting / migrating pulmonary opacities     - per flowsheets, patient has been requiring HFNC since 11/28 now on 45% 40L/min  - pattern of fleeting / migratory pneumonia noted on CT chest 11/22 vs 11/27 vs 12/8   - currently on treatment for multilobar PNA with meropenem (11/29-) as well as treatment for PCP PNA with bactrim DS 2 tab q8. Discussed with ID. Reasonable to treat PCP for full 14 days to complete next week.   - bedside POCUS with no pleural effusions and with diffuse B lines but irregular pleura   - please obtain sputum PCP PCR if able  - fungitell elevated.   - lower suspicion for pulmonary edema.   - Bicalutamide pneumonitis lower on ddx. Currently improving on empiric abx (juan and po bactrim) supporting of infection. CT migratory pattern is suspicious for organizing pneumonia. As patient is improving with current management, we favor holding off on systemic steroids for now. If patient continues to improve, will finish course of antimicrobials and obtain outpatient CT imaging. If patient does not continue to improve, then will pursue flexible bronchoscopy and/or systemic steroids.     Dr. Tae Nunes, DO  Pulmonary and Critical Care Medicine Fellow   Available via Microsoft Teams - **Preferred**  Pulmonary [facility] #[id number] ([facility]) / [phone] ([facility])  Pager:  [phone]

## 2023-12-08 NOTE — PROGRESS NOTE ADULT - SUBJECTIVE AND OBJECTIVE BOX
Plastic Surgery Progress Note (pg [facility]: [id number], [facility]: [phone])    SUBJECTIVE  The patient was seen and examined.     OBJECTIVE  ___________________________________________________  VITAL SIGNS / I&O's   Vital Signs Last 24 Hrs  T(C): 36.8 (07 Dec 2023 20:00), Max: 36.8 (07 Dec 2023 20:00)  T(F): 98.3 (07 Dec 2023 20:00), Max: 98.3 (07 Dec 2023 20:00)  HR: 96 (07 Dec 2023 20:00) (96 - 106)  BP: 124/73 (07 Dec 2023 20:00) (121/69 - 124/73)  BP(mean): --  RR: 18 (07 Dec 2023 20:00) (17 - 18)  SpO2: 96% (07 Dec 2023 20:00) (94% - 100%)    Parameters below as of 07 Dec 2023 20:00  Patient On (Oxygen Delivery Method): nasal cannula, high flow          07 Dec 2023 07:01  -  08 Dec 2023 07:00  --------------------------------------------------------  IN:    Oral Fluid: 480 mL    sodium chloride 0.9%: 900 mL  Total IN: 1380 mL    OUT:    Voided (mL): 1300 mL  Total OUT: 1300 mL    Total NET: 80 mL        ___________________________________________________  PHYSICAL EXAM    General: NAD  Neuro: Awake and alert  Pulm: non-labored respirations  Back: Incisions intact, slight fluid wave. No s/s of infection    ___________________________________________________  LABS    07 Dec 2023 06:45    136    |  101    |  7      ----------------------------<  100    4.8     |  21     |  0.79     Ca    8.5        07 Dec 2023 06:45  Phos  2.9       07 Dec 2023 06:45  Mg     2.00      07 Dec 2023 06:45        CAPILLARY BLOOD GLUCOSE      POCT Blood Glucose.: 120 mg/dL (07 Dec 2023 21:17)  POCT Blood Glucose.: 112 mg/dL (07 Dec 2023 17:45)  POCT Blood Glucose.: 117 mg/dL (07 Dec 2023 13:06)  POCT Blood Glucose.: 122 mg/dL (07 Dec 2023 08:54)        Urinalysis Basic - ( 07 Dec 2023 06:45 )    Color: x / Appearance: x / SG: x / pH: x  Gluc: 100 mg/dL / Ketone: x  / Bili: x / Urobili: x   Blood: x / Protein: x / Nitrite: x   Leuk Esterase: x / RBC: x / WBC x   Sq Epi: x / Non Sq Epi: x / Bacteria: x      ___________________________________________________  MICRO  Recent Cultures:    ___________________________________________________  MEDICATIONS  (STANDING):  acetaminophen     Tablet .. 975 milliGRAM(s) Oral every 6 hours  atorvastatin 20 milliGRAM(s) Oral at bedtime  bicalutamide 50 milliGRAM(s) Oral daily  chlorhexidine 2% Cloths 1 Application(s) Topical daily  cholecalciferol 2000 Unit(s) Oral daily  dextrose 5%. 1000 milliLiter(s) (50 mL/Hr) IV Continuous <Continuous>  dextrose 5%. 1000 milliLiter(s) (100 mL/Hr) IV Continuous <Continuous>  dextrose 50% Injectable 25 Gram(s) IV Push once  dextrose 50% Injectable 25 Gram(s) IV Push once  dextrose 50% Injectable 12.5 Gram(s) IV Push once  glucagon  Injectable 1 milliGRAM(s) IntraMuscular once  heparin   Injectable 5000 Unit(s) SubCutaneous every 8 hours  lactobacillus acidophilus 1 Tablet(s) Oral daily  lisinopril 5 milliGRAM(s) Oral daily  meropenem  IVPB 1000 milliGRAM(s) IV Intermittent every 8 hours  pantoprazole  Injectable 40 milliGRAM(s) IV Push daily  sodium chloride 0.9%. 1000 milliLiter(s) (100 mL/Hr) IV Continuous <Continuous>  tamsulosin 0.4 milliGRAM(s) Oral at bedtime  trimethoprim  160 mG/sulfamethoxazole 800 mG 2 Tablet(s) Oral three times a day    MEDICATIONS  (PRN):  benzocaine/menthol Lozenge 1 Lozenge Oral every 6 hours PRN Sore Throat  dextrose Oral Gel 15 Gram(s) Oral once PRN Blood Glucose LESS THAN 70 milliGRAM(s)/deciliter  guaiFENesin Oral Liquid (Sugar-Free) 200 milliGRAM(s) Oral every 6 hours PRN Cough  levalbuterol Inhalation 0.63 milliGRAM(s) Inhalation every 6 hours PRN shortness of breath/wheezing  naloxone Injectable 0.1 milliGRAM(s) IV Push every 3 minutes PRN For ANY of the following changes in patient status:  A. RR LESS THAN 10 breaths per minute, B. Oxygen saturation LESS THAN 90%, C. Sedation score of 6  ondansetron Injectable 4 milliGRAM(s) IV Push every 6 hours PRN Nausea Plastic Surgery Progress Note (pg [facility]: [id number], [facility]: [phone])    SUBJECTIVE  The patient was seen and examined.     OBJECTIVE  ___________________________________________________  VITAL SIGNS / I&O's   Vital Signs Last 24 Hrs  T(C): 36.8 (07 Dec 2023 20:00), Max: 36.8 (07 Dec 2023 20:00)  T(F): 98.3 (07 Dec 2023 20:00), Max: 98.3 (07 Dec 2023 20:00)  HR: 96 (07 Dec 2023 20:00) (96 - 106)  BP: 124/73 (07 Dec 2023 20:00) (121/69 - 124/73)  BP(mean): --  RR: 18 (07 Dec 2023 20:00) (17 - 18)  SpO2: 96% (07 Dec 2023 20:00) (94% - 100%)    Parameters below as of 07 Dec 2023 20:00  Patient On (Oxygen Delivery Method): nasal cannula, high flow          07 Dec 2023 07:01  -  08 Dec 2023 07:00  --------------------------------------------------------  IN:    Oral Fluid: 480 mL    sodium chloride 0.9%: 900 mL  Total IN: 1380 mL    OUT:    Voided (mL): 1300 mL  Total OUT: 1300 mL    Total NET: 80 mL        ___________________________________________________  PHYSICAL EXAM    General: NAD  Neuro: Awake and alert  Pulm: non-labored respirations  Back: Incisions intact, slight fluid wave. No s/s of infection    ___________________________________________________  LABS    07 Dec 2023 06:45    136    |  101    |  7      ----------------------------<  100    4.8     |  21     |  0.79     Ca    8.5        07 Dec 2023 06:45  Phos  2.9       07 Dec 2023 06:45  Mg     2.00      07 Dec 2023 06:45        CAPILLARY BLOOD GLUCOSE      POCT Blood Glucose.: 120 mg/dL (07 Dec 2023 21:17)  POCT Blood Glucose.: 112 mg/dL (07 Dec 2023 17:45)  POCT Blood Glucose.: 117 mg/dL (07 Dec 2023 13:06)  POCT Blood Glucose.: 122 mg/dL (07 Dec 2023 08:54)        Urinalysis Basic - ( 07 Dec 2023 06:45 )    Color: x / Appearance: x / SG: x / pH: x  Gluc: 100 mg/dL / Ketone: x  / Bili: x / Urobili: x   Blood: x / Protein: x / Nitrite: x   Leuk Esterase: x / RBC: x / WBC x   Sq Epi: x / Non Sq Epi: x / Bacteria: x      ___________________________________________________  MICRO  Recent Cultures:    ___________________________________________________  MEDICATIONS  (STANDING):  acetaminophen     Tablet .. 975 milliGRAM(s) Oral every 6 hours  atorvastatin 20 milliGRAM(s) Oral at bedtime  bicalutamide 50 milliGRAM(s) Oral daily  chlorhexidine 2% Cloths 1 Application(s) Topical daily  cholecalciferol 2000 Unit(s) Oral daily  dextrose 5%. 1000 milliLiter(s) (50 mL/Hr) IV Continuous <Continuous>  dextrose 5%. 1000 milliLiter(s) (100 mL/Hr) IV Continuous <Continuous>  dextrose 50% Injectable 25 Gram(s) IV Push once  dextrose 50% Injectable 25 Gram(s) IV Push once  dextrose 50% Injectable 12.5 Gram(s) IV Push once  glucagon  Injectable 1 milliGRAM(s) IntraMuscular once  heparin   Injectable 5000 Unit(s) SubCutaneous every 8 hours  lactobacillus acidophilus 1 Tablet(s) Oral daily  lisinopril 5 milliGRAM(s) Oral daily  meropenem  IVPB 1000 milliGRAM(s) IV Intermittent every 8 hours  pantoprazole  Injectable 40 milliGRAM(s) IV Push daily  sodium chloride 0.9%. 1000 milliLiter(s) (100 mL/Hr) IV Continuous <Continuous>  tamsulosin 0.4 milliGRAM(s) Oral at bedtime  trimethoprim  160 mG/sulfamethoxazole 800 mG 2 Tablet(s) Oral three times a day    MEDICATIONS  (PRN):  benzocaine/menthol Lozenge 1 Lozenge Oral every 6 hours PRN Sore Throat  dextrose Oral Gel 15 Gram(s) Oral once PRN Blood Glucose LESS THAN 70 milliGRAM(s)/deciliter  guaiFENesin Oral Liquid (Sugar-Free) 200 milliGRAM(s) Oral every 6 hours PRN Cough  levalbuterol Inhalation 0.63 milliGRAM(s) Inhalation every 6 hours PRN shortness of breath/wheezing  naloxone Injectable 0.1 milliGRAM(s) IV Push every 3 minutes PRN For ANY of the following changes in patient status:  A. RR LESS THAN 10 breaths per minute, B. Oxygen saturation LESS THAN 90%, C. Sedation score of 6  ondansetron Injectable 4 milliGRAM(s) IV Push every 6 hours PRN Nausea

## 2023-12-09 LAB
ANION GAP SERPL CALC-SCNC: 11 MMOL/L — SIGNIFICANT CHANGE UP (ref 7–14)
ANION GAP SERPL CALC-SCNC: 11 MMOL/L — SIGNIFICANT CHANGE UP (ref 7–14)
BASOPHILS # BLD AUTO: 0.06 K/UL — SIGNIFICANT CHANGE UP (ref 0–0.2)
BASOPHILS # BLD AUTO: 0.06 K/UL — SIGNIFICANT CHANGE UP (ref 0–0.2)
BASOPHILS NFR BLD AUTO: 0.6 % — SIGNIFICANT CHANGE UP (ref 0–2)
BASOPHILS NFR BLD AUTO: 0.6 % — SIGNIFICANT CHANGE UP (ref 0–2)
BUN SERPL-MCNC: 11 MG/DL — SIGNIFICANT CHANGE UP (ref 7–23)
BUN SERPL-MCNC: 11 MG/DL — SIGNIFICANT CHANGE UP (ref 7–23)
CALCIUM SERPL-MCNC: 8.8 MG/DL — SIGNIFICANT CHANGE UP (ref 8.4–10.5)
CALCIUM SERPL-MCNC: 8.8 MG/DL — SIGNIFICANT CHANGE UP (ref 8.4–10.5)
CHLORIDE SERPL-SCNC: 103 MMOL/L — SIGNIFICANT CHANGE UP (ref 98–107)
CHLORIDE SERPL-SCNC: 103 MMOL/L — SIGNIFICANT CHANGE UP (ref 98–107)
CO2 SERPL-SCNC: 26 MMOL/L — SIGNIFICANT CHANGE UP (ref 22–31)
CO2 SERPL-SCNC: 26 MMOL/L — SIGNIFICANT CHANGE UP (ref 22–31)
CREAT SERPL-MCNC: 0.91 MG/DL — SIGNIFICANT CHANGE UP (ref 0.5–1.3)
CREAT SERPL-MCNC: 0.91 MG/DL — SIGNIFICANT CHANGE UP (ref 0.5–1.3)
EGFR: 90 ML/MIN/1.73M2 — SIGNIFICANT CHANGE UP
EGFR: 90 ML/MIN/1.73M2 — SIGNIFICANT CHANGE UP
EOSINOPHIL # BLD AUTO: 0.23 K/UL — SIGNIFICANT CHANGE UP (ref 0–0.5)
EOSINOPHIL # BLD AUTO: 0.23 K/UL — SIGNIFICANT CHANGE UP (ref 0–0.5)
EOSINOPHIL NFR BLD AUTO: 2.5 % — SIGNIFICANT CHANGE UP (ref 0–6)
EOSINOPHIL NFR BLD AUTO: 2.5 % — SIGNIFICANT CHANGE UP (ref 0–6)
GLUCOSE SERPL-MCNC: 97 MG/DL — SIGNIFICANT CHANGE UP (ref 70–99)
GLUCOSE SERPL-MCNC: 97 MG/DL — SIGNIFICANT CHANGE UP (ref 70–99)
HCT VFR BLD CALC: 25.3 % — LOW (ref 39–50)
HCT VFR BLD CALC: 25.3 % — LOW (ref 39–50)
HGB BLD-MCNC: 8 G/DL — LOW (ref 13–17)
HGB BLD-MCNC: 8 G/DL — LOW (ref 13–17)
IANC: 5.19 K/UL — SIGNIFICANT CHANGE UP (ref 1.8–7.4)
IANC: 5.19 K/UL — SIGNIFICANT CHANGE UP (ref 1.8–7.4)
IMM GRANULOCYTES NFR BLD AUTO: 4 % — HIGH (ref 0–0.9)
IMM GRANULOCYTES NFR BLD AUTO: 4 % — HIGH (ref 0–0.9)
LYMPHOCYTES # BLD AUTO: 2.72 K/UL — SIGNIFICANT CHANGE UP (ref 1–3.3)
LYMPHOCYTES # BLD AUTO: 2.72 K/UL — SIGNIFICANT CHANGE UP (ref 1–3.3)
LYMPHOCYTES # BLD AUTO: 29.2 % — SIGNIFICANT CHANGE UP (ref 13–44)
LYMPHOCYTES # BLD AUTO: 29.2 % — SIGNIFICANT CHANGE UP (ref 13–44)
MAGNESIUM SERPL-MCNC: 2 MG/DL — SIGNIFICANT CHANGE UP (ref 1.6–2.6)
MAGNESIUM SERPL-MCNC: 2 MG/DL — SIGNIFICANT CHANGE UP (ref 1.6–2.6)
MCHC RBC-ENTMCNC: 31 PG — SIGNIFICANT CHANGE UP (ref 27–34)
MCHC RBC-ENTMCNC: 31 PG — SIGNIFICANT CHANGE UP (ref 27–34)
MCHC RBC-ENTMCNC: 31.6 GM/DL — LOW (ref 32–36)
MCHC RBC-ENTMCNC: 31.6 GM/DL — LOW (ref 32–36)
MCV RBC AUTO: 98.1 FL — SIGNIFICANT CHANGE UP (ref 80–100)
MCV RBC AUTO: 98.1 FL — SIGNIFICANT CHANGE UP (ref 80–100)
MONOCYTES # BLD AUTO: 0.76 K/UL — SIGNIFICANT CHANGE UP (ref 0–0.9)
MONOCYTES # BLD AUTO: 0.76 K/UL — SIGNIFICANT CHANGE UP (ref 0–0.9)
MONOCYTES NFR BLD AUTO: 8.1 % — SIGNIFICANT CHANGE UP (ref 2–14)
MONOCYTES NFR BLD AUTO: 8.1 % — SIGNIFICANT CHANGE UP (ref 2–14)
NEUTROPHILS # BLD AUTO: 5.19 K/UL — SIGNIFICANT CHANGE UP (ref 1.8–7.4)
NEUTROPHILS # BLD AUTO: 5.19 K/UL — SIGNIFICANT CHANGE UP (ref 1.8–7.4)
NEUTROPHILS NFR BLD AUTO: 55.6 % — SIGNIFICANT CHANGE UP (ref 43–77)
NEUTROPHILS NFR BLD AUTO: 55.6 % — SIGNIFICANT CHANGE UP (ref 43–77)
NRBC # BLD: 2 /100 WBCS — HIGH (ref 0–0)
NRBC # BLD: 2 /100 WBCS — HIGH (ref 0–0)
NRBC # FLD: 0.16 K/UL — HIGH (ref 0–0)
NRBC # FLD: 0.16 K/UL — HIGH (ref 0–0)
PHOSPHATE SERPL-MCNC: 2.8 MG/DL — SIGNIFICANT CHANGE UP (ref 2.5–4.5)
PHOSPHATE SERPL-MCNC: 2.8 MG/DL — SIGNIFICANT CHANGE UP (ref 2.5–4.5)
PLATELET # BLD AUTO: 354 K/UL — SIGNIFICANT CHANGE UP (ref 150–400)
PLATELET # BLD AUTO: 354 K/UL — SIGNIFICANT CHANGE UP (ref 150–400)
POTASSIUM SERPL-MCNC: 4.2 MMOL/L — SIGNIFICANT CHANGE UP (ref 3.5–5.3)
POTASSIUM SERPL-MCNC: 4.2 MMOL/L — SIGNIFICANT CHANGE UP (ref 3.5–5.3)
POTASSIUM SERPL-SCNC: 4.2 MMOL/L — SIGNIFICANT CHANGE UP (ref 3.5–5.3)
POTASSIUM SERPL-SCNC: 4.2 MMOL/L — SIGNIFICANT CHANGE UP (ref 3.5–5.3)
RBC # BLD: 2.58 M/UL — LOW (ref 4.2–5.8)
RBC # BLD: 2.58 M/UL — LOW (ref 4.2–5.8)
RBC # FLD: 19.1 % — HIGH (ref 10.3–14.5)
RBC # FLD: 19.1 % — HIGH (ref 10.3–14.5)
SODIUM SERPL-SCNC: 140 MMOL/L — SIGNIFICANT CHANGE UP (ref 135–145)
SODIUM SERPL-SCNC: 140 MMOL/L — SIGNIFICANT CHANGE UP (ref 135–145)
WBC # BLD: 9.33 K/UL — SIGNIFICANT CHANGE UP (ref 3.8–10.5)
WBC # BLD: 9.33 K/UL — SIGNIFICANT CHANGE UP (ref 3.8–10.5)
WBC # FLD AUTO: 9.33 K/UL — SIGNIFICANT CHANGE UP (ref 3.8–10.5)
WBC # FLD AUTO: 9.33 K/UL — SIGNIFICANT CHANGE UP (ref 3.8–10.5)

## 2023-12-09 PROCEDURE — 93306 TTE W/DOPPLER COMPLETE: CPT | Mod: 26

## 2023-12-09 PROCEDURE — 99233 SBSQ HOSP IP/OBS HIGH 50: CPT

## 2023-12-09 RX ADMIN — PANTOPRAZOLE SODIUM 40 MILLIGRAM(S): 20 TABLET, DELAYED RELEASE ORAL at 13:09

## 2023-12-09 RX ADMIN — BICALUTAMIDE 50 MILLIGRAM(S): 50 TABLET, FILM COATED ORAL at 13:09

## 2023-12-09 RX ADMIN — MEROPENEM 100 MILLIGRAM(S): 1 INJECTION INTRAVENOUS at 09:16

## 2023-12-09 RX ADMIN — LISINOPRIL 5 MILLIGRAM(S): 2.5 TABLET ORAL at 05:25

## 2023-12-09 RX ADMIN — CHLORHEXIDINE GLUCONATE 1 APPLICATION(S): 213 SOLUTION TOPICAL at 13:08

## 2023-12-09 RX ADMIN — MEROPENEM 100 MILLIGRAM(S): 1 INJECTION INTRAVENOUS at 17:59

## 2023-12-09 RX ADMIN — Medication 2000 UNIT(S): at 13:09

## 2023-12-09 RX ADMIN — Medication 2 TABLET(S): at 01:08

## 2023-12-09 RX ADMIN — HEPARIN SODIUM 5000 UNIT(S): 5000 INJECTION INTRAVENOUS; SUBCUTANEOUS at 09:15

## 2023-12-09 RX ADMIN — ATORVASTATIN CALCIUM 20 MILLIGRAM(S): 80 TABLET, FILM COATED ORAL at 21:23

## 2023-12-09 RX ADMIN — TAMSULOSIN HYDROCHLORIDE 0.4 MILLIGRAM(S): 0.4 CAPSULE ORAL at 21:23

## 2023-12-09 RX ADMIN — HEPARIN SODIUM 5000 UNIT(S): 5000 INJECTION INTRAVENOUS; SUBCUTANEOUS at 17:56

## 2023-12-09 RX ADMIN — HEPARIN SODIUM 5000 UNIT(S): 5000 INJECTION INTRAVENOUS; SUBCUTANEOUS at 01:08

## 2023-12-09 RX ADMIN — Medication 2 TABLET(S): at 09:16

## 2023-12-09 RX ADMIN — Medication 1 TABLET(S): at 13:09

## 2023-12-09 RX ADMIN — MEROPENEM 100 MILLIGRAM(S): 1 INJECTION INTRAVENOUS at 01:08

## 2023-12-09 RX ADMIN — Medication 2 TABLET(S): at 17:56

## 2023-12-09 NOTE — PROGRESS NOTE ADULT - PROBLEM SELECTOR PLAN 12
no obvious hemorrhage noted  - continue to monitor CBC  - s/p 1u PRBC on 11/25   - s/p 1u PRBC on 11/29    Plan discussed with ACP    Updated daughter Olga on 12/9 and answered all questions.

## 2023-12-09 NOTE — PROGRESS NOTE ADULT - PROBLEM SELECTOR PLAN 4
noted diarrhea and positive C. diff colitis.  - Completed po vanco course on 12/8   - continue probiotics  - likely due to long term abx.

## 2023-12-09 NOTE — PROGRESS NOTE ADULT - PROBLEM SELECTOR PLAN 1
c/b acute on chronic hypoxic respiratory failure  Noted result of CTPA.  - No PE  - Broadened antibiotics to cover for aspiration multilobar PNA.  - titrate down on HFNC as tolerated  - Bactrim DS 2 tab q8 for empiric PJP PNA treatment, cont bactrim, f/u sputum pjp pcr  -wean high flow as tolerated,  monitor resp status closely  -ID prateek, Discussed with ID attending on 12/7,  Pulm consult appreciatd per pulm As O2 requirements are improving and patient feeling subjectively better, will hold off empiric steroids for now. If O2 requirements again plateau or worse, will reconsider empiric steroids or bronchoscopic evaluation with BAL/TBBX.  -rvp with adenovirus, pro Bnp wnl for age, f/u echo  -rpt ct 12/8  showed Diffuse lung disease with evolving bilateral lung opacities since   11/27/2023, worsening in the upper-mid lungs and improving in the lower lobes. Decreased trace pleural effusions.  Multifocal osteosclerotic metastases. Decreasing upper paraspinal postoperative fluid collection.

## 2023-12-09 NOTE — PROGRESS NOTE ADULT - SUBJECTIVE AND OBJECTIVE BOX
Patient is a 73y old  Male who presents with a chief complaint of Diffuse Spinal Mets from Prostate Cancer (08 Dec 2023 13:30)      SUBJECTIVE / OVERNIGHT EVENTS: Pt seen and examined at 11:17am, no overnight events, sitting in a chair, denies any sob, still on high flow oxygen but down titrated to 20/30, no other complaints, no other new issues reported.       MEDICATIONS  (STANDING):  acetaminophen     Tablet .. 975 milliGRAM(s) Oral every 6 hours  atorvastatin 20 milliGRAM(s) Oral at bedtime  bicalutamide 50 milliGRAM(s) Oral daily  chlorhexidine 2% Cloths 1 Application(s) Topical daily  cholecalciferol 2000 Unit(s) Oral daily  dextrose 5%. 1000 milliLiter(s) (50 mL/Hr) IV Continuous <Continuous>  dextrose 5%. 1000 milliLiter(s) (100 mL/Hr) IV Continuous <Continuous>  dextrose 50% Injectable 25 Gram(s) IV Push once  dextrose 50% Injectable 12.5 Gram(s) IV Push once  dextrose 50% Injectable 25 Gram(s) IV Push once  glucagon  Injectable 1 milliGRAM(s) IntraMuscular once  heparin   Injectable 5000 Unit(s) SubCutaneous every 8 hours  lactobacillus acidophilus 1 Tablet(s) Oral daily  lisinopril 5 milliGRAM(s) Oral daily  meropenem  IVPB 1000 milliGRAM(s) IV Intermittent every 8 hours  pantoprazole  Injectable 40 milliGRAM(s) IV Push daily  tamsulosin 0.4 milliGRAM(s) Oral at bedtime  trimethoprim  160 mG/sulfamethoxazole 800 mG 2 Tablet(s) Oral three times a day    MEDICATIONS  (PRN):  benzocaine/menthol Lozenge 1 Lozenge Oral every 6 hours PRN Sore Throat  dextrose Oral Gel 15 Gram(s) Oral once PRN Blood Glucose LESS THAN 70 milliGRAM(s)/deciliter  guaiFENesin Oral Liquid (Sugar-Free) 200 milliGRAM(s) Oral every 6 hours PRN Cough  levalbuterol Inhalation 0.63 milliGRAM(s) Inhalation every 6 hours PRN shortness of breath/wheezing  naloxone Injectable 0.1 milliGRAM(s) IV Push every 3 minutes PRN For ANY of the following changes in patient status:  A. RR LESS THAN 10 breaths per minute, B. Oxygen saturation LESS THAN 90%, C. Sedation score of 6  ondansetron Injectable 4 milliGRAM(s) IV Push every 6 hours PRN Nausea      Vital Signs Last 24 Hrs  T(C): 36.7 (09 Dec 2023 12:00), Max: 36.7 (08 Dec 2023 18:30)  T(F): 98 (09 Dec 2023 12:00), Max: 98.1 (09 Dec 2023 01:16)  HR: 94 (09 Dec 2023 12:00) (87 - 108)  BP: 105/62 (09 Dec 2023 12:00) (105/62 - 131/77)  BP(mean): --  RR: 18 (09 Dec 2023 12:00) (18 - 18)  SpO2: 98% (09 Dec 2023 12:00) (91% - 100%)    Parameters below as of 09 Dec 2023 12:00  Patient On (Oxygen Delivery Method): nasal cannula, high flow      CAPILLARY BLOOD GLUCOSE      POCT Blood Glucose.: 125 mg/dL (09 Dec 2023 12:23)  POCT Blood Glucose.: 102 mg/dL (09 Dec 2023 08:49)  POCT Blood Glucose.: 128 mg/dL (08 Dec 2023 21:56)  POCT Blood Glucose.: 110 mg/dL (08 Dec 2023 17:42)    I&O's Summary    08 Dec 2023 07:01  -  09 Dec 2023 07:00  --------------------------------------------------------  IN: 240 mL / OUT: 825 mL / NET: -585 mL    09 Dec 2023 07:01  -  09 Dec 2023 13:48  --------------------------------------------------------  IN: 0 mL / OUT: 400 mL / NET: -400 mL        PHYSICAL EXAM:  GENERAL: NAD  CHEST/LUNG: Clear to auscultation bilaterally; No wheeze, on high flow oxygen  HEART: Regular rate and rhythm  ABDOMEN: Soft, Nontender, Nondistended  EXTREMITIES: no LE edema  PSYCH: Calm  NEUROLOGY: AA answers questions appropriately  SKIN: dry and warm      LABS:                        8.0    9.33  )-----------( 354      ( 09 Dec 2023 05:25 )             25.3     12-09    140  |  103  |  11  ----------------------------<  97  4.2   |  26  |  0.91    Ca    8.8      09 Dec 2023 05:25  Phos  2.8     12-09  Mg     2.00     12-09            Urinalysis Basic - ( 09 Dec 2023 05:25 )    Color: x / Appearance: x / SG: x / pH: x  Gluc: 97 mg/dL / Ketone: x  / Bili: x / Urobili: x   Blood: x / Protein: x / Nitrite: x   Leuk Esterase: x / RBC: x / WBC x   Sq Epi: x / Non Sq Epi: x / Bacteria: x        RADIOLOGY & ADDITIONAL TESTS:  < from: CT Chest No Cont (12.08.23 @ 09:09) >  CT CHEST   O    < end of copied text >  < from: CT Chest No Cont (12.08.23 @ 09:09) >  Diffuse lung disease with evolving bilateral lung opacities since   11/27/2023, worsening in the upper-mid lungs and improving in the lower   lobes.    Decreased trace pleural effusions.    Multifocal osteosclerotic metastases. Decreasing upper paraspinal   postoperative fluid collection.    < end of copied text >    Imaging Personally Reviewed:    Consultant(s) Notes Reviewed:      Care Discussed with Consultants/Other Providers:

## 2023-12-10 LAB
ANION GAP SERPL CALC-SCNC: 19 MMOL/L — HIGH (ref 7–14)
ANION GAP SERPL CALC-SCNC: 19 MMOL/L — HIGH (ref 7–14)
BASOPHILS # BLD AUTO: 0.1 K/UL — SIGNIFICANT CHANGE UP (ref 0–0.2)
BASOPHILS # BLD AUTO: 0.1 K/UL — SIGNIFICANT CHANGE UP (ref 0–0.2)
BASOPHILS NFR BLD AUTO: 1 % — SIGNIFICANT CHANGE UP (ref 0–2)
BASOPHILS NFR BLD AUTO: 1 % — SIGNIFICANT CHANGE UP (ref 0–2)
BUN SERPL-MCNC: 15 MG/DL — SIGNIFICANT CHANGE UP (ref 7–23)
BUN SERPL-MCNC: 15 MG/DL — SIGNIFICANT CHANGE UP (ref 7–23)
CALCIUM SERPL-MCNC: 8.9 MG/DL — SIGNIFICANT CHANGE UP (ref 8.4–10.5)
CALCIUM SERPL-MCNC: 8.9 MG/DL — SIGNIFICANT CHANGE UP (ref 8.4–10.5)
CHLORIDE SERPL-SCNC: 100 MMOL/L — SIGNIFICANT CHANGE UP (ref 98–107)
CHLORIDE SERPL-SCNC: 100 MMOL/L — SIGNIFICANT CHANGE UP (ref 98–107)
CO2 SERPL-SCNC: 16 MMOL/L — LOW (ref 22–31)
CO2 SERPL-SCNC: 16 MMOL/L — LOW (ref 22–31)
CREAT SERPL-MCNC: 0.89 MG/DL — SIGNIFICANT CHANGE UP (ref 0.5–1.3)
CREAT SERPL-MCNC: 0.89 MG/DL — SIGNIFICANT CHANGE UP (ref 0.5–1.3)
EGFR: 92 ML/MIN/1.73M2 — SIGNIFICANT CHANGE UP
EGFR: 92 ML/MIN/1.73M2 — SIGNIFICANT CHANGE UP
EOSINOPHIL # BLD AUTO: 0.35 K/UL — SIGNIFICANT CHANGE UP (ref 0–0.5)
EOSINOPHIL # BLD AUTO: 0.35 K/UL — SIGNIFICANT CHANGE UP (ref 0–0.5)
EOSINOPHIL NFR BLD AUTO: 3.6 % — SIGNIFICANT CHANGE UP (ref 0–6)
EOSINOPHIL NFR BLD AUTO: 3.6 % — SIGNIFICANT CHANGE UP (ref 0–6)
GLUCOSE SERPL-MCNC: 83 MG/DL — SIGNIFICANT CHANGE UP (ref 70–99)
GLUCOSE SERPL-MCNC: 83 MG/DL — SIGNIFICANT CHANGE UP (ref 70–99)
HCT VFR BLD CALC: 30.6 % — LOW (ref 39–50)
HCT VFR BLD CALC: 30.6 % — LOW (ref 39–50)
HGB BLD-MCNC: 9.2 G/DL — LOW (ref 13–17)
HGB BLD-MCNC: 9.2 G/DL — LOW (ref 13–17)
IANC: 5.84 K/UL — SIGNIFICANT CHANGE UP (ref 1.8–7.4)
IANC: 5.84 K/UL — SIGNIFICANT CHANGE UP (ref 1.8–7.4)
IMM GRANULOCYTES NFR BLD AUTO: 2.6 % — HIGH (ref 0–0.9)
IMM GRANULOCYTES NFR BLD AUTO: 2.6 % — HIGH (ref 0–0.9)
LYMPHOCYTES # BLD AUTO: 2.36 K/UL — SIGNIFICANT CHANGE UP (ref 1–3.3)
LYMPHOCYTES # BLD AUTO: 2.36 K/UL — SIGNIFICANT CHANGE UP (ref 1–3.3)
LYMPHOCYTES # BLD AUTO: 24.6 % — SIGNIFICANT CHANGE UP (ref 13–44)
LYMPHOCYTES # BLD AUTO: 24.6 % — SIGNIFICANT CHANGE UP (ref 13–44)
MAGNESIUM SERPL-MCNC: 2.2 MG/DL — SIGNIFICANT CHANGE UP (ref 1.6–2.6)
MAGNESIUM SERPL-MCNC: 2.2 MG/DL — SIGNIFICANT CHANGE UP (ref 1.6–2.6)
MCHC RBC-ENTMCNC: 30.1 GM/DL — LOW (ref 32–36)
MCHC RBC-ENTMCNC: 30.1 GM/DL — LOW (ref 32–36)
MCHC RBC-ENTMCNC: 30.7 PG — SIGNIFICANT CHANGE UP (ref 27–34)
MCHC RBC-ENTMCNC: 30.7 PG — SIGNIFICANT CHANGE UP (ref 27–34)
MCV RBC AUTO: 102 FL — HIGH (ref 80–100)
MCV RBC AUTO: 102 FL — HIGH (ref 80–100)
MONOCYTES # BLD AUTO: 0.7 K/UL — SIGNIFICANT CHANGE UP (ref 0–0.9)
MONOCYTES # BLD AUTO: 0.7 K/UL — SIGNIFICANT CHANGE UP (ref 0–0.9)
MONOCYTES NFR BLD AUTO: 7.3 % — SIGNIFICANT CHANGE UP (ref 2–14)
MONOCYTES NFR BLD AUTO: 7.3 % — SIGNIFICANT CHANGE UP (ref 2–14)
NEUTROPHILS # BLD AUTO: 5.84 K/UL — SIGNIFICANT CHANGE UP (ref 1.8–7.4)
NEUTROPHILS # BLD AUTO: 5.84 K/UL — SIGNIFICANT CHANGE UP (ref 1.8–7.4)
NEUTROPHILS NFR BLD AUTO: 60.9 % — SIGNIFICANT CHANGE UP (ref 43–77)
NEUTROPHILS NFR BLD AUTO: 60.9 % — SIGNIFICANT CHANGE UP (ref 43–77)
NRBC # BLD: 1 /100 WBCS — HIGH (ref 0–0)
NRBC # BLD: 1 /100 WBCS — HIGH (ref 0–0)
NRBC # FLD: 0.1 K/UL — HIGH (ref 0–0)
NRBC # FLD: 0.1 K/UL — HIGH (ref 0–0)
PHOSPHATE SERPL-MCNC: 3.3 MG/DL — SIGNIFICANT CHANGE UP (ref 2.5–4.5)
PHOSPHATE SERPL-MCNC: 3.3 MG/DL — SIGNIFICANT CHANGE UP (ref 2.5–4.5)
PLATELET # BLD AUTO: 320 K/UL — SIGNIFICANT CHANGE UP (ref 150–400)
PLATELET # BLD AUTO: 320 K/UL — SIGNIFICANT CHANGE UP (ref 150–400)
POTASSIUM SERPL-MCNC: 5.4 MMOL/L — HIGH (ref 3.5–5.3)
POTASSIUM SERPL-MCNC: 5.4 MMOL/L — HIGH (ref 3.5–5.3)
POTASSIUM SERPL-SCNC: 5.4 MMOL/L — HIGH (ref 3.5–5.3)
POTASSIUM SERPL-SCNC: 5.4 MMOL/L — HIGH (ref 3.5–5.3)
RBC # BLD: 3 M/UL — LOW (ref 4.2–5.8)
RBC # BLD: 3 M/UL — LOW (ref 4.2–5.8)
RBC # FLD: 19.4 % — HIGH (ref 10.3–14.5)
RBC # FLD: 19.4 % — HIGH (ref 10.3–14.5)
SODIUM SERPL-SCNC: 135 MMOL/L — SIGNIFICANT CHANGE UP (ref 135–145)
SODIUM SERPL-SCNC: 135 MMOL/L — SIGNIFICANT CHANGE UP (ref 135–145)
WBC # BLD: 9.6 K/UL — SIGNIFICANT CHANGE UP (ref 3.8–10.5)
WBC # BLD: 9.6 K/UL — SIGNIFICANT CHANGE UP (ref 3.8–10.5)
WBC # FLD AUTO: 9.6 K/UL — SIGNIFICANT CHANGE UP (ref 3.8–10.5)
WBC # FLD AUTO: 9.6 K/UL — SIGNIFICANT CHANGE UP (ref 3.8–10.5)

## 2023-12-10 PROCEDURE — 99232 SBSQ HOSP IP/OBS MODERATE 35: CPT

## 2023-12-10 RX ADMIN — HEPARIN SODIUM 5000 UNIT(S): 5000 INJECTION INTRAVENOUS; SUBCUTANEOUS at 18:39

## 2023-12-10 RX ADMIN — BICALUTAMIDE 50 MILLIGRAM(S): 50 TABLET, FILM COATED ORAL at 12:33

## 2023-12-10 RX ADMIN — PANTOPRAZOLE SODIUM 40 MILLIGRAM(S): 20 TABLET, DELAYED RELEASE ORAL at 12:34

## 2023-12-10 RX ADMIN — Medication 2000 UNIT(S): at 12:33

## 2023-12-10 RX ADMIN — HEPARIN SODIUM 5000 UNIT(S): 5000 INJECTION INTRAVENOUS; SUBCUTANEOUS at 00:59

## 2023-12-10 RX ADMIN — LISINOPRIL 5 MILLIGRAM(S): 2.5 TABLET ORAL at 06:11

## 2023-12-10 RX ADMIN — CHLORHEXIDINE GLUCONATE 1 APPLICATION(S): 213 SOLUTION TOPICAL at 12:32

## 2023-12-10 RX ADMIN — Medication 1 TABLET(S): at 12:33

## 2023-12-10 RX ADMIN — Medication 2 TABLET(S): at 18:38

## 2023-12-10 RX ADMIN — MEROPENEM 100 MILLIGRAM(S): 1 INJECTION INTRAVENOUS at 12:31

## 2023-12-10 RX ADMIN — MEROPENEM 100 MILLIGRAM(S): 1 INJECTION INTRAVENOUS at 18:38

## 2023-12-10 RX ADMIN — MEROPENEM 100 MILLIGRAM(S): 1 INJECTION INTRAVENOUS at 00:59

## 2023-12-10 RX ADMIN — ATORVASTATIN CALCIUM 20 MILLIGRAM(S): 80 TABLET, FILM COATED ORAL at 20:44

## 2023-12-10 RX ADMIN — HEPARIN SODIUM 5000 UNIT(S): 5000 INJECTION INTRAVENOUS; SUBCUTANEOUS at 12:31

## 2023-12-10 RX ADMIN — Medication 2 TABLET(S): at 12:31

## 2023-12-10 RX ADMIN — Medication 2 TABLET(S): at 00:59

## 2023-12-10 RX ADMIN — TAMSULOSIN HYDROCHLORIDE 0.4 MILLIGRAM(S): 0.4 CAPSULE ORAL at 20:44

## 2023-12-10 NOTE — PROGRESS NOTE ADULT - PROBLEM SELECTOR PROBLEM 5
"    DISCHARGE SUMMARY    PATIENT NAME: Jung Selby   PHYSICIAN: Billy Trujlilo DO  : 1969  MRN: [id number]    ADMITTED: 2023     DISCHARGED: 2023      ADMITTING DIAGNOSIS:  Precordial pain [R07.2]  Chest pain [R07.9]      DISCHARGE, AND RESOLVED DIAGNOSES:  Active Hospital Problems    Diagnosis  POA    **Chest pain, atypical [R07.89]  Yes    Hypothyroidism [E03.9]  Unknown    DONNELL (generalized anxiety disorder) [F41.1]  Unknown    Hyperlipidemia LDL goal <70 [E78.5]  Yes    Gastroesophageal reflux disease [K21.9]  Yes      Resolved Hospital Problems   No resolved problems to display.         SERVICE: Family Medicine Residency  Attending: Dr. Esequiel Acevedo  Resident: Billy Trujillo DO    CONSULTS:   Consult Orders (all) (From admission, onward)       Start     Ordered    23 1530  Inpatient Gastroenterology Consult  Once        Specialty:  Gastroenterology  Provider:  Nestor Echols DO    23 1529    23 1626  Inpatient Cardiology Consult  Once        Specialty:  Cardiology  Provider:  Christine Pillai MD    23 1625    23 1320  Family Practice - Resident (on-call MD unless specified)  Once        Specialty:  Family Medicine  Provider:  James Rosa MD    23 1320                    PROCEDURES:   -2023: Barium swallow study    HISTORY OF PRESENT ILLNESS:   Per the H&P written by Dr. Trujillo, dated 2023:    \"Jung Selby is a 53 y.o. male with a CMH of hypothyroidism, GERD, HLD, mild CAD who presents with acute chest pain.  Patient states he was awoken from sleep around 5 AM with \"hot flashing, like tingling/burning across my chest\" located on the left side of his chest and radiating, which persisted for 3 hours. He endorses sweating but no nausea, palpitations, shortness of breath, or lightheadedness. Patient reports a history of chest pain \"episodes\"similar to this and persistent chest pain starting back in 2022. " " He has seen multiple specialties including GI, rheumatology, and cardiology for evaluation of this ongoing pain.  He underwent full GI upper endoscopy and cardiac work-up and was prescribed Carafate and anti-inflammatories for possible GI manifestation was discontinued due to inefficacy of treatment.  Past cardiac work-up included echo significant for LVEF 61 to 65% and CT angiogram coronary significant for mild coronary artery disease and mild stenosis of multiple vessels.  Of note, he does state that although the chest pain is constant it does seem to improve with activity and increasing his heart rate. \"    DIAGNOSTIC DATA:   Lab Results (last 72 hours)       Procedure Component Value Units Date/Time    Comprehensive Metabolic Panel [[id number]]  (Abnormal) Collected: 09/26/23 0519    Specimen: Blood Updated: 09/26/23 0622     Glucose 117 mg/dL      BUN 16 mg/dL      Creatinine 0.94 mg/dL      Sodium 141 mmol/L      Potassium 3.8 mmol/L      Chloride 104 mmol/L      CO2 28.0 mmol/L      Calcium 9.2 mg/dL      Total Protein 6.8 g/dL      Albumin 4.4 g/dL      ALT (SGPT) 26 U/L      AST (SGOT) 17 U/L      Alkaline Phosphatase 44 U/L      Total Bilirubin 0.5 mg/dL      Globulin 2.4 gm/dL      A/G Ratio 1.8 g/dL      BUN/Creatinine Ratio 17.0     Anion Gap 9.0 mmol/L      eGFR 98.1 mL/min/1.73     Narrative:      GFR Normal >60  Chronic Kidney Disease <60  Kidney Failure <15      CBC & Differential [[id number]]  (Abnormal) Collected: 09/26/23 0519    Specimen: Blood Updated: 09/26/23 0555    Narrative:      The following orders were created for panel order CBC & Differential.  Procedure                               Abnormality         Status                     ---------                               -----------         ------                     CBC Auto Differential[[id number]]        Abnormal            Final result                 Please view results for these tests on the individual orders.    CBC Auto " Candida esophagitis Differential [[id number]]  (Abnormal) Collected: 09/26/23 0519    Specimen: Blood Updated: 09/26/23 0555     WBC 7.31 10*3/mm3      RBC 4.65 10*6/mm3      Hemoglobin 13.8 g/dL      Hematocrit 40.9 %      MCV 88.0 fL      MCH 29.7 pg      MCHC 33.7 g/dL      RDW 11.8 %      RDW-SD 37.5 fl      MPV 9.7 fL      Platelets 147 10*3/mm3      Neutrophil % 62.4 %      Lymphocyte % 25.6 %      Monocyte % 9.7 %      Eosinophil % 1.6 %      Basophil % 0.4 %      Immature Grans % 0.3 %      Neutrophils, Absolute 4.56 10*3/mm3      Lymphocytes, Absolute 1.87 10*3/mm3      Monocytes, Absolute 0.71 10*3/mm3      Eosinophils, Absolute 0.12 10*3/mm3      Basophils, Absolute 0.03 10*3/mm3      Immature Grans, Absolute 0.02 10*3/mm3      nRBC 0.0 /100 WBC     Comprehensive Metabolic Panel [[id number]]  (Abnormal) Collected: 09/25/23 0510    Specimen: Blood Updated: 09/25/23 0636     Glucose 115 mg/dL      BUN 14 mg/dL      Creatinine 0.94 mg/dL      Sodium 137 mmol/L      Potassium 4.2 mmol/L      Comment: Slight hemolysis detected by analyzer. Results may be affected.        Chloride 100 mmol/L      CO2 28.0 mmol/L      Calcium 9.0 mg/dL      Total Protein 7.0 g/dL      Albumin 4.3 g/dL      ALT (SGPT) 28 U/L      AST (SGOT) 16 U/L      Alkaline Phosphatase 46 U/L      Total Bilirubin 0.5 mg/dL      Globulin 2.7 gm/dL      A/G Ratio 1.6 g/dL      BUN/Creatinine Ratio 14.9     Anion Gap 9.0 mmol/L      eGFR 98.1 mL/min/1.73     Narrative:      GFR Normal >60  Chronic Kidney Disease <60  Kidney Failure <15      CBC & Differential [[id number]]  (Abnormal) Collected: 09/25/23 0510    Specimen: Blood Updated: 09/25/23 0615    Narrative:      The following orders were created for panel order CBC & Differential.  Procedure                               Abnormality         Status                     ---------                               -----------         ------                     CBC Auto Differential[[id number]]        Abnormal             Final result                 Please view results for these tests on the individual orders.    CBC Auto Differential [[id number]]  (Abnormal) Collected: 09/25/23 0510    Specimen: Blood Updated: 09/25/23 0615     WBC 8.01 10*3/mm3      RBC 4.77 10*6/mm3      Hemoglobin 13.9 g/dL      Hematocrit 41.9 %      MCV 87.8 fL      MCH 29.1 pg      MCHC 33.2 g/dL      RDW 11.7 %      RDW-SD 37.3 fl      MPV 10.1 fL      Platelets 154 10*3/mm3      Neutrophil % 63.8 %      Lymphocyte % 26.3 %      Monocyte % 7.0 %      Eosinophil % 2.0 %      Basophil % 0.5 %      Immature Grans % 0.4 %      Neutrophils, Absolute 5.11 10*3/mm3      Lymphocytes, Absolute 2.11 10*3/mm3      Monocytes, Absolute 0.56 10*3/mm3      Eosinophils, Absolute 0.16 10*3/mm3      Basophils, Absolute 0.04 10*3/mm3      Immature Grans, Absolute 0.03 10*3/mm3      nRBC 0.0 /100 WBC     High Sensitivity Troponin T [[id number]]  (Normal) Collected: 09/24/23 0659    Specimen: Blood Updated: 09/24/23 0751     HS Troponin T 7 ng/L     Narrative:      High Sensitive Troponin T Reference Range:  <10.0 ng/L- Negative Female for AMI  <15.0 ng/L- Negative Male for AMI  >=10 - Abnormal Female indicating possible myocardial injury.  >=15 - Abnormal Male indicating possible myocardial injury.   Clinicians would have to utilize clinical acumen, EKG, Troponin, and serial changes to determine if it is an Acute Myocardial Infarction or myocardial injury due to an underlying chronic condition.         Comprehensive Metabolic Panel [[id number]]  (Abnormal) Collected: 09/24/23 0659    Specimen: Blood Updated: 09/24/23 0735     Glucose 129 mg/dL      BUN 14 mg/dL      Creatinine 0.90 mg/dL      Sodium 136 mmol/L      Potassium 4.3 mmol/L      Chloride 100 mmol/L      CO2 26.0 mmol/L      Calcium 9.2 mg/dL      Total Protein 6.9 g/dL      Albumin 4.4 g/dL      ALT (SGPT) 32 U/L      AST (SGOT) 17 U/L      Alkaline Phosphatase 46 U/L      Total Bilirubin 0.8 mg/dL      Globulin 2.5  "gm/dL      A/G Ratio 1.8 g/dL      BUN/Creatinine Ratio 15.6     Anion Gap 10.0 mmol/L      eGFR 103.4 mL/min/1.73     Narrative:      GFR Normal >60  Chronic Kidney Disease <60  Kidney Failure <15      CBC & Differential [[id number]]  (Abnormal) Collected: 09/24/23 0659    Specimen: Blood Updated: 09/24/23 0721    Narrative:      The following orders were created for panel order CBC & Differential.  Procedure                               Abnormality         Status                     ---------                               -----------         ------                     CBC Auto Differential[[id number]]        Abnormal            Final result                 Please view results for these tests on the individual orders.    CBC Auto Differential [[id number]]  (Abnormal) Collected: 09/24/23 0659    Specimen: Blood Updated: 09/24/23 0721     WBC 11.09 10*3/mm3      RBC 4.80 10*6/mm3      Hemoglobin 14.2 g/dL      Hematocrit 42.1 %      MCV 87.7 fL      MCH 29.6 pg      MCHC 33.7 g/dL      RDW 11.7 %      RDW-SD 37.3 fl      MPV 9.5 fL      Platelets 148 10*3/mm3      Neutrophil % 83.1 %      Lymphocyte % 11.3 %      Monocyte % 4.1 %      Eosinophil % 0.5 %      Basophil % 0.4 %      Immature Grans % 0.6 %      Neutrophils, Absolute 9.23 10*3/mm3      Lymphocytes, Absolute 1.25 10*3/mm3      Monocytes, Absolute 0.45 10*3/mm3      Eosinophils, Absolute 0.05 10*3/mm3      Basophils, Absolute 0.04 10*3/mm3      Immature Grans, Absolute 0.07 10*3/mm3      nRBC 0.0 /100 WBC              XR Chest PA & Lateral    Result Date: 9/23/2023  No significant abnormality of the chest.    FL Esophagram Complete Double-Contrast    Result Date: 9/25/2023  1. Normal esophagram and upper GI series.       HOSPITAL COURSE:    Jung Selby initially presented to the ED with \"hot flashing, like tingling burning across my chest\" acute on chronic chest pain that persisted for 3 hours.  Initial cardiac work-up was negative, including troponins, " proBNP, echo, EKGs, and recent CT angiogram coronary.  He was admitted to the [facility] hospital team for further evaluation.  Cardiology and GI were consulted to investigate possible causes of chest pain including vasospastic angina and/or esophageal spasms.  His home Celexa was discontinued due to adverse effect of hot flash, particularly over his chest.  He was initiated on nifedipine XL and transdermal lidocaine patches.  Esophageal studies including barium swallow study were unremarkable for acute or chronic processes.  GI and cardiology signed off with no evidence of acute cardiac or gastrointestinal processes accounting for chest pain.  After initiation of nifedipine XL and lidocaine patches, symptoms began to show improvement.  He was discharged to home in stable condition with new prescriptions of nifedipine XL 30 mg, lidocaine patches, and Cymbalta 60 mg for anxiety and skeletal muscle pain.  He was instructed to follow-up with PCP for continued evaluation of tolerance to medications and further resolution of symptoms.    DISCHARGE CONDITION:   Stable    Physical Exam:  Physical Exam  Vitals reviewed.   Constitutional:       General: He is not in acute distress.     Appearance: Normal appearance.   HENT:      Head: Normocephalic and atraumatic.      Right Ear: External ear normal.      Left Ear: External ear normal.      Nose: Nose normal. No congestion or rhinorrhea.      Mouth/Throat:      Mouth: Mucous membranes are moist.   Eyes:      Extraocular Movements: Extraocular movements intact.      Conjunctiva/sclera: Conjunctivae normal.      Pupils: Pupils are equal, round, and reactive to light.   Cardiovascular:      Rate and Rhythm: Normal rate and regular rhythm.      Pulses: Normal pulses.      Heart sounds: Normal heart sounds. No murmur heard.    No friction rub. No gallop.   Pulmonary:      Effort: Pulmonary effort is normal.      Breath sounds: Normal breath sounds. No wheezing,  rhonchi or rales.   Abdominal:      General: Abdomen is flat. Bowel sounds are normal. There is no distension.      Palpations: Abdomen is soft.      Tenderness: There is no abdominal tenderness.   Musculoskeletal:         General: Swelling (Left chest) and tenderness (Left chest) present.      Cervical back: Neck supple.   Lymphadenopathy:      Cervical: No cervical adenopathy.   Skin:     General: Skin is warm and dry.      Capillary Refill: Capillary refill takes less than 2 seconds.      Coloration: Skin is not jaundiced.      Findings: No rash.   Neurological:      General: No focal deficit present.      Mental Status: He is alert and oriented to person, place, and time.   Psychiatric:         Mood and Affect: Mood normal.         Behavior: Behavior normal.       DISPOSITION:  Home or Self Care    DISCHARGE MEDICATIONS     Discharge Medications        New Medications        Instructions Start Date   DULoxetine 60 MG capsule  Commonly known as: CYMBALTA   60 mg, Oral, Daily, Take 0.5 capsule daily for 7 days then 1 full capsule afterward.      lidocaine 5 %  Commonly known as: LIDODERM   1 patch, Transdermal, Every 24 Hours Scheduled, Remove & Discard patch within 12 hours or as directed by MD   Start Date: September 27, 2023     NIFEdipine CC 30 MG 24 hr tablet  Commonly known as: ADALAT CC   30 mg, Oral, Nightly             Continue These Medications        Instructions Start Date   aspirin 81 MG EC tablet   81 mg, Oral, Daily      cetirizine 10 MG tablet  Commonly known as: zyrTEC   10 mg, Oral, Daily      levothyroxine 25 MCG tablet  Commonly known as: SYNTHROID, LEVOTHROID   25 mcg, Oral      omeprazole 20 MG capsule  Commonly known as: priLOSEC   20 mg, Oral, Daily      pravastatin 10 MG tablet  Commonly known as: PRAVACHOL   10 mg, Oral, Nightly      vitamin C 250 MG tablet  Commonly known as: ASCORBIC ACID   250 mg, Oral, Daily               INSTRUCTIONS:  Activity:   Activity Instructions       Activity  as Tolerated            Diet:   Diet Instructions       Diet: Cardiac Diets; Healthy Heart (2-3 Na+); Thin (IDDSI 0)      Discharge Diet: Cardiac Diets    Cardiac Diet: Healthy Heart (2-3 Na+)    Fluid Consistency: Thin (IDDSI 0)            FOLLOW UP:   Additional Instructions for the Follow-ups that You Need to Schedule       Call MD With Problems / Concerns   As directed      Please take medications as prescribed.  Please follow with PCP in one week  Call PCP or return to ED should you develop lightheadedness, confusion, chest pain, shortness of breath or any other concerning symptoms.    Order Comments: Please take medications as prescribed. Please follow with PCP in one week Call PCP or return to ED should you develop lightheadedness, confusion, chest pain, shortness of breath or any other concerning symptoms.                Follow-up Information       Giselle Richards APRN. Go on 10/3/2023.    Specialty: Family Medicine  Why: TUESDAY OCTOBER 3RD 1:00 HOSPITAL FOLLOW UP APPOINTMENT WITH SEAN RACHEL  Contact information:  [address] [place] [address]  [phone]                             PENDING TEST RESULTS AT DISCHARGE      Time: >30 minutes were spent in discharge planning, medication reconciliation and coordination of care for this patient.    Dr. Esequiel Acevedo is the attending at time of discharge, He is aware of the patient's status and agrees with the above discharge summary.      [facility]  [address] [place] [address]  Office: [phone]  This document has been electronically signed by Billy Trujillo DO on September 26, 2023 13:43 CDT

## 2023-12-10 NOTE — PROGRESS NOTE ADULT - PROBLEM SELECTOR PLAN 1
c/b acute on chronic hypoxic respiratory failure  Noted result of CTPA.  - No PE, cont meropenem  - titrate down on HFNC as tolerated  - Bactrim DS 2 tab q8 for empiric PJP PNA treatment, cont bactrim, f/u sputum pjp pcr  -wean high flow as tolerated currently on 20/30  monitor resp status closely  -ID prateek, Discussed with ID attending on 12/7,  Pulm consult appreciatd per pulm As O2 requirements are improving and patient feeling subjectively better, will hold off empiric steroids for now. If O2 requirements again plateau or worse, will reconsider empiric steroids or bronchoscopic evaluation with BAL/TBBX.  -rvp with adenovirus, pro Bnp wnl for age, echo showed Left ventricular systolic function is normal  -rpt ct 12/8  showed Diffuse lung disease with evolving bilateral lung opacities since   11/27/2023, worsening in the upper-mid lungs and improving in the lower lobes. Decreased trace pleural effusions.  Multifocal osteosclerotic metastases. Decreasing upper paraspinal postoperative fluid collection.

## 2023-12-10 NOTE — PROGRESS NOTE ADULT - SUBJECTIVE AND OBJECTIVE BOX
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         MEDICATIONS  (STANDING):  acetaminophen     Tablet .. 975 milliGRAM(s) Oral every 6 hours  atorvastatin 20 milliGRAM(s) Oral at bedtime  bicalutamide 50 milliGRAM(s) Oral daily  chlorhexidine 2% Cloths 1 Application(s) Topical daily  cholecalciferol 2000 Unit(s) Oral daily  dextrose 5%. 1000 milliLiter(s) (50 mL/Hr) IV Continuous <Continuous>  dextrose 5%. 1000 milliLiter(s) (100 mL/Hr) IV Continuous <Continuous>  dextrose 50% Injectable 25 Gram(s) IV Push once  dextrose 50% Injectable 25 Gram(s) IV Push once  dextrose 50% Injectable 12.5 Gram(s) IV Push once  glucagon  Injectable 1 milliGRAM(s) IntraMuscular once  heparin   Injectable 5000 Unit(s) SubCutaneous every 8 hours  lactobacillus acidophilus 1 Tablet(s) Oral daily  lisinopril 5 milliGRAM(s) Oral daily  meropenem  IVPB 1000 milliGRAM(s) IV Intermittent every 8 hours  pantoprazole  Injectable 40 milliGRAM(s) IV Push daily  tamsulosin 0.4 milliGRAM(s) Oral at bedtime  trimethoprim  160 mG/sulfamethoxazole 800 mG 2 Tablet(s) Oral three times a day    MEDICATIONS  (PRN):  benzocaine/menthol Lozenge 1 Lozenge Oral every 6 hours PRN Sore Throat  dextrose Oral Gel 15 Gram(s) Oral once PRN Blood Glucose LESS THAN 70 milliGRAM(s)/deciliter  guaiFENesin Oral Liquid (Sugar-Free) 200 milliGRAM(s) Oral every 6 hours PRN Cough  levalbuterol Inhalation 0.63 milliGRAM(s) Inhalation every 6 hours PRN shortness of breath/wheezing  naloxone Injectable 0.1 milliGRAM(s) IV Push every 3 minutes PRN For ANY of the following changes in patient status:  A. RR LESS THAN 10 breaths per minute, B. Oxygen saturation LESS THAN 90%, C. Sedation score of 6  ondansetron Injectable 4 milliGRAM(s) IV Push every 6 hours PRN Nausea      Vital Signs Last 24 Hrs  T(C): 36.4 (10 Dec 2023 13:24), Max: 37.1 (09 Dec 2023 16:00)  T(F): 97.6 (10 Dec 2023 13:24), Max: 98.7 (09 Dec 2023 16:00)  HR: 95 (10 Dec 2023 13:24) (78 - 101)  BP: 113/66 (10 Dec 2023 13:24) (113/66 - 141/81)  BP(mean): --  RR: 19 (10 Dec 2023 13:24) (18 - 21)  SpO2: 95% (10 Dec 2023 13:24) (95% - 99%)    Parameters below as of 10 Dec 2023 13:24  Patient On (Oxygen Delivery Method): nasal cannula      CAPILLARY BLOOD GLUCOSE      POCT Blood Glucose.: 126 mg/dL (10 Dec 2023 12:12)  POCT Blood Glucose.: 103 mg/dL (10 Dec 2023 09:00)  POCT Blood Glucose.: 113 mg/dL (09 Dec 2023 23:01)  POCT Blood Glucose.: 110 mg/dL (09 Dec 2023 17:44)    I&O's Summary    09 Dec 2023 07:01  -  10 Dec 2023 07:00  --------------------------------------------------------  IN: 0 mL / OUT: 400 mL / NET: -400 mL        PHYSICAL EXAM:  GENERAL: NAD  CHEST/LUNG: Clear to auscultation bilaterally; No wheeze, on high flow oxygen  HEART: Regular rate and rhythm  ABDOMEN: Soft, Nontender, Nondistended  EXTREMITIES: no LE edema  PSYCH: Calm  NEUROLOGY: AA answers questions appropriately  SKIN: dry and warm    LABS:                        9.2    9.60  )-----------( 320      ( 10 Dec 2023 06:30 )             30.6     12-10    135  |  100  |  15  ----------------------------<  83  5.4<H>   |  16<L>  |  0.89    Ca    8.9      10 Dec 2023 06:30  Phos  3.3     12-10  Mg     2.20     12-10            Urinalysis Basic - ( 10 Dec 2023 06:30 )    Color: x / Appearance: x / SG: x / pH: x  Gluc: 83 mg/dL / Ketone: x  / Bili: x / Urobili: x   Blood: x / Protein: x / Nitrite: x   Leuk Esterase: x / RBC: x / WBC x   Sq Epi: x / Non Sq Epi: x / Bacteria: x        RADIOLOGY & ADDITIONAL TESTS:  < from: TTE W or WO Ultrasound Enhancing Agent (12.09.23 @ 12:23) >  Left ventricular systolic function is normal with an ejection fraction of 63 % by Diggs's method of disks. There are no regional wall motion abnormalities seen.   2. Normal filling pressure.   3. The left atrium is normal.   4. Normal atria.   5. Fibrocalcificaortic valve sclerosis without stenosis.   6. Pulmonary artery systolic pressure could not be estimated.   7. No pericardial effusion seen.   8. Compared to the transthoracic echocardiogram performed on 11/18/2023 there have been no significant interval changes.      < end of copied text >    Imaging Personally Reviewed:    Consultant(s) Notes Reviewed:      Care Discussed with Consultants/Other Providers:

## 2023-12-11 LAB
ANION GAP SERPL CALC-SCNC: 14 MMOL/L — SIGNIFICANT CHANGE UP (ref 7–14)
ANION GAP SERPL CALC-SCNC: 14 MMOL/L — SIGNIFICANT CHANGE UP (ref 7–14)
BASOPHILS # BLD AUTO: 0.06 K/UL — SIGNIFICANT CHANGE UP (ref 0–0.2)
BASOPHILS # BLD AUTO: 0.06 K/UL — SIGNIFICANT CHANGE UP (ref 0–0.2)
BASOPHILS NFR BLD AUTO: 0.9 % — SIGNIFICANT CHANGE UP (ref 0–2)
BASOPHILS NFR BLD AUTO: 0.9 % — SIGNIFICANT CHANGE UP (ref 0–2)
BUN SERPL-MCNC: 21 MG/DL — SIGNIFICANT CHANGE UP (ref 7–23)
BUN SERPL-MCNC: 21 MG/DL — SIGNIFICANT CHANGE UP (ref 7–23)
CALCIUM SERPL-MCNC: 9.1 MG/DL — SIGNIFICANT CHANGE UP (ref 8.4–10.5)
CALCIUM SERPL-MCNC: 9.1 MG/DL — SIGNIFICANT CHANGE UP (ref 8.4–10.5)
CHLORIDE SERPL-SCNC: 103 MMOL/L — SIGNIFICANT CHANGE UP (ref 98–107)
CHLORIDE SERPL-SCNC: 103 MMOL/L — SIGNIFICANT CHANGE UP (ref 98–107)
CO2 SERPL-SCNC: 22 MMOL/L — SIGNIFICANT CHANGE UP (ref 22–31)
CO2 SERPL-SCNC: 22 MMOL/L — SIGNIFICANT CHANGE UP (ref 22–31)
CREAT SERPL-MCNC: 0.97 MG/DL — SIGNIFICANT CHANGE UP (ref 0.5–1.3)
CREAT SERPL-MCNC: 0.97 MG/DL — SIGNIFICANT CHANGE UP (ref 0.5–1.3)
EGFR: 83 ML/MIN/1.73M2 — SIGNIFICANT CHANGE UP
EGFR: 83 ML/MIN/1.73M2 — SIGNIFICANT CHANGE UP
EOSINOPHIL # BLD AUTO: 0.23 K/UL — SIGNIFICANT CHANGE UP (ref 0–0.5)
EOSINOPHIL # BLD AUTO: 0.23 K/UL — SIGNIFICANT CHANGE UP (ref 0–0.5)
EOSINOPHIL NFR BLD AUTO: 3.3 % — SIGNIFICANT CHANGE UP (ref 0–6)
EOSINOPHIL NFR BLD AUTO: 3.3 % — SIGNIFICANT CHANGE UP (ref 0–6)
GLUCOSE SERPL-MCNC: 86 MG/DL — SIGNIFICANT CHANGE UP (ref 70–99)
GLUCOSE SERPL-MCNC: 86 MG/DL — SIGNIFICANT CHANGE UP (ref 70–99)
HCT VFR BLD CALC: 28.3 % — LOW (ref 39–50)
HCT VFR BLD CALC: 28.3 % — LOW (ref 39–50)
HGB BLD-MCNC: 8.7 G/DL — LOW (ref 13–17)
HGB BLD-MCNC: 8.7 G/DL — LOW (ref 13–17)
IANC: 3.95 K/UL — SIGNIFICANT CHANGE UP (ref 1.8–7.4)
IANC: 3.95 K/UL — SIGNIFICANT CHANGE UP (ref 1.8–7.4)
IMM GRANULOCYTES NFR BLD AUTO: 1.6 % — HIGH (ref 0–0.9)
IMM GRANULOCYTES NFR BLD AUTO: 1.6 % — HIGH (ref 0–0.9)
LYMPHOCYTES # BLD AUTO: 1.99 K/UL — SIGNIFICANT CHANGE UP (ref 1–3.3)
LYMPHOCYTES # BLD AUTO: 1.99 K/UL — SIGNIFICANT CHANGE UP (ref 1–3.3)
LYMPHOCYTES # BLD AUTO: 28.8 % — SIGNIFICANT CHANGE UP (ref 13–44)
LYMPHOCYTES # BLD AUTO: 28.8 % — SIGNIFICANT CHANGE UP (ref 13–44)
MAGNESIUM SERPL-MCNC: 2.3 MG/DL — SIGNIFICANT CHANGE UP (ref 1.6–2.6)
MAGNESIUM SERPL-MCNC: 2.3 MG/DL — SIGNIFICANT CHANGE UP (ref 1.6–2.6)
MCHC RBC-ENTMCNC: 30.7 GM/DL — LOW (ref 32–36)
MCHC RBC-ENTMCNC: 30.7 GM/DL — LOW (ref 32–36)
MCHC RBC-ENTMCNC: 31 PG — SIGNIFICANT CHANGE UP (ref 27–34)
MCHC RBC-ENTMCNC: 31 PG — SIGNIFICANT CHANGE UP (ref 27–34)
MCV RBC AUTO: 100.7 FL — HIGH (ref 80–100)
MCV RBC AUTO: 100.7 FL — HIGH (ref 80–100)
MONOCYTES # BLD AUTO: 0.58 K/UL — SIGNIFICANT CHANGE UP (ref 0–0.9)
MONOCYTES # BLD AUTO: 0.58 K/UL — SIGNIFICANT CHANGE UP (ref 0–0.9)
MONOCYTES NFR BLD AUTO: 8.4 % — SIGNIFICANT CHANGE UP (ref 2–14)
MONOCYTES NFR BLD AUTO: 8.4 % — SIGNIFICANT CHANGE UP (ref 2–14)
NEUTROPHILS # BLD AUTO: 3.95 K/UL — SIGNIFICANT CHANGE UP (ref 1.8–7.4)
NEUTROPHILS # BLD AUTO: 3.95 K/UL — SIGNIFICANT CHANGE UP (ref 1.8–7.4)
NEUTROPHILS NFR BLD AUTO: 57 % — SIGNIFICANT CHANGE UP (ref 43–77)
NEUTROPHILS NFR BLD AUTO: 57 % — SIGNIFICANT CHANGE UP (ref 43–77)
NRBC # BLD: 0 /100 WBCS — SIGNIFICANT CHANGE UP (ref 0–0)
NRBC # BLD: 0 /100 WBCS — SIGNIFICANT CHANGE UP (ref 0–0)
NRBC # FLD: 0 K/UL — SIGNIFICANT CHANGE UP (ref 0–0)
NRBC # FLD: 0 K/UL — SIGNIFICANT CHANGE UP (ref 0–0)
P JIROVECII DNA L RESP QL NAA+NON-PROBE: NEGATIVE — SIGNIFICANT CHANGE UP
P JIROVECII DNA L RESP QL NAA+NON-PROBE: NEGATIVE — SIGNIFICANT CHANGE UP
PHOSPHATE SERPL-MCNC: 3 MG/DL — SIGNIFICANT CHANGE UP (ref 2.5–4.5)
PHOSPHATE SERPL-MCNC: 3 MG/DL — SIGNIFICANT CHANGE UP (ref 2.5–4.5)
PLATELET # BLD AUTO: 315 K/UL — SIGNIFICANT CHANGE UP (ref 150–400)
PLATELET # BLD AUTO: 315 K/UL — SIGNIFICANT CHANGE UP (ref 150–400)
POTASSIUM SERPL-MCNC: 5.1 MMOL/L — SIGNIFICANT CHANGE UP (ref 3.5–5.3)
POTASSIUM SERPL-MCNC: 5.1 MMOL/L — SIGNIFICANT CHANGE UP (ref 3.5–5.3)
POTASSIUM SERPL-SCNC: 5.1 MMOL/L — SIGNIFICANT CHANGE UP (ref 3.5–5.3)
POTASSIUM SERPL-SCNC: 5.1 MMOL/L — SIGNIFICANT CHANGE UP (ref 3.5–5.3)
RBC # BLD: 2.81 M/UL — LOW (ref 4.2–5.8)
RBC # BLD: 2.81 M/UL — LOW (ref 4.2–5.8)
RBC # FLD: 18.7 % — HIGH (ref 10.3–14.5)
RBC # FLD: 18.7 % — HIGH (ref 10.3–14.5)
SODIUM SERPL-SCNC: 139 MMOL/L — SIGNIFICANT CHANGE UP (ref 135–145)
SODIUM SERPL-SCNC: 139 MMOL/L — SIGNIFICANT CHANGE UP (ref 135–145)
SPECIMEN SOURCE: SIGNIFICANT CHANGE UP
SPECIMEN SOURCE: SIGNIFICANT CHANGE UP
WBC # BLD: 6.92 K/UL — SIGNIFICANT CHANGE UP (ref 3.8–10.5)
WBC # BLD: 6.92 K/UL — SIGNIFICANT CHANGE UP (ref 3.8–10.5)
WBC # FLD AUTO: 6.92 K/UL — SIGNIFICANT CHANGE UP (ref 3.8–10.5)
WBC # FLD AUTO: 6.92 K/UL — SIGNIFICANT CHANGE UP (ref 3.8–10.5)

## 2023-12-11 PROCEDURE — 99232 SBSQ HOSP IP/OBS MODERATE 35: CPT

## 2023-12-11 PROCEDURE — 99233 SBSQ HOSP IP/OBS HIGH 50: CPT | Mod: GC

## 2023-12-11 RX ADMIN — MEROPENEM 100 MILLIGRAM(S): 1 INJECTION INTRAVENOUS at 16:18

## 2023-12-11 RX ADMIN — Medication 2000 UNIT(S): at 11:48

## 2023-12-11 RX ADMIN — Medication 2 TABLET(S): at 00:15

## 2023-12-11 RX ADMIN — CHLORHEXIDINE GLUCONATE 1 APPLICATION(S): 213 SOLUTION TOPICAL at 11:57

## 2023-12-11 RX ADMIN — HEPARIN SODIUM 5000 UNIT(S): 5000 INJECTION INTRAVENOUS; SUBCUTANEOUS at 16:13

## 2023-12-11 RX ADMIN — BICALUTAMIDE 50 MILLIGRAM(S): 50 TABLET, FILM COATED ORAL at 11:47

## 2023-12-11 RX ADMIN — TAMSULOSIN HYDROCHLORIDE 0.4 MILLIGRAM(S): 0.4 CAPSULE ORAL at 21:19

## 2023-12-11 RX ADMIN — LISINOPRIL 5 MILLIGRAM(S): 2.5 TABLET ORAL at 08:31

## 2023-12-11 RX ADMIN — Medication 2 TABLET(S): at 16:12

## 2023-12-11 RX ADMIN — Medication 2 TABLET(S): at 08:33

## 2023-12-11 RX ADMIN — PANTOPRAZOLE SODIUM 40 MILLIGRAM(S): 20 TABLET, DELAYED RELEASE ORAL at 11:58

## 2023-12-11 RX ADMIN — MEROPENEM 100 MILLIGRAM(S): 1 INJECTION INTRAVENOUS at 08:34

## 2023-12-11 RX ADMIN — HEPARIN SODIUM 5000 UNIT(S): 5000 INJECTION INTRAVENOUS; SUBCUTANEOUS at 00:15

## 2023-12-11 RX ADMIN — Medication 1 TABLET(S): at 11:53

## 2023-12-11 RX ADMIN — MEROPENEM 100 MILLIGRAM(S): 1 INJECTION INTRAVENOUS at 00:15

## 2023-12-11 RX ADMIN — HEPARIN SODIUM 5000 UNIT(S): 5000 INJECTION INTRAVENOUS; SUBCUTANEOUS at 08:26

## 2023-12-11 RX ADMIN — ATORVASTATIN CALCIUM 20 MILLIGRAM(S): 80 TABLET, FILM COATED ORAL at 21:19

## 2023-12-11 NOTE — PROGRESS NOTE ADULT - ASSESSMENT
73M with HTN met prostate cancer (2009) with LE weakness and R hand paresthesias now s/p C5-T3 posterior cervical fusion and C7-T1 decompression for metastatic prostate cancer with plastics closure on 11/2. Course c/b acute hypoxic respiratory failure requiring HFNC.     #Acute Hypoxic Respiratory Failure   #Multifocal PNA  #Abnormal CT  #Fleeting / migrating pulmonary opacities     - From HFNC fio2 45% @ 40L/min to now 5L NC.  - pattern of fleeting / migratory pneumonia noted on CT chest 11/22 vs 11/27 vs 12/8   - currently on treatment for multilobar PNA with meropenem (11/29-) as well as treatment for PCP PNA with bactrim DS 2 tab q8. Discussed with ID. Reasonable to treat PCP for full 14 days to complete next week.   - bedside POCUS with no pleural effusions and with diffuse B lines but irregular pleura   - fungitell elevated.   - lower suspicion for pulmonary edema.   - Bicalutamide pneumonitis lower on ddx.   - Improving on current antimicrobials. Hold off on systemic steroids and/or bronchoscopy as patient is improving.      Dr. Tae Nunes, DO  Pulmonary and Critical Care Medicine Fellow   Available via Microsoft Teams - **Preferred**  Pulmonary Spectra #[id number] ([facility]) / [phone] ([facility])  Pager:  [phone]     73M with HTN met prostate cancer (2009) with LE weakness and R hand paresthesias now s/p C5-T3 posterior cervical fusion and C7-T1 decompression for metastatic prostate cancer with plastics closure on 11/2. Course c/b acute hypoxic respiratory failure requiring HFNC.     #Acute Hypoxic Respiratory Failure   #Multifocal PNA  #Abnormal CT  #Fleeting / migrating pulmonary opacities     - From HFNC fio2 45% @ 40L/min to now 5L NC.  - pattern of fleeting / migratory pneumonia noted on CT chest 11/22 vs 11/27 vs 12/8   - currently on treatment for multilobar PNA with meropenem (11/29-) as well as treatment for PCP PNA with bactrim DS 2 tab q8. Discussed with ID. Reasonable to treat PCP for full 14 days to complete next week.   - bedside POCUS with no pleural effusions and with diffuse B lines but irregular pleura   - fungitell elevated.   - lower suspicion for pulmonary edema.   - Bicalutamide pneumonitis lower on ddx.   - Improving on current antimicrobials. Hold off on systemic steroids and/or bronchoscopy as patient is improving.      Dr. Tae Nunes, DO  Pulmonary and Critical Care Medicine Fellow   Available via Microsoft Teams - **Preferred**  Pulmonary Spectra #[id number] ([facility]) / [id number] ([facility])  Pager:  [phone]

## 2023-12-11 NOTE — PROGRESS NOTE ADULT - PROBLEM SELECTOR PLAN 12
no obvious hemorrhage noted  - continue to monitor CBC  - s/p 1u PRBC on 11/25   - s/p 1u PRBC on 11/29      Left a message for daughter 12/11

## 2023-12-11 NOTE — PROGRESS NOTE ADULT - PROBLEM SELECTOR PLAN 1
Gram negative PNA in the setting of aspiration, c/b acute on chronic hypoxic respiratory failure  Noted result of CTPA 11/27. CT showed diffuse lung opacities, worsening on repeat CT 12/8 concerning for organizing PNA which led to suspicion for PJP  - No PE, cont meropenem  - titrate down on HFNC as tolerated  - Bactrim DS 2 tab q8 for empiric PJP PNA treatment, cont bactrim, f/u sputum pjp pcr  -s/p high flow, now on NC, no plan for bronch per pulm given improvement, will complete 14days of bactrim  -ID follg, will hold off empiric steroids for now. If O2 requirements again plateau or worse, will reconsider empiric steroids or bronchoscopic evaluation with BAL/TBBX.  -rvp with +ADENOVIRUS, TTE with no acute findings

## 2023-12-11 NOTE — PROGRESS NOTE ADULT - SUBJECTIVE AND OBJECTIVE BOX
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 Pt wants to go home    MEDICATIONS  (STANDING):  atorvastatin 20 milliGRAM(s) Oral at bedtime  bicalutamide 50 milliGRAM(s) Oral daily  chlorhexidine 2% Cloths 1 Application(s) Topical daily  cholecalciferol 2000 Unit(s) Oral daily  dextrose 5%. 1000 milliLiter(s) (50 mL/Hr) IV Continuous <Continuous>  dextrose 5%. 1000 milliLiter(s) (100 mL/Hr) IV Continuous <Continuous>  dextrose 50% Injectable 25 Gram(s) IV Push once  dextrose 50% Injectable 25 Gram(s) IV Push once  dextrose 50% Injectable 12.5 Gram(s) IV Push once  glucagon  Injectable 1 milliGRAM(s) IntraMuscular once  heparin   Injectable 5000 Unit(s) SubCutaneous every 8 hours  lactobacillus acidophilus 1 Tablet(s) Oral daily  lisinopril 5 milliGRAM(s) Oral daily  meropenem  IVPB 1000 milliGRAM(s) IV Intermittent every 8 hours  pantoprazole  Injectable 40 milliGRAM(s) IV Push daily  tamsulosin 0.4 milliGRAM(s) Oral at bedtime  trimethoprim  160 mG/sulfamethoxazole 800 mG 2 Tablet(s) Oral three times a day    MEDICATIONS  (PRN):  benzocaine/menthol Lozenge 1 Lozenge Oral every 6 hours PRN Sore Throat  dextrose Oral Gel 15 Gram(s) Oral once PRN Blood Glucose LESS THAN 70 milliGRAM(s)/deciliter  guaiFENesin Oral Liquid (Sugar-Free) 200 milliGRAM(s) Oral every 6 hours PRN Cough  levalbuterol Inhalation 0.63 milliGRAM(s) Inhalation every 6 hours PRN shortness of breath/wheezing  naloxone Injectable 0.1 milliGRAM(s) IV Push every 3 minutes PRN For ANY of the following changes in patient status:  A. RR LESS THAN 10 breaths per minute, B. Oxygen saturation LESS THAN 90%, C. Sedation score of 6  ondansetron Injectable 4 milliGRAM(s) IV Push every 6 hours PRN Nausea      CAPILLARY BLOOD GLUCOSE      POCT Blood Glucose.: 103 mg/dL (11 Dec 2023 11:44)  POCT Blood Glucose.: 113 mg/dL (11 Dec 2023 08:47)  POCT Blood Glucose.: 146 mg/dL (10 Dec 2023 21:20)  POCT Blood Glucose.: 113 mg/dL (10 Dec 2023 17:56)    I&O's Summary    10 Dec 2023 07:01  -  11 Dec 2023 07:00  --------------------------------------------------------  IN: 0 mL / OUT: 400 mL / NET: -400 mL        PHYSICAL EXAM:  Vital Signs Last 24 Hrs  T(C): 36.6 (11 Dec 2023 08:00), Max: 36.8 (10 Dec 2023 20:51)  T(F): 97.9 (11 Dec 2023 08:00), Max: 98.2 (10 Dec 2023 20:51)  HR: 83 (11 Dec 2023 08:00) (83 - 92)  BP: 120/72 (11 Dec 2023 08:00) (107/61 - 120/72)  BP(mean): --  RR: 19 (11 Dec 2023 08:00) (19 - 20)  SpO2: 100% (11 Dec 2023 08:00) (87% - 100%)    Parameters below as of 11 Dec 2023 08:00  Patient On (Oxygen Delivery Method): nasal cannula  O2 Flow (L/min): 5    CONSTITUTIONAL: NAD  EYES: conjunctiva and sclera clear  ENMT: mmm  NECK: Supple,  RESPIRATORY: decreased breath sound at the bases  CARDIOVASCULAR: Regular rate and rhythm, + S1 and S2  ABDOMEN: Nontender to palpation, normoactive bowel sounds, no rebound/guarding  PSYCH: A+O x 3    LABS:                        8.7    6.92  )-----------( 315      ( 11 Dec 2023 06:05 )             28.3     12-11    139  |  103  |  21  ----------------------------<  86  5.1   |  22  |  0.97    Ca    9.1      11 Dec 2023 06:05  Phos  3.0     12-11  Mg     2.30     12-11            Urinalysis Basic - ( 11 Dec 2023 06:05 )    Color: x / Appearance: x / SG: x / pH: x  Gluc: 86 mg/dL / Ketone: x  / Bili: x / Urobili: x   Blood: x / Protein: x / Nitrite: x   Leuk Esterase: x / RBC: x / WBC x   Sq Epi: x / Non Sq Epi: x / Bacteria: x               [facility] of Hospital Medicine  Zach Villagran MD  Pager [phone]      Patient is a 73y old  Male who presents with a chief complaint of 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 Pt wants to go home    MEDICATIONS  (STANDING):  atorvastatin 20 milliGRAM(s) Oral at bedtime  bicalutamide 50 milliGRAM(s) Oral daily  chlorhexidine 2% Cloths 1 Application(s) Topical daily  cholecalciferol 2000 Unit(s) Oral daily  dextrose 5%. 1000 milliLiter(s) (50 mL/Hr) IV Continuous <Continuous>  dextrose 5%. 1000 milliLiter(s) (100 mL/Hr) IV Continuous <Continuous>  dextrose 50% Injectable 25 Gram(s) IV Push once  dextrose 50% Injectable 25 Gram(s) IV Push once  dextrose 50% Injectable 12.5 Gram(s) IV Push once  glucagon  Injectable 1 milliGRAM(s) IntraMuscular once  heparin   Injectable 5000 Unit(s) SubCutaneous every 8 hours  lactobacillus acidophilus 1 Tablet(s) Oral daily  lisinopril 5 milliGRAM(s) Oral daily  meropenem  IVPB 1000 milliGRAM(s) IV Intermittent every 8 hours  pantoprazole  Injectable 40 milliGRAM(s) IV Push daily  tamsulosin 0.4 milliGRAM(s) Oral at bedtime  trimethoprim  160 mG/sulfamethoxazole 800 mG 2 Tablet(s) Oral three times a day    MEDICATIONS  (PRN):  benzocaine/menthol Lozenge 1 Lozenge Oral every 6 hours PRN Sore Throat  dextrose Oral Gel 15 Gram(s) Oral once PRN Blood Glucose LESS THAN 70 milliGRAM(s)/deciliter  guaiFENesin Oral Liquid (Sugar-Free) 200 milliGRAM(s) Oral every 6 hours PRN Cough  levalbuterol Inhalation 0.63 milliGRAM(s) Inhalation every 6 hours PRN shortness of breath/wheezing  naloxone Injectable 0.1 milliGRAM(s) IV Push every 3 minutes PRN For ANY of the following changes in patient status:  A. RR LESS THAN 10 breaths per minute, B. Oxygen saturation LESS THAN 90%, C. Sedation score of 6  ondansetron Injectable 4 milliGRAM(s) IV Push every 6 hours PRN Nausea      CAPILLARY BLOOD GLUCOSE      POCT Blood Glucose.: 103 mg/dL (11 Dec 2023 11:44)  POCT Blood Glucose.: 113 mg/dL (11 Dec 2023 08:47)  POCT Blood Glucose.: 146 mg/dL (10 Dec 2023 21:20)  POCT Blood Glucose.: 113 mg/dL (10 Dec 2023 17:56)    I&O's Summary    10 Dec 2023 07:01  -  11 Dec 2023 07:00  --------------------------------------------------------  IN: 0 mL / OUT: 400 mL / NET: -400 mL        PHYSICAL EXAM:  Vital Signs Last 24 Hrs  T(C): 36.6 (11 Dec 2023 08:00), Max: 36.8 (10 Dec 2023 20:51)  T(F): 97.9 (11 Dec 2023 08:00), Max: 98.2 (10 Dec 2023 20:51)  HR: 83 (11 Dec 2023 08:00) (83 - 92)  BP: 120/72 (11 Dec 2023 08:00) (107/61 - 120/72)  BP(mean): --  RR: 19 (11 Dec 2023 08:00) (19 - 20)  SpO2: 100% (11 Dec 2023 08:00) (87% - 100%)    Parameters below as of 11 Dec 2023 08:00  Patient On (Oxygen Delivery Method): nasal cannula  O2 Flow (L/min): 5    CONSTITUTIONAL: NAD  EYES: conjunctiva and sclera clear  ENMT: mmm  NECK: Supple,  RESPIRATORY: decreased breath sound at the bases  CARDIOVASCULAR: Regular rate and rhythm, + S1 and S2  ABDOMEN: Nontender to palpation, normoactive bowel sounds, no rebound/guarding  PSYCH: A+O x 3    LABS:                        8.7    6.92  )-----------( 315      ( 11 Dec 2023 06:05 )             28.3     12-11    139  |  103  |  21  ----------------------------<  86  5.1   |  22  |  0.97    Ca    9.1      11 Dec 2023 06:05  Phos  3.0     12-11  Mg     2.30     12-11            Urinalysis Basic - ( 11 Dec 2023 06:05 )    Color: x / Appearance: x / SG: x / pH: x  Gluc: 86 mg/dL / Ketone: x  / Bili: x / Urobili: x   Blood: x / Protein: x / Nitrite: x   Leuk Esterase: x / RBC: x / WBC x   Sq Epi: x / Non Sq Epi: x / Bacteria: x

## 2023-12-11 NOTE — PROGRESS NOTE ADULT - SUBJECTIVE AND OBJECTIVE BOX
PULMONARY SERVICE FOLLOW UP CONSULT NOTE    SUBJECTIVE:  Reports improving in dyspnea.      REVIEW OF SYSTEMS:  All additional ROS negative.    MEDICATIONS:  Pulmonary:  guaiFENesin Oral Liquid (Sugar-Free) 200 milliGRAM(s) Oral every 6 hours PRN  levalbuterol Inhalation 0.63 milliGRAM(s) Inhalation every 6 hours PRN    Antimicrobials:  meropenem  IVPB 1000 milliGRAM(s) IV Intermittent every 8 hours  trimethoprim  160 mG/sulfamethoxazole 800 mG 2 Tablet(s) Oral three times a day    Anticoagulants:  heparin   Injectable 5000 Unit(s) SubCutaneous every 8 hours    Onc:  bicalutamide 50 milliGRAM(s) Oral daily    GI/:  pantoprazole  Injectable 40 milliGRAM(s) IV Push daily  tamsulosin 0.4 milliGRAM(s) Oral at bedtime    Endocrine:  atorvastatin 20 milliGRAM(s) Oral at bedtime  dextrose 50% Injectable 25 Gram(s) IV Push once  dextrose 50% Injectable 25 Gram(s) IV Push once  dextrose 50% Injectable 12.5 Gram(s) IV Push once  dextrose Oral Gel 15 Gram(s) Oral once PRN  glucagon  Injectable 1 milliGRAM(s) IntraMuscular once    Cardiac:  lisinopril 5 milliGRAM(s) Oral daily    Other Medications:  benzocaine/menthol Lozenge 1 Lozenge Oral every 6 hours PRN  chlorhexidine 2% Cloths 1 Application(s) Topical daily  cholecalciferol 2000 Unit(s) Oral daily  dextrose 5%. 1000 milliLiter(s) IV Continuous <Continuous>  dextrose 5%. 1000 milliLiter(s) IV Continuous <Continuous>  lactobacillus acidophilus 1 Tablet(s) Oral daily  naloxone Injectable 0.1 milliGRAM(s) IV Push every 3 minutes PRN  ondansetron Injectable 4 milliGRAM(s) IV Push every 6 hours PRN      PHYSICAL EXAM  Vital Signs Last 24 Hrs  T(C): 36.6 (11 Dec 2023 08:00), Max: 36.8 (10 Dec 2023 20:51)  T(F): 97.9 (11 Dec 2023 08:00), Max: 98.2 (10 Dec 2023 20:51)  HR: 83 (11 Dec 2023 08:00) (83 - 95)  BP: 120/72 (11 Dec 2023 08:00) (107/61 - 120/72)  BP(mean): --  RR: 19 (11 Dec 2023 08:00) (19 - 20)  SpO2: 100% (11 Dec 2023 08:00) (87% - 100%)    Parameters below as of 11 Dec 2023 08:00  Patient On (Oxygen Delivery Method): nasal cannula  O2 Flow (L/min): 5      12-10 @ 07:01  -  12-11 @ 07:00  --------------------------------------------------------  IN: 0 mL / OUT: 400 mL / NET: -400 mL            CONSTITUTIONAL: No acute distress.   HEENT:  Conjunctiva clear B/L.  Moist oral mucosa.   Cardiovascular: RRR with no murmurs. No JVD noted. No lower extremity edema B/L. Extremities are warm and well perfused.    Respiratory: basilar rales. No accessory muscle use.   Gastrointestinal:  Soft, nontender. Non-distended. Non-rigid.    Neurologic:  Alert and awake. Moving all extremities. Following commands.    Skin:  No gross rashes notes.    LABS:      CBC Full  -  ( 11 Dec 2023 06:05 )  WBC Count : 6.92 K/uL  RBC Count : 2.81 M/uL  Hemoglobin : 8.7 g/dL  Hematocrit : 28.3 %  Platelet Count - Automated : 315 K/uL  Mean Cell Volume : 100.7 fL  Mean Cell Hemoglobin : 31.0 pg  Mean Cell Hemoglobin Concentration : 30.7 gm/dL  Auto Neutrophil # : 3.95 K/uL  Auto Lymphocyte # : 1.99 K/uL  Auto Monocyte # : 0.58 K/uL  Auto Eosinophil # : 0.23 K/uL  Auto Basophil # : 0.06 K/uL  Auto Neutrophil % : 57.0 %  Auto Lymphocyte % : 28.8 %  Auto Monocyte % : 8.4 %  Auto Eosinophil % : 3.3 %  Auto Basophil % : 0.9 %    12-11    139  |  103  |  21  ----------------------------<  86  5.1   |  22  |  0.97    Ca    9.1      11 Dec 2023 06:05  Phos  3.0     12-11  Mg     2.30     12-11            Urinalysis Basic - ( 11 Dec 2023 06:05 )    Color: x / Appearance: x / SG: x / pH: x  Gluc: 86 mg/dL / Ketone: x  / Bili: x / Urobili: x   Blood: x / Protein: x / Nitrite: x   Leuk Esterase: x / RBC: x / WBC x   Sq Epi: x / Non Sq Epi: x / Bacteria: x                RADIOLOGY & ADDITIONAL STUDIES:

## 2023-12-11 NOTE — PROGRESS NOTE ADULT - ATTENDING COMMENTS
Patient is a 72 yo M w/ HTN, metastatic prostate ca (2009) with LE weakness and R hand paresthesias now s/p C5-T3 posterior cervical fusion and C7-T1 decompression for metastatic prostate cancer with plastics closure on 11/2. Hospital course has been c/b MSSA bacteremia (repeat blood cultures negative since then) and acute hypoxic respiratory failure requiring HFNC. Of note, patient tested positive for adenovirus on 11/15 and 11/24. O2 requirements significantly increased on 11/28, initially requiring HFNC 100%. Patient was started empirically on PCP tx with Bactrim, O2 requirements decreased.     #Acute Hypoxic Respiratory Failure - Unclear etiology. Improved with Meropenem and empiric Bactrim. CT chest on 11/22 with RLL consolidation. Repeat CT chest on 11/27 with multifocal consolidations, mostly peripheral, of note with RLL consolidaiton on initial CT gone. Fungitell elevated. Possible organizing pneumonia vs atypical infectious vs infectious vs pneumonitis (rare but possible with bicalutamide). Repeat CT 12/7 with lower lobe consolidation improved, upper lobe slightly worsened.  #Multifocal PNA  #Shortness of Breath  - Patient is now down to 3L NC while laying in bed. Please assess exercise oximetry  - As O2 requirements are improving and patient feeling subjectively better, will hold off empiric steroids for now. If O2 requirements again plateau or worse, will reconsider empiric steroids or bronchoscopic evaluation with BAL/TBBX. Discussed with ID, would preferentially not start empiric steroids given possible infectious collection on MRI.    Will continue to follow

## 2023-12-11 NOTE — PROGRESS NOTE ADULT - SUBJECTIVE AND OBJECTIVE BOX
Follow Up:      Inverval History/ROS:Patient is a 73y old  Male who presents with a chief complaint of Diffuse Spinal Mets from Prostate Cancer (11 Dec 2023 16:16)    Less SOB  On 4-5 L at the time of my evaluation  Decrease to 2L at time note was written      Allergies    No Known Allergies    Intolerances        ANTIMICROBIALS:  meropenem  IVPB 1000 every 8 hours  trimethoprim  160 mG/sulfamethoxazole 800 mG 2 three times a day      OTHER MEDS:  atorvastatin 20 milliGRAM(s) Oral at bedtime  benzocaine/menthol Lozenge 1 Lozenge Oral every 6 hours PRN  bicalutamide 50 milliGRAM(s) Oral daily  chlorhexidine 2% Cloths 1 Application(s) Topical daily  cholecalciferol 2000 Unit(s) Oral daily  dextrose 5%. 1000 milliLiter(s) IV Continuous <Continuous>  dextrose 5%. 1000 milliLiter(s) IV Continuous <Continuous>  dextrose 50% Injectable 25 Gram(s) IV Push once  dextrose 50% Injectable 25 Gram(s) IV Push once  dextrose 50% Injectable 12.5 Gram(s) IV Push once  dextrose Oral Gel 15 Gram(s) Oral once PRN  glucagon  Injectable 1 milliGRAM(s) IntraMuscular once  guaiFENesin Oral Liquid (Sugar-Free) 200 milliGRAM(s) Oral every 6 hours PRN  heparin   Injectable 5000 Unit(s) SubCutaneous every 8 hours  lactobacillus acidophilus 1 Tablet(s) Oral daily  levalbuterol Inhalation 0.63 milliGRAM(s) Inhalation every 6 hours PRN  lisinopril 5 milliGRAM(s) Oral daily  naloxone Injectable 0.1 milliGRAM(s) IV Push every 3 minutes PRN  ondansetron Injectable 4 milliGRAM(s) IV Push every 6 hours PRN  pantoprazole  Injectable 40 milliGRAM(s) IV Push daily  tamsulosin 0.4 milliGRAM(s) Oral at bedtime      Vital Signs Last 24 Hrs  T(C): 36.3 (11 Dec 2023 20:07), Max: 36.9 (11 Dec 2023 16:00)  T(F): 97.3 (11 Dec 2023 20:07), Max: 98.5 (11 Dec 2023 16:00)  HR: 93 (11 Dec 2023 20:07) (83 - 93)  BP: 105/65 (11 Dec 2023 20:07) (105/65 - 120/72)  BP(mean): --  RR: 18 (11 Dec 2023 20:07) (18 - 20)  SpO2: 97% (11 Dec 2023 20:07) (97% - 100%)    Parameters below as of 11 Dec 2023 20:07  Patient On (Oxygen Delivery Method): nasal cannula        PHYSICAL EXAM:  General: [x ] non-toxic  HEAD/EYES: [ ] PERRL x[ ] white sclera [ ] icterus  ENT:  [ ] normal [x ] supple [ ] thrush [ ] pharyngeal exudate  Cardiovascular:   [ ] murmur [x ] normal [ ] PPM/AICD  Respiratory:  [x ] clear to ausculation bilaterally  GI:  [x ] soft, non-tender, normal bowel sounds  :  [ ] best [ ] no CVA tenderness   Musculoskeletal:  [ ] no synovitis  Neurologic:  [ ] non-focal exam   Skin:  [x ] no rash  Lymph: [x ] no lymphadenopathy  Psychiatric:  [ ] appropriate affect [ ] alert & oriented  Lines:  [ x] no phlebitis [ ] central line                                8.7    6.92  )-----------( 315      ( 11 Dec 2023 06:05 )             28.3       12-11    139  |  103  |  21  ----------------------------<  86  5.1   |  22  |  0.97    Ca    9.1      11 Dec 2023 06:05  Phos  3.0     12-11  Mg     2.30     12-11        Urinalysis Basic - ( 11 Dec 2023 06:05 )    Color: x / Appearance: x / SG: x / pH: x  Gluc: 86 mg/dL / Ketone: x  / Bili: x / Urobili: x   Blood: x / Protein: x / Nitrite: x   Leuk Esterase: x / RBC: x / WBC x   Sq Epi: x / Non Sq Epi: x / Bacteria: x        MICROBIOLOGY:    RADIOLOGY:

## 2023-12-11 NOTE — PROGRESS NOTE ADULT - ASSESSMENT
73m with known metastatic prostate CA admitted initially to [facility] after falling and weakness. Found to be in rhabdo with SRUTHI.  Imaging noted spinal fracture with edema and lytic metastatic disease from C7, T1-T5, T12, and L1-L3 as well as epidural expansion of neoplasm at C7 centrally and dorsally resulting in moderate cord compression and underlying edema/extension into the bilateral C7-T1 and T1-2 and Left T2-3 neural foramina. Epidural disease was also noted at T12 vertebral body.   11/2, he underwent C7-T1 decompression, C5-T3 posterior cervical fusion.  Hospital course complicated by MSSA bacteremia which has cleared with negative TTE.  Course also complicated by hypoxia not responding to antibiotics.  Has been on empiric treatment for PJP (bactrim).  Fungitell elevated and LDH elevated.  Serial CT with changing GGO.  Seems unlikely to be PJP    Overall, metastatic prostate cancer with cord compression s/p spinal decompression 11/2, with fever due to MSSA bacteremia and cdiff, hypoxia +RVP adenovirus and GGO on CT    Fever  - resolved for now  - repeat BC negative    MSSA bacteremia  - would continue antibiotics  - negative echo  - broadened to meropenem due to adverse effects (leukopenia on cephalosporin?) while also treating pna  - Change meropenem to nafcillin 2 gram iv q 4 through 12/28    Abnormal Imaging  - fluid collections noted on imaging  - per plastics, they feel it is an uninfected seroma  - previously had drainage from incision  - Continue antibiotics for 6 weeks from negative culture through 12/28    Hypoxic respiratory failure  - repeat CT with changing GGO  - per pulmonary suspect organizing pneumonia  - patient appears to be improving without steroids  - PCP on differential and fungitell is elevated  - Continue PCP treatment through 12/21  - HIV is negative    C diff  - Completed course of po vancomycin   -monitor for diarrhea    Follow up as an outpatient [phone] 73m with known metastatic prostate CA admitted initially to [facility] after falling and weakness. Found to be in rhabdo with SRUTHI.  Imaging noted spinal fracture with edema and lytic metastatic disease from C7, T1-T5, T12, and L1-L3 as well as epidural expansion of neoplasm at C7 centrally and dorsally resulting in moderate cord compression and underlying edema/extension into the bilateral C7-T1 and T1-2 and Left T2-3 neural foramina. Epidural disease was also noted at T12 vertebral body.   11/2, he underwent C7-T1 decompression, C5-T3 posterior cervical fusion.  Hospital course complicated by MSSA bacteremia which has cleared with negative TTE.  Course also complicated by hypoxia not responding to antibiotics.  Has been on empiric treatment for PJP (bactrim).  Fungitell elevated and LDH elevated.  Serial CT with changing GGO.  Seems unlikely to be PJP    Overall, metastatic prostate cancer with cord compression s/p spinal decompression 11/2, with fever due to MSSA bacteremia and cdiff, hypoxia +RVP adenovirus and GGO on CT    Fever  - resolved for now  - repeat BC negative    MSSA bacteremia  - would continue antibiotics  - negative echo  - broadened to meropenem due to adverse effects (leukopenia on cephalosporin?) while also treating pna  - Change meropenem to nafcillin 2 gram iv q 4 through 12/28    Abnormal Imaging  - fluid collections noted on imaging  - per plastics, they feel it is an uninfected seroma  - previously had drainage from incision  - Continue antibiotics for 6 weeks from negative culture through 12/28    Hypoxic respiratory failure  - repeat CT with changing GGO  - per pulmonary suspect organizing pneumonia  - patient appears to be improving without steroids  - PCP on differential and fungitell is elevated  - Continue PCP treatment through 12/21  - HIV is negative    C diff  - Completed course of po vancomycin   -monitor for diarrhea    Follow up as an outpatient [phone]

## 2023-12-12 LAB
ANION GAP SERPL CALC-SCNC: 11 MMOL/L — SIGNIFICANT CHANGE UP (ref 7–14)
ANION GAP SERPL CALC-SCNC: 11 MMOL/L — SIGNIFICANT CHANGE UP (ref 7–14)
BASOPHILS # BLD AUTO: 0.05 K/UL — SIGNIFICANT CHANGE UP (ref 0–0.2)
BASOPHILS # BLD AUTO: 0.05 K/UL — SIGNIFICANT CHANGE UP (ref 0–0.2)
BASOPHILS NFR BLD AUTO: 0.8 % — SIGNIFICANT CHANGE UP (ref 0–2)
BASOPHILS NFR BLD AUTO: 0.8 % — SIGNIFICANT CHANGE UP (ref 0–2)
BUN SERPL-MCNC: 19 MG/DL — SIGNIFICANT CHANGE UP (ref 7–23)
BUN SERPL-MCNC: 19 MG/DL — SIGNIFICANT CHANGE UP (ref 7–23)
CALCIUM SERPL-MCNC: 9.2 MG/DL — SIGNIFICANT CHANGE UP (ref 8.4–10.5)
CALCIUM SERPL-MCNC: 9.2 MG/DL — SIGNIFICANT CHANGE UP (ref 8.4–10.5)
CHLORIDE SERPL-SCNC: 100 MMOL/L — SIGNIFICANT CHANGE UP (ref 98–107)
CHLORIDE SERPL-SCNC: 100 MMOL/L — SIGNIFICANT CHANGE UP (ref 98–107)
CO2 SERPL-SCNC: 24 MMOL/L — SIGNIFICANT CHANGE UP (ref 22–31)
CO2 SERPL-SCNC: 24 MMOL/L — SIGNIFICANT CHANGE UP (ref 22–31)
CREAT SERPL-MCNC: 0.92 MG/DL — SIGNIFICANT CHANGE UP (ref 0.5–1.3)
CREAT SERPL-MCNC: 0.92 MG/DL — SIGNIFICANT CHANGE UP (ref 0.5–1.3)
EGFR: 89 ML/MIN/1.73M2 — SIGNIFICANT CHANGE UP
EGFR: 89 ML/MIN/1.73M2 — SIGNIFICANT CHANGE UP
EOSINOPHIL # BLD AUTO: 0.23 K/UL — SIGNIFICANT CHANGE UP (ref 0–0.5)
EOSINOPHIL # BLD AUTO: 0.23 K/UL — SIGNIFICANT CHANGE UP (ref 0–0.5)
EOSINOPHIL NFR BLD AUTO: 3.8 % — SIGNIFICANT CHANGE UP (ref 0–6)
EOSINOPHIL NFR BLD AUTO: 3.8 % — SIGNIFICANT CHANGE UP (ref 0–6)
GLUCOSE SERPL-MCNC: 104 MG/DL — HIGH (ref 70–99)
GLUCOSE SERPL-MCNC: 104 MG/DL — HIGH (ref 70–99)
HCT VFR BLD CALC: 29.2 % — LOW (ref 39–50)
HCT VFR BLD CALC: 29.2 % — LOW (ref 39–50)
HGB BLD-MCNC: 9.1 G/DL — LOW (ref 13–17)
HGB BLD-MCNC: 9.1 G/DL — LOW (ref 13–17)
IANC: 3.46 K/UL — SIGNIFICANT CHANGE UP (ref 1.8–7.4)
IANC: 3.46 K/UL — SIGNIFICANT CHANGE UP (ref 1.8–7.4)
IMM GRANULOCYTES NFR BLD AUTO: 1.2 % — HIGH (ref 0–0.9)
IMM GRANULOCYTES NFR BLD AUTO: 1.2 % — HIGH (ref 0–0.9)
LYMPHOCYTES # BLD AUTO: 1.71 K/UL — SIGNIFICANT CHANGE UP (ref 1–3.3)
LYMPHOCYTES # BLD AUTO: 1.71 K/UL — SIGNIFICANT CHANGE UP (ref 1–3.3)
LYMPHOCYTES # BLD AUTO: 28.5 % — SIGNIFICANT CHANGE UP (ref 13–44)
LYMPHOCYTES # BLD AUTO: 28.5 % — SIGNIFICANT CHANGE UP (ref 13–44)
MAGNESIUM SERPL-MCNC: 2.3 MG/DL — SIGNIFICANT CHANGE UP (ref 1.6–2.6)
MAGNESIUM SERPL-MCNC: 2.3 MG/DL — SIGNIFICANT CHANGE UP (ref 1.6–2.6)
MCHC RBC-ENTMCNC: 30.6 PG — SIGNIFICANT CHANGE UP (ref 27–34)
MCHC RBC-ENTMCNC: 30.6 PG — SIGNIFICANT CHANGE UP (ref 27–34)
MCHC RBC-ENTMCNC: 31.2 GM/DL — LOW (ref 32–36)
MCHC RBC-ENTMCNC: 31.2 GM/DL — LOW (ref 32–36)
MCV RBC AUTO: 98.3 FL — SIGNIFICANT CHANGE UP (ref 80–100)
MCV RBC AUTO: 98.3 FL — SIGNIFICANT CHANGE UP (ref 80–100)
MONOCYTES # BLD AUTO: 0.49 K/UL — SIGNIFICANT CHANGE UP (ref 0–0.9)
MONOCYTES # BLD AUTO: 0.49 K/UL — SIGNIFICANT CHANGE UP (ref 0–0.9)
MONOCYTES NFR BLD AUTO: 8.2 % — SIGNIFICANT CHANGE UP (ref 2–14)
MONOCYTES NFR BLD AUTO: 8.2 % — SIGNIFICANT CHANGE UP (ref 2–14)
NEUTROPHILS # BLD AUTO: 3.46 K/UL — SIGNIFICANT CHANGE UP (ref 1.8–7.4)
NEUTROPHILS # BLD AUTO: 3.46 K/UL — SIGNIFICANT CHANGE UP (ref 1.8–7.4)
NEUTROPHILS NFR BLD AUTO: 57.5 % — SIGNIFICANT CHANGE UP (ref 43–77)
NEUTROPHILS NFR BLD AUTO: 57.5 % — SIGNIFICANT CHANGE UP (ref 43–77)
NRBC # BLD: 0 /100 WBCS — SIGNIFICANT CHANGE UP (ref 0–0)
NRBC # BLD: 0 /100 WBCS — SIGNIFICANT CHANGE UP (ref 0–0)
NRBC # FLD: 0 K/UL — SIGNIFICANT CHANGE UP (ref 0–0)
NRBC # FLD: 0 K/UL — SIGNIFICANT CHANGE UP (ref 0–0)
PHOSPHATE SERPL-MCNC: 2.9 MG/DL — SIGNIFICANT CHANGE UP (ref 2.5–4.5)
PHOSPHATE SERPL-MCNC: 2.9 MG/DL — SIGNIFICANT CHANGE UP (ref 2.5–4.5)
PLATELET # BLD AUTO: 312 K/UL — SIGNIFICANT CHANGE UP (ref 150–400)
PLATELET # BLD AUTO: 312 K/UL — SIGNIFICANT CHANGE UP (ref 150–400)
POTASSIUM SERPL-MCNC: 4.9 MMOL/L — SIGNIFICANT CHANGE UP (ref 3.5–5.3)
POTASSIUM SERPL-MCNC: 4.9 MMOL/L — SIGNIFICANT CHANGE UP (ref 3.5–5.3)
POTASSIUM SERPL-SCNC: 4.9 MMOL/L — SIGNIFICANT CHANGE UP (ref 3.5–5.3)
POTASSIUM SERPL-SCNC: 4.9 MMOL/L — SIGNIFICANT CHANGE UP (ref 3.5–5.3)
RBC # BLD: 2.97 M/UL — LOW (ref 4.2–5.8)
RBC # BLD: 2.97 M/UL — LOW (ref 4.2–5.8)
RBC # FLD: 18.3 % — HIGH (ref 10.3–14.5)
RBC # FLD: 18.3 % — HIGH (ref 10.3–14.5)
SODIUM SERPL-SCNC: 135 MMOL/L — SIGNIFICANT CHANGE UP (ref 135–145)
SODIUM SERPL-SCNC: 135 MMOL/L — SIGNIFICANT CHANGE UP (ref 135–145)
WBC # BLD: 6.01 K/UL — SIGNIFICANT CHANGE UP (ref 3.8–10.5)
WBC # BLD: 6.01 K/UL — SIGNIFICANT CHANGE UP (ref 3.8–10.5)
WBC # FLD AUTO: 6.01 K/UL — SIGNIFICANT CHANGE UP (ref 3.8–10.5)
WBC # FLD AUTO: 6.01 K/UL — SIGNIFICANT CHANGE UP (ref 3.8–10.5)

## 2023-12-12 PROCEDURE — 99233 SBSQ HOSP IP/OBS HIGH 50: CPT

## 2023-12-12 PROCEDURE — 99232 SBSQ HOSP IP/OBS MODERATE 35: CPT

## 2023-12-12 RX ORDER — NAFCILLIN 10 G/100ML
2 INJECTION, POWDER, FOR SOLUTION INTRAVENOUS EVERY 4 HOURS
Refills: 0 | Status: DISCONTINUED | OUTPATIENT
Start: 2023-12-12 | End: 2023-12-23

## 2023-12-12 RX ORDER — NAFCILLIN 10 G/100ML
100 INJECTION, POWDER, FOR SOLUTION INTRAVENOUS
Qty: 10200 | Refills: 0
Start: 2023-12-12 | End: 2023-12-28

## 2023-12-12 RX ORDER — NAFCILLIN 10 G/100ML
2 INJECTION, POWDER, FOR SOLUTION INTRAVENOUS
Qty: 204 | Refills: 0
Start: 2023-12-12 | End: 2023-12-28

## 2023-12-12 RX ADMIN — MEROPENEM 100 MILLIGRAM(S): 1 INJECTION INTRAVENOUS at 01:23

## 2023-12-12 RX ADMIN — HEPARIN SODIUM 5000 UNIT(S): 5000 INJECTION INTRAVENOUS; SUBCUTANEOUS at 16:23

## 2023-12-12 RX ADMIN — TAMSULOSIN HYDROCHLORIDE 0.4 MILLIGRAM(S): 0.4 CAPSULE ORAL at 21:07

## 2023-12-12 RX ADMIN — HEPARIN SODIUM 5000 UNIT(S): 5000 INJECTION INTRAVENOUS; SUBCUTANEOUS at 01:23

## 2023-12-12 RX ADMIN — Medication 2 TABLET(S): at 08:47

## 2023-12-12 RX ADMIN — PANTOPRAZOLE SODIUM 40 MILLIGRAM(S): 20 TABLET, DELAYED RELEASE ORAL at 12:14

## 2023-12-12 RX ADMIN — Medication 2 TABLET(S): at 01:23

## 2023-12-12 RX ADMIN — Medication 2000 UNIT(S): at 12:10

## 2023-12-12 RX ADMIN — NAFCILLIN 200 GRAM(S): 10 INJECTION, POWDER, FOR SOLUTION INTRAVENOUS at 15:26

## 2023-12-12 RX ADMIN — NAFCILLIN 200 GRAM(S): 10 INJECTION, POWDER, FOR SOLUTION INTRAVENOUS at 12:14

## 2023-12-12 RX ADMIN — Medication 2 TABLET(S): at 16:22

## 2023-12-12 RX ADMIN — BICALUTAMIDE 50 MILLIGRAM(S): 50 TABLET, FILM COATED ORAL at 12:09

## 2023-12-12 RX ADMIN — Medication 1 TABLET(S): at 12:10

## 2023-12-12 RX ADMIN — CHLORHEXIDINE GLUCONATE 1 APPLICATION(S): 213 SOLUTION TOPICAL at 12:13

## 2023-12-12 RX ADMIN — NAFCILLIN 200 GRAM(S): 10 INJECTION, POWDER, FOR SOLUTION INTRAVENOUS at 08:44

## 2023-12-12 RX ADMIN — LISINOPRIL 5 MILLIGRAM(S): 2.5 TABLET ORAL at 05:19

## 2023-12-12 RX ADMIN — HEPARIN SODIUM 5000 UNIT(S): 5000 INJECTION INTRAVENOUS; SUBCUTANEOUS at 08:49

## 2023-12-12 RX ADMIN — NAFCILLIN 200 GRAM(S): 10 INJECTION, POWDER, FOR SOLUTION INTRAVENOUS at 21:09

## 2023-12-12 RX ADMIN — ATORVASTATIN CALCIUM 20 MILLIGRAM(S): 80 TABLET, FILM COATED ORAL at 21:07

## 2023-12-12 NOTE — PROGRESS NOTE ADULT - PROBLEM SELECTOR PLAN 12
no obvious hemorrhage noted  - continue to monitor CBC  - s/p 1u PRBC on 11/25   - s/p 1u PRBC on 11/29      Plan discussed with daughter and son on 2 different  occasions.

## 2023-12-12 NOTE — PROGRESS NOTE ADULT - PROBLEM SELECTOR PLAN 1
Gram negative PNA in the setting of aspiration, c/b acute on chronic hypoxic respiratory failure  Noted result of CTPA 11/27. CT showed diffuse lung opacities, worsening on repeat CT 12/8 concerning for organizing PNA which led to suspicion for PJP  - titrate down on HFNC as tolerated  - Bactrim DS 2 tab q8 for empiric PJP PNA treatment, cont bactrim, f/u sputum pjp pcr  -s/p high flow, now on NC, no plan for bronch per pulm given improvement, will complete 14days of bactrim  -ID follg, will hold off empiric steroids for now. If O2 requirements again plateau or worse, will reconsider empiric steroids or bronchoscopic evaluation with BAL/TBBX.  -rvp with +ADENOVIRUS, TTE with no acute findings

## 2023-12-12 NOTE — PROGRESS NOTE ADULT - SUBJECTIVE AND OBJECTIVE BOX
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 1000 milliLiter(s) IV Continuous <Continuous>  dextrose 5%. 1000 milliLiter(s) IV Continuous <Continuous>  dextrose 50% Injectable 25 Gram(s) IV Push once  dextrose 50% Injectable 25 Gram(s) IV Push once  dextrose 50% Injectable 12.5 Gram(s) IV Push once  dextrose Oral Gel 15 Gram(s) Oral once PRN  glucagon  Injectable 1 milliGRAM(s) IntraMuscular once  guaiFENesin Oral Liquid (Sugar-Free) 200 milliGRAM(s) Oral every 6 hours PRN  heparin   Injectable 5000 Unit(s) SubCutaneous every 8 hours  lactobacillus acidophilus 1 Tablet(s) Oral daily  levalbuterol Inhalation 0.63 milliGRAM(s) Inhalation every 6 hours PRN  lisinopril 5 milliGRAM(s) Oral daily  naloxone Injectable 0.1 milliGRAM(s) IV Push every 3 minutes PRN  ondansetron Injectable 4 milliGRAM(s) IV Push every 6 hours PRN  pantoprazole  Injectable 40 milliGRAM(s) IV Push daily  tamsulosin 0.4 milliGRAM(s) Oral at bedtime      Vital Signs Last 24 Hrs  T(C): 36.9 (12 Dec 2023 16:00), Max: 36.9 (12 Dec 2023 05:00)  T(F): 98.4 (12 Dec 2023 16:00), Max: 98.4 (12 Dec 2023 05:00)  HR: 100 (12 Dec 2023 16:00) (85 - 100)  BP: 103/58 (12 Dec 2023 16:00) (103/58 - 113/67)  BP(mean): --  RR: 18 (12 Dec 2023 16:00) (18 - 18)  SpO2: 98% (12 Dec 2023 16:00) (89% - 99%)    Parameters below as of 12 Dec 2023 16:00  Patient On (Oxygen Delivery Method): nasal cannula  O2 Flow (L/min): 2      PHYSICAL EXAM:  General: [ x] non-toxic  HEAD/EYES: [ ] PERRL [x ] white sclera [ ] icterus  ENT:  [ ] normal [ ] supple [ ] thrush [ ] pharyngeal exudate  Cardiovascular:   [ ] murmur [x ] normal [ ] PPM/AICD  Respiratory:  [ x] clear to ausculation bilaterally  GI:  [ ]x soft, non-tender, normal bowel sounds  :  [ ] best [ ] no CVA tenderness   Musculoskeletal:  [ ] no synovitis  Neurologic:  [ ] non-focal exam   Skin:  x[ ] no rash  Lymph: [x ] no lymphadenopathy  Psychiatric:  [ ] appropriate affect [ ] alert & oriented  Lines:  [ x] no phlebitis [ ] central line                                9.1    6.01  )-----------( 312      ( 12 Dec 2023 06:48 )             29.2       12-12    135  |  100  |  19  ----------------------------<  104<H>  4.9   |  24  |  0.92    Ca    9.2      12 Dec 2023 06:48  Phos  2.9     12-12  Mg     2.30     12-12        Urinalysis Basic - ( 12 Dec 2023 06:48 )    Color: x / Appearance: x / SG: x / pH: x  Gluc: 104 mg/dL / Ketone: x  / Bili: x / Urobili: x   Blood: x / Protein: x / Nitrite: x   Leuk Esterase: x / RBC: x / WBC x   Sq Epi: x / Non Sq Epi: x / Bacteria: x        MICROBIOLOGY:    RADIOLOGY:

## 2023-12-12 NOTE — PROGRESS NOTE ADULT - PROBLEM SELECTOR PLAN 6
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 Large collection with peripheral enhancement involving the posterior paraspinal soft tissue region is again partially demonstrated.  -Sutures removed by plastics  -no plan for Kyphoplasty for now per IR, pt can f/u as outpt  -Per plastics it is uninfected seroma, c/w drains

## 2023-12-12 NOTE — PROGRESS NOTE ADULT - SUBJECTIVE AND OBJECTIVE BOX
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 No fever or chills. Wants to go home so that he can eventually go to [facility]      MEDICATIONS  (STANDING):  atorvastatin 20 milliGRAM(s) Oral at bedtime  bicalutamide 50 milliGRAM(s) Oral daily  chlorhexidine 2% Cloths 1 Application(s) Topical daily  cholecalciferol 2000 Unit(s) Oral daily  dextrose 5%. 1000 milliLiter(s) (50 mL/Hr) IV Continuous <Continuous>  dextrose 5%. 1000 milliLiter(s) (100 mL/Hr) IV Continuous <Continuous>  dextrose 50% Injectable 25 Gram(s) IV Push once  dextrose 50% Injectable 12.5 Gram(s) IV Push once  dextrose 50% Injectable 25 Gram(s) IV Push once  glucagon  Injectable 1 milliGRAM(s) IntraMuscular once  heparin   Injectable 5000 Unit(s) SubCutaneous every 8 hours  lactobacillus acidophilus 1 Tablet(s) Oral daily  lisinopril 5 milliGRAM(s) Oral daily  nafcillin  IVPB 2 Gram(s) IV Intermittent every 4 hours  pantoprazole  Injectable 40 milliGRAM(s) IV Push daily  tamsulosin 0.4 milliGRAM(s) Oral at bedtime  trimethoprim  160 mG/sulfamethoxazole 800 mG 2 Tablet(s) Oral three times a day    MEDICATIONS  (PRN):  benzocaine/menthol Lozenge 1 Lozenge Oral every 6 hours PRN Sore Throat  dextrose Oral Gel 15 Gram(s) Oral once PRN Blood Glucose LESS THAN 70 milliGRAM(s)/deciliter  guaiFENesin Oral Liquid (Sugar-Free) 200 milliGRAM(s) Oral every 6 hours PRN Cough  levalbuterol Inhalation 0.63 milliGRAM(s) Inhalation every 6 hours PRN shortness of breath/wheezing  naloxone Injectable 0.1 milliGRAM(s) IV Push every 3 minutes PRN For ANY of the following changes in patient status:  A. RR LESS THAN 10 breaths per minute, B. Oxygen saturation LESS THAN 90%, C. Sedation score of 6  ondansetron Injectable 4 milliGRAM(s) IV Push every 6 hours PRN Nausea      CAPILLARY BLOOD GLUCOSE      POCT Blood Glucose.: 112 mg/dL (12 Dec 2023 11:43)  POCT Blood Glucose.: 94 mg/dL (12 Dec 2023 08:38)  POCT Blood Glucose.: 112 mg/dL (11 Dec 2023 21:17)  POCT Blood Glucose.: 103 mg/dL (11 Dec 2023 17:39)    I&O's Summary      PHYSICAL EXAM:  Vital Signs Last 24 Hrs  T(C): 36.9 (12 Dec 2023 16:00), Max: 36.9 (12 Dec 2023 05:00)  T(F): 98.4 (12 Dec 2023 16:00), Max: 98.4 (12 Dec 2023 05:00)  HR: 100 (12 Dec 2023 16:00) (85 - 100)  BP: 103/58 (12 Dec 2023 16:00) (103/58 - 113/67)  BP(mean): --  RR: 18 (12 Dec 2023 16:00) (18 - 18)  SpO2: 98% (12 Dec 2023 16:00) (89% - 99%)    Parameters below as of 12 Dec 2023 16:00  Patient On (Oxygen Delivery Method): nasal cannula  O2 Flow (L/min): 2    CONSTITUTIONAL: NAD  EYES: conjunctiva and sclera clear  ENMT: mmm  NECK: Supple,  RESPIRATORY: Normal respiratory effort;   CARDIOVASCULAR: Regular rate and rhythm, + S1 and S2  ABDOMEN: Nontender to palpation, normoactive bowel sounds, no rebound/guarding  PSYCH: A+O x 3  MUSC: Limited ability to stand or ambulate  SKin: +Drains    LABS:                        9.1    6.01  )-----------( 312      ( 12 Dec 2023 06:48 )             29.2     12-12    135  |  100  |  19  ----------------------------<  104<H>  4.9   |  24  |  0.92    Ca    9.2      12 Dec 2023 06:48  Phos  2.9     12-12  Mg     2.30     12-12            Urinalysis Basic - ( 12 Dec 2023 06:48 )    Color: x / Appearance: x / SG: x / pH: x  Gluc: 104 mg/dL / Ketone: x  / Bili: x / Urobili: x   Blood: x / Protein: x / Nitrite: x   Leuk Esterase: x / RBC: x / WBC x   Sq Epi: x / Non Sq Epi: x / Bacteria: x                 [facility] Division of Hospital Medicine  Zach Villagran MD  Pager [phone]      Patient is a 73y old  Male who presents with a chief complaint of Diffuse Spinal Mets from Prostate Cancer (12 Dec 2023 06:59)      SUBJECTIVE / OVERNIGHT EVENTS: Pt distressed about the possibility of going to rehab. No fever or chills. Wants to go home so that he can eventually go to [facility]      MEDICATIONS  (STANDING):  atorvastatin 20 milliGRAM(s) Oral at bedtime  bicalutamide 50 milliGRAM(s) Oral daily  chlorhexidine 2% Cloths 1 Application(s) Topical daily  cholecalciferol 2000 Unit(s) Oral daily  dextrose 5%. 1000 milliLiter(s) (50 mL/Hr) IV Continuous <Continuous>  dextrose 5%. 1000 milliLiter(s) (100 mL/Hr) IV Continuous <Continuous>  dextrose 50% Injectable 25 Gram(s) IV Push once  dextrose 50% Injectable 12.5 Gram(s) IV Push once  dextrose 50% Injectable 25 Gram(s) IV Push once  glucagon  Injectable 1 milliGRAM(s) IntraMuscular once  heparin   Injectable 5000 Unit(s) SubCutaneous every 8 hours  lactobacillus acidophilus 1 Tablet(s) Oral daily  lisinopril 5 milliGRAM(s) Oral daily  nafcillin  IVPB 2 Gram(s) IV Intermittent every 4 hours  pantoprazole  Injectable 40 milliGRAM(s) IV Push daily  tamsulosin 0.4 milliGRAM(s) Oral at bedtime  trimethoprim  160 mG/sulfamethoxazole 800 mG 2 Tablet(s) Oral three times a day    MEDICATIONS  (PRN):  benzocaine/menthol Lozenge 1 Lozenge Oral every 6 hours PRN Sore Throat  dextrose Oral Gel 15 Gram(s) Oral once PRN Blood Glucose LESS THAN 70 milliGRAM(s)/deciliter  guaiFENesin Oral Liquid (Sugar-Free) 200 milliGRAM(s) Oral every 6 hours PRN Cough  levalbuterol Inhalation 0.63 milliGRAM(s) Inhalation every 6 hours PRN shortness of breath/wheezing  naloxone Injectable 0.1 milliGRAM(s) IV Push every 3 minutes PRN For ANY of the following changes in patient status:  A. RR LESS THAN 10 breaths per minute, B. Oxygen saturation LESS THAN 90%, C. Sedation score of 6  ondansetron Injectable 4 milliGRAM(s) IV Push every 6 hours PRN Nausea      CAPILLARY BLOOD GLUCOSE      POCT Blood Glucose.: 112 mg/dL (12 Dec 2023 11:43)  POCT Blood Glucose.: 94 mg/dL (12 Dec 2023 08:38)  POCT Blood Glucose.: 112 mg/dL (11 Dec 2023 21:17)  POCT Blood Glucose.: 103 mg/dL (11 Dec 2023 17:39)    I&O's Summary      PHYSICAL EXAM:  Vital Signs Last 24 Hrs  T(C): 36.9 (12 Dec 2023 16:00), Max: 36.9 (12 Dec 2023 05:00)  T(F): 98.4 (12 Dec 2023 16:00), Max: 98.4 (12 Dec 2023 05:00)  HR: 100 (12 Dec 2023 16:00) (85 - 100)  BP: 103/58 (12 Dec 2023 16:00) (103/58 - 113/67)  BP(mean): --  RR: 18 (12 Dec 2023 16:00) (18 - 18)  SpO2: 98% (12 Dec 2023 16:00) (89% - 99%)    Parameters below as of 12 Dec 2023 16:00  Patient On (Oxygen Delivery Method): nasal cannula  O2 Flow (L/min): 2    CONSTITUTIONAL: NAD  EYES: conjunctiva and sclera clear  ENMT: mmm  NECK: Supple,  RESPIRATORY: Normal respiratory effort;   CARDIOVASCULAR: Regular rate and rhythm, + S1 and S2  ABDOMEN: Nontender to palpation, normoactive bowel sounds, no rebound/guarding  PSYCH: A+O x 3  MUSC: Limited ability to stand or ambulate  SKin: +Drains    LABS:                        9.1    6.01  )-----------( 312      ( 12 Dec 2023 06:48 )             29.2     12-12    135  |  100  |  19  ----------------------------<  104<H>  4.9   |  24  |  0.92    Ca    9.2      12 Dec 2023 06:48  Phos  2.9     12-12  Mg     2.30     12-12            Urinalysis Basic - ( 12 Dec 2023 06:48 )    Color: x / Appearance: x / SG: x / pH: x  Gluc: 104 mg/dL / Ketone: x  / Bili: x / Urobili: x   Blood: x / Protein: x / Nitrite: x   Leuk Esterase: x / RBC: x / WBC x   Sq Epi: x / Non Sq Epi: x / Bacteria: x

## 2023-12-12 NOTE — PROGRESS NOTE ADULT - SUBJECTIVE AND OBJECTIVE BOX
Plastic Surgery Progress Note (pg [facility]: [id number], [facility]: [phone])    SUBJECTIVE    The patient was seen and examined. No acute events overnight    OBJECTIVE  ___________________________________________________  VITAL SIGNS / I&O's   Vital Signs Last 24 Hrs  T(C): 36.9 (12 Dec 2023 05:00), Max: 36.9 (11 Dec 2023 16:00)  T(F): 98.4 (12 Dec 2023 05:00), Max: 98.5 (11 Dec 2023 16:00)  HR: 96 (12 Dec 2023 05:00) (83 - 96)  BP: 113/67 (12 Dec 2023 05:00) (105/65 - 120/72)  BP(mean): --  RR: 18 (12 Dec 2023 05:00) (18 - 20)  SpO2: 98% (12 Dec 2023 05:00) (97% - 100%)    Parameters below as of 12 Dec 2023 05:00  Patient On (Oxygen Delivery Method): nasal cannula  O2 Flow (L/min): 2        ___________________________________________________  PHYSICAL EXAM    General: NAD  Neuro: Awake and alert  Pulm: non-labored respirations  Back: Incisions intact. No s/s of infection    ___________________________________________________  LABS                        8.7    6.92  )-----------( 315      ( 11 Dec 2023 06:05 )             28.3     11 Dec 2023 06:05    139    |  103    |  21     ----------------------------<  86     5.1     |  22     |  0.97     Ca    9.1        11 Dec 2023 06:05  Phos  3.0       11 Dec 2023 06:05  Mg     2.30      11 Dec 2023 06:05        CAPILLARY BLOOD GLUCOSE      POCT Blood Glucose.: 112 mg/dL (11 Dec 2023 21:17)  POCT Blood Glucose.: 103 mg/dL (11 Dec 2023 17:39)  POCT Blood Glucose.: 103 mg/dL (11 Dec 2023 11:44)  POCT Blood Glucose.: 113 mg/dL (11 Dec 2023 08:47)        Urinalysis Basic - ( 11 Dec 2023 06:05 )    Color: x / Appearance: x / SG: x / pH: x  Gluc: 86 mg/dL / Ketone: x  / Bili: x / Urobili: x   Blood: x / Protein: x / Nitrite: x   Leuk Esterase: x / RBC: x / WBC x   Sq Epi: x / Non Sq Epi: x / Bacteria: x      ___________________________________________________  MICRO  Recent Cultures:    ___________________________________________________  MEDICATIONS  (STANDING):  atorvastatin 20 milliGRAM(s) Oral at bedtime  bicalutamide 50 milliGRAM(s) Oral daily  chlorhexidine 2% Cloths 1 Application(s) Topical daily  cholecalciferol 2000 Unit(s) Oral daily  dextrose 5%. 1000 milliLiter(s) (50 mL/Hr) IV Continuous <Continuous>  dextrose 5%. 1000 milliLiter(s) (100 mL/Hr) IV Continuous <Continuous>  dextrose 50% Injectable 25 Gram(s) IV Push once  dextrose 50% Injectable 12.5 Gram(s) IV Push once  dextrose 50% Injectable 25 Gram(s) IV Push once  glucagon  Injectable 1 milliGRAM(s) IntraMuscular once  heparin   Injectable 5000 Unit(s) SubCutaneous every 8 hours  lactobacillus acidophilus 1 Tablet(s) Oral daily  lisinopril 5 milliGRAM(s) Oral daily  meropenem  IVPB 1000 milliGRAM(s) IV Intermittent every 8 hours  pantoprazole  Injectable 40 milliGRAM(s) IV Push daily  tamsulosin 0.4 milliGRAM(s) Oral at bedtime  trimethoprim  160 mG/sulfamethoxazole 800 mG 2 Tablet(s) Oral three times a day    MEDICATIONS  (PRN):  benzocaine/menthol Lozenge 1 Lozenge Oral every 6 hours PRN Sore Throat  dextrose Oral Gel 15 Gram(s) Oral once PRN Blood Glucose LESS THAN 70 milliGRAM(s)/deciliter  guaiFENesin Oral Liquid (Sugar-Free) 200 milliGRAM(s) Oral every 6 hours PRN Cough  levalbuterol Inhalation 0.63 milliGRAM(s) Inhalation every 6 hours PRN shortness of breath/wheezing  naloxone Injectable 0.1 milliGRAM(s) IV Push every 3 minutes PRN For ANY of the following changes in patient status:  A. RR LESS THAN 10 breaths per minute, B. Oxygen saturation LESS THAN 90%, C. Sedation score of 6  ondansetron Injectable 4 milliGRAM(s) IV Push every 6 hours PRN Nausea Plastic Surgery Progress Note (pg [facility]: [id number], [facility]: [phone])    SUBJECTIVE    The patient was seen and examined. No acute events overnight    OBJECTIVE  ___________________________________________________  VITAL SIGNS / I&O's   Vital Signs Last 24 Hrs  T(C): 36.9 (12 Dec 2023 05:00), Max: 36.9 (11 Dec 2023 16:00)  T(F): 98.4 (12 Dec 2023 05:00), Max: 98.5 (11 Dec 2023 16:00)  HR: 96 (12 Dec 2023 05:00) (83 - 96)  BP: 113/67 (12 Dec 2023 05:00) (105/65 - 120/72)  BP(mean): --  RR: 18 (12 Dec 2023 05:00) (18 - 20)  SpO2: 98% (12 Dec 2023 05:00) (97% - 100%)    Parameters below as of 12 Dec 2023 05:00  Patient On (Oxygen Delivery Method): nasal cannula  O2 Flow (L/min): 2        ___________________________________________________  PHYSICAL EXAM    General: NAD  Neuro: Awake and alert  Pulm: non-labored respirations  Back: Incisions intact. No s/s of infection    ___________________________________________________  LABS                        8.7    6.92  )-----------( 315      ( 11 Dec 2023 06:05 )             28.3     11 Dec 2023 06:05    139    |  103    |  21     ----------------------------<  86     5.1     |  22     |  0.97     Ca    9.1        11 Dec 2023 06:05  Phos  3.0       11 Dec 2023 06:05  Mg     2.30      11 Dec 2023 06:05        CAPILLARY BLOOD GLUCOSE      POCT Blood Glucose.: 112 mg/dL (11 Dec 2023 21:17)  POCT Blood Glucose.: 103 mg/dL (11 Dec 2023 17:39)  POCT Blood Glucose.: 103 mg/dL (11 Dec 2023 11:44)  POCT Blood Glucose.: 113 mg/dL (11 Dec 2023 08:47)        Urinalysis Basic - ( 11 Dec 2023 06:05 )    Color: x / Appearance: x / SG: x / pH: x  Gluc: 86 mg/dL / Ketone: x  / Bili: x / Urobili: x   Blood: x / Protein: x / Nitrite: x   Leuk Esterase: x / RBC: x / WBC x   Sq Epi: x / Non Sq Epi: x / Bacteria: x      ___________________________________________________  MICRO  Recent Cultures:    ___________________________________________________  MEDICATIONS  (STANDING):  atorvastatin 20 milliGRAM(s) Oral at bedtime  bicalutamide 50 milliGRAM(s) Oral daily  chlorhexidine 2% Cloths 1 Application(s) Topical daily  cholecalciferol 2000 Unit(s) Oral daily  dextrose 5%. 1000 milliLiter(s) (50 mL/Hr) IV Continuous <Continuous>  dextrose 5%. 1000 milliLiter(s) (100 mL/Hr) IV Continuous <Continuous>  dextrose 50% Injectable 25 Gram(s) IV Push once  dextrose 50% Injectable 12.5 Gram(s) IV Push once  dextrose 50% Injectable 25 Gram(s) IV Push once  glucagon  Injectable 1 milliGRAM(s) IntraMuscular once  heparin   Injectable 5000 Unit(s) SubCutaneous every 8 hours  lactobacillus acidophilus 1 Tablet(s) Oral daily  lisinopril 5 milliGRAM(s) Oral daily  meropenem  IVPB 1000 milliGRAM(s) IV Intermittent every 8 hours  pantoprazole  Injectable 40 milliGRAM(s) IV Push daily  tamsulosin 0.4 milliGRAM(s) Oral at bedtime  trimethoprim  160 mG/sulfamethoxazole 800 mG 2 Tablet(s) Oral three times a day    MEDICATIONS  (PRN):  benzocaine/menthol Lozenge 1 Lozenge Oral every 6 hours PRN Sore Throat  dextrose Oral Gel 15 Gram(s) Oral once PRN Blood Glucose LESS THAN 70 milliGRAM(s)/deciliter  guaiFENesin Oral Liquid (Sugar-Free) 200 milliGRAM(s) Oral every 6 hours PRN Cough  levalbuterol Inhalation 0.63 milliGRAM(s) Inhalation every 6 hours PRN shortness of breath/wheezing  naloxone Injectable 0.1 milliGRAM(s) IV Push every 3 minutes PRN For ANY of the following changes in patient status:  A. RR LESS THAN 10 breaths per minute, B. Oxygen saturation LESS THAN 90%, C. Sedation score of 6  ondansetron Injectable 4 milliGRAM(s) IV Push every 6 hours PRN Nausea

## 2023-12-12 NOTE — PROGRESS NOTE ADULT - PROBLEM SELECTOR PLAN 3
Concern for surgical site as a primary source  - BCx from 11/16, 11/17 NGTD  - changed to Nafcillin IV on 11/20. Now on meropenem (11/29- 1/28)  -Pt prefer to go home, family is not able to give the antibiotics at home, family to discuss with pt and f/u  - TTE revealed hyperdynamic left ventricle  - afebrile now.  -ID [email or web address]

## 2023-12-12 NOTE — PROGRESS NOTE ADULT - ASSESSMENT
73m with known metastatic prostate CA admitted initially to [facility] after falling and weakness. Found to be in rhabdo with SRUTHI.  Imaging noted spinal fracture with edema and lytic metastatic disease from C7, T1-T5, T12, and L1-L3 as well as epidural expansion of neoplasm at C7 centrally and dorsally resulting in moderate cord compression and underlying edema/extension into the bilateral C7-T1 and T1-2 and Left T2-3 neural foramina. Epidural disease was also noted at T12 vertebral body.   11/2, he underwent C7-T1 decompression, C5-T3 posterior cervical fusion.  Hospital course complicated by MSSA bacteremia which has cleared with negative TTE.  Course also complicated by hypoxia not responding to antibiotics.  Has been on empiric treatment for PJP (bactrim).  Fungitell elevated and LDH elevated.  Serial CT with changing GGO.  Seems unlikely to be PJP    Overall, metastatic prostate cancer with cord compression s/p spinal decompression 11/2, with fever due to MSSA bacteremia and cdiff, hypoxia +RVP adenovirus and GGO on CT    Fever  - resolved for now  - repeat BC negative    MSSA bacteremia  - would continue antibiotics  - negative echo    Abnormal Imaging  - fluid collections noted on imaging  - per plastics, they feel it is an uninfected seroma  - previously had drainage from incision    Hypoxic respiratory failure  - repeat CT with changing GGO  - per pulmonary suspect organizing pneumonia  - patient appears to be improving without steroids  - sputum for PJP PCR negative  - With elevated fungitell and clinical improvement, I would still continue Bactrim through 12/21  - HIV is negative    C diff  - completed treatment    Overall,     For MSSA infection, continue nafcillin for 6 weeks from negative culture through 12/28  continue Bactrim through 12/21    Weekly CBC, BMP fax to [phone]  Follow up as outpt [phone]    Please call the ID service [phone] with questions or concerns  73m with known metastatic prostate CA admitted initially to [facility] after falling and weakness. Found to be in rhabdo with SRUTHI.  Imaging noted spinal fracture with edema and lytic metastatic disease from C7, T1-T5, T12, and L1-L3 as well as epidural expansion of neoplasm at C7 centrally and dorsally resulting in moderate cord compression and underlying edema/extension into the bilateral C7-T1 and T1-2 and Left T2-3 neural foramina. Epidural disease was also noted at T12 vertebral body.   11/2, he underwent C7-T1 decompression, C5-T3 posterior cervical fusion.  Hospital course complicated by MSSA bacteremia which has cleared with negative TTE.  Course also complicated by hypoxia not responding to antibiotics.  Has been on empiric treatment for PJP (bactrim).  Fungitell elevated and LDH elevated.  Serial CT with changing GGO.  Seems unlikely to be PJP    Overall, metastatic prostate cancer with cord compression s/p spinal decompression 11/2, with fever due to MSSA bacteremia and cdiff, hypoxia +RVP adenovirus and GGO on CT    Fever  - resolved for now  - repeat BC negative    MSSA bacteremia  - would continue antibiotics  - negative echo    Abnormal Imaging  - fluid collections noted on imaging  - per plastics, they feel it is an uninfected seroma  - previously had drainage from incision    Hypoxic respiratory failure  - repeat CT with changing GGO  - per pulmonary suspect organizing pneumonia  - patient appears to be improving without steroids  - sputum for PJP PCR negative  - With elevated fungitell and clinical improvement, I would still continue Bactrim through 12/21  - HIV is negative    C diff  - completed treatment    Overall,     For MSSA infection, continue nafcillin for 6 weeks from negative culture through 12/28  continue Bactrim through 12/21    Weekly CBC, BMP fax to [phone]  Follow up as outpt [phone]    Please call the ID service [phone] with questions or concerns

## 2023-12-12 NOTE — PROGRESS NOTE ADULT - ASSESSMENT
73y Male s/p C5-T3 posterior cervical fusion, C7-T1 decompression for metastatic prostate cancer with plastics closure on 11/2. Recovering well. Both drains removed. MRI 11/14 showing possible seroma in subcutaneous space. Incisional vac w/ black sponge/Adaptic started on 11/17/2023, now left open to air      Plan:    - leave back incision open to air  - Optimize nutrition, ensure adequate protein intake  - Remainder of care per primary team    Plastic Surgery  #[id number] [facility]    73y Male s/p C5-T3 posterior cervical fusion, C7-T1 decompression for metastatic prostate cancer with plastics closure on 11/2. Recovering well. Both drains removed. MRI 11/14 showing possible seroma in subcutaneous space. Incisional vac w/ black sponge/Adaptic started on 11/17/2023, now left open to air      Plan:    - leave back incision open to air  - Optimize nutrition, ensure adequate protein intake  - Remainder of care per primary team    Plastic Surgery  #[id number] [facility]

## 2023-12-13 LAB
ANION GAP SERPL CALC-SCNC: 15 MMOL/L — HIGH (ref 7–14)
ANION GAP SERPL CALC-SCNC: 15 MMOL/L — HIGH (ref 7–14)
BASOPHILS # BLD AUTO: 0.04 K/UL — SIGNIFICANT CHANGE UP (ref 0–0.2)
BASOPHILS # BLD AUTO: 0.04 K/UL — SIGNIFICANT CHANGE UP (ref 0–0.2)
BASOPHILS NFR BLD AUTO: 0.8 % — SIGNIFICANT CHANGE UP (ref 0–2)
BASOPHILS NFR BLD AUTO: 0.8 % — SIGNIFICANT CHANGE UP (ref 0–2)
BUN SERPL-MCNC: 24 MG/DL — HIGH (ref 7–23)
BUN SERPL-MCNC: 24 MG/DL — HIGH (ref 7–23)
CALCIUM SERPL-MCNC: 8.8 MG/DL — SIGNIFICANT CHANGE UP (ref 8.4–10.5)
CALCIUM SERPL-MCNC: 8.8 MG/DL — SIGNIFICANT CHANGE UP (ref 8.4–10.5)
CHLORIDE SERPL-SCNC: 98 MMOL/L — SIGNIFICANT CHANGE UP (ref 98–107)
CHLORIDE SERPL-SCNC: 98 MMOL/L — SIGNIFICANT CHANGE UP (ref 98–107)
CO2 SERPL-SCNC: 23 MMOL/L — SIGNIFICANT CHANGE UP (ref 22–31)
CO2 SERPL-SCNC: 23 MMOL/L — SIGNIFICANT CHANGE UP (ref 22–31)
CREAT SERPL-MCNC: 1.42 MG/DL — HIGH (ref 0.5–1.3)
CREAT SERPL-MCNC: 1.42 MG/DL — HIGH (ref 0.5–1.3)
EGFR: 53 ML/MIN/1.73M2 — LOW
EGFR: 53 ML/MIN/1.73M2 — LOW
EOSINOPHIL # BLD AUTO: 0.19 K/UL — SIGNIFICANT CHANGE UP (ref 0–0.5)
EOSINOPHIL # BLD AUTO: 0.19 K/UL — SIGNIFICANT CHANGE UP (ref 0–0.5)
EOSINOPHIL NFR BLD AUTO: 3.9 % — SIGNIFICANT CHANGE UP (ref 0–6)
EOSINOPHIL NFR BLD AUTO: 3.9 % — SIGNIFICANT CHANGE UP (ref 0–6)
GLUCOSE SERPL-MCNC: 124 MG/DL — HIGH (ref 70–99)
GLUCOSE SERPL-MCNC: 124 MG/DL — HIGH (ref 70–99)
HCT VFR BLD CALC: 29.2 % — LOW (ref 39–50)
HCT VFR BLD CALC: 29.2 % — LOW (ref 39–50)
HGB BLD-MCNC: 9.1 G/DL — LOW (ref 13–17)
HGB BLD-MCNC: 9.1 G/DL — LOW (ref 13–17)
IANC: 2.91 K/UL — SIGNIFICANT CHANGE UP (ref 1.8–7.4)
IANC: 2.91 K/UL — SIGNIFICANT CHANGE UP (ref 1.8–7.4)
IMM GRANULOCYTES NFR BLD AUTO: 1 % — HIGH (ref 0–0.9)
IMM GRANULOCYTES NFR BLD AUTO: 1 % — HIGH (ref 0–0.9)
LYMPHOCYTES # BLD AUTO: 1.25 K/UL — SIGNIFICANT CHANGE UP (ref 1–3.3)
LYMPHOCYTES # BLD AUTO: 1.25 K/UL — SIGNIFICANT CHANGE UP (ref 1–3.3)
LYMPHOCYTES # BLD AUTO: 25.6 % — SIGNIFICANT CHANGE UP (ref 13–44)
LYMPHOCYTES # BLD AUTO: 25.6 % — SIGNIFICANT CHANGE UP (ref 13–44)
MAGNESIUM SERPL-MCNC: 2.3 MG/DL — SIGNIFICANT CHANGE UP (ref 1.6–2.6)
MAGNESIUM SERPL-MCNC: 2.3 MG/DL — SIGNIFICANT CHANGE UP (ref 1.6–2.6)
MCHC RBC-ENTMCNC: 31.1 PG — SIGNIFICANT CHANGE UP (ref 27–34)
MCHC RBC-ENTMCNC: 31.1 PG — SIGNIFICANT CHANGE UP (ref 27–34)
MCHC RBC-ENTMCNC: 31.2 GM/DL — LOW (ref 32–36)
MCHC RBC-ENTMCNC: 31.2 GM/DL — LOW (ref 32–36)
MCV RBC AUTO: 99.7 FL — SIGNIFICANT CHANGE UP (ref 80–100)
MCV RBC AUTO: 99.7 FL — SIGNIFICANT CHANGE UP (ref 80–100)
MONOCYTES # BLD AUTO: 0.45 K/UL — SIGNIFICANT CHANGE UP (ref 0–0.9)
MONOCYTES # BLD AUTO: 0.45 K/UL — SIGNIFICANT CHANGE UP (ref 0–0.9)
MONOCYTES NFR BLD AUTO: 9.2 % — SIGNIFICANT CHANGE UP (ref 2–14)
MONOCYTES NFR BLD AUTO: 9.2 % — SIGNIFICANT CHANGE UP (ref 2–14)
NEUTROPHILS # BLD AUTO: 2.91 K/UL — SIGNIFICANT CHANGE UP (ref 1.8–7.4)
NEUTROPHILS # BLD AUTO: 2.91 K/UL — SIGNIFICANT CHANGE UP (ref 1.8–7.4)
NEUTROPHILS NFR BLD AUTO: 59.5 % — SIGNIFICANT CHANGE UP (ref 43–77)
NEUTROPHILS NFR BLD AUTO: 59.5 % — SIGNIFICANT CHANGE UP (ref 43–77)
NRBC # BLD: 0 /100 WBCS — SIGNIFICANT CHANGE UP (ref 0–0)
NRBC # BLD: 0 /100 WBCS — SIGNIFICANT CHANGE UP (ref 0–0)
NRBC # FLD: 0 K/UL — SIGNIFICANT CHANGE UP (ref 0–0)
NRBC # FLD: 0 K/UL — SIGNIFICANT CHANGE UP (ref 0–0)
PHOSPHATE SERPL-MCNC: 4.2 MG/DL — SIGNIFICANT CHANGE UP (ref 2.5–4.5)
PHOSPHATE SERPL-MCNC: 4.2 MG/DL — SIGNIFICANT CHANGE UP (ref 2.5–4.5)
PLATELET # BLD AUTO: 259 K/UL — SIGNIFICANT CHANGE UP (ref 150–400)
PLATELET # BLD AUTO: 259 K/UL — SIGNIFICANT CHANGE UP (ref 150–400)
POTASSIUM SERPL-MCNC: 4.9 MMOL/L — SIGNIFICANT CHANGE UP (ref 3.5–5.3)
POTASSIUM SERPL-MCNC: 4.9 MMOL/L — SIGNIFICANT CHANGE UP (ref 3.5–5.3)
POTASSIUM SERPL-SCNC: 4.9 MMOL/L — SIGNIFICANT CHANGE UP (ref 3.5–5.3)
POTASSIUM SERPL-SCNC: 4.9 MMOL/L — SIGNIFICANT CHANGE UP (ref 3.5–5.3)
RBC # BLD: 2.93 M/UL — LOW (ref 4.2–5.8)
RBC # BLD: 2.93 M/UL — LOW (ref 4.2–5.8)
RBC # FLD: 18.1 % — HIGH (ref 10.3–14.5)
RBC # FLD: 18.1 % — HIGH (ref 10.3–14.5)
SODIUM SERPL-SCNC: 136 MMOL/L — SIGNIFICANT CHANGE UP (ref 135–145)
SODIUM SERPL-SCNC: 136 MMOL/L — SIGNIFICANT CHANGE UP (ref 135–145)
WBC # BLD: 4.89 K/UL — SIGNIFICANT CHANGE UP (ref 3.8–10.5)
WBC # BLD: 4.89 K/UL — SIGNIFICANT CHANGE UP (ref 3.8–10.5)
WBC # FLD AUTO: 4.89 K/UL — SIGNIFICANT CHANGE UP (ref 3.8–10.5)
WBC # FLD AUTO: 4.89 K/UL — SIGNIFICANT CHANGE UP (ref 3.8–10.5)

## 2023-12-13 PROCEDURE — 99232 SBSQ HOSP IP/OBS MODERATE 35: CPT

## 2023-12-13 PROCEDURE — 99233 SBSQ HOSP IP/OBS HIGH 50: CPT | Mod: GC

## 2023-12-13 RX ORDER — ATOVAQUONE 750 MG/5ML
750 SUSPENSION ORAL
Refills: 0 | Status: COMPLETED | OUTPATIENT
Start: 2023-12-13 | End: 2023-12-21

## 2023-12-13 RX ADMIN — Medication 2 TABLET(S): at 12:28

## 2023-12-13 RX ADMIN — CHLORHEXIDINE GLUCONATE 1 APPLICATION(S): 213 SOLUTION TOPICAL at 12:29

## 2023-12-13 RX ADMIN — HEPARIN SODIUM 5000 UNIT(S): 5000 INJECTION INTRAVENOUS; SUBCUTANEOUS at 00:29

## 2023-12-13 RX ADMIN — ATORVASTATIN CALCIUM 20 MILLIGRAM(S): 80 TABLET, FILM COATED ORAL at 20:35

## 2023-12-13 RX ADMIN — TAMSULOSIN HYDROCHLORIDE 0.4 MILLIGRAM(S): 0.4 CAPSULE ORAL at 20:35

## 2023-12-13 RX ADMIN — NAFCILLIN 200 GRAM(S): 10 INJECTION, POWDER, FOR SOLUTION INTRAVENOUS at 05:22

## 2023-12-13 RX ADMIN — NAFCILLIN 200 GRAM(S): 10 INJECTION, POWDER, FOR SOLUTION INTRAVENOUS at 20:27

## 2023-12-13 RX ADMIN — NAFCILLIN 200 GRAM(S): 10 INJECTION, POWDER, FOR SOLUTION INTRAVENOUS at 16:00

## 2023-12-13 RX ADMIN — BICALUTAMIDE 50 MILLIGRAM(S): 50 TABLET, FILM COATED ORAL at 12:28

## 2023-12-13 RX ADMIN — HEPARIN SODIUM 5000 UNIT(S): 5000 INJECTION INTRAVENOUS; SUBCUTANEOUS at 19:20

## 2023-12-13 RX ADMIN — Medication 1 TABLET(S): at 12:28

## 2023-12-13 RX ADMIN — NAFCILLIN 200 GRAM(S): 10 INJECTION, POWDER, FOR SOLUTION INTRAVENOUS at 12:12

## 2023-12-13 RX ADMIN — Medication 2000 UNIT(S): at 12:28

## 2023-12-13 RX ADMIN — NAFCILLIN 200 GRAM(S): 10 INJECTION, POWDER, FOR SOLUTION INTRAVENOUS at 00:28

## 2023-12-13 RX ADMIN — Medication 2 TABLET(S): at 00:28

## 2023-12-13 RX ADMIN — NAFCILLIN 200 GRAM(S): 10 INJECTION, POWDER, FOR SOLUTION INTRAVENOUS at 20:37

## 2023-12-13 RX ADMIN — PANTOPRAZOLE SODIUM 40 MILLIGRAM(S): 20 TABLET, DELAYED RELEASE ORAL at 12:28

## 2023-12-13 RX ADMIN — HEPARIN SODIUM 5000 UNIT(S): 5000 INJECTION INTRAVENOUS; SUBCUTANEOUS at 19:21

## 2023-12-13 RX ADMIN — ATOVAQUONE 750 MILLIGRAM(S): 750 SUSPENSION ORAL at 19:19

## 2023-12-13 NOTE — PROGRESS NOTE ADULT - SUBJECTIVE AND OBJECTIVE BOX
Follow Up:      Inverval History/ROS: Patient is a 73y old  Male who presents with a chief complaint of Diffuse Spinal Mets from Prostate Cancer (13 Dec 2023 08:28)    Off nasal canula at time of my eval  Less SOB    Allergies    No Known Allergies    Intolerances        ANTIMICROBIALS:  nafcillin  IVPB 2 every 4 hours  trimethoprim  160 mG/sulfamethoxazole 800 mG 2 three times a day      OTHER MEDS:  atorvastatin 20 milliGRAM(s) Oral at bedtime  benzocaine/menthol Lozenge 1 Lozenge Oral every 6 hours PRN  bicalutamide 50 milliGRAM(s) Oral daily  chlorhexidine 2% Cloths 1 Application(s) Topical daily  cholecalciferol 2000 Unit(s) Oral daily  dextrose 5%. 1000 milliLiter(s) IV Continuous <Continuous>  dextrose 5%. 1000 milliLiter(s) IV Continuous <Continuous>  dextrose 50% Injectable 25 Gram(s) IV Push once  dextrose 50% Injectable 12.5 Gram(s) IV Push once  dextrose 50% Injectable 25 Gram(s) IV Push once  dextrose Oral Gel 15 Gram(s) Oral once PRN  glucagon  Injectable 1 milliGRAM(s) IntraMuscular once  guaiFENesin Oral Liquid (Sugar-Free) 200 milliGRAM(s) Oral every 6 hours PRN  heparin   Injectable 5000 Unit(s) SubCutaneous every 8 hours  lactobacillus acidophilus 1 Tablet(s) Oral daily  levalbuterol Inhalation 0.63 milliGRAM(s) Inhalation every 6 hours PRN  lisinopril 5 milliGRAM(s) Oral daily  naloxone Injectable 0.1 milliGRAM(s) IV Push every 3 minutes PRN  ondansetron Injectable 4 milliGRAM(s) IV Push every 6 hours PRN  pantoprazole  Injectable 40 milliGRAM(s) IV Push daily  tamsulosin 0.4 milliGRAM(s) Oral at bedtime      Vital Signs Last 24 Hrs  T(C): 36.9 (13 Dec 2023 13:19), Max: 36.9 (13 Dec 2023 13:19)  T(F): 98.4 (13 Dec 2023 13:19), Max: 98.4 (13 Dec 2023 13:19)  HR: 96 (13 Dec 2023 13:19) (84 - 98)  BP: 106/60 (13 Dec 2023 13:19) (106/60 - 118/66)  BP(mean): --  RR: 18 (13 Dec 2023 13:19) (18 - 18)  SpO2: 95% (13 Dec 2023 13:19) (95% - 98%)    Parameters below as of 13 Dec 2023 13:19  Patient On (Oxygen Delivery Method): room air        PHYSICAL EXAM:  General: [x ] non-toxic  HEAD/EYES: [ ] PERRL [ x] white sclera [ ] icterus  ENT:  [ ] normal [ ] supple [ ] thrush [ ] pharyngeal exudate  Cardiovascular:   [ ] murmur [x ] normal [ ] PPM/AICD  Respiratory:  [ x] clear to ausculation bilaterally  GI:  [x ] soft, non-tender, normal bowel sounds  :  [ ] best [ ] no CVA tenderness   Musculoskeletal:  [ ] no synovitis  Neurologic:  [x ] non-focal exam   Skin:  [x ] no rash  Lymph: [ x] no lymphadenopathy  Psychiatric:  [ ] appropriate affect [ ] alert & oriented  Lines:  [x ] no phlebitis [ ] central line                                9.1    4.89  )-----------( 259      ( 13 Dec 2023 05:50 )             29.2       12-13    136  |  98  |  24<H>  ----------------------------<  124<H>  4.9   |  23  |  1.42<H>    Ca    8.8      13 Dec 2023 05:50  Phos  4.2     12-13  Mg     2.30     12-13        Urinalysis Basic - ( 13 Dec 2023 05:50 )    Color: x / Appearance: x / SG: x / pH: x  Gluc: 124 mg/dL / Ketone: x  / Bili: x / Urobili: x   Blood: x / Protein: x / Nitrite: x   Leuk Esterase: x / RBC: x / WBC x   Sq Epi: x / Non Sq Epi: x / Bacteria: x        MICROBIOLOGY:    RADIOLOGY:

## 2023-12-13 NOTE — PROGRESS NOTE ADULT - SUBJECTIVE AND OBJECTIVE BOX
[facility] Division of Hospital Medicine  Zach Villagran MD  Pager [phone]      Patient is a 73y old  Male who presents with a chief complaint of Diffuse Spinal Mets from Prostate Cancer (13 Dec 2023 17:01)      SUBJECTIVE / OVERNIGHT EVENTS: No fever or chills. Agreeable to go to rehab     MEDICATIONS  (STANDING):  atorvastatin 20 milliGRAM(s) Oral at bedtime  atovaquone  Suspension 750 milliGRAM(s) Oral two times a day  bicalutamide 50 milliGRAM(s) Oral daily  chlorhexidine 2% Cloths 1 Application(s) Topical daily  cholecalciferol 2000 Unit(s) Oral daily  dextrose 5%. 1000 milliLiter(s) (50 mL/Hr) IV Continuous <Continuous>  dextrose 5%. 1000 milliLiter(s) (100 mL/Hr) IV Continuous <Continuous>  dextrose 50% Injectable 25 Gram(s) IV Push once  dextrose 50% Injectable 12.5 Gram(s) IV Push once  dextrose 50% Injectable 25 Gram(s) IV Push once  glucagon  Injectable 1 milliGRAM(s) IntraMuscular once  heparin   Injectable 5000 Unit(s) SubCutaneous every 8 hours  lactobacillus acidophilus 1 Tablet(s) Oral daily  lisinopril 5 milliGRAM(s) Oral daily  nafcillin  IVPB 2 Gram(s) IV Intermittent every 4 hours  pantoprazole  Injectable 40 milliGRAM(s) IV Push daily  tamsulosin 0.4 milliGRAM(s) Oral at bedtime    MEDICATIONS  (PRN):  benzocaine/menthol Lozenge 1 Lozenge Oral every 6 hours PRN Sore Throat  dextrose Oral Gel 15 Gram(s) Oral once PRN Blood Glucose LESS THAN 70 milliGRAM(s)/deciliter  guaiFENesin Oral Liquid (Sugar-Free) 200 milliGRAM(s) Oral every 6 hours PRN Cough  levalbuterol Inhalation 0.63 milliGRAM(s) Inhalation every 6 hours PRN shortness of breath/wheezing  naloxone Injectable 0.1 milliGRAM(s) IV Push every 3 minutes PRN For ANY of the following changes in patient status:  A. RR LESS THAN 10 breaths per minute, B. Oxygen saturation LESS THAN 90%, C. Sedation score of 6  ondansetron Injectable 4 milliGRAM(s) IV Push every 6 hours PRN Nausea      CAPILLARY BLOOD GLUCOSE      POCT Blood Glucose.: 132 mg/dL (13 Dec 2023 12:55)  POCT Blood Glucose.: 99 mg/dL (13 Dec 2023 08:56)  POCT Blood Glucose.: 131 mg/dL (12 Dec 2023 22:03)  POCT Blood Glucose.: 117 mg/dL (12 Dec 2023 17:39)  POCT Blood Glucose.: 114 mg/dL (12 Dec 2023 17:21)    I&O's Summary    12 Dec 2023 07:01  -  13 Dec 2023 07:00  --------------------------------------------------------  IN: 0 mL / OUT: 1000 mL / NET: -1000 mL        PHYSICAL EXAM:  Vital Signs Last 24 Hrs  T(C): 36.9 (13 Dec 2023 13:19), Max: 36.9 (13 Dec 2023 13:19)  T(F): 98.4 (13 Dec 2023 13:19), Max: 98.4 (13 Dec 2023 13:19)  HR: 96 (13 Dec 2023 13:19) (84 - 98)  BP: 106/60 (13 Dec 2023 13:19) (106/60 - 118/66)  BP(mean): --  RR: 18 (13 Dec 2023 13:19) (18 - 18)  SpO2: 95% (13 Dec 2023 13:19) (95% - 98%)    Parameters below as of 13 Dec 2023 13:19  Patient On (Oxygen Delivery Method): room air      CONSTITUTIONAL: NAD  EYES: conjunctiva and sclera clear  ENMT: mmm  NECK: Supple,  RESPIRATORY: Normal respiratory effort;   CARDIOVASCULAR: Regular rate and rhythm, + S1 and S2  ABDOMEN: Nontender to palpation, normoactive bowel sounds, no rebound/guarding  PSYCH: A+O x 3      LABS:                        9.1    4.89  )-----------( 259      ( 13 Dec 2023 05:50 )             29.2     12-13    136  |  98  |  24<H>  ----------------------------<  124<H>  4.9   |  23  |  1.42<H>    Ca    8.8      13 Dec 2023 05:50  Phos  4.2     12-13  Mg     2.30     12-13            Urinalysis Basic - ( 13 Dec 2023 05:50 )    Color: x / Appearance: x / SG: x / pH: x  Gluc: 124 mg/dL / Ketone: x  / Bili: x / Urobili: x   Blood: x / Protein: x / Nitrite: x   Leuk Esterase: x / RBC: x / WBC x   Sq Epi: x / Non Sq Epi: x / Bacteria: x               [facility] Division of Hospital Medicine  Zach Villagran MD  Pager [phone]      Patient is a 73y old  Male who presents with a chief complaint of Diffuse Spinal Mets from Prostate Cancer (13 Dec 2023 17:01)      SUBJECTIVE / OVERNIGHT EVENTS: No fever or chills. Agreeable to go to rehab     MEDICATIONS  (STANDING):  atorvastatin 20 milliGRAM(s) Oral at bedtime  atovaquone  Suspension 750 milliGRAM(s) Oral two times a day  bicalutamide 50 milliGRAM(s) Oral daily  chlorhexidine 2% Cloths 1 Application(s) Topical daily  cholecalciferol 2000 Unit(s) Oral daily  dextrose 5%. 1000 milliLiter(s) (50 mL/Hr) IV Continuous <Continuous>  dextrose 5%. 1000 milliLiter(s) (100 mL/Hr) IV Continuous <Continuous>  dextrose 50% Injectable 25 Gram(s) IV Push once  dextrose 50% Injectable 12.5 Gram(s) IV Push once  dextrose 50% Injectable 25 Gram(s) IV Push once  glucagon  Injectable 1 milliGRAM(s) IntraMuscular once  heparin   Injectable 5000 Unit(s) SubCutaneous every 8 hours  lactobacillus acidophilus 1 Tablet(s) Oral daily  lisinopril 5 milliGRAM(s) Oral daily  nafcillin  IVPB 2 Gram(s) IV Intermittent every 4 hours  pantoprazole  Injectable 40 milliGRAM(s) IV Push daily  tamsulosin 0.4 milliGRAM(s) Oral at bedtime    MEDICATIONS  (PRN):  benzocaine/menthol Lozenge 1 Lozenge Oral every 6 hours PRN Sore Throat  dextrose Oral Gel 15 Gram(s) Oral once PRN Blood Glucose LESS THAN 70 milliGRAM(s)/deciliter  guaiFENesin Oral Liquid (Sugar-Free) 200 milliGRAM(s) Oral every 6 hours PRN Cough  levalbuterol Inhalation 0.63 milliGRAM(s) Inhalation every 6 hours PRN shortness of breath/wheezing  naloxone Injectable 0.1 milliGRAM(s) IV Push every 3 minutes PRN For ANY of the following changes in patient status:  A. RR LESS THAN 10 breaths per minute, B. Oxygen saturation LESS THAN 90%, C. Sedation score of 6  ondansetron Injectable 4 milliGRAM(s) IV Push every 6 hours PRN Nausea      CAPILLARY BLOOD GLUCOSE      POCT Blood Glucose.: 132 mg/dL (13 Dec 2023 12:55)  POCT Blood Glucose.: 99 mg/dL (13 Dec 2023 08:56)  POCT Blood Glucose.: 131 mg/dL (12 Dec 2023 22:03)  POCT Blood Glucose.: 117 mg/dL (12 Dec 2023 17:39)  POCT Blood Glucose.: 114 mg/dL (12 Dec 2023 17:21)    I&O's Summary    12 Dec 2023 07:01  -  13 Dec 2023 07:00  --------------------------------------------------------  IN: 0 mL / OUT: 1000 mL / NET: -1000 mL        PHYSICAL EXAM:  Vital Signs Last 24 Hrs  T(C): 36.9 (13 Dec 2023 13:19), Max: 36.9 (13 Dec 2023 13:19)  T(F): 98.4 (13 Dec 2023 13:19), Max: 98.4 (13 Dec 2023 13:19)  HR: 96 (13 Dec 2023 13:19) (84 - 98)  BP: 106/60 (13 Dec 2023 13:19) (106/60 - 118/66)  BP(mean): --  RR: 18 (13 Dec 2023 13:19) (18 - 18)  SpO2: 95% (13 Dec 2023 13:19) (95% - 98%)    Parameters below as of 13 Dec 2023 13:19  Patient On (Oxygen Delivery Method): room air      CONSTITUTIONAL: NAD  EYES: conjunctiva and sclera clear  ENMT: mmm  NECK: Supple,  RESPIRATORY: Normal respiratory effort;   CARDIOVASCULAR: Regular rate and rhythm, + S1 and S2  ABDOMEN: Nontender to palpation, normoactive bowel sounds, no rebound/guarding  PSYCH: A+O x 3      LABS:                        9.1    4.89  )-----------( 259      ( 13 Dec 2023 05:50 )             29.2     12-13    136  |  98  |  24<H>  ----------------------------<  124<H>  4.9   |  23  |  1.42<H>    Ca    8.8      13 Dec 2023 05:50  Phos  4.2     12-13  Mg     2.30     12-13            Urinalysis Basic - ( 13 Dec 2023 05:50 )    Color: x / Appearance: x / SG: x / pH: x  Gluc: 124 mg/dL / Ketone: x  / Bili: x / Urobili: x   Blood: x / Protein: x / Nitrite: x   Leuk Esterase: x / RBC: x / WBC x   Sq Epi: x / Non Sq Epi: x / Bacteria: x

## 2023-12-13 NOTE — PROGRESS NOTE ADULT - ATTENDING COMMENTS
Patient is a 74 yo M w/ HTN, metastatic prostate ca (2009) with LE weakness and R hand paresthesias now s/p C5-T3 posterior cervical fusion and C7-T1 decompression for metastatic prostate cancer with plastics closure on 11/2. Hospital course has been c/b MSSA bacteremia (repeat blood cultures negative since then) and acute hypoxic respiratory failure requiring HFNC. Of note, patient tested positive for adenovirus on 11/15 and 11/24. O2 requirements significantly increased on 11/28, initially requiring HFNC 100%. Patient was started empirically on PCP tx with Bactrim, O2 requirements decreased.     #Acute Hypoxic Respiratory Failure - Unclear etiology. Improved with Meropenem and empiric Bactrim. CT chest on 11/22 with RLL consolidation. Repeat CT chest on 11/27 with multifocal consolidations, mostly peripheral, of note with RLL consolidation on initial CT gone. Fungitell elevated. Possible organizing pneumonia vs atypical infectious vs infectious vs pneumonitis (rare but possible with bicalutamide). Repeat CT 12/7 with lower lobe consolidation improved, upper lobe slightly worsened.  #Multifocal PNA  #Shortness of Breath  - Was able to take off supplemental oxygen and patient was saturating between 94-96%.   - Please ambulate and see if he requires supplemental oxygen.     Otherwise, given the significant improvement in hypoxemia no further recommendations are given.     Thank you for your consult. Please call back if you have further questions regarding the care of this patient. Patient is a 72 yo M w/ HTN, metastatic prostate ca (2009) with LE weakness and R hand paresthesias now s/p C5-T3 posterior cervical fusion and C7-T1 decompression for metastatic prostate cancer with plastics closure on 11/2. Hospital course has been c/b MSSA bacteremia (repeat blood cultures negative since then) and acute hypoxic respiratory failure requiring HFNC. Of note, patient tested positive for adenovirus on 11/15 and 11/24. O2 requirements significantly increased on 11/28, initially requiring HFNC 100%. Patient was started empirically on PCP tx with Bactrim, O2 requirements decreased.     #Acute Hypoxic Respiratory Failure - Unclear etiology. Improved with Meropenem and empiric Bactrim. CT chest on 11/22 with RLL consolidation. Repeat CT chest on 11/27 with multifocal consolidations, mostly peripheral, of note with RLL consolidation on initial CT gone. Fungitell elevated. Possible organizing pneumonia vs atypical infectious vs infectious vs pneumonitis (rare but possible with bicalutamide). Repeat CT 12/7 with lower lobe consolidation improved, upper lobe slightly worsened.  #Multifocal PNA  #Shortness of Breath  - Was able to take off supplemental oxygen and patient was saturating between 94-96%.   - Please ambulate and see if he requires supplemental oxygen.     Otherwise, given the significant improvement in hypoxemia no further recommendations are given.     Thank you for your consult. Please call back if you have further questions regarding the care of this patient.

## 2023-12-13 NOTE — PROGRESS NOTE ADULT - ASSESSMENT
73M with HTN met prostate cancer (2009) with LE weakness and R hand paresthesias now s/p C5-T3 posterior cervical fusion and C7-T1 decompression for metastatic prostate cancer with plastics closure on 11/2. Course c/b acute hypoxic respiratory failure requiring HFNC.     #Acute Hypoxic Respiratory Failure   #Multifocal PNA  #Abnormal CT  #Fleeting / migrating pulmonary opacities     - From HFNC fio2 45% @ 40L/min to now 2L NC. Likely can be weaned to RA. Would check ambulatory RA saturation to ensure home o2 is not required.   - pattern of fleeting / migratory pneumonia noted on CT chest 11/22 vs 11/27 vs 12/8   - s/p treatment for multilobar PNA with meropenem will well over a 7 day course as well as PCP PNA with bactrim DS 2 tab q8. Discussed with ID. Reasonable to treat PCP for full course.  - now on nafcillin for staph bacteremia.    - bedside POCUS with no pleural effusions and with diffuse B lines but irregular pleura   - fungitell elevated. Sputum pcp pcr negative however variable yield.   - lower suspicion for pulmonary edema.   - Bicalutamide pneumonitis lower on ddx.   - Hold off on systemic steroids and/or bronchoscopy as patient is improving.    - Outpatient follow up. Will need CT chest in 6-8 weeks to evaluate for resolution of radiographic findings.   - Pulmonary to sign off.    Dr. Tae Nunes,   Pulmonary and Critical Care Medicine Fellow   Available via Microsoft Teams - **Preferred**  Pulmonary Spectra #[id number] ([facility]) / [id number] ([facility])  Pager:  [phone]     73M with HTN met prostate cancer (2009) with LE weakness and R hand paresthesias now s/p C5-T3 posterior cervical fusion and C7-T1 decompression for metastatic prostate cancer with plastics closure on 11/2. Course c/b acute hypoxic respiratory failure requiring HFNC.     #Acute Hypoxic Respiratory Failure   #Multifocal PNA  #Abnormal CT  #Fleeting / migrating pulmonary opacities     - From HFNC fio2 45% @ 40L/min to now 2L NC. Likely can be weaned to RA. Would check ambulatory RA saturation to ensure home o2 is not required.   - pattern of fleeting / migratory pneumonia noted on CT chest 11/22 vs 11/27 vs 12/8   - s/p treatment for multilobar PNA with meropenem will well over a 7 day course as well as PCP PNA with bactrim DS 2 tab q8. Discussed with ID. Reasonable to treat PCP for full course.  - now on nafcillin for staph bacteremia.    - bedside POCUS with no pleural effusions and with diffuse B lines but irregular pleura   - fungitell elevated. Sputum pcp pcr negative however variable yield.   - lower suspicion for pulmonary edema.   - Bicalutamide pneumonitis lower on ddx.   - Hold off on systemic steroids and/or bronchoscopy as patient is improving.    - Outpatient follow up. Will need CT chest in 6-8 weeks to evaluate for resolution of radiographic findings.   - Pulmonary to sign off.    Dr. Tae Nunes,   Pulmonary and Critical Care Medicine Fellow   Available via Microsoft Teams - **Preferred**  Pulmonary Spectra #[id number] ([facility]) / [id number] ([facility])  Pager:  [phone]     73M with HTN met prostate cancer (2009) with LE weakness and R hand paresthesias now s/p C5-T3 posterior cervical fusion and C7-T1 decompression for metastatic prostate cancer with plastics closure on 11/2. Course c/b acute hypoxic respiratory failure requiring HFNC.     #Acute Hypoxic Respiratory Failure   #Multifocal PNA  #Abnormal CT  #Fleeting / migrating pulmonary opacities     - From HFNC fio2 45% @ 40L/min to now 2L NC. Weaned to RA this AM with spo2 of 95%. Would check ambulatory RA saturation to ensure home o2 is not required.   - pattern of fleeting / migratory pneumonia noted on CT chest 11/22 vs 11/27 vs 12/8   - s/p treatment for multilobar PNA with meropenem will well over a 7 day course as well as PCP PNA with bactrim DS 2 tab q8. Discussed with ID. Reasonable to treat PCP for full course.  - now on nafcillin for staph bacteremia.    - bedside POCUS with no pleural effusions and with diffuse B lines but irregular pleura   - fungitell elevated. Sputum pcp pcr negative however variable yield.   - lower suspicion for pulmonary edema.   - Bicalutamide pneumonitis lower on ddx.   - Hold off on systemic steroids and/or bronchoscopy as patient is improving.    - Outpatient follow up. Will need CT chest in 6-8 weeks to evaluate for resolution of radiographic findings.   - Pulmonary to sign off.    Dr. Tae Nunes, DO  Pulmonary and Critical Care Medicine Fellow   Available via Microsoft Teams - **Preferred**  Pulmonary Spectra #[id number] ([facility]) / [id number] ([facility])  Pager:  [phone]     73M with HTN met prostate cancer (2009) with LE weakness and R hand paresthesias now s/p C5-T3 posterior cervical fusion and C7-T1 decompression for metastatic prostate cancer with plastics closure on 11/2. Course c/b acute hypoxic respiratory failure requiring HFNC.     #Acute Hypoxic Respiratory Failure   #Multifocal PNA  #Abnormal CT  #Fleeting / migrating pulmonary opacities     - From HFNC fio2 45% @ 40L/min to now 2L NC. Weaned to RA this AM with spo2 of 95%. Would check ambulatory RA saturation to ensure home o2 is not required.   - pattern of fleeting / migratory pneumonia noted on CT chest 11/22 vs 11/27 vs 12/8   - s/p treatment for multilobar PNA with meropenem will well over a 7 day course as well as PCP PNA with bactrim DS 2 tab q8. Discussed with ID. Reasonable to treat PCP for full course.  - now on nafcillin for staph bacteremia.    - bedside POCUS with no pleural effusions and with diffuse B lines but irregular pleura   - fungitell elevated. Sputum pcp pcr negative however variable yield.   - lower suspicion for pulmonary edema.   - Bicalutamide pneumonitis lower on ddx.   - Hold off on systemic steroids and/or bronchoscopy as patient is improving.    - Outpatient follow up. Will need CT chest in 6-8 weeks to evaluate for resolution of radiographic findings.   - Pulmonary to sign off.    Dr. Tae Nunes, DO  Pulmonary and Critical Care Medicine Fellow   Available via Microsoft Teams - **Preferred**  Pulmonary Spectra #[id number] ([facility]) / [id number] ([facility])  Pager:  [phone]

## 2023-12-13 NOTE — PROGRESS NOTE ADULT - PROBLEM SELECTOR PLAN 12
no obvious hemorrhage noted  - continue to monitor CBC  - s/p 1u PRBC on 11/25   - s/p 1u PRBC on 11/29      Plan discussed with daughter and son 12/12, dc pending rehab auth

## 2023-12-13 NOTE — PROGRESS NOTE ADULT - PROBLEM SELECTOR PLAN 1
Gram negative PNA in the setting of aspiration, c/b acute on chronic hypoxic respiratory failure  Noted result of CTPA 11/27. CT showed diffuse lung opacities, worsening on repeat CT 12/8 concerning for organizing PNA which led to suspicion for PJP  - titrate down on HFNC as tolerated  - Bactrim DS 2 tab q8 for empiric PJP PNA treatment, cont bactrim, f/u sputum pjp pcr  -s/p high flow, now on NC, no plan for bronch per pulm given improvement, will complete 14days of bactrim, will switch to meperon given uptrending Cr  -ID follg, will hold off empiric steroids for now. If O2 requirements again plateau or worse, will reconsider empiric steroids or bronchoscopic evaluation with BAL/TBBX.  -rvp with +ADENOVIRUS, TTE with no acute findings

## 2023-12-13 NOTE — PROGRESS NOTE ADULT - SUBJECTIVE AND OBJECTIVE BOX
PULMONARY SERVICE FOLLOW UP CONSULT NOTE    SUBJECTIVE:  No acute complaints.     REVIEW OF SYSTEMS:  All additional ROS negative.    MEDICATIONS:  Pulmonary:  guaiFENesin Oral Liquid (Sugar-Free) 200 milliGRAM(s) Oral every 6 hours PRN  levalbuterol Inhalation 0.63 milliGRAM(s) Inhalation every 6 hours PRN    Antimicrobials:  nafcillin  IVPB 2 Gram(s) IV Intermittent every 4 hours  trimethoprim  160 mG/sulfamethoxazole 800 mG 2 Tablet(s) Oral three times a day    Anticoagulants:  heparin   Injectable 5000 Unit(s) SubCutaneous every 8 hours    Onc:  bicalutamide 50 milliGRAM(s) Oral daily    GI/:  pantoprazole  Injectable 40 milliGRAM(s) IV Push daily  tamsulosin 0.4 milliGRAM(s) Oral at bedtime    Endocrine:  atorvastatin 20 milliGRAM(s) Oral at bedtime  dextrose 50% Injectable 25 Gram(s) IV Push once  dextrose 50% Injectable 12.5 Gram(s) IV Push once  dextrose 50% Injectable 25 Gram(s) IV Push once  dextrose Oral Gel 15 Gram(s) Oral once PRN  glucagon  Injectable 1 milliGRAM(s) IntraMuscular once    Cardiac:  lisinopril 5 milliGRAM(s) Oral daily    Other Medications:  benzocaine/menthol Lozenge 1 Lozenge Oral every 6 hours PRN  chlorhexidine 2% Cloths 1 Application(s) Topical daily  cholecalciferol 2000 Unit(s) Oral daily  dextrose 5%. 1000 milliLiter(s) IV Continuous <Continuous>  dextrose 5%. 1000 milliLiter(s) IV Continuous <Continuous>  lactobacillus acidophilus 1 Tablet(s) Oral daily  naloxone Injectable 0.1 milliGRAM(s) IV Push every 3 minutes PRN  ondansetron Injectable 4 milliGRAM(s) IV Push every 6 hours PRN      PHYSICAL EXAM  Vital Signs Last 24 Hrs  T(C): 36.6 (13 Dec 2023 05:00), Max: 36.9 (12 Dec 2023 16:00)  T(F): 97.8 (13 Dec 2023 05:00), Max: 98.4 (12 Dec 2023 16:00)  HR: 84 (13 Dec 2023 05:00) (84 - 100)  BP: 108/67 (13 Dec 2023 05:00) (103/58 - 118/66)  BP(mean): --  RR: 18 (13 Dec 2023 05:00) (18 - 18)  SpO2: 97% (13 Dec 2023 05:00) (89% - 98%)    Parameters below as of 13 Dec 2023 05:00  Patient On (Oxygen Delivery Method): nasal cannula  O2 Flow (L/min): 2      12-12 @ 07:01  -  12-13 @ 07:00  --------------------------------------------------------  IN: 0 mL / OUT: 1000 mL / NET: -1000 mL            CONSTITUTIONAL: No acute distress.   HEENT:  Conjunctiva clear B/L.  Moist oral mucosa.   Cardiovascular: RRR with no murmurs. No JVD noted. No lower extremity edema B/L. Extremities are warm and well perfused.    Respiratory: basilar rales. No accessory muscle use.   Gastrointestinal:  Soft, nontender. Non-distended. Non-rigid.    Neurologic:  Alert and awake. Moving all extremities. Following commands.    Skin:  No gross rashes notes.    LABS:      CBC Full  -  ( 13 Dec 2023 05:50 )  WBC Count : 4.89 K/uL  RBC Count : 2.93 M/uL  Hemoglobin : 9.1 g/dL  Hematocrit : 29.2 %  Platelet Count - Automated : 259 K/uL  Mean Cell Volume : 99.7 fL  Mean Cell Hemoglobin : 31.1 pg  Mean Cell Hemoglobin Concentration : 31.2 gm/dL  Auto Neutrophil # : 2.91 K/uL  Auto Lymphocyte # : 1.25 K/uL  Auto Monocyte # : 0.45 K/uL  Auto Eosinophil # : 0.19 K/uL  Auto Basophil # : 0.04 K/uL  Auto Neutrophil % : 59.5 %  Auto Lymphocyte % : 25.6 %  Auto Monocyte % : 9.2 %  Auto Eosinophil % : 3.9 %  Auto Basophil % : 0.8 %    12-13    136  |  98  |  24<H>  ----------------------------<  124<H>  4.9   |  23  |  1.42<H>    Ca    8.8      13 Dec 2023 05:50  Phos  4.2     12-13  Mg     2.30     12-13            Urinalysis Basic - ( 13 Dec 2023 05:50 )    Color: x / Appearance: x / SG: x / pH: x  Gluc: 124 mg/dL / Ketone: x  / Bili: x / Urobili: x   Blood: x / Protein: x / Nitrite: x   Leuk Esterase: x / RBC: x / WBC x   Sq Epi: x / Non Sq Epi: x / Bacteria: x                RADIOLOGY & ADDITIONAL STUDIES:

## 2023-12-13 NOTE — PROGRESS NOTE ADULT - TIME BILLING
The necessity of the time spent during the encounter on this date of service was due to: Time spent on review of vitals, physical exam, documentation, complex decision making and discussion of plan of care with patient and consultant.
Preparing to see the patient including review of tests and other providers' notes, confirming history with family member, performing medical examination and evaluation, counseling and educating the patient/family/caregiver, ordering medications, tests and procedures, communicating with other health care professionals, documenting clinical information in the EMR, independently interpreting results and communicating results to the patient/family/caregiven, care coordination.
Medical management as above, reviewing chart and coordinating care with primary team/staff, as well as reviewing vitals, radiology, medication list, recent labs, and prior records.    Does not include teaching time.
Preparing to see the patient including review of tests and other providers' notes, confirming history with family member, performing medical examination and evaluation, counseling and educating the patient/family/caregiver, ordering medications, tests and procedures, communicating with other health care professionals, documenting clinical information in the EMR, independently interpreting results and communicating results to the patient/family/caregiven, care coordination.
Preparing to see the patient including review of tests and other providers' notes, confirming history with family member, performing medical examination and evaluation, counseling and educating the patient/family/caregiver, ordering medications, tests and procedures, communicating with other health care professionals, documenting clinical information in the EMR, independently interpreting results and communicating results to the patient/family/caregiven, care coordination.
Medical management as above, review of results/records, discussion with patient and primary team.  Encounter time excludes time spent teaching resident/fellow.
Preparing to see the patient including review of tests and other providers' notes, confirming history with family member, performing medical examination and evaluation, counseling and educating the patient/family/caregiver, ordering medications, tests and procedures, communicating with other health care professionals, documenting clinical information in the EMR, independently interpreting results and communicating results to the patient/family/caregiven, care coordination.
Preparing to see the patient including review of tests and other providers' notes, confirming history with family member, performing medical examination and evaluation, counseling and educating the patient/family/caregiver, ordering medications, tests and procedures, communicating with other health care professionals, documenting clinical information in the EMR, independently interpreting results and communicating results to the patient/family/caregiven, care coordination.
Medical management as above, review of results/records, discussion with patient and primary team.  Encounter time excludes time spent teaching resident/fellow.
Preparing to see the patient including review of tests and other providers' notes, confirming history with family member, performing medical examination and evaluation, counseling and educating the patient/family/caregiver, ordering medications, tests and procedures, communicating with other health care professionals, documenting clinical information in the EMR, independently interpreting results and communicating results to the patient/family/caregiven, care coordination.
Time-based billing (NON-critical care).     51 minutes spent on total encounter; more than 50% of the visit was spent counseling and / or coordinating care by the attending physician.  The necessity of the time spent during the encounter on this date of service was due to:     review of laboratory data, radiology results, consultants' recommendations, documentation in [place], discussion with patient/housestaff and interdisciplinary staff (such as , social workers, etc). Interventions were performed as documented above.

## 2023-12-13 NOTE — PROGRESS NOTE ADULT - ASSESSMENT
73m with known metastatic prostate CA admitted initially to [facility] after falling and weakness. Found to be in rhabdo with SRUTHI.  Imaging noted spinal fracture with edema and lytic metastatic disease from C7, T1-T5, T12, and L1-L3 as well as epidural expansion of neoplasm at C7 centrally and dorsally resulting in moderate cord compression and underlying edema/extension into the bilateral C7-T1 and T1-2 and Left T2-3 neural foramina. Epidural disease was also noted at T12 vertebral body.   11/2, he underwent C7-T1 decompression, C5-T3 posterior cervical fusion.  Hospital course complicated by MSSA bacteremia which has cleared with negative TTE.  Course also complicated by hypoxia not responding to antibiotics.  Has been on empiric treatment for PJP (bactrim).  Fungitell elevated and LDH elevated.  Serial CT with changing GGO.  Seems unlikely to be PJP    Overall, metastatic prostate cancer with cord compression s/p spinal decompression 11/2, with fever due to MSSA bacteremia and cdiff, hypoxia +RVP adenovirus and GGO on CT    Fever  - resolved for now  - repeat BC negative    MSSA bacteremia  - would continue antibiotics  - negative echo    Abnormal Imaging  - fluid collections noted on imaging  - per plastics, they feel it is an uninfected seroma  - previously had drainage from incision    Hypoxic respiratory failure  - repeat CT with changing GGO  - per pulmonary suspect organizing pneumonia  - patient appears to be improving without steroids  - sputum for PJP PCR negative  - With elevated fungitell and clinical improvement, I would still treat for PCP through 12/21  -Cr increased- change Bactrim to Mepron 750 q 12  - HIV is negative    C diff  - completed treatment    Overall,     For MSSA infection, continue nafcillin for 6 weeks from negative culture through 12/28  continue Mepron through 12/21    Weekly CBC, BMP fax to [phone]  Follow up as outpt [phone]    Please call the ID service [phone] with questions or concerns  73m with known metastatic prostate CA admitted initially to [facility] after falling and weakness. Found to be in rhabdo with SRUTHI.  Imaging noted spinal fracture with edema and lytic metastatic disease from C7, T1-T5, T12, and L1-L3 as well as epidural expansion of neoplasm at C7 centrally and dorsally resulting in moderate cord compression and underlying edema/extension into the bilateral C7-T1 and T1-2 and Left T2-3 neural foramina. Epidural disease was also noted at T12 vertebral body.   11/2, he underwent C7-T1 decompression, C5-T3 posterior cervical fusion.  Hospital course complicated by MSSA bacteremia which has cleared with negative TTE.  Course also complicated by hypoxia not responding to antibiotics.  Has been on empiric treatment for PJP (bactrim).  Fungitell elevated and LDH elevated.  Serial CT with changing GGO.  Seems unlikely to be PJP    Overall, metastatic prostate cancer with cord compression s/p spinal decompression 11/2, with fever due to MSSA bacteremia and cdiff, hypoxia +RVP adenovirus and GGO on CT    Fever  - resolved for now  - repeat BC negative    MSSA bacteremia  - would continue antibiotics  - negative echo    Abnormal Imaging  - fluid collections noted on imaging  - per plastics, they feel it is an uninfected seroma  - previously had drainage from incision    Hypoxic respiratory failure  - repeat CT with changing GGO  - per pulmonary suspect organizing pneumonia  - patient appears to be improving without steroids  - sputum for PJP PCR negative  - With elevated fungitell and clinical improvement, I would still treat for PCP through 12/21  -Cr increased- change Bactrim to Mepron 750 q 12  - HIV is negative    C diff  - completed treatment    Overall,     For MSSA infection, continue nafcillin for 6 weeks from negative culture through 12/28  continue Mepron through 12/21    Weekly CBC, BMP fax to [phone]  Follow up as outpt [phone]    Please call the ID service [phone] with questions or concerns

## 2023-12-14 LAB
ANION GAP SERPL CALC-SCNC: 13 MMOL/L — SIGNIFICANT CHANGE UP (ref 7–14)
ANION GAP SERPL CALC-SCNC: 13 MMOL/L — SIGNIFICANT CHANGE UP (ref 7–14)
BASOPHILS # BLD AUTO: 0.06 K/UL — SIGNIFICANT CHANGE UP (ref 0–0.2)
BASOPHILS # BLD AUTO: 0.06 K/UL — SIGNIFICANT CHANGE UP (ref 0–0.2)
BASOPHILS NFR BLD AUTO: 1.4 % — SIGNIFICANT CHANGE UP (ref 0–2)
BASOPHILS NFR BLD AUTO: 1.4 % — SIGNIFICANT CHANGE UP (ref 0–2)
BUN SERPL-MCNC: 28 MG/DL — HIGH (ref 7–23)
BUN SERPL-MCNC: 28 MG/DL — HIGH (ref 7–23)
CALCIUM SERPL-MCNC: 9.1 MG/DL — SIGNIFICANT CHANGE UP (ref 8.4–10.5)
CALCIUM SERPL-MCNC: 9.1 MG/DL — SIGNIFICANT CHANGE UP (ref 8.4–10.5)
CHLORIDE SERPL-SCNC: 100 MMOL/L — SIGNIFICANT CHANGE UP (ref 98–107)
CHLORIDE SERPL-SCNC: 100 MMOL/L — SIGNIFICANT CHANGE UP (ref 98–107)
CO2 SERPL-SCNC: 22 MMOL/L — SIGNIFICANT CHANGE UP (ref 22–31)
CO2 SERPL-SCNC: 22 MMOL/L — SIGNIFICANT CHANGE UP (ref 22–31)
CREAT SERPL-MCNC: 1.52 MG/DL — HIGH (ref 0.5–1.3)
CREAT SERPL-MCNC: 1.52 MG/DL — HIGH (ref 0.5–1.3)
EGFR: 49 ML/MIN/1.73M2 — LOW
EGFR: 49 ML/MIN/1.73M2 — LOW
EOSINOPHIL # BLD AUTO: 0.38 K/UL — SIGNIFICANT CHANGE UP (ref 0–0.5)
EOSINOPHIL # BLD AUTO: 0.38 K/UL — SIGNIFICANT CHANGE UP (ref 0–0.5)
EOSINOPHIL NFR BLD AUTO: 9 % — HIGH (ref 0–6)
EOSINOPHIL NFR BLD AUTO: 9 % — HIGH (ref 0–6)
GLUCOSE SERPL-MCNC: 105 MG/DL — HIGH (ref 70–99)
GLUCOSE SERPL-MCNC: 105 MG/DL — HIGH (ref 70–99)
HCT VFR BLD CALC: 29 % — LOW (ref 39–50)
HCT VFR BLD CALC: 29 % — LOW (ref 39–50)
HGB BLD-MCNC: 9.3 G/DL — LOW (ref 13–17)
HGB BLD-MCNC: 9.3 G/DL — LOW (ref 13–17)
IANC: 2.4 K/UL — SIGNIFICANT CHANGE UP (ref 1.8–7.4)
IANC: 2.4 K/UL — SIGNIFICANT CHANGE UP (ref 1.8–7.4)
IMM GRANULOCYTES NFR BLD AUTO: 0.9 % — SIGNIFICANT CHANGE UP (ref 0–0.9)
IMM GRANULOCYTES NFR BLD AUTO: 0.9 % — SIGNIFICANT CHANGE UP (ref 0–0.9)
LYMPHOCYTES # BLD AUTO: 0.91 K/UL — LOW (ref 1–3.3)
LYMPHOCYTES # BLD AUTO: 0.91 K/UL — LOW (ref 1–3.3)
LYMPHOCYTES # BLD AUTO: 21.6 % — SIGNIFICANT CHANGE UP (ref 13–44)
LYMPHOCYTES # BLD AUTO: 21.6 % — SIGNIFICANT CHANGE UP (ref 13–44)
MAGNESIUM SERPL-MCNC: 2.5 MG/DL — SIGNIFICANT CHANGE UP (ref 1.6–2.6)
MAGNESIUM SERPL-MCNC: 2.5 MG/DL — SIGNIFICANT CHANGE UP (ref 1.6–2.6)
MCHC RBC-ENTMCNC: 31.4 PG — SIGNIFICANT CHANGE UP (ref 27–34)
MCHC RBC-ENTMCNC: 31.4 PG — SIGNIFICANT CHANGE UP (ref 27–34)
MCHC RBC-ENTMCNC: 32.1 GM/DL — SIGNIFICANT CHANGE UP (ref 32–36)
MCHC RBC-ENTMCNC: 32.1 GM/DL — SIGNIFICANT CHANGE UP (ref 32–36)
MCV RBC AUTO: 98 FL — SIGNIFICANT CHANGE UP (ref 80–100)
MCV RBC AUTO: 98 FL — SIGNIFICANT CHANGE UP (ref 80–100)
MONOCYTES # BLD AUTO: 0.43 K/UL — SIGNIFICANT CHANGE UP (ref 0–0.9)
MONOCYTES # BLD AUTO: 0.43 K/UL — SIGNIFICANT CHANGE UP (ref 0–0.9)
MONOCYTES NFR BLD AUTO: 10.2 % — SIGNIFICANT CHANGE UP (ref 2–14)
MONOCYTES NFR BLD AUTO: 10.2 % — SIGNIFICANT CHANGE UP (ref 2–14)
NEUTROPHILS # BLD AUTO: 2.4 K/UL — SIGNIFICANT CHANGE UP (ref 1.8–7.4)
NEUTROPHILS # BLD AUTO: 2.4 K/UL — SIGNIFICANT CHANGE UP (ref 1.8–7.4)
NEUTROPHILS NFR BLD AUTO: 56.9 % — SIGNIFICANT CHANGE UP (ref 43–77)
NEUTROPHILS NFR BLD AUTO: 56.9 % — SIGNIFICANT CHANGE UP (ref 43–77)
NRBC # BLD: 0 /100 WBCS — SIGNIFICANT CHANGE UP (ref 0–0)
NRBC # BLD: 0 /100 WBCS — SIGNIFICANT CHANGE UP (ref 0–0)
NRBC # FLD: 0 K/UL — SIGNIFICANT CHANGE UP (ref 0–0)
NRBC # FLD: 0 K/UL — SIGNIFICANT CHANGE UP (ref 0–0)
PHOSPHATE SERPL-MCNC: 3.9 MG/DL — SIGNIFICANT CHANGE UP (ref 2.5–4.5)
PHOSPHATE SERPL-MCNC: 3.9 MG/DL — SIGNIFICANT CHANGE UP (ref 2.5–4.5)
PLATELET # BLD AUTO: 288 K/UL — SIGNIFICANT CHANGE UP (ref 150–400)
PLATELET # BLD AUTO: 288 K/UL — SIGNIFICANT CHANGE UP (ref 150–400)
POTASSIUM SERPL-MCNC: 4.7 MMOL/L — SIGNIFICANT CHANGE UP (ref 3.5–5.3)
POTASSIUM SERPL-MCNC: 4.7 MMOL/L — SIGNIFICANT CHANGE UP (ref 3.5–5.3)
POTASSIUM SERPL-SCNC: 4.7 MMOL/L — SIGNIFICANT CHANGE UP (ref 3.5–5.3)
POTASSIUM SERPL-SCNC: 4.7 MMOL/L — SIGNIFICANT CHANGE UP (ref 3.5–5.3)
RBC # BLD: 2.96 M/UL — LOW (ref 4.2–5.8)
RBC # BLD: 2.96 M/UL — LOW (ref 4.2–5.8)
RBC # FLD: 17.4 % — HIGH (ref 10.3–14.5)
RBC # FLD: 17.4 % — HIGH (ref 10.3–14.5)
SODIUM SERPL-SCNC: 135 MMOL/L — SIGNIFICANT CHANGE UP (ref 135–145)
SODIUM SERPL-SCNC: 135 MMOL/L — SIGNIFICANT CHANGE UP (ref 135–145)
WBC # BLD: 4.22 K/UL — SIGNIFICANT CHANGE UP (ref 3.8–10.5)
WBC # BLD: 4.22 K/UL — SIGNIFICANT CHANGE UP (ref 3.8–10.5)
WBC # FLD AUTO: 4.22 K/UL — SIGNIFICANT CHANGE UP (ref 3.8–10.5)
WBC # FLD AUTO: 4.22 K/UL — SIGNIFICANT CHANGE UP (ref 3.8–10.5)

## 2023-12-14 PROCEDURE — 99232 SBSQ HOSP IP/OBS MODERATE 35: CPT

## 2023-12-14 RX ADMIN — BICALUTAMIDE 50 MILLIGRAM(S): 50 TABLET, FILM COATED ORAL at 14:15

## 2023-12-14 RX ADMIN — ATOVAQUONE 750 MILLIGRAM(S): 750 SUSPENSION ORAL at 05:00

## 2023-12-14 RX ADMIN — LISINOPRIL 5 MILLIGRAM(S): 2.5 TABLET ORAL at 09:43

## 2023-12-14 RX ADMIN — ATOVAQUONE 750 MILLIGRAM(S): 750 SUSPENSION ORAL at 18:28

## 2023-12-14 RX ADMIN — NAFCILLIN 200 GRAM(S): 10 INJECTION, POWDER, FOR SOLUTION INTRAVENOUS at 23:47

## 2023-12-14 RX ADMIN — ATORVASTATIN CALCIUM 20 MILLIGRAM(S): 80 TABLET, FILM COATED ORAL at 20:19

## 2023-12-14 RX ADMIN — HEPARIN SODIUM 5000 UNIT(S): 5000 INJECTION INTRAVENOUS; SUBCUTANEOUS at 01:02

## 2023-12-14 RX ADMIN — CHLORHEXIDINE GLUCONATE 1 APPLICATION(S): 213 SOLUTION TOPICAL at 14:14

## 2023-12-14 RX ADMIN — Medication 2000 UNIT(S): at 14:14

## 2023-12-14 RX ADMIN — NAFCILLIN 200 GRAM(S): 10 INJECTION, POWDER, FOR SOLUTION INTRAVENOUS at 14:15

## 2023-12-14 RX ADMIN — NAFCILLIN 200 GRAM(S): 10 INJECTION, POWDER, FOR SOLUTION INTRAVENOUS at 04:57

## 2023-12-14 RX ADMIN — NAFCILLIN 200 GRAM(S): 10 INJECTION, POWDER, FOR SOLUTION INTRAVENOUS at 00:59

## 2023-12-14 RX ADMIN — NAFCILLIN 200 GRAM(S): 10 INJECTION, POWDER, FOR SOLUTION INTRAVENOUS at 09:42

## 2023-12-14 RX ADMIN — TAMSULOSIN HYDROCHLORIDE 0.4 MILLIGRAM(S): 0.4 CAPSULE ORAL at 20:19

## 2023-12-14 RX ADMIN — Medication 1 TABLET(S): at 14:14

## 2023-12-14 RX ADMIN — HEPARIN SODIUM 5000 UNIT(S): 5000 INJECTION INTRAVENOUS; SUBCUTANEOUS at 09:49

## 2023-12-14 RX ADMIN — NAFCILLIN 200 GRAM(S): 10 INJECTION, POWDER, FOR SOLUTION INTRAVENOUS at 16:08

## 2023-12-14 RX ADMIN — HEPARIN SODIUM 5000 UNIT(S): 5000 INJECTION INTRAVENOUS; SUBCUTANEOUS at 16:13

## 2023-12-14 RX ADMIN — NAFCILLIN 200 GRAM(S): 10 INJECTION, POWDER, FOR SOLUTION INTRAVENOUS at 20:22

## 2023-12-14 RX ADMIN — PANTOPRAZOLE SODIUM 40 MILLIGRAM(S): 20 TABLET, DELAYED RELEASE ORAL at 14:15

## 2023-12-14 NOTE — PROGRESS NOTE ADULT - PROBLEM SELECTOR PLAN 1
Gram negative PNA in the setting of aspiration, c/b acute on chronic hypoxic respiratory failure  Noted result of CTPA 11/27. CT showed diffuse lung opacities, worsening on repeat CT 12/8 concerning for organizing PNA which led to suspicion for PJP  - Now on NC  - Bactrim DS 2 tab q8 for empiric PJP PNA treatment, cont bactrim, f/u sputum pjp pcr  -s/p high flow, now on NC, no plan for bronch per pulm given improvement, will complete 14days of bactrim, will switch to meperon given uptrending Cr  -ID follg, will hold off empiric steroids for now. If O2 requirements again plateau or worse, will reconsider empiric steroids or bronchoscopic evaluation with BAL/TBBX.  -rvp with +ADENOVIRUS, TTE with no acute findings

## 2023-12-14 NOTE — PROGRESS NOTE ADULT - ASSESSMENT
73m with known metastatic prostate CA admitted initially to [facility] after falling and weakness. Found to be in rhabdo with SRUTHI.  Imaging noted spinal fracture with edema and lytic metastatic disease from C7, T1-T5, T12, and L1-L3 as well as epidural expansion of neoplasm at C7 centrally and dorsally resulting in moderate cord compression and underlying edema/extension into the bilateral C7-T1 and T1-2 and Left T2-3 neural foramina. Epidural disease was also noted at T12 vertebral body.   11/2, he underwent C7-T1 decompression, C5-T3 posterior cervical fusion.  Hospital course complicated by MSSA bacteremia which has cleared with negative TTE.  Course also complicated by hypoxia not responding to antibiotics.  Has been on empiric treatment for PJP (bactrim).  Fungitell elevated and LDH elevated.  Serial CT with changing GGO.  Seems unlikely to be PJP    Overall, metastatic prostate cancer with cord compression s/p spinal decompression 11/2, with fever due to MSSA bacteremia and c diff, hypoxia +RVP adenovirus and GGO on CT    Fever  - resolved for now  - repeat BC negative    MSSA bacteremia  - would continue antibiotics  - negative echo    Abnormal Imaging  - fluid collections noted on imaging  - per plastics, they feel it is an uninfected seroma  - previously had drainage from incision    Hypoxic respiratory failure  - repeat CT with changing GGO  - per pulmonary suspect organizing pneumonia  - patient appears to be improving without steroids  - sputum for PJP PCR negative  - With elevated fungitell and clinical improvement, I would still treat for PCP through 12/21  -Cr increased- change Bactrim to Mepron 750 q 12  - HIV is negative    C diff  - completed treatment    Overall,     For MSSA infection, continue nafcillin for 6 weeks from negative culture through 12/28  Daptomycin 600 mg iv daily would be an alternative  continue Mepron through 12/21    Weekly CBC, BMP fax to [phone]  Follow up as outpt [phone]    Please call the ID service [phone] with questions or concerns  73m with known metastatic prostate CA admitted initially to [facility] after falling and weakness. Found to be in rhabdo with SRUTHI.  Imaging noted spinal fracture with edema and lytic metastatic disease from C7, T1-T5, T12, and L1-L3 as well as epidural expansion of neoplasm at C7 centrally and dorsally resulting in moderate cord compression and underlying edema/extension into the bilateral C7-T1 and T1-2 and Left T2-3 neural foramina. Epidural disease was also noted at T12 vertebral body.   11/2, he underwent C7-T1 decompression, C5-T3 posterior cervical fusion.  Hospital course complicated by MSSA bacteremia which has cleared with negative TTE.  Course also complicated by hypoxia not responding to antibiotics.  Has been on empiric treatment for PJP (bactrim).  Fungitell elevated and LDH elevated.  Serial CT with changing GGO.  Seems unlikely to be PJP    Overall, metastatic prostate cancer with cord compression s/p spinal decompression 11/2, with fever due to MSSA bacteremia and c diff, hypoxia +RVP adenovirus and GGO on CT    Fever  - resolved for now  - repeat BC negative    MSSA bacteremia  - would continue antibiotics  - negative echo    Abnormal Imaging  - fluid collections noted on imaging  - per plastics, they feel it is an uninfected seroma  - previously had drainage from incision    Hypoxic respiratory failure  - repeat CT with changing GGO  - per pulmonary suspect organizing pneumonia  - patient appears to be improving without steroids  - sputum for PJP PCR negative  - With elevated fungitell and clinical improvement, I would still treat for PCP through 12/21  -Cr increased- change Bactrim to Mepron 750 q 12  - HIV is negative    C diff  - completed treatment    Overall,     For MSSA infection, continue nafcillin for 6 weeks from negative culture through 12/28  Daptomycin 600 mg iv daily would be an alternative  continue Mepron through 12/21    Weekly CBC, BMP fax to [phone]  Follow up as outpt [phone]    Please call the ID service [phone] with questions or concerns

## 2023-12-14 NOTE — PROGRESS NOTE ADULT - PROBLEM SELECTOR PLAN 6
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 Large collection with peripheral enhancement involving the posterior paraspinal soft tissue region is again partially demonstrated.  -Sutures removed by plastics  -no plan for Kyphoplasty for now per IR, pt can f/u as outpt  -Per plastics it is uninfected seroma,

## 2023-12-14 NOTE — PROGRESS NOTE ADULT - PROBLEM SELECTOR PLAN 3
Concern for surgical site as a primary source  - BCx from 11/16, 11/17 NGTD  - changed to Nafcillin IV on 11/20. Now on meropenem (11/29- 1/28)  -Plan for rehab for Abx  - TTE revealed hyperdynamic left ventricle  - afebrile now.  -ANDERS sanchezg Concern for surgical site as a primary source  - BCx from 11/16, 11/17 NGTD  - changed to Nafcillin IV on 11/20. Now on meropenem (11/29- 1/28)  -Plan for rehab for Abx  - TTE revealed hyperdynamic left ventricle  - afebrile now.  -ANDERS sanchzeg

## 2023-12-14 NOTE — PROGRESS NOTE ADULT - SUBJECTIVE AND OBJECTIVE BOX
Follow Up:      Inverval History/ROS:Patient is a 73y old  Male who presents with a chief complaint of Diffuse Spinal Mets from Prostate Cancer (14 Dec 2023 14:09)    No fever    Allergies    No Known Allergies    Intolerances        ANTIMICROBIALS:  atovaquone  Suspension 750 two times a day  nafcillin  IVPB 2 every 4 hours      OTHER MEDS:  atorvastatin 20 milliGRAM(s) Oral at bedtime  benzocaine/menthol Lozenge 1 Lozenge Oral every 6 hours PRN  bicalutamide 50 milliGRAM(s) Oral daily  chlorhexidine 2% Cloths 1 Application(s) Topical daily  cholecalciferol 2000 Unit(s) Oral daily  dextrose 5%. 1000 milliLiter(s) IV Continuous <Continuous>  dextrose 5%. 1000 milliLiter(s) IV Continuous <Continuous>  dextrose 50% Injectable 25 Gram(s) IV Push once  dextrose 50% Injectable 12.5 Gram(s) IV Push once  dextrose 50% Injectable 25 Gram(s) IV Push once  dextrose Oral Gel 15 Gram(s) Oral once PRN  glucagon  Injectable 1 milliGRAM(s) IntraMuscular once  guaiFENesin Oral Liquid (Sugar-Free) 200 milliGRAM(s) Oral every 6 hours PRN  heparin   Injectable 5000 Unit(s) SubCutaneous every 8 hours  lactobacillus acidophilus 1 Tablet(s) Oral daily  levalbuterol Inhalation 0.63 milliGRAM(s) Inhalation every 6 hours PRN  lisinopril 5 milliGRAM(s) Oral daily  naloxone Injectable 0.1 milliGRAM(s) IV Push every 3 minutes PRN  ondansetron Injectable 4 milliGRAM(s) IV Push every 6 hours PRN  pantoprazole  Injectable 40 milliGRAM(s) IV Push daily  tamsulosin 0.4 milliGRAM(s) Oral at bedtime      Vital Signs Last 24 Hrs  T(C): 36.7 (14 Dec 2023 12:48), Max: 36.7 (14 Dec 2023 12:48)  T(F): 98.1 (14 Dec 2023 12:48), Max: 98.1 (14 Dec 2023 12:48)  HR: 84 (14 Dec 2023 12:48) (84 - 96)  BP: 113/68 (14 Dec 2023 12:48) (103/60 - 113/68)  BP(mean): --  RR: 18 (14 Dec 2023 12:48) (18 - 19)  SpO2: 97% (14 Dec 2023 12:48) (94% - 97%)    Parameters below as of 14 Dec 2023 12:48  Patient On (Oxygen Delivery Method): nasal cannula        PHYSICAL EXAM:  General: [x ] non-toxic  HEAD/EYES: [ ] PERRL [x ] white sclera [ ] icterus  ENT:  [ ] normal [ x] supple [ ] thrush [ ] pharyngeal exudate  Cardiovascular:   [ ] murmur [ x] normal [ ] PPM/AICD  Respiratory:  [ x] clear to ausculation bilaterally  GI:  [ x] soft, non-tender, normal bowel sounds  :  [ ] best [ x] no CVA tenderness   Musculoskeletal:  [ ] no synovitis  Neurologic:  [ ] non-focal exam   Skin:  [x ] no rash  Lymph: [ ] no lymphadenopathy  Psychiatric:  [ ] appropriate affect [ x] alert & oriented  Lines:  [x ] no phlebitis [ ] central line                                9.3    4.22  )-----------( 288      ( 14 Dec 2023 06:25 )             29.0       12-14    135  |  100  |  28<H>  ----------------------------<  105<H>  4.7   |  22  |  1.52<H>    Ca    9.1      14 Dec 2023 06:25  Phos  3.9     12-14  Mg     2.50     12-14        Urinalysis Basic - ( 14 Dec 2023 06:25 )    Color: x / Appearance: x / SG: x / pH: x  Gluc: 105 mg/dL / Ketone: x  / Bili: x / Urobili: x   Blood: x / Protein: x / Nitrite: x   Leuk Esterase: x / RBC: x / WBC x   Sq Epi: x / Non Sq Epi: x / Bacteria: x        MICROBIOLOGY:    RADIOLOGY:

## 2023-12-14 NOTE — PROGRESS NOTE ADULT - SUBJECTIVE AND OBJECTIVE BOX
[facility] Medicine  Zach Villagran MD  Pager [phone]      Patient is a 73y old  Male who presents with a chief complaint of Diffuse Spinal Mets from Prostate Cancer (13 Dec 2023 17:12)      SUBJECTIVE / OVERNIGHT EVENTS: No CP, SOB or fever    MEDICATIONS  (STANDING):  atorvastatin 20 milliGRAM(s) Oral at bedtime  atovaquone  Suspension 750 milliGRAM(s) Oral two times a day  bicalutamide 50 milliGRAM(s) Oral daily  chlorhexidine 2% Cloths 1 Application(s) Topical daily  cholecalciferol 2000 Unit(s) Oral daily  dextrose 5%. 1000 milliLiter(s) (50 mL/Hr) IV Continuous <Continuous>  dextrose 5%. 1000 milliLiter(s) (100 mL/Hr) IV Continuous <Continuous>  dextrose 50% Injectable 12.5 Gram(s) IV Push once  dextrose 50% Injectable 25 Gram(s) IV Push once  dextrose 50% Injectable 25 Gram(s) IV Push once  glucagon  Injectable 1 milliGRAM(s) IntraMuscular once  heparin   Injectable 5000 Unit(s) SubCutaneous every 8 hours  lactobacillus acidophilus 1 Tablet(s) Oral daily  lisinopril 5 milliGRAM(s) Oral daily  nafcillin  IVPB 2 Gram(s) IV Intermittent every 4 hours  pantoprazole  Injectable 40 milliGRAM(s) IV Push daily  tamsulosin 0.4 milliGRAM(s) Oral at bedtime    MEDICATIONS  (PRN):  benzocaine/menthol Lozenge 1 Lozenge Oral every 6 hours PRN Sore Throat  dextrose Oral Gel 15 Gram(s) Oral once PRN Blood Glucose LESS THAN 70 milliGRAM(s)/deciliter  guaiFENesin Oral Liquid (Sugar-Free) 200 milliGRAM(s) Oral every 6 hours PRN Cough  levalbuterol Inhalation 0.63 milliGRAM(s) Inhalation every 6 hours PRN shortness of breath/wheezing  naloxone Injectable 0.1 milliGRAM(s) IV Push every 3 minutes PRN For ANY of the following changes in patient status:  A. RR LESS THAN 10 breaths per minute, B. Oxygen saturation LESS THAN 90%, C. Sedation score of 6  ondansetron Injectable 4 milliGRAM(s) IV Push every 6 hours PRN Nausea      CAPILLARY BLOOD GLUCOSE      POCT Blood Glucose.: 137 mg/dL (13 Dec 2023 21:04)  POCT Blood Glucose.: 100 mg/dL (13 Dec 2023 17:53)    I&O's Summary    13 Dec 2023 07:01  -  14 Dec 2023 07:00  --------------------------------------------------------  IN: 0 mL / OUT: 500 mL / NET: -500 mL        PHYSICAL EXAM:  Vital Signs Last 24 Hrs  T(C): 36.7 (14 Dec 2023 12:48), Max: 36.7 (14 Dec 2023 12:48)  T(F): 98.1 (14 Dec 2023 12:48), Max: 98.1 (14 Dec 2023 12:48)  HR: 84 (14 Dec 2023 12:48) (84 - 96)  BP: 113/68 (14 Dec 2023 12:48) (103/60 - 113/68)  BP(mean): --  RR: 18 (14 Dec 2023 12:48) (18 - 19)  SpO2: 97% (14 Dec 2023 12:48) (94% - 97%)    Parameters below as of 14 Dec 2023 12:48  Patient On (Oxygen Delivery Method): nasal cannula      CONSTITUTIONAL: NAD  EYES: conjunctiva and sclera clear  ENMT: mmm  NECK: Supple,  RESPIRATORY: Normal respiratory effort; lungs are clear to auscultation bilaterally  CARDIOVASCULAR: Regular rate and rhythm, + S1 and S2  ABDOMEN: Nontender to palpation, normoactive bowel sounds, no rebound/guarding  PSYCH: A+O x 3    LABS:                        9.3    4.22  )-----------( 288      ( 14 Dec 2023 06:25 )             29.0     12-14    135  |  100  |  28<H>  ----------------------------<  105<H>  4.7   |  22  |  1.52<H>    Ca    9.1      14 Dec 2023 06:25  Phos  3.9     12-14  Mg     2.50     12-14            Urinalysis Basic - ( 14 Dec 2023 06:25 )    Color: x / Appearance: x / SG: x / pH: x  Gluc: 105 mg/dL / Ketone: x  / Bili: x / Urobili: x   Blood: x / Protein: x / Nitrite: x   Leuk Esterase: x / RBC: x / WBC x   Sq Epi: x / Non Sq Epi: x / Bacteria: x             [facility]  Zach Villagran MD  Pager [phone]      Patient is a 73y old  Male who presents with a chief complaint of Diffuse Spinal Mets from Prostate Cancer (13 Dec 2023 17:12)      SUBJECTIVE / OVERNIGHT EVENTS: No CP, SOB or fever    MEDICATIONS  (STANDING):  atorvastatin 20 milliGRAM(s) Oral at bedtime  atovaquone  Suspension 750 milliGRAM(s) Oral two times a day  bicalutamide 50 milliGRAM(s) Oral daily  chlorhexidine 2% Cloths 1 Application(s) Topical daily  cholecalciferol 2000 Unit(s) Oral daily  dextrose 5%. 1000 milliLiter(s) (50 mL/Hr) IV Continuous <Continuous>  dextrose 5%. 1000 milliLiter(s) (100 mL/Hr) IV Continuous <Continuous>  dextrose 50% Injectable 12.5 Gram(s) IV Push once  dextrose 50% Injectable 25 Gram(s) IV Push once  dextrose 50% Injectable 25 Gram(s) IV Push once  glucagon  Injectable 1 milliGRAM(s) IntraMuscular once  heparin   Injectable 5000 Unit(s) SubCutaneous every 8 hours  lactobacillus acidophilus 1 Tablet(s) Oral daily  lisinopril 5 milliGRAM(s) Oral daily  nafcillin  IVPB 2 Gram(s) IV Intermittent every 4 hours  pantoprazole  Injectable 40 milliGRAM(s) IV Push daily  tamsulosin 0.4 milliGRAM(s) Oral at bedtime    MEDICATIONS  (PRN):  benzocaine/menthol Lozenge 1 Lozenge Oral every 6 hours PRN Sore Throat  dextrose Oral Gel 15 Gram(s) Oral once PRN Blood Glucose LESS THAN 70 milliGRAM(s)/deciliter  guaiFENesin Oral Liquid (Sugar-Free) 200 milliGRAM(s) Oral every 6 hours PRN Cough  levalbuterol Inhalation 0.63 milliGRAM(s) Inhalation every 6 hours PRN shortness of breath/wheezing  naloxone Injectable 0.1 milliGRAM(s) IV Push every 3 minutes PRN For ANY of the following changes in patient status:  A. RR LESS THAN 10 breaths per minute, B. Oxygen saturation LESS THAN 90%, C. Sedation score of 6  ondansetron Injectable 4 milliGRAM(s) IV Push every 6 hours PRN Nausea      CAPILLARY BLOOD GLUCOSE      POCT Blood Glucose.: 137 mg/dL (13 Dec 2023 21:04)  POCT Blood Glucose.: 100 mg/dL (13 Dec 2023 17:53)    I&O's Summary    13 Dec 2023 07:01  -  14 Dec 2023 07:00  --------------------------------------------------------  IN: 0 mL / OUT: 500 mL / NET: -500 mL        PHYSICAL EXAM:  Vital Signs Last 24 Hrs  T(C): 36.7 (14 Dec 2023 12:48), Max: 36.7 (14 Dec 2023 12:48)  T(F): 98.1 (14 Dec 2023 12:48), Max: 98.1 (14 Dec 2023 12:48)  HR: 84 (14 Dec 2023 12:48) (84 - 96)  BP: 113/68 (14 Dec 2023 12:48) (103/60 - 113/68)  BP(mean): --  RR: 18 (14 Dec 2023 12:48) (18 - 19)  SpO2: 97% (14 Dec 2023 12:48) (94% - 97%)    Parameters below as of 14 Dec 2023 12:48  Patient On (Oxygen Delivery Method): nasal cannula      CONSTITUTIONAL: NAD  EYES: conjunctiva and sclera clear  ENMT: mmm  NECK: Supple,  RESPIRATORY: Normal respiratory effort; lungs are clear to auscultation bilaterally  CARDIOVASCULAR: Regular rate and rhythm, + S1 and S2  ABDOMEN: Nontender to palpation, normoactive bowel sounds, no rebound/guarding  PSYCH: A+O x 3    LABS:                        9.3    4.22  )-----------( 288      ( 14 Dec 2023 06:25 )             29.0     12-14    135  |  100  |  28<H>  ----------------------------<  105<H>  4.7   |  22  |  1.52<H>    Ca    9.1      14 Dec 2023 06:25  Phos  3.9     12-14  Mg     2.50     12-14            Urinalysis Basic - ( 14 Dec 2023 06:25 )    Color: x / Appearance: x / SG: x / pH: x  Gluc: 105 mg/dL / Ketone: x  / Bili: x / Urobili: x   Blood: x / Protein: x / Nitrite: x   Leuk Esterase: x / RBC: x / WBC x   Sq Epi: x / Non Sq Epi: x / Bacteria: x

## 2023-12-15 LAB
ANION GAP SERPL CALC-SCNC: 13 MMOL/L — SIGNIFICANT CHANGE UP (ref 7–14)
ANION GAP SERPL CALC-SCNC: 13 MMOL/L — SIGNIFICANT CHANGE UP (ref 7–14)
BASOPHILS # BLD AUTO: 0.04 K/UL — SIGNIFICANT CHANGE UP (ref 0–0.2)
BASOPHILS # BLD AUTO: 0.04 K/UL — SIGNIFICANT CHANGE UP (ref 0–0.2)
BASOPHILS NFR BLD AUTO: 0.9 % — SIGNIFICANT CHANGE UP (ref 0–2)
BASOPHILS NFR BLD AUTO: 0.9 % — SIGNIFICANT CHANGE UP (ref 0–2)
BUN SERPL-MCNC: 29 MG/DL — HIGH (ref 7–23)
BUN SERPL-MCNC: 29 MG/DL — HIGH (ref 7–23)
CALCIUM SERPL-MCNC: 9.3 MG/DL — SIGNIFICANT CHANGE UP (ref 8.4–10.5)
CALCIUM SERPL-MCNC: 9.3 MG/DL — SIGNIFICANT CHANGE UP (ref 8.4–10.5)
CHLORIDE SERPL-SCNC: 99 MMOL/L — SIGNIFICANT CHANGE UP (ref 98–107)
CHLORIDE SERPL-SCNC: 99 MMOL/L — SIGNIFICANT CHANGE UP (ref 98–107)
CO2 SERPL-SCNC: 22 MMOL/L — SIGNIFICANT CHANGE UP (ref 22–31)
CO2 SERPL-SCNC: 22 MMOL/L — SIGNIFICANT CHANGE UP (ref 22–31)
CREAT SERPL-MCNC: 1.44 MG/DL — HIGH (ref 0.5–1.3)
CREAT SERPL-MCNC: 1.44 MG/DL — HIGH (ref 0.5–1.3)
EGFR: 52 ML/MIN/1.73M2 — LOW
EGFR: 52 ML/MIN/1.73M2 — LOW
EOSINOPHIL # BLD AUTO: 0.46 K/UL — SIGNIFICANT CHANGE UP (ref 0–0.5)
EOSINOPHIL # BLD AUTO: 0.46 K/UL — SIGNIFICANT CHANGE UP (ref 0–0.5)
EOSINOPHIL NFR BLD AUTO: 10 % — HIGH (ref 0–6)
EOSINOPHIL NFR BLD AUTO: 10 % — HIGH (ref 0–6)
GLUCOSE SERPL-MCNC: 92 MG/DL — SIGNIFICANT CHANGE UP (ref 70–99)
GLUCOSE SERPL-MCNC: 92 MG/DL — SIGNIFICANT CHANGE UP (ref 70–99)
HCT VFR BLD CALC: 30.5 % — LOW (ref 39–50)
HCT VFR BLD CALC: 30.5 % — LOW (ref 39–50)
HGB BLD-MCNC: 9.5 G/DL — LOW (ref 13–17)
HGB BLD-MCNC: 9.5 G/DL — LOW (ref 13–17)
IANC: 2.05 K/UL — SIGNIFICANT CHANGE UP (ref 1.8–7.4)
IANC: 2.05 K/UL — SIGNIFICANT CHANGE UP (ref 1.8–7.4)
IMM GRANULOCYTES NFR BLD AUTO: 0.2 % — SIGNIFICANT CHANGE UP (ref 0–0.9)
IMM GRANULOCYTES NFR BLD AUTO: 0.2 % — SIGNIFICANT CHANGE UP (ref 0–0.9)
LYMPHOCYTES # BLD AUTO: 1.48 K/UL — SIGNIFICANT CHANGE UP (ref 1–3.3)
LYMPHOCYTES # BLD AUTO: 1.48 K/UL — SIGNIFICANT CHANGE UP (ref 1–3.3)
LYMPHOCYTES # BLD AUTO: 32.2 % — SIGNIFICANT CHANGE UP (ref 13–44)
LYMPHOCYTES # BLD AUTO: 32.2 % — SIGNIFICANT CHANGE UP (ref 13–44)
MAGNESIUM SERPL-MCNC: 2.4 MG/DL — SIGNIFICANT CHANGE UP (ref 1.6–2.6)
MAGNESIUM SERPL-MCNC: 2.4 MG/DL — SIGNIFICANT CHANGE UP (ref 1.6–2.6)
MCHC RBC-ENTMCNC: 30.5 PG — SIGNIFICANT CHANGE UP (ref 27–34)
MCHC RBC-ENTMCNC: 30.5 PG — SIGNIFICANT CHANGE UP (ref 27–34)
MCHC RBC-ENTMCNC: 31.1 GM/DL — LOW (ref 32–36)
MCHC RBC-ENTMCNC: 31.1 GM/DL — LOW (ref 32–36)
MCV RBC AUTO: 98.1 FL — SIGNIFICANT CHANGE UP (ref 80–100)
MCV RBC AUTO: 98.1 FL — SIGNIFICANT CHANGE UP (ref 80–100)
MONOCYTES # BLD AUTO: 0.55 K/UL — SIGNIFICANT CHANGE UP (ref 0–0.9)
MONOCYTES # BLD AUTO: 0.55 K/UL — SIGNIFICANT CHANGE UP (ref 0–0.9)
MONOCYTES NFR BLD AUTO: 12 % — SIGNIFICANT CHANGE UP (ref 2–14)
MONOCYTES NFR BLD AUTO: 12 % — SIGNIFICANT CHANGE UP (ref 2–14)
NEUTROPHILS # BLD AUTO: 2.05 K/UL — SIGNIFICANT CHANGE UP (ref 1.8–7.4)
NEUTROPHILS # BLD AUTO: 2.05 K/UL — SIGNIFICANT CHANGE UP (ref 1.8–7.4)
NEUTROPHILS NFR BLD AUTO: 44.7 % — SIGNIFICANT CHANGE UP (ref 43–77)
NEUTROPHILS NFR BLD AUTO: 44.7 % — SIGNIFICANT CHANGE UP (ref 43–77)
NRBC # BLD: 0 /100 WBCS — SIGNIFICANT CHANGE UP (ref 0–0)
NRBC # BLD: 0 /100 WBCS — SIGNIFICANT CHANGE UP (ref 0–0)
NRBC # FLD: 0 K/UL — SIGNIFICANT CHANGE UP (ref 0–0)
NRBC # FLD: 0 K/UL — SIGNIFICANT CHANGE UP (ref 0–0)
PHOSPHATE SERPL-MCNC: 3.8 MG/DL — SIGNIFICANT CHANGE UP (ref 2.5–4.5)
PHOSPHATE SERPL-MCNC: 3.8 MG/DL — SIGNIFICANT CHANGE UP (ref 2.5–4.5)
PLATELET # BLD AUTO: 277 K/UL — SIGNIFICANT CHANGE UP (ref 150–400)
PLATELET # BLD AUTO: 277 K/UL — SIGNIFICANT CHANGE UP (ref 150–400)
POTASSIUM SERPL-MCNC: 4.7 MMOL/L — SIGNIFICANT CHANGE UP (ref 3.5–5.3)
POTASSIUM SERPL-MCNC: 4.7 MMOL/L — SIGNIFICANT CHANGE UP (ref 3.5–5.3)
POTASSIUM SERPL-SCNC: 4.7 MMOL/L — SIGNIFICANT CHANGE UP (ref 3.5–5.3)
POTASSIUM SERPL-SCNC: 4.7 MMOL/L — SIGNIFICANT CHANGE UP (ref 3.5–5.3)
RBC # BLD: 3.11 M/UL — LOW (ref 4.2–5.8)
RBC # BLD: 3.11 M/UL — LOW (ref 4.2–5.8)
RBC # FLD: 17.1 % — HIGH (ref 10.3–14.5)
RBC # FLD: 17.1 % — HIGH (ref 10.3–14.5)
SODIUM SERPL-SCNC: 134 MMOL/L — LOW (ref 135–145)
SODIUM SERPL-SCNC: 134 MMOL/L — LOW (ref 135–145)
WBC # BLD: 4.59 K/UL — SIGNIFICANT CHANGE UP (ref 3.8–10.5)
WBC # BLD: 4.59 K/UL — SIGNIFICANT CHANGE UP (ref 3.8–10.5)
WBC # FLD AUTO: 4.59 K/UL — SIGNIFICANT CHANGE UP (ref 3.8–10.5)
WBC # FLD AUTO: 4.59 K/UL — SIGNIFICANT CHANGE UP (ref 3.8–10.5)

## 2023-12-15 PROCEDURE — 99232 SBSQ HOSP IP/OBS MODERATE 35: CPT

## 2023-12-15 PROCEDURE — 99233 SBSQ HOSP IP/OBS HIGH 50: CPT

## 2023-12-15 PROCEDURE — 99232 SBSQ HOSP IP/OBS MODERATE 35: CPT | Mod: GC

## 2023-12-15 RX ADMIN — BICALUTAMIDE 50 MILLIGRAM(S): 50 TABLET, FILM COATED ORAL at 12:31

## 2023-12-15 RX ADMIN — ATOVAQUONE 750 MILLIGRAM(S): 750 SUSPENSION ORAL at 03:46

## 2023-12-15 RX ADMIN — TAMSULOSIN HYDROCHLORIDE 0.4 MILLIGRAM(S): 0.4 CAPSULE ORAL at 19:51

## 2023-12-15 RX ADMIN — Medication 2000 UNIT(S): at 12:18

## 2023-12-15 RX ADMIN — NAFCILLIN 200 GRAM(S): 10 INJECTION, POWDER, FOR SOLUTION INTRAVENOUS at 07:35

## 2023-12-15 RX ADMIN — HEPARIN SODIUM 5000 UNIT(S): 5000 INJECTION INTRAVENOUS; SUBCUTANEOUS at 12:17

## 2023-12-15 RX ADMIN — ATORVASTATIN CALCIUM 20 MILLIGRAM(S): 80 TABLET, FILM COATED ORAL at 19:54

## 2023-12-15 RX ADMIN — NAFCILLIN 200 GRAM(S): 10 INJECTION, POWDER, FOR SOLUTION INTRAVENOUS at 16:43

## 2023-12-15 RX ADMIN — NAFCILLIN 200 GRAM(S): 10 INJECTION, POWDER, FOR SOLUTION INTRAVENOUS at 12:15

## 2023-12-15 RX ADMIN — Medication 1 TABLET(S): at 12:18

## 2023-12-15 RX ADMIN — HEPARIN SODIUM 5000 UNIT(S): 5000 INJECTION INTRAVENOUS; SUBCUTANEOUS at 19:51

## 2023-12-15 RX ADMIN — CHLORHEXIDINE GLUCONATE 1 APPLICATION(S): 213 SOLUTION TOPICAL at 12:17

## 2023-12-15 RX ADMIN — HEPARIN SODIUM 5000 UNIT(S): 5000 INJECTION INTRAVENOUS; SUBCUTANEOUS at 02:38

## 2023-12-15 RX ADMIN — PANTOPRAZOLE SODIUM 40 MILLIGRAM(S): 20 TABLET, DELAYED RELEASE ORAL at 12:18

## 2023-12-15 RX ADMIN — NAFCILLIN 200 GRAM(S): 10 INJECTION, POWDER, FOR SOLUTION INTRAVENOUS at 03:44

## 2023-12-15 RX ADMIN — ATOVAQUONE 750 MILLIGRAM(S): 750 SUSPENSION ORAL at 16:45

## 2023-12-15 RX ADMIN — NAFCILLIN 200 GRAM(S): 10 INJECTION, POWDER, FOR SOLUTION INTRAVENOUS at 19:51

## 2023-12-15 NOTE — PROGRESS NOTE ADULT - SUBJECTIVE AND OBJECTIVE BOX
[facility]  Zach Villagran MD  Pager [phone]      Patient is a 73y old  Male who presents with a chief complaint of Diffuse Spinal Mets from Prostate Cancer (14 Dec 2023 15:02)      SUBJECTIVE / OVERNIGHT EVENTS: No pain, CP or SOB    MEDICATIONS  (STANDING):  atorvastatin 20 milliGRAM(s) Oral at bedtime  atovaquone  Suspension 750 milliGRAM(s) Oral two times a day  bicalutamide 50 milliGRAM(s) Oral daily  chlorhexidine 2% Cloths 1 Application(s) Topical daily  cholecalciferol 2000 Unit(s) Oral daily  dextrose 5%. 1000 milliLiter(s) (50 mL/Hr) IV Continuous <Continuous>  dextrose 5%. 1000 milliLiter(s) (100 mL/Hr) IV Continuous <Continuous>  dextrose 50% Injectable 25 Gram(s) IV Push once  dextrose 50% Injectable 12.5 Gram(s) IV Push once  dextrose 50% Injectable 25 Gram(s) IV Push once  glucagon  Injectable 1 milliGRAM(s) IntraMuscular once  heparin   Injectable 5000 Unit(s) SubCutaneous every 8 hours  lactobacillus acidophilus 1 Tablet(s) Oral daily  lisinopril 5 milliGRAM(s) Oral daily  nafcillin  IVPB 2 Gram(s) IV Intermittent every 4 hours  pantoprazole  Injectable 40 milliGRAM(s) IV Push daily  tamsulosin 0.4 milliGRAM(s) Oral at bedtime    MEDICATIONS  (PRN):  benzocaine/menthol Lozenge 1 Lozenge Oral every 6 hours PRN Sore Throat  dextrose Oral Gel 15 Gram(s) Oral once PRN Blood Glucose LESS THAN 70 milliGRAM(s)/deciliter  guaiFENesin Oral Liquid (Sugar-Free) 200 milliGRAM(s) Oral every 6 hours PRN Cough  levalbuterol Inhalation 0.63 milliGRAM(s) Inhalation every 6 hours PRN shortness of breath/wheezing  naloxone Injectable 0.1 milliGRAM(s) IV Push every 3 minutes PRN For ANY of the following changes in patient status:  A. RR LESS THAN 10 breaths per minute, B. Oxygen saturation LESS THAN 90%, C. Sedation score of 6  ondansetron Injectable 4 milliGRAM(s) IV Push every 6 hours PRN Nausea      CAPILLARY BLOOD GLUCOSE      POCT Blood Glucose.: 122 mg/dL (15 Dec 2023 00:31)  POCT Blood Glucose.: 144 mg/dL (14 Dec 2023 18:16)    I&O's Summary      PHYSICAL EXAM:  Vital Signs Last 24 Hrs  T(C): 36.6 (15 Dec 2023 03:43), Max: 36.7 (14 Dec 2023 20:23)  T(F): 97.9 (15 Dec 2023 03:43), Max: 98.1 (14 Dec 2023 20:23)  HR: 84 (15 Dec 2023 03:43) (84 - 89)  BP: 103/66 (15 Dec 2023 03:43) (103/66 - 112/68)  BP(mean): --  RR: 17 (15 Dec 2023 03:43) (17 - 18)  SpO2: 97% (15 Dec 2023 03:43) (96% - 97%)    Parameters below as of 15 Dec 2023 03:43  Patient On (Oxygen Delivery Method): nasal cannula  O2 Flow (L/min): 2    CONSTITUTIONAL: NAD  EYES: conjunctiva and sclera clear  ENMT: mmm  NECK: Supple,  RESPIRATORY: Normal respiratory effort; lungs are clear to auscultation bilaterally  CARDIOVASCULAR: Regular rate and rhythm, + S1 and S2  ABDOMEN: Nontender to palpation, normoactive bowel sounds, no rebound/guarding  PSYCH: A+O x 3    LABS:                        9.5    4.59  )-----------( 277      ( 15 Dec 2023 07:03 )             30.5     12-15    134<L>  |  99  |  29<H>  ----------------------------<  92  4.7   |  22  |  1.44<H>    Ca    9.3      15 Dec 2023 07:03  Phos  3.8     12-15  Mg     2.40     12-15            Urinalysis Basic - ( 15 Dec 2023 07:03 )    Color: x / Appearance: x / SG: x / pH: x  Gluc: 92 mg/dL / Ketone: x  / Bili: x / Urobili: x   Blood: x / Protein: x / Nitrite: x   Leuk Esterase: x / RBC: x / WBC x   Sq Epi: x / Non Sq Epi: x / Bacteria: x           [facility]  Zach Villagran MD  Pager [phone]      Patient is a 73y old  Male who presents with a chief complaint of Diffuse Spinal Mets from Prostate Cancer (14 Dec 2023 15:02)      SUBJECTIVE / OVERNIGHT EVENTS: No pain, CP or SOB    MEDICATIONS  (STANDING):  atorvastatin 20 milliGRAM(s) Oral at bedtime  atovaquone  Suspension 750 milliGRAM(s) Oral two times a day  bicalutamide 50 milliGRAM(s) Oral daily  chlorhexidine 2% Cloths 1 Application(s) Topical daily  cholecalciferol 2000 Unit(s) Oral daily  dextrose 5%. 1000 milliLiter(s) (50 mL/Hr) IV Continuous <Continuous>  dextrose 5%. 1000 milliLiter(s) (100 mL/Hr) IV Continuous <Continuous>  dextrose 50% Injectable 25 Gram(s) IV Push once  dextrose 50% Injectable 12.5 Gram(s) IV Push once  dextrose 50% Injectable 25 Gram(s) IV Push once  glucagon  Injectable 1 milliGRAM(s) IntraMuscular once  heparin   Injectable 5000 Unit(s) SubCutaneous every 8 hours  lactobacillus acidophilus 1 Tablet(s) Oral daily  lisinopril 5 milliGRAM(s) Oral daily  nafcillin  IVPB 2 Gram(s) IV Intermittent every 4 hours  pantoprazole  Injectable 40 milliGRAM(s) IV Push daily  tamsulosin 0.4 milliGRAM(s) Oral at bedtime    MEDICATIONS  (PRN):  benzocaine/menthol Lozenge 1 Lozenge Oral every 6 hours PRN Sore Throat  dextrose Oral Gel 15 Gram(s) Oral once PRN Blood Glucose LESS THAN 70 milliGRAM(s)/deciliter  guaiFENesin Oral Liquid (Sugar-Free) 200 milliGRAM(s) Oral every 6 hours PRN Cough  levalbuterol Inhalation 0.63 milliGRAM(s) Inhalation every 6 hours PRN shortness of breath/wheezing  naloxone Injectable 0.1 milliGRAM(s) IV Push every 3 minutes PRN For ANY of the following changes in patient status:  A. RR LESS THAN 10 breaths per minute, B. Oxygen saturation LESS THAN 90%, C. Sedation score of 6  ondansetron Injectable 4 milliGRAM(s) IV Push every 6 hours PRN Nausea      CAPILLARY BLOOD GLUCOSE      POCT Blood Glucose.: 122 mg/dL (15 Dec 2023 00:31)  POCT Blood Glucose.: 144 mg/dL (14 Dec 2023 18:16)    I&O's Summary      PHYSICAL EXAM:  Vital Signs Last 24 Hrs  T(C): 36.6 (15 Dec 2023 03:43), Max: 36.7 (14 Dec 2023 20:23)  T(F): 97.9 (15 Dec 2023 03:43), Max: 98.1 (14 Dec 2023 20:23)  HR: 84 (15 Dec 2023 03:43) (84 - 89)  BP: 103/66 (15 Dec 2023 03:43) (103/66 - 112/68)  BP(mean): --  RR: 17 (15 Dec 2023 03:43) (17 - 18)  SpO2: 97% (15 Dec 2023 03:43) (96% - 97%)    Parameters below as of 15 Dec 2023 03:43  Patient On (Oxygen Delivery Method): nasal cannula  O2 Flow (L/min): 2    CONSTITUTIONAL: NAD  EYES: conjunctiva and sclera clear  ENMT: mmm  NECK: Supple,  RESPIRATORY: Normal respiratory effort; lungs are clear to auscultation bilaterally  CARDIOVASCULAR: Regular rate and rhythm, + S1 and S2  ABDOMEN: Nontender to palpation, normoactive bowel sounds, no rebound/guarding  PSYCH: A+O x 3    LABS:                        9.5    4.59  )-----------( 277      ( 15 Dec 2023 07:03 )             30.5     12-15    134<L>  |  99  |  29<H>  ----------------------------<  92  4.7   |  22  |  1.44<H>    Ca    9.3      15 Dec 2023 07:03  Phos  3.8     12-15  Mg     2.40     12-15            Urinalysis Basic - ( 15 Dec 2023 07:03 )    Color: x / Appearance: x / SG: x / pH: x  Gluc: 92 mg/dL / Ketone: x  / Bili: x / Urobili: x   Blood: x / Protein: x / Nitrite: x   Leuk Esterase: x / RBC: x / WBC x   Sq Epi: x / Non Sq Epi: x / Bacteria: x

## 2023-12-15 NOTE — PROGRESS NOTE ADULT - PROBLEM SELECTOR PLAN 12
no obvious hemorrhage noted  - continue to monitor CBC  - s/p 1u PRBC on 11/25   - s/p 1u PRBC on 11/29      Plan discussed with daughter and son 12/12, dc pending rehab auth.  Rad-Onc and Med-Onc outreached for plan for RT/chemo while inpt, will f/u recs

## 2023-12-15 NOTE — PROGRESS NOTE ADULT - PROBLEM SELECTOR PLAN 3
Concern for surgical site as a primary source  - BCx from 11/16, 11/17 NGTD  - changed to Nafcillin IV on 11/20. Now on meropenem (11/29- 1/28)  -Plan for rehab for Abx  - TTE revealed hyperdynamic left ventricle  - afebrile now.  -ANDERS sanchezg

## 2023-12-15 NOTE — CHART NOTE - NSCHARTNOTEFT_GEN_A_CORE
Notified by primary team that patient to be discharged potentially on Monday, asking about any further inpatient therapy for his metastatic prostate cancer. He was started on Casodex (bicalutamide) inpatient. Would recommend giving a dose of leupron prior to discharge. This was discussed with patient, including side effects, but he was hesitant to start it and would like to think about this.     Will plan to revisit this on Monday for further discussion.

## 2023-12-15 NOTE — PROGRESS NOTE ADULT - SUBJECTIVE AND OBJECTIVE BOX
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 1000 milliLiter(s) IV Continuous <Continuous>  dextrose 5%. 1000 milliLiter(s) IV Continuous <Continuous>  dextrose 50% Injectable 25 Gram(s) IV Push once  dextrose 50% Injectable 25 Gram(s) IV Push once  dextrose 50% Injectable 12.5 Gram(s) IV Push once  dextrose Oral Gel 15 Gram(s) Oral once PRN  glucagon  Injectable 1 milliGRAM(s) IntraMuscular once  guaiFENesin Oral Liquid (Sugar-Free) 200 milliGRAM(s) Oral every 6 hours PRN  heparin   Injectable 5000 Unit(s) SubCutaneous every 8 hours  lactobacillus acidophilus 1 Tablet(s) Oral daily  levalbuterol Inhalation 0.63 milliGRAM(s) Inhalation every 6 hours PRN  lisinopril 5 milliGRAM(s) Oral daily  naloxone Injectable 0.1 milliGRAM(s) IV Push every 3 minutes PRN  ondansetron Injectable 4 milliGRAM(s) IV Push every 6 hours PRN  pantoprazole  Injectable 40 milliGRAM(s) IV Push daily  tamsulosin 0.4 milliGRAM(s) Oral at bedtime      Vital Signs Last 24 Hrs  T(C): 36.7 (15 Dec 2023 17:42), Max: 36.7 (14 Dec 2023 20:23)  T(F): 98 (15 Dec 2023 17:42), Max: 98.1 (14 Dec 2023 20:23)  HR: 93 (15 Dec 2023 17:42) (84 - 94)  BP: 101/65 (15 Dec 2023 17:42) (101/65 - 112/68)  BP(mean): --  RR: 17 (15 Dec 2023 17:42) (17 - 18)  SpO2: 97% (15 Dec 2023 17:42) (96% - 97%)    Parameters below as of 15 Dec 2023 17:42  Patient On (Oxygen Delivery Method): room air        PHYSICAL EXAM:  General: x[ ] non-toxic  HEAD/EYES: [ ] PERRL [x ] white sclera [ ] icterus  ENT:  [ ] normal [ x] supple [ ] thrush [ ] pharyngeal exudate  Cardiovascular:   [ ] murmur [ x] normal [ ] PPM/AICD  Respiratory:  [ x] clear to ausculation bilaterally  GI:  [ ]x soft, non-tender, normal bowel sounds  :  [ ] best x[ ] no CVA tenderness   Musculoskeletal:  [ ] no synovitis  Neurologic:  [ ] non-focal exam   Skin:  [x ] no rash  Lymph: [x ] no lymphadenopathy  Psychiatric:  [ ] appropriate affect [ ] alert & oriented  Lines:  [x ] no phlebitis [ ] central line                                9.5    4.59  )-----------( 277      ( 15 Dec 2023 07:03 )             30.5       12-15    134<L>  |  99  |  29<H>  ----------------------------<  92  4.7   |  22  |  1.44<H>    Ca    9.3      15 Dec 2023 07:03  Phos  3.8     12-15  Mg     2.40     12-15        Urinalysis Basic - ( 15 Dec 2023 07:03 )    Color: x / Appearance: x / SG: x / pH: x  Gluc: 92 mg/dL / Ketone: x  / Bili: x / Urobili: x   Blood: x / Protein: x / Nitrite: x   Leuk Esterase: x / RBC: x / WBC x   Sq Epi: x / Non Sq Epi: x / Bacteria: x        MICROBIOLOGY:    RADIOLOGY:

## 2023-12-15 NOTE — CHART NOTE - NSCHARTNOTEFT_GEN_A_CORE
Discussed with Dr. Rosenberg from Radiation Oncology, no plans for inpt RT until antibiotics is completed. Pt can follow up with the clinic after rehab. Pt to call  after discharge from rehab.

## 2023-12-15 NOTE — PROGRESS NOTE ADULT - ASSESSMENT
73m with known metastatic prostate CA admitted initially to [facility] after falling and weakness. Found to be in rhabdo with SRUTHI.  Imaging noted spinal fracture with edema and lytic metastatic disease from C7, T1-T5, T12, and L1-L3 as well as epidural expansion of neoplasm at C7 centrally and dorsally resulting in moderate cord compression and underlying edema/extension into the bilateral C7-T1 and T1-2 and Left T2-3 neural foramina. Epidural disease was also noted at T12 vertebral body.   11/2, he underwent C7-T1 decompression, C5-T3 posterior cervical fusion.  Hospital course complicated by MSSA bacteremia which has cleared with negative TTE.  Course also complicated by hypoxia not responding to antibiotics.  Has been on empiric treatment for PJP (bactrim).  Fungitell elevated and LDH elevated.  Serial CT with changing GGO.  Seems unlikely to be PJP    Overall, metastatic prostate cancer with cord compression s/p spinal decompression 11/2, with fever due to MSSA bacteremia and c diff, hypoxia +RVP adenovirus and GGO on CT    Fever  - resolved for now  - repeat BC negative    MSSA bacteremia  - would continue antibiotics  - negative echo    Abnormal Imaging  - fluid collections noted on imaging  - per plastics, they feel it is an uninfected seroma  - previously had drainage from incision    Hypoxic respiratory failure  - repeat CT with changing GGO  - per pulmonary suspect organizing pneumonia  - patient appears to be improving without steroids  - sputum for PJP PCR negative  - With elevated fungitell and clinical improvement, I would still treat for PCP through 12/21  -Cr increased- change Bactrim to Mepron 750 q 12  - HIV is negative    C diff  - completed treatment    Overall,     For MSSA infection, continue nafcillin for 6 weeks from negative culture through 12/28  Daptomycin 600 mg iv daily would be an alternative  continue Mepron through 12/21    Weekly CBC, BMP fax to [phone]  Follow up as outpt [phone]    Please call the ID service [phone] with questions or concerns  73m with known metastatic prostate CA admitted initially to [facility] after falling and weakness. Found to be in rhabdo with SRUTHI.  Imaging noted spinal fracture with edema and lytic metastatic disease from C7, T1-T5, T12, and L1-L3 as well as epidural expansion of neoplasm at C7 centrally and dorsally resulting in moderate cord compression and underlying edema/extension into the bilateral C7-T1 and T1-2 and Left T2-3 neural foramina. Epidural disease was also noted at T12 vertebral body.   11/2, he underwent C7-T1 decompression, C5-T3 posterior cervical fusion.  Hospital course complicated by MSSA bacteremia which has cleared with negative TTE.  Course also complicated by hypoxia not responding to antibiotics.  Has been on empiric treatment for PJP (bactrim).  Fungitell elevated and LDH elevated.  Serial CT with changing GGO.  Seems unlikely to be PJP    Overall, metastatic prostate cancer with cord compression s/p spinal decompression 11/2, with fever due to MSSA bacteremia and c diff, hypoxia +RVP adenovirus and GGO on CT    Fever  - resolved for now  - repeat BC negative    MSSA bacteremia  - would continue antibiotics  - negative echo    Abnormal Imaging  - fluid collections noted on imaging  - per plastics, they feel it is an uninfected seroma  - previously had drainage from incision    Hypoxic respiratory failure  - repeat CT with changing GGO  - per pulmonary suspect organizing pneumonia  - patient appears to be improving without steroids  - sputum for PJP PCR negative  - With elevated fungitell and clinical improvement, I would still treat for PCP through 12/21  -Cr increased- change Bactrim to Mepron 750 q 12  - HIV is negative    C diff  - completed treatment    Overall,     For MSSA infection, continue nafcillin for 6 weeks from negative culture through 12/28  Daptomycin 600 mg iv daily would be an alternative  continue Mepron through 12/21    Weekly CBC, BMP fax to [phone]  Follow up as outpt [phone]    Please call the ID service [phone] with questions or concerns

## 2023-12-16 LAB
ANION GAP SERPL CALC-SCNC: 14 MMOL/L — SIGNIFICANT CHANGE UP (ref 7–14)
ANION GAP SERPL CALC-SCNC: 14 MMOL/L — SIGNIFICANT CHANGE UP (ref 7–14)
BASOPHILS # BLD AUTO: 0.06 K/UL — SIGNIFICANT CHANGE UP (ref 0–0.2)
BASOPHILS # BLD AUTO: 0.06 K/UL — SIGNIFICANT CHANGE UP (ref 0–0.2)
BASOPHILS NFR BLD AUTO: 1.3 % — SIGNIFICANT CHANGE UP (ref 0–2)
BASOPHILS NFR BLD AUTO: 1.3 % — SIGNIFICANT CHANGE UP (ref 0–2)
BUN SERPL-MCNC: 27 MG/DL — HIGH (ref 7–23)
BUN SERPL-MCNC: 27 MG/DL — HIGH (ref 7–23)
CALCIUM SERPL-MCNC: 9.6 MG/DL — SIGNIFICANT CHANGE UP (ref 8.4–10.5)
CALCIUM SERPL-MCNC: 9.6 MG/DL — SIGNIFICANT CHANGE UP (ref 8.4–10.5)
CHLORIDE SERPL-SCNC: 98 MMOL/L — SIGNIFICANT CHANGE UP (ref 98–107)
CHLORIDE SERPL-SCNC: 98 MMOL/L — SIGNIFICANT CHANGE UP (ref 98–107)
CO2 SERPL-SCNC: 22 MMOL/L — SIGNIFICANT CHANGE UP (ref 22–31)
CO2 SERPL-SCNC: 22 MMOL/L — SIGNIFICANT CHANGE UP (ref 22–31)
CREAT SERPL-MCNC: 1.12 MG/DL — SIGNIFICANT CHANGE UP (ref 0.5–1.3)
CREAT SERPL-MCNC: 1.12 MG/DL — SIGNIFICANT CHANGE UP (ref 0.5–1.3)
EGFR: 70 ML/MIN/1.73M2 — SIGNIFICANT CHANGE UP
EGFR: 70 ML/MIN/1.73M2 — SIGNIFICANT CHANGE UP
EOSINOPHIL # BLD AUTO: 0.47 K/UL — SIGNIFICANT CHANGE UP (ref 0–0.5)
EOSINOPHIL # BLD AUTO: 0.47 K/UL — SIGNIFICANT CHANGE UP (ref 0–0.5)
EOSINOPHIL NFR BLD AUTO: 10.3 % — HIGH (ref 0–6)
EOSINOPHIL NFR BLD AUTO: 10.3 % — HIGH (ref 0–6)
GLUCOSE SERPL-MCNC: 99 MG/DL — SIGNIFICANT CHANGE UP (ref 70–99)
GLUCOSE SERPL-MCNC: 99 MG/DL — SIGNIFICANT CHANGE UP (ref 70–99)
HCT VFR BLD CALC: 31.4 % — LOW (ref 39–50)
HCT VFR BLD CALC: 31.4 % — LOW (ref 39–50)
HGB BLD-MCNC: 9.8 G/DL — LOW (ref 13–17)
HGB BLD-MCNC: 9.8 G/DL — LOW (ref 13–17)
IANC: 2.06 K/UL — SIGNIFICANT CHANGE UP (ref 1.8–7.4)
IANC: 2.06 K/UL — SIGNIFICANT CHANGE UP (ref 1.8–7.4)
IMM GRANULOCYTES NFR BLD AUTO: 0 % — SIGNIFICANT CHANGE UP (ref 0–0.9)
IMM GRANULOCYTES NFR BLD AUTO: 0 % — SIGNIFICANT CHANGE UP (ref 0–0.9)
LYMPHOCYTES # BLD AUTO: 1.47 K/UL — SIGNIFICANT CHANGE UP (ref 1–3.3)
LYMPHOCYTES # BLD AUTO: 1.47 K/UL — SIGNIFICANT CHANGE UP (ref 1–3.3)
LYMPHOCYTES # BLD AUTO: 32.2 % — SIGNIFICANT CHANGE UP (ref 13–44)
LYMPHOCYTES # BLD AUTO: 32.2 % — SIGNIFICANT CHANGE UP (ref 13–44)
MAGNESIUM SERPL-MCNC: 2.3 MG/DL — SIGNIFICANT CHANGE UP (ref 1.6–2.6)
MAGNESIUM SERPL-MCNC: 2.3 MG/DL — SIGNIFICANT CHANGE UP (ref 1.6–2.6)
MCHC RBC-ENTMCNC: 30.6 PG — SIGNIFICANT CHANGE UP (ref 27–34)
MCHC RBC-ENTMCNC: 30.6 PG — SIGNIFICANT CHANGE UP (ref 27–34)
MCHC RBC-ENTMCNC: 31.2 GM/DL — LOW (ref 32–36)
MCHC RBC-ENTMCNC: 31.2 GM/DL — LOW (ref 32–36)
MCV RBC AUTO: 98.1 FL — SIGNIFICANT CHANGE UP (ref 80–100)
MCV RBC AUTO: 98.1 FL — SIGNIFICANT CHANGE UP (ref 80–100)
MONOCYTES # BLD AUTO: 0.5 K/UL — SIGNIFICANT CHANGE UP (ref 0–0.9)
MONOCYTES # BLD AUTO: 0.5 K/UL — SIGNIFICANT CHANGE UP (ref 0–0.9)
MONOCYTES NFR BLD AUTO: 11 % — SIGNIFICANT CHANGE UP (ref 2–14)
MONOCYTES NFR BLD AUTO: 11 % — SIGNIFICANT CHANGE UP (ref 2–14)
NEUTROPHILS # BLD AUTO: 2.06 K/UL — SIGNIFICANT CHANGE UP (ref 1.8–7.4)
NEUTROPHILS # BLD AUTO: 2.06 K/UL — SIGNIFICANT CHANGE UP (ref 1.8–7.4)
NEUTROPHILS NFR BLD AUTO: 45.2 % — SIGNIFICANT CHANGE UP (ref 43–77)
NEUTROPHILS NFR BLD AUTO: 45.2 % — SIGNIFICANT CHANGE UP (ref 43–77)
NRBC # BLD: 0 /100 WBCS — SIGNIFICANT CHANGE UP (ref 0–0)
NRBC # BLD: 0 /100 WBCS — SIGNIFICANT CHANGE UP (ref 0–0)
NRBC # FLD: 0 K/UL — SIGNIFICANT CHANGE UP (ref 0–0)
NRBC # FLD: 0 K/UL — SIGNIFICANT CHANGE UP (ref 0–0)
PHOSPHATE SERPL-MCNC: 4 MG/DL — SIGNIFICANT CHANGE UP (ref 2.5–4.5)
PHOSPHATE SERPL-MCNC: 4 MG/DL — SIGNIFICANT CHANGE UP (ref 2.5–4.5)
PLATELET # BLD AUTO: 273 K/UL — SIGNIFICANT CHANGE UP (ref 150–400)
PLATELET # BLD AUTO: 273 K/UL — SIGNIFICANT CHANGE UP (ref 150–400)
POTASSIUM SERPL-MCNC: 4.2 MMOL/L — SIGNIFICANT CHANGE UP (ref 3.5–5.3)
POTASSIUM SERPL-MCNC: 4.2 MMOL/L — SIGNIFICANT CHANGE UP (ref 3.5–5.3)
POTASSIUM SERPL-SCNC: 4.2 MMOL/L — SIGNIFICANT CHANGE UP (ref 3.5–5.3)
POTASSIUM SERPL-SCNC: 4.2 MMOL/L — SIGNIFICANT CHANGE UP (ref 3.5–5.3)
RBC # BLD: 3.2 M/UL — LOW (ref 4.2–5.8)
RBC # BLD: 3.2 M/UL — LOW (ref 4.2–5.8)
RBC # FLD: 16.7 % — HIGH (ref 10.3–14.5)
RBC # FLD: 16.7 % — HIGH (ref 10.3–14.5)
SODIUM SERPL-SCNC: 134 MMOL/L — LOW (ref 135–145)
SODIUM SERPL-SCNC: 134 MMOL/L — LOW (ref 135–145)
WBC # BLD: 4.56 K/UL — SIGNIFICANT CHANGE UP (ref 3.8–10.5)
WBC # BLD: 4.56 K/UL — SIGNIFICANT CHANGE UP (ref 3.8–10.5)
WBC # FLD AUTO: 4.56 K/UL — SIGNIFICANT CHANGE UP (ref 3.8–10.5)
WBC # FLD AUTO: 4.56 K/UL — SIGNIFICANT CHANGE UP (ref 3.8–10.5)

## 2023-12-16 PROCEDURE — 99233 SBSQ HOSP IP/OBS HIGH 50: CPT

## 2023-12-16 RX ADMIN — LISINOPRIL 5 MILLIGRAM(S): 2.5 TABLET ORAL at 03:22

## 2023-12-16 RX ADMIN — NAFCILLIN 200 GRAM(S): 10 INJECTION, POWDER, FOR SOLUTION INTRAVENOUS at 17:50

## 2023-12-16 RX ADMIN — HEPARIN SODIUM 5000 UNIT(S): 5000 INJECTION INTRAVENOUS; SUBCUTANEOUS at 03:22

## 2023-12-16 RX ADMIN — NAFCILLIN 200 GRAM(S): 10 INJECTION, POWDER, FOR SOLUTION INTRAVENOUS at 03:21

## 2023-12-16 RX ADMIN — BICALUTAMIDE 50 MILLIGRAM(S): 50 TABLET, FILM COATED ORAL at 12:40

## 2023-12-16 RX ADMIN — HEPARIN SODIUM 5000 UNIT(S): 5000 INJECTION INTRAVENOUS; SUBCUTANEOUS at 12:40

## 2023-12-16 RX ADMIN — PANTOPRAZOLE SODIUM 40 MILLIGRAM(S): 20 TABLET, DELAYED RELEASE ORAL at 12:40

## 2023-12-16 RX ADMIN — NAFCILLIN 200 GRAM(S): 10 INJECTION, POWDER, FOR SOLUTION INTRAVENOUS at 20:10

## 2023-12-16 RX ADMIN — NAFCILLIN 200 GRAM(S): 10 INJECTION, POWDER, FOR SOLUTION INTRAVENOUS at 00:00

## 2023-12-16 RX ADMIN — ATORVASTATIN CALCIUM 20 MILLIGRAM(S): 80 TABLET, FILM COATED ORAL at 22:06

## 2023-12-16 RX ADMIN — TAMSULOSIN HYDROCHLORIDE 0.4 MILLIGRAM(S): 0.4 CAPSULE ORAL at 22:05

## 2023-12-16 RX ADMIN — Medication 1 TABLET(S): at 12:41

## 2023-12-16 RX ADMIN — NAFCILLIN 200 GRAM(S): 10 INJECTION, POWDER, FOR SOLUTION INTRAVENOUS at 08:46

## 2023-12-16 RX ADMIN — ATOVAQUONE 750 MILLIGRAM(S): 750 SUSPENSION ORAL at 03:22

## 2023-12-16 RX ADMIN — NAFCILLIN 200 GRAM(S): 10 INJECTION, POWDER, FOR SOLUTION INTRAVENOUS at 12:39

## 2023-12-16 RX ADMIN — Medication 2000 UNIT(S): at 12:39

## 2023-12-16 RX ADMIN — ATOVAQUONE 750 MILLIGRAM(S): 750 SUSPENSION ORAL at 17:51

## 2023-12-16 RX ADMIN — CHLORHEXIDINE GLUCONATE 1 APPLICATION(S): 213 SOLUTION TOPICAL at 12:41

## 2023-12-16 RX ADMIN — HEPARIN SODIUM 5000 UNIT(S): 5000 INJECTION INTRAVENOUS; SUBCUTANEOUS at 22:05

## 2023-12-16 NOTE — PROGRESS NOTE ADULT - PROBLEM SELECTOR PLAN 3
Concern for surgical site as a primary source  - BCx from 11/16, 11/17 NGTD  - changed to Nafcillin IV on 11/20, plan to complete 12/28, costof nafcillin limiting rehab placement, unable to switch per ID given cyptopenias in the past on cefazolin  -Plan for rehab for Abx  - TTE revealed hyperdynamic left ventricle  - afebrile now.  -ID follg

## 2023-12-16 NOTE — PROGRESS NOTE ADULT - PROBLEM SELECTOR PLAN 7
Patient previously diagnosed with prostate ca in 2009  - NM scan showing multiple osseous lesions throughout body  - heme/onc recs appreciated, will c/w bicalutamide 50mg qd  - IR consulted for kyphoplasty 12/4 in anticipation for Rad Tx, per IR no plan for kyphoplasty for now given acute infection/bacteremia, pt to f/u as outpt.  - Per RT discussion with Dr. Rosenberg, He would likely get a better course of RT outpatient with SBRT as an option per Dr. Scales preference, plan discussed with daughter and pt, they will follow up with Dr. Scales outpt as soon as the finish rehab  - Discussed with Med-Onc, plan to continue casodex, pt undecided about Lupron, will f/u and dose before dc to rehab

## 2023-12-16 NOTE — PROGRESS NOTE ADULT - SUBJECTIVE AND OBJECTIVE BOX
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 1000 milliLiter(s) (50 mL/Hr) IV Continuous <Continuous>  dextrose 5%. 1000 milliLiter(s) (100 mL/Hr) IV Continuous <Continuous>  dextrose 50% Injectable 25 Gram(s) IV Push once  dextrose 50% Injectable 12.5 Gram(s) IV Push once  dextrose 50% Injectable 25 Gram(s) IV Push once  glucagon  Injectable 1 milliGRAM(s) IntraMuscular once  heparin   Injectable 5000 Unit(s) SubCutaneous every 8 hours  lactobacillus acidophilus 1 Tablet(s) Oral daily  lisinopril 5 milliGRAM(s) Oral daily  nafcillin  IVPB 2 Gram(s) IV Intermittent every 4 hours  pantoprazole  Injectable 40 milliGRAM(s) IV Push daily  tamsulosin 0.4 milliGRAM(s) Oral at bedtime    MEDICATIONS  (PRN):  benzocaine/menthol Lozenge 1 Lozenge Oral every 6 hours PRN Sore Throat  dextrose Oral Gel 15 Gram(s) Oral once PRN Blood Glucose LESS THAN 70 milliGRAM(s)/deciliter  guaiFENesin Oral Liquid (Sugar-Free) 200 milliGRAM(s) Oral every 6 hours PRN Cough  levalbuterol Inhalation 0.63 milliGRAM(s) Inhalation every 6 hours PRN shortness of breath/wheezing  naloxone Injectable 0.1 milliGRAM(s) IV Push every 3 minutes PRN For ANY of the following changes in patient status:  A. RR LESS THAN 10 breaths per minute, B. Oxygen saturation LESS THAN 90%, C. Sedation score of 6  ondansetron Injectable 4 milliGRAM(s) IV Push every 6 hours PRN Nausea      CAPILLARY BLOOD GLUCOSE        I&O's Summary      PHYSICAL EXAM:  Vital Signs Last 24 Hrs  T(C): 36.8 (16 Dec 2023 03:20), Max: 36.8 (15 Dec 2023 20:06)  T(F): 98.2 (16 Dec 2023 03:20), Max: 98.3 (15 Dec 2023 20:06)  HR: 88 (16 Dec 2023 03:20) (88 - 95)  BP: 111/71 (16 Dec 2023 03:20) (101/65 - 112/68)  BP(mean): --  RR: 18 (16 Dec 2023 03:20) (17 - 18)  SpO2: 97% (16 Dec 2023 03:20) (96% - 98%)    Parameters below as of 16 Dec 2023 03:20  Patient On (Oxygen Delivery Method): room air      CONSTITUTIONAL: NAD  EYES: conjunctiva and sclera clear  ENMT: mmm  NECK: Supple,  RESPIRATORY: Normal respiratory effort; lungs are clear to auscultation bilaterally  CARDIOVASCULAR: Regular rate and rhythm, + S1 and S2  ABDOMEN: Nontender to palpation, normoactive bowel sounds, no rebound/guarding  PSYCH: A+O x 3    LABS:                        9.8    4.56  )-----------( 273      ( 16 Dec 2023 05:35 )             31.4     12-16    134<L>  |  98  |  27<H>  ----------------------------<  99  4.2   |  22  |  1.12    Ca    9.6      16 Dec 2023 05:35  Phos  4.0     12-16  Mg     2.30     12-16            Urinalysis Basic - ( 16 Dec 2023 05:35 )    Color: x / Appearance: x / SG: x / pH: x  Gluc: 99 mg/dL / Ketone: x  / Bili: x / Urobili: x   Blood: x / Protein: x / Nitrite: x   Leuk Esterase: x / RBC: x / WBC x   Sq Epi: x / Non Sq Epi: x / Bacteria: x           [facility]  Zach Villagran MD  Pager [phone]      Patient is a 73y old  Male who presents with a chief complaint of 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 1000 milliLiter(s) (50 mL/Hr) IV Continuous <Continuous>  dextrose 5%. 1000 milliLiter(s) (100 mL/Hr) IV Continuous <Continuous>  dextrose 50% Injectable 25 Gram(s) IV Push once  dextrose 50% Injectable 12.5 Gram(s) IV Push once  dextrose 50% Injectable 25 Gram(s) IV Push once  glucagon  Injectable 1 milliGRAM(s) IntraMuscular once  heparin   Injectable 5000 Unit(s) SubCutaneous every 8 hours  lactobacillus acidophilus 1 Tablet(s) Oral daily  lisinopril 5 milliGRAM(s) Oral daily  nafcillin  IVPB 2 Gram(s) IV Intermittent every 4 hours  pantoprazole  Injectable 40 milliGRAM(s) IV Push daily  tamsulosin 0.4 milliGRAM(s) Oral at bedtime    MEDICATIONS  (PRN):  benzocaine/menthol Lozenge 1 Lozenge Oral every 6 hours PRN Sore Throat  dextrose Oral Gel 15 Gram(s) Oral once PRN Blood Glucose LESS THAN 70 milliGRAM(s)/deciliter  guaiFENesin Oral Liquid (Sugar-Free) 200 milliGRAM(s) Oral every 6 hours PRN Cough  levalbuterol Inhalation 0.63 milliGRAM(s) Inhalation every 6 hours PRN shortness of breath/wheezing  naloxone Injectable 0.1 milliGRAM(s) IV Push every 3 minutes PRN For ANY of the following changes in patient status:  A. RR LESS THAN 10 breaths per minute, B. Oxygen saturation LESS THAN 90%, C. Sedation score of 6  ondansetron Injectable 4 milliGRAM(s) IV Push every 6 hours PRN Nausea      CAPILLARY BLOOD GLUCOSE        I&O's Summary      PHYSICAL EXAM:  Vital Signs Last 24 Hrs  T(C): 36.8 (16 Dec 2023 03:20), Max: 36.8 (15 Dec 2023 20:06)  T(F): 98.2 (16 Dec 2023 03:20), Max: 98.3 (15 Dec 2023 20:06)  HR: 88 (16 Dec 2023 03:20) (88 - 95)  BP: 111/71 (16 Dec 2023 03:20) (101/65 - 112/68)  BP(mean): --  RR: 18 (16 Dec 2023 03:20) (17 - 18)  SpO2: 97% (16 Dec 2023 03:20) (96% - 98%)    Parameters below as of 16 Dec 2023 03:20  Patient On (Oxygen Delivery Method): room air      CONSTITUTIONAL: NAD  EYES: conjunctiva and sclera clear  ENMT: mmm  NECK: Supple,  RESPIRATORY: Normal respiratory effort; lungs are clear to auscultation bilaterally  CARDIOVASCULAR: Regular rate and rhythm, + S1 and S2  ABDOMEN: Nontender to palpation, normoactive bowel sounds, no rebound/guarding  PSYCH: A+O x 3    LABS:                        9.8    4.56  )-----------( 273      ( 16 Dec 2023 05:35 )             31.4     12-16    134<L>  |  98  |  27<H>  ----------------------------<  99  4.2   |  22  |  1.12    Ca    9.6      16 Dec 2023 05:35  Phos  4.0     12-16  Mg     2.30     12-16            Urinalysis Basic - ( 16 Dec 2023 05:35 )    Color: x / Appearance: x / SG: x / pH: x  Gluc: 99 mg/dL / Ketone: x  / Bili: x / Urobili: x   Blood: x / Protein: x / Nitrite: x   Leuk Esterase: x / RBC: x / WBC x   Sq Epi: x / Non Sq Epi: x / Bacteria: x

## 2023-12-16 NOTE — PROGRESS NOTE ADULT - PROBLEM SELECTOR PLAN 1
Gram negative PNA in the setting of aspiration, c/b acute on chronic hypoxic respiratory failure  Noted result of CTPA 11/27. CT showed diffuse lung opacities, worsening on repeat CT 12/8 concerning for organizing PNA which led to suspicion for PJP  - Now on NC  - PJP PNA, Initially with Bactrim DS 2 tab q8 for empiric PJP PNA treatment, will switch to mepron till 12/21 given uptrending Cr, Cr now improving.   -s/p high flow, now on NC, no plan for bronch per pulm given improvement   -ID follg, will hold off empiric steroids for now. If O2 requirements again plateau or worse, will reconsider empiric steroids or bronchoscopic evaluation with BAL/TBBX.  -rvp with +ADENOVIRUS, TTE with no acute findings

## 2023-12-16 NOTE — PROGRESS NOTE ADULT - PROBLEM SELECTOR PLAN 12
no obvious hemorrhage noted  - continue to monitor CBC  - s/p 1u PRBC on 11/25   - s/p 1u PRBC on 11/29      Plan discussed with daughter 12/16, dc pending rehab auth

## 2023-12-17 LAB
ANION GAP SERPL CALC-SCNC: 14 MMOL/L — SIGNIFICANT CHANGE UP (ref 7–14)
ANION GAP SERPL CALC-SCNC: 14 MMOL/L — SIGNIFICANT CHANGE UP (ref 7–14)
BASOPHILS # BLD AUTO: 0.06 K/UL — SIGNIFICANT CHANGE UP (ref 0–0.2)
BASOPHILS # BLD AUTO: 0.06 K/UL — SIGNIFICANT CHANGE UP (ref 0–0.2)
BASOPHILS NFR BLD AUTO: 1.2 % — SIGNIFICANT CHANGE UP (ref 0–2)
BASOPHILS NFR BLD AUTO: 1.2 % — SIGNIFICANT CHANGE UP (ref 0–2)
BUN SERPL-MCNC: 30 MG/DL — HIGH (ref 7–23)
BUN SERPL-MCNC: 30 MG/DL — HIGH (ref 7–23)
CALCIUM SERPL-MCNC: 9.7 MG/DL — SIGNIFICANT CHANGE UP (ref 8.4–10.5)
CALCIUM SERPL-MCNC: 9.7 MG/DL — SIGNIFICANT CHANGE UP (ref 8.4–10.5)
CHLORIDE SERPL-SCNC: 102 MMOL/L — SIGNIFICANT CHANGE UP (ref 98–107)
CHLORIDE SERPL-SCNC: 102 MMOL/L — SIGNIFICANT CHANGE UP (ref 98–107)
CO2 SERPL-SCNC: 21 MMOL/L — LOW (ref 22–31)
CO2 SERPL-SCNC: 21 MMOL/L — LOW (ref 22–31)
CREAT SERPL-MCNC: 1.07 MG/DL — SIGNIFICANT CHANGE UP (ref 0.5–1.3)
CREAT SERPL-MCNC: 1.07 MG/DL — SIGNIFICANT CHANGE UP (ref 0.5–1.3)
EGFR: 74 ML/MIN/1.73M2 — SIGNIFICANT CHANGE UP
EGFR: 74 ML/MIN/1.73M2 — SIGNIFICANT CHANGE UP
EOSINOPHIL # BLD AUTO: 0.57 K/UL — HIGH (ref 0–0.5)
EOSINOPHIL # BLD AUTO: 0.57 K/UL — HIGH (ref 0–0.5)
EOSINOPHIL NFR BLD AUTO: 11.2 % — HIGH (ref 0–6)
EOSINOPHIL NFR BLD AUTO: 11.2 % — HIGH (ref 0–6)
GLUCOSE SERPL-MCNC: 95 MG/DL — SIGNIFICANT CHANGE UP (ref 70–99)
GLUCOSE SERPL-MCNC: 95 MG/DL — SIGNIFICANT CHANGE UP (ref 70–99)
HCT VFR BLD CALC: 31.1 % — LOW (ref 39–50)
HCT VFR BLD CALC: 31.1 % — LOW (ref 39–50)
HGB BLD-MCNC: 9.8 G/DL — LOW (ref 13–17)
HGB BLD-MCNC: 9.8 G/DL — LOW (ref 13–17)
IANC: 2.04 K/UL — SIGNIFICANT CHANGE UP (ref 1.8–7.4)
IANC: 2.04 K/UL — SIGNIFICANT CHANGE UP (ref 1.8–7.4)
IMM GRANULOCYTES NFR BLD AUTO: 0.2 % — SIGNIFICANT CHANGE UP (ref 0–0.9)
IMM GRANULOCYTES NFR BLD AUTO: 0.2 % — SIGNIFICANT CHANGE UP (ref 0–0.9)
LYMPHOCYTES # BLD AUTO: 1.91 K/UL — SIGNIFICANT CHANGE UP (ref 1–3.3)
LYMPHOCYTES # BLD AUTO: 1.91 K/UL — SIGNIFICANT CHANGE UP (ref 1–3.3)
LYMPHOCYTES # BLD AUTO: 37.5 % — SIGNIFICANT CHANGE UP (ref 13–44)
LYMPHOCYTES # BLD AUTO: 37.5 % — SIGNIFICANT CHANGE UP (ref 13–44)
MAGNESIUM SERPL-MCNC: 2.2 MG/DL — SIGNIFICANT CHANGE UP (ref 1.6–2.6)
MAGNESIUM SERPL-MCNC: 2.2 MG/DL — SIGNIFICANT CHANGE UP (ref 1.6–2.6)
MCHC RBC-ENTMCNC: 31.4 PG — SIGNIFICANT CHANGE UP (ref 27–34)
MCHC RBC-ENTMCNC: 31.4 PG — SIGNIFICANT CHANGE UP (ref 27–34)
MCHC RBC-ENTMCNC: 31.5 GM/DL — LOW (ref 32–36)
MCHC RBC-ENTMCNC: 31.5 GM/DL — LOW (ref 32–36)
MCV RBC AUTO: 99.7 FL — SIGNIFICANT CHANGE UP (ref 80–100)
MCV RBC AUTO: 99.7 FL — SIGNIFICANT CHANGE UP (ref 80–100)
MONOCYTES # BLD AUTO: 0.5 K/UL — SIGNIFICANT CHANGE UP (ref 0–0.9)
MONOCYTES # BLD AUTO: 0.5 K/UL — SIGNIFICANT CHANGE UP (ref 0–0.9)
MONOCYTES NFR BLD AUTO: 9.8 % — SIGNIFICANT CHANGE UP (ref 2–14)
MONOCYTES NFR BLD AUTO: 9.8 % — SIGNIFICANT CHANGE UP (ref 2–14)
NEUTROPHILS # BLD AUTO: 2.04 K/UL — SIGNIFICANT CHANGE UP (ref 1.8–7.4)
NEUTROPHILS # BLD AUTO: 2.04 K/UL — SIGNIFICANT CHANGE UP (ref 1.8–7.4)
NEUTROPHILS NFR BLD AUTO: 40.1 % — LOW (ref 43–77)
NEUTROPHILS NFR BLD AUTO: 40.1 % — LOW (ref 43–77)
NRBC # BLD: 0 /100 WBCS — SIGNIFICANT CHANGE UP (ref 0–0)
NRBC # BLD: 0 /100 WBCS — SIGNIFICANT CHANGE UP (ref 0–0)
NRBC # FLD: 0 K/UL — SIGNIFICANT CHANGE UP (ref 0–0)
NRBC # FLD: 0 K/UL — SIGNIFICANT CHANGE UP (ref 0–0)
PHOSPHATE SERPL-MCNC: 3.6 MG/DL — SIGNIFICANT CHANGE UP (ref 2.5–4.5)
PHOSPHATE SERPL-MCNC: 3.6 MG/DL — SIGNIFICANT CHANGE UP (ref 2.5–4.5)
PLATELET # BLD AUTO: 268 K/UL — SIGNIFICANT CHANGE UP (ref 150–400)
PLATELET # BLD AUTO: 268 K/UL — SIGNIFICANT CHANGE UP (ref 150–400)
POTASSIUM SERPL-MCNC: 4.3 MMOL/L — SIGNIFICANT CHANGE UP (ref 3.5–5.3)
POTASSIUM SERPL-MCNC: 4.3 MMOL/L — SIGNIFICANT CHANGE UP (ref 3.5–5.3)
POTASSIUM SERPL-SCNC: 4.3 MMOL/L — SIGNIFICANT CHANGE UP (ref 3.5–5.3)
POTASSIUM SERPL-SCNC: 4.3 MMOL/L — SIGNIFICANT CHANGE UP (ref 3.5–5.3)
RBC # BLD: 3.12 M/UL — LOW (ref 4.2–5.8)
RBC # BLD: 3.12 M/UL — LOW (ref 4.2–5.8)
RBC # FLD: 16.3 % — HIGH (ref 10.3–14.5)
RBC # FLD: 16.3 % — HIGH (ref 10.3–14.5)
SODIUM SERPL-SCNC: 137 MMOL/L — SIGNIFICANT CHANGE UP (ref 135–145)
SODIUM SERPL-SCNC: 137 MMOL/L — SIGNIFICANT CHANGE UP (ref 135–145)
WBC # BLD: 5.09 K/UL — SIGNIFICANT CHANGE UP (ref 3.8–10.5)
WBC # BLD: 5.09 K/UL — SIGNIFICANT CHANGE UP (ref 3.8–10.5)
WBC # FLD AUTO: 5.09 K/UL — SIGNIFICANT CHANGE UP (ref 3.8–10.5)
WBC # FLD AUTO: 5.09 K/UL — SIGNIFICANT CHANGE UP (ref 3.8–10.5)

## 2023-12-17 PROCEDURE — 99232 SBSQ HOSP IP/OBS MODERATE 35: CPT

## 2023-12-17 RX ADMIN — NAFCILLIN 200 GRAM(S): 10 INJECTION, POWDER, FOR SOLUTION INTRAVENOUS at 10:02

## 2023-12-17 RX ADMIN — NAFCILLIN 200 GRAM(S): 10 INJECTION, POWDER, FOR SOLUTION INTRAVENOUS at 01:01

## 2023-12-17 RX ADMIN — NAFCILLIN 200 GRAM(S): 10 INJECTION, POWDER, FOR SOLUTION INTRAVENOUS at 13:32

## 2023-12-17 RX ADMIN — BICALUTAMIDE 50 MILLIGRAM(S): 50 TABLET, FILM COATED ORAL at 13:33

## 2023-12-17 RX ADMIN — LISINOPRIL 5 MILLIGRAM(S): 2.5 TABLET ORAL at 05:51

## 2023-12-17 RX ADMIN — PANTOPRAZOLE SODIUM 40 MILLIGRAM(S): 20 TABLET, DELAYED RELEASE ORAL at 13:33

## 2023-12-17 RX ADMIN — Medication 2000 UNIT(S): at 13:34

## 2023-12-17 RX ADMIN — ATOVAQUONE 750 MILLIGRAM(S): 750 SUSPENSION ORAL at 05:51

## 2023-12-17 RX ADMIN — NAFCILLIN 200 GRAM(S): 10 INJECTION, POWDER, FOR SOLUTION INTRAVENOUS at 05:50

## 2023-12-17 RX ADMIN — HEPARIN SODIUM 5000 UNIT(S): 5000 INJECTION INTRAVENOUS; SUBCUTANEOUS at 22:32

## 2023-12-17 RX ADMIN — TAMSULOSIN HYDROCHLORIDE 0.4 MILLIGRAM(S): 0.4 CAPSULE ORAL at 22:33

## 2023-12-17 RX ADMIN — ATORVASTATIN CALCIUM 20 MILLIGRAM(S): 80 TABLET, FILM COATED ORAL at 22:33

## 2023-12-17 RX ADMIN — Medication 1 TABLET(S): at 13:33

## 2023-12-17 RX ADMIN — HEPARIN SODIUM 5000 UNIT(S): 5000 INJECTION INTRAVENOUS; SUBCUTANEOUS at 15:14

## 2023-12-17 RX ADMIN — CHLORHEXIDINE GLUCONATE 1 APPLICATION(S): 213 SOLUTION TOPICAL at 13:32

## 2023-12-17 RX ADMIN — NAFCILLIN 200 GRAM(S): 10 INJECTION, POWDER, FOR SOLUTION INTRAVENOUS at 18:11

## 2023-12-17 RX ADMIN — ATOVAQUONE 750 MILLIGRAM(S): 750 SUSPENSION ORAL at 18:12

## 2023-12-17 RX ADMIN — NAFCILLIN 200 GRAM(S): 10 INJECTION, POWDER, FOR SOLUTION INTRAVENOUS at 22:32

## 2023-12-17 RX ADMIN — HEPARIN SODIUM 5000 UNIT(S): 5000 INJECTION INTRAVENOUS; SUBCUTANEOUS at 07:40

## 2023-12-17 NOTE — PROGRESS NOTE ADULT - PROBLEM SELECTOR PLAN 7
Patient previously diagnosed with prostate ca in 2009  - NM scan showing multiple osseous lesions throughout body  - heme/onc recs appreciated, will c/w bicalutamide 50mg qd  - IR consulted for kyphoplasty 12/4 in anticipation for Rad Tx, per IR no plan for kyphoplasty for now given acute infection/bacteremia, pt to f/u as outpt.  - Per RT discussion with Dr. Rosenberg, He would likely get a better course of RT outpatient with SBRT as an option per Dr. Scales preference, plan discussed with daughter and pt, they will follow up with Dr. Scales outpt as soon as the finish rehab  - Discussed with Med-Onc, plan to continue casodex, pt now agreeable to [facility], spoke with Onc 12/17, they'll follow up with dosing on monday

## 2023-12-17 NOTE — PROGRESS NOTE ADULT - PROBLEM SELECTOR PLAN 12
no obvious hemorrhage noted  - continue to monitor CBC  - s/p 1u PRBC on 11/25   - s/p 1u PRBC on 11/29      Plan discussed with daughter 12/16, dc pending rehab kelly/lupron

## 2023-12-17 NOTE — PROGRESS NOTE ADULT - SUBJECTIVE AND OBJECTIVE BOX
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 Pt is agreeable to Lupron injection    MEDICATIONS  (STANDING):  atorvastatin 20 milliGRAM(s) Oral at bedtime  atovaquone  Suspension 750 milliGRAM(s) Oral two times a day  bicalutamide 50 milliGRAM(s) Oral daily  chlorhexidine 2% Cloths 1 Application(s) Topical daily  cholecalciferol 2000 Unit(s) Oral daily  dextrose 5%. 1000 milliLiter(s) (50 mL/Hr) IV Continuous <Continuous>  dextrose 5%. 1000 milliLiter(s) (100 mL/Hr) IV Continuous <Continuous>  dextrose 50% Injectable 25 Gram(s) IV Push once  dextrose 50% Injectable 25 Gram(s) IV Push once  dextrose 50% Injectable 12.5 Gram(s) IV Push once  glucagon  Injectable 1 milliGRAM(s) IntraMuscular once  heparin   Injectable 5000 Unit(s) SubCutaneous every 8 hours  lactobacillus acidophilus 1 Tablet(s) Oral daily  lisinopril 5 milliGRAM(s) Oral daily  nafcillin  IVPB 2 Gram(s) IV Intermittent every 4 hours  pantoprazole  Injectable 40 milliGRAM(s) IV Push daily  tamsulosin 0.4 milliGRAM(s) Oral at bedtime    MEDICATIONS  (PRN):  benzocaine/menthol Lozenge 1 Lozenge Oral every 6 hours PRN Sore Throat  dextrose Oral Gel 15 Gram(s) Oral once PRN Blood Glucose LESS THAN 70 milliGRAM(s)/deciliter  guaiFENesin Oral Liquid (Sugar-Free) 200 milliGRAM(s) Oral every 6 hours PRN Cough  levalbuterol Inhalation 0.63 milliGRAM(s) Inhalation every 6 hours PRN shortness of breath/wheezing  naloxone Injectable 0.1 milliGRAM(s) IV Push every 3 minutes PRN For ANY of the following changes in patient status:  A. RR LESS THAN 10 breaths per minute, B. Oxygen saturation LESS THAN 90%, C. Sedation score of 6  ondansetron Injectable 4 milliGRAM(s) IV Push every 6 hours PRN Nausea      CAPILLARY BLOOD GLUCOSE      POCT Blood Glucose.: 102 mg/dL (17 Dec 2023 09:26)    I&O's Summary      PHYSICAL EXAM:  Vital Signs Last 24 Hrs  T(C): 36.7 (17 Dec 2023 05:48), Max: 36.7 (17 Dec 2023 05:48)  T(F): 98.1 (17 Dec 2023 05:48), Max: 98.1 (17 Dec 2023 05:48)  HR: 91 (17 Dec 2023 05:48) (90 - 91)  BP: 114/71 (17 Dec 2023 05:48) (105/69 - 114/71)  BP(mean): --  RR: 16 (17 Dec 2023 05:48) (16 - 18)  SpO2: 96% (17 Dec 2023 05:48) (96% - 97%)    Parameters below as of 17 Dec 2023 05:48  Patient On (Oxygen Delivery Method): room air      CONSTITUTIONAL: NAD  EYES: conjunctiva and sclera clear  ENMT: mmm  NECK: Supple,  RESPIRATORY: Normal respiratory effort; lungs are clear to auscultation bilaterally  CARDIOVASCULAR: Regular rate and rhythm, + S1 and S2  ABDOMEN: Nontender to palpation, normoactive bowel sounds, no rebound/guarding  PSYCH: A+O x 3    LABS:                        9.8    5.09  )-----------( 268      ( 17 Dec 2023 06:45 )             31.1     12-17    137  |  102  |  30<H>  ----------------------------<  95  4.3   |  21<L>  |  1.07    Ca    9.7      17 Dec 2023 06:45  Phos  3.6     12-17  Mg     2.20     12-17            Urinalysis Basic - ( 17 Dec 2023 06:45 )    Color: x / Appearance: x / SG: x / pH: x  Gluc: 95 mg/dL / Ketone: x  / Bili: x / Urobili: x   Blood: x / Protein: x / Nitrite: x   Leuk Esterase: x / RBC: x / WBC x   Sq Epi: x / Non Sq Epi: x / Bacteria: x             [facility]  Zach Villagran MD  Pager [phone]      Patient is a 73y old  Male who presents with a chief complaint of 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 Pt is agreeable to Lupron injection    MEDICATIONS  (STANDING):  atorvastatin 20 milliGRAM(s) Oral at bedtime  atovaquone  Suspension 750 milliGRAM(s) Oral two times a day  bicalutamide 50 milliGRAM(s) Oral daily  chlorhexidine 2% Cloths 1 Application(s) Topical daily  cholecalciferol 2000 Unit(s) Oral daily  dextrose 5%. 1000 milliLiter(s) (50 mL/Hr) IV Continuous <Continuous>  dextrose 5%. 1000 milliLiter(s) (100 mL/Hr) IV Continuous <Continuous>  dextrose 50% Injectable 25 Gram(s) IV Push once  dextrose 50% Injectable 25 Gram(s) IV Push once  dextrose 50% Injectable 12.5 Gram(s) IV Push once  glucagon  Injectable 1 milliGRAM(s) IntraMuscular once  heparin   Injectable 5000 Unit(s) SubCutaneous every 8 hours  lactobacillus acidophilus 1 Tablet(s) Oral daily  lisinopril 5 milliGRAM(s) Oral daily  nafcillin  IVPB 2 Gram(s) IV Intermittent every 4 hours  pantoprazole  Injectable 40 milliGRAM(s) IV Push daily  tamsulosin 0.4 milliGRAM(s) Oral at bedtime    MEDICATIONS  (PRN):  benzocaine/menthol Lozenge 1 Lozenge Oral every 6 hours PRN Sore Throat  dextrose Oral Gel 15 Gram(s) Oral once PRN Blood Glucose LESS THAN 70 milliGRAM(s)/deciliter  guaiFENesin Oral Liquid (Sugar-Free) 200 milliGRAM(s) Oral every 6 hours PRN Cough  levalbuterol Inhalation 0.63 milliGRAM(s) Inhalation every 6 hours PRN shortness of breath/wheezing  naloxone Injectable 0.1 milliGRAM(s) IV Push every 3 minutes PRN For ANY of the following changes in patient status:  A. RR LESS THAN 10 breaths per minute, B. Oxygen saturation LESS THAN 90%, C. Sedation score of 6  ondansetron Injectable 4 milliGRAM(s) IV Push every 6 hours PRN Nausea      CAPILLARY BLOOD GLUCOSE      POCT Blood Glucose.: 102 mg/dL (17 Dec 2023 09:26)    I&O's Summary      PHYSICAL EXAM:  Vital Signs Last 24 Hrs  T(C): 36.7 (17 Dec 2023 05:48), Max: 36.7 (17 Dec 2023 05:48)  T(F): 98.1 (17 Dec 2023 05:48), Max: 98.1 (17 Dec 2023 05:48)  HR: 91 (17 Dec 2023 05:48) (90 - 91)  BP: 114/71 (17 Dec 2023 05:48) (105/69 - 114/71)  BP(mean): --  RR: 16 (17 Dec 2023 05:48) (16 - 18)  SpO2: 96% (17 Dec 2023 05:48) (96% - 97%)    Parameters below as of 17 Dec 2023 05:48  Patient On (Oxygen Delivery Method): room air      CONSTITUTIONAL: NAD  EYES: conjunctiva and sclera clear  ENMT: mmm  NECK: Supple,  RESPIRATORY: Normal respiratory effort; lungs are clear to auscultation bilaterally  CARDIOVASCULAR: Regular rate and rhythm, + S1 and S2  ABDOMEN: Nontender to palpation, normoactive bowel sounds, no rebound/guarding  PSYCH: A+O x 3    LABS:                        9.8    5.09  )-----------( 268      ( 17 Dec 2023 06:45 )             31.1     12-17    137  |  102  |  30<H>  ----------------------------<  95  4.3   |  21<L>  |  1.07    Ca    9.7      17 Dec 2023 06:45  Phos  3.6     12-17  Mg     2.20     12-17            Urinalysis Basic - ( 17 Dec 2023 06:45 )    Color: x / Appearance: x / SG: x / pH: x  Gluc: 95 mg/dL / Ketone: x  / Bili: x / Urobili: x   Blood: x / Protein: x / Nitrite: x   Leuk Esterase: x / RBC: x / WBC x   Sq Epi: x / Non Sq Epi: x / Bacteria: x

## 2023-12-18 DIAGNOSIS — Z29.9 ENCOUNTER FOR PROPHYLACTIC MEASURES, UNSPECIFIED: ICD-10-CM

## 2023-12-18 LAB
ANION GAP SERPL CALC-SCNC: 16 MMOL/L — HIGH (ref 7–14)
ANION GAP SERPL CALC-SCNC: 16 MMOL/L — HIGH (ref 7–14)
BASOPHILS # BLD AUTO: 0.05 K/UL — SIGNIFICANT CHANGE UP (ref 0–0.2)
BASOPHILS # BLD AUTO: 0.05 K/UL — SIGNIFICANT CHANGE UP (ref 0–0.2)
BASOPHILS NFR BLD AUTO: 0.9 % — SIGNIFICANT CHANGE UP (ref 0–2)
BASOPHILS NFR BLD AUTO: 0.9 % — SIGNIFICANT CHANGE UP (ref 0–2)
BUN SERPL-MCNC: 28 MG/DL — HIGH (ref 7–23)
BUN SERPL-MCNC: 28 MG/DL — HIGH (ref 7–23)
CALCIUM SERPL-MCNC: 9.7 MG/DL — SIGNIFICANT CHANGE UP (ref 8.4–10.5)
CALCIUM SERPL-MCNC: 9.7 MG/DL — SIGNIFICANT CHANGE UP (ref 8.4–10.5)
CHLORIDE SERPL-SCNC: 102 MMOL/L — SIGNIFICANT CHANGE UP (ref 98–107)
CHLORIDE SERPL-SCNC: 102 MMOL/L — SIGNIFICANT CHANGE UP (ref 98–107)
CO2 SERPL-SCNC: 20 MMOL/L — LOW (ref 22–31)
CO2 SERPL-SCNC: 20 MMOL/L — LOW (ref 22–31)
CREAT SERPL-MCNC: 1.07 MG/DL — SIGNIFICANT CHANGE UP (ref 0.5–1.3)
CREAT SERPL-MCNC: 1.07 MG/DL — SIGNIFICANT CHANGE UP (ref 0.5–1.3)
EGFR: 74 ML/MIN/1.73M2 — SIGNIFICANT CHANGE UP
EGFR: 74 ML/MIN/1.73M2 — SIGNIFICANT CHANGE UP
EOSINOPHIL # BLD AUTO: 0.71 K/UL — HIGH (ref 0–0.5)
EOSINOPHIL # BLD AUTO: 0.71 K/UL — HIGH (ref 0–0.5)
EOSINOPHIL NFR BLD AUTO: 12.7 % — HIGH (ref 0–6)
EOSINOPHIL NFR BLD AUTO: 12.7 % — HIGH (ref 0–6)
GLUCOSE BLDC GLUCOMTR-MCNC: 127 MG/DL — HIGH (ref 70–99)
GLUCOSE BLDC GLUCOMTR-MCNC: 127 MG/DL — HIGH (ref 70–99)
GLUCOSE BLDC GLUCOMTR-MCNC: 130 MG/DL — HIGH (ref 70–99)
GLUCOSE BLDC GLUCOMTR-MCNC: 130 MG/DL — HIGH (ref 70–99)
GLUCOSE BLDC GLUCOMTR-MCNC: 96 MG/DL — SIGNIFICANT CHANGE UP (ref 70–99)
GLUCOSE BLDC GLUCOMTR-MCNC: 96 MG/DL — SIGNIFICANT CHANGE UP (ref 70–99)
GLUCOSE SERPL-MCNC: 107 MG/DL — HIGH (ref 70–99)
GLUCOSE SERPL-MCNC: 107 MG/DL — HIGH (ref 70–99)
HCT VFR BLD CALC: 30.3 % — LOW (ref 39–50)
HCT VFR BLD CALC: 30.3 % — LOW (ref 39–50)
HGB BLD-MCNC: 9.8 G/DL — LOW (ref 13–17)
HGB BLD-MCNC: 9.8 G/DL — LOW (ref 13–17)
IANC: 2.28 K/UL — SIGNIFICANT CHANGE UP (ref 1.8–7.4)
IANC: 2.28 K/UL — SIGNIFICANT CHANGE UP (ref 1.8–7.4)
IMM GRANULOCYTES NFR BLD AUTO: 0.2 % — SIGNIFICANT CHANGE UP (ref 0–0.9)
IMM GRANULOCYTES NFR BLD AUTO: 0.2 % — SIGNIFICANT CHANGE UP (ref 0–0.9)
LYMPHOCYTES # BLD AUTO: 2.07 K/UL — SIGNIFICANT CHANGE UP (ref 1–3.3)
LYMPHOCYTES # BLD AUTO: 2.07 K/UL — SIGNIFICANT CHANGE UP (ref 1–3.3)
LYMPHOCYTES # BLD AUTO: 36.9 % — SIGNIFICANT CHANGE UP (ref 13–44)
LYMPHOCYTES # BLD AUTO: 36.9 % — SIGNIFICANT CHANGE UP (ref 13–44)
MAGNESIUM SERPL-MCNC: 2 MG/DL — SIGNIFICANT CHANGE UP (ref 1.6–2.6)
MAGNESIUM SERPL-MCNC: 2 MG/DL — SIGNIFICANT CHANGE UP (ref 1.6–2.6)
MCHC RBC-ENTMCNC: 31.1 PG — SIGNIFICANT CHANGE UP (ref 27–34)
MCHC RBC-ENTMCNC: 31.1 PG — SIGNIFICANT CHANGE UP (ref 27–34)
MCHC RBC-ENTMCNC: 32.3 GM/DL — SIGNIFICANT CHANGE UP (ref 32–36)
MCHC RBC-ENTMCNC: 32.3 GM/DL — SIGNIFICANT CHANGE UP (ref 32–36)
MCV RBC AUTO: 96.2 FL — SIGNIFICANT CHANGE UP (ref 80–100)
MCV RBC AUTO: 96.2 FL — SIGNIFICANT CHANGE UP (ref 80–100)
MONOCYTES # BLD AUTO: 0.49 K/UL — SIGNIFICANT CHANGE UP (ref 0–0.9)
MONOCYTES # BLD AUTO: 0.49 K/UL — SIGNIFICANT CHANGE UP (ref 0–0.9)
MONOCYTES NFR BLD AUTO: 8.7 % — SIGNIFICANT CHANGE UP (ref 2–14)
MONOCYTES NFR BLD AUTO: 8.7 % — SIGNIFICANT CHANGE UP (ref 2–14)
NEUTROPHILS # BLD AUTO: 2.28 K/UL — SIGNIFICANT CHANGE UP (ref 1.8–7.4)
NEUTROPHILS # BLD AUTO: 2.28 K/UL — SIGNIFICANT CHANGE UP (ref 1.8–7.4)
NEUTROPHILS NFR BLD AUTO: 40.6 % — LOW (ref 43–77)
NEUTROPHILS NFR BLD AUTO: 40.6 % — LOW (ref 43–77)
NRBC # BLD: 0 /100 WBCS — SIGNIFICANT CHANGE UP (ref 0–0)
NRBC # BLD: 0 /100 WBCS — SIGNIFICANT CHANGE UP (ref 0–0)
NRBC # FLD: 0 K/UL — SIGNIFICANT CHANGE UP (ref 0–0)
NRBC # FLD: 0 K/UL — SIGNIFICANT CHANGE UP (ref 0–0)
PHOSPHATE SERPL-MCNC: 3.7 MG/DL — SIGNIFICANT CHANGE UP (ref 2.5–4.5)
PHOSPHATE SERPL-MCNC: 3.7 MG/DL — SIGNIFICANT CHANGE UP (ref 2.5–4.5)
PLATELET # BLD AUTO: 245 K/UL — SIGNIFICANT CHANGE UP (ref 150–400)
PLATELET # BLD AUTO: 245 K/UL — SIGNIFICANT CHANGE UP (ref 150–400)
POTASSIUM SERPL-MCNC: 4.3 MMOL/L — SIGNIFICANT CHANGE UP (ref 3.5–5.3)
POTASSIUM SERPL-MCNC: 4.3 MMOL/L — SIGNIFICANT CHANGE UP (ref 3.5–5.3)
POTASSIUM SERPL-SCNC: 4.3 MMOL/L — SIGNIFICANT CHANGE UP (ref 3.5–5.3)
POTASSIUM SERPL-SCNC: 4.3 MMOL/L — SIGNIFICANT CHANGE UP (ref 3.5–5.3)
RBC # BLD: 3.15 M/UL — LOW (ref 4.2–5.8)
RBC # BLD: 3.15 M/UL — LOW (ref 4.2–5.8)
RBC # FLD: 16 % — HIGH (ref 10.3–14.5)
RBC # FLD: 16 % — HIGH (ref 10.3–14.5)
SODIUM SERPL-SCNC: 138 MMOL/L — SIGNIFICANT CHANGE UP (ref 135–145)
SODIUM SERPL-SCNC: 138 MMOL/L — SIGNIFICANT CHANGE UP (ref 135–145)
WBC # BLD: 5.61 K/UL — SIGNIFICANT CHANGE UP (ref 3.8–10.5)
WBC # BLD: 5.61 K/UL — SIGNIFICANT CHANGE UP (ref 3.8–10.5)
WBC # FLD AUTO: 5.61 K/UL — SIGNIFICANT CHANGE UP (ref 3.8–10.5)
WBC # FLD AUTO: 5.61 K/UL — SIGNIFICANT CHANGE UP (ref 3.8–10.5)

## 2023-12-18 PROCEDURE — 99233 SBSQ HOSP IP/OBS HIGH 50: CPT

## 2023-12-18 PROCEDURE — 99232 SBSQ HOSP IP/OBS MODERATE 35: CPT | Mod: GC

## 2023-12-18 RX ADMIN — NAFCILLIN 200 GRAM(S): 10 INJECTION, POWDER, FOR SOLUTION INTRAVENOUS at 12:49

## 2023-12-18 RX ADMIN — NAFCILLIN 200 GRAM(S): 10 INJECTION, POWDER, FOR SOLUTION INTRAVENOUS at 06:34

## 2023-12-18 RX ADMIN — TAMSULOSIN HYDROCHLORIDE 0.4 MILLIGRAM(S): 0.4 CAPSULE ORAL at 22:35

## 2023-12-18 RX ADMIN — ATOVAQUONE 750 MILLIGRAM(S): 750 SUSPENSION ORAL at 06:33

## 2023-12-18 RX ADMIN — ATORVASTATIN CALCIUM 20 MILLIGRAM(S): 80 TABLET, FILM COATED ORAL at 22:35

## 2023-12-18 RX ADMIN — Medication 1 TABLET(S): at 12:52

## 2023-12-18 RX ADMIN — LISINOPRIL 5 MILLIGRAM(S): 2.5 TABLET ORAL at 06:33

## 2023-12-18 RX ADMIN — NAFCILLIN 200 GRAM(S): 10 INJECTION, POWDER, FOR SOLUTION INTRAVENOUS at 18:50

## 2023-12-18 RX ADMIN — BICALUTAMIDE 50 MILLIGRAM(S): 50 TABLET, FILM COATED ORAL at 12:51

## 2023-12-18 RX ADMIN — NAFCILLIN 200 GRAM(S): 10 INJECTION, POWDER, FOR SOLUTION INTRAVENOUS at 22:35

## 2023-12-18 RX ADMIN — HEPARIN SODIUM 5000 UNIT(S): 5000 INJECTION INTRAVENOUS; SUBCUTANEOUS at 06:33

## 2023-12-18 RX ADMIN — Medication 2000 UNIT(S): at 12:52

## 2023-12-18 RX ADMIN — NAFCILLIN 200 GRAM(S): 10 INJECTION, POWDER, FOR SOLUTION INTRAVENOUS at 13:43

## 2023-12-18 RX ADMIN — ATOVAQUONE 750 MILLIGRAM(S): 750 SUSPENSION ORAL at 17:20

## 2023-12-18 RX ADMIN — PANTOPRAZOLE SODIUM 40 MILLIGRAM(S): 20 TABLET, DELAYED RELEASE ORAL at 12:51

## 2023-12-18 RX ADMIN — NAFCILLIN 200 GRAM(S): 10 INJECTION, POWDER, FOR SOLUTION INTRAVENOUS at 02:35

## 2023-12-18 RX ADMIN — CHLORHEXIDINE GLUCONATE 1 APPLICATION(S): 213 SOLUTION TOPICAL at 12:51

## 2023-12-18 RX ADMIN — HEPARIN SODIUM 5000 UNIT(S): 5000 INJECTION INTRAVENOUS; SUBCUTANEOUS at 12:50

## 2023-12-18 RX ADMIN — HEPARIN SODIUM 5000 UNIT(S): 5000 INJECTION INTRAVENOUS; SUBCUTANEOUS at 22:35

## 2023-12-18 NOTE — PROGRESS NOTE ADULT - PROBLEM SELECTOR PLAN 2
concern for surgical site as a primary source  - BCx from 11/16, 11/17 NGTD  - changed to Nafcillin IV on 11/20, plan to complete 12/28, cost of nafcillin limiting rehab placement, unable to switch per ID given cytopenias in the past on cefazolin  - TTE revealed hyperdynamic left ventricle  - will place midline prior to dc to rehab

## 2023-12-18 NOTE — PROGRESS NOTE ADULT - PROBLEM SELECTOR PLAN 7
Patient found to have b/l LE DVTs during this admission  - s/p IVC filter placed by IR on 11/1  - in setting of spinal surgery, patient to remain off AC due to risk of hematoma formation for at least 6 weeks since surgery (11/2)  - patient will f/u with IR within 6-8 weeks to assess for IVC filter retrieval once contraindication to AC no longer exists

## 2023-12-18 NOTE — PROGRESS NOTE ADULT - SUBJECTIVE AND OBJECTIVE BOX
INTERVAL HPI/OVERNIGHT EVENTS:  Patient S&E at bedside. No complaints at this time. His LE weakness has improved w/ PT and is able to ambulate with a walker. His UE weakness resolved and now he is awaiting rehab.     No F/C, CP, SOB, abdominal pain, N/V, rash, or edema      VITAL SIGNS:  T(F): 98.3 (12-18-23 @ 12:30)  HR: 88 (12-18-23 @ 12:30)  BP: 104/90 (12-18-23 @ 12:30)  RR: 18 (12-18-23 @ 12:30)  SpO2: 98% (12-18-23 @ 12:30)  Wt(kg): --    PHYSICAL EXAM:    Constitutional: NAD  Eyes: EOMI, sclera non-icteric  Neck: supple  Respiratory: no increased WOB, CTAB/L  Cardiovascular: RRR, S1, S2, no m/g/r  Gastrointestinal: soft, NTND, no masses palpable, no HSM  Extremities: no c/c/e  Neurological: AAOx3    MEDICATIONS  (STANDING):  atorvastatin 20 milliGRAM(s) Oral at bedtime  atovaquone  Suspension 750 milliGRAM(s) Oral two times a day  bicalutamide 50 milliGRAM(s) Oral daily  chlorhexidine 2% Cloths 1 Application(s) Topical daily  cholecalciferol 2000 Unit(s) Oral daily  dextrose 50% Injectable 25 Gram(s) IV Push once  heparin   Injectable 5000 Unit(s) SubCutaneous every 8 hours  lactobacillus acidophilus 1 Tablet(s) Oral daily  lisinopril 5 milliGRAM(s) Oral daily  nafcillin  IVPB 2 Gram(s) IV Intermittent every 4 hours  pantoprazole  Injectable 40 milliGRAM(s) IV Push daily  tamsulosin 0.4 milliGRAM(s) Oral at bedtime    MEDICATIONS  (PRN):  benzocaine/menthol Lozenge 1 Lozenge Oral every 6 hours PRN Sore Throat  guaiFENesin Oral Liquid (Sugar-Free) 200 milliGRAM(s) Oral every 6 hours PRN Cough  levalbuterol Inhalation 0.63 milliGRAM(s) Inhalation every 6 hours PRN shortness of breath/wheezing  naloxone Injectable 0.1 milliGRAM(s) IV Push every 3 minutes PRN For ANY of the following changes in patient status:  A. RR LESS THAN 10 breaths per minute, B. Oxygen saturation LESS THAN 90%, C. Sedation score of 6  ondansetron Injectable 4 milliGRAM(s) IV Push every 6 hours PRN Nausea      LABS:                        9.8    5.61  )-----------( 245      ( 18 Dec 2023 02:44 )             30.3     12-18    138  |  102  |  28<H>  ----------------------------<  107<H>  4.3   |  20<L>  |  1.07    Ca    9.7      18 Dec 2023 02:44  Phos  3.7     12-18  Mg     2.00     12-18        Urinalysis Basic - ( 18 Dec 2023 02:44 )    Color: x / Appearance: x / SG: x / pH: x  Gluc: 107 mg/dL / Ketone: x  / Bili: x / Urobili: x   Blood: x / Protein: x / Nitrite: x   Leuk Esterase: x / RBC: x / WBC x   Sq Epi: x / Non Sq Epi: x / Bacteria: x        RADIOLOGY & ADDITIONAL TESTS:   INTERVAL HPI/OVERNIGHT EVENTS:  Patient S&E at bedside. No complaints at this time. His LE weakness has improved w/ PT and is able to ambulate with a walker. Now he is awaiting rehab.     No F/C, CP, SOB, abdominal pain, N/V, rash, or edema      VITAL SIGNS:  T(F): 98.3 (12-18-23 @ 12:30)  HR: 88 (12-18-23 @ 12:30)  BP: 104/90 (12-18-23 @ 12:30)  RR: 18 (12-18-23 @ 12:30)  SpO2: 98% (12-18-23 @ 12:30)  Wt(kg): --    PHYSICAL EXAM:    Constitutional: NAD  Eyes: EOMI, sclera non-icteric  Neck: supple  Respiratory: no increased WOB, CTAB/L  Cardiovascular: RRR, S1, S2, no m/g/r  Gastrointestinal: soft, NTND, no masses palpable, no HSM  Extremities: no c/c/e  Neurological: AAOx3    MEDICATIONS  (STANDING):  atorvastatin 20 milliGRAM(s) Oral at bedtime  atovaquone  Suspension 750 milliGRAM(s) Oral two times a day  bicalutamide 50 milliGRAM(s) Oral daily  chlorhexidine 2% Cloths 1 Application(s) Topical daily  cholecalciferol 2000 Unit(s) Oral daily  dextrose 50% Injectable 25 Gram(s) IV Push once  heparin   Injectable 5000 Unit(s) SubCutaneous every 8 hours  lactobacillus acidophilus 1 Tablet(s) Oral daily  lisinopril 5 milliGRAM(s) Oral daily  nafcillin  IVPB 2 Gram(s) IV Intermittent every 4 hours  pantoprazole  Injectable 40 milliGRAM(s) IV Push daily  tamsulosin 0.4 milliGRAM(s) Oral at bedtime    MEDICATIONS  (PRN):  benzocaine/menthol Lozenge 1 Lozenge Oral every 6 hours PRN Sore Throat  guaiFENesin Oral Liquid (Sugar-Free) 200 milliGRAM(s) Oral every 6 hours PRN Cough  levalbuterol Inhalation 0.63 milliGRAM(s) Inhalation every 6 hours PRN shortness of breath/wheezing  naloxone Injectable 0.1 milliGRAM(s) IV Push every 3 minutes PRN For ANY of the following changes in patient status:  A. RR LESS THAN 10 breaths per minute, B. Oxygen saturation LESS THAN 90%, C. Sedation score of 6  ondansetron Injectable 4 milliGRAM(s) IV Push every 6 hours PRN Nausea      LABS:                        9.8    5.61  )-----------( 245      ( 18 Dec 2023 02:44 )             30.3     12-18    138  |  102  |  28<H>  ----------------------------<  107<H>  4.3   |  20<L>  |  1.07    Ca    9.7      18 Dec 2023 02:44  Phos  3.7     12-18  Mg     2.00     12-18        Urinalysis Basic - ( 18 Dec 2023 02:44 )    Color: x / Appearance: x / SG: x / pH: x  Gluc: 107 mg/dL / Ketone: x  / Bili: x / Urobili: x   Blood: x / Protein: x / Nitrite: x   Leuk Esterase: x / RBC: x / WBC x   Sq Epi: x / Non Sq Epi: x / Bacteria: x        RADIOLOGY & ADDITIONAL TESTS:

## 2023-12-18 NOTE — PROGRESS NOTE ADULT - ATTENDING COMMENTS
74 y/o man with progressive prostate cancer now with bilateral LE weakness and spine showing evidence of cord compression. Rad onc aware of case and planning tenatively for out-patient RT. Unclear at this time if patient has insurance coverage to follow-up with [facility] oncology as apparently he was brought her direction from [place] and had not followed with our team before. Will help coordinate discharge planning to ensure safe discharge.  Nikolas Sorto MD PhD  Oncology Attending 72 y/o man with progressive prostate cancer now with bilateral LE weakness and spine showing evidence of cord compression. Rad onc aware of case and planning tenatively for out-patient RT. Unclear at this time if patient has insurance coverage to follow-up with [facility] oncology as apparently he was brought her direction from [place] and had not followed with our team before. Will help coordinate discharge planning to ensure safe discharge.  Nikolas Sorto MD PhD  Oncology Attending

## 2023-12-18 NOTE — PROGRESS NOTE ADULT - PROBLEM SELECTOR PLAN 3
resolved   - noted diarrhea and positive C. diff colitis  - completed po vanco course on 12/8   - continue probiotics

## 2023-12-18 NOTE — PROGRESS NOTE ADULT - PROBLEM SELECTOR PLAN 4
c/b dysphagia and aspiration PNA  - appreciate ENT eval - reviewed direct laryngoscope imaging on 11/22 - suspects esophageal candida but also possible mass effect from recent C-spine surgery; however no airway compromise noted  - s/p Diflucan IV (11/22-12/1) Fungitell is positive, ID attending aware  - with clinical improvement  - S+S eval appreciated

## 2023-12-18 NOTE — PROGRESS NOTE ADULT - PROBLEM SELECTOR PLAN 5
neoplasm with epidural expansion causing cord compression from C7-T3  - s/p C5-T3 posterior cervical fusion and C7-T1 decompression by orthopedic surgery on 11/2  - s/p flap closure of back surgical site by plastic surgery with b/l AGGIE drains; wound vac placed on 11/17  - ortho re: MRI c-spine - suspect not infected but rather expected post-op fluid collection  - radonc: recommending outpatient follow up for RT   - sutures removed by plastics, uninfected seroma   - no plan for Kyphoplasty for now per IR, pt can f/u as outpt

## 2023-12-18 NOTE — PROGRESS NOTE ADULT - PROBLEM SELECTOR PLAN 6
previously diagnosed with prostate ca in 2009  - NM scan showing multiple osseous lesions throughout body  - IR consulted for kyphoplasty 12/4 in anticipation for Rad Tx, per IR no plan for kyphoplasty for now given acute infection/bacteremia, pt to f/u as outpt  - Radonc: per discussion with Dr. Rosenberg, patient would likely get a better course of RT outpatient with SBRT as an option per Dr. Scales preference, family will follow up outpatient after dc from rehab   - Hemeonc: c/w bicalutamide 50mg qd, follow up re: Lupron prior to dc to rehab

## 2023-12-18 NOTE — PROGRESS NOTE ADULT - ASSESSMENT
73M w/ hx of IDDM, HTN and history of prostate cancer s/p biopsy in 2021 ([place] 4+3) who declined radiation therapy at that time, and since followed by urologist at [facility], p/w progressive bilateral LE weakness, R hand paresthesias and recurrent falls. MRI spine findings concerning for spinal cord compression.    #metastatic prostate adenocarcinoma   #Back pain 2/2 diffuse mets/cord compression   - hx of prostate cancer diagnosed back in 2009 but did not seek treatment/care at that time given no symptoms until 1 month ago when noticed neuropathy and started following up with Dr. Zelaya - urologist.  - Prostate biopsy in 2021 showed adenocarcinoma, [place] 3+4; PSA level 169 declined radiation therapy  - imaging shows  ([place] 4+3), PSA level 169 in July 2023, now with evidence of diffuse metastatic disease, progressive LE weakness, saddle anesthesia and cord compression.  - LE weakness has improved w/ PT and is able to ambulate with a walker. Now he is awaiting rehab.   - Rad/onc plan: tentative palliative outpatient RT 20Gy in 5fx to C7-T5 after rehab  -had a long hospital course c/b multiple infections that has since resolved    Plan:  - c/w casodex 50 mg OD   - will order Lupron injection tomorrow. Will need to get consent.  -had extensive discussion w/ patient at bedside. He stated he flew in from [facility] [place] to get [place]. Also does not have any medical oncologist. If patient wants to see [facility] medical oncology, we will set up an appointment with [facility]  oncology.  -please let us know if patient wants to see [facility]  oncology  -before patient get's discharged to rehab please ensure he has an appointment w/ medical oncology and radiation oncology and also he has transportation available from Rehab to his appointments.    Discussed w/ Dr. King Shania Garcia MD, PhD  Heme/Onc Fellow  [id number]         73M w/ hx of IDDM, HTN and history of prostate cancer s/p biopsy in 2021 ([place] 4+3) who declined radiation therapy at that time, and since followed by urologist at [facility], p/w progressive bilateral LE weakness, R hand paresthesias and recurrent falls. MRI spine findings concerning for spinal cord compression.    #metastatic prostate adenocarcinoma   #Back pain 2/2 diffuse mets/cord compression   - hx of prostate cancer diagnosed back in 2009 but did not seek treatment/care at that time given no symptoms until 1 month ago when noticed neuropathy and started following up with Dr. Zelaya - urologist.  - Prostate biopsy in 2021 showed adenocarcinoma, [place] 3+4; PSA level 169 declined radiation therapy  - imaging shows  ([place] 4+3), PSA level 169 in July 2023, now with evidence of diffuse metastatic disease, progressive LE weakness, saddle anesthesia and cord compression.  - LE weakness has improved w/ PT and is able to ambulate with a walker. Now he is awaiting rehab.   - Rad/onc plan: tentative palliative outpatient RT 20Gy in 5fx to C7-T5 after rehab  -had a long hospital course c/b multiple infections that has since resolved    Plan:  - c/w casodex 50 mg OD   - will order Lupron injection tomorrow. Will need to get consent.  -had extensive discussion w/ patient at bedside. He stated he flew in from [facility] [place] to get [place]. Also does not have any medical oncologist. If patient wants to see [facility]. [facility] medical oncology, we will set up an appointment with [facility]  oncology.  -please let us know if patient wants to see [facility]  oncology  -before patient get's discharged to rehab please ensure he has an appointment w/ medical oncology and radiation oncology and also he has transportation available from Rehab to his appointments.    Discussed w/ Dr. King Shania Garcia MD, PhD  Heme/Onc Fellow  PGY4

## 2023-12-18 NOTE — PROGRESS NOTE ADULT - ASSESSMENT
73M with metastatic prostate cancer initially admitted to [facility] for rhabdo + SRUTHI, transferred for spinal cord compression from metastasis s/p C5-T3 Fusion, C7-T1 decompression 11/2 with course c/b MSSA bacteremia from surgical site infection, bilateral LE DVT s/p IVC filter 11/1 by IR, candida esophagitis w/ dysphagia, aspiration multilobar PNA, acute on chronic anemia, and C. diff colitis.   73M with metastatic prostate cancer initially admitted to [facility] for rhabdo + SRUTHI, transferred for spinal cord compression from metastasis s/p C5-T3 Fusion, C7-T1 decompression 11/2 with course c/b MSSA bacteremia from surgical site infection, bilateral LE DVT s/p IVC filter 11/1 by IR, candida esophagitis w/ dysphagia, aspiration multilobar PNA, acute on chronic anemia, and C. diff colitis.

## 2023-12-18 NOTE — PROGRESS NOTE ADULT - ATTENDING SUPERVISION STATEMENT
Resident
Fellow
Resident
Fellow
Fellow
Resident

## 2023-12-18 NOTE — PROGRESS NOTE ADULT - PROBLEM SELECTOR PLAN 1
gram negative PNA in the setting of aspiration, c/b acute on chronic hypoxic respiratory failure  - noted result of CTPA 11/27. CT showed diffuse lung opacities, worsening on repeat CT 12/8 concerning for organizing PNA which led to suspicion for PJP  - PJP PNA, was on Bactrim - changed to Mepron given uptrend in creatinine   - s/p high flow, now on NC, no plan for bronch per pulm given improvement   - appreciate ID recs - hold off empiric steroids for now. If O2 requirements again plateau or worse, will reconsider empiric steroids or bronchoscopic evaluation with BAL/TBBX  - rvp with +ADENOVIRUS, TTE with no acute findings

## 2023-12-19 LAB
ANION GAP SERPL CALC-SCNC: 11 MMOL/L — SIGNIFICANT CHANGE UP (ref 7–14)
ANION GAP SERPL CALC-SCNC: 11 MMOL/L — SIGNIFICANT CHANGE UP (ref 7–14)
BASOPHILS # BLD AUTO: 0.05 K/UL — SIGNIFICANT CHANGE UP (ref 0–0.2)
BASOPHILS # BLD AUTO: 0.05 K/UL — SIGNIFICANT CHANGE UP (ref 0–0.2)
BASOPHILS NFR BLD AUTO: 1.3 % — SIGNIFICANT CHANGE UP (ref 0–2)
BASOPHILS NFR BLD AUTO: 1.3 % — SIGNIFICANT CHANGE UP (ref 0–2)
BUN SERPL-MCNC: 22 MG/DL — SIGNIFICANT CHANGE UP (ref 7–23)
BUN SERPL-MCNC: 22 MG/DL — SIGNIFICANT CHANGE UP (ref 7–23)
CALCIUM SERPL-MCNC: 9.1 MG/DL — SIGNIFICANT CHANGE UP (ref 8.4–10.5)
CALCIUM SERPL-MCNC: 9.1 MG/DL — SIGNIFICANT CHANGE UP (ref 8.4–10.5)
CHLORIDE SERPL-SCNC: 102 MMOL/L — SIGNIFICANT CHANGE UP (ref 98–107)
CHLORIDE SERPL-SCNC: 102 MMOL/L — SIGNIFICANT CHANGE UP (ref 98–107)
CO2 SERPL-SCNC: 23 MMOL/L — SIGNIFICANT CHANGE UP (ref 22–31)
CO2 SERPL-SCNC: 23 MMOL/L — SIGNIFICANT CHANGE UP (ref 22–31)
CREAT SERPL-MCNC: 0.98 MG/DL — SIGNIFICANT CHANGE UP (ref 0.5–1.3)
CREAT SERPL-MCNC: 0.98 MG/DL — SIGNIFICANT CHANGE UP (ref 0.5–1.3)
EGFR: 82 ML/MIN/1.73M2 — SIGNIFICANT CHANGE UP
EGFR: 82 ML/MIN/1.73M2 — SIGNIFICANT CHANGE UP
EOSINOPHIL # BLD AUTO: 0.55 K/UL — HIGH (ref 0–0.5)
EOSINOPHIL # BLD AUTO: 0.55 K/UL — HIGH (ref 0–0.5)
EOSINOPHIL NFR BLD AUTO: 14.1 % — HIGH (ref 0–6)
EOSINOPHIL NFR BLD AUTO: 14.1 % — HIGH (ref 0–6)
GLUCOSE BLDC GLUCOMTR-MCNC: 109 MG/DL — HIGH (ref 70–99)
GLUCOSE BLDC GLUCOMTR-MCNC: 109 MG/DL — HIGH (ref 70–99)
GLUCOSE BLDC GLUCOMTR-MCNC: 120 MG/DL — HIGH (ref 70–99)
GLUCOSE BLDC GLUCOMTR-MCNC: 120 MG/DL — HIGH (ref 70–99)
GLUCOSE BLDC GLUCOMTR-MCNC: 121 MG/DL — HIGH (ref 70–99)
GLUCOSE BLDC GLUCOMTR-MCNC: 121 MG/DL — HIGH (ref 70–99)
GLUCOSE BLDC GLUCOMTR-MCNC: 125 MG/DL — HIGH (ref 70–99)
GLUCOSE BLDC GLUCOMTR-MCNC: 125 MG/DL — HIGH (ref 70–99)
GLUCOSE SERPL-MCNC: 113 MG/DL — HIGH (ref 70–99)
GLUCOSE SERPL-MCNC: 113 MG/DL — HIGH (ref 70–99)
HCT VFR BLD CALC: 29.7 % — LOW (ref 39–50)
HCT VFR BLD CALC: 29.7 % — LOW (ref 39–50)
HGB BLD-MCNC: 9.7 G/DL — LOW (ref 13–17)
HGB BLD-MCNC: 9.7 G/DL — LOW (ref 13–17)
IANC: 1.68 K/UL — LOW (ref 1.8–7.4)
IANC: 1.68 K/UL — LOW (ref 1.8–7.4)
IMM GRANULOCYTES NFR BLD AUTO: 0.3 % — SIGNIFICANT CHANGE UP (ref 0–0.9)
IMM GRANULOCYTES NFR BLD AUTO: 0.3 % — SIGNIFICANT CHANGE UP (ref 0–0.9)
LYMPHOCYTES # BLD AUTO: 1.27 K/UL — SIGNIFICANT CHANGE UP (ref 1–3.3)
LYMPHOCYTES # BLD AUTO: 1.27 K/UL — SIGNIFICANT CHANGE UP (ref 1–3.3)
LYMPHOCYTES # BLD AUTO: 32.6 % — SIGNIFICANT CHANGE UP (ref 13–44)
LYMPHOCYTES # BLD AUTO: 32.6 % — SIGNIFICANT CHANGE UP (ref 13–44)
MAGNESIUM SERPL-MCNC: 1.9 MG/DL — SIGNIFICANT CHANGE UP (ref 1.6–2.6)
MAGNESIUM SERPL-MCNC: 1.9 MG/DL — SIGNIFICANT CHANGE UP (ref 1.6–2.6)
MCHC RBC-ENTMCNC: 31.4 PG — SIGNIFICANT CHANGE UP (ref 27–34)
MCHC RBC-ENTMCNC: 31.4 PG — SIGNIFICANT CHANGE UP (ref 27–34)
MCHC RBC-ENTMCNC: 32.7 GM/DL — SIGNIFICANT CHANGE UP (ref 32–36)
MCHC RBC-ENTMCNC: 32.7 GM/DL — SIGNIFICANT CHANGE UP (ref 32–36)
MCV RBC AUTO: 96.1 FL — SIGNIFICANT CHANGE UP (ref 80–100)
MCV RBC AUTO: 96.1 FL — SIGNIFICANT CHANGE UP (ref 80–100)
MONOCYTES # BLD AUTO: 0.33 K/UL — SIGNIFICANT CHANGE UP (ref 0–0.9)
MONOCYTES # BLD AUTO: 0.33 K/UL — SIGNIFICANT CHANGE UP (ref 0–0.9)
MONOCYTES NFR BLD AUTO: 8.5 % — SIGNIFICANT CHANGE UP (ref 2–14)
MONOCYTES NFR BLD AUTO: 8.5 % — SIGNIFICANT CHANGE UP (ref 2–14)
NEUTROPHILS # BLD AUTO: 1.68 K/UL — LOW (ref 1.8–7.4)
NEUTROPHILS # BLD AUTO: 1.68 K/UL — LOW (ref 1.8–7.4)
NEUTROPHILS NFR BLD AUTO: 43.2 % — SIGNIFICANT CHANGE UP (ref 43–77)
NEUTROPHILS NFR BLD AUTO: 43.2 % — SIGNIFICANT CHANGE UP (ref 43–77)
NRBC # BLD: 0 /100 WBCS — SIGNIFICANT CHANGE UP (ref 0–0)
NRBC # BLD: 0 /100 WBCS — SIGNIFICANT CHANGE UP (ref 0–0)
NRBC # FLD: 0 K/UL — SIGNIFICANT CHANGE UP (ref 0–0)
NRBC # FLD: 0 K/UL — SIGNIFICANT CHANGE UP (ref 0–0)
PHOSPHATE SERPL-MCNC: 3.8 MG/DL — SIGNIFICANT CHANGE UP (ref 2.5–4.5)
PHOSPHATE SERPL-MCNC: 3.8 MG/DL — SIGNIFICANT CHANGE UP (ref 2.5–4.5)
PLATELET # BLD AUTO: 219 K/UL — SIGNIFICANT CHANGE UP (ref 150–400)
PLATELET # BLD AUTO: 219 K/UL — SIGNIFICANT CHANGE UP (ref 150–400)
POTASSIUM SERPL-MCNC: 4 MMOL/L — SIGNIFICANT CHANGE UP (ref 3.5–5.3)
POTASSIUM SERPL-MCNC: 4 MMOL/L — SIGNIFICANT CHANGE UP (ref 3.5–5.3)
POTASSIUM SERPL-SCNC: 4 MMOL/L — SIGNIFICANT CHANGE UP (ref 3.5–5.3)
POTASSIUM SERPL-SCNC: 4 MMOL/L — SIGNIFICANT CHANGE UP (ref 3.5–5.3)
RBC # BLD: 3.09 M/UL — LOW (ref 4.2–5.8)
RBC # BLD: 3.09 M/UL — LOW (ref 4.2–5.8)
RBC # FLD: 15.9 % — HIGH (ref 10.3–14.5)
RBC # FLD: 15.9 % — HIGH (ref 10.3–14.5)
SODIUM SERPL-SCNC: 136 MMOL/L — SIGNIFICANT CHANGE UP (ref 135–145)
SODIUM SERPL-SCNC: 136 MMOL/L — SIGNIFICANT CHANGE UP (ref 135–145)
WBC # BLD: 3.89 K/UL — SIGNIFICANT CHANGE UP (ref 3.8–10.5)
WBC # BLD: 3.89 K/UL — SIGNIFICANT CHANGE UP (ref 3.8–10.5)
WBC # FLD AUTO: 3.89 K/UL — SIGNIFICANT CHANGE UP (ref 3.8–10.5)
WBC # FLD AUTO: 3.89 K/UL — SIGNIFICANT CHANGE UP (ref 3.8–10.5)

## 2023-12-19 PROCEDURE — 99233 SBSQ HOSP IP/OBS HIGH 50: CPT

## 2023-12-19 RX ORDER — APIXABAN 2.5 MG/1
1 TABLET, FILM COATED ORAL
Qty: 60 | Refills: 0
Start: 2023-12-19 | End: 2024-01-17

## 2023-12-19 RX ADMIN — TAMSULOSIN HYDROCHLORIDE 0.4 MILLIGRAM(S): 0.4 CAPSULE ORAL at 21:28

## 2023-12-19 RX ADMIN — ATOVAQUONE 750 MILLIGRAM(S): 750 SUSPENSION ORAL at 18:11

## 2023-12-19 RX ADMIN — HEPARIN SODIUM 5000 UNIT(S): 5000 INJECTION INTRAVENOUS; SUBCUTANEOUS at 06:41

## 2023-12-19 RX ADMIN — NAFCILLIN 200 GRAM(S): 10 INJECTION, POWDER, FOR SOLUTION INTRAVENOUS at 14:04

## 2023-12-19 RX ADMIN — BICALUTAMIDE 50 MILLIGRAM(S): 50 TABLET, FILM COATED ORAL at 11:21

## 2023-12-19 RX ADMIN — HEPARIN SODIUM 5000 UNIT(S): 5000 INJECTION INTRAVENOUS; SUBCUTANEOUS at 20:26

## 2023-12-19 RX ADMIN — NAFCILLIN 200 GRAM(S): 10 INJECTION, POWDER, FOR SOLUTION INTRAVENOUS at 18:11

## 2023-12-19 RX ADMIN — CHLORHEXIDINE GLUCONATE 1 APPLICATION(S): 213 SOLUTION TOPICAL at 11:28

## 2023-12-19 RX ADMIN — Medication 2000 UNIT(S): at 11:58

## 2023-12-19 RX ADMIN — Medication 1 TABLET(S): at 11:58

## 2023-12-19 RX ADMIN — NAFCILLIN 200 GRAM(S): 10 INJECTION, POWDER, FOR SOLUTION INTRAVENOUS at 06:41

## 2023-12-19 RX ADMIN — PANTOPRAZOLE SODIUM 40 MILLIGRAM(S): 20 TABLET, DELAYED RELEASE ORAL at 11:59

## 2023-12-19 RX ADMIN — HEPARIN SODIUM 5000 UNIT(S): 5000 INJECTION INTRAVENOUS; SUBCUTANEOUS at 11:20

## 2023-12-19 RX ADMIN — NAFCILLIN 200 GRAM(S): 10 INJECTION, POWDER, FOR SOLUTION INTRAVENOUS at 02:22

## 2023-12-19 RX ADMIN — NAFCILLIN 200 GRAM(S): 10 INJECTION, POWDER, FOR SOLUTION INTRAVENOUS at 11:05

## 2023-12-19 RX ADMIN — ATOVAQUONE 750 MILLIGRAM(S): 750 SUSPENSION ORAL at 06:42

## 2023-12-19 RX ADMIN — NAFCILLIN 200 GRAM(S): 10 INJECTION, POWDER, FOR SOLUTION INTRAVENOUS at 21:22

## 2023-12-19 RX ADMIN — LISINOPRIL 5 MILLIGRAM(S): 2.5 TABLET ORAL at 06:42

## 2023-12-19 RX ADMIN — ATORVASTATIN CALCIUM 20 MILLIGRAM(S): 80 TABLET, FILM COATED ORAL at 21:28

## 2023-12-19 NOTE — PROGRESS NOTE ADULT - PROBLEM SELECTOR PLAN 2
concern for surgical site as a primary source  - BCx from 11/16, 11/17 NGTD  - changed to Nafcillin IV on 11/20, plan to complete 12/28   - TTE revealed hyperdynamic left ventricle  - will place midline prior to dc to rehab

## 2023-12-19 NOTE — PROGRESS NOTE ADULT - ASSESSMENT
73M with metastatic prostate cancer initially admitted to [facility] for rhabdo + SRUTHI, transferred for spinal cord compression from metastasis s/p C5-T3 Fusion, C7-T1 decompression 11/2 with course c/b MSSA bacteremia from surgical site infection, bilateral LE DVT s/p IVC filter 11/1 by IR, candida esophagitis w/ dysphagia, aspiration multilobar PNA, acute on chronic anemia, and C. diff colitis.   73M with metastatic prostate cancer initially admitted to [facility] for rhabdo + SRUTHI, transferred for spinal cord compression from metastasis s/p C5-T3 Fusion, C7-T1 decompression 11/2 with course c/b MSSA bacteremia from surgical site infection, bilateral LE DVT s/p IVC filter 11/1 by IR, candida esophagitis w/ dysphagia, aspiration multilobar PNA, acute on chronic anemia, and C. diff colitis.

## 2023-12-19 NOTE — CHART NOTE - NSCHARTNOTEFT_GEN_A_CORE
Medicine called inquiring if patient can have anticoagulation restarted now s/p C5-T3 PSF/C7-T1 laminectomy on 11/2/23, close to six weeks post surgery. Per loli Delvalle for patient to be started on Eliquis, as long as there is no loading dose. Above was relayed to Dr. Austin. Please notify orthopedics with any further questions.

## 2023-12-19 NOTE — CHART NOTE - NSCHARTNOTEFT_GEN_A_CORE
Nutrition Follow-Up/Chart Note    Source: Patient A&Ox3   Family [ ]     RN [ ]    Chart [x ]    Hospital Course:  Per 12/18/23 hospitalist attending chart note, Patient is a 73 year old Male with metastatic prostate cancer initially admitted to [facility] for rhabdo + SRUTHI, transferred for spinal cord compression from metastasis s/p C5-T3 Fusion, C7-T1 decompression 11/2 with course c/b MSSA bacteremia from surgical site infection, bilateral LE DVT s/p IVC filter 11/1 by IR, candida esophagitis w/ dysphagia, aspiration multilobar PNA, acute on chronic anemia, and C. diff colitis.    Diet, Consistent Carbohydrate w/Evening Snack:   Supplement Feeding Modality:  Oral  Glucerna Shake Cans or Servings Per Day:  1       Frequency:  Three Times a day (11-28-23 @ 17:16)    Nutrition Course:  Patient is A&O x3 at baseline.  Seen for severe malnutrition follow-up, last visit 12/7/23.  Patient is tolerating a PO diet as listed above.  Appetite and PO intake improved a lot per Patient reported.  Patient feels so much stronger this week.  Estimated PO intake ~50-75% with diet order added Glucerna 3x/day (660kcal, 30gm protein).  Patient has been making his meal choices daily.  No c/o chewing/swallowing difficulty.  No reported N/V/D/C, last BM reported ( 12/19/23 ) per Patient and RN fowsheets.  Skin has no edema, no pressure injury per RN flowsheets.  Noted L knee abrasion, R knee abrasion, Midline clavical spine wound, R upper back SW per RN flowsheets.  Patient made aware of weight loss since hospital admission (10/27/23), likely multifactorial in setting of inadequate energy intake (s/p NPO, s/p requiring NGT feeding) and improved edema (per previous RD noted with 3+ edema over hospital course).  Noted a 17.6kg (18.7%) weight loss in 1.5 month as current weight measured @76.3 kg/BMI 26.7 (12-14).  Patient encouraged to maintain oral intake and consume nutritional supplements alternative of Glucerna - [employer] Glucose Support (provides 300kcal, 16gms protein per serving) TID as per patient preference.  Pt continues to meet criteria for malnutrition at this time.       Anthropometrics:   Height (cm): 170.2 (11-02), 170.2 (10-22)  Weight (kg): 89.2 (11-29), 90.4 (10-22)  BMI (kg/m2): 30.8 (11-29), 31.2 (11-02), 31.2 (10-22)    Weight Assessment:  76.3 (12-14)  76.4 (12-13)  89.2 (11-29)  87.9 (11-12)  94.6 (11-7)  91.8 (11-5)  93.6 (11-4)  93.9 (11-2)  70 (10-27) ? -likely an erroneous weight        __________________ Pertinent Medications__________________   MEDICATIONS  (STANDING):  atorvastatin 20 milliGRAM(s) Oral at bedtime  atovaquone  Suspension 750 milliGRAM(s) Oral two times a day  bicalutamide 50 milliGRAM(s) Oral daily  chlorhexidine 2% Cloths 1 Application(s) Topical daily  cholecalciferol 2000 Unit(s) Oral daily  dextrose 50% Injectable 25 Gram(s) IV Push once  heparin   Injectable 5000 Unit(s) SubCutaneous every 8 hours  lactobacillus acidophilus 1 Tablet(s) Oral daily  lisinopril 5 milliGRAM(s) Oral daily  nafcillin  IVPB 2 Gram(s) IV Intermittent every 4 hours  pantoprazole  Injectable 40 milliGRAM(s) IV Push daily  tamsulosin 0.4 milliGRAM(s) Oral at bedtime    MEDICATIONS  (PRN):  benzocaine/menthol Lozenge 1 Lozenge Oral every 6 hours PRN Sore Throat  guaiFENesin Oral Liquid (Sugar-Free) 200 milliGRAM(s) Oral every 6 hours PRN Cough  levalbuterol Inhalation 0.63 milliGRAM(s) Inhalation every 6 hours PRN shortness of breath/wheezing  naloxone Injectable 0.1 milliGRAM(s) IV Push every 3 minutes PRN For ANY of the following changes in patient status:  A. RR LESS THAN 10 breaths per minute, B. Oxygen saturation LESS THAN 90%, C. Sedation score of 6  ondansetron Injectable 4 milliGRAM(s) IV Push every 6 hours PRN Nausea      __________________ Pertinent Labs__________________   12-19 Na136 mmol/L Glu 113 mg/dL<H> K+ 4.0 mmol/L Cr  0.98 mg/dL BUN 22 mg/dL 12-19 Phos 3.8 mg/dL 11-23 Chol 124 mg/dL LDL --    HDL 22 mg/dL<L> Trig 174 mg/dL<H>        POCT Blood Glucose.: 121 mg/dL (12-19-23 @ 12:35)  POCT Blood Glucose.: 109 mg/dL (12-19-23 @ 08:19)  POCT Blood Glucose.: 127 mg/dL (12-18-23 @ 21:37)  POCT Blood Glucose.: 96 mg/dL (12-18-23 @ 18:07)  POCT Blood Glucose.: 130 mg/dL (12-18-23 @ 11:39)  POCT Blood Glucose.: 98 mg/dL (12-18-23 @ 09:03)  POCT Blood Glucose.: 157 mg/dL (12-17-23 @ 21:08)  POCT Blood Glucose.: 102 mg/dL (12-17-23 @ 09:26)      Needs Assessment:   Recalculated via 76.3kg  Calorie: 1430-5693 kcal (25-30kcal/kg)  Protein: 91.5-106.8gm (1.2-1.4gm/kg)    Previous Nutrition Diagnosis: Severe protein calorie malnutrition     Nutrition Diagnosis is [x] ongoing     Education: [x] Given on previous assessment by RD     Recommendations:  1. Continue present diet order as it remains appropriate at this time  2. Honor food and fluid preferences as able.      Monitoring and Evaluation:   1. Monitor PO intake, skin integrity, bowel regimen, weight trends, and nutrition pertinent labs.  2. RD to remain available for further nutritional intervention. Nutrition Follow-Up/Chart Note    Source: Patient A&Ox3   Family [ ]     RN [ ]    Chart [x ]    Hospital Course:  Per 12/18/23 hospitalist attending chart note, Patient is a 73 year old Male with metastatic prostate cancer initially admitted to [facility] for rhabdo + SRUTHI, transferred for spinal cord compression from metastasis s/p C5-T3 Fusion, C7-T1 decompression 11/2 with course c/b MSSA bacteremia from surgical site infection, bilateral LE DVT s/p IVC filter 11/1 by IR, candida esophagitis w/ dysphagia, aspiration multilobar PNA, acute on chronic anemia, and C. diff colitis.    Diet, Consistent Carbohydrate w/Evening Snack:   Supplement Feeding Modality:  Oral  Glucerna Shake Cans or Servings Per Day:  1       Frequency:  Three Times a day (11-28-23 @ 17:16)    Nutrition Course:  Patient is A&O x3 at baseline.  Seen for severe malnutrition follow-up, last visit 12/7/23.  Patient is tolerating a PO diet as listed above.  Appetite and PO intake improved a lot per Patient reported.  Patient feels so much stronger this week.  Estimated PO intake ~50-75% with diet order added Glucerna 3x/day (660kcal, 30gm protein).  Patient has been making his meal choices daily.  No c/o chewing/swallowing difficulty.  No reported N/V/D/C, last BM reported ( 12/19/23 ) per Patient and RN fowsheets.  Skin has no edema, no pressure injury per RN flowsheets.  Noted L knee abrasion, R knee abrasion, Midline clavical spine wound, R upper back SW per RN flowsheets.  Patient made aware of weight loss since hospital admission (10/27/23), likely multifactorial in setting of inadequate energy intake (s/p NPO, s/p requiring NGT feeding) and improved edema (per previous RD noted with 3+ edema over hospital course).  Noted a 17.6kg (18.7%) weight loss in 1.5 month as current weight measured @76.3 kg/BMI 26.7 (12-14).  Patient encouraged to maintain oral intake and consume nutritional supplements alternative of Glucerna - [employer] Glucose Support (provides 300kcal, 16gms protein per serving) TID as per patient preference.  Pt continues to meet criteria for malnutrition at this time.       Anthropometrics:   Height (cm): 170.2 (11-02), 170.2 (10-22)  Weight (kg): 89.2 (11-29), 90.4 (10-22)  BMI (kg/m2): 30.8 (11-29), 31.2 (11-02), 31.2 (10-22)    Weight Assessment:  76.3 (12-14)  76.4 (12-13)  89.2 (11-29)  87.9 (11-12)  94.6 (11-7)  91.8 (11-5)  93.6 (11-4)  93.9 (11-2)  70 (10-27) ? -likely an erroneous weight        __________________ Pertinent Medications__________________   MEDICATIONS  (STANDING):  atorvastatin 20 milliGRAM(s) Oral at bedtime  atovaquone  Suspension 750 milliGRAM(s) Oral two times a day  bicalutamide 50 milliGRAM(s) Oral daily  chlorhexidine 2% Cloths 1 Application(s) Topical daily  cholecalciferol 2000 Unit(s) Oral daily  dextrose 50% Injectable 25 Gram(s) IV Push once  heparin   Injectable 5000 Unit(s) SubCutaneous every 8 hours  lactobacillus acidophilus 1 Tablet(s) Oral daily  lisinopril 5 milliGRAM(s) Oral daily  nafcillin  IVPB 2 Gram(s) IV Intermittent every 4 hours  pantoprazole  Injectable 40 milliGRAM(s) IV Push daily  tamsulosin 0.4 milliGRAM(s) Oral at bedtime    MEDICATIONS  (PRN):  benzocaine/menthol Lozenge 1 Lozenge Oral every 6 hours PRN Sore Throat  guaiFENesin Oral Liquid (Sugar-Free) 200 milliGRAM(s) Oral every 6 hours PRN Cough  levalbuterol Inhalation 0.63 milliGRAM(s) Inhalation every 6 hours PRN shortness of breath/wheezing  naloxone Injectable 0.1 milliGRAM(s) IV Push every 3 minutes PRN For ANY of the following changes in patient status:  A. RR LESS THAN 10 breaths per minute, B. Oxygen saturation LESS THAN 90%, C. Sedation score of 6  ondansetron Injectable 4 milliGRAM(s) IV Push every 6 hours PRN Nausea      __________________ Pertinent Labs__________________   12-19 Na136 mmol/L Glu 113 mg/dL<H> K+ 4.0 mmol/L Cr  0.98 mg/dL BUN 22 mg/dL 12-19 Phos 3.8 mg/dL 11-23 Chol 124 mg/dL LDL --    HDL 22 mg/dL<L> Trig 174 mg/dL<H>        POCT Blood Glucose.: 121 mg/dL (12-19-23 @ 12:35)  POCT Blood Glucose.: 109 mg/dL (12-19-23 @ 08:19)  POCT Blood Glucose.: 127 mg/dL (12-18-23 @ 21:37)  POCT Blood Glucose.: 96 mg/dL (12-18-23 @ 18:07)  POCT Blood Glucose.: 130 mg/dL (12-18-23 @ 11:39)  POCT Blood Glucose.: 98 mg/dL (12-18-23 @ 09:03)  POCT Blood Glucose.: 157 mg/dL (12-17-23 @ 21:08)  POCT Blood Glucose.: 102 mg/dL (12-17-23 @ 09:26)      Needs Assessment:   Recalculated via 76.3kg  Calorie: 3262-9930 kcal (25-30kcal/kg)  Protein: 91.5-106.8gm (1.2-1.4gm/kg)    Previous Nutrition Diagnosis: Severe protein calorie malnutrition     Nutrition Diagnosis is [x] ongoing     Education: [x] Given on previous assessment by RD     Recommendations:  1. Continue present diet order as it remains appropriate at this time  2. Honor food and fluid preferences as able.      Monitoring and Evaluation:   1. Monitor PO intake, skin integrity, bowel regimen, weight trends, and nutrition pertinent labs.  2. RD to remain available for further nutritional intervention.

## 2023-12-19 NOTE — PROGRESS NOTE ADULT - PROBLEM SELECTOR PLAN 7
Patient found to have b/l LE DVTs during this admission  - s/p IVC filter placed by IR on 11/1  - in setting of spinal surgery, patient to remain off AC due to risk of hematoma formation for at least 6 weeks since surgery (11/2)  - discussed with ortho (Dr. Coburn team) 12/19 - patient is now 7 weeks post op, ok to be on AC (Eliquis 5mg BID without loading dose), hemeonc agreeable with plan as well   - discussed with daughter - she will discuss with rest of family, if agrees then will consult IR for IVC removal

## 2023-12-19 NOTE — PROGRESS NOTE ADULT - SUBJECTIVE AND OBJECTIVE BOX
Muna Mckinley MD  [facility] Division of Hospital Medicine  Pager [phone] (M-F 8AM-5PM)  Other Times: [id number]    Patient is a 73y old  Male who presents with a chief complaint of Diffuse Spinal Mets from Prostate Cancer (18 Dec 2023 16:42)    SUBJECTIVE / OVERNIGHT EVENTS: no acute events overnight     MEDICATIONS  (STANDING):  atorvastatin 20 milliGRAM(s) Oral at bedtime  atovaquone  Suspension 750 milliGRAM(s) Oral two times a day  bicalutamide 50 milliGRAM(s) Oral daily  chlorhexidine 2% Cloths 1 Application(s) Topical daily  cholecalciferol 2000 Unit(s) Oral daily  dextrose 50% Injectable 25 Gram(s) IV Push once  heparin   Injectable 5000 Unit(s) SubCutaneous every 8 hours  lactobacillus acidophilus 1 Tablet(s) Oral daily  lisinopril 5 milliGRAM(s) Oral daily  nafcillin  IVPB 2 Gram(s) IV Intermittent every 4 hours  pantoprazole  Injectable 40 milliGRAM(s) IV Push daily  tamsulosin 0.4 milliGRAM(s) Oral at bedtime    MEDICATIONS  (PRN):  benzocaine/menthol Lozenge 1 Lozenge Oral every 6 hours PRN Sore Throat  guaiFENesin Oral Liquid (Sugar-Free) 200 milliGRAM(s) Oral every 6 hours PRN Cough  levalbuterol Inhalation 0.63 milliGRAM(s) Inhalation every 6 hours PRN shortness of breath/wheezing  naloxone Injectable 0.1 milliGRAM(s) IV Push every 3 minutes PRN For ANY of the following changes in patient status:  A. RR LESS THAN 10 breaths per minute, B. Oxygen saturation LESS THAN 90%, C. Sedation score of 6  ondansetron Injectable 4 milliGRAM(s) IV Push every 6 hours PRN Nausea      PHYSICAL EXAM:  Vital Signs Last 24 Hrs  T(C): 36.3 (19 Dec 2023 06:40), Max: 36.6 (18 Dec 2023 20:40)  T(F): 97.3 (19 Dec 2023 06:40), Max: 97.8 (18 Dec 2023 20:40)  HR: 79 (19 Dec 2023 06:40) (79 - 97)  BP: 116/71 (19 Dec 2023 06:40) (113/73 - 117/72)  RR: 18 (19 Dec 2023 06:40) (17 - 18)  SpO2: 100% (19 Dec 2023 06:40) (97% - 100%)    Parameters below as of 19 Dec 2023 06:40  Patient On (Oxygen Delivery Method): room air        CONSTITUTIONAL: NAD, well-developed, well-groomed  RESPIRATORY: Normal respiratory effort; lungs are clear to auscultation bilaterally  CARDIOVASCULAR: Regular rate and rhythm, normal S1 and S2, no murmur/rub/gallop; No lower extremity edema  GASTROINTESTINAL: Nontender to palpation, normoactive bowel sounds, no rebound/guarding; No hepatosplenomegaly  MUSCULOSKELETAL:  no clubbing or cyanosis of digits; no joint swelling or tenderness to palpation  NEUROLOGY: non-focal; no gross sensory deficits   PSYCH: A+O to person, place, and time; affect appropriate  SKIN: No rashes; warm     LABS:                        9.7    3.89  )-----------( 219      ( 19 Dec 2023 07:50 )             29.7     12-19    136  |  102  |  22  ----------------------------<  113<H>  4.0   |  23  |  0.98    Ca    9.1      19 Dec 2023 07:50  Phos  3.8     12-19  Mg     1.90     12-19            Urinalysis Basic - ( 19 Dec 2023 07:50 )    Color: x / Appearance: x / SG: x / pH: x  Gluc: 113 mg/dL / Ketone: x  / Bili: x / Urobili: x   Blood: x / Protein: x / Nitrite: x   Leuk Esterase: x / RBC: x / WBC x   Sq Epi: x / Non Sq Epi: x / Bacteria: x          RADIOLOGY & ADDITIONAL TESTS:  Results Reviewed:   Imaging Personally Reviewed:  Electrocardiogram Personally Reviewed:    COORDINATION OF CARE:  Care Discussed with Consultants/Other Providers [Y/N]:  Prior or Outpatient Records Reviewed [Y/N]:   Muna Mckinley MD  [facility] Division of Hospital Medicine  Pager [phone] (M-F 8AM-5PM)  Other Times: [id number]    Patient is a 73y old  Male who presents with a chief complaint of Diffuse Spinal Mets from Prostate Cancer (18 Dec 2023 16:42)    SUBJECTIVE / OVERNIGHT EVENTS: no acute events overnight     MEDICATIONS  (STANDING):  atorvastatin 20 milliGRAM(s) Oral at bedtime  atovaquone  Suspension 750 milliGRAM(s) Oral two times a day  bicalutamide 50 milliGRAM(s) Oral daily  chlorhexidine 2% Cloths 1 Application(s) Topical daily  cholecalciferol 2000 Unit(s) Oral daily  dextrose 50% Injectable 25 Gram(s) IV Push once  heparin   Injectable 5000 Unit(s) SubCutaneous every 8 hours  lactobacillus acidophilus 1 Tablet(s) Oral daily  lisinopril 5 milliGRAM(s) Oral daily  nafcillin  IVPB 2 Gram(s) IV Intermittent every 4 hours  pantoprazole  Injectable 40 milliGRAM(s) IV Push daily  tamsulosin 0.4 milliGRAM(s) Oral at bedtime    MEDICATIONS  (PRN):  benzocaine/menthol Lozenge 1 Lozenge Oral every 6 hours PRN Sore Throat  guaiFENesin Oral Liquid (Sugar-Free) 200 milliGRAM(s) Oral every 6 hours PRN Cough  levalbuterol Inhalation 0.63 milliGRAM(s) Inhalation every 6 hours PRN shortness of breath/wheezing  naloxone Injectable 0.1 milliGRAM(s) IV Push every 3 minutes PRN For ANY of the following changes in patient status:  A. RR LESS THAN 10 breaths per minute, B. Oxygen saturation LESS THAN 90%, C. Sedation score of 6  ondansetron Injectable 4 milliGRAM(s) IV Push every 6 hours PRN Nausea      PHYSICAL EXAM:  Vital Signs Last 24 Hrs  T(C): 36.3 (19 Dec 2023 06:40), Max: 36.6 (18 Dec 2023 20:40)  T(F): 97.3 (19 Dec 2023 06:40), Max: 97.8 (18 Dec 2023 20:40)  HR: 79 (19 Dec 2023 06:40) (79 - 97)  BP: 116/71 (19 Dec 2023 06:40) (113/73 - 117/72)  RR: 18 (19 Dec 2023 06:40) (17 - 18)  SpO2: 100% (19 Dec 2023 06:40) (97% - 100%)    Parameters below as of 19 Dec 2023 06:40  Patient On (Oxygen Delivery Method): room air        CONSTITUTIONAL: NAD, well-developed, well-groomed  RESPIRATORY: Normal respiratory effort; lungs are clear to auscultation bilaterally  CARDIOVASCULAR: Regular rate and rhythm, normal S1 and S2, no murmur/rub/gallop; No lower extremity edema  GASTROINTESTINAL: Nontender to palpation, normoactive bowel sounds, no rebound/guarding; No hepatosplenomegaly  MUSCULOSKELETAL:  no clubbing or cyanosis of digits; no joint swelling or tenderness to palpation  NEUROLOGY: non-focal; no gross sensory deficits   PSYCH: A+O to person, place, and time; affect appropriate  SKIN: No rashes; warm     LABS:                        9.7    3.89  )-----------( 219      ( 19 Dec 2023 07:50 )             29.7     12-19    136  |  102  |  22  ----------------------------<  113<H>  4.0   |  23  |  0.98    Ca    9.1      19 Dec 2023 07:50  Phos  3.8     12-19  Mg     1.90     12-19            Urinalysis Basic - ( 19 Dec 2023 07:50 )    Color: x / Appearance: x / SG: x / pH: x  Gluc: 113 mg/dL / Ketone: x  / Bili: x / Urobili: x   Blood: x / Protein: x / Nitrite: x   Leuk Esterase: x / RBC: x / WBC x   Sq Epi: x / Non Sq Epi: x / Bacteria: x          RADIOLOGY & ADDITIONAL TESTS:  Results Reviewed:   Imaging Personally Reviewed:  Electrocardiogram Personally Reviewed:    COORDINATION OF CARE:  Care Discussed with Consultants/Other Providers [Y/N]:  Prior or Outpatient Records Reviewed [Y/N]:

## 2023-12-19 NOTE — PROGRESS NOTE ADULT - PROBLEM SELECTOR PLAN 6
previously diagnosed with prostate ca in 2009  - NM scan showing multiple osseous lesions throughout body  - IR consulted for kyphoplasty 12/4 in anticipation for Rad Tx, per IR no plan for kyphoplasty for now given acute infection/bacteremia, pt to f/u as outpt  - Radonc: per discussion with Dr. Rosenberg, patient would likely get a better course of RT outpatient with SBRT as an option per Dr. Scales preference, family will follow up outpatient after dc from rehab   - Hemeonc: c/w bicalutamide 50mg qd, planning to dose Lupron today  - need to coordinate outpatient appointments with heme/onc and radonc once dc plan finalized

## 2023-12-19 NOTE — PROGRESS NOTE ADULT - PROBLEM SELECTOR PLAN 1
gram negative PNA in the setting of aspiration, c/b acute on chronic hypoxic respiratory failure  - noted result of CTPA 11/27. CT showed diffuse lung opacities, worsening on repeat CT 12/8 concerning for organizing PNA which led to suspicion for PJP  - PJP PNA, was on Bactrim - changed to Mepron given uptrend in creatinine - continue through 12/21  - s/p high flow, now on NC, no plan for bronch per pulm given improvement   - appreciate ID recs - hold off empiric steroids for now. If O2 requirements again plateau or worse, will reconsider empiric steroids or bronchoscopic evaluation with BAL/TBBX  - rvp with +ADENOVIRUS, TTE with no acute findings

## 2023-12-20 LAB
ANION GAP SERPL CALC-SCNC: 13 MMOL/L — SIGNIFICANT CHANGE UP (ref 7–14)
ANION GAP SERPL CALC-SCNC: 13 MMOL/L — SIGNIFICANT CHANGE UP (ref 7–14)
BUN SERPL-MCNC: 18 MG/DL — SIGNIFICANT CHANGE UP (ref 7–23)
BUN SERPL-MCNC: 18 MG/DL — SIGNIFICANT CHANGE UP (ref 7–23)
CALCIUM SERPL-MCNC: 9.3 MG/DL — SIGNIFICANT CHANGE UP (ref 8.4–10.5)
CALCIUM SERPL-MCNC: 9.3 MG/DL — SIGNIFICANT CHANGE UP (ref 8.4–10.5)
CHLORIDE SERPL-SCNC: 104 MMOL/L — SIGNIFICANT CHANGE UP (ref 98–107)
CHLORIDE SERPL-SCNC: 104 MMOL/L — SIGNIFICANT CHANGE UP (ref 98–107)
CO2 SERPL-SCNC: 22 MMOL/L — SIGNIFICANT CHANGE UP (ref 22–31)
CO2 SERPL-SCNC: 22 MMOL/L — SIGNIFICANT CHANGE UP (ref 22–31)
CREAT SERPL-MCNC: 0.99 MG/DL — SIGNIFICANT CHANGE UP (ref 0.5–1.3)
CREAT SERPL-MCNC: 0.99 MG/DL — SIGNIFICANT CHANGE UP (ref 0.5–1.3)
EGFR: 81 ML/MIN/1.73M2 — SIGNIFICANT CHANGE UP
EGFR: 81 ML/MIN/1.73M2 — SIGNIFICANT CHANGE UP
GLUCOSE BLDC GLUCOMTR-MCNC: 108 MG/DL — HIGH (ref 70–99)
GLUCOSE BLDC GLUCOMTR-MCNC: 108 MG/DL — HIGH (ref 70–99)
GLUCOSE BLDC GLUCOMTR-MCNC: 118 MG/DL — HIGH (ref 70–99)
GLUCOSE BLDC GLUCOMTR-MCNC: 118 MG/DL — HIGH (ref 70–99)
GLUCOSE BLDC GLUCOMTR-MCNC: 120 MG/DL — HIGH (ref 70–99)
GLUCOSE BLDC GLUCOMTR-MCNC: 120 MG/DL — HIGH (ref 70–99)
GLUCOSE BLDC GLUCOMTR-MCNC: 157 MG/DL — HIGH (ref 70–99)
GLUCOSE BLDC GLUCOMTR-MCNC: 157 MG/DL — HIGH (ref 70–99)
GLUCOSE SERPL-MCNC: 110 MG/DL — HIGH (ref 70–99)
GLUCOSE SERPL-MCNC: 110 MG/DL — HIGH (ref 70–99)
HCT VFR BLD CALC: 29.3 % — LOW (ref 39–50)
HCT VFR BLD CALC: 29.3 % — LOW (ref 39–50)
HGB BLD-MCNC: 9.5 G/DL — LOW (ref 13–17)
HGB BLD-MCNC: 9.5 G/DL — LOW (ref 13–17)
MAGNESIUM SERPL-MCNC: 2 MG/DL — SIGNIFICANT CHANGE UP (ref 1.6–2.6)
MAGNESIUM SERPL-MCNC: 2 MG/DL — SIGNIFICANT CHANGE UP (ref 1.6–2.6)
MCHC RBC-ENTMCNC: 31.4 PG — SIGNIFICANT CHANGE UP (ref 27–34)
MCHC RBC-ENTMCNC: 31.4 PG — SIGNIFICANT CHANGE UP (ref 27–34)
MCHC RBC-ENTMCNC: 32.4 GM/DL — SIGNIFICANT CHANGE UP (ref 32–36)
MCHC RBC-ENTMCNC: 32.4 GM/DL — SIGNIFICANT CHANGE UP (ref 32–36)
MCV RBC AUTO: 96.7 FL — SIGNIFICANT CHANGE UP (ref 80–100)
MCV RBC AUTO: 96.7 FL — SIGNIFICANT CHANGE UP (ref 80–100)
NRBC # BLD: 0 /100 WBCS — SIGNIFICANT CHANGE UP (ref 0–0)
NRBC # BLD: 0 /100 WBCS — SIGNIFICANT CHANGE UP (ref 0–0)
NRBC # FLD: 0 K/UL — SIGNIFICANT CHANGE UP (ref 0–0)
NRBC # FLD: 0 K/UL — SIGNIFICANT CHANGE UP (ref 0–0)
PHOSPHATE SERPL-MCNC: 3.5 MG/DL — SIGNIFICANT CHANGE UP (ref 2.5–4.5)
PHOSPHATE SERPL-MCNC: 3.5 MG/DL — SIGNIFICANT CHANGE UP (ref 2.5–4.5)
PLATELET # BLD AUTO: 223 K/UL — SIGNIFICANT CHANGE UP (ref 150–400)
PLATELET # BLD AUTO: 223 K/UL — SIGNIFICANT CHANGE UP (ref 150–400)
POTASSIUM SERPL-MCNC: 3.5 MMOL/L — SIGNIFICANT CHANGE UP (ref 3.5–5.3)
POTASSIUM SERPL-MCNC: 3.5 MMOL/L — SIGNIFICANT CHANGE UP (ref 3.5–5.3)
POTASSIUM SERPL-SCNC: 3.5 MMOL/L — SIGNIFICANT CHANGE UP (ref 3.5–5.3)
POTASSIUM SERPL-SCNC: 3.5 MMOL/L — SIGNIFICANT CHANGE UP (ref 3.5–5.3)
RBC # BLD: 3.03 M/UL — LOW (ref 4.2–5.8)
RBC # BLD: 3.03 M/UL — LOW (ref 4.2–5.8)
RBC # FLD: 15.8 % — HIGH (ref 10.3–14.5)
RBC # FLD: 15.8 % — HIGH (ref 10.3–14.5)
SODIUM SERPL-SCNC: 139 MMOL/L — SIGNIFICANT CHANGE UP (ref 135–145)
SODIUM SERPL-SCNC: 139 MMOL/L — SIGNIFICANT CHANGE UP (ref 135–145)
WBC # BLD: 4.56 K/UL — SIGNIFICANT CHANGE UP (ref 3.8–10.5)
WBC # BLD: 4.56 K/UL — SIGNIFICANT CHANGE UP (ref 3.8–10.5)
WBC # FLD AUTO: 4.56 K/UL — SIGNIFICANT CHANGE UP (ref 3.8–10.5)
WBC # FLD AUTO: 4.56 K/UL — SIGNIFICANT CHANGE UP (ref 3.8–10.5)

## 2023-12-20 PROCEDURE — 99233 SBSQ HOSP IP/OBS HIGH 50: CPT

## 2023-12-20 RX ORDER — PANTOPRAZOLE SODIUM 20 MG/1
40 TABLET, DELAYED RELEASE ORAL
Refills: 0 | Status: DISCONTINUED | OUTPATIENT
Start: 2023-12-20 | End: 2023-12-23

## 2023-12-20 RX ORDER — BICALUTAMIDE 50 MG/1
1 TABLET, FILM COATED ORAL
Qty: 30 | Refills: 0
Start: 2023-12-20 | End: 2024-01-18

## 2023-12-20 RX ADMIN — LISINOPRIL 5 MILLIGRAM(S): 2.5 TABLET ORAL at 05:00

## 2023-12-20 RX ADMIN — Medication 1 TABLET(S): at 12:08

## 2023-12-20 RX ADMIN — NAFCILLIN 200 GRAM(S): 10 INJECTION, POWDER, FOR SOLUTION INTRAVENOUS at 13:50

## 2023-12-20 RX ADMIN — NAFCILLIN 200 GRAM(S): 10 INJECTION, POWDER, FOR SOLUTION INTRAVENOUS at 17:44

## 2023-12-20 RX ADMIN — HEPARIN SODIUM 5000 UNIT(S): 5000 INJECTION INTRAVENOUS; SUBCUTANEOUS at 21:14

## 2023-12-20 RX ADMIN — NAFCILLIN 200 GRAM(S): 10 INJECTION, POWDER, FOR SOLUTION INTRAVENOUS at 21:16

## 2023-12-20 RX ADMIN — HEPARIN SODIUM 5000 UNIT(S): 5000 INJECTION INTRAVENOUS; SUBCUTANEOUS at 04:49

## 2023-12-20 RX ADMIN — BICALUTAMIDE 50 MILLIGRAM(S): 50 TABLET, FILM COATED ORAL at 12:06

## 2023-12-20 RX ADMIN — ATOVAQUONE 750 MILLIGRAM(S): 750 SUSPENSION ORAL at 05:00

## 2023-12-20 RX ADMIN — HEPARIN SODIUM 5000 UNIT(S): 5000 INJECTION INTRAVENOUS; SUBCUTANEOUS at 12:08

## 2023-12-20 RX ADMIN — NAFCILLIN 200 GRAM(S): 10 INJECTION, POWDER, FOR SOLUTION INTRAVENOUS at 01:36

## 2023-12-20 RX ADMIN — ATORVASTATIN CALCIUM 20 MILLIGRAM(S): 80 TABLET, FILM COATED ORAL at 21:13

## 2023-12-20 RX ADMIN — CHLORHEXIDINE GLUCONATE 1 APPLICATION(S): 213 SOLUTION TOPICAL at 12:06

## 2023-12-20 RX ADMIN — ATOVAQUONE 750 MILLIGRAM(S): 750 SUSPENSION ORAL at 17:44

## 2023-12-20 RX ADMIN — Medication 2000 UNIT(S): at 12:07

## 2023-12-20 RX ADMIN — NAFCILLIN 200 GRAM(S): 10 INJECTION, POWDER, FOR SOLUTION INTRAVENOUS at 05:00

## 2023-12-20 RX ADMIN — PANTOPRAZOLE SODIUM 40 MILLIGRAM(S): 20 TABLET, DELAYED RELEASE ORAL at 12:08

## 2023-12-20 RX ADMIN — NAFCILLIN 200 GRAM(S): 10 INJECTION, POWDER, FOR SOLUTION INTRAVENOUS at 10:45

## 2023-12-20 RX ADMIN — TAMSULOSIN HYDROCHLORIDE 0.4 MILLIGRAM(S): 0.4 CAPSULE ORAL at 21:13

## 2023-12-20 NOTE — CHART NOTE - NSCHARTNOTEFT_GEN_A_CORE
For MSSA infection - patient can be on Daptomycin 600mg IV daily through 12/28/23  For Prostate Cancer - first dose of Lupron to be given in hospital (12/21) --> next dose due 1/21/23 (to be given at [facility])    Muna Mckinley MD  [facility] Division of [facility] Medicine For MSSA infection - patient can be on Daptomycin 600mg IV daily through 12/28/23  For Prostate Cancer - first dose of Lupron to be given in hospital (12/21) --> next dose due 1/21/23 (to be given at [facility])    Muna Mckinley MD  [facility] Division of [facility] Medicine

## 2023-12-20 NOTE — CHART NOTE - NSCHARTNOTEFT_GEN_A_CORE
Patient Age: 73y    Patient Gender: Male    Procedure: IVC Filter Removal     Diagnosis/Indication: B/L DVTs     Interventional Radiology Attending Physician: Dr. Sparrow     Ordering Attending Physician: Dr. Mckinley      Pertinent Medical History: 73M PMHx of T2DM, HTN and metastatic prostate cancer to bone, found to have cervical spine neoplasm c/f cord compression as well as multiple metastatic thoracic, scapular, pelvic spine lesions. s/p C5-T3 posterior cervical fusion and C7-T1 decompression laminectomy with Dr Badillo spine sx on 11/2. Plastics performed flap closure and BL AGGIE drains removed on 11/7 and 11/9 respectfully. Course complicated by BL DVTs and s/p IVC FILTER (11/1) as pt cannot be on therapeutic AC x6 weeks post-op per ortho. Per ortho patient is now cleared for therapeutic AC.     Pertinent labs:                      9.5    4.56  )-----------( 223      ( 20 Dec 2023 06:00 )             29.3       12-20    139  |  104  |  18  ----------------------------<  110<H>  3.5   |  22  |  0.99    Ca    9.3      20 Dec 2023 06:00  Phos  3.5     12-20  Mg     2.00     12-20    Patient and Family Aware ? Yes

## 2023-12-20 NOTE — CONSULT NOTE ADULT - PROVIDER SPECIALTY LIST ADULT
Infectious Disease
SICU
Intervent Radiology
Orthopedics
Rehab Medicine
Intervent Radiology
Heme/Onc
Rad Onc
Pulmonology
Endocrinology
Intervent Radiology
ENT

## 2023-12-20 NOTE — CONSULT NOTE ADULT - SUBJECTIVE AND OBJECTIVE BOX
Pt called in asking if he can have a doctors note for work because he tested positive for covid on 12/08/2022 and has not been back to work  Pt would like to know how long he has to stay out of work  Interventional Radiology    Evaluate for Procedure: IVC Filter removal    HPI: 73y Male with metastatic prostate cancer, bilateral DVT s/p IVC filter 11/1/23. Recent spinal surgery precluded pt from being on anticoagulation. Neurosurgery now okay with anticoagulation. IR consulted for removal of IVC filter.    Allergies: No Known Allergies    Medications (Abx/Cardiac/Anticoagulation/Blood Products)    atovaquone  Suspension: 750 milliGRAM(s) Oral (12-20 @ 05:00)  heparin   Injectable: 5000 Unit(s) SubCutaneous (12-20 @ 12:08)  lisinopril: 5 milliGRAM(s) Oral (12-20 @ 05:00)  nafcillin  IVPB: 200 mL/Hr IV Intermittent (12-20 @ 13:50)    Data:    T(C): 36.7  HR: 94  BP: 115/68  RR: 16  SpO2: 97%    -WBC 4.56 / HgB 9.5 / Hct 29.3 / Plt 223  -Na 139 / Cl 104 / BUN 18 / Glucose 110  -K 3.5 / CO2 22 / Cr 0.99  -ALT -- / Alk Phos -- / T.Bili --  -INR 1.12 / PTT 31.0    Radiology: reviewed    Assessment: 73y Male with metastatic prostate cancer, bilateral DVT s/p IVC filter 11/1/23. Recent spinal surgery precluded pt from being on anticoagulation. Neurosurgery now okay with anticoagulation. IR consulted for removal of IVC filter.    Plan:   -Plan for IVC Filter removal on 12/21/23  -Please place order for IR Procedure, approving attending Dr. Sparrow  -Please place pre-procedure note  -hold therpaeutic/prophylactic anticoagulants  -AM CBC, BMP, and coags  -discussed with primary team    Winnie Lozano MD, PGY-3 Interventional Radiology  Available on [employer] Teams    - Non-emergent consults: Place IR consult order in [place]  - Emergent issues (pager): [facility] [phone]; [facility] [phone]; [phone]  - Scheduling questions: [facility] [phone]; [facility] [phone]  - Clinic/outpatient booking: [facility] [phone]; [facility] [phone] Interventional Radiology    Evaluate for Procedure: IVC Filter removal    HPI: 73y Male with metastatic prostate cancer, bilateral DVT s/p IVC filter 11/1/23. Recent spinal surgery precluded pt from being on anticoagulation. Neurosurgery now okay with anticoagulation. IR consulted for removal of IVC filter.    Allergies: No Known Allergies    Medications (Abx/Cardiac/Anticoagulation/Blood Products)    atovaquone  Suspension: 750 milliGRAM(s) Oral (12-20 @ 05:00)  heparin   Injectable: 5000 Unit(s) SubCutaneous (12-20 @ 12:08)  lisinopril: 5 milliGRAM(s) Oral (12-20 @ 05:00)  nafcillin  IVPB: 200 mL/Hr IV Intermittent (12-20 @ 13:50)    Data:    T(C): 36.7  HR: 94  BP: 115/68  RR: 16  SpO2: 97%    -WBC 4.56 / HgB 9.5 / Hct 29.3 / Plt 223  -Na 139 / Cl 104 / BUN 18 / Glucose 110  -K 3.5 / CO2 22 / Cr 0.99  -ALT -- / Alk Phos -- / T.Bili --  -INR 1.12 / PTT 31.0    Radiology: reviewed    Assessment: 73y Male with metastatic prostate cancer, bilateral DVT s/p IVC filter 11/1/23. Recent spinal surgery precluded pt from being on anticoagulation. Neurosurgery now okay with anticoagulation. IR consulted for removal of IVC filter.    Plan:   -Plan for IVC Filter removal on 12/21/23  -Please place order for IR Procedure, approving attending Dr. Sparrow  -Please place pre-procedure note  -hold therpaeutic/prophylactic anticoagulants  -AM CBC, BMP, and coags  -discussed with primary team    Winnie Lozano MD, PGY-3 Interventional Radiology  Available on [employer] Teams    - Non-emergent consults: Place IR consult order in [place]  - Emergent issues (pager): [facility] [phone]; [facility] [phone]; [phone]  - Scheduling questions: [facility] [phone]; [facility] [phone]  - Clinic/outpatient booking: [facility] [phone]; [facility] [phone] Interventional Radiology    Evaluate for Procedure: IVC Filter removal    HPI: 73y Male with metastatic prostate cancer, bilateral DVT s/p IVC filter 11/1/23. Recent spinal surgery precluded pt from being on anticoagulation. Neurosurgery now okay with anticoagulation. IR consulted for removal of IVC filter.    Allergies: No Known Allergies    Medications (Abx/Cardiac/Anticoagulation/Blood Products)    atovaquone  Suspension: 750 milliGRAM(s) Oral (12-20 @ 05:00)  heparin   Injectable: 5000 Unit(s) SubCutaneous (12-20 @ 12:08)  lisinopril: 5 milliGRAM(s) Oral (12-20 @ 05:00)  nafcillin  IVPB: 200 mL/Hr IV Intermittent (12-20 @ 13:50)    Data:    T(C): 36.7  HR: 94  BP: 115/68  RR: 16  SpO2: 97%    -WBC 4.56 / HgB 9.5 / Hct 29.3 / Plt 223  -Na 139 / Cl 104 / BUN 18 / Glucose 110  -K 3.5 / CO2 22 / Cr 0.99  -ALT -- / Alk Phos -- / T.Bili --  -INR 1.12 / PTT 31.0    Radiology: reviewed    Assessment: 73y Male with metastatic prostate cancer, bilateral DVT s/p IVC filter 11/1/23. Recent spinal surgery precluded pt from being on anticoagulation. Neurosurgery now okay with anticoagulation. IR consulted for removal of IVC filter.    Plan:   -Plan for IVC Filter removal on 12/21/23  -NPO after midnight tonight  -Please place order for IR Procedure, approving attending Dr. Sparrow  -Please place pre-procedure note  -hold therpaeutic/prophylactic anticoagulants  -AM CBC, BMP, and coags  -discussed with primary team    Winnie Lozano MD, PGY-3 Interventional Radiology  Available on [employer] Teams    - Non-emergent consults: Place IR consult order in [place]  - Emergent issues (pager): [facility] [phone]; [facility] [phone]; [phone]  - Scheduling questions: [facility] [phone]; [facility] [phone]  - Clinic/outpatient booking: [facility] [phone]; [facility] [phone] Interventional Radiology    Evaluate for Procedure: IVC Filter removal    HPI: 73y Male with metastatic prostate cancer, bilateral DVT s/p IVC filter 11/1/23. Recent spinal surgery precluded pt from being on anticoagulation. Neurosurgery now okay with anticoagulation. IR consulted for removal of IVC filter.    Allergies: No Known Allergies    Medications (Abx/Cardiac/Anticoagulation/Blood Products)    atovaquone  Suspension: 750 milliGRAM(s) Oral (12-20 @ 05:00)  heparin   Injectable: 5000 Unit(s) SubCutaneous (12-20 @ 12:08)  lisinopril: 5 milliGRAM(s) Oral (12-20 @ 05:00)  nafcillin  IVPB: 200 mL/Hr IV Intermittent (12-20 @ 13:50)    Data:    T(C): 36.7  HR: 94  BP: 115/68  RR: 16  SpO2: 97%    -WBC 4.56 / HgB 9.5 / Hct 29.3 / Plt 223  -Na 139 / Cl 104 / BUN 18 / Glucose 110  -K 3.5 / CO2 22 / Cr 0.99  -ALT -- / Alk Phos -- / T.Bili --  -INR 1.12 / PTT 31.0    Radiology: reviewed    Assessment: 73y Male with metastatic prostate cancer, bilateral DVT s/p IVC filter 11/1/23. Recent spinal surgery precluded pt from being on anticoagulation. Neurosurgery now okay with anticoagulation. IR consulted for removal of IVC filter.    Plan:   -Plan for IVC Filter removal on 12/21/23  -NPO after midnight tonight  -Please place order for IR Procedure, approving attending Dr. Sparrow  -Please place pre-procedure note  -hold therpaeutic/prophylactic anticoagulants  -AM CBC, BMP, and coags  -discussed with primary team    Winnie Lozano MD, PGY-3 Interventional Radiology  Available on [employer] Teams    - Non-emergent consults: Place IR consult order in [place]  - Emergent issues (pager): [facility] [phone]; [facility] [phone]; [phone]  - Scheduling questions: [facility] [phone]; [facility] [phone]  - Clinic/outpatient booking: [facility] [phone]; [facility] [phone]

## 2023-12-20 NOTE — CONSULT NOTE ADULT - CONSULT REQUESTED DATE/TIME
07-Dec-2023 11:25
10-Nov-2023 09:27
20-Dec-2023 15:10
31-Oct-2023 17:51
28-Oct-2023 08:20
28-Oct-2023 09:53
02-Nov-2023 20:52
16-Nov-2023 16:28
27-Oct-2023 14:35
29-Nov-2023 11:47
22-Nov-2023 10:45
14-Nov-2023 15:12

## 2023-12-20 NOTE — PROGRESS NOTE ADULT - PROBLEM SELECTOR PLAN 7
Patient found to have b/l LE DVTs during this admission  - s/p IVC filter placed by IR on 11/1  - in setting of spinal surgery, patient to remain off AC due to risk of hematoma formation for at least 6 weeks since surgery (11/2)  - discussed with ortho (Dr. Coburn team) 12/19 - patient is now 7 weeks post op, ok to be on AC (Eliquis 5mg BID without loading dose), ok from hemeonc standpoint as well  - IR consulted for IVC filter removal - plan to start Eliquis 5mg BID post removal ($15 copay)

## 2023-12-20 NOTE — CONSULT NOTE ADULT - CONSULT REQUESTED BY NAME
Dalia Zavaleta
Dr Reynolds
E
Madai Walters
Dr. Pranay Blevins
[place]
Primary team
Dr. Kiah Aponte
Dr. job Riojas
Dr. richardson
Rama Harper
Primary Team

## 2023-12-20 NOTE — PROGRESS NOTE ADULT - SUBJECTIVE AND OBJECTIVE BOX
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 (19 Dec 2023 13:45)    SUBJECTIVE / OVERNIGHT EVENTS: no acute events overnight     MEDICATIONS  (STANDING):  atorvastatin 20 milliGRAM(s) Oral at bedtime  atovaquone  Suspension 750 milliGRAM(s) Oral two times a day  bicalutamide 50 milliGRAM(s) Oral daily  chlorhexidine 2% Cloths 1 Application(s) Topical daily  cholecalciferol 2000 Unit(s) Oral daily  dextrose 50% Injectable 25 Gram(s) IV Push once  heparin   Injectable 5000 Unit(s) SubCutaneous every 8 hours  lactobacillus acidophilus 1 Tablet(s) Oral daily  lisinopril 5 milliGRAM(s) Oral daily  nafcillin  IVPB 2 Gram(s) IV Intermittent every 4 hours  pantoprazole  Injectable 40 milliGRAM(s) IV Push daily  tamsulosin 0.4 milliGRAM(s) Oral at bedtime    MEDICATIONS  (PRN):  benzocaine/menthol Lozenge 1 Lozenge Oral every 6 hours PRN Sore Throat  guaiFENesin Oral Liquid (Sugar-Free) 200 milliGRAM(s) Oral every 6 hours PRN Cough  levalbuterol Inhalation 0.63 milliGRAM(s) Inhalation every 6 hours PRN shortness of breath/wheezing  naloxone Injectable 0.1 milliGRAM(s) IV Push every 3 minutes PRN For ANY of the following changes in patient status:  A. RR LESS THAN 10 breaths per minute, B. Oxygen saturation LESS THAN 90%, C. Sedation score of 6  ondansetron Injectable 4 milliGRAM(s) IV Push every 6 hours PRN Nausea      PHYSICAL EXAM:  Vital Signs Last 24 Hrs  T(C): 36.7 (20 Dec 2023 12:14), Max: 36.9 (20 Dec 2023 04:55)  T(F): 98.1 (20 Dec 2023 12:14), Max: 98.5 (20 Dec 2023 04:55)  HR: 94 (20 Dec 2023 12:14) (80 - 94)  BP: 115/68 (20 Dec 2023 12:14) (115/68 - 130/86)  RR: 16 (20 Dec 2023 12:14) (16 - 16)  SpO2: 97% (20 Dec 2023 12:14) (97% - 99%)    Parameters below as of 20 Dec 2023 12:14  Patient On (Oxygen Delivery Method): room air    CONSTITUTIONAL: NAD, well-developed, well-groomed  RESPIRATORY: Normal respiratory effort; lungs are clear to auscultation bilaterally  CARDIOVASCULAR: Regular rate and rhythm, normal S1 and S2, no murmur/rub/gallop; No lower extremity edema  GASTROINTESTINAL: Nontender to palpation, normoactive bowel sounds, no rebound/guarding; No hepatosplenomegaly  MUSCULOSKELETAL:  no clubbing or cyanosis of digits; no joint swelling or tenderness to palpation  NEUROLOGY: non-focal; no gross sensory deficits   PSYCH: A+O to person, place, and time; affect appropriate  SKIN: No rashes; warm     LABS:                        9.5    4.56  )-----------( 223      ( 20 Dec 2023 06:00 )             29.3     12-20    139  |  104  |  18  ----------------------------<  110<H>  3.5   |  22  |  0.99    Ca    9.3      20 Dec 2023 06:00  Phos  3.5     12-20  Mg     2.00     12-20            Urinalysis Basic - ( 20 Dec 2023 06:00 )    Color: x / Appearance: x / SG: x / pH: x  Gluc: 110 mg/dL / Ketone: x  / Bili: x / Urobili: x   Blood: x / Protein: x / Nitrite: x   Leuk Esterase: x / RBC: x / WBC x   Sq Epi: x / Non Sq Epi: x / Bacteria: x          RADIOLOGY & ADDITIONAL TESTS:  Results Reviewed:   Imaging Personally Reviewed:  Electrocardiogram Personally Reviewed:    COORDINATION OF CARE:  Care Discussed with Consultants/Other Providers [Y/N]:  Prior or Outpatient Records Reviewed [Y/N]:   Muna Mckinley MD  [facility] Division of Hospital Medicine  Pager [phone] (M-F 8AM-5PM)  Other Times: [id number]    Patient is a 73y old  Male who presents with a chief complaint of 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 (19 Dec 2023 13:45)    SUBJECTIVE / OVERNIGHT EVENTS: no acute events overnight     MEDICATIONS  (STANDING):  atorvastatin 20 milliGRAM(s) Oral at bedtime  atovaquone  Suspension 750 milliGRAM(s) Oral two times a day  bicalutamide 50 milliGRAM(s) Oral daily  chlorhexidine 2% Cloths 1 Application(s) Topical daily  cholecalciferol 2000 Unit(s) Oral daily  dextrose 50% Injectable 25 Gram(s) IV Push once  heparin   Injectable 5000 Unit(s) SubCutaneous every 8 hours  lactobacillus acidophilus 1 Tablet(s) Oral daily  lisinopril 5 milliGRAM(s) Oral daily  nafcillin  IVPB 2 Gram(s) IV Intermittent every 4 hours  pantoprazole  Injectable 40 milliGRAM(s) IV Push daily  tamsulosin 0.4 milliGRAM(s) Oral at bedtime    MEDICATIONS  (PRN):  benzocaine/menthol Lozenge 1 Lozenge Oral every 6 hours PRN Sore Throat  guaiFENesin Oral Liquid (Sugar-Free) 200 milliGRAM(s) Oral every 6 hours PRN Cough  levalbuterol Inhalation 0.63 milliGRAM(s) Inhalation every 6 hours PRN shortness of breath/wheezing  naloxone Injectable 0.1 milliGRAM(s) IV Push every 3 minutes PRN For ANY of the following changes in patient status:  A. RR LESS THAN 10 breaths per minute, B. Oxygen saturation LESS THAN 90%, C. Sedation score of 6  ondansetron Injectable 4 milliGRAM(s) IV Push every 6 hours PRN Nausea      PHYSICAL EXAM:  Vital Signs Last 24 Hrs  T(C): 36.7 (20 Dec 2023 12:14), Max: 36.9 (20 Dec 2023 04:55)  T(F): 98.1 (20 Dec 2023 12:14), Max: 98.5 (20 Dec 2023 04:55)  HR: 94 (20 Dec 2023 12:14) (80 - 94)  BP: 115/68 (20 Dec 2023 12:14) (115/68 - 130/86)  RR: 16 (20 Dec 2023 12:14) (16 - 16)  SpO2: 97% (20 Dec 2023 12:14) (97% - 99%)    Parameters below as of 20 Dec 2023 12:14  Patient On (Oxygen Delivery Method): room air    CONSTITUTIONAL: NAD, well-developed, well-groomed  RESPIRATORY: Normal respiratory effort; lungs are clear to auscultation bilaterally  CARDIOVASCULAR: Regular rate and rhythm, normal S1 and S2, no murmur/rub/gallop; No lower extremity edema  GASTROINTESTINAL: Nontender to palpation, normoactive bowel sounds, no rebound/guarding; No hepatosplenomegaly  MUSCULOSKELETAL:  no clubbing or cyanosis of digits; no joint swelling or tenderness to palpation  NEUROLOGY: non-focal; no gross sensory deficits   PSYCH: A+O to person, place, and time; affect appropriate  SKIN: No rashes; warm     LABS:                        9.5    4.56  )-----------( 223      ( 20 Dec 2023 06:00 )             29.3     12-20    139  |  104  |  18  ----------------------------<  110<H>  3.5   |  22  |  0.99    Ca    9.3      20 Dec 2023 06:00  Phos  3.5     12-20  Mg     2.00     12-20            Urinalysis Basic - ( 20 Dec 2023 06:00 )    Color: x / Appearance: x / SG: x / pH: x  Gluc: 110 mg/dL / Ketone: x  / Bili: x / Urobili: x   Blood: x / Protein: x / Nitrite: x   Leuk Esterase: x / RBC: x / WBC x   Sq Epi: x / Non Sq Epi: x / Bacteria: x          RADIOLOGY & ADDITIONAL TESTS:  Results Reviewed:   Imaging Personally Reviewed:  Electrocardiogram Personally Reviewed:    COORDINATION OF CARE:  Care Discussed with Consultants/Other Providers [Y/N]:  Prior or Outpatient Records Reviewed [Y/N]:

## 2023-12-20 NOTE — PROGRESS NOTE ADULT - PROBLEM SELECTOR PLAN 6
previously diagnosed with prostate ca in 2009  - NM scan showing multiple osseous lesions throughout body  - IR consulted for kyphoplasty 12/4 in anticipation for Rad Tx, per IR no plan for kyphoplasty for now given acute infection/bacteremia, pt to f/u as outpt  - Radonc: per discussion with Dr. Rosenberg, patient would likely get a better course of RT outpatient with SBRT as an option per Dr. Scales preference, family will follow up outpatient after dc from rehab   - Hemeonc: c/w bicalutamide 50mg qd, planning to dose Lupron 12/21 - followed by qmonthly   - need to coordinate outpatient appointments with heme/onc and radonc once dc plan finalized

## 2023-12-20 NOTE — PROGRESS NOTE ADULT - ASSESSMENT
73M with metastatic prostate cancer initially admitted to [facility] for rhabdo + SRUTHI, transferred for spinal cord compression from metastasis s/p C5-T3 Fusion, C7-T1 decompression 11/2 with course c/b MSSA bacteremia from surgical site infection, bilateral LE DVT s/p IVC filter 11/1 by IR, candida esophagitis w/ dysphagia, aspiration multilobar PNA, acute on chronic anemia, and C. diff colitis.   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 diff colitis.

## 2023-12-20 NOTE — CONSULT NOTE ADULT - REASON FOR ADMISSION
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
Ground level fall
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

## 2023-12-20 NOTE — PROGRESS NOTE ADULT - PROBLEM SELECTOR PLAN 2
concern for surgical site as a primary source  - BCx from 11/16, 11/17 NGTD  - changed to Nafcillin IV on 11/20, plan to complete 12/28 (daptomycin can be considered as alternative if would facilitate rehab placement)  - TTE revealed hyperdynamic left ventricle  - will place midline for IV abx prior to rehab once confirmed

## 2023-12-21 LAB
ANION GAP SERPL CALC-SCNC: 14 MMOL/L — SIGNIFICANT CHANGE UP (ref 7–14)
ANION GAP SERPL CALC-SCNC: 14 MMOL/L — SIGNIFICANT CHANGE UP (ref 7–14)
APTT BLD: 38.4 SEC — HIGH (ref 24.5–35.6)
APTT BLD: 38.4 SEC — HIGH (ref 24.5–35.6)
BUN SERPL-MCNC: 16 MG/DL — SIGNIFICANT CHANGE UP (ref 7–23)
BUN SERPL-MCNC: 16 MG/DL — SIGNIFICANT CHANGE UP (ref 7–23)
CALCIUM SERPL-MCNC: 9.2 MG/DL — SIGNIFICANT CHANGE UP (ref 8.4–10.5)
CALCIUM SERPL-MCNC: 9.2 MG/DL — SIGNIFICANT CHANGE UP (ref 8.4–10.5)
CHLORIDE SERPL-SCNC: 103 MMOL/L — SIGNIFICANT CHANGE UP (ref 98–107)
CHLORIDE SERPL-SCNC: 103 MMOL/L — SIGNIFICANT CHANGE UP (ref 98–107)
CO2 SERPL-SCNC: 22 MMOL/L — SIGNIFICANT CHANGE UP (ref 22–31)
CO2 SERPL-SCNC: 22 MMOL/L — SIGNIFICANT CHANGE UP (ref 22–31)
CREAT SERPL-MCNC: 0.91 MG/DL — SIGNIFICANT CHANGE UP (ref 0.5–1.3)
CREAT SERPL-MCNC: 0.91 MG/DL — SIGNIFICANT CHANGE UP (ref 0.5–1.3)
EGFR: 90 ML/MIN/1.73M2 — SIGNIFICANT CHANGE UP
EGFR: 90 ML/MIN/1.73M2 — SIGNIFICANT CHANGE UP
GLUCOSE BLDC GLUCOMTR-MCNC: 105 MG/DL — HIGH (ref 70–99)
GLUCOSE BLDC GLUCOMTR-MCNC: 105 MG/DL — HIGH (ref 70–99)
GLUCOSE BLDC GLUCOMTR-MCNC: 115 MG/DL — HIGH (ref 70–99)
GLUCOSE BLDC GLUCOMTR-MCNC: 115 MG/DL — HIGH (ref 70–99)
GLUCOSE BLDC GLUCOMTR-MCNC: 138 MG/DL — HIGH (ref 70–99)
GLUCOSE BLDC GLUCOMTR-MCNC: 138 MG/DL — HIGH (ref 70–99)
GLUCOSE SERPL-MCNC: 110 MG/DL — HIGH (ref 70–99)
GLUCOSE SERPL-MCNC: 110 MG/DL — HIGH (ref 70–99)
HCT VFR BLD CALC: 27.3 % — LOW (ref 39–50)
HCT VFR BLD CALC: 27.3 % — LOW (ref 39–50)
HGB BLD-MCNC: 9 G/DL — LOW (ref 13–17)
HGB BLD-MCNC: 9 G/DL — LOW (ref 13–17)
INR BLD: 1.09 RATIO — SIGNIFICANT CHANGE UP (ref 0.85–1.18)
INR BLD: 1.09 RATIO — SIGNIFICANT CHANGE UP (ref 0.85–1.18)
MAGNESIUM SERPL-MCNC: 1.9 MG/DL — SIGNIFICANT CHANGE UP (ref 1.6–2.6)
MAGNESIUM SERPL-MCNC: 1.9 MG/DL — SIGNIFICANT CHANGE UP (ref 1.6–2.6)
MCHC RBC-ENTMCNC: 31.4 PG — SIGNIFICANT CHANGE UP (ref 27–34)
MCHC RBC-ENTMCNC: 31.4 PG — SIGNIFICANT CHANGE UP (ref 27–34)
MCHC RBC-ENTMCNC: 33 GM/DL — SIGNIFICANT CHANGE UP (ref 32–36)
MCHC RBC-ENTMCNC: 33 GM/DL — SIGNIFICANT CHANGE UP (ref 32–36)
MCV RBC AUTO: 95.1 FL — SIGNIFICANT CHANGE UP (ref 80–100)
MCV RBC AUTO: 95.1 FL — SIGNIFICANT CHANGE UP (ref 80–100)
NRBC # BLD: 0 /100 WBCS — SIGNIFICANT CHANGE UP (ref 0–0)
NRBC # BLD: 0 /100 WBCS — SIGNIFICANT CHANGE UP (ref 0–0)
NRBC # FLD: 0 K/UL — SIGNIFICANT CHANGE UP (ref 0–0)
NRBC # FLD: 0 K/UL — SIGNIFICANT CHANGE UP (ref 0–0)
PHOSPHATE SERPL-MCNC: 3.4 MG/DL — SIGNIFICANT CHANGE UP (ref 2.5–4.5)
PHOSPHATE SERPL-MCNC: 3.4 MG/DL — SIGNIFICANT CHANGE UP (ref 2.5–4.5)
PLATELET # BLD AUTO: 225 K/UL — SIGNIFICANT CHANGE UP (ref 150–400)
PLATELET # BLD AUTO: 225 K/UL — SIGNIFICANT CHANGE UP (ref 150–400)
POTASSIUM SERPL-MCNC: 3.2 MMOL/L — LOW (ref 3.5–5.3)
POTASSIUM SERPL-MCNC: 3.2 MMOL/L — LOW (ref 3.5–5.3)
POTASSIUM SERPL-SCNC: 3.2 MMOL/L — LOW (ref 3.5–5.3)
POTASSIUM SERPL-SCNC: 3.2 MMOL/L — LOW (ref 3.5–5.3)
PROTHROM AB SERPL-ACNC: 12.3 SEC — SIGNIFICANT CHANGE UP (ref 9.5–13)
PROTHROM AB SERPL-ACNC: 12.3 SEC — SIGNIFICANT CHANGE UP (ref 9.5–13)
RBC # BLD: 2.87 M/UL — LOW (ref 4.2–5.8)
RBC # BLD: 2.87 M/UL — LOW (ref 4.2–5.8)
RBC # FLD: 15.6 % — HIGH (ref 10.3–14.5)
RBC # FLD: 15.6 % — HIGH (ref 10.3–14.5)
SODIUM SERPL-SCNC: 139 MMOL/L — SIGNIFICANT CHANGE UP (ref 135–145)
SODIUM SERPL-SCNC: 139 MMOL/L — SIGNIFICANT CHANGE UP (ref 135–145)
WBC # BLD: 3.66 K/UL — LOW (ref 3.8–10.5)
WBC # BLD: 3.66 K/UL — LOW (ref 3.8–10.5)
WBC # FLD AUTO: 3.66 K/UL — LOW (ref 3.8–10.5)
WBC # FLD AUTO: 3.66 K/UL — LOW (ref 3.8–10.5)

## 2023-12-21 PROCEDURE — 99233 SBSQ HOSP IP/OBS HIGH 50: CPT

## 2023-12-21 PROCEDURE — 93970 EXTREMITY STUDY: CPT | Mod: 26

## 2023-12-21 RX ORDER — POTASSIUM CHLORIDE 20 MEQ
40 PACKET (EA) ORAL ONCE
Refills: 0 | Status: COMPLETED | OUTPATIENT
Start: 2023-12-21 | End: 2023-12-21

## 2023-12-21 RX ORDER — POTASSIUM CHLORIDE 20 MEQ
10 PACKET (EA) ORAL
Refills: 0 | Status: DISCONTINUED | OUTPATIENT
Start: 2023-12-21 | End: 2023-12-21

## 2023-12-21 RX ORDER — HEPARIN SODIUM 5000 [USP'U]/ML
5000 INJECTION INTRAVENOUS; SUBCUTANEOUS EVERY 8 HOURS
Refills: 0 | Status: DISCONTINUED | OUTPATIENT
Start: 2023-12-21 | End: 2023-12-23

## 2023-12-21 RX ADMIN — PANTOPRAZOLE SODIUM 40 MILLIGRAM(S): 20 TABLET, DELAYED RELEASE ORAL at 05:05

## 2023-12-21 RX ADMIN — LISINOPRIL 5 MILLIGRAM(S): 2.5 TABLET ORAL at 05:05

## 2023-12-21 RX ADMIN — CHLORHEXIDINE GLUCONATE 1 APPLICATION(S): 213 SOLUTION TOPICAL at 13:12

## 2023-12-21 RX ADMIN — ATOVAQUONE 750 MILLIGRAM(S): 750 SUSPENSION ORAL at 05:06

## 2023-12-21 RX ADMIN — Medication 40 MILLIEQUIVALENT(S): at 13:12

## 2023-12-21 RX ADMIN — NAFCILLIN 200 GRAM(S): 10 INJECTION, POWDER, FOR SOLUTION INTRAVENOUS at 01:20

## 2023-12-21 RX ADMIN — TAMSULOSIN HYDROCHLORIDE 0.4 MILLIGRAM(S): 0.4 CAPSULE ORAL at 21:09

## 2023-12-21 RX ADMIN — HEPARIN SODIUM 5000 UNIT(S): 5000 INJECTION INTRAVENOUS; SUBCUTANEOUS at 13:20

## 2023-12-21 RX ADMIN — Medication 1 TABLET(S): at 13:13

## 2023-12-21 RX ADMIN — NAFCILLIN 200 GRAM(S): 10 INJECTION, POWDER, FOR SOLUTION INTRAVENOUS at 18:58

## 2023-12-21 RX ADMIN — ATORVASTATIN CALCIUM 20 MILLIGRAM(S): 80 TABLET, FILM COATED ORAL at 21:09

## 2023-12-21 RX ADMIN — Medication 2000 UNIT(S): at 13:13

## 2023-12-21 RX ADMIN — BICALUTAMIDE 50 MILLIGRAM(S): 50 TABLET, FILM COATED ORAL at 13:13

## 2023-12-21 RX ADMIN — NAFCILLIN 200 GRAM(S): 10 INJECTION, POWDER, FOR SOLUTION INTRAVENOUS at 14:32

## 2023-12-21 RX ADMIN — NAFCILLIN 200 GRAM(S): 10 INJECTION, POWDER, FOR SOLUTION INTRAVENOUS at 05:06

## 2023-12-21 RX ADMIN — NAFCILLIN 200 GRAM(S): 10 INJECTION, POWDER, FOR SOLUTION INTRAVENOUS at 22:12

## 2023-12-21 RX ADMIN — HEPARIN SODIUM 5000 UNIT(S): 5000 INJECTION INTRAVENOUS; SUBCUTANEOUS at 21:10

## 2023-12-21 RX ADMIN — NAFCILLIN 200 GRAM(S): 10 INJECTION, POWDER, FOR SOLUTION INTRAVENOUS at 10:36

## 2023-12-21 NOTE — PROGRESS NOTE ADULT - PROBLEM SELECTOR PLAN 6
previously diagnosed with prostate ca in 2009  - NM scan showing multiple osseous lesions throughout body  - IR consulted for kyphoplasty 12/4 in anticipation for Rad Tx, per IR no plan for kyphoplasty for now given acute infection/bacteremia, pt to f/u as outpt  - Radonc: per discussion with Dr. Rosenberg, patient would likely get a better course of RT outpatient with SBRT as an option per Dr. Scales preference, family will follow up outpatient after dc from rehab   - Hemeonc: c/w bicalutamide 50mg qd, planning to dose Lupron 12/21 - followed by qmonthly   - need to coordinate outpatient appointments with heme/onc and radonc once dc plan finalized previously diagnosed with prostate ca in 2009  - NM scan showing multiple osseous lesions throughout body  - IR consulted for kyphoplasty 12/4 in anticipation for Rad Tx, per IR no plan for kyphoplasty for now given acute infection/bacteremia, pt to f/u as outpt  - Radonc: patient would benefit from SBRT which is only offered outpatient - family will follow up post dc  - Hemeonc: c/w bicalutamide 50mg qd, planning to dose Lupron in house - followed by qmonthly   - need to coordinate outpatient appointments with heme/onc and radonc once dc plan finalized

## 2023-12-21 NOTE — PROGRESS NOTE ADULT - PROBLEM SELECTOR PLAN 7
Patient found to have b/l LE DVTs during this admission  - s/p IVC filter placed by IR on 11/1  - in setting of spinal surgery, patient to remain off AC due to risk of hematoma formation for at least 6 weeks since surgery (11/2)  - discussed with ortho (Dr. Coburn team) 12/19 - patient is now 7 weeks post op, ok to be on AC (Eliquis 5mg BID without loading dose), ok from hemeonc standpoint as well  - IR consulted for IVC filter removal - plan to start Eliquis 5mg BID post removal ($15 copay) bilateral LE DVTs s/p IVC filter by IR  - in setting of spinal surgery, patient was remain off AC due to risk of hematoma formation for at least 6 weeks since surgery (11/2), patient is now 7 weeks post op - discussed with ortho (Dr. Coburn team) 12/19 - ok to be on AC (Eliquis 5mg BID without loading dose)  - IR consulted for IVC filter removal - recommending outpatient follow up for non-emergent IVC filter removal   - discussed with heme/onc - hold off AC until IVC filter removed, start DOAC once filter removed outpatient  - Bilateral UE sonogram 12/21: right/left cephalic occlusive SVT no DVT - warm compress + elevate

## 2023-12-21 NOTE — CHART NOTE - NSCHARTNOTEFT_GEN_A_CORE
-Due to scheduling, non-emergent IVC filter removal cannot be done today.  -Recommend pt to schedule removal on an outpatient basis  -Outpt scheduling phone number is [phone]  -discussed with primary team    Winnie Lozano MD, PGY-3 Interventional Radiology  Available on Microsoft Teams    - Non-emergent consults: Place IR consult order in [place]  - Emergent issues (pager): [facility] [phone]; [facility] [phone]; [phone]  - Scheduling questions: [facility] [phone]; [facility] [phone]  - Clinic/outpatient booking: [facility] [phone]; [facility] [phone] -Due to scheduling, non-emergent IVC filter removal cannot be done today.  -Recommend pt to schedule removal on an outpatient basis  -Outpt scheduling phone number is [phone]  -discussed with primary team    Winnie Lozano MD, PGY-3 Interventional Radiology  Available on Microsoft Teams    - Non-emergent consults: Place IR consult order in [place]  - Emergent issues (pager): [facility] [phone]; [facility] [phone]; [phone]  - Scheduling questions: [facility] [phone]; [facility] [phone]  - Clinic/outpatient booking: [facility] [phone]; [facility] [phone] -Due to scheduling, non-emergent IVC filter removal cannot be done today.  -Recommend pt to schedule removal on an outpatient basis  -Anticoagulation can be resumed now.  -Outpt scheduling phone number is [phone]  -discussed with primary team    Winnie Lozano MD, PGY-3 Interventional Radiology  Available on [employer] Teams    - Non-emergent consults: Place IR consult order in [place]  - Emergent issues (pager): [facility] [phone]; [facility] [phone]; [phone]  - Scheduling questions: [facility] [phone]; [facility] [phone]  - Clinic/outpatient booking: [facility] [phone]; [facility] [phone] -Due to scheduling, non-emergent IVC filter removal cannot be done today.  -Recommend pt to schedule removal on an outpatient basis  -Anticoagulation can be resumed now.  -Outpt scheduling phone number is [phone]  -discussed with primary team    Winnie Lozano MD, PGY-3 Interventional Radiology  Available on [employer] Teams    - Non-emergent consults: Place IR consult order in [place]  - Emergent issues (pager): [facility] [phone]; [facility] [phone]; [phone]  - Scheduling questions: [facility] [phone]; [facility] [phone]  - Clinic/outpatient booking: [facility] [phone]; [facility] [phone]

## 2023-12-21 NOTE — PROGRESS NOTE ADULT - SUBJECTIVE AND OBJECTIVE BOX
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 (20 Dec 2023 15:09)    SUBJECTIVE / OVERNIGHT EVENTS: no acute events overnight     MEDICATIONS  (STANDING):  atorvastatin 20 milliGRAM(s) Oral at bedtime  bicalutamide 50 milliGRAM(s) Oral daily  chlorhexidine 2% Cloths 1 Application(s) Topical daily  cholecalciferol 2000 Unit(s) Oral daily  lactobacillus acidophilus 1 Tablet(s) Oral daily  lisinopril 5 milliGRAM(s) Oral daily  nafcillin  IVPB 2 Gram(s) IV Intermittent every 4 hours  pantoprazole    Tablet 40 milliGRAM(s) Oral before breakfast  tamsulosin 0.4 milliGRAM(s) Oral at bedtime    MEDICATIONS  (PRN):  benzocaine/menthol Lozenge 1 Lozenge Oral every 6 hours PRN Sore Throat  guaiFENesin Oral Liquid (Sugar-Free) 200 milliGRAM(s) Oral every 6 hours PRN Cough  levalbuterol Inhalation 0.63 milliGRAM(s) Inhalation every 6 hours PRN shortness of breath/wheezing  naloxone Injectable 0.1 milliGRAM(s) IV Push every 3 minutes PRN For ANY of the following changes in patient status:  A. RR LESS THAN 10 breaths per minute, B. Oxygen saturation LESS THAN 90%, C. Sedation score of 6  ondansetron Injectable 4 milliGRAM(s) IV Push every 6 hours PRN Nausea      PHYSICAL EXAM:  Vital Signs Last 24 Hrs  T(C): 36.8 (21 Dec 2023 04:33), Max: 36.8 (20 Dec 2023 18:10)  T(F): 98.2 (21 Dec 2023 04:33), Max: 98.3 (20 Dec 2023 20:32)  HR: 72 (21 Dec 2023 04:33) (72 - 94)  BP: 120/78 (21 Dec 2023 04:33) (115/68 - 125/75)  BP(mean): --  RR: 18 (21 Dec 2023 04:33) (16 - 18)  SpO2: 98% (21 Dec 2023 04:33) (97% - 98%)    Parameters below as of 21 Dec 2023 04:33  Patient On (Oxygen Delivery Method): room air        CONSTITUTIONAL: NAD, well-developed, well-groomed  RESPIRATORY: Normal respiratory effort; lungs are clear to auscultation bilaterally  CARDIOVASCULAR: Regular rate and rhythm, normal S1 and S2, no murmur/rub/gallop; No lower extremity edema  GASTROINTESTINAL: Nontender to palpation, normoactive bowel sounds, no rebound/guarding; No hepatosplenomegaly  MUSCULOSKELETAL:  no clubbing or cyanosis of digits; no joint swelling or tenderness to palpation  NEUROLOGY: non-focal; no gross sensory deficits   PSYCH: A+O to person, place, and time; affect appropriate  SKIN: No rashes; warm     LABS:                        9.5    4.56  )-----------( 223      ( 20 Dec 2023 06:00 )             29.3     12-20    139  |  104  |  18  ----------------------------<  110<H>  3.5   |  22  |  0.99    Ca    9.3      20 Dec 2023 06:00  Phos  3.5     12-20  Mg     2.00     12-20            Urinalysis Basic - ( 20 Dec 2023 06:00 )    Color: x / Appearance: x / SG: x / pH: x  Gluc: 110 mg/dL / Ketone: x  / Bili: x / Urobili: x   Blood: x / Protein: x / Nitrite: x   Leuk Esterase: x / RBC: x / WBC x   Sq Epi: x / Non Sq Epi: x / Bacteria: x          RADIOLOGY & ADDITIONAL TESTS:  Results Reviewed:   Imaging Personally Reviewed:  Electrocardiogram Personally Reviewed:    COORDINATION OF CARE:  Care Discussed with Consultants/Other Providers [Y/N]:  Prior or Outpatient Records Reviewed [Y/N]:   Muna Mckinley MD  [facility] Division of Hospital Medicine  Pager [phone] (M-F 8AM-5PM)  Other Times: [id number]    Patient is a 73y old  Male who presents with a chief complaint of Diffuse Spinal Mets from Prostate Cancer (20 Dec 2023 15:09)    SUBJECTIVE / OVERNIGHT EVENTS: no acute events overnight     MEDICATIONS  (STANDING):  atorvastatin 20 milliGRAM(s) Oral at bedtime  bicalutamide 50 milliGRAM(s) Oral daily  chlorhexidine 2% Cloths 1 Application(s) Topical daily  cholecalciferol 2000 Unit(s) Oral daily  lactobacillus acidophilus 1 Tablet(s) Oral daily  lisinopril 5 milliGRAM(s) Oral daily  nafcillin  IVPB 2 Gram(s) IV Intermittent every 4 hours  pantoprazole    Tablet 40 milliGRAM(s) Oral before breakfast  tamsulosin 0.4 milliGRAM(s) Oral at bedtime    MEDICATIONS  (PRN):  benzocaine/menthol Lozenge 1 Lozenge Oral every 6 hours PRN Sore Throat  guaiFENesin Oral Liquid (Sugar-Free) 200 milliGRAM(s) Oral every 6 hours PRN Cough  levalbuterol Inhalation 0.63 milliGRAM(s) Inhalation every 6 hours PRN shortness of breath/wheezing  naloxone Injectable 0.1 milliGRAM(s) IV Push every 3 minutes PRN For ANY of the following changes in patient status:  A. RR LESS THAN 10 breaths per minute, B. Oxygen saturation LESS THAN 90%, C. Sedation score of 6  ondansetron Injectable 4 milliGRAM(s) IV Push every 6 hours PRN Nausea      PHYSICAL EXAM:  Vital Signs Last 24 Hrs  T(C): 36.8 (21 Dec 2023 04:33), Max: 36.8 (20 Dec 2023 18:10)  T(F): 98.2 (21 Dec 2023 04:33), Max: 98.3 (20 Dec 2023 20:32)  HR: 72 (21 Dec 2023 04:33) (72 - 94)  BP: 120/78 (21 Dec 2023 04:33) (115/68 - 125/75)  BP(mean): --  RR: 18 (21 Dec 2023 04:33) (16 - 18)  SpO2: 98% (21 Dec 2023 04:33) (97% - 98%)    Parameters below as of 21 Dec 2023 04:33  Patient On (Oxygen Delivery Method): room air        CONSTITUTIONAL: NAD, well-developed, well-groomed  RESPIRATORY: Normal respiratory effort; lungs are clear to auscultation bilaterally  CARDIOVASCULAR: Regular rate and rhythm, normal S1 and S2, no murmur/rub/gallop; No lower extremity edema  GASTROINTESTINAL: Nontender to palpation, normoactive bowel sounds, no rebound/guarding; No hepatosplenomegaly  MUSCULOSKELETAL:  no clubbing or cyanosis of digits; no joint swelling or tenderness to palpation  NEUROLOGY: non-focal; no gross sensory deficits   PSYCH: A+O to person, place, and time; affect appropriate  SKIN: No rashes; warm     LABS:                        9.5    4.56  )-----------( 223      ( 20 Dec 2023 06:00 )             29.3     12-20    139  |  104  |  18  ----------------------------<  110<H>  3.5   |  22  |  0.99    Ca    9.3      20 Dec 2023 06:00  Phos  3.5     12-20  Mg     2.00     12-20            Urinalysis Basic - ( 20 Dec 2023 06:00 )    Color: x / Appearance: x / SG: x / pH: x  Gluc: 110 mg/dL / Ketone: x  / Bili: x / Urobili: x   Blood: x / Protein: x / Nitrite: x   Leuk Esterase: x / RBC: x / WBC x   Sq Epi: x / Non Sq Epi: x / Bacteria: x          RADIOLOGY & ADDITIONAL TESTS:  Results Reviewed:   Imaging Personally Reviewed:  Electrocardiogram Personally Reviewed:    COORDINATION OF CARE:  Care Discussed with Consultants/Other Providers [Y/N]:  Prior or Outpatient Records Reviewed [Y/N]:

## 2023-12-21 NOTE — PROGRESS NOTE ADULT - PROBLEM SELECTOR PLAN 1
gram negative PNA in the setting of aspiration, c/b acute on chronic hypoxic respiratory failure  - noted result of CTPA 11/27. CT showed diffuse lung opacities, worsening on repeat CT 12/8 concerning for organizing PNA which led to suspicion for PJP  - PJP PNA, was on Bactrim - changed to Mepron given uptrend in creatinine - continue through 12/21  - s/p high flow, now on NC, no plan for bronch per pulm given improvement   - appreciate ID recs - hold off empiric steroids for now. If O2 requirements again plateau or worse, will reconsider empiric steroids or bronchoscopic evaluation with BAL/TBBX  - rvp with +ADENOVIRUS, TTE with no acute findings 11-Sep-2023 21:30

## 2023-12-21 NOTE — PROGRESS NOTE ADULT - ASSESSMENT
73M with metastatic prostate cancer initially admitted to [facility] for rhabdo + SRUTHI, transferred for spinal cord compression from metastasis s/p C5-T3 Fusion, C7-T1 decompression 11/2 with course c/b MSSA bacteremia from surgical site infection, bilateral LE DVT s/p IVC filter 11/1 by IR, candida esophagitis w/ dysphagia, aspiration multilobar PNA, acute on chronic anemia, and C. diff colitis.   73M with metastatic prostate cancer initially admitted to [facility] for rhabdo + SRUTHI, transferred for spinal cord compression from metastasis s/p C5-T3 Fusion, C7-T1 decompression 11/2 with course c/b MSSA bacteremia from surgical site infection, bilateral LE DVT s/p IVC filter 11/1 by IR, candida esophagitis w/ dysphagia, aspiration multilobar PNA, acute on chronic anemia, and C. diff colitis.

## 2023-12-22 LAB
ANION GAP SERPL CALC-SCNC: 15 MMOL/L — HIGH (ref 7–14)
ANION GAP SERPL CALC-SCNC: 15 MMOL/L — HIGH (ref 7–14)
BUN SERPL-MCNC: 15 MG/DL — SIGNIFICANT CHANGE UP (ref 7–23)
BUN SERPL-MCNC: 15 MG/DL — SIGNIFICANT CHANGE UP (ref 7–23)
CALCIUM SERPL-MCNC: 9.3 MG/DL — SIGNIFICANT CHANGE UP (ref 8.4–10.5)
CALCIUM SERPL-MCNC: 9.3 MG/DL — SIGNIFICANT CHANGE UP (ref 8.4–10.5)
CHLORIDE SERPL-SCNC: 105 MMOL/L — SIGNIFICANT CHANGE UP (ref 98–107)
CHLORIDE SERPL-SCNC: 105 MMOL/L — SIGNIFICANT CHANGE UP (ref 98–107)
CO2 SERPL-SCNC: 21 MMOL/L — LOW (ref 22–31)
CO2 SERPL-SCNC: 21 MMOL/L — LOW (ref 22–31)
CREAT SERPL-MCNC: 1.03 MG/DL — SIGNIFICANT CHANGE UP (ref 0.5–1.3)
CREAT SERPL-MCNC: 1.03 MG/DL — SIGNIFICANT CHANGE UP (ref 0.5–1.3)
EGFR: 78 ML/MIN/1.73M2 — SIGNIFICANT CHANGE UP
EGFR: 78 ML/MIN/1.73M2 — SIGNIFICANT CHANGE UP
GLUCOSE BLDC GLUCOMTR-MCNC: 109 MG/DL — HIGH (ref 70–99)
GLUCOSE BLDC GLUCOMTR-MCNC: 109 MG/DL — HIGH (ref 70–99)
GLUCOSE BLDC GLUCOMTR-MCNC: 112 MG/DL — HIGH (ref 70–99)
GLUCOSE BLDC GLUCOMTR-MCNC: 112 MG/DL — HIGH (ref 70–99)
GLUCOSE BLDC GLUCOMTR-MCNC: 92 MG/DL — SIGNIFICANT CHANGE UP (ref 70–99)
GLUCOSE BLDC GLUCOMTR-MCNC: 92 MG/DL — SIGNIFICANT CHANGE UP (ref 70–99)
GLUCOSE BLDC GLUCOMTR-MCNC: 97 MG/DL — SIGNIFICANT CHANGE UP (ref 70–99)
GLUCOSE BLDC GLUCOMTR-MCNC: 97 MG/DL — SIGNIFICANT CHANGE UP (ref 70–99)
GLUCOSE SERPL-MCNC: 88 MG/DL — SIGNIFICANT CHANGE UP (ref 70–99)
GLUCOSE SERPL-MCNC: 88 MG/DL — SIGNIFICANT CHANGE UP (ref 70–99)
HCT VFR BLD CALC: 30.9 % — LOW (ref 39–50)
HCT VFR BLD CALC: 30.9 % — LOW (ref 39–50)
HGB BLD-MCNC: 10 G/DL — LOW (ref 13–17)
HGB BLD-MCNC: 10 G/DL — LOW (ref 13–17)
MAGNESIUM SERPL-MCNC: 1.9 MG/DL — SIGNIFICANT CHANGE UP (ref 1.6–2.6)
MAGNESIUM SERPL-MCNC: 1.9 MG/DL — SIGNIFICANT CHANGE UP (ref 1.6–2.6)
MCHC RBC-ENTMCNC: 30.8 PG — SIGNIFICANT CHANGE UP (ref 27–34)
MCHC RBC-ENTMCNC: 30.8 PG — SIGNIFICANT CHANGE UP (ref 27–34)
MCHC RBC-ENTMCNC: 32.4 GM/DL — SIGNIFICANT CHANGE UP (ref 32–36)
MCHC RBC-ENTMCNC: 32.4 GM/DL — SIGNIFICANT CHANGE UP (ref 32–36)
MCV RBC AUTO: 95.1 FL — SIGNIFICANT CHANGE UP (ref 80–100)
MCV RBC AUTO: 95.1 FL — SIGNIFICANT CHANGE UP (ref 80–100)
NRBC # BLD: 0 /100 WBCS — SIGNIFICANT CHANGE UP (ref 0–0)
NRBC # BLD: 0 /100 WBCS — SIGNIFICANT CHANGE UP (ref 0–0)
NRBC # FLD: 0 K/UL — SIGNIFICANT CHANGE UP (ref 0–0)
NRBC # FLD: 0 K/UL — SIGNIFICANT CHANGE UP (ref 0–0)
PHOSPHATE SERPL-MCNC: 3.5 MG/DL — SIGNIFICANT CHANGE UP (ref 2.5–4.5)
PHOSPHATE SERPL-MCNC: 3.5 MG/DL — SIGNIFICANT CHANGE UP (ref 2.5–4.5)
PLATELET # BLD AUTO: 246 K/UL — SIGNIFICANT CHANGE UP (ref 150–400)
PLATELET # BLD AUTO: 246 K/UL — SIGNIFICANT CHANGE UP (ref 150–400)
POTASSIUM SERPL-MCNC: 3.8 MMOL/L — SIGNIFICANT CHANGE UP (ref 3.5–5.3)
POTASSIUM SERPL-MCNC: 3.8 MMOL/L — SIGNIFICANT CHANGE UP (ref 3.5–5.3)
POTASSIUM SERPL-SCNC: 3.8 MMOL/L — SIGNIFICANT CHANGE UP (ref 3.5–5.3)
POTASSIUM SERPL-SCNC: 3.8 MMOL/L — SIGNIFICANT CHANGE UP (ref 3.5–5.3)
RBC # BLD: 3.25 M/UL — LOW (ref 4.2–5.8)
RBC # BLD: 3.25 M/UL — LOW (ref 4.2–5.8)
RBC # FLD: 15.9 % — HIGH (ref 10.3–14.5)
RBC # FLD: 15.9 % — HIGH (ref 10.3–14.5)
SODIUM SERPL-SCNC: 141 MMOL/L — SIGNIFICANT CHANGE UP (ref 135–145)
SODIUM SERPL-SCNC: 141 MMOL/L — SIGNIFICANT CHANGE UP (ref 135–145)
WBC # BLD: 4.72 K/UL — SIGNIFICANT CHANGE UP (ref 3.8–10.5)
WBC # BLD: 4.72 K/UL — SIGNIFICANT CHANGE UP (ref 3.8–10.5)
WBC # FLD AUTO: 4.72 K/UL — SIGNIFICANT CHANGE UP (ref 3.8–10.5)
WBC # FLD AUTO: 4.72 K/UL — SIGNIFICANT CHANGE UP (ref 3.8–10.5)

## 2023-12-22 PROCEDURE — 99233 SBSQ HOSP IP/OBS HIGH 50: CPT

## 2023-12-22 RX ORDER — BICALUTAMIDE 50 MG/1
1 TABLET, FILM COATED ORAL
Qty: 30 | Refills: 0
Start: 2023-12-22 | End: 2024-01-20

## 2023-12-22 RX ORDER — TAMSULOSIN HYDROCHLORIDE 0.4 MG/1
1 CAPSULE ORAL
Qty: 0 | Refills: 0 | DISCHARGE
Start: 2023-12-22

## 2023-12-22 RX ORDER — LISINOPRIL 2.5 MG/1
1 TABLET ORAL
Qty: 0 | Refills: 0 | DISCHARGE
Start: 2023-12-22

## 2023-12-22 RX ORDER — PANTOPRAZOLE SODIUM 20 MG/1
1 TABLET, DELAYED RELEASE ORAL
Qty: 0 | Refills: 0 | DISCHARGE
Start: 2023-12-22

## 2023-12-22 RX ORDER — LACTOBACILLUS ACIDOPHILUS 100MM CELL
1 CAPSULE ORAL
Qty: 0 | Refills: 0 | DISCHARGE
Start: 2023-12-22

## 2023-12-22 RX ORDER — CHOLECALCIFEROL (VITAMIN D3) 125 MCG
2000 CAPSULE ORAL
Qty: 0 | Refills: 0 | DISCHARGE
Start: 2023-12-22

## 2023-12-22 RX ADMIN — BICALUTAMIDE 50 MILLIGRAM(S): 50 TABLET, FILM COATED ORAL at 11:12

## 2023-12-22 RX ADMIN — CHLORHEXIDINE GLUCONATE 1 APPLICATION(S): 213 SOLUTION TOPICAL at 11:12

## 2023-12-22 RX ADMIN — HEPARIN SODIUM 5000 UNIT(S): 5000 INJECTION INTRAVENOUS; SUBCUTANEOUS at 15:29

## 2023-12-22 RX ADMIN — NAFCILLIN 200 GRAM(S): 10 INJECTION, POWDER, FOR SOLUTION INTRAVENOUS at 15:29

## 2023-12-22 RX ADMIN — HEPARIN SODIUM 5000 UNIT(S): 5000 INJECTION INTRAVENOUS; SUBCUTANEOUS at 05:06

## 2023-12-22 RX ADMIN — Medication 1 TABLET(S): at 11:12

## 2023-12-22 RX ADMIN — NAFCILLIN 200 GRAM(S): 10 INJECTION, POWDER, FOR SOLUTION INTRAVENOUS at 22:41

## 2023-12-22 RX ADMIN — NAFCILLIN 200 GRAM(S): 10 INJECTION, POWDER, FOR SOLUTION INTRAVENOUS at 11:11

## 2023-12-22 RX ADMIN — ATORVASTATIN CALCIUM 20 MILLIGRAM(S): 80 TABLET, FILM COATED ORAL at 22:40

## 2023-12-22 RX ADMIN — NAFCILLIN 200 GRAM(S): 10 INJECTION, POWDER, FOR SOLUTION INTRAVENOUS at 01:47

## 2023-12-22 RX ADMIN — Medication 2000 UNIT(S): at 11:12

## 2023-12-22 RX ADMIN — PANTOPRAZOLE SODIUM 40 MILLIGRAM(S): 20 TABLET, DELAYED RELEASE ORAL at 05:05

## 2023-12-22 RX ADMIN — LISINOPRIL 5 MILLIGRAM(S): 2.5 TABLET ORAL at 05:05

## 2023-12-22 RX ADMIN — HEPARIN SODIUM 5000 UNIT(S): 5000 INJECTION INTRAVENOUS; SUBCUTANEOUS at 22:41

## 2023-12-22 RX ADMIN — TAMSULOSIN HYDROCHLORIDE 0.4 MILLIGRAM(S): 0.4 CAPSULE ORAL at 22:40

## 2023-12-22 RX ADMIN — NAFCILLIN 200 GRAM(S): 10 INJECTION, POWDER, FOR SOLUTION INTRAVENOUS at 05:57

## 2023-12-22 NOTE — ADVANCED PRACTICE NURSE CONSULT - ASSESSMENT
Patient is aware and alert. Midline insertion along with risks, benefits, possible complications and infection prevention explained to patient who verbalized understanding. All questions addressed and patient gave verbal consent to place midline. Left arm cleansed with CHG. Using sterile technique under ultra sound guidance, placed PowerGlide Pro Midline 20G /10cm into Left Basilic vein. Brisk blood return and flushed with 20Mls of normal saline. Minimal blood loss and patient tolerated procedure well. CHG dressing placed. All sharps accounted for. Report given to district RN and ordering provider. LOT#: [id number], REF#: [id number] Patient is aware and alert. Midline insertion along with risks, benefits, possible complications and infection prevention explained to patient who verbalized understanding. All questions addressed and patient gave verbal consent to place midline. Left arm cleansed with CHG. Using sterile technique under ultra sound guidance, placed PowerGlide Pro Midline 20G /10cm into Left Basilic vein. Brisk blood return and flushed with 20Mls of normal saline. Minimal blood loss and patient tolerated procedure well. CHG dressing placed. All sharps accounted for. Report given to district RN and ordering provider. LOT#: [id number], REF#: [id number]

## 2023-12-22 NOTE — PROGRESS NOTE ADULT - PROBLEM SELECTOR PLAN 6
previously diagnosed with prostate ca in 2009  - NM scan showing multiple osseous lesions throughout body  - IR consulted for kyphoplasty 12/4 in anticipation for Rad Tx, per IR no plan for kyphoplasty for now given acute infection/bacteremia, pt to f/u as outpt  - Radonc: patient would benefit from SBRT which is only offered outpatient - family will follow up post dc  - Hemeonc: c/w bicalutamide 50mg qd, planning to dose Lupron in house - followed by qmonthly   - need to coordinate outpatient appointments with heme/onc and radonc once dc plan finalized previously diagnosed with prostate ca in 2009  - NM scan showing multiple osseous lesions throughout body  - IR consulted for kyphoplasty 12/4 in anticipation for Rad Tx, per IR no plan for kyphoplasty for now given acute infection/bacteremia, pt to f/u as outpt  - Radonc: patient would benefit from SBRT which is only offered outpatient - family will follow up post dc  - Hemeonc: c/w bicalutamide 50mg qd, Lupron 12/22 - followed [facility] qmonthly   [facility] will [facility] radiation medicine outpatient on dc for outpatient f/u previously diagnosed with prostate ca in 2009  - NM scan showing multiple osseous lesions throughout body  - IR consulted for kyphoplasty 12/4 in anticipation for Rad Tx, per IR no plan for kyphoplasty for now given acute infection/bacteremia, pt to f/u as outpt  - Radonc: patient would benefit from SBRT which is only offered outpatient - family will follow up post dc  - Hemeonc: c/w bicalutamide 50mg qd, Lupron 12/22 - followed by qmonthly   [facility] will email radiation medicine outpatient on dc for outpatient f/u

## 2023-12-22 NOTE — CHART NOTE - NSCHARTNOTEFT_GEN_A_CORE
73M w/ hx of IDDM, HTN and history of prostate cancer s/p biopsy in 2021 ([place] 4+3) who declined radiation therapy at that time, and since followed by urologist at [facility], p/w progressive bilateral LE weakness, R hand paresthesias and recurrent falls. MRI spine findings concerning for spinal cord compression.    #metastatic prostate adenocarcinoma   #Back pain 2/2 diffuse mets/cord compression   - hx of prostate cancer diagnosed back in 2009 but did not seek treatment/care at that time given no symptoms until 1 month ago when noticed neuropathy and started following up with Dr. Zelaya - urologist.  - Prostate biopsy in 2021 showed adenocarcinoma, [place] 3+4; PSA level 169 declined radiation therapy  - imaging shows  ([place] 4+3), PSA level 169 in July 2023, now with evidence of diffuse metastatic disease, progressive LE weakness, saddle anesthesia and cord compression.  - LE weakness has improved w/ PT and is able to ambulate with a walker. Now he is awaiting rehab.   - Rad/onc plan: tentative palliative outpatient RT 20Gy in 5fx to C7-T5 after rehab  -had a long hospital course c/b multiple infections that has since resolved    Plan:  - c/w casodex 50 mg OD   -ordered Lupron injection depot 7.5 mg x1 mo. Please administer Lupron today  -he is pending IVC filter retrieval as outpatient. No need to start AC for superficial DVTs.  -had extensive discussion w/ patient at bedside. He stated he flew in from [place] to get treated. Also does not have any medical oncologist. If patient wants to see [email or web address] medical oncology, we will set up an appointment with [email or web address]  oncology.  -please let us know if patient wants to see [email or web address]  oncology  -before patient get's discharged to rehab please ensure he has an appointment w/ medical oncology and radiation oncology and also he has transportation available from Rehab to his appointments.    Discussed w/ Dr. King Shania Garcia MD, PhD  Heme/Onc Fellow  PGY4 73M w/ hx of IDDM, HTN and history of prostate cancer s/p biopsy in 2021 ([place] 4+3) who declined radiation therapy at that time, and since followed by urologist at [facility], p/w progressive bilateral LE weakness, R hand paresthesias and recurrent falls. MRI spine findings concerning for spinal cord compression.    #metastatic prostate adenocarcinoma   #Back pain 2/2 diffuse mets/cord compression   - hx of prostate cancer diagnosed back in 2009 but did not seek treatment/care at that time given no symptoms until 1 month ago when noticed neuropathy and started following up with Dr. Zelaya - urologist.  - Prostate biopsy in 2021 showed adenocarcinoma, [place] 3+4; PSA level 169 declined radiation therapy  - imaging shows  ([place] 4+3), PSA level 169 in July 2023, now with evidence of diffuse metastatic disease, progressive LE weakness, saddle anesthesia and cord compression.  - LE weakness has improved w/ PT and is able to ambulate with a walker. Now he is awaiting rehab.   - Rad/onc plan: tentative palliative outpatient RT 20Gy in 5fx to C7-T5 after rehab  -had a long hospital course c/b multiple infections that has since resolved    Plan:  - c/w casodex 50 mg OD   -ordered Lupron injection depot 7.5 mg x1 mo. Please administer Lupron today  -he is pending IVC filter retrieval as outpatient. No need to start AC for superficial DVTs.  -had extensive discussion w/ patient at bedside. He stated he flew in from [place] to get treated. Also does not have any medical oncologist. If patient wants to see [facility] medical oncology, we will set up an appointment with [facility]  oncology.  -please let us know if patient wants to see [facility]  oncology  -before patient get's discharged to rehab please ensure he has an appointment w/ medical oncology and radiation oncology and also he has transportation available from Rehab to his appointments.    Discussed w/ Dr. King Shania Garcia MD, PhD  Heme/Onc Fellow  PGY4

## 2023-12-22 NOTE — PROGRESS NOTE ADULT - PROBLEM SELECTOR PLAN 7
bilateral LE DVTs s/p IVC filter by IR  - in setting of spinal surgery, patient was remain off AC due to risk of hematoma formation for at least 6 weeks since surgery (11/2), patient is now 7 weeks post op - discussed with ortho (Dr. Coburn team) 12/19 - ok to be on AC (Eliquis 5mg BID without loading dose)  - IR consulted for IVC filter removal - recommending outpatient follow up for non-emergent IVC filter removal   - discussed with heme/onc - hold off AC until IVC filter removed, start DOAC once filter removed outpatient  - Bilateral UE sonogram 12/21: right/left cephalic occlusive SVT no DVT - warm compress + elevate

## 2023-12-22 NOTE — PROGRESS NOTE ADULT - PROBLEM SELECTOR PLAN 2
concern for surgical site as a primary source  - BCx from 11/16, 11/17 NGTD, TTE no endocarditis   - changed to Nafcillin IV on 11/20, plan to complete 12/28 (daptomycin can be considered as alternative if would facilitate rehab placement)

## 2023-12-22 NOTE — PROGRESS NOTE ADULT - ASSESSMENT
73M with metastatic prostate cancer initially admitted to [facility] for rhabdo + SRUTHI, transferred for spinal cord compression from metastasis s/p C5-T3 Fusion, C7-T1 decompression 11/2 with course c/b MSSA bacteremia from surgical site infection, bilateral LE DVT s/p IVC filter 11/1 by IR, candida esophagitis w/ dysphagia, aspiration multilobar PNA, acute on chronic anemia, and C. diff colitis.   73M with metastatic prostate cancer initially admitted to [facility] for rhabdo + SRUTHI, transferred for spinal cord compression from metastasis s/p C5-T3 Fusion, C7-T1 decompression 11/2 with course c/b MSSA bacteremia from surgical site infection, bilateral LE DVT s/p IVC filter 11/1 by IR, candida esophagitis w/ dysphagia, aspiration multilobar PNA, acute on chronic anemia, and C. diff colitis.

## 2023-12-22 NOTE — PROGRESS NOTE ADULT - SUBJECTIVE AND OBJECTIVE BOX
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 (21 Dec 2023 08:12)    SUBJECTIVE / OVERNIGHT EVENTS: no acute events overnight     MEDICATIONS  (STANDING):  atorvastatin 20 milliGRAM(s) Oral at bedtime  bicalutamide 50 milliGRAM(s) Oral daily  chlorhexidine 2% Cloths 1 Application(s) Topical daily  cholecalciferol 2000 Unit(s) Oral daily  heparin   Injectable 5000 Unit(s) SubCutaneous every 8 hours  lactobacillus acidophilus 1 Tablet(s) Oral daily  lisinopril 5 milliGRAM(s) Oral daily  nafcillin  IVPB 2 Gram(s) IV Intermittent every 4 hours  pantoprazole    Tablet 40 milliGRAM(s) Oral before breakfast  tamsulosin 0.4 milliGRAM(s) Oral at bedtime    MEDICATIONS  (PRN):  benzocaine/menthol Lozenge 1 Lozenge Oral every 6 hours PRN Sore Throat  guaiFENesin Oral Liquid (Sugar-Free) 200 milliGRAM(s) Oral every 6 hours PRN Cough  levalbuterol Inhalation 0.63 milliGRAM(s) Inhalation every 6 hours PRN shortness of breath/wheezing  naloxone Injectable 0.1 milliGRAM(s) IV Push every 3 minutes PRN For ANY of the following changes in patient status:  A. RR LESS THAN 10 breaths per minute, B. Oxygen saturation LESS THAN 90%, C. Sedation score of 6  ondansetron Injectable 4 milliGRAM(s) IV Push every 6 hours PRN Nausea      PHYSICAL EXAM:  Vital Signs Last 24 Hrs  T(C): 36.6 (22 Dec 2023 05:00), Max: 36.6 (21 Dec 2023 12:00)  T(F): 97.8 (22 Dec 2023 05:00), Max: 97.8 (21 Dec 2023 12:00)  HR: 79 (22 Dec 2023 05:00) (79 - 88)  BP: 127/79 (22 Dec 2023 05:00) (127/79 - 134/74)  BP(mean): --  RR: 18 (22 Dec 2023 05:00) (18 - 18)  SpO2: 98% (22 Dec 2023 05:00) (98% - 99%)    Parameters below as of 22 Dec 2023 05:00  Patient On (Oxygen Delivery Method): room air        CONSTITUTIONAL: NAD, well-developed, well-groomed  RESPIRATORY: Normal respiratory effort; lungs are clear to auscultation bilaterally  CARDIOVASCULAR: Regular rate and rhythm, normal S1 and S2, no murmur/rub/gallop; No lower extremity edema  GASTROINTESTINAL: Nontender to palpation, normoactive bowel sounds, no rebound/guarding; No hepatosplenomegaly  MUSCULOSKELETAL:  no clubbing or cyanosis of digits; no joint swelling or tenderness to palpation  NEUROLOGY: non-focal; no gross sensory deficits   PSYCH: A+O to person, place, and time; affect appropriate  SKIN: No rashes; warm     LABS:                        10.0   4.72  )-----------( 246      ( 22 Dec 2023 05:00 )             30.9     12-21    139  |  103  |  16  ----------------------------<  110<H>  3.2<L>   |  22  |  0.91    Ca    9.2      21 Dec 2023 06:40  Phos  3.4     12-21  Mg     1.90     12-21      PT/INR - ( 21 Dec 2023 06:40 )   PT: 12.3 sec;   INR: 1.09 ratio         PTT - ( 21 Dec 2023 06:40 )  PTT:38.4 sec      Urinalysis Basic - ( 21 Dec 2023 06:40 )    Color: x / Appearance: x / SG: x / pH: x  Gluc: 110 mg/dL / Ketone: x  / Bili: x / Urobili: x   Blood: x / Protein: x / Nitrite: x   Leuk Esterase: x / RBC: x / WBC x   Sq Epi: x / Non Sq Epi: x / Bacteria: x          RADIOLOGY & ADDITIONAL TESTS:  Results Reviewed:   Imaging Personally Reviewed:  Electrocardiogram Personally Reviewed:    COORDINATION OF CARE:  Care Discussed with Consultants/Other Providers [Y/N]:  Prior or Outpatient Records Reviewed [Y/N]:   Muna Mckinley MD  [facility] Division of Hospital Medicine  Pager [phone] (M-F 8AM-5PM)  Other Times: [phone]    Patient is a 73y old  Male who presents with a chief complaint of 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 (21 Dec 2023 08:12)    SUBJECTIVE / OVERNIGHT EVENTS: no acute events overnight     MEDICATIONS  (STANDING):  atorvastatin 20 milliGRAM(s) Oral at bedtime  bicalutamide 50 milliGRAM(s) Oral daily  chlorhexidine 2% Cloths 1 Application(s) Topical daily  cholecalciferol 2000 Unit(s) Oral daily  heparin   Injectable 5000 Unit(s) SubCutaneous every 8 hours  lactobacillus acidophilus 1 Tablet(s) Oral daily  lisinopril 5 milliGRAM(s) Oral daily  nafcillin  IVPB 2 Gram(s) IV Intermittent every 4 hours  pantoprazole    Tablet 40 milliGRAM(s) Oral before breakfast  tamsulosin 0.4 milliGRAM(s) Oral at bedtime    MEDICATIONS  (PRN):  benzocaine/menthol Lozenge 1 Lozenge Oral every 6 hours PRN Sore Throat  guaiFENesin Oral Liquid (Sugar-Free) 200 milliGRAM(s) Oral every 6 hours PRN Cough  levalbuterol Inhalation 0.63 milliGRAM(s) Inhalation every 6 hours PRN shortness of breath/wheezing  naloxone Injectable 0.1 milliGRAM(s) IV Push every 3 minutes PRN For ANY of the following changes in patient status:  A. RR LESS THAN 10 breaths per minute, B. Oxygen saturation LESS THAN 90%, C. Sedation score of 6  ondansetron Injectable 4 milliGRAM(s) IV Push every 6 hours PRN Nausea      PHYSICAL EXAM:  Vital Signs Last 24 Hrs  T(C): 36.6 (22 Dec 2023 05:00), Max: 36.6 (21 Dec 2023 12:00)  T(F): 97.8 (22 Dec 2023 05:00), Max: 97.8 (21 Dec 2023 12:00)  HR: 79 (22 Dec 2023 05:00) (79 - 88)  BP: 127/79 (22 Dec 2023 05:00) (127/79 - 134/74)  BP(mean): --  RR: 18 (22 Dec 2023 05:00) (18 - 18)  SpO2: 98% (22 Dec 2023 05:00) (98% - 99%)    Parameters below as of 22 Dec 2023 05:00  Patient On (Oxygen Delivery Method): room air        CONSTITUTIONAL: NAD, well-developed, well-groomed  RESPIRATORY: Normal respiratory effort; lungs are clear to auscultation bilaterally  CARDIOVASCULAR: Regular rate and rhythm, normal S1 and S2, no murmur/rub/gallop; No lower extremity edema  GASTROINTESTINAL: Nontender to palpation, normoactive bowel sounds, no rebound/guarding; No hepatosplenomegaly  MUSCULOSKELETAL:  no clubbing or cyanosis of digits; no joint swelling or tenderness to palpation  NEUROLOGY: non-focal; no gross sensory deficits   PSYCH: A+O to person, place, and time; affect appropriate  SKIN: No rashes; warm     LABS:                        10.0   4.72  )-----------( 246      ( 22 Dec 2023 05:00 )             30.9     12-21    139  |  103  |  16  ----------------------------<  110<H>  3.2<L>   |  22  |  0.91    Ca    9.2      21 Dec 2023 06:40  Phos  3.4     12-21  Mg     1.90     12-21      PT/INR - ( 21 Dec 2023 06:40 )   PT: 12.3 sec;   INR: 1.09 ratio         PTT - ( 21 Dec 2023 06:40 )  PTT:38.4 sec      Urinalysis Basic - ( 21 Dec 2023 06:40 )    Color: x / Appearance: x / SG: x / pH: x  Gluc: 110 mg/dL / Ketone: x  / Bili: x / Urobili: x   Blood: x / Protein: x / Nitrite: x   Leuk Esterase: x / RBC: x / WBC x   Sq Epi: x / Non Sq Epi: x / Bacteria: x          RADIOLOGY & ADDITIONAL TESTS:  Results Reviewed:   Imaging Personally Reviewed:  Electrocardiogram Personally Reviewed:    COORDINATION OF CARE:  Care Discussed with Consultants/Other Providers [Y/N]:  Prior or Outpatient Records Reviewed [Y/N]:

## 2023-12-23 ENCOUNTER — TRANSCRIPTION ENCOUNTER (OUTPATIENT)
Age: 73
End: 2023-12-23

## 2023-12-23 VITALS
DIASTOLIC BLOOD PRESSURE: 69 MMHG | OXYGEN SATURATION: 98 % | TEMPERATURE: 99 F | RESPIRATION RATE: 18 BRPM | SYSTOLIC BLOOD PRESSURE: 119 MMHG | HEART RATE: 91 BPM

## 2023-12-23 LAB
ANION GAP SERPL CALC-SCNC: 12 MMOL/L — SIGNIFICANT CHANGE UP (ref 7–14)
ANION GAP SERPL CALC-SCNC: 12 MMOL/L — SIGNIFICANT CHANGE UP (ref 7–14)
BUN SERPL-MCNC: 11 MG/DL — SIGNIFICANT CHANGE UP (ref 7–23)
BUN SERPL-MCNC: 11 MG/DL — SIGNIFICANT CHANGE UP (ref 7–23)
CALCIUM SERPL-MCNC: 9.5 MG/DL — SIGNIFICANT CHANGE UP (ref 8.4–10.5)
CALCIUM SERPL-MCNC: 9.5 MG/DL — SIGNIFICANT CHANGE UP (ref 8.4–10.5)
CHLORIDE SERPL-SCNC: 105 MMOL/L — SIGNIFICANT CHANGE UP (ref 98–107)
CHLORIDE SERPL-SCNC: 105 MMOL/L — SIGNIFICANT CHANGE UP (ref 98–107)
CO2 SERPL-SCNC: 23 MMOL/L — SIGNIFICANT CHANGE UP (ref 22–31)
CO2 SERPL-SCNC: 23 MMOL/L — SIGNIFICANT CHANGE UP (ref 22–31)
CREAT SERPL-MCNC: 0.92 MG/DL — SIGNIFICANT CHANGE UP (ref 0.5–1.3)
CREAT SERPL-MCNC: 0.92 MG/DL — SIGNIFICANT CHANGE UP (ref 0.5–1.3)
EGFR: 89 ML/MIN/1.73M2 — SIGNIFICANT CHANGE UP
EGFR: 89 ML/MIN/1.73M2 — SIGNIFICANT CHANGE UP
GLUCOSE BLDC GLUCOMTR-MCNC: 114 MG/DL — HIGH (ref 70–99)
GLUCOSE BLDC GLUCOMTR-MCNC: 114 MG/DL — HIGH (ref 70–99)
GLUCOSE BLDC GLUCOMTR-MCNC: 99 MG/DL — SIGNIFICANT CHANGE UP (ref 70–99)
GLUCOSE BLDC GLUCOMTR-MCNC: 99 MG/DL — SIGNIFICANT CHANGE UP (ref 70–99)
GLUCOSE SERPL-MCNC: 96 MG/DL — SIGNIFICANT CHANGE UP (ref 70–99)
GLUCOSE SERPL-MCNC: 96 MG/DL — SIGNIFICANT CHANGE UP (ref 70–99)
HCT VFR BLD CALC: 29 % — LOW (ref 39–50)
HCT VFR BLD CALC: 29 % — LOW (ref 39–50)
HGB BLD-MCNC: 9.7 G/DL — LOW (ref 13–17)
HGB BLD-MCNC: 9.7 G/DL — LOW (ref 13–17)
MAGNESIUM SERPL-MCNC: 1.9 MG/DL — SIGNIFICANT CHANGE UP (ref 1.6–2.6)
MAGNESIUM SERPL-MCNC: 1.9 MG/DL — SIGNIFICANT CHANGE UP (ref 1.6–2.6)
MCHC RBC-ENTMCNC: 31.4 PG — SIGNIFICANT CHANGE UP (ref 27–34)
MCHC RBC-ENTMCNC: 31.4 PG — SIGNIFICANT CHANGE UP (ref 27–34)
MCHC RBC-ENTMCNC: 33.4 GM/DL — SIGNIFICANT CHANGE UP (ref 32–36)
MCHC RBC-ENTMCNC: 33.4 GM/DL — SIGNIFICANT CHANGE UP (ref 32–36)
MCV RBC AUTO: 93.9 FL — SIGNIFICANT CHANGE UP (ref 80–100)
MCV RBC AUTO: 93.9 FL — SIGNIFICANT CHANGE UP (ref 80–100)
NRBC # BLD: 0 /100 WBCS — SIGNIFICANT CHANGE UP (ref 0–0)
NRBC # BLD: 0 /100 WBCS — SIGNIFICANT CHANGE UP (ref 0–0)
NRBC # FLD: 0 K/UL — SIGNIFICANT CHANGE UP (ref 0–0)
NRBC # FLD: 0 K/UL — SIGNIFICANT CHANGE UP (ref 0–0)
PHOSPHATE SERPL-MCNC: 3.7 MG/DL — SIGNIFICANT CHANGE UP (ref 2.5–4.5)
PHOSPHATE SERPL-MCNC: 3.7 MG/DL — SIGNIFICANT CHANGE UP (ref 2.5–4.5)
PLATELET # BLD AUTO: 257 K/UL — SIGNIFICANT CHANGE UP (ref 150–400)
PLATELET # BLD AUTO: 257 K/UL — SIGNIFICANT CHANGE UP (ref 150–400)
POTASSIUM SERPL-MCNC: 3.1 MMOL/L — LOW (ref 3.5–5.3)
POTASSIUM SERPL-MCNC: 3.1 MMOL/L — LOW (ref 3.5–5.3)
POTASSIUM SERPL-SCNC: 3.1 MMOL/L — LOW (ref 3.5–5.3)
POTASSIUM SERPL-SCNC: 3.1 MMOL/L — LOW (ref 3.5–5.3)
RBC # BLD: 3.09 M/UL — LOW (ref 4.2–5.8)
RBC # BLD: 3.09 M/UL — LOW (ref 4.2–5.8)
RBC # FLD: 15.7 % — HIGH (ref 10.3–14.5)
RBC # FLD: 15.7 % — HIGH (ref 10.3–14.5)
SODIUM SERPL-SCNC: 140 MMOL/L — SIGNIFICANT CHANGE UP (ref 135–145)
SODIUM SERPL-SCNC: 140 MMOL/L — SIGNIFICANT CHANGE UP (ref 135–145)
WBC # BLD: 3.82 K/UL — SIGNIFICANT CHANGE UP (ref 3.8–10.5)
WBC # BLD: 3.82 K/UL — SIGNIFICANT CHANGE UP (ref 3.8–10.5)
WBC # FLD AUTO: 3.82 K/UL — SIGNIFICANT CHANGE UP (ref 3.8–10.5)
WBC # FLD AUTO: 3.82 K/UL — SIGNIFICANT CHANGE UP (ref 3.8–10.5)

## 2023-12-23 PROCEDURE — 99239 HOSP IP/OBS DSCHRG MGMT >30: CPT

## 2023-12-23 RX ORDER — INSULIN GLARGINE 100 [IU]/ML
15 INJECTION, SOLUTION SUBCUTANEOUS
Refills: 0 | DISCHARGE

## 2023-12-23 RX ORDER — RAMIPRIL 5 MG
1 CAPSULE ORAL
Refills: 0 | DISCHARGE

## 2023-12-23 RX ORDER — POTASSIUM CHLORIDE 20 MEQ
40 PACKET (EA) ORAL ONCE
Refills: 0 | Status: COMPLETED | OUTPATIENT
Start: 2023-12-23 | End: 2023-12-23

## 2023-12-23 RX ORDER — AMLODIPINE BESYLATE 2.5 MG/1
1 TABLET ORAL
Refills: 0 | DISCHARGE

## 2023-12-23 RX ADMIN — Medication 1 TABLET(S): at 12:00

## 2023-12-23 RX ADMIN — PANTOPRAZOLE SODIUM 40 MILLIGRAM(S): 20 TABLET, DELAYED RELEASE ORAL at 06:26

## 2023-12-23 RX ADMIN — CHLORHEXIDINE GLUCONATE 1 APPLICATION(S): 213 SOLUTION TOPICAL at 12:17

## 2023-12-23 RX ADMIN — Medication 40 MILLIEQUIVALENT(S): at 12:19

## 2023-12-23 RX ADMIN — NAFCILLIN 200 GRAM(S): 10 INJECTION, POWDER, FOR SOLUTION INTRAVENOUS at 11:59

## 2023-12-23 RX ADMIN — HEPARIN SODIUM 5000 UNIT(S): 5000 INJECTION INTRAVENOUS; SUBCUTANEOUS at 06:26

## 2023-12-23 RX ADMIN — LISINOPRIL 5 MILLIGRAM(S): 2.5 TABLET ORAL at 06:26

## 2023-12-23 RX ADMIN — NAFCILLIN 200 GRAM(S): 10 INJECTION, POWDER, FOR SOLUTION INTRAVENOUS at 03:59

## 2023-12-23 RX ADMIN — Medication 2000 UNIT(S): at 12:00

## 2023-12-23 NOTE — PROGRESS NOTE ADULT - NUTRITIONAL ASSESSMENT
This patient has been assessed with a concern for Malnutrition and has been determined to have a diagnosis/diagnoses of Severe protein-calorie malnutrition.    This patient is being managed with:   Diet Consistent Carbohydrate w/Evening Snack-  Supplement Feeding Modality:  Oral  Glucerna Shake Cans or Servings Per Day:  1       Frequency:  Three Times a day  Entered: Nov 28 2023  5:16PM  
This patient has been assessed with a concern for Malnutrition and has been determined to have a diagnosis/diagnoses of Severe protein-calorie malnutrition.    This patient is being managed with:   Diet Pureed-  Consistent Carbohydrate {No Snacks} (CSTCHO)  Supplement Feeding Modality:  Oral  Glucerna Shake Cans or Servings Per Day:  1       Frequency:  Three Times a day  Entered: Nov 27 2023  1:39PM  
This patient has been assessed with a concern for Malnutrition and has been determined to have a diagnosis/diagnoses of Severe protein-calorie malnutrition.    This patient is being managed with:   Diet Consistent Carbohydrate w/Evening Snack-  Supplement Feeding Modality:  Oral  Glucerna Shake Cans or Servings Per Day:  1       Frequency:  Three Times a day  Entered: Nov 28 2023  5:16PM  
This patient has been assessed with a concern for Malnutrition and has been determined to have a diagnosis/diagnoses of Severe protein-calorie malnutrition.    This patient is being managed with:   Diet NPO after Midnight-     NPO Start Date: 04-Dec-2023   NPO Start Time: 23:59  Entered: Dec  4 2023  5:01PM    Diet Consistent Carbohydrate w/Evening Snack-  Supplement Feeding Modality:  Oral  Glucerna Shake Cans or Servings Per Day:  1       Frequency:  Three Times a day  Entered: Nov 28 2023  5:16PM  
This patient has been assessed with a concern for Malnutrition and has been determined to have a diagnosis/diagnoses of Severe protein-calorie malnutrition.    This patient is being managed with:   Diet NPO-  NPO for Procedure/Test     NPO Start Date: 03-Dec-2023   NPO Start Time: 23:59  Except Medications  Entered: Dec  2 2023  7:19AM    Diet Consistent Carbohydrate w/Evening Snack-  Supplement Feeding Modality:  Oral  Glucerna Shake Cans or Servings Per Day:  1       Frequency:  Three Times a day  Entered: Nov 28 2023  5:16PM  
This patient has been assessed with a concern for Malnutrition and has been determined to have a diagnosis/diagnoses of Severe protein-calorie malnutrition.    This patient is being managed with:   Diet NPO-  Except Medications  Entered: Nov 22 2023 10:45AM  
This patient has been assessed with a concern for Malnutrition and has been determined to have a diagnosis/diagnoses of Severe protein-calorie malnutrition.    This patient is being managed with:   Diet Consistent Carbohydrate w/Evening Snack-  Supplement Feeding Modality:  Oral  Glucerna Shake Cans or Servings Per Day:  1       Frequency:  Three Times a day  Entered: Nov 28 2023  5:16PM  
This patient has been assessed with a concern for Malnutrition and has been determined to have a diagnosis/diagnoses of Severe protein-calorie malnutrition.    This patient is being managed with:   Diet NPO-  Except Medications  Entered: Nov 22 2023 10:45AM  
This patient has been assessed with a concern for Malnutrition and has been determined to have a diagnosis/diagnoses of Severe protein-calorie malnutrition.    This patient is being managed with:   Diet Pureed-  Consistent Carbohydrate {No Snacks} (CSTCHO)  Supplement Feeding Modality:  Oral  Glucerna Shake Cans or Servings Per Day:  1       Frequency:  Three Times a day  Entered: Nov 27 2023  1:39PM  
This patient has been assessed with a concern for Malnutrition and has been determined to have a diagnosis/diagnoses of Severe protein-calorie malnutrition.    This patient is being managed with:   Diet Consistent Carbohydrate w/Evening Snack-  Supplement Feeding Modality:  Oral  Glucerna Shake Cans or Servings Per Day:  1       Frequency:  Three Times a day  Entered: Nov 28 2023  5:16PM  
This patient has been assessed with a concern for Malnutrition and has been determined to have a diagnosis/diagnoses of Severe protein-calorie malnutrition.    This patient is being managed with:   Diet NPO-  NPO for Procedure/Test     NPO Start Date: 03-Dec-2023   NPO Start Time: 23:59  Except Medications  Entered: Dec  2 2023  7:19AM    Diet Consistent Carbohydrate w/Evening Snack-  Supplement Feeding Modality:  Oral  Glucerna Shake Cans or Servings Per Day:  1       Frequency:  Three Times a day  Entered: Nov 28 2023  5:16PM  
This patient has been assessed with a concern for Malnutrition and has been determined to have a diagnosis/diagnoses of Severe protein-calorie malnutrition.    This patient is being managed with:   Diet Consistent Carbohydrate w/Evening Snack-  Supplement Feeding Modality:  Oral  Glucerna Shake Cans or Servings Per Day:  1       Frequency:  Three Times a day  Entered: Nov 28 2023  5:16PM  
This patient has been assessed with a concern for Malnutrition and has been determined to have a diagnosis/diagnoses of Severe protein-calorie malnutrition.    This patient is being managed with:   Diet NPO after Midnight-     NPO Start Date: 20-Dec-2023   NPO Start Time: 23:59  Except Medications  Entered: Dec 20 2023  2:23PM    Diet Consistent Carbohydrate w/Evening Snack-  Supplement Feeding Modality:  Oral  Glucerna Shake Cans or Servings Per Day:  1       Frequency:  Three Times a day  Entered: Nov 28 2023  5:16PM  
This patient has been assessed with a concern for Malnutrition and has been determined to have a diagnosis/diagnoses of Severe protein-calorie malnutrition.    This patient is being managed with:   Diet Consistent Carbohydrate w/Evening Snack-  Supplement Feeding Modality:  Oral  Glucerna Shake Cans or Servings Per Day:  1       Frequency:  Three Times a day  Entered: Nov 28 2023  5:16PM  
This patient has been assessed with a concern for Malnutrition and has been determined to have a diagnosis/diagnoses of Severe protein-calorie malnutrition.    This patient is being managed with:   Diet NPO-  NPO for Procedure/Test     NPO Start Date: 03-Dec-2023   NPO Start Time: 23:59  Except Medications  Entered: Dec  2 2023  7:19AM    Diet Consistent Carbohydrate w/Evening Snack-  Supplement Feeding Modality:  Oral  Glucerna Shake Cans or Servings Per Day:  1       Frequency:  Three Times a day  Entered: Nov 28 2023  5:16PM  
This patient has been assessed with a concern for Malnutrition and has been determined to have a diagnosis/diagnoses of Severe protein-calorie malnutrition.    This patient is being managed with:   Diet Consistent Carbohydrate w/Evening Snack-  Supplement Feeding Modality:  Oral  Glucerna Shake Cans or Servings Per Day:  1       Frequency:  Three Times a day  Entered: Nov 28 2023  5:16PM  
This patient has been assessed with a concern for Malnutrition and has been determined to have a diagnosis/diagnoses of Severe protein-calorie malnutrition.    This patient is being managed with:   Diet NPO-  NPO for Procedure/Test     NPO Start Date: 03-Dec-2023   NPO Start Time: 23:59  Except Medications  Entered: Dec  2 2023  7:19AM    Diet Consistent Carbohydrate w/Evening Snack-  Supplement Feeding Modality:  Oral  Glucerna Shake Cans or Servings Per Day:  1       Frequency:  Three Times a day  Entered: Nov 28 2023  5:16PM  
This patient has been assessed with a concern for Malnutrition and has been determined to have a diagnosis/diagnoses of Severe protein-calorie malnutrition.    This patient is being managed with:   Diet Consistent Carbohydrate w/Evening Snack-  Supplement Feeding Modality:  Oral  Glucerna Shake Cans or Servings Per Day:  1       Frequency:  Three Times a day  Entered: Nov 28 2023  5:16PM  

## 2023-12-23 NOTE — PROGRESS NOTE ADULT - PROBLEM SELECTOR PROBLEM 12
Anemia of chronic disease
Prophylactic measure
Anemia of chronic disease
Need for prophylactic measure
Anemia of chronic disease
Prophylactic measure
Anemia of chronic disease
Prophylactic measure
Prophylactic measure
Anemia of chronic disease
Anemia of chronic disease
Need for prophylactic measure
Anemia of chronic disease
Anemia of chronic disease
Prophylactic measure
Prophylactic measure
Anemia of chronic disease

## 2023-12-23 NOTE — PROGRESS NOTE ADULT - PROVIDER SPECIALTY LIST ADULT
Infectious Disease
Intervent Radiology
Orthopedics
Plastic Surgery
Pulmonology
Rad Onc
SICU
Anesthesia
Hospitalist
Hospitalist
Infectious Disease
Orthopedics
Pain Medicine
Plastic Surgery
Hospitalist
Infectious Disease
Orthopedics
Pain Medicine
Pain Medicine
Plastic Surgery
Pulmonology
Pulmonology
Rad Onc
Rad Onc
Hospitalist
Hospitalist
Infectious Disease
Orthopedics
Orthopedics
Plastic Surgery
SICU
Heme/Onc
Infectious Disease
Endocrinology
Hospitalist
Hospitalist
Infectious Disease
Infectious Disease
Internal Medicine
Endocrinology
Hospitalist
Plastic Surgery
Hospitalist
Internal Medicine
Internal Medicine
Hospitalist
Hospitalist
Internal Medicine
Hospitalist
Internal Medicine
Internal Medicine
Hospitalist
Internal Medicine
Hospitalist
Internal Medicine
Hospitalist

## 2023-12-23 NOTE — PROGRESS NOTE ADULT - SUBJECTIVE AND OBJECTIVE BOX
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 (22 Dec 2023 08:07)    SUBJECTIVE / OVERNIGHT EVENTS: no acute events overnight     MEDICATIONS  (STANDING):  atorvastatin 20 milliGRAM(s) Oral at bedtime  bicalutamide 50 milliGRAM(s) Oral daily  chlorhexidine 2% Cloths 1 Application(s) Topical daily  cholecalciferol 2000 Unit(s) Oral daily  heparin   Injectable 5000 Unit(s) SubCutaneous every 8 hours  lactobacillus acidophilus 1 Tablet(s) Oral daily  lisinopril 5 milliGRAM(s) Oral daily  nafcillin  IVPB 2 Gram(s) IV Intermittent every 4 hours  pantoprazole    Tablet 40 milliGRAM(s) Oral before breakfast  tamsulosin 0.4 milliGRAM(s) Oral at bedtime    MEDICATIONS  (PRN):  benzocaine/menthol Lozenge 1 Lozenge Oral every 6 hours PRN Sore Throat  guaiFENesin Oral Liquid (Sugar-Free) 200 milliGRAM(s) Oral every 6 hours PRN Cough  levalbuterol Inhalation 0.63 milliGRAM(s) Inhalation every 6 hours PRN shortness of breath/wheezing  naloxone Injectable 0.1 milliGRAM(s) IV Push every 3 minutes PRN For ANY of the following changes in patient status:  A. RR LESS THAN 10 breaths per minute, B. Oxygen saturation LESS THAN 90%, C. Sedation score of 6  ondansetron Injectable 4 milliGRAM(s) IV Push every 6 hours PRN Nausea      PHYSICAL EXAM:  Vital Signs Last 24 Hrs  T(C): 36.6 (23 Dec 2023 05:00), Max: 36.9 (22 Dec 2023 21:01)  T(F): 97.9 (23 Dec 2023 05:00), Max: 98.4 (22 Dec 2023 21:01)  HR: 71 (23 Dec 2023 05:00) (70 - 71)  BP: 128/71 (23 Dec 2023 05:00) (128/71 - 130/79)  BP(mean): --  RR: 17 (23 Dec 2023 05:00) (17 - 18)  SpO2: 99% (23 Dec 2023 05:00) (99% - 100%)    Parameters below as of 23 Dec 2023 05:00  Patient On (Oxygen Delivery Method): room air        CONSTITUTIONAL: NAD, well-developed, well-groomed  RESPIRATORY: Normal respiratory effort; lungs are clear to auscultation bilaterally  CARDIOVASCULAR: Regular rate and rhythm, normal S1 and S2, no murmur/rub/gallop; No lower extremity edema  GASTROINTESTINAL: Nontender to palpation, normoactive bowel sounds, no rebound/guarding; No hepatosplenomegaly  MUSCULOSKELETAL:  no clubbing or cyanosis of digits; no joint swelling or tenderness to palpation  NEUROLOGY: non-focal; no gross sensory deficits   PSYCH: A+O to person, place, and time; affect appropriate  SKIN: No rashes; warm     LABS:                        9.7    3.82  )-----------( 257      ( 23 Dec 2023 08:36 )             29.0     12-23    140  |  105  |  11  ----------------------------<  96  3.1<L>   |  23  |  0.92    Ca    9.5      23 Dec 2023 08:36  Phos  3.7     12-23  Mg     1.90     12-23            Urinalysis Basic - ( 23 Dec 2023 08:36 )    Color: x / Appearance: x / SG: x / pH: x  Gluc: 96 mg/dL / Ketone: x  / Bili: x / Urobili: x   Blood: x / Protein: x / Nitrite: x   Leuk Esterase: x / RBC: x / WBC x   Sq Epi: x / Non Sq Epi: x / Bacteria: x          RADIOLOGY & ADDITIONAL TESTS:  Results Reviewed:   Imaging Personally Reviewed:  Electrocardiogram Personally Reviewed:    COORDINATION OF CARE:  Care Discussed with Consultants/Other Providers [Y/N]:  Prior or Outpatient Records Reviewed [Y/N]:   uMna Mckinley MD  [facility] Division of Hospital Medicine  Pager [phone] (M-F 8AM-5PM)  Other Times: [id number]    Patient is a 73y old  Male who presents with a chief complaint of 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 (22 Dec 2023 08:07)    SUBJECTIVE / OVERNIGHT EVENTS: no acute events overnight     MEDICATIONS  (STANDING):  atorvastatin 20 milliGRAM(s) Oral at bedtime  bicalutamide 50 milliGRAM(s) Oral daily  chlorhexidine 2% Cloths 1 Application(s) Topical daily  cholecalciferol 2000 Unit(s) Oral daily  heparin   Injectable 5000 Unit(s) SubCutaneous every 8 hours  lactobacillus acidophilus 1 Tablet(s) Oral daily  lisinopril 5 milliGRAM(s) Oral daily  nafcillin  IVPB 2 Gram(s) IV Intermittent every 4 hours  pantoprazole    Tablet 40 milliGRAM(s) Oral before breakfast  tamsulosin 0.4 milliGRAM(s) Oral at bedtime    MEDICATIONS  (PRN):  benzocaine/menthol Lozenge 1 Lozenge Oral every 6 hours PRN Sore Throat  guaiFENesin Oral Liquid (Sugar-Free) 200 milliGRAM(s) Oral every 6 hours PRN Cough  levalbuterol Inhalation 0.63 milliGRAM(s) Inhalation every 6 hours PRN shortness of breath/wheezing  naloxone Injectable 0.1 milliGRAM(s) IV Push every 3 minutes PRN For ANY of the following changes in patient status:  A. RR LESS THAN 10 breaths per minute, B. Oxygen saturation LESS THAN 90%, C. Sedation score of 6  ondansetron Injectable 4 milliGRAM(s) IV Push every 6 hours PRN Nausea      PHYSICAL EXAM:  Vital Signs Last 24 Hrs  T(C): 36.6 (23 Dec 2023 05:00), Max: 36.9 (22 Dec 2023 21:01)  T(F): 97.9 (23 Dec 2023 05:00), Max: 98.4 (22 Dec 2023 21:01)  HR: 71 (23 Dec 2023 05:00) (70 - 71)  BP: 128/71 (23 Dec 2023 05:00) (128/71 - 130/79)  BP(mean): --  RR: 17 (23 Dec 2023 05:00) (17 - 18)  SpO2: 99% (23 Dec 2023 05:00) (99% - 100%)    Parameters below as of 23 Dec 2023 05:00  Patient On (Oxygen Delivery Method): room air        CONSTITUTIONAL: NAD, well-developed, well-groomed  RESPIRATORY: Normal respiratory effort; lungs are clear to auscultation bilaterally  CARDIOVASCULAR: Regular rate and rhythm, normal S1 and S2, no murmur/rub/gallop; No lower extremity edema  GASTROINTESTINAL: Nontender to palpation, normoactive bowel sounds, no rebound/guarding; No hepatosplenomegaly  MUSCULOSKELETAL:  no clubbing or cyanosis of digits; no joint swelling or tenderness to palpation  NEUROLOGY: non-focal; no gross sensory deficits   PSYCH: A+O to person, place, and time; affect appropriate  SKIN: No rashes; warm     LABS:                        9.7    3.82  )-----------( 257      ( 23 Dec 2023 08:36 )             29.0     12-23    140  |  105  |  11  ----------------------------<  96  3.1<L>   |  23  |  0.92    Ca    9.5      23 Dec 2023 08:36  Phos  3.7     12-23  Mg     1.90     12-23            Urinalysis Basic - ( 23 Dec 2023 08:36 )    Color: x / Appearance: x / SG: x / pH: x  Gluc: 96 mg/dL / Ketone: x  / Bili: x / Urobili: x   Blood: x / Protein: x / Nitrite: x   Leuk Esterase: x / RBC: x / WBC x   Sq Epi: x / Non Sq Epi: x / Bacteria: x          RADIOLOGY & ADDITIONAL TESTS:  Results Reviewed:   Imaging Personally Reviewed:  Electrocardiogram Personally Reviewed:    COORDINATION OF CARE:  Care Discussed with Consultants/Other Providers [Y/N]:  Prior or Outpatient Records Reviewed [Y/N]:

## 2023-12-23 NOTE — DISCHARGE NOTE NURSING/CASE MANAGEMENT/SOCIAL WORK - NSDCFUADDAPPT_GEN_ALL_CORE_FT
Follow up with Rad/OncDr. Scales at [place] [facility] next to [facility], at- [address] [phone] appt made January 9th 8:30AM    Followup for Kyphoplasty  [facility] [phone]     IR: Outpt scheduling phone number is [phone] for IVC filter removal. Follow up with Rad/OncDr. Scales at [place] [facility] next to [facility], at- [address] [phone] appt made January 9th 8:30AM    Followup for Kyphoplasty  [facility] [phone]     IR: Outpt scheduling phone number is [phone] for IVC filter removal.

## 2023-12-23 NOTE — DISCHARGE NOTE NURSING/CASE MANAGEMENT/SOCIAL WORK - CAREGIVER ADDRESS
[address] [facility] [phone] [facility] [id number]

## 2023-12-23 NOTE — PROGRESS NOTE ADULT - ASSESSMENT
73M with metastatic prostate cancer initially admitted to [facility] for rhabdo + SRUTHI, transferred for spinal cord compression from metastasis s/p C5-T3 Fusion, C7-T1 decompression 11/2 with course c/b MSSA bacteremia from surgical site infection, bilateral LE DVT s/p IVC filter 11/1 by IR, candida esophagitis w/ dysphagia, aspiration multilobar PNA, acute on chronic anemia, and C. diff colitis.   73M with metastatic prostate cancer initially admitted to [facility] for rhabdo + SRUTHI, transferred for spinal cord compression from metastasis s/p C5-T3 Fusion, C7-T1 decompression 11/2 with course c/b MSSA bacteremia from surgical site infection, bilateral LE DVT s/p IVC filter 11/1 by IR, candida esophagitis w/ dysphagia, aspiration multilobar PNA, acute on chronic anemia, and C. diff colitis.

## 2023-12-23 NOTE — DISCHARGE NOTE NURSING/CASE MANAGEMENT/SOCIAL WORK - PATIENT PORTAL LINK FT
You can access the FollowMyHealth Patient Portal offered by [facility] by registering at the following website: [email or web address]. By joining [employer]’s FollowMyHealth portal, you will also be able to view your health information using other applications (apps) compatible with our system. You can access the FollowMyHealth Patient Portal offered by [facility] by registering at the following website: [email or web address]. By joining [employer]’s FollowMyHealth portal, you will also be able to view your health information using other applications (apps) compatible with our system.

## 2023-12-23 NOTE — PROGRESS NOTE ADULT - REASON FOR ADMISSION
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

## 2023-12-23 NOTE — PROGRESS NOTE ADULT - PROBLEM SELECTOR PLAN 12
DVT ppx: HSQ  DIET: DASH + CC  DISPO: ELTON. Discussed with hemeonc - patient does not need chemo while at rehab, next Lupron dose due 1/23/23, [facility] emailed by onc fellow - they will call patient to set up appt after dc from rehab. Daughter Olga to follow up with radonc and IR. 37 mins spent dc planning.

## 2023-12-23 NOTE — PROGRESS NOTE ADULT - PROBLEM SELECTOR PLAN 6
previously diagnosed with prostate ca in 2009  - NM scan showing multiple osseous lesions throughout body  - IR consulted for kyphoplasty 12/4 in anticipation for Rad Tx, per IR no plan for kyphoplasty for now given acute infection/bacteremia, pt to f/u as outpt  - Radonc: patient would benefit from SBRT which is only offered outpatient - family will follow up post dc  - Hemeonc: c/w bicalutamide 50mg qd, Lupron 12/22 - followed by qmonthly   - hemeonc will set up outpatient followup, radonc set up followup

## 2023-12-23 NOTE — DISCHARGE NOTE NURSING/CASE MANAGEMENT/SOCIAL WORK - NSDCPEFALRISK_GEN_ALL_CORE
For information on Fall & Injury Prevention, visit: [email or web address] OR  [email or web address] OR  https://www.cdc.gov/steadi/patient.html For information on Fall & Injury Prevention, visit: [email or web address] OR  [email or web address] OR  https://www.cdc.gov/steadi/patient.html

## 2023-12-23 NOTE — CHART NOTE - NSCHARTNOTEFT_GEN_A_CORE
Patient seen by oncology team yesterday, and signed off on patient. Called today by primary team to advise transport would not be possible back and forth from rehab. Per note yesterday, Lupron is given monthly and was administered prior to hospital discharge so next Lupron dose is Jan 23, 2023. Will reach out to primary coverage team to ensure email was sent to [facility] to have patient follow up at [facility] once she is discharged from rehab. Primary team advised patient will complete rehab before Jan 23, 2023.    ***************************************************************  Perri Lemus, PGY4  Fellow Hematology/Oncology  pager: [phone]   Available on [employer] Teams  After 5pm or on weekends please contact  to page on-call fellow   *************************************************************** Patient seen by oncology team yesterday, and signed off on patient. Called today by primary team to advise transport would not be possible back and forth from rehab. Per note yesterday, Lupron is given monthly and was administered prior to hospital discharge so next Lupron dose is Jan 23, 2023. Will reach out to primary coverage team to ensure email was sent to [facility] to have patient follow up at [facility] once she is discharged from rehab. Primary team advised patient will complete rehab before Jan 23, 2023.    ***************************************************************  Perri Lemus, PGY4  Fellow Hematology/Oncology  pager: [phone]   Available on [employer] Teams  After 5pm or on weekends please contact  to page on-call fellow   ***************************************************************

## 2023-12-23 NOTE — PROGRESS NOTE ADULT - PROBLEM SELECTOR PROBLEM 10
T2DM (type 2 diabetes mellitus) Azathioprine Pregnancy And Lactation Text: This medication is Pregnancy Category D and isn't considered safe during pregnancy. It is unknown if this medication is excreted in breast milk.

## 2023-12-23 NOTE — CHART NOTE - NSCHARTNOTESELECT_GEN_ALL_CORE
ACP-NP Note/Event Note
Abx/Chemo Rehab/Event Note
Endocrine
Event Note
Heme/Onc/Event Note
Hospitalist/Event Note
Interventional Radiology/Off Service Note
MAR Accept/Event Note
Nutrition Follow Up/Nutrition Services
Ortho/Event Note
Orthopedic preop
Endocrine
Event Note
Follow-Up/Nutrition Services
Follow-up/Nutrition Services
IR pre-procedure note/Event Note
Nutrition Services
Nutrition Services
Rad Onc/Event Note
Transfer Note/Event Note
rad onc/Event Note
Heme-onc chart note/Event Note
IR pre-procedure/Event Note
Nutrition Services
Oncology/Event Note
Ortho/Event Note
PRE-INTERVENTIONAL RADIOLOGY PROCEDURE NOTE/Event Note

## 2024-01-04 NOTE — HISTORY OF PRESENT ILLNESS
[FreeTextEntry1] : Postoperative changes compatible with laminectomies and posterior spinal fusion is again seen involving the lower cervical and upper thoracic spine region. There is evidence of a large collection seen involving the posterior paraspinal soft tissue region. This extends from at least C5 down to T6 and measures approximately 4.0 x 6.7 cm and previously measured approximately 3.5 x 8.6 cm. This finding does demonstrate peripheral enhancement. While this could be compatible sterile postoperative collection the possibility of underlying abscess cannot be entirely excluded.  Scoliosis is seen.  Slight increased kyphosis seen.  The vertebral body height and alignment appears maintained and unchanged.  Abnormal T1 prolongation associated enhancement is seen involving the C7, T1, T2, T3, T4, T5 and T12 vertebral bodies. Involvement of posterior elements at most of these levels are seen as well. These findings are likely compatible with osseous metastasis and appear unchanged when compared prior exam. Subtle T2 prolongation involving the spinal cord at the T1 level is suspected (9-9). These findings were present on prior MRI of the thoracic spine performed on October 30, 2023.  No abnormal disc herniations or significant central neural foramina stenosis  There is evidence of abnormal signal seen involving the lungs bilaterally. These findings are new when compared with the prior exam. Please refer to CT scan of chest performed on November 27, 2023.

## 2024-01-09 ENCOUNTER — APPOINTMENT (OUTPATIENT)
Dept: RADIATION ONCOLOGY | Facility: CLINIC | Age: 74
End: 2024-01-09

## 2024-01-31 ENCOUNTER — APPOINTMENT (OUTPATIENT)
Dept: HEMATOLOGY ONCOLOGY | Facility: CLINIC | Age: 74
End: 2024-01-31

## 2024-02-01 RX ORDER — RAMIPRIL 2.5 MG/1
2.5 CAPSULE ORAL
Qty: 90 | Refills: 1 | Status: DISCONTINUED | COMMUNITY
Start: 2021-09-08 | End: 2024-02-01

## 2024-02-02 ENCOUNTER — MED ADMIN CHARGE (OUTPATIENT)
Age: 74
End: 2024-02-02

## 2024-02-02 ENCOUNTER — APPOINTMENT (OUTPATIENT)
Dept: UROLOGY | Facility: CLINIC | Age: 74
End: 2024-02-02
Payer: MEDICARE

## 2024-02-02 VITALS
BODY MASS INDEX: 27 KG/M2 | HEART RATE: 99 BPM | TEMPERATURE: 97.9 F | SYSTOLIC BLOOD PRESSURE: 147 MMHG | HEIGHT: 67 IN | DIASTOLIC BLOOD PRESSURE: 85 MMHG | OXYGEN SATURATION: 95 % | WEIGHT: 172 LBS

## 2024-02-02 DIAGNOSIS — R39.9 UNSPECIFIED SYMPTOMS AND SIGNS INVOLVING THE GENITOURINARY SYSTEM: ICD-10-CM

## 2024-02-02 DIAGNOSIS — R59.0 LOCALIZED ENLARGED LYMPH NODES: ICD-10-CM

## 2024-02-02 PROCEDURE — 99214 OFFICE O/P EST MOD 30 MIN: CPT

## 2024-02-02 RX ORDER — TAMSULOSIN HYDROCHLORIDE 0.4 MG/1
0.4 CAPSULE ORAL
Qty: 90 | Refills: 3 | Status: ACTIVE | COMMUNITY
Start: 2024-02-02 | End: 1900-01-01

## 2024-02-02 RX ORDER — LEUPROLIDE ACETATE 22.5 MG
22.5 KIT INTRAMUSCULAR
Qty: 1 | Refills: 0 | Status: COMPLETED | OUTPATIENT
Start: 2024-02-02

## 2024-02-02 RX ADMIN — LEUPROLIDE ACETATE 0 MG: KIT at 00:00

## 2024-02-02 NOTE — ASSESSMENT
[FreeTextEntry1] : 74 yo male with metastatic prostate cancer on ADT with hx of pathologic spinal fractures.  HGsil will get a 3 month depot lupron injection today.  He will continue bicalutamide for now until he sees med onc.  I urged him to see med onc and rad onc as soon as possible for management of his cancer.  I will renew his tamsulosin today.   Plan: 1. Lupron 22.5 mg IM 2. PSA 3. Renew tamsulosin and bicalutamide 4. F/u with Dr. Tatum and Dr. Aponte

## 2024-02-02 NOTE — HISTORY OF PRESENT ILLNESS
[FreeTextEntry1] : [Dr. Aquino previous notes] This is a very pleasant 67  male with a history of HTN and longstanding history of elevated PSA s/p biopsy in 2010 and unclear pathology. Past records indicate progressive rise of PSA from 5.47 in 2014 to 9.79 today. Currently feels well without complaints, denies hematuria, dysuria, or bothersome LUTS. No recent weight loss, has longstanding back pain after a traumatic accident that has not changed in nature, and no family history of prostate cancer. He is unsure if he's been dx with prostate cancer, he has been following with Dr. Mckeon, radiation oncology, yearly for a "check-up" but denies any recent biopsies, imaging or ever having radiation therapy. Has previously seen Dr. Wilcox of  but not since 2011. Of note, he resides in the [facility] [place], and only visits the [place] for weeks at a time, and is planning to return to the  on August 23rd.  8/04/21 Patient last seen in 2017, recently hospitalized with DKA, workup included PSA = 50, CT A/P which shows a rectosigmoid mass and pelvic adenopathy with lymph node measuring up to 9mm but suspicious. He is currently voiding well, denies pain or weight loss. Seeing colorectal surgery later this week.   9/01/21 Underwent MRI showing PI-RADS 5 lesion and pelvic adenopathy as well as linda-rectal adenopathy.   9/29/21 Here for f/u. Underwent MRI fusion biopsy Path: Brook 3+4(GG2) involving 5/13 cores  *************** 2/2/24: Patient returns for follow up.  He was diagnosed with GG3 prostate cancer in 2021 but declined treatment at that time.  He presented to [facility] for SRUTHI, rhabdomyolysis and spinal cord compression fractures due to metastatic prostate cancer.  A PSA from 7/12/23 was 169.  He was scheduled to see radiation oncology and medical oncology for his metastatic cancer, but was unable to get to his appointments due to transportation issues.   He has already been started on bicalutamide and Lupron.  He is due for another Lupron injection today.   He complains fo LUTS with slow stream.  He was voiding better on tamsulosin while admitted to the hospital, but has not been on the medications an outpatient.

## 2024-02-02 NOTE — PHYSICAL EXAM
[General Appearance - Well Developed] : well developed [General Appearance - Well Nourished] : well nourished [] : no respiratory distress [Abdomen Soft] : soft [Skin Color & Pigmentation] : normal skin color and pigmentation [No Focal Deficits] : no focal deficits [Oriented To Time, Place, And Person] : oriented to person, place, and time [Not Anxious] : not anxious [de-identified] : Wheelchair

## 2024-02-05 LAB
PSA SERPL-MCNC: 2.82 NG/ML
TESTOST SERPL-MCNC: <2.5 NG/DL

## 2024-02-06 ENCOUNTER — APPOINTMENT (OUTPATIENT)
Dept: INFECTIOUS DISEASE | Facility: CLINIC | Age: 74
End: 2024-02-06
Payer: MEDICARE

## 2024-02-06 DIAGNOSIS — B95.61 BACTEREMIA: ICD-10-CM

## 2024-02-06 DIAGNOSIS — R93.89 ABNORMAL FINDINGS ON DIAGNOSTIC IMAGING OF OTHER SPECIFIED BODY STRUCTURES: ICD-10-CM

## 2024-02-06 DIAGNOSIS — R78.81 BACTEREMIA: ICD-10-CM

## 2024-02-06 PROCEDURE — 99214 OFFICE O/P EST MOD 30 MIN: CPT

## 2024-02-06 NOTE — ASSESSMENT
[FreeTextEntry1] : 73m with known metastatic prostate CA admitted initially to [facility] on 10/21 At that itme, he was diagnosed with lytic metastatic disease from C7, T1-T5, T12, and L1-L3 as well as epidural expansion of neoplasm at C7 centrally and dorsally resulting in moderate cord compression and underlying edema/extension into the bilateral C7-T1 and T1-2 and Left T2-3 neural foramina. Epidural disease was also noted at T12 vertebral body.    On 11/2, he underwent C7-T1 decompression, C5-T3 posterior cervical fusion.  Hospital course complicated by MSSA bacteremia which has cleared with negative TTE.   His course was complicated by MSSA bacteremia.  He did have drainage from his operative wounds and a collection was seen on imaging.   Surgery did not think additional intervention was needed.  He was given a 6 week course of antibiotics through 12/28.  At this time, he has changes on his imaging but no symptoms of infection.  He is 5-6 weeks out after stopping antibiotics  Observe off antibiotics.   Monitor for worsening pain, neurologic change, or signs of ifnection

## 2024-02-06 NOTE — HISTORY OF PRESENT ILLNESS
[Home] : at home, [unfilled] , at the time of the visit. [Medical Office: ([facility])___] : at the medical office located in  [FreeTextEntry1] : 73m with known metastatic prostate CA admitted initially to [facility] on 10/21 At that itme, he was diagnosed with lytic metastatic disease from C7, T1-T5, T12, and L1-L3 as well as epidural expansion of neoplasm at C7 centrally and dorsally resulting in moderate cord compression and underlying edema/extension into the bilateral C7-T1 and T1-2 and Left T2-3 neural foramina. Epidural disease was also noted at T12 vertebral body.    On 11/2, he underwent C7-T1 decompression, C5-T3 posterior cervical fusion.  Hospital course complicated by MSSA bacteremia which has cleared with negative TTE.   His course was complicated by MSSA bacteremia.  He did have drainage from his operative wounds and a collection was seen on imaging.   Surgery did not think additional intervention was needed.  He was given a 6 week course of antibiotics through 12/28.  He did not follow up at the end of this antibiotic course. His family called requesting a follow up. Patient was unable to come into the office and requested a tele visit.   He states he feels well. He denies fever. He states his pain is controlled and that his mobility is better. He denies drainage/ discharge from the surgical site.

## 2024-02-07 NOTE — DISCHARGE NOTE PROVIDER - NSDCHHBASESERVICE_GEN_ALL_CORE
Physical Therapy Daily Progress Note    [address] [facility], [address]  [place] [address]      Patient: Tyrell Guzmán   : 1960  Diagnosis/ICD-10 Code:  Primary osteoarthritis of right hip [M16.11]  Referring practitioner: Shaq High MD  Date of Initial Visit: Type: THERAPY  Noted: 2024  Today's Date: 2024  Patient seen for 6 sessions           Patient reports: mild groin soreness after last visit but not as much as the time before.       Objective   See Exercise, Manual, and Modality Logs for complete treatment.       Assessment/Plan  Progressed hip ER strengthening and anterior hip stretching with good tolerance.    Attempted to trial R sidelying exercise, but patient's seroma around distal incision began to drain. The incision was inspected in standing and no continued drainage was noted. His dressing was intact. Patient reported no pain. His surgeon was contacted and only standing activities were completed following this.        Timed:  Manual Therapy:    0     mins  [id number];  Therapeutic Exercise:    30     mins  20310;     Neuromuscular Zeb:   10    mins  14579;    Therapeutic Activity:     10     mins  04949;     Gait Trainin     mins  55665;     Ultrasound:     0     mins  01629;    Electrical Stimulation:    0     mins  52267 ( );    Untimed:  Electrical Stimulation:    0     mins  36139 ( );  Mechanical Traction:    0     mins  27780;     Timed Treatment:   50   mins   Total Treatment:     50   mins    Desmond Ford PT  Physical Therapist  
Nursing
10

## 2024-02-15 ENCOUNTER — NON-APPOINTMENT (OUTPATIENT)
Age: 74
End: 2024-02-15

## 2024-02-15 ENCOUNTER — APPOINTMENT (OUTPATIENT)
Dept: INTERNAL MEDICINE | Facility: CLINIC | Age: 74
End: 2024-02-15
Payer: MEDICARE

## 2024-02-15 VITALS
TEMPERATURE: 97.5 F | OXYGEN SATURATION: 96 % | WEIGHT: 169.13 LBS | DIASTOLIC BLOOD PRESSURE: 84 MMHG | HEART RATE: 93 BPM | BODY MASS INDEX: 26.55 KG/M2 | SYSTOLIC BLOOD PRESSURE: 156 MMHG | HEIGHT: 67 IN

## 2024-02-15 DIAGNOSIS — I10 ESSENTIAL (PRIMARY) HYPERTENSION: ICD-10-CM

## 2024-02-15 DIAGNOSIS — R26.81 UNSTEADINESS ON FEET: ICD-10-CM

## 2024-02-15 PROCEDURE — G2211 COMPLEX E/M VISIT ADD ON: CPT

## 2024-02-15 PROCEDURE — 99214 OFFICE O/P EST MOD 30 MIN: CPT

## 2024-02-15 PROCEDURE — 36415 COLL VENOUS BLD VENIPUNCTURE: CPT

## 2024-02-15 RX ORDER — AMLODIPINE BESYLATE 2.5 MG/1
2.5 TABLET ORAL DAILY
Qty: 1 | Refills: 1 | Status: ACTIVE | COMMUNITY
Start: 1900-01-01 | End: 1900-01-01

## 2024-02-15 RX ORDER — GLIPIZIDE 5 MG/1
5 TABLET ORAL
Qty: 30 | Refills: 3 | Status: DISCONTINUED | COMMUNITY
End: 2024-02-15

## 2024-02-15 RX ORDER — METFORMIN HYDROCHLORIDE 500 MG/1
500 TABLET, COATED ORAL DAILY
Qty: 1 | Refills: 1 | Status: DISCONTINUED | COMMUNITY
Start: 2023-07-12 | End: 2024-02-15

## 2024-02-15 NOTE — HISTORY OF PRESENT ILLNESS
[de-identified] : Mr. DARYN BRAVO is a 73 year old male with history of HTN, BPH, GERD, T2DM, HLD, LVH, Obesity, and metastatic Prostate CA presenting today for follow up. He was admitted in October and diagnosed with lytic metastatic disease in lower C spine, T spine and upper L spine resulting in moderate cord compression. He underwent C7-T1 decompression and C5-T3 posterior cervical fusion on 11/2/23. Hospital course complicated by MSSA bacteremia due to SSI which has cleared with negative TTE. He was given a 6 week course of antibiotics through 12/28/23. Of note his hospital course was also complicated by bilateral LE DVT s/p IVC filter 11/1/23, candida esophagitis w/ dysphagia, aspiration multilobar PNA, acute on chronic anemia, and C. diff colitis.

## 2024-02-16 LAB
ALBUMIN SERPL ELPH-MCNC: 4.8 G/DL
ALP BLD-CCNC: 196 U/L
ALT SERPL-CCNC: 11 U/L
ANION GAP SERPL CALC-SCNC: 15 MMOL/L
AST SERPL-CCNC: 15 U/L
BASOPHILS # BLD AUTO: 0.02 K/UL
BASOPHILS NFR BLD AUTO: 0.6 %
BILIRUB SERPL-MCNC: 0.6 MG/DL
BUN SERPL-MCNC: 21 MG/DL
CALCIUM SERPL-MCNC: 10.3 MG/DL
CHLORIDE SERPL-SCNC: 104 MMOL/L
CHOLEST SERPL-MCNC: 189 MG/DL
CO2 SERPL-SCNC: 24 MMOL/L
CREAT SERPL-MCNC: 0.81 MG/DL
CREAT SPEC-SCNC: 157 MG/DL
EGFR: 94 ML/MIN/1.73M2
EOSINOPHIL # BLD AUTO: 0.04 K/UL
EOSINOPHIL NFR BLD AUTO: 1.2 %
ESTIMATED AVERAGE GLUCOSE: 88 MG/DL
GLUCOSE SERPL-MCNC: 122 MG/DL
HBA1C MFR BLD HPLC: 4.7 %
HCT VFR BLD CALC: 33 %
HDLC SERPL-MCNC: 59 MG/DL
HGB BLD-MCNC: 11 G/DL
IMM GRANULOCYTES NFR BLD AUTO: 0.3 %
LDLC SERPL CALC-MCNC: 109 MG/DL
LYMPHOCYTES # BLD AUTO: 1.36 K/UL
LYMPHOCYTES NFR BLD AUTO: 39.8 %
MAN DIFF?: NORMAL
MCHC RBC-ENTMCNC: 30.3 PG
MCHC RBC-ENTMCNC: 33.3 GM/DL
MCV RBC AUTO: 90.9 FL
MICROALBUMIN 24H UR DL<=1MG/L-MCNC: 5 MG/DL
MICROALBUMIN/CREAT 24H UR-RTO: 32 MG/G
MONOCYTES # BLD AUTO: 0.24 K/UL
MONOCYTES NFR BLD AUTO: 7 %
NEUTROPHILS # BLD AUTO: 1.75 K/UL
NEUTROPHILS NFR BLD AUTO: 51.1 %
NONHDLC SERPL-MCNC: 130 MG/DL
PLATELET # BLD AUTO: 275 K/UL
POTASSIUM SERPL-SCNC: 3.8 MMOL/L
PROT SERPL-MCNC: 7.6 G/DL
RBC # BLD: 3.63 M/UL
RBC # FLD: 12.3 %
SODIUM SERPL-SCNC: 142 MMOL/L
TRIGL SERPL-MCNC: 118 MG/DL
WBC # FLD AUTO: 3.42 K/UL

## 2024-02-29 ENCOUNTER — APPOINTMENT (OUTPATIENT)
Dept: HEMATOLOGY ONCOLOGY | Facility: CLINIC | Age: 74
End: 2024-02-29
Payer: MEDICARE

## 2024-02-29 ENCOUNTER — NON-APPOINTMENT (OUTPATIENT)
Age: 74
End: 2024-02-29

## 2024-02-29 ENCOUNTER — APPOINTMENT (OUTPATIENT)
Dept: HEART AND VASCULAR | Facility: CLINIC | Age: 74
End: 2024-02-29
Payer: MEDICARE

## 2024-02-29 VITALS
HEIGHT: 67 IN | TEMPERATURE: 97.6 F | WEIGHT: 178 LBS | SYSTOLIC BLOOD PRESSURE: 135 MMHG | HEART RATE: 95 BPM | OXYGEN SATURATION: 95 % | RESPIRATION RATE: 18 BRPM | DIASTOLIC BLOOD PRESSURE: 80 MMHG | BODY MASS INDEX: 27.94 KG/M2

## 2024-02-29 VITALS
HEART RATE: 97 BPM | SYSTOLIC BLOOD PRESSURE: 142 MMHG | DIASTOLIC BLOOD PRESSURE: 84 MMHG | RESPIRATION RATE: 16 BRPM | BODY MASS INDEX: 28.09 KG/M2 | HEIGHT: 67 IN | TEMPERATURE: 98.3 F | WEIGHT: 179 LBS | OXYGEN SATURATION: 98 %

## 2024-02-29 DIAGNOSIS — R00.2 PALPITATIONS: ICD-10-CM

## 2024-02-29 DIAGNOSIS — I82.453 BILATERAL PERONEAL VEIN ACUTE EMBOLISM AND THROMBOSIS: ICD-10-CM

## 2024-02-29 PROCEDURE — G2211 COMPLEX E/M VISIT ADD ON: CPT

## 2024-02-29 PROCEDURE — 93000 ELECTROCARDIOGRAM COMPLETE: CPT

## 2024-02-29 PROCEDURE — 99213 OFFICE O/P EST LOW 20 MIN: CPT

## 2024-02-29 PROCEDURE — 99215 OFFICE O/P EST HI 40 MIN: CPT

## 2024-02-29 PROCEDURE — 99205 OFFICE O/P NEW HI 60 MIN: CPT

## 2024-02-29 NOTE — ASSESSMENT
[FreeTextEntry1] : An EKG was performed to evaluate for arrhythmia and ischemia.  s/p surgery and bacteremia, hx of metatstatic prostate cancer  Continue cardiac medications  I encouraged continued risk factor reduction and gradual increase in aerobic activity as tolerated    24 minutes were spent discussing cardiac risk excluding procedure time

## 2024-02-29 NOTE — HISTORY OF PRESENT ILLNESS
[FreeTextEntry1] : 73 year male who comes for Cardiology followup. He was hospitalized in  when he could not walk. He improved with surgery. He was told that his cancer has spread to his spine and requires radiation. He believes he was told that his prostate cancer is what caused his spine issues and created difficulty walking. His symptoms started Octoberr 17, 2023 in . He returned to [place] on October 21, 2023. He went to [facility]  on October 23, 2023. He was hospitalized until discharged December 23, 2023 to Rehab. He left Rehab January 11, 2024. He denies any chest pain, SOB, BONE, dizziness, palpitations, orthopnea, PND or syncope. He completed 6 weeks of antibiotics for bacteremia and is aware of an IVC filter placed for DVT that needs to be removed.

## 2024-03-01 LAB
PSA FREE FLD-MCNC: 39 %
PSA FREE SERPL-MCNC: 0.61 NG/ML
PSA SERPL-MCNC: 1.56 NG/ML
TESTOST SERPL-MCNC: <2.5 NG/DL

## 2024-03-04 NOTE — REVIEW OF SYSTEMS
[Negative] : Heme/Lymph [FreeTextEntry9] : persistent LE weakness, gradually improving [de-identified] : persistent tingling b/l LE

## 2024-03-04 NOTE — PHYSICAL EXAM
[Ambulatory and capable of all self care but unable to carry out any work activities] : Status 2- Ambulatory and capable of all self care but unable to carry out any work activities. Up and about more than 50% of waking hours [Normal] : normal spine exam without palpable tenderness, no kyphosis or scoliosis [de-identified] : decreased muscle strength b/l LE 3/5. Able to walk with walker

## 2024-03-04 NOTE — HISTORY OF PRESENT ILLNESS
[Disease: _____________________] : Disease: [unfilled] [M: ___] : M[unfilled] [AJCC Stage: ____] : AJCC Stage: [unfilled] [de-identified] : August Salinas is a 73-year-old male with history of IDDM, HTN and history of prostate cancer s/p biopsy in 2021 (Brook 4+3) who declined radiation therapy at that time. He spends much of his time in the [place], where he was in 10/2023 when he began to feel ill and developed neurologic symptoms. In 10/30/2023, [place] to the [place] and was immediately taken to [facility] and transferred to [facility] for spinal cord compression from metastasis and fracture. He was noted to have diffuse osseous mets to spine, ribs, and pelvis. He underwent C5-T3 Fusion, C7-T1 decompression on 11/2/2023. His hospital course was complicated by MSSA bacteremia from surgical site infection, bilateral LE DVT s/p IVC filter 11/1 by IR, candida esophagitis w/ dysphagia, aspiration multilobar PNA, acute on chronic anemia, and C. diff colitis. He was started on casodex/Lupron while in the hospital. He was discharged on 12/23/2023 to rehab facility. He was seen by Dr. Jeff on 2/2/24 at which time he received second dose of Lupron 22.5mg IM. He was continued on Casodex pending follow up with me.   He reports gradual improvement in his lower extremity strength since discharge from hospital. He only stayed in the recommended rehab facility for a few days because he was unhappy with the cleanliness and care. He has been doing exercises at home to increase his LE strength. He is able to walk short distances without his walker. He reports persistent tingling in b/l LE but denies focal pain or new weakness/numbness.

## 2024-03-04 NOTE — HISTORY OF PRESENT ILLNESS
[de-identified] : August Salinas is a 73-year-old male with history of IDDM, HTN and history of prostate cancer s/p biopsy in 2021 (Brook 4+3) who declined radiation therapy at that time, and since followed by urologist at [facility]. In 11/2023, he was admitted to [facility] for rhabdo + SRUTHI, transferred to [facility] for spinal cord compression from metastasis s/p C5-T3 Fusion, C7-T1 decompression on 11/2/2023. His hospital course was complicated by MSSA bacteremia from surgical site infection, bilateral LE DVT s/p IVC filter 11/1 by IR, candida esophagitis w/ dysphagia, aspiration multilobar PNA, acute on chronic anemia, and C. diff colitis. He was started on casodex/Lupron and presents to establish outpatient care and continue therapy for his metastatic CSPC.

## 2024-03-04 NOTE — ASSESSMENT
[FreeTextEntry1] : August Salinas is a 73-year-old male with history of IDDM, HTN and history of prostate cancer s/p biopsy in 2021 (Brook 3+4) who declined radiation therapy at that time. In 11/2023, he was admitted to [facility] for rhabdo + SRUTHI, transferred to [facility] for spinal cord compression from metastasis s/p C5-T3 Fusion, C7-T1 decompression on 11/2/2023. His hospital course was complicated by MSSA bacteremia from surgical site infection, bilateral LE DVT s/p IVC filter, candida esophagitis, aspiration multilobar PNA, and C. diff colitis. 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  I had an extensive discussion with him to review his clinical course, recent hospitalization, and next steps with regard to his metastatic prostate cancer. For patients with newly diagnosed metastatic castration-sensitive prostate cancer (mCSPC), androgen deprivation therapy (ADT) remains the cornerstone of treatment; however, its combination with other systemic therapies has shown improved outcomes. The pivotal trials of note include the CHAARTED, STAMPEDE, and LATITUDE, which have each demonstrated the benefit of adding either docetaxel or novel hormonal agents such to standard ADT. In addition, recent trials have also shown survival benefit with triplet therapy in patients with high volume disease.   Given his comorbidities and desire to minimize potential toxicities at this point in his recovery, we will plan to proceed with addition of enzalutamide to his ADT. We discussed common toxicities including muscle/joint pain, seizures, falls, GI toxicity and others. Enzalutamide will be sent to [facility] and he will stop bicalutamide once he receives the enzalutamide. His PSA has responded well but at last check was not undetectable. He will need restaging imaging to assess disease response/concordance with PSA. He will return in 4 weeks for repeat labs and toxicity assessment.   #Metastatic CSPC:  -repeat PSA and testosterone today -add enzalutamide 160mg daily; script sent to [facility] -next Lupron early May -RTC in 4 weeks for repeat labs and toxicity assessment

## 2024-03-14 NOTE — H&P ADULT - NSHPPOAPULMEMBOLUS_GEN_A_CORE
MRN: [id number], Eryn Ferrara is a 76 [id number]     UNIT NUMBER [id number]     DISPENSE STATUS Returned from Issue     PRODUCT ID Red Blood Cells     PRODUCT CODE [id number]     PRODUCT DESCRIPTION RBC [id number]     UNIT NUMBER [id number]     DISPENSE STATUS Returned from Issue     PRODUCT ID Red Blood Cells     PRODUCT CODE [id number]     PRODUCT DESCRIPTION RBC [phone] [facility] 24hr Answering Service  [phone]       no

## 2024-03-18 ENCOUNTER — APPOINTMENT (OUTPATIENT)
Dept: RADIATION ONCOLOGY | Facility: CLINIC | Age: 74
End: 2024-03-18
Payer: MEDICARE

## 2024-03-18 VITALS
DIASTOLIC BLOOD PRESSURE: 79 MMHG | TEMPERATURE: 98.4 F | HEART RATE: 100 BPM | BODY MASS INDEX: 28.25 KG/M2 | OXYGEN SATURATION: 98 % | SYSTOLIC BLOOD PRESSURE: 140 MMHG | RESPIRATION RATE: 16 BRPM | HEIGHT: 67 IN | WEIGHT: 180 LBS

## 2024-03-18 PROCEDURE — 99215 OFFICE O/P EST HI 40 MIN: CPT

## 2024-03-18 PROCEDURE — 99205 OFFICE O/P NEW HI 60 MIN: CPT

## 2024-03-18 NOTE — HISTORY OF PRESENT ILLNESS
[FreeTextEntry1] :   August Marie  is a 73  year old male with no family history of  Prostate Cancer.  PMH : IDDMHTN, history of prostate cancer and underwent in 2021 Prostate biospy ( [place]aceville score 7 (3+4)). At this time Mr. Salinas declined radiation therapy. While residing in the [place] this past 10/2023 he developed neurologic symptoms and [place] back to [place] and immediately went to [facility] and diagnosed with spinal cord compression from metastasis and fracture. He was noted to have diffuse osseous mets to spine , ribs and pelvis. He underwent Posterior C5-T3 lami and fusion, C7-T1 decompression on 11/2/2023. Hospitalization course complicated by MSSA bacteremia from surgical site infection, bilateral LE DVT s/p IVC filter on 11/1/2023 .  He has been started on casodex/Lupron while in the hospital . Mr. Salinas consulted with Dr. Jeff and received his second dose of Lupron on 2/2/2024,.a diagnosis of high risk metastaic disease  Prostatic Adenocarcinoma,  Urologist:   Dr. Jeff Oncologist: Dr. Aponte     PSA Trend: ng/mL   12/9/2014:    5.47ng/mL 2/12/2016:    6.26ng/mL 10/31/2026:  7.84ng/mL 2/8/2017:      8.51ng/mL 8/7/2017:      9.79ng/mL 8/13/2019:    16.6ng/mL 7/12/2023:    169ng/mL 2/2/2024:      2.82ng/mL 2/29/204:     1.56ng/mL   MRI Prostate 8/30/2021  IMPRESSION:  Midline, posterior medial (PZpm), base, peripheral zone lesion as detailed above. Extracapsular extension and bilateral seminal vesicle invasion is present. *PIRADS 5 - Very high (clinically significant cancer is highly likely to be present)  Right obturator, perirectal, and aortic bifurcation lymphadenopathy.  *Lesion descriptors and assessment categories are formulated utilizing PI-RADS v2.1.     Patient presents today for consideration of radiation therapy options to the prostate, referred by Dr. Martin. Overall, the patient states he feels well and denies any radiation therapy in the past. He notes baseline urine function with and nocturia x >5 , while  using Flomax 0.4mg at night.  He is expereincing urgency, urinary frequency,  Denies dribbling, urinary retention, dysuria, hematuria, leakage or incontinence. He has well-formed bowel movements. He denies blood or mucous in the stool. He denies rectal pain and states a history of hemorrhoids., has constipation at times. He had his last colonoscopy more than 2 years ago and do to schedule one soon.He is currently receiving ADT therapy  Injection #2 on 2/2/2024 and ue for the nex one in May 2024. .He is not sexually active at this time. He expereiences hotflashes and night sweats. He ambualtes with a walker and is independnet in ADL's.

## 2024-03-18 NOTE — REVIEW OF SYSTEMS
[Nocturia] : nocturia [Urinary Frequency] : urinary frequency [IPSS Score (0-40): ___] : IPSS score: [unfilled] [EPIC-CP Score (0-60): ___] : EPIC-CP score: [unfilled] [Negative] : Allergic/Immunologic [Anal Pain: Grade 0] : Anal Pain: Grade 0 [Constipation: Grade 0] : Constipation: Grade 0 [Diarrhea: Grade 0] : Diarrhea: Grade 0 [Dyspepsia: Grade 0] : Dyspepsia: Grade 0 [Esophagitis: Grade 0] : Esophagitis: Grade 0 [Dysphagia: Grade 0] : Dysphagia: Grade 0 [Gastroparesis: Grade 0] : Gastroparesis: Grade 0 [Fecal Incontinence: Grade 0] : Fecal Incontinence: Grade 0 [Nausea: Grade 0] : Nausea: Grade 0 [Proctitis: Grade 0] : Proctitis: Grade 0 [Rectal Pain: Grade 0] : Rectal Pain: Grade 0 [Small Intestinal Obstruction: Grade 0] : Small Intestinal Obstruction: Grade 0 [Vomiting: Grade 0] : Vomiting: Grade 0 [Hematuria: Grade 0] : Hematuria: Grade 0 [Urinary Incontinence: Grade 0] : Urinary Incontinence: Grade 0  [Urinary Retention: Grade 0] : Urinary Retention: Grade 0 [Urinary Tract Pain: Grade 0] : Urinary Tract Pain: Grade 0 [Urinary Urgency: Grade 1 - Present] : Urinary Urgency: Grade 1 - Present [Urinary Frequency: Grade 1 - Present] : Urinary Frequency: Grade 1 - Present [Ejaculation Disorder: Grade 1 - Diminished ejaculation] : Ejaculation Disorder: Grade 1 - Diminished ejaculation  [Erectile Dysfunction: Grade 1 - Decrease in erectile function (frequency or rigidity of erections) but intervention not indicated (e.g., medication or use of mechanical device, penile pump)] : Erectile Dysfunction: Grade 1 - Decrease in erectile function (frequency or rigidity of erections) but intervention not indicated (e.g., medication or use of mechanical device, penile pump)

## 2024-03-18 NOTE — VITALS
[Maximal Pain Intensity: 1/10] : 1/10 [Least Pain Intensity: 1/10] : 1/10 [Pain Description/Quality: ___] : Pain description/quality: [unfilled] [OTC] : OTC [80: Normal activity with effort; some signs or symptoms of disease.] : 80: Normal activity with effort; some signs or symptoms of disease.  [ECOG Performance Status: 0 - Fully active, able to carry on all pre-disease performance without restriction] : Performance Status: 0 - Fully active, able to carry on all pre-disease performance without restriction [Date: ____________] : Patient's last distress assessment performed on [unfilled]. [1 - Distress Level] : Distress Level: 1 [FreeTextEntry1] : Pt ambualtes with walker but is independent with ADL's

## 2024-03-18 NOTE — PHYSICAL EXAM
[Normal] : normal external genitalia without lesions and no testicular masses [Normal] : no palpable adenopathy [de-identified] : Well healed cervical lami insicion [de-identified] : well healed Posterior Cervical lami incision [de-identified] : LLE 4/5 and tinlgling LLE as per pt.  ambulates with walker.

## 2024-03-27 ENCOUNTER — APPOINTMENT (OUTPATIENT)
Dept: HEMATOLOGY ONCOLOGY | Facility: CLINIC | Age: 74
End: 2024-03-27
Payer: MEDICARE

## 2024-03-27 PROCEDURE — 99442: CPT

## 2024-03-27 RX ORDER — ENZALUTAMIDE 40 MG/1
40 CAPSULE ORAL
Qty: 120 | Refills: 0 | Status: DISCONTINUED | COMMUNITY
Start: 2024-02-29 | End: 2024-03-27

## 2024-03-27 NOTE — REVIEW OF SYSTEMS
[Urinary Frequency] : urinary frequency [IPSS Score (0-40): ___] : IPSS score: [unfilled] [EPIC-CP Score (0-60): ___] : EPIC-CP score: [unfilled] [Negative] : Allergic/Immunologic [Anal Pain: Grade 0] : Anal Pain: Grade 0 [Constipation: Grade 0] : Constipation: Grade 0 [Diarrhea: Grade 0] : Diarrhea: Grade 0 [Dyspepsia: Grade 0] : Dyspepsia: Grade 0 [Dysphagia: Grade 0] : Dysphagia: Grade 0 [Esophagitis: Grade 0] : Esophagitis: Grade 0 [Fecal Incontinence: Grade 0] : Fecal Incontinence: Grade 0 [Gastroparesis: Grade 0] : Gastroparesis: Grade 0 [Proctitis: Grade 0] : Proctitis: Grade 0 [Nausea: Grade 0] : Nausea: Grade 0 [Rectal Pain: Grade 0] : Rectal Pain: Grade 0 [Small Intestinal Obstruction: Grade 0] : Small Intestinal Obstruction: Grade 0 [Vomiting: Grade 0] : Vomiting: Grade 0 [Urinary Incontinence: Grade 0] : Urinary Incontinence: Grade 0  [Hematuria: Grade 0] : Hematuria: Grade 0 [Urinary Retention: Grade 0] : Urinary Retention: Grade 0 [Urinary Tract Pain: Grade 0] : Urinary Tract Pain: Grade 0 [Urinary Urgency: Grade 1 - Present] : Urinary Urgency: Grade 1 - Present [Ejaculation Disorder: Grade 1 - Diminished ejaculation] : Ejaculation Disorder: Grade 1 - Diminished ejaculation  [Urinary Frequency: Grade 1 - Present] : Urinary Frequency: Grade 1 - Present [Erectile Dysfunction: Grade 1 - Decrease in erectile function (frequency or rigidity of erections) but intervention not indicated (e.g., medication or use of mechanical device, penile pump)] : Erectile Dysfunction: Grade 1 - Decrease in erectile function (frequency or rigidity of erections) but intervention not indicated (e.g., medication or use of mechanical device, penile pump)

## 2024-03-28 ENCOUNTER — NON-APPOINTMENT (OUTPATIENT)
Age: 74
End: 2024-03-28

## 2024-04-01 ENCOUNTER — NON-APPOINTMENT (OUTPATIENT)
Age: 74
End: 2024-04-01

## 2024-04-01 NOTE — HISTORY OF PRESENT ILLNESS
[Disease: _____________________] : Disease: [unfilled] [M: ___] : M[unfilled] [AJCC Stage: ____] : AJCC Stage: [unfilled] [de-identified] : August Salinas is a 73-year-old male with history of IDDM, HTN and history of prostate cancer s/p biopsy in 2021 (Brook 4+3) who declined radiation therapy at that time. He spends much of his time in the [place], where he was in 10/2023 when he began to feel ill and developed neurologic symptoms. In 10/30/2023, [place] the [place] and was immediately taken to [facility] and transferred to [facility] for spinal cord compression from metastasis and fracture. He was noted to have diffuse osseous mets to spine, ribs, and pelvis. He underwent C5-T3 Fusion, C7-T1 decompression on 11/2/2023. His hospital course was complicated by MSSA bacteremia from surgical site infection, bilateral LE DVT s/p IVC filter 11/1 by IR, candida esophagitis w/ dysphagia, aspiration multilobar PNA, acute on chronic anemia, and C. diff colitis. He was started on casodex/Lupron while in the hospital. He was discharged on 12/23/2023 to rehab facility. He was seen by Dr. Jeff on 2/2/24 at which time he received second dose of Lupron 22.5mg IM. He was continued on Casodex pending follow up with me.   He reports gradual improvement in his lower extremity strength since discharge from hospital. He only stayed in the recommended rehab facility for a few days because he was unhappy with the cleanliness and care. He has been doing exercises at home to increase his LE strength. He is able to walk short distances without his walker. He reports persistent tingling in b/l LE but denies focal pain or new weakness/numbness.   [de-identified] : He follows up today via telehealth. This Telephonic visit was provided via audio technology. Since his last visit, he has continued to follow up with Cardiology (2/29/2024). An EKG was performed to evaluate for arrhythmia and ischemia. Cardiology encouraged continued risk factor reduction and gradual increase in aerobic activity as tolerated. Additionally, patient has his first visit with Dr. Tatum on 3/18/2024. The plan is for patient to have XRT to his spine. Specialty pharmacy reached out to him due to high copay with enzalutamide, but he did not want to provide information needed for financial support over the phone.

## 2024-04-01 NOTE — REASON FOR VISIT
...Purposeful Rounding    Patient Location: [facility]    Patient willing to engage in conversation: Yes    Presentation/behavior: Anxious, Withdrawn and Controlled    Affect: Flat/blunted    Concerns reported: no    PRN medications given: no    Environmental assessment: Room free from clutter    Fall prevention interventions in place: n/a    Daily Tian Fall Risk Score: 77    Daily [place] Fall Risk Score: low [Initial Consultation] : an initial consultation [Follow-Up Visit] : a follow-up [FreeTextEntry2] : Metastatic Prostate Cancer - Telephonic visit was provided via audio technology.

## 2024-04-01 NOTE — PHYSICAL EXAM
[Ambulatory and capable of all self care but unable to carry out any work activities] : Status 2- Ambulatory and capable of all self care but unable to carry out any work activities. Up and about more than 50% of waking hours [Normal] : normoactive bowel sounds, soft and nontender, no hepatosplenomegaly or masses appreciated [de-identified] : decreased muscle strength b/l LE 3/5. Able to walk with walker

## 2024-04-01 NOTE — REVIEW OF SYSTEMS
[Negative] : Heme/Lymph [FreeTextEntry9] : persistent LE weakness, gradually improving [de-identified] : persistent tingling b/l LE

## 2024-04-01 NOTE — ASSESSMENT
[FreeTextEntry1] : August Salinas is a 73-year-old male with history of IDDM, HTN and history of prostate cancer s/p biopsy in 2021 (Brook 3+4) who declined radiation therapy at that time. In 11/2023, he was admitted to [facility] for rhabdo + SRUTHI, transferred to [facility] for spinal cord compression from metastasis s/p C5-T3 Fusion, C7-T1 decompression on 11/2/2023. His hospital course was complicated by MSSA bacteremia from surgical site infection, bilateral LE DVT s/p IVC filter, candida esophagitis, aspiration multilobar PNA, and C. diff colitis. 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  I had an extensive discussion with him to review his clinical course, recent hospitalization, and next steps with regard to his metastatic prostate cancer. For patients with newly diagnosed metastatic castration-sensitive prostate cancer (mCSPC), androgen deprivation therapy (ADT) remains the cornerstone of treatment; however, its combination with other systemic therapies has shown improved outcomes. The pivotal trials of note include the CHAARTED, STAMPEDE, and LATITUDE, which have each demonstrated the benefit of adding either docetaxel or novel hormonal agents such to standard ADT. In addition, recent trials have also shown survival benefit with triplet therapy in patients with high volume disease.   Given his comorbidities and desire to minimize potential toxicities at this point in his recovery, we discussed proceeding with addition of enzalutamide to his ADT. We discussed common toxicities including muscle/joint pain, seizures, falls, GI toxicity and others. Due to very high copay a/w with enzalutamide, we will switch prescription to abiraterone and prednisone. He will need to complete financial assistance form which will be mailed to him. He will return in approximately 5 weeks for repeat labs and toxicity assessment.   #Metastatic CSPC -cancel enzalutamide, prescribe abiraterone 1000mg daily + prednisone 5mg daily -complete financial assistance paperwork -next Lupron early May -RTC in 5 weeks for repeat labs and toxicity assessment

## 2024-04-02 NOTE — HISTORY OF PRESENT ILLNESS
[FreeTextEntry1] :   August Marie  is a 73  year old male with no family history of  Prostate Cancer.  PMH : IDDMHTN, history of prostate cancer and underwent in 2021 Prostate biospy ( [place] score 7 (3+4)). At this time Mr. Salinas declined radiation therapy. While residing in the [place] this past 10/2023 he developed neurologic symptoms and [place] back to [place] and immediately went to [facility] and diagnosed with spinal cord compression from metastasis and fracture. He was noted to have diffuse osseous mets to spine , ribs and pelvis. He underwent Posterior C5-T3 lami and fusion, C7-T1 decompression on 11/2/2023. Hospitalization course complicated by MSSA bacteremia from surgical site infection, bilateral LE DVT s/p IVC filter on 11/1/2023 .  He has been started on casodex/Lupron while in the hospital . Mr. Salinas consulted with Dr. Jeff and received his second dose of Lupron on 2/2/2024,.a diagnosis of high risk metastaic disease  Prostatic Adenocarcinoma, Pt is undergoing RT therapy to Cevical spine  Urologist:   Dr. Jeff Oncologist: Dr. Aponte     PSA Trend: ng/mL   12/9/2014:    5.47ng/mL 2/12/2016:    6.26ng/mL 10/31/2026:  7.84ng/mL 2/8/2017:      8.51ng/mL 8/7/2017:      9.79ng/mL 8/13/2019:    16.6ng/mL 7/12/2023:    169ng/mL 2/2/2024:      2.82ng/mL 2/29/204:     1.56ng/mL   MRI Prostate 8/30/2021  IMPRESSION:  Midline, posterior medial (PZpm), base, peripheral zone lesion as detailed above. Extracapsular extension and bilateral seminal vesicle invasion is present. *PIRADS 5 - Very high (clinically significant cancer is highly likely to be present)  Right obturator, perirectal, and aortic bifurcation lymphadenopathy.  *Lesion descriptors and assessment categories are formulated utilizing PI-RADS v2.1.     OTV APPTS:  4/1/2024-OTV- Mr. Salinas has completed  2/5Fx or  800 /4000cGy radiation to the Cervical Spinal Cord. Overall e tolerating Rt treatmentes. He states his pain has decreased. He will continue RT treatments as planned  PTE packet given to the pt and reviewed.

## 2024-04-02 NOTE — DISEASE MANAGEMENT
[Clinical] : TNM Stage: c [TTNM] : x [NTNM] : x [MTNM] : 1 [IV] : IV [de-identified] : 800cGy [de-identified] : 2000cGy

## 2024-04-02 NOTE — PHYSICAL EXAM
[Normal] : normal external genitalia without lesions and no testicular masses [Normal] : oriented to person, place and time, the affect was normal, the mood was normal and not anxious [de-identified] : Well healed cervical lami insicion [de-identified] : LLE 4/5 and tinlgling LLE as per pt.  ambulates with walker. [de-identified] : well healed Posterior Cervical lami incision

## 2024-04-02 NOTE — VITALS
[Least Pain Intensity: 1/10] : 1/10 [Maximal Pain Intensity: 1/10] : 1/10 [Pain Description/Quality: ___] : Pain description/quality: [unfilled] [OTC] : OTC [80: Normal activity with effort; some signs or symptoms of disease.] : 80: Normal activity with effort; some signs or symptoms of disease.  [ECOG Performance Status: 0 - Fully active, able to carry on all pre-disease performance without restriction] : Performance Status: 0 - Fully active, able to carry on all pre-disease performance without restriction [Date: ____________] : Patient's last distress assessment performed on [unfilled]. [1 - Distress Level] : Distress Level: 1 [FreeTextEntry1] : Pt ambualtes with walker but is independent with ADL's

## 2024-04-03 ENCOUNTER — APPOINTMENT (OUTPATIENT)
Dept: INFECTIOUS DISEASE | Facility: CLINIC | Age: 74
End: 2024-04-03

## 2024-04-18 RX ORDER — BICALUTAMIDE 50 MG/1
50 TABLET ORAL DAILY
Qty: 30 | Refills: 2 | Status: DISCONTINUED | COMMUNITY
Start: 2024-02-02 | End: 2024-04-18

## 2024-05-01 ENCOUNTER — OUTPATIENT (OUTPATIENT)
Dept: OUTPATIENT SERVICES | Facility: HOSPITAL | Age: 74
LOS: 1 days | End: 2024-05-01
Payer: COMMERCIAL

## 2024-05-01 ENCOUNTER — APPOINTMENT (OUTPATIENT)
Dept: HEMATOLOGY ONCOLOGY | Facility: CLINIC | Age: 74
End: 2024-05-01
Payer: MEDICARE

## 2024-05-01 ENCOUNTER — LABORATORY RESULT (OUTPATIENT)
Age: 74
End: 2024-05-01

## 2024-05-01 ENCOUNTER — APPOINTMENT (OUTPATIENT)
Dept: INFUSION THERAPY | Facility: CLINIC | Age: 74
End: 2024-05-01

## 2024-05-01 VITALS
DIASTOLIC BLOOD PRESSURE: 75 MMHG | RESPIRATION RATE: 18 BRPM | WEIGHT: 179.9 LBS | TEMPERATURE: 98 F | SYSTOLIC BLOOD PRESSURE: 130 MMHG | HEART RATE: 81 BPM | OXYGEN SATURATION: 96 % | HEIGHT: 67 IN

## 2024-05-01 DIAGNOSIS — C61 MALIGNANT NEOPLASM OF PROSTATE: ICD-10-CM

## 2024-05-01 LAB
ALBUMIN SERPL ELPH-MCNC: 4.2 G/DL
ALP BLD-CCNC: 116 U/L
ALT SERPL-CCNC: 19 U/L
ANION GAP SERPL CALC-SCNC: 7 MMOL/L
AST SERPL-CCNC: 19 U/L
BILIRUB SERPL-MCNC: 1 MG/DL
BUN SERPL-MCNC: 28 MG/DL
CALCIUM SERPL-MCNC: 10.5 MG/DL
CHLORIDE SERPL-SCNC: 108 MMOL/L
CO2 SERPL-SCNC: 31 MMOL/L
CREAT SERPL-MCNC: 1 MG/DL
EGFR: 80 ML/MIN/1.73M2
GLUCOSE SERPL-MCNC: 155 MG/DL
POTASSIUM SERPL-SCNC: 4 MMOL/L
PROT SERPL-MCNC: 7.5 G/DL
SODIUM SERPL-SCNC: 146 MMOL/L

## 2024-05-01 PROCEDURE — 96402 CHEMO HORMON ANTINEOPL SQ/IM: CPT

## 2024-05-01 PROCEDURE — 99215 OFFICE O/P EST HI 40 MIN: CPT

## 2024-05-01 RX ADMIN — Medication 22.5 MILLIGRAM(S): at 17:04

## 2024-05-02 ENCOUNTER — NON-APPOINTMENT (OUTPATIENT)
Age: 74
End: 2024-05-02

## 2024-05-02 LAB
PSA FREE FLD-MCNC: 38 %
PSA FREE SERPL-MCNC: 0.44 NG/ML
PSA SERPL-MCNC: 1.15 NG/ML

## 2024-05-02 NOTE — REVIEW OF SYSTEMS
[Negative] : Heme/Lymph [FreeTextEntry9] : persistent LE weakness, gradually improving [de-identified] : burning skin upper back [de-identified] : persistent tingling b/l LE

## 2024-05-02 NOTE — ASSESSMENT
[FreeTextEntry1] : August Salinas is a 73-year-old male with history of IDDM, HTN and history of prostate cancer s/p biopsy in 2021 (Brook 3+4) who declined radiation therapy at that time. In 11/2023, he was admitted to [facility] for rhabdo + SRUTHI, transferred to [facility] for spinal cord compression from metastasis s/p C5-T3 Fusion, C7-T1 decompression on 11/2/2023. His hospital course was complicated by MSSA bacteremia from surgical site infection, bilateral LE DVT s/p IVC filter, candida esophagitis, aspiration multilobar PNA, and C. diff colitis. 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  I had an extensive discussion with him to review his clinical course, recent hospitalization, and next steps with regard to his metastatic prostate cancer. For patients with newly diagnosed metastatic castration-sensitive prostate cancer (mCSPC), androgen deprivation therapy (ADT) remains the cornerstone of treatment; however, its combination with other systemic therapies has shown improved outcomes. The pivotal trials of note include the CHAARTED, STAMPEDE, and LATITUDE, which have each demonstrated the benefit of adding either docetaxel or novel hormonal agents such to standard ADT. In addition, recent trials have also shown survival benefit with triplet therapy in patients with high volume disease.   Given his comorbidities and desire to minimize potential toxicities at this point in his recovery, we discussed proceeding with addition of enzalutamide to his ADT. We discussed common toxicities including muscle/joint pain, seizures, falls, GI toxicity and others. Due to very high copay a/w with enzalutamide, we opted to switch prescription to abiraterone and prednisone however he has not received it yet. In coordination with specialty pharmacy, we provided income information and had him fill out [email or web address] Epic form today to facilitate copay assistance. He will receive 2nd dose of lupron today and will start abiraterone/prednisone as soon as he receives it.   His PSA continues to downtrend although  it may be plateauing, 1.15 today. He will return in 6 weeks for f/u and toxicity assessment.   #Metastatic CSPC -Lupron 22.5mg today, next dose  7/24/24 -start abiraterone 1000mg daily + prednisone 5mg daily as soon as received from pharmacy -RTC in 6 weeks for repeat labs and toxicity assessment

## 2024-05-02 NOTE — PHYSICAL EXAM
[Ambulatory and capable of all self care but unable to carry out any work activities] : Status 2- Ambulatory and capable of all self care but unable to carry out any work activities. Up and about more than 50% of waking hours [Normal] : full range of motion and no deformities appreciated [de-identified] : decreased muscle strength b/l LE 3/5. Able to walk with walker

## 2024-05-02 NOTE — HISTORY OF PRESENT ILLNESS
[Disease: _____________________] : Disease: [unfilled] [M: ___] : M[unfilled] [AJCC Stage: ____] : AJCC Stage: [unfilled] [de-identified] : August Salinas is a 73-year-old male with history of IDDM, HTN and history of prostate cancer s/p biopsy in 2021 (Brook 4+3) who declined radiation therapy at that time. He spends much of his time in the [place], where he was in 10/2023 when he began to feel ill and developed neurologic symptoms. In 10/30/2023, [place] back to the [place] and was immediately taken to [facility] and transferred to [facility] for spinal cord compression from metastasis and fracture. He was noted to have diffuse osseous mets to spine, ribs, and pelvis. He underwent C5-T3 Fusion, C7-T1 decompression on 11/2/2023. His hospital course was complicated by MSSA bacteremia from surgical site infection, bilateral LE DVT s/p IVC filter 11/1 by IR, candida esophagitis w/ dysphagia, aspiration multilobar PNA, acute on chronic anemia, and C. diff colitis. He was started on casodex/Lupron while in the hospital. He was discharged on 12/23/2023 to rehab facility. He was seen by Dr. Jeff on 2/2/24 at which time he received second dose of Lupron 22.5mg IM. He was continued on Casodex pending follow up with me.   He reports gradual improvement in his lower extremity strength since discharge from hospital. He only stayed in the recommended rehab facility for a few days because he was unhappy with the cleanliness and care. He has been doing exercises at home to increase his LE strength. He is able to walk short distances without his walker. He reports persistent tingling in b/l LE but denies focal pain or new weakness/numbness.   [de-identified] : He presents for follow up. Since his last visit, he completed XRT to cervical spine with Dr. Tatum. Unfortunately he has not received or started abiraterone. Specialty pharmacy reached out to him due to high copay with enzalutamide, but he did not want to provide information needed for financial support over the phone. He has been feeling generally well with gradually improving strength in back and legs. He does report a feeling of burning in the skin at site of radiation. He denies any worsening numbness/tingling/weakness.

## 2024-05-08 ENCOUNTER — APPOINTMENT (OUTPATIENT)
Dept: RADIATION ONCOLOGY | Facility: CLINIC | Age: 74
End: 2024-05-08
Payer: MEDICARE

## 2024-05-08 ENCOUNTER — NON-APPOINTMENT (OUTPATIENT)
Age: 74
End: 2024-05-08

## 2024-05-08 DIAGNOSIS — C79.51 MALIGNANT NEOPLASM OF PROSTATE: ICD-10-CM

## 2024-05-08 DIAGNOSIS — Z00.00 ENCOUNTER FOR GENERAL ADULT MEDICAL EXAMINATION W/OUT ABNORMAL FINDINGS: ICD-10-CM

## 2024-05-08 DIAGNOSIS — C61 MALIGNANT NEOPLASM OF PROSTATE: ICD-10-CM

## 2024-05-08 PROCEDURE — 99024 POSTOP FOLLOW-UP VISIT: CPT

## 2024-05-08 NOTE — PHYSICAL EXAM
[Normal] : normal external genitalia without lesions and no testicular masses [Normal] : no palpable adenopathy

## 2024-05-08 NOTE — HISTORY OF PRESENT ILLNESS
[FreeTextEntry1] :   August Marie is a 73-year-old male with no family history of Prostate Cancer.  PMH: IDDMHTN, history of prostate cancer and underwent in 2021 Prostate biopsy (Brook score 7 (3+4)). At this time Mr. Salinas declined radiation therapy. While residing in the [place] this past 10/2023 he developed neurologic symptoms and [place] back [place] and immediately went to [facility] and diagnosed with spinal cord compression from metastasis and fracture. He was noted to have diffuse osseous Mets to spine, ribs and pelvis. He underwent Posterior C5-T3 Lami and fusion, C7-T1 decompression on 11/2/2023. Hospitalization course complicated by MSSA bacteremia from surgical site infection, bilateral LE DVT s/p IVC filter on 11/1/2023.  He has been started on casodex/Lupron while in the hospital. Mr. Salinas consulted with Dr. 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   He tolerated the RT therapy and KPS remained stable during the course of radiation treatment.  Urologist:   Dr. Jeff Oncologist: Dr. Aponte     PSA Trend: ng/mL  12/9/2014:    5.47ng/mL 2/12/2016:    6.26ng/mL 10/31/2026:  7.84ng/mL 2/8/2017:      8.51ng/mL 8/7/2017:      9.79ng/mL 8/13/2019:    16.6ng/mL 7/12/2023:    169ng/mL 2/2/2024:      2.82ng/mL 2/29/2024:    1.56ng/mL 5/1/2024:       1.15ng/mL   MRI Prostate 8/30/2021  IMPRESSION:  Midline, posterior medial (PZpm), base, peripheral zone lesion as detailed above. Extracapsular extension and bilateral seminal vesicle invasion is present. *PIRADS 5 - Very high (clinically significant cancer is highly likely to be present)  Right obturator, perirectal, and aortic bifurcation lymphadenopathy.  *Lesion descriptors and assessment categories are formulated utilizing PI-RADS v2.1.    Patient is here for post treatment evaluation. Overall, he states his pain is gone but intermittently from bilateral groin areas to feet he develops tingling sensation, Denies any increase weakness in extremities or any numbness. He has Nocturia with frequency and stream flow is moderate. He is on Flomax o.4mg qhs  He denies urinary dribbling, dysuria, hematuria or incontinence. His bowel movements are normal. He ambulates with a cane and guarded gait.  He recently received Lupron 22.5mg on 5/1/2024 by Dr. Aponte and started abiraterone 1000mg daily prednisone 5 mg daily. he is currently doing home PT.

## 2024-05-08 NOTE — REVIEW OF SYSTEMS
[Nocturia] : nocturia [Urinary Frequency] : urinary frequency [IPSS Score (0-40): ___] : IPSS score: [unfilled] [EPIC-CP Score (0-60): ___] : EPIC-CP score: [unfilled] [Negative] : Endocrine [Anal Pain: Grade 0] : Anal Pain: Grade 0 [Constipation: Grade 0] : Constipation: Grade 0 [Diarrhea: Grade 0] : Diarrhea: Grade 0 [Dyspepsia: Grade 0] : Dyspepsia: Grade 0 [Dysphagia: Grade 0] : Dysphagia: Grade 0 [Esophagitis: Grade 0] : Esophagitis: Grade 0 [Fecal Incontinence: Grade 0] : Fecal Incontinence: Grade 0 [Gastroparesis: Grade 0] : Gastroparesis: Grade 0 [Nausea: Grade 0] : Nausea: Grade 0 [Proctitis: Grade 0] : Proctitis: Grade 0 [Rectal Pain: Grade 0] : Rectal Pain: Grade 0 [Small Intestinal Obstruction: Grade 0] : Small Intestinal Obstruction: Grade 0 [Vomiting: Grade 0] : Vomiting: Grade 0 [Hematuria: Grade 0] : Hematuria: Grade 0 [Urinary Incontinence: Grade 0] : Urinary Incontinence: Grade 0  [Urinary Retention: Grade 0] : Urinary Retention: Grade 0 [Urinary Tract Pain: Grade 0] : Urinary Tract Pain: Grade 0 [Urinary Urgency: Grade 1 - Present] : Urinary Urgency: Grade 1 - Present [Urinary Frequency: Grade 1 - Present] : Urinary Frequency: Grade 1 - Present [Ejaculation Disorder: Grade 1 - Diminished ejaculation] : Ejaculation Disorder: Grade 1 - Diminished ejaculation  [Erectile Dysfunction: Grade 1 - Decrease in erectile function (frequency or rigidity of erections) but intervention not indicated (e.g., medication or use of mechanical device, penile pump)] : Erectile Dysfunction: Grade 1 - Decrease in erectile function (frequency or rigidity of erections) but intervention not indicated (e.g., medication or use of mechanical device, penile pump) [FreeTextEntry9] : ambulates with a cane  tingling sensation BLE no numbness

## 2024-05-29 RX ORDER — LISINOPRIL 5 MG/1
5 TABLET ORAL
Qty: 90 | Refills: 1 | Status: ACTIVE | COMMUNITY
Start: 2024-02-01 | End: 1900-01-01

## 2024-05-30 NOTE — PATIENT PROFILE ADULT - CONTRAINDICATIONS & PRECAUTIONS (SELECT ALL THAT APPLY)
Chief Complaint   Patient presents with    Follow-up     HTN, diabetes f/u         Aleena Baumann is a 49 y.o. male who presents today for Follow-up (HTN, diabetes f/u )       Started using freestyle randy in range for the last 4 days 100%  Has been using the short acting insulin 5 units with meals, he denies any hypoglycemia, but he gets definitely making changes in his diet, and is going to the gym.  He has not been drinking alcohol notable, he is overall feeling in good health, has been working with a nutritionist and making changes.  He has been seen by nephrology, he was restarted on metformin, 500 mg twice a day,          Wt Readings from Last 3 Encounters:   05/30/24 96.3 kg (212 lb 6.4 oz)   04/19/24 96.6 kg (212 lb 14.4 oz)   04/10/24 94.8 kg (208 lb 14.4 oz)     Vitals:    05/30/24 1126   BP: (!) 140/90   Site: Left Upper Arm   Position: Sitting   Pulse: 66   SpO2: 99%   Weight: 96.3 kg (212 lb 6.4 oz)   Height: 1.702 m (5' 7\")        Assessment and plan:  1. Primary hypertension  2. Hyperglycemia due to diabetes mellitus (HCC)  -     empagliflozin (JARDIANCE) 10 MG tablet; Take 1 tablet by mouth daily, Disp-90 tablet, R-0Normal  3. Chronic kidney disease (CKD) stage G3b/A1, moderately decreased glomerular filtration rate (GFR) between 30-44 mL/min/1.73 square meter and albuminuria creatinine ratio less than 30 mg/g (HCC)  -     empagliflozin (JARDIANCE) 10 MG tablet; Take 1 tablet by mouth daily, Disp-90 tablet, R-0Normal    We discussed about titrating down and off insulin  , he may benefit of medications like Jardiance or Farxiga,  He is currently only using 15 units of short acting insulin, he is currently making changes in diet, so he may be able to stop insulin at eventually, if insurance approve the use of Jardiance or fatigue, he may also continue with insulin sliding scale if needed, but he will continue with consistency with exercise, dietary changes,  We also discussed the options of  none...

## 2024-06-12 ENCOUNTER — APPOINTMENT (OUTPATIENT)
Dept: HEMATOLOGY ONCOLOGY | Facility: CLINIC | Age: 74
End: 2024-06-12
Payer: MEDICARE

## 2024-06-12 VITALS
HEIGHT: 67 IN | WEIGHT: 185 LBS | OXYGEN SATURATION: 96 % | BODY MASS INDEX: 29.03 KG/M2 | DIASTOLIC BLOOD PRESSURE: 83 MMHG | HEART RATE: 75 BPM | RESPIRATION RATE: 18 BRPM | TEMPERATURE: 98 F | SYSTOLIC BLOOD PRESSURE: 158 MMHG

## 2024-06-12 DIAGNOSIS — E83.52 HYPERCALCEMIA: ICD-10-CM

## 2024-06-12 DIAGNOSIS — C61 MALIGNANT NEOPLASM OF PROSTATE: ICD-10-CM

## 2024-06-12 PROCEDURE — 99215 OFFICE O/P EST HI 40 MIN: CPT

## 2024-06-12 RX ORDER — ABIRATERONE ACETATE 500 MG/1
500 TABLET, FILM COATED ORAL DAILY
Qty: 60 | Refills: 2 | Status: DISCONTINUED | COMMUNITY
Start: 2024-03-27 | End: 2024-06-12

## 2024-06-12 RX ORDER — ENZALUTAMIDE 40 MG/1
40 CAPSULE ORAL
Qty: 120 | Refills: 1 | Status: ACTIVE | COMMUNITY
Start: 2024-06-12 | End: 1900-01-01

## 2024-06-12 RX ORDER — PREDNISONE 5 MG/1
5 TABLET ORAL DAILY
Qty: 30 | Refills: 2 | Status: DISCONTINUED | COMMUNITY
Start: 2024-03-27 | End: 2024-06-12

## 2024-06-12 NOTE — HISTORY OF PRESENT ILLNESS
[Disease: _____________________] : Disease: [unfilled] [M: ___] : M[unfilled] [AJCC Stage: ____] : AJCC Stage: [unfilled] [de-identified] : August Salinas is a 73-year-old male with history of IDDM, HTN and history of prostate cancer s/p biopsy in 2021 (Brook 4+3) who declined radiation therapy at that time. He spends much of his time in the [place], where he was in 10/2023 when he began to feel ill and developed neurologic symptoms. In 10/30/2023, [place] the [place] and was immediately taken to [facility] and transferred to [facility] for spinal cord compression from metastasis and fracture. He was noted to have diffuse osseous mets to spine, ribs, and pelvis. He underwent C5-T3 Fusion, C7-T1 decompression on 11/2/2023. His hospital course was complicated by MSSA bacteremia from surgical site infection, bilateral LE DVT s/p IVC filter 11/1 by IR, candida esophagitis w/ dysphagia, aspiration multilobar PNA, acute on chronic anemia, and C. diff colitis. He was started on casodex/Lupron while in the hospital. He was discharged on 12/23/2023 to rehab facility. He was seen by Dr. Jeff on 2/2/24 at which time he received second dose of Lupron 22.5mg IM. He was continued on Casodex pending follow up with me.   He reported gradual improvement in his lower extremity strength since discharge from hospital. He only stayed in the recommended rehab facility for a few days because he was unhappy with the cleanliness and care. He has been doing exercises at home to increase his LE strength. He is able to walk short distances without his walker. He reports persistent tingling in b/l LE but denies focal pain or new weakness/numbness.   He completed pall XRT to cervical spine with Dr. Tatum. After initial delay with insurance authorization, he was able to start enzalutamide.   [de-identified] : He presents for follow up after initiating enzalutamide approx 4 weeks ago. He reports gradually improving strength but has noticed several new symptoms, typically shortly after taking enzalutamide and mostly self-resolving after approx 1 hour. This includes dizziness, joint pain, unsettled stomach, increase appetite, and low back pain. He denies any worsening numbness/tingling/weakness.

## 2024-06-12 NOTE — REVIEW OF SYSTEMS
[Joint Pain] : joint pain [Dizziness] : dizziness [Negative] : Heme/Lymph [FreeTextEntry9] : persistent LE weakness, gradually improving [de-identified] : burning skin upper back

## 2024-06-12 NOTE — ADVANCED PRACTICE NURSE CONSULT - RECOMMEDATIONS
Topical recommendations:   ---NPWT/VAC dressing to cervical spine to be managed by WOCN service line [facility].  If dressing compromised for >2 hours, please contact MD and plastic surgery, turn off machine, remove dressing, and apply alternative dressing: Cleanse with NS, pat dry. Apply Liquid barrier film to periwound skin (allow to dry). Apply surgical foam dressing. Change daily and PRN if soiled.   ---Right upper back: Cleanse with NS, pat dry. Apply Liquid barrier film to periwound skin (allow to dry). Apply medihoney gel to base of wound, cover with silicone foam with border. Change daily and PRN if soiled.   ---Bilateral knees: Cleanse with NS, pat dry. Apply Liquid barrier film to periwound skin (allow to dry). Apply thin layer of medihoney gel to open wounds, cover with silicone foam with border. Change daily and PRN if soiled. Once foam dressings are secured, apply sween24 moisturizer daily to dry skin on bilateral legs, avoid in between the toes.   ---Continue low air loss bed therapy, continue heel elevation while in bed, continue to turn & reposition per protocol, continue moisture management with barrier creams & single breathable pad, continue measures to decrease friction/shear/pressure.     Plan discussed with patient and primary RN Jessica Newton at bedside. Wound care team will continue to see patient on [facility] schedule.   Please contact Wound/Ostomy Care Service Line if we can be of further assistance (ext [phone]). Please reconsult if changes to tissue type noted.  n/a

## 2024-06-12 NOTE — PHYSICAL EXAM
[Ambulatory and capable of all self care but unable to carry out any work activities] : Status 2- Ambulatory and capable of all self care but unable to carry out any work activities. Up and about more than 50% of waking hours [Normal] : affect appropriate [de-identified] : decreased muscle strength b/l LE 4/5. Able to walk with walker

## 2024-06-12 NOTE — ASSESSMENT
[FreeTextEntry1] : August Salinas is a 74-year-old male with history of IDDM, HTN and history of prostate cancer s/p biopsy in 2021 (Brook 3+4) who declined radiation therapy at that time. In 11/2023, he was admitted to [facility] for rhabdo + SRUTHI, transferred to [facility] for spinal cord compression from metastasis s/p C5-T3 Fusion, C7-T1 decompression on 11/2/2023. His hospital course was complicated by MSSA bacteremia from surgical site infection, bilateral LE DVT s/p IVC filter, candida esophagitis, aspiration multilobar PNA, and C. diff colitis. 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  During our previous visits, I had an extensive discussion with him to review his clinical course, recent hospitalization, and next steps with regard to his metastatic prostate cancer. For patients with newly diagnosed metastatic castration-sensitive prostate cancer (mCSPC), androgen deprivation therapy (ADT) remains the cornerstone of treatment; however, its combination with other systemic therapies has shown improved outcomes. The pivotal trials of note include the CHAARTED, STAMPEDE, and LATITUDE, which have each demonstrated the benefit of adding either docetaxel or novel hormonal agents such to standard ADT. In addition, recent trials have also shown survival benefit with triplet therapy in patients with high volume disease.   Given his comorbidities and desire to minimize potential toxicities at this point in his recovery, we discussed proceeding with addition of enzalutamide to his ADT. We discussed common toxicities including muscle/joint pain, seizures, falls, GI toxicity and others. Initially this was delayed due to insurance copay, however this was resolved and he initiated treatment in mid-May. He has developed multiple new symptoms, suspected 2/2 to alessandra including dizziness, stomach upset, joint pains. These are manageable for now. We will have him continue at current dose. If symptoms worsen or become intolerable, we will explore dose reduction or switching to alternative NHT. We will trend PSA.  He would like IVC filter removed and we will place referral to vasc surgery. He will return in 4 weeks for f/u and toxicity assessment.   #Metastatic CSPC -Lupron 22.5mg next dose 7/24/24 -continue enzalutamide 160mg daily - monitor symptoms closely -RTC in 4 weeks for repeat labs and toxicity assessment

## 2024-06-13 PROBLEM — E83.52 HYPERCALCEMIA: Status: ACTIVE | Noted: 2024-06-13

## 2024-06-14 ENCOUNTER — APPOINTMENT (OUTPATIENT)
Dept: HEMATOLOGY ONCOLOGY | Facility: CLINIC | Age: 74
End: 2024-06-14

## 2024-06-17 LAB
24R-OH-CALCIDIOL SERPL-MCNC: 27 PG/ML
ALBUMIN SERPL ELPH-MCNC: 4.2 G/DL
ALP BLD-CCNC: 127 U/L
ALT SERPL-CCNC: 16 U/L
ANION GAP SERPL CALC-SCNC: 8 MMOL/L
AST SERPL-CCNC: 20 U/L
BILIRUB SERPL-MCNC: 1.1 MG/DL
BUN SERPL-MCNC: 23 MG/DL
CALCIUM SERPL-MCNC: 10.5 MG/DL
CALCIUM SERPL-MCNC: 10.6 MG/DL
CALCIUM SERPL-MCNC: 11 MG/DL
CHLORIDE SERPL-SCNC: 106 MMOL/L
CO2 SERPL-SCNC: 31 MMOL/L
CREAT SERPL-MCNC: 1 MG/DL
DEPRECATED KAPPA LC FREE/LAMBDA SER: 0.71 RATIO
EGFR: 80 ML/MIN/1.73M2
GLUCOSE SERPL-MCNC: 112 MG/DL
HCT VFR BLD CALC: 35.5 %
HGB BLD-MCNC: 12.1 G/DL
KAPPA LC CSF-MCNC: 1.72 MG/DL
KAPPA LC SERPL-MCNC: 1.22 MG/DL
LYMPHOCYTES # BLD AUTO: 1.1 K/UL
LYMPHOCYTES NFR BLD AUTO: 33.6 %
MAN DIFF?: NO
MCHC RBC-ENTMCNC: 30.2 PG
MCHC RBC-ENTMCNC: 34.1 GM/DL
MCV RBC AUTO: 88.5 FL
NEUTROPHILS # BLD AUTO: 1.8 K/UL
NEUTROPHILS NFR BLD AUTO: 58.3 %
PARATHYROID HORMONE INTACT: 30 PG/ML
PLATELET # BLD AUTO: 218 K/UL
POTASSIUM SERPL-SCNC: 4.7 MMOL/L
PROT SERPL-MCNC: 7.9 G/DL
RBC # BLD: 4.01 M/UL
RBC # FLD: 12.8 %
SODIUM SERPL-SCNC: 145 MMOL/L
WBC # FLD AUTO: 3.2 K/UL

## 2024-06-18 LAB
ALBUMIN MFR SERPL ELPH: 59.9 %
ALBUMIN SERPL-MCNC: 4.5 G/DL
ALBUMIN/GLOB SERPL: 1.5 RATIO
ALPHA1 GLOB MFR SERPL ELPH: 4 %
ALPHA1 GLOB SERPL ELPH-MCNC: 0.3 G/DL
ALPHA2 GLOB MFR SERPL ELPH: 8.2 %
ALPHA2 GLOB SERPL ELPH-MCNC: 0.6 G/DL
B-GLOBULIN MFR SERPL ELPH: 10.9 %
B-GLOBULIN SERPL ELPH-MCNC: 0.8 G/DL
GAMMA GLOB FLD ELPH-MCNC: 1.3 G/DL
GAMMA GLOB MFR SERPL ELPH: 17 %
INTERPRETATION SERPL IEP-IMP: NORMAL
M PROTEIN MFR SERPL ELPH: NORMAL
M PROTEIN SPEC IFE-MCNC: NORMAL
MONOCLON BAND OBS SERPL: NORMAL
PROT SERPL-MCNC: 7.5 G/DL
PROT SERPL-MCNC: 7.5 G/DL
PSA FREE FLD-MCNC: 27 %
PSA FREE SERPL-MCNC: 0.49 NG/ML
PSA SERPL-MCNC: 1.8 NG/ML

## 2024-06-19 ENCOUNTER — APPOINTMENT (OUTPATIENT)
Dept: HEMATOLOGY ONCOLOGY | Facility: CLINIC | Age: 74
End: 2024-06-19

## 2024-07-10 ENCOUNTER — APPOINTMENT (OUTPATIENT)
Dept: HEMATOLOGY ONCOLOGY | Facility: CLINIC | Age: 74
End: 2024-07-10
Payer: MEDICARE

## 2024-07-10 VITALS
SYSTOLIC BLOOD PRESSURE: 162 MMHG | TEMPERATURE: 98.4 F | HEIGHT: 67 IN | OXYGEN SATURATION: 98 % | RESPIRATION RATE: 18 BRPM | DIASTOLIC BLOOD PRESSURE: 86 MMHG | HEART RATE: 67 BPM

## 2024-07-10 DIAGNOSIS — C61 MALIGNANT NEOPLASM OF PROSTATE: ICD-10-CM

## 2024-07-10 DIAGNOSIS — D47.2 MONOCLONAL GAMMOPATHY: ICD-10-CM

## 2024-07-10 DIAGNOSIS — I82.453 BILATERAL PERONEAL VEIN ACUTE EMBOLISM AND THROMBOSIS: ICD-10-CM

## 2024-07-10 PROCEDURE — 99215 OFFICE O/P EST HI 40 MIN: CPT

## 2024-07-11 ENCOUNTER — NON-APPOINTMENT (OUTPATIENT)
Age: 74
End: 2024-07-11

## 2024-07-11 ENCOUNTER — APPOINTMENT (OUTPATIENT)
Dept: VASCULAR SURGERY | Facility: CLINIC | Age: 74
End: 2024-07-11
Payer: MEDICARE

## 2024-07-11 DIAGNOSIS — Z95.828 PRESENCE OF OTHER VASCULAR IMPLANTS AND GRAFTS: ICD-10-CM

## 2024-07-11 LAB
ALBUMIN SERPL ELPH-MCNC: 3.9 G/DL
ALP BLD-CCNC: 87 U/L
ALT SERPL-CCNC: 18 U/L
ANION GAP SERPL CALC-SCNC: 6 MMOL/L
AST SERPL-CCNC: 24 U/L
CALCIUM SERPL-MCNC: 10.3 MG/DL
CHLORIDE SERPL-SCNC: 109 MMOL/L
CO2 SERPL-SCNC: 31 MMOL/L
CREAT SERPL-MCNC: 0.8 MG/DL
GLUCOSE SERPL-MCNC: 117 MG/DL
POTASSIUM SERPL-SCNC: 4.9 MMOL/L
PROT SERPL-MCNC: 7.1 G/DL
PSA FREE FLD-MCNC: 20 %
PSA FREE SERPL-MCNC: 0.6 NG/ML
PSA SERPL-MCNC: 3.02 NG/ML
SODIUM SERPL-SCNC: 146 MMOL/L

## 2024-07-11 PROCEDURE — 99214 OFFICE O/P EST MOD 30 MIN: CPT

## 2024-07-11 PROCEDURE — 93970 EXTREMITY STUDY: CPT

## 2024-07-12 PROBLEM — Z95.828 PRESENCE OF IVC FILTER: Status: ACTIVE | Noted: 2024-07-12

## 2024-07-15 ENCOUNTER — TRANSCRIPTION ENCOUNTER (OUTPATIENT)
Age: 74
End: 2024-07-15

## 2024-07-15 VITALS
RESPIRATION RATE: 18 BRPM | TEMPERATURE: 97 F | WEIGHT: 184.75 LBS | DIASTOLIC BLOOD PRESSURE: 81 MMHG | SYSTOLIC BLOOD PRESSURE: 165 MMHG | HEIGHT: 67 IN | HEART RATE: 62 BPM | OXYGEN SATURATION: 98 %

## 2024-07-15 LAB
HCT VFR BLD CALC: 35.6 %
HGB BLD-MCNC: 12.2 G/DL
LYMPHOCYTES # BLD AUTO: 1.1 K/UL
LYMPHOCYTES NFR BLD AUTO: 35.2 %
MAN DIFF?: NO
MCHC RBC-ENTMCNC: 30.5 PG
MCHC RBC-ENTMCNC: 34.3 GM/DL
MCV RBC AUTO: 89 FL
NEUTROPHILS # BLD AUTO: 1.7 K/UL
NEUTROPHILS NFR BLD AUTO: 58.8 %
PLATELET # BLD AUTO: 217 K/UL
RBC # BLD: 4 M/UL
RBC # FLD: 12.7 %
WBC # FLD AUTO: 3 K/UL

## 2024-07-15 NOTE — ASU PATIENT PROFILE, ADULT - FALL HARM RISK - HARM RISK INTERVENTIONS

## 2024-07-15 NOTE — ASU PATIENT PROFILE, ADULT - NSICDXPASTMEDICALHX_GEN_ALL_CORE_FT
PAST MEDICAL HISTORY:  DM (diabetes mellitus), type 1     Essential hypertension     Prostate cancer metastatic to bone

## 2024-07-16 ENCOUNTER — TRANSCRIPTION ENCOUNTER (OUTPATIENT)
Age: 74
End: 2024-07-16

## 2024-07-16 ENCOUNTER — APPOINTMENT (OUTPATIENT)
Dept: VASCULAR SURGERY | Facility: HOSPITAL | Age: 74
End: 2024-07-16

## 2024-07-16 ENCOUNTER — OUTPATIENT (OUTPATIENT)
Dept: OUTPATIENT SERVICES | Facility: HOSPITAL | Age: 74
LOS: 1 days | Discharge: HOME | End: 2024-07-16
Payer: COMMERCIAL

## 2024-07-16 ENCOUNTER — RESULT REVIEW (OUTPATIENT)
Age: 74
End: 2024-07-16

## 2024-07-16 VITALS
HEART RATE: 64 BPM | OXYGEN SATURATION: 99 % | RESPIRATION RATE: 20 BRPM | TEMPERATURE: 97 F | DIASTOLIC BLOOD PRESSURE: 88 MMHG | SYSTOLIC BLOOD PRESSURE: 158 MMHG

## 2024-07-16 DIAGNOSIS — Z95.828 PRESENCE OF OTHER VASCULAR IMPLANTS AND GRAFTS: Chronic | ICD-10-CM

## 2024-07-16 DIAGNOSIS — Z98.890 OTHER SPECIFIED POSTPROCEDURAL STATES: Chronic | ICD-10-CM

## 2024-07-16 PROBLEM — E10.9 TYPE 1 DIABETES MELLITUS WITHOUT COMPLICATIONS: Chronic | Status: ACTIVE | Noted: 2024-07-15

## 2024-07-16 LAB — GLUCOSE BLDC GLUCOMTR-MCNC: 117 MG/DL — HIGH (ref 70–99)

## 2024-07-16 PROCEDURE — 37193 REM ENDOVAS VENA CAVA FILTER: CPT

## 2024-07-16 PROCEDURE — 37193 REM ENDOVAS VENA CAVA FILTER: CPT | Mod: GC

## 2024-07-16 PROCEDURE — 88300 SURGICAL PATH GROSS: CPT | Mod: 26

## 2024-07-16 PROCEDURE — 76000 FLUOROSCOPY <1 HR PHYS/QHP: CPT

## 2024-07-16 PROCEDURE — C1887: CPT

## 2024-07-16 PROCEDURE — 82962 GLUCOSE BLOOD TEST: CPT

## 2024-07-16 PROCEDURE — C1894: CPT

## 2024-07-16 PROCEDURE — C1773: CPT

## 2024-07-16 PROCEDURE — C1769: CPT

## 2024-07-16 DEVICE — SHEATH INTRODUCER TERUMO PINNACLE CORONARY 5FR X 10CM X 0.038" MINI WIRE: Type: IMPLANTABLE DEVICE | Status: FUNCTIONAL

## 2024-07-16 DEVICE — SHEATH INTRODUCER TERUMO PINNACLE CORONARY 8FR X 10CM X 0.038" MINI WIRE: Type: IMPLANTABLE DEVICE | Status: FUNCTIONAL

## 2024-07-16 DEVICE — IMPLANTABLE DEVICE: Type: IMPLANTABLE DEVICE | Status: FUNCTIONAL

## 2024-07-16 DEVICE — INTRO FASTCATH HEMOSTASIS LRG LUMEN/GWIRE 12FRX12CM: Type: IMPLANTABLE DEVICE | Status: FUNCTIONAL

## 2024-07-16 DEVICE — CATH ANG BERENSTN 5FRX65CM: Type: IMPLANTABLE DEVICE | Status: FUNCTIONAL

## 2024-07-16 DEVICE — INTRO MICROPUNC 4FRX10CM SS: Type: IMPLANTABLE DEVICE | Status: FUNCTIONAL

## 2024-07-16 DEVICE — GUIDEWIRE AMPLATZ SUPER-STIFF STRAIGHT .035" X 260CM: Type: IMPLANTABLE DEVICE | Status: FUNCTIONAL

## 2024-07-16 DEVICE — GWIRE FC ST BENT 0.035INX180CM: Type: IMPLANTABLE DEVICE | Status: FUNCTIONAL

## 2024-07-16 DEVICE — GWIRE SUPRACORE .035X370: Type: IMPLANTABLE DEVICE | Status: FUNCTIONAL

## 2024-07-16 RX ORDER — ONDANSETRON HYDROCHLORIDE 2 MG/ML
4 INJECTION INTRAMUSCULAR; INTRAVENOUS ONCE
Refills: 0 | Status: DISCONTINUED | OUTPATIENT
Start: 2024-07-16 | End: 2024-07-16

## 2024-07-16 RX ORDER — ACETAMINOPHEN 325 MG
650 TABLET ORAL EVERY 6 HOURS
Refills: 0 | Status: DISCONTINUED | OUTPATIENT
Start: 2024-07-16 | End: 2024-07-16

## 2024-07-16 NOTE — PROGRESS NOTE ADULT - SUBJECTIVE AND OBJECTIVE BOX
Patient s/p IVC filter removal via R IJ.  Feelin well , no complaints   Neck soft, no hematoma  d/c home, f/u as outpt

## 2024-07-16 NOTE — BRIEF OPERATIVE NOTE - OPERATION/FINDINGS
RIJ access. Venogram performed demonstrating patent IVC + straight IVCF. Filter retrieved with Argon Retrieval Kit. Completion venogram showed no extrav. Skin closed with monocryl.

## 2024-07-16 NOTE — ASU DISCHARGE PLAN (ADULT/PEDIATRIC) - CARE PROVIDER_API CALL
Reggie Lundberg  Vascular Surgery  [address] [address]  Phone: [phone]  Fax: [phone]  Follow Up Time:

## 2024-07-16 NOTE — PRE-ANESTHESIA EVALUATION ADULT - NSANTHOSAYNRD_GEN_A_CORE
No. GIAN screening performed.  STOP BANG Legend: 0-2 = LOW Risk; 3-4 = INTERMEDIATE Risk; 5-8 = HIGH Risk

## 2024-07-16 NOTE — PRE-ANESTHESIA EVALUATION ADULT - RESPIRATORY RATE (BREATHS/MIN)
I have personally seen and examined this patient.  I have fully participated in the care of this patient. I have reviewed all pertinent clinical information, including history, physical exam, plan and the Resident’s note and agree except as noted.
18

## 2024-07-16 NOTE — BRIEF OPERATIVE NOTE - ANESTHESIOLOGIST NAME
Latesha Complex Repair And Flap Additional Text (Will Appearing After The Standard Complex Repair Text): The complex repair was not sufficient to completely close the primary defect. The remaining additional defect was repaired with the flap mentioned below.

## 2024-07-17 LAB — SURGICAL PATHOLOGY STUDY: SIGNIFICANT CHANGE UP

## 2024-07-23 DIAGNOSIS — C79.51 MALIGNANT NEOPLASM OF PROSTATE: ICD-10-CM

## 2024-07-23 DIAGNOSIS — C61 MALIGNANT NEOPLASM OF PROSTATE: ICD-10-CM

## 2024-07-24 ENCOUNTER — APPOINTMENT (OUTPATIENT)
Dept: HEMATOLOGY ONCOLOGY | Facility: CLINIC | Age: 74
End: 2024-07-24
Payer: MEDICARE

## 2024-07-24 VITALS
WEIGHT: 185 LBS | HEIGHT: 67 IN | SYSTOLIC BLOOD PRESSURE: 161 MMHG | RESPIRATION RATE: 18 BRPM | BODY MASS INDEX: 29.03 KG/M2 | HEART RATE: 74 BPM | DIASTOLIC BLOOD PRESSURE: 96 MMHG | OXYGEN SATURATION: 98 % | TEMPERATURE: 98 F

## 2024-07-24 VITALS — DIASTOLIC BLOOD PRESSURE: 110 MMHG | SYSTOLIC BLOOD PRESSURE: 177 MMHG

## 2024-07-24 VITALS — DIASTOLIC BLOOD PRESSURE: 96 MMHG | SYSTOLIC BLOOD PRESSURE: 169 MMHG

## 2024-07-24 PROCEDURE — 99215 OFFICE O/P EST HI 40 MIN: CPT

## 2024-07-25 ENCOUNTER — APPOINTMENT (OUTPATIENT)
Dept: INFUSION THERAPY | Facility: CLINIC | Age: 74
End: 2024-07-25

## 2024-07-25 ENCOUNTER — TRANSCRIPTION ENCOUNTER (OUTPATIENT)
Age: 74
End: 2024-07-25

## 2024-07-25 ENCOUNTER — NON-APPOINTMENT (OUTPATIENT)
Age: 74
End: 2024-07-25

## 2024-07-25 LAB
ALBUMIN SERPL ELPH-MCNC: 4.4 G/DL
ALP BLD-CCNC: 101 U/L
ALT SERPL-CCNC: 17 U/L
ANION GAP SERPL CALC-SCNC: 3 MMOL/L
AST SERPL-CCNC: 26 U/L
BILIRUB SERPL-MCNC: 1.2 MG/DL
BUN SERPL-MCNC: 18 MG/DL
CALCIUM SERPL-MCNC: 10.3 MG/DL
CHLORIDE SERPL-SCNC: 109 MMOL/L
CO2 SERPL-SCNC: 29 MMOL/L
CREAT SERPL-MCNC: 0.9 MG/DL
EGFR: 91 ML/MIN/1.73M2
GLUCOSE SERPL-MCNC: 118 MG/DL
HCT VFR BLD CALC: 36.2 %
HGB BLD-MCNC: 12.4 G/DL
LYMPHOCYTES # BLD AUTO: 1 K/UL
LYMPHOCYTES NFR BLD AUTO: 31.8 %
MAN DIFF?: NO
MCHC RBC-ENTMCNC: 30.6 PG
MCHC RBC-ENTMCNC: 34.3 GM/DL
MCV RBC AUTO: 89.4 FL
NEUTROPHILS # BLD AUTO: 2.1 K/UL
NEUTROPHILS NFR BLD AUTO: 63.1 %
PLATELET # BLD AUTO: 201 K/UL
POTASSIUM SERPL-SCNC: 4.2 MMOL/L
PROT SERPL-MCNC: 7.7 G/DL
PSA FREE FLD-MCNC: 18 %
PSA FREE SERPL-MCNC: 0.58 NG/ML
PSA SERPL-MCNC: 3.2 NG/ML
RBC # BLD: 4.05 M/UL
RBC # FLD: 12.5 %
SODIUM SERPL-SCNC: 141 MMOL/L
WBC # FLD AUTO: 3.3 K/UL

## 2024-07-25 RX ORDER — ASPIRIN 325 MG/1
1 TABLET, FILM COATED ORAL
Refills: 0 | DISCHARGE

## 2024-07-25 RX ORDER — PREDNISONE 5 MG/1
5 TABLET ORAL
Qty: 60 | Refills: 0 | Status: ACTIVE | COMMUNITY
Start: 2024-07-16 | End: 1900-01-01

## 2024-07-25 RX ORDER — ABIRATERONE ACETATE 500 MG/1
500 TABLET, FILM COATED ORAL DAILY
Qty: 120 | Refills: 0 | Status: ACTIVE | COMMUNITY
Start: 2024-07-16 | End: 1900-01-01

## 2024-07-25 RX ORDER — ENZALUTAMIDE 40 MG/1
4 CAPSULE ORAL
Refills: 0 | DISCHARGE

## 2024-07-25 RX ORDER — LISINOPRIL 5 MG/1
1 TABLET ORAL
Refills: 0 | DISCHARGE

## 2024-07-25 RX ORDER — AMLODIPINE BESYLATE 2.5 MG/1
1 TABLET ORAL
Refills: 0 | DISCHARGE

## 2024-07-25 RX ORDER — AMLODIPINE BESYLATE 10 MG/1
2 TABLET ORAL
Qty: 90 | Refills: 1 | DISCHARGE
Start: 2024-07-25 | End: 2025-01-20

## 2024-07-25 NOTE — ASSESSMENT
[FreeTextEntry1] : Aguust Salinas is a 74-year-old male with history of IDDM, HTN and history of prostate cancer s/p biopsy in 2021 (Brook 3+4) who declined radiation therapy at that time. In 11/2023, he was admitted to [facility] for rhabdo + SRUTHI, transferred to [facility] for spinal cord compression from metastasis s/p C5-T3 Fusion, C7-T1 decompression on 11/2/2023. His hospital course was complicated by MSSA bacteremia from surgical site infection, bilateral LE DVT s/p IVC filter, candida esophagitis, aspiration multilobar PNA, and C. diff colitis. 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  During our previous visits, we had an extensive discussion with him to review his clinical course, recent hospitalization, and next steps with regard to his metastatic prostate cancer. For patients with newly diagnosed metastatic castration-sensitive prostate cancer (mCSPC), androgen deprivation therapy (ADT) remains the cornerstone of treatment; however, its combination with other systemic therapies has shown improved outcomes. The pivotal trials of note include the CHAARTED, STAMPEDE, and LATITUDE, which have each demonstrated the benefit of adding either docetaxel or novel hormonal agents such to standard ADT. In addition, recent trials have also shown survival benefit with triplet therapy in patients with high volume disease.   Given his comorbidities and desire to minimize potential toxicities at this point in his recovery, we opted to procced with the combination  of enzalutamide and ADT however shortly after treatment initiation his PSA began to rise, consistent with development of CRPC. Due to his upcoming travels, we opted to switch to abiraterone/prednisone, understanding that this is unlikely to offer a high chance of PSA response and even if it does, it may be transient. He has developed multiple new symptoms, suspected 2/2 to enzalutamide. and was also found to have a rising PSA.   At todays visit, he has significantly elevated BP and new headaches and vision changes concerning for hypertensive urgency/emergency. He will sent to the ED for further evaluation and management. We instructed him to increase his antihypertensive regimen to 5mg amlodipine and 10mg lisinopril  with close monitoring once he is discharged. He will return to see us on 10/2/24 after he returns from    #Metastatic CSPC - Lupron 22.5mg today, next due  10/16/24 - Enzalutamide 160mg daily discontinued - Abiraterone 1000mg with prednisone 5mg started 7/19/24 - will plan to continue abiraterone and increase anti-hypertensive regimen  #HTN urgency/emergency: - patient /110 in office today and also complains of intermittent headache/blurry vision - will send to ED for management of HTN - if BP able to normalize, likely can d/c from ED   #Hypercalcemia - Patient was supplementing with Calcium, last CA was 10.3 - Patient has held Calcium and will continue to hold Calcium.   #DVT - IVC Filter removed mid - July - repeat ultrasound without evidence of thrombosis - follows with vascular surgery  Discussed with Dr. Aponte

## 2024-07-25 NOTE — HISTORY OF PRESENT ILLNESS
[Disease: _____________________] : Disease: [unfilled] [M: ___] : M[unfilled] [AJCC Stage: ____] : AJCC Stage: [unfilled] [de-identified] : August Salinas is a 73-year-old male with history of IDDM, HTN and history of prostate cancer s/p biopsy in 2021 (Brook 4+3) who declined radiation therapy at that time. He spends much of his time in the [place], where he was in 10/2023 when he began to feel ill and developed neurologic symptoms. In 10/30/2023, [place] back to the [place] and was immediately taken to [facility] and transferred to [facility] for spinal cord compression from metastasis and fracture. He was noted to have diffuse osseous mets to spine, ribs, and pelvis. He underwent C5-T3 Fusion, C7-T1 decompression on 11/2/2023. His hospital course was complicated by MSSA bacteremia from surgical site infection, bilateral LE DVT s/p IVC filter 11/1 by IR, candida esophagitis w/ dysphagia, aspiration multilobar PNA, acute on chronic anemia, and C. diff colitis. He was started on casodex/Lupron while in the hospital. He was discharged on 12/23/2023 to rehab facility. He was seen by Dr. Jeff on 2/2/24 at which time he received second dose of Lupron 22.5mg IM. He was continued on Casodex pending follow up with me.   He reported gradual improvement in his lower extremity strength since discharge from hospital. He only stayed in the recommended rehab facility for a few days because he was unhappy with the cleanliness and care. He has been doing exercises at home to increase his LE strength. He is able to walk short distances without his walker. He reports persistent tingling in b/l LE but denies focal pain or new weakness/numbness.   He completed pall XRT to cervical spine with Dr. Tatum. After initial delay with insurance authorization, he was able to start enzalutamide.   After starting enzalutamide in 4/2024, his PSA decreased to a preeti of 1.15 on 5/1/24. Since then, his PSA has gradually  increased, most recently to 3.20 on 7/24/24.   We discussed that this appears to suggest development of CRPC. We reviewed options including switch in API, taxane chemotherapy, and Pluvicto. We also discussed that he will need somatic and germline genetic testing to assess HRR mutation status. As he will be traveling to   for 2 months, we opted to switch to abiraterone in hopes of  maintaining disease control until his return.   [de-identified] : He presents  for a follow up visit and to receive next Lupron injection Since his last visit, patient had his IVC filter pulled.  Patient reports he is experiencing headache and intermittent blurry vision, new since yesterday. Of note, he started taking abiraterone/pred on 7/19/2024.

## 2024-07-25 NOTE — ASSESSMENT
[FreeTextEntry1] : August Salinas is a 74-year-old male with history of IDDM, HTN and history of prostate cancer s/p biopsy in 2021 (Brook 3+4) who declined radiation therapy at that time. In 11/2023, he was admitted to [facility] for rhabdo + SRUTHI, transferred to [facility] for spinal cord compression from metastasis s/p C5-T3 Fusion, C7-T1 decompression on 11/2/2023. His hospital course was complicated by MSSA bacteremia from surgical site infection, bilateral LE DVT s/p IVC filter, candida esophagitis, aspiration multilobar PNA, and C. diff colitis. 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  During our previous visits, we had an extensive discussion with him to review his clinical course, recent hospitalization, and next steps with regard to his metastatic prostate cancer. For patients with newly diagnosed metastatic castration-sensitive prostate cancer (mCSPC), androgen deprivation therapy (ADT) remains the cornerstone of treatment; however, its combination with other systemic therapies has shown improved outcomes. The pivotal trials of note include the CHAARTED, STAMPEDE, and LATITUDE, which have each demonstrated the benefit of adding either docetaxel or novel hormonal agents such to standard ADT. In addition, recent trials have also shown survival benefit with triplet therapy in patients with high volume disease.   Given his comorbidities and desire to minimize potential toxicities at this point in his recovery, we opted to procced with the combination  of enzalutamide and ADT however shortly after treatment initiation his PSA began to rise, consistent with development of CRPC. Due to his upcoming travels, we opted to switch to abiraterone/prednisone, understanding that this is unlikely to offer a high chance of PSA response and even if it does, it may be transient. He has developed multiple new symptoms, suspected 2/2 to enzalutamide. and was also found to have a rising PSA.   At todays visit, he has significantly elevated BP and new headaches and vision changes concerning for hypertensive urgency/emergency. He will sent to the ED for further evaluation and management. We instructed him to increase his antihypertensive regimen to 5mg amlodipine and 10mg lisinopril  with close monitoring once he is discharged. He will return to see us on 10/2/24 after he returns from    #Metastatic CSPC - Lupron 22.5mg today, next due  10/16/24 - Enzalutamide 160mg daily discontinued - Abiraterone 1000mg with prednisone 5mg started 7/19/24 - will plan to continue abiraterone and increase anti-hypertensive regimen  #HTN urgency/emergency: - patient /110 in office today and also complains of intermittent headache/blurry vision - will send to ED for management of HTN - if BP able to normalize, likely can d/c from ED   #Hypercalcemia - Patient was supplementing with Calcium, last CA was 10.3 - Patient has held Calcium and will continue to hold Calcium.   #DVT - IVC Filter removed mid - July - repeat ultrasound without evidence of thrombosis - follows with vascular surgery  Discussed with Dr. Aponte

## 2024-07-25 NOTE — PHYSICAL EXAM
[Ambulatory and capable of all self care but unable to carry out any work activities] : Status 2- Ambulatory and capable of all self care but unable to carry out any work activities. Up and about more than 50% of waking hours [Normal] : full range of motion and no deformities appreciated [de-identified] : Soft, non-tender upon palpation to abdomen. [de-identified] : Alert and Oriented x 4

## 2024-07-25 NOTE — ASSESSMENT
[FreeTextEntry1] : August Salinas is a 74-year-old male with history of IDDM, HTN and history of prostate cancer s/p biopsy in 2021 (Brook 3+4) who declined radiation therapy at that time. In 11/2023, he was admitted to [facility] for rhabdo + SRUTHI, transferred to [facility] for spinal cord compression from metastasis s/p C5-T3 Fusion, C7-T1 decompression on 11/2/2023. His hospital course was complicated by MSSA bacteremia from surgical site infection, bilateral LE DVT s/p IVC filter, candida esophagitis, aspiration multilobar PNA, and C. diff colitis. 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  During our previous visits, we had an extensive discussion with him to review his clinical course, recent hospitalization, and next steps with regard to his metastatic prostate cancer. For patients with newly diagnosed metastatic castration-sensitive prostate cancer (mCSPC), androgen deprivation therapy (ADT) remains the cornerstone of treatment; however, its combination with other systemic therapies has shown improved outcomes. The pivotal trials of note include the CHAARTED, STAMPEDE, and LATITUDE, which have each demonstrated the benefit of adding either docetaxel or novel hormonal agents such to standard ADT. In addition, recent trials have also shown survival benefit with triplet therapy in patients with high volume disease.   Given his comorbidities and desire to minimize potential toxicities at this point in his recovery, we opted to procced with the combination  of enzalutamide and ADT however shortly after treatment initiation his PSA began to rise, consistent with development of CRPC. Due to his upcoming travels, we opted to switch to abiraterone/prednisone, understanding that this is unlikely to offer a high chance of PSA response and even if it does, it may be transient. He has developed multiple new symptoms, suspected 2/2 to enzalutamide. and was also found to have a rising PSA.   At todays visit, he has significantly elevated BP and new headaches and vision changes concerning for hypertensive urgency/emergency. He will sent to the ED for further evaluation and management. We instructed him to increase his antihypertensive regimen to 5mg amlodipine and 10mg lisinopril  with close monitoring once he is discharged. He will return to see us on 10/2/24 after he returns from    #Metastatic CSPC - Lupron 22.5mg today, next due  10/16/24 - Enzalutamide 160mg daily discontinued - Abiraterone 1000mg with prednisone 5mg started 7/19/24 - will plan to continue abiraterone and increase anti-hypertensive regimen  #HTN urgency/emergency: - patient /110 in office today and also complains of intermittent headache/blurry vision - will send to ED for management of HTN - if BP able to normalize, likely can d/c from ED   #Hypercalcemia - Patient was supplementing with Calcium, last CA was 10.3 - Patient has held Calcium and will continue to hold Calcium.   #DVT - IVC Filter removed mid - July - repeat ultrasound without evidence of thrombosis - follows with vascular surgery  Discussed with Dr. Aponte

## 2024-07-25 NOTE — PHYSICAL EXAM
[Ambulatory and capable of all self care but unable to carry out any work activities] : Status 2- Ambulatory and capable of all self care but unable to carry out any work activities. Up and about more than 50% of waking hours [Normal] : full range of motion and no deformities appreciated [de-identified] : Soft, non-tender upon palpation to abdomen. [de-identified] : Alert and Oriented x 4

## 2024-07-25 NOTE — ASSESSMENT
[FreeTextEntry1] : August Salinas is a 74-year-old male with history of IDDM, HTN and history of prostate cancer s/p biopsy in 2021 (Brook 3+4) who declined radiation therapy at that time. In 11/2023, he was admitted to [facility] for rhabdo + SRUTHI, transferred to [facility] for spinal cord compression from metastasis s/p C5-T3 Fusion, C7-T1 decompression on 11/2/2023. His hospital course was complicated by MSSA bacteremia from surgical site infection, bilateral LE DVT s/p IVC filter, candida esophagitis, aspiration multilobar PNA, and C. diff colitis. 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  During our previous visits, we had an extensive discussion with him to review his clinical course, recent hospitalization, and next steps with regard to his metastatic prostate cancer. For patients with newly diagnosed metastatic castration-sensitive prostate cancer (mCSPC), androgen deprivation therapy (ADT) remains the cornerstone of treatment; however, its combination with other systemic therapies has shown improved outcomes. The pivotal trials of note include the CHAARTED, STAMPEDE, and LATITUDE, which have each demonstrated the benefit of adding either docetaxel or novel hormonal agents such to standard ADT. In addition, recent trials have also shown survival benefit with triplet therapy in patients with high volume disease.   Given his comorbidities and desire to minimize potential toxicities at this point in his recovery, we opted to procced with the combination  of enzalutamide and ADT however shortly after treatment initiation his PSA began to rise, consistent with development of CRPC. Due to his upcoming travels, we opted to switch to abiraterone/prednisone, understanding that this is unlikely to offer a high chance of PSA response and even if it does, it may be transient. He has developed multiple new symptoms, suspected 2/2 to enzalutamide. and was also found to have a rising PSA.   At todays visit, he has significantly elevated BP and new headaches and vision changes concerning for hypertensive urgency/emergency. He will sent to the ED for further evaluation and management. We instructed him to increase his antihypertensive regimen to 5mg amlodipine and 10mg lisinopril  with close monitoring once he is discharged. He will return to see us on 10/2/24 after he returns from    #Metastatic CSPC - Lupron 22.5mg today, next due  10/16/24 - Enzalutamide 160mg daily discontinued - Abiraterone 1000mg with prednisone 5mg started 7/19/24 - will plan to continue abiraterone and increase anti-hypertensive regimen  #HTN urgency/emergency: - patient /110 in office today and also complains of intermittent headache/blurry vision - will send to ED for management of HTN - if BP able to normalize, likely can d/c from ED   #Hypercalcemia - Patient was supplementing with Calcium, last CA was 10.3 - Patient has held Calcium and will continue to hold Calcium.   #DVT - IVC Filter removed mid - July - repeat ultrasound without evidence of thrombosis - follows with vascular surgery  Discussed with Dr. Aponte

## 2024-07-25 NOTE — HISTORY OF PRESENT ILLNESS
[Disease: _____________________] : Disease: [unfilled] [M: ___] : M[unfilled] [AJCC Stage: ____] : AJCC Stage: [unfilled] [de-identified] : August Salinas is a 73-year-old male with history of IDDM, HTN and history of prostate cancer s/p biopsy in 2021 (Brook 4+3) who declined radiation therapy at that time. He spends much of his time in the [place], where he was in 10/2023 when he began to feel ill and developed neurologic symptoms. In 10/30/2023, [place] the [place] and was immediately taken to [facility] and transferred to [facility] for spinal cord compression from metastasis and fracture. He was noted to have diffuse osseous mets to spine, ribs, and pelvis. He underwent C5-T3 Fusion, C7-T1 decompression on 11/2/2023. His hospital course was complicated by MSSA bacteremia from surgical site infection, bilateral LE DVT s/p IVC filter 11/1 by IR, candida esophagitis w/ dysphagia, aspiration multilobar PNA, acute on chronic anemia, and C. diff colitis. He was started on casodex/Lupron while in the hospital. He was discharged on 12/23/2023 to rehab facility. He was seen by Dr. Jeff on 2/2/24 at which time he received second dose of Lupron 22.5mg IM. He was continued on Casodex pending follow up with me.   He reported gradual improvement in his lower extremity strength since discharge from hospital. He only stayed in the recommended rehab facility for a few days because he was unhappy with the cleanliness and care. He has been doing exercises at home to increase his LE strength. He is able to walk short distances without his walker. He reports persistent tingling in b/l LE but denies focal pain or new weakness/numbness.   He completed pall XRT to cervical spine with Dr. Tatum. After initial delay with insurance authorization, he was able to start enzalutamide.   After starting enzalutamide in 4/2024, his PSA decreased to a preeti of 1.15 on 5/1/24. Since then, his PSA has gradually  increased, most recently to 3.20 on 7/24/24.   We discussed that this appears to suggest development of CRPC. We reviewed options including switch in API, taxane chemotherapy, and Pluvicto. We also discussed that he will need somatic and germline genetic testing to assess HRR mutation status. As he will be traveling to   for 2 months, we opted to switch to abiraterone in hopes of  maintaining disease control until his return.   [de-identified] : He presents  for a follow up visit and to receive next Lupron injection Since his last visit, patient had his IVC filter pulled.  Patient reports he is experiencing headache and intermittent blurry vision, new since yesterday. Of note, he started taking abiraterone/pred on 7/19/2024.

## 2024-07-25 NOTE — HISTORY OF PRESENT ILLNESS
[Disease: _____________________] : Disease: [unfilled] [M: ___] : M[unfilled] [AJCC Stage: ____] : AJCC Stage: [unfilled] [de-identified] : He presents  for a follow up visit and to receive next Lupron injection Since his last visit, patient had his IVC filter pulled.  Patient reports he is experiencing headache and intermittent blurry vision, new since yesterday. Of note, he started taking abiraterone/pred on 7/19/2024.    [de-identified] : August Salinas is a 73-year-old male with history of IDDM, HTN and history of prostate cancer s/p biopsy in 2021 (Brook 4+3) who declined radiation therapy at that time. He spends much of his time in the [place], where he was in 10/2023 when he began to feel ill and developed neurologic symptoms. In 10/30/2023, [place] back to the [place] and was immediately taken to [facility] and transferred to [facility] for spinal cord compression from metastasis and fracture. He was noted to have diffuse osseous mets to spine, ribs, and pelvis. He underwent C5-T3 Fusion, C7-T1 decompression on 11/2/2023. His hospital course was complicated by MSSA bacteremia from surgical site infection, bilateral LE DVT s/p IVC filter 11/1 by IR, candida esophagitis w/ dysphagia, aspiration multilobar PNA, acute on chronic anemia, and C. diff colitis. He was started on casodex/Lupron while in the hospital. He was discharged on 12/23/2023 to rehab facility. He was seen by Dr. Jeff on 2/2/24 at which time he received second dose of Lupron 22.5mg IM. He was continued on Casodex pending follow up with me.   He reported gradual improvement in his lower extremity strength since discharge from hospital. He only stayed in the recommended rehab facility for a few days because he was unhappy with the cleanliness and care. He has been doing exercises at home to increase his LE strength. He is able to walk short distances without his walker. He reports persistent tingling in b/l LE but denies focal pain or new weakness/numbness.   He completed pall XRT to cervical spine with Dr. Tatum. After initial delay with insurance authorization, he was able to start enzalutamide.   After starting enzalutamide in 4/2024, his PSA decreased to a preeti of 1.15 on 5/1/24. Since then, his PSA has gradually  increased, most recently to 3.20 on 7/24/24.   We discussed that this appears to suggest development of CRPC. We reviewed options including switch in API, taxane chemotherapy, and Pluvicto. We also discussed that he will need somatic and germline genetic testing to assess HRR mutation status. As he will be traveling to   for 2 months, we opted to switch to abiraterone in hopes of  maintaining disease control until his return.

## 2024-07-25 NOTE — PHYSICAL EXAM
[Ambulatory and capable of all self care but unable to carry out any work activities] : Status 2- Ambulatory and capable of all self care but unable to carry out any work activities. Up and about more than 50% of waking hours [Normal] : full range of motion and no deformities appreciated [de-identified] : Soft, non-tender upon palpation to abdomen. [de-identified] : Alert and Oriented x 4

## 2024-07-25 NOTE — HISTORY OF PRESENT ILLNESS
[Disease: _____________________] : Disease: [unfilled] [M: ___] : M[unfilled] [AJCC Stage: ____] : AJCC Stage: [unfilled] [de-identified] : He presents  for a follow up visit and to receive next Lupron injection Since his last visit, patient had his IVC filter pulled.  Patient reports he is experiencing headache and intermittent blurry vision, new since yesterday. Of note, he started taking abiraterone/pred on 7/19/2024.    [de-identified] : August Salinas is a 73-year-old male with history of IDDM, HTN and history of prostate cancer s/p biopsy in 2021 (Brook 4+3) who declined radiation therapy at that time. He spends much of his time in the [place], where he was in 10/2023 when he began to feel ill and developed neurologic symptoms. In 10/30/2023, [place] the [place] and was immediately taken to [facility] and transferred to [facility] for spinal cord compression from metastasis and fracture. He was noted to have diffuse osseous mets to spine, ribs, and pelvis. He underwent C5-T3 Fusion, C7-T1 decompression on 11/2/2023. His hospital course was complicated by MSSA bacteremia from surgical site infection, bilateral LE DVT s/p IVC filter 11/1 by IR, candida esophagitis w/ dysphagia, aspiration multilobar PNA, acute on chronic anemia, and C. diff colitis. He was started on casodex/Lupron while in the hospital. He was discharged on 12/23/2023 to rehab facility. He was seen by Dr. Jeff on 2/2/24 at which time he received second dose of Lupron 22.5mg IM. He was continued on Casodex pending follow up with me.   He reported gradual improvement in his lower extremity strength since discharge from hospital. He only stayed in the recommended rehab facility for a few days because he was unhappy with the cleanliness and care. He has been doing exercises at home to increase his LE strength. He is able to walk short distances without his walker. He reports persistent tingling in b/l LE but denies focal pain or new weakness/numbness.   He completed pall XRT to cervical spine with Dr. Tatum. After initial delay with insurance authorization, he was able to start enzalutamide.   After starting enzalutamide in 4/2024, his PSA decreased to a preeti of 1.15 on 5/1/24. Since then, his PSA has gradually  increased, most recently to 3.20 on 7/24/24.   We discussed that this appears to suggest development of CRPC. We reviewed options including switch in API, taxane chemotherapy, and Pluvicto. We also discussed that he will need somatic and germline genetic testing to assess HRR mutation status. As he will be traveling to   for 2 months, we opted to switch to abiraterone in hopes of  maintaining disease control until his return.

## 2024-07-25 NOTE — PHYSICAL EXAM
[Ambulatory and capable of all self care but unable to carry out any work activities] : Status 2- Ambulatory and capable of all self care but unable to carry out any work activities. Up and about more than 50% of waking hours [Normal] : full range of motion and no deformities appreciated [de-identified] : Soft, non-tender upon palpation to abdomen. [de-identified] : Alert and Oriented x 4

## 2024-09-25 NOTE — HISTORY OF PRESENT ILLNESS
[FreeTextEntry1] :   August Marie is a 74-year-old male with no family history of Prostate Cancer.  PMH: IDDMHTN, history of prostate cancer and underwent in 2021 Prostate biopsy (Brook score 7 (3+4)). At this time Mr. Salinas declined radiation therapy. While residing in the [place] this past 10/2023 he developed neurologic symptoms and flew back to [place] and immediately went to [facility] and diagnosed with spinal cord compression from metastasis and fracture. He was noted to have diffuse osseous Mets to spine, ribs and pelvis. He underwent Posterior C5-T3 Lami and fusion, C7-T1 decompression on 11/2/2023. Hospitalization course complicated by MSSA bacteremia from surgical site infection, bilateral LE DVT s/p IVC filter on 11/1/2023 and removed by Dr. Lundberg on 7/2024..  He has been started on casodex/Lupron while in the hospital. Mr. Salinas consulted with Dr. 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   He tolerated the RT therapy and KPS remained stable during the course of radiation treatment. He recently followed u with Dr. Aponte this past July and noted to have increase PSA. He was started on Enzalutamide for s short period of time along with ADT therapy and then needed to be discontinued and presently on Abiraaterone/Prdnisone and will receive his next dose of Lupron 22.6mg on 10/16/2024.  He is e [facility] for follow up visit  Urologist:                Dr. Jeff Oncologist :            Dr. Aponte    Vascular Surgeon:  Dr. Lundberg  PSA Trend: ng/mL  12/9/2014:    5.47ng/mL 2/12/2016:    6.26ng/mL 10/31/2026:  7.84ng/mL 2/8/2017:      8.51ng/mL 8/7/2017:      9.79ng/mL 8/13/2019:    16.6ng/mL 7/12/2023:    169ng/mL 2/2/2024:      2.82ng/mL 2/29/2024:    1.56ng/mL 5/1/2024:       1.15ng/mL 7/10/2024:      3.02 7/24/2024:      3.02ng/mL   MRI Prostate 8/30/2021  IMPRESSION:  Midline, posterior medial (PZpm), base, peripheral zone lesion as detailed above. Extracapsular extension and bilateral seminal vesicle invasion is present. *PIRADS 5 - Very high (clinically significant cancer is highly likely to be present)  Right obturator, perirectal, and aortic bifurcation lymphadenopathy.  *Lesion descriptors and assessment categories are formulated utilizing PI-RADS v2.1.    Patient is here for follow up visit.-------   Overall, he states his pain is gone but intermittently from bilateral groin areas to feet he develops tingling sensation, Denies any increase weakness in extremities or any numbness. He has Nocturia with frequency and stream flow is moderate. He is on Flomax o.4mg qhs  He denies urinary dribbling, dysuria, hematuria or incontinence. His bowel movements are normal. He ambulates with a cane and guarded gait.  He recently received Lupron 22.5mg on 5/1/2024 by Dr. Aponte and started abiraterone 1000mg daily prednisone 5 mg daily. he is currently doing home PT.

## 2024-09-30 NOTE — HISTORY OF PRESENT ILLNESS
[de-identified] : Mr. DARYN BRAVO is a 73 year old male with history of HTN, BPH, GERD, T2DM, HLD, LVH, Obesity, and Prostate CA presenting today for CPE. He has concerns about back pain. He describes upper back pain with radiation around to his chest and R arm. Improved with lying down in bed. Started in December, but has worsened in the past 2 months. He has noticed weakness as well. Leg and arm heaviness. +BONE for 2 months. +Claudication symptoms. He is splitting time between [place] and [facility] with plans to leave for 1-2 months starting next week.  done

## 2024-10-01 ENCOUNTER — NON-APPOINTMENT (OUTPATIENT)
Age: 74
End: 2024-10-01

## 2024-10-02 ENCOUNTER — LABORATORY RESULT (OUTPATIENT)
Age: 74
End: 2024-10-02

## 2024-10-02 ENCOUNTER — APPOINTMENT (OUTPATIENT)
Dept: HEMATOLOGY ONCOLOGY | Facility: CLINIC | Age: 74
End: 2024-10-02

## 2024-10-02 VITALS
OXYGEN SATURATION: 95 % | BODY MASS INDEX: 29.03 KG/M2 | HEART RATE: 93 BPM | SYSTOLIC BLOOD PRESSURE: 134 MMHG | DIASTOLIC BLOOD PRESSURE: 81 MMHG | HEIGHT: 67 IN | RESPIRATION RATE: 18 BRPM | TEMPERATURE: 99.3 F | WEIGHT: 185 LBS

## 2024-10-02 LAB
ALBUMIN SERPL ELPH-MCNC: 3.7 G/DL
ALP BLD-CCNC: 100 U/L
ALT SERPL-CCNC: 18 U/L
ANION GAP SERPL CALC-SCNC: 0 MMOL/L
AST SERPL-CCNC: 33 U/L
BILIRUB SERPL-MCNC: 1 MG/DL
BUN SERPL-MCNC: 20 MG/DL
CALCIUM SERPL-MCNC: 9.6 MG/DL
CHLORIDE SERPL-SCNC: 110 MMOL/L
CO2 SERPL-SCNC: 28 MMOL/L
CREAT SERPL-MCNC: 0.9 MG/DL
EGFR: 91 ML/MIN/1.73M2
GLUCOSE SERPL-MCNC: 141 MG/DL
HCT VFR BLD CALC: 33.6 %
HGB BLD-MCNC: 11.7 G/DL
LYMPHOCYTES # BLD AUTO: 0.6 K/UL
LYMPHOCYTES NFR BLD AUTO: 17.8 %
MAN DIFF?: NO
MCHC RBC-ENTMCNC: 31.7 PG
MCHC RBC-ENTMCNC: 34.8 GM/DL
MCV RBC AUTO: 91.1 FL
NEUTROPHILS # BLD AUTO: 2.6 K/UL
NEUTROPHILS NFR BLD AUTO: 75.2 %
PLATELET # BLD AUTO: NORMAL K/UL
POTASSIUM SERPL-SCNC: 3.5 MMOL/L
PROT SERPL-MCNC: 7 G/DL
RBC # BLD: 3.69 M/UL
RBC # FLD: 12.8 %
SODIUM SERPL-SCNC: 138 MMOL/L
WBC # FLD AUTO: 3.4 K/UL

## 2024-10-02 NOTE — DISCUSSION/SUMMARY
[FreeTextEntry1] : REASON FOR CONSULT: August Salinas is a 74-year-old male referred by Dr. Spencer Aponte for cancer genetic counseling and risk assessment due to a diagnosis of metastatic prostate cancer. Mr. Salinas was seen on October 2, 2024 at which time medical and family history was ascertained and a pedigree constructed.   RELEVANT MEDICAL HISTORY: Mr. Salinas was initially diagnosed with grade group 3 prostate cancer in 2021 but declined treatment at that time. In early 2024, he presented to [facility] for rhabdomyolysis and spinal cord compression fractures due to metastatic disease from prostate cancer. During that workup, he was noted to have diffuse osseous mets to spine, ribs, and pelvis. He underwent spinal fusion and decompression and was started on Lupron/Casodex while hospitalized. He has since completed radiation therapy to the cervical spine. 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 He is now currently on Abiraterone and prednisone and oncology team has sent NGS on tumor specimen to determine additional treatment options.   Of note, patient stated [place] at [place] end of the month.   OTHER MEDICAL AND SURGICAL HISTORY: -	Medical History: IDDM, HTN -	Surgical History: lipoma excision, IVC filter placement and removal (DVT related)  CANCER SCREENING HISTORY:   Colon: -	Colonoscopy: within the past 10 years- reportedly wnl, doesn't' remember interval recommendation for follow up  Prostate: -	PSA: 7/25/24- 3.2 (trending up) Skin:   -	FBSE: No -	Lesions biopsied/removed: No  SOCIAL HISTORY: -	Tobacco-product use: No -	Environmental exposures: No   FAMILY HISTORY: Maternal and paternal ancestry was reported as [place]. Ashkenazi Yarsani ancestry was denied. A detailed family history of cancer was ascertained, see below and scanned chart for pedigree.   According to Mr. Salinas there are no other known cases of cancer in the family. To his knowledge no one in the family has had germline testing for cancer susceptibility. Consanguinity was denied.  	 RISK ASSESSMENT: Mr. Salinas's personal history is suggestive of a hereditary cancer syndrome given his diagnosis of metastatic prostate cancer. The patient meets National Comprehensive Cancer Network (NCCN) criteria for genetic testing. We recommended genetic testing using [facility] ProstateNext panel. This test analyzes 14 genes: ELLIS, BRCA1, BRCA2, CHEK2, EPCAM, HOXB13, MLH1, MSH2, MSH6, NBN, PALB2, PMS2, RAD51D, and TP53.  The risks, benefits and limitations of genetic testing were discussed with Mr. Salinas. In addition, we discussed the purpose of genetic testing and possible test results (positive, negative, inconclusive) along with associated medical management options and psychosocial implications. Insurance coverage and potential out of pocket costs were also discussed.   It was explained that risk assessment is based upon medical and family history as provided and may change in the future should new information be obtained.   Following our discussion, Mr. Salinas consented to the above-mentioned genetic testing panel. Blood was drawn in our laboratory and sent to [facility] today.  PLAN:  1.	Blood drawn today will be sent to [facility] for analysis.  2.	We will contact Mr. Salinas to schedule a follow-up appointment once the results are   For any additional questions please call Cancer Genetics at [phone].    Kimber Jamil MS, [facility], Cancer Genetics

## 2024-10-03 ENCOUNTER — APPOINTMENT (OUTPATIENT)
Dept: RADIATION ONCOLOGY | Facility: CLINIC | Age: 74
End: 2024-10-03
Payer: MEDICARE

## 2024-10-03 ENCOUNTER — NON-APPOINTMENT (OUTPATIENT)
Age: 74
End: 2024-10-03

## 2024-10-03 VITALS
DIASTOLIC BLOOD PRESSURE: 76 MMHG | HEART RATE: 78 BPM | OXYGEN SATURATION: 100 % | BODY MASS INDEX: 28.56 KG/M2 | TEMPERATURE: 98.2 F | HEIGHT: 67 IN | WEIGHT: 182 LBS | RESPIRATION RATE: 18 BRPM | SYSTOLIC BLOOD PRESSURE: 121 MMHG

## 2024-10-03 DIAGNOSIS — C61 MALIGNANT NEOPLASM OF PROSTATE: ICD-10-CM

## 2024-10-03 LAB
PSA FREE FLD-MCNC: 16 %
PSA FREE SERPL-MCNC: 0.65 NG/ML
PSA SERPL-MCNC: 4.11 NG/ML

## 2024-10-03 PROCEDURE — 99213 OFFICE O/P EST LOW 20 MIN: CPT

## 2024-10-03 PROCEDURE — G2211 COMPLEX E/M VISIT ADD ON: CPT

## 2024-10-03 NOTE — REVIEW OF SYSTEMS
[Fatigue] : fatigue [Nocturia] : nocturia [Urinary Frequency] : urinary frequency [IPSS Score (0-40): ___] : IPSS score: [unfilled] [EPIC-CP Score (0-60): ___] : EPIC-CP score: [unfilled] [Difficulty Walking] : difficulty walking [Negative] : Psychiatric [Fever] : no fever [Chills] : no chills [Confused] : no confusion [Dizziness] : no dizziness [FreeTextEntry9] : back pain, lower > upper [de-identified] : ambulates with cane

## 2024-10-03 NOTE — PHYSICAL EXAM
[Outer Ear] : the ears and nose were normal in appearance [Hearing Threshold Finger Rub Not [place]] : hearing was normal [] : no respiratory distress [Exaggerated Use Of Accessory Muscles For Inspiration] : no accessory muscle use [Arterial Pulses Normal] : the arterial pulses were normal [Edema] : no peripheral edema present [Nondistended] : nondistended [Abdomen Tenderness] : non-tender [Nail Clubbing] : no clubbing  or cyanosis of the fingernails [Normal] : oriented to person, place and time, the affect was normal, the mood was normal and not anxious [de-identified] : pleasant male, cane at bedside [de-identified] : lumbar spine tender to palpation

## 2024-10-03 NOTE — HISTORY OF PRESENT ILLNESS
[FreeTextEntry1] :   August Marie is a 74-year-old male with no family history of Prostate Cancer.  PMH: IDDMHTN, history of prostate cancer and underwent in 2021 Prostate biopsy (Brook score 7 (3+4)). At this time Mr. Salinas declined radiation therapy. While residing in the [place] this past 10/2023 he developed neurologic symptoms and [place] back to [place] and immediately went to [facility] and diagnosed with spinal cord compression from metastasis and fracture. He was noted to have diffuse osseous Mets to spine, ribs and pelvis. He underwent Posterior C5-T3 Lami and fusion, C7-T1 decompression on 11/2/2023. Hospitalization course complicated by MSSA bacteremia from surgical site infection, bilateral LE DVT s/p IVC filter on 11/1/2023 and removed by Dr. Lundberg on 7/2024..  He has been started on casodex/Lupron while in the hospital. Mr. Salinas consulted with Dr. 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   He tolerated the RT therapy and KPS remained stable during the course of radiation treatment. He recently followed up with Dr. Aponte this past July and noted to have increase PSA. He was started on Enzalutamide for s short period of time along with ADT therapy and then needed to be discontinued and presently on Abiraaterone/Prednisone and will receive his next dose of Lupron 22.6mg on 10/16/2024.  He is here today for follow up visit  Urologist:                Dr. Jeff Oncologist :            Dr. Aponte    Vascular Surgeon:  Dr. Lundberg  PSA Trend: ng/mL  12/9/2014:    5.47ng/mL 2/12/2016:    6.26ng/mL 10/31/2026:  7.84ng/mL 2/8/2017:      8.51ng/mL 8/7/2017:      9.79ng/mL 8/13/2019:    16.6ng/mL 7/12/2023:    169ng/mL 2/2/2024:      2.82ng/mL 2/29/2024:    1.56ng/mL 5/1/2024:       1.15ng/mL 6/12/2024:      1.80ng/mL 7/10/2024:      3.02ng/mL 7/24/2024:      3.20ng/mL 10/2/2024:      4.11ng/mL   MRI Prostate 8/30/2021  IMPRESSION:  Midline, posterior medial (PZpm), base, peripheral zone lesion as detailed above. Extracapsular extension and bilateral seminal vesicle invasion is present. *PIRADS 5 - Very high (clinically significant cancer is highly likely to be present)  Right obturator, perirectal, and aortic bifurcation lymphadenopathy.  *Lesion descriptors and assessment categories are formulated utilizing PI-RADS v2.1.    Patient is here for follow up visit. Patient has a chief complaint of back pain. In the cervical region he describes a mechanical pain that feels stiff. He complains of a worse lower back pain that is 9.5/10 in intensity, decreasing to a 4/10 with Advil. This pain has been increasing over the past few months. He also continues to have complaints in regard to his urination. Over the past few months, he has noticed worsening bladder emptying, inreased noctruia (x5) and difficulty starting his stream. He is currently on Flomax BID with some relief. He is not sexually active at this time and has difficulty with erections. Of note, was recently seen by Dr. Aponte and discussion was had in regard to his increasing PSA while on ADT. Options were discussed and he is returning to them for a PSA check in mid October.

## 2024-10-03 NOTE — HISTORY OF PRESENT ILLNESS
[FreeTextEntry1] :   August Marie is a 74-year-old male with no family history of Prostate Cancer.  PMH: IDDMHTN, history of prostate cancer and underwent in 2021 Prostate biopsy (Brook score 7 (3+4)). At this time Mr. Salinas declined radiation therapy. While residing in the [place] this past 10/2023 he developed neurologic symptoms and [place] back to [place] and immediately went to [facility] and diagnosed with spinal cord compression from metastasis and fracture. He was noted to have diffuse osseous Mets to spine, ribs and pelvis. He underwent Posterior C5-T3 Lami and fusion, C7-T1 decompression on 11/2/2023. Hospitalization course complicated by MSSA bacteremia from surgical site infection, bilateral LE DVT s/p IVC filter on 11/1/2023 and removed by Dr. Lundberg on 7/2024..  He has been started on casodex/Lupron while in the hospital. Mr. Salinas consulted with Dr. 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   He tolerated the RT therapy and KPS remained stable during the course of radiation treatment. He recently followed up with Dr. Aponte this past July and noted to have increase PSA. He was started on Enzalutamide for s short period of time along with ADT therapy and then needed to be discontinued and presently on Abiraaterone/Prednisone and will receive his next dose of Lupron 22.6mg on 10/16/2024.  He is here today for follow up visit  Urologist:                Dr. Jeff Oncologist :            Dr. Aponte    Vascular Surgeon:  Dr. Lundberg  PSA Trend: ng/mL  12/9/2014:    5.47ng/mL 2/12/2016:    6.26ng/mL 10/31/2026:  7.84ng/mL 2/8/2017:      8.51ng/mL 8/7/2017:      9.79ng/mL 8/13/2019:    16.6ng/mL 7/12/2023:    169ng/mL 2/2/2024:      2.82ng/mL 2/29/2024:    1.56ng/mL 5/1/2024:       1.15ng/mL 6/12/2024:      1.80ng/mL 7/10/2024:      3.02ng/mL 7/24/2024:      3.20ng/mL 10/2/2024:      4.11ng/mL   MRI Prostate 8/30/2021  IMPRESSION:  Midline, posterior medial (PZpm), base, peripheral zone lesion as detailed above. Extracapsular extension and bilateral seminal vesicle invasion is present. *PIRADS 5 - Very high (clinically significant cancer is highly likely to be present)  Right obturator, perirectal, and aortic bifurcation lymphadenopathy.  *Lesion descriptors and assessment categories are formulated utilizing PI-RADS v2.1.    Patient is here for follow up visit. Patient has a chief complaint of back pain. In the cervical region he describes a mechanical pain that feels stiff. He complains of a worse lower back pain that is 9.5/10 in intensity, decreasing to a 4/10 with Advil. This pain has been increasing over the past few months. He also continues to have complaints in regard to his urination. Over the past few months, he has noticed worsening bladder emptying, inreased noctruia (x5) and difficulty starting his stream. He is currently on Flomax BID with some relief. He is not sexually active at this time and has difficulty with erections. Of note, was recently seen by Dr. Aponte and discussion was had in regard to his increasing PSA while on ADT. Options were discussed and he is returning to them for a PSA check in mid October.

## 2024-10-03 NOTE — VITALS
[Maximal Pain Intensity: 9/10] : 9/10 [Least Pain Intensity: 4/10] : 4/10 [NSAID/Non-Opioid] : NSAID/Non-Opioid [80: Normal activity with effort; some signs or symptoms of disease.] : 80: Normal activity with effort; some signs or symptoms of disease.

## 2024-10-03 NOTE — REVIEW OF SYSTEMS
[Fatigue] : fatigue [Nocturia] : nocturia [Urinary Frequency] : urinary frequency [IPSS Score (0-40): ___] : IPSS score: [unfilled] [EPIC-CP Score (0-60): ___] : EPIC-CP score: [unfilled] [Difficulty Walking] : difficulty walking [Negative] : Psychiatric [Fever] : no fever [Chills] : no chills [Confused] : no confusion [Dizziness] : no dizziness [FreeTextEntry9] : back pain, lower > upper [de-identified] : ambulates with cane

## 2024-10-03 NOTE — PHYSICAL EXAM
[Outer Ear] : the ears and nose were normal in appearance [Hearing Threshold Finger Rub Not [place]] : hearing was normal [] : no respiratory distress [Exaggerated Use Of Accessory Muscles For Inspiration] : no accessory muscle use [Arterial Pulses Normal] : the arterial pulses were normal [Edema] : no peripheral edema present [Nondistended] : nondistended [Abdomen Tenderness] : non-tender [Nail Clubbing] : no clubbing  or cyanosis of the fingernails [Normal] : oriented to person, place and time, the affect was normal, the mood was normal and not anxious [de-identified] : pleasant male, cane at bedside [de-identified] : lumbar spine tender to palpation

## 2024-10-03 NOTE — REVIEW OF SYSTEMS
[Fatigue] : fatigue [Nocturia] : nocturia [Urinary Frequency] : urinary frequency [IPSS Score (0-40): ___] : IPSS score: [unfilled] [EPIC-CP Score (0-60): ___] : EPIC-CP score: [unfilled] [Difficulty Walking] : difficulty walking [Negative] : Psychiatric [Fever] : no fever [Chills] : no chills [Confused] : no confusion [Dizziness] : no dizziness [FreeTextEntry9] : back pain, lower > upper [de-identified] : ambulates with cane

## 2024-10-03 NOTE — PHYSICAL EXAM
[Outer Ear] : the ears and nose were normal in appearance [Hearing Threshold Finger Rub Not [place]] : hearing was normal [] : no respiratory distress [Exaggerated Use Of Accessory Muscles For Inspiration] : no accessory muscle use [Arterial Pulses Normal] : the arterial pulses were normal [Edema] : no peripheral edema present [Nondistended] : nondistended [Abdomen Tenderness] : non-tender [Nail Clubbing] : no clubbing  or cyanosis of the fingernails [Normal] : oriented to person, place and time, the affect was normal, the mood was normal and not anxious [de-identified] : pleasant male, cane at bedside [de-identified] : lumbar spine tender to palpation

## 2024-10-03 NOTE — HISTORY OF PRESENT ILLNESS
[FreeTextEntry1] :   August Marie is a 74-year-old male with no family history of Prostate Cancer.  PMH: IDDMHTN, history of prostate cancer and underwent in 2021 Prostate biopsy (Brook score 7 (3+4)). At this time Mr. Salinas declined radiation therapy. While residing in the [place] this past 10/2023 he developed neurologic symptoms and flew back to [place] and immediately went to [facility] and diagnosed with spinal cord compression from metastasis and fracture. He was noted to have diffuse osseous Mets to spine, ribs and pelvis. He underwent Posterior C5-T3 Lami and fusion, C7-T1 decompression on 11/2/2023. Hospitalization course complicated by MSSA bacteremia from surgical site infection, bilateral LE DVT s/p IVC filter on 11/1/2023 and removed by Dr. Lundberg on 7/2024..  He has been started on casodex/Lupron while in the hospital. Mr. Salinas consulted with Dr. 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   He tolerated the RT therapy and KPS remained stable during the course of radiation treatment. He recently followed up with Dr. Aponte this past July and noted to have increase PSA. He was started on Enzalutamide for s short period of time along with ADT therapy and then needed to be discontinued and presently on Abiraaterone/Prednisone and will receive his next dose of Lupron 22.6mg on 10/16/2024.  He is here today for follow up visit  Urologist:                Dr. Jeff Oncologist :            Dr. Aponte    Vascular Surgeon:  Dr. Lundberg  PSA Trend: ng/mL  12/9/2014:    5.47ng/mL 2/12/2016:    6.26ng/mL 10/31/2026:  7.84ng/mL 2/8/2017:      8.51ng/mL 8/7/2017:      9.79ng/mL 8/13/2019:    16.6ng/mL 7/12/2023:    169ng/mL 2/2/2024:      2.82ng/mL 2/29/2024:    1.56ng/mL 5/1/2024:       1.15ng/mL 6/12/2024:      1.80ng/mL 7/10/2024:      3.02ng/mL 7/24/2024:      3.20ng/mL 10/2/2024:      4.11ng/mL   MRI Prostate 8/30/2021  IMPRESSION:  Midline, posterior medial (PZpm), base, peripheral zone lesion as detailed above. Extracapsular extension and bilateral seminal vesicle invasion is present. *PIRADS 5 - Very high (clinically significant cancer is highly likely to be present)  Right obturator, perirectal, and aortic bifurcation lymphadenopathy.  *Lesion descriptors and assessment categories are formulated utilizing PI-RADS v2.1.    Patient is here for follow up visit. Patient has a chief complaint of back pain. In the cervical region he describes a mechanical pain that feels stiff. He complains of a worse lower back pain that is 9.5/10 in intensity, decreasing to a 4/10 with Advil. This pain has been increasing over the past few months. He also continues to have complaints in regard to his urination. Over the past few months, he has noticed worsening bladder emptying, inreased noctruia (x5) and difficulty starting his stream. He is currently on Flomax BID with some relief. He is not sexually active at this time and has difficulty with erections. Of note, was recently seen by Dr. Aponte and discussion was had in regard to his increasing PSA while on ADT. Options were discussed and he is returning to them for a PSA check in mid October.

## 2024-10-06 NOTE — ASSESSMENT
[FreeTextEntry1] : August Salinas is a 74-year-old male with history of IDDM, HTN and history of prostate cancer s/p biopsy in 2021 (Brook 3+4) who declined radiation therapy at that time. In 11/2023, he was admitted to [facility] for rhabdo + SRUTHI, transferred to [facility] for spinal cord compression from metastasis s/p C5-T3 Fusion, C7-T1 decompression on 11/2/2023. His hospital course was complicated by MSSA bacteremia from surgical site infection, bilateral LE DVT s/p IVC filter, candida esophagitis, aspiration multilobar PNA, and C. diff colitis. 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  During our previous visits, we had an extensive discussion with him to review his clinical course, recent hospitalization, and next steps with regard to his metastatic prostate cancer. For patients with newly diagnosed metastatic castration-sensitive prostate cancer (mCSPC), androgen deprivation therapy (ADT) remains the cornerstone of treatment; however, its combination with other systemic therapies has shown improved outcomes. The pivotal trials of note include the CHAARTED, STAMPEDE, and LATITUDE, which have each demonstrated the benefit of adding either docetaxel or novel hormonal agents such to standard ADT. In addition, recent trials have also shown survival benefit with triplet therapy in patients with high volume disease.   Given his comorbidities and desire to minimize potential toxicities at this point in his recovery, we opted to procced with the combination of enzalutamide and ADT however shortly after treatment initiation his PSA began to rise, consistent with development of CRPC. Due to his upcoming travels, we opted to switch to abiraterone/prednisone, understanding that this is unlikely to offer a high chance of PSA response and even if it does, it may be transient.   Patient is now back from [place] but reports that he will be traveling back there at the ~end of October. He reports that he is feeling strong overall but has experienced flu-like symptoms since returning from travel, including fever and sweating. Vital signs indicate a mild fever (99F) and heart rate of 93 bpm. No new pain or medical issues are noted. Patient was advised to monitor flu symptoms closely. If symptoms worsen or if fever persists, he will contact our team or go to the ER if severe.  Metastatic Castration-Sensitive Prostate Cancer (CSPC): Current Treatment: - Lupron 22.5 mg next dose due on 10/16/24. - Continue Abiraterone 1000 mg with Prednisone 5 mg. - PSA pending from today. This will dictate next steps - continuing Abiraterone vs. transitioning to a different medication such as a PARP inhibitor based on mutation status.  Hypercalcemia: - Resolved.  Germline/Somatic Testing - Will meet with the genetic counselor today. - Patient will need germline testing and somatic testing to dictate potential next treatment options.  - We will send somatic testing on tumor.   We will see patient back on 10/16/2024. He will be seen in clinic and also have his Lupron injection.

## 2024-10-06 NOTE — PHYSICAL EXAM
[Ambulatory and capable of all self care but unable to carry out any work activities] : Status 2- Ambulatory and capable of all self care but unable to carry out any work activities. Up and about more than 50% of waking hours [Normal] : affect appropriate [de-identified] : Soft, non-tender upon palpation to abdomen. [de-identified] : Alert and Oriented x 4

## 2024-10-06 NOTE — HISTORY OF PRESENT ILLNESS
[Disease: _____________________] : Disease: [unfilled] [M: ___] : M[unfilled] [AJCC Stage: ____] : AJCC Stage: [unfilled] [de-identified] : August Salinas is a 73-year-old male with history of IDDM, HTN and history of prostate cancer s/p biopsy in 2021 (Brook 4+3) who declined radiation therapy at that time. He spends much of his time in the [place], where he was in 10/2023 when he began to feel ill and developed neurologic symptoms. In 10/30/2023, he flew back to the [place] and was immediately taken to [facility] and transferred to [facility] for spinal cord compression from metastasis and fracture. He was noted to have diffuse osseous mets to spine, ribs, and pelvis. He underwent C5-T3 Fusion, C7-T1 decompression on 11/2/2023. His hospital course was complicated by MSSA bacteremia from surgical site infection, bilateral LE DVT s/p IVC filter 11/1 by IR, candida esophagitis w/ dysphagia, aspiration multilobar PNA, acute on chronic anemia, and C. diff colitis. He was started on casodex/Lupron while in the hospital. He was discharged on 12/23/2023 to rehab facility. He was seen by Dr. Jeff on 2/2/24 at which time he received second dose of Lupron 22.5mg IM. He was continued on Casodex pending follow up with me.   He reported gradual improvement in his lower extremity strength since discharge from hospital. He only stayed in the recommended rehab facility for a few days because he was unhappy with the cleanliness and care. He has been doing exercises at home to increase his LE strength. He is able to walk short distances without his walker. He reports persistent tingling in b/l LE but denies focal pain or new weakness/numbness.   He completed pall XRT to cervical spine with Dr. Tatum. After initial delay with insurance authorization, he was able to start enzalutamide.   After starting enzalutamide in 4/2024, his PSA decreased to a preeti of 1.15 on 5/1/24. Since then, his PSA has gradually  increased, most recently to 3.20 on 7/24/24.   We discussed that this appears to suggest development of CRPC. We reviewed options including switch in API, taxane chemotherapy, and Pluvicto. We also discussed that he will need somatic and germline genetic testing to assess HRR mutation status. As he will be traveling to   for 2 months, we opted to switch to abiraterone in hopes of  maintaining disease control until his return.   [de-identified] : He presents  for a follow up visit and to receive next Lupron injection Since his last visit, patient had his IVC filter pulled.  Patient reports he is experiencing headache and intermittent blurry vision, new since yesterday. Of note, he started taking abiraterone/pred on 7/19/2024.

## 2024-10-07 ENCOUNTER — RX RENEWAL (OUTPATIENT)
Age: 74
End: 2024-10-07

## 2024-10-09 ENCOUNTER — APPOINTMENT (OUTPATIENT)
Dept: NUCLEAR MEDICINE | Facility: HOSPITAL | Age: 74
End: 2024-10-09
Payer: MEDICARE

## 2024-10-09 ENCOUNTER — OUTPATIENT (OUTPATIENT)
Dept: OUTPATIENT SERVICES | Facility: HOSPITAL | Age: 74
LOS: 1 days | End: 2024-10-09
Payer: COMMERCIAL

## 2024-10-09 DIAGNOSIS — Z95.828 PRESENCE OF OTHER VASCULAR IMPLANTS AND GRAFTS: Chronic | ICD-10-CM

## 2024-10-09 DIAGNOSIS — Z98.890 OTHER SPECIFIED POSTPROCEDURAL STATES: Chronic | ICD-10-CM

## 2024-10-09 LAB — GLUCOSE BLDC GLUCOMTR-MCNC: 100 MG/DL — HIGH (ref 70–99)

## 2024-10-09 PROCEDURE — 78816 PET IMAGE W/CT FULL BODY: CPT | Mod: 26

## 2024-10-09 PROCEDURE — A9595: CPT

## 2024-10-09 PROCEDURE — 82962 GLUCOSE BLOOD TEST: CPT

## 2024-10-09 PROCEDURE — 78816 PET IMAGE W/CT FULL BODY: CPT

## 2024-10-10 ENCOUNTER — APPOINTMENT (OUTPATIENT)
Dept: VASCULAR SURGERY | Facility: CLINIC | Age: 74
End: 2024-10-10
Payer: MEDICARE

## 2024-10-10 DIAGNOSIS — Z86.718 PERSONAL HISTORY OF OTHER VENOUS THROMBOSIS AND EMBOLISM: ICD-10-CM

## 2024-10-10 PROCEDURE — 99214 OFFICE O/P EST MOD 30 MIN: CPT

## 2024-10-15 ENCOUNTER — NON-APPOINTMENT (OUTPATIENT)
Age: 74
End: 2024-10-15

## 2024-10-16 ENCOUNTER — OUTPATIENT (OUTPATIENT)
Dept: OUTPATIENT SERVICES | Facility: HOSPITAL | Age: 74
LOS: 1 days | End: 2024-10-16
Payer: COMMERCIAL

## 2024-10-16 ENCOUNTER — APPOINTMENT (OUTPATIENT)
Dept: INFUSION THERAPY | Facility: CLINIC | Age: 74
End: 2024-10-16

## 2024-10-16 ENCOUNTER — APPOINTMENT (OUTPATIENT)
Dept: HEMATOLOGY ONCOLOGY | Facility: CLINIC | Age: 74
End: 2024-10-16
Payer: MEDICARE

## 2024-10-16 VITALS
OXYGEN SATURATION: 99 % | RESPIRATION RATE: 18 BRPM | HEART RATE: 76 BPM | TEMPERATURE: 98 F | DIASTOLIC BLOOD PRESSURE: 87 MMHG | SYSTOLIC BLOOD PRESSURE: 167 MMHG

## 2024-10-16 VITALS
WEIGHT: 178 LBS | BODY MASS INDEX: 27.94 KG/M2 | HEART RATE: 72 BPM | SYSTOLIC BLOOD PRESSURE: 136 MMHG | RESPIRATION RATE: 18 BRPM | DIASTOLIC BLOOD PRESSURE: 82 MMHG | TEMPERATURE: 98.4 F | OXYGEN SATURATION: 98 % | HEIGHT: 67 IN

## 2024-10-16 DIAGNOSIS — Z98.890 OTHER SPECIFIED POSTPROCEDURAL STATES: Chronic | ICD-10-CM

## 2024-10-16 DIAGNOSIS — Z95.828 PRESENCE OF OTHER VASCULAR IMPLANTS AND GRAFTS: Chronic | ICD-10-CM

## 2024-10-16 DIAGNOSIS — C61 MALIGNANT NEOPLASM OF PROSTATE: ICD-10-CM

## 2024-10-16 PROCEDURE — 96402 CHEMO HORMON ANTINEOPL SQ/IM: CPT

## 2024-10-16 PROCEDURE — 99215 OFFICE O/P EST HI 40 MIN: CPT

## 2024-10-16 RX ORDER — LEUPROLIDE ACETATE 45 MG
22.5 KIT SUBCUTANEOUS ONCE
Refills: 0 | Status: COMPLETED | OUTPATIENT
Start: 2024-10-16 | End: 2024-10-16

## 2024-10-16 RX ADMIN — LEUPROLIDE ACETATE 22.5 MILLIGRAM(S): KIT SUBCUTANEOUS at 11:14

## 2024-10-17 ENCOUNTER — APPOINTMENT (OUTPATIENT)
Dept: HEMATOLOGY ONCOLOGY | Facility: CLINIC | Age: 74
End: 2024-10-17

## 2024-10-17 ENCOUNTER — APPOINTMENT (OUTPATIENT)
Dept: RADIATION ONCOLOGY | Facility: CLINIC | Age: 74
End: 2024-10-17
Payer: MEDICARE

## 2024-10-17 VITALS
DIASTOLIC BLOOD PRESSURE: 73 MMHG | SYSTOLIC BLOOD PRESSURE: 153 MMHG | TEMPERATURE: 97.7 F | BODY MASS INDEX: 27.94 KG/M2 | HEIGHT: 67 IN | WEIGHT: 178 LBS | OXYGEN SATURATION: 100 % | RESPIRATION RATE: 18 BRPM | HEART RATE: 78 BPM

## 2024-10-17 VITALS
OXYGEN SATURATION: 97 % | SYSTOLIC BLOOD PRESSURE: 134 MMHG | WEIGHT: 178 LBS | BODY MASS INDEX: 27.94 KG/M2 | TEMPERATURE: 98 F | HEART RATE: 76 BPM | HEIGHT: 67 IN | RESPIRATION RATE: 18 BRPM | DIASTOLIC BLOOD PRESSURE: 75 MMHG

## 2024-10-17 DIAGNOSIS — Z86.39 PERSONAL HISTORY OF OTHER ENDOCRINE, NUTRITIONAL AND METABOLIC DISEASE: ICD-10-CM

## 2024-10-17 DIAGNOSIS — Z01.818 ENCOUNTER FOR OTHER PREPROCEDURAL EXAMINATION: ICD-10-CM

## 2024-10-17 DIAGNOSIS — R77.8 OTHER SPECIFIED ABNORMALITIES OF PLASMA PROTEINS: ICD-10-CM

## 2024-10-17 DIAGNOSIS — C61 MALIGNANT NEOPLASM OF PROSTATE: ICD-10-CM

## 2024-10-17 DIAGNOSIS — Z95.828 PRESENCE OF OTHER VASCULAR IMPLANTS AND GRAFTS: ICD-10-CM

## 2024-10-17 PROCEDURE — 99214 OFFICE O/P EST MOD 30 MIN: CPT

## 2024-10-17 PROCEDURE — 99213 OFFICE O/P EST LOW 20 MIN: CPT

## 2024-10-17 PROCEDURE — G2211 COMPLEX E/M VISIT ADD ON: CPT

## 2024-10-23 ENCOUNTER — APPOINTMENT (OUTPATIENT)
Dept: HEMATOLOGY ONCOLOGY | Facility: CLINIC | Age: 74
End: 2024-10-23
Payer: MEDICARE

## 2024-10-23 PROCEDURE — 99443: CPT

## 2025-01-08 ENCOUNTER — APPOINTMENT (OUTPATIENT)
Dept: INFUSION THERAPY | Facility: CLINIC | Age: 75
End: 2025-01-08

## 2025-01-27 ENCOUNTER — NON-APPOINTMENT (OUTPATIENT)
Age: 75
End: 2025-01-27

## 2025-02-26 NOTE — ASSESSMENT
End of Shift Note: Medical    What matters most to the patient this shift: going home     Significant Events: Patient went into Afib with RVR; given IV Cardizem and PO. this morning around 0600 kourtney with hr at 58    Pain Management: Last Pain Score: Numeric Rating Scale 0-10: 6 (02/25/25 2148)                                      Pain medication:  NORCO.  Last given:  2148   Diet: Cardiac; Yes, Medical Nutrition Management by Rd (Registered Dietitian) Diet      Bowel Function:  Normal  LBM:      Activity:  Activity: Ambulated (02/26/25 0500)  Mobility Assistive Device:  Mobility Assistive Device: Walker;Gait belt (02/26/25 0500)  Level of Assistance:  Level of Assistance: Minimal assist (02/26/25 0500)  Positioning: Positioning: High Fowlers (60-90 degrees) (02/26/25 0500)  LDAs: peripheral IV   Patient's Anticipated Discharge Needs: Home with additional community support services (T) (02/25/25 1631)  Expected Discharge Date:   Expected Discharge Time:        [FreeTextEntry1] : 70yo male with very elevated PSA, unclear history of biopsy but he denies prior diagnosis or treatment for PCa\par PSA now 50\par Abnormal BINH and CT suspicious for node positive disease\par Also question of sigmoid neoplasm or advanced PCa\par MRI reviewed, high probability of clinically significant cancer and suspicion for node positive disease\par Recommend MRI guided biopsy under sedation\par Reviewed procedure, indications and risks\par Medical clearance\par F/u colorectal surgery

## 2025-04-02 ENCOUNTER — OUTPATIENT (OUTPATIENT)
Dept: OUTPATIENT SERVICES | Facility: HOSPITAL | Age: 75
LOS: 1 days | End: 2025-04-02
Payer: COMMERCIAL

## 2025-04-02 ENCOUNTER — APPOINTMENT (OUTPATIENT)
Dept: INFUSION THERAPY | Facility: CLINIC | Age: 75
End: 2025-04-02

## 2025-04-02 ENCOUNTER — APPOINTMENT (OUTPATIENT)
Dept: HEMATOLOGY ONCOLOGY | Facility: CLINIC | Age: 75
End: 2025-04-02
Payer: MEDICARE

## 2025-04-02 VITALS
DIASTOLIC BLOOD PRESSURE: 88 MMHG | SYSTOLIC BLOOD PRESSURE: 154 MMHG | HEIGHT: 60.7 IN | RESPIRATION RATE: 18 BRPM | HEART RATE: 73 BPM | OXYGEN SATURATION: 96 % | TEMPERATURE: 98 F | WEIGHT: 201.94 LBS

## 2025-04-02 VITALS
WEIGHT: 202 LBS | HEIGHT: 63.78 IN | DIASTOLIC BLOOD PRESSURE: 96 MMHG | OXYGEN SATURATION: 96 % | HEART RATE: 85 BPM | BODY MASS INDEX: 34.91 KG/M2 | RESPIRATION RATE: 18 BRPM | SYSTOLIC BLOOD PRESSURE: 179 MMHG | TEMPERATURE: 98.1 F

## 2025-04-02 DIAGNOSIS — C61 MALIGNANT NEOPLASM OF PROSTATE: ICD-10-CM

## 2025-04-02 DIAGNOSIS — Z95.828 PRESENCE OF OTHER VASCULAR IMPLANTS AND GRAFTS: Chronic | ICD-10-CM

## 2025-04-02 DIAGNOSIS — Z98.890 OTHER SPECIFIED POSTPROCEDURAL STATES: Chronic | ICD-10-CM

## 2025-04-02 PROCEDURE — 96402 CHEMO HORMON ANTINEOPL SQ/IM: CPT

## 2025-04-02 PROCEDURE — 99215 OFFICE O/P EST HI 40 MIN: CPT

## 2025-04-02 RX ORDER — LEUPROLIDE 42 MG/.37G
22.5 INJECTION, EMULSION SUBCUTANEOUS ONCE
Refills: 0 | Status: COMPLETED | OUTPATIENT
Start: 2025-04-02 | End: 2025-04-02

## 2025-04-02 RX ADMIN — LEUPROLIDE 22.5 MILLIGRAM(S): 42 INJECTION, EMULSION SUBCUTANEOUS at 10:34

## 2025-05-12 NOTE — PROGRESS NOTE ADULT - PROBLEM SELECTOR PROBLEM 6
Hyperlipidemia Quality 226: Preventive Care And Screening: Tobacco Use: Screening And Cessation Intervention: Patient screened for tobacco use and is an ex/non-smoker Detail Level: Zone Quality 431: Preventive Care And Screening: Unhealthy Alcohol Use - Screening: Patient not identified as an unhealthy alcohol user when screened for unhealthy alcohol use using a systematic screening method Hypertension

## 2025-05-14 ENCOUNTER — APPOINTMENT (OUTPATIENT)
Dept: HEMATOLOGY ONCOLOGY | Facility: CLINIC | Age: 75
End: 2025-05-14

## 2025-05-27 ENCOUNTER — APPOINTMENT (OUTPATIENT)
Dept: INTERNAL MEDICINE | Facility: CLINIC | Age: 75
End: 2025-05-27

## 2025-06-16 NOTE — PROGRESS NOTE ADULT - TIME-BASED BILLING (NON-CRITICAL CARE)
Time-based billing (NON-critical care)
n/a
Time-based billing (NON-critical care)

## 2025-06-25 ENCOUNTER — APPOINTMENT (OUTPATIENT)
Dept: INFUSION THERAPY | Facility: CLINIC | Age: 75
End: 2025-06-25

## 2025-08-22 ENCOUNTER — INPATIENT (INPATIENT)
Facility: HOSPITAL | Age: 75
LOS: 3 days | Discharge: HOME | DRG: 638 | End: 2025-08-26
Attending: STUDENT IN AN ORGANIZED HEALTH CARE EDUCATION/TRAINING PROGRAM | Admitting: STUDENT IN AN ORGANIZED HEALTH CARE EDUCATION/TRAINING PROGRAM
Payer: COMMERCIAL

## 2025-08-22 VITALS
DIASTOLIC BLOOD PRESSURE: 91 MMHG | HEART RATE: 70 BPM | TEMPERATURE: 98 F | HEIGHT: 67 IN | OXYGEN SATURATION: 96 % | SYSTOLIC BLOOD PRESSURE: 135 MMHG | WEIGHT: 199.96 LBS | RESPIRATION RATE: 17 BRPM

## 2025-08-22 DIAGNOSIS — Z95.828 PRESENCE OF OTHER VASCULAR IMPLANTS AND GRAFTS: Chronic | ICD-10-CM

## 2025-08-22 DIAGNOSIS — Z98.890 OTHER SPECIFIED POSTPROCEDURAL STATES: Chronic | ICD-10-CM

## 2025-08-22 LAB
ADD ON TEST-SPECIMEN IN LAB: SIGNIFICANT CHANGE UP
ALBUMIN SERPL ELPH-MCNC: 4 G/DL — SIGNIFICANT CHANGE UP (ref 3.3–5)
ALBUMIN SERPL ELPH-MCNC: 4.5 G/DL — SIGNIFICANT CHANGE UP (ref 3.3–5)
ALBUMIN SERPL ELPH-MCNC: 4.7 G/DL — SIGNIFICANT CHANGE UP (ref 3.3–5)
ALP SERPL-CCNC: 231 U/L — HIGH (ref 40–120)
ALP SERPL-CCNC: 252 U/L — HIGH (ref 40–120)
ALP SERPL-CCNC: 263 U/L — HIGH (ref 40–120)
ALT FLD-CCNC: 13 U/L — SIGNIFICANT CHANGE UP (ref 10–45)
ALT FLD-CCNC: 21 U/L — SIGNIFICANT CHANGE UP (ref 10–45)
ALT FLD-CCNC: SIGNIFICANT CHANGE UP U/L (ref 10–45)
ANION GAP SERPL CALC-SCNC: 17 MMOL/L — SIGNIFICANT CHANGE UP (ref 5–17)
ANION GAP SERPL CALC-SCNC: 20 MMOL/L — HIGH (ref 5–17)
ANION GAP SERPL CALC-SCNC: 22 MMOL/L — HIGH (ref 5–17)
APPEARANCE UR: CLEAR — SIGNIFICANT CHANGE UP
AST SERPL-CCNC: 22 U/L — SIGNIFICANT CHANGE UP (ref 10–40)
AST SERPL-CCNC: 22 U/L — SIGNIFICANT CHANGE UP (ref 10–40)
AST SERPL-CCNC: SIGNIFICANT CHANGE UP U/L (ref 10–40)
B-OH-BUTYR SERPL-SCNC: 2.9 MMOL/L — HIGH
B-OH-BUTYR SERPL-SCNC: 4.8 MMOL/L — HIGH
BASE EXCESS BLDV CALC-SCNC: -7.3 MMOL/L — LOW (ref -2–3)
BILIRUB SERPL-MCNC: 0.5 MG/DL — SIGNIFICANT CHANGE UP (ref 0.2–1.2)
BILIRUB UR-MCNC: NEGATIVE — SIGNIFICANT CHANGE UP
BUN SERPL-MCNC: 25 MG/DL — HIGH (ref 7–23)
BUN SERPL-MCNC: 30 MG/DL — HIGH (ref 7–23)
BUN SERPL-MCNC: 30 MG/DL — HIGH (ref 7–23)
CALCIUM SERPL-MCNC: 10.3 MG/DL — SIGNIFICANT CHANGE UP (ref 8.4–10.5)
CALCIUM SERPL-MCNC: 10.8 MG/DL — HIGH (ref 8.4–10.5)
CALCIUM SERPL-MCNC: 9.7 MG/DL — SIGNIFICANT CHANGE UP (ref 8.4–10.5)
CHLORIDE SERPL-SCNC: 100 MMOL/L — SIGNIFICANT CHANGE UP (ref 96–108)
CHLORIDE SERPL-SCNC: 91 MMOL/L — LOW (ref 96–108)
CHLORIDE SERPL-SCNC: 92 MMOL/L — LOW (ref 96–108)
CO2 BLDV-SCNC: 20 MMOL/L — LOW (ref 22–26)
CO2 SERPL-SCNC: 18 MMOL/L — LOW (ref 22–31)
CO2 SERPL-SCNC: 19 MMOL/L — LOW (ref 22–31)
CO2 SERPL-SCNC: 20 MMOL/L — LOW (ref 22–31)
COLOR SPEC: YELLOW — SIGNIFICANT CHANGE UP
CREAT SERPL-MCNC: 0.99 MG/DL — SIGNIFICANT CHANGE UP (ref 0.5–1.3)
CREAT SERPL-MCNC: 1.09 MG/DL — SIGNIFICANT CHANGE UP (ref 0.5–1.3)
CREAT SERPL-MCNC: 1.11 MG/DL — SIGNIFICANT CHANGE UP (ref 0.5–1.3)
DIFF PNL FLD: NEGATIVE — SIGNIFICANT CHANGE UP
EGFR: 70 ML/MIN/1.73M2 — SIGNIFICANT CHANGE UP
EGFR: 70 ML/MIN/1.73M2 — SIGNIFICANT CHANGE UP
EGFR: 72 ML/MIN/1.73M2 — SIGNIFICANT CHANGE UP
EGFR: 72 ML/MIN/1.73M2 — SIGNIFICANT CHANGE UP
EGFR: 80 ML/MIN/1.73M2 — SIGNIFICANT CHANGE UP
EGFR: 80 ML/MIN/1.73M2 — SIGNIFICANT CHANGE UP
GAS PNL BLDV: SIGNIFICANT CHANGE UP
GLUCOSE SERPL-MCNC: 399 MG/DL — HIGH (ref 70–99)
GLUCOSE SERPL-MCNC: 586 MG/DL — CRITICAL HIGH (ref 70–99)
GLUCOSE SERPL-MCNC: 603 MG/DL — CRITICAL HIGH (ref 70–99)
GLUCOSE UR QL: >=1000 MG/DL
HCO3 BLDV-SCNC: 19 MMOL/L — LOW (ref 22–29)
HCT VFR BLD CALC: 35.9 % — LOW (ref 39–50)
HCT VFR BLD CALC: 37.6 % — LOW (ref 39–50)
HGB BLD-MCNC: 12.4 G/DL — LOW (ref 13–17)
HGB BLD-MCNC: 12.8 G/DL — LOW (ref 13–17)
KETONES UR QL: 80 MG/DL
LACTATE SERPL-SCNC: 1.9 MMOL/L — SIGNIFICANT CHANGE UP (ref 0.5–2)
LEUKOCYTE ESTERASE UR-ACNC: NEGATIVE — SIGNIFICANT CHANGE UP
MAGNESIUM SERPL-MCNC: 2 MG/DL — SIGNIFICANT CHANGE UP (ref 1.6–2.6)
MAGNESIUM SERPL-MCNC: 2.2 MG/DL — SIGNIFICANT CHANGE UP (ref 1.6–2.6)
MAGNESIUM SERPL-MCNC: 2.3 MG/DL — SIGNIFICANT CHANGE UP (ref 1.6–2.6)
MCHC RBC-ENTMCNC: 30.4 PG — SIGNIFICANT CHANGE UP (ref 27–34)
MCHC RBC-ENTMCNC: 30.5 PG — SIGNIFICANT CHANGE UP (ref 27–34)
MCHC RBC-ENTMCNC: 34 G/DL — SIGNIFICANT CHANGE UP (ref 32–36)
MCHC RBC-ENTMCNC: 34.5 G/DL — SIGNIFICANT CHANGE UP (ref 32–36)
MCV RBC AUTO: 88.4 FL — SIGNIFICANT CHANGE UP (ref 80–100)
MCV RBC AUTO: 89.3 FL — SIGNIFICANT CHANGE UP (ref 80–100)
NITRITE UR-MCNC: NEGATIVE — SIGNIFICANT CHANGE UP
NRBC # BLD AUTO: 0 K/UL — SIGNIFICANT CHANGE UP (ref 0–0)
NRBC # BLD AUTO: 0 K/UL — SIGNIFICANT CHANGE UP (ref 0–0)
NRBC # FLD: 0 K/UL — SIGNIFICANT CHANGE UP (ref 0–0)
NRBC # FLD: 0 K/UL — SIGNIFICANT CHANGE UP (ref 0–0)
NRBC BLD AUTO-RTO: 0 /100 WBCS — SIGNIFICANT CHANGE UP (ref 0–0)
NRBC BLD AUTO-RTO: 0 /100 WBCS — SIGNIFICANT CHANGE UP (ref 0–0)
OSMOLALITY SERPL: 307 MOSM/KG — HIGH (ref 280–301)
OSMOLALITY SERPL: 320 MOSM/KG — HIGH (ref 280–301)
PCO2 BLDV: 39 MMHG — LOW (ref 42–55)
PH BLDV: 7.29 — LOW (ref 7.32–7.43)
PH UR: 5.5 — SIGNIFICANT CHANGE UP (ref 5–8)
PHOSPHATE SERPL-MCNC: 2.9 MG/DL — SIGNIFICANT CHANGE UP (ref 2.5–4.5)
PHOSPHATE SERPL-MCNC: 4 MG/DL — SIGNIFICANT CHANGE UP (ref 2.5–4.5)
PHOSPHATE SERPL-MCNC: 4.3 MG/DL — SIGNIFICANT CHANGE UP (ref 2.5–4.5)
PLATELET # BLD AUTO: 188 K/UL — SIGNIFICANT CHANGE UP (ref 150–400)
PLATELET # BLD AUTO: 202 K/UL — SIGNIFICANT CHANGE UP (ref 150–400)
PMV BLD: 11.1 FL — SIGNIFICANT CHANGE UP (ref 7–13)
PMV BLD: 11.6 FL — SIGNIFICANT CHANGE UP (ref 7–13)
PO2 BLDV: 50 MMHG — HIGH (ref 25–45)
POTASSIUM SERPL-MCNC: 3.8 MMOL/L — SIGNIFICANT CHANGE UP (ref 3.5–5.3)
POTASSIUM SERPL-MCNC: 4.6 MMOL/L — SIGNIFICANT CHANGE UP (ref 3.5–5.3)
POTASSIUM SERPL-MCNC: SIGNIFICANT CHANGE UP MMOL/L (ref 3.5–5.3)
POTASSIUM SERPL-SCNC: 3.8 MMOL/L — SIGNIFICANT CHANGE UP (ref 3.5–5.3)
POTASSIUM SERPL-SCNC: 4.6 MMOL/L — SIGNIFICANT CHANGE UP (ref 3.5–5.3)
POTASSIUM SERPL-SCNC: SIGNIFICANT CHANGE UP MMOL/L (ref 3.5–5.3)
PROT SERPL-MCNC: 7 G/DL — SIGNIFICANT CHANGE UP (ref 6–8.3)
PROT SERPL-MCNC: 7.7 G/DL — SIGNIFICANT CHANGE UP (ref 6–8.3)
PROT SERPL-MCNC: 8.1 G/DL — SIGNIFICANT CHANGE UP (ref 6–8.3)
PROT UR-MCNC: NEGATIVE MG/DL — SIGNIFICANT CHANGE UP
RBC # BLD: 4.06 M/UL — LOW (ref 4.2–5.8)
RBC # BLD: 4.21 M/UL — SIGNIFICANT CHANGE UP (ref 4.2–5.8)
RBC # FLD: 11.5 % — SIGNIFICANT CHANGE UP (ref 10.3–14.5)
RBC # FLD: 11.5 % — SIGNIFICANT CHANGE UP (ref 10.3–14.5)
SAO2 % BLDV: 81 % — SIGNIFICANT CHANGE UP (ref 67–88)
SODIUM SERPL-SCNC: 131 MMOL/L — LOW (ref 135–145)
SODIUM SERPL-SCNC: 133 MMOL/L — LOW (ref 135–145)
SODIUM SERPL-SCNC: 135 MMOL/L — SIGNIFICANT CHANGE UP (ref 135–145)
SP GR SPEC: 1.03 — SIGNIFICANT CHANGE UP (ref 1–1.03)
UROBILINOGEN FLD QL: 0.2 MG/DL — SIGNIFICANT CHANGE UP (ref 0.2–1)
WBC # BLD: 3.97 K/UL — SIGNIFICANT CHANGE UP (ref 3.8–10.5)
WBC # BLD: 4.25 K/UL — SIGNIFICANT CHANGE UP (ref 3.8–10.5)
WBC # FLD AUTO: 3.97 K/UL — SIGNIFICANT CHANGE UP (ref 3.8–10.5)
WBC # FLD AUTO: 4.25 K/UL — SIGNIFICANT CHANGE UP (ref 3.8–10.5)

## 2025-08-22 PROCEDURE — 83605 ASSAY OF LACTIC ACID: CPT

## 2025-08-22 PROCEDURE — 99291 CRITICAL CARE FIRST HOUR: CPT | Mod: GC,25

## 2025-08-22 PROCEDURE — 82330 ASSAY OF CALCIUM: CPT

## 2025-08-22 PROCEDURE — 83735 ASSAY OF MAGNESIUM: CPT

## 2025-08-22 PROCEDURE — 83930 ASSAY OF BLOOD OSMOLALITY: CPT

## 2025-08-22 PROCEDURE — 85027 COMPLETE CBC AUTOMATED: CPT

## 2025-08-22 PROCEDURE — 80053 COMPREHEN METABOLIC PANEL: CPT

## 2025-08-22 PROCEDURE — 99285 EMERGENCY DEPT VISIT HI MDM: CPT

## 2025-08-22 PROCEDURE — 36415 COLL VENOUS BLD VENIPUNCTURE: CPT

## 2025-08-22 PROCEDURE — 84132 ASSAY OF SERUM POTASSIUM: CPT

## 2025-08-22 PROCEDURE — 71045 X-RAY EXAM CHEST 1 VIEW: CPT | Mod: 26

## 2025-08-22 PROCEDURE — 82962 GLUCOSE BLOOD TEST: CPT

## 2025-08-22 PROCEDURE — 76604 US EXAM CHEST: CPT | Mod: 26

## 2025-08-22 PROCEDURE — 82803 BLOOD GASES ANY COMBINATION: CPT

## 2025-08-22 PROCEDURE — 83036 HEMOGLOBIN GLYCOSYLATED A1C: CPT

## 2025-08-22 PROCEDURE — 81003 URINALYSIS AUTO W/O SCOPE: CPT

## 2025-08-22 PROCEDURE — 84295 ASSAY OF SERUM SODIUM: CPT

## 2025-08-22 PROCEDURE — 93010 ELECTROCARDIOGRAM REPORT: CPT

## 2025-08-22 PROCEDURE — 82010 KETONE BODYS QUAN: CPT

## 2025-08-22 PROCEDURE — 84100 ASSAY OF PHOSPHORUS: CPT

## 2025-08-22 RX ORDER — ASPIRIN 325 MG
81 TABLET ORAL DAILY
Refills: 0 | Status: DISCONTINUED | OUTPATIENT
Start: 2025-08-22 | End: 2025-08-26

## 2025-08-22 RX ORDER — LISINOPRIL 5 MG/1
10 TABLET ORAL DAILY
Refills: 0 | Status: DISCONTINUED | OUTPATIENT
Start: 2025-08-22 | End: 2025-08-23

## 2025-08-22 RX ORDER — AMLODIPINE BESYLATE 10 MG/1
10 TABLET ORAL EVERY 24 HOURS
Refills: 0 | Status: DISCONTINUED | OUTPATIENT
Start: 2025-08-22 | End: 2025-08-23

## 2025-08-22 RX ORDER — HEPARIN SODIUM 1000 [USP'U]/ML
5000 INJECTION INTRAVENOUS; SUBCUTANEOUS EVERY 8 HOURS
Refills: 0 | Status: DISCONTINUED | OUTPATIENT
Start: 2025-08-22 | End: 2025-08-26

## 2025-08-22 RX ORDER — DEXTROSE 50 % IN WATER 50 %
25 SYRINGE (ML) INTRAVENOUS ONCE
Refills: 0 | Status: DISCONTINUED | OUTPATIENT
Start: 2025-08-22 | End: 2025-08-26

## 2025-08-22 RX ORDER — SODIUM CHLORIDE 9 G/1000ML
1000 INJECTION, SOLUTION INTRAVENOUS
Refills: 0 | Status: DISCONTINUED | OUTPATIENT
Start: 2025-08-22 | End: 2025-08-23

## 2025-08-22 RX ADMIN — HEPARIN SODIUM 5000 UNIT(S): 1000 INJECTION INTRAVENOUS; SUBCUTANEOUS at 21:28

## 2025-08-22 RX ADMIN — Medication 40 MILLIEQUIVALENT(S): at 20:45

## 2025-08-22 RX ADMIN — Medication 100 MILLIEQUIVALENT(S): at 21:11

## 2025-08-22 RX ADMIN — SODIUM CHLORIDE 200 MILLILITER(S): 9 INJECTION, SOLUTION INTRAVENOUS at 21:27

## 2025-08-22 RX ADMIN — Medication 9 UNIT(S)/HR: at 19:09

## 2025-08-22 RX ADMIN — Medication 2000 MILLILITER(S): at 17:15

## 2025-08-22 RX ADMIN — Medication 100 MILLIEQUIVALENT(S): at 23:03

## 2025-08-22 RX ADMIN — Medication 1 APPLICATION(S): at 21:32

## 2025-08-22 RX ADMIN — Medication 100 MILLIEQUIVALENT(S): at 22:02

## 2025-08-22 RX ADMIN — Medication 1000 MILLILITER(S): at 20:33

## 2025-08-23 DIAGNOSIS — Z29.9 ENCOUNTER FOR PROPHYLACTIC MEASURES, UNSPECIFIED: ICD-10-CM

## 2025-08-23 DIAGNOSIS — I82.409 ACUTE EMBOLISM AND THROMBOSIS OF UNSPECIFIED DEEP VEINS OF UNSPECIFIED LOWER EXTREMITY: ICD-10-CM

## 2025-08-23 DIAGNOSIS — E11.9 TYPE 2 DIABETES MELLITUS WITHOUT COMPLICATIONS: ICD-10-CM

## 2025-08-23 DIAGNOSIS — I10 ESSENTIAL (PRIMARY) HYPERTENSION: ICD-10-CM

## 2025-08-23 DIAGNOSIS — C61 MALIGNANT NEOPLASM OF PROSTATE: ICD-10-CM

## 2025-08-23 DIAGNOSIS — E11.10 TYPE 2 DIABETES MELLITUS WITH KETOACIDOSIS WITHOUT COMA: ICD-10-CM

## 2025-08-23 LAB
ADD ON TEST-SPECIMEN IN LAB: SIGNIFICANT CHANGE UP
ADD ON TEST-SPECIMEN IN LAB: SIGNIFICANT CHANGE UP
ANION GAP SERPL CALC-SCNC: 10 MMOL/L — SIGNIFICANT CHANGE UP (ref 5–17)
ANION GAP SERPL CALC-SCNC: 9 MMOL/L — SIGNIFICANT CHANGE UP (ref 5–17)
BUN SERPL-MCNC: 16 MG/DL — SIGNIFICANT CHANGE UP (ref 7–23)
BUN SERPL-MCNC: 21 MG/DL — SIGNIFICANT CHANGE UP (ref 7–23)
CALCIUM SERPL-MCNC: 9.1 MG/DL — SIGNIFICANT CHANGE UP (ref 8.4–10.5)
CALCIUM SERPL-MCNC: 9.1 MG/DL — SIGNIFICANT CHANGE UP (ref 8.4–10.5)
CHLORIDE SERPL-SCNC: 105 MMOL/L — SIGNIFICANT CHANGE UP (ref 96–108)
CHLORIDE SERPL-SCNC: 106 MMOL/L — SIGNIFICANT CHANGE UP (ref 96–108)
CO2 SERPL-SCNC: 19 MMOL/L — LOW (ref 22–31)
CO2 SERPL-SCNC: 21 MMOL/L — LOW (ref 22–31)
CREAT SERPL-MCNC: 0.83 MG/DL — SIGNIFICANT CHANGE UP (ref 0.5–1.3)
CREAT SERPL-MCNC: 0.88 MG/DL — SIGNIFICANT CHANGE UP (ref 0.5–1.3)
EGFR: 91 ML/MIN/1.73M2 — SIGNIFICANT CHANGE UP
EGFR: 91 ML/MIN/1.73M2 — SIGNIFICANT CHANGE UP
EGFR: 92 ML/MIN/1.73M2 — SIGNIFICANT CHANGE UP
EGFR: 92 ML/MIN/1.73M2 — SIGNIFICANT CHANGE UP
GLUCOSE SERPL-MCNC: 191 MG/DL — HIGH (ref 70–99)
GLUCOSE SERPL-MCNC: 233 MG/DL — HIGH (ref 70–99)
HCT VFR BLD CALC: 32.6 % — LOW (ref 39–50)
HGB BLD-MCNC: 11.4 G/DL — LOW (ref 13–17)
MAGNESIUM SERPL-MCNC: 2 MG/DL — SIGNIFICANT CHANGE UP (ref 1.6–2.6)
MCHC RBC-ENTMCNC: 30.8 PG — SIGNIFICANT CHANGE UP (ref 27–34)
MCHC RBC-ENTMCNC: 35 G/DL — SIGNIFICANT CHANGE UP (ref 32–36)
MCV RBC AUTO: 88.1 FL — SIGNIFICANT CHANGE UP (ref 80–100)
NRBC # BLD AUTO: 0 K/UL — SIGNIFICANT CHANGE UP (ref 0–0)
NRBC # FLD: 0 K/UL — SIGNIFICANT CHANGE UP (ref 0–0)
NRBC BLD AUTO-RTO: 0 /100 WBCS — SIGNIFICANT CHANGE UP (ref 0–0)
PHOSPHATE SERPL-MCNC: 2.6 MG/DL — SIGNIFICANT CHANGE UP (ref 2.5–4.5)
PLATELET # BLD AUTO: 185 K/UL — SIGNIFICANT CHANGE UP (ref 150–400)
PMV BLD: 11 FL — SIGNIFICANT CHANGE UP (ref 7–13)
POTASSIUM SERPL-MCNC: 4.5 MMOL/L — SIGNIFICANT CHANGE UP (ref 3.5–5.3)
POTASSIUM SERPL-MCNC: 4.6 MMOL/L — SIGNIFICANT CHANGE UP (ref 3.5–5.3)
POTASSIUM SERPL-SCNC: 4.5 MMOL/L — SIGNIFICANT CHANGE UP (ref 3.5–5.3)
POTASSIUM SERPL-SCNC: 4.6 MMOL/L — SIGNIFICANT CHANGE UP (ref 3.5–5.3)
RBC # BLD: 3.7 M/UL — LOW (ref 4.2–5.8)
RBC # FLD: 11.5 % — SIGNIFICANT CHANGE UP (ref 10.3–14.5)
SODIUM SERPL-SCNC: 134 MMOL/L — LOW (ref 135–145)
SODIUM SERPL-SCNC: 136 MMOL/L — SIGNIFICANT CHANGE UP (ref 135–145)
WBC # BLD: 3.14 K/UL — LOW (ref 3.8–10.5)
WBC # FLD AUTO: 3.14 K/UL — LOW (ref 3.8–10.5)

## 2025-08-23 PROCEDURE — 83036 HEMOGLOBIN GLYCOSYLATED A1C: CPT

## 2025-08-23 PROCEDURE — 82962 GLUCOSE BLOOD TEST: CPT

## 2025-08-23 PROCEDURE — 84100 ASSAY OF PHOSPHORUS: CPT

## 2025-08-23 PROCEDURE — 84132 ASSAY OF SERUM POTASSIUM: CPT

## 2025-08-23 PROCEDURE — 36415 COLL VENOUS BLD VENIPUNCTURE: CPT

## 2025-08-23 PROCEDURE — 82010 KETONE BODYS QUAN: CPT

## 2025-08-23 PROCEDURE — 82803 BLOOD GASES ANY COMBINATION: CPT

## 2025-08-23 PROCEDURE — 80053 COMPREHEN METABOLIC PANEL: CPT

## 2025-08-23 PROCEDURE — 80076 HEPATIC FUNCTION PANEL: CPT

## 2025-08-23 PROCEDURE — 82330 ASSAY OF CALCIUM: CPT

## 2025-08-23 PROCEDURE — 83930 ASSAY OF BLOOD OSMOLALITY: CPT

## 2025-08-23 PROCEDURE — 99223 1ST HOSP IP/OBS HIGH 75: CPT | Mod: GC

## 2025-08-23 PROCEDURE — 81003 URINALYSIS AUTO W/O SCOPE: CPT

## 2025-08-23 PROCEDURE — 83735 ASSAY OF MAGNESIUM: CPT

## 2025-08-23 PROCEDURE — 85027 COMPLETE CBC AUTOMATED: CPT

## 2025-08-23 PROCEDURE — 80048 BASIC METABOLIC PNL TOTAL CA: CPT

## 2025-08-23 PROCEDURE — 83605 ASSAY OF LACTIC ACID: CPT

## 2025-08-23 PROCEDURE — 84295 ASSAY OF SERUM SODIUM: CPT

## 2025-08-23 PROCEDURE — 87040 BLOOD CULTURE FOR BACTERIA: CPT

## 2025-08-23 RX ORDER — INSULIN LISPRO 100 U/ML
7 INJECTION, SOLUTION INTRAVENOUS; SUBCUTANEOUS
Refills: 0 | Status: DISCONTINUED | OUTPATIENT
Start: 2025-08-23 | End: 2025-08-23

## 2025-08-23 RX ORDER — MAGNESIUM SULFATE 500 MG/ML
1 SYRINGE (ML) INJECTION ONCE
Refills: 0 | Status: DISCONTINUED | OUTPATIENT
Start: 2025-08-23 | End: 2025-08-23

## 2025-08-23 RX ORDER — GLUCAGON 3 MG/1
1 POWDER NASAL ONCE
Refills: 0 | Status: DISCONTINUED | OUTPATIENT
Start: 2025-08-23 | End: 2025-08-26

## 2025-08-23 RX ORDER — SODIUM CHLORIDE 9 G/1000ML
1000 INJECTION, SOLUTION INTRAVENOUS
Refills: 0 | Status: DISCONTINUED | OUTPATIENT
Start: 2025-08-23 | End: 2025-08-26

## 2025-08-23 RX ORDER — INSULIN LISPRO 100 U/ML
INJECTION, SOLUTION INTRAVENOUS; SUBCUTANEOUS
Refills: 0 | Status: DISCONTINUED | OUTPATIENT
Start: 2025-08-23 | End: 2025-08-24

## 2025-08-23 RX ORDER — MAGNESIUM SULFATE 500 MG/ML
1 SYRINGE (ML) INJECTION ONCE
Refills: 0 | Status: COMPLETED | OUTPATIENT
Start: 2025-08-23 | End: 2025-08-23

## 2025-08-23 RX ORDER — DEXTROSE 50 % IN WATER 50 %
15 SYRINGE (ML) INTRAVENOUS ONCE
Refills: 0 | Status: DISCONTINUED | OUTPATIENT
Start: 2025-08-23 | End: 2025-08-26

## 2025-08-23 RX ORDER — INSULIN LISPRO 100 U/ML
INJECTION, SOLUTION INTRAVENOUS; SUBCUTANEOUS
Refills: 0 | Status: DISCONTINUED | OUTPATIENT
Start: 2025-08-23 | End: 2025-08-23

## 2025-08-23 RX ORDER — INSULIN LISPRO 100 U/ML
5 INJECTION, SOLUTION INTRAVENOUS; SUBCUTANEOUS
Refills: 0 | Status: DISCONTINUED | OUTPATIENT
Start: 2025-08-23 | End: 2025-08-24

## 2025-08-23 RX ORDER — INSULIN GLARGINE-YFGN 100 [IU]/ML
21 INJECTION, SOLUTION SUBCUTANEOUS AT BEDTIME
Refills: 0 | Status: DISCONTINUED | OUTPATIENT
Start: 2025-08-23 | End: 2025-08-23

## 2025-08-23 RX ORDER — SOD PHOS DI, MONO/K PHOS MONO 250 MG
2 TABLET ORAL ONCE
Refills: 0 | Status: COMPLETED | OUTPATIENT
Start: 2025-08-23 | End: 2025-08-23

## 2025-08-23 RX ORDER — INSULIN GLARGINE-YFGN 100 [IU]/ML
15 INJECTION, SOLUTION SUBCUTANEOUS AT BEDTIME
Refills: 0 | Status: DISCONTINUED | OUTPATIENT
Start: 2025-08-23 | End: 2025-08-25

## 2025-08-23 RX ADMIN — INSULIN LISPRO 5 UNIT(S): 100 INJECTION, SOLUTION INTRAVENOUS; SUBCUTANEOUS at 18:37

## 2025-08-23 RX ADMIN — INSULIN LISPRO 3: 100 INJECTION, SOLUTION INTRAVENOUS; SUBCUTANEOUS at 11:01

## 2025-08-23 RX ADMIN — Medication 81 MILLIGRAM(S): at 11:53

## 2025-08-23 RX ADMIN — Medication 100 GRAM(S): at 02:14

## 2025-08-23 RX ADMIN — Medication 100 MILLIEQUIVALENT(S): at 00:07

## 2025-08-23 RX ADMIN — Medication 15 UNIT(S): at 06:12

## 2025-08-23 RX ADMIN — Medication 1 APPLICATION(S): at 06:13

## 2025-08-23 RX ADMIN — Medication 2 PACKET(S): at 02:14

## 2025-08-23 RX ADMIN — AMLODIPINE BESYLATE 10 MILLIGRAM(S): 10 TABLET ORAL at 06:14

## 2025-08-23 RX ADMIN — INSULIN GLARGINE-YFGN 15 UNIT(S): 100 INJECTION, SOLUTION SUBCUTANEOUS at 22:30

## 2025-08-23 RX ADMIN — INSULIN LISPRO 5 UNIT(S): 100 INJECTION, SOLUTION INTRAVENOUS; SUBCUTANEOUS at 11:52

## 2025-08-23 RX ADMIN — INSULIN LISPRO 5: 100 INJECTION, SOLUTION INTRAVENOUS; SUBCUTANEOUS at 18:38

## 2025-08-23 RX ADMIN — LISINOPRIL 10 MILLIGRAM(S): 5 TABLET ORAL at 06:14

## 2025-08-23 RX ADMIN — HEPARIN SODIUM 5000 UNIT(S): 1000 INJECTION INTRAVENOUS; SUBCUTANEOUS at 14:18

## 2025-08-23 RX ADMIN — HEPARIN SODIUM 5000 UNIT(S): 1000 INJECTION INTRAVENOUS; SUBCUTANEOUS at 22:30

## 2025-08-23 RX ADMIN — HEPARIN SODIUM 5000 UNIT(S): 1000 INJECTION INTRAVENOUS; SUBCUTANEOUS at 06:13

## 2025-08-24 LAB
ALBUMIN SERPL ELPH-MCNC: 3.7 G/DL — SIGNIFICANT CHANGE UP (ref 3.3–5)
ALP SERPL-CCNC: 226 U/L — HIGH (ref 40–120)
ALT FLD-CCNC: 25 U/L — SIGNIFICANT CHANGE UP (ref 10–45)
ANION GAP SERPL CALC-SCNC: 11 MMOL/L — SIGNIFICANT CHANGE UP (ref 5–17)
ANION GAP SERPL CALC-SCNC: 13 MMOL/L — SIGNIFICANT CHANGE UP (ref 5–17)
AST SERPL-CCNC: 35 U/L — SIGNIFICANT CHANGE UP (ref 10–40)
BILIRUB SERPL-MCNC: 0.6 MG/DL — SIGNIFICANT CHANGE UP (ref 0.2–1.2)
BUN SERPL-MCNC: 19 MG/DL — SIGNIFICANT CHANGE UP (ref 7–23)
BUN SERPL-MCNC: 20 MG/DL — SIGNIFICANT CHANGE UP (ref 7–23)
CALCIUM SERPL-MCNC: 9.6 MG/DL — SIGNIFICANT CHANGE UP (ref 8.4–10.5)
CALCIUM SERPL-MCNC: SIGNIFICANT CHANGE UP (ref 8.4–10.5)
CHLORIDE SERPL-SCNC: 101 MMOL/L — SIGNIFICANT CHANGE UP (ref 96–108)
CHLORIDE SERPL-SCNC: 105 MMOL/L — SIGNIFICANT CHANGE UP (ref 96–108)
CO2 SERPL-SCNC: 15 MMOL/L — LOW (ref 22–31)
CO2 SERPL-SCNC: 24 MMOL/L — SIGNIFICANT CHANGE UP (ref 22–31)
CREAT SERPL-MCNC: 0.91 MG/DL — SIGNIFICANT CHANGE UP (ref 0.5–1.3)
CREAT SERPL-MCNC: 0.94 MG/DL — SIGNIFICANT CHANGE UP (ref 0.5–1.3)
EGFR: 86 ML/MIN/1.73M2 — SIGNIFICANT CHANGE UP
EGFR: 86 ML/MIN/1.73M2 — SIGNIFICANT CHANGE UP
EGFR: 89 ML/MIN/1.73M2 — SIGNIFICANT CHANGE UP
EGFR: 89 ML/MIN/1.73M2 — SIGNIFICANT CHANGE UP
GLUCOSE SERPL-MCNC: 325 MG/DL — HIGH (ref 70–99)
GLUCOSE SERPL-MCNC: 444 MG/DL — HIGH (ref 70–99)
HCT VFR BLD CALC: 34.9 % — LOW (ref 39–50)
HGB BLD-MCNC: 12.2 G/DL — LOW (ref 13–17)
MAGNESIUM SERPL-MCNC: 2 MG/DL — SIGNIFICANT CHANGE UP (ref 1.6–2.6)
MCHC RBC-ENTMCNC: 30.5 PG — SIGNIFICANT CHANGE UP (ref 27–34)
MCHC RBC-ENTMCNC: 35 G/DL — SIGNIFICANT CHANGE UP (ref 32–36)
MCV RBC AUTO: 87.3 FL — SIGNIFICANT CHANGE UP (ref 80–100)
NRBC # BLD AUTO: 0 K/UL — SIGNIFICANT CHANGE UP (ref 0–0)
NRBC # FLD: 0 K/UL — SIGNIFICANT CHANGE UP (ref 0–0)
NRBC BLD AUTO-RTO: 0 /100 WBCS — SIGNIFICANT CHANGE UP (ref 0–0)
PHOSPHATE SERPL-MCNC: 3.6 MG/DL — SIGNIFICANT CHANGE UP (ref 2.5–4.5)
PLATELET # BLD AUTO: 188 K/UL — SIGNIFICANT CHANGE UP (ref 150–400)
PMV BLD: 11.4 FL — SIGNIFICANT CHANGE UP (ref 7–13)
POTASSIUM SERPL-MCNC: 4.3 MMOL/L — SIGNIFICANT CHANGE UP (ref 3.5–5.3)
POTASSIUM SERPL-MCNC: 5.4 MMOL/L — HIGH (ref 3.5–5.3)
POTASSIUM SERPL-SCNC: 4.3 MMOL/L — SIGNIFICANT CHANGE UP (ref 3.5–5.3)
POTASSIUM SERPL-SCNC: 5.4 MMOL/L — HIGH (ref 3.5–5.3)
PROT SERPL-MCNC: 7.3 G/DL — SIGNIFICANT CHANGE UP (ref 6–8.3)
RBC # BLD: 4 M/UL — LOW (ref 4.2–5.8)
RBC # FLD: 11.3 % — SIGNIFICANT CHANGE UP (ref 10.3–14.5)
SODIUM SERPL-SCNC: 133 MMOL/L — LOW (ref 135–145)
SODIUM SERPL-SCNC: 136 MMOL/L — SIGNIFICANT CHANGE UP (ref 135–145)
WBC # BLD: 3.03 K/UL — LOW (ref 3.8–10.5)
WBC # FLD AUTO: 3.03 K/UL — LOW (ref 3.8–10.5)

## 2025-08-24 PROCEDURE — 81003 URINALYSIS AUTO W/O SCOPE: CPT

## 2025-08-24 PROCEDURE — 93010 ELECTROCARDIOGRAM REPORT: CPT

## 2025-08-24 PROCEDURE — 36415 COLL VENOUS BLD VENIPUNCTURE: CPT

## 2025-08-24 PROCEDURE — 82803 BLOOD GASES ANY COMBINATION: CPT

## 2025-08-24 PROCEDURE — 83930 ASSAY OF BLOOD OSMOLALITY: CPT

## 2025-08-24 PROCEDURE — 93970 EXTREMITY STUDY: CPT | Mod: 26

## 2025-08-24 PROCEDURE — 99221 1ST HOSP IP/OBS SF/LOW 40: CPT

## 2025-08-24 PROCEDURE — 71045 X-RAY EXAM CHEST 1 VIEW: CPT

## 2025-08-24 PROCEDURE — 84295 ASSAY OF SERUM SODIUM: CPT

## 2025-08-24 PROCEDURE — 80048 BASIC METABOLIC PNL TOTAL CA: CPT

## 2025-08-24 PROCEDURE — 85027 COMPLETE CBC AUTOMATED: CPT

## 2025-08-24 PROCEDURE — 84100 ASSAY OF PHOSPHORUS: CPT

## 2025-08-24 PROCEDURE — 99233 SBSQ HOSP IP/OBS HIGH 50: CPT | Mod: GC

## 2025-08-24 PROCEDURE — 82010 KETONE BODYS QUAN: CPT

## 2025-08-24 PROCEDURE — 83605 ASSAY OF LACTIC ACID: CPT

## 2025-08-24 PROCEDURE — 82962 GLUCOSE BLOOD TEST: CPT

## 2025-08-24 PROCEDURE — 83036 HEMOGLOBIN GLYCOSYLATED A1C: CPT

## 2025-08-24 PROCEDURE — 84132 ASSAY OF SERUM POTASSIUM: CPT

## 2025-08-24 PROCEDURE — 80076 HEPATIC FUNCTION PANEL: CPT

## 2025-08-24 PROCEDURE — 87040 BLOOD CULTURE FOR BACTERIA: CPT

## 2025-08-24 PROCEDURE — 82330 ASSAY OF CALCIUM: CPT

## 2025-08-24 PROCEDURE — 80053 COMPREHEN METABOLIC PANEL: CPT

## 2025-08-24 PROCEDURE — 83735 ASSAY OF MAGNESIUM: CPT

## 2025-08-24 RX ORDER — SODIUM ZIRCONIUM CYCLOSILICATE 5 G/5G
10 POWDER, FOR SUSPENSION ORAL ONCE
Refills: 0 | Status: COMPLETED | OUTPATIENT
Start: 2025-08-24 | End: 2025-08-24

## 2025-08-24 RX ORDER — INSULIN LISPRO 100 U/ML
8 INJECTION, SOLUTION INTRAVENOUS; SUBCUTANEOUS
Refills: 0 | Status: DISCONTINUED | OUTPATIENT
Start: 2025-08-24 | End: 2025-08-25

## 2025-08-24 RX ORDER — INSULIN LISPRO 100 U/ML
INJECTION, SOLUTION INTRAVENOUS; SUBCUTANEOUS
Refills: 0 | Status: DISCONTINUED | OUTPATIENT
Start: 2025-08-24 | End: 2025-08-26

## 2025-08-24 RX ADMIN — INSULIN LISPRO 8 UNIT(S): 100 INJECTION, SOLUTION INTRAVENOUS; SUBCUTANEOUS at 13:48

## 2025-08-24 RX ADMIN — HEPARIN SODIUM 5000 UNIT(S): 1000 INJECTION INTRAVENOUS; SUBCUTANEOUS at 22:40

## 2025-08-24 RX ADMIN — INSULIN LISPRO 4: 100 INJECTION, SOLUTION INTRAVENOUS; SUBCUTANEOUS at 18:49

## 2025-08-24 RX ADMIN — INSULIN LISPRO 8 UNIT(S): 100 INJECTION, SOLUTION INTRAVENOUS; SUBCUTANEOUS at 18:52

## 2025-08-24 RX ADMIN — HEPARIN SODIUM 5000 UNIT(S): 1000 INJECTION INTRAVENOUS; SUBCUTANEOUS at 13:26

## 2025-08-24 RX ADMIN — HEPARIN SODIUM 5000 UNIT(S): 1000 INJECTION INTRAVENOUS; SUBCUTANEOUS at 06:00

## 2025-08-24 RX ADMIN — INSULIN LISPRO 6: 100 INJECTION, SOLUTION INTRAVENOUS; SUBCUTANEOUS at 22:41

## 2025-08-24 RX ADMIN — INSULIN LISPRO 5: 100 INJECTION, SOLUTION INTRAVENOUS; SUBCUTANEOUS at 09:16

## 2025-08-24 RX ADMIN — INSULIN LISPRO 5: 100 INJECTION, SOLUTION INTRAVENOUS; SUBCUTANEOUS at 13:47

## 2025-08-24 RX ADMIN — INSULIN GLARGINE-YFGN 15 UNIT(S): 100 INJECTION, SOLUTION SUBCUTANEOUS at 22:40

## 2025-08-24 RX ADMIN — SODIUM ZIRCONIUM CYCLOSILICATE 10 GRAM(S): 5 POWDER, FOR SUSPENSION ORAL at 09:17

## 2025-08-24 RX ADMIN — Medication 81 MILLIGRAM(S): at 13:26

## 2025-08-25 ENCOUNTER — TRANSCRIPTION ENCOUNTER (OUTPATIENT)
Age: 75
End: 2025-08-25

## 2025-08-25 LAB
ALBUMIN SERPL ELPH-MCNC: 4 G/DL — SIGNIFICANT CHANGE UP (ref 3.3–5)
ALP SERPL-CCNC: 203 U/L — HIGH (ref 40–120)
ALT FLD-CCNC: 19 U/L — SIGNIFICANT CHANGE UP (ref 10–45)
ANION GAP SERPL CALC-SCNC: 13 MMOL/L — SIGNIFICANT CHANGE UP (ref 5–17)
AST SERPL-CCNC: 20 U/L — SIGNIFICANT CHANGE UP (ref 10–40)
BASOPHILS # BLD AUTO: 0.02 K/UL — SIGNIFICANT CHANGE UP (ref 0–0.2)
BASOPHILS NFR BLD AUTO: 0.8 % — SIGNIFICANT CHANGE UP (ref 0–2)
BILIRUB SERPL-MCNC: 0.7 MG/DL — SIGNIFICANT CHANGE UP (ref 0.2–1.2)
BLD GP AB SCN SERPL QL: NEGATIVE — SIGNIFICANT CHANGE UP
BUN SERPL-MCNC: 21 MG/DL — SIGNIFICANT CHANGE UP (ref 7–23)
CALCIUM SERPL-MCNC: 9.4 MG/DL — SIGNIFICANT CHANGE UP (ref 8.4–10.5)
CHLORIDE SERPL-SCNC: 102 MMOL/L — SIGNIFICANT CHANGE UP (ref 96–108)
CO2 SERPL-SCNC: 23 MMOL/L — SIGNIFICANT CHANGE UP (ref 22–31)
CREAT SERPL-MCNC: 1.06 MG/DL — SIGNIFICANT CHANGE UP (ref 0.5–1.3)
EGFR: 74 ML/MIN/1.73M2 — SIGNIFICANT CHANGE UP
EGFR: 74 ML/MIN/1.73M2 — SIGNIFICANT CHANGE UP
EOSINOPHIL # BLD AUTO: 0.08 K/UL — SIGNIFICANT CHANGE UP (ref 0–0.5)
EOSINOPHIL NFR BLD AUTO: 3.3 % — SIGNIFICANT CHANGE UP (ref 0–6)
GLUCOSE SERPL-MCNC: 205 MG/DL — HIGH (ref 70–99)
HCT VFR BLD CALC: 34.4 % — LOW (ref 39–50)
HGB BLD-MCNC: 11.9 G/DL — LOW (ref 13–17)
IMM GRANULOCYTES # BLD AUTO: 0.01 K/UL — SIGNIFICANT CHANGE UP (ref 0–0.07)
IMM GRANULOCYTES NFR BLD AUTO: 0.4 % — SIGNIFICANT CHANGE UP (ref 0–0.9)
LYMPHOCYTES # BLD AUTO: 1.06 K/UL — SIGNIFICANT CHANGE UP (ref 1–3.3)
LYMPHOCYTES NFR BLD AUTO: 43.3 % — SIGNIFICANT CHANGE UP (ref 13–44)
MAGNESIUM SERPL-MCNC: 1.9 MG/DL — SIGNIFICANT CHANGE UP (ref 1.6–2.6)
MCHC RBC-ENTMCNC: 30.7 PG — SIGNIFICANT CHANGE UP (ref 27–34)
MCHC RBC-ENTMCNC: 34.6 G/DL — SIGNIFICANT CHANGE UP (ref 32–36)
MCV RBC AUTO: 88.9 FL — SIGNIFICANT CHANGE UP (ref 80–100)
MONOCYTES # BLD AUTO: 0.2 K/UL — SIGNIFICANT CHANGE UP (ref 0–0.9)
MONOCYTES NFR BLD AUTO: 8.2 % — SIGNIFICANT CHANGE UP (ref 2–14)
NEUTROPHILS # BLD AUTO: 1.08 K/UL — LOW (ref 1.8–7.4)
NEUTROPHILS NFR BLD AUTO: 44 % — SIGNIFICANT CHANGE UP (ref 43–77)
NRBC # BLD AUTO: 0 K/UL — SIGNIFICANT CHANGE UP (ref 0–0)
NRBC # FLD: 0 K/UL — SIGNIFICANT CHANGE UP (ref 0–0)
NRBC BLD AUTO-RTO: 0 /100 WBCS — SIGNIFICANT CHANGE UP (ref 0–0)
PHOSPHATE SERPL-MCNC: 3.8 MG/DL — SIGNIFICANT CHANGE UP (ref 2.5–4.5)
PLATELET # BLD AUTO: 132 K/UL — LOW (ref 150–400)
PMV BLD: 11.7 FL — SIGNIFICANT CHANGE UP (ref 7–13)
POTASSIUM SERPL-MCNC: 4.1 MMOL/L — SIGNIFICANT CHANGE UP (ref 3.5–5.3)
POTASSIUM SERPL-SCNC: 4.1 MMOL/L — SIGNIFICANT CHANGE UP (ref 3.5–5.3)
PROT SERPL-MCNC: 6.7 G/DL — SIGNIFICANT CHANGE UP (ref 6–8.3)
RBC # BLD: 3.87 M/UL — LOW (ref 4.2–5.8)
RBC # FLD: 11.7 % — SIGNIFICANT CHANGE UP (ref 10.3–14.5)
RH IG SCN BLD-IMP: POSITIVE — SIGNIFICANT CHANGE UP
SODIUM SERPL-SCNC: 138 MMOL/L — SIGNIFICANT CHANGE UP (ref 135–145)
WBC # BLD: 2.45 K/UL — LOW (ref 3.8–10.5)
WBC # FLD AUTO: 2.45 K/UL — LOW (ref 3.8–10.5)

## 2025-08-25 PROCEDURE — 86850 RBC ANTIBODY SCREEN: CPT

## 2025-08-25 PROCEDURE — 83735 ASSAY OF MAGNESIUM: CPT

## 2025-08-25 PROCEDURE — 36415 COLL VENOUS BLD VENIPUNCTURE: CPT

## 2025-08-25 PROCEDURE — 84295 ASSAY OF SERUM SODIUM: CPT

## 2025-08-25 PROCEDURE — 82962 GLUCOSE BLOOD TEST: CPT

## 2025-08-25 PROCEDURE — 87040 BLOOD CULTURE FOR BACTERIA: CPT

## 2025-08-25 PROCEDURE — 83605 ASSAY OF LACTIC ACID: CPT

## 2025-08-25 PROCEDURE — 81003 URINALYSIS AUTO W/O SCOPE: CPT

## 2025-08-25 PROCEDURE — 82803 BLOOD GASES ANY COMBINATION: CPT

## 2025-08-25 PROCEDURE — 80048 BASIC METABOLIC PNL TOTAL CA: CPT

## 2025-08-25 PROCEDURE — 82010 KETONE BODYS QUAN: CPT

## 2025-08-25 PROCEDURE — 99497 ADVNCD CARE PLAN 30 MIN: CPT | Mod: GC,25

## 2025-08-25 PROCEDURE — 93970 EXTREMITY STUDY: CPT

## 2025-08-25 PROCEDURE — 84100 ASSAY OF PHOSPHORUS: CPT

## 2025-08-25 PROCEDURE — 86901 BLOOD TYPING SEROLOGIC RH(D): CPT

## 2025-08-25 PROCEDURE — 71045 X-RAY EXAM CHEST 1 VIEW: CPT

## 2025-08-25 PROCEDURE — 80053 COMPREHEN METABOLIC PANEL: CPT

## 2025-08-25 PROCEDURE — 83036 HEMOGLOBIN GLYCOSYLATED A1C: CPT

## 2025-08-25 PROCEDURE — 99239 HOSP IP/OBS DSCHRG MGMT >30: CPT | Mod: GC

## 2025-08-25 PROCEDURE — 83930 ASSAY OF BLOOD OSMOLALITY: CPT

## 2025-08-25 PROCEDURE — 80076 HEPATIC FUNCTION PANEL: CPT

## 2025-08-25 PROCEDURE — 86900 BLOOD TYPING SEROLOGIC ABO: CPT

## 2025-08-25 PROCEDURE — 84132 ASSAY OF SERUM POTASSIUM: CPT

## 2025-08-25 PROCEDURE — 85025 COMPLETE CBC W/AUTO DIFF WBC: CPT

## 2025-08-25 PROCEDURE — 85027 COMPLETE CBC AUTOMATED: CPT

## 2025-08-25 PROCEDURE — 99232 SBSQ HOSP IP/OBS MODERATE 35: CPT | Mod: GC

## 2025-08-25 PROCEDURE — 93005 ELECTROCARDIOGRAM TRACING: CPT

## 2025-08-25 PROCEDURE — 82330 ASSAY OF CALCIUM: CPT

## 2025-08-25 RX ORDER — ISOPROPYL ALCOHOL, BENZOCAINE .7; .06 ML/ML; ML/ML
0 SWAB TOPICAL
Qty: 100 | Refills: 1
Start: 2025-08-25

## 2025-08-25 RX ORDER — INSULIN GLARGINE-YFGN 100 [IU]/ML
25 INJECTION, SOLUTION SUBCUTANEOUS AT BEDTIME
Refills: 0 | Status: DISCONTINUED | OUTPATIENT
Start: 2025-08-25 | End: 2025-08-26

## 2025-08-25 RX ORDER — INSULIN GLARGINE-YFGN 100 [IU]/ML
15 INJECTION, SOLUTION SUBCUTANEOUS
Qty: 1 | Refills: 0
Start: 2025-08-25

## 2025-08-25 RX ORDER — INSULIN LISPRO 100 U/ML
8 INJECTION, SOLUTION INTRAVENOUS; SUBCUTANEOUS
Qty: 1 | Refills: 0
Start: 2025-08-25

## 2025-08-25 RX ORDER — INSULIN LISPRO 100 U/ML
12 INJECTION, SOLUTION INTRAVENOUS; SUBCUTANEOUS
Refills: 0 | Status: DISCONTINUED | OUTPATIENT
Start: 2025-08-25 | End: 2025-08-26

## 2025-08-25 RX ADMIN — HEPARIN SODIUM 5000 UNIT(S): 1000 INJECTION INTRAVENOUS; SUBCUTANEOUS at 06:14

## 2025-08-25 RX ADMIN — INSULIN LISPRO 8 UNIT(S): 100 INJECTION, SOLUTION INTRAVENOUS; SUBCUTANEOUS at 09:23

## 2025-08-25 RX ADMIN — INSULIN LISPRO 8 UNIT(S): 100 INJECTION, SOLUTION INTRAVENOUS; SUBCUTANEOUS at 13:53

## 2025-08-25 RX ADMIN — HEPARIN SODIUM 5000 UNIT(S): 1000 INJECTION INTRAVENOUS; SUBCUTANEOUS at 18:16

## 2025-08-25 RX ADMIN — Medication 81 MILLIGRAM(S): at 12:07

## 2025-08-25 RX ADMIN — INSULIN LISPRO 12 UNIT(S): 100 INJECTION, SOLUTION INTRAVENOUS; SUBCUTANEOUS at 18:56

## 2025-08-25 RX ADMIN — INSULIN LISPRO 2: 100 INJECTION, SOLUTION INTRAVENOUS; SUBCUTANEOUS at 18:56

## 2025-08-25 RX ADMIN — INSULIN LISPRO 4: 100 INJECTION, SOLUTION INTRAVENOUS; SUBCUTANEOUS at 13:54

## 2025-08-25 RX ADMIN — INSULIN LISPRO 4: 100 INJECTION, SOLUTION INTRAVENOUS; SUBCUTANEOUS at 09:24

## 2025-08-26 ENCOUNTER — TRANSCRIPTION ENCOUNTER (OUTPATIENT)
Age: 75
End: 2025-08-26

## 2025-08-26 VITALS
TEMPERATURE: 98 F | SYSTOLIC BLOOD PRESSURE: 127 MMHG | DIASTOLIC BLOOD PRESSURE: 82 MMHG | RESPIRATION RATE: 18 BRPM | HEART RATE: 74 BPM | OXYGEN SATURATION: 96 %

## 2025-08-26 LAB
ALBUMIN SERPL ELPH-MCNC: 3.8 G/DL — SIGNIFICANT CHANGE UP (ref 3.3–5)
ALP SERPL-CCNC: 217 U/L — HIGH (ref 40–120)
ALT FLD-CCNC: 25 U/L — SIGNIFICANT CHANGE UP (ref 10–45)
ANION GAP SERPL CALC-SCNC: 11 MMOL/L — SIGNIFICANT CHANGE UP (ref 5–17)
AST SERPL-CCNC: 28 U/L — SIGNIFICANT CHANGE UP (ref 10–40)
BASOPHILS # BLD AUTO: 0.02 K/UL — SIGNIFICANT CHANGE UP (ref 0–0.2)
BASOPHILS NFR BLD AUTO: 0.8 % — SIGNIFICANT CHANGE UP (ref 0–2)
BILIRUB SERPL-MCNC: 0.7 MG/DL — SIGNIFICANT CHANGE UP (ref 0.2–1.2)
BUN SERPL-MCNC: 24 MG/DL — HIGH (ref 7–23)
CALCIUM SERPL-MCNC: 9.3 MG/DL — SIGNIFICANT CHANGE UP (ref 8.4–10.5)
CHLORIDE SERPL-SCNC: 103 MMOL/L — SIGNIFICANT CHANGE UP (ref 96–108)
CO2 SERPL-SCNC: 24 MMOL/L — SIGNIFICANT CHANGE UP (ref 22–31)
CREAT SERPL-MCNC: 0.99 MG/DL — SIGNIFICANT CHANGE UP (ref 0.5–1.3)
EGFR: 80 ML/MIN/1.73M2 — SIGNIFICANT CHANGE UP
EGFR: 80 ML/MIN/1.73M2 — SIGNIFICANT CHANGE UP
EOSINOPHIL # BLD AUTO: 0.08 K/UL — SIGNIFICANT CHANGE UP (ref 0–0.5)
EOSINOPHIL NFR BLD AUTO: 3 % — SIGNIFICANT CHANGE UP (ref 0–6)
GLUCOSE SERPL-MCNC: 244 MG/DL — HIGH (ref 70–99)
HCT VFR BLD CALC: 36.4 % — LOW (ref 39–50)
HGB BLD-MCNC: 12.1 G/DL — LOW (ref 13–17)
IMM GRANULOCYTES # BLD AUTO: 0.02 K/UL — SIGNIFICANT CHANGE UP (ref 0–0.07)
IMM GRANULOCYTES NFR BLD AUTO: 0.8 % — SIGNIFICANT CHANGE UP (ref 0–0.9)
LYMPHOCYTES # BLD AUTO: 1.26 K/UL — SIGNIFICANT CHANGE UP (ref 1–3.3)
LYMPHOCYTES NFR BLD AUTO: 47.9 % — HIGH (ref 13–44)
MAGNESIUM SERPL-MCNC: 2.2 MG/DL — SIGNIFICANT CHANGE UP (ref 1.6–2.6)
MCHC RBC-ENTMCNC: 30.5 PG — SIGNIFICANT CHANGE UP (ref 27–34)
MCHC RBC-ENTMCNC: 33.2 G/DL — SIGNIFICANT CHANGE UP (ref 32–36)
MCV RBC AUTO: 91.7 FL — SIGNIFICANT CHANGE UP (ref 80–100)
MONOCYTES # BLD AUTO: 0.25 K/UL — SIGNIFICANT CHANGE UP (ref 0–0.9)
MONOCYTES NFR BLD AUTO: 9.5 % — SIGNIFICANT CHANGE UP (ref 2–14)
NEUTROPHILS # BLD AUTO: 1 K/UL — LOW (ref 1.8–7.4)
NEUTROPHILS NFR BLD AUTO: 38 % — LOW (ref 43–77)
NRBC # BLD AUTO: 0 K/UL — SIGNIFICANT CHANGE UP (ref 0–0)
NRBC # FLD: 0 K/UL — SIGNIFICANT CHANGE UP (ref 0–0)
NRBC BLD AUTO-RTO: 0 /100 WBCS — SIGNIFICANT CHANGE UP (ref 0–0)
PHOSPHATE SERPL-MCNC: 3.7 MG/DL — SIGNIFICANT CHANGE UP (ref 2.5–4.5)
PLATELET # BLD AUTO: 197 K/UL — SIGNIFICANT CHANGE UP (ref 150–400)
PMV BLD: 11.6 FL — SIGNIFICANT CHANGE UP (ref 7–13)
POTASSIUM SERPL-MCNC: 4.3 MMOL/L — SIGNIFICANT CHANGE UP (ref 3.5–5.3)
POTASSIUM SERPL-SCNC: 4.3 MMOL/L — SIGNIFICANT CHANGE UP (ref 3.5–5.3)
PROT SERPL-MCNC: 6.7 G/DL — SIGNIFICANT CHANGE UP (ref 6–8.3)
RBC # BLD: 3.97 M/UL — LOW (ref 4.2–5.8)
RBC # FLD: 11.7 % — SIGNIFICANT CHANGE UP (ref 10.3–14.5)
SODIUM SERPL-SCNC: 138 MMOL/L — SIGNIFICANT CHANGE UP (ref 135–145)
WBC # BLD: 2.63 K/UL — LOW (ref 3.8–10.5)
WBC # FLD AUTO: 2.63 K/UL — LOW (ref 3.8–10.5)

## 2025-08-26 PROCEDURE — 82962 GLUCOSE BLOOD TEST: CPT

## 2025-08-26 PROCEDURE — 87040 BLOOD CULTURE FOR BACTERIA: CPT

## 2025-08-26 PROCEDURE — 82803 BLOOD GASES ANY COMBINATION: CPT

## 2025-08-26 PROCEDURE — 80076 HEPATIC FUNCTION PANEL: CPT

## 2025-08-26 PROCEDURE — 93970 EXTREMITY STUDY: CPT

## 2025-08-26 PROCEDURE — 82010 KETONE BODYS QUAN: CPT

## 2025-08-26 PROCEDURE — 99285 EMERGENCY DEPT VISIT HI MDM: CPT | Mod: 25

## 2025-08-26 PROCEDURE — 85027 COMPLETE CBC AUTOMATED: CPT

## 2025-08-26 PROCEDURE — 86901 BLOOD TYPING SEROLOGIC RH(D): CPT

## 2025-08-26 PROCEDURE — 80053 COMPREHEN METABOLIC PANEL: CPT

## 2025-08-26 PROCEDURE — 83605 ASSAY OF LACTIC ACID: CPT

## 2025-08-26 PROCEDURE — 99233 SBSQ HOSP IP/OBS HIGH 50: CPT

## 2025-08-26 PROCEDURE — 84100 ASSAY OF PHOSPHORUS: CPT

## 2025-08-26 PROCEDURE — 83930 ASSAY OF BLOOD OSMOLALITY: CPT

## 2025-08-26 PROCEDURE — 86900 BLOOD TYPING SEROLOGIC ABO: CPT

## 2025-08-26 PROCEDURE — 83036 HEMOGLOBIN GLYCOSYLATED A1C: CPT

## 2025-08-26 PROCEDURE — 86850 RBC ANTIBODY SCREEN: CPT

## 2025-08-26 PROCEDURE — 84295 ASSAY OF SERUM SODIUM: CPT

## 2025-08-26 PROCEDURE — 81003 URINALYSIS AUTO W/O SCOPE: CPT

## 2025-08-26 PROCEDURE — 99497 ADVNCD CARE PLAN 30 MIN: CPT | Mod: 25

## 2025-08-26 PROCEDURE — 36415 COLL VENOUS BLD VENIPUNCTURE: CPT

## 2025-08-26 PROCEDURE — 85025 COMPLETE CBC W/AUTO DIFF WBC: CPT

## 2025-08-26 PROCEDURE — 84132 ASSAY OF SERUM POTASSIUM: CPT

## 2025-08-26 PROCEDURE — 80048 BASIC METABOLIC PNL TOTAL CA: CPT

## 2025-08-26 PROCEDURE — 93005 ELECTROCARDIOGRAM TRACING: CPT

## 2025-08-26 PROCEDURE — 71045 X-RAY EXAM CHEST 1 VIEW: CPT

## 2025-08-26 PROCEDURE — 83735 ASSAY OF MAGNESIUM: CPT

## 2025-08-26 PROCEDURE — 82330 ASSAY OF CALCIUM: CPT

## 2025-08-26 RX ORDER — INSULIN GLARGINE-YFGN 100 [IU]/ML
25 INJECTION, SOLUTION SUBCUTANEOUS
Qty: 1 | Refills: 0
Start: 2025-08-26

## 2025-08-26 RX ORDER — INSULIN ASPART 100 [IU]/ML
14 INJECTION, SOLUTION INTRAVENOUS; SUBCUTANEOUS
Qty: 1 | Refills: 0
Start: 2025-08-26

## 2025-08-26 RX ORDER — INSULIN ASPART 100 [IU]/ML
12 INJECTION, SOLUTION INTRAVENOUS; SUBCUTANEOUS
Qty: 1 | Refills: 0
Start: 2025-08-26

## 2025-08-26 RX ORDER — METFORMIN HYDROCHLORIDE 850 MG/1
1 TABLET ORAL
Qty: 60 | Refills: 2
Start: 2025-08-26 | End: 2025-11-23

## 2025-08-26 RX ORDER — INSULIN GLARGINE-YFGN 100 [IU]/ML
32 INJECTION, SOLUTION SUBCUTANEOUS
Qty: 1 | Refills: 0
Start: 2025-08-26

## 2025-08-26 RX ADMIN — INSULIN LISPRO 12 UNIT(S): 100 INJECTION, SOLUTION INTRAVENOUS; SUBCUTANEOUS at 10:05

## 2025-08-26 RX ADMIN — INSULIN LISPRO 6: 100 INJECTION, SOLUTION INTRAVENOUS; SUBCUTANEOUS at 01:29

## 2025-08-26 RX ADMIN — INSULIN GLARGINE-YFGN 25 UNIT(S): 100 INJECTION, SOLUTION SUBCUTANEOUS at 01:28

## 2025-08-26 RX ADMIN — HEPARIN SODIUM 5000 UNIT(S): 1000 INJECTION INTRAVENOUS; SUBCUTANEOUS at 00:38

## 2025-08-26 RX ADMIN — INSULIN LISPRO 2: 100 INJECTION, SOLUTION INTRAVENOUS; SUBCUTANEOUS at 13:36

## 2025-08-26 RX ADMIN — HEPARIN SODIUM 5000 UNIT(S): 1000 INJECTION INTRAVENOUS; SUBCUTANEOUS at 08:29

## 2025-08-26 RX ADMIN — INSULIN LISPRO 10: 100 INJECTION, SOLUTION INTRAVENOUS; SUBCUTANEOUS at 10:04

## 2025-08-26 RX ADMIN — Medication 81 MILLIGRAM(S): at 11:59

## 2025-08-28 LAB
CULTURE RESULTS: SIGNIFICANT CHANGE UP
CULTURE RESULTS: SIGNIFICANT CHANGE UP
SPECIMEN SOURCE: SIGNIFICANT CHANGE UP
SPECIMEN SOURCE: SIGNIFICANT CHANGE UP

## 2025-09-02 DIAGNOSIS — I10 ESSENTIAL (PRIMARY) HYPERTENSION: ICD-10-CM

## 2025-09-02 DIAGNOSIS — Z92.3 PERSONAL HISTORY OF IRRADIATION: ICD-10-CM

## 2025-09-02 DIAGNOSIS — R09.02 HYPOXEMIA: ICD-10-CM

## 2025-09-02 DIAGNOSIS — Z86.718 PERSONAL HISTORY OF OTHER VENOUS THROMBOSIS AND EMBOLISM: ICD-10-CM

## 2025-09-02 DIAGNOSIS — E11.10 TYPE 2 DIABETES MELLITUS WITH KETOACIDOSIS WITHOUT COMA: ICD-10-CM

## 2025-09-02 DIAGNOSIS — D63.0 ANEMIA IN NEOPLASTIC DISEASE: ICD-10-CM

## 2025-09-02 DIAGNOSIS — C61 MALIGNANT NEOPLASM OF PROSTATE: ICD-10-CM

## 2025-09-02 DIAGNOSIS — Z79.82 LONG TERM (CURRENT) USE OF ASPIRIN: ICD-10-CM

## 2025-09-02 DIAGNOSIS — C79.51 SECONDARY MALIGNANT NEOPLASM OF BONE: ICD-10-CM

## 2025-09-02 DIAGNOSIS — T38.3X6A UNDERDOSING OF INSULIN AND ORAL HYPOGLYCEMIC [ANTIDIABETIC] DRUGS, INITIAL ENCOUNTER: ICD-10-CM

## (undated) DEVICE — DRSG TEGADERM 4X4.75"

## (undated) DEVICE — PREP DURAPREP 26CC

## (undated) DEVICE — DRAPE C ARM C-ARMOUR

## (undated) DEVICE — SUT VICRYL 0 18" CT-1 UNDYED (POP-OFF)

## (undated) DEVICE — GLV 7 PROTEXIS (WHITE)

## (undated) DEVICE — ULTRASOUND TRANSDUCER COVER

## (undated) DEVICE — DRSG TELFA 3 X 8

## (undated) DEVICE — SNAP ON SPHERZ 3 PACK

## (undated) DEVICE — SUT MONOCRYL 3-0 18" PS-1

## (undated) DEVICE — DRAIN RESERVOIR FOR JACKSON PRATT 100CC CARDINAL

## (undated) DEVICE — SUCTION YANKAUER NO CONTROL VENT

## (undated) DEVICE — MIDAS REX LEGEND BALL FLUTED SM BORE 4.0MM X 10CM

## (undated) DEVICE — SUT ETHILON 2-0 18" FS

## (undated) DEVICE — POSITIONER JACKSON TABLE CHEST, HIP, THIGH PADS, ARM CRADLE

## (undated) DEVICE — SPONGE DISSECTOR PEANUT

## (undated) DEVICE — STRYKER BONE MILL BLADE FINE 3.2MM

## (undated) DEVICE — SUT PDS II 0 36" CT-1

## (undated) DEVICE — WARMING BLANKET UPPER ADULT

## (undated) DEVICE — ELCTR BOVIE TIP BLADE INSULATED 2.75" EDGE

## (undated) DEVICE — SOL IRR BAG NS 0.9% 1000ML

## (undated) DEVICE — WARMING BLANKET LOWER ADULT

## (undated) DEVICE — DRAPE STERILE-Z PATIENT

## (undated) DEVICE — SUT VICRYL 2-0 18" CT-2 (POP-OFF)

## (undated) DEVICE — DRSG AQUACEL 3.5 X 14"

## (undated) DEVICE — GLV 7.5 PROTEXIS (WHITE)

## (undated) DEVICE — VENODYNE/SCD SLEEVE CALF MEDIUM

## (undated) DEVICE — DRAPE X-DRAPE W CUT OUT 12X17" .25MM LEAD

## (undated) DEVICE — Device

## (undated) DEVICE — LABELS BLANK W PEN

## (undated) DEVICE — DRSG CURITY GAUZE SPONGE 4 X 4" 12-PLY

## (undated) DEVICE — TUBING FOR SMOKE EVACUATOR (PURPLE END)

## (undated) DEVICE — DRAPE SURGICAL #1010

## (undated) DEVICE — DRAPE SMALL W ADHESIVE APERTURE

## (undated) DEVICE — SOL IRR POUR NS 0.9% 500ML

## (undated) DEVICE — LIJ-KMEDIC KERRISON RONGUER: Type: DURABLE MEDICAL EQUIPMENT

## (undated) DEVICE — DRSG DERMABOND PRINEO 42CM

## (undated) DEVICE — TAPE SILK 3"

## (undated) DEVICE — SOL IRR POUR NS 0.9% 1500ML

## (undated) DEVICE — GLV 7.5 PROTEXIS (BLUE)

## (undated) DEVICE — DRAPE C ARM 41X74"

## (undated) DEVICE — VISITEC 4X4

## (undated) DEVICE — DVC ASCOPE 4 SNGL USE SLIM

## (undated) DEVICE — WOUND IRR SURGIPHOR

## (undated) DEVICE — DRAPE 3/4 SHEET 52X76"

## (undated) DEVICE — DRAPE COVER SNAP 36X30"

## (undated) DEVICE — CANISTER DISPOSABLE THIN WALL 3000CC

## (undated) DEVICE — DRAPE TOP SHEET 53" X 101"

## (undated) DEVICE — SUT MONOCRYL 3-0 27" PS-2 UNDYED

## (undated) DEVICE — POSITIONER CUSHION INSERT PRONE VIEW LG

## (undated) DEVICE — ELCTR GROUNDING PAD ADULT COVIDIEN

## (undated) DEVICE — BIPOLAR FORCEP STRYKER STANDARD 8" X 1MM (YELLOW)

## (undated) DEVICE — PACK NEURO MINOR

## (undated) DEVICE — MIDAS REX LEGEND BALL FLUTED SM BORE 3.0MM X 10CM

## (undated) DEVICE — SYR ASEPTO

## (undated) DEVICE — ELCTR BOVIE PENCIL SMOKE EVACUATION

## (undated) DEVICE — STAPLER SKIN VISI-STAT 35 WIDE

## (undated) DEVICE — WARMING BLANKET FULL UNDERBODY

## (undated) DEVICE — DRSG BIOPATCH DISK W CHG 1" W 4.0MM HOLE

## (undated) DEVICE — DRAPE BACK TABLE COVER 44X90"

## (undated) DEVICE — DRAPE LAPAROTOMY W VELCRO CORD TABS

## (undated) DEVICE — DRAPE SPLIT SHEET 77" X 120"

## (undated) DEVICE — TORQUE DEVICE FOR GUIDEWIRE 0.0100.038"

## (undated) DEVICE — MISONIX BONESCALPEL DIAMOND SHAVER & TUBESET

## (undated) DEVICE — SOL IRR POUR H2O 500ML

## (undated) DEVICE — GLV 7 PROTEXIS (BLUE)

## (undated) DEVICE — SUT QUILL MONODERM 2-0 30CM 19MM

## (undated) DEVICE — CATH IV SAFE INSYTE 14G X 1.75" (ORANGE)

## (undated) DEVICE — SUT VICRYL 0 18" OS-6 (POP-OFF)

## (undated) DEVICE — PACK ANGIOGRAM LNX SURGICOUNT

## (undated) DEVICE — SUT QUILL PDO 0 45CM 36MM

## (undated) DEVICE — DRAPE OR CAMERA COVER

## (undated) DEVICE — GLV 6.5 PROTEXIS (WHITE)

## (undated) DEVICE — SUT VICRYL 3-0 18" SH UNDYED (POP-OFF)

## (undated) DEVICE — ELCTR BOVIE PENCIL BLADE 10FT

## (undated) DEVICE — NDL HYPO SAFE 21G X 1.5" (GREEN)